# Patient Record
Sex: FEMALE | Race: BLACK OR AFRICAN AMERICAN | NOT HISPANIC OR LATINO | Employment: PART TIME | URBAN - METROPOLITAN AREA
[De-identification: names, ages, dates, MRNs, and addresses within clinical notes are randomized per-mention and may not be internally consistent; named-entity substitution may affect disease eponyms.]

---

## 2017-03-12 DIAGNOSIS — Z12.39 ENCOUNTER FOR OTHER SCREENING FOR MALIGNANT NEOPLASM OF BREAST: ICD-10-CM

## 2017-07-17 ENCOUNTER — GENERIC CONVERSION - ENCOUNTER (OUTPATIENT)
Dept: OTHER | Facility: OTHER | Age: 62
End: 2017-07-17

## 2017-07-26 ENCOUNTER — APPOINTMENT (OUTPATIENT)
Dept: RADIOLOGY | Facility: MEDICAL CENTER | Age: 62
End: 2017-07-26
Attending: COLON & RECTAL SURGERY
Payer: COMMERCIAL

## 2017-09-07 ENCOUNTER — HOSPITAL ENCOUNTER (OUTPATIENT)
Dept: RADIOLOGY | Facility: MEDICAL CENTER | Age: 62
End: 2017-09-07
Attending: COLON & RECTAL SURGERY
Payer: COMMERCIAL

## 2017-09-07 ENCOUNTER — HOSPITAL ENCOUNTER (OUTPATIENT)
Dept: LAB | Facility: MEDICAL CENTER | Age: 62
End: 2017-09-07
Attending: NURSE PRACTITIONER
Payer: COMMERCIAL

## 2017-09-07 DIAGNOSIS — E66.01 MORBID OBESITY, UNSPECIFIED OBESITY TYPE (HCC): ICD-10-CM

## 2017-09-07 DIAGNOSIS — Z01.818 PREOP EXAMINATION: ICD-10-CM

## 2017-09-07 LAB — GFR SERPL CREATININE-BSD FRML MDRD: >60 ML/MIN/1.73 M 2

## 2017-09-07 PROCEDURE — 36415 COLL VENOUS BLD VENIPUNCTURE: CPT

## 2017-09-07 PROCEDURE — 85007 BL SMEAR W/DIFF WBC COUNT: CPT

## 2017-09-07 PROCEDURE — 83540 ASSAY OF IRON: CPT

## 2017-09-07 PROCEDURE — 83550 IRON BINDING TEST: CPT

## 2017-09-07 PROCEDURE — 82306 VITAMIN D 25 HYDROXY: CPT

## 2017-09-07 PROCEDURE — 74241 DX-UPPER GI-SERIES WITH KUB: CPT

## 2017-09-07 PROCEDURE — 82607 VITAMIN B-12: CPT

## 2017-09-07 PROCEDURE — 82728 ASSAY OF FERRITIN: CPT

## 2017-09-07 PROCEDURE — 82746 ASSAY OF FOLIC ACID SERUM: CPT

## 2017-09-07 PROCEDURE — 85027 COMPLETE CBC AUTOMATED: CPT

## 2017-09-07 PROCEDURE — 80053 COMPREHEN METABOLIC PANEL: CPT

## 2017-09-07 PROCEDURE — 84443 ASSAY THYROID STIM HORMONE: CPT

## 2017-09-08 LAB
25(OH)D3 SERPL-MCNC: 36 NG/ML (ref 30–100)
ALBUMIN SERPL BCP-MCNC: 3.8 G/DL (ref 3.2–4.9)
ALBUMIN/GLOB SERPL: 1.1 G/DL
ALP SERPL-CCNC: 96 U/L (ref 30–99)
ALT SERPL-CCNC: 16 U/L (ref 2–50)
ANION GAP SERPL CALC-SCNC: 10 MMOL/L (ref 0–11.9)
AST SERPL-CCNC: 22 U/L (ref 12–45)
BASOPHILS # BLD AUTO: 0 % (ref 0–1.8)
BASOPHILS # BLD: 0 K/UL (ref 0–0.12)
BILIRUB SERPL-MCNC: 0.4 MG/DL (ref 0.1–1.5)
BUN SERPL-MCNC: 11 MG/DL (ref 8–22)
CALCIUM SERPL-MCNC: 9.7 MG/DL (ref 8.5–10.5)
CHLORIDE SERPL-SCNC: 106 MMOL/L (ref 96–112)
CO2 SERPL-SCNC: 25 MMOL/L (ref 20–33)
CREAT SERPL-MCNC: 0.9 MG/DL (ref 0.5–1.4)
EOSINOPHIL # BLD AUTO: 0.21 K/UL (ref 0–0.51)
EOSINOPHIL NFR BLD: 2.6 % (ref 0–6.9)
ERYTHROCYTE [DISTWIDTH] IN BLOOD BY AUTOMATED COUNT: 49.7 FL (ref 35.9–50)
FERRITIN SERPL-MCNC: 9.3 NG/ML (ref 10–291)
FOLATE SERPL-MCNC: >23.7 NG/ML
GLOBULIN SER CALC-MCNC: 3.5 G/DL (ref 1.9–3.5)
GLUCOSE SERPL-MCNC: 90 MG/DL (ref 65–99)
HCT VFR BLD AUTO: 37.2 % (ref 37–47)
HGB BLD-MCNC: 10.8 G/DL (ref 12–16)
HYPOCHROMIA BLD QL SMEAR: ABNORMAL
IRON SATN MFR SERPL: 10 % (ref 15–55)
IRON SERPL-MCNC: 42 UG/DL (ref 40–170)
LG PLATELETS BLD QL SMEAR: NORMAL
LYMPHOCYTES # BLD AUTO: 1.85 K/UL (ref 1–4.8)
LYMPHOCYTES NFR BLD: 22.8 % (ref 22–41)
MANUAL DIFF BLD: NORMAL
MCH RBC QN AUTO: 22.4 PG (ref 27–33)
MCHC RBC AUTO-ENTMCNC: 29 G/DL (ref 33.6–35)
MCV RBC AUTO: 77 FL (ref 81.4–97.8)
MONOCYTES # BLD AUTO: 0.28 K/UL (ref 0–0.85)
MONOCYTES NFR BLD AUTO: 3.5 % (ref 0–13.4)
MORPHOLOGY BLD-IMP: NORMAL
NEUTROPHILS # BLD AUTO: 5.76 K/UL (ref 2–7.15)
NEUTROPHILS NFR BLD: 71.1 % (ref 44–72)
NRBC # BLD AUTO: 0 K/UL
NRBC BLD AUTO-RTO: 0 /100 WBC
OVALOCYTES BLD QL SMEAR: NORMAL
PLATELET # BLD AUTO: 313 K/UL (ref 164–446)
PLATELET BLD QL SMEAR: NORMAL
PMV BLD AUTO: 13.1 FL (ref 9–12.9)
POIKILOCYTOSIS BLD QL SMEAR: NORMAL
POTASSIUM SERPL-SCNC: 4.2 MMOL/L (ref 3.6–5.5)
PROT SERPL-MCNC: 7.3 G/DL (ref 6–8.2)
RBC # BLD AUTO: 4.83 M/UL (ref 4.2–5.4)
RBC BLD AUTO: PRESENT
SODIUM SERPL-SCNC: 141 MMOL/L (ref 135–145)
TIBC SERPL-MCNC: 417 UG/DL (ref 250–450)
TSH SERPL DL<=0.005 MIU/L-ACNC: 1.48 UIU/ML (ref 0.3–3.7)
VIT B12 SERPL-MCNC: 304 PG/ML (ref 211–911)
WBC # BLD AUTO: 8.1 K/UL (ref 4.8–10.8)

## 2017-09-13 ENCOUNTER — ALLSCRIPTS OFFICE VISIT (OUTPATIENT)
Dept: OTHER | Facility: OTHER | Age: 62
End: 2017-09-13

## 2017-12-22 ENCOUNTER — HOSPITAL ENCOUNTER (OUTPATIENT)
Dept: RADIOLOGY | Facility: MEDICAL CENTER | Age: 62
End: 2017-12-22
Attending: OTOLARYNGOLOGY
Payer: COMMERCIAL

## 2017-12-22 DIAGNOSIS — C44.319 BASAL CELL CARCINOMA OF FOREHEAD: ICD-10-CM

## 2017-12-22 PROCEDURE — 70481 CT ORBIT/EAR/FOSSA W/DYE: CPT

## 2017-12-22 PROCEDURE — 700117 HCHG RX CONTRAST REV CODE 255

## 2017-12-22 RX ADMIN — IOHEXOL 80 ML: 350 INJECTION, SOLUTION INTRAVENOUS at 11:39

## 2018-01-09 NOTE — MISCELLANEOUS
Provider Comments  Provider Comments:   called patient about no show appointment   LMOM to call office back and reschedule      Signatures   Electronically signed by : Jose Gutierrez DO; Sep 13 2017 11:37PM EST                       (Author)

## 2018-01-10 NOTE — MISCELLANEOUS
Message   Recorded as Task   Date: 07/13/2017 01:21 AM, Created By: System   Task Name: Rx Renew Request   Assigned To: Zechariah High   Regarding Patient: Jesusita Melvin, Status: Active   Comment:    System - 13 Jul 2017 1:21 AM     PHARMACY: [id number]  PATIENT: Jesusita Melvin  MEDICATION: METFORMIN HCL 1,000 MG TABLET   Janet Zee - 13 Jul 2017 3:29 PM     TASK REASSIGNED: Previously Assigned To Zechariah High  Pt needs an appt  her last OV was 10/2016     Chris Escobar - 13 Jul 2017 4:21 PM     TASK EDITED  Gabino Cantor - 13 Jul 2017 4:21 PM     TASK IN PROGRESS   Vicky Knowles - 14 Jul 2017 11:07 AM     [id number] has placed an additional request for this medication: METFORMIN HCL 1,000 MG TABLET [received on Jul 14 2017 11:07:42:113AM]   Zechariah High - 14 Jul 2017 4:59 PM     TASK EDITED  sent but ovedue for appt for DM   Vicky Knowles - 17 Jul 2017 9:00 AM     TASK EDITED  Patient aware appointment made   Chris Escobar - 17 Jul 2017 9:00 AM     TASK EDITED  Please close        Signatures   Electronically signed by : Sukumar Narayanan DO; Jul 17 2017 11:45AM EST                       (Author)

## 2018-01-10 NOTE — RESULT NOTES
Verified Results  ([facility]) Hemoglobin A1c 01Apr2016 10:13AM Alessandra Lee     Test Name Result Flag Reference   Hemoglobin A1c 7 3 %Hb H    Reference Range:  American Diabetes Association (ADA) Guidelines:  <5 7: Decreased risk for diabetes  5 7 - 6 4: Increased risk for diabetes  >6 4: Ongoing Hyperglycemia of any cause  <7 0: Glycemic control for adults with diabetes   Estimated Average Glucose 163 mg/dL         Discussion/Summary   Please schedule an office visit for your Diabetes very soon    Dr Ortiz Cons

## 2018-01-11 NOTE — RESULT NOTES
Verified Results  [facility]) CMP14+eGFR 01Apr2016 10:13AM Elvira Phipps     Test Name Result Flag Reference   Glucose, Serum 97 mg/dL  65-99   BUN 14 mg/dL  8-27   Creatinine, Serum 0 84 mg/dL  0 57-1 00   eGFR If NonAfricn Am 75 mL/min/1 73  >59   eGFR If Africn Am 86 mL/min/1 73  >59   BUN/Creatinine Ratio 17  11-26   Sodium, Serum 140 mmol/L  134-144   Potassium, Serum 4 7 mmol/L  3 5-5 2   Chloride, Serum 103 mmol/L     Carbon Dioxide, Total 23 mmol/L  18-29   Calcium, Serum 11 5 mg/dL H 8 7-10 3   **Verified by repeat analysis**   Protein, Total, Serum 6 8 g/dL  6 0-8 5   Albumin, Serum 3 9 g/dL  3 6-4 8   Globulin, Total 2 9 g/dL  1 5-4 5   A/G Ratio 1 3  1 1-2 5   Bilirubin, Total 0 4 mg/dL  0 0-1 2   Alkaline Phosphatase, S 71 IU/L     AST (SGOT) 10 IU/L  0-40   ALT (SGPT) 13 IU/L  0-32       Discussion/Summary   Sugar, kidneys, minerals and liver are good    Dr Brittanie Duarte

## 2018-01-13 NOTE — RESULT NOTES
Verified Results  MAMMO SCREENING RIGHT W CAD [id number] 10:11AM Ceola Roberta     Test Name Result Flag Reference   MAMMO SCREENING RIGHT W CAD (Report)     Patient History:   Patient has history of cancer in the left breast at age 34, has    history of breast cancer at age 26, and had previous    chemotherapy  Family history of breast cancer in mother at age 80  Reduction of the right breast, 1994  Malignant mastectomy of the   left breast, 1994  Patient's BMI is 24 8  Reason for exam: history of breast cancer, mastectomy  Mammo Screening Right W CAD: February 23, 2016 - Check In #:    [de-identified]   CC, MLO, and XCCL view(s) were taken of the right breast      Technologist: EVELYN Ortega   Prior study comparison: December 26, 2014, right breast    diagnostic mammogram performed at [address]  October 10, 2014, right breast screening mammogram    performed at [address]  There are scattered fibroglandular densities  No new dominant    soft tissue mass, architectural distortion or suspicious    calcifications are noted  The skin and nipple structures are    within normal limits  Benign appearing calcifications are noted  No mammographic evidence of malignancy  No    significant changes when compared with prior studies  ASSESSMENT: BiRad:2 - Benign     Recommendation:   Routine screening mammogram of the right breast in 1 year  Analyzed by CAD     8-10% of cancers will be missed on mammography  Management of a    palpable abnormality must be based on clinical grounds  Patients   will be notified of their results via letter from our facility  Accredited by [employer] of [employer]iology and FDA  Transcription Location: [address] [place] [address] [place]: [id number]     Risk Value(s):   Myriad Table: 26 3%, MRS : Based on personal and/or    family history, consideration of hereditary risk assessment may    be warranted  Signed by:   Aracelis Brown MD   2/23/16       Plan  Encounter for screening mammogram for malignant neoplasm of breast    · Digital Unilateral Diagnostic Mammogram With CAD ; every 1 year; Last [id number]; Next 72KEC9485; Status:Active    Discussion/Summary   Mammogram is normal   Repeat in one year    Dr Awa Beavers

## 2018-01-15 NOTE — RESULT NOTES
Verified Results  (1) COMPREHENSIVE METABOLIC PANEL 26HGU1563 64:80OM Patty Redman     Test Name Result Flag Reference   Glucose, Serum 105 mg/dL H 65-99   BUN 15 mg/dL  8-27   Creatinine, Serum 0 82 mg/dL  0 57-1 00   eGFR If NonAfricn Am 76 mL/min/1 73  >59   eGFR If Africn Am 88 mL/min/1 73  >59   BUN/Creatinine Ratio 18  11-26   Sodium, Serum 138 mmol/L  134-144   **Effective October 17, 2016 the reference interval**                   for Sodium, Serum will be changing to:                                                             136 - 144   Potassium, Serum 4 4 mmol/L  3 5-5 2   **Effective October 17, 2016 the reference interval**                   for Potassium, Serum will be changing to:                                          0 -  7 days        3 7 - 5 2                                          8 - 30 days        3 7 - 6 4                                          1 -  6 months      3 8 - 6 0                                   7 months -  1 year        3 8 - 5 3                                              >1 year        3 5 - 5 2   Chloride, Serum 102 mmol/L     **Effective October 17, 2016 the reference interval**                   for Chloride, Serum will be changing to:                                                              97 - 106   Carbon Dioxide, Total 23 mmol/L  18-29   Calcium, Serum 12 2 mg/dL H 8 7-10 3   **Verified by repeat analysis**   Protein, Total, Serum 6 9 g/dL  6 0-8 5   Albumin, Serum 4 0 g/dL  3 6-4 8   Globulin, Total 2 9 g/dL  1 5-4 5   A/G Ratio 1 4  1 1-2 5   Bilirubin, Total 0 3 mg/dL  0 0-1 2   Alkaline Phosphatase, S 84 IU/L     AST (SGOT) 16 IU/L  0-40   ALT (SGPT) 16 IU/L  0-32     (1) LIPID PANEL FASTING W DIRECT LDL REFLEX [id number] 08:41AM Patty Redman     Test Name Result Flag Reference   Cholesterol, Total 190 mg/dL  100-199   Triglycerides 100 mg/dL  0-149   HDL Cholesterol 62 mg/dL  >39   According to ATP-III Guidelines, HDL-C >59 mg/dL is considered a  negative risk factor for CHD  LDL Cholesterol Calc 108 mg/dL H 0-99     (1) MICROALBUMIN CREATININE RATIO, RANDOM URINE 07Oct2016 08:41AM Татьяна Pelt     Test Name Result Flag Reference   Creatinine, Urine 74 6 mg/dL  Not Estab  Microalbumin, Urine 12 0 ug/mL  Not Estab  Microalb/Creat Ratio 16 1 mg/g creat  0 0-30 0     (1) HEMOGLOBIN A1C 07Oct2016 08:41AM Татьяна Pelt     Test Name Result Flag Reference   Hemoglobin A1c 6 5 % H 4 8-5 6   Pre-diabetes: 5 7 - 6 4           Diabetes: >6 4           Glycemic control for adults with diabetes: <7 0       Discussion/Summary   Sugar, kidneys, minerals and liver are normal/good  Cholesterol profile is normal/good  LDL goal <100  The yearly urine test for diabetic kidney issues/harm is normal       A1c level indicates good diabetes control for past 3 months  Follow up in 3 months   Dr Mili Ayoub

## 2018-01-15 NOTE — PROGRESS NOTES
History of Present Illness  Care Coordination Encounter Information:   Type of Encounter: Telephonic   Last Office Visit: 4/18/16   Spoke to Patient  Care Coordination SL Nurse Katina Dates:   The reason for call is to discuss outreach for follow up/needed services and coordination of meeting care plan treatment goals  I called Amarilis Kang as per CPCI list  She has uncontrolled diabetes and has not been seen since 4/18/16  She refuses to schedule an appointment at this time as she "lost my insurance when I retired " She states she is working on getting some type of coverage  I have offered care coordination services that are provided free of charge and provided my contact information to her  Active Problems    1  Adenocarcinoma of breast, unspecified laterality (174 9) (C50 919)   2  Arthropathy (716 90) (M12 9)   3  Benign essential hypertension (401 1) (I10)   4  Body mass index (BMI) 24 0-24 9, adult (V85 1) (Z68 24)   5  Breast cancer screening, high risk patient (V76 11) (Z12 39)   6  Chronic kidney disease, stage I (585 1) (N18 1)   7  Chronic reflux esophagitis (530 11) (K21 0)   8  Colon cancer screening (V76 51) (Z12 11)   9  Colon, diverticulosis (562 10) (K57 30)   10  Hyperlipidemia LDL goal <100 (272 4) (E78 5)   11  Immunization due (V05 9) (Z23)   12  Nicotine dependence (305 1) (F17 200)   13  Obstructive sleep apnea (327 23) (G47 33)   14  Type 2 diabetes, uncontrolled, with renal manifestation (250 42) (E11 29,E11 65)   15  Well adult on routine health check (V70 0) (Z00 00)    Past Medical History    1  History of Acute bronchitis, unspecified organism (466 0) (J20 9)   2  History of Acute maxillary sinusitis, recurrence not specified (461 0) (J01 00)   3  Acute upper respiratory infection (465 9) (J06 9)   4  Acute upper respiratory infection (465 9) (J06 9)   5  Cough (786 2) (R05)   6  History of acute bronchitis (V12 69) (Z87 09)   7  History of acute bronchitis (V12 69) (Z87 09)   8  History of acute bronchitis (V12 69) (Z87 09)   9  History of sebaceous cyst (V13 3) (Z87 2)   10  History of tinea corporis (V12 09) (Z86 19)   11  History of Late Effects Of Sprain Or Strain (905 7)   12  History of Noninfectious gastroenteritis (558 9) (K52 9)   13  History of Noninfectious gastroenteritis (558 9) (K52 9)   14  History of Paronychia of toe, unspecified laterality (681 11) (L03 039)   15  History of Shoulder joint pain, unspecified laterality   16  History of Shoulder Sprain (840 9)   17  History of Type 2 diabetes mellitus (250 00) (E11 9)    Surgical History    1  History of Breast Surgery Mastectomy    Family History  Mother    1  Denied: Family history of Colon cancer   2  Denied: Family history of Crohn disease   3  Family history of hypothyroidism (V18 19) (Z83 49)   4  Denied: Family history of Liver disease  Father    5  Denied: Family history of Colon cancer   6  Denied: Family history of Crohn disease   7  Family history of diabetes mellitus (V18 0) (Z83 3)   8  Denied: Family history of Liver disease    Social History    · Being A Social Drinker   · Current every day smoker (305 1) (F17 200)   · Nicotine dependence (305 1) (F17 200)    Current Meds    1  Irbesartan 150 MG Oral Tablet; 1 every day; Therapy: 37YNA2415 to (Last Rx:48Zud8438)  Requested for: 68IGM7945 Ordered    2  Esomeprazole Magnesium 40 MG Oral Capsule Delayed Release (NexIUM); TAKE ONE   CAPSULE BY MOUTH EVERY DAY; Therapy: 61ERP3379 to (Evaluate:18Jan2017)  Requested for: 44Hgv8386; Last   Rx:87Okg6820 Ordered    3  Accu-Chek Bessie Plus In Vitro Strip; test bid as directed, <250 00>; Therapy: 32DMU5802 to (Last Rx:04Mar2014) Ordered   4  MetFORMIN HCl - 1000 MG Oral Tablet; take 1 tablet twice a day; Therapy: 22YEW3260 to (Evaluate:85Drb5245)  Requested for: 66WJG6544; Last   Rx:22Mar2016 Ordered    Allergies    1  No Known Drug Allergies    Health Management   MAMMO SCREENING RIGHT W CAD; every 1 year;  Last 38MYM6384; Next Due: 80EEK1632; Active   COLONOSCOPY; every 10 years; Last 70GWG0802; Next Due: 78TEG3888; Active   MAMMO SCREENING RIGHT W CAD; every 1 year; Last 13Buk7467; Next Due: 13Hcp0870; Active   MAMMO SCREENING RIGHT W CAD; every 1 year; Last 23Feb2016; Next Due: 45Zll3503; Active    End of Encounter Meds    1  Irbesartan 150 MG Oral Tablet; 1 every day; Therapy: 59OIQ2442 to (Last Rx:15Jul2016)  Requested for: 86DNM8922 Ordered    2  Esomeprazole Magnesium 40 MG Oral Capsule Delayed Release (NexIUM); TAKE ONE   CAPSULE BY MOUTH EVERY DAY; Therapy: 11QIC1309 to (Evaluate:18Jan2017)  Requested for: 22Jul2016; Last   Rx:22Jul2016 Ordered    3  Accu-Chek Bessie Plus In Vitro Strip; test bid as directed, <250 00>; Therapy: 29QWI4767 to (Last Rx:04Mar2014) Ordered   4  MetFORMIN HCl - 1000 MG Oral Tablet; take 1 tablet twice a day; Therapy: 27MCP6500 to (Evaluate:18Sep2016)  Requested for: 22Mar2016; Last   Rx:22Mar2016 Ordered    Patient Care Team    Care Team Member Role Specialty Office Number   Sheldon CLEMENTE  Gastroenterology Adult [phone]     Signatures   Electronically signed by :  Darrick Song RN; Aug 22 2016 11:37AM EST                       (Author)

## 2018-02-21 ENCOUNTER — OFFICE VISIT (OUTPATIENT)
Dept: FAMILY MEDICINE CLINIC | Facility: CLINIC | Age: 63
End: 2018-02-21

## 2018-02-21 VITALS
SYSTOLIC BLOOD PRESSURE: 130 MMHG | BODY MASS INDEX: 26.05 KG/M2 | HEIGHT: 63 IN | TEMPERATURE: 98.6 F | WEIGHT: 147 LBS | HEART RATE: 64 BPM | RESPIRATION RATE: 16 BRPM | DIASTOLIC BLOOD PRESSURE: 70 MMHG

## 2018-02-21 DIAGNOSIS — I10 BENIGN ESSENTIAL HYPERTENSION: Primary | ICD-10-CM

## 2018-02-21 DIAGNOSIS — J20.9 ACUTE BRONCHITIS, UNSPECIFIED ORGANISM: ICD-10-CM

## 2018-02-21 DIAGNOSIS — IMO0002 UNCONTROLLED TYPE 2 DIABETES MELLITUS WITH OTHER DIABETIC KIDNEY COMPLICATION, WITHOUT LONG-TERM CURRENT USE OF INSULIN: ICD-10-CM

## 2018-02-21 DIAGNOSIS — K21.00 CHRONIC REFLUX ESOPHAGITIS: ICD-10-CM

## 2018-02-21 PROCEDURE — 99213 OFFICE O/P EST LOW 20 MIN: CPT | Performed by: FAMILY MEDICINE

## 2018-02-21 RX ORDER — PANTOPRAZOLE SODIUM 40 MG/1
1 TABLET, DELAYED RELEASE ORAL DAILY
COMMUNITY
Start: 2016-10-04 | End: 2018-10-24

## 2018-02-21 RX ORDER — VALSARTAN 160 MG/1
1 TABLET ORAL DAILY
COMMUNITY
Start: 2012-01-06 | End: 2018-08-24

## 2018-02-21 RX ORDER — IRBESARTAN 150 MG/1
TABLET ORAL DAILY
Refills: 0 | Status: CANCELLED | OUTPATIENT
Start: 2018-02-21

## 2018-02-21 RX ORDER — IRBESARTAN 150 MG/1
TABLET ORAL DAILY
COMMUNITY
Start: 2016-07-15 | End: 2018-02-21

## 2018-02-21 RX ORDER — PANTOPRAZOLE SODIUM 40 MG/1
40 TABLET, DELAYED RELEASE ORAL DAILY
Refills: 0 | Status: CANCELLED | OUTPATIENT
Start: 2018-02-21

## 2018-02-21 RX ORDER — AMOXICILLIN 500 MG/1
500 TABLET, FILM COATED ORAL 3 TIMES DAILY
Qty: 30 TABLET | Refills: 0 | Status: SHIPPED | OUTPATIENT
Start: 2018-02-21 | End: 2018-03-03

## 2018-02-21 RX ORDER — IRBESARTAN 150 MG/1
150 TABLET ORAL DAILY
Qty: 30 TABLET | Refills: 3 | Status: SHIPPED | OUTPATIENT
Start: 2018-02-21 | End: 2018-08-24 | Stop reason: SDUPTHER

## 2018-02-21 NOTE — PROGRESS NOTES
Assessment/Plan:    No problem-specific Assessment & Plan notes found for this encounter  Needs dm f/u asap  Overdue  q3m advised  Getting insurance  Risks of meds aware alonzo unmonitored  Bronchitis possible pneumonia     Diagnoses and all orders for this visit:    Benign essential hypertension  -     irbesartan (AVAPRO) 150 mg tablet; Take 1 tablet (150 mg total) by mouth daily    Uncontrolled type 2 diabetes mellitus with other diabetic kidney complication, without long-term current use of insulin (HCC)  -     metFORMIN (GLUCOPHAGE) 1000 MG tablet; Take 1 tablet (1,000 mg total) by mouth 2 (two) times a day with meals    Chronic reflux esophagitis    Acute bronchitis, unspecified organism  -     amoxicillin (AMOXIL) 500 MG tablet; Take 1 tablet (500 mg total) by mouth 3 (three) times a day for 10 days    Other orders  -     valsartan (DIOVAN) 160 mg tablet; Take 1 tablet by mouth daily  -     pantoprazole (PROTONIX) 40 mg tablet; Take 1 tablet by mouth daily  -     Discontinue: irbesartan (AVAPRO) 150 mg tablet; Take by mouth daily  -     glucose blood (ACCU-CHEK TAVON PLUS) test strip; by In Vitro route  -     Discontinue: metFORMIN (GLUCOPHAGE) 1000 MG tablet; TAKE 1 TABLET BY MOUTH TWICE A DAY *PT NEEDS TO MAKE APPT*  -     Cancel: irbesartan (AVAPRO) 150 mg tablet; Take by mouth daily  -     Cancel: metFORMIN (GLUCOPHAGE) 1000 MG tablet;   -     Cancel: pantoprazole (PROTONIX) 40 mg tablet; Take 1 tablet (40 mg total) by mouth daily              Return if symptoms worsen or fail to improve  Subjective:      Patient ID: Maya Knutson is a 57 y o  female      Chief Complaint   Patient presents with    Generalized Body Aches       2d, cough, severe body aches, mucus is yellow and thick, still smoking, no fever  Not seen in 16m, aware of DM risk  No ha or dizziness  Sneezing  Stomach sore from coughing  No cp or sob      Generalized Body Aches         The following portions of the patient's history were reviewed and updated as appropriate: allergies, current medications, past family history, past medical history, past social history, past surgical history and problem list     Review of Systems   Neurological: Negative for dizziness and syncope  Current Outpatient Prescriptions   Medication Sig Dispense Refill    glucose blood (ACCU-CHEK TAVON PLUS) test strip by In Vitro route      irbesartan (AVAPRO) 150 mg tablet Take 1 tablet (150 mg total) by mouth daily 30 tablet 3    metFORMIN (GLUCOPHAGE) 1000 MG tablet Take 1 tablet (1,000 mg total) by mouth 2 (two) times a day with meals 60 tablet 3    pantoprazole (PROTONIX) 40 mg tablet Take 1 tablet by mouth daily      valsartan (DIOVAN) 160 mg tablet Take 1 tablet by mouth daily      amoxicillin (AMOXIL) 500 MG tablet Take 1 tablet (500 mg total) by mouth 3 (three) times a day for 10 days 30 tablet 0     No current facility-administered medications for this visit  Objective:    /70   Pulse 64   Temp 98 6 °F (37 °C)   Resp 16   Ht 5' 3" (1 6 m)   Wt 66 7 kg (147 lb)   BMI 26 04 kg/m²        Physical Exam   Constitutional: She appears well-developed  HENT:   Head: Normocephalic  Eyes: Conjunctivae are normal    Neck: Neck supple  Cardiovascular: Normal rate and intact distal pulses  Pulmonary/Chest: Effort normal  No respiratory distress  Coarse rhonchi right mid lung   Abdominal: Soft  Musculoskeletal: She exhibits no edema or deformity  Neurological: She is alert  Skin: Skin is warm and dry  Psychiatric: Her behavior is normal  Thought content normal    Nursing note and vitals reviewed               Saud Hennessy DO

## 2018-02-25 DIAGNOSIS — IMO0002 UNCONTROLLED TYPE 2 DIABETES MELLITUS WITH OTHER DIABETIC KIDNEY COMPLICATION, WITHOUT LONG-TERM CURRENT USE OF INSULIN: ICD-10-CM

## 2018-08-24 DIAGNOSIS — I10 BENIGN ESSENTIAL HYPERTENSION: ICD-10-CM

## 2018-08-24 RX ORDER — IRBESARTAN 150 MG/1
TABLET ORAL
Qty: 30 TABLET | Refills: 0 | Status: SHIPPED | OUTPATIENT
Start: 2018-08-24 | End: 2018-10-01 | Stop reason: SDUPTHER

## 2018-10-01 DIAGNOSIS — I10 BENIGN ESSENTIAL HYPERTENSION: ICD-10-CM

## 2018-10-01 RX ORDER — IRBESARTAN 150 MG/1
TABLET ORAL
Qty: 30 TABLET | Refills: 0 | Status: SHIPPED | OUTPATIENT
Start: 2018-10-01 | End: 2018-10-24

## 2018-10-24 ENCOUNTER — OFFICE VISIT (OUTPATIENT)
Dept: FAMILY MEDICINE CLINIC | Facility: CLINIC | Age: 63
End: 2018-10-24
Payer: MEDICARE

## 2018-10-24 VITALS
SYSTOLIC BLOOD PRESSURE: 138 MMHG | HEART RATE: 76 BPM | DIASTOLIC BLOOD PRESSURE: 84 MMHG | RESPIRATION RATE: 16 BRPM | BODY MASS INDEX: 24.59 KG/M2 | TEMPERATURE: 97.6 F | WEIGHT: 138.8 LBS | HEIGHT: 63 IN

## 2018-10-24 DIAGNOSIS — E11.29 TYPE 2 DIABETES MELLITUS WITH OTHER DIABETIC KIDNEY COMPLICATION, WITHOUT LONG-TERM CURRENT USE OF INSULIN (HCC): Primary | ICD-10-CM

## 2018-10-24 DIAGNOSIS — N18.1 CKD STAGE 1 DUE TO TYPE 2 DIABETES MELLITUS (HCC): ICD-10-CM

## 2018-10-24 DIAGNOSIS — Z78.0 MENOPAUSE: ICD-10-CM

## 2018-10-24 DIAGNOSIS — E78.5 HYPERLIPIDEMIA LDL GOAL <100: ICD-10-CM

## 2018-10-24 DIAGNOSIS — Z13.83 SCREENING FOR CARDIOVASCULAR, RESPIRATORY, AND GENITOURINARY DISEASES: ICD-10-CM

## 2018-10-24 DIAGNOSIS — Z12.39 BREAST CANCER SCREENING: ICD-10-CM

## 2018-10-24 DIAGNOSIS — I10 BENIGN ESSENTIAL HYPERTENSION: ICD-10-CM

## 2018-10-24 DIAGNOSIS — Z23 IMMUNIZATION DUE: ICD-10-CM

## 2018-10-24 DIAGNOSIS — Z13.89 SCREENING FOR CARDIOVASCULAR, RESPIRATORY, AND GENITOURINARY DISEASES: ICD-10-CM

## 2018-10-24 DIAGNOSIS — F17.208 NICOTINE DEPENDENCE WITH OTHER NICOTINE-INDUCED DISORDER, UNSPECIFIED NICOTINE PRODUCT TYPE: ICD-10-CM

## 2018-10-24 DIAGNOSIS — Z12.11 COLON CANCER SCREENING: ICD-10-CM

## 2018-10-24 DIAGNOSIS — E11.22 CKD STAGE 1 DUE TO TYPE 2 DIABETES MELLITUS (HCC): ICD-10-CM

## 2018-10-24 DIAGNOSIS — Z13.6 SCREENING FOR CARDIOVASCULAR, RESPIRATORY, AND GENITOURINARY DISEASES: ICD-10-CM

## 2018-10-24 PROCEDURE — G0009 ADMIN PNEUMOCOCCAL VACCINE: HCPCS

## 2018-10-24 PROCEDURE — 90670 PCV13 VACCINE IM: CPT

## 2018-10-24 PROCEDURE — 90662 IIV NO PRSV INCREASED AG IM: CPT

## 2018-10-24 PROCEDURE — 99214 OFFICE O/P EST MOD 30 MIN: CPT | Performed by: FAMILY MEDICINE

## 2018-10-24 PROCEDURE — G0008 ADMIN INFLUENZA VIRUS VAC: HCPCS

## 2018-10-24 RX ORDER — AMLODIPINE BESYLATE 10 MG/1
10 TABLET ORAL DAILY
Qty: 90 TABLET | Refills: 0 | Status: SHIPPED | OUTPATIENT
Start: 2018-10-24 | End: 2019-03-04 | Stop reason: SDUPTHER

## 2018-10-24 NOTE — PROGRESS NOTES
Assessment/Plan:    No problem-specific Assessment & Plan notes found for this encounter  Diagnoses and all orders for this visit:    Type 2 diabetes mellitus with other diabetic kidney complication, without long-term current use of insulin ([facility] 75 )  -     Comprehensive metabolic panel; Future  -     Hemoglobin A1C; Future  -     Microalbumin / creatinine urine ratio; Future  -     metFORMIN (GLUCOPHAGE) 1000 MG tablet; Take 1 tablet (1,000 mg total) by mouth 2 (two) times a day with meals    Screening for cardiovascular, respiratory, and genitourinary diseases  -     Lipid Panel with Direct LDL reflex; Future    Immunization due  -     PNEUMOCOCCAL CONJUGATE VACCINE 13-VALENT GREATER THAN 6 MONTHS  -     influenza vaccine, 5480-2586, high-dose, PF 0 5 mL, for patients 65 yr+ (FLUZONE HIGH-DOSE)    CKD stage 1 due to type 2 diabetes mellitus ([facility] 75 )  -     Comprehensive metabolic panel; Future    Nicotine dependence with other nicotine-induced disorder, unspecified nicotine product type  -     CT lung screening program; Future    Hyperlipidemia LDL goal <100    Benign essential hypertension  -     amLODIPine (NORVASC) 10 mg tablet; Take 1 tablet (10 mg total) by mouth daily    Breast cancer screening  -     Mammo screening bilateral w cad; Future    Colon cancer screening  -     Ambulatory referral to Gastroenterology; Future    Menopause  -     DXA bone density spine hip and pelvis; Future              Return in about 1 month (around 11/24/2018) for Recheck  Subjective:      Patient ID: Nina Rojas is a 70 y o  female      Chief Complaint   Patient presents with    Hypertension    Diabetes    Hyperlipidemia     [facility]    Cough     beleives it is from blood pressure medication       HPI  Taking meds  Cough   Smokes still  Willing to get lung CT scan  Coughed in past due ACEi  DM risks aware, needs q3m monitoring  Ongoing shoulder issue, WC injury  htn stable, no cp    The following portions of the patient's history were reviewed and updated as appropriate: allergies, current medications, past family history, past medical history, past social history, past surgical history and problem list     Review of Systems   Neurological: Negative for dizziness and syncope  Current Outpatient Prescriptions   Medication Sig Dispense Refill    glucose blood (ACCU-CHEK TAVON PLUS) test strip by In Vitro route      metFORMIN (GLUCOPHAGE) 1000 MG tablet Take 1 tablet (1,000 mg total) by mouth 2 (two) times a day with meals 180 tablet 0    amLODIPine (NORVASC) 10 mg tablet Take 1 tablet (10 mg total) by mouth daily 90 tablet 0     No current facility-administered medications for this visit  Objective:    /84   Pulse 76   Temp 97 6 °F (36 4 °C)   Resp 16   Ht 5' 3" (1 6 m)   Wt 63 kg (138 lb 12 8 oz)   BMI 24 59 kg/m²        Physical Exam   Constitutional: She appears well-developed  HENT:   Head: Normocephalic  Eyes: Conjunctivae are normal    Neck: Neck supple  Cardiovascular: Normal rate and intact distal pulses  Pulses are no weak pulses  Pulses:       Dorsalis pedis pulses are 2+ on the right side, and 2+ on the left side  Pulmonary/Chest: Effort normal  No respiratory distress  Abdominal: Soft  Musculoskeletal: She exhibits no edema or deformity  Feet:   Right Foot:   Skin Integrity: Negative for ulcer, skin breakdown, erythema, warmth, callus or dry skin  Left Foot:   Skin Integrity: Negative for ulcer, skin breakdown, erythema, warmth, callus or dry skin  Neurological: She is alert  Skin: Skin is warm and dry  Psychiatric: Her behavior is normal  Thought content normal    Nursing note and vitals reviewed  Patient's shoes and socks removed  Right Foot/Ankle   Right Foot Inspection  Skin Exam: skin normal and skin intact no dry skin, no warmth, no callus, no erythema, no maceration, no abnormal color, no pre-ulcer, no ulcer and no callus Sensory       Monofilament testing: intact  Vascular    The right DP pulse is 2+  Left Foot/Ankle  Left Foot Inspection  Skin Exam: skin normal and skin intactno dry skin, no warmth, no erythema, no maceration, normal color, no pre-ulcer, no ulcer and no callus                                         Sensory       Monofilament: intact  Vascular    The left DP pulse is 2+  Assign Risk Category:  No deformity present; No loss of protective sensation;  No weak pulses       Risk: 0           Norm Sharron, DO

## 2018-10-24 NOTE — PATIENT INSTRUCTIONS
TEST STRIPS for Diabetes monitoring can be prescribed to your pharmacy  Since the test strips are unique to the [facility] of the glucose meter, it is in your best economic interest to find out the SPECIFIC BRAND and TYPE of test strip covered by your insurance company  You can find this out from your insurance company or participating pharmacy  Once you obtain this information, we can prescribe the specific branded test strip of your choosing  As a Type 2 Diabetic, it would be informative to check and record your blood sugars at home to help manage sugar control and identify any fluctuations that may be due to diet/exercise  You can check your sugar ONCE A DAY at [facility] DIFFERENT times in an alternating fashion: (1) before BREAKFAST, then the next day (2) before LUNCH, the following day (3) before DINNER, and the following day (4) before BEDTIME (then start over again)  Identifying trends may help you determine if changes are needed to what you eat, when you eat it, your activity level, and your medications  You should bring this records with you to your Diabetes follow-up appointments every 3 months  Stay on your metformin for diabetes  Change blood pressure pill to amlodipine to see if cough occurs

## 2018-10-25 ENCOUNTER — APPOINTMENT (OUTPATIENT)
Dept: LAB | Facility: HOSPITAL | Age: 63
End: 2018-10-25
Attending: FAMILY MEDICINE
Payer: MEDICARE

## 2018-10-25 ENCOUNTER — TELEPHONE (OUTPATIENT)
Dept: GASTROENTEROLOGY | Facility: AMBULARY SURGERY CENTER | Age: 63
End: 2018-10-25

## 2018-10-25 ENCOUNTER — TELEPHONE (OUTPATIENT)
Dept: FAMILY MEDICINE CLINIC | Facility: CLINIC | Age: 63
End: 2018-10-25

## 2018-10-25 ENCOUNTER — TRANSCRIBE ORDERS (OUTPATIENT)
Dept: ADMINISTRATIVE | Facility: HOSPITAL | Age: 63
End: 2018-10-25

## 2018-10-25 DIAGNOSIS — Z13.6 SCREENING FOR CARDIOVASCULAR, RESPIRATORY, AND GENITOURINARY DISEASES: ICD-10-CM

## 2018-10-25 DIAGNOSIS — Z13.83 SCREENING FOR CARDIOVASCULAR, RESPIRATORY, AND GENITOURINARY DISEASES: ICD-10-CM

## 2018-10-25 DIAGNOSIS — E11.29 TYPE 2 DIABETES MELLITUS WITH OTHER DIABETIC KIDNEY COMPLICATION, WITHOUT LONG-TERM CURRENT USE OF INSULIN (HCC): ICD-10-CM

## 2018-10-25 DIAGNOSIS — N18.1 CKD STAGE 1 DUE TO TYPE 2 DIABETES MELLITUS (HCC): ICD-10-CM

## 2018-10-25 DIAGNOSIS — E11.22 CKD STAGE 1 DUE TO TYPE 2 DIABETES MELLITUS (HCC): ICD-10-CM

## 2018-10-25 DIAGNOSIS — Z13.89 SCREENING FOR CARDIOVASCULAR, RESPIRATORY, AND GENITOURINARY DISEASES: ICD-10-CM

## 2018-10-25 DIAGNOSIS — Z12.39 BREAST CANCER SCREENING: Primary | ICD-10-CM

## 2018-10-25 LAB
ALBUMIN SERPL BCP-MCNC: 3.6 G/DL (ref 3.5–5)
ALP SERPL-CCNC: 83 U/L (ref 46–116)
ALT SERPL W P-5'-P-CCNC: 30 U/L (ref 12–78)
ANION GAP SERPL CALCULATED.3IONS-SCNC: 8 MMOL/L (ref 4–13)
AST SERPL W P-5'-P-CCNC: 15 U/L (ref 5–45)
BILIRUB SERPL-MCNC: 0.6 MG/DL (ref 0.2–1)
BUN SERPL-MCNC: 19 MG/DL (ref 5–25)
CALCIUM ALBUM COR SERPL-MCNC: 11.5 MG/DL (ref 8.3–10.1)
CALCIUM SERPL-MCNC: 11.2 MG/DL (ref 8.3–10.1)
CHLORIDE SERPL-SCNC: 105 MMOL/L (ref 100–108)
CHOLEST SERPL-MCNC: 151 MG/DL (ref 50–200)
CO2 SERPL-SCNC: 30 MMOL/L (ref 21–32)
CREAT SERPL-MCNC: 1.11 MG/DL (ref 0.6–1.3)
CREAT UR-MCNC: 174 MG/DL
EST. AVERAGE GLUCOSE BLD GHB EST-MCNC: 186 MG/DL
GFR SERPL CREATININE-BSD FRML MDRD: 60 ML/MIN/1.73SQ M
GLUCOSE P FAST SERPL-MCNC: 79 MG/DL (ref 65–99)
HBA1C MFR BLD: 8.1 % (ref 4.2–6.3)
HDLC SERPL-MCNC: 63 MG/DL (ref 40–60)
LDLC SERPL CALC-MCNC: 73 MG/DL (ref 0–100)
MICROALBUMIN UR-MCNC: 37.9 MG/L (ref 0–20)
MICROALBUMIN/CREAT 24H UR: 22 MG/G CREATININE (ref 0–30)
POTASSIUM SERPL-SCNC: 3.6 MMOL/L (ref 3.5–5.3)
PROT SERPL-MCNC: 7.5 G/DL (ref 6.4–8.2)
SODIUM SERPL-SCNC: 143 MMOL/L (ref 136–145)
TRIGL SERPL-MCNC: 76 MG/DL

## 2018-10-25 PROCEDURE — 36415 COLL VENOUS BLD VENIPUNCTURE: CPT

## 2018-10-25 PROCEDURE — 80061 LIPID PANEL: CPT

## 2018-10-25 PROCEDURE — 82043 UR ALBUMIN QUANTITATIVE: CPT

## 2018-10-25 PROCEDURE — 82570 ASSAY OF URINE CREATININE: CPT

## 2018-10-25 PROCEDURE — 80053 COMPREHEN METABOLIC PANEL: CPT

## 2018-10-25 PROCEDURE — 83036 HEMOGLOBIN GLYCOSYLATED A1C: CPT

## 2018-10-25 NOTE — TELEPHONE ENCOUNTER
Abdi Melo  1955  [address]  [phone]  Cell Phone     Screened by: Mary Telles  ]    Referring Dr :     Pre- Screening:   Has patient been referred for a routine screening Colonoscopy? yes  Is the patient over 48years of age? yes    If the answer is YES to both questions, proceed to the medical questions  Do you have any of the following symptoms? Have you had a coronary or vascular stent within the last year? no    Have you had a heart attack or stroke in the last 6 months? no    Have you had intestinal surgery in the last 3 months? no    Do you have problems with:    Do you use:  Oxygen no  CPAP/BiPAP no    Have you been hospitalized in the last Month? no    Have you been diagnosed with a bleeding disorder or anemia? no    Have you had chest pain (angina) or breathing problems  (COPD) in the last 3 months? no     Do you have any difficulty walking up a flight of stairs? no    Have you had Kidney failure or insufficiency? no    Have you had heart valve surgery? no    Are you confined to a wheelchair? no    Do you take     Do you take insulin for Diabetes YES  Name of medication:     : If patient answers NO to medical questions, then schedule procedure  If patient answers YES to medical questions, then schedule office appointment  Previous Colonoscopy yes   (if yes) Date and Facility of last colonoscopy? Patient scheduled for procedure:   Scheduled by:     Time:   Provider:   Location:     Insurance:   Referral Required? Were instructions Mailed? [facility]   Was the prep sent to Pharmacy?    Comments: Venkatesh Pathak

## 2018-10-25 NOTE — TELEPHONE ENCOUNTER
Mammo needs to be changed to right breast   Dr Michael George wrote it for bilateral      Please put new order in epic    Thank you

## 2018-10-25 NOTE — TELEPHONE ENCOUNTER
lmom letting patient know that order is in Epic for her but if she would like it printed we would be happy to print a copy for her      No further action needed

## 2018-11-06 ENCOUNTER — HOSPITAL ENCOUNTER (OUTPATIENT)
Dept: RADIOLOGY | Facility: HOSPITAL | Age: 63
Discharge: HOME | End: 2018-11-06
Attending: FAMILY MEDICINE
Payer: MEDICARE

## 2018-11-06 DIAGNOSIS — Z12.39 BREAST CANCER SCREENING: ICD-10-CM

## 2018-11-06 DIAGNOSIS — Z78.0 MENOPAUSE: ICD-10-CM

## 2018-11-06 PROCEDURE — 77067 SCR MAMMO BI INCL CAD: CPT

## 2018-11-06 PROCEDURE — 77080 DXA BONE DENSITY AXIAL: CPT

## 2018-11-14 ENCOUNTER — OFFICE VISIT (OUTPATIENT)
Dept: GASTROENTEROLOGY | Facility: CLINIC | Age: 63
End: 2018-11-14
Payer: MEDICARE

## 2018-11-14 VITALS
TEMPERATURE: 96.9 F | RESPIRATION RATE: 18 BRPM | WEIGHT: 140.8 LBS | DIASTOLIC BLOOD PRESSURE: 84 MMHG | HEART RATE: 92 BPM | HEIGHT: 63 IN | SYSTOLIC BLOOD PRESSURE: 138 MMHG | BODY MASS INDEX: 24.95 KG/M2

## 2018-11-14 DIAGNOSIS — Z12.11 COLON CANCER SCREENING: ICD-10-CM

## 2018-11-14 DIAGNOSIS — K21.9 GASTROESOPHAGEAL REFLUX DISEASE, ESOPHAGITIS PRESENCE NOT SPECIFIED: Primary | ICD-10-CM

## 2018-11-14 PROCEDURE — 99204 OFFICE O/P NEW MOD 45 MIN: CPT | Performed by: INTERNAL MEDICINE

## 2018-11-14 NOTE — LETTER
November 14, 2018     Rodolfo Bashir, [address]  [facility] [address]    Patient: Anna Hawk   YOB: 1955   Date of Visit: 11/14/2018       Dear Dr Digna Patel: Thank you for referring Anna Hawk to me for evaluation  Below are my notes for this consultation  If you have questions, please do not hesitate to call me  I look forward to following your patient along with you  Sincerely,        Lashell Terry MD        CC: No Recipients  Lashell Terry MD  11/14/2018 11:57 AM  Sign at close encounter  Consultation - [facility]) [facility]  Anna Hawk 70 y o  female MRN: [id number]  Unit/Bed#:  Encounter: [id number]        Consults    ASSESSMENT/PLAN:   1  Colon cancer screening-up to date, underwent colonoscopy in January 2015 by Dr Mesha Kenny, was noted to have diverticulosis and internal hemorrhoids only  She is recommended repeat colonoscopy at 10 year interval however patient endorses that she has family history of colon cancer therefore would recommend 5 year interval instead  Nonetheless, patient is not due for colonoscopy at this time, will recall in 2 years  2   Longstanding symptoms of GERD-has taken over-the-counter Prilosec in the past, takes baking soda at this time, concern for Kapoor's esophagus given chronicity of symptoms, will schedule EGD for evaluation of Kapoor's  Meanwhile patient is instructed to start on low-dose Prilosec, 30 min before breakfast   - Patient was explained about the lifestyle and dietary modifications  Advised to avoid fatty foods, chocolates, caffeine, alcohol and any other triggering foods    Avoid eating for at least 3 hours before going to bed   -labs were reviewed, LFTs are normal  No other labs to CBC or TSH, states that will get this done with her PCP           ______________________________________________________________________    Reason for Consult / Principal Problem: [unfilled]    HPI: Yanely Mike is a 70y o  year old female presents for colon cancer screening evaluation  Patient states that she underwent colonoscopy 3 years ago  I reviewed the report, it appears that she only had mild diverticulosis and internal hemorrhoids, she was recommended a repeat colonoscopy at 10 year interval   Patient states that she does have family history of colon cancer in father in 62s  She denies any change in bowel habits, hematochezia, abdominal pain, unintentional weight loss  She does endorse a longstanding symptoms of acid reflux  She states that she has taken over-the-counter medications which is Prilosec and Nexium in the past but most recently has been taking baking soda for symptomatic relief  She denies dysphagia, odynophagia, nausea, vomiting, hematemesis or melena  No NSAID use  Review of Systems: The remainder of the review of systems was negative except for the pertinent positives noted in HPI  Historical Information   Past Medical History:   Diagnosis Date    [facility]2006    last assessed 7/10/13     Past Surgical History:   Procedure Laterality Date    MASTECTOMY      last assessed 12/16/14     Social History   History   Alcohol Use    Yes     Comment: Social     History   Drug Use No     History   Smoking Status    Current Every Day Smoker   Smokeless Tobacco    Never Used     Comment: Nicotine dependence     Family History   Problem Relation Age of Onset    Hypothyroidism Mother     Diabetes Father        Meds/Allergies       (Not in a hospital admission)  No current facility-administered medications for this visit  No Known Allergies    Objective     Blood pressure 138/84, pulse 92, temperature (!) 96 9 °F (36 1 °C), temperature source Tympanic, resp  rate 18, height 5' 3" (1 6 m), weight 63 9 kg (140 lb 12 8 oz)      [unfilled]    PHYSICAL EXAM     GEN: well nourished, well developed, no acute distress  HEENT: anicteric, MMM, no cervical or supraclavicular lymphadenopathy  CV: RRR, no m/r/g  CHEST: CTA b/l, no WRR  ABD: +BS, soft, NT/ND, no hepatosplenomegaly  EXT: no c/c/e  SKIN: no rashes,  NEURO: aaox3    Lab Results:   No visits with results within 1 Day(s) from this visit  Latest known visit with results is:   Appointment on 10/25/2018   Component Date Value    Sodium 10/25/2018 143     Potassium 10/25/2018 3 6     Chloride 10/25/2018 105     CO2 10/25/2018 30     ANION GAP 10/25/2018 8     BUN 10/25/2018 19     Creatinine 10/25/2018 1 11     Glucose, Fasting 10/25/2018 79     Calcium 10/25/2018 11 2*    Corrected Calcium 10/25/2018 11 5*    AST 10/25/2018 15     ALT 10/25/2018 30     Alkaline Phosphatase 10/25/2018 83     Total Protein 10/25/2018 7 5     Albumin 10/25/2018 3 6     Total Bilirubin 10/25/2018 0 60     eGFR 10/25/2018 60     Cholesterol 10/25/2018 151     Triglycerides 10/25/2018 76     HDL, Direct 10/25/2018 63*    LDL Calculated 10/25/2018 73     Hemoglobin A1C 10/25/2018 8 1*    EAG 10/25/2018 186     Creatinine, Ur 10/25/2018 174 0     Microalbum  ,U,Random 10/25/2018 37 9*    Microalb Creat Ratio 10/25/2018 22      Imaging Studies: I have personally reviewed pertinent films in PACS

## 2018-11-14 NOTE — PROGRESS NOTES
Consultation - [facility]  Grace Posada 70 y o  female MRN: [id number]  Unit/Bed#:  Encounter: [id number]        Consults    ASSESSMENT/PLAN:   1  Colon cancer screening-up to date, underwent colonoscopy in January 2015 by Dr Crystal Levi, was noted to have diverticulosis and internal hemorrhoids only  She is recommended repeat colonoscopy at 10 year interval however patient endorses that she has family history of colon cancer therefore would recommend 5 year interval instead  Nonetheless, patient is not due for colonoscopy at this time, will recall in 2 years  2   Longstanding symptoms of GERD-has taken over-the-counter Prilosec in the past, takes baking soda at this time, concern for Kapoor's esophagus given chronicity of symptoms, will schedule EGD for evaluation of Kapoor's  Meanwhile patient is instructed to start on low-dose Prilosec, 30 min before breakfast   - Patient was explained about the lifestyle and dietary modifications  Advised to avoid fatty foods, chocolates, caffeine, alcohol and any other triggering foods  Avoid eating for at least 3 hours before going to bed   -labs were reviewed, LFTs are normal  No other labs to CBC or TSH, states that will get this done with her PCP           ______________________________________________________________________    Reason for Consult / Principal Problem: [unfilled]    HPI: Grace Posada is a 70y o  year old female presents for colon cancer screening evaluation  Patient states that she underwent colonoscopy 3 years ago  I reviewed the report, it appears that she only had mild diverticulosis and internal hemorrhoids, she was recommended a repeat colonoscopy at 10 year interval   Patient states that she does have family history of colon cancer in father in 62s  She denies any change in bowel habits, hematochezia, abdominal pain, unintentional weight loss  She does endorse a longstanding symptoms of acid reflux    She states that she has taken over-the-counter medications which is Prilosec and Nexium in the past but most recently has been taking baking soda for symptomatic relief  She denies dysphagia, odynophagia, nausea, vomiting, hematemesis or melena  No NSAID use  Review of Systems: The remainder of the review of systems was negative except for the pertinent positives noted in HPI  Historical Information   Past Medical History:   Diagnosis Date    Diabetes mellitus, type 2 ([facility] ) 03/15/2006    last assessed 7/10/13     Past Surgical History:   Procedure Laterality Date    MASTECTOMY      last assessed 12/16/14     Social History   History   Alcohol Use    Yes     Comment: Social     History   Drug Use No     History   Smoking Status    Current Every Day Smoker   Smokeless Tobacco    Never Used     Comment: Nicotine dependence     Family History   Problem Relation Age of Onset    Hypothyroidism Mother     Diabetes Father        Meds/Allergies       (Not in a hospital admission)  No current facility-administered medications for this visit  No Known Allergies    Objective     Blood pressure 138/84, pulse 92, temperature (!) 96 9 °F (36 1 °C), temperature source Tympanic, resp  rate 18, height 5' 3" (1 6 m), weight 63 9 kg (140 lb 12 8 oz)  [unfilled]    PHYSICAL EXAM     GEN: well nourished, well developed, no acute distress  HEENT: anicteric, MMM, no cervical or supraclavicular lymphadenopathy  CV: RRR, no m/r/g  CHEST: CTA b/l, no WRR  ABD: +BS, soft, NT/ND, no hepatosplenomegaly  EXT: no c/c/e  SKIN: no rashes,  NEURO: aaox3    Lab Results:   No visits with results within 1 Day(s) from this visit     Latest known visit with results is:   Appointment on 10/25/2018   Component Date Value    Sodium 10/25/2018 143     Potassium 10/25/2018 3 6     Chloride 10/25/2018 105     CO2 10/25/2018 30     ANION GAP 10/25/2018 8     BUN 10/25/2018 19     Creatinine 10/25/2018 1 11     Glucose, Fasting 10/25/2018 79     Calcium 10/25/2018 11 2*    Corrected Calcium 10/25/2018 11 5*    AST 10/25/2018 15     ALT 10/25/2018 30     Alkaline Phosphatase 10/25/2018 83     Total Protein 10/25/2018 7 5     Albumin 10/25/2018 3 6     Total Bilirubin 10/25/2018 0 60     eGFR 10/25/2018 60     Cholesterol 10/25/2018 151     Triglycerides 10/25/2018 76     HDL, Direct 10/25/2018 63*    LDL Calculated 10/25/2018 73     Hemoglobin A1C 10/25/2018 8 1*    EAG 10/25/2018 186     Creatinine, Ur 10/25/2018 174 0     Microalbum  ,U,Random 10/25/2018 37 9*    Microalb Creat Ratio 10/25/2018 22      Imaging Studies: I have personally reviewed pertinent films in PACS

## 2018-12-04 ENCOUNTER — TELEPHONE (OUTPATIENT)
Dept: FAMILY MEDICINE CLINIC | Facility: CLINIC | Age: 63
End: 2018-12-04

## 2018-12-20 ENCOUNTER — ANESTHESIA EVENT (OUTPATIENT)
Dept: GASTROENTEROLOGY | Facility: AMBULARY SURGERY CENTER | Age: 63
End: 2018-12-20

## 2018-12-20 NOTE — ANESTHESIA PREPROCEDURE EVALUATION
Review of Systems/Medical History  Patient summary reviewed  Chart reviewed  No history of anesthetic complications     Cardiovascular  Hyperlipidemia, Hypertension ,    Pulmonary  Smoker cigarette smoker  , Tobacco cessation counseling given ,        GI/Hepatic    GERD ,        Kidney disease CKD, Chronic kidney disease stage 1,        Endo/Other  Diabetes ,      GYN       Hematology   Musculoskeletal       Neurology   Psychology           Physical Exam    Airway    Mallampati score: II  TM Distance: >3 FB  Neck ROM: full     Dental       Cardiovascular  Cardiovascular exam normal    Pulmonary  Pulmonary exam normal     Other Findings        Anesthesia Plan  ASA Score- 2     Anesthesia Type- IV sedation with anesthesia with ASA Monitors  Additional Monitors:   Airway Plan:         Plan Factors-    Induction-     Postoperative Plan-     Informed Consent- Anesthetic plan and risks discussed with patient

## 2018-12-21 ENCOUNTER — ANESTHESIA (OUTPATIENT)
Dept: GASTROENTEROLOGY | Facility: AMBULARY SURGERY CENTER | Age: 63
End: 2018-12-21

## 2019-03-04 DIAGNOSIS — I10 BENIGN ESSENTIAL HYPERTENSION: ICD-10-CM

## 2019-03-04 RX ORDER — AMLODIPINE BESYLATE 10 MG/1
TABLET ORAL
Qty: 90 TABLET | Refills: 0 | Status: SHIPPED | OUTPATIENT
Start: 2019-03-04 | End: 2020-04-16 | Stop reason: SDUPTHER

## 2019-03-10 ENCOUNTER — HOSPITAL ENCOUNTER (INPATIENT)
Facility: MEDICAL CENTER | Age: 64
LOS: 3 days | DRG: 607 | End: 2019-03-15
Attending: EMERGENCY MEDICINE | Admitting: INTERNAL MEDICINE
Payer: MEDICARE

## 2019-03-10 DIAGNOSIS — D64.9 ANEMIA, UNSPECIFIED TYPE: ICD-10-CM

## 2019-03-10 DIAGNOSIS — C44.90 SKIN CANCER: ICD-10-CM

## 2019-03-10 LAB
BASOPHILS # BLD AUTO: 0.2 % (ref 0–1.8)
BASOPHILS # BLD: 0.03 K/UL (ref 0–0.12)
EOSINOPHIL # BLD AUTO: 0 K/UL (ref 0–0.51)
EOSINOPHIL NFR BLD: 0 % (ref 0–6.9)
ERYTHROCYTE [DISTWIDTH] IN BLOOD BY AUTOMATED COUNT: 47.8 FL (ref 35.9–50)
HCT VFR BLD AUTO: 23.5 % (ref 37–47)
HGB BLD-MCNC: 7.3 G/DL (ref 12–16)
IMM GRANULOCYTES # BLD AUTO: 0.18 K/UL (ref 0–0.11)
IMM GRANULOCYTES NFR BLD AUTO: 0.9 % (ref 0–0.9)
LYMPHOCYTES # BLD AUTO: 1.8 K/UL (ref 1–4.8)
LYMPHOCYTES NFR BLD: 9.3 % (ref 22–41)
MCH RBC QN AUTO: 28.2 PG (ref 27–33)
MCHC RBC AUTO-ENTMCNC: 31.1 G/DL (ref 33.6–35)
MCV RBC AUTO: 90.7 FL (ref 81.4–97.8)
MONOCYTES # BLD AUTO: 0.79 K/UL (ref 0–0.85)
MONOCYTES NFR BLD AUTO: 4.1 % (ref 0–13.4)
NEUTROPHILS # BLD AUTO: 16.61 K/UL (ref 2–7.15)
NEUTROPHILS NFR BLD: 85.5 % (ref 44–72)
NRBC # BLD AUTO: 0 K/UL
NRBC BLD-RTO: 0 /100 WBC
PLATELET # BLD AUTO: 283 K/UL (ref 164–446)
PMV BLD AUTO: 11.5 FL (ref 9–12.9)
RBC # BLD AUTO: 2.59 M/UL (ref 4.2–5.4)
WBC # BLD AUTO: 19.4 K/UL (ref 4.8–10.8)

## 2019-03-10 PROCEDURE — 96375 TX/PRO/DX INJ NEW DRUG ADDON: CPT

## 2019-03-10 PROCEDURE — 85610 PROTHROMBIN TIME: CPT

## 2019-03-10 PROCEDURE — 82746 ASSAY OF FOLIC ACID SERUM: CPT

## 2019-03-10 PROCEDURE — 85025 COMPLETE CBC W/AUTO DIFF WBC: CPT

## 2019-03-10 PROCEDURE — 83550 IRON BINDING TEST: CPT

## 2019-03-10 PROCEDURE — 82607 VITAMIN B-12: CPT

## 2019-03-10 PROCEDURE — 700111 HCHG RX REV CODE 636 W/ 250 OVERRIDE (IP): Performed by: EMERGENCY MEDICINE

## 2019-03-10 PROCEDURE — 99285 EMERGENCY DEPT VISIT HI MDM: CPT

## 2019-03-10 PROCEDURE — 85730 THROMBOPLASTIN TIME PARTIAL: CPT

## 2019-03-10 PROCEDURE — 83540 ASSAY OF IRON: CPT

## 2019-03-10 PROCEDURE — 96374 THER/PROPH/DIAG INJ IV PUSH: CPT

## 2019-03-10 PROCEDURE — G0378 HOSPITAL OBSERVATION PER HR: HCPCS

## 2019-03-10 PROCEDURE — 99497 ADVNCD CARE PLAN 30 MIN: CPT | Performed by: INTERNAL MEDICINE

## 2019-03-10 PROCEDURE — 80048 BASIC METABOLIC PNL TOTAL CA: CPT

## 2019-03-10 PROCEDURE — 99220 PR INITIAL OBSERVATION CARE,LEVL III: CPT | Mod: 25 | Performed by: INTERNAL MEDICINE

## 2019-03-10 RX ORDER — ONDANSETRON 4 MG/1
4 TABLET, ORALLY DISINTEGRATING ORAL EVERY 4 HOURS PRN
Status: DISCONTINUED | OUTPATIENT
Start: 2019-03-10 | End: 2019-03-13

## 2019-03-10 RX ORDER — TEMAZEPAM 15 MG/1
45 CAPSULE ORAL
Status: DISCONTINUED | OUTPATIENT
Start: 2019-03-10 | End: 2019-03-13

## 2019-03-10 RX ORDER — ONDANSETRON 2 MG/ML
4 INJECTION INTRAMUSCULAR; INTRAVENOUS EVERY 4 HOURS PRN
Status: DISCONTINUED | OUTPATIENT
Start: 2019-03-10 | End: 2019-03-13

## 2019-03-10 RX ORDER — SODIUM CHLORIDE, SODIUM LACTATE, POTASSIUM CHLORIDE, CALCIUM CHLORIDE 600; 310; 30; 20 MG/100ML; MG/100ML; MG/100ML; MG/100ML
INJECTION, SOLUTION INTRAVENOUS CONTINUOUS
Status: DISCONTINUED | OUTPATIENT
Start: 2019-03-10 | End: 2019-03-13

## 2019-03-10 RX ORDER — MORPHINE SULFATE 4 MG/ML
4 INJECTION, SOLUTION INTRAMUSCULAR; INTRAVENOUS ONCE
Status: COMPLETED | OUTPATIENT
Start: 2019-03-10 | End: 2019-03-10

## 2019-03-10 RX ORDER — ACETAMINOPHEN 325 MG/1
650 TABLET ORAL EVERY 6 HOURS PRN
Status: DISCONTINUED | OUTPATIENT
Start: 2019-03-10 | End: 2019-03-13

## 2019-03-10 RX ORDER — OMEPRAZOLE 20 MG/1
20 CAPSULE, DELAYED RELEASE ORAL 2 TIMES DAILY
Status: DISCONTINUED | OUTPATIENT
Start: 2019-03-10 | End: 2019-03-13

## 2019-03-10 RX ORDER — HYDROCODONE BITARTRATE AND ACETAMINOPHEN 5; 325 MG/1; MG/1
1-2 TABLET ORAL EVERY 4 HOURS PRN
Status: DISCONTINUED | OUTPATIENT
Start: 2019-03-10 | End: 2019-03-13

## 2019-03-10 RX ORDER — MORPHINE SULFATE 4 MG/ML
2-4 INJECTION, SOLUTION INTRAMUSCULAR; INTRAVENOUS
Status: DISCONTINUED | OUTPATIENT
Start: 2019-03-10 | End: 2019-03-13

## 2019-03-10 RX ORDER — ONDANSETRON 2 MG/ML
4 INJECTION INTRAMUSCULAR; INTRAVENOUS ONCE
Status: COMPLETED | OUTPATIENT
Start: 2019-03-10 | End: 2019-03-10

## 2019-03-10 RX ADMIN — MORPHINE SULFATE 4 MG: 4 INJECTION INTRAVENOUS at 20:35

## 2019-03-10 RX ADMIN — ONDANSETRON 4 MG: 2 INJECTION INTRAMUSCULAR; INTRAVENOUS at 20:27

## 2019-03-10 ASSESSMENT — ENCOUNTER SYMPTOMS
BLOOD IN STOOL: 1
WEAKNESS: 1
SHORTNESS OF BREATH: 0
HEADACHES: 1

## 2019-03-10 ASSESSMENT — LIFESTYLE VARIABLES: DO YOU DRINK ALCOHOL: NO

## 2019-03-11 PROBLEM — D64.9 ANEMIA: Status: ACTIVE | Noted: 2019-03-11

## 2019-03-11 PROBLEM — C44.90 SKIN CANCER: Status: ACTIVE | Noted: 2019-03-11

## 2019-03-11 PROBLEM — I95.9 HYPOTENSION: Status: ACTIVE | Noted: 2019-03-11

## 2019-03-11 LAB
ANION GAP SERPL CALC-SCNC: 10 MMOL/L (ref 0–11.9)
ANION GAP SERPL CALC-SCNC: 6 MMOL/L (ref 0–11.9)
APTT PPP: 26.1 SEC (ref 24.7–36)
BASOPHILS # BLD AUTO: 0.3 % (ref 0–1.8)
BASOPHILS # BLD: 0.04 K/UL (ref 0–0.12)
BUN SERPL-MCNC: 21 MG/DL (ref 8–22)
BUN SERPL-MCNC: 22 MG/DL (ref 8–22)
CALCIUM SERPL-MCNC: 7.3 MG/DL (ref 8.5–10.5)
CALCIUM SERPL-MCNC: 7.7 MG/DL (ref 8.5–10.5)
CHLORIDE SERPL-SCNC: 108 MMOL/L (ref 96–112)
CHLORIDE SERPL-SCNC: 111 MMOL/L (ref 96–112)
CO2 SERPL-SCNC: 19 MMOL/L (ref 20–33)
CO2 SERPL-SCNC: 19 MMOL/L (ref 20–33)
COMMENT 1642: NORMAL
CREAT SERPL-MCNC: 0.89 MG/DL (ref 0.5–1.4)
CREAT SERPL-MCNC: 1.11 MG/DL (ref 0.5–1.4)
EOSINOPHIL # BLD AUTO: 0.1 K/UL (ref 0–0.51)
EOSINOPHIL NFR BLD: 0.7 % (ref 0–6.9)
ERYTHROCYTE [DISTWIDTH] IN BLOOD BY AUTOMATED COUNT: 47.3 FL (ref 35.9–50)
FOLATE SERPL-MCNC: >23.6 NG/ML
GLUCOSE SERPL-MCNC: 156 MG/DL (ref 65–99)
GLUCOSE SERPL-MCNC: 191 MG/DL (ref 65–99)
HCT VFR BLD AUTO: 21.6 % (ref 37–47)
HGB BLD-MCNC: 6.8 G/DL (ref 12–16)
HYPOCHROMIA BLD QL SMEAR: ABNORMAL
IMM GRANULOCYTES # BLD AUTO: 0.08 K/UL (ref 0–0.11)
IMM GRANULOCYTES NFR BLD AUTO: 0.6 % (ref 0–0.9)
INR PPP: 1.1 (ref 0.87–1.13)
IRON SATN MFR SERPL: 23 % (ref 15–55)
IRON SATN MFR SERPL: 7 % (ref 15–55)
IRON SERPL-MCNC: 20 UG/DL (ref 40–170)
IRON SERPL-MCNC: 58 UG/DL (ref 40–170)
LG PLATELETS BLD QL SMEAR: NORMAL
LYMPHOCYTES # BLD AUTO: 1.66 K/UL (ref 1–4.8)
LYMPHOCYTES NFR BLD: 12.4 % (ref 22–41)
MCH RBC QN AUTO: 28.1 PG (ref 27–33)
MCHC RBC AUTO-ENTMCNC: 31.5 G/DL (ref 33.6–35)
MCV RBC AUTO: 89.3 FL (ref 81.4–97.8)
MONOCYTES # BLD AUTO: 0.41 K/UL (ref 0–0.85)
MONOCYTES NFR BLD AUTO: 3.1 % (ref 0–13.4)
MORPHOLOGY BLD-IMP: NORMAL
NEUTROPHILS # BLD AUTO: 11.05 K/UL (ref 2–7.15)
NEUTROPHILS NFR BLD: 82.9 % (ref 44–72)
NRBC # BLD AUTO: 0 K/UL
NRBC BLD-RTO: 0 /100 WBC
PLATELET # BLD AUTO: 241 K/UL (ref 164–446)
PLATELET BLD QL SMEAR: NORMAL
PMV BLD AUTO: 12.1 FL (ref 9–12.9)
POTASSIUM SERPL-SCNC: 3.7 MMOL/L (ref 3.6–5.5)
POTASSIUM SERPL-SCNC: 4.3 MMOL/L (ref 3.6–5.5)
PROTHROMBIN TIME: 14.4 SEC (ref 12–14.6)
RBC # BLD AUTO: 2.42 M/UL (ref 4.2–5.4)
RBC BLD AUTO: PRESENT
SODIUM SERPL-SCNC: 136 MMOL/L (ref 135–145)
SODIUM SERPL-SCNC: 137 MMOL/L (ref 135–145)
TIBC SERPL-MCNC: 256 UG/DL (ref 250–450)
TIBC SERPL-MCNC: 269 UG/DL (ref 250–450)
VIT B12 SERPL-MCNC: 266 PG/ML (ref 211–911)
WBC # BLD AUTO: 13.3 K/UL (ref 4.8–10.8)

## 2019-03-11 PROCEDURE — 700111 HCHG RX REV CODE 636 W/ 250 OVERRIDE (IP): Performed by: INTERNAL MEDICINE

## 2019-03-11 PROCEDURE — 700105 HCHG RX REV CODE 258: Performed by: INTERNAL MEDICINE

## 2019-03-11 PROCEDURE — A9270 NON-COVERED ITEM OR SERVICE: HCPCS | Performed by: INTERNAL MEDICINE

## 2019-03-11 PROCEDURE — 700102 HCHG RX REV CODE 250 W/ 637 OVERRIDE(OP): Performed by: INTERNAL MEDICINE

## 2019-03-11 PROCEDURE — 36415 COLL VENOUS BLD VENIPUNCTURE: CPT

## 2019-03-11 PROCEDURE — 80048 BASIC METABOLIC PNL TOTAL CA: CPT

## 2019-03-11 PROCEDURE — G0378 HOSPITAL OBSERVATION PER HR: HCPCS

## 2019-03-11 PROCEDURE — 83550 IRON BINDING TEST: CPT

## 2019-03-11 PROCEDURE — 99498 ADVNCD CARE PLAN ADDL 30 MIN: CPT | Performed by: NURSE PRACTITIONER

## 2019-03-11 PROCEDURE — 83540 ASSAY OF IRON: CPT

## 2019-03-11 PROCEDURE — 99497 ADVNCD CARE PLAN 30 MIN: CPT | Performed by: INTERNAL MEDICINE

## 2019-03-11 PROCEDURE — 96376 TX/PRO/DX INJ SAME DRUG ADON: CPT

## 2019-03-11 PROCEDURE — 85025 COMPLETE CBC W/AUTO DIFF WBC: CPT

## 2019-03-11 PROCEDURE — 99226 PR SUBSEQUENT OBSERVATION CARE,LEVEL III: CPT | Mod: 25 | Performed by: INTERNAL MEDICINE

## 2019-03-11 PROCEDURE — 99497 ADVNCD CARE PLAN 30 MIN: CPT | Performed by: NURSE PRACTITIONER

## 2019-03-11 RX ORDER — FERROUS SULFATE 325(65) MG
325 TABLET ORAL 2 TIMES DAILY WITH MEALS
Status: DISCONTINUED | OUTPATIENT
Start: 2019-03-11 | End: 2019-03-13

## 2019-03-11 RX ORDER — CHOLECALCIFEROL (VITAMIN D3) 125 MCG
1000 CAPSULE ORAL DAILY
Status: DISCONTINUED | OUTPATIENT
Start: 2019-03-11 | End: 2019-03-13

## 2019-03-11 RX ORDER — SODIUM CHLORIDE 9 MG/ML
250 INJECTION, SOLUTION INTRAVENOUS ONCE
Status: COMPLETED | OUTPATIENT
Start: 2019-03-11 | End: 2019-03-11

## 2019-03-11 RX ORDER — MECLIZINE HYDROCHLORIDE 25 MG/1
25 TABLET ORAL 3 TIMES DAILY PRN
Status: DISCONTINUED | OUTPATIENT
Start: 2019-03-11 | End: 2019-03-13

## 2019-03-11 RX ADMIN — HYDROCODONE BITARTRATE AND ACETAMINOPHEN 2 TABLET: 5; 325 TABLET ORAL at 12:36

## 2019-03-11 RX ADMIN — HYDROCODONE BITARTRATE AND ACETAMINOPHEN 1 TABLET: 5; 325 TABLET ORAL at 08:36

## 2019-03-11 RX ADMIN — HYDROCODONE BITARTRATE AND ACETAMINOPHEN 2 TABLET: 5; 325 TABLET ORAL at 17:28

## 2019-03-11 RX ADMIN — HYDROCODONE BITARTRATE AND ACETAMINOPHEN 2 TABLET: 5; 325 TABLET ORAL at 21:42

## 2019-03-11 RX ADMIN — HYDROCODONE BITARTRATE AND ACETAMINOPHEN 2 TABLET: 5; 325 TABLET ORAL at 01:30

## 2019-03-11 RX ADMIN — FERROUS SULFATE TAB 325 MG (65 MG ELEMENTAL FE) 325 MG: 325 (65 FE) TAB at 17:28

## 2019-03-11 RX ADMIN — TEMAZEPAM 45 MG: 15 CAPSULE ORAL at 21:10

## 2019-03-11 RX ADMIN — SODIUM CHLORIDE 250 ML: 9 INJECTION, SOLUTION INTRAVENOUS at 15:35

## 2019-03-11 RX ADMIN — SODIUM CHLORIDE, POTASSIUM CHLORIDE, SODIUM LACTATE AND CALCIUM CHLORIDE: 600; 310; 30; 20 INJECTION, SOLUTION INTRAVENOUS at 17:28

## 2019-03-11 RX ADMIN — FERROUS SULFATE TAB 325 MG (65 MG ELEMENTAL FE) 325 MG: 325 (65 FE) TAB at 08:32

## 2019-03-11 RX ADMIN — OMEPRAZOLE 20 MG: 20 CAPSULE, DELAYED RELEASE ORAL at 17:29

## 2019-03-11 RX ADMIN — CYANOCOBALAMIN TAB 500 MCG 1000 MCG: 500 TAB at 15:35

## 2019-03-11 RX ADMIN — MORPHINE SULFATE 4 MG: 4 INJECTION INTRAVENOUS at 20:02

## 2019-03-11 RX ADMIN — OMEPRAZOLE 20 MG: 20 CAPSULE, DELAYED RELEASE ORAL at 01:26

## 2019-03-11 RX ADMIN — SODIUM CHLORIDE, POTASSIUM CHLORIDE, SODIUM LACTATE AND CALCIUM CHLORIDE: 600; 310; 30; 20 INJECTION, SOLUTION INTRAVENOUS at 01:27

## 2019-03-11 RX ADMIN — TEMAZEPAM 45 MG: 15 CAPSULE ORAL at 01:26

## 2019-03-11 RX ADMIN — MORPHINE SULFATE 4 MG: 4 INJECTION INTRAVENOUS at 15:44

## 2019-03-11 RX ADMIN — SODIUM CHLORIDE, POTASSIUM CHLORIDE, SODIUM LACTATE AND CALCIUM CHLORIDE: 600; 310; 30; 20 INJECTION, SOLUTION INTRAVENOUS at 08:32

## 2019-03-11 RX ADMIN — MORPHINE SULFATE 3 MG: 4 INJECTION INTRAVENOUS at 10:44

## 2019-03-11 RX ADMIN — MORPHINE SULFATE 4 MG: 4 INJECTION INTRAVENOUS at 23:34

## 2019-03-11 ASSESSMENT — ENCOUNTER SYMPTOMS
SORE THROAT: 0
VOMITING: 0
ABDOMINAL PAIN: 0
BLOOD IN STOOL: 0
HALLUCINATIONS: 0
PALPITATIONS: 0
COUGH: 0
MEMORY LOSS: 0
WHEEZING: 0
FEVER: 0
DIAPHORESIS: 0
SHORTNESS OF BREATH: 0
CHILLS: 0
DIARRHEA: 0
MYALGIAS: 1
BLURRED VISION: 1
BACK PAIN: 0
SENSORY CHANGE: 0
DIZZINESS: 0
NECK PAIN: 0
BLURRED VISION: 0
PHOTOPHOBIA: 0
FLANK PAIN: 0
SPUTUM PRODUCTION: 0
FOCAL WEAKNESS: 0
MYALGIAS: 0
EYE REDNESS: 0
DEPRESSION: 0
HEADACHES: 0
SPEECH CHANGE: 0
DOUBLE VISION: 0
NERVOUS/ANXIOUS: 0
NAUSEA: 0
SEIZURES: 0
WEAKNESS: 1

## 2019-03-11 ASSESSMENT — PATIENT HEALTH QUESTIONNAIRE - PHQ9
1. LITTLE INTEREST OR PLEASURE IN DOING THINGS: NOT AT ALL
SUM OF ALL RESPONSES TO PHQ9 QUESTIONS 1 AND 2: 0
2. FEELING DOWN, DEPRESSED, IRRITABLE, OR HOPELESS: NOT AT ALL

## 2019-03-11 ASSESSMENT — COGNITIVE AND FUNCTIONAL STATUS - GENERAL
DRESSING REGULAR UPPER BODY CLOTHING: A LITTLE
SUGGESTED CMS G CODE MODIFIER DAILY ACTIVITY: CJ
MOBILITY SCORE: 19
CLIMB 3 TO 5 STEPS WITH RAILING: A LITTLE
MOVING TO AND FROM BED TO CHAIR: A LITTLE
DAILY ACTIVITIY SCORE: 20
HELP NEEDED FOR BATHING: A LITTLE
STANDING UP FROM CHAIR USING ARMS: A LITTLE
DRESSING REGULAR LOWER BODY CLOTHING: A LITTLE
PERSONAL GROOMING: A LITTLE
WALKING IN HOSPITAL ROOM: A LITTLE
MOVING FROM LYING ON BACK TO SITTING ON SIDE OF FLAT BED: A LITTLE
SUGGESTED CMS G CODE MODIFIER MOBILITY: CK

## 2019-03-11 NOTE — ED NOTES
Tele contacted for a second time that patient is ready for transport to the floor. MERVIN Weber unit charge states she will find the nurse to come retrieve the patient.

## 2019-03-11 NOTE — ASSESSMENT & PLAN NOTE
Metastatic skin cancer diagnosed at [facility] in 2017, with metastases to bony calvarium.  Complicated by ?arterial bleeding.  Refused treatment at that time and continues to not want aggressive therapies.  Palliative care discussion resulted in her CODE STATUS being changed to DNR/DNI.  -Palliative care following  -Social work following, no hospice agencies in the area; plan to go home with support of her PCP  -Declines blood transfusion secondary to being Zoroastrian  -Supportive care with iron and IV fluids

## 2019-03-11 NOTE — CARE PLAN
Problem: Communication  Goal: The ability to communicate needs accurately and effectively will improve  Outcome: PROGRESSING AS EXPECTED  Patient able to call as needed for assistance.      Problem: Pain Management  Goal: Pain level will decrease to patient's comfort goal  Outcome: PROGRESSING AS EXPECTED  Patient with open head lesions due to cancer diagnosis.  Medicated per MAR for pain.  Resting comfortably at this time.

## 2019-03-11 NOTE — PROGRESS NOTES
Pt ambulated to the bathroom SBA. HR jumped up 150s and pt was feeling lightheaded and dizzy. Pt laid back down in bed and HR returned to 101 and pt symptoms resolved.

## 2019-03-11 NOTE — H&P
Hospital Medicine History & Physical Note    Date of Service  3/10/2019    Primary Care Physician  Shanta Krause M.D.    Consultants  None    Code Status  Full code    Chief Complaint  Left ear bleeding    History of Presenting Illness  63 y.o. female with a past medical history of metastatic skin cancer who presented 3/10/2019 with bleeding from her left ear started earlier today.  Patient is transferred from [facility].  Patient states she was diagnosed with metastatic skin cancer of her left ear in [facility] in 2017, she was evaluated by multiple ENT physicians and was recommended extensive surgery of her left ear and face, chemotherapy and radiation therapy which she refused.  Since then she has been taking naturopathic medications to manage her cancer.  Over the last few weeks she noted some bright red blood after bowel movements which have since then resolved.  Today she developed profuse bleeding from her left ear skin cancer site and presented to [facility].  Her bleeding has now resolved.  Patient is a Rastafari and does not want any blood transfusion.  She reports pain of her left ear at the site of her cancer.  I had a discussion with the patient and family members at bedside regarding her metastatic cancer and her poor prognosis given her intention for limited treatment.  She would not like any surgeries, chemotherapy or radiation therapy.  I recommended palliative care, pain control and symptom management and enrollment into hospice.  The patient and family agreed.  I have changed her CODE STATUS to DNR/DNI    Review of Systems  Review of Systems   Constitutional: Negative for chills, diaphoresis and fever.   HENT: Negative for hearing loss and sore throat.    Eyes: Negative for blurred vision.   Respiratory: Negative for cough, sputum production, shortness of breath and wheezing.    Cardiovascular: Negative for chest pain, palpitations and leg swelling.    Gastrointestinal: Negative for abdominal pain, blood in stool, diarrhea, nausea and vomiting.   Genitourinary: Negative for dysuria, flank pain and urgency.   Musculoskeletal: Negative for back pain, joint pain, myalgias and neck pain.   Skin: Negative for rash.   Neurological: Negative for dizziness, focal weakness, seizures and headaches.   Endo/Heme/Allergies:        Bleeding from left ear skin cancer site   Psychiatric/Behavioral: Negative for suicidal ideas.   All other systems reviewed and are negative.      Past Medical History   has a past medical history of Back pain; Dental disorder; Heart murmur; Pneumonia; Psychiatric disorder; and Sleep apnea.    Surgical History   has a past surgical history that includes other abdominal surgery; pr gastric bypass,obese<150cm javier-en-y; hysterectomy, total abdominal; knee arthroscopy; colonoscopy; foot surgery; and wrist orif (4/3/2014).     Family History  family history includes Cancer (age of onset: 42) in her sister.     Social History   reports that she has never smoked. She does not have any smokeless tobacco history on file. She reports that she uses drugs, including Oral. She reports that she does not drink alcohol.    Allergies  Allergies   Allergen Reactions   • Benadryl [Altaryl] Palpitations   • Codeine Vomiting   • Elavil [Amitriptyline Hcl]    • Percocet [Oxycodone-Acetaminophen] Vomiting   • Trazodone        Medications  Prior to Admission Medications   Prescriptions Last Dose Informant Patient Reported? Taking?   temazepam (RESTORIL) 15 MG CAPS 3/9/2019 at 2200  Yes No   Sig: Take 45 mg by mouth every bedtime.      Facility-Administered Medications: None       Physical Exam  Temp:  [36.4 °C (97.5 °F)] 36.4 °C (97.5 °F)  Pulse:  [] 96  Resp:  [6-18] 9  BP: (90)/(67) 90/67  SpO2:  [93 %-97 %] 93 %    Physical Exam   Constitutional: She is oriented to person, place, and time. She appears well-developed and well-nourished. No distress.   HENT:    Head: Normocephalic and atraumatic.   Mouth/Throat: Oropharynx is clear and moist.   Left mastoid mass with clotted blood  Left preauricular ulcer   Eyes: Pupils are equal, round, and reactive to light. Conjunctivae are normal.   Neck: Neck supple. No thyromegaly present.   Cardiovascular: Normal rate, regular rhythm and normal heart sounds.    Pulmonary/Chest: Effort normal and breath sounds normal. No respiratory distress. She has no wheezes. She has no rales.   Abdominal: Soft. Bowel sounds are normal. She exhibits no distension. There is no tenderness. There is no rebound.   Musculoskeletal: Normal range of motion. She exhibits no edema or tenderness.   Neurological: She is alert and oriented to person, place, and time. No cranial nerve deficit. Coordination normal.   Skin: Skin is warm and dry. There is pallor.   Psychiatric: She has a normal mood and affect. Her behavior is normal.   Nursing note and vitals reviewed.      Laboratory:          No results for input(s): ALTSGPT, ASTSGOT, ALKPHOSPHAT, TBILIRUBIN, DBILIRUBIN, GAMMAGT, AMYLASE, LIPASE, ALB, PREALBUMIN, GLUCOSE in the last 72 hours.              No results for input(s): TROPONINI in the last 72 hours.    Urinalysis:    No results found     Imaging:  No orders to display         Assessment/Plan:  I anticipate this patient is appropriate for observation status at this time.    Skin cancer- (present on admission)   Assessment & Plan    Metastatic skin cancer diagnosed at [facility] in 2017  Patient refused all treatment at the time and does not want any treatment for cancer at this time  Bleeding from cancerous growth has now stopped  Patient does not want any blood transfusions  Pain control with oxycodone and IV morphine, monitor respiratory status  She is DNR/DNI  I have consulted palliative care for advanced care planning, patient would benefit from hospice placement     Anemia- (present on admission)   Assessment & Plan    Secondary to chronic  blood loss from cancerous growth  Patient is Islam and will not receive blood transfusions  Check iron studies, vitamin B12, folate and TSH  Iron studies consistent with deficiency, I will start the patient on ferrous sulfate twice daily         VTE prophylaxis: scd    I discussed advance care planning with the patient for at least 15 minutes with the patient and family, including diagnosis, prognosis, plan of care, risks and benefits of any therapies that could be offered, as well as alternatives including palliation and hospice, as appropriate. The patient has opted for palliative care. Time spent is exclusive of evaluation and management or other separately billable procedures. Start time 10 10 pm: End time: 10 35 pm

## 2019-03-11 NOTE — ED NOTES
Med rec updated and complete.  Allergies reviewed.  Pt denies oral antibiotic use in last 30 days at home.

## 2019-03-11 NOTE — CARE PLAN
Problem: Safety  Goal: Will remain free from falls  Outcome: PROGRESSING AS EXPECTED  Pt has been free of falls. Safety precautions in place. Will continue to monitor.     Problem: Knowledge Deficit  Goal: Knowledge of disease process/condition, treatment plan, diagnostic tests, and medications will improve  Outcome: PROGRESSING AS EXPECTED  Educated pt on POC which includes IV fluids, palliative care consult, pain management. Pt verbalized understanding

## 2019-03-11 NOTE — DISCHARGE PLANNING
Received Choice form at 2451  Agency/Facility Name: [facility]  Referral sent per Choice form @ 4048

## 2019-03-11 NOTE — ED NOTES
Patient updated on POC. Patient's bed lowered for comfort. Patient has no complaints at this time. NAD. VSS.

## 2019-03-11 NOTE — ED NOTES
ERP at bedside for patient evaluation. Patient A&O x4, calm and cooperative. NAD at this time. Will continue to monitor closely.    agree

## 2019-03-11 NOTE — ASSESSMENT & PLAN NOTE
Secondary to iron deficiency with acute/chronic blood loss from cancerous growth. Patient is Confucianist and declines blood transfusions. Hemoglobin down to 5.6, but improved to 6.8 today.  - vitamin B12 low normal, add supplements  - supportive care with ferrous sulfate

## 2019-03-11 NOTE — ED TRIAGE NOTES
".  Chief Complaint   Patient presents with   • Bloody Stools   • Weakness   Pt transfer - BIB EMS(fixed wing) from [facility].  Pt c/o bleeding \" from left inner ear this morning \" and weakness.  Left ear bleeding onset approximately 5 days ago.  Pt also reports having blood in her stools over \" the last few weeks, maybe longer. \"  Pt has history of brain cancer, diagnosed late 2017.  Pt declines radiation or chemotherapy treatment.  Pt pale, skin dry.  Pressure dressing left ear intact.  Bleeding controlled.    "

## 2019-03-11 NOTE — DISCHARGE PLANNING
Anticipated Discharge Disposition: Home with hospice.    Action: RN CM phoned [facility], [place], and spoke with Newton. Newton states they do serve the [place] area depending on the case and to please send referral when CHOICE is obtained. RN CM met with pt and her family at the bedside to discuss hospice. Pt gave CHOICE which was faxed to [facility] at ext. [phone]. Support given and questions answered.    Barriers to Discharge: Accepting hospice agency.    Plan: Notify pt and IDT when accepting hospice agency is obtained.

## 2019-03-11 NOTE — ED PROVIDER NOTES
ED Provider Note    Scribed for Syd Crespo M.D. by Keri Kern. 3/10/2019, 7:32 PM.    Primary care provider: Shanta Krause M.D.  Means of arrival: Med flight   History obtained from: Patient  History limited by: None    CHIEF COMPLAINT  Chief Complaint   Patient presents with   • Bloody Stools   • Weakness       HPI  Josephine Casas is a 63 y.o. female who presents to the Emergency Department as a transfer from [place]. She initially presented to the transferring facility with left ear bleeding and generalized weakness. Patient states she lost approximately 5 L of blood from the left ear after using black salve on the affected area. She reports noticing two lumps in the back of her head that is new. Patient states she has been experiencing constipation over the last few weeks and was noted to have bright red blood with BM. She denies prior history of bloody stools. Patient reports new onset of headache and vision changes exacerbated by light. She states this is new onset over the last few months. She was diagnosed with metastatic skin cancer in 2017 at [facility] however declined further treatment with extensive surgery, radiation or chemotherapy treatment. Denies chest pain, fever, or shortness of breath. She refuses blood transfusion as a Voodoo. She is DNR status.     Transfer data:    PT/INR normal     CMP  Glucose 240   BUN 21  Co2 19   Creatinine 1.28  AST 38   Anion gap abnormal 20  Otherwise normal CMP    CBC  WBC 14  HGB 8.8    ANC 8.8 otherwise normal differential     REVIEW OF SYSTEMS  Review of Systems   HENT:        Positive for controlled bleeding from left ear   Respiratory: Negative for shortness of breath.    Cardiovascular: Negative for chest pain.   Gastrointestinal: Positive for blood in stool.   Neurological: Positive for weakness and headaches.        Positive for vision changes   All other systems reviewed and are negative.      PAST MEDICAL HISTORY   has a past  "medical history of Back pain; Dental disorder; Heart murmur; Pneumonia; Psychiatric disorder; and Sleep apnea.    SURGICAL HISTORY   has a past surgical history that includes other abdominal surgery; gastric bypass,obese<150cm javier-en-y; hysterectomy, total abdominal; knee arthroscopy; colonoscopy; foot surgery; and wrist orif (4/3/2014).    SOCIAL HISTORY  Social History   Substance Use Topics   • Smoking status: Never Smoker   • Smokeless tobacco: Not on file   • Alcohol use No      History   Drug Use   • Types: Oral       FAMILY HISTORY  Family History   Problem Relation Age of Onset   • Cancer Sister 42        mets        CURRENT MEDICATIONS  No current facility-administered medications on file prior to encounter.      Current Outpatient Prescriptions on File Prior to Encounter   Medication Sig Dispense Refill   • temazepam (RESTORIL) 15 MG CAPS Take 45 mg by mouth every bedtime.       ALLERGIES  Allergies   Allergen Reactions   • Benadryl [Altaryl] Palpitations   • Codeine Vomiting   • Elavil [Amitriptyline Hcl]    • Percocet [Oxycodone-Acetaminophen] Vomiting   • Trazodone        PHYSICAL EXAM  VITAL SIGNS: BP (!) 90/67   Pulse 95   Temp 36.4 °C (97.5 °F) (Oral)   Resp 18   Ht 1.702 m (5' 7\")   Wt 99.8 kg (220 lb)   SpO2 95%   BMI 34.46 kg/m²     Constitutional:  Alert in mild distress  HENT: Moist mucous membranes, Clotted mass over left mastoid and excoriated tumor anterior to the left ear, both measuring approximately 5-7 cm.   Eyes: No conjunctivitis or icterus  Neck: trachea is midline, no palpable thyroid  Lymphatic: No cervical lymphadenopathy  Cardiovascular: Regular rate and rhythm, no murmurs  Thorax & Lungs: Normal breath sounds, no rhonchi  Abdomen: Soft, Non-tender  Rectal: Heme negative, No impaction   Skin:. no rash  Extremities:  no edema  Vascular: symmetric radial pulse  Neurologic: Normal gross motor    COURSE & MEDICAL DECISION MAKING  Pertinent Labs & Imaging studies reviewed. (See " chart for details)    7:32 PM Patient seen and examined at bedside. Patient will be treated with 4 mg Zofran and 4 mg/ml morphine. We discussed further plan of care including admission for pain control and palliative care. Patient and family members understand and are agreeable.     8:18 PM Consulted Dr. Lema, Hospitalist, who agreed to admit patient.         FINAL IMPRESSION  1. Anemia, unspecified type       I, Keri Kern (Scribe), am scribing for, and in the presence of, Syd Crespo M.D..  Electronically signed by: Keri Kern (Scribe), 3/10/2019  I, Syd Crespo M.D. personally performed the services described in this documentation, as scribed by Keri Kern in my presence, and it is both accurate and complete. C.     The note accurately reflects work and decisions made by me.  Syd Crespo  3/10/2019  10:58 PM

## 2019-03-11 NOTE — DISCHARGE PLANNING
Care Transition Team Assessment    Patient lives in [facility] with spouse, Gary ([phone]). She lives in a two story house but has everything she needs on the main level and does not go upstairs.  Patient informs me she is a Restoration and is therefore does not accept Blood Products.   Family member will take her home when she is ready for discharge.    Information Source  Information Given By: Patient  Who is responsible for making decisions for patient? : Patient    Readmission Evaluation  Is this a readmission?: No    Elopement Risk  Legal Hold: No    Interdisciplinary Discharge Planning  Does Admitting Nurse Feel This Could be a Complex Discharge?: No  Primary Care Physician: Jimi  Lives with - Patient's Self Care Capacity: Spouse  Patient or legal guardian wants to designate a caregiver (see row info): Yes  Caregiver name: Kimberly Mtz  Caregiver relationship to patient: dtr  Caregiver contact info: [phone]  Support Systems: [employer] / [employer], Family Member(s), Friends / Neighbors  Housing / Facility: 2 Story House  Do You Take your Prescribed Medications Regularly: Yes  Able to Return to Previous ADL's: Yes  Mobility Issues: No  Prior Services: None  Patient Expects to be Discharged to:: home  Assistance Needed: No  Durable Medical Equipment: Walker    Discharge Preparedness  What is your plan after discharge?: Other (comment) (home with spouse)  What are your discharge supports?: Spouse  Prior Functional Level: Ambulatory, Drives Self, Independent with Activities of Daily Living, Independent with Medication Management  Difficulity with ADLs: None    Functional Assesment  Prior Functional Level: Ambulatory, Drives Self, Independent with Activities of Daily Living, Independent with Medication Management    Finances  Financial Barriers to Discharge: No  Prescription Coverage: Yes    Vision / Hearing Impairment  Vision Impairment : Yes  Right Eye Vision: Wears Glasses, Other  (Comments) (readers)  Left Eye Vision: Wears Glasses, Other (Comments) (readers)  Hearing Impairment : No    Values / Beliefs / Concerns  Values / Beliefs Concerns : Yes  Spiritual Requests During Hospitalization:  (Pt is JW.  NO BLOOD PRODUCTS)    Advance Directive  Advance Directive?: DPOA for Health Care (scanned into chart. Advanced directive packet given)  Durable Power of  Name and Contact : Kimberly Smith [phone]    Domestic Abuse  Have you ever been the victim of abuse or violence?: No    Psychological Assessment  History of Substance Abuse: None  History of Psychiatric Problems: No    Discharge Risks or Barriers  Discharge risks or barriers?: No    Anticipated Discharge Information  Anticipated discharge disposition: Home  Discharge Address: [address] [place] [address]  Discharge Contact Phone Number: [phone]

## 2019-03-11 NOTE — PROGRESS NOTES
[facility] Medicine Daily Progress Note    Date of Service  3/11/2019    Chief Complaint  63 y.o. female admitted 3/10/2019 with prostatic skin cancer with acute bleed refusing blood transfusion.  Patient is a Jain.  Now with associated hypotension.    Hospital Course    63 y.o. female admitted 3/10/2019 with prostatic skin cancer with acute bleed refusing blood transfusion.  Patient is a Jain.  Now with associated hypotension.      Interval Problem Update  Reports intermittent blurry vision  Denies any headache  Dizziness with ambulation  Hypotension with systolic in the 80s  Denies any shortness of breath  Bleeding from left ear cancer site, now stopped  Denies chest pain or palpitations    Patient is accompanied by her fellow Worship members  Advanced directives discussed, patient would like discharge home on comfort measures    Consultants/Specialty  None    Code Status  Full    Disposition  tbd    Review of Systems  Review of Systems   Constitutional: Negative for chills, diaphoresis, fever and malaise/fatigue.   HENT: Negative for congestion and hearing loss.    Eyes: Positive for blurred vision. Negative for double vision, photophobia and redness.   Respiratory: Negative for cough and shortness of breath.    Cardiovascular: Negative for chest pain, palpitations and leg swelling.   Gastrointestinal: Negative for abdominal pain, nausea and vomiting.   Genitourinary: Negative for dysuria and flank pain.   Musculoskeletal: Positive for myalgias. Negative for back pain and joint pain.   Neurological: Positive for weakness. Negative for dizziness, sensory change, speech change, focal weakness and headaches.   Psychiatric/Behavioral: Negative for depression, hallucinations and memory loss. The patient is not nervous/anxious.         Physical Exam  Temp:  [36.4 °C (97.5 °F)-37.4 °C (99.4 °F)] 37.4 °C (99.4 °F)  Pulse:  [] 95  Resp:  [1-28] 16  BP: ()/(40-67) 105/54  SpO2:  [91  %-97 %] 96 %    Physical Exam   Constitutional: She is oriented to person, place, and time. She appears well-nourished. No distress.   HENT:   Head: Normocephalic and atraumatic.   Nose: Nose normal.   Mouth/Throat: No oropharyngeal exudate.   Eyes: Pupils are equal, round, and reactive to light. EOM are normal. Right eye exhibits no discharge. Left eye exhibits no discharge. No scleral icterus.   Neck: Neck supple. No JVD present. No thyromegaly present.   Cardiovascular: Normal rate and intact distal pulses.    No murmur heard.  Pulmonary/Chest: Effort normal and breath sounds normal. No respiratory distress. She has no wheezes.   Abdominal: Soft. Bowel sounds are normal. She exhibits no distension. There is no tenderness.   Musculoskeletal: She exhibits tenderness. She exhibits no edema.   Neurological: She is alert and oriented to person, place, and time. No cranial nerve deficit. She exhibits normal muscle tone. Coordination normal.   Skin: Skin is warm and dry. No rash noted. She is not diaphoretic. No erythema. There is pallor.   Psychiatric: She has a normal mood and affect. Her behavior is normal. Judgment and thought content normal.   Nursing note and vitals reviewed.      Fluids    Intake/Output Summary (Last 24 hours) at 03/11/19 1517  Last data filed at 03/11/19 1441   Gross per 24 hour   Intake              830 ml   Output                0 ml   Net              830 ml       Laboratory  Recent Labs      03/10/19   2322  03/11/19   1029   WBC  19.4*  13.3*   RBC  2.59*  2.42*   HEMOGLOBIN  7.3*  6.8*   HEMATOCRIT  23.5*  21.6*   MCV  90.7  89.3   MCH  28.2  28.1   MCHC  31.1*  31.5*   RDW  47.8  47.3   PLATELETCT  283  241   MPV  11.5  12.1     Recent Labs      03/10/19   2322  03/11/19   1029   SODIUM  136  137   POTASSIUM  4.3  3.7   CHLORIDE  111  108   CO2  19*  19*   GLUCOSE  156*  191*   BUN  22  21   CREATININE  1.11  0.89   CALCIUM  7.3*  7.7*     Recent Labs      03/10/19   2322   APTT  26.1    INR  1.10               Imaging  No orders to display        Assessment/Plan  Skin cancer- (present on admission)   Assessment & Plan    Metastatic skin cancer diagnosed at [facility] in 2017, with metastases to bony calvarium  Patient refused all treatment at the time and does not want any treatment for cancer at this time  Bleeding from cancerous growth has now stopped  Patient does not want any blood transfusions  Pain control with oxycodone and IV morphine, monitor respiratory status  She is DNR/DNI  - palliative care for advanced care planning, patient would benefit from hospice placement    3/11 patient expresses wishes to return home and to be comfortable  We will consult hospice,  to assist    I discussed advance care planning with the patient's family for at least 26 minutes, including diagnosis, prognosis, plan of care, risks and benefits of any therapies that could be offered, as well as alternatives including palliation and hospice, as appropriate. Patient has been made a DNR now. BILL CODE 21720.     Hypotension   Assessment & Plan    Secondary to anemia and acute blood loss  Continue fluid support  250 mL bolus     Anemia- (present on admission)   Assessment & Plan    Secondary to acute/chronic blood loss from cancerous growth  Patient is Pentecostal and will not receive blood transfusions  Check iron studies and TSH  -vitamin B12 low normal, add supplements  - folate within normal limits   and TSH  Iron studies consistent with deficiency,on ferrous sulfate twice daily    3/11 hemoglobin of 6.8 today  With borderline hypotension  Continue fluid support  Add vitamin B12  INR is 1.1  Refusing transfusion  DNR/DNI  Palliative care follow-up          VTE prophylaxis: SCD

## 2019-03-11 NOTE — CONSULTS
"Reason for Palliative Care Consult: Advance Care Planning    Consulted by: Dr. Augustsu Lema    HPI: Josephine Casas is a 62 y/o female with a past medical history of metastatic skin cancer of the left ear, which was diagnosed by  [facility] in 2017.  Patient was transferred from [facility] on 3/10/19 with uncontrolled bleeding from her left ear, which has since resolved.  At the time of patient's diagnosis in 2017, she declined the radical surgery of her left ear and face, along with chemotherapy and radiation that was recommended by [facility] physicians.    Past medical/surgical history: back pain, dental disorder, heart murmur, pneumonia, sleep apnea, psychiatric disorder, gastric bypass    Assessment:  Patient alert & oriented X4  Dyspnea: No-    Last BM: 03/10/19-    Pain: Yes-    Depression: Mood appropriate for situation-    Dementia: No;       Living situation & psychosocial: Josephine lives in [place], [place] with her .  She has two children and 9 grandchildren.  Her daughter, Kimberly, lives nearby and provides functional support.     Spiritual:  Is Faith or spirituality important for coping with this illness? Yes- Worship  Has a  or spiritual provider visit been requested? No; patient declined offer for non-[facility] chaplaincy    Palliative Performance Scale: 60%    Advance Directive: None-    DPOA: No-    POLST: No-      Code Status: DNR-      Outcome:  Met with Josephine at bedside. Introduced myself and explained the role of palliative care.  Daughter, Kimberly, and 2 grandchildren at bedside during encounter.  Josephine was very alert, open, and interactive throughout encounter.  From a functional standpoint, Josephine has been very functional since her diagnosis of skin cancer in 2017.  She reports she is able to perform her ADL's including bathing, cooking, and shopping.  She does report that in the past month or so that housecleaning and cooking has been \"getting a little bit harder because I have " "no energy.\"    Patient endorses strong osiris as a Zoroastrian and gains much strength and support from her osiris community.  From a spiritual standpoint, she is hopeful for a cure from her cancer.  She has been using essiac tea, black salve, and \"health water\" to treat her cancer.  She feels that these treatments have extended her life and slowed her disease progression.  Patient also endorses being open to reflexology and aroma therapy.    Josephine shared that she derives umesh from her family and her animals.  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  They also have horses and she loves to watch documentaries and YouTube videos about animals like meerkats, panda bears, and monkeys.  She loves shopping with her daughter Kimberly, and especially enjoys purchasing [employer] housewares and  [employer] home decor.  One of her favorite stores is the [employer] in [place].    When asked about her understanding of her disease, Josephine said, \"It's progressive.  It's getting really big.  I've had 3 worsening bleeding episodes.  She verbalized understanding that her disease is terminal.  She stated that at the time of diagnosis, she declined the radical head/neck surgery that would've removed part of her skull, her ear, and part of her cheek.  She also states that she was told by [facility] doctors at that time, that chemotherapy and radiation \"was not an option.\"  She reports she received a head CT scan at [facility] in December 2017 and also received radiation here.    When asked what is most important to her as her disease progresses she said, \"I want to be home with all my family and animals and my [employer] community.  I don't want to be in pain.\"  She continued with, \"I love the truth and I am not afraid to die.  I'm just worried about my family and my animals.  I know the minute I wake up, I'll wake up in [place].\"  Shared with patient that hospice care aligns well with her " goal of staying home to be with her friends and her animals.  Educated patient about hospice services.  She is open to having  research hospice options in [place], [place] and discussing options with her.    Discussed advance directive and explained benefit of having a healthcare agent designated to make decision on her behalf, if she were unable to voice her own wishes regarding her healthcare.  Patient shared that she would not want to list her  as her DPOA-HC, as he has PTSD and she feels it would be too much of a burden for him.  I educated Josephine that she should designate somebody that she trusts, and who understands what her wishes are.  She voiced the possibly of listing her daughter, Kimberly, but would like some time to think about it and discuss it with Kimberly before making a final decision.  Made a plan to [facility] back later in the week for AD completion.    Discussed code status.  Patient confirms her desire to be DNAR/DNI.  Completed POLST form with patient selecting DNR, comfort treatment, no artifical nutrition, and IV fluid trial of no more than one week.  POLST completed and scanned into system.  Copy ofPOLST given to patient.  Original POLST placed in hardcopy chart.  Patient educated that original POLST should be sent home with her upon discharge, and that she should place POLST on her refrigerator at home.       Provided therapeutic communication including open-ended questions, reflective listening and normalization of feelings throughout encounter. Provided business card with palliative care contact information and encouraged Josephine to call with any questions or needs.     Plan: POLST completed and scanned into system.  Goal to [facility] back this week for AD completion.    Recommendations: I recommend a hospice consult.    Updated: Floor care manager, Ananya, who will research options for hospice care and discuss choice with patient.    Thank you for allowing Palliative Care to participate in  this patient's care. Please call our team with questions and/or additional needs.    Total visit time was 60 minutes discussing advance care planning.     MAUREEN Griffith.  Palliative Care Nurse Practitioner  [phone]

## 2019-03-12 LAB
ANION GAP SERPL CALC-SCNC: 5 MMOL/L (ref 0–11.9)
BASOPHILS # BLD AUTO: 0 % (ref 0–1.8)
BASOPHILS # BLD: 0 K/UL (ref 0–0.12)
BUN SERPL-MCNC: 16 MG/DL (ref 8–22)
CALCIUM SERPL-MCNC: 7.5 MG/DL (ref 8.5–10.5)
CHLORIDE SERPL-SCNC: 108 MMOL/L (ref 96–112)
CO2 SERPL-SCNC: 23 MMOL/L (ref 20–33)
CREAT SERPL-MCNC: 0.74 MG/DL (ref 0.5–1.4)
EOSINOPHIL # BLD AUTO: 0.32 K/UL (ref 0–0.51)
EOSINOPHIL NFR BLD: 3.5 % (ref 0–6.9)
ERYTHROCYTE [DISTWIDTH] IN BLOOD BY AUTOMATED COUNT: 49.9 FL (ref 35.9–50)
GLUCOSE SERPL-MCNC: 110 MG/DL (ref 65–99)
HCT VFR BLD AUTO: 19.4 % (ref 37–47)
HGB BLD-MCNC: 5.8 G/DL (ref 12–16)
LYMPHOCYTES # BLD AUTO: 0.72 K/UL (ref 1–4.8)
LYMPHOCYTES NFR BLD: 7.9 % (ref 22–41)
MANUAL DIFF BLD: ABNORMAL
MCH RBC QN AUTO: 27.9 PG (ref 27–33)
MCHC RBC AUTO-ENTMCNC: 29.9 G/DL (ref 33.6–35)
MCV RBC AUTO: 93.3 FL (ref 81.4–97.8)
MONOCYTES # BLD AUTO: 0 K/UL (ref 0–0.85)
MONOCYTES NFR BLD AUTO: 0 % (ref 0–13.4)
MORPHOLOGY BLD-IMP: NORMAL
NEUTROPHILS # BLD AUTO: 8.06 K/UL (ref 2–7.15)
NEUTROPHILS NFR BLD: 66.7 % (ref 44–72)
NEUTS BAND NFR BLD MANUAL: 21.9 % (ref 0–10)
NRBC # BLD AUTO: 0 K/UL
NRBC BLD-RTO: 0 /100 WBC
PLATELET # BLD AUTO: 216 K/UL (ref 164–446)
PLATELET BLD QL SMEAR: NORMAL
PMV BLD AUTO: 11 FL (ref 9–12.9)
POTASSIUM SERPL-SCNC: 3.9 MMOL/L (ref 3.6–5.5)
RBC # BLD AUTO: 2.08 M/UL (ref 4.2–5.4)
RBC BLD AUTO: NORMAL
SODIUM SERPL-SCNC: 136 MMOL/L (ref 135–145)
WBC # BLD AUTO: 9.1 K/UL (ref 4.8–10.8)

## 2019-03-12 PROCEDURE — A9270 NON-COVERED ITEM OR SERVICE: HCPCS | Performed by: INTERNAL MEDICINE

## 2019-03-12 PROCEDURE — G0378 HOSPITAL OBSERVATION PER HR: HCPCS

## 2019-03-12 PROCEDURE — 96376 TX/PRO/DX INJ SAME DRUG ADON: CPT

## 2019-03-12 PROCEDURE — 99232 SBSQ HOSP IP/OBS MODERATE 35: CPT | Performed by: HOSPITALIST

## 2019-03-12 PROCEDURE — 80048 BASIC METABOLIC PNL TOTAL CA: CPT

## 2019-03-12 PROCEDURE — 700105 HCHG RX REV CODE 258: Performed by: INTERNAL MEDICINE

## 2019-03-12 PROCEDURE — 770020 HCHG ROOM/CARE - TELE (206)

## 2019-03-12 PROCEDURE — 36415 COLL VENOUS BLD VENIPUNCTURE: CPT

## 2019-03-12 PROCEDURE — 700102 HCHG RX REV CODE 250 W/ 637 OVERRIDE(OP): Performed by: INTERNAL MEDICINE

## 2019-03-12 PROCEDURE — 700111 HCHG RX REV CODE 636 W/ 250 OVERRIDE (IP): Performed by: INTERNAL MEDICINE

## 2019-03-12 PROCEDURE — 85027 COMPLETE CBC AUTOMATED: CPT

## 2019-03-12 PROCEDURE — 99497 ADVNCD CARE PLAN 30 MIN: CPT | Performed by: NURSE PRACTITIONER

## 2019-03-12 PROCEDURE — 94760 N-INVAS EAR/PLS OXIMETRY 1: CPT

## 2019-03-12 PROCEDURE — 85007 BL SMEAR W/DIFF WBC COUNT: CPT

## 2019-03-12 RX ORDER — AMOXICILLIN 250 MG
1 CAPSULE ORAL DAILY
Status: DISCONTINUED | OUTPATIENT
Start: 2019-03-13 | End: 2019-03-13

## 2019-03-12 RX ORDER — POLYETHYLENE GLYCOL 3350 17 G/17G
1 POWDER, FOR SOLUTION ORAL
Status: DISCONTINUED | OUTPATIENT
Start: 2019-03-12 | End: 2019-03-13

## 2019-03-12 RX ADMIN — HYDROCODONE BITARTRATE AND ACETAMINOPHEN 2 TABLET: 5; 325 TABLET ORAL at 15:24

## 2019-03-12 RX ADMIN — HYDROCODONE BITARTRATE AND ACETAMINOPHEN 2 TABLET: 5; 325 TABLET ORAL at 11:04

## 2019-03-12 RX ADMIN — SODIUM CHLORIDE, POTASSIUM CHLORIDE, SODIUM LACTATE AND CALCIUM CHLORIDE: 600; 310; 30; 20 INJECTION, SOLUTION INTRAVENOUS at 18:15

## 2019-03-12 RX ADMIN — FERROUS SULFATE TAB 325 MG (65 MG ELEMENTAL FE) 325 MG: 325 (65 FE) TAB at 09:20

## 2019-03-12 RX ADMIN — FERROUS SULFATE TAB 325 MG (65 MG ELEMENTAL FE) 325 MG: 325 (65 FE) TAB at 18:14

## 2019-03-12 RX ADMIN — HYDROCODONE BITARTRATE AND ACETAMINOPHEN 2 TABLET: 5; 325 TABLET ORAL at 21:04

## 2019-03-12 RX ADMIN — TEMAZEPAM 45 MG: 15 CAPSULE ORAL at 21:01

## 2019-03-12 RX ADMIN — HYDROCODONE BITARTRATE AND ACETAMINOPHEN 2 TABLET: 5; 325 TABLET ORAL at 05:55

## 2019-03-12 RX ADMIN — OMEPRAZOLE 20 MG: 20 CAPSULE, DELAYED RELEASE ORAL at 18:14

## 2019-03-12 RX ADMIN — MORPHINE SULFATE 4 MG: 4 INJECTION INTRAVENOUS at 09:26

## 2019-03-12 RX ADMIN — SODIUM CHLORIDE, POTASSIUM CHLORIDE, SODIUM LACTATE AND CALCIUM CHLORIDE: 600; 310; 30; 20 INJECTION, SOLUTION INTRAVENOUS at 01:38

## 2019-03-12 RX ADMIN — SODIUM CHLORIDE, POTASSIUM CHLORIDE, SODIUM LACTATE AND CALCIUM CHLORIDE: 600; 310; 30; 20 INJECTION, SOLUTION INTRAVENOUS at 09:20

## 2019-03-12 RX ADMIN — CYANOCOBALAMIN TAB 500 MCG 1000 MCG: 500 TAB at 05:55

## 2019-03-12 ASSESSMENT — ENCOUNTER SYMPTOMS
VOMITING: 0
PALPITATIONS: 0
FOCAL WEAKNESS: 0
PHOTOPHOBIA: 0
SPEECH CHANGE: 0
HALLUCINATIONS: 0
COUGH: 0
MEMORY LOSS: 0
HEADACHES: 1
BACK PAIN: 0
WEAKNESS: 1
DOUBLE VISION: 0
ABDOMINAL PAIN: 0
FLANK PAIN: 0
MYALGIAS: 1
EYE REDNESS: 0
DIAPHORESIS: 0
DIZZINESS: 1
NAUSEA: 0
LOSS OF CONSCIOUSNESS: 0
FEVER: 0
BLURRED VISION: 1
DEPRESSION: 0
NERVOUS/ANXIOUS: 0
SHORTNESS OF BREATH: 0
CHILLS: 0

## 2019-03-12 ASSESSMENT — COPD QUESTIONNAIRES
DO YOU EVER COUGH UP ANY MUCUS OR PHLEGM?: NO/ONLY WITH OCCASIONAL COLDS OR INFECTIONS
HAVE YOU SMOKED AT LEAST 100 CIGARETTES IN YOUR ENTIRE LIFE: NO/DON'T KNOW
COPD SCREENING SCORE: 2
DURING THE PAST 4 WEEKS HOW MUCH DID YOU FEEL SHORT OF BREATH: NONE/LITTLE OF THE TIME

## 2019-03-12 ASSESSMENT — LIFESTYLE VARIABLES: EVER_SMOKED: NEVER

## 2019-03-12 NOTE — DISCHARGE PLANNING
Anticipated Discharge Disposition: Home with family, no hospice services in [place], [place].    Action: RN CM met with pt to discuss dc plan. Pt now aware of no hospice services for [place]. Pt states her grandson is getting FMLA and coming from [place] to take care of her in [place]. Pt sees CRISTAL Palmer with [facility] group and gave RN CM permission to call Fani to discuss post-acute plan- to ask Fani if her office will be able to keep pt comfortable at home. RN CM gave call back number for staff at [facility]'s office.    Barriers to Discharge: Medical clearance.    Plan: Update IDT and pt when [facility] calls back for collaboration.

## 2019-03-12 NOTE — PROGRESS NOTES
Hospital Medicine Daily Progress Note    Date of Service  3/12/2019    Chief Complaint  63 y.o. female admitted 3/10/2019 with prostatic skin cancer with acute bleed refusing blood transfusion.  Patient is a [employer].  Now with associated hypotension.    Hospital Course    63 y.o. female admitted 3/10/2019 with prostatic skin cancer with acute bleed refusing blood transfusion.  Patient is a [employer].  Now with associated hypotension.      Interval Problem Update  No further bleeding from malignancy behind left ear.  Hemoglobin down to 5.8.  Social work following but no hospice agency available where she lives.  Reiterates that she does not want aggressive measures.    Consultants/Specialty  None    Code Status  Full    Disposition  tbd    Review of Systems  Review of Systems   Constitutional: Positive for malaise/fatigue. Negative for chills, diaphoresis and fever.   HENT: Negative for congestion and hearing loss.    Eyes: Positive for blurred vision (Intermittent). Negative for double vision, photophobia and redness.   Respiratory: Negative for cough and shortness of breath.    Cardiovascular: Negative for chest pain, palpitations and leg swelling.   Gastrointestinal: Negative for abdominal pain, nausea and vomiting.   Genitourinary: Negative for dysuria and flank pain.   Musculoskeletal: Positive for myalgias. Negative for back pain and joint pain.   Neurological: Positive for dizziness, weakness and headaches. Negative for speech change, focal weakness and loss of consciousness.   Psychiatric/Behavioral: Negative for depression, hallucinations and memory loss. The patient is not nervous/anxious.         Physical Exam  Temp:  [36.7 °C (98.1 °F)-37.5 °C (99.5 °F)] 37.5 °C (99.5 °F)  Pulse:  [] 75  Resp:  [16-18] 18  BP: ()/(48-58) 88/52  SpO2:  [94 %-98 %] 95 %    Physical Exam   Constitutional: She is oriented to person, place, and time. She appears well-developed and well-nourished.  No distress.   HENT:   Head: Normocephalic and atraumatic.   Mouth/Throat: Oropharynx is clear and moist.   Eyes: Pupils are equal, round, and reactive to light. EOM are normal. Right eye exhibits no discharge. Left eye exhibits no discharge. No scleral icterus.   Neck: Normal range of motion. No JVD present. No tracheal deviation present.   Cardiovascular: Normal rate and intact distal pulses.    Pulmonary/Chest: Effort normal and breath sounds normal. No respiratory distress. She has no wheezes. She has no rales.   Abdominal: Soft. She exhibits no distension. There is no tenderness.   Musculoskeletal: She exhibits tenderness. She exhibits no edema.   Neurological: She is alert and oriented to person, place, and time. No cranial nerve deficit.   Skin: Skin is warm. She is not diaphoretic. There is pallor.   Large black mass lesion behind the left ear, no bleeding   Psychiatric: She has a normal mood and affect. Her behavior is normal.   Vitals reviewed.      Fluids    Intake/Output Summary (Last 24 hours) at 03/12/19 0803  Last data filed at 03/11/19 1822   Gross per 24 hour   Intake             2580 ml   Output                0 ml   Net             2580 ml       Laboratory  Recent Labs      03/10/19   2322  03/11/19   1029  03/12/19   0223   WBC  19.4*  13.3*  9.1   RBC  2.59*  2.42*  2.08*   HEMOGLOBIN  7.3*  6.8*  5.8*   HEMATOCRIT  23.5*  21.6*  19.4*   MCV  90.7  89.3  93.3   MCH  28.2  28.1  27.9   MCHC  31.1*  31.5*  29.9*   RDW  47.8  47.3  49.9   PLATELETCT  283  241  216   MPV  11.5  12.1  11.0     Recent Labs      03/10/19   2322  03/11/19   1029  03/12/19   0223   SODIUM  136  137  136   POTASSIUM  4.3  3.7  3.9   CHLORIDE  111  108  108   CO2  19*  19*  23   GLUCOSE  156*  191*  110*   BUN  22  21  16   CREATININE  1.11  0.89  0.74   CALCIUM  7.3*  7.7*  7.5*     Recent Labs      03/10/19   2322   APTT  26.1   INR  1.10               Imaging  No orders to display        Assessment/Plan  Skin cancer-  (present on admission)   Assessment & Plan    Metastatic skin cancer diagnosed at [facility] in 2017, with metastases to bony calvarium.  Complicated by ?arterial bleeding.  Refused treatment at that time and continues to not want aggressive therapies.  Palliative care discussion resulted in her CODE STATUS being changed to DNR/DNI.  -Palliative care following  -Social work following, no hospice agencies in the area  -Declines blood transfusion secondary to being [employer]  -Supportive care with iron and IV fluids     Anemia- (present on admission)   Assessment & Plan    Secondary to iron deficiency with acute/chronic blood loss from cancerous growth. Patient is [employer] and will not receive blood transfusions  -vitamin B12 low normal, add supplements  - folate within normal limits  -  ferrous sulfate twice daily     Hypotension   Assessment & Plan    Borderline low BP.  Secondary to anemia and acute blood loss.  -Continue with IV fluids          VTE prophylaxis: SCD

## 2019-03-12 NOTE — CARE PLAN
Problem: Safety  Goal: Will remain free from falls  Outcome: PROGRESSING AS EXPECTED  Pt has been free of falls. Safety precautions in place. Will continue to monitor.     Problem: Knowledge Deficit  Goal: Knowledge of disease process/condition, treatment plan, diagnostic tests, and medications will improve  Outcome: PROGRESSING AS EXPECTED  Pt educated on POC which includes lab monitoring, tele monitoring, IV fluids. Pt verbalized understanding.

## 2019-03-12 NOTE — PROGRESS NOTES
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  Patient was transferred from [facility] on 3/10/19 with uncontrolled bleeding from her left ear, which has since resolved.  At the time of patient's diagnosis in 2017, she declined the radical surgery of her left ear and face, along with chemotherapy and radiation that was recommended by [facility] physicians.    Discussion:  Reviewed our goals of care discussion from yesterday.  Patient is still very firm in her desire to remain DNR/DNI.  She does not want to pursue aggressive and/or life-prolonging treatment at this time, and is opting for comfort-based care, preferably with hospice care in [place], [place].  It is very important for her to be at home with her family and animals.  Josephine volunteered that after pondering, she would like her grandson, Elijah Castorena, to be her DPOA-HC.  Reviewed and completed advance directive.  Under statement of desires section, patient selected #2, #3, and #5.  Listed Elijah Castorena (grandson; tel. [phone]) as DPOA-HC, with Kimberly Mtz (daughter; tel. [phone] and [phone]) as first alternate.    Provided therapeutic communication including open-ended questions, reflective listening, and therapeutic touch throughout encounter. Provided business card with palliative care contact information and encouraged Josephine and/or her family to call with any questions or needs.     Outcome:  DNR/DNI code status confirmed.  Completed AD.  Floor care management  Ana emailed notary request.  Completed AD left in notary box in care management office.  Once AD is notarized, will scan document in patient's EMR and return original to patient.    Plan:  Josephine advised that  Ananya had been in yesterday to speak with her about hospice care.  Per  patient, Ananya had indicated that there is a hospice company headquartered in [place] that has a satellite office in [place], which may be able to provide hospice care to patient.  Pending update from care coordinator.    Thank you for allowing Palliative Care to participate in this patient's care. Please call our team with questions and/or additional needs.    Total visit time was 30 minutes discussing advance care planning.    Nguyen Lutz A.P.R.N.  Palliative Care Nurse Practitioner  [phone]

## 2019-03-12 NOTE — PROGRESS NOTES
Foster from Lab called with critical result of Hgb at 5.8 at 0627. Critical lab result read back to Foster.   Dr. Rubi notified of critical lab result at 0636.  Critical lab result read back by Dr. Rubi.    Dr. Rubi aware that pt is bloodless

## 2019-03-12 NOTE — PROGRESS NOTES
Bedside report received from day shift RN, assumed pt care. Pt assessment complete. Pt alert and oriented, no signs of distress. Reviewed plan of care with pt. Tele box on and rhythm verified.  Chart and labs reviewed.  Bed in lowest position, call light within reach. Hourly rounding in place. Educated about calling for assistance.

## 2019-03-12 NOTE — CARE PLAN
Problem: Safety  Goal: Will remain free from injury  Outcome: PROGRESSING AS EXPECTED  Pt calls appropriately, call light within reach. Bed in lowest and locked position, with hourly rounding in place. Bed alarm on and treaded slipper socks on. Mobility assessed with appropriate signs in place. Hourly rounding in place.      Intervention: Provide assistance with mobility   03/11/19 2000   OTHER   Assistance / Tolerance Standby Assist     Intervention: Collaborate with Interdisciplinary Team for safe transfer and mobilization techniques   03/11/19 2000   OTHER   Assistive Devices None         Problem: Infection  Goal: Will remain free from infection  Outcome: PROGRESSING AS EXPECTED  Standard precautions in place. Hand hygiene completed before entering room and exiting room.

## 2019-03-12 NOTE — DISCHARGE PLANNING
Agency/Facility Name: [facility]  Spoke To: Bonnie  Outcome: Patient declined. The hospice nurse servicing this area is at capacity and cannot travel to [place] to provide care at this time.

## 2019-03-13 LAB
HCT VFR BLD AUTO: 17.7 % (ref 37–47)
HGB BLD-MCNC: 5.6 G/DL (ref 12–16)

## 2019-03-13 PROCEDURE — 700102 HCHG RX REV CODE 250 W/ 637 OVERRIDE(OP): Performed by: INTERNAL MEDICINE

## 2019-03-13 PROCEDURE — 36415 COLL VENOUS BLD VENIPUNCTURE: CPT

## 2019-03-13 PROCEDURE — 85018 HEMOGLOBIN: CPT

## 2019-03-13 PROCEDURE — 770001 HCHG ROOM/CARE - MED/SURG/GYN PRIV*

## 2019-03-13 PROCEDURE — 85014 HEMATOCRIT: CPT

## 2019-03-13 PROCEDURE — 700111 HCHG RX REV CODE 636 W/ 250 OVERRIDE (IP): Performed by: HOSPITALIST

## 2019-03-13 PROCEDURE — A9270 NON-COVERED ITEM OR SERVICE: HCPCS | Performed by: INTERNAL MEDICINE

## 2019-03-13 PROCEDURE — 700111 HCHG RX REV CODE 636 W/ 250 OVERRIDE (IP): Performed by: INTERNAL MEDICINE

## 2019-03-13 PROCEDURE — 700105 HCHG RX REV CODE 258: Performed by: HOSPITALIST

## 2019-03-13 PROCEDURE — 99231 SBSQ HOSP IP/OBS SF/LOW 25: CPT | Performed by: HOSPITALIST

## 2019-03-13 PROCEDURE — 700102 HCHG RX REV CODE 250 W/ 637 OVERRIDE(OP): Performed by: HOSPITALIST

## 2019-03-13 PROCEDURE — A9270 NON-COVERED ITEM OR SERVICE: HCPCS | Performed by: HOSPITALIST

## 2019-03-13 PROCEDURE — 700105 HCHG RX REV CODE 258: Performed by: INTERNAL MEDICINE

## 2019-03-13 RX ORDER — ACETAMINOPHEN 325 MG/1
650 TABLET ORAL EVERY 4 HOURS PRN
Status: DISCONTINUED | OUTPATIENT
Start: 2019-03-13 | End: 2019-03-15 | Stop reason: HOSPADM

## 2019-03-13 RX ORDER — MORPHINE SULFATE 10 MG/ML
5 INJECTION, SOLUTION INTRAMUSCULAR; INTRAVENOUS
Status: DISCONTINUED | OUTPATIENT
Start: 2019-03-13 | End: 2019-03-15 | Stop reason: HOSPADM

## 2019-03-13 RX ORDER — ONDANSETRON 4 MG/1
8 TABLET, ORALLY DISINTEGRATING ORAL EVERY 8 HOURS PRN
Status: DISCONTINUED | OUTPATIENT
Start: 2019-03-13 | End: 2019-03-15 | Stop reason: HOSPADM

## 2019-03-13 RX ORDER — DOCUSATE SODIUM 100 MG/1
100 CAPSULE, LIQUID FILLED ORAL EVERY 12 HOURS
Status: DISCONTINUED | OUTPATIENT
Start: 2019-03-13 | End: 2019-03-13

## 2019-03-13 RX ORDER — ACETAMINOPHEN 650 MG/1
650 SUPPOSITORY RECTAL EVERY 4 HOURS PRN
Status: DISCONTINUED | OUTPATIENT
Start: 2019-03-13 | End: 2019-03-15 | Stop reason: HOSPADM

## 2019-03-13 RX ORDER — ATROPINE SULFATE 10 MG/ML
2 SOLUTION/ DROPS OPHTHALMIC EVERY 4 HOURS PRN
Status: DISCONTINUED | OUTPATIENT
Start: 2019-03-13 | End: 2019-03-15 | Stop reason: HOSPADM

## 2019-03-13 RX ORDER — LORAZEPAM 2 MG/ML
1 CONCENTRATE ORAL
Status: DISCONTINUED | OUTPATIENT
Start: 2019-03-13 | End: 2019-03-15 | Stop reason: HOSPADM

## 2019-03-13 RX ORDER — ONDANSETRON 2 MG/ML
8 INJECTION INTRAMUSCULAR; INTRAVENOUS EVERY 8 HOURS PRN
Status: DISCONTINUED | OUTPATIENT
Start: 2019-03-13 | End: 2019-03-15 | Stop reason: HOSPADM

## 2019-03-13 RX ORDER — LORAZEPAM 2 MG/ML
1 INJECTION INTRAMUSCULAR
Status: DISCONTINUED | OUTPATIENT
Start: 2019-03-13 | End: 2019-03-15 | Stop reason: HOSPADM

## 2019-03-13 RX ADMIN — OMEPRAZOLE 20 MG: 20 CAPSULE, DELAYED RELEASE ORAL at 05:22

## 2019-03-13 RX ADMIN — HYDROCODONE BITARTRATE AND ACETAMINOPHEN 2 TABLET: 5; 325 TABLET ORAL at 02:35

## 2019-03-13 RX ADMIN — MORPHINE SULFATE 4 MG: 4 INJECTION INTRAVENOUS at 11:34

## 2019-03-13 RX ADMIN — LORAZEPAM 1 MG: 2 INJECTION INTRAMUSCULAR; INTRAVENOUS at 23:10

## 2019-03-13 RX ADMIN — SODIUM CHLORIDE, POTASSIUM CHLORIDE, SODIUM LACTATE AND CALCIUM CHLORIDE: 600; 310; 30; 20 INJECTION, SOLUTION INTRAVENOUS at 11:36

## 2019-03-13 RX ADMIN — MORPHINE SULFATE 5 MG: 10 INJECTION INTRAVENOUS at 19:07

## 2019-03-13 RX ADMIN — MORPHINE SULFATE 5 MG: 10 INJECTION INTRAVENOUS at 15:37

## 2019-03-13 RX ADMIN — SODIUM CHLORIDE, POTASSIUM CHLORIDE, SODIUM LACTATE AND CALCIUM CHLORIDE 1000 ML: 600; 310; 30; 20 INJECTION, SOLUTION INTRAVENOUS at 02:32

## 2019-03-13 RX ADMIN — SENNOSIDES AND DOCUSATE SODIUM 1 TABLET: 8.6; 5 TABLET ORAL at 05:22

## 2019-03-13 RX ADMIN — HYDROCODONE BITARTRATE AND ACETAMINOPHEN 2 TABLET: 5; 325 TABLET ORAL at 08:23

## 2019-03-13 RX ADMIN — MORPHINE SULFATE 5 MG: 10 INJECTION INTRAVENOUS at 21:35

## 2019-03-13 RX ADMIN — CYANOCOBALAMIN TAB 500 MCG 1000 MCG: 500 TAB at 05:22

## 2019-03-13 RX ADMIN — FERROUS SULFATE TAB 325 MG (65 MG ELEMENTAL FE) 325 MG: 325 (65 FE) TAB at 08:25

## 2019-03-13 ASSESSMENT — ENCOUNTER SYMPTOMS
FEVER: 0
COUGH: 0
NERVOUS/ANXIOUS: 0
DIZZINESS: 0
PHOTOPHOBIA: 0
BLOOD IN STOOL: 0
HALLUCINATIONS: 0
PALPITATIONS: 0
CHILLS: 0
SPEECH CHANGE: 0
BLURRED VISION: 1
MYALGIAS: 1
EYE PAIN: 0
ABDOMINAL PAIN: 0
NAUSEA: 0
DIAPHORESIS: 0
MEMORY LOSS: 0
LOSS OF CONSCIOUSNESS: 0
FOCAL WEAKNESS: 0
WEAKNESS: 1
SHORTNESS OF BREATH: 0
DEPRESSION: 0
HEADACHES: 1
DOUBLE VISION: 0
BACK PAIN: 0
VOMITING: 0
EYE REDNESS: 0
FLANK PAIN: 0

## 2019-03-13 NOTE — DISCHARGE PLANNING
CRISTAL Orourke for pt, returned call and discussed dc plan. Medical release permission signed by pt and clinicals faxed to Tala at [phone].

## 2019-03-13 NOTE — PROGRESS NOTES
[facility] Medicine Daily Progress Note    Date of Service  3/13/2019    Chief Complaint  63 y.o. female admitted 3/10/2019 with prostatic skin cancer with acute bleed refusing blood transfusion.  Patient is a [employer].  Now with associated hypotension.    Hospital Course    63 y.o. female admitted 3/10/2019 with prostatic skin cancer with acute bleed refusing blood transfusion.  Patient is a [employer].  Now with associated hypotension.      Interval Problem Update  No further bleeding from malignancy behind left ear. Hemoglobin down to 5.6 this AM. Social work following but no hospice agency available where she lives. SW working with PCP to get additional support at home.    Consultants/Specialty  None    Code Status  Full    Disposition  Comfort Care. Home with aid of PCP, as no hospice agencies available in area.     Review of Systems  Review of Systems   Constitutional: Positive for malaise/fatigue. Negative for chills, diaphoresis and fever.   HENT: Negative for congestion and hearing loss.    Eyes: Positive for blurred vision (Intermittent). Negative for double vision, photophobia, pain and redness.   Respiratory: Negative for cough and shortness of breath.    Cardiovascular: Negative for chest pain, palpitations and leg swelling.   Gastrointestinal: Negative for abdominal pain, blood in stool, nausea and vomiting.   Genitourinary: Negative for dysuria and flank pain.   Musculoskeletal: Positive for myalgias. Negative for back pain and joint pain.   Neurological: Positive for weakness and headaches. Negative for dizziness, speech change, focal weakness and loss of consciousness.   Psychiatric/Behavioral: Negative for depression, hallucinations and memory loss. The patient is not nervous/anxious.         Physical Exam  Temp:  [36.2 °C (97.1 °F)-36.7 °C (98 °F)] 36.3 °C (97.4 °F)  Pulse:  [89-99] 99  Resp:  [17-18] 18  BP: ()/(49-63) 111/54  SpO2:  [92 %-96 %] 96 %    Physical Exam    Constitutional: She is oriented to person, place, and time. She appears well-developed and well-nourished. No distress.   HENT:   Head: Normocephalic.   Mouth/Throat: Oropharynx is clear and moist.   Eyes: EOM are normal. Right eye exhibits no discharge. Left eye exhibits no discharge. No scleral icterus.   Neck: Normal range of motion. No JVD present. No tracheal deviation present.   Cardiovascular: Normal rate and intact distal pulses.    Pulmonary/Chest: Effort normal and breath sounds normal. No respiratory distress. She has no rales.   Abdominal: Soft. She exhibits no distension. There is no tenderness.   Musculoskeletal: She exhibits tenderness. She exhibits no edema.   Neurological: She is alert and oriented to person, place, and time. No cranial nerve deficit.   Skin: Skin is warm. She is not diaphoretic. There is pallor.   Large black mass lesion behind the left ear and anterior to ear, no bleeding   Psychiatric: She has a normal mood and affect. Her speech is normal and behavior is normal. Cognition and memory are normal.   Vitals reviewed.      Fluids    Intake/Output Summary (Last 24 hours) at 03/13/19 0839  Last data filed at 03/13/19 0600   Gross per 24 hour   Intake             2090 ml   Output                0 ml   Net             2090 ml       Laboratory  Recent Labs      03/10/19   2322  03/11/19   1029  03/12/19   0223  03/13/19   0218   WBC  19.4*  13.3*  9.1   --    RBC  2.59*  2.42*  2.08*   --    HEMOGLOBIN  7.3*  6.8*  5.8*  5.6*   HEMATOCRIT  23.5*  21.6*  19.4*  17.7*   MCV  90.7  89.3  93.3   --    MCH  28.2  28.1  27.9   --    MCHC  31.1*  31.5*  29.9*   --    RDW  47.8  47.3  49.9   --    PLATELETCT  283  241  216   --    MPV  11.5  12.1  11.0   --      Recent Labs      03/10/19   2322  03/11/19   1029  03/12/19   0223   SODIUM  136  137  136   POTASSIUM  4.3  3.7  3.9   CHLORIDE  111  108  108   CO2  19*  19*  23   GLUCOSE  156*  191*  110*   BUN  22  21  16   CREATININE  1.11  0.89   0.74   CALCIUM  7.3*  7.7*  7.5*     Recent Labs      03/10/19   2322   APTT  26.1   INR  1.10               Imaging  No orders to display        Assessment/Plan  Skin cancer- (present on admission)   Assessment & Plan    Metastatic skin cancer diagnosed at [facility] in 2017, with metastases to bony calvarium.  Complicated by ?arterial bleeding.  Refused treatment at that time and continues to not want aggressive therapies.  Palliative care discussion resulted in her CODE STATUS being changed to DNR/DNI.  -Palliative care following  -Social work following, no hospice agencies in the area  -Declines blood transfusion secondary to being [employer]  -Supportive care with iron and IV fluids  -wishes to be comfort care, no additional studies or blood draws     Anemia- (present on admission)   Assessment & Plan    Secondary to iron deficiency with acute/chronic blood loss from cancerous growth. Patient is [employer] and declines blood transfusions. Hemoglobin down to 5.6.  -vitamin B12 low normal, add supplements  - supportive care with ferrous sulfate     Hypotension- (present on admission)   Assessment & Plan    Borderline low BP.  Secondary to anemia and acute blood loss.  -Continue with IV fluids          VTE prophylaxis: SCD

## 2019-03-13 NOTE — CARE PLAN
Problem: Safety  Goal: Will remain free from falls  Outcome: PROGRESSING AS EXPECTED  Pt has been free of falls. Safety precautions in place. Will continue to monitor.     Problem: Knowledge Deficit  Goal: Knowledge of disease process/condition, treatment plan, diagnostic tests, and medications will improve  Outcome: PROGRESSING AS EXPECTED  Pt educated on POC which includes IV fluids, ambulating, labs.

## 2019-03-13 NOTE — PROGRESS NOTES
Reinaldo from Lab called with critical result of Hemoglobin of 5.6 and Hematocrit of 17.7 at 0328. Critical lab result read back to Reinaldo. Dr. Roberson notified of critical lab result at 0344.  Critical lab result read back by Dr. Roberson.    MD aware pt is bloodless. No new orders at this time.

## 2019-03-14 LAB
HCT VFR BLD AUTO: 21.6 % (ref 37–47)
HGB BLD-MCNC: 6.8 G/DL (ref 12–16)

## 2019-03-14 PROCEDURE — 85018 HEMOGLOBIN: CPT

## 2019-03-14 PROCEDURE — 99231 SBSQ HOSP IP/OBS SF/LOW 25: CPT | Performed by: HOSPITALIST

## 2019-03-14 PROCEDURE — A9270 NON-COVERED ITEM OR SERVICE: HCPCS | Performed by: HOSPITALIST

## 2019-03-14 PROCEDURE — 36415 COLL VENOUS BLD VENIPUNCTURE: CPT

## 2019-03-14 PROCEDURE — 85014 HEMATOCRIT: CPT

## 2019-03-14 PROCEDURE — 700102 HCHG RX REV CODE 250 W/ 637 OVERRIDE(OP): Performed by: INTERNAL MEDICINE

## 2019-03-14 PROCEDURE — 770001 HCHG ROOM/CARE - MED/SURG/GYN PRIV*

## 2019-03-14 PROCEDURE — A9270 NON-COVERED ITEM OR SERVICE: HCPCS | Performed by: INTERNAL MEDICINE

## 2019-03-14 PROCEDURE — 700102 HCHG RX REV CODE 250 W/ 637 OVERRIDE(OP): Performed by: HOSPITALIST

## 2019-03-14 RX ORDER — HYDROCODONE BITARTRATE AND ACETAMINOPHEN 5; 325 MG/1; MG/1
1-2 TABLET ORAL EVERY 6 HOURS PRN
Status: DISCONTINUED | OUTPATIENT
Start: 2019-03-14 | End: 2019-03-15 | Stop reason: HOSPADM

## 2019-03-14 RX ORDER — TEMAZEPAM 15 MG/1
45 CAPSULE ORAL
Status: DISCONTINUED | OUTPATIENT
Start: 2019-03-14 | End: 2019-03-15 | Stop reason: HOSPADM

## 2019-03-14 RX ORDER — FERROUS SULFATE 325(65) MG
325 TABLET ORAL 2 TIMES DAILY WITH MEALS
Status: DISCONTINUED | OUTPATIENT
Start: 2019-03-14 | End: 2019-03-15 | Stop reason: HOSPADM

## 2019-03-14 RX ORDER — LOPERAMIDE HYDROCHLORIDE 2 MG/1
2 CAPSULE ORAL 4 TIMES DAILY PRN
Status: DISCONTINUED | OUTPATIENT
Start: 2019-03-14 | End: 2019-03-15 | Stop reason: HOSPADM

## 2019-03-14 RX ADMIN — FERROUS SULFATE TAB 325 MG (65 MG ELEMENTAL FE) 325 MG: 325 (65 FE) TAB at 10:50

## 2019-03-14 RX ADMIN — TEMAZEPAM 45 MG: 15 CAPSULE ORAL at 21:10

## 2019-03-14 RX ADMIN — LOPERAMIDE HYDROCHLORIDE 2 MG: 2 CAPSULE ORAL at 17:16

## 2019-03-14 RX ADMIN — HYDROCODONE BITARTRATE AND ACETAMINOPHEN 2 TABLET: 5; 325 TABLET ORAL at 19:33

## 2019-03-14 RX ADMIN — FERROUS SULFATE TAB 325 MG (65 MG ELEMENTAL FE) 325 MG: 325 (65 FE) TAB at 17:16

## 2019-03-14 ASSESSMENT — ENCOUNTER SYMPTOMS
FLANK PAIN: 0
MYALGIAS: 1
HALLUCINATIONS: 0
EYE REDNESS: 0
PALPITATIONS: 0
DEPRESSION: 0
DIZZINESS: 0
DIAPHORESIS: 0
SHORTNESS OF BREATH: 0
FEVER: 0
LOSS OF CONSCIOUSNESS: 0
CHILLS: 0
HEADACHES: 1
SPEECH CHANGE: 0
BLOOD IN STOOL: 0
MEMORY LOSS: 0
NERVOUS/ANXIOUS: 0
DOUBLE VISION: 0
VOMITING: 0
COUGH: 0
BLURRED VISION: 1
NAUSEA: 0
ABDOMINAL PAIN: 0
BACK PAIN: 0
FOCAL WEAKNESS: 0

## 2019-03-14 NOTE — CARE PLAN
Problem: Communication  Goal: The ability to communicate needs accurately and effectively will improve  Outcome: PROGRESSING AS EXPECTED  Patient educated to utilize call light. Patient oriented to hospital room. Patient encouraged to ask questions about plan of care. Patient effectively uses call light and is involved in POC.    Problem: Pain Management  Goal: Pain level will decrease to patient's comfort goal  Outcome: PROGRESSING AS EXPECTED  Patient educated to pain scale system. Patient encouraged to verbalize discomfort. Patient taught about non-pharmacological pain management. Patient is comfortable at this time without statements of discomfort or pain.

## 2019-03-14 NOTE — PROGRESS NOTES
Bedside report completed. Pt lying in bed comfortably. Family at bedside. A/O x 4. Safety precautions in place. Call light and personal belongings within reach. Pt educated on POC and all questions answered at this time.  Educated patient on calling for assistance when needed. All pt needs are met at this time.  Will continue to monitor.

## 2019-03-14 NOTE — PROGRESS NOTES
Bedside report received from night nurse. Assumed care of pt. Pt awake, laying in bed. A/Ox4, VSS. No complaints at this time. POC reviewed and white board updated. Tele box on. Call light in reach. Bed locked in lowest position with 2 upper bed rails up. Bed alarm on, plugged in correctly, and placed under pt correctly.

## 2019-03-14 NOTE — PROGRESS NOTES
Hospital Medicine Daily Progress Note    Date of Service  3/14/2019    Chief Complaint  63 y.o. female admitted 3/10/2019 with prostatic skin cancer with acute bleed refusing blood transfusion.  Patient is a [employer].  Now with associated hypotension.    Hospital Course    63 y.o. female admitted 3/10/2019 with prostatic skin cancer with acute bleed refusing blood transfusion.  Patient is a [employer].  Now with associated hypotension.      Interval Problem Update  States that morphine makes her nauseated. Still with mild headache. No acute overnight events. Would like to know what her hemoglobin is today.    Consultants/Specialty  None    Code Status  Full    Disposition  Comfort Care. Home with aid of PCP, as no hospice agencies available in area.     Review of Systems  Review of Systems   Constitutional: Positive for malaise/fatigue. Negative for chills, diaphoresis and fever.   HENT: Negative for congestion and hearing loss.    Eyes: Positive for blurred vision (Intermittent). Negative for double vision and redness.   Respiratory: Negative for cough and shortness of breath.    Cardiovascular: Negative for chest pain, palpitations and leg swelling.   Gastrointestinal: Negative for abdominal pain, blood in stool, nausea and vomiting.   Genitourinary: Negative for dysuria and flank pain.   Musculoskeletal: Positive for myalgias. Negative for back pain and joint pain.   Neurological: Positive for headaches. Negative for dizziness, speech change, focal weakness and loss of consciousness.   Psychiatric/Behavioral: Negative for depression, hallucinations and memory loss. The patient is not nervous/anxious.         Physical Exam  Temp:  [36.4 °C (97.5 °F)-36.9 °C (98.5 °F)] 36.9 °C (98.5 °F)  Pulse:  [100-112] 112  Resp:  [18] 18  BP: ()/(51-61) 91/58  SpO2:  [97 %-98 %] 97 %    Physical Exam   Constitutional: She is oriented to person, place, and time. She appears well-developed. No distress.    HENT:   Head: Normocephalic.   Mouth/Throat: Oropharynx is clear and moist.   Eyes: EOM are normal. No scleral icterus.   Neck: Normal range of motion. No tracheal deviation present.   Cardiovascular: Normal rate and intact distal pulses.    Pulmonary/Chest: Effort normal and breath sounds normal. No respiratory distress. She has no rales.   Abdominal: Soft. She exhibits no distension. There is no tenderness.   Musculoskeletal: She exhibits tenderness. She exhibits no edema.   Neurological: She is alert and oriented to person, place, and time. No cranial nerve deficit.   Skin: Skin is warm. She is not diaphoretic. There is pallor.   Large black mass lesion behind the left ear and anterior to ear, no bleeding   Psychiatric: She has a normal mood and affect. Her speech is normal and behavior is normal. Cognition and memory are normal.   Vitals reviewed.      Fluids    Intake/Output Summary (Last 24 hours) at 03/14/19 0802  Last data filed at 03/13/19 1529   Gross per 24 hour   Intake          1436.67 ml   Output                0 ml   Net          1436.67 ml       Laboratory  Recent Labs      03/11/19   1029  03/12/19   0223  03/13/19   0218   WBC  13.3*  9.1   --    RBC  2.42*  2.08*   --    HEMOGLOBIN  6.8*  5.8*  5.6*   HEMATOCRIT  21.6*  19.4*  17.7*   MCV  89.3  93.3   --    MCH  28.1  27.9   --    MCHC  31.5*  29.9*   --    RDW  47.3  49.9   --    PLATELETCT  241  216   --    MPV  12.1  11.0   --      Recent Labs      03/11/19   1029  03/12/19   0223   SODIUM  137  136   POTASSIUM  3.7  3.9   CHLORIDE  108  108   CO2  19*  23   GLUCOSE  191*  110*   BUN  21  16   CREATININE  0.89  0.74   CALCIUM  7.7*  7.5*                   Imaging  No orders to display        Assessment/Plan  Skin cancer- (present on admission)   Assessment & Plan    Metastatic skin cancer diagnosed at [facility] in 2017, with metastases to bony calvarium.  Complicated by ?arterial bleeding.  Refused treatment at that time and continues to not  want aggressive therapies.  Palliative care discussion resulted in her CODE STATUS being changed to DNR/DNI.  -Palliative care following  -Social work following, no hospice agencies in the area; plan to go home with support of her PCP  -Declines blood transfusion secondary to being [employer]  -Supportive care with iron and IV fluids     Anemia- (present on admission)   Assessment & Plan    Secondary to iron deficiency with acute/chronic blood loss from cancerous growth. Patient is [employer] and declines blood transfusions. Hemoglobin down to 5.6, but improved to 6.8 today.  - vitamin B12 low normal, add supplements  - supportive care with ferrous sulfate     Hypotension- (present on admission)   Assessment & Plan    Borderline low BP.  Secondary to anemia and acute blood loss.  -Continue with IV fluids          VTE prophylaxis: SCD

## 2019-03-14 NOTE — DISCHARGE PLANNING
Anticipated Discharge Disposition: Home with family.    Action: RN CM discussed dc plan for tomorrow with pts dtr at bedside. Daughter is returning to [facility] to get a different vehicle and will be back in the morning for pts discharge to take her home. Questions answered and support given.    Barriers to Discharge: None.    Plan: Assist with any needs that may be identified.

## 2019-03-14 NOTE — CARE PLAN
Problem: Safety  Goal: Will remain free from injury  Outcome: PROGRESSING AS EXPECTED  Pt educated to use call light before getting out of bed. Call light in reach. Bed locked in lowest position with 2 upper bed rails up. Pt encouraged to sit at edge of bed before standing up. No c/o dizziness. Assistance of one given to help pt to the commode and back to bed. Room floor is free from clutter. Treaded socks on pt. Bed alarm on.     Problem: Knowledge Deficit  Goal: Knowledge of disease process/condition, treatment plan, diagnostic tests, and medications will improve    Intervention: Explain information regarding disease process/condition, treatment plan, diagnostic tests, and medications and document in education  Pt educated on plan of care, medications, pain management, and disease processes. Pt verbalized understanding and was encouraged to ask questions. All questions answered.

## 2019-03-15 ENCOUNTER — PATIENT OUTREACH (OUTPATIENT)
Dept: HEALTH INFORMATION MANAGEMENT | Facility: OTHER | Age: 64
End: 2019-03-15

## 2019-03-15 VITALS
TEMPERATURE: 97.9 F | RESPIRATION RATE: 18 BRPM | HEIGHT: 67 IN | DIASTOLIC BLOOD PRESSURE: 49 MMHG | SYSTOLIC BLOOD PRESSURE: 111 MMHG | HEART RATE: 98 BPM | OXYGEN SATURATION: 95 % | WEIGHT: 223.11 LBS | BODY MASS INDEX: 35.02 KG/M2

## 2019-03-15 LAB
HCT VFR BLD AUTO: 22.2 % (ref 37–47)
HGB BLD-MCNC: 6.9 G/DL (ref 12–16)

## 2019-03-15 PROCEDURE — 36415 COLL VENOUS BLD VENIPUNCTURE: CPT

## 2019-03-15 PROCEDURE — 700102 HCHG RX REV CODE 250 W/ 637 OVERRIDE(OP): Performed by: INTERNAL MEDICINE

## 2019-03-15 PROCEDURE — 85014 HEMATOCRIT: CPT

## 2019-03-15 PROCEDURE — A9270 NON-COVERED ITEM OR SERVICE: HCPCS | Performed by: INTERNAL MEDICINE

## 2019-03-15 PROCEDURE — 99239 HOSP IP/OBS DSCHRG MGMT >30: CPT | Performed by: HOSPITALIST

## 2019-03-15 PROCEDURE — 85018 HEMOGLOBIN: CPT

## 2019-03-15 PROCEDURE — A9270 NON-COVERED ITEM OR SERVICE: HCPCS | Performed by: HOSPITALIST

## 2019-03-15 PROCEDURE — 700102 HCHG RX REV CODE 250 W/ 637 OVERRIDE(OP): Performed by: HOSPITALIST

## 2019-03-15 RX ORDER — HYDROCODONE BITARTRATE AND ACETAMINOPHEN 5; 325 MG/1; MG/1
1-2 TABLET ORAL EVERY 6 HOURS PRN
Qty: 30 TAB | Refills: 0 | Status: SHIPPED | OUTPATIENT
Start: 2019-03-15 | End: 2019-03-22

## 2019-03-15 RX ORDER — ONDANSETRON 4 MG/1
4 TABLET, ORALLY DISINTEGRATING ORAL EVERY 8 HOURS PRN
Qty: 10 TAB | Refills: 0 | Status: SHIPPED | OUTPATIENT
Start: 2019-03-15

## 2019-03-15 RX ORDER — ONDANSETRON 4 MG/1
4 TABLET, ORALLY DISINTEGRATING ORAL EVERY 8 HOURS PRN
Qty: 10 TAB | Refills: 0 | Status: SHIPPED | OUTPATIENT
Start: 2019-03-15 | End: 2019-03-15

## 2019-03-15 RX ORDER — FERROUS SULFATE 325(65) MG
325 TABLET ORAL 2 TIMES DAILY WITH MEALS
Qty: 60 TAB | Refills: 0 | Status: SHIPPED | OUTPATIENT
Start: 2019-03-15

## 2019-03-15 RX ORDER — FERROUS SULFATE 325(65) MG
325 TABLET ORAL 2 TIMES DAILY WITH MEALS
Qty: 60 TAB | Refills: 0 | Status: SHIPPED | OUTPATIENT
Start: 2019-03-15 | End: 2019-03-15

## 2019-03-15 RX ADMIN — HYDROCODONE BITARTRATE AND ACETAMINOPHEN 2 TABLET: 5; 325 TABLET ORAL at 07:22

## 2019-03-15 RX ADMIN — HYDROCODONE BITARTRATE AND ACETAMINOPHEN 2 TABLET: 5; 325 TABLET ORAL at 01:24

## 2019-03-15 RX ADMIN — FERROUS SULFATE TAB 325 MG (65 MG ELEMENTAL FE) 325 MG: 325 (65 FE) TAB at 09:17

## 2019-03-15 RX ADMIN — LOPERAMIDE HYDROCHLORIDE 2 MG: 2 CAPSULE ORAL at 01:24

## 2019-03-15 RX ADMIN — LOPERAMIDE HYDROCHLORIDE 2 MG: 2 CAPSULE ORAL at 12:27

## 2019-03-15 NOTE — PROGRESS NOTES
Bedside report received. Pt care assumed. Pt resting comfortably. Pt not in distress. AOx 4. Pt complains of pain and wants a shower. Medicated per mar. Safety precautions in place. Educated to call for assistance.

## 2019-03-15 NOTE — DISCHARGE INSTRUCTIONS
Discharge Instructions    Discharged to home by car with relative. Discharged via wheelchair, hospital escort: Yes.  Special equipment needed: Not Applicable    Be sure to schedule a follow-up appointment with your primary care doctor or any specialists as instructed.     Discharge Plan:   Pneumococcal Vaccine Administered/Refused: Not given - Patient refused pneumococcal vaccine  Influenza Vaccine Indication: Patient Refuses    I understand that a diet low in cholesterol, fat, and sodium is recommended for good health. Unless I have been given specific instructions below for another diet, I accept this instruction as my diet prescription.   Other diet: Regular    Special Instructions: None    · Is patient discharged on Warfarin / Coumadin?   No     Depression / Suicide Risk    As you are discharged from this [facility] facility, it is important to learn how to keep safe from harming yourself.    Recognize the warning signs:  · Abrupt changes in personality, positive or negative- including increase in energy   · Giving away possessions  · Change in eating patterns- significant weight changes-  positive or negative  · Change in sleeping patterns- unable to sleep or sleeping all the time   · Unwillingness or inability to communicate  · Depression  · Unusual sadness, discouragement and loneliness  · Talk of wanting to die  · Neglect of personal appearance   · Rebelliousness- reckless behavior  · Withdrawal from people/activities they love  · Confusion- inability to concentrate     If you or a loved one observes any of these behaviors or has concerns about self-harm, here's what you can do:  · Talk about it- your feelings and reasons for harming yourself  · Remove any means that you might use to hurt yourself (examples: pills, rope, extension cords, firearm)  · Get professional help from the community (Mental Health, Substance Abuse, psychological counseling)  · Do not be alone:Call your Safe Contact- someone whom  you trust who will be there for you.  · Call your local CRISIS HOTLINE 717-9510 or 577-400-2296  · Call your local Children's Mobile Crisis Response Team [place] (301) 146-7271 or www.Consulted  · Call the toll free National Suicide Prevention Hotlines   · National Suicide Prevention Lifeline 443-968-QEKB (3995)  · [employer] Line Network 800-SUICIDE (501-8268)    Anemia, Nonspecific  Anemia is a condition in which the concentration of red blood cells or hemoglobin in the blood is below normal. Hemoglobin is a substance in red blood cells that carries oxygen to the tissues of the body. Anemia results in not enough oxygen reaching these tissues.  What are the causes?  Common causes of anemia include:  · Excessive bleeding. Bleeding may be internal or external. This includes excessive bleeding from periods (in women) or from the intestine.  · Poor nutrition.  · Chronic kidney, thyroid, and liver disease.  · Bone marrow disorders that decrease red blood cell production.  · Cancer and treatments for cancer.  · HIV, AIDS, and their treatments.  · Spleen problems that increase red blood cell destruction.  · Blood disorders.  · Excess destruction of red blood cells due to infection, medicines, and autoimmune disorders.  What are the signs or symptoms?  · Minor weakness.  · Dizziness.  · Headache.  · Palpitations.  · Shortness of breath, especially with exercise.  · Paleness.  · Cold sensitivity.  · Indigestion.  · Nausea.  · Difficulty sleeping.  · Difficulty concentrating.  Symptoms may occur suddenly or they may develop slowly.  How is this diagnosed?  Additional blood tests are often needed. These help your health care provider determine the best treatment. Your health care provider will check your stool for blood and look for other causes of blood loss.  How is this treated?  Treatment varies depending on the cause of the anemia. Treatment can include:  · Supplements of iron, vitamin B12, or folic  acid.  · Hormone medicines.  · A blood transfusion. This may be needed if blood loss is severe.  · Hospitalization. This may be needed if there is significant continual blood loss.  · Dietary changes.  · Spleen removal.  Follow these instructions at home:  Keep all follow-up appointments. It often takes many weeks to correct anemia, and having your health care provider check on your condition and your response to treatment is very important.  Get help right away if:  · You develop extreme weakness, shortness of breath, or chest pain.  · You become dizzy or have trouble concentrating.  · You develop heavy vaginal bleeding.  · You develop a rash.  · You have bloody or black, tarry stools.  · You faint.  · You vomit up blood.  · You vomit repeatedly.  · You have abdominal pain.  · You have a fever or persistent symptoms for more than 2-3 days.  · You have a fever and your symptoms suddenly get worse.  · You are dehydrated.  This information is not intended to replace advice given to you by your health care provider. Make sure you discuss any questions you have with your health care provider.  Document Released: 01/25/2006 Document Revised: 05/31/2017 Document Reviewed: 06/13/2014  [employer] Patient Education © 2017 [employer]    Skin Cancer  Most skin cancers occur on the face, neck, arms, or on sun-exposed areas. Damage from excessive sunlight exposure is the most common cause of skin cancer. People with light skin are more likely to develop skin cancer than people with more skin pigment, but skin cancers occur in people of all races. Squamous and basal cell cancers are the most common types. They are both slow-growing and easy to recognize. They often form scaly open sores that do not heal. Melanoma is a skin cancer that looks like a mole. Most moles are harmless, especially if they do not change over time.  Most skin cancers can be prevented by avoiding excessive sunlight exposure. Signs that a skin  sore or mole may be cancerous include:  · A sore that does not heal or one that bleeds or slowly gets bigger.   · Moles that are growing or are larger than a pencil eraser, or ones that have irregular borders or color variations.   · Moles that have a bump, a raised area, or cause itching or pain.   If you have a skin sore or mole that could be cancer, it is important that you call your doctor right away for a skin exam and possible skin biopsy to determine the best treatment.  Document Released: 01/25/2006 Document Revised: 03/11/2013 Document Reviewed: 05/07/2010  [employer]® Patient Information ©2013 [employer].

## 2019-03-15 NOTE — DISCHARGE SUMMARY
Discharge Summary    CHIEF COMPLAINT ON ADMISSION  Chief Complaint   Patient presents with   • Bloody Stools   • Weakness       Reason for Admission  ems     Admission Date  3/10/2019    CODE STATUS  Comfort Care/DNR    HPI & HOSPITAL COURSE  This is a 63 y.o. Female Mandaeism with metastatic skin cancer here with bleeding from behind the left ear. She was transferred from [place] for possible ENT or cautery. She was diagnosed with this skin cancer at [facility] in 2017, where she refused surgery or aggressive treatment. Since that time she has been using homeopathic remedies. On evaluation, she was hypotensive, tachycardic, and with a hemoglobin of 7.3 that trended down to 5.6 during her hospitalization. There was no further bleeding. She was given IVF and iron supplementation. She associated SOB, headaches, changes in vision, dizziness and malaise. She continued to decline blood transfusions or aggressive measures for her metastatic skin cancer. Palliative care was consulted and she opted to go home with hospice. Unfortunately, the hospice agency in her area was unable to accept her so her PCP was notified and willing to help support her post discharge.    Therefore, she is discharged in good and stable condition to home with close outpatient follow-up.    The patient met 2-midnight criteria for an inpatient stay at the time of discharge.    Discharge Date  3/15/2019    FOLLOW UP ITEMS POST DISCHARGE  Follow up with PCP.    DISCHARGE DIAGNOSES  Active Problems:    Skin cancer POA: Yes    Anemia POA: Yes    Hypotension POA: Yes  Resolved Problems:    * No resolved hospital problems. *      FOLLOW UP  Shanta Krause M.D.  [address] [facility] [address]  [phone]      The hospital  left a voicemail with the office to call you directly to schedule a follow up appointment. If you do not receive a call within 2 days please call to arrange a follow up.       MEDICATIONS ON DISCHARGE      Medication List      START taking these medications      Instructions   ferrous sulfate 325 (65 Fe) MG tablet   Take 1 Tab by mouth 2 times a day, with meals.  Dose:  325 mg     HYDROcodone-acetaminophen 5-325 MG Tabs per tablet  Commonly known as:  NORCO   Take 1-2 Tabs by mouth every 6 hours as needed for up to 7 days.  Dose:  1-2 Tab     ondansetron 4 MG Tbdp  Commonly known as:  ZOFRAN ODT   Take 1 Tab by mouth every 8 hours as needed for Nausea.  Dose:  4 mg        CONTINUE taking these medications      Instructions   temazepam 15 MG Caps  Commonly known as:  RESTORIL   Take 45 mg by mouth every bedtime.  Dose:  45 mg            Allergies  Allergies   Allergen Reactions   • Benadryl [Altaryl] Palpitations   • Bloodless      Patient does not want blood transfusions due to [employer] beliefs   • Codeine Vomiting   • Elavil [Amitriptyline Hcl]    • Percocet [Oxycodone-Acetaminophen] Vomiting   • Trazodone        DIET  Orders Placed This Encounter   Procedures   • Diet Order Regular     Standing Status:   Standing     Number of Occurrences:   1     Order Specific Question:   Diet:     Answer:   Regular [1]       ACTIVITY  As tolerated.  Weight bearing as tolerated    CONSULTATIONS  Palliative Care    PROCEDURES  No orders to display         LABORATORY  Lab Results   Component Value Date    SODIUM 136 03/12/2019    POTASSIUM 3.9 03/12/2019    CHLORIDE 108 03/12/2019    CO2 23 03/12/2019    GLUCOSE 110 (H) 03/12/2019    BUN 16 03/12/2019    CREATININE 0.74 03/12/2019    CREATININE 0.9 06/08/2006        Lab Results   Component Value Date    WBC 9.1 03/12/2019    HEMOGLOBIN 6.9 (L) 03/15/2019    HEMATOCRIT 22.2 (L) 03/15/2019    PLATELETCT 216 03/12/2019        Total time of the discharge process exceeds 34 minutes.

## 2019-03-15 NOTE — PROGRESS NOTES
Bedside report completed. Pt lying in bed comfortably. Family at bedside. A/O x4, bed alarm is on. Safety precautions in place. Call light and personal belongings within reach. Pt educated on POC and all questions answered at this time.  Educated patient on calling for assistance when needed. All pt needs are met at this time.  Will continue to monitor.

## 2019-03-15 NOTE — PROGRESS NOTES
Discharge instructions and prescriptions given. IV removed. No complaints at this time. Pt transported with this RN to car and family.

## 2019-03-15 NOTE — PROGRESS NOTES
Pt requesting norco for pain relief instead. Already addressed pt wanting norco two other times with Dr. Thomas earlier in the day. Still no orders placed. Dr. Thomas paged for orders. Awaiting call back.

## 2019-08-13 ENCOUNTER — HOSPITAL ENCOUNTER (OUTPATIENT)
Dept: LAB | Facility: MEDICAL CENTER | Age: 64
End: 2019-08-13
Attending: NURSE PRACTITIONER
Payer: MEDICARE

## 2019-08-13 LAB
ALBUMIN SERPL BCP-MCNC: 3.7 G/DL (ref 3.2–4.9)
ALBUMIN/GLOB SERPL: 1.2 G/DL
ALP SERPL-CCNC: 65 U/L (ref 30–99)
ALT SERPL-CCNC: 12 U/L (ref 2–50)
ANION GAP SERPL CALC-SCNC: 5 MMOL/L (ref 0–11.9)
ANISOCYTOSIS BLD QL SMEAR: ABNORMAL
AST SERPL-CCNC: 17 U/L (ref 12–45)
BASOPHILS # BLD AUTO: 1.2 % (ref 0–1.8)
BASOPHILS # BLD: 0.08 K/UL (ref 0–0.12)
BILIRUB SERPL-MCNC: 0.2 MG/DL (ref 0.1–1.5)
BUN SERPL-MCNC: 16 MG/DL (ref 8–22)
CALCIUM SERPL-MCNC: 8.9 MG/DL (ref 8.5–10.5)
CHLORIDE SERPL-SCNC: 109 MMOL/L (ref 96–112)
CO2 SERPL-SCNC: 28 MMOL/L (ref 20–33)
COMMENT 1642: NORMAL
CREAT SERPL-MCNC: 0.87 MG/DL (ref 0.5–1.4)
EOSINOPHIL # BLD AUTO: 0.38 K/UL (ref 0–0.51)
EOSINOPHIL NFR BLD: 5.6 % (ref 0–6.9)
ERYTHROCYTE [DISTWIDTH] IN BLOOD BY AUTOMATED COUNT: 71.9 FL (ref 35.9–50)
FERRITIN SERPL-MCNC: 24.1 NG/ML (ref 10–291)
GLOBULIN SER CALC-MCNC: 3.1 G/DL (ref 1.9–3.5)
GLUCOSE SERPL-MCNC: 84 MG/DL (ref 65–99)
HCT VFR BLD AUTO: 37.2 % (ref 37–47)
HGB BLD-MCNC: 11 G/DL (ref 12–16)
IMM GRANULOCYTES # BLD AUTO: 0.01 K/UL (ref 0–0.11)
IMM GRANULOCYTES NFR BLD AUTO: 0.1 % (ref 0–0.9)
IRON SATN MFR SERPL: 16 % (ref 15–55)
IRON SERPL-MCNC: 50 UG/DL (ref 40–170)
LYMPHOCYTES # BLD AUTO: 2.76 K/UL (ref 1–4.8)
LYMPHOCYTES NFR BLD: 40.5 % (ref 22–41)
MACROCYTES BLD QL SMEAR: ABNORMAL
MCH RBC QN AUTO: 26 PG (ref 27–33)
MCHC RBC AUTO-ENTMCNC: 29.8 G/DL (ref 33.6–35)
MCV RBC AUTO: 87.1 FL (ref 81.4–97.8)
MICROCYTES BLD QL SMEAR: ABNORMAL
MONOCYTES # BLD AUTO: 0.47 K/UL (ref 0–0.85)
MONOCYTES NFR BLD AUTO: 6.9 % (ref 0–13.4)
MORPHOLOGY BLD-IMP: NORMAL
NEUTROPHILS # BLD AUTO: 3.12 K/UL (ref 2–7.15)
NEUTROPHILS NFR BLD: 45.7 % (ref 44–72)
NRBC # BLD AUTO: 0 K/UL
NRBC BLD-RTO: 0 /100 WBC
PLATELET # BLD AUTO: 207 K/UL (ref 164–446)
PLATELET BLD QL SMEAR: NORMAL
PMV BLD AUTO: 12 FL (ref 9–12.9)
POTASSIUM SERPL-SCNC: 4.2 MMOL/L (ref 3.6–5.5)
PROT SERPL-MCNC: 6.8 G/DL (ref 6–8.2)
RBC # BLD AUTO: 4.2 M/UL (ref 4.2–5.4)
RBC BLD AUTO: PRESENT
SODIUM SERPL-SCNC: 142 MMOL/L (ref 135–145)
TIBC SERPL-MCNC: 315 UG/DL (ref 250–450)
WBC # BLD AUTO: 6.8 K/UL (ref 4.8–10.8)

## 2019-08-13 PROCEDURE — 80053 COMPREHEN METABOLIC PANEL: CPT

## 2019-08-13 PROCEDURE — 36415 COLL VENOUS BLD VENIPUNCTURE: CPT

## 2019-08-13 PROCEDURE — 83540 ASSAY OF IRON: CPT

## 2019-08-13 PROCEDURE — 85025 COMPLETE CBC W/AUTO DIFF WBC: CPT

## 2019-08-13 PROCEDURE — 82728 ASSAY OF FERRITIN: CPT

## 2019-08-13 PROCEDURE — 83550 IRON BINDING TEST: CPT

## 2020-04-12 PROBLEM — Z85.3 PERSONAL HISTORY OF MALIGNANT NEOPLASM OF BREAST: Status: ACTIVE | Noted: 2020-04-12

## 2020-04-12 RX ORDER — VALSARTAN 160 MG/1
TABLET ORAL DAILY
COMMUNITY
Start: 2011-03-08 | End: 2020-04-16

## 2020-04-16 ENCOUNTER — TELEMEDICINE (OUTPATIENT)
Dept: FAMILY MEDICINE CLINIC | Facility: CLINIC | Age: 65
End: 2020-04-16
Payer: MEDICARE

## 2020-04-16 DIAGNOSIS — E11.22 CKD STAGE 1 DUE TO TYPE 2 DIABETES MELLITUS (HCC): ICD-10-CM

## 2020-04-16 DIAGNOSIS — Z13.89 SCREENING FOR CARDIOVASCULAR, RESPIRATORY, AND GENITOURINARY DISEASES: ICD-10-CM

## 2020-04-16 DIAGNOSIS — E11.29 TYPE 2 DIABETES MELLITUS WITH OTHER DIABETIC KIDNEY COMPLICATION, WITHOUT LONG-TERM CURRENT USE OF INSULIN (HCC): ICD-10-CM

## 2020-04-16 DIAGNOSIS — I10 BENIGN ESSENTIAL HYPERTENSION: Primary | ICD-10-CM

## 2020-04-16 DIAGNOSIS — Z13.83 SCREENING FOR CARDIOVASCULAR, RESPIRATORY, AND GENITOURINARY DISEASES: ICD-10-CM

## 2020-04-16 DIAGNOSIS — N18.1 CKD STAGE 1 DUE TO TYPE 2 DIABETES MELLITUS (HCC): ICD-10-CM

## 2020-04-16 DIAGNOSIS — Z13.6 SCREENING FOR CARDIOVASCULAR, RESPIRATORY, AND GENITOURINARY DISEASES: ICD-10-CM

## 2020-04-16 PROCEDURE — 99443 PR PHYS/QHP TELEPHONE EVALUATION 21-30 MIN: CPT | Performed by: FAMILY MEDICINE

## 2020-04-16 RX ORDER — AMLODIPINE BESYLATE 10 MG/1
10 TABLET ORAL DAILY
Qty: 30 TABLET | Refills: 1 | Status: SHIPPED | OUTPATIENT
Start: 2020-04-16 | End: 2020-04-24 | Stop reason: SDUPTHER

## 2020-04-22 LAB
CREAT ?TM UR-SCNC: 137 UMOL/L
EXT MICROALBUMIN URINE RANDOM: 162.5
HBA1C MFR BLD HPLC: 14.4 %
MICROALBUMIN/CREAT UR: 118.6 MG/G{CREAT}

## 2020-04-24 ENCOUNTER — TELEMEDICINE (OUTPATIENT)
Dept: FAMILY MEDICINE CLINIC | Facility: CLINIC | Age: 65
End: 2020-04-24
Payer: MEDICARE

## 2020-04-24 DIAGNOSIS — R80.9 MICROALBUMINURIA: ICD-10-CM

## 2020-04-24 DIAGNOSIS — E78.5 HYPERLIPIDEMIA LDL GOAL <100: ICD-10-CM

## 2020-04-24 DIAGNOSIS — I10 BENIGN ESSENTIAL HYPERTENSION: ICD-10-CM

## 2020-04-24 DIAGNOSIS — Z00.00 MEDICARE ANNUAL WELLNESS VISIT, INITIAL: ICD-10-CM

## 2020-04-24 DIAGNOSIS — Z00.00 MEDICARE ANNUAL WELLNESS VISIT, SUBSEQUENT: Primary | ICD-10-CM

## 2020-04-24 DIAGNOSIS — E11.29 TYPE 2 DIABETES MELLITUS WITH OTHER DIABETIC KIDNEY COMPLICATION, WITHOUT LONG-TERM CURRENT USE OF INSULIN (HCC): ICD-10-CM

## 2020-04-24 DIAGNOSIS — Z11.59 NEED FOR HEPATITIS C SCREENING TEST: ICD-10-CM

## 2020-04-24 DIAGNOSIS — R05.9 COUGH: ICD-10-CM

## 2020-04-24 DIAGNOSIS — F17.208 NICOTINE DEPENDENCE WITH OTHER NICOTINE-INDUCED DISORDER, UNSPECIFIED NICOTINE PRODUCT TYPE: ICD-10-CM

## 2020-04-24 DIAGNOSIS — N18.1 CKD STAGE 1 DUE TO TYPE 2 DIABETES MELLITUS (HCC): ICD-10-CM

## 2020-04-24 DIAGNOSIS — E11.22 CKD STAGE 1 DUE TO TYPE 2 DIABETES MELLITUS (HCC): ICD-10-CM

## 2020-04-24 DIAGNOSIS — Z12.39 BREAST CANCER SCREENING: ICD-10-CM

## 2020-04-24 PROCEDURE — 1036F TOBACCO NON-USER: CPT | Performed by: FAMILY MEDICINE

## 2020-04-24 PROCEDURE — 3075F SYST BP GE 130 - 139MM HG: CPT | Performed by: FAMILY MEDICINE

## 2020-04-24 PROCEDURE — 4040F PNEUMOC VAC/ADMIN/RCVD: CPT | Performed by: FAMILY MEDICINE

## 2020-04-24 PROCEDURE — 3066F NEPHROPATHY DOC TX: CPT | Performed by: FAMILY MEDICINE

## 2020-04-24 PROCEDURE — 3046F HEMOGLOBIN A1C LEVEL >9.0%: CPT | Performed by: FAMILY MEDICINE

## 2020-04-24 PROCEDURE — 99214 OFFICE O/P EST MOD 30 MIN: CPT | Performed by: FAMILY MEDICINE

## 2020-04-24 PROCEDURE — 4010F ACE/ARB THERAPY RXD/TAKEN: CPT | Performed by: FAMILY MEDICINE

## 2020-04-24 PROCEDURE — G0438 PPPS, INITIAL VISIT: HCPCS | Performed by: FAMILY MEDICINE

## 2020-04-24 PROCEDURE — 3079F DIAST BP 80-89 MM HG: CPT | Performed by: FAMILY MEDICINE

## 2020-04-24 PROCEDURE — 1170F FXNL STATUS ASSESSED: CPT | Performed by: FAMILY MEDICINE

## 2020-04-24 PROCEDURE — 1125F AMNT PAIN NOTED PAIN PRSNT: CPT | Performed by: FAMILY MEDICINE

## 2020-04-24 RX ORDER — BUPRENORPHINE HYDROCHLORIDE, NALOXONE HYDROCHLORIDE 8; 2 MG/1; MG/1
FILM, SOLUBLE BUCCAL; SUBLINGUAL
COMMUNITY
Start: 2020-04-01 | End: 2020-05-27

## 2020-04-24 RX ORDER — AMLODIPINE BESYLATE 10 MG/1
10 TABLET ORAL DAILY
Qty: 90 TABLET | Refills: 1 | Status: SHIPPED | OUTPATIENT
Start: 2020-04-24 | End: 2020-05-11

## 2020-04-24 RX ORDER — BENZONATATE 200 MG/1
200 CAPSULE ORAL 3 TIMES DAILY PRN
Qty: 60 CAPSULE | Refills: 0 | Status: SHIPPED | OUTPATIENT
Start: 2020-04-24 | End: 2020-12-20

## 2020-04-24 RX ORDER — ATORVASTATIN CALCIUM 10 MG/1
10 TABLET, FILM COATED ORAL DAILY
Qty: 90 TABLET | Refills: 1 | Status: ON HOLD | OUTPATIENT
Start: 2020-04-24 | End: 2020-11-17 | Stop reason: SDUPTHER

## 2020-05-01 ENCOUNTER — TRANSCRIBE ORDERS (OUTPATIENT)
Dept: ADMINISTRATIVE | Facility: HOSPITAL | Age: 65
End: 2020-05-01

## 2020-05-01 DIAGNOSIS — C50.919 MALIGNANT NEOPLASM OF FEMALE BREAST, UNSPECIFIED ESTROGEN RECEPTOR STATUS, UNSPECIFIED LATERALITY, UNSPECIFIED SITE OF BREAST (HCC): Primary | ICD-10-CM

## 2020-05-10 DIAGNOSIS — I10 BENIGN ESSENTIAL HYPERTENSION: ICD-10-CM

## 2020-05-11 RX ORDER — AMLODIPINE BESYLATE 10 MG/1
TABLET ORAL
Qty: 30 TABLET | Refills: 1 | Status: SHIPPED | OUTPATIENT
Start: 2020-05-11 | End: 2020-05-27

## 2020-05-18 ENCOUNTER — APPOINTMENT (OUTPATIENT)
Dept: RADIOLOGY | Facility: HOSPITAL | Age: 65
End: 2020-05-18
Attending: FAMILY MEDICINE
Payer: MEDICARE

## 2020-05-18 ENCOUNTER — HOSPITAL ENCOUNTER (OUTPATIENT)
Dept: RADIOLOGY | Facility: HOSPITAL | Age: 65
Discharge: HOME | End: 2020-05-18
Attending: FAMILY MEDICINE
Payer: MEDICARE

## 2020-05-18 VITALS — BODY MASS INDEX: 24.8 KG/M2 | HEIGHT: 63 IN | WEIGHT: 140 LBS

## 2020-05-18 DIAGNOSIS — C50.919 MALIGNANT NEOPLASM OF FEMALE BREAST, UNSPECIFIED ESTROGEN RECEPTOR STATUS, UNSPECIFIED LATERALITY, UNSPECIFIED SITE OF BREAST (HCC): ICD-10-CM

## 2020-05-18 PROCEDURE — 77063 BREAST TOMOSYNTHESIS BI: CPT

## 2020-05-18 PROCEDURE — 77067 SCR MAMMO BI INCL CAD: CPT

## 2020-05-25 DIAGNOSIS — I10 BENIGN ESSENTIAL HYPERTENSION: ICD-10-CM

## 2020-05-26 PROBLEM — G47.33 OBSTRUCTIVE SLEEP APNEA SYNDROME: Status: ACTIVE | Noted: 2020-05-26

## 2020-05-26 PROBLEM — IMO0002 UNCONTROLLED TYPE 2 DIABETES MELLITUS: Status: ACTIVE | Noted: 2020-05-26

## 2020-05-26 RX ORDER — ESOMEPRAZOLE MAGNESIUM 40 MG/1
CAPSULE, DELAYED RELEASE ORAL
COMMUNITY
Start: 2014-10-09 | End: 2020-05-27

## 2020-05-26 RX ORDER — VALSARTAN 160 MG/1
TABLET ORAL
COMMUNITY
Start: 2012-01-06 | End: 2020-05-27

## 2020-05-27 ENCOUNTER — OFFICE VISIT (OUTPATIENT)
Dept: FAMILY MEDICINE CLINIC | Facility: CLINIC | Age: 65
End: 2020-05-27
Payer: MEDICARE

## 2020-05-27 ENCOUNTER — TELEPHONE (OUTPATIENT)
Dept: FAMILY MEDICINE CLINIC | Facility: CLINIC | Age: 65
End: 2020-05-27

## 2020-05-27 VITALS
SYSTOLIC BLOOD PRESSURE: 130 MMHG | BODY MASS INDEX: 24.27 KG/M2 | DIASTOLIC BLOOD PRESSURE: 70 MMHG | WEIGHT: 137 LBS | TEMPERATURE: 97 F | RESPIRATION RATE: 18 BRPM | HEART RATE: 93 BPM | HEIGHT: 63 IN | OXYGEN SATURATION: 97 %

## 2020-05-27 DIAGNOSIS — N18.1 CKD STAGE 1 DUE TO TYPE 2 DIABETES MELLITUS (HCC): ICD-10-CM

## 2020-05-27 DIAGNOSIS — E11.29 TYPE 2 DIABETES MELLITUS WITH OTHER DIABETIC KIDNEY COMPLICATION, WITHOUT LONG-TERM CURRENT USE OF INSULIN (HCC): Primary | ICD-10-CM

## 2020-05-27 DIAGNOSIS — F17.208 NICOTINE DEPENDENCE WITH OTHER NICOTINE-INDUCED DISORDER, UNSPECIFIED NICOTINE PRODUCT TYPE: ICD-10-CM

## 2020-05-27 DIAGNOSIS — E11.22 CKD STAGE 1 DUE TO TYPE 2 DIABETES MELLITUS (HCC): ICD-10-CM

## 2020-05-27 DIAGNOSIS — E78.5 HYPERLIPIDEMIA LDL GOAL <100: ICD-10-CM

## 2020-05-27 DIAGNOSIS — I10 BENIGN ESSENTIAL HYPERTENSION: ICD-10-CM

## 2020-05-27 PROCEDURE — 3008F BODY MASS INDEX DOCD: CPT | Performed by: FAMILY MEDICINE

## 2020-05-27 PROCEDURE — 1160F RVW MEDS BY RX/DR IN RCRD: CPT | Performed by: FAMILY MEDICINE

## 2020-05-27 PROCEDURE — 3046F HEMOGLOBIN A1C LEVEL >9.0%: CPT | Performed by: FAMILY MEDICINE

## 2020-05-27 PROCEDURE — 3078F DIAST BP <80 MM HG: CPT | Performed by: FAMILY MEDICINE

## 2020-05-27 PROCEDURE — 4040F PNEUMOC VAC/ADMIN/RCVD: CPT | Performed by: FAMILY MEDICINE

## 2020-05-27 PROCEDURE — 99214 OFFICE O/P EST MOD 30 MIN: CPT | Performed by: FAMILY MEDICINE

## 2020-05-27 PROCEDURE — 3066F NEPHROPATHY DOC TX: CPT | Performed by: FAMILY MEDICINE

## 2020-05-27 PROCEDURE — 3075F SYST BP GE 130 - 139MM HG: CPT | Performed by: FAMILY MEDICINE

## 2020-05-27 PROCEDURE — 4004F PT TOBACCO SCREEN RCVD TLK: CPT | Performed by: FAMILY MEDICINE

## 2020-05-27 RX ORDER — AMLODIPINE BESYLATE 10 MG/1
TABLET ORAL
Qty: 90 TABLET | Refills: 1 | Status: SHIPPED | OUTPATIENT
Start: 2020-05-27 | End: 2020-06-19

## 2020-05-29 ENCOUNTER — TELEPHONE (OUTPATIENT)
Dept: FAMILY MEDICINE CLINIC | Facility: CLINIC | Age: 65
End: 2020-05-29

## 2020-06-19 DIAGNOSIS — I10 BENIGN ESSENTIAL HYPERTENSION: ICD-10-CM

## 2020-06-19 RX ORDER — AMLODIPINE BESYLATE 10 MG/1
TABLET ORAL
Qty: 30 TABLET | Refills: 1 | Status: SHIPPED | OUTPATIENT
Start: 2020-06-19 | End: 2020-11-17 | Stop reason: HOSPADM

## 2020-06-23 ENCOUNTER — CLINICAL SUPPORT (OUTPATIENT)
Dept: FAMILY MEDICINE CLINIC | Facility: CLINIC | Age: 65
End: 2020-06-23
Payer: MEDICARE

## 2020-06-23 VITALS — TEMPERATURE: 98.3 F

## 2020-06-23 DIAGNOSIS — Z23 NEED FOR VACCINATION: Primary | ICD-10-CM

## 2020-06-23 PROCEDURE — 86580 TB INTRADERMAL TEST: CPT

## 2020-06-25 ENCOUNTER — CLINICAL SUPPORT (OUTPATIENT)
Dept: FAMILY MEDICINE CLINIC | Facility: CLINIC | Age: 65
End: 2020-06-25

## 2020-06-25 DIAGNOSIS — Z11.1 SCREENING FOR TUBERCULOSIS: Primary | ICD-10-CM

## 2020-06-25 LAB
INDURATION: NORMAL MM
TB SKIN TEST: NEGATIVE

## 2020-07-07 ENCOUNTER — CLINICAL SUPPORT (OUTPATIENT)
Dept: FAMILY MEDICINE CLINIC | Facility: CLINIC | Age: 65
End: 2020-07-07
Payer: MEDICARE

## 2020-07-07 DIAGNOSIS — Z11.1 SCREENING FOR TUBERCULOSIS: Primary | ICD-10-CM

## 2020-07-07 PROCEDURE — 86580 TB INTRADERMAL TEST: CPT

## 2020-07-09 ENCOUNTER — CLINICAL SUPPORT (OUTPATIENT)
Dept: FAMILY MEDICINE CLINIC | Facility: CLINIC | Age: 65
End: 2020-07-09

## 2020-07-09 VITALS — TEMPERATURE: 96.1 F

## 2020-07-09 DIAGNOSIS — Z11.1 ENCOUNTER FOR PPD SKIN TEST READING: Primary | ICD-10-CM

## 2020-07-09 LAB
INDURATION: 0 MM
TB SKIN TEST: NEGATIVE

## 2020-11-09 LAB — EXT SARS-COV-2: NOT DETECTED

## 2020-11-15 ENCOUNTER — APPOINTMENT (EMERGENCY)
Dept: RADIOLOGY | Facility: HOSPITAL | Age: 65
DRG: 286 | End: 2020-11-15
Payer: MEDICARE

## 2020-11-15 ENCOUNTER — NURSE TRIAGE (OUTPATIENT)
Dept: OTHER | Facility: OTHER | Age: 65
End: 2020-11-15

## 2020-11-15 ENCOUNTER — HOSPITAL ENCOUNTER (INPATIENT)
Facility: HOSPITAL | Age: 65
LOS: 2 days | Discharge: HOME | DRG: 286 | End: 2020-11-17
Attending: EMERGENCY MEDICINE | Admitting: INTERNAL MEDICINE
Payer: MEDICARE

## 2020-11-15 ENCOUNTER — APPOINTMENT (EMERGENCY)
Dept: CT IMAGING | Facility: HOSPITAL | Age: 65
DRG: 286 | End: 2020-11-15
Payer: MEDICARE

## 2020-11-15 ENCOUNTER — APPOINTMENT (INPATIENT)
Dept: NON INVASIVE DIAGNOSTICS | Facility: HOSPITAL | Age: 65
DRG: 286 | End: 2020-11-15
Payer: MEDICARE

## 2020-11-15 DIAGNOSIS — I50.41 ACUTE COMBINED SYSTOLIC AND DIASTOLIC CONGESTIVE HEART FAILURE (HCC): ICD-10-CM

## 2020-11-15 DIAGNOSIS — E83.42 HYPOMAGNESEMIA: ICD-10-CM

## 2020-11-15 DIAGNOSIS — E11.29 TYPE 2 DIABETES MELLITUS WITH OTHER DIABETIC KIDNEY COMPLICATION, WITHOUT LONG-TERM CURRENT USE OF INSULIN (HCC): ICD-10-CM

## 2020-11-15 DIAGNOSIS — E78.5 HYPERLIPIDEMIA LDL GOAL <100: ICD-10-CM

## 2020-11-15 DIAGNOSIS — I10 HYPERTENSION: ICD-10-CM

## 2020-11-15 DIAGNOSIS — E83.52 HYPERCALCEMIA: ICD-10-CM

## 2020-11-15 DIAGNOSIS — R06.00 DYSPNEA: ICD-10-CM

## 2020-11-15 DIAGNOSIS — F17.208 NICOTINE DEPENDENCE WITH OTHER NICOTINE-INDUCED DISORDER, UNSPECIFIED NICOTINE PRODUCT TYPE: ICD-10-CM

## 2020-11-15 DIAGNOSIS — I50.9 CHF (CONGESTIVE HEART FAILURE) (HCC): Primary | ICD-10-CM

## 2020-11-15 LAB
ALBUMIN SERPL BCP-MCNC: 3.2 G/DL (ref 3.5–5)
ALP SERPL-CCNC: 126 U/L (ref 46–116)
ALT SERPL W P-5'-P-CCNC: 67 U/L (ref 12–78)
ANION GAP SERPL CALCULATED.3IONS-SCNC: 10 MMOL/L (ref 4–13)
APTT PPP: 23 SECONDS (ref 23–37)
AST SERPL W P-5'-P-CCNC: 20 U/L (ref 5–45)
ATRIAL RATE: 106 BPM
BASOPHILS # BLD AUTO: 0.02 THOUSANDS/ΜL (ref 0–0.1)
BASOPHILS NFR BLD AUTO: 0 % (ref 0–1)
BILIRUB SERPL-MCNC: 0.66 MG/DL (ref 0.2–1)
BILIRUB UR QL STRIP: NEGATIVE
BUN SERPL-MCNC: 10 MG/DL (ref 5–25)
CALCIUM ALBUM COR SERPL-MCNC: 11.4 MG/DL (ref 8.3–10.1)
CALCIUM SERPL-MCNC: 10.8 MG/DL (ref 8.3–10.1)
CHLORIDE SERPL-SCNC: 103 MMOL/L (ref 100–108)
CLARITY UR: CLEAR
CO2 SERPL-SCNC: 23 MMOL/L (ref 21–32)
COLOR UR: YELLOW
CREAT SERPL-MCNC: 1.04 MG/DL (ref 0.6–1.3)
CRP SERPL HS-MCNC: 22.52 MG/L
D DIMER PPP FEU-MCNC: 0.68 UG/ML FEU
EOSINOPHIL # BLD AUTO: 0.11 THOUSAND/ΜL (ref 0–0.61)
EOSINOPHIL NFR BLD AUTO: 1 % (ref 0–6)
ERYTHROCYTE [DISTWIDTH] IN BLOOD BY AUTOMATED COUNT: 13 % (ref 11.6–15.1)
FLUAV RNA RESP QL NAA+PROBE: NEGATIVE
FLUBV RNA RESP QL NAA+PROBE: NEGATIVE
GFR SERPL CREATININE-BSD FRML MDRD: 64 ML/MIN/1.73SQ M
GLUCOSE SERPL-MCNC: 138 MG/DL (ref 65–140)
GLUCOSE SERPL-MCNC: 182 MG/DL (ref 65–140)
GLUCOSE SERPL-MCNC: 257 MG/DL (ref 65–140)
GLUCOSE SERPL-MCNC: 307 MG/DL (ref 65–140)
GLUCOSE UR STRIP-MCNC: ABNORMAL MG/DL
HCT VFR BLD AUTO: 44.7 % (ref 34.8–46.1)
HGB BLD-MCNC: 14.7 G/DL (ref 11.5–15.4)
HGB UR QL STRIP.AUTO: NEGATIVE
IMM GRANULOCYTES # BLD AUTO: 0.02 THOUSAND/UL (ref 0–0.2)
IMM GRANULOCYTES NFR BLD AUTO: 0 % (ref 0–2)
INR PPP: 0.99 (ref 0.84–1.19)
KETONES UR STRIP-MCNC: NEGATIVE MG/DL
LACTATE SERPL-SCNC: 1.3 MMOL/L (ref 0.5–2)
LEUKOCYTE ESTERASE UR QL STRIP: NEGATIVE
LYMPHOCYTES # BLD AUTO: 2.99 THOUSANDS/ΜL (ref 0.6–4.47)
LYMPHOCYTES NFR BLD AUTO: 33 % (ref 14–44)
MCH RBC QN AUTO: 28.2 PG (ref 26.8–34.3)
MCHC RBC AUTO-ENTMCNC: 32.9 G/DL (ref 31.4–37.4)
MCV RBC AUTO: 86 FL (ref 82–98)
MONOCYTES # BLD AUTO: 0.7 THOUSAND/ΜL (ref 0.17–1.22)
MONOCYTES NFR BLD AUTO: 8 % (ref 4–12)
NEUTROPHILS # BLD AUTO: 5.28 THOUSANDS/ΜL (ref 1.85–7.62)
NEUTS SEG NFR BLD AUTO: 58 % (ref 43–75)
NITRITE UR QL STRIP: NEGATIVE
NRBC BLD AUTO-RTO: 0 /100 WBCS
NT-PROBNP SERPL-MCNC: 3246 PG/ML
P AXIS: 251 DEGREES
PH UR STRIP.AUTO: 6.5 [PH]
PLATELET # BLD AUTO: 249 THOUSANDS/UL (ref 149–390)
PLATELET # BLD AUTO: 271 THOUSANDS/UL (ref 149–390)
PMV BLD AUTO: 11.8 FL (ref 8.9–12.7)
PMV BLD AUTO: 11.9 FL (ref 8.9–12.7)
POTASSIUM SERPL-SCNC: 3.8 MMOL/L (ref 3.5–5.3)
PR INTERVAL: 136 MS
PROT SERPL-MCNC: 7.6 G/DL (ref 6.4–8.2)
PROT UR STRIP-MCNC: NEGATIVE MG/DL
PROTHROMBIN TIME: 13.2 SECONDS (ref 11.6–14.5)
QRS AXIS: 87 DEGREES
QRSD INTERVAL: 114 MS
QT INTERVAL: 332 MS
QTC INTERVAL: 441 MS
RBC # BLD AUTO: 5.21 MILLION/UL (ref 3.81–5.12)
RSV RNA RESP QL NAA+PROBE: NEGATIVE
SARS-COV-2 RNA RESP QL NAA+PROBE: NEGATIVE
SODIUM SERPL-SCNC: 136 MMOL/L (ref 136–145)
SP GR UR STRIP.AUTO: 1.01 (ref 1–1.03)
T WAVE AXIS: -54 DEGREES
TROPONIN I SERPL-MCNC: 0.02 NG/ML
TROPONIN I SERPL-MCNC: 0.02 NG/ML
TROPONIN I SERPL-MCNC: 0.03 NG/ML
TROPONIN I SERPL-MCNC: 0.03 NG/ML
TSH SERPL DL<=0.05 MIU/L-ACNC: 0.85 UIU/ML (ref 0.36–3.74)
UROBILINOGEN UR QL STRIP.AUTO: 0.2 E.U./DL
VENTRICULAR RATE: 106 BPM
WBC # BLD AUTO: 9.12 THOUSAND/UL (ref 4.31–10.16)

## 2020-11-15 PROCEDURE — 99223 1ST HOSP IP/OBS HIGH 75: CPT | Performed by: INTERNAL MEDICINE

## 2020-11-15 PROCEDURE — 83970 ASSAY OF PARATHORMONE: CPT | Performed by: INTERNAL MEDICINE

## 2020-11-15 PROCEDURE — 71275 CT ANGIOGRAPHY CHEST: CPT

## 2020-11-15 PROCEDURE — 99285 EMERGENCY DEPT VISIT HI MDM: CPT | Performed by: EMERGENCY MEDICINE

## 2020-11-15 PROCEDURE — 74177 CT ABD & PELVIS W/CONTRAST: CPT

## 2020-11-15 PROCEDURE — 83605 ASSAY OF LACTIC ACID: CPT | Performed by: EMERGENCY MEDICINE

## 2020-11-15 PROCEDURE — 84443 ASSAY THYROID STIM HORMONE: CPT | Performed by: INTERNAL MEDICINE

## 2020-11-15 PROCEDURE — G1004 CDSM NDSC: HCPCS

## 2020-11-15 PROCEDURE — 93005 ELECTROCARDIOGRAM TRACING: CPT

## 2020-11-15 PROCEDURE — 85025 COMPLETE CBC W/AUTO DIFF WBC: CPT | Performed by: EMERGENCY MEDICINE

## 2020-11-15 PROCEDURE — 85730 THROMBOPLASTIN TIME PARTIAL: CPT | Performed by: EMERGENCY MEDICINE

## 2020-11-15 PROCEDURE — 71045 X-RAY EXAM CHEST 1 VIEW: CPT

## 2020-11-15 PROCEDURE — 86141 C-REACTIVE PROTEIN HS: CPT | Performed by: EMERGENCY MEDICINE

## 2020-11-15 PROCEDURE — 93010 ELECTROCARDIOGRAM REPORT: CPT | Performed by: INTERNAL MEDICINE

## 2020-11-15 PROCEDURE — 93306 TTE W/DOPPLER COMPLETE: CPT

## 2020-11-15 PROCEDURE — 84484 ASSAY OF TROPONIN QUANT: CPT | Performed by: INTERNAL MEDICINE

## 2020-11-15 PROCEDURE — 93306 TTE W/DOPPLER COMPLETE: CPT | Performed by: INTERNAL MEDICINE

## 2020-11-15 PROCEDURE — 1124F ACP DISCUSS-NO DSCNMKR DOCD: CPT | Performed by: EMERGENCY MEDICINE

## 2020-11-15 PROCEDURE — 87040 BLOOD CULTURE FOR BACTERIA: CPT | Performed by: EMERGENCY MEDICINE

## 2020-11-15 PROCEDURE — 85049 AUTOMATED PLATELET COUNT: CPT | Performed by: INTERNAL MEDICINE

## 2020-11-15 PROCEDURE — 85379 FIBRIN DEGRADATION QUANT: CPT | Performed by: EMERGENCY MEDICINE

## 2020-11-15 PROCEDURE — 36415 COLL VENOUS BLD VENIPUNCTURE: CPT | Performed by: EMERGENCY MEDICINE

## 2020-11-15 PROCEDURE — 83036 HEMOGLOBIN GLYCOSYLATED A1C: CPT | Performed by: INTERNAL MEDICINE

## 2020-11-15 PROCEDURE — 82948 REAGENT STRIP/BLOOD GLUCOSE: CPT

## 2020-11-15 PROCEDURE — 83880 ASSAY OF NATRIURETIC PEPTIDE: CPT | Performed by: EMERGENCY MEDICINE

## 2020-11-15 PROCEDURE — 81003 URINALYSIS AUTO W/O SCOPE: CPT | Performed by: EMERGENCY MEDICINE

## 2020-11-15 PROCEDURE — 99285 EMERGENCY DEPT VISIT HI MDM: CPT

## 2020-11-15 PROCEDURE — 84484 ASSAY OF TROPONIN QUANT: CPT | Performed by: EMERGENCY MEDICINE

## 2020-11-15 PROCEDURE — 85610 PROTHROMBIN TIME: CPT | Performed by: EMERGENCY MEDICINE

## 2020-11-15 PROCEDURE — 80053 COMPREHEN METABOLIC PANEL: CPT | Performed by: EMERGENCY MEDICINE

## 2020-11-15 PROCEDURE — 94664 DEMO&/EVAL PT USE INHALER: CPT

## 2020-11-15 PROCEDURE — 96374 THER/PROPH/DIAG INJ IV PUSH: CPT

## 2020-11-15 PROCEDURE — 0241U HB NFCT DS VIR RESP RNA 4 TRGT: CPT | Performed by: EMERGENCY MEDICINE

## 2020-11-15 RX ORDER — ACETAMINOPHEN 325 MG/1
650 TABLET ORAL EVERY 6 HOURS PRN
Status: DISCONTINUED | OUTPATIENT
Start: 2020-11-15 | End: 2020-11-17 | Stop reason: HOSPADM

## 2020-11-15 RX ORDER — GUAIFENESIN/DEXTROMETHORPHAN 100-10MG/5
10 SYRUP ORAL EVERY 4 HOURS PRN
Status: DISCONTINUED | OUTPATIENT
Start: 2020-11-15 | End: 2020-11-17 | Stop reason: HOSPADM

## 2020-11-15 RX ORDER — FUROSEMIDE 10 MG/ML
20 INJECTION INTRAMUSCULAR; INTRAVENOUS
Status: DISCONTINUED | OUTPATIENT
Start: 2020-11-15 | End: 2020-11-16

## 2020-11-15 RX ORDER — GUAIFENESIN 600 MG
600 TABLET, EXTENDED RELEASE 12 HR ORAL EVERY 12 HOURS SCHEDULED
Status: DISCONTINUED | OUTPATIENT
Start: 2020-11-15 | End: 2020-11-17 | Stop reason: HOSPADM

## 2020-11-15 RX ORDER — ALBUTEROL SULFATE 2.5 MG/3ML
2.5 SOLUTION RESPIRATORY (INHALATION) EVERY 6 HOURS PRN
Status: DISCONTINUED | OUTPATIENT
Start: 2020-11-15 | End: 2020-11-17 | Stop reason: HOSPADM

## 2020-11-15 RX ORDER — CALCIUM CARBONATE 200(500)MG
1000 TABLET,CHEWABLE ORAL DAILY PRN
Status: DISCONTINUED | OUTPATIENT
Start: 2020-11-15 | End: 2020-11-15

## 2020-11-15 RX ORDER — NICOTINE 21 MG/24HR
1 PATCH, TRANSDERMAL 24 HOURS TRANSDERMAL DAILY
Status: DISCONTINUED | OUTPATIENT
Start: 2020-11-15 | End: 2020-11-17 | Stop reason: HOSPADM

## 2020-11-15 RX ORDER — AMLODIPINE BESYLATE 10 MG/1
10 TABLET ORAL DAILY
Status: DISCONTINUED | OUTPATIENT
Start: 2020-11-15 | End: 2020-11-17

## 2020-11-15 RX ORDER — FUROSEMIDE 10 MG/ML
20 INJECTION INTRAMUSCULAR; INTRAVENOUS ONCE
Status: COMPLETED | OUTPATIENT
Start: 2020-11-15 | End: 2020-11-15

## 2020-11-15 RX ORDER — ALBUTEROL SULFATE 90 UG/1
2 AEROSOL, METERED RESPIRATORY (INHALATION) ONCE
Status: COMPLETED | OUTPATIENT
Start: 2020-11-15 | End: 2020-11-15

## 2020-11-15 RX ORDER — BENZONATATE 100 MG/1
100 CAPSULE ORAL 3 TIMES DAILY
Status: DISCONTINUED | OUTPATIENT
Start: 2020-11-15 | End: 2020-11-17 | Stop reason: HOSPADM

## 2020-11-15 RX ORDER — ONDANSETRON 2 MG/ML
4 INJECTION INTRAMUSCULAR; INTRAVENOUS EVERY 6 HOURS PRN
Status: DISCONTINUED | OUTPATIENT
Start: 2020-11-15 | End: 2020-11-17 | Stop reason: HOSPADM

## 2020-11-15 RX ORDER — DOCUSATE SODIUM 100 MG/1
100 CAPSULE, LIQUID FILLED ORAL 2 TIMES DAILY PRN
Status: DISCONTINUED | OUTPATIENT
Start: 2020-11-15 | End: 2020-11-17 | Stop reason: HOSPADM

## 2020-11-15 RX ADMIN — NITROGLYCERIN 0.5 INCH: 20 OINTMENT TOPICAL at 07:13

## 2020-11-15 RX ADMIN — ALBUTEROL SULFATE 2 PUFF: 90 AEROSOL, METERED RESPIRATORY (INHALATION) at 07:12

## 2020-11-15 RX ADMIN — IOHEXOL 100 ML: 350 INJECTION, SOLUTION INTRAVENOUS at 08:20

## 2020-11-15 RX ADMIN — ACETAMINOPHEN 650 MG: 325 TABLET, FILM COATED ORAL at 14:58

## 2020-11-15 RX ADMIN — Medication 1 PATCH: at 13:49

## 2020-11-15 RX ADMIN — GUAIFENESIN 600 MG: 600 TABLET, EXTENDED RELEASE ORAL at 14:59

## 2020-11-15 RX ADMIN — INSULIN LISPRO 4 UNITS: 100 INJECTION, SOLUTION INTRAVENOUS; SUBCUTANEOUS at 18:30

## 2020-11-15 RX ADMIN — AMLODIPINE BESYLATE 10 MG: 10 TABLET ORAL at 13:48

## 2020-11-15 RX ADMIN — GUAIFENESIN AND DEXTROMETHORPHAN 10 ML: 100; 10 SYRUP ORAL at 13:48

## 2020-11-15 RX ADMIN — FUROSEMIDE 20 MG: 10 INJECTION, SOLUTION INTRAMUSCULAR; INTRAVENOUS at 17:21

## 2020-11-15 RX ADMIN — FUROSEMIDE 20 MG: 10 INJECTION, SOLUTION INTRAMUSCULAR; INTRAVENOUS at 07:18

## 2020-11-15 RX ADMIN — BENZONATATE 100 MG: 100 CAPSULE ORAL at 21:25

## 2020-11-15 RX ADMIN — ENOXAPARIN SODIUM 40 MG: 40 INJECTION SUBCUTANEOUS at 13:49

## 2020-11-15 RX ADMIN — BENZONATATE 100 MG: 100 CAPSULE ORAL at 17:22

## 2020-11-15 RX ADMIN — GUAIFENESIN 600 MG: 600 TABLET, EXTENDED RELEASE ORAL at 21:25

## 2020-11-15 RX ADMIN — GUAIFENESIN AND DEXTROMETHORPHAN 10 ML: 100; 10 SYRUP ORAL at 21:25

## 2020-11-16 ENCOUNTER — APPOINTMENT (OUTPATIENT)
Dept: NON INVASIVE DIAGNOSTICS | Facility: HOSPITAL | Age: 65
DRG: 286 | End: 2020-11-16
Payer: MEDICARE

## 2020-11-16 PROBLEM — I50.41 ACUTE COMBINED SYSTOLIC AND DIASTOLIC CONGESTIVE HEART FAILURE (HCC): Status: ACTIVE | Noted: 2020-11-15

## 2020-11-16 PROBLEM — N18.2 CKD (CHRONIC KIDNEY DISEASE) STAGE 2, GFR 60-89 ML/MIN: Status: ACTIVE | Noted: 2020-11-16

## 2020-11-16 PROBLEM — E10.22 CKD STAGE 1 DUE TO TYPE 1 DIABETES MELLITUS (HCC): Status: ACTIVE | Noted: 2020-11-16

## 2020-11-16 PROBLEM — N18.1 CKD STAGE 1 DUE TO TYPE 1 DIABETES MELLITUS (HCC): Status: ACTIVE | Noted: 2020-11-16

## 2020-11-16 LAB
25(OH)D3 SERPL-MCNC: 7.6 NG/ML (ref 30–100)
AMPHETAMINES SERPL QL SCN: NEGATIVE
ANION GAP SERPL CALCULATED.3IONS-SCNC: 11 MMOL/L (ref 4–13)
BARBITURATES UR QL: NEGATIVE
BENZODIAZ UR QL: NEGATIVE
BUN SERPL-MCNC: 18 MG/DL (ref 5–25)
CALCIUM SERPL-MCNC: 10.7 MG/DL (ref 8.3–10.1)
CHLORIDE SERPL-SCNC: 102 MMOL/L (ref 100–108)
CHOLEST SERPL-MCNC: 168 MG/DL (ref 50–200)
CO2 SERPL-SCNC: 21 MMOL/L (ref 21–32)
COCAINE UR QL: NEGATIVE
CREAT SERPL-MCNC: 1.03 MG/DL (ref 0.6–1.3)
ERYTHROCYTE [DISTWIDTH] IN BLOOD BY AUTOMATED COUNT: 12.7 % (ref 11.6–15.1)
EST. AVERAGE GLUCOSE BLD GHB EST-MCNC: 240 MG/DL
GFR SERPL CREATININE-BSD FRML MDRD: 65 ML/MIN/1.73SQ M
GLUCOSE SERPL-MCNC: 158 MG/DL (ref 65–140)
GLUCOSE SERPL-MCNC: 188 MG/DL (ref 65–140)
GLUCOSE SERPL-MCNC: 194 MG/DL (ref 65–140)
GLUCOSE SERPL-MCNC: 298 MG/DL (ref 65–140)
GLUCOSE SERPL-MCNC: 445 MG/DL (ref 65–140)
HBA1C MFR BLD: 10 %
HCT VFR BLD AUTO: 42.4 % (ref 34.8–46.1)
HDLC SERPL-MCNC: 47 MG/DL
HGB BLD-MCNC: 13.7 G/DL (ref 11.5–15.4)
LDLC SERPL CALC-MCNC: 105 MG/DL (ref 0–100)
MAGNESIUM SERPL-MCNC: 1.6 MG/DL (ref 1.6–2.6)
MCH RBC QN AUTO: 27.7 PG (ref 26.8–34.3)
MCHC RBC AUTO-ENTMCNC: 32.3 G/DL (ref 31.4–37.4)
MCV RBC AUTO: 86 FL (ref 82–98)
METHADONE UR QL: NEGATIVE
NONHDLC SERPL-MCNC: 121 MG/DL
OPIATES UR QL SCN: NEGATIVE
OXYCODONE+OXYMORPHONE UR QL SCN: NEGATIVE
PCP UR QL: NEGATIVE
PLATELET # BLD AUTO: 255 THOUSANDS/UL (ref 149–390)
PMV BLD AUTO: 11.9 FL (ref 8.9–12.7)
POTASSIUM SERPL-SCNC: 3.5 MMOL/L (ref 3.5–5.3)
PTH-INTACT SERPL-MCNC: 359.4 PG/ML (ref 18.4–80.1)
RBC # BLD AUTO: 4.94 MILLION/UL (ref 3.81–5.12)
SODIUM SERPL-SCNC: 134 MMOL/L (ref 136–145)
THC UR QL: NEGATIVE
TRIGL SERPL-MCNC: 82 MG/DL
WBC # BLD AUTO: 7.44 THOUSAND/UL (ref 4.31–10.16)

## 2020-11-16 PROCEDURE — 83735 ASSAY OF MAGNESIUM: CPT | Performed by: INTERNAL MEDICINE

## 2020-11-16 PROCEDURE — C1769 GUIDE WIRE: HCPCS | Performed by: PHYSICIAN ASSISTANT

## 2020-11-16 PROCEDURE — 93571 IV DOP VEL&/PRESS C FLO 1ST: CPT | Performed by: PHYSICIAN ASSISTANT

## 2020-11-16 PROCEDURE — 99152 MOD SED SAME PHYS/QHP 5/>YRS: CPT | Performed by: INTERNAL MEDICINE

## 2020-11-16 PROCEDURE — 80048 BASIC METABOLIC PNL TOTAL CA: CPT | Performed by: INTERNAL MEDICINE

## 2020-11-16 PROCEDURE — B2111ZZ FLUOROSCOPY OF MULTIPLE CORONARY ARTERIES USING LOW OSMOLAR CONTRAST: ICD-10-PCS | Performed by: INTERNAL MEDICINE

## 2020-11-16 PROCEDURE — 93572 IV DOP VEL&/PRESS C FLO EA: CPT | Performed by: INTERNAL MEDICINE

## 2020-11-16 PROCEDURE — 4A023N7 MEASUREMENT OF CARDIAC SAMPLING AND PRESSURE, LEFT HEART, PERCUTANEOUS APPROACH: ICD-10-PCS | Performed by: INTERNAL MEDICINE

## 2020-11-16 PROCEDURE — 93458 L HRT ARTERY/VENTRICLE ANGIO: CPT | Performed by: INTERNAL MEDICINE

## 2020-11-16 PROCEDURE — 93458 L HRT ARTERY/VENTRICLE ANGIO: CPT | Performed by: PHYSICIAN ASSISTANT

## 2020-11-16 PROCEDURE — 82948 REAGENT STRIP/BLOOD GLUCOSE: CPT

## 2020-11-16 PROCEDURE — 82306 VITAMIN D 25 HYDROXY: CPT | Performed by: INTERNAL MEDICINE

## 2020-11-16 PROCEDURE — 93571 IV DOP VEL&/PRESS C FLO 1ST: CPT | Performed by: INTERNAL MEDICINE

## 2020-11-16 PROCEDURE — 99152 MOD SED SAME PHYS/QHP 5/>YRS: CPT | Performed by: PHYSICIAN ASSISTANT

## 2020-11-16 PROCEDURE — 85027 COMPLETE CBC AUTOMATED: CPT | Performed by: INTERNAL MEDICINE

## 2020-11-16 PROCEDURE — 80307 DRUG TEST PRSMV CHEM ANLYZR: CPT | Performed by: INTERNAL MEDICINE

## 2020-11-16 PROCEDURE — C1894 INTRO/SHEATH, NON-LASER: HCPCS | Performed by: PHYSICIAN ASSISTANT

## 2020-11-16 PROCEDURE — 99232 SBSQ HOSP IP/OBS MODERATE 35: CPT | Performed by: INTERNAL MEDICINE

## 2020-11-16 PROCEDURE — 99223 1ST HOSP IP/OBS HIGH 75: CPT | Performed by: INTERNAL MEDICINE

## 2020-11-16 PROCEDURE — 80061 LIPID PANEL: CPT | Performed by: INTERNAL MEDICINE

## 2020-11-16 PROCEDURE — 99153 MOD SED SAME PHYS/QHP EA: CPT | Performed by: PHYSICIAN ASSISTANT

## 2020-11-16 PROCEDURE — 93572 IV DOP VEL&/PRESS C FLO EA: CPT | Performed by: PHYSICIAN ASSISTANT

## 2020-11-16 PROCEDURE — C1887 CATHETER, GUIDING: HCPCS | Performed by: PHYSICIAN ASSISTANT

## 2020-11-16 RX ORDER — FUROSEMIDE 10 MG/ML
20 INJECTION INTRAMUSCULAR; INTRAVENOUS ONCE
Status: COMPLETED | OUTPATIENT
Start: 2020-11-16 | End: 2020-11-16

## 2020-11-16 RX ORDER — INSULIN GLARGINE 100 [IU]/ML
8 INJECTION, SOLUTION SUBCUTANEOUS
Status: DISCONTINUED | OUTPATIENT
Start: 2020-11-16 | End: 2020-11-17

## 2020-11-16 RX ORDER — NITROGLYCERIN 20 MG/100ML
INJECTION INTRAVENOUS CODE/TRAUMA/SEDATION MEDICATION
Status: COMPLETED | OUTPATIENT
Start: 2020-11-16 | End: 2020-11-16

## 2020-11-16 RX ORDER — ERGOCALCIFEROL 1.25 MG/1
50000 CAPSULE ORAL WEEKLY
Status: DISCONTINUED | OUTPATIENT
Start: 2020-11-17 | End: 2020-11-16

## 2020-11-16 RX ORDER — HEPARIN SODIUM 1000 [USP'U]/ML
INJECTION, SOLUTION INTRAVENOUS; SUBCUTANEOUS CODE/TRAUMA/SEDATION MEDICATION
Status: COMPLETED | OUTPATIENT
Start: 2020-11-16 | End: 2020-11-16

## 2020-11-16 RX ORDER — LIDOCAINE HYDROCHLORIDE 10 MG/ML
INJECTION, SOLUTION EPIDURAL; INFILTRATION; INTRACAUDAL; PERINEURAL CODE/TRAUMA/SEDATION MEDICATION
Status: COMPLETED | OUTPATIENT
Start: 2020-11-16 | End: 2020-11-16

## 2020-11-16 RX ORDER — ASPIRIN 81 MG/1
81 TABLET ORAL DAILY
Status: DISCONTINUED | OUTPATIENT
Start: 2020-11-16 | End: 2020-11-16

## 2020-11-16 RX ORDER — FUROSEMIDE 10 MG/ML
40 INJECTION INTRAMUSCULAR; INTRAVENOUS
Status: DISCONTINUED | OUTPATIENT
Start: 2020-11-16 | End: 2020-11-17

## 2020-11-16 RX ORDER — SODIUM CHLORIDE 9 MG/ML
INJECTION, SOLUTION INTRAVENOUS
Status: COMPLETED | OUTPATIENT
Start: 2020-11-16 | End: 2020-11-16

## 2020-11-16 RX ORDER — FENTANYL CITRATE 50 UG/ML
INJECTION, SOLUTION INTRAMUSCULAR; INTRAVENOUS CODE/TRAUMA/SEDATION MEDICATION
Status: COMPLETED | OUTPATIENT
Start: 2020-11-16 | End: 2020-11-16

## 2020-11-16 RX ORDER — VERAPAMIL HYDROCHLORIDE 2.5 MG/ML
INJECTION, SOLUTION INTRAVENOUS CODE/TRAUMA/SEDATION MEDICATION
Status: COMPLETED | OUTPATIENT
Start: 2020-11-16 | End: 2020-11-16

## 2020-11-16 RX ORDER — MIDAZOLAM HYDROCHLORIDE 2 MG/2ML
INJECTION, SOLUTION INTRAMUSCULAR; INTRAVENOUS CODE/TRAUMA/SEDATION MEDICATION
Status: COMPLETED | OUTPATIENT
Start: 2020-11-16 | End: 2020-11-16

## 2020-11-16 RX ORDER — METOPROLOL SUCCINATE 25 MG/1
25 TABLET, EXTENDED RELEASE ORAL DAILY
Status: DISCONTINUED | OUTPATIENT
Start: 2020-11-16 | End: 2020-11-17 | Stop reason: HOSPADM

## 2020-11-16 RX ORDER — ATORVASTATIN CALCIUM 40 MG/1
40 TABLET, FILM COATED ORAL
Status: DISCONTINUED | OUTPATIENT
Start: 2020-11-16 | End: 2020-11-17 | Stop reason: HOSPADM

## 2020-11-16 RX ORDER — SODIUM CHLORIDE 9 MG/ML
75 INJECTION, SOLUTION INTRAVENOUS CONTINUOUS
Status: DISPENSED | OUTPATIENT
Start: 2020-11-16 | End: 2020-11-16

## 2020-11-16 RX ORDER — ASPIRIN 81 MG/1
324 TABLET ORAL ONCE
Status: COMPLETED | OUTPATIENT
Start: 2020-11-16 | End: 2020-11-16

## 2020-11-16 RX ADMIN — BENZONATATE 100 MG: 100 CAPSULE ORAL at 21:45

## 2020-11-16 RX ADMIN — VERAPAMIL HYDROCHLORIDE 2.5 MG: 2.5 INJECTION, SOLUTION INTRAVENOUS at 15:57

## 2020-11-16 RX ADMIN — ACETAMINOPHEN 650 MG: 325 TABLET, FILM COATED ORAL at 09:08

## 2020-11-16 RX ADMIN — ENOXAPARIN SODIUM 40 MG: 40 INJECTION SUBCUTANEOUS at 09:08

## 2020-11-16 RX ADMIN — MAGNESIUM OXIDE 400 MG: 400 TABLET ORAL at 12:45

## 2020-11-16 RX ADMIN — TICAGRELOR 180 MG: 90 TABLET ORAL at 12:33

## 2020-11-16 RX ADMIN — METOPROLOL SUCCINATE 25 MG: 25 TABLET, EXTENDED RELEASE ORAL at 12:27

## 2020-11-16 RX ADMIN — INSULIN LISPRO 1 UNITS: 100 INJECTION, SOLUTION INTRAVENOUS; SUBCUTANEOUS at 09:10

## 2020-11-16 RX ADMIN — FENTANYL CITRATE 50 MCG: 50 INJECTION, SOLUTION INTRAMUSCULAR; INTRAVENOUS at 15:50

## 2020-11-16 RX ADMIN — NITROGLYCERIN 200 MCG: 20 INJECTION INTRAVENOUS at 15:57

## 2020-11-16 RX ADMIN — MAGNESIUM OXIDE 400 MG: 400 TABLET ORAL at 18:18

## 2020-11-16 RX ADMIN — Medication 1 PATCH: at 09:11

## 2020-11-16 RX ADMIN — SODIUM CHLORIDE 75 ML/HR: 0.9 INJECTION, SOLUTION INTRAVENOUS at 18:43

## 2020-11-16 RX ADMIN — FUROSEMIDE 20 MG: 10 INJECTION, SOLUTION INTRAMUSCULAR; INTRAVENOUS at 12:27

## 2020-11-16 RX ADMIN — INSULIN LISPRO 1 UNITS: 100 INJECTION, SOLUTION INTRAVENOUS; SUBCUTANEOUS at 18:21

## 2020-11-16 RX ADMIN — GUAIFENESIN AND DEXTROMETHORPHAN 10 ML: 100; 10 SYRUP ORAL at 18:18

## 2020-11-16 RX ADMIN — HEPARIN SODIUM 4000 UNITS: 1000 INJECTION INTRAVENOUS; SUBCUTANEOUS at 15:58

## 2020-11-16 RX ADMIN — FUROSEMIDE 20 MG: 10 INJECTION, SOLUTION INTRAMUSCULAR; INTRAVENOUS at 09:09

## 2020-11-16 RX ADMIN — IOHEXOL 80 ML: 350 INJECTION, SOLUTION INTRAVENOUS at 16:11

## 2020-11-16 RX ADMIN — SODIUM CHLORIDE 50 ML/HR: 0.9 INJECTION, SOLUTION INTRAVENOUS at 15:48

## 2020-11-16 RX ADMIN — BENZONATATE 100 MG: 100 CAPSULE ORAL at 09:09

## 2020-11-16 RX ADMIN — LIDOCAINE HYDROCHLORIDE 1 ML: 10 INJECTION, SOLUTION EPIDURAL; INFILTRATION; INTRACAUDAL; PERINEURAL at 15:56

## 2020-11-16 RX ADMIN — GUAIFENESIN AND DEXTROMETHORPHAN 10 ML: 100; 10 SYRUP ORAL at 09:09

## 2020-11-16 RX ADMIN — INSULIN GLARGINE 8 UNITS: 100 INJECTION, SOLUTION SUBCUTANEOUS at 21:43

## 2020-11-16 RX ADMIN — HEPARIN SODIUM 2000 UNITS: 1000 INJECTION INTRAVENOUS; SUBCUTANEOUS at 16:03

## 2020-11-16 RX ADMIN — BENZONATATE 100 MG: 100 CAPSULE ORAL at 18:18

## 2020-11-16 RX ADMIN — MIDAZOLAM HYDROCHLORIDE 2 MG: 1 INJECTION, SOLUTION INTRAMUSCULAR; INTRAVENOUS at 15:50

## 2020-11-16 RX ADMIN — AMLODIPINE BESYLATE 10 MG: 10 TABLET ORAL at 09:07

## 2020-11-16 RX ADMIN — ATORVASTATIN CALCIUM 40 MG: 40 TABLET, FILM COATED ORAL at 18:18

## 2020-11-16 RX ADMIN — ASPIRIN 324 MG: 81 TABLET, COATED ORAL at 12:25

## 2020-11-16 RX ADMIN — GUAIFENESIN 600 MG: 600 TABLET, EXTENDED RELEASE ORAL at 09:09

## 2020-11-16 RX ADMIN — INSULIN LISPRO 5 UNITS: 100 INJECTION, SOLUTION INTRAVENOUS; SUBCUTANEOUS at 21:44

## 2020-11-16 RX ADMIN — FUROSEMIDE 40 MG: 10 INJECTION, SOLUTION INTRAMUSCULAR; INTRAVENOUS at 18:18

## 2020-11-16 RX ADMIN — GUAIFENESIN 600 MG: 600 TABLET, EXTENDED RELEASE ORAL at 21:44

## 2020-11-17 VITALS
BODY MASS INDEX: 23.92 KG/M2 | WEIGHT: 135 LBS | DIASTOLIC BLOOD PRESSURE: 80 MMHG | HEART RATE: 81 BPM | TEMPERATURE: 97.9 F | SYSTOLIC BLOOD PRESSURE: 133 MMHG | RESPIRATION RATE: 16 BRPM | HEIGHT: 63 IN | OXYGEN SATURATION: 98 %

## 2020-11-17 PROBLEM — N17.9 AKI (ACUTE KIDNEY INJURY) (HCC): Status: ACTIVE | Noted: 2020-11-16

## 2020-11-17 LAB
ALBUMIN SERPL BCP-MCNC: 3.1 G/DL (ref 3.5–5)
ALP SERPL-CCNC: 126 U/L (ref 46–116)
ALT SERPL W P-5'-P-CCNC: 37 U/L (ref 12–78)
ANION GAP SERPL CALCULATED.3IONS-SCNC: 8 MMOL/L (ref 4–13)
AST SERPL W P-5'-P-CCNC: 10 U/L (ref 5–45)
BILIRUB SERPL-MCNC: 0.44 MG/DL (ref 0.2–1)
BUN SERPL-MCNC: 30 MG/DL (ref 5–25)
CA-I BLD-SCNC: 1.34 MMOL/L (ref 1.12–1.32)
CALCIUM ALBUM COR SERPL-MCNC: 11.8 MG/DL (ref 8.3–10.1)
CALCIUM SERPL-MCNC: 11.1 MG/DL (ref 8.3–10.1)
CHLORIDE SERPL-SCNC: 100 MMOL/L (ref 100–108)
CO2 SERPL-SCNC: 26 MMOL/L (ref 21–32)
CREAT SERPL-MCNC: 1.47 MG/DL (ref 0.6–1.3)
GFR SERPL CREATININE-BSD FRML MDRD: 42 ML/MIN/1.73SQ M
GLUCOSE SERPL-MCNC: 189 MG/DL (ref 65–140)
GLUCOSE SERPL-MCNC: 201 MG/DL (ref 65–140)
GLUCOSE SERPL-MCNC: 241 MG/DL (ref 65–140)
POTASSIUM SERPL-SCNC: 3.7 MMOL/L (ref 3.5–5.3)
PROT SERPL-MCNC: 7.6 G/DL (ref 6.4–8.2)
SODIUM SERPL-SCNC: 134 MMOL/L (ref 136–145)

## 2020-11-17 PROCEDURE — 80053 COMPREHEN METABOLIC PANEL: CPT | Performed by: INTERNAL MEDICINE

## 2020-11-17 PROCEDURE — 82330 ASSAY OF CALCIUM: CPT | Performed by: INTERNAL MEDICINE

## 2020-11-17 PROCEDURE — 99239 HOSP IP/OBS DSCHRG MGMT >30: CPT | Performed by: HOSPITALIST

## 2020-11-17 PROCEDURE — 82948 REAGENT STRIP/BLOOD GLUCOSE: CPT

## 2020-11-17 PROCEDURE — 99232 SBSQ HOSP IP/OBS MODERATE 35: CPT | Performed by: INTERNAL MEDICINE

## 2020-11-17 RX ORDER — METOPROLOL SUCCINATE 25 MG/1
25 TABLET, EXTENDED RELEASE ORAL DAILY
Qty: 30 TABLET | Refills: 0 | Status: SHIPPED | OUTPATIENT
Start: 2020-11-18 | End: 2020-11-24 | Stop reason: SDUPTHER

## 2020-11-17 RX ORDER — ATORVASTATIN CALCIUM 40 MG/1
40 TABLET, FILM COATED ORAL DAILY
Qty: 30 TABLET | Refills: 0 | Status: SHIPPED | OUTPATIENT
Start: 2020-11-17 | End: 2020-11-24 | Stop reason: SDUPTHER

## 2020-11-17 RX ORDER — LISINOPRIL 10 MG/1
10 TABLET ORAL DAILY
Qty: 30 TABLET | Refills: 0 | Status: SHIPPED | OUTPATIENT
Start: 2020-11-17 | End: 2020-11-17 | Stop reason: SDUPTHER

## 2020-11-17 RX ORDER — INSULIN GLARGINE 100 [IU]/ML
10 INJECTION, SOLUTION SUBCUTANEOUS
Status: DISCONTINUED | OUTPATIENT
Start: 2020-11-17 | End: 2020-11-17 | Stop reason: HOSPADM

## 2020-11-17 RX ORDER — LISINOPRIL 10 MG/1
10 TABLET ORAL DAILY
Status: DISCONTINUED | OUTPATIENT
Start: 2020-11-17 | End: 2020-11-17

## 2020-11-17 RX ORDER — PEN NEEDLE, DIABETIC 31 G X1/4"
NEEDLE, DISPOSABLE MISCELLANEOUS DAILY
Qty: 30 EACH | Refills: 0 | Status: SHIPPED | OUTPATIENT
Start: 2020-11-17 | End: 2020-11-17 | Stop reason: HOSPADM

## 2020-11-17 RX ORDER — ATORVASTATIN CALCIUM 10 MG/1
40 TABLET, FILM COATED ORAL DAILY
Qty: 90 TABLET | Refills: 0 | Status: SHIPPED | OUTPATIENT
Start: 2020-11-17 | End: 2020-11-17 | Stop reason: SDUPTHER

## 2020-11-17 RX ORDER — NICOTINE 21 MG/24HR
1 PATCH, TRANSDERMAL 24 HOURS TRANSDERMAL DAILY
Qty: 28 PATCH | Refills: 0 | Status: SHIPPED | OUTPATIENT
Start: 2020-11-18 | End: 2020-12-02 | Stop reason: SDUPTHER

## 2020-11-17 RX ORDER — LISINOPRIL 10 MG/1
10 TABLET ORAL DAILY
Qty: 30 TABLET | Refills: 0 | Status: SHIPPED | OUTPATIENT
Start: 2020-11-17 | End: 2020-11-24 | Stop reason: SDUPTHER

## 2020-11-17 RX ORDER — PEN NEEDLE, DIABETIC 31 G X1/4"
NEEDLE, DISPOSABLE MISCELLANEOUS DAILY
Qty: 30 EACH | Refills: 0 | Status: SHIPPED | OUTPATIENT
Start: 2020-11-17 | End: 2020-11-17 | Stop reason: SDUPTHER

## 2020-11-17 RX ORDER — INSULIN GLARGINE 100 [IU]/ML
10 INJECTION, SOLUTION SUBCUTANEOUS DAILY
Qty: 5 PEN | Refills: 0 | Status: SHIPPED | OUTPATIENT
Start: 2020-11-17 | End: 2020-11-17 | Stop reason: HOSPADM

## 2020-11-17 RX ADMIN — FUROSEMIDE 40 MG: 10 INJECTION, SOLUTION INTRAMUSCULAR; INTRAVENOUS at 08:04

## 2020-11-17 RX ADMIN — Medication 1 PATCH: at 08:09

## 2020-11-17 RX ADMIN — MAGNESIUM OXIDE 400 MG: 400 TABLET ORAL at 08:05

## 2020-11-17 RX ADMIN — AMLODIPINE BESYLATE 10 MG: 10 TABLET ORAL at 08:06

## 2020-11-17 RX ADMIN — INSULIN LISPRO 3 UNITS: 100 INJECTION, SOLUTION INTRAVENOUS; SUBCUTANEOUS at 11:49

## 2020-11-17 RX ADMIN — ACETAMINOPHEN 650 MG: 325 TABLET, FILM COATED ORAL at 05:33

## 2020-11-17 RX ADMIN — INSULIN LISPRO 6 UNITS: 100 INJECTION, SOLUTION INTRAVENOUS; SUBCUTANEOUS at 08:03

## 2020-11-17 RX ADMIN — ENOXAPARIN SODIUM 40 MG: 40 INJECTION SUBCUTANEOUS at 08:05

## 2020-11-17 RX ADMIN — GUAIFENESIN 600 MG: 600 TABLET, EXTENDED RELEASE ORAL at 08:06

## 2020-11-17 RX ADMIN — BENZONATATE 100 MG: 100 CAPSULE ORAL at 08:04

## 2020-11-17 RX ADMIN — METOPROLOL SUCCINATE 25 MG: 25 TABLET, EXTENDED RELEASE ORAL at 08:05

## 2020-11-18 ENCOUNTER — TRANSITIONAL CARE MANAGEMENT (OUTPATIENT)
Dept: FAMILY MEDICINE CLINIC | Facility: CLINIC | Age: 65
End: 2020-11-18

## 2020-11-20 LAB
BACTERIA BLD CULT: NORMAL
BACTERIA BLD CULT: NORMAL

## 2020-11-24 ENCOUNTER — OFFICE VISIT (OUTPATIENT)
Dept: FAMILY MEDICINE CLINIC | Facility: CLINIC | Age: 65
End: 2020-11-24
Payer: MEDICARE

## 2020-11-24 ENCOUNTER — LAB (OUTPATIENT)
Dept: LAB | Facility: HOSPITAL | Age: 65
End: 2020-11-24
Payer: MEDICARE

## 2020-11-24 VITALS
SYSTOLIC BLOOD PRESSURE: 136 MMHG | WEIGHT: 137 LBS | HEIGHT: 63 IN | BODY MASS INDEX: 24.27 KG/M2 | DIASTOLIC BLOOD PRESSURE: 88 MMHG | TEMPERATURE: 97 F | HEART RATE: 86 BPM | RESPIRATION RATE: 16 BRPM

## 2020-11-24 DIAGNOSIS — E21.3 HYPERPARATHYROIDISM (HCC): ICD-10-CM

## 2020-11-24 DIAGNOSIS — I10 BENIGN ESSENTIAL HYPERTENSION: Primary | ICD-10-CM

## 2020-11-24 DIAGNOSIS — E11.65 UNCONTROLLED TYPE 2 DIABETES MELLITUS WITH HYPERGLYCEMIA (HCC): ICD-10-CM

## 2020-11-24 DIAGNOSIS — E78.5 HYPERLIPIDEMIA LDL GOAL <100: ICD-10-CM

## 2020-11-24 DIAGNOSIS — I10 BENIGN ESSENTIAL HYPERTENSION: ICD-10-CM

## 2020-11-24 DIAGNOSIS — E11.29 TYPE 2 DIABETES MELLITUS WITH OTHER DIABETIC KIDNEY COMPLICATION, WITHOUT LONG-TERM CURRENT USE OF INSULIN (HCC): ICD-10-CM

## 2020-11-24 DIAGNOSIS — I50.41 ACUTE COMBINED SYSTOLIC AND DIASTOLIC CONGESTIVE HEART FAILURE (HCC): ICD-10-CM

## 2020-11-24 PROBLEM — I25.10 CORONARY ARTERY DISEASE INVOLVING NATIVE CORONARY ARTERY OF NATIVE HEART WITHOUT ANGINA PECTORIS: Status: ACTIVE | Noted: 2020-11-24

## 2020-11-24 PROBLEM — I50.42 CHRONIC COMBINED SYSTOLIC AND DIASTOLIC CONGESTIVE HEART FAILURE (HCC): Status: ACTIVE | Noted: 2020-11-24

## 2020-11-24 LAB
ANION GAP SERPL CALCULATED.3IONS-SCNC: 7 MMOL/L (ref 4–13)
BUN SERPL-MCNC: 15 MG/DL (ref 6–20)
CALCIUM SERPL-MCNC: 11.5 MG/DL (ref 8.4–10.2)
CHLORIDE SERPL-SCNC: 104 MMOL/L (ref 96–108)
CO2 SERPL-SCNC: 29 MMOL/L (ref 22–33)
CREAT SERPL-MCNC: 1.4 MG/DL (ref 0.4–1.1)
GFR SERPL CREATININE-BSD FRML MDRD: 45 ML/MIN/1.73SQ M
GLUCOSE P FAST SERPL-MCNC: 137 MG/DL (ref 70–105)
POTASSIUM SERPL-SCNC: 4.7 MMOL/L (ref 3.5–5)
SODIUM SERPL-SCNC: 140 MMOL/L (ref 133–145)

## 2020-11-24 PROCEDURE — 80048 BASIC METABOLIC PNL TOTAL CA: CPT

## 2020-11-24 PROCEDURE — 36415 COLL VENOUS BLD VENIPUNCTURE: CPT

## 2020-11-24 PROCEDURE — 99495 TRANSJ CARE MGMT MOD F2F 14D: CPT | Performed by: FAMILY MEDICINE

## 2020-11-24 RX ORDER — LISINOPRIL 10 MG/1
10 TABLET ORAL DAILY
Qty: 90 TABLET | Refills: 1 | Status: SHIPPED | OUTPATIENT
Start: 2020-11-24 | End: 2021-09-20

## 2020-11-24 RX ORDER — ATORVASTATIN CALCIUM 40 MG/1
40 TABLET, FILM COATED ORAL DAILY
Qty: 90 TABLET | Refills: 1 | Status: SHIPPED | OUTPATIENT
Start: 2020-11-24 | End: 2020-12-31 | Stop reason: SDUPTHER

## 2020-11-24 RX ORDER — MAGNESIUM OXIDE 400 MG/1
1 TABLET ORAL 2 TIMES DAILY
COMMUNITY
Start: 2020-11-17 | End: 2020-12-02 | Stop reason: SDUPTHER

## 2020-11-24 RX ORDER — ATORVASTATIN CALCIUM 10 MG/1
TABLET, FILM COATED ORAL
COMMUNITY
Start: 2020-11-17 | End: 2020-11-24

## 2020-11-24 RX ORDER — METOPROLOL SUCCINATE 25 MG/1
25 TABLET, EXTENDED RELEASE ORAL DAILY
Qty: 90 TABLET | Refills: 1 | Status: SHIPPED | OUTPATIENT
Start: 2020-11-24 | End: 2020-12-31 | Stop reason: SDUPTHER

## 2020-12-02 ENCOUNTER — OFFICE VISIT (OUTPATIENT)
Dept: CARDIOLOGY CLINIC | Facility: CLINIC | Age: 65
End: 2020-12-02
Payer: MEDICARE

## 2020-12-02 VITALS
OXYGEN SATURATION: 99 % | BODY MASS INDEX: 24.38 KG/M2 | WEIGHT: 137.6 LBS | DIASTOLIC BLOOD PRESSURE: 80 MMHG | SYSTOLIC BLOOD PRESSURE: 138 MMHG | HEART RATE: 77 BPM | HEIGHT: 63 IN

## 2020-12-02 DIAGNOSIS — I25.10 CORONARY ARTERY DISEASE INVOLVING NATIVE CORONARY ARTERY OF NATIVE HEART WITHOUT ANGINA PECTORIS: ICD-10-CM

## 2020-12-02 DIAGNOSIS — E78.2 MIXED HYPERLIPIDEMIA: ICD-10-CM

## 2020-12-02 DIAGNOSIS — I50.33 ACUTE ON CHRONIC DIASTOLIC HF (HEART FAILURE) (HCC): ICD-10-CM

## 2020-12-02 DIAGNOSIS — Z09 HOSPITAL DISCHARGE FOLLOW-UP: Primary | ICD-10-CM

## 2020-12-02 DIAGNOSIS — G47.33 OBSTRUCTIVE SLEEP APNEA SYNDROME: ICD-10-CM

## 2020-12-02 DIAGNOSIS — N18.31 DIABETES MELLITUS DUE TO UNDERLYING CONDITION WITH STAGE 3A CHRONIC KIDNEY DISEASE, UNSPECIFIED WHETHER LONG TERM INSULIN USE (HCC): ICD-10-CM

## 2020-12-02 DIAGNOSIS — F17.200 NICOTINE DEPENDENCE, UNCOMPLICATED, UNSPECIFIED NICOTINE PRODUCT TYPE: ICD-10-CM

## 2020-12-02 DIAGNOSIS — E08.22 DIABETES MELLITUS DUE TO UNDERLYING CONDITION WITH STAGE 3A CHRONIC KIDNEY DISEASE, UNSPECIFIED WHETHER LONG TERM INSULIN USE (HCC): ICD-10-CM

## 2020-12-02 DIAGNOSIS — I10 BENIGN ESSENTIAL HYPERTENSION: ICD-10-CM

## 2020-12-02 DIAGNOSIS — F17.208 NICOTINE DEPENDENCE WITH OTHER NICOTINE-INDUCED DISORDER, UNSPECIFIED NICOTINE PRODUCT TYPE: ICD-10-CM

## 2020-12-02 PROCEDURE — 99215 OFFICE O/P EST HI 40 MIN: CPT | Performed by: NURSE PRACTITIONER

## 2020-12-02 RX ORDER — FUROSEMIDE 20 MG/1
TABLET ORAL
Qty: 30 TABLET | Refills: 2
Start: 2020-12-02 | End: 2021-07-01 | Stop reason: SDUPTHER

## 2020-12-02 RX ORDER — NICOTINE 21 MG/24HR
1 PATCH, TRANSDERMAL 24 HOURS TRANSDERMAL DAILY
Qty: 28 PATCH | Refills: 1 | Status: SHIPPED | OUTPATIENT
Start: 2020-12-02 | End: 2021-10-14

## 2020-12-02 RX ORDER — ASPIRIN 81 MG/1
81 TABLET ORAL DAILY
Status: ON HOLD
Start: 2020-12-02 | End: 2021-04-21 | Stop reason: SDUPTHER

## 2020-12-03 ENCOUNTER — OFFICE VISIT (OUTPATIENT)
Dept: ENDOCRINOLOGY | Facility: CLINIC | Age: 65
End: 2020-12-03
Payer: MEDICARE

## 2020-12-03 VITALS
DIASTOLIC BLOOD PRESSURE: 68 MMHG | TEMPERATURE: 96 F | WEIGHT: 137 LBS | HEART RATE: 74 BPM | BODY MASS INDEX: 24.27 KG/M2 | HEIGHT: 63 IN | SYSTOLIC BLOOD PRESSURE: 130 MMHG

## 2020-12-03 DIAGNOSIS — E83.52 HYPERCALCEMIA: Primary | ICD-10-CM

## 2020-12-03 DIAGNOSIS — E78.00 PURE HYPERCHOLESTEROLEMIA: ICD-10-CM

## 2020-12-03 DIAGNOSIS — I10 HYPERTENSION GOAL BP (BLOOD PRESSURE) < 140/90: ICD-10-CM

## 2020-12-03 DIAGNOSIS — E21.3 HYPERPARATHYROIDISM (HCC): ICD-10-CM

## 2020-12-03 DIAGNOSIS — E11.65 TYPE 2 DIABETES MELLITUS WITH HYPERGLYCEMIA, WITHOUT LONG-TERM CURRENT USE OF INSULIN (HCC): ICD-10-CM

## 2020-12-03 DIAGNOSIS — M85.80 OSTEOPENIA, UNSPECIFIED LOCATION: ICD-10-CM

## 2020-12-03 DIAGNOSIS — E11.65 UNCONTROLLED TYPE 2 DIABETES MELLITUS WITH HYPERGLYCEMIA (HCC): ICD-10-CM

## 2020-12-03 PROCEDURE — 99204 OFFICE O/P NEW MOD 45 MIN: CPT | Performed by: INTERNAL MEDICINE

## 2020-12-09 ENCOUNTER — LAB (OUTPATIENT)
Dept: LAB | Facility: HOSPITAL | Age: 65
End: 2020-12-09
Payer: MEDICARE

## 2020-12-09 DIAGNOSIS — E11.65 UNCONTROLLED TYPE 2 DIABETES MELLITUS WITH HYPERGLYCEMIA (HCC): ICD-10-CM

## 2020-12-09 DIAGNOSIS — E21.3 HYPERPARATHYROIDISM (HCC): ICD-10-CM

## 2020-12-09 DIAGNOSIS — I50.33 ACUTE ON CHRONIC DIASTOLIC HF (HEART FAILURE) (HCC): ICD-10-CM

## 2020-12-09 DIAGNOSIS — E11.65 TYPE 2 DIABETES MELLITUS WITH HYPERGLYCEMIA, WITHOUT LONG-TERM CURRENT USE OF INSULIN (HCC): ICD-10-CM

## 2020-12-09 LAB
25(OH)D3 SERPL-MCNC: 9.3 NG/ML (ref 30–100)
ALBUMIN SERPL BCP-MCNC: 4 G/DL (ref 3.4–4.8)
ALP SERPL-CCNC: 128.4 U/L (ref 35–140)
ALT SERPL W P-5'-P-CCNC: 47 U/L (ref 5–54)
ANION GAP SERPL CALCULATED.3IONS-SCNC: 8 MMOL/L (ref 4–13)
AST SERPL W P-5'-P-CCNC: 17 U/L (ref 15–41)
BILIRUB SERPL-MCNC: 0.63 MG/DL (ref 0.3–1.2)
BUN SERPL-MCNC: 18 MG/DL (ref 6–20)
CALCIUM SERPL-MCNC: 11.3 MG/DL (ref 8.4–10.2)
CHLORIDE SERPL-SCNC: 105 MMOL/L (ref 96–108)
CO2 SERPL-SCNC: 24 MMOL/L (ref 22–33)
CREAT SERPL-MCNC: 1.02 MG/DL (ref 0.4–1.1)
GFR SERPL CREATININE-BSD FRML MDRD: 66 ML/MIN/1.73SQ M
GLUCOSE P FAST SERPL-MCNC: 211 MG/DL (ref 70–105)
MAGNESIUM SERPL-MCNC: 1.6 MG/DL (ref 1.6–2.6)
PHOSPHATE SERPL-MCNC: 2.3 MG/DL (ref 2.5–5)
POTASSIUM SERPL-SCNC: 4.2 MMOL/L (ref 3.5–5)
PROT SERPL-MCNC: 7.5 G/DL (ref 6.4–8.3)
PTH-INTACT SERPL-MCNC: 523.1 PG/ML (ref 18.4–80.1)
SODIUM SERPL-SCNC: 137 MMOL/L (ref 133–145)
T4 FREE SERPL-MCNC: 1.25 NG/DL (ref 0.76–1.46)
TSH SERPL DL<=0.05 MIU/L-ACNC: 1.33 UIU/ML (ref 0.34–5.6)

## 2020-12-09 PROCEDURE — 83735 ASSAY OF MAGNESIUM: CPT

## 2020-12-09 PROCEDURE — 80053 COMPREHEN METABOLIC PANEL: CPT

## 2020-12-09 PROCEDURE — 82306 VITAMIN D 25 HYDROXY: CPT

## 2020-12-09 PROCEDURE — 84439 ASSAY OF FREE THYROXINE: CPT

## 2020-12-09 PROCEDURE — 84443 ASSAY THYROID STIM HORMONE: CPT

## 2020-12-09 PROCEDURE — 36415 COLL VENOUS BLD VENIPUNCTURE: CPT

## 2020-12-09 PROCEDURE — 84100 ASSAY OF PHOSPHORUS: CPT

## 2020-12-09 PROCEDURE — 83970 ASSAY OF PARATHORMONE: CPT

## 2020-12-11 ENCOUNTER — TELEPHONE (OUTPATIENT)
Dept: ENDOCRINOLOGY | Facility: CLINIC | Age: 65
End: 2020-12-11

## 2020-12-11 ENCOUNTER — LAB (OUTPATIENT)
Dept: LAB | Facility: HOSPITAL | Age: 65
End: 2020-12-11
Attending: INTERNAL MEDICINE
Payer: MEDICARE

## 2020-12-11 DIAGNOSIS — E21.3 HYPERPARATHYROIDISM (HCC): Primary | ICD-10-CM

## 2020-12-11 DIAGNOSIS — E83.52 HYPERCALCEMIA: ICD-10-CM

## 2020-12-11 DIAGNOSIS — E11.65 TYPE 2 DIABETES MELLITUS WITH HYPERGLYCEMIA, WITHOUT LONG-TERM CURRENT USE OF INSULIN (HCC): ICD-10-CM

## 2020-12-11 DIAGNOSIS — E11.65 UNCONTROLLED TYPE 2 DIABETES MELLITUS WITH HYPERGLYCEMIA (HCC): ICD-10-CM

## 2020-12-11 DIAGNOSIS — E21.3 HYPERPARATHYROIDISM (HCC): ICD-10-CM

## 2020-12-11 LAB
CALCIUM 24H UR-MCNC: 177.6 MG/24 HRS (ref 42–353)
CREAT 24H UR-MRATE: 1.2 G/24HR (ref 0.6–1.8)
SPECIMEN VOL UR: 800 ML
SPECIMEN VOL UR: 800 ML

## 2020-12-11 PROCEDURE — 82340 ASSAY OF CALCIUM IN URINE: CPT

## 2020-12-11 PROCEDURE — 82570 ASSAY OF URINE CREATININE: CPT

## 2020-12-14 ENCOUNTER — TRANSCRIBE ORDERS (OUTPATIENT)
Dept: LAB | Facility: CLINIC | Age: 65
End: 2020-12-14

## 2020-12-15 ENCOUNTER — TELEPHONE (OUTPATIENT)
Dept: ENDOCRINOLOGY | Facility: CLINIC | Age: 65
End: 2020-12-15

## 2020-12-17 ENCOUNTER — TELEPHONE (OUTPATIENT)
Dept: ENDOCRINOLOGY | Facility: CLINIC | Age: 65
End: 2020-12-17

## 2020-12-20 PROBLEM — E11.22 CKD STAGE 2 DUE TO TYPE 2 DIABETES MELLITUS (HCC): Status: ACTIVE | Noted: 2020-11-16

## 2020-12-20 PROBLEM — IMO0002 UNCONTROLLED TYPE 2 DIABETES MELLITUS: Status: RESOLVED | Noted: 2020-05-26 | Resolved: 2020-12-20

## 2020-12-28 ENCOUNTER — TELEPHONE (OUTPATIENT)
Dept: FAMILY MEDICINE CLINIC | Facility: CLINIC | Age: 65
End: 2020-12-28

## 2020-12-31 ENCOUNTER — OFFICE VISIT (OUTPATIENT)
Dept: FAMILY MEDICINE CLINIC | Facility: CLINIC | Age: 65
End: 2020-12-31
Payer: MEDICARE

## 2020-12-31 VITALS
BODY MASS INDEX: 24.1 KG/M2 | DIASTOLIC BLOOD PRESSURE: 84 MMHG | SYSTOLIC BLOOD PRESSURE: 132 MMHG | TEMPERATURE: 97.6 F | HEART RATE: 72 BPM | RESPIRATION RATE: 16 BRPM | HEIGHT: 63 IN | WEIGHT: 136 LBS

## 2020-12-31 DIAGNOSIS — E78.5 HYPERLIPIDEMIA LDL GOAL <100: ICD-10-CM

## 2020-12-31 DIAGNOSIS — F17.200 NICOTINE DEPENDENCE, UNCOMPLICATED, UNSPECIFIED NICOTINE PRODUCT TYPE: ICD-10-CM

## 2020-12-31 DIAGNOSIS — Z79.4 TYPE 2 DIABETES MELLITUS WITH OTHER DIABETIC KIDNEY COMPLICATION, WITH LONG-TERM CURRENT USE OF INSULIN (HCC): ICD-10-CM

## 2020-12-31 DIAGNOSIS — I10 BENIGN ESSENTIAL HYPERTENSION: Primary | ICD-10-CM

## 2020-12-31 DIAGNOSIS — E11.29 TYPE 2 DIABETES MELLITUS WITH OTHER DIABETIC KIDNEY COMPLICATION, WITH LONG-TERM CURRENT USE OF INSULIN (HCC): ICD-10-CM

## 2020-12-31 DIAGNOSIS — I50.41 ACUTE COMBINED SYSTOLIC AND DIASTOLIC CONGESTIVE HEART FAILURE (HCC): ICD-10-CM

## 2020-12-31 DIAGNOSIS — I50.42 CHRONIC COMBINED SYSTOLIC AND DIASTOLIC CONGESTIVE HEART FAILURE (HCC): ICD-10-CM

## 2020-12-31 PROCEDURE — 99214 OFFICE O/P EST MOD 30 MIN: CPT | Performed by: FAMILY MEDICINE

## 2020-12-31 RX ORDER — METOPROLOL SUCCINATE 25 MG/1
25 TABLET, EXTENDED RELEASE ORAL DAILY
Qty: 90 TABLET | Refills: 1 | Status: SHIPPED | OUTPATIENT
Start: 2020-12-31 | End: 2021-10-14 | Stop reason: SDUPTHER

## 2020-12-31 RX ORDER — ATORVASTATIN CALCIUM 40 MG/1
40 TABLET, FILM COATED ORAL DAILY
Qty: 90 TABLET | Refills: 1 | Status: SHIPPED | OUTPATIENT
Start: 2020-12-31 | End: 2021-10-14 | Stop reason: SDUPTHER

## 2021-01-01 ENCOUNTER — APPOINTMENT (OUTPATIENT)
Dept: RADIOLOGY | Facility: MEDICAL CENTER | Age: 66
DRG: 853 | End: 2021-01-01
Attending: SURGERY
Payer: MEDICARE

## 2021-01-01 ENCOUNTER — APPOINTMENT (OUTPATIENT)
Dept: RADIOLOGY | Facility: MEDICAL CENTER | Age: 66
DRG: 853 | End: 2021-01-01
Attending: NURSE PRACTITIONER
Payer: MEDICARE

## 2021-01-01 ENCOUNTER — HOSPITAL ENCOUNTER (INPATIENT)
Facility: MEDICAL CENTER | Age: 66
LOS: 15 days | DRG: 853 | End: 2021-07-03
Attending: EMERGENCY MEDICINE | Admitting: INTERNAL MEDICINE
Payer: MEDICARE

## 2021-01-01 ENCOUNTER — ANESTHESIA EVENT (OUTPATIENT)
Dept: SURGERY | Facility: MEDICAL CENTER | Age: 66
DRG: 853 | End: 2021-01-01
Payer: MEDICARE

## 2021-01-01 ENCOUNTER — ANESTHESIA (OUTPATIENT)
Dept: SURGERY | Facility: MEDICAL CENTER | Age: 66
DRG: 853 | End: 2021-01-01
Payer: MEDICARE

## 2021-01-01 ENCOUNTER — HOSPITAL ENCOUNTER (OUTPATIENT)
Dept: RADIOLOGY | Facility: MEDICAL CENTER | Age: 66
End: 2021-06-18
Payer: MEDICARE

## 2021-01-01 ENCOUNTER — APPOINTMENT (OUTPATIENT)
Dept: RADIOLOGY | Facility: MEDICAL CENTER | Age: 66
DRG: 853 | End: 2021-01-01
Payer: MEDICARE

## 2021-01-01 ENCOUNTER — APPOINTMENT (OUTPATIENT)
Dept: RADIOLOGY | Facility: MEDICAL CENTER | Age: 66
DRG: 853 | End: 2021-01-01
Attending: EMERGENCY MEDICINE
Payer: MEDICARE

## 2021-01-01 VITALS
BODY MASS INDEX: 42.94 KG/M2 | HEIGHT: 66 IN | WEIGHT: 267.2 LBS | TEMPERATURE: 99.4 F | SYSTOLIC BLOOD PRESSURE: 141 MMHG | DIASTOLIC BLOOD PRESSURE: 65 MMHG

## 2021-01-01 DIAGNOSIS — L02.211 ABDOMINAL WALL ABSCESS: ICD-10-CM

## 2021-01-01 DIAGNOSIS — J95.821 RESPIRATORY FAILURE FOLLOWING TRAUMA AND SURGERY (HCC): ICD-10-CM

## 2021-01-01 DIAGNOSIS — K55.9 MESENTERIC ISCHEMIA (HCC): ICD-10-CM

## 2021-01-01 DIAGNOSIS — E43 SEVERE PROTEIN-CALORIE MALNUTRITION (HCC): ICD-10-CM

## 2021-01-01 DIAGNOSIS — A41.9 SEPSIS, DUE TO UNSPECIFIED ORGANISM, UNSPECIFIED WHETHER ACUTE ORGAN DYSFUNCTION PRESENT (HCC): ICD-10-CM

## 2021-01-01 DIAGNOSIS — E66.01 CLASS 2 SEVERE OBESITY DUE TO EXCESS CALORIES WITH SERIOUS COMORBIDITY IN ADULT, UNSPECIFIED BMI (HCC): ICD-10-CM

## 2021-01-01 DIAGNOSIS — K55.9 ISCHEMIC COLITIS (HCC): ICD-10-CM

## 2021-01-01 LAB
ALBUMIN SERPL BCP-MCNC: 1.3 G/DL (ref 3.2–4.9)
ALBUMIN SERPL BCP-MCNC: 1.4 G/DL (ref 3.2–4.9)
ALBUMIN SERPL BCP-MCNC: 1.5 G/DL (ref 3.2–4.9)
ALBUMIN SERPL BCP-MCNC: 1.6 G/DL (ref 3.2–4.9)
ALBUMIN SERPL BCP-MCNC: 1.6 G/DL (ref 3.2–4.9)
ALBUMIN SERPL BCP-MCNC: 1.7 G/DL (ref 3.2–4.9)
ALBUMIN SERPL BCP-MCNC: 1.9 G/DL (ref 3.2–4.9)
ALBUMIN SERPL BCP-MCNC: 2.2 G/DL (ref 3.2–4.9)
ALBUMIN/GLOB SERPL: 0.4 G/DL
ALBUMIN/GLOB SERPL: 0.5 G/DL
ALBUMIN/GLOB SERPL: 0.6 G/DL
ALBUMIN/GLOB SERPL: 0.6 G/DL
ALBUMIN/GLOB SERPL: 0.7 G/DL
ALBUMIN/GLOB SERPL: 0.9 G/DL
ALBUMIN/GLOB SERPL: 1 G/DL
ALBUMIN/GLOB SERPL: ABNORMAL G/DL
ALP SERPL-CCNC: 107 U/L (ref 30–99)
ALP SERPL-CCNC: 108 U/L (ref 30–99)
ALP SERPL-CCNC: 109 U/L (ref 30–99)
ALP SERPL-CCNC: 110 U/L (ref 30–99)
ALP SERPL-CCNC: 111 U/L (ref 30–99)
ALP SERPL-CCNC: 116 U/L (ref 30–99)
ALP SERPL-CCNC: 117 U/L (ref 30–99)
ALP SERPL-CCNC: 117 U/L (ref 30–99)
ALP SERPL-CCNC: 122 U/L (ref 30–99)
ALP SERPL-CCNC: 122 U/L (ref 30–99)
ALP SERPL-CCNC: 123 U/L (ref 30–99)
ALP SERPL-CCNC: 124 U/L (ref 30–99)
ALP SERPL-CCNC: 127 U/L (ref 30–99)
ALP SERPL-CCNC: 46 U/L (ref 30–99)
ALP SERPL-CCNC: 57 U/L (ref 30–99)
ALP SERPL-CCNC: 58 U/L (ref 30–99)
ALP SERPL-CCNC: 63 U/L (ref 30–99)
ALP SERPL-CCNC: 74 U/L (ref 30–99)
ALT SERPL-CCNC: 103 U/L (ref 2–50)
ALT SERPL-CCNC: 105 U/L (ref 2–50)
ALT SERPL-CCNC: 112 U/L (ref 2–50)
ALT SERPL-CCNC: 114 U/L (ref 2–50)
ALT SERPL-CCNC: 122 U/L (ref 2–50)
ALT SERPL-CCNC: 130 U/L (ref 2–50)
ALT SERPL-CCNC: 132 U/L (ref 2–50)
ALT SERPL-CCNC: 23 U/L (ref 2–50)
ALT SERPL-CCNC: 38 U/L (ref 2–50)
ALT SERPL-CCNC: 39 U/L (ref 2–50)
ALT SERPL-CCNC: 39 U/L (ref 2–50)
ALT SERPL-CCNC: 43 U/L (ref 2–50)
ALT SERPL-CCNC: 45 U/L (ref 2–50)
ALT SERPL-CCNC: 48 U/L (ref 2–50)
ALT SERPL-CCNC: 66 U/L (ref 2–50)
ALT SERPL-CCNC: 68 U/L (ref 2–50)
ALT SERPL-CCNC: 79 U/L (ref 2–50)
ALT SERPL-CCNC: 87 U/L (ref 2–50)
ANION GAP SERPL CALC-SCNC: 10 MMOL/L (ref 7–16)
ANION GAP SERPL CALC-SCNC: 20 MMOL/L (ref 7–16)
ANION GAP SERPL CALC-SCNC: 5 MMOL/L (ref 7–16)
ANION GAP SERPL CALC-SCNC: 5 MMOL/L (ref 7–16)
ANION GAP SERPL CALC-SCNC: 6 MMOL/L (ref 7–16)
ANION GAP SERPL CALC-SCNC: 7 MMOL/L (ref 7–16)
ANION GAP SERPL CALC-SCNC: 8 MMOL/L (ref 7–16)
ANION GAP SERPL CALC-SCNC: 8 MMOL/L (ref 7–16)
ANION GAP SERPL CALC-SCNC: 9 MMOL/L (ref 7–16)
ANISOCYTOSIS BLD QL SMEAR: ABNORMAL
APPEARANCE UR: CLEAR
APPEARANCE UR: CLEAR
APTT PPP: 25.6 SEC (ref 24.7–36)
AST SERPL-CCNC: 102 U/L (ref 12–45)
AST SERPL-CCNC: 105 U/L (ref 12–45)
AST SERPL-CCNC: 135 U/L (ref 12–45)
AST SERPL-CCNC: 142 U/L (ref 12–45)
AST SERPL-CCNC: 18 U/L (ref 12–45)
AST SERPL-CCNC: 195 U/L (ref 12–45)
AST SERPL-CCNC: 200 U/L (ref 12–45)
AST SERPL-CCNC: 21 U/L (ref 12–45)
AST SERPL-CCNC: 228 U/L (ref 12–45)
AST SERPL-CCNC: 229 U/L (ref 12–45)
AST SERPL-CCNC: 25 U/L (ref 12–45)
AST SERPL-CCNC: 26 U/L (ref 12–45)
AST SERPL-CCNC: 26 U/L (ref 12–45)
AST SERPL-CCNC: 35 U/L (ref 12–45)
AST SERPL-CCNC: 49 U/L (ref 12–45)
AST SERPL-CCNC: 49 U/L (ref 12–45)
AST SERPL-CCNC: 64 U/L (ref 12–45)
AST SERPL-CCNC: 74 U/L (ref 12–45)
BACTERIA #/AREA URNS HPF: NEGATIVE /HPF
BACTERIA #/AREA URNS HPF: NEGATIVE /HPF
BACTERIA BLD CULT: NORMAL
BACTERIA BLD CULT: NORMAL
BACTERIA BRONCH AEROBE CULT: ABNORMAL
BACTERIA BRONCH AEROBE CULT: ABNORMAL
BACTERIA UR CULT: NORMAL
BASE EXCESS BLDA CALC-SCNC: -13 MMOL/L (ref -4–3)
BASE EXCESS BLDA CALC-SCNC: -5 MMOL/L (ref -4–3)
BASE EXCESS BLDA CALC-SCNC: -9 MMOL/L (ref -4–3)
BASE EXCESS BLDA CALC-SCNC: 8 MMOL/L (ref -4–3)
BASE EXCESS BLDA CALC-SCNC: 9 MMOL/L (ref -4–3)
BASOPHILS # BLD AUTO: 0 % (ref 0–1.8)
BASOPHILS # BLD AUTO: 0.2 % (ref 0–1.8)
BASOPHILS # BLD AUTO: 0.3 % (ref 0–1.8)
BASOPHILS # BLD AUTO: 0.7 % (ref 0–1.8)
BASOPHILS # BLD: 0 K/UL (ref 0–0.12)
BASOPHILS # BLD: 0.03 K/UL (ref 0–0.12)
BASOPHILS # BLD: 0.03 K/UL (ref 0–0.12)
BASOPHILS # BLD: 0.07 K/UL (ref 0–0.12)
BASOPHILS # BLD: 0.1 K/UL (ref 0–0.12)
BASOPHILS # BLD: 0.18 K/UL (ref 0–0.12)
BILIRUB SERPL-MCNC: 0.2 MG/DL (ref 0.1–1.5)
BILIRUB SERPL-MCNC: 0.3 MG/DL (ref 0.1–1.5)
BILIRUB SERPL-MCNC: 0.9 MG/DL (ref 0.1–1.5)
BILIRUB SERPL-MCNC: 1.2 MG/DL (ref 0.1–1.5)
BILIRUB SERPL-MCNC: 1.3 MG/DL (ref 0.1–1.5)
BILIRUB SERPL-MCNC: 1.3 MG/DL (ref 0.1–1.5)
BILIRUB SERPL-MCNC: 1.4 MG/DL (ref 0.1–1.5)
BILIRUB SERPL-MCNC: 1.5 MG/DL (ref 0.1–1.5)
BILIRUB SERPL-MCNC: 1.5 MG/DL (ref 0.1–1.5)
BILIRUB SERPL-MCNC: 1.6 MG/DL (ref 0.1–1.5)
BILIRUB SERPL-MCNC: 1.6 MG/DL (ref 0.1–1.5)
BILIRUB SERPL-MCNC: 1.7 MG/DL (ref 0.1–1.5)
BILIRUB SERPL-MCNC: 1.9 MG/DL (ref 0.1–1.5)
BILIRUB SERPL-MCNC: 2 MG/DL (ref 0.1–1.5)
BILIRUB SERPL-MCNC: 2.1 MG/DL (ref 0.1–1.5)
BILIRUB SERPL-MCNC: <0.2 MG/DL (ref 0.1–1.5)
BILIRUB UR QL STRIP.AUTO: NEGATIVE
BILIRUB UR QL STRIP.AUTO: NEGATIVE
BODY TEMPERATURE: ABNORMAL DEGREES
BREATHS SETTING VENT: 20
BREATHS SETTING VENT: 20
BREATHS SETTING VENT: 22
BUN SERPL-MCNC: 21 MG/DL (ref 8–22)
BUN SERPL-MCNC: 24 MG/DL (ref 8–22)
BUN SERPL-MCNC: 25 MG/DL (ref 8–22)
BUN SERPL-MCNC: 25 MG/DL (ref 8–22)
BUN SERPL-MCNC: 26 MG/DL (ref 8–22)
BUN SERPL-MCNC: 28 MG/DL (ref 8–22)
BUN SERPL-MCNC: 29 MG/DL (ref 8–22)
BUN SERPL-MCNC: 30 MG/DL (ref 8–22)
BUN SERPL-MCNC: 30 MG/DL (ref 8–22)
BUN SERPL-MCNC: 31 MG/DL (ref 8–22)
BUN SERPL-MCNC: 32 MG/DL (ref 8–22)
BUN SERPL-MCNC: 32 MG/DL (ref 8–22)
BUN SERPL-MCNC: 33 MG/DL (ref 8–22)
BUN SERPL-MCNC: 33 MG/DL (ref 8–22)
BURR CELLS BLD QL SMEAR: NORMAL
BURR CELLS BLD QL SMEAR: NORMAL
C DIFF DNA SPEC QL NAA+PROBE: NEGATIVE
C DIFF TOX GENS STL QL NAA+PROBE: NEGATIVE
CA-I BLD ISE-SCNC: 1.24 MMOL/L (ref 1.1–1.3)
CA-I SERPL-SCNC: 1 MMOL/L (ref 1.1–1.3)
CALCIUM SERPL-MCNC: 6.7 MG/DL (ref 8.5–10.5)
CALCIUM SERPL-MCNC: 6.9 MG/DL (ref 8.5–10.5)
CALCIUM SERPL-MCNC: 7 MG/DL (ref 8.5–10.5)
CALCIUM SERPL-MCNC: 7.1 MG/DL (ref 8.5–10.5)
CALCIUM SERPL-MCNC: 7.2 MG/DL (ref 8.5–10.5)
CALCIUM SERPL-MCNC: 7.4 MG/DL (ref 8.5–10.5)
CALCIUM SERPL-MCNC: 7.5 MG/DL (ref 8.5–10.5)
CALCIUM SERPL-MCNC: 7.6 MG/DL (ref 8.5–10.5)
CALCIUM SERPL-MCNC: 7.6 MG/DL (ref 8.5–10.5)
CHLORIDE SERPL-SCNC: 106 MMOL/L (ref 96–112)
CHLORIDE SERPL-SCNC: 107 MMOL/L (ref 96–112)
CHLORIDE SERPL-SCNC: 108 MMOL/L (ref 96–112)
CHLORIDE SERPL-SCNC: 109 MMOL/L (ref 96–112)
CHLORIDE SERPL-SCNC: 110 MMOL/L (ref 96–112)
CHLORIDE SERPL-SCNC: 110 MMOL/L (ref 96–112)
CHLORIDE SERPL-SCNC: 111 MMOL/L (ref 96–112)
CHLORIDE SERPL-SCNC: 112 MMOL/L (ref 96–112)
CHLORIDE SERPL-SCNC: 112 MMOL/L (ref 96–112)
CHLORIDE SERPL-SCNC: 113 MMOL/L (ref 96–112)
CHLORIDE SERPL-SCNC: 114 MMOL/L (ref 96–112)
CHLORIDE SERPL-SCNC: 115 MMOL/L (ref 96–112)
CHLORIDE SERPL-SCNC: 116 MMOL/L (ref 96–112)
CO2 BLDA-SCNC: 15 MMOL/L (ref 20–33)
CO2 BLDA-SCNC: 15 MMOL/L (ref 20–33)
CO2 BLDA-SCNC: 20 MMOL/L (ref 20–33)
CO2 BLDA-SCNC: 32 MMOL/L (ref 20–33)
CO2 BLDA-SCNC: 34 MMOL/L (ref 20–33)
CO2 SERPL-SCNC: 14 MMOL/L (ref 20–33)
CO2 SERPL-SCNC: 17 MMOL/L (ref 20–33)
CO2 SERPL-SCNC: 20 MMOL/L (ref 20–33)
CO2 SERPL-SCNC: 22 MMOL/L (ref 20–33)
CO2 SERPL-SCNC: 24 MMOL/L (ref 20–33)
CO2 SERPL-SCNC: 26 MMOL/L (ref 20–33)
CO2 SERPL-SCNC: 27 MMOL/L (ref 20–33)
CO2 SERPL-SCNC: 27 MMOL/L (ref 20–33)
CO2 SERPL-SCNC: 28 MMOL/L (ref 20–33)
CO2 SERPL-SCNC: 29 MMOL/L (ref 20–33)
CO2 SERPL-SCNC: 29 MMOL/L (ref 20–33)
CO2 SERPL-SCNC: 30 MMOL/L (ref 20–33)
CO2 SERPL-SCNC: 9 MMOL/L (ref 20–33)
COLOR UR: YELLOW
COLOR UR: YELLOW
CORTIS SERPL-MCNC: 66.6 UG/DL (ref 0–23)
CREAT SERPL-MCNC: 0.53 MG/DL (ref 0.5–1.4)
CREAT SERPL-MCNC: 0.53 MG/DL (ref 0.5–1.4)
CREAT SERPL-MCNC: 0.54 MG/DL (ref 0.5–1.4)
CREAT SERPL-MCNC: 0.58 MG/DL (ref 0.5–1.4)
CREAT SERPL-MCNC: 0.59 MG/DL (ref 0.5–1.4)
CREAT SERPL-MCNC: 0.59 MG/DL (ref 0.5–1.4)
CREAT SERPL-MCNC: 0.61 MG/DL (ref 0.5–1.4)
CREAT SERPL-MCNC: 0.62 MG/DL (ref 0.5–1.4)
CREAT SERPL-MCNC: 0.62 MG/DL (ref 0.5–1.4)
CREAT SERPL-MCNC: 0.65 MG/DL (ref 0.5–1.4)
CREAT SERPL-MCNC: 0.66 MG/DL (ref 0.5–1.4)
CREAT SERPL-MCNC: 0.68 MG/DL (ref 0.5–1.4)
CREAT SERPL-MCNC: 0.79 MG/DL (ref 0.5–1.4)
CREAT SERPL-MCNC: 0.84 MG/DL (ref 0.5–1.4)
CREAT SERPL-MCNC: 0.85 MG/DL (ref 0.5–1.4)
CREAT SERPL-MCNC: 1.12 MG/DL (ref 0.5–1.4)
CREAT SERPL-MCNC: 1.16 MG/DL (ref 0.5–1.4)
CREAT SERPL-MCNC: 1.24 MG/DL (ref 0.5–1.4)
CREAT SERPL-MCNC: 1.28 MG/DL (ref 0.5–1.4)
CRP SERPL HS-MCNC: 16.39 MG/DL (ref 0–0.75)
CRP SERPL HS-MCNC: 24.91 MG/DL (ref 0–0.75)
CRP SERPL HS-MCNC: 33.63 MG/DL (ref 0–0.75)
DELSYS IDSYS: ABNORMAL
END TIDAL CARBON DIOXIDE IECO2: 26 MMHG
END TIDAL CARBON DIOXIDE IECO2: 28 MMHG
END TIDAL CARBON DIOXIDE IECO2: 31 MMHG
END TIDAL CARBON DIOXIDE IECO2: 32 MMHG
EOSINOPHIL # BLD AUTO: 0 K/UL (ref 0–0.51)
EOSINOPHIL # BLD AUTO: 0.01 K/UL (ref 0–0.51)
EOSINOPHIL # BLD AUTO: 0.03 K/UL (ref 0–0.51)
EOSINOPHIL # BLD AUTO: 0.05 K/UL (ref 0–0.51)
EOSINOPHIL # BLD AUTO: 0.05 K/UL (ref 0–0.51)
EOSINOPHIL # BLD AUTO: 0.12 K/UL (ref 0–0.51)
EOSINOPHIL # BLD AUTO: 0.18 K/UL (ref 0–0.51)
EOSINOPHIL NFR BLD: 0 % (ref 0–6.9)
EOSINOPHIL NFR BLD: 0.1 % (ref 0–6.9)
EOSINOPHIL NFR BLD: 0.2 % (ref 0–6.9)
EOSINOPHIL NFR BLD: 0.3 % (ref 0–6.9)
EOSINOPHIL NFR BLD: 0.7 % (ref 0–6.9)
EOSINOPHIL NFR BLD: 0.9 % (ref 0–6.9)
EPI CELLS #/AREA URNS HPF: ABNORMAL /HPF
EPI CELLS #/AREA URNS HPF: ABNORMAL /HPF
ERYTHROCYTE [DISTWIDTH] IN BLOOD BY AUTOMATED COUNT: 45.5 FL (ref 35.9–50)
ERYTHROCYTE [DISTWIDTH] IN BLOOD BY AUTOMATED COUNT: 46.9 FL (ref 35.9–50)
ERYTHROCYTE [DISTWIDTH] IN BLOOD BY AUTOMATED COUNT: 47.5 FL (ref 35.9–50)
ERYTHROCYTE [DISTWIDTH] IN BLOOD BY AUTOMATED COUNT: 47.9 FL (ref 35.9–50)
ERYTHROCYTE [DISTWIDTH] IN BLOOD BY AUTOMATED COUNT: 48.1 FL (ref 35.9–50)
ERYTHROCYTE [DISTWIDTH] IN BLOOD BY AUTOMATED COUNT: 48.3 FL (ref 35.9–50)
ERYTHROCYTE [DISTWIDTH] IN BLOOD BY AUTOMATED COUNT: 48.5 FL (ref 35.9–50)
ERYTHROCYTE [DISTWIDTH] IN BLOOD BY AUTOMATED COUNT: 48.6 FL (ref 35.9–50)
ERYTHROCYTE [DISTWIDTH] IN BLOOD BY AUTOMATED COUNT: 48.6 FL (ref 35.9–50)
ERYTHROCYTE [DISTWIDTH] IN BLOOD BY AUTOMATED COUNT: 48.7 FL (ref 35.9–50)
ERYTHROCYTE [DISTWIDTH] IN BLOOD BY AUTOMATED COUNT: 49.3 FL (ref 35.9–50)
ERYTHROCYTE [DISTWIDTH] IN BLOOD BY AUTOMATED COUNT: 49.9 FL (ref 35.9–50)
ERYTHROCYTE [DISTWIDTH] IN BLOOD BY AUTOMATED COUNT: 50.3 FL (ref 35.9–50)
ERYTHROCYTE [DISTWIDTH] IN BLOOD BY AUTOMATED COUNT: 50.9 FL (ref 35.9–50)
ERYTHROCYTE [DISTWIDTH] IN BLOOD BY AUTOMATED COUNT: 55 FL (ref 35.9–50)
ERYTHROCYTE [DISTWIDTH] IN BLOOD BY AUTOMATED COUNT: 57.4 FL (ref 35.9–50)
EST. AVERAGE GLUCOSE BLD GHB EST-MCNC: 108 MG/DL
FIBRINOGEN PPP-MCNC: 393 MG/DL (ref 215–460)
GIANT PLATELETS BLD QL SMEAR: NORMAL
GLOBULIN SER CALC-MCNC: 1.8 G/DL (ref 1.9–3.5)
GLOBULIN SER CALC-MCNC: 2.1 G/DL (ref 1.9–3.5)
GLOBULIN SER CALC-MCNC: 2.2 G/DL (ref 1.9–3.5)
GLOBULIN SER CALC-MCNC: 2.4 G/DL (ref 1.9–3.5)
GLOBULIN SER CALC-MCNC: 2.6 G/DL (ref 1.9–3.5)
GLOBULIN SER CALC-MCNC: 2.6 G/DL (ref 1.9–3.5)
GLOBULIN SER CALC-MCNC: 2.7 G/DL (ref 1.9–3.5)
GLOBULIN SER CALC-MCNC: 2.8 G/DL (ref 1.9–3.5)
GLOBULIN SER CALC-MCNC: 3.2 G/DL (ref 1.9–3.5)
GLOBULIN SER CALC-MCNC: 3.8 G/DL (ref 1.9–3.5)
GLOBULIN SER CALC-MCNC: ABNORMAL G/DL (ref 1.9–3.5)
GLUCOSE BLD-MCNC: 105 MG/DL (ref 65–99)
GLUCOSE BLD-MCNC: 106 MG/DL (ref 65–99)
GLUCOSE BLD-MCNC: 107 MG/DL (ref 65–99)
GLUCOSE BLD-MCNC: 107 MG/DL (ref 65–99)
GLUCOSE BLD-MCNC: 108 MG/DL (ref 65–99)
GLUCOSE BLD-MCNC: 108 MG/DL (ref 65–99)
GLUCOSE BLD-MCNC: 109 MG/DL (ref 65–99)
GLUCOSE BLD-MCNC: 110 MG/DL (ref 65–99)
GLUCOSE BLD-MCNC: 111 MG/DL (ref 65–99)
GLUCOSE BLD-MCNC: 111 MG/DL (ref 65–99)
GLUCOSE BLD-MCNC: 112 MG/DL (ref 65–99)
GLUCOSE BLD-MCNC: 113 MG/DL (ref 65–99)
GLUCOSE BLD-MCNC: 114 MG/DL (ref 65–99)
GLUCOSE BLD-MCNC: 114 MG/DL (ref 65–99)
GLUCOSE BLD-MCNC: 115 MG/DL (ref 65–99)
GLUCOSE BLD-MCNC: 117 MG/DL (ref 65–99)
GLUCOSE BLD-MCNC: 118 MG/DL (ref 65–99)
GLUCOSE BLD-MCNC: 119 MG/DL (ref 65–99)
GLUCOSE BLD-MCNC: 119 MG/DL (ref 65–99)
GLUCOSE BLD-MCNC: 121 MG/DL (ref 65–99)
GLUCOSE BLD-MCNC: 122 MG/DL (ref 65–99)
GLUCOSE BLD-MCNC: 125 MG/DL (ref 65–99)
GLUCOSE BLD-MCNC: 125 MG/DL (ref 65–99)
GLUCOSE BLD-MCNC: 126 MG/DL (ref 65–99)
GLUCOSE BLD-MCNC: 128 MG/DL (ref 65–99)
GLUCOSE BLD-MCNC: 129 MG/DL (ref 65–99)
GLUCOSE BLD-MCNC: 130 MG/DL (ref 65–99)
GLUCOSE BLD-MCNC: 130 MG/DL (ref 65–99)
GLUCOSE BLD-MCNC: 132 MG/DL (ref 65–99)
GLUCOSE BLD-MCNC: 132 MG/DL (ref 65–99)
GLUCOSE BLD-MCNC: 135 MG/DL (ref 65–99)
GLUCOSE BLD-MCNC: 137 MG/DL (ref 65–99)
GLUCOSE BLD-MCNC: 140 MG/DL (ref 65–99)
GLUCOSE BLD-MCNC: 141 MG/DL (ref 65–99)
GLUCOSE BLD-MCNC: 142 MG/DL (ref 65–99)
GLUCOSE BLD-MCNC: 143 MG/DL (ref 65–99)
GLUCOSE BLD-MCNC: 143 MG/DL (ref 65–99)
GLUCOSE BLD-MCNC: 181 MG/DL (ref 65–99)
GLUCOSE BLD-MCNC: 68 MG/DL (ref 65–99)
GLUCOSE BLD-MCNC: 75 MG/DL (ref 65–99)
GLUCOSE BLD-MCNC: 84 MG/DL (ref 65–99)
GLUCOSE BLD-MCNC: 95 MG/DL (ref 65–99)
GLUCOSE SERPL-MCNC: 106 MG/DL (ref 65–99)
GLUCOSE SERPL-MCNC: 117 MG/DL (ref 65–99)
GLUCOSE SERPL-MCNC: 117 MG/DL (ref 65–99)
GLUCOSE SERPL-MCNC: 122 MG/DL (ref 65–99)
GLUCOSE SERPL-MCNC: 126 MG/DL (ref 65–99)
GLUCOSE SERPL-MCNC: 126 MG/DL (ref 65–99)
GLUCOSE SERPL-MCNC: 128 MG/DL (ref 65–99)
GLUCOSE SERPL-MCNC: 130 MG/DL (ref 65–99)
GLUCOSE SERPL-MCNC: 133 MG/DL (ref 65–99)
GLUCOSE SERPL-MCNC: 134 MG/DL (ref 65–99)
GLUCOSE SERPL-MCNC: 135 MG/DL (ref 65–99)
GLUCOSE SERPL-MCNC: 139 MG/DL (ref 65–99)
GLUCOSE SERPL-MCNC: 139 MG/DL (ref 65–99)
GLUCOSE SERPL-MCNC: 144 MG/DL (ref 65–99)
GLUCOSE SERPL-MCNC: 153 MG/DL (ref 65–99)
GLUCOSE SERPL-MCNC: 158 MG/DL (ref 65–99)
GLUCOSE SERPL-MCNC: 168 MG/DL (ref 65–99)
GLUCOSE SERPL-MCNC: 230 MG/DL (ref 65–99)
GLUCOSE SERPL-MCNC: 300 MG/DL (ref 65–99)
GLUCOSE UR STRIP.AUTO-MCNC: 500 MG/DL
GLUCOSE UR STRIP.AUTO-MCNC: NEGATIVE MG/DL
GRAM STN SPEC: ABNORMAL
GRAM STN SPEC: NORMAL
GRAM STN SPEC: NORMAL
HBA1C MFR BLD: 5.4 % (ref 4–5.6)
HCO3 BLDA-SCNC: 13.8 MMOL/L (ref 17–25)
HCO3 BLDA-SCNC: 14.1 MMOL/L (ref 17–25)
HCO3 BLDA-SCNC: 18.8 MMOL/L (ref 17–25)
HCO3 BLDA-SCNC: 30.8 MMOL/L (ref 17–25)
HCO3 BLDA-SCNC: 32.4 MMOL/L (ref 17–25)
HCO3 BLDA-SCNC: 32.8 MMOL/L (ref 17–25)
HCO3 BLDA-SCNC: 32.9 MMOL/L (ref 17–25)
HCT VFR BLD AUTO: 19.4 % (ref 37–47)
HCT VFR BLD AUTO: 19.5 % (ref 37–47)
HCT VFR BLD AUTO: 20.3 % (ref 37–47)
HCT VFR BLD AUTO: 20.8 % (ref 37–47)
HCT VFR BLD AUTO: 20.9 % (ref 37–47)
HCT VFR BLD AUTO: 22 % (ref 37–47)
HCT VFR BLD AUTO: 22.2 % (ref 37–47)
HCT VFR BLD AUTO: 22.4 % (ref 37–47)
HCT VFR BLD AUTO: 22.7 % (ref 37–47)
HCT VFR BLD AUTO: 23.1 % (ref 37–47)
HCT VFR BLD AUTO: 23.6 % (ref 37–47)
HCT VFR BLD AUTO: 24 % (ref 37–47)
HCT VFR BLD AUTO: 24.5 % (ref 37–47)
HCT VFR BLD AUTO: 29.2 % (ref 37–47)
HCT VFR BLD AUTO: 32.4 % (ref 37–47)
HCT VFR BLD AUTO: 37.6 % (ref 37–47)
HCT VFR BLD CALC: 30 % (ref 37–47)
HGB BLD-MCNC: 10.2 G/DL (ref 12–16)
HGB BLD-MCNC: 10.5 G/DL (ref 12–16)
HGB BLD-MCNC: 11.4 G/DL (ref 12–16)
HGB BLD-MCNC: 6.3 G/DL (ref 12–16)
HGB BLD-MCNC: 6.3 G/DL (ref 12–16)
HGB BLD-MCNC: 6.4 G/DL (ref 12–16)
HGB BLD-MCNC: 6.5 G/DL (ref 12–16)
HGB BLD-MCNC: 6.7 G/DL (ref 12–16)
HGB BLD-MCNC: 6.9 G/DL (ref 12–16)
HGB BLD-MCNC: 6.9 G/DL (ref 12–16)
HGB BLD-MCNC: 7 G/DL (ref 12–16)
HGB BLD-MCNC: 7.1 G/DL (ref 12–16)
HGB BLD-MCNC: 7.4 G/DL (ref 12–16)
HGB BLD-MCNC: 7.4 G/DL (ref 12–16)
HGB BLD-MCNC: 7.7 G/DL (ref 12–16)
HGB BLD-MCNC: 7.9 G/DL (ref 12–16)
HGB BLD-MCNC: 9.7 G/DL (ref 12–16)
HGB RETIC QN AUTO: 18.1 PG/CELL (ref 29–35)
HGB RETIC QN AUTO: 25.7 PG/CELL (ref 29–35)
HGB RETIC QN AUTO: 27.3 PG/CELL (ref 29–35)
HOROWITZ INDEX BLDA+IHG-RTO: 125 MM[HG]
HOROWITZ INDEX BLDA+IHG-RTO: 170 MM[HG]
HOROWITZ INDEX BLDA+IHG-RTO: 198 MM[HG]
HOROWITZ INDEX BLDA+IHG-RTO: 297 MM[HG]
HOROWITZ INDEX BLDA+IHG-RTO: 377 MM[HG]
HOROWITZ INDEX BLDA+IHG-RTO: 65 MM[HG]
HOROWITZ INDEX BLDA+IHG-RTO: 89 MM[HG]
HYALINE CASTS #/AREA URNS LPF: ABNORMAL /LPF
HYALINE CASTS #/AREA URNS LPF: ABNORMAL /LPF
HYPOCHROMIA BLD QL SMEAR: ABNORMAL
IMM GRANULOCYTES # BLD AUTO: 0.51 K/UL (ref 0–0.11)
IMM GRANULOCYTES # BLD AUTO: 0.66 K/UL (ref 0–0.11)
IMM GRANULOCYTES # BLD AUTO: 0.66 K/UL (ref 0–0.11)
IMM GRANULOCYTES # BLD AUTO: 0.7 K/UL (ref 0–0.11)
IMM GRANULOCYTES # BLD AUTO: 0.72 K/UL (ref 0–0.11)
IMM GRANULOCYTES NFR BLD AUTO: 2 % (ref 0–0.9)
IMM GRANULOCYTES NFR BLD AUTO: 2.4 % (ref 0–0.9)
IMM GRANULOCYTES NFR BLD AUTO: 2.6 % (ref 0–0.9)
IMM GRANULOCYTES NFR BLD AUTO: 2.9 % (ref 0–0.9)
IMM GRANULOCYTES NFR BLD AUTO: 4 % (ref 0–0.9)
IMM RETICS NFR: 19.1 % (ref 9.3–17.4)
IMM RETICS NFR: 38.9 % (ref 9.3–17.4)
IMM RETICS NFR: 50.6 % (ref 9.3–17.4)
INR PPP: 1.13 (ref 0.87–1.13)
INR PPP: 1.47 (ref 0.87–1.13)
IRON SATN MFR SERPL: ABNORMAL % (ref 15–55)
IRON SATN MFR SERPL: ABNORMAL % (ref 15–55)
IRON SERPL-MCNC: 15 UG/DL (ref 40–170)
IRON SERPL-MCNC: 24 UG/DL (ref 40–170)
KETONES UR STRIP.AUTO-MCNC: 15 MG/DL
KETONES UR STRIP.AUTO-MCNC: NEGATIVE MG/DL
LACTATE BLD-SCNC: 1.1 MMOL/L (ref 0.5–2)
LACTATE BLD-SCNC: 1.4 MMOL/L (ref 0.5–2)
LACTATE BLD-SCNC: 1.5 MMOL/L (ref 0.5–2)
LACTATE BLD-SCNC: 1.9 MMOL/L (ref 0.5–2)
LACTATE BLD-SCNC: 10.4 MMOL/L (ref 0.5–2)
LACTATE BLD-SCNC: 2 MMOL/L (ref 0.5–2)
LACTATE BLD-SCNC: 2.1 MMOL/L (ref 0.5–2)
LACTATE BLD-SCNC: 2.8 MMOL/L (ref 0.5–2)
LACTATE BLD-SCNC: 3.2 MMOL/L (ref 0.5–2)
LACTATE BLD-SCNC: 3.7 MMOL/L (ref 0.5–2)
LACTATE BLD-SCNC: 3.7 MMOL/L (ref 0.5–2)
LACTATE BLD-SCNC: 5.9 MMOL/L (ref 0.5–2)
LEUKOCYTE ESTERASE UR QL STRIP.AUTO: NEGATIVE
LEUKOCYTE ESTERASE UR QL STRIP.AUTO: NEGATIVE
LG PLATELETS BLD QL SMEAR: NORMAL
LPM ILPM: 8 LPM
LPM ILPM: 8 LPM
LYMPHOCYTES # BLD AUTO: 0.24 K/UL (ref 1–4.8)
LYMPHOCYTES # BLD AUTO: 0.34 K/UL (ref 1–4.8)
LYMPHOCYTES # BLD AUTO: 0.61 K/UL (ref 1–4.8)
LYMPHOCYTES # BLD AUTO: 0.65 K/UL (ref 1–4.8)
LYMPHOCYTES # BLD AUTO: 0.67 K/UL (ref 1–4.8)
LYMPHOCYTES # BLD AUTO: 0.97 K/UL (ref 1–4.8)
LYMPHOCYTES # BLD AUTO: 0.99 K/UL (ref 1–4.8)
LYMPHOCYTES # BLD AUTO: 1.04 K/UL (ref 1–4.8)
LYMPHOCYTES # BLD AUTO: 1.05 K/UL (ref 1–4.8)
LYMPHOCYTES # BLD AUTO: 1.23 K/UL (ref 1–4.8)
LYMPHOCYTES # BLD AUTO: 1.25 K/UL (ref 1–4.8)
LYMPHOCYTES # BLD AUTO: 1.26 K/UL (ref 1–4.8)
LYMPHOCYTES # BLD AUTO: 1.27 K/UL (ref 1–4.8)
LYMPHOCYTES # BLD AUTO: 1.59 K/UL (ref 1–4.8)
LYMPHOCYTES # BLD AUTO: 1.6 K/UL (ref 1–4.8)
LYMPHOCYTES # BLD AUTO: 1.92 K/UL (ref 1–4.8)
LYMPHOCYTES NFR BLD: 0.9 % (ref 22–41)
LYMPHOCYTES NFR BLD: 1.7 % (ref 22–41)
LYMPHOCYTES NFR BLD: 2.6 % (ref 22–41)
LYMPHOCYTES NFR BLD: 3.5 % (ref 22–41)
LYMPHOCYTES NFR BLD: 3.9 % (ref 22–41)
LYMPHOCYTES NFR BLD: 4.4 % (ref 22–41)
LYMPHOCYTES NFR BLD: 5.2 % (ref 22–41)
LYMPHOCYTES NFR BLD: 5.3 % (ref 22–41)
LYMPHOCYTES NFR BLD: 6.1 % (ref 22–41)
LYMPHOCYTES NFR BLD: 6.1 % (ref 22–41)
LYMPHOCYTES NFR BLD: 6.9 % (ref 22–41)
LYMPHOCYTES NFR BLD: 7 % (ref 22–41)
LYMPHOCYTES NFR BLD: 7.5 % (ref 22–41)
LYMPHOCYTES NFR BLD: 9.7 % (ref 22–41)
MACROCYTES BLD QL SMEAR: ABNORMAL
MAGNESIUM SERPL-MCNC: 1.7 MG/DL (ref 1.5–2.5)
MAGNESIUM SERPL-MCNC: 1.7 MG/DL (ref 1.5–2.5)
MAGNESIUM SERPL-MCNC: 1.8 MG/DL (ref 1.5–2.5)
MAGNESIUM SERPL-MCNC: 1.9 MG/DL (ref 1.5–2.5)
MAGNESIUM SERPL-MCNC: 2 MG/DL (ref 1.5–2.5)
MAGNESIUM SERPL-MCNC: 2.1 MG/DL (ref 1.5–2.5)
MAGNESIUM SERPL-MCNC: 2.1 MG/DL (ref 1.5–2.5)
MAGNESIUM SERPL-MCNC: 2.2 MG/DL (ref 1.5–2.5)
MANUAL DIFF BLD: NORMAL
MCH RBC QN AUTO: 28.8 PG (ref 27–33)
MCH RBC QN AUTO: 28.9 PG (ref 27–33)
MCH RBC QN AUTO: 29 PG (ref 27–33)
MCH RBC QN AUTO: 29 PG (ref 27–33)
MCH RBC QN AUTO: 29.4 PG (ref 27–33)
MCH RBC QN AUTO: 29.6 PG (ref 27–33)
MCH RBC QN AUTO: 29.7 PG (ref 27–33)
MCH RBC QN AUTO: 29.7 PG (ref 27–33)
MCH RBC QN AUTO: 29.9 PG (ref 27–33)
MCH RBC QN AUTO: 29.9 PG (ref 27–33)
MCH RBC QN AUTO: 30.2 PG (ref 27–33)
MCH RBC QN AUTO: 30.3 PG (ref 27–33)
MCH RBC QN AUTO: 30.5 PG (ref 27–33)
MCH RBC QN AUTO: 30.6 PG (ref 27–33)
MCHC RBC AUTO-ENTMCNC: 30.3 G/DL (ref 33.6–35)
MCHC RBC AUTO-ENTMCNC: 30.7 G/DL (ref 33.6–35)
MCHC RBC AUTO-ENTMCNC: 30.8 G/DL (ref 33.6–35)
MCHC RBC AUTO-ENTMCNC: 30.8 G/DL (ref 33.6–35)
MCHC RBC AUTO-ENTMCNC: 31.1 G/DL (ref 33.6–35)
MCHC RBC AUTO-ENTMCNC: 31.4 G/DL (ref 33.6–35)
MCHC RBC AUTO-ENTMCNC: 31.4 G/DL (ref 33.6–35)
MCHC RBC AUTO-ENTMCNC: 31.5 G/DL (ref 33.6–35)
MCHC RBC AUTO-ENTMCNC: 32.1 G/DL (ref 33.6–35)
MCHC RBC AUTO-ENTMCNC: 32.2 G/DL (ref 33.6–35)
MCHC RBC AUTO-ENTMCNC: 32.2 G/DL (ref 33.6–35)
MCHC RBC AUTO-ENTMCNC: 32.3 G/DL (ref 33.6–35)
MCHC RBC AUTO-ENTMCNC: 32.4 G/DL (ref 33.6–35)
MCHC RBC AUTO-ENTMCNC: 32.5 G/DL (ref 33.6–35)
MCHC RBC AUTO-ENTMCNC: 33.2 G/DL (ref 33.6–35)
MCHC RBC AUTO-ENTMCNC: 33.3 G/DL (ref 33.6–35)
MCV RBC AUTO: 91 FL (ref 81.4–97.8)
MCV RBC AUTO: 91.5 FL (ref 81.4–97.8)
MCV RBC AUTO: 91.8 FL (ref 81.4–97.8)
MCV RBC AUTO: 92.1 FL (ref 81.4–97.8)
MCV RBC AUTO: 92.4 FL (ref 81.4–97.8)
MCV RBC AUTO: 92.4 FL (ref 81.4–97.8)
MCV RBC AUTO: 92.7 FL (ref 81.4–97.8)
MCV RBC AUTO: 93.3 FL (ref 81.4–97.8)
MCV RBC AUTO: 93.3 FL (ref 81.4–97.8)
MCV RBC AUTO: 93.7 FL (ref 81.4–97.8)
MCV RBC AUTO: 93.7 FL (ref 81.4–97.8)
MCV RBC AUTO: 93.8 FL (ref 81.4–97.8)
MCV RBC AUTO: 94 FL (ref 81.4–97.8)
MCV RBC AUTO: 94.2 FL (ref 81.4–97.8)
MCV RBC AUTO: 96.3 FL (ref 81.4–97.8)
MCV RBC AUTO: 98.7 FL (ref 81.4–97.8)
METAMYELOCYTES NFR BLD MANUAL: 1.7 %
METAMYELOCYTES NFR BLD MANUAL: 2.6 %
METAMYELOCYTES NFR BLD MANUAL: 4.4 %
MICRO URNS: ABNORMAL
MICRO URNS: ABNORMAL
MODE IMODE: ABNORMAL
MONOCYTES # BLD AUTO: 0.18 K/UL (ref 0–0.85)
MONOCYTES # BLD AUTO: 0.22 K/UL (ref 0–0.85)
MONOCYTES # BLD AUTO: 0.51 K/UL (ref 0–0.85)
MONOCYTES # BLD AUTO: 0.67 K/UL (ref 0–0.85)
MONOCYTES # BLD AUTO: 0.73 K/UL (ref 0–0.85)
MONOCYTES # BLD AUTO: 0.8 K/UL (ref 0–0.85)
MONOCYTES # BLD AUTO: 0.9 K/UL (ref 0–0.85)
MONOCYTES # BLD AUTO: 0.91 K/UL (ref 0–0.85)
MONOCYTES # BLD AUTO: 1.03 K/UL (ref 0–0.85)
MONOCYTES # BLD AUTO: 1.39 K/UL (ref 0–0.85)
MONOCYTES # BLD AUTO: 1.61 K/UL (ref 0–0.85)
MONOCYTES # BLD AUTO: 1.65 K/UL (ref 0–0.85)
MONOCYTES # BLD AUTO: 1.83 K/UL (ref 0–0.85)
MONOCYTES # BLD AUTO: 1.93 K/UL (ref 0–0.85)
MONOCYTES # BLD AUTO: 1.93 K/UL (ref 0–0.85)
MONOCYTES # BLD AUTO: 2.47 K/UL (ref 0–0.85)
MONOCYTES NFR BLD AUTO: 0.9 % (ref 0–13.4)
MONOCYTES NFR BLD AUTO: 1.8 % (ref 0–13.4)
MONOCYTES NFR BLD AUTO: 10.5 % (ref 0–13.4)
MONOCYTES NFR BLD AUTO: 11.6 % (ref 0–13.4)
MONOCYTES NFR BLD AUTO: 2.6 % (ref 0–13.4)
MONOCYTES NFR BLD AUTO: 3.4 % (ref 0–13.4)
MONOCYTES NFR BLD AUTO: 3.5 % (ref 0–13.4)
MONOCYTES NFR BLD AUTO: 4.3 % (ref 0–13.4)
MONOCYTES NFR BLD AUTO: 4.3 % (ref 0–13.4)
MONOCYTES NFR BLD AUTO: 4.4 % (ref 0–13.4)
MONOCYTES NFR BLD AUTO: 5.2 % (ref 0–13.4)
MONOCYTES NFR BLD AUTO: 5.3 % (ref 0–13.4)
MONOCYTES NFR BLD AUTO: 5.6 % (ref 0–13.4)
MONOCYTES NFR BLD AUTO: 7 % (ref 0–13.4)
MONOCYTES NFR BLD AUTO: 7.3 % (ref 0–13.4)
MONOCYTES NFR BLD AUTO: 9.3 % (ref 0–13.4)
MORPHOLOGY BLD-IMP: NORMAL
MRSA DNA SPEC QL NAA+PROBE: NORMAL
MYELOCYTES NFR BLD MANUAL: 0.9 %
MYELOCYTES NFR BLD MANUAL: 1.8 %
MYELOCYTES NFR BLD MANUAL: 2.6 %
NEUTROPHILS # BLD AUTO: 11.33 K/UL (ref 2–7.15)
NEUTROPHILS # BLD AUTO: 12.01 K/UL (ref 2–7.15)
NEUTROPHILS # BLD AUTO: 12.68 K/UL (ref 2–7.15)
NEUTROPHILS # BLD AUTO: 14.28 K/UL (ref 2–7.15)
NEUTROPHILS # BLD AUTO: 16.32 K/UL (ref 2–7.15)
NEUTROPHILS # BLD AUTO: 16.83 K/UL (ref 2–7.15)
NEUTROPHILS # BLD AUTO: 17.27 K/UL (ref 2–7.15)
NEUTROPHILS # BLD AUTO: 17.38 K/UL (ref 2–7.15)
NEUTROPHILS # BLD AUTO: 18.44 K/UL (ref 2–7.15)
NEUTROPHILS # BLD AUTO: 18.6 K/UL (ref 2–7.15)
NEUTROPHILS # BLD AUTO: 20 K/UL (ref 2–7.15)
NEUTROPHILS # BLD AUTO: 21.82 K/UL (ref 2–7.15)
NEUTROPHILS # BLD AUTO: 21.99 K/UL (ref 2–7.15)
NEUTROPHILS # BLD AUTO: 25.12 K/UL (ref 2–7.15)
NEUTROPHILS # BLD AUTO: 26.45 K/UL (ref 2–7.15)
NEUTROPHILS # BLD AUTO: 28.59 K/UL (ref 2–7.15)
NEUTROPHILS NFR BLD: 76 % (ref 44–72)
NEUTROPHILS NFR BLD: 76.3 % (ref 44–72)
NEUTROPHILS NFR BLD: 80.4 % (ref 44–72)
NEUTROPHILS NFR BLD: 83.2 % (ref 44–72)
NEUTROPHILS NFR BLD: 83.5 % (ref 44–72)
NEUTROPHILS NFR BLD: 85.1 % (ref 44–72)
NEUTROPHILS NFR BLD: 85.2 % (ref 44–72)
NEUTROPHILS NFR BLD: 85.2 % (ref 44–72)
NEUTROPHILS NFR BLD: 86.7 % (ref 44–72)
NEUTROPHILS NFR BLD: 87 % (ref 44–72)
NEUTROPHILS NFR BLD: 87.8 % (ref 44–72)
NEUTROPHILS NFR BLD: 88.2 % (ref 44–72)
NEUTROPHILS NFR BLD: 89.5 % (ref 44–72)
NEUTROPHILS NFR BLD: 91.3 % (ref 44–72)
NEUTROPHILS NFR BLD: 92.9 % (ref 44–72)
NEUTROPHILS NFR BLD: 94.8 % (ref 44–72)
NEUTS BAND NFR BLD MANUAL: 3.5 % (ref 0–10)
NEUTS BAND NFR BLD MANUAL: 6.1 % (ref 0–10)
NEUTS BAND NFR BLD MANUAL: 6.9 % (ref 0–10)
NEUTS BAND NFR BLD MANUAL: 7.8 % (ref 0–10)
NITRITE UR QL STRIP.AUTO: NEGATIVE
NITRITE UR QL STRIP.AUTO: NEGATIVE
NRBC # BLD AUTO: 0 K/UL
NRBC # BLD AUTO: 0 K/UL
NRBC # BLD AUTO: 0.03 K/UL
NRBC # BLD AUTO: 0.05 K/UL
NRBC # BLD AUTO: 0.06 K/UL
NRBC # BLD AUTO: 0.13 K/UL
NRBC # BLD AUTO: 0.16 K/UL
NRBC # BLD AUTO: 0.21 K/UL
NRBC # BLD AUTO: 0.22 K/UL
NRBC # BLD AUTO: 0.25 K/UL
NRBC # BLD AUTO: 0.26 K/UL
NRBC # BLD AUTO: 0.27 K/UL
NRBC # BLD AUTO: 0.31 K/UL
NRBC # BLD AUTO: 0.42 K/UL
NRBC BLD-RTO: 0 /100 WBC
NRBC BLD-RTO: 0 /100 WBC
NRBC BLD-RTO: 0.2 /100 WBC
NRBC BLD-RTO: 0.3 /100 WBC
NRBC BLD-RTO: 0.4 /100 WBC
NRBC BLD-RTO: 0.7 /100 WBC
NRBC BLD-RTO: 0.7 /100 WBC
NRBC BLD-RTO: 0.8 /100 WBC
NRBC BLD-RTO: 0.9 /100 WBC
NRBC BLD-RTO: 0.9 /100 WBC
NRBC BLD-RTO: 1 /100 WBC
NRBC BLD-RTO: 1.3 /100 WBC
NRBC BLD-RTO: 1.3 /100 WBC
NRBC BLD-RTO: 1.8 /100 WBC
O2/TOTAL GAS SETTING VFR VENT: 100 %
O2/TOTAL GAS SETTING VFR VENT: 100 %
O2/TOTAL GAS SETTING VFR VENT: 30 %
O2/TOTAL GAS SETTING VFR VENT: 50 %
O2/TOTAL GAS SETTING VFR VENT: 52 %
O2/TOTAL GAS SETTING VFR VENT: 60 %
O2/TOTAL GAS SETTING VFR VENT: 60 %
PATHOLOGY CONSULT NOTE: NORMAL
PCO2 BLDA: 22.6 MMHG (ref 26–37)
PCO2 BLDA: 31 MMHG (ref 26–37)
PCO2 BLDA: 33.4 MMHG (ref 26–37)
PCO2 BLDA: 35.2 MMHG (ref 26–37)
PCO2 BLDA: 39.1 MMHG (ref 26–37)
PCO2 BLDA: 39.7 MMHG (ref 26–37)
PCO2 BLDA: 41.8 MMHG (ref 26–37)
PCO2 TEMP ADJ BLDA: 23.4 MMHG (ref 26–37)
PCO2 TEMP ADJ BLDA: 30.6 MMHG (ref 26–37)
PCO2 TEMP ADJ BLDA: 33 MMHG (ref 26–37)
PCO2 TEMP ADJ BLDA: 36.4 MMHG (ref 26–37)
PCO2 TEMP ADJ BLDA: 40 MMHG (ref 26–37)
PCO2 TEMP ADJ BLDA: 41.1 MMHG (ref 26–37)
PCO2 TEMP ADJ BLDA: 42.9 MMHG (ref 26–37)
PEEP END EXPIRATORY PRESSURE IPEEP: 10 CMH20
PEEP END EXPIRATORY PRESSURE IPEEP: 8 CMH20
PH BLDA: 7.23 [PH] (ref 7.4–7.5)
PH BLDA: 7.39 [PH] (ref 7.4–7.5)
PH BLDA: 7.4 [PH] (ref 7.4–7.5)
PH BLDA: 7.5 [PH] (ref 7.4–7.5)
PH BLDA: 7.53 [PH] (ref 7.4–7.5)
PH BLDA: 7.53 [PH] (ref 7.4–7.5)
PH BLDA: 7.55 [PH] (ref 7.4–7.5)
PH TEMP ADJ BLDA: 7.23 [PH] (ref 7.4–7.5)
PH TEMP ADJ BLDA: 7.39 [PH] (ref 7.4–7.5)
PH TEMP ADJ BLDA: 7.39 [PH] (ref 7.4–7.5)
PH TEMP ADJ BLDA: 7.49 [PH] (ref 7.4–7.5)
PH TEMP ADJ BLDA: 7.51 [PH] (ref 7.4–7.5)
PH TEMP ADJ BLDA: 7.52 [PH] (ref 7.4–7.5)
PH TEMP ADJ BLDA: 7.54 [PH] (ref 7.4–7.5)
PH UR STRIP.AUTO: 5.5 [PH] (ref 5–8)
PH UR STRIP.AUTO: 5.5 [PH] (ref 5–8)
PHOSPHATE SERPL-MCNC: 1.6 MG/DL (ref 2.5–4.5)
PHOSPHATE SERPL-MCNC: 1.9 MG/DL (ref 2.5–4.5)
PHOSPHATE SERPL-MCNC: 2.1 MG/DL (ref 2.5–4.5)
PHOSPHATE SERPL-MCNC: 2.2 MG/DL (ref 2.5–4.5)
PHOSPHATE SERPL-MCNC: 2.4 MG/DL (ref 2.5–4.5)
PHOSPHATE SERPL-MCNC: 2.5 MG/DL (ref 2.5–4.5)
PHOSPHATE SERPL-MCNC: 2.5 MG/DL (ref 2.5–4.5)
PHOSPHATE SERPL-MCNC: 2.6 MG/DL (ref 2.5–4.5)
PHOSPHATE SERPL-MCNC: 2.9 MG/DL (ref 2.5–4.5)
PHOSPHATE SERPL-MCNC: 2.9 MG/DL (ref 2.5–4.5)
PHOSPHATE SERPL-MCNC: 3 MG/DL (ref 2.5–4.5)
PHOSPHATE SERPL-MCNC: 3.2 MG/DL (ref 2.5–4.5)
PHOSPHATE SERPL-MCNC: 3.8 MG/DL (ref 2.5–4.5)
PLATELET # BLD AUTO: 142 K/UL (ref 164–446)
PLATELET # BLD AUTO: 161 K/UL (ref 164–446)
PLATELET # BLD AUTO: 167 K/UL (ref 164–446)
PLATELET # BLD AUTO: 190 K/UL (ref 164–446)
PLATELET # BLD AUTO: 192 K/UL (ref 164–446)
PLATELET # BLD AUTO: 203 K/UL (ref 164–446)
PLATELET # BLD AUTO: 204 K/UL (ref 164–446)
PLATELET # BLD AUTO: 241 K/UL (ref 164–446)
PLATELET # BLD AUTO: 249 K/UL (ref 164–446)
PLATELET # BLD AUTO: 262 K/UL (ref 164–446)
PLATELET # BLD AUTO: 321 K/UL (ref 164–446)
PLATELET # BLD AUTO: 331 K/UL (ref 164–446)
PLATELET # BLD AUTO: 357 K/UL (ref 164–446)
PLATELET # BLD AUTO: 367 K/UL (ref 164–446)
PLATELET # BLD AUTO: 368 K/UL (ref 164–446)
PLATELET # BLD AUTO: 414 K/UL (ref 164–446)
PLATELET BLD QL SMEAR: NORMAL
PMV BLD AUTO: 10.8 FL (ref 9–12.9)
PMV BLD AUTO: 10.9 FL (ref 9–12.9)
PMV BLD AUTO: 11.3 FL (ref 9–12.9)
PMV BLD AUTO: 11.3 FL (ref 9–12.9)
PMV BLD AUTO: 11.6 FL (ref 9–12.9)
PMV BLD AUTO: 11.7 FL (ref 9–12.9)
PMV BLD AUTO: 11.7 FL (ref 9–12.9)
PMV BLD AUTO: 11.8 FL (ref 9–12.9)
PMV BLD AUTO: 11.9 FL (ref 9–12.9)
PMV BLD AUTO: 12 FL (ref 9–12.9)
PMV BLD AUTO: 12.1 FL (ref 9–12.9)
PMV BLD AUTO: 12.4 FL (ref 9–12.9)
PO2 BLDA: 119 MMHG (ref 64–87)
PO2 BLDA: 377 MMHG (ref 64–87)
PO2 BLDA: 39 MMHG (ref 64–87)
PO2 BLDA: 65 MMHG (ref 64–87)
PO2 BLDA: 85 MMHG (ref 64–87)
PO2 BLDA: 89 MMHG (ref 64–87)
PO2 BLDA: 89 MMHG (ref 64–87)
PO2 TEMP ADJ BLDA: 117 MMHG (ref 64–87)
PO2 TEMP ADJ BLDA: 376 MMHG (ref 64–87)
PO2 TEMP ADJ BLDA: 40 MMHG (ref 64–87)
PO2 TEMP ADJ BLDA: 67 MMHG (ref 64–87)
PO2 TEMP ADJ BLDA: 89 MMHG (ref 64–87)
PO2 TEMP ADJ BLDA: 93 MMHG (ref 64–87)
PO2 TEMP ADJ BLDA: 93 MMHG (ref 64–87)
POIKILOCYTOSIS BLD QL SMEAR: NORMAL
POLYCHROMASIA BLD QL SMEAR: NORMAL
POTASSIUM BLD-SCNC: 3.7 MMOL/L (ref 3.6–5.5)
POTASSIUM SERPL-SCNC: 2.6 MMOL/L (ref 3.6–5.5)
POTASSIUM SERPL-SCNC: 3 MMOL/L (ref 3.6–5.5)
POTASSIUM SERPL-SCNC: 3 MMOL/L (ref 3.6–5.5)
POTASSIUM SERPL-SCNC: 3.1 MMOL/L (ref 3.6–5.5)
POTASSIUM SERPL-SCNC: 3.2 MMOL/L (ref 3.6–5.5)
POTASSIUM SERPL-SCNC: 3.3 MMOL/L (ref 3.6–5.5)
POTASSIUM SERPL-SCNC: 3.4 MMOL/L (ref 3.6–5.5)
POTASSIUM SERPL-SCNC: 3.4 MMOL/L (ref 3.6–5.5)
POTASSIUM SERPL-SCNC: 3.5 MMOL/L (ref 3.6–5.5)
POTASSIUM SERPL-SCNC: 3.6 MMOL/L (ref 3.6–5.5)
POTASSIUM SERPL-SCNC: 3.7 MMOL/L (ref 3.6–5.5)
POTASSIUM SERPL-SCNC: 3.8 MMOL/L (ref 3.6–5.5)
POTASSIUM SERPL-SCNC: 3.9 MMOL/L (ref 3.6–5.5)
POTASSIUM SERPL-SCNC: 4 MMOL/L (ref 3.6–5.5)
POTASSIUM SERPL-SCNC: 4.1 MMOL/L (ref 3.6–5.5)
POTASSIUM SERPL-SCNC: 4.1 MMOL/L (ref 3.6–5.5)
POTASSIUM SERPL-SCNC: 4.3 MMOL/L (ref 3.6–5.5)
PREALB SERPL-MCNC: 4.5 MG/DL (ref 18–38)
PREALB SERPL-MCNC: 8.3 MG/DL (ref 18–38)
PROCALCITONIN SERPL-MCNC: 0.78 NG/ML
PROMYELOCYTES NFR BLD MANUAL: 0.9 %
PROT SERPL-MCNC: 3.5 G/DL (ref 6–8.2)
PROT SERPL-MCNC: 3.7 G/DL (ref 6–8.2)
PROT SERPL-MCNC: 3.7 G/DL (ref 6–8.2)
PROT SERPL-MCNC: 3.8 G/DL (ref 6–8.2)
PROT SERPL-MCNC: 3.9 G/DL (ref 6–8.2)
PROT SERPL-MCNC: 3.9 G/DL (ref 6–8.2)
PROT SERPL-MCNC: 4 G/DL (ref 6–8.2)
PROT SERPL-MCNC: 4.3 G/DL (ref 6–8.2)
PROT SERPL-MCNC: 4.5 G/DL (ref 6–8.2)
PROT SERPL-MCNC: 4.6 G/DL (ref 6–8.2)
PROT SERPL-MCNC: 4.7 G/DL (ref 6–8.2)
PROT SERPL-MCNC: 4.8 G/DL (ref 6–8.2)
PROT SERPL-MCNC: 5.1 G/DL (ref 6–8.2)
PROT SERPL-MCNC: 5.3 G/DL (ref 6–8.2)
PROT UR QL STRIP: 100 MG/DL
PROT UR QL STRIP: 30 MG/DL
PROTHROMBIN TIME: 14.2 SEC (ref 12–14.6)
PROTHROMBIN TIME: 17.4 SEC (ref 12–14.6)
RBC # BLD AUTO: 2.11 M/UL (ref 4.2–5.4)
RBC # BLD AUTO: 2.12 M/UL (ref 4.2–5.4)
RBC # BLD AUTO: 2.16 M/UL (ref 4.2–5.4)
RBC # BLD AUTO: 2.22 M/UL (ref 4.2–5.4)
RBC # BLD AUTO: 2.24 M/UL (ref 4.2–5.4)
RBC # BLD AUTO: 2.38 M/UL (ref 4.2–5.4)
RBC # BLD AUTO: 2.4 M/UL (ref 4.2–5.4)
RBC # BLD AUTO: 2.4 M/UL (ref 4.2–5.4)
RBC # BLD AUTO: 2.42 M/UL (ref 4.2–5.4)
RBC # BLD AUTO: 2.44 M/UL (ref 4.2–5.4)
RBC # BLD AUTO: 2.52 M/UL (ref 4.2–5.4)
RBC # BLD AUTO: 2.59 M/UL (ref 4.2–5.4)
RBC # BLD AUTO: 2.67 M/UL (ref 4.2–5.4)
RBC # BLD AUTO: 3.17 M/UL (ref 4.2–5.4)
RBC # BLD AUTO: 3.44 M/UL (ref 4.2–5.4)
RBC # BLD AUTO: 3.81 M/UL (ref 4.2–5.4)
RBC # URNS HPF: ABNORMAL /HPF
RBC # URNS HPF: ABNORMAL /HPF
RBC BLD AUTO: PRESENT
RBC UR QL AUTO: ABNORMAL
RBC UR QL AUTO: ABNORMAL
RETICS # AUTO: 0.04 M/UL (ref 0.04–0.06)
RETICS # AUTO: 0.07 M/UL (ref 0.04–0.06)
RETICS # AUTO: 0.14 M/UL (ref 0.04–0.06)
RETICS/RBC NFR: 1.8 % (ref 0.8–2.1)
RETICS/RBC NFR: 2.7 % (ref 0.8–2.1)
RETICS/RBC NFR: 6 % (ref 0.8–2.1)
SAO2 % BLDA: 100 % (ref 93–99)
SAO2 % BLDA: 78 % (ref 93–99)
SAO2 % BLDA: 95 % (ref 93–99)
SAO2 % BLDA: 97 % (ref 93–99)
SAO2 % BLDA: 98 % (ref 93–99)
SAO2 % BLDA: 98 % (ref 93–99)
SAO2 % BLDA: 99 % (ref 93–99)
SARS-COV+SARS-COV-2 AG RESP QL IA.RAPID: NOTDETECTED
SARS-COV-2 RNA RESP QL NAA+PROBE: NOTDETECTED
SIGNIFICANT IND 70042: ABNORMAL
SIGNIFICANT IND 70042: NORMAL
SITE SITE: ABNORMAL
SITE SITE: NORMAL
SODIUM BLD-SCNC: 139 MMOL/L (ref 135–145)
SODIUM SERPL-SCNC: 133 MMOL/L (ref 135–145)
SODIUM SERPL-SCNC: 135 MMOL/L (ref 135–145)
SODIUM SERPL-SCNC: 137 MMOL/L (ref 135–145)
SODIUM SERPL-SCNC: 138 MMOL/L (ref 135–145)
SODIUM SERPL-SCNC: 139 MMOL/L (ref 135–145)
SODIUM SERPL-SCNC: 142 MMOL/L (ref 135–145)
SODIUM SERPL-SCNC: 143 MMOL/L (ref 135–145)
SODIUM SERPL-SCNC: 143 MMOL/L (ref 135–145)
SODIUM SERPL-SCNC: 146 MMOL/L (ref 135–145)
SODIUM SERPL-SCNC: 147 MMOL/L (ref 135–145)
SODIUM SERPL-SCNC: 148 MMOL/L (ref 135–145)
SODIUM SERPL-SCNC: 149 MMOL/L (ref 135–145)
SODIUM SERPL-SCNC: 150 MMOL/L (ref 135–145)
SODIUM SERPL-SCNC: 151 MMOL/L (ref 135–145)
SOURCE SOURCE: ABNORMAL
SOURCE SOURCE: NORMAL
SP GR UR STRIP.AUTO: 1.02
SP GR UR STRIP.AUTO: 1.04
SPECIMEN DRAWN FROM PATIENT: ABNORMAL
SPECIMEN SOURCE: NORMAL
SPECIMEN SOURCE: NORMAL
TIBC SERPL-MCNC: 84 UG/DL (ref 250–450)
TIBC SERPL-MCNC: 84 UG/DL (ref 250–450)
TIDAL VOLUME IVT: 320 ML
TIDAL VOLUME IVT: 340 ML
TIDAL VOLUME IVT: 340 ML
TOXIC GRANULES BLD QL SMEAR: NORMAL
TOXIC GRANULES BLD QL SMEAR: SLIGHT
TRIGL SERPL-MCNC: 122 MG/DL (ref 0–149)
TRIGL SERPL-MCNC: 127 MG/DL (ref 0–149)
TRIGL SERPL-MCNC: 186 MG/DL (ref 0–149)
TRIGL SERPL-MCNC: 382 MG/DL (ref 0–149)
TRIGL SERPL-MCNC: 98 MG/DL (ref 0–149)
TROPONIN T SERPL-MCNC: 27 NG/L (ref 6–19)
UIBC SERPL-MCNC: 60 UG/DL (ref 110–370)
UIBC SERPL-MCNC: 69 UG/DL (ref 110–370)
UROBILINOGEN UR STRIP.AUTO-MCNC: 1 MG/DL
UROBILINOGEN UR STRIP.AUTO-MCNC: 1 MG/DL
WBC # BLD AUTO: 12.2 K/UL (ref 4.8–10.8)
WBC # BLD AUTO: 14.1 K/UL (ref 4.8–10.8)
WBC # BLD AUTO: 16.7 K/UL (ref 4.8–10.8)
WBC # BLD AUTO: 17.8 K/UL (ref 4.8–10.8)
WBC # BLD AUTO: 18.6 K/UL (ref 4.8–10.8)
WBC # BLD AUTO: 19.1 K/UL (ref 4.8–10.8)
WBC # BLD AUTO: 19.8 K/UL (ref 4.8–10.8)
WBC # BLD AUTO: 19.8 K/UL (ref 4.8–10.8)
WBC # BLD AUTO: 20 K/UL (ref 4.8–10.8)
WBC # BLD AUTO: 20.6 K/UL (ref 4.8–10.8)
WBC # BLD AUTO: 23.5 K/UL (ref 4.8–10.8)
WBC # BLD AUTO: 23.9 K/UL (ref 4.8–10.8)
WBC # BLD AUTO: 26.4 K/UL (ref 4.8–10.8)
WBC # BLD AUTO: 26.5 K/UL (ref 4.8–10.8)
WBC # BLD AUTO: 30.5 K/UL (ref 4.8–10.8)
WBC # BLD AUTO: 32.5 K/UL (ref 4.8–10.8)
WBC #/AREA URNS HPF: ABNORMAL /HPF
WBC #/AREA URNS HPF: ABNORMAL /HPF

## 2021-01-01 PROCEDURE — 700111 HCHG RX REV CODE 636 W/ 250 OVERRIDE (IP): Performed by: ANESTHESIOLOGY

## 2021-01-01 PROCEDURE — 97530 THERAPEUTIC ACTIVITIES: CPT

## 2021-01-01 PROCEDURE — 700105 HCHG RX REV CODE 258: Performed by: SURGERY

## 2021-01-01 PROCEDURE — 700111 HCHG RX REV CODE 636 W/ 250 OVERRIDE (IP): Performed by: SURGERY

## 2021-01-01 PROCEDURE — 770022 HCHG ROOM/CARE - ICU (200)

## 2021-01-01 PROCEDURE — 160031 HCHG SURGERY MINUTES - 1ST 30 MINS LEVEL 5: Performed by: SURGERY

## 2021-01-01 PROCEDURE — 86140 C-REACTIVE PROTEIN: CPT

## 2021-01-01 PROCEDURE — 94799 UNLISTED PULMONARY SVC/PX: CPT

## 2021-01-01 PROCEDURE — 94003 VENT MGMT INPAT SUBQ DAY: CPT

## 2021-01-01 PROCEDURE — 84100 ASSAY OF PHOSPHORUS: CPT

## 2021-01-01 PROCEDURE — 96376 TX/PRO/DX INJ SAME DRUG ADON: CPT

## 2021-01-01 PROCEDURE — 99291 CRITICAL CARE FIRST HOUR: CPT | Performed by: SURGERY

## 2021-01-01 PROCEDURE — 43830 GSTRST OPEN WO CONSTJ TUBE: CPT | Mod: 59 | Performed by: SURGERY

## 2021-01-01 PROCEDURE — 99292 CRITICAL CARE ADDL 30 MIN: CPT | Mod: 24 | Performed by: SURGERY

## 2021-01-01 PROCEDURE — 82962 GLUCOSE BLOOD TEST: CPT | Mod: 91

## 2021-01-01 PROCEDURE — 84478 ASSAY OF TRIGLYCERIDES: CPT

## 2021-01-01 PROCEDURE — 0DH63UZ INSERTION OF FEEDING DEVICE INTO STOMACH, PERCUTANEOUS APPROACH: ICD-10-PCS | Performed by: SURGERY

## 2021-01-01 PROCEDURE — 501435 HCHG STAPLER, LINEAR 60: Performed by: SURGERY

## 2021-01-01 PROCEDURE — 85025 COMPLETE CBC W/AUTO DIFF WBC: CPT

## 2021-01-01 PROCEDURE — 80053 COMPREHEN METABOLIC PANEL: CPT

## 2021-01-01 PROCEDURE — 160042 HCHG SURGERY MINUTES - EA ADDL 1 MIN LEVEL 5: Performed by: SURGERY

## 2021-01-01 PROCEDURE — 74177 CT ABD & PELVIS W/CONTRAST: CPT | Mod: ME

## 2021-01-01 PROCEDURE — 83605 ASSAY OF LACTIC ACID: CPT | Mod: 91

## 2021-01-01 PROCEDURE — 85046 RETICYTE/HGB CONCENTRATE: CPT

## 2021-01-01 PROCEDURE — 99291 CRITICAL CARE FIRST HOUR: CPT | Mod: 24 | Performed by: SURGERY

## 2021-01-01 PROCEDURE — 700111 HCHG RX REV CODE 636 W/ 250 OVERRIDE (IP)

## 2021-01-01 PROCEDURE — 700105 HCHG RX REV CODE 258

## 2021-01-01 PROCEDURE — 37799 UNLISTED PX VASCULAR SURGERY: CPT

## 2021-01-01 PROCEDURE — 700111 HCHG RX REV CODE 636 W/ 250 OVERRIDE (IP): Performed by: STUDENT IN AN ORGANIZED HEALTH CARE EDUCATION/TRAINING PROGRAM

## 2021-01-01 PROCEDURE — 700117 HCHG RX CONTRAST REV CODE 255: Performed by: SURGERY

## 2021-01-01 PROCEDURE — 99291 CRITICAL CARE FIRST HOUR: CPT | Mod: 25 | Performed by: SURGERY

## 2021-01-01 PROCEDURE — 501623 HCHG TUBE, GASTROSTOMY: Performed by: SURGERY

## 2021-01-01 PROCEDURE — 94150 VITAL CAPACITY TEST: CPT

## 2021-01-01 PROCEDURE — 82330 ASSAY OF CALCIUM: CPT

## 2021-01-01 PROCEDURE — 02H633Z INSERTION OF INFUSION DEVICE INTO RIGHT ATRIUM, PERCUTANEOUS APPROACH: ICD-10-PCS | Performed by: EMERGENCY MEDICINE

## 2021-01-01 PROCEDURE — 88307 TISSUE EXAM BY PATHOLOGIST: CPT

## 2021-01-01 PROCEDURE — 94640 AIRWAY INHALATION TREATMENT: CPT

## 2021-01-01 PROCEDURE — 71045 X-RAY EXAM CHEST 1 VIEW: CPT

## 2021-01-01 PROCEDURE — 700101 HCHG RX REV CODE 250: Performed by: SURGERY

## 2021-01-01 PROCEDURE — 82533 TOTAL CORTISOL: CPT

## 2021-01-01 PROCEDURE — 501838 HCHG SUTURE GENERAL: Performed by: SURGERY

## 2021-01-01 PROCEDURE — U0005 INFEC AGEN DETEC AMPLI PROBE: HCPCS

## 2021-01-01 PROCEDURE — 94669 MECHANICAL CHEST WALL OSCILL: CPT

## 2021-01-01 PROCEDURE — 85027 COMPLETE CBC AUTOMATED: CPT

## 2021-01-01 PROCEDURE — 82962 GLUCOSE BLOOD TEST: CPT

## 2021-01-01 PROCEDURE — 36600 WITHDRAWAL OF ARTERIAL BLOOD: CPT

## 2021-01-01 PROCEDURE — 96375 TX/PRO/DX INJ NEW DRUG ADDON: CPT

## 2021-01-01 PROCEDURE — 0DB80ZZ EXCISION OF SMALL INTESTINE, OPEN APPROACH: ICD-10-PCS | Performed by: SURGERY

## 2021-01-01 PROCEDURE — 83735 ASSAY OF MAGNESIUM: CPT

## 2021-01-01 PROCEDURE — 85610 PROTHROMBIN TIME: CPT

## 2021-01-01 PROCEDURE — 92950 HEART/LUNG RESUSCITATION CPR: CPT

## 2021-01-01 PROCEDURE — 81001 URINALYSIS AUTO W/SCOPE: CPT

## 2021-01-01 PROCEDURE — 82803 BLOOD GASES ANY COMBINATION: CPT | Mod: 91

## 2021-01-01 PROCEDURE — 0W9G30Z DRAINAGE OF PERITONEAL CAVITY WITH DRAINAGE DEVICE, PERCUTANEOUS APPROACH: ICD-10-PCS | Performed by: RADIOLOGY

## 2021-01-01 PROCEDURE — 85007 BL SMEAR W/DIFF WBC COUNT: CPT

## 2021-01-01 PROCEDURE — 83540 ASSAY OF IRON: CPT

## 2021-01-01 PROCEDURE — 87040 BLOOD CULTURE FOR BACTERIA: CPT | Mod: 91

## 2021-01-01 PROCEDURE — 97535 SELF CARE MNGMENT TRAINING: CPT

## 2021-01-01 PROCEDURE — 83605 ASSAY OF LACTIC ACID: CPT

## 2021-01-01 PROCEDURE — 87106 FUNGI IDENTIFICATION YEAST: CPT

## 2021-01-01 PROCEDURE — 99292 CRITICAL CARE ADDL 30 MIN: CPT | Mod: 24,25 | Performed by: SURGERY

## 2021-01-01 PROCEDURE — 99291 CRITICAL CARE FIRST HOUR: CPT | Mod: 24,25 | Performed by: SURGERY

## 2021-01-01 PROCEDURE — 03HY32Z INSERTION OF MONITORING DEVICE INTO UPPER ARTERY, PERCUTANEOUS APPROACH: ICD-10-PCS | Performed by: ANESTHESIOLOGY

## 2021-01-01 PROCEDURE — C1751 CATH, INF, PER/CENT/MIDLINE: HCPCS

## 2021-01-01 PROCEDURE — 500042 HCHG BAG, URINARY DRAINAGE (CLOSED): Performed by: SURGERY

## 2021-01-01 PROCEDURE — A4338 INDWELLING CATHETER LATEX: HCPCS | Performed by: SURGERY

## 2021-01-01 PROCEDURE — 87426 SARSCOV CORONAVIRUS AG IA: CPT

## 2021-01-01 PROCEDURE — 700102 HCHG RX REV CODE 250 W/ 637 OVERRIDE(OP): Performed by: SURGERY

## 2021-01-01 PROCEDURE — 700111 HCHG RX REV CODE 636 W/ 250 OVERRIDE (IP): Performed by: RADIOLOGY

## 2021-01-01 PROCEDURE — 160009 HCHG ANES TIME/MIN: Performed by: SURGERY

## 2021-01-01 PROCEDURE — 502240 HCHG MISC OR SUPPLY RC 0272: Performed by: SURGERY

## 2021-01-01 PROCEDURE — 80048 BASIC METABOLIC PNL TOTAL CA: CPT

## 2021-01-01 PROCEDURE — 87186 SC STD MICRODIL/AGAR DIL: CPT | Mod: 91

## 2021-01-01 PROCEDURE — 700105 HCHG RX REV CODE 258: Performed by: EMERGENCY MEDICINE

## 2021-01-01 PROCEDURE — 501452 HCHG STAPLES, GIA MULTIFIRE 60/80: Performed by: SURGERY

## 2021-01-01 PROCEDURE — 700101 HCHG RX REV CODE 250: Performed by: PSYCHIATRY & NEUROLOGY

## 2021-01-01 PROCEDURE — 501583 HCHG TROCAR, THRD CAN&SEAL 5X100: Performed by: SURGERY

## 2021-01-01 PROCEDURE — 84145 PROCALCITONIN (PCT): CPT

## 2021-01-01 PROCEDURE — 501572 HCHG TROCAR, SHIELD OBTU 5X100: Performed by: SURGERY

## 2021-01-01 PROCEDURE — 84484 ASSAY OF TROPONIN QUANT: CPT

## 2021-01-01 PROCEDURE — 97166 OT EVAL MOD COMPLEX 45 MIN: CPT

## 2021-01-01 PROCEDURE — 44120 REMOVAL OF SMALL INTESTINE: CPT | Performed by: SURGERY

## 2021-01-01 PROCEDURE — 700101 HCHG RX REV CODE 250: Performed by: ANESTHESIOLOGY

## 2021-01-01 PROCEDURE — 5A1945Z RESPIRATORY VENTILATION, 24-96 CONSECUTIVE HOURS: ICD-10-PCS | Performed by: SURGERY

## 2021-01-01 PROCEDURE — 31500 INSERT EMERGENCY AIRWAY: CPT | Performed by: SURGERY

## 2021-01-01 PROCEDURE — 85384 FIBRINOGEN ACTIVITY: CPT

## 2021-01-01 PROCEDURE — 3E0436Z INTRODUCTION OF NUTRITIONAL SUBSTANCE INTO CENTRAL VEIN, PERCUTANEOUS APPROACH: ICD-10-PCS | Performed by: SURGERY

## 2021-01-01 PROCEDURE — 502571 HCHG PACK, LAP CHOLE: Performed by: SURGERY

## 2021-01-01 PROCEDURE — 99233 SBSQ HOSP IP/OBS HIGH 50: CPT | Mod: 24 | Performed by: SURGERY

## 2021-01-01 PROCEDURE — 84134 ASSAY OF PREALBUMIN: CPT

## 2021-01-01 PROCEDURE — 87077 CULTURE AEROBIC IDENTIFY: CPT | Mod: 91

## 2021-01-01 PROCEDURE — B4087 GASTRO/JEJUNO TUBE, STD: HCPCS | Performed by: SURGERY

## 2021-01-01 PROCEDURE — 87641 MR-STAPH DNA AMP PROBE: CPT

## 2021-01-01 PROCEDURE — 99291 CRITICAL CARE FIRST HOUR: CPT | Mod: AI | Performed by: STUDENT IN AN ORGANIZED HEALTH CARE EDUCATION/TRAINING PROGRAM

## 2021-01-01 PROCEDURE — 160048 HCHG OR STATISTICAL LEVEL 1-5: Performed by: SURGERY

## 2021-01-01 PROCEDURE — 87086 URINE CULTURE/COLONY COUNT: CPT

## 2021-01-01 PROCEDURE — 87070 CULTURE OTHR SPECIMN AEROBIC: CPT

## 2021-01-01 PROCEDURE — 4410114 CT-DRAIN-PERITONEAL

## 2021-01-01 PROCEDURE — 36556 INSERT NON-TUNNEL CV CATH: CPT

## 2021-01-01 PROCEDURE — 85014 HEMATOCRIT: CPT

## 2021-01-01 PROCEDURE — 74177 CT ABD & PELVIS W/CONTRAST: CPT | Mod: MG

## 2021-01-01 PROCEDURE — 700117 HCHG RX CONTRAST REV CODE 255

## 2021-01-01 PROCEDURE — 700111 HCHG RX REV CODE 636 W/ 250 OVERRIDE (IP): Performed by: INTERNAL MEDICINE

## 2021-01-01 PROCEDURE — 501433 HCHG STAPLER, GIA MULTIFIRE 60/80: Performed by: SURGERY

## 2021-01-01 PROCEDURE — 31500 INSERT EMERGENCY AIRWAY: CPT

## 2021-01-01 PROCEDURE — 97162 PT EVAL MOD COMPLEX 30 MIN: CPT

## 2021-01-01 PROCEDURE — 500868 HCHG NEEDLE, SURGI(VARES): Performed by: SURGERY

## 2021-01-01 PROCEDURE — 87205 SMEAR GRAM STAIN: CPT

## 2021-01-01 PROCEDURE — 700105 HCHG RX REV CODE 258: Performed by: STUDENT IN AN ORGANIZED HEALTH CARE EDUCATION/TRAINING PROGRAM

## 2021-01-01 PROCEDURE — 94002 VENT MGMT INPAT INIT DAY: CPT

## 2021-01-01 PROCEDURE — 0DH60UZ INSERTION OF FEEDING DEVICE INTO STOMACH, OPEN APPROACH: ICD-10-PCS | Performed by: SURGERY

## 2021-01-01 PROCEDURE — 501443 HCHG STAPLER, ROTIC. FLEX: Performed by: SURGERY

## 2021-01-01 PROCEDURE — A6402 STERILE GAUZE <= 16 SQ IN: HCPCS | Performed by: SURGERY

## 2021-01-01 PROCEDURE — 49002 REOPENING OF ABDOMEN: CPT | Mod: 59 | Performed by: SURGERY

## 2021-01-01 PROCEDURE — 700111 HCHG RX REV CODE 636 W/ 250 OVERRIDE (IP): Mod: JG | Performed by: SURGERY

## 2021-01-01 PROCEDURE — 302978 HCHG BRONCHOSCOPY-DIAGNOSTIC

## 2021-01-01 PROCEDURE — 82803 BLOOD GASES ANY COMBINATION: CPT

## 2021-01-01 PROCEDURE — 99291 CRITICAL CARE FIRST HOUR: CPT

## 2021-01-01 PROCEDURE — 99152 MOD SED SAME PHYS/QHP 5/>YRS: CPT

## 2021-01-01 PROCEDURE — 87076 CULTURE ANAEROBE IDENT EACH: CPT

## 2021-01-01 PROCEDURE — 83550 IRON BINDING TEST: CPT

## 2021-01-01 PROCEDURE — 84132 ASSAY OF SERUM POTASSIUM: CPT

## 2021-01-01 PROCEDURE — 0DBA0ZZ EXCISION OF JEJUNUM, OPEN APPROACH: ICD-10-PCS | Performed by: SURGERY

## 2021-01-01 PROCEDURE — 84132 ASSAY OF SERUM POTASSIUM: CPT | Mod: 91

## 2021-01-01 PROCEDURE — 502652 HCHG STAPLES, ETHICON ECR60: Performed by: SURGERY

## 2021-01-01 PROCEDURE — 0D160ZA BYPASS STOMACH TO JEJUNUM, OPEN APPROACH: ICD-10-PCS | Performed by: SURGERY

## 2021-01-01 PROCEDURE — 99291 CRITICAL CARE FIRST HOUR: CPT | Performed by: PSYCHIATRY & NEUROLOGY

## 2021-01-01 PROCEDURE — 83036 HEMOGLOBIN GLYCOSYLATED A1C: CPT

## 2021-01-01 PROCEDURE — 501460 HCHG STAPLER, TA55 35LD: Performed by: SURGERY

## 2021-01-01 PROCEDURE — 99233 SBSQ HOSP IP/OBS HIGH 50: CPT | Performed by: SURGERY

## 2021-01-01 PROCEDURE — 44121 REMOVAL OF SMALL INTESTINE: CPT | Performed by: SURGERY

## 2021-01-01 PROCEDURE — 0DBU0ZZ EXCISION OF OMENTUM, OPEN APPROACH: ICD-10-PCS | Performed by: SURGERY

## 2021-01-01 PROCEDURE — A9270 NON-COVERED ITEM OR SERVICE: HCPCS | Performed by: SURGERY

## 2021-01-01 PROCEDURE — 0BH18EZ INSERTION OF ENDOTRACHEAL AIRWAY INTO TRACHEA, VIA NATURAL OR ARTIFICIAL OPENING ENDOSCOPIC: ICD-10-PCS | Performed by: SURGERY

## 2021-01-01 PROCEDURE — 94760 N-INVAS EAR/PLS OXIMETRY 1: CPT

## 2021-01-01 PROCEDURE — 700105 HCHG RX REV CODE 258: Performed by: ANESTHESIOLOGY

## 2021-01-01 PROCEDURE — 502704 HCHG DEVICE, LIGASURE IMPACT: Performed by: SURGERY

## 2021-01-01 PROCEDURE — 501570 HCHG TROCAR, SEPARATOR: Performed by: SURGERY

## 2021-01-01 PROCEDURE — 302136 NUTRITION PUMP: Performed by: SURGERY

## 2021-01-01 PROCEDURE — 93971 EXTREMITY STUDY: CPT | Mod: LT

## 2021-01-01 PROCEDURE — 88305 TISSUE EXAM BY PATHOLOGIST: CPT

## 2021-01-01 PROCEDURE — 87493 C DIFF AMPLIFIED PROBE: CPT

## 2021-01-01 PROCEDURE — 0B9F8ZX DRAINAGE OF RIGHT LOWER LUNG LOBE, VIA NATURAL OR ARTIFICIAL OPENING ENDOSCOPIC, DIAGNOSTIC: ICD-10-PCS | Performed by: SURGERY

## 2021-01-01 PROCEDURE — U0003 INFECTIOUS AGENT DETECTION BY NUCLEIC ACID (DNA OR RNA); SEVERE ACUTE RESPIRATORY SYNDROME CORONAVIRUS 2 (SARS-COV-2) (CORONAVIRUS DISEASE [COVID-19]), AMPLIFIED PROBE TECHNIQUE, MAKING USE OF HIGH THROUGHPUT TECHNOLOGIES AS DESCRIBED BY CMS-2020-01-R: HCPCS

## 2021-01-01 PROCEDURE — 85730 THROMBOPLASTIN TIME PARTIAL: CPT

## 2021-01-01 PROCEDURE — 84295 ASSAY OF SERUM SODIUM: CPT

## 2021-01-01 PROCEDURE — 96374 THER/PROPH/DIAG INJ IV PUSH: CPT

## 2021-01-01 RX ORDER — HYDROMORPHONE HYDROCHLORIDE 1 MG/ML
1 INJECTION, SOLUTION INTRAMUSCULAR; INTRAVENOUS; SUBCUTANEOUS
Status: DISCONTINUED | OUTPATIENT
Start: 2021-01-01 | End: 2021-01-01

## 2021-01-01 RX ORDER — ENALAPRILAT 1.25 MG/ML
1.25 INJECTION INTRAVENOUS EVERY 6 HOURS PRN
Status: DISCONTINUED | OUTPATIENT
Start: 2021-01-01 | End: 2021-01-01

## 2021-01-01 RX ORDER — MIDAZOLAM HYDROCHLORIDE 1 MG/ML
INJECTION INTRAMUSCULAR; INTRAVENOUS
Status: COMPLETED
Start: 2021-01-01 | End: 2021-01-01

## 2021-01-01 RX ORDER — SODIUM CHLORIDE, SODIUM LACTATE, POTASSIUM CHLORIDE, AND CALCIUM CHLORIDE .6; .31; .03; .02 G/100ML; G/100ML; G/100ML; G/100ML
500 INJECTION, SOLUTION INTRAVENOUS ONCE
Status: COMPLETED | OUTPATIENT
Start: 2021-01-01 | End: 2021-01-01

## 2021-01-01 RX ORDER — SODIUM CHLORIDE, SODIUM LACTATE, POTASSIUM CHLORIDE, AND CALCIUM CHLORIDE .6; .31; .03; .02 G/100ML; G/100ML; G/100ML; G/100ML
1000 INJECTION, SOLUTION INTRAVENOUS ONCE
Status: COMPLETED | OUTPATIENT
Start: 2021-01-01 | End: 2021-01-01

## 2021-01-01 RX ORDER — SODIUM CHLORIDE, SODIUM LACTATE, POTASSIUM CHLORIDE, CALCIUM CHLORIDE 600; 310; 30; 20 MG/100ML; MG/100ML; MG/100ML; MG/100ML
INJECTION, SOLUTION INTRAVENOUS CONTINUOUS
Status: DISCONTINUED | OUTPATIENT
Start: 2021-01-01 | End: 2021-01-01

## 2021-01-01 RX ORDER — PROPOFOL 10 MG/ML
0-200 INJECTION, EMULSION INTRAVENOUS ONCE
Status: COMPLETED | OUTPATIENT
Start: 2021-01-01 | End: 2021-01-01

## 2021-01-01 RX ORDER — ETOMIDATE 2 MG/ML
INJECTION INTRAVENOUS PRN
Status: DISCONTINUED | OUTPATIENT
Start: 2021-01-01 | End: 2021-01-01 | Stop reason: SURG

## 2021-01-01 RX ORDER — ONDANSETRON 2 MG/ML
4 INJECTION INTRAMUSCULAR; INTRAVENOUS EVERY 4 HOURS PRN
Status: DISCONTINUED | OUTPATIENT
Start: 2021-01-01 | End: 2021-01-01

## 2021-01-01 RX ORDER — ONDANSETRON 4 MG/1
4 TABLET, ORALLY DISINTEGRATING ORAL EVERY 4 HOURS PRN
Status: DISCONTINUED | OUTPATIENT
Start: 2021-01-01 | End: 2021-01-01

## 2021-01-01 RX ORDER — HYDROMORPHONE HYDROCHLORIDE 2 MG/1
4 TABLET ORAL
Status: DISCONTINUED | OUTPATIENT
Start: 2021-01-01 | End: 2021-01-01

## 2021-01-01 RX ORDER — CALCIUM CHLORIDE 100 MG/ML
1 INJECTION INTRAVENOUS; INTRAVENTRICULAR ONCE
Status: DISCONTINUED | OUTPATIENT
Start: 2021-01-01 | End: 2021-01-01

## 2021-01-01 RX ORDER — SUCCINYLCHOLINE CHLORIDE 20 MG/ML
200 INJECTION INTRAMUSCULAR; INTRAVENOUS ONCE
Status: COMPLETED | OUTPATIENT
Start: 2021-01-01 | End: 2021-01-01

## 2021-01-01 RX ORDER — POTASSIUM CHLORIDE 14.9 MG/ML
20 INJECTION INTRAVENOUS ONCE
Status: COMPLETED | OUTPATIENT
Start: 2021-01-01 | End: 2021-01-01

## 2021-01-01 RX ORDER — MAGNESIUM SULFATE HEPTAHYDRATE 40 MG/ML
2 INJECTION, SOLUTION INTRAVENOUS ONCE
Status: DISCONTINUED | OUTPATIENT
Start: 2021-01-01 | End: 2021-01-01

## 2021-01-01 RX ORDER — LABETALOL HYDROCHLORIDE 5 MG/ML
10 INJECTION, SOLUTION INTRAVENOUS EVERY 4 HOURS PRN
Status: DISCONTINUED | OUTPATIENT
Start: 2021-01-01 | End: 2021-01-01

## 2021-01-01 RX ORDER — MAGNESIUM SULFATE HEPTAHYDRATE 40 MG/ML
2 INJECTION, SOLUTION INTRAVENOUS ONCE
Status: COMPLETED | OUTPATIENT
Start: 2021-01-01 | End: 2021-01-01

## 2021-01-01 RX ORDER — METOCLOPRAMIDE HYDROCHLORIDE 5 MG/ML
INJECTION INTRAMUSCULAR; INTRAVENOUS PRN
Status: DISCONTINUED | OUTPATIENT
Start: 2021-01-01 | End: 2021-01-01 | Stop reason: SURG

## 2021-01-01 RX ORDER — DEXTROSE MONOHYDRATE 25 G/50ML
50 INJECTION, SOLUTION INTRAVENOUS
Status: DISCONTINUED | OUTPATIENT
Start: 2021-01-01 | End: 2021-01-01

## 2021-01-01 RX ORDER — SODIUM CHLORIDE, SODIUM LACTATE, POTASSIUM CHLORIDE, CALCIUM CHLORIDE 600; 310; 30; 20 MG/100ML; MG/100ML; MG/100ML; MG/100ML
1000 INJECTION, SOLUTION INTRAVENOUS ONCE
Status: COMPLETED | OUTPATIENT
Start: 2021-01-01 | End: 2021-01-01

## 2021-01-01 RX ORDER — FERROUS SULFATE 325(65) MG
325 TABLET ORAL 2 TIMES DAILY WITH MEALS
Status: DISCONTINUED | OUTPATIENT
Start: 2021-01-01 | End: 2021-01-01

## 2021-01-01 RX ORDER — FAMOTIDINE 20 MG/1
20 TABLET, FILM COATED ORAL 2 TIMES DAILY
Status: DISCONTINUED | OUTPATIENT
Start: 2021-01-01 | End: 2021-01-01

## 2021-01-01 RX ORDER — ACETAMINOPHEN 325 MG/1
650 TABLET ORAL EVERY 6 HOURS PRN
Status: DISCONTINUED | OUTPATIENT
Start: 2021-01-01 | End: 2021-01-01

## 2021-01-01 RX ORDER — ONDANSETRON 2 MG/ML
4 INJECTION INTRAMUSCULAR; INTRAVENOUS PRN
Status: ACTIVE | OUTPATIENT
Start: 2021-01-01 | End: 2021-01-01

## 2021-01-01 RX ORDER — POTASSIUM CHLORIDE 29.8 MG/ML
40 INJECTION INTRAVENOUS ONCE
Status: DISCONTINUED | OUTPATIENT
Start: 2021-01-01 | End: 2021-01-01

## 2021-01-01 RX ORDER — HYDROMORPHONE HYDROCHLORIDE 2 MG/ML
4 INJECTION, SOLUTION INTRAMUSCULAR; INTRAVENOUS; SUBCUTANEOUS
Status: DISCONTINUED | OUTPATIENT
Start: 2021-01-01 | End: 2021-01-01 | Stop reason: HOSPADM

## 2021-01-01 RX ORDER — FAMOTIDINE 20 MG/1
20 TABLET, FILM COATED ORAL DAILY
Status: DISCONTINUED | OUTPATIENT
Start: 2021-01-01 | End: 2021-01-01

## 2021-01-01 RX ORDER — POTASSIUM CHLORIDE 29.8 MG/ML
40 INJECTION INTRAVENOUS ONCE
Status: COMPLETED | OUTPATIENT
Start: 2021-01-01 | End: 2021-01-01

## 2021-01-01 RX ORDER — ROCURONIUM BROMIDE 10 MG/ML
50 INJECTION, SOLUTION INTRAVENOUS ONCE
Status: COMPLETED | OUTPATIENT
Start: 2021-01-01 | End: 2021-01-01

## 2021-01-01 RX ORDER — POTASSIUM CHLORIDE 7.45 MG/ML
10 INJECTION INTRAVENOUS ONCE
Status: COMPLETED | OUTPATIENT
Start: 2021-01-01 | End: 2021-01-01

## 2021-01-01 RX ORDER — FUROSEMIDE 10 MG/ML
20 INJECTION INTRAMUSCULAR; INTRAVENOUS ONCE
Status: COMPLETED | OUTPATIENT
Start: 2021-01-01 | End: 2021-01-01

## 2021-01-01 RX ORDER — HALOPERIDOL 5 MG/ML
1 INJECTION INTRAMUSCULAR
Status: DISCONTINUED | OUTPATIENT
Start: 2021-01-01 | End: 2021-01-01

## 2021-01-01 RX ORDER — TEMAZEPAM 15 MG/1
15 CAPSULE ORAL
Status: DISCONTINUED | OUTPATIENT
Start: 2021-01-01 | End: 2021-01-01

## 2021-01-01 RX ORDER — POLYETHYLENE GLYCOL 3350 17 G/17G
1 POWDER, FOR SOLUTION ORAL
Status: DISCONTINUED | OUTPATIENT
Start: 2021-01-01 | End: 2021-01-01

## 2021-01-01 RX ORDER — CEFOTETAN DISODIUM 2 G/20ML
INJECTION, POWDER, FOR SOLUTION INTRAMUSCULAR; INTRAVENOUS PRN
Status: DISCONTINUED | OUTPATIENT
Start: 2021-01-01 | End: 2021-01-01 | Stop reason: SURG

## 2021-01-01 RX ORDER — HYDROMORPHONE HYDROCHLORIDE 1 MG/ML
1 INJECTION, SOLUTION INTRAMUSCULAR; INTRAVENOUS; SUBCUTANEOUS ONCE
Status: COMPLETED | OUTPATIENT
Start: 2021-01-01 | End: 2021-01-01

## 2021-01-01 RX ORDER — FUROSEMIDE 10 MG/ML
20 INJECTION INTRAMUSCULAR; INTRAVENOUS
Status: DISCONTINUED | OUTPATIENT
Start: 2021-01-01 | End: 2021-01-01

## 2021-01-01 RX ORDER — SODIUM CHLORIDE, SODIUM LACTATE, POTASSIUM CHLORIDE, CALCIUM CHLORIDE 600; 310; 30; 20 MG/100ML; MG/100ML; MG/100ML; MG/100ML
INJECTION, SOLUTION INTRAVENOUS
Status: DISCONTINUED | OUTPATIENT
Start: 2021-01-01 | End: 2021-01-01 | Stop reason: SURG

## 2021-01-01 RX ORDER — ACETAMINOPHEN 325 MG/1
650 TABLET ORAL EVERY 4 HOURS PRN
Status: DISCONTINUED | OUTPATIENT
Start: 2021-01-01 | End: 2021-01-01 | Stop reason: HOSPADM

## 2021-01-01 RX ORDER — HYDROMORPHONE HYDROCHLORIDE 2 MG/ML
INJECTION, SOLUTION INTRAMUSCULAR; INTRAVENOUS; SUBCUTANEOUS PRN
Status: DISCONTINUED | OUTPATIENT
Start: 2021-01-01 | End: 2021-01-01 | Stop reason: SURG

## 2021-01-01 RX ORDER — NOREPINEPHRINE BITARTRATE 0.03 MG/ML
0-30 INJECTION, SOLUTION INTRAVENOUS CONTINUOUS
Status: DISCONTINUED | OUTPATIENT
Start: 2021-01-01 | End: 2021-01-01

## 2021-01-01 RX ORDER — HYDROMORPHONE HYDROCHLORIDE 1 MG/ML
0.5 INJECTION, SOLUTION INTRAMUSCULAR; INTRAVENOUS; SUBCUTANEOUS ONCE
Status: COMPLETED | OUTPATIENT
Start: 2021-01-01 | End: 2021-01-01

## 2021-01-01 RX ORDER — TEMAZEPAM 15 MG/1
45 CAPSULE ORAL
Status: DISCONTINUED | OUTPATIENT
Start: 2021-01-01 | End: 2021-01-01

## 2021-01-01 RX ORDER — LINEZOLID 2 MG/ML
600 INJECTION, SOLUTION INTRAVENOUS EVERY 12 HOURS
Status: DISCONTINUED | OUTPATIENT
Start: 2021-01-01 | End: 2021-01-01

## 2021-01-01 RX ORDER — HYDROMORPHONE HYDROCHLORIDE 1 MG/ML
.5-1 INJECTION, SOLUTION INTRAMUSCULAR; INTRAVENOUS; SUBCUTANEOUS
Status: DISCONTINUED | OUTPATIENT
Start: 2021-01-01 | End: 2021-01-01

## 2021-01-01 RX ORDER — ACETAMINOPHEN 650 MG/1
650 SUPPOSITORY RECTAL EVERY 4 HOURS PRN
Status: DISCONTINUED | OUTPATIENT
Start: 2021-01-01 | End: 2021-01-01 | Stop reason: HOSPADM

## 2021-01-01 RX ORDER — LORAZEPAM 2 MG/ML
1 CONCENTRATE ORAL
Status: DISCONTINUED | OUTPATIENT
Start: 2021-01-01 | End: 2021-01-01 | Stop reason: HOSPADM

## 2021-01-01 RX ORDER — TEMAZEPAM 15 MG/1
30 CAPSULE ORAL
Status: DISCONTINUED | OUTPATIENT
Start: 2021-01-01 | End: 2021-01-01 | Stop reason: HOSPADM

## 2021-01-01 RX ORDER — MIDAZOLAM HYDROCHLORIDE 1 MG/ML
.5-2 INJECTION INTRAMUSCULAR; INTRAVENOUS PRN
Status: ACTIVE | OUTPATIENT
Start: 2021-01-01 | End: 2021-01-01

## 2021-01-01 RX ORDER — PHENYLEPHRINE HCL IN 0.9% NACL 0.5 MG/5ML
SYRINGE (ML) INTRAVENOUS PRN
Status: DISCONTINUED | OUTPATIENT
Start: 2021-01-01 | End: 2021-01-01 | Stop reason: SURG

## 2021-01-01 RX ORDER — HYDROMORPHONE HYDROCHLORIDE 2 MG/ML
2 INJECTION, SOLUTION INTRAMUSCULAR; INTRAVENOUS; SUBCUTANEOUS
Status: DISCONTINUED | OUTPATIENT
Start: 2021-01-01 | End: 2021-01-01 | Stop reason: HOSPADM

## 2021-01-01 RX ORDER — FLUMAZENIL 0.1 MG/ML
INJECTION INTRAVENOUS
Status: COMPLETED
Start: 2021-01-01 | End: 2021-01-01

## 2021-01-01 RX ORDER — AMOXICILLIN 250 MG
2 CAPSULE ORAL 2 TIMES DAILY
Status: DISCONTINUED | OUTPATIENT
Start: 2021-01-01 | End: 2021-01-01

## 2021-01-01 RX ORDER — BUPIVACAINE HYDROCHLORIDE AND EPINEPHRINE 5; 5 MG/ML; UG/ML
INJECTION, SOLUTION EPIDURAL; INTRACAUDAL; PERINEURAL
Status: DISCONTINUED | OUTPATIENT
Start: 2021-01-01 | End: 2021-01-01 | Stop reason: HOSPADM

## 2021-01-01 RX ORDER — NALOXONE HYDROCHLORIDE 0.4 MG/ML
INJECTION, SOLUTION INTRAMUSCULAR; INTRAVENOUS; SUBCUTANEOUS
Status: COMPLETED
Start: 2021-01-01 | End: 2021-01-01

## 2021-01-01 RX ORDER — ONDANSETRON 2 MG/ML
INJECTION INTRAMUSCULAR; INTRAVENOUS PRN
Status: DISCONTINUED | OUTPATIENT
Start: 2021-01-01 | End: 2021-01-01 | Stop reason: SURG

## 2021-01-01 RX ORDER — SODIUM CHLORIDE 9 MG/ML
500 INJECTION, SOLUTION INTRAVENOUS
Status: ACTIVE | OUTPATIENT
Start: 2021-01-01 | End: 2021-01-01

## 2021-01-01 RX ORDER — POTASSIUM CHLORIDE 14.9 MG/ML
20 INJECTION INTRAVENOUS ONCE
Status: DISCONTINUED | OUTPATIENT
Start: 2021-01-01 | End: 2021-01-01

## 2021-01-01 RX ORDER — ATROPINE SULFATE 10 MG/ML
2 SOLUTION/ DROPS OPHTHALMIC EVERY 4 HOURS PRN
Status: DISCONTINUED | OUTPATIENT
Start: 2021-01-01 | End: 2021-01-01 | Stop reason: HOSPADM

## 2021-01-01 RX ORDER — SODIUM CHLORIDE, SODIUM LACTATE, POTASSIUM CHLORIDE, AND CALCIUM CHLORIDE .6; .31; .03; .02 G/100ML; G/100ML; G/100ML; G/100ML
500 INJECTION, SOLUTION INTRAVENOUS
Status: COMPLETED | OUTPATIENT
Start: 2021-01-01 | End: 2021-01-01

## 2021-01-01 RX ORDER — SODIUM CHLORIDE, SODIUM LACTATE, POTASSIUM CHLORIDE, CALCIUM CHLORIDE 600; 310; 30; 20 MG/100ML; MG/100ML; MG/100ML; MG/100ML
INJECTION, SOLUTION INTRAVENOUS CONTINUOUS
Status: ACTIVE | OUTPATIENT
Start: 2021-01-01 | End: 2021-01-01

## 2021-01-01 RX ORDER — PIPERACILLIN SODIUM, TAZOBACTAM SODIUM 3; .375 G/15ML; G/15ML
INJECTION, POWDER, LYOPHILIZED, FOR SOLUTION INTRAVENOUS PRN
Status: DISCONTINUED | OUTPATIENT
Start: 2021-01-01 | End: 2021-01-01 | Stop reason: SURG

## 2021-01-01 RX ORDER — LORAZEPAM 2 MG/ML
1 INJECTION INTRAMUSCULAR
Status: DISCONTINUED | OUTPATIENT
Start: 2021-01-01 | End: 2021-01-01 | Stop reason: HOSPADM

## 2021-01-01 RX ORDER — ROCURONIUM BROMIDE 10 MG/ML
INJECTION, SOLUTION INTRAVENOUS PRN
Status: DISCONTINUED | OUTPATIENT
Start: 2021-01-01 | End: 2021-01-01 | Stop reason: SURG

## 2021-01-01 RX ORDER — ONDANSETRON 2 MG/ML
4 INJECTION INTRAMUSCULAR; INTRAVENOUS ONCE
Status: COMPLETED | OUTPATIENT
Start: 2021-01-01 | End: 2021-01-01

## 2021-01-01 RX ORDER — BISACODYL 10 MG
10 SUPPOSITORY, RECTAL RECTAL
Status: DISCONTINUED | OUTPATIENT
Start: 2021-01-01 | End: 2021-01-01

## 2021-01-01 RX ORDER — HEPARIN SODIUM 5000 [USP'U]/ML
5000 INJECTION, SOLUTION INTRAVENOUS; SUBCUTANEOUS EVERY 8 HOURS
Status: DISCONTINUED | OUTPATIENT
Start: 2021-01-01 | End: 2021-01-01

## 2021-01-01 RX ORDER — PROPOFOL 10 MG/ML
100 INJECTION, EMULSION INTRAVENOUS ONCE
Status: COMPLETED | OUTPATIENT
Start: 2021-01-01 | End: 2021-01-01

## 2021-01-01 RX ORDER — HYDROMORPHONE HYDROCHLORIDE 2 MG/ML
2 INJECTION, SOLUTION INTRAMUSCULAR; INTRAVENOUS; SUBCUTANEOUS ONCE
Status: COMPLETED | OUTPATIENT
Start: 2021-01-01 | End: 2021-01-01

## 2021-01-01 RX ORDER — SODIUM CHLORIDE, SODIUM LACTATE, POTASSIUM CHLORIDE, CALCIUM CHLORIDE 600; 310; 30; 20 MG/100ML; MG/100ML; MG/100ML; MG/100ML
INJECTION, SOLUTION INTRAVENOUS
Status: DISCONTINUED | OUTPATIENT
Start: 2021-01-01 | End: 2021-01-01 | Stop reason: HOSPADM

## 2021-01-01 RX ORDER — POTASSIUM CHLORIDE 20 MEQ/1
20 TABLET, EXTENDED RELEASE ORAL DAILY
Status: DISCONTINUED | OUTPATIENT
Start: 2021-01-01 | End: 2021-01-01

## 2021-01-01 RX ORDER — DIPHENHYDRAMINE HYDROCHLORIDE 50 MG/ML
12.5 INJECTION INTRAMUSCULAR; INTRAVENOUS
Status: DISCONTINUED | OUTPATIENT
Start: 2021-01-01 | End: 2021-01-01

## 2021-01-01 RX ORDER — BUPRENORPHINE AND NALOXONE 8; 2 MG/1; MG/1
1 FILM, SOLUBLE BUCCAL; SUBLINGUAL 2 TIMES DAILY
COMMUNITY
Start: 2021-01-01

## 2021-01-01 RX ORDER — GUAIFENESIN/DEXTROMETHORPHAN 100-10MG/5
10 SYRUP ORAL EVERY 6 HOURS PRN
Status: DISCONTINUED | OUTPATIENT
Start: 2021-01-01 | End: 2021-01-01

## 2021-01-01 RX ORDER — HYDROMORPHONE HYDROCHLORIDE 2 MG/1
2 TABLET ORAL
Status: DISCONTINUED | OUTPATIENT
Start: 2021-01-01 | End: 2021-01-01

## 2021-01-01 RX ORDER — BUPRENORPHINE AND NALOXONE 4; 1 MG/1; MG/1
2 FILM, SOLUBLE BUCCAL; SUBLINGUAL 2 TIMES DAILY
Status: DISCONTINUED | OUTPATIENT
Start: 2021-01-01 | End: 2021-01-01

## 2021-01-01 RX ORDER — SODIUM CHLORIDE, SODIUM LACTATE, POTASSIUM CHLORIDE, CALCIUM CHLORIDE 600; 310; 30; 20 MG/100ML; MG/100ML; MG/100ML; MG/100ML
4000 INJECTION, SOLUTION INTRAVENOUS CONTINUOUS
Status: DISCONTINUED | OUTPATIENT
Start: 2021-01-01 | End: 2021-01-01

## 2021-01-01 RX ORDER — DEXAMETHASONE SODIUM PHOSPHATE 4 MG/ML
INJECTION, SOLUTION INTRA-ARTICULAR; INTRALESIONAL; INTRAMUSCULAR; INTRAVENOUS; SOFT TISSUE PRN
Status: DISCONTINUED | OUTPATIENT
Start: 2021-01-01 | End: 2021-01-01 | Stop reason: SURG

## 2021-01-01 RX ORDER — ONDANSETRON 2 MG/ML
4 INJECTION INTRAMUSCULAR; INTRAVENOUS
Status: DISCONTINUED | OUTPATIENT
Start: 2021-01-01 | End: 2021-01-01

## 2021-01-01 RX ADMIN — SODIUM CHLORIDE, POTASSIUM CHLORIDE, SODIUM LACTATE AND CALCIUM CHLORIDE 500 ML: 600; 310; 30; 20 INJECTION, SOLUTION INTRAVENOUS at 01:45

## 2021-01-01 RX ADMIN — SODIUM CHLORIDE 125 MG: 9 INJECTION, SOLUTION INTRAVENOUS at 17:29

## 2021-01-01 RX ADMIN — MAGNESIUM SULFATE HEPTAHYDRATE: 500 INJECTION, SOLUTION INTRAMUSCULAR; INTRAVENOUS at 19:27

## 2021-01-01 RX ADMIN — PIPERACILLIN AND TAZOBACTAM 3.38 G: 3; .375 INJECTION, POWDER, LYOPHILIZED, FOR SOLUTION INTRAVENOUS; PARENTERAL at 12:45

## 2021-01-01 RX ADMIN — PROPOFOL 10 MCG/KG/MIN: 10 INJECTION, EMULSION INTRAVENOUS at 09:57

## 2021-01-01 RX ADMIN — FENTANYL CITRATE 100 MCG: 50 INJECTION INTRAMUSCULAR; INTRAVENOUS at 23:34

## 2021-01-01 RX ADMIN — HYDROMORPHONE HYDROCHLORIDE 1 MG: 2 INJECTION, SOLUTION INTRAMUSCULAR; INTRAVENOUS; SUBCUTANEOUS at 13:50

## 2021-01-01 RX ADMIN — DEXTROSE MONOHYDRATE 50 ML: 25 INJECTION, SOLUTION INTRAVENOUS at 17:08

## 2021-01-01 RX ADMIN — PIPERACILLIN AND TAZOBACTAM 3.38 G: 3; .375 INJECTION, POWDER, LYOPHILIZED, FOR SOLUTION INTRAVENOUS; PARENTERAL at 06:07

## 2021-01-01 RX ADMIN — PIPERACILLIN AND TAZOBACTAM 4.5 G: 4; .5 INJECTION, POWDER, LYOPHILIZED, FOR SOLUTION INTRAVENOUS; PARENTERAL at 20:15

## 2021-01-01 RX ADMIN — MIDAZOLAM HYDROCHLORIDE 0.5 MG: 1 INJECTION, SOLUTION INTRAMUSCULAR; INTRAVENOUS at 16:54

## 2021-01-01 RX ADMIN — LINEZOLID 600 MG: 600 INJECTION, SOLUTION INTRAVENOUS at 12:55

## 2021-01-01 RX ADMIN — Medication 200 MCG: at 23:15

## 2021-01-01 RX ADMIN — FENTANYL CITRATE 50 MCG: 50 INJECTION INTRAMUSCULAR; INTRAVENOUS at 19:43

## 2021-01-01 RX ADMIN — PROPOFOL 10 MCG/KG/MIN: 10 INJECTION, EMULSION INTRAVENOUS at 01:37

## 2021-01-01 RX ADMIN — ONDANSETRON 4 MG: 2 INJECTION INTRAMUSCULAR; INTRAVENOUS at 12:56

## 2021-01-01 RX ADMIN — MAGNESIUM SULFATE 2 G: 2 INJECTION INTRAVENOUS at 06:03

## 2021-01-01 RX ADMIN — SODIUM CHLORIDE: 234 INJECTION INTRAMUSCULAR; INTRAVENOUS; SUBCUTANEOUS at 19:29

## 2021-01-01 RX ADMIN — PIPERACILLIN AND TAZOBACTAM 3.38 G: 3; .375 INJECTION, POWDER, LYOPHILIZED, FOR SOLUTION INTRAVENOUS; PARENTERAL at 21:11

## 2021-01-01 RX ADMIN — ONDANSETRON 4 MG: 2 INJECTION INTRAMUSCULAR; INTRAVENOUS at 21:06

## 2021-01-01 RX ADMIN — HYDROMORPHONE HYDROCHLORIDE 1 MG: 1 INJECTION, SOLUTION INTRAMUSCULAR; INTRAVENOUS; SUBCUTANEOUS at 10:35

## 2021-01-01 RX ADMIN — SODIUM CHLORIDE, POTASSIUM CHLORIDE, SODIUM LACTATE AND CALCIUM CHLORIDE: 600; 310; 30; 20 INJECTION, SOLUTION INTRAVENOUS at 00:21

## 2021-01-01 RX ADMIN — PIPERACILLIN AND TAZOBACTAM 4.5 G: 4; .5 INJECTION, POWDER, LYOPHILIZED, FOR SOLUTION INTRAVENOUS; PARENTERAL at 21:38

## 2021-01-01 RX ADMIN — ENOXAPARIN SODIUM 40 MG: 40 INJECTION SUBCUTANEOUS at 05:16

## 2021-01-01 RX ADMIN — PROPOFOL 50 MG: 10 INJECTION, EMULSION INTRAVENOUS at 12:34

## 2021-01-01 RX ADMIN — FUROSEMIDE 20 MG: 10 INJECTION, SOLUTION INTRAMUSCULAR; INTRAVENOUS at 05:28

## 2021-01-01 RX ADMIN — FENTANYL CITRATE 25 MCG: 50 INJECTION, SOLUTION INTRAMUSCULAR; INTRAVENOUS at 17:03

## 2021-01-01 RX ADMIN — FENTANYL CITRATE 50 MCG: 50 INJECTION INTRAMUSCULAR; INTRAVENOUS at 17:00

## 2021-01-01 RX ADMIN — FUROSEMIDE 20 MG: 10 INJECTION, SOLUTION INTRAMUSCULAR; INTRAVENOUS at 05:32

## 2021-01-01 RX ADMIN — PROPOFOL 10 MCG/KG/MIN: 10 INJECTION, EMULSION INTRAVENOUS at 05:51

## 2021-01-01 RX ADMIN — SODIUM CHLORIDE: 234 INJECTION INTRAMUSCULAR; INTRAVENOUS; SUBCUTANEOUS at 20:05

## 2021-01-01 RX ADMIN — FENTANYL CITRATE 50 MCG: 50 INJECTION INTRAMUSCULAR; INTRAVENOUS at 20:40

## 2021-01-01 RX ADMIN — ROCURONIUM BROMIDE 100 MG: 10 INJECTION, SOLUTION INTRAVENOUS at 12:50

## 2021-01-01 RX ADMIN — FENTANYL CITRATE 50 MCG: 50 INJECTION INTRAMUSCULAR; INTRAVENOUS at 17:04

## 2021-01-01 RX ADMIN — PIPERACILLIN AND TAZOBACTAM 4.5 G: 4; .5 INJECTION, POWDER, LYOPHILIZED, FOR SOLUTION INTRAVENOUS; PARENTERAL at 04:54

## 2021-01-01 RX ADMIN — PROPOFOL 50 MG: 10 INJECTION, EMULSION INTRAVENOUS at 06:09

## 2021-01-01 RX ADMIN — FENTANYL CITRATE 100 MCG: 50 INJECTION INTRAMUSCULAR; INTRAVENOUS at 14:54

## 2021-01-01 RX ADMIN — CALCIUM GLUCONATE 1 G: 98 INJECTION, SOLUTION INTRAVENOUS at 08:53

## 2021-01-01 RX ADMIN — SODIUM CHLORIDE, POTASSIUM CHLORIDE, SODIUM LACTATE AND CALCIUM CHLORIDE: 600; 310; 30; 20 INJECTION, SOLUTION INTRAVENOUS at 12:48

## 2021-01-01 RX ADMIN — NOREPINEPHRINE BITARTRATE 6 MCG/MIN: 1 INJECTION, SOLUTION, CONCENTRATE INTRAVENOUS at 10:33

## 2021-01-01 RX ADMIN — PROPOFOL 20 MCG/KG/MIN: 10 INJECTION, EMULSION INTRAVENOUS at 18:55

## 2021-01-01 RX ADMIN — PROPOFOL 40 MCG/KG/MIN: 10 INJECTION, EMULSION INTRAVENOUS at 05:20

## 2021-01-01 RX ADMIN — FENTANYL CITRATE 50 MCG: 50 INJECTION INTRAMUSCULAR; INTRAVENOUS at 19:45

## 2021-01-01 RX ADMIN — SODIUM CHLORIDE, POTASSIUM CHLORIDE, SODIUM LACTATE AND CALCIUM CHLORIDE: 600; 310; 30; 20 INJECTION, SOLUTION INTRAVENOUS at 23:07

## 2021-01-01 RX ADMIN — METOCLOPRAMIDE 10 MG: 5 INJECTION, SOLUTION INTRAMUSCULAR; INTRAVENOUS at 13:50

## 2021-01-01 RX ADMIN — SODIUM CHLORIDE, POTASSIUM CHLORIDE, SODIUM LACTATE AND CALCIUM CHLORIDE 1000 ML: 600; 310; 30; 20 INJECTION, SOLUTION INTRAVENOUS at 09:52

## 2021-01-01 RX ADMIN — PROPOFOL 20 MCG/KG/MIN: 10 INJECTION, EMULSION INTRAVENOUS at 01:16

## 2021-01-01 RX ADMIN — PIPERACILLIN AND TAZOBACTAM 4.5 G: 4; .5 INJECTION, POWDER, LYOPHILIZED, FOR SOLUTION INTRAVENOUS; PARENTERAL at 05:29

## 2021-01-01 RX ADMIN — LORAZEPAM 1 MG: 2 INJECTION INTRAMUSCULAR; INTRAVENOUS at 15:13

## 2021-01-01 RX ADMIN — FUROSEMIDE 20 MG: 10 INJECTION, SOLUTION INTRAMUSCULAR; INTRAVENOUS at 06:07

## 2021-01-01 RX ADMIN — SODIUM CHLORIDE, POTASSIUM CHLORIDE, SODIUM LACTATE AND CALCIUM CHLORIDE: 600; 310; 30; 20 INJECTION, SOLUTION INTRAVENOUS at 02:01

## 2021-01-01 RX ADMIN — LINEZOLID 600 MG: 600 INJECTION, SOLUTION INTRAVENOUS at 00:21

## 2021-01-01 RX ADMIN — HYDROMORPHONE HYDROCHLORIDE 4 MG: 2 INJECTION, SOLUTION INTRAMUSCULAR; INTRAVENOUS; SUBCUTANEOUS at 15:13

## 2021-01-01 RX ADMIN — FENTANYL CITRATE 100 MCG: 50 INJECTION INTRAMUSCULAR; INTRAVENOUS at 23:08

## 2021-01-01 RX ADMIN — SODIUM CHLORIDE 250 MG: 9 INJECTION, SOLUTION INTRAVENOUS at 17:37

## 2021-01-01 RX ADMIN — FENTANYL CITRATE 100 MCG: 50 INJECTION INTRAMUSCULAR; INTRAVENOUS at 02:29

## 2021-01-01 RX ADMIN — FENTANYL CITRATE 25 MCG: 50 INJECTION, SOLUTION INTRAMUSCULAR; INTRAVENOUS at 16:54

## 2021-01-01 RX ADMIN — FENTANYL CITRATE 100 MCG: 50 INJECTION INTRAMUSCULAR; INTRAVENOUS at 07:27

## 2021-01-01 RX ADMIN — SODIUM CHLORIDE, POTASSIUM CHLORIDE, SODIUM LACTATE AND CALCIUM CHLORIDE 500 ML: 600; 310; 30; 20 INJECTION, SOLUTION INTRAVENOUS at 10:30

## 2021-01-01 RX ADMIN — PIPERACILLIN AND TAZOBACTAM 3.38 G: 3; .375 INJECTION, POWDER, LYOPHILIZED, FOR SOLUTION INTRAVENOUS; PARENTERAL at 10:26

## 2021-01-01 RX ADMIN — POTASSIUM CHLORIDE 20 MEQ: 14.9 INJECTION, SOLUTION INTRAVENOUS at 08:18

## 2021-01-01 RX ADMIN — FENTANYL CITRATE 50 MCG: 50 INJECTION INTRAMUSCULAR; INTRAVENOUS at 10:26

## 2021-01-01 RX ADMIN — ENOXAPARIN SODIUM 40 MG: 40 INJECTION SUBCUTANEOUS at 05:19

## 2021-01-01 RX ADMIN — SODIUM CHLORIDE 250 MG: 9 INJECTION, SOLUTION INTRAVENOUS at 17:52

## 2021-01-01 RX ADMIN — SODIUM CHLORIDE: 234 INJECTION INTRAMUSCULAR; INTRAVENOUS; SUBCUTANEOUS at 20:01

## 2021-01-01 RX ADMIN — HYDROMORPHONE HYDROCHLORIDE 1 MG: 1 INJECTION, SOLUTION INTRAMUSCULAR; INTRAVENOUS; SUBCUTANEOUS at 21:08

## 2021-01-01 RX ADMIN — LINEZOLID 600 MG: 600 INJECTION, SOLUTION INTRAVENOUS at 12:10

## 2021-01-01 RX ADMIN — HYDROMORPHONE HYDROCHLORIDE 0.5 MG: 1 INJECTION, SOLUTION INTRAMUSCULAR; INTRAVENOUS; SUBCUTANEOUS at 21:06

## 2021-01-01 RX ADMIN — PIPERACILLIN AND TAZOBACTAM 3.38 G: 3; .375 INJECTION, POWDER, LYOPHILIZED, FOR SOLUTION INTRAVENOUS; PARENTERAL at 20:50

## 2021-01-01 RX ADMIN — ONDANSETRON 4 MG: 2 INJECTION INTRAMUSCULAR; INTRAVENOUS at 10:39

## 2021-01-01 RX ADMIN — FENTANYL CITRATE 50 MCG: 50 INJECTION INTRAMUSCULAR; INTRAVENOUS at 07:12

## 2021-01-01 RX ADMIN — ENOXAPARIN SODIUM 40 MG: 40 INJECTION SUBCUTANEOUS at 05:32

## 2021-01-01 RX ADMIN — FUROSEMIDE 20 MG: 10 INJECTION, SOLUTION INTRAMUSCULAR; INTRAVENOUS at 05:34

## 2021-01-01 RX ADMIN — FENTANYL CITRATE 25 MCG: 50 INJECTION, SOLUTION INTRAMUSCULAR; INTRAVENOUS at 16:58

## 2021-01-01 RX ADMIN — CEFOTETAN DISODIUM 2 G: 2 INJECTION, POWDER, FOR SOLUTION INTRAMUSCULAR; INTRAVENOUS at 23:15

## 2021-01-01 RX ADMIN — FENTANYL CITRATE 100 MCG: 50 INJECTION INTRAMUSCULAR; INTRAVENOUS at 01:48

## 2021-01-01 RX ADMIN — ONDANSETRON 4 MG: 2 INJECTION INTRAMUSCULAR; INTRAVENOUS at 18:43

## 2021-01-01 RX ADMIN — SODIUM CHLORIDE, POTASSIUM CHLORIDE, SODIUM LACTATE AND CALCIUM CHLORIDE 1000 ML: 600; 310; 30; 20 INJECTION, SOLUTION INTRAVENOUS at 12:21

## 2021-01-01 RX ADMIN — MAGNESIUM SULFATE 2 G: 2 INJECTION INTRAVENOUS at 09:36

## 2021-01-01 RX ADMIN — POTASSIUM CHLORIDE 20 MEQ: 14.9 INJECTION, SOLUTION INTRAVENOUS at 13:43

## 2021-01-01 RX ADMIN — SODIUM CHLORIDE, POTASSIUM CHLORIDE, SODIUM LACTATE AND CALCIUM CHLORIDE: 600; 310; 30; 20 INJECTION, SOLUTION INTRAVENOUS at 03:16

## 2021-01-01 RX ADMIN — PIPERACILLIN SODIUM AND TAZOBACTAM SODIUM 4.5 G: 3; .375 INJECTION, POWDER, FOR SOLUTION INTRAVENOUS at 13:09

## 2021-01-01 RX ADMIN — FENTANYL CITRATE 100 MCG: 50 INJECTION INTRAMUSCULAR; INTRAVENOUS at 19:22

## 2021-01-01 RX ADMIN — SODIUM CHLORIDE 125 MG: 9 INJECTION, SOLUTION INTRAVENOUS at 23:01

## 2021-01-01 RX ADMIN — PROPOFOL 10 MCG/KG/MIN: 10 INJECTION, EMULSION INTRAVENOUS at 14:57

## 2021-01-01 RX ADMIN — POTASSIUM CHLORIDE 20 MEQ: 14.9 INJECTION, SOLUTION INTRAVENOUS at 01:34

## 2021-01-01 RX ADMIN — PIPERACILLIN AND TAZOBACTAM 3.38 G: 3; .375 INJECTION, POWDER, LYOPHILIZED, FOR SOLUTION INTRAVENOUS; PARENTERAL at 12:59

## 2021-01-01 RX ADMIN — POTASSIUM CHLORIDE 40 MEQ: 29.8 INJECTION, SOLUTION INTRAVENOUS at 06:03

## 2021-01-01 RX ADMIN — POTASSIUM PHOSPHATE, MONOBASIC AND POTASSIUM PHOSPHATE, DIBASIC 30 MMOL: 224; 236 INJECTION, SOLUTION, CONCENTRATE INTRAVENOUS at 09:46

## 2021-01-01 RX ADMIN — PIPERACILLIN AND TAZOBACTAM 4.5 G: 4; .5 INJECTION, POWDER, LYOPHILIZED, FOR SOLUTION INTRAVENOUS; PARENTERAL at 06:00

## 2021-01-01 RX ADMIN — FAMOTIDINE 20 MG: 10 INJECTION INTRAVENOUS at 05:51

## 2021-01-01 RX ADMIN — SODIUM CHLORIDE, POTASSIUM CHLORIDE, SODIUM LACTATE AND CALCIUM CHLORIDE 1000 ML: 600; 310; 30; 20 INJECTION, SOLUTION INTRAVENOUS at 10:45

## 2021-01-01 RX ADMIN — POTASSIUM CHLORIDE 10 MEQ: 7.46 INJECTION, SOLUTION INTRAVENOUS at 03:48

## 2021-01-01 RX ADMIN — SODIUM PHOSPHATE, MONOBASIC, MONOHYDRATE AND SODIUM PHOSPHATE, DIBASIC, ANHYDROUS 30 MMOL: 276; 142 INJECTION, SOLUTION INTRAVENOUS at 09:49

## 2021-01-01 RX ADMIN — FENTANYL CITRATE 75 MCG: 50 INJECTION INTRAMUSCULAR; INTRAVENOUS at 23:17

## 2021-01-01 RX ADMIN — PIPERACILLIN AND TAZOBACTAM 4.5 G: 4; .5 INJECTION, POWDER, LYOPHILIZED, FOR SOLUTION INTRAVENOUS; PARENTERAL at 20:31

## 2021-01-01 RX ADMIN — POTASSIUM CHLORIDE 20 MEQ: 14.9 INJECTION, SOLUTION INTRAVENOUS at 06:50

## 2021-01-01 RX ADMIN — FENTANYL CITRATE 50 MCG: 50 INJECTION INTRAMUSCULAR; INTRAVENOUS at 21:57

## 2021-01-01 RX ADMIN — PIPERACILLIN AND TAZOBACTAM 4.5 G: 4; .5 INJECTION, POWDER, LYOPHILIZED, FOR SOLUTION INTRAVENOUS; PARENTERAL at 13:06

## 2021-01-01 RX ADMIN — FENTANYL CITRATE 50 MCG: 50 INJECTION INTRAMUSCULAR; INTRAVENOUS at 10:27

## 2021-01-01 RX ADMIN — FUROSEMIDE 20 MG: 10 INJECTION, SOLUTION INTRAMUSCULAR; INTRAVENOUS at 13:10

## 2021-01-01 RX ADMIN — FENTANYL CITRATE 50 MCG: 50 INJECTION INTRAMUSCULAR; INTRAVENOUS at 18:06

## 2021-01-01 RX ADMIN — VASOPRESSIN 0.03 UNITS/MIN: 20 INJECTION INTRAVENOUS at 14:04

## 2021-01-01 RX ADMIN — PIPERACILLIN AND TAZOBACTAM 3.38 G: 3; .375 INJECTION, POWDER, LYOPHILIZED, FOR SOLUTION INTRAVENOUS; PARENTERAL at 13:10

## 2021-01-01 RX ADMIN — PIPERACILLIN AND TAZOBACTAM 3.38 G: 3; .375 INJECTION, POWDER, LYOPHILIZED, FOR SOLUTION INTRAVENOUS; PARENTERAL at 14:30

## 2021-01-01 RX ADMIN — FUROSEMIDE 20 MG: 10 INJECTION, SOLUTION INTRAMUSCULAR; INTRAVENOUS at 12:42

## 2021-01-01 RX ADMIN — POTASSIUM CHLORIDE 20 MEQ: 14.9 INJECTION, SOLUTION INTRAVENOUS at 00:49

## 2021-01-01 RX ADMIN — FENTANYL CITRATE 50 MCG: 50 INJECTION INTRAMUSCULAR; INTRAVENOUS at 21:54

## 2021-01-01 RX ADMIN — SODIUM CHLORIDE, POTASSIUM CHLORIDE, SODIUM LACTATE AND CALCIUM CHLORIDE: 600; 310; 30; 20 INJECTION, SOLUTION INTRAVENOUS at 05:28

## 2021-01-01 RX ADMIN — SODIUM CHLORIDE, POTASSIUM CHLORIDE, SODIUM LACTATE AND CALCIUM CHLORIDE 1000 ML: 600; 310; 30; 20 INJECTION, SOLUTION INTRAVENOUS at 06:30

## 2021-01-01 RX ADMIN — FENTANYL CITRATE 50 MCG: 50 INJECTION INTRAMUSCULAR; INTRAVENOUS at 17:11

## 2021-01-01 RX ADMIN — FENTANYL CITRATE 100 MCG: 50 INJECTION INTRAMUSCULAR; INTRAVENOUS at 02:49

## 2021-01-01 RX ADMIN — CALCIUM CHLORIDE 1000 MG: 100 INJECTION, SOLUTION INTRAVENOUS at 09:49

## 2021-01-01 RX ADMIN — FUROSEMIDE 20 MG: 10 INJECTION, SOLUTION INTRAMUSCULAR; INTRAVENOUS at 09:46

## 2021-01-01 RX ADMIN — SODIUM CHLORIDE: 234 INJECTION INTRAMUSCULAR; INTRAVENOUS; SUBCUTANEOUS at 20:14

## 2021-01-01 RX ADMIN — FENTANYL CITRATE 50 MCG: 50 INJECTION, SOLUTION INTRAMUSCULAR; INTRAVENOUS at 23:35

## 2021-01-01 RX ADMIN — SODIUM CHLORIDE, POTASSIUM CHLORIDE, SODIUM LACTATE AND CALCIUM CHLORIDE: 600; 310; 30; 20 INJECTION, SOLUTION INTRAVENOUS at 08:20

## 2021-01-01 RX ADMIN — CALCIUM GLUCONATE 1 G: 98 INJECTION, SOLUTION INTRAVENOUS at 22:25

## 2021-01-01 RX ADMIN — ONDANSETRON 4 MG: 2 INJECTION INTRAMUSCULAR; INTRAVENOUS at 08:41

## 2021-01-01 RX ADMIN — VASOPRESSIN 0.03 UNITS/MIN: 20 INJECTION INTRAVENOUS at 16:43

## 2021-01-01 RX ADMIN — PIPERACILLIN AND TAZOBACTAM 4.5 G: 4; .5 INJECTION, POWDER, LYOPHILIZED, FOR SOLUTION INTRAVENOUS; PARENTERAL at 13:14

## 2021-01-01 RX ADMIN — PIPERACILLIN AND TAZOBACTAM 3.38 G: 3; .375 INJECTION, POWDER, LYOPHILIZED, FOR SOLUTION INTRAVENOUS; PARENTERAL at 21:08

## 2021-01-01 RX ADMIN — PROPOFOL 20 MCG/KG/MIN: 10 INJECTION, EMULSION INTRAVENOUS at 05:31

## 2021-01-01 RX ADMIN — SODIUM CHLORIDE, POTASSIUM CHLORIDE, SODIUM LACTATE AND CALCIUM CHLORIDE: 600; 310; 30; 20 INJECTION, SOLUTION INTRAVENOUS at 07:47

## 2021-01-01 RX ADMIN — HYDROMORPHONE HYDROCHLORIDE 4 MG: 2 INJECTION, SOLUTION INTRAMUSCULAR; INTRAVENOUS; SUBCUTANEOUS at 14:42

## 2021-01-01 RX ADMIN — FENTANYL CITRATE 100 MCG: 50 INJECTION INTRAMUSCULAR; INTRAVENOUS at 21:38

## 2021-01-01 RX ADMIN — ROCURONIUM BROMIDE 50 MG: 10 INJECTION, SOLUTION INTRAVENOUS at 12:20

## 2021-01-01 RX ADMIN — ENOXAPARIN SODIUM 40 MG: 40 INJECTION SUBCUTANEOUS at 18:16

## 2021-01-01 RX ADMIN — ALBUTEROL SULFATE 2.5 MG: 2.5 SOLUTION RESPIRATORY (INHALATION) at 22:11

## 2021-01-01 RX ADMIN — PIPERACILLIN AND TAZOBACTAM 3.38 G: 3; .375 INJECTION, POWDER, LYOPHILIZED, FOR SOLUTION INTRAVENOUS; PARENTERAL at 14:27

## 2021-01-01 RX ADMIN — SODIUM CHLORIDE, POTASSIUM CHLORIDE, SODIUM LACTATE AND CALCIUM CHLORIDE 1000 ML: 600; 310; 30; 20 INJECTION, SOLUTION INTRAVENOUS at 23:00

## 2021-01-01 RX ADMIN — SODIUM CHLORIDE, POTASSIUM CHLORIDE, SODIUM LACTATE AND CALCIUM CHLORIDE 1000 ML: 600; 310; 30; 20 INJECTION, SOLUTION INTRAVENOUS at 02:02

## 2021-01-01 RX ADMIN — FENTANYL CITRATE 100 MCG: 50 INJECTION INTRAMUSCULAR; INTRAVENOUS at 11:00

## 2021-01-01 RX ADMIN — SODIUM CHLORIDE, POTASSIUM CHLORIDE, SODIUM LACTATE AND CALCIUM CHLORIDE: 600; 310; 30; 20 INJECTION, SOLUTION INTRAVENOUS at 13:07

## 2021-01-01 RX ADMIN — ENOXAPARIN SODIUM 40 MG: 40 INJECTION SUBCUTANEOUS at 18:06

## 2021-01-01 RX ADMIN — FENTANYL CITRATE 100 MCG: 50 INJECTION INTRAMUSCULAR; INTRAVENOUS at 22:08

## 2021-01-01 RX ADMIN — FAMOTIDINE 20 MG: 10 INJECTION INTRAVENOUS at 05:29

## 2021-01-01 RX ADMIN — PIPERACILLIN AND TAZOBACTAM 4.5 G: 4; .5 INJECTION, POWDER, LYOPHILIZED, FOR SOLUTION INTRAVENOUS; PARENTERAL at 04:22

## 2021-01-01 RX ADMIN — ENOXAPARIN SODIUM 40 MG: 40 INJECTION SUBCUTANEOUS at 06:07

## 2021-01-01 RX ADMIN — SODIUM CHLORIDE: 234 INJECTION INTRAMUSCULAR; INTRAVENOUS; SUBCUTANEOUS at 20:08

## 2021-01-01 RX ADMIN — PIPERACILLIN AND TAZOBACTAM 4.5 G: 4; .5 INJECTION, POWDER, LYOPHILIZED, FOR SOLUTION INTRAVENOUS; PARENTERAL at 12:43

## 2021-01-01 RX ADMIN — FENTANYL CITRATE 50 MCG: 50 INJECTION INTRAMUSCULAR; INTRAVENOUS at 22:55

## 2021-01-01 RX ADMIN — FENTANYL CITRATE 50 MCG: 50 INJECTION INTRAMUSCULAR; INTRAVENOUS at 18:27

## 2021-01-01 RX ADMIN — PROPOFOL 20 MCG/KG/MIN: 10 INJECTION, EMULSION INTRAVENOUS at 15:15

## 2021-01-01 RX ADMIN — PIPERACILLIN AND TAZOBACTAM 3.38 G: 3; .375 INJECTION, POWDER, LYOPHILIZED, FOR SOLUTION INTRAVENOUS; PARENTERAL at 05:19

## 2021-01-01 RX ADMIN — FENTANYL CITRATE 50 MCG: 50 INJECTION, SOLUTION INTRAMUSCULAR; INTRAVENOUS at 23:39

## 2021-01-01 RX ADMIN — FENTANYL CITRATE 25 MCG: 50 INJECTION INTRAMUSCULAR; INTRAVENOUS at 16:07

## 2021-01-01 RX ADMIN — ETOMIDATE 120 MG: 2 INJECTION INTRAVENOUS at 23:15

## 2021-01-01 RX ADMIN — FUROSEMIDE 20 MG: 10 INJECTION, SOLUTION INTRAMUSCULAR; INTRAVENOUS at 05:51

## 2021-01-01 RX ADMIN — SODIUM CHLORIDE, POTASSIUM CHLORIDE, SODIUM LACTATE AND CALCIUM CHLORIDE 1000 ML: 600; 310; 30; 20 INJECTION, SOLUTION INTRAVENOUS at 16:00

## 2021-01-01 RX ADMIN — IOHEXOL 100 ML: 350 INJECTION, SOLUTION INTRAVENOUS at 13:28

## 2021-01-01 RX ADMIN — ONDANSETRON 4 MG: 2 INJECTION INTRAMUSCULAR; INTRAVENOUS at 14:25

## 2021-01-01 RX ADMIN — PIPERACILLIN AND TAZOBACTAM 4.5 G: 4; .5 INJECTION, POWDER, LYOPHILIZED, FOR SOLUTION INTRAVENOUS; PARENTERAL at 12:10

## 2021-01-01 RX ADMIN — SODIUM CHLORIDE, POTASSIUM CHLORIDE, SODIUM LACTATE AND CALCIUM CHLORIDE: 600; 310; 30; 20 INJECTION, SOLUTION INTRAVENOUS at 15:24

## 2021-01-01 RX ADMIN — MIDAZOLAM HYDROCHLORIDE 1 MG: 1 INJECTION, SOLUTION INTRAMUSCULAR; INTRAVENOUS at 17:01

## 2021-01-01 RX ADMIN — ENOXAPARIN SODIUM 40 MG: 40 INJECTION SUBCUTANEOUS at 18:03

## 2021-01-01 RX ADMIN — FENTANYL CITRATE 50 MCG: 50 INJECTION INTRAMUSCULAR; INTRAVENOUS at 12:09

## 2021-01-01 RX ADMIN — PIPERACILLIN AND TAZOBACTAM 4.5 G: 4; .5 INJECTION, POWDER, LYOPHILIZED, FOR SOLUTION INTRAVENOUS; PARENTERAL at 05:24

## 2021-01-01 RX ADMIN — FENTANYL CITRATE 50 MCG: 50 INJECTION INTRAMUSCULAR; INTRAVENOUS at 11:44

## 2021-01-01 RX ADMIN — FENTANYL CITRATE 50 MCG: 50 INJECTION INTRAMUSCULAR; INTRAVENOUS at 12:56

## 2021-01-01 RX ADMIN — SODIUM CHLORIDE 355 MG: 9 INJECTION, SOLUTION INTRAVENOUS at 14:42

## 2021-01-01 RX ADMIN — ONDANSETRON 4 MG: 2 INJECTION INTRAMUSCULAR; INTRAVENOUS at 13:50

## 2021-01-01 RX ADMIN — FENTANYL CITRATE 50 MCG: 50 INJECTION INTRAMUSCULAR; INTRAVENOUS at 11:06

## 2021-01-01 RX ADMIN — HYDROMORPHONE HYDROCHLORIDE 2 MG: 2 INJECTION, SOLUTION INTRAMUSCULAR; INTRAVENOUS; SUBCUTANEOUS at 13:13

## 2021-01-01 RX ADMIN — POTASSIUM CHLORIDE 20 MEQ: 14.9 INJECTION, SOLUTION INTRAVENOUS at 09:36

## 2021-01-01 RX ADMIN — FENTANYL CITRATE 50 MCG: 50 INJECTION INTRAMUSCULAR; INTRAVENOUS at 08:38

## 2021-01-01 RX ADMIN — DEXTROSE MONOHYDRATE 50 ML: 25 INJECTION, SOLUTION INTRAVENOUS at 11:36

## 2021-01-01 RX ADMIN — POTASSIUM PHOSPHATE, MONOBASIC AND POTASSIUM PHOSPHATE, DIBASIC 15 MMOL: 224; 236 INJECTION, SOLUTION, CONCENTRATE INTRAVENOUS at 10:00

## 2021-01-01 RX ADMIN — HYDROMORPHONE HYDROCHLORIDE 1 MG: 1 INJECTION, SOLUTION INTRAMUSCULAR; INTRAVENOUS; SUBCUTANEOUS at 00:38

## 2021-01-01 RX ADMIN — ONDANSETRON 4 MG: 2 INJECTION INTRAMUSCULAR; INTRAVENOUS at 20:24

## 2021-01-01 RX ADMIN — FUROSEMIDE 20 MG: 10 INJECTION, SOLUTION INTRAMUSCULAR; INTRAVENOUS at 05:19

## 2021-01-01 RX ADMIN — PROPOFOL 20 MCG/KG/MIN: 10 INJECTION, EMULSION INTRAVENOUS at 22:27

## 2021-01-01 RX ADMIN — PROPOFOL 10 MCG/KG/MIN: 10 INJECTION, EMULSION INTRAVENOUS at 05:32

## 2021-01-01 RX ADMIN — ENOXAPARIN SODIUM 40 MG: 40 INJECTION SUBCUTANEOUS at 05:33

## 2021-01-01 RX ADMIN — PROPOFOL 30 MCG/KG/MIN: 10 INJECTION, EMULSION INTRAVENOUS at 14:02

## 2021-01-01 RX ADMIN — PROPOFOL 25 MCG/KG/MIN: 10 INJECTION, EMULSION INTRAVENOUS at 19:23

## 2021-01-01 RX ADMIN — PROPOFOL 20 MCG/KG/MIN: 10 INJECTION, EMULSION INTRAVENOUS at 03:29

## 2021-01-01 RX ADMIN — HYDROMORPHONE HYDROCHLORIDE 0.5 MG: 2 INJECTION, SOLUTION INTRAMUSCULAR; INTRAVENOUS; SUBCUTANEOUS at 13:08

## 2021-01-01 RX ADMIN — PIPERACILLIN AND TAZOBACTAM 4.5 G: 4; .5 INJECTION, POWDER, LYOPHILIZED, FOR SOLUTION INTRAVENOUS; PARENTERAL at 12:49

## 2021-01-01 RX ADMIN — POTASSIUM CHLORIDE 40 MEQ: 29.8 INJECTION, SOLUTION INTRAVENOUS at 14:27

## 2021-01-01 RX ADMIN — POTASSIUM CHLORIDE 20 MEQ: 14.9 INJECTION, SOLUTION INTRAVENOUS at 19:31

## 2021-01-01 RX ADMIN — POTASSIUM CHLORIDE 10 MEQ: 7.46 INJECTION, SOLUTION INTRAVENOUS at 21:09

## 2021-01-01 RX ADMIN — PIPERACILLIN AND TAZOBACTAM 4.5 G: 4; .5 INJECTION, POWDER, LYOPHILIZED, FOR SOLUTION INTRAVENOUS; PARENTERAL at 20:08

## 2021-01-01 RX ADMIN — FENTANYL CITRATE 100 MCG: 50 INJECTION INTRAMUSCULAR; INTRAVENOUS at 03:14

## 2021-01-01 RX ADMIN — PROPOFOL 20 MCG/KG/MIN: 10 INJECTION, EMULSION INTRAVENOUS at 14:57

## 2021-01-01 RX ADMIN — DEXAMETHASONE SODIUM PHOSPHATE 4 MG: 4 INJECTION, SOLUTION INTRA-ARTICULAR; INTRALESIONAL; INTRAMUSCULAR; INTRAVENOUS; SOFT TISSUE at 20:24

## 2021-01-01 RX ADMIN — PIPERACILLIN AND TAZOBACTAM 3.38 G: 3; .375 INJECTION, POWDER, LYOPHILIZED, FOR SOLUTION INTRAVENOUS; PARENTERAL at 20:11

## 2021-01-01 RX ADMIN — SODIUM CHLORIDE, POTASSIUM CHLORIDE, SODIUM LACTATE AND CALCIUM CHLORIDE: 600; 310; 30; 20 INJECTION, SOLUTION INTRAVENOUS at 12:55

## 2021-01-01 RX ADMIN — PROPOFOL 5 MCG/KG/MIN: 10 INJECTION, EMULSION INTRAVENOUS at 06:45

## 2021-01-01 RX ADMIN — MAGNESIUM SULFATE 2 G: 2 INJECTION INTRAVENOUS at 14:12

## 2021-01-01 RX ADMIN — POTASSIUM CHLORIDE 10 MEQ: 7.46 INJECTION, SOLUTION INTRAVENOUS at 21:47

## 2021-01-01 RX ADMIN — FENTANYL CITRATE 50 MCG: 50 INJECTION INTRAMUSCULAR; INTRAVENOUS at 07:47

## 2021-01-01 RX ADMIN — PIPERACILLIN AND TAZOBACTAM 4.5 G: 4; .5 INJECTION, POWDER, LYOPHILIZED, FOR SOLUTION INTRAVENOUS; PARENTERAL at 21:48

## 2021-01-01 RX ADMIN — FENTANYL CITRATE 100 MCG: 50 INJECTION INTRAMUSCULAR; INTRAVENOUS at 19:53

## 2021-01-01 RX ADMIN — FENTANYL CITRATE 50 MCG: 50 INJECTION INTRAMUSCULAR; INTRAVENOUS at 17:26

## 2021-01-01 RX ADMIN — ENOXAPARIN SODIUM 40 MG: 40 INJECTION SUBCUTANEOUS at 17:25

## 2021-01-01 RX ADMIN — ENOXAPARIN SODIUM 40 MG: 40 INJECTION SUBCUTANEOUS at 05:54

## 2021-01-01 RX ADMIN — FUROSEMIDE 20 MG: 10 INJECTION, SOLUTION INTRAMUSCULAR; INTRAVENOUS at 05:55

## 2021-01-01 RX ADMIN — FENTANYL CITRATE 50 MCG: 50 INJECTION INTRAMUSCULAR; INTRAVENOUS at 18:39

## 2021-01-01 RX ADMIN — NOREPINEPHRINE BITARTRATE 5 MCG/MIN: 1 INJECTION, SOLUTION, CONCENTRATE INTRAVENOUS at 10:09

## 2021-01-01 RX ADMIN — FENTANYL CITRATE 100 MCG: 50 INJECTION INTRAMUSCULAR; INTRAVENOUS at 04:22

## 2021-01-01 RX ADMIN — MAGNESIUM SULFATE HEPTAHYDRATE: 500 INJECTION, SOLUTION INTRAMUSCULAR; INTRAVENOUS at 21:08

## 2021-01-01 RX ADMIN — ONDANSETRON 4 MG: 2 INJECTION INTRAMUSCULAR; INTRAVENOUS at 10:56

## 2021-01-01 RX ADMIN — TEMAZEPAM 15 MG: 15 CAPSULE ORAL at 22:22

## 2021-01-01 RX ADMIN — MAGNESIUM SULFATE 2 G: 2 INJECTION INTRAVENOUS at 17:25

## 2021-01-01 RX ADMIN — FENTANYL CITRATE 50 MCG: 50 INJECTION INTRAMUSCULAR; INTRAVENOUS at 08:47

## 2021-01-01 RX ADMIN — NOREPINEPHRINE BITARTRATE 5 MCG/MIN: 1 INJECTION, SOLUTION, CONCENTRATE INTRAVENOUS at 23:18

## 2021-01-01 RX ADMIN — ONDANSETRON 4 MG: 2 INJECTION INTRAMUSCULAR; INTRAVENOUS at 08:15

## 2021-01-01 RX ADMIN — LINEZOLID 600 MG: 600 INJECTION, SOLUTION INTRAVENOUS at 00:05

## 2021-01-01 RX ADMIN — IOHEXOL 25 ML: 240 INJECTION, SOLUTION INTRATHECAL; INTRAVASCULAR; INTRAVENOUS; ORAL at 11:53

## 2021-01-01 RX ADMIN — PIPERACILLIN AND TAZOBACTAM 4.5 G: 4; .5 INJECTION, POWDER, LYOPHILIZED, FOR SOLUTION INTRAVENOUS; PARENTERAL at 05:55

## 2021-01-01 RX ADMIN — FENTANYL CITRATE 100 MCG: 50 INJECTION INTRAMUSCULAR; INTRAVENOUS at 12:21

## 2021-01-01 RX ADMIN — FENTANYL CITRATE 75 MCG: 50 INJECTION INTRAMUSCULAR; INTRAVENOUS at 01:55

## 2021-01-01 RX ADMIN — PIPERACILLIN AND TAZOBACTAM 3.38 G: 3; .375 INJECTION, POWDER, LYOPHILIZED, FOR SOLUTION INTRAVENOUS; PARENTERAL at 14:57

## 2021-01-01 RX ADMIN — LORAZEPAM 1 MG: 2 INJECTION INTRAMUSCULAR; INTRAVENOUS at 15:45

## 2021-01-01 RX ADMIN — PIPERACILLIN AND TAZOBACTAM 3.38 G: 3; .375 INJECTION, POWDER, LYOPHILIZED, FOR SOLUTION INTRAVENOUS; PARENTERAL at 04:43

## 2021-01-01 RX ADMIN — FUROSEMIDE 20 MG: 10 INJECTION, SOLUTION INTRAVENOUS at 18:13

## 2021-01-01 RX ADMIN — FENTANYL CITRATE 100 MCG: 50 INJECTION INTRAMUSCULAR; INTRAVENOUS at 00:28

## 2021-01-01 RX ADMIN — VASOPRESSIN 0.03 UNITS/MIN: 20 INJECTION INTRAVENOUS at 23:30

## 2021-01-01 RX ADMIN — SODIUM CHLORIDE, POTASSIUM CHLORIDE, SODIUM LACTATE AND CALCIUM CHLORIDE: 600; 310; 30; 20 INJECTION, SOLUTION INTRAVENOUS at 20:18

## 2021-01-01 RX ADMIN — PIPERACILLIN AND TAZOBACTAM 3.38 G: 3; .375 INJECTION, POWDER, LYOPHILIZED, FOR SOLUTION INTRAVENOUS; PARENTERAL at 05:54

## 2021-01-01 RX ADMIN — SODIUM CHLORIDE: 234 INJECTION INTRAMUSCULAR; INTRAVENOUS; SUBCUTANEOUS at 20:09

## 2021-01-01 RX ADMIN — FENTANYL CITRATE 50 MCG: 50 INJECTION INTRAMUSCULAR; INTRAVENOUS at 14:48

## 2021-01-01 RX ADMIN — PIPERACILLIN AND TAZOBACTAM 3.38 G: 3; .375 INJECTION, POWDER, LYOPHILIZED, FOR SOLUTION INTRAVENOUS; PARENTERAL at 05:17

## 2021-01-01 RX ADMIN — ONDANSETRON 4 MG: 2 INJECTION INTRAMUSCULAR; INTRAVENOUS at 04:30

## 2021-01-01 RX ADMIN — HYDROMORPHONE HYDROCHLORIDE 0.5 MG: 2 INJECTION, SOLUTION INTRAMUSCULAR; INTRAVENOUS; SUBCUTANEOUS at 13:34

## 2021-01-01 RX ADMIN — POTASSIUM PHOSPHATE, MONOBASIC AND POTASSIUM PHOSPHATE, DIBASIC 30 MMOL: 224; 236 INJECTION, SOLUTION, CONCENTRATE INTRAVENOUS at 08:53

## 2021-01-01 RX ADMIN — FUROSEMIDE 20 MG: 10 INJECTION, SOLUTION INTRAMUSCULAR; INTRAVENOUS at 05:54

## 2021-01-01 RX ADMIN — PIPERACILLIN AND TAZOBACTAM 3.38 G: 3; .375 INJECTION, POWDER, LYOPHILIZED, FOR SOLUTION INTRAVENOUS; PARENTERAL at 21:10

## 2021-01-01 RX ADMIN — SODIUM CHLORIDE, POTASSIUM CHLORIDE, SODIUM LACTATE AND CALCIUM CHLORIDE 1000 ML: 600; 310; 30; 20 INJECTION, SOLUTION INTRAVENOUS at 03:30

## 2021-01-01 RX ADMIN — FENTANYL CITRATE 50 MCG: 50 INJECTION INTRAMUSCULAR; INTRAVENOUS at 11:49

## 2021-01-01 RX ADMIN — HYDROMORPHONE HYDROCHLORIDE 4 MG: 2 INJECTION, SOLUTION INTRAMUSCULAR; INTRAVENOUS; SUBCUTANEOUS at 15:45

## 2021-01-01 RX ADMIN — PIPERACILLIN AND TAZOBACTAM 4.5 G: 4; .5 INJECTION, POWDER, LYOPHILIZED, FOR SOLUTION INTRAVENOUS; PARENTERAL at 20:21

## 2021-01-01 RX ADMIN — FENTANYL CITRATE 50 MCG: 50 INJECTION INTRAMUSCULAR; INTRAVENOUS at 08:30

## 2021-01-01 RX ADMIN — FENTANYL CITRATE 50 MCG: 50 INJECTION INTRAMUSCULAR; INTRAVENOUS at 05:34

## 2021-01-01 RX ADMIN — LORAZEPAM 1 MG: 2 INJECTION INTRAMUSCULAR; INTRAVENOUS at 13:57

## 2021-01-01 RX ADMIN — PIPERACILLIN AND TAZOBACTAM 4.5 G: 4; .5 INJECTION, POWDER, LYOPHILIZED, FOR SOLUTION INTRAVENOUS; PARENTERAL at 05:39

## 2021-01-01 RX ADMIN — FENTANYL CITRATE 50 MCG: 50 INJECTION INTRAMUSCULAR; INTRAVENOUS at 05:36

## 2021-01-01 RX ADMIN — VASOPRESSIN 0.03 UNITS/MIN: 20 INJECTION INTRAVENOUS at 04:30

## 2021-01-01 RX ADMIN — FENTANYL CITRATE 100 MCG: 50 INJECTION INTRAMUSCULAR; INTRAVENOUS at 03:48

## 2021-01-01 RX ADMIN — SUCCINYLCHOLINE CHLORIDE 200 MG: 20 INJECTION, SOLUTION INTRAMUSCULAR; INTRAVENOUS; PARENTERAL at 06:00

## 2021-01-01 RX ADMIN — POTASSIUM CHLORIDE 20 MEQ: 14.9 INJECTION, SOLUTION INTRAVENOUS at 20:53

## 2021-01-01 RX ADMIN — ALTEPLASE 2 MG: 2.2 INJECTION, POWDER, LYOPHILIZED, FOR SOLUTION INTRAVENOUS at 12:40

## 2021-01-01 RX ADMIN — POTASSIUM CHLORIDE 40 MEQ: 29.8 INJECTION, SOLUTION INTRAVENOUS at 08:48

## 2021-01-01 RX ADMIN — ROCURONIUM BROMIDE 50 MG: 10 INJECTION, SOLUTION INTRAVENOUS at 20:53

## 2021-01-01 RX ADMIN — POTASSIUM PHOSPHATE, MONOBASIC AND POTASSIUM PHOSPHATE, DIBASIC 30 MMOL: 224; 236 INJECTION, SOLUTION, CONCENTRATE INTRAVENOUS at 10:01

## 2021-01-01 RX ADMIN — SODIUM CHLORIDE 355 MG: 9 INJECTION, SOLUTION INTRAVENOUS at 01:52

## 2021-01-01 RX ADMIN — POTASSIUM CHLORIDE 20 MEQ: 14.9 INJECTION, SOLUTION INTRAVENOUS at 16:46

## 2021-01-01 RX ADMIN — FENTANYL CITRATE 100 MCG: 50 INJECTION INTRAMUSCULAR; INTRAVENOUS at 12:29

## 2021-01-01 RX ADMIN — FENTANYL CITRATE 100 MCG: 50 INJECTION INTRAMUSCULAR; INTRAVENOUS at 03:42

## 2021-01-01 RX ADMIN — FENTANYL CITRATE 100 MCG: 50 INJECTION INTRAMUSCULAR; INTRAVENOUS at 00:31

## 2021-01-01 RX ADMIN — POTASSIUM PHOSPHATE, MONOBASIC AND POTASSIUM PHOSPHATE, DIBASIC 30 MMOL: 224; 236 INJECTION, SOLUTION, CONCENTRATE INTRAVENOUS at 09:55

## 2021-01-01 RX ADMIN — HYDROMORPHONE HYDROCHLORIDE 4 MG: 2 INJECTION, SOLUTION INTRAMUSCULAR; INTRAVENOUS; SUBCUTANEOUS at 13:57

## 2021-01-01 RX ADMIN — PROPOFOL 30 MCG/KG/MIN: 10 INJECTION, EMULSION INTRAVENOUS at 00:23

## 2021-01-01 RX ADMIN — SODIUM CHLORIDE: 234 INJECTION INTRAMUSCULAR; INTRAVENOUS; SUBCUTANEOUS at 20:21

## 2021-01-01 RX ADMIN — ONDANSETRON 4 MG: 2 INJECTION INTRAMUSCULAR; INTRAVENOUS at 23:00

## 2021-01-01 RX ADMIN — SODIUM CHLORIDE, POTASSIUM CHLORIDE, SODIUM LACTATE AND CALCIUM CHLORIDE 1000 ML: 600; 310; 30; 20 INJECTION, SOLUTION INTRAVENOUS at 20:25

## 2021-01-01 RX ADMIN — LORAZEPAM 1 MG: 2 INJECTION INTRAMUSCULAR; INTRAVENOUS at 14:41

## 2021-01-01 RX ADMIN — SODIUM CHLORIDE, POTASSIUM CHLORIDE, SODIUM LACTATE AND CALCIUM CHLORIDE 4000 ML: 600; 310; 30; 20 INJECTION, SOLUTION INTRAVENOUS at 00:11

## 2021-01-01 RX ADMIN — SODIUM CHLORIDE: 234 INJECTION INTRAMUSCULAR; INTRAVENOUS; SUBCUTANEOUS at 19:57

## 2021-01-01 RX ADMIN — PIPERACILLIN AND TAZOBACTAM 3.38 G: 3; .375 INJECTION, POWDER, LYOPHILIZED, FOR SOLUTION INTRAVENOUS; PARENTERAL at 12:42

## 2021-01-01 RX ADMIN — POTASSIUM PHOSPHATE, MONOBASIC AND POTASSIUM PHOSPHATE, DIBASIC 30 MMOL: 224; 236 INJECTION, SOLUTION, CONCENTRATE INTRAVENOUS at 08:20

## 2021-01-01 RX ADMIN — FUROSEMIDE 20 MG: 10 INJECTION, SOLUTION INTRAMUSCULAR; INTRAVENOUS at 14:12

## 2021-01-01 RX ADMIN — PROPOFOL 20 MCG/KG/MIN: 10 INJECTION, EMULSION INTRAVENOUS at 14:28

## 2021-01-01 RX ADMIN — FENTANYL CITRATE 25 MCG: 50 INJECTION, SOLUTION INTRAMUSCULAR; INTRAVENOUS at 17:01

## 2021-01-01 RX ADMIN — IOHEXOL 100 ML: 350 INJECTION, SOLUTION INTRAVENOUS at 11:52

## 2021-01-01 RX ADMIN — MIDAZOLAM HYDROCHLORIDE 0.5 MG: 1 INJECTION, SOLUTION INTRAMUSCULAR; INTRAVENOUS at 16:58

## 2021-01-01 RX ADMIN — PROPOFOL 30 MCG/KG/MIN: 10 INJECTION, EMULSION INTRAVENOUS at 23:49

## 2021-01-01 RX ADMIN — HYDROMORPHONE HYDROCHLORIDE 1 MG: 1 INJECTION, SOLUTION INTRAMUSCULAR; INTRAVENOUS; SUBCUTANEOUS at 00:57

## 2021-01-01 RX ADMIN — ROCURONIUM BROMIDE 50 MG: 10 INJECTION, SOLUTION INTRAVENOUS at 23:15

## 2021-01-01 RX ADMIN — SODIUM CHLORIDE, POTASSIUM CHLORIDE, SODIUM LACTATE AND CALCIUM CHLORIDE: 600; 310; 30; 20 INJECTION, SOLUTION INTRAVENOUS at 14:11

## 2021-01-01 RX ADMIN — EPOETIN ALFA-EPBX 6000 UNITS: 3000 INJECTION, SOLUTION INTRAVENOUS; SUBCUTANEOUS at 00:00

## 2021-01-01 RX ADMIN — SODIUM CHLORIDE, POTASSIUM CHLORIDE, SODIUM LACTATE AND CALCIUM CHLORIDE: 600; 310; 30; 20 INJECTION, SOLUTION INTRAVENOUS at 10:33

## 2021-01-01 RX ADMIN — PROPOFOL 10 MCG/KG/MIN: 10 INJECTION, EMULSION INTRAVENOUS at 17:10

## 2021-01-01 RX ADMIN — CALCIUM CHLORIDE 1000 MG: 100 INJECTION, SOLUTION INTRAVENOUS at 09:56

## 2021-01-01 RX ADMIN — SODIUM CHLORIDE, POTASSIUM CHLORIDE, SODIUM LACTATE AND CALCIUM CHLORIDE 1000 ML: 600; 310; 30; 20 INJECTION, SOLUTION INTRAVENOUS at 13:40

## 2021-01-01 RX ADMIN — PIPERACILLIN AND TAZOBACTAM 3.38 G: 3; .375 INJECTION, POWDER, LYOPHILIZED, FOR SOLUTION INTRAVENOUS; PARENTERAL at 05:32

## 2021-01-01 RX ADMIN — PROPOFOL 20 MCG/KG/MIN: 10 INJECTION, EMULSION INTRAVENOUS at 20:29

## 2021-01-01 RX ADMIN — FENTANYL CITRATE 100 MCG: 50 INJECTION INTRAMUSCULAR; INTRAVENOUS at 09:58

## 2021-01-01 RX ADMIN — IOHEXOL 25 ML: 240 INJECTION, SOLUTION INTRATHECAL; INTRAVASCULAR; INTRAVENOUS; ORAL at 13:30

## 2021-01-01 RX ADMIN — ENOXAPARIN SODIUM 40 MG: 40 INJECTION SUBCUTANEOUS at 17:52

## 2021-01-01 RX ADMIN — SODIUM CHLORIDE, POTASSIUM CHLORIDE, SODIUM LACTATE AND CALCIUM CHLORIDE 1000 ML: 600; 310; 30; 20 INJECTION, SOLUTION INTRAVENOUS at 20:20

## 2021-01-01 RX ADMIN — PIPERACILLIN AND TAZOBACTAM 4.5 G: 4; .5 INJECTION, POWDER, LYOPHILIZED, FOR SOLUTION INTRAVENOUS; PARENTERAL at 00:35

## 2021-01-01 RX ADMIN — SODIUM CHLORIDE 250 MG: 9 INJECTION, SOLUTION INTRAVENOUS at 18:19

## 2021-01-01 RX ADMIN — FOLIC ACID: 5 INJECTION, SOLUTION INTRAMUSCULAR; INTRAVENOUS; SUBCUTANEOUS at 19:24

## 2021-01-01 RX ADMIN — HYDROMORPHONE HYDROCHLORIDE 1 MG: 1 INJECTION, SOLUTION INTRAMUSCULAR; INTRAVENOUS; SUBCUTANEOUS at 21:58

## 2021-01-01 RX ADMIN — HYDROMORPHONE HYDROCHLORIDE 1 MG: 1 INJECTION, SOLUTION INTRAMUSCULAR; INTRAVENOUS; SUBCUTANEOUS at 22:32

## 2021-01-01 RX ADMIN — HYDROMORPHONE HYDROCHLORIDE 1 MG: 1 INJECTION, SOLUTION INTRAMUSCULAR; INTRAVENOUS; SUBCUTANEOUS at 03:52

## 2021-01-01 RX ADMIN — PROPOFOL 20 MCG/KG/MIN: 10 INJECTION, EMULSION INTRAVENOUS at 12:54

## 2021-01-01 ASSESSMENT — ENCOUNTER SYMPTOMS
ABDOMINAL PAIN: 1
COUGH: 0
DIARRHEA: 0
COUGH: 1
SHORTNESS OF BREATH: 0
WEAKNESS: 0
NEUROLOGICAL NEGATIVE: 1
BRUISES/BLEEDS EASILY: 0
MYALGIAS: 0
CHILLS: 0
SHORTNESS OF BREATH: 0
SHORTNESS OF BREATH: 1
NERVOUS/ANXIOUS: 1
MUSCULOSKELETAL NEGATIVE: 1
EYES NEGATIVE: 1
FALLS: 0
FEVER: 0
FATIGUE: 1
CHILLS: 0
NERVOUS/ANXIOUS: 1
NEUROLOGICAL NEGATIVE: 1
VOMITING: 1
CHEST TIGHTNESS: 0
ABDOMINAL PAIN: 1
CHEST TIGHTNESS: 0
WHEEZING: 0
CHEST TIGHTNESS: 0
HEADACHES: 0
FEVER: 0
NAUSEA: 1
FOCAL WEAKNESS: 0
FLANK PAIN: 0
WHEEZING: 0
DOUBLE VISION: 0
ABDOMINAL PAIN: 1
SLEEP DISTURBANCE: 1
CONSTIPATION: 0
ABDOMINAL PAIN: 1
BLURRED VISION: 0
NEUROLOGICAL NEGATIVE: 1
PND: 0
CONSTIPATION: 0
BLOOD IN STOOL: 0
SHORTNESS OF BREATH: 1
CONSTIPATION: 0
SLEEP DISTURBANCE: 1
FATIGUE: 1
NEUROLOGICAL NEGATIVE: 1
FATIGUE: 1
CHILLS: 0
DIAPHORESIS: 0
WHEEZING: 0
DIAPHORESIS: 0
DIZZINESS: 0
HEARTBURN: 1
CHILLS: 0
SLEEP DISTURBANCE: 1
NERVOUS/ANXIOUS: 1
COUGH: 1
DEPRESSION: 0
DIAPHORESIS: 0
COUGH: 0
WHEEZING: 0

## 2021-01-01 ASSESSMENT — COGNITIVE AND FUNCTIONAL STATUS - GENERAL
HELP NEEDED FOR BATHING: TOTAL
MOVING TO AND FROM BED TO CHAIR: UNABLE
DAILY ACTIVITIY SCORE: 6
MOBILITY SCORE: 6
MOVING FROM LYING ON BACK TO SITTING ON SIDE OF FLAT BED: UNABLE
TURNING FROM BACK TO SIDE WHILE IN FLAT BAD: UNABLE
TURNING FROM BACK TO SIDE WHILE IN FLAT BAD: UNABLE
CLIMB 3 TO 5 STEPS WITH RAILING: TOTAL
TURNING FROM BACK TO SIDE WHILE IN FLAT BAD: UNABLE
DRESSING REGULAR LOWER BODY CLOTHING: TOTAL
MOVING FROM LYING ON BACK TO SITTING ON SIDE OF FLAT BED: UNABLE
MOBILITY SCORE: 6
DRESSING REGULAR UPPER BODY CLOTHING: TOTAL
MOVING FROM LYING ON BACK TO SITTING ON SIDE OF FLAT BED: UNABLE
STANDING UP FROM CHAIR USING ARMS: TOTAL
CLIMB 3 TO 5 STEPS WITH RAILING: TOTAL
CLIMB 3 TO 5 STEPS WITH RAILING: TOTAL
SUGGESTED CMS G CODE MODIFIER MOBILITY: CN
SUGGESTED CMS G CODE MODIFIER MOBILITY: CN
STANDING UP FROM CHAIR USING ARMS: TOTAL
TOILETING: TOTAL
MOVING TO AND FROM BED TO CHAIR: UNABLE
SUGGESTED CMS G CODE MODIFIER MOBILITY: CN
PERSONAL GROOMING: TOTAL
WALKING IN HOSPITAL ROOM: TOTAL
STANDING UP FROM CHAIR USING ARMS: TOTAL
WALKING IN HOSPITAL ROOM: TOTAL
MOBILITY SCORE: 6
WALKING IN HOSPITAL ROOM: TOTAL
MOVING TO AND FROM BED TO CHAIR: UNABLE
EATING MEALS: TOTAL
SUGGESTED CMS G CODE MODIFIER DAILY ACTIVITY: CN

## 2021-01-01 ASSESSMENT — PATIENT HEALTH QUESTIONNAIRE - PHQ9
SUM OF ALL RESPONSES TO PHQ9 QUESTIONS 1 AND 2: 0
2. FEELING DOWN, DEPRESSED, IRRITABLE, OR HOPELESS: NOT AT ALL
SUM OF ALL RESPONSES TO PHQ9 QUESTIONS 1 AND 2: 0
1. LITTLE INTEREST OR PLEASURE IN DOING THINGS: NOT AT ALL
2. FEELING DOWN, DEPRESSED, IRRITABLE, OR HOPELESS: NOT AT ALL
1. LITTLE INTEREST OR PLEASURE IN DOING THINGS: NOT AT ALL

## 2021-01-01 ASSESSMENT — PULMONARY FUNCTION TESTS
FVC: 1.1
FVC: .7
FVC: 1.5
FVC: .55
FVC: .5
FVC: .8
FVC: .6

## 2021-01-01 ASSESSMENT — FIBROSIS 4 INDEX
FIB4 SCORE: 4.89
FIB4 SCORE: 7.08
FIB4 SCORE: 1.59
FIB4 SCORE: 0.66
FIB4 SCORE: 7.15
FIB4 SCORE: 0.65
FIB4 SCORE: 1.28
FIB4 SCORE: 7.08
FIB4 SCORE: 6.07
FIB4 SCORE: 2.13
FIB4 SCORE: 0.8
FIB4 SCORE: 6.23
FIB4 SCORE: 7.15
FIB4 SCORE: 4.84
FIB4 SCORE: 1.1

## 2021-01-01 ASSESSMENT — PAIN SCALES - GENERAL
PAIN_LEVEL: 0
PAIN_LEVEL: 0

## 2021-01-01 ASSESSMENT — COPD QUESTIONNAIRES
COPD SCREENING SCORE: 4
DO YOU EVER COUGH UP ANY MUCUS OR PHLEGM?: NO/ONLY WITH OCCASIONAL COLDS OR INFECTIONS
COPD SCREENING SCORE: 4
HAVE YOU SMOKED AT LEAST 100 CIGARETTES IN YOUR ENTIRE LIFE: NO/DON'T KNOW
DO YOU EVER COUGH UP ANY MUCUS OR PHLEGM?: NO/ONLY WITH OCCASIONAL COLDS OR INFECTIONS
DURING THE PAST 4 WEEKS HOW MUCH DID YOU FEEL SHORT OF BREATH: SOME OF THE TIME
HAVE YOU SMOKED AT LEAST 100 CIGARETTES IN YOUR ENTIRE LIFE: NO/DON'T KNOW
DURING THE PAST 4 WEEKS HOW MUCH DID YOU FEEL SHORT OF BREATH: SOME OF THE TIME

## 2021-01-01 ASSESSMENT — GAIT ASSESSMENTS
GAIT LEVEL OF ASSIST: UNABLE TO PARTICIPATE

## 2021-01-01 ASSESSMENT — ACTIVITIES OF DAILY LIVING (ADL): TOILETING: INDEPENDENT

## 2021-01-01 ASSESSMENT — LIFESTYLE VARIABLES: SUBSTANCE_ABUSE: 0

## 2021-01-23 PROBLEM — Z85.3 PERSONAL HISTORY OF MALIGNANT NEOPLASM OF BREAST: Status: RESOLVED | Noted: 2020-04-12 | Resolved: 2021-01-23

## 2021-02-17 ENCOUNTER — TELEPHONE (OUTPATIENT)
Dept: ENDOCRINOLOGY | Facility: CLINIC | Age: 66
End: 2021-02-17

## 2021-02-18 NOTE — TELEPHONE ENCOUNTER
Please order CMP, Phosphorus, Magnesium, PTH, vitamin D level, A1C, lipids, urine microalbumin along with the labs ordered by her family doctor Dr Jt Barrera in December 2020 c-peptide and BRIDGET Antibody     She also didn't do the US thyroid, DEXA scan, or the nuclear medicine parathyroid scan

## 2021-02-19 DIAGNOSIS — I10 HYPERTENSION GOAL BP (BLOOD PRESSURE) < 140/90: ICD-10-CM

## 2021-02-19 DIAGNOSIS — E11.65 UNCONTROLLED TYPE 2 DIABETES MELLITUS WITH HYPERGLYCEMIA (HCC): ICD-10-CM

## 2021-02-19 DIAGNOSIS — M85.80 OSTEOPENIA, UNSPECIFIED LOCATION: ICD-10-CM

## 2021-02-19 DIAGNOSIS — E21.3 HYPERPARATHYROIDISM (HCC): Primary | ICD-10-CM

## 2021-02-19 DIAGNOSIS — E83.52 HYPERCALCEMIA: ICD-10-CM

## 2021-02-19 DIAGNOSIS — E55.9 VITAMIN D DEFICIENCY: ICD-10-CM

## 2021-02-19 DIAGNOSIS — E78.00 PURE HYPERCHOLESTEROLEMIA: ICD-10-CM

## 2021-02-19 NOTE — TELEPHONE ENCOUNTER
Labs ordered  Called and spoke to patient, advised of all labs and scans to be completed  Placed in mail  She understood

## 2021-03-09 ENCOUNTER — TRANSCRIBE ORDERS (OUTPATIENT)
Dept: LAB | Facility: CLINIC | Age: 66
End: 2021-03-09

## 2021-03-09 ENCOUNTER — LAB (OUTPATIENT)
Dept: LAB | Facility: CLINIC | Age: 66
End: 2021-03-09
Payer: COMMERCIAL

## 2021-03-09 ENCOUNTER — TELEPHONE (OUTPATIENT)
Dept: OTHER | Facility: OTHER | Age: 66
End: 2021-03-09

## 2021-03-09 ENCOUNTER — TELEPHONE (OUTPATIENT)
Dept: OTHER | Facility: HOSPITAL | Age: 66
End: 2021-03-09

## 2021-03-09 DIAGNOSIS — E78.00 PURE HYPERCHOLESTEROLEMIA: ICD-10-CM

## 2021-03-09 DIAGNOSIS — E11.65 UNCONTROLLED TYPE 2 DIABETES MELLITUS WITH HYPERGLYCEMIA (HCC): ICD-10-CM

## 2021-03-09 DIAGNOSIS — I10 HYPERTENSION GOAL BP (BLOOD PRESSURE) < 140/90: ICD-10-CM

## 2021-03-09 DIAGNOSIS — Z79.4 TYPE 2 DIABETES MELLITUS WITH OTHER DIABETIC KIDNEY COMPLICATION, WITH LONG-TERM CURRENT USE OF INSULIN (HCC): Primary | ICD-10-CM

## 2021-03-09 DIAGNOSIS — I50.41 ACUTE COMBINED SYSTOLIC AND DIASTOLIC CONGESTIVE HEART FAILURE (HCC): ICD-10-CM

## 2021-03-09 DIAGNOSIS — E21.3 HYPERPARATHYROIDISM (HCC): ICD-10-CM

## 2021-03-09 DIAGNOSIS — E83.52 HYPERCALCEMIA: ICD-10-CM

## 2021-03-09 DIAGNOSIS — Z79.4 TYPE 2 DIABETES MELLITUS WITH OTHER DIABETIC KIDNEY COMPLICATION, WITH LONG-TERM CURRENT USE OF INSULIN (HCC): ICD-10-CM

## 2021-03-09 DIAGNOSIS — E55.9 VITAMIN D DEFICIENCY: ICD-10-CM

## 2021-03-09 DIAGNOSIS — M85.80 OSTEOPENIA, UNSPECIFIED LOCATION: ICD-10-CM

## 2021-03-09 DIAGNOSIS — E11.29 TYPE 2 DIABETES MELLITUS WITH OTHER DIABETIC KIDNEY COMPLICATION, WITH LONG-TERM CURRENT USE OF INSULIN (HCC): Primary | ICD-10-CM

## 2021-03-09 DIAGNOSIS — E11.29 TYPE 2 DIABETES MELLITUS WITH OTHER DIABETIC KIDNEY COMPLICATION, WITH LONG-TERM CURRENT USE OF INSULIN (HCC): ICD-10-CM

## 2021-03-09 LAB
25(OH)D3 SERPL-MCNC: 7.7 NG/ML (ref 30–100)
ALBUMIN SERPL BCP-MCNC: 3.6 G/DL (ref 3.5–5)
ALP SERPL-CCNC: 118 U/L (ref 46–116)
ALT SERPL W P-5'-P-CCNC: 22 U/L (ref 12–78)
ANION GAP SERPL CALCULATED.3IONS-SCNC: 3 MMOL/L (ref 4–13)
AST SERPL W P-5'-P-CCNC: 15 U/L (ref 5–45)
BILIRUB SERPL-MCNC: 0.54 MG/DL (ref 0.2–1)
BUN SERPL-MCNC: 15 MG/DL (ref 5–25)
CALCIUM SERPL-MCNC: 12.3 MG/DL (ref 8.3–10.1)
CHLORIDE SERPL-SCNC: 112 MMOL/L (ref 100–108)
CHOLEST SERPL-MCNC: 144 MG/DL (ref 50–200)
CO2 SERPL-SCNC: 27 MMOL/L (ref 21–32)
CREAT SERPL-MCNC: 1.12 MG/DL (ref 0.6–1.3)
CREAT UR-MCNC: 298 MG/DL
EST. AVERAGE GLUCOSE BLD GHB EST-MCNC: 171 MG/DL
GFR SERPL CREATININE-BSD FRML MDRD: 59 ML/MIN/1.73SQ M
GLUCOSE P FAST SERPL-MCNC: 136 MG/DL (ref 65–99)
HBA1C MFR BLD: 7.6 %
HDLC SERPL-MCNC: 64 MG/DL
LDLC SERPL CALC-MCNC: 65 MG/DL (ref 0–100)
MAGNESIUM SERPL-MCNC: 1.6 MG/DL (ref 1.6–2.6)
MICROALBUMIN UR-MCNC: 182 MG/L (ref 0–20)
MICROALBUMIN/CREAT 24H UR: 61 MG/G CREATININE (ref 0–30)
NONHDLC SERPL-MCNC: 80 MG/DL
PHOSPHATE SERPL-MCNC: 2.4 MG/DL (ref 2.3–4.1)
POTASSIUM SERPL-SCNC: 4.7 MMOL/L (ref 3.5–5.3)
PROT SERPL-MCNC: 7.7 G/DL (ref 6.4–8.2)
PTH-INTACT SERPL-MCNC: 391.6 PG/ML (ref 18.4–80.1)
SODIUM SERPL-SCNC: 142 MMOL/L (ref 136–145)
TRIGL SERPL-MCNC: 76 MG/DL

## 2021-03-09 PROCEDURE — 80053 COMPREHEN METABOLIC PANEL: CPT

## 2021-03-09 PROCEDURE — 83970 ASSAY OF PARATHORMONE: CPT

## 2021-03-09 PROCEDURE — 3051F HG A1C>EQUAL 7.0%<8.0%: CPT | Performed by: SPECIALIST

## 2021-03-09 PROCEDURE — 3060F POS MICROALBUMINURIA REV: CPT | Performed by: SPECIALIST

## 2021-03-09 PROCEDURE — 82570 ASSAY OF URINE CREATININE: CPT

## 2021-03-09 PROCEDURE — 82306 VITAMIN D 25 HYDROXY: CPT

## 2021-03-09 PROCEDURE — 82043 UR ALBUMIN QUANTITATIVE: CPT

## 2021-03-09 PROCEDURE — 83036 HEMOGLOBIN GLYCOSYLATED A1C: CPT

## 2021-03-09 PROCEDURE — 86341 ISLET CELL ANTIBODY: CPT

## 2021-03-09 PROCEDURE — 83735 ASSAY OF MAGNESIUM: CPT

## 2021-03-09 PROCEDURE — 80061 LIPID PANEL: CPT

## 2021-03-09 PROCEDURE — 84100 ASSAY OF PHOSPHORUS: CPT

## 2021-03-09 PROCEDURE — 3066F NEPHROPATHY DOC TX: CPT | Performed by: SPECIALIST

## 2021-03-09 PROCEDURE — 84681 ASSAY OF C-PEPTIDE: CPT

## 2021-03-09 PROCEDURE — 36415 COLL VENOUS BLD VENIPUNCTURE: CPT

## 2021-03-09 PROCEDURE — 86337 INSULIN ANTIBODIES: CPT

## 2021-03-10 DIAGNOSIS — E21.3 HYPERPARATHYROIDISM (HCC): Primary | ICD-10-CM

## 2021-03-10 DIAGNOSIS — E83.52 HYPERCALCEMIA: ICD-10-CM

## 2021-03-10 LAB — C PEPTIDE SERPL-MCNC: 3.8 NG/ML (ref 1.1–4.4)

## 2021-03-10 RX ORDER — CINACALCET 30 MG/1
30 TABLET, FILM COATED ORAL 2 TIMES DAILY
Qty: 180 TABLET | Refills: 0 | Status: SHIPPED | OUTPATIENT
Start: 2021-03-10 | End: 2021-04-19

## 2021-03-10 NOTE — TELEPHONE ENCOUNTER
TigerText:    [phone]/ Tara/ [facility]/ Pt Deshawn Zamora  [phone]/ Critical calcium result

## 2021-03-10 NOTE — TELEPHONE ENCOUNTER
I tried calling patient twice on mobile # and twice on home # no     -please let pt know calcium level increased to 12 3 which is now quite high  -please start cinacalcet 30mg BID (ordered to her pharmacy) which is a pill to help reduce parathyroid hormone levels and reduce calcium levels    Please see ENT ASAP to discuss parathyroid surgery along with having the parathyroid nuclear scan done along with the US thyroid that was previously ordered     Stay well hydrated, repeat CMP in 1 week to re-evaluate calcium level (ordered)

## 2021-03-10 NOTE — TELEPHONE ENCOUNTER
I was called for critical lab result of Calcium: 12 3 at 9:30 pm  Last calcium were 11 3 and 11 5  Will rout to office to address tomorrow morning

## 2021-03-11 ENCOUNTER — TELEPHONE (OUTPATIENT)
Dept: ENDOCRINOLOGY | Facility: CLINIC | Age: 66
End: 2021-03-11

## 2021-03-11 DIAGNOSIS — E83.52 HYPERCALCEMIA: ICD-10-CM

## 2021-03-11 DIAGNOSIS — E21.3 HYPERPARATHYROIDISM (HCC): Primary | ICD-10-CM

## 2021-03-11 NOTE — TELEPHONE ENCOUNTER
Called patient left message to call office to discuss alternative for Sensipar  Called pharmacy and was advised $100 is monthly

## 2021-03-11 NOTE — TELEPHONE ENCOUNTER
I tried calling patient on home and mobile number no      Liv Fitzpatrick- please try calling her again and if sensipar is too expensive will try to give IV bisphosphonate to help lower calcium level until she can see Dr Arloa Dancer     Also if you can let me know the best phone # to reach her thanks

## 2021-03-11 NOTE — TELEPHONE ENCOUNTER
Unfortunately not- we can do a 1 month supply if that is more affordable    It is the only medication to help with the calcium to hold her over until she sees ENT

## 2021-03-11 NOTE — TELEPHONE ENCOUNTER
patient called ans stated that Sensipar will cost $100 a month  She can not afford that, is there something else she can take?

## 2021-03-11 NOTE — TELEPHONE ENCOUNTER
We can consider an IV bisphosphonate at the infusion center if she cannot afford the cinacalcet     Let me know

## 2021-03-11 NOTE — TELEPHONE ENCOUNTER
Patient returned called, went over labs results in detail  Advised  to call Dr Waqas moody to schedule appt and also schedule appt for NM parathyroid and US thyroid  She advised she was going to call today

## 2021-03-12 NOTE — TELEPHONE ENCOUNTER
Tried to leave a message with appointment time and date for infusion, but mailbox full    The appointment for the pamidronate IV 60mg infusion is March 18th at 10:00 am

## 2021-03-12 NOTE — TELEPHONE ENCOUNTER
I spoke with patient, she cannot afford the sensipar right now, possibly would be able to afford it if receives her stimulus check soon  Will order IV pamidronate 60mg once to be given at [facility] infusion center next week  Counseled on adverse side effects of pamidronate including osteonecrosis of the jaw and flu like symptoms for a few days after infusion  She understood and wished to proceed       Encouraged continued hydration    Repeat CMP calcium level 1 week after infusion    Message sent to Dr Varghese Genao to see if ENT appt can be moved up sooner      **please assist setting up infusion at [facility] 3/18/21

## 2021-03-17 ENCOUNTER — HOSPITAL ENCOUNTER (OUTPATIENT)
Dept: RADIOLOGY | Facility: HOSPITAL | Age: 66
Discharge: HOME | End: 2021-03-17
Attending: INTERNAL MEDICINE
Payer: COMMERCIAL

## 2021-03-17 ENCOUNTER — TELEPHONE (OUTPATIENT)
Dept: OTOLARYNGOLOGY | Facility: CLINIC | Age: 66
End: 2021-03-17

## 2021-03-17 DIAGNOSIS — E11.65 TYPE 2 DIABETES MELLITUS WITH HYPERGLYCEMIA, WITHOUT LONG-TERM CURRENT USE OF INSULIN (HCC): ICD-10-CM

## 2021-03-17 DIAGNOSIS — E78.00 PURE HYPERCHOLESTEROLEMIA: ICD-10-CM

## 2021-03-17 DIAGNOSIS — E21.3 HYPERPARATHYROIDISM (HCC): ICD-10-CM

## 2021-03-17 DIAGNOSIS — E11.65 UNCONTROLLED TYPE 2 DIABETES MELLITUS WITH HYPERGLYCEMIA (HCC): ICD-10-CM

## 2021-03-17 DIAGNOSIS — E83.52 HYPERCALCEMIA: ICD-10-CM

## 2021-03-17 DIAGNOSIS — I10 HYPERTENSION GOAL BP (BLOOD PRESSURE) < 140/90: ICD-10-CM

## 2021-03-17 PROCEDURE — 76536 US EXAM OF HEAD AND NECK: CPT

## 2021-03-17 PROCEDURE — 77080 DXA BONE DENSITY AXIAL: CPT

## 2021-03-17 NOTE — TELEPHONE ENCOUNTER
I left a message for Sofiya to call back and move her appt up to March 29th    If she calls back please schedule her on March 29th between 2pm and 3pm   Also just as an FYI if you need to leave her an additional message you can only do so on the cell phone

## 2021-03-18 ENCOUNTER — HOSPITAL ENCOUNTER (OUTPATIENT)
Dept: INFUSION CENTER | Facility: HOSPITAL | Age: 66
Discharge: HOME | End: 2021-03-18
Attending: INTERNAL MEDICINE
Payer: COMMERCIAL

## 2021-03-18 ENCOUNTER — TELEPHONE (OUTPATIENT)
Dept: ENDOCRINOLOGY | Facility: CLINIC | Age: 66
End: 2021-03-18

## 2021-03-18 VITALS
HEART RATE: 80 BPM | TEMPERATURE: 97.7 F | SYSTOLIC BLOOD PRESSURE: 144 MMHG | WEIGHT: 129.19 LBS | BODY MASS INDEX: 22.06 KG/M2 | DIASTOLIC BLOOD PRESSURE: 65 MMHG | RESPIRATION RATE: 16 BRPM | HEIGHT: 64 IN | OXYGEN SATURATION: 97 %

## 2021-03-18 DIAGNOSIS — E21.3 HYPERPARATHYROIDISM (HCC): ICD-10-CM

## 2021-03-18 DIAGNOSIS — E83.52 HYPERCALCEMIA: Primary | ICD-10-CM

## 2021-03-18 LAB
ALBUMIN SERPL BCP-MCNC: 3.4 G/DL (ref 3.5–5)
ALP SERPL-CCNC: 130 U/L (ref 46–116)
ALT SERPL W P-5'-P-CCNC: 28 U/L (ref 12–78)
ANION GAP SERPL CALCULATED.3IONS-SCNC: 9 MMOL/L (ref 4–13)
AST SERPL W P-5'-P-CCNC: 19 U/L (ref 5–45)
BILIRUB SERPL-MCNC: 0.3 MG/DL (ref 0.2–1)
BUN SERPL-MCNC: 12 MG/DL (ref 5–25)
CALCIUM ALBUM COR SERPL-MCNC: 12.2 MG/DL (ref 8.3–10.1)
CALCIUM SERPL-MCNC: 11.7 MG/DL (ref 8.3–10.1)
CHLORIDE SERPL-SCNC: 102 MMOL/L (ref 100–108)
CO2 SERPL-SCNC: 28 MMOL/L (ref 21–32)
CREAT SERPL-MCNC: 1.22 MG/DL (ref 0.6–1.3)
GFR SERPL CREATININE-BSD FRML MDRD: 53 ML/MIN/1.73SQ M
GLUCOSE SERPL-MCNC: 154 MG/DL (ref 65–140)
POTASSIUM SERPL-SCNC: 3.5 MMOL/L (ref 3.5–5.3)
PROT SERPL-MCNC: 7.8 G/DL (ref 6.4–8.2)
SODIUM SERPL-SCNC: 139 MMOL/L (ref 136–145)

## 2021-03-18 PROCEDURE — 80053 COMPREHEN METABOLIC PANEL: CPT | Performed by: INTERNAL MEDICINE

## 2021-03-18 PROCEDURE — 96366 THER/PROPH/DIAG IV INF ADDON: CPT

## 2021-03-18 PROCEDURE — 96365 THER/PROPH/DIAG IV INF INIT: CPT

## 2021-03-18 RX ADMIN — PAMIDRONATE DISODIUM 60 MG: 3 INJECTION INTRAVENOUS at 10:49

## 2021-03-19 ENCOUNTER — TELEPHONE (OUTPATIENT)
Dept: ENDOCRINOLOGY | Facility: CLINIC | Age: 66
End: 2021-03-19

## 2021-03-19 ENCOUNTER — TELEPHONE (OUTPATIENT)
Dept: INFUSION CENTER | Facility: HOSPITAL | Age: 66
End: 2021-03-19

## 2021-03-19 DIAGNOSIS — E11.65 UNCONTROLLED TYPE 2 DIABETES MELLITUS WITH HYPERGLYCEMIA (HCC): ICD-10-CM

## 2021-03-19 DIAGNOSIS — M85.80 OSTEOPENIA, UNSPECIFIED LOCATION: ICD-10-CM

## 2021-03-19 DIAGNOSIS — I10 HYPERTENSION GOAL BP (BLOOD PRESSURE) < 140/90: ICD-10-CM

## 2021-03-19 DIAGNOSIS — E83.52 HYPERCALCEMIA: ICD-10-CM

## 2021-03-19 DIAGNOSIS — E78.00 PURE HYPERCHOLESTEROLEMIA: ICD-10-CM

## 2021-03-19 DIAGNOSIS — E21.3 HYPERPARATHYROIDISM (HCC): Primary | ICD-10-CM

## 2021-03-19 NOTE — TELEPHONE ENCOUNTER
Adalgisa Jacobs from Infusion ([phone]) called and   She has two orders, one states one time treatment and the other states every 28 days until she sess Dr Darrick Bullock  Pt has appt on 4/26/21 with Dr Darrick Bullock   asked if patient should have Infusion that is scheduled for 4/15/21

## 2021-03-19 NOTE — TELEPHONE ENCOUNTER
Please let pt know DEXA scan showed osteoporosis or weak bones    This should improve to some degree after parathyroid surgery    Will repeat DEXA including forearm in 1 year

## 2021-03-19 NOTE — TELEPHONE ENCOUNTER
Called Rick Wesley back and advised to cancel the re-occurrent appts  Rick Wesley advised for physician to discontinue the orders

## 2021-03-19 NOTE — PROGRESS NOTES
Spoke to Simran Montemayor - will contact Dr Timmy Farah and clarify if pt is to have another [facility] infusion -

## 2021-03-24 ENCOUNTER — TELEPHONE (OUTPATIENT)
Dept: ENDOCRINOLOGY | Facility: CLINIC | Age: 66
End: 2021-03-24

## 2021-03-24 DIAGNOSIS — M81.0 OSTEOPOROSIS, UNSPECIFIED OSTEOPOROSIS TYPE, UNSPECIFIED PATHOLOGICAL FRACTURE PRESENCE: Primary | ICD-10-CM

## 2021-03-26 ENCOUNTER — TRANSCRIBE ORDERS (OUTPATIENT)
Dept: ADMINISTRATIVE | Facility: HOSPITAL | Age: 66
End: 2021-03-26

## 2021-03-26 ENCOUNTER — LAB (OUTPATIENT)
Dept: LAB | Facility: HOSPITAL | Age: 66
End: 2021-03-26
Attending: INTERNAL MEDICINE
Payer: COMMERCIAL

## 2021-03-26 DIAGNOSIS — E83.52 HYPERCALCEMIA: ICD-10-CM

## 2021-03-26 DIAGNOSIS — I10 HYPERTENSION GOAL BP (BLOOD PRESSURE) < 140/90: ICD-10-CM

## 2021-03-26 DIAGNOSIS — E78.00 PURE HYPERCHOLESTEROLEMIA: ICD-10-CM

## 2021-03-26 DIAGNOSIS — E11.65 UNCONTROLLED TYPE 2 DIABETES MELLITUS WITH HYPERGLYCEMIA (HCC): ICD-10-CM

## 2021-03-26 DIAGNOSIS — M85.80 OSTEOPENIA, UNSPECIFIED LOCATION: ICD-10-CM

## 2021-03-26 DIAGNOSIS — E21.3 HYPERPARATHYROIDISM (HCC): ICD-10-CM

## 2021-03-26 LAB
ALBUMIN SERPL BCP-MCNC: 3.1 G/DL (ref 3.5–5)
ALP SERPL-CCNC: 105 U/L (ref 46–116)
ALT SERPL W P-5'-P-CCNC: 24 U/L (ref 12–78)
ANION GAP SERPL CALCULATED.3IONS-SCNC: 8 MMOL/L (ref 4–13)
AST SERPL W P-5'-P-CCNC: 21 U/L (ref 5–45)
BILIRUB SERPL-MCNC: 0.3 MG/DL (ref 0.2–1)
BUN SERPL-MCNC: 21 MG/DL (ref 5–25)
CALCIUM ALBUM COR SERPL-MCNC: 10.9 MG/DL (ref 8.3–10.1)
CALCIUM SERPL-MCNC: 10.2 MG/DL (ref 8.3–10.1)
CHLORIDE SERPL-SCNC: 108 MMOL/L (ref 100–108)
CO2 SERPL-SCNC: 27 MMOL/L (ref 21–32)
CREAT SERPL-MCNC: 0.98 MG/DL (ref 0.6–1.3)
GFR SERPL CREATININE-BSD FRML MDRD: 69 ML/MIN/1.73SQ M
GLUCOSE P FAST SERPL-MCNC: 97 MG/DL (ref 65–99)
POTASSIUM SERPL-SCNC: 4 MMOL/L (ref 3.5–5.3)
PROT SERPL-MCNC: 7.4 G/DL (ref 6.4–8.2)
SODIUM SERPL-SCNC: 143 MMOL/L (ref 136–145)

## 2021-03-26 PROCEDURE — 86341 ISLET CELL ANTIBODY: CPT

## 2021-03-26 PROCEDURE — 36415 COLL VENOUS BLD VENIPUNCTURE: CPT

## 2021-03-26 PROCEDURE — 83519 RIA NONANTIBODY: CPT

## 2021-03-26 PROCEDURE — 86337 INSULIN ANTIBODIES: CPT

## 2021-03-26 PROCEDURE — 80053 COMPREHEN METABOLIC PANEL: CPT

## 2021-03-29 ENCOUNTER — OFFICE VISIT (OUTPATIENT)
Dept: OTOLARYNGOLOGY | Facility: CLINIC | Age: 66
End: 2021-03-29
Payer: COMMERCIAL

## 2021-03-29 VITALS — HEIGHT: 64 IN | TEMPERATURE: 97.6 F | BODY MASS INDEX: 23.56 KG/M2 | WEIGHT: 138 LBS

## 2021-03-29 DIAGNOSIS — E55.9 VITAMIN D DEFICIENCY: ICD-10-CM

## 2021-03-29 DIAGNOSIS — E83.52 HYPERCALCEMIA: ICD-10-CM

## 2021-03-29 DIAGNOSIS — E21.3 HYPERPARATHYROIDISM (HCC): Primary | ICD-10-CM

## 2021-03-29 LAB — GAD65 AB SER-ACNC: <5 U/ML (ref 0–5)

## 2021-03-29 PROCEDURE — 3008F BODY MASS INDEX DOCD: CPT | Performed by: SPECIALIST

## 2021-03-29 PROCEDURE — 99204 OFFICE O/P NEW MOD 45 MIN: CPT | Performed by: SPECIALIST

## 2021-03-29 PROCEDURE — 1160F RVW MEDS BY RX/DR IN RCRD: CPT | Performed by: SPECIALIST

## 2021-03-29 NOTE — H&P (VIEW-ONLY)
Assessment/Plan:    Hyperparathyroidism ([facility] [address] )  Reviewed hyperparathyroidism, hypercalcemia, and vitamin D deficiency  Thyroid us indicating:  Right lobe:  3 7 x 1 5 x 1 7 cm  Left lobe:  4 1 x 1 5 x 1 7 cm  Isthmus:  0 4 cm  LEFT midgland nodule measuring 0 6 x 0 3 x 0 5 cm  No priors for comparison  TR 4 (4-6 points)  There are additional nodules of lesser size and/or TI-RADS score  These do not necessitate additional evaluation based on ACR criteria  5 mm macrocalcification in the inferior left lobe    1 6 x 1 x 0 9 cm hypoechoic nodule posterior and inferior to the lower pole of the right lobe may represent parathyroid nodule  Most recent labs: PTH level 391, Calcium 11 7, Vitamin D 7 7     Discussed that most likely the thyroid nodule is parathyroid gland that is enlarged  Reviewed treatment options including acceptance, nuclear scan (Spect), Ct scan of neck, or surgical options  Surgical option includes minimally invasive parathyroidectomy with 4 gland exploration vs right gland alone if  Nuclear study localizes  Discussed actual surgical procedure as well as risks of infection, anesthesia, bleeding, and treatment failure  Discussed parathyroid specific risks including altered swallow, injury to vocal cords, and hoarseness  Reviewed post operative expectations  Pt agreed to proceed with nuclear scan  Follow up after testing to review results and again discuss surgery if needed  Also reviewed plan with Dr Danuta Alfaro              Diagnoses and all orders for this visit:    Hyperparathyroidism [facility])  -     Ambulatory Referral to Otolaryngology  -     NM parathyroid scan w spect; Future    Hypercalcemia  -     Ambulatory Referral to Otolaryngology  -     NM parathyroid scan w spect; Future    Vitamin D deficiency  -     NM parathyroid scan w spect; Future          Subjective:      Patient ID: Hiral Anderson is a 77 y o  female      Presents today as a new patient consultation due to parathyroid concerns  Following endocrinology for medical management of parathyroid  Pt notes frequent muscle aches  Denies kidney stones  Recent thyroid us and dexa scan        The following portions of the patient's history were reviewed and updated as appropriate: allergies, current medications, past family history, past medical history, past social history, past surgical history and problem list     Review of Systems   Constitutional: Negative  HENT: Negative for congestion, ear discharge, ear pain, hearing loss, nosebleeds, postnasal drip, rhinorrhea, sinus pressure, sinus pain, sore throat, tinnitus and voice change  Eyes: Negative  Respiratory: Negative for chest tightness and shortness of breath  Cardiovascular: Negative  Gastrointestinal: Negative  Endocrine: Negative  Musculoskeletal: Positive for myalgias  Skin: Negative for color change  Neurological: Positive for weakness  Negative for dizziness, numbness and headaches  Psychiatric/Behavioral: Negative  Objective:      Temp 97 6 °F (36 4 °C) (Temporal)   Ht 5' 3 75" (1 619 m)   Wt 62 6 kg (138 lb)   LMP  (LMP Unknown)   BMI 23 87 kg/m²          Physical Exam  Constitutional:       Appearance: She is well-developed  HENT:      Head: Normocephalic  Right Ear: Hearing, tympanic membrane, ear canal and external ear normal  No decreased hearing noted  No drainage or tenderness  Tympanic membrane is not perforated or erythematous  Left Ear: Hearing, tympanic membrane, ear canal and external ear normal  No decreased hearing noted  No drainage or tenderness  Tympanic membrane is not perforated or erythematous  Nose: Nose normal  No nasal deformity or septal deviation  Mouth/Throat:      Mouth: Mucous membranes are not pale and not dry  No oral lesions  Dentition: Normal dentition  Pharynx: Uvula midline  No oropharyngeal exudate     Neck:      Musculoskeletal: Full passive range of motion without pain, normal range of motion and neck supple  Trachea: No tracheal deviation  Cardiovascular:      Rate and Rhythm: Normal rate  Pulmonary:      Effort: Pulmonary effort is normal  No accessory muscle usage or respiratory distress  Musculoskeletal:      Right shoulder: She exhibits normal range of motion  Lymphadenopathy:      Cervical: No cervical adenopathy  Skin:     General: Skin is warm and dry  Neurological:      Mental Status: She is alert and oriented to person, place, and time  Cranial Nerves: No cranial nerve deficit  Sensory: No sensory deficit  Psychiatric:         Behavior: Behavior is cooperative           Scribe Attestation    I,:  MAGGIE Moody am acting as a scribe while in the presence of the attending physician :       I,:  Deon Roth MD personally performed the services described in this documentation    as scribed in my presence :

## 2021-03-29 NOTE — ASSESSMENT & PLAN NOTE
Reviewed hyperparathyroidism, hypercalcemia, and vitamin D deficiency  Thyroid us indicating:  Right lobe:  3 7 x 1 5 x 1 7 cm  Left lobe:  4 1 x 1 5 x 1 7 cm  Isthmus:  0 4 cm  LEFT midgland nodule measuring 0 6 x 0 3 x 0 5 cm  No priors for comparison  TR 4 (4-6 points)  There are additional nodules of lesser size and/or TI-RADS score  These do not necessitate additional evaluation based on ACR criteria  5 mm macrocalcification in the inferior left lobe    1 6 x 1 x 0 9 cm hypoechoic nodule posterior and inferior to the lower pole of the right lobe may represent parathyroid nodule  Most recent labs: PTH level 391, Calcium 11 7, Vitamin D 7 7     Discussed that most likely the thyroid nodule is parathyroid gland that is enlarged  Reviewed treatment options including acceptance, nuclear scan (Spect), Ct scan of neck, or surgical options  Surgical option includes minimally invasive parathyroidectomy with 4 gland exploration vs right gland alone if  Nuclear study localizes  Discussed actual surgical procedure as well as risks of infection, anesthesia, bleeding, and treatment failure  Discussed parathyroid specific risks including altered swallow, injury to vocal cords, and hoarseness  Reviewed post operative expectations     Pt agreed to proceed with nuclear scan  Follow up after testing to review results and again discuss surgery if needed  Also reviewed plan with Dr Zully Bosch

## 2021-03-29 NOTE — PROGRESS NOTES
Assessment/Plan:    Hyperparathyroidism ([facility] [address] )  Reviewed hyperparathyroidism, hypercalcemia, and vitamin D deficiency  Thyroid us indicating:  Right lobe:  3 7 x 1 5 x 1 7 cm  Left lobe:  4 1 x 1 5 x 1 7 cm  Isthmus:  0 4 cm  LEFT midgland nodule measuring 0 6 x 0 3 x 0 5 cm  No priors for comparison  TR 4 (4-6 points)  There are additional nodules of lesser size and/or TI-RADS score  These do not necessitate additional evaluation based on ACR criteria  5 mm macrocalcification in the inferior left lobe    1 6 x 1 x 0 9 cm hypoechoic nodule posterior and inferior to the lower pole of the right lobe may represent parathyroid nodule  Most recent labs: PTH level 391, Calcium 11 7, Vitamin D 7 7     Discussed that most likely the thyroid nodule is parathyroid gland that is enlarged  Reviewed treatment options including acceptance, nuclear scan (Spect), Ct scan of neck, or surgical options  Surgical option includes minimally invasive parathyroidectomy with 4 gland exploration vs right gland alone if  Nuclear study localizes  Discussed actual surgical procedure as well as risks of infection, anesthesia, bleeding, and treatment failure  Discussed parathyroid specific risks including altered swallow, injury to vocal cords, and hoarseness  Reviewed post operative expectations  Pt agreed to proceed with nuclear scan  Follow up after testing to review results and again discuss surgery if needed  Also reviewed plan with Dr Katy Garza              Diagnoses and all orders for this visit:    Hyperparathyroidism [facility])  -     Ambulatory Referral to Otolaryngology  -     NM parathyroid scan w spect; Future    Hypercalcemia  -     Ambulatory Referral to Otolaryngology  -     NM parathyroid scan w spect; Future    Vitamin D deficiency  -     NM parathyroid scan w spect; Future          Subjective:      Patient ID: Rigoberto Batres is a 77 y o  female      Presents today as a new patient consultation due to parathyroid concerns  Following endocrinology for medical management of parathyroid  Pt notes frequent muscle aches  Denies kidney stones  Recent thyroid us and dexa scan        The following portions of the patient's history were reviewed and updated as appropriate: allergies, current medications, past family history, past medical history, past social history, past surgical history and problem list     Review of Systems   Constitutional: Negative  HENT: Negative for congestion, ear discharge, ear pain, hearing loss, nosebleeds, postnasal drip, rhinorrhea, sinus pressure, sinus pain, sore throat, tinnitus and voice change  Eyes: Negative  Respiratory: Negative for chest tightness and shortness of breath  Cardiovascular: Negative  Gastrointestinal: Negative  Endocrine: Negative  Musculoskeletal: Positive for myalgias  Skin: Negative for color change  Neurological: Positive for weakness  Negative for dizziness, numbness and headaches  Psychiatric/Behavioral: Negative  Objective:      Temp 97 6 °F (36 4 °C) (Temporal)   Ht 5' 3 75" (1 619 m)   Wt 62 6 kg (138 lb)   LMP  (LMP Unknown)   BMI 23 87 kg/m²          Physical Exam  Constitutional:       Appearance: She is well-developed  HENT:      Head: Normocephalic  Right Ear: Hearing, tympanic membrane, ear canal and external ear normal  No decreased hearing noted  No drainage or tenderness  Tympanic membrane is not perforated or erythematous  Left Ear: Hearing, tympanic membrane, ear canal and external ear normal  No decreased hearing noted  No drainage or tenderness  Tympanic membrane is not perforated or erythematous  Nose: Nose normal  No nasal deformity or septal deviation  Mouth/Throat:      Mouth: Mucous membranes are not pale and not dry  No oral lesions  Dentition: Normal dentition  Pharynx: Uvula midline  No oropharyngeal exudate     Neck:      Musculoskeletal: Full passive range of motion without pain, normal range of motion and neck supple  Trachea: No tracheal deviation  Cardiovascular:      Rate and Rhythm: Normal rate  Pulmonary:      Effort: Pulmonary effort is normal  No accessory muscle usage or respiratory distress  Musculoskeletal:      Right shoulder: She exhibits normal range of motion  Lymphadenopathy:      Cervical: No cervical adenopathy  Skin:     General: Skin is warm and dry  Neurological:      Mental Status: She is alert and oriented to person, place, and time  Cranial Nerves: No cranial nerve deficit  Sensory: No sensory deficit  Psychiatric:         Behavior: Behavior is cooperative           Scribe Attestation    I,:  MAGGIE Youssef am acting as a scribe while in the presence of the attending physician :       I,:  Brooks Lehman MD personally performed the services described in this documentation    as scribed in my presence :

## 2021-03-30 ENCOUNTER — TELEPHONE (OUTPATIENT)
Dept: ENDOCRINOLOGY | Facility: CLINIC | Age: 66
End: 2021-03-30

## 2021-03-30 NOTE — TELEPHONE ENCOUNTER
Spoke with patient and reviewed information regarding ultrasound thyroid  She understood and is going April 5th for parathyroid scan

## 2021-03-30 NOTE — TELEPHONE ENCOUNTER
Please let pt know I spoke with Dr Baylee Avila, ultrasound thyroid suspicious for right lower parathyroid being overactive    Proceed to have the parathyroid scan done--please schedule ASAP    Calcium level improved to 10 9 corrected after IV pamidronate but the effect will only last for a few weeks so it is important to have the parathyroid scan done now

## 2021-04-03 LAB — ISLET CELL512 AB SER-ACNC: <7.5 U/ML

## 2021-04-05 ENCOUNTER — TELEPHONE (OUTPATIENT)
Dept: OTOLARYNGOLOGY | Facility: CLINIC | Age: 66
End: 2021-04-05

## 2021-04-05 ENCOUNTER — HOSPITAL ENCOUNTER (OUTPATIENT)
Dept: RADIOLOGY | Facility: HOSPITAL | Age: 66
Discharge: HOME | End: 2021-04-05
Payer: COMMERCIAL

## 2021-04-05 ENCOUNTER — TRANSCRIBE ORDERS (OUTPATIENT)
Dept: ADMINISTRATIVE | Facility: HOSPITAL | Age: 66
End: 2021-04-05

## 2021-04-05 DIAGNOSIS — E21.3 HYPERPARATHYROIDISM (HCC): ICD-10-CM

## 2021-04-05 DIAGNOSIS — E55.9 VITAMIN D DEFICIENCY: ICD-10-CM

## 2021-04-05 DIAGNOSIS — E83.52 HYPERCALCEMIA: ICD-10-CM

## 2021-04-05 PROCEDURE — G1004 CDSM NDSC: HCPCS

## 2021-04-05 PROCEDURE — A9500 TC99M SESTAMIBI: HCPCS

## 2021-04-05 PROCEDURE — 78071 PARATHYRD PLANAR W/WO SUBTRJ: CPT

## 2021-04-05 NOTE — TELEPHONE ENCOUNTER
Attempted to contact pt regarding proceeding with surgery based on test results    Unable to leave voicemail, mailbox full

## 2021-04-06 ENCOUNTER — TELEPHONE (OUTPATIENT)
Dept: OTOLARYNGOLOGY | Facility: CLINIC | Age: 66
End: 2021-04-06

## 2021-04-06 LAB — INSULIN AB SER-ACNC: <5 UU/ML

## 2021-04-06 NOTE — TELEPHONE ENCOUNTER
----- Message from Jimmy Erazo sent at 4/6/2021  1:21 PM EDT -----  Regarding: RE: surgery  Spoke to patient  Scheduled her for 4/21/21  She will be contacting the [facility] office to schedule post-op appointment  Kayli Hatfield said you can call her today to go over lab results  ----- Message -----  From: Sanket Patiño MA  Sent: 4/6/2021  12:19 PM EDT  To: MAGGIE German, Jimmy Erazo  Subject: RE: surgery                                      This is Parathyroid surgery which cannot be done at [facility]  ~Megan L   ----- Message -----  From: Jimmy Erazo  Sent: 4/6/2021  12:14 PM EDT  To: MAGGIE German, Sanket Patiño MA  Subject: RE: surgery                                      Diana Miranda,    Will you be doing the scheduling for [facility]? Or should I contact her to schedule at [facility]?  ----- Message -----  From: MAGGIE German  Sent: 4/6/2021  11:44 AM EDT  To: Sanket Patiño MA, Jimmy Erazo  Subject: surgery                                          Surgery discussed in detail with pt during last visit  Nuclear med testing indicating she did localize  Parathyroidectomy  I attempted to contact pt last evening but did not have success  Dr Rabia Larkin agrees to proceed with scheduling surgery  Please contact pt to schedule  If she wishes to review results please let me know when she is available    Yoan Melendez  ----- Message -----  From: Interface, Radiology Results In  Sent: 4/5/2021   4:29 PM EDT  To: Flory Langford

## 2021-04-06 NOTE — TELEPHONE ENCOUNTER
Reviewed Nuclear scan indicating suspicious parathyroid adenoma  Pt agrees to proceed with surgery  Clarified any questions she had related to surgery    Pt to contact office if needed

## 2021-04-07 ENCOUNTER — TELEPHONE (OUTPATIENT)
Dept: FAMILY MEDICINE CLINIC | Facility: CLINIC | Age: 66
End: 2021-04-07

## 2021-04-07 NOTE — TELEPHONE ENCOUNTER
Request for preop clearance received from ENT Dr Peg Yañez is Parathyroidectomy    Date of surgery is 4/21/21    Please schedule her for a preop clearance ASAP

## 2021-04-08 ENCOUNTER — RA CDI HCC (OUTPATIENT)
Dept: OTHER | Facility: HOSPITAL | Age: 66
End: 2021-04-08

## 2021-04-08 NOTE — PROGRESS NOTES
[facility]  coding opportunities             Chart reviewed, (number of) suggestions sent to provider: 6         DX: E11 22, N18 31 Type 2 diabetes mellitus with diabetic chronic kidney disease, with Chronic kidney disease, stage 3a  DX: I50 42 Chronic combined systolic (congestive) and diastolic (congestive) heart failure  DX: I13 0 Hypertensive heart and chronic kidney disease with heart failure and stage 1 through stage 4 chronic kidney disease, or unspecified chronic kidney disease  DX: E11 65, Z79 4 Type 2 diabetes mellitus with hyperglycemia, with Long term (current) use of insulin

## 2021-04-12 PROBLEM — Z79.4 TYPE 2 DIABETES MELLITUS WITH HYPERGLYCEMIA, WITH LONG-TERM CURRENT USE OF INSULIN (HCC): Status: ACTIVE | Noted: 2021-04-12

## 2021-04-12 PROBLEM — E11.65 TYPE 2 DIABETES MELLITUS WITH HYPERGLYCEMIA, WITH LONG-TERM CURRENT USE OF INSULIN (HCC): Status: ACTIVE | Noted: 2021-04-12

## 2021-04-13 ENCOUNTER — APPOINTMENT (OUTPATIENT)
Dept: LAB | Facility: HOSPITAL | Age: 66
End: 2021-04-13
Attending: SPECIALIST
Payer: COMMERCIAL

## 2021-04-13 DIAGNOSIS — E21.3 HYPERPARATHYROIDISM (HCC): ICD-10-CM

## 2021-04-13 LAB
ANION GAP SERPL CALCULATED.3IONS-SCNC: 7 MMOL/L (ref 4–13)
BASOPHILS # BLD AUTO: 0.01 THOUSANDS/ΜL (ref 0–0.1)
BASOPHILS NFR BLD AUTO: 0 % (ref 0–1)
BUN SERPL-MCNC: 15 MG/DL (ref 5–25)
CALCIUM SERPL-MCNC: 10.8 MG/DL (ref 8.3–10.1)
CHLORIDE SERPL-SCNC: 107 MMOL/L (ref 100–108)
CO2 SERPL-SCNC: 27 MMOL/L (ref 21–32)
CREAT SERPL-MCNC: 1 MG/DL (ref 0.6–1.3)
EOSINOPHIL # BLD AUTO: 0.12 THOUSAND/ΜL (ref 0–0.61)
EOSINOPHIL NFR BLD AUTO: 2 % (ref 0–6)
ERYTHROCYTE [DISTWIDTH] IN BLOOD BY AUTOMATED COUNT: 14.1 % (ref 11.6–15.1)
GFR SERPL CREATININE-BSD FRML MDRD: 67 ML/MIN/1.73SQ M
GLUCOSE P FAST SERPL-MCNC: 123 MG/DL (ref 65–99)
HCT VFR BLD AUTO: 44.4 % (ref 34.8–46.1)
HGB BLD-MCNC: 13.8 G/DL (ref 11.5–15.4)
IMM GRANULOCYTES # BLD AUTO: 0.03 THOUSAND/UL (ref 0–0.2)
IMM GRANULOCYTES NFR BLD AUTO: 0 % (ref 0–2)
LYMPHOCYTES # BLD AUTO: 3.21 THOUSANDS/ΜL (ref 0.6–4.47)
LYMPHOCYTES NFR BLD AUTO: 45 % (ref 14–44)
MCH RBC QN AUTO: 27.1 PG (ref 26.8–34.3)
MCHC RBC AUTO-ENTMCNC: 31.1 G/DL (ref 31.4–37.4)
MCV RBC AUTO: 87 FL (ref 82–98)
MONOCYTES # BLD AUTO: 0.58 THOUSAND/ΜL (ref 0.17–1.22)
MONOCYTES NFR BLD AUTO: 8 % (ref 4–12)
NEUTROPHILS # BLD AUTO: 3.19 THOUSANDS/ΜL (ref 1.85–7.62)
NEUTS SEG NFR BLD AUTO: 45 % (ref 43–75)
NRBC BLD AUTO-RTO: 0 /100 WBCS
PLATELET # BLD AUTO: 231 THOUSANDS/UL (ref 149–390)
PMV BLD AUTO: 12.1 FL (ref 8.9–12.7)
POTASSIUM SERPL-SCNC: 4.6 MMOL/L (ref 3.5–5.3)
RBC # BLD AUTO: 5.09 MILLION/UL (ref 3.81–5.12)
SODIUM SERPL-SCNC: 141 MMOL/L (ref 136–145)
WBC # BLD AUTO: 7.14 THOUSAND/UL (ref 4.31–10.16)

## 2021-04-13 PROCEDURE — 36415 COLL VENOUS BLD VENIPUNCTURE: CPT

## 2021-04-13 PROCEDURE — 80048 BASIC METABOLIC PNL TOTAL CA: CPT

## 2021-04-13 PROCEDURE — 85025 COMPLETE CBC W/AUTO DIFF WBC: CPT

## 2021-04-14 ENCOUNTER — OFFICE VISIT (OUTPATIENT)
Dept: FAMILY MEDICINE CLINIC | Facility: CLINIC | Age: 66
End: 2021-04-14
Payer: COMMERCIAL

## 2021-04-14 VITALS
SYSTOLIC BLOOD PRESSURE: 136 MMHG | HEIGHT: 64 IN | OXYGEN SATURATION: 97 % | DIASTOLIC BLOOD PRESSURE: 70 MMHG | RESPIRATION RATE: 16 BRPM | HEART RATE: 78 BPM | WEIGHT: 128.2 LBS | TEMPERATURE: 98.4 F | BODY MASS INDEX: 21.89 KG/M2

## 2021-04-14 DIAGNOSIS — Z12.31 ENCOUNTER FOR SCREENING MAMMOGRAM FOR MALIGNANT NEOPLASM OF BREAST: ICD-10-CM

## 2021-04-14 DIAGNOSIS — Z85.3 HISTORY OF LEFT BREAST CANCER: ICD-10-CM

## 2021-04-14 DIAGNOSIS — Z79.4 TYPE 2 DIABETES MELLITUS WITH OTHER DIABETIC KIDNEY COMPLICATION, WITH LONG-TERM CURRENT USE OF INSULIN (HCC): ICD-10-CM

## 2021-04-14 DIAGNOSIS — I50.42 CHRONIC COMBINED SYSTOLIC AND DIASTOLIC CONGESTIVE HEART FAILURE (HCC): ICD-10-CM

## 2021-04-14 DIAGNOSIS — N18.2 CKD STAGE 2 DUE TO TYPE 2 DIABETES MELLITUS (HCC): ICD-10-CM

## 2021-04-14 DIAGNOSIS — Z90.12 S/P LEFT MASTECTOMY: ICD-10-CM

## 2021-04-14 DIAGNOSIS — E11.22 CKD STAGE 2 DUE TO TYPE 2 DIABETES MELLITUS (HCC): ICD-10-CM

## 2021-04-14 DIAGNOSIS — E11.29 TYPE 2 DIABETES MELLITUS WITH OTHER DIABETIC KIDNEY COMPLICATION, WITH LONG-TERM CURRENT USE OF INSULIN (HCC): ICD-10-CM

## 2021-04-14 DIAGNOSIS — Z00.00 MEDICARE ANNUAL WELLNESS VISIT, SUBSEQUENT: Primary | ICD-10-CM

## 2021-04-14 PROBLEM — E11.65 TYPE 2 DIABETES MELLITUS WITH HYPERGLYCEMIA, WITH LONG-TERM CURRENT USE OF INSULIN (HCC): Status: RESOLVED | Noted: 2021-04-12 | Resolved: 2021-04-14

## 2021-04-14 PROCEDURE — G0439 PPPS, SUBSEQ VISIT: HCPCS | Performed by: FAMILY MEDICINE

## 2021-04-14 PROCEDURE — 3066F NEPHROPATHY DOC TX: CPT | Performed by: INTERNAL MEDICINE

## 2021-04-14 PROCEDURE — 99214 OFFICE O/P EST MOD 30 MIN: CPT | Performed by: FAMILY MEDICINE

## 2021-04-14 PROCEDURE — 3078F DIAST BP <80 MM HG: CPT | Performed by: FAMILY MEDICINE

## 2021-04-14 PROCEDURE — 1125F AMNT PAIN NOTED PAIN PRSNT: CPT | Performed by: FAMILY MEDICINE

## 2021-04-14 PROCEDURE — 3725F SCREEN DEPRESSION PERFORMED: CPT | Performed by: FAMILY MEDICINE

## 2021-04-14 PROCEDURE — 1170F FXNL STATUS ASSESSED: CPT | Performed by: FAMILY MEDICINE

## 2021-04-14 PROCEDURE — 1101F PT FALLS ASSESS-DOCD LE1/YR: CPT | Performed by: FAMILY MEDICINE

## 2021-04-14 PROCEDURE — 3288F FALL RISK ASSESSMENT DOCD: CPT | Performed by: FAMILY MEDICINE

## 2021-04-14 PROCEDURE — 3075F SYST BP GE 130 - 139MM HG: CPT | Performed by: FAMILY MEDICINE

## 2021-04-14 NOTE — PROGRESS NOTES
Assessment/Plan:    No problem-specific Assessment & Plan notes found for this encounter  Medically cleared for surgery  Letter sent to surgeon    Plan cardiology f/u after surgery for her CHF  Asked her to stop smoking asap for better surgical recovery    Plan osteoporosis medications after parathyroid situation stabilized, especially calcium    DM2 fair control    Htn/chf stable but needs cardiology followup       Diagnoses and all orders for this visit:    Encounter for screening mammogram for malignant neoplasm of breast  -     Mammo screening right w 3d & cad; Future    S/P left mastectomy  -     Mammo screening right w 3d & cad; Future    Chronic combined systolic and diastolic congestive heart failure ([facility] [address] )  -     Ambulatory referral to Cardiology; Future    History of left breast cancer    Type 2 diabetes mellitus with other diabetic kidney complication, with long-term current use of insulin ([facility] [address] )    CKD stage 2 due to type 2 diabetes mellitus [facility] )              No follow-ups on file  Subjective:      Patient ID: Mitzi Chandler is a 77 y o  female      Chief Complaint   Patient presents with    Pre-op Exam       HPI    Planned procedure:  parathyroidectomy  Surgeon:   Dr Ya Rangel  Date:  4/21/21  Anesthesia type:  unstated    Prior major surgeries:  Left mastectomy, right rotator cuff repair  Problems with anesthesia in past:  none    Can walk 4 blocks or 2 flights of stairs:  y  History of excessive bleeding:  n  History of excessive clotting:  n  Personal history of DVT or Pe: n     Taking NSAIDS:  Asa on hold, avoiding nsaids  Takings vitamins D or E or A or fish oil supplements:  none    Usual am medications:  Metformin 1000mg, lipitor 40mg, lisinopril 10mg    Sees Dr Tawanna Jones, pt not sure she if is taking sensipar    Bmp ok but Ca 10 8  fbs 123  Cbc wnl    Last a1c 7 6 on 3/9/21    Lost wt  Hx of chf  Needs cardiology f/u at [facility] per last note  No leg swelling    The following portions of the patient's history were reviewed and updated as appropriate: allergies, current medications, past family history, past medical history, past social history, past surgical history and problem list     Review of Systems   Constitutional: Negative for fever  Respiratory: Negative for shortness of breath  Current Outpatient Medications   Medication Sig Dispense Refill    furosemide (LASIX) 20 mg tablet Take one tablet daily x 3 days, then every Mon,Wed,Fri 30 tablet 2    metFORMIN (GLUCOPHAGE) 1000 MG tablet Take 1 tablet (1,000 mg total) by mouth 2 (two) times a day with meals 180 tablet 1    NEEDLE, DISP, 30 G 30G X 1/2" MISC Use daily 100 each 0    aspirin (ECOTRIN LOW STRENGTH) 81 mg EC tablet Take 1 tablet (81 mg total) by mouth daily (Patient not taking: Reported on 4/14/2021)      atorvastatin (LIPITOR) 40 mg tablet Take 1 tablet (40 mg total) by mouth daily 90 tablet 1    cinacalcet (SENSIPAR) 30 mg tablet Take 1 tablet (30 mg total) by mouth 2 (two) times a day (Patient not taking: Reported on 3/18/2021) 180 tablet 0    lisinopril (ZESTRIL) 10 mg tablet Take 1 tablet (10 mg total) by mouth daily 90 tablet 1    metoprolol succinate (TOPROL-XL) 25 mg 24 hr tablet Take 1 tablet (25 mg total) by mouth daily 90 tablet 1    nicotine (NICODERM CQ) 14 mg/24hr TD 24 hr patch Place 1 patch on the skin daily (Patient not taking: Reported on 3/18/2021) 28 patch 1     No current facility-administered medications for this visit  Objective:    /70   Pulse 78   Temp 98 4 °F (36 9 °C) (Temporal)   Resp 16   Ht 5' 3 75" (1 619 m)   Wt 58 2 kg (128 lb 3 2 oz)   LMP  (LMP Unknown)   SpO2 97%   BMI 22 18 kg/m²        Physical Exam  Vitals signs and nursing note reviewed  Constitutional:       General: She is not in acute distress  Appearance: She is well-developed  She is not ill-appearing  HENT:      Head: Normocephalic  Eyes:      General: No scleral icterus  Conjunctiva/sclera: Conjunctivae normal    Neck:      Musculoskeletal: Neck supple  Cardiovascular:      Rate and Rhythm: Normal rate and regular rhythm  Pulmonary:      Effort: Pulmonary effort is normal  No respiratory distress  Breath sounds: No wheezing  Abdominal:      General: There is no distension  Palpations: Abdomen is soft  Tenderness: There is no abdominal tenderness  Musculoskeletal:         General: No deformity  Skin:     General: Skin is warm and dry  Coloration: Skin is not pale  Neurological:      Mental Status: She is alert  Gait: Gait normal    Psychiatric:         Behavior: Behavior normal          Thought Content:  Thought content normal                 Nick Fisher, DO

## 2021-04-15 NOTE — PROGRESS NOTES
Assessment and Plan:     Problem List Items Addressed This Visit        Active Problems    Breast cancer screening    Relevant Orders    Mammo screening right w 3d & cad    Medicare annual wellness visit, subsequent - Primary    Diabetes mellitus with renal manifestation ([facility] )    Chronic combined systolic and diastolic congestive heart failure ([facility] )    Relevant Orders    Ambulatory referral to Cardiology    CKD stage 2 due to type 2 diabetes mellitus ([facility] )    History of left breast cancer    S/P left mastectomy    Relevant Orders    Mammo screening right w 3d & cad           Preventive health issues were discussed with patient, and age appropriate screening tests were ordered as noted in patient's After Visit Summary  Personalized health advice and appropriate referrals for health education or preventive services given if needed, as noted in patient's After Visit Summary       History of Present Illness:     Patient presents for Medicare Annual Wellness visit    Patient Care Team:  Jose Gutierrez DO as PCP - Nissa Carey MD (Gastroenterology)  Swathi Flores MD (Endocrinology)     Problem List:     Patient Active Problem List   Diagnosis    History of left breast cancer    Arthropathy    Hypertensive heart and kidney disease with heart failure and with chronic kidney disease stage III ([facility] )    Colon, diverticulosis    Hyperlipidemia LDL goal <100    [facility]    Screening for cardiovascular, respiratory, and genitourinary diseases    Immunization due    Breast cancer screening    Colon cancer screening    Menopause    Gastroesophageal reflux disease    Microalbuminuria    Medicare annual wellness visit, subsequent    Obstructive sleep apnea syndrome    Hypercalcemia    CKD stage 2 due to type 2 diabetes mellitus ([facility] )    Hyperparathyroidism ([facility] )    Coronary artery disease involving native coronary artery of native heart without angina pectoris    Chronic combined systolic and diastolic congestive heart failure (HCC)    Vitamin D deficiency    S/P left mastectomy      Past Medical and Surgical History:     Past Medical History:   Diagnosis Date    Breast cancer ([facility] [address] )     left - 1994      Diabetes mellitus, type 2 ([facility] ) 03/15/2006    last assessed 7/10/13    History of chemotherapy     Hypertension      Past Surgical History:   Procedure Laterality Date    MASTECTOMY Left     last assessed 12/16/14    MASTECTOMY, RADICAL Left     1994    REDUCTION MAMMAPLASTY Right       Family History:     Family History   Problem Relation Age of Onset    Hypothyroidism Mother     Leukemia Mother     Diabetes Father     Diabetes type II Father     Stroke Sister     Diabetes Paternal Grandmother     Cancer Sister     Diabetes Brother       Social History:     E-Cigarette/Vaping    E-Cigarette Use Never User      E-Cigarette/Vaping Substances    Nicotine No     THC No     CBD No     Flavoring No     Other No     Unknown No      Social History     Socioeconomic History    Marital status: Single     Spouse name: None    Number of children: None    Years of education: None    Highest education level: None   Occupational History    None   Social Needs    Financial resource strain: None    Food insecurity     Worry: None     Inability: None    Transportation needs     Medical: None     Non-medical: None   Tobacco Use    Smoking status: Current Every Day Smoker     Packs/day: 0 25    Smokeless tobacco: Never Used    Tobacco comment: Nicotine dependence   Substance and Sexual Activity    Alcohol use: Not Currently     Comment: Social    Drug use: No    Sexual activity: None   Lifestyle    Physical activity     Days per week: None     Minutes per session: None    Stress: None   Relationships    Social connections     Talks on phone: None     Gets together: None     Attends Restorationism service: None     Active member of club or organization: None     Attends meetings of clubs or organizations: None     Relationship status: None    Intimate partner violence     Fear of current or ex partner: None     Emotionally abused: None     Physically abused: None     Forced sexual activity: None   Other Topics Concern    None   Social History Narrative    None      Medications and Allergies:     Current Outpatient Medications   Medication Sig Dispense Refill    furosemide (LASIX) 20 mg tablet Take one tablet daily x 3 days, then every Mon,Wed,Fri 30 tablet 2    metFORMIN (GLUCOPHAGE) 1000 MG tablet Take 1 tablet (1,000 mg total) by mouth 2 (two) times a day with meals 180 tablet 1    NEEDLE, DISP, 30 G 30G X 1/2" MISC Use daily 100 each 0    aspirin (ECOTRIN LOW STRENGTH) 81 mg EC tablet Take 1 tablet (81 mg total) by mouth daily (Patient not taking: Reported on 4/14/2021)      atorvastatin (LIPITOR) 40 mg tablet Take 1 tablet (40 mg total) by mouth daily 90 tablet 1    cinacalcet (SENSIPAR) 30 mg tablet Take 1 tablet (30 mg total) by mouth 2 (two) times a day (Patient not taking: Reported on 3/18/2021) 180 tablet 0    lisinopril (ZESTRIL) 10 mg tablet Take 1 tablet (10 mg total) by mouth daily 90 tablet 1    metoprolol succinate (TOPROL-XL) 25 mg 24 hr tablet Take 1 tablet (25 mg total) by mouth daily 90 tablet 1    nicotine (NICODERM CQ) 14 mg/24hr TD 24 hr patch Place 1 patch on the skin daily (Patient not taking: Reported on 3/18/2021) 28 patch 1     No current facility-administered medications for this visit        No Known Allergies   Immunizations:     Immunization History   Administered Date(s) Administered    DTaP / HiB 03/15/2006    H1N1, All Formulations 11/05/2009    Influenza Quadrivalent Preservative Free 3 years and older IM 09/29/2014    Influenza Quadrivalent, 6-35 Months IM 09/10/2015, 10/04/2016    Influenza, high dose seasonal 0 7 mL 10/24/2018    Influenza, injectable, quadrivalent, preservative free 0 5 mL 09/18/2020    Influenza, seasonal, injectable 10/25/2006, 10/17/2007, 01/06/2012, 09/28/2012, 10/22/2013    Pneumococcal Conjugate 13-Valent 10/24/2018    Pneumococcal Polysaccharide PPV23 09/18/2020    SARS-CoV-2 / COVID-19 mRNA IM (Moderna) 01/11/2021, 02/08/2021    Tuberculin Skin Test-PPD Intradermal 06/23/2020, 07/07/2020      Health Maintenance:         Topic Date Due    Hepatitis C Screening  Never done    MAMMOGRAM  05/18/2021    Colorectal Cancer Screening  01/07/2025         Topic Date Due    COVID-19 Vaccine (2 - Moderna 2-dose series) 02/08/2021      Medicare Health Risk Assessment:     /70   Pulse 78   Temp 98 4 °F (36 9 °C) (Temporal)   Resp 16   Ht 5' 3 75" (1 619 m)   Wt 58 2 kg (128 lb 3 2 oz)   LMP  (LMP Unknown)   SpO2 97%   BMI 22 18 kg/m²      Christophe Romero is here for her Subsequent Wellness visit  Last Medicare Wellness visit information reviewed, patient interviewed and updates made to the record today  Health Risk Assessment:   Patient rates overall health as good  Patient feels that their physical health rating is much better  Patient is satisfied with their life  Eyesight was rated as same  Hearing was rated as same  Patient feels that their emotional and mental health rating is much better  Patients states they are sometimes angry  Patient states they are always unusually tired/fatigued  Pain experienced in the last 7 days has been a lot  Patient's pain rating has been 7/10  Patient states that she has experienced no weight loss or gain in last 6 months  Depression Screening:   PHQ-2 Score: 0      Fall Risk Screening: In the past year, patient has experienced: no history of falling in past year      Urinary Incontinence Screening:   Patient has not leaked urine accidently in the last six months  Home Safety:  Patient does not have trouble with stairs inside or outside of their home   Patient has working smoke alarms and has no working carbon monoxide detector  Home safety hazards include: none  Nutrition:   Current diet is Low Saturated Fat, Low Carb, No Added Salt and Regular  Medications:   Patient is not currently taking any over-the-counter supplements  Patient is able to manage medications  Activities of Daily Living (ADLs)/Instrumental Activities of Daily Living (IADLs):   Walk and transfer into and out of bed and chair?: Yes  Dress and groom yourself?: Yes    Bathe or shower yourself?: Yes    Feed yourself?  Yes  Do your laundry/housekeeping?: Yes  Manage your money, pay your bills and track your expenses?: Yes  Make your own meals?: Yes    Do your own shopping?: Yes    Previous Hospitalizations:   Any hospitalizations or ED visits within the last 12 months?: Yes    How many hospitalizations have you had in the last year?: 1-2    Advance Care Planning:   Living will: No    Durable POA for healthcare: No    End of Life Decisions reviewed with patient: No      Cognitive Screening:   Provider or family/friend/caregiver concerned regarding cognition?: No    PREVENTIVE SCREENINGS      Cardiovascular Screening:    General: Screening Not Indicated and History Lipid Disorder      Diabetes Screening:     General: Screening Not Indicated and History Diabetes      Colorectal Cancer Screening:     General: Screening Current      Breast Cancer Screening:     General: History Breast Cancer      Cervical Cancer Screening:    General: Screening Not Indicated      Osteoporosis Screening:    General: Screening Not Indicated and History Osteoporosis      Abdominal Aortic Aneurysm (AAA) Screening:        General: Screening Not Indicated      Lung Cancer Screening:     General: Screening Not Indicated      Hepatitis C Screening:    General: Risks and Benefits Discussed    Hep C Screening Accepted: No     Screening, Brief Intervention, and Referral to Treatment (SBIRT)    Screening  Typical number of drinks in a day: 0  Typical number of drinks in a week: 0  Interpretation: Low risk drinking behavior  Single Item Drug Screening:  How often have you used an illegal drug (including marijuana) or a prescription medication for non-medical reasons in the past year? never    Single Item Drug Screen Score: 0  Interpretation: Negative screen for possible drug use disorder    Other Counseling Topics:   Car/seat belt/driving safety and regular weightbearing exercise         Johnna Gamble, DO

## 2021-04-15 NOTE — PATIENT INSTRUCTIONS
Medicare Preventive Visit Patient Instructions  Thank you for completing your Welcome to Medicare Visit or Medicare Annual Wellness Visit today  Your next wellness visit will be due in one year (4/15/2022)  The screening/preventive services that you may require over the next 5-10 years are detailed below  Some tests may not apply to you based off risk factors and/or age  Screening tests ordered at today's visit but not completed yet may show as past due  Also, please note that scanned in results may not display below  Preventive Screenings:  Service Recommendations Previous Testing/Comments   Colorectal Cancer Screening  * Colonoscopy    * Fecal Occult Blood Test (FOBT)/Fecal Immunochemical Test (FIT)  * Fecal DNA/Cologuard Test  * Flexible Sigmoidoscopy Age: 54-65 years old   Colonoscopy: every 10 years (may be performed more frequently if at higher risk)  OR  FOBT/FIT: every 1 year  OR  Cologuard: every 3 years  OR  Sigmoidoscopy: every 5 years  Screening may be recommended earlier than age 48 if at higher risk for colorectal cancer  Also, an individualized decision between you and your healthcare provider will decide whether screening between the ages of 74-80 would be appropriate  Colonoscopy: 01/07/2015  FOBT/FIT: Not on file  Cologuard: Not on file  Sigmoidoscopy: Not on file    Screening Current     Breast Cancer Screening Age: 36 years old  Frequency: every 1-2 years  Not required if history of left and right mastectomy Mammogram: 05/18/2020    History Breast Cancer   Cervical Cancer Screening Between the ages of 21-29, pap smear recommended once every 3 years  Between the ages of 33-67, can perform pap smear with HPV co-testing every 5 years     Recommendations may differ for women with a history of total hysterectomy, cervical cancer, or abnormal pap smears in past  Pap Smear: Not on file    Screening Not Indicated   Hepatitis C Screening Once for adults born between 1945 and 1965  More frequently in patients at high risk for Hepatitis C Hep C Antibody: Not on file    Risks and Benefits Discussed   Diabetes Screening 1-2 times per year if you're at risk for diabetes or have pre-diabetes Fasting glucose: 123 mg/dL   A1C: 7 6 %    Screening Not Indicated  History Diabetes   Cholesterol Screening Once every 5 years if you don't have a lipid disorder  May order more often based on risk factors  Lipid panel: 03/09/2021    Screening Not Indicated  History Lipid Disorder     Other Preventive Screenings Covered by Medicare:  1  Abdominal Aortic Aneurysm (AAA) Screening: covered once if your at risk  You're considered to be at risk if you have a family history of AAA  2  Lung Cancer Screening: covers low dose CT scan once per year if you meet all of the following conditions: (1) Age 50-69; (2) No signs or symptoms of lung cancer; (3) Current smoker or have quit smoking within the last 15 years; (4) You have a tobacco smoking history of at least 30 pack years (packs per day multiplied by number of years you smoked); (5) You get a written order from a healthcare provider  3  Glaucoma Screening: covered annually if you're considered high risk: (1) You have diabetes OR (2) Family history of glaucoma OR (3)  aged 48 and older OR (3)  American aged 72 and older  3  Osteoporosis Screening: covered every 2 years if you meet one of the following conditions: (1) You're estrogen deficient and at risk for osteoporosis based off medical history and other findings; (2) Have a vertebral abnormality; (3) On glucocorticoid therapy for more than 3 months; (4) Have primary hyperparathyroidism; (5) On osteoporosis medications and need to assess response to drug therapy  · Last bone density test (DXA Scan): 03/17/2021   5  HIV Screening: covered annually if you're between the age of 15-65  Also covered annually if you are younger than 13 and older than 72 with risk factors for HIV infection   For pregnant patients, it is covered up to 3 times per pregnancy  Immunizations:  Immunization Recommendations   Influenza Vaccine Annual influenza vaccination during flu season is recommended for all persons aged >= 6 months who do not have contraindications   Pneumococcal Vaccine (Prevnar and Pneumovax)  * Prevnar = PCV13  * Pneumovax = PPSV23   Adults 25-60 years old: 1-3 doses may be recommended based on certain risk factors  Adults 72 years old: Prevnar (PCV13) vaccine recommended followed by Pneumovax (PPSV23) vaccine  If already received PPSV23 since turning 65, then PCV13 recommended at least one year after PPSV23 dose  Hepatitis B Vaccine 3 dose series if at intermediate or high risk (ex: diabetes, end stage renal disease, liver disease)   Tetanus (Td) Vaccine - COST NOT COVERED BY MEDICARE PART B Following completion of primary series, a booster dose should be given every 10 years to maintain immunity against tetanus  Td may also be given as tetanus wound prophylaxis  Tdap Vaccine - COST NOT COVERED BY MEDICARE PART B Recommended at least once for all adults  For pregnant patients, recommended with each pregnancy  Shingles Vaccine (Shingrix) - COST NOT COVERED BY MEDICARE PART B  2 shot series recommended in those aged 48 and above     Health Maintenance Due:      Topic Date Due    Hepatitis C Screening  Never done    MAMMOGRAM  05/18/2021    Colorectal Cancer Screening  01/07/2025     Immunizations Due:      Topic Date Due    COVID-19 Vaccine (2 - Moderna 2-dose series) 02/08/2021     Advance Directives   What are advance directives? Advance directives are legal documents that state your wishes and plans for medical care  These plans are made ahead of time in case you lose your ability to make decisions for yourself  Advance directives can apply to any medical decision, such as the treatments you want, and if you want to donate organs  What are the types of advance directives?   There are many types of advance directives, and each state has rules about how to use them  You may choose a combination of any of the following:  · Living will: This is a written record of the treatment you want  You can also choose which treatments you do not want, which to limit, and which to stop at a certain time  This includes surgery, medicine, IV fluid, and tube feedings  · Durable power of  for healthcare [facility]): This is a written record that states who you want to make healthcare choices for you when you are unable to make them for yourself  This person, called a proxy, is usually a family member or a friend  You may choose more than 1 proxy  · Do not resuscitate (DNR) order:  A DNR order is used in case your heart stops beating or you stop breathing  It is a request not to have certain forms of treatment, such as CPR  A DNR order may be included in other types of advance directives  · Medical directive: This covers the care that you want if you are in a coma, near death, or unable to make decisions for yourself  You can list the treatments you want for each condition  Treatment may include pain medicine, surgery, blood transfusions, dialysis, IV or tube feedings, and a ventilator (breathing machine)  · Values history: This document has questions about your views, beliefs, and how you feel and think about life  This information can help others choose the care that you would choose  Why are advance directives important? An advance directive helps you control your care  Although spoken wishes may be used, it is better to have your wishes written down  Spoken wishes can be misunderstood, or not followed  Treatments may be given even if you do not want them  An advance directive may make it easier for your family to make difficult choices about your care  Cigarette Smoking and Your Health   Risks to your health if you smoke:  Nicotine and other chemicals found in tobacco damage every cell in your body   Even if you are a light smoker, you have an increased risk for cancer, heart disease, and lung disease  If you are pregnant or have diabetes, smoking increases your risk for complications  Benefits to your health if you stop smoking:   · You decrease respiratory symptoms such as coughing, wheezing, and shortness of breath  · You reduce your risk for cancers of the lung, mouth, throat, kidney, bladder, pancreas, stomach, and cervix  If you already have cancer, you increase the benefits of chemotherapy  You also reduce your risk for cancer returning or a second cancer from developing  · You reduce your risk for heart disease, blood clots, heart attack, and stroke  · You reduce your risk for lung infections, and diseases such as pneumonia, asthma, chronic bronchitis, and emphysema  · Your circulation improves  More oxygen can be delivered to your body  If you have diabetes, you lower your risk for complications, such as kidney, artery, and eye diseases  You also lower your risk for nerve damage  Nerve damage can lead to amputations, poor vision, and blindness  · You improve your body's ability to heal and to fight infections  For more information and support to stop smoking:   · [place]  gov  Phone: 4- 476 - 573-5801  Web Address: www iFormulary  [address] [place] [address] Information is for End User's use only and may not be sold, redistributed or otherwise used for commercial purposes   All illustrations and images included in CareNotes® are the copyrighted property of A D A M , Inc  or [address] [place] [address]

## 2021-04-19 ENCOUNTER — ANESTHESIA EVENT (OUTPATIENT)
Dept: PERIOP | Facility: HOSPITAL | Age: 66
End: 2021-04-19
Payer: COMMERCIAL

## 2021-04-19 ENCOUNTER — OFFICE VISIT (OUTPATIENT)
Dept: ENDOCRINOLOGY | Facility: CLINIC | Age: 66
End: 2021-04-19
Payer: COMMERCIAL

## 2021-04-19 VITALS
HEIGHT: 64 IN | HEART RATE: 70 BPM | SYSTOLIC BLOOD PRESSURE: 150 MMHG | WEIGHT: 130 LBS | TEMPERATURE: 97 F | BODY MASS INDEX: 22.2 KG/M2 | DIASTOLIC BLOOD PRESSURE: 80 MMHG

## 2021-04-19 DIAGNOSIS — Z79.4 TYPE 2 DIABETES MELLITUS WITH HYPERGLYCEMIA, WITH LONG-TERM CURRENT USE OF INSULIN (HCC): ICD-10-CM

## 2021-04-19 DIAGNOSIS — E21.3 HYPERPARATHYROIDISM (HCC): Primary | ICD-10-CM

## 2021-04-19 DIAGNOSIS — E78.00 PURE HYPERCHOLESTEROLEMIA: ICD-10-CM

## 2021-04-19 DIAGNOSIS — I10 HYPERTENSION GOAL BP (BLOOD PRESSURE) < 140/90: ICD-10-CM

## 2021-04-19 DIAGNOSIS — M81.0 OSTEOPOROSIS, UNSPECIFIED OSTEOPOROSIS TYPE, UNSPECIFIED PATHOLOGICAL FRACTURE PRESENCE: ICD-10-CM

## 2021-04-19 DIAGNOSIS — E83.52 HYPERCALCEMIA: ICD-10-CM

## 2021-04-19 DIAGNOSIS — E11.65 TYPE 2 DIABETES MELLITUS WITH HYPERGLYCEMIA, WITH LONG-TERM CURRENT USE OF INSULIN (HCC): Primary | ICD-10-CM

## 2021-04-19 DIAGNOSIS — E11.65 TYPE 2 DIABETES MELLITUS WITH HYPERGLYCEMIA, WITH LONG-TERM CURRENT USE OF INSULIN (HCC): ICD-10-CM

## 2021-04-19 DIAGNOSIS — Z79.4 TYPE 2 DIABETES MELLITUS WITH HYPERGLYCEMIA, WITH LONG-TERM CURRENT USE OF INSULIN (HCC): Primary | ICD-10-CM

## 2021-04-19 DIAGNOSIS — E55.9 VITAMIN D DEFICIENCY: ICD-10-CM

## 2021-04-19 PROCEDURE — 1160F RVW MEDS BY RX/DR IN RCRD: CPT | Performed by: INTERNAL MEDICINE

## 2021-04-19 PROCEDURE — 4004F PT TOBACCO SCREEN RCVD TLK: CPT | Performed by: INTERNAL MEDICINE

## 2021-04-19 PROCEDURE — 99214 OFFICE O/P EST MOD 30 MIN: CPT | Performed by: INTERNAL MEDICINE

## 2021-04-19 RX ORDER — BLOOD SUGAR DIAGNOSTIC
STRIP MISCELLANEOUS
Qty: 200 EACH | Refills: 3 | Status: SHIPPED | OUTPATIENT
Start: 2021-04-19 | End: 2021-12-13

## 2021-04-19 RX ORDER — BLOOD-GLUCOSE METER
EACH MISCELLANEOUS
Qty: 1 KIT | Refills: 0 | Status: SHIPPED | OUTPATIENT
Start: 2021-04-19 | End: 2021-10-14

## 2021-04-19 RX ORDER — LANCETS
EACH MISCELLANEOUS
Qty: 102 EACH | Refills: 3 | Status: SHIPPED | OUTPATIENT
Start: 2021-04-19 | End: 2021-12-13

## 2021-04-19 NOTE — PRE-PROCEDURE INSTRUCTIONS
Pre-Surgery Instructions:   Medication Instructions    aspirin (ECOTRIN LOW STRENGTH) 81 mg EC tablet Patient was instructed by Physician and understands   atorvastatin (LIPITOR) 40 mg tablet Instructed to take per normal schedule including DOS with sips water    cinacalcet (SENSIPAR) 30 mg tablet Instructed to take per normal schedule including DOS with sips water    furosemide (LASIX) 20 mg tablet Instructed to take per normal schedule except DOS    lisinopril (ZESTRIL) 10 mg tablet Instructed to take per normal schedule except DOS    metFORMIN (GLUCOPHAGE) 1000 MG tablet Instructed to take per normal schedule except DOS    metoprolol succinate (TOPROL-XL) 25 mg 24 hr tablet Instructed to take per normal schedule including DOS with sips water    Pre-op medication, and showering instructions with antibacteral soap reviewed  Instructed to avoid all NSAIDs and OTC Vit/Supp from now until after surgery  Tylenol ok prn  Pt  Given instructions by MD to stop ASA 1week prior to surgery  Pt  Verbalized an understanding of all instructions reviewed and offers no concerns at this time

## 2021-04-19 NOTE — PATIENT INSTRUCTIONS
Start vitamin D3 2000 international units daily     Have labs done in 4 weeks     Follow up in 5 weeks

## 2021-04-19 NOTE — PROGRESS NOTES
ENDOCRINOLOGY  FOLLOW UP VISIT      Reason for Endocrine Consult/Chief Complaint: DM follow up    ? Medical Decision Making:     Impression  1  Type 2 Diabetes  2  Hypercalcemia  3  Elevated PTH  4  Vitamin D def  5  Osteoporosis   6  HTN  7  HLD  8  HFrEF  9  Hx of breast CA  10  Thyroid nodule        Recommendations:  ? She stopped her insulin, now only on metformin 1000mg BID AC continue, A1C improved to 7 6% March 2021  She has no glucometer to check BGs, new meter prescribed  Hypercalcemia, elevated PTH- likely primary hyperparathyroidism, s/p  IV pamidronate 60mg 3/18/21, calcium level slowly increasing 10 8 April 2021, going for parathyroid resection later this week, US thyroid and parathyroid scan localizing to right lower gland, repeat calcium labs 4 weeks post-operatively     Osteoporosis- on DEXA scan March 2021 in forearm density, repeat DEXA including forearm March 2022 for re-evaluation after parathyroid surgery     Vitamin D def- low at 7 7 March 2021, never started D3 replacement, instructed to start 2000 IU daily after surgery    Thyroid nodule- left subcentimeter, repeat US thyroid 3/2022     RTC 5 weeks  Daniel CLEMENTE  Endocrinology           History of Present Illness:   Mrs Lalita Song is a 77year old female who presents for hypercalcemia and diabetes evaluation- follow up      She has a history of hypercalcemia for several years     >11 1- yes   Age <50- no  GFR <60- yes 45 Nov 2020  Kidney stones-none known   Osteoporosis-DEXA scan 2018 osteopenia   24hr urine calcium- has not had checked yet      ?  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 Review of Systems:     Review of Systems   Constitutional: Negative for appetite change, chills, diaphoresis, fatigue, fever and unexpected weight change  HENT: Negative for congestion, ear pain, hearing loss, rhinorrhea, sinus pressure, sinus pain, sore throat, trouble swallowing and voice change  Eyes: Negative for photophobia, redness and visual disturbance  Respiratory: Negative for apnea, cough, chest tightness, shortness of breath, wheezing and stridor  Cardiovascular: Negative for chest pain, palpitations and leg swelling  Gastrointestinal: Negative for abdominal distention, abdominal pain, constipation, diarrhea, nausea and vomiting  Endocrine: Negative for cold intolerance, heat intolerance, polydipsia, polyphagia and polyuria  Genitourinary: Negative for difficulty urinating, dysuria, flank pain, frequency, hematuria and urgency  Musculoskeletal: Negative for arthralgias, back pain, gait problem, joint swelling and myalgias  Skin: Negative for color change, pallor, rash and wound  Allergic/Immunologic: Negative for immunocompromised state  Neurological: Negative for dizziness, tremors, syncope, weakness, light-headedness and headaches  Hematological: Negative for adenopathy  Does not bruise/bleed easily  Psychiatric/Behavioral: Negative for confusion and sleep disturbance  The patient is not nervous/anxious  ? Patient History:     Past Medical History:   Diagnosis Date    Breast cancer ([facility] )     left - 1994      Diabetes mellitus ([facility] )     Diabetes mellitus, type 2 ([facility] ) 03/15/2006    last assessed 7/10/13    GERD (gastroesophageal reflux disease)     History of chemotherapy     Hypertension      Past Surgical History:   Procedure Laterality Date    MASTECTOMY Left     last assessed 12/16/14    MASTECTOMY, RADICAL Left     1994    REDUCTION MAMMAPLASTY Right     UVULECTOMY       Social History     Socioeconomic History    Marital status: Single     Spouse name: Not on file    Number of children: Not on file    Years of education: Not on file    Highest education level: Not on file   Occupational History    Not on file   Social Needs    Financial resource strain: Not on file    Food insecurity     Worry: Not on file     Inability: Not on file    Transportation needs     Medical: Not on file     Non-medical: Not on file   Tobacco Use    Smoking status: Current Every Day Smoker     Packs/day: 0 25    Smokeless tobacco: Never Used    Tobacco comment: Nicotine dependence   Substance and Sexual Activity    Alcohol use: Not Currently     Comment: Social    Drug use: No    Sexual activity: Not on file   Lifestyle    Physical activity     Days per week: Not on file     Minutes per session: Not on file    Stress: Not on file   Relationships    Social connections     Talks on phone: Not on file     Gets together: Not on file     Attends Alevism service: Not on file     Active member of club or organization: Not on file     Attends meetings of clubs or organizations: Not on file     Relationship status: Not on file    Intimate partner violence     Fear of current or ex partner: Not on file     Emotionally abused: Not on file     Physically abused: Not on file     Forced sexual activity: Not on file   Other Topics Concern    Not on file   Social History Narrative    Not on file     Family History   Problem Relation Age of Onset    Hypothyroidism Mother     Leukemia Mother     Diabetes Father     Diabetes type II Father     Stroke Sister     Diabetes Paternal Grandmother     Cancer Sister     Diabetes Brother        Current Medications: At the time this note was written these were the medications the patient was on    Current Outpatient Medications   Medication Sig Dispense Refill    aspirin (ECOTRIN LOW STRENGTH) 81 mg EC tablet Take 1 tablet (81 mg total) by mouth daily      atorvastatin (LIPITOR) 40 mg tablet Take 1 tablet (40 mg total) by mouth daily 90 tablet 1    furosemide (LASIX) 20 mg tablet Take one tablet daily x 3 days, then every Mon,Wed,Fri 30 tablet 2    lisinopril (ZESTRIL) 10 mg tablet Take 1 tablet (10 mg total) by mouth daily 90 tablet 1    metFORMIN (GLUCOPHAGE) 1000 MG tablet Take 1 tablet (1,000 mg total) by mouth 2 (two) times a day with meals 180 tablet 1    metoprolol succinate (TOPROL-XL) 25 mg 24 hr tablet Take 1 tablet (25 mg total) by mouth daily 90 tablet 1    NEEDLE, DISP, 30 G 30G X 1/2" MISC Use daily 100 each 0    cinacalcet (SENSIPAR) 30 mg tablet Take 1 tablet (30 mg total) by mouth 2 (two) times a day (Patient not taking: Reported on 4/19/2021) 180 tablet 0    nicotine (NICODERM CQ) 14 mg/24hr TD 24 hr patch Place 1 patch on the skin daily (Patient not taking: Reported on 3/18/2021) 28 patch 1     No current facility-administered medications for this visit  Allergies: Patient has no known allergies  Physical Exam:   Vital Signs:   /80   Pulse 70   Temp (!) 97 °F (36 1 °C)   Ht 5' 3 75" (1 619 m)   Wt 59 kg (130 lb)   LMP  (LMP Unknown)   BMI 22 49 kg/m²     Physical Exam  Vitals signs reviewed  Constitutional:       General: She is not in acute distress  Appearance: Normal appearance  She is not ill-appearing, toxic-appearing or diaphoretic  HENT:      Head: Normocephalic and atraumatic  Right Ear: External ear normal       Left Ear: External ear normal       Nose: Nose normal    Eyes:      General: No scleral icterus  Extraocular Movements: Extraocular movements intact  Conjunctiva/sclera: Conjunctivae normal    Neck:      Musculoskeletal: Normal range of motion and neck supple  No muscular tenderness  Comments: No thyromegaly or nodules  Cardiovascular:      Rate and Rhythm: Normal rate and regular rhythm  Heart sounds: Normal heart sounds  No murmur  No friction rub  No gallop  Pulmonary:      Effort: Pulmonary effort is normal  No respiratory distress  Breath sounds: Normal breath sounds  No stridor  No wheezing, rhonchi or rales  Abdominal:      General: Bowel sounds are normal  There is no distension  Palpations: Abdomen is soft  There is no mass  Tenderness: There is no abdominal tenderness  There is no guarding or rebound  Hernia: No hernia is present  Musculoskeletal: Normal range of motion  General: No swelling  Lymphadenopathy:      Cervical: No cervical adenopathy  Skin:     General: Skin is warm and dry  Coloration: Skin is not pale  Findings: No erythema or rash  Neurological:      General: No focal deficit present  Mental Status: She is alert and oriented to person, place, and time  Psychiatric:         Mood and Affect: Mood normal          Behavior: Behavior normal          Thought Content:  Thought content normal          Judgment: Judgment normal             Labs and Imaging:      Component      Latest Ref Rng & Units 4/13/2021   Sodium      136 - 145 mmol/L 141   Potassium      3 5 - 5 3 mmol/L 4 6   Chloride      100 - 108 mmol/L 107   CO2      21 - 32 mmol/L 27   Anion Gap      4 - 13 mmol/L 7   BUN      5 - 25 mg/dL 15   Creatinine      0 60 - 1 30 mg/dL 1 00   GLUCOSE FASTING      65 - 99 mg/dL 123 (H)   Calcium      8 3 - 10 1 mg/dL 10 8 (H)   eGFR      ml/min/1 73sq m 67       PARATHYROID SCAN     INDICATION:  E21 3: Hyperparathyroidism, unspecified  E83 52: Hypercalcemia  E55 9: Vitamin D deficiency, unspecified     COMPARISON:  Thyroid ultrasound 3/17/2021     TECHNIQUE:   Following the intravenous administration of 25 mCi Tc-99m Cardiolite, anterior and bilateral anterior oblique projection images of the neck and mediastinum were obtained at approximately 10 minutes post injection followed at 2 hours post   injection by static anterior and bilateral oblique projections as well as SPECT images in coronal, sagittal and axial projections       FINDINGS:     Early images demonstrate symmetric radiotracer uptake in the thyroid gland      Delayed images demonstrate washout of thyroid gland activity  Focal radiotracer uptake suggested in the right lower pole region      SPECT images are somewhat suboptimal due to motion but do demonstrate focal radiotracer uptake in the right lower pole region extending posteriorly from the thyroid gland level  This area would correspond to the 1 6 x 1 0 x 0 9 cm hypoechoic nodule seen   on ultrasound exam   Findings suspicious for parathyroid adenoma      IMPRESSION:     1  Focal radiotracer uptake in the right lower pole region    This area would correspond to the 1 6 x 1 0 x 0 9 cm hypoechoic nodule seen on ultrasound exam   Findings suspicious for parathyroid adenoma         Workstation performed: [id number]

## 2021-04-20 PROBLEM — IMO0001 SMOKING: Status: ACTIVE | Noted: 2021-04-20

## 2021-04-20 PROBLEM — C50.919 BREAST CANCER (HCC): Status: ACTIVE | Noted: 2021-04-20

## 2021-04-20 PROBLEM — F17.200 SMOKING: Status: ACTIVE | Noted: 2021-04-20

## 2021-04-20 PROBLEM — I10 HTN (HYPERTENSION): Status: ACTIVE | Noted: 2021-04-20

## 2021-04-21 ENCOUNTER — HOSPITAL ENCOUNTER (OUTPATIENT)
Facility: HOSPITAL | Age: 66
Setting detail: OUTPATIENT SURGERY
Discharge: HOME | End: 2021-04-21
Attending: SPECIALIST | Admitting: SPECIALIST
Payer: COMMERCIAL

## 2021-04-21 ENCOUNTER — ANESTHESIA (OUTPATIENT)
Dept: PERIOP | Facility: HOSPITAL | Age: 66
End: 2021-04-21
Payer: COMMERCIAL

## 2021-04-21 VITALS
SYSTOLIC BLOOD PRESSURE: 178 MMHG | HEART RATE: 84 BPM | BODY MASS INDEX: 21.85 KG/M2 | HEIGHT: 64 IN | WEIGHT: 128 LBS | OXYGEN SATURATION: 97 % | DIASTOLIC BLOOD PRESSURE: 89 MMHG | TEMPERATURE: 97.1 F | RESPIRATION RATE: 20 BRPM

## 2021-04-21 DIAGNOSIS — E21.3 HYPERPARATHYROIDISM (HCC): Primary | ICD-10-CM

## 2021-04-21 DIAGNOSIS — I25.10 CORONARY ARTERY DISEASE INVOLVING NATIVE CORONARY ARTERY OF NATIVE HEART WITHOUT ANGINA PECTORIS: ICD-10-CM

## 2021-04-21 PROBLEM — M54.9 BACK PAIN: Status: ACTIVE | Noted: 2021-04-21

## 2021-04-21 LAB
GLUCOSE SERPL-MCNC: 119 MG/DL (ref 65–140)
GLUCOSE SERPL-MCNC: 136 MG/DL (ref 65–140)
PTH-INTACT SERPL-MCNC: 395.5 PG/ML (ref 8.2–83.5)
PTH-INTACT SERPL-MCNC: 99.5 PG/ML (ref 8.2–83.5)

## 2021-04-21 PROCEDURE — 88305 TISSUE EXAM BY PATHOLOGIST: CPT | Performed by: PATHOLOGY

## 2021-04-21 PROCEDURE — 82948 REAGENT STRIP/BLOOD GLUCOSE: CPT

## 2021-04-21 PROCEDURE — 83970 ASSAY OF PARATHORMONE: CPT | Performed by: SPECIALIST

## 2021-04-21 PROCEDURE — 60500 EXPLORE PARATHYROID GLANDS: CPT | Performed by: SPECIALIST

## 2021-04-21 PROCEDURE — 88331 PATH CONSLTJ SURG 1 BLK 1SPC: CPT | Performed by: PATHOLOGY

## 2021-04-21 RX ORDER — NEOSTIGMINE METHYLSULFATE 1 MG/ML
INJECTION INTRAVENOUS AS NEEDED
Status: DISCONTINUED | OUTPATIENT
Start: 2021-04-21 | End: 2021-04-21

## 2021-04-21 RX ORDER — PROPOFOL 10 MG/ML
INJECTION, EMULSION INTRAVENOUS AS NEEDED
Status: DISCONTINUED | OUTPATIENT
Start: 2021-04-21 | End: 2021-04-21

## 2021-04-21 RX ORDER — SODIUM CHLORIDE, SODIUM LACTATE, POTASSIUM CHLORIDE, CALCIUM CHLORIDE 600; 310; 30; 20 MG/100ML; MG/100ML; MG/100ML; MG/100ML
75 INJECTION, SOLUTION INTRAVENOUS CONTINUOUS
Status: DISCONTINUED | OUTPATIENT
Start: 2021-04-21 | End: 2021-04-21 | Stop reason: HOSPADM

## 2021-04-21 RX ORDER — GLYCOPYRROLATE 0.2 MG/ML
INJECTION INTRAMUSCULAR; INTRAVENOUS AS NEEDED
Status: DISCONTINUED | OUTPATIENT
Start: 2021-04-21 | End: 2021-04-21

## 2021-04-21 RX ORDER — ONDANSETRON 2 MG/ML
INJECTION INTRAMUSCULAR; INTRAVENOUS
Status: COMPLETED
Start: 2021-04-21 | End: 2021-04-21

## 2021-04-21 RX ORDER — ONDANSETRON 2 MG/ML
INJECTION INTRAMUSCULAR; INTRAVENOUS AS NEEDED
Status: DISCONTINUED | OUTPATIENT
Start: 2021-04-21 | End: 2021-04-21

## 2021-04-21 RX ORDER — ASPIRIN 81 MG/1
81 TABLET ORAL DAILY
Refills: 0
Start: 2021-04-24 | End: 2022-01-27 | Stop reason: SDUPTHER

## 2021-04-21 RX ORDER — OXYCODONE HYDROCHLORIDE 5 MG/1
5 TABLET ORAL EVERY 4 HOURS PRN
Status: DISCONTINUED | OUTPATIENT
Start: 2021-04-21 | End: 2021-04-21 | Stop reason: HOSPADM

## 2021-04-21 RX ORDER — MIDAZOLAM HYDROCHLORIDE 2 MG/2ML
INJECTION, SOLUTION INTRAMUSCULAR; INTRAVENOUS AS NEEDED
Status: DISCONTINUED | OUTPATIENT
Start: 2021-04-21 | End: 2021-04-21

## 2021-04-21 RX ORDER — ACETAMINOPHEN 325 MG/1
650 TABLET ORAL EVERY 6 HOURS PRN
Status: DISCONTINUED | OUTPATIENT
Start: 2021-04-21 | End: 2021-04-21 | Stop reason: HOSPADM

## 2021-04-21 RX ORDER — OXYCODONE HYDROCHLORIDE 5 MG/1
5 TABLET ORAL EVERY 6 HOURS PRN
Qty: 6 TABLET | Refills: 0 | Status: SHIPPED | OUTPATIENT
Start: 2021-04-21 | End: 2021-05-01

## 2021-04-21 RX ORDER — DEXAMETHASONE SODIUM PHOSPHATE 10 MG/ML
INJECTION, SOLUTION INTRAMUSCULAR; INTRAVENOUS AS NEEDED
Status: DISCONTINUED | OUTPATIENT
Start: 2021-04-21 | End: 2021-04-21

## 2021-04-21 RX ORDER — LIDOCAINE HYDROCHLORIDE 10 MG/ML
INJECTION, SOLUTION EPIDURAL; INFILTRATION; INTRACAUDAL; PERINEURAL AS NEEDED
Status: DISCONTINUED | OUTPATIENT
Start: 2021-04-21 | End: 2021-04-21

## 2021-04-21 RX ORDER — LIDOCAINE HYDROCHLORIDE AND EPINEPHRINE 10; 10 MG/ML; UG/ML
INJECTION, SOLUTION INFILTRATION; PERINEURAL AS NEEDED
Status: DISCONTINUED | OUTPATIENT
Start: 2021-04-21 | End: 2021-04-21 | Stop reason: HOSPADM

## 2021-04-21 RX ORDER — MAGNESIUM HYDROXIDE 1200 MG/15ML
LIQUID ORAL AS NEEDED
Status: DISCONTINUED | OUTPATIENT
Start: 2021-04-21 | End: 2021-04-21 | Stop reason: HOSPADM

## 2021-04-21 RX ORDER — ONDANSETRON 2 MG/ML
4 INJECTION INTRAMUSCULAR; INTRAVENOUS ONCE AS NEEDED
Status: DISCONTINUED | OUTPATIENT
Start: 2021-04-21 | End: 2021-04-21 | Stop reason: HOSPADM

## 2021-04-21 RX ORDER — B-COMPLEX WITH VITAMIN C
2 TABLET ORAL 3 TIMES DAILY
Qty: 180 TABLET | Refills: 0 | Status: SHIPPED | OUTPATIENT
Start: 2021-04-21 | End: 2021-12-13

## 2021-04-21 RX ORDER — LIDOCAINE HYDROCHLORIDE 10 MG/ML
0.5 INJECTION, SOLUTION EPIDURAL; INFILTRATION; INTRACAUDAL; PERINEURAL ONCE AS NEEDED
Status: DISCONTINUED | OUTPATIENT
Start: 2021-04-21 | End: 2021-04-21 | Stop reason: HOSPADM

## 2021-04-21 RX ORDER — IBUPROFEN 200 MG
400 TABLET ORAL EVERY 6 HOURS PRN
Qty: 120 TABLET | Refills: 0
Start: 2021-04-21 | End: 2021-07-25

## 2021-04-21 RX ORDER — IBUPROFEN 400 MG/1
400 TABLET ORAL EVERY 6 HOURS PRN
Status: DISCONTINUED | OUTPATIENT
Start: 2021-04-21 | End: 2021-04-21

## 2021-04-21 RX ORDER — FENTANYL CITRATE/PF 50 MCG/ML
25 SYRINGE (ML) INJECTION
Status: DISCONTINUED | OUTPATIENT
Start: 2021-04-21 | End: 2021-04-21 | Stop reason: HOSPADM

## 2021-04-21 RX ORDER — ACETAMINOPHEN 325 MG/1
TABLET ORAL
Qty: 30 TABLET | Refills: 0
Start: 2021-04-21 | End: 2021-07-01

## 2021-04-21 RX ORDER — ROCURONIUM BROMIDE 10 MG/ML
INJECTION, SOLUTION INTRAVENOUS AS NEEDED
Status: DISCONTINUED | OUTPATIENT
Start: 2021-04-21 | End: 2021-04-21

## 2021-04-21 RX ORDER — PROMETHAZINE HYDROCHLORIDE 25 MG/ML
12.5 INJECTION, SOLUTION INTRAMUSCULAR; INTRAVENOUS EVERY 6 HOURS PRN
Status: DISCONTINUED | OUTPATIENT
Start: 2021-04-21 | End: 2021-04-21

## 2021-04-21 RX ORDER — B-COMPLEX WITH VITAMIN C
2 TABLET ORAL ONCE
Status: COMPLETED | OUTPATIENT
Start: 2021-04-21 | End: 2021-04-21

## 2021-04-21 RX ORDER — FENTANYL CITRATE 50 UG/ML
INJECTION, SOLUTION INTRAMUSCULAR; INTRAVENOUS AS NEEDED
Status: DISCONTINUED | OUTPATIENT
Start: 2021-04-21 | End: 2021-04-21

## 2021-04-21 RX ORDER — PROMETHAZINE HYDROCHLORIDE 25 MG/ML
12.5 INJECTION, SOLUTION INTRAMUSCULAR; INTRAVENOUS EVERY 6 HOURS PRN
Status: DISCONTINUED | OUTPATIENT
Start: 2021-04-21 | End: 2021-04-21 | Stop reason: HOSPADM

## 2021-04-21 RX ORDER — IBUPROFEN 400 MG/1
400 TABLET ORAL EVERY 6 HOURS PRN
Status: DISCONTINUED | OUTPATIENT
Start: 2021-04-21 | End: 2021-04-21 | Stop reason: HOSPADM

## 2021-04-21 RX ADMIN — ACETAMINOPHEN 650 MG: 325 TABLET, FILM COATED ORAL at 13:07

## 2021-04-21 RX ADMIN — GLYCOPYRROLATE 0.4 MG: 0.2 INJECTION, SOLUTION INTRAMUSCULAR; INTRAVENOUS at 11:54

## 2021-04-21 RX ADMIN — ROCURONIUM BROMIDE 40 MG: 10 SOLUTION INTRAVENOUS at 10:49

## 2021-04-21 RX ADMIN — DEXAMETHASONE SODIUM PHOSPHATE 4 MG: 10 INJECTION, SOLUTION INTRAMUSCULAR; INTRAVENOUS at 10:49

## 2021-04-21 RX ADMIN — ONDANSETRON 4 MG: 2 INJECTION INTRAMUSCULAR; INTRAVENOUS at 10:49

## 2021-04-21 RX ADMIN — ONDANSETRON 4 MG: 2 INJECTION INTRAMUSCULAR; INTRAVENOUS at 13:32

## 2021-04-21 RX ADMIN — NEOSTIGMINE METHYLSULFATE 3 MG: 1 INJECTION INTRAVENOUS at 11:54

## 2021-04-21 RX ADMIN — FENTANYL CITRATE 50 MCG: 50 INJECTION, SOLUTION INTRAMUSCULAR; INTRAVENOUS at 11:18

## 2021-04-21 RX ADMIN — FENTANYL CITRATE 50 MCG: 50 INJECTION, SOLUTION INTRAMUSCULAR; INTRAVENOUS at 10:49

## 2021-04-21 RX ADMIN — SODIUM CHLORIDE, SODIUM LACTATE, POTASSIUM CHLORIDE, AND CALCIUM CHLORIDE: .6; .31; .03; .02 INJECTION, SOLUTION INTRAVENOUS at 10:44

## 2021-04-21 RX ADMIN — LIDOCAINE HYDROCHLORIDE 50 MG: 10 INJECTION, SOLUTION EPIDURAL; INFILTRATION; INTRACAUDAL at 10:49

## 2021-04-21 RX ADMIN — IBUPROFEN 400 MG: 400 TABLET ORAL at 14:47

## 2021-04-21 RX ADMIN — Medication 2 TABLET: at 14:46

## 2021-04-21 RX ADMIN — PROPOFOL 120 MG: 10 INJECTION, EMULSION INTRAVENOUS at 10:49

## 2021-04-21 RX ADMIN — MIDAZOLAM HYDROCHLORIDE 2 MG: 1 INJECTION, SOLUTION INTRAMUSCULAR; INTRAVENOUS at 10:44

## 2021-04-21 NOTE — INTERVAL H&P NOTE
H&P reviewed  After examining the patient I find no changes in the patients condition since the H&P had been written  Her nuclear medicine scan localized corresponding to the nodule seen posterior to thyroid on the right  As such we will proceed with right parathyroidectomy, possible four gland exploration      Vitals:    04/21/21 0913   BP: (!) 187/80   Pulse: 66   Resp: 18   Temp: (!) 97 2 °F (36 2 °C)   SpO2: 100%

## 2021-04-21 NOTE — ANESTHESIA POSTPROCEDURE EVALUATION
Post-Op Assessment Note    CV Status:  Stable  Pain Score: 0    Pain management: adequate     Mental Status:  Awake   Hydration Status:  Stable and euvolemic   PONV Controlled:  None   Airway Patency:  Patent      Post Op Vitals Reviewed: Yes      Staff: CRNA         No complications documented      BP  171/89   Temp 96 9   Pulse 92   Resp 16   SpO2 100

## 2021-04-21 NOTE — OP NOTE
OPERATIVE REPORT  PATIENT NAME: Michael Ca    :  1955  MRN: [id number]  Pt Location: AN OR ROOM 03    SURGERY DATE: 2021    Surgeon(s) and Role:     * Maria Hager MD - Primary     * Kusum Montano MD - Assisting    Preop Diagnosis:  Hyperparathyroidism ([address] ) [E21 3]    Post-Op Diagnosis Codes:     * Hyperparathyroidism ([address] ) [E21 3]     * Parathyroid related hypercalcemia ([address] ) [E83 52, E21 5]    Procedure(s):  PARATHYROIDECTOMY, RIGHT INFERIOR    Specimen(s):  ID Type Source Tests Collected by Time Destination   1 : RIGHT INFERIOR PARATHYROID Tissue Parathyroid TISSUE Sena Gomez MD 2021 1124        Estimated Blood Loss:   Minimal    Drains:  None    Anesthesia Type:   General    Operative Indications:  Hyperparathyroidism ([address] ) [E21 3] and hypercalcemia, as well as osteoporosis, workup consistent with primary hyperparathyroidism due to likely right inferior parathyroid adenoma  As such, parathyroidectomy was indicated  Operative Findings:  Large abnormal right inferior parathyroid gland weighing over 1g, confirmed as hypercellular parathyroid tissue consistent with proliferative disease on frozen section evaluation  Complications:   None    Procedure and Technique:  Michael Ca was positively identified and transferred onto the operating table in the supine position  Appropriate monitoring devices were put in place, anesthesia was induced and the patient was intubated without difficulty  A shoulder roll was placed, and the patients neck was examined  An appropriate site for skin incision was demarcated 1cm below the cricoid cartilage  Before proceeding further, the timeout process was completed  Local anesthesia in the form of 1% lidocaine with 1/100,000 epinephrine was then injected to the marked site  The patients neck was then prepped and draped in the usual sterile fashion  Skin incision was then made at the marked site in a transverse fashion using a 15 blade  Dissection continued through subcutaneous tissues and platysma using the 15 blade and Bovie cautery  Dissection then continued vertically in midline in between strap musculature using DeBakey forceps and Bovie cautery  Strap muscles were then elevated off the underlying thyroid gland on the right side using Bovie cautery and blunt dissection  The strap muscles were then held in a retracted position using an CoreOS Orlovista retractor  Dissection then continued around the thyroid gland, immediately adjacent to the capsule of the gland using bipolar cautery  The gland was retracted medially, and dissection continued using right angle forceps and bipolar cautery  As dissection continued an enlarged parathyroid gland consistent with parathyroid adenoma was identified in the location suggested by the ultrasound and nuclear medicine study  The gland was carefully dissected free using bipolar cautery, and sent to pathology for frozen and permanent section evaluation  The surgical site was irrigated with saline which was suctioned free, and hemostasis was ensured using bipolar cautery  The surgical site was then closed in multiple layers  Strap muscles were re-approximated across midline using 3-0 Vicryl stitches in a running fashion, and the same stitch was used in an interrupted fashion to reapproximate the plastysma and tissue in midline at the same plane  Skin was closed using a 4-0 Monocryl stitch in a running sub-cuticular fashion, followed by a Steristrip  Anesthesia was then reversed, and the patient was awakened, extubated and taken to the recovery room in stable condition  All counts were correct at the end of the case, and no complications were encountered       I was present for the entire procedure    Patient Disposition:  PACU  and extubated and stable    SIGNATURE: Bhanu Cash MD  DATE: April 21, 2021  TIME: 3:40 PM

## 2021-04-21 NOTE — DISCHARGE INSTRUCTIONS
Parathyroidectomy   WHAT YOU NEED TO KNOW:   A parathyroidectomy is surgery to remove part or all of your parathyroid glands  The parathyroid is made of 4 small glands that usually sit near the thyroid gland  The parathyroid glands make a hormone that controls the amount of calcium in your blood  DISCHARGE INSTRUCTIONS:   Medicines: You may need any of the following:  · NSAIDs  may decrease swelling and pain  This medicine can be bought with or without a doctor's order  This medicine can cause stomach bleeding or kidney problems in certain people  If you take blood thinner medicine, always ask your healthcare provider if NSAIDs are safe for you  Always read the medicine label and follow the directions on it before using this medicine  · Acetaminophen  decreases pain  It is available without a doctor's order  Ask how much to take and how often to take it  Follow directions  Acetaminophen can cause liver damage if not taken correctly  · Take your medicine as directed  Contact your healthcare provider if you think your medicine is not helping or if you have side effects  Tell him or her if you are allergic to any medicine  Keep a list of the medicines, vitamins, and herbs you take  Include the amounts, and when and why you take them  Bring the list or the pill bottles to follow-up visits  Carry your medicine list with you in case of an emergency  Follow up with your healthcare provider or surgeon as directed: You will need to return to have tests, your incision checked, and your drain or stitches removed  You may be referred to an endocrinologist  Write down your questions so you remember to ask them during your visits  Wound care:  Care for your wound as directed  You may need to carefully wash the wound with soap and water  Dry the area and put on new, clean bandages as directed  Change your bandages when they get wet or dirty  Check your drain when you change the bandages   Do not pull the drain out  Ask for more information about how to care for your drain  Rest as needed:  Slowly start to do more each day  Return to your daily activities as directed  Take supplements as directed: You may need to take calcium medicine to keep your blood calcium level normal  It may also help prevent and treat bone loss  Your healthcare provider may also tell you to take vitamin D to help your body absorb the calcium  Contact your healthcare provider or surgeon if:   · You have a fever  · Your wound is red, swollen, or draining pus  · You have pain in your neck area that does not go away, or gets worse even after you take your pain medicine  · You have chills, a cough, or feel weak and achy  · You have nausea or are vomiting  · You have questions or concerns about your condition or care  Seek care immediately or call 911 if:   · You cough up blood  · You feel lightheaded, short of breath, and have chest pain  · Your arm or leg feels warm, tender, and painful  It may look swollen and red  · You have trouble swallowing or talking, or you lose your voice  · You feel anxious, frightened, and uneasy  · You have the following symptoms of low blood calcium:     ¨ Confusion    ¨ Fatigue    ¨ Muscle spasms or muscle tightening    ¨ Numbness or tingling around your face, hands, or feet    ¨ A seizure  © 2017 [facility] Information is for End User's use only and may not be sold, redistributed or otherwise used for commercial purposes  All illustrations and images included in CareNotes® are the copyrighted property of A D A M , Inc  or Elvin Landry  The above information is an  only  It is not intended as medical advice for individual conditions or treatments  Talk to your doctor, nurse or pharmacist before following any medical regimen to see if it is safe and effective for you      Call Dr Waqas Soto with any questions or concerns: office ; mobile  (urgent issues)

## 2021-04-21 NOTE — ANESTHESIA PREPROCEDURE EVALUATION
Procedure:  PARATHYROIDECTOMY POSSIBLE 4 GLAND EXPLORATION (N/A Neck)    Relevant Problems   CARDIO   (+) Chronic combined systolic and diastolic congestive heart failure (HCC)   (+) Coronary artery disease involving native coronary artery of native heart without angina pectoris   (+) HTN (hypertension)   (+) Hyperlipidemia LDL goal <100   (+) Hypertensive heart and kidney disease with heart failure and with chronic kidney disease stage III (HCC)      ENDO   (+) Diabetes mellitus, type 2 (HCC)   (+) Hyperparathyroidism (HCC)      GI/HEPATIC   (+) Gastroesophageal reflux disease      /RENAL   (+) CKD stage 2 due to type 2 diabetes mellitus (HCC)   (+) Hypertensive heart and kidney disease with heart failure and with chronic kidney disease stage III (HCC)      GYN   (+) Breast cancer (HCC) - left 1994      MUSCULOSKELETAL   (+) Back pain      NEURO/PSYCH   (+) History of left breast cancer      PULMONARY   (+) Obstructive sleep apnea syndrome   (+) Smoking        Physical Exam    Airway    Mallampati score: II  TM Distance: >3 FB  Neck ROM: full     Dental       Cardiovascular  Rhythm: regular, Rate: normal,     Pulmonary  Breath sounds clear to auscultation,     Other Findings        Anesthesia Plan  ASA Score- 3     Anesthesia Type- general with ASA Monitors  Additional Monitors:   Airway Plan: ETT  Plan Factors-    Chart reviewed  Patient is a current smoker  Induction- intravenous  Postoperative Plan- Plan for postoperative opioid use  Informed Consent- Anesthetic plan and risks discussed with patient  I personally reviewed this patient with the CRNA  Discussed and agreed on the Anesthesia Plan with the CRNA  Ira Ramsay

## 2021-04-28 ENCOUNTER — OFFICE VISIT (OUTPATIENT)
Dept: OTOLARYNGOLOGY | Facility: CLINIC | Age: 66
End: 2021-04-28

## 2021-04-28 VITALS — TEMPERATURE: 97.9 F | BODY MASS INDEX: 22.2 KG/M2 | WEIGHT: 130 LBS | HEIGHT: 64 IN

## 2021-04-28 DIAGNOSIS — E21.3 HYPERPARATHYROIDISM (HCC): Primary | ICD-10-CM

## 2021-04-28 DIAGNOSIS — E83.52 HYPERCALCEMIA: ICD-10-CM

## 2021-04-28 DIAGNOSIS — E55.9 VITAMIN D DEFICIENCY: ICD-10-CM

## 2021-04-28 PROCEDURE — 3008F BODY MASS INDEX DOCD: CPT | Performed by: INTERNAL MEDICINE

## 2021-04-28 PROCEDURE — 99024 POSTOP FOLLOW-UP VISIT: CPT | Performed by: NURSE PRACTITIONER

## 2021-04-28 NOTE — PROGRESS NOTES
Assessment/Plan:    Hyperparathyroidism ([facility] )  Status post parathyroidectomy 04/21/2021  PTH levels decreased during surgery from 395 to 99 5  Discussed nature of hyperparathyroidism  Denies numbness or tingling of face, hands or feet  Reviewed post operative expectations including pain, incision care, and hypocalcemia  Incision well healing, surgical tape dislodged prior to today's visit  Antibiotic ointment to incision twice daily for four weeks then may use vaseline to incision once daily  Reviewed ongoing wound care of the incision as well as avoiding deep dark tan and risk of keloids  Discussed the importance of calcium and vitamin D supplementation  Pt voiced concern with swallowing large pills  May take Calcium (tums 2 tabs two times per day)  Vitamin D3 4000 units daily (may take gummies) until follow up with Dr Marty Palacios   Encouraged to follow up with endocrinology as scheduled for medical management of PTH and calcium levels, labs per Dr Marty Palacios 4 weeks after surgery (approx 05/20)  Follow up 3 months post operatively  Diagnoses and all orders for this visit:    Hyper[facility]athyroidism ([facility] )    Hypercalcemia    Vitamin D deficiency          Subjective:      Patient ID: Mary Eduardo is a 77 y o  female  Presents today for POV due to parathyroidectomy 04/21/2021  Back pain resolved since surgery  Swallowing without difficulty  Slight tenderness at incision site  No numbness or tingling in face, hands or feet  Not currently taking calcium supplement  The following portions of the patient's history were reviewed and updated as appropriate: allergies, current medications, past family history, past medical history, past social history, past surgical history and problem list     Review of Systems   Constitutional: Negative      HENT: Negative for congestion, ear discharge, ear pain, hearing loss, nosebleeds, postnasal drip, rhinorrhea, sinus pressure, sinus pain, sore throat, tinnitus and voice change  Eyes: Negative  Respiratory: Negative for chest tightness and shortness of breath  Cardiovascular: Negative  Gastrointestinal: Negative  Endocrine: Negative  Musculoskeletal: Positive for arthralgias and myalgias  Skin: Negative for color change  Neurological: Negative for dizziness, numbness and headaches  Psychiatric/Behavioral: Negative  Objective:      Temp 97 9 °F (36 6 °C) (Temporal)   Ht 5' 3 75" (1 619 m)   Wt 59 kg (130 lb)   LMP  (LMP Unknown)   BMI 22 49 kg/m²          Physical Exam  Constitutional:       Appearance: She is well-developed  HENT:      Head: Normocephalic  Right Ear: Hearing, tympanic membrane, ear canal and external ear normal  No decreased hearing noted  No drainage or tenderness  Tympanic membrane is not perforated or erythematous  Left Ear: Hearing, tympanic membrane, ear canal and external ear normal  No decreased hearing noted  No drainage or tenderness  Tympanic membrane is not perforated or erythematous  Nose: Nose normal  No nasal deformity or septal deviation  Mouth/Throat:      Mouth: Mucous membranes are not pale and not dry  No oral lesions  Dentition: Normal dentition  Pharynx: Uvula midline  No oropharyngeal exudate  Neck:      Musculoskeletal: Full passive range of motion without pain, normal range of motion and neck supple  Trachea: No tracheal deviation  Comments: Midline neck incision well healing    Cardiovascular:      Rate and Rhythm: Normal rate  Pulmonary:      Effort: Pulmonary effort is normal  No accessory muscle usage or respiratory distress  Musculoskeletal:      Right shoulder: She exhibits normal range of motion  Lymphadenopathy:      Cervical: No cervical adenopathy  Skin:     General: Skin is warm and dry  Neurological:      Mental Status: She is alert and oriented to person, place, and time  Cranial Nerves: No cranial nerve deficit  Sensory: No sensory deficit  Psychiatric:         Behavior: Behavior is cooperative

## 2021-04-28 NOTE — PATIENT INSTRUCTIONS
Blood work per Dr Maurice Slater 4 weeks after surgery (approx 05/20)  Antibiotic ointment to incision twice daily for four weeks then may use vaseline to incision once daily  Calcium (tums 2 tabs two times per day)  Vitamin D3 4000 units daily (may take gummies) until follow up with Dr Maurice Slater

## 2021-04-28 NOTE — ASSESSMENT & PLAN NOTE
Status post parathyroidectomy 04/21/2021  PTH levels decreased during surgery from 395 to 99 5  Discussed nature of hyperparathyroidism  Denies numbness or tingling of face, hands or feet  Reviewed post operative expectations including pain, incision care, and hypocalcemia  Incision well healing, surgical tape dislodged prior to today's visit  Antibiotic ointment to incision twice daily for four weeks then may use vaseline to incision once daily  Reviewed ongoing wound care of the incision as well as avoiding deep dark tan and risk of keloids  Discussed the importance of calcium and vitamin D supplementation  Pt voiced concern with swallowing large pills  May take Calcium (tums 2 tabs two times per day)  Vitamin D3 4000 units daily (may take gummies) until follow up with Dr Annabella Somers   Encouraged to follow up with endocrinology as scheduled for medical management of PTH and calcium levels, labs per Dr Annabella Somers 4 weeks after surgery (approx 05/20)  Follow up 3 months post operatively

## 2021-06-18 PROBLEM — R65.21 SEPTIC SHOCK (HCC): Status: ACTIVE | Noted: 2021-01-01

## 2021-06-18 PROBLEM — E86.0 DEHYDRATION: Status: ACTIVE | Noted: 2021-01-01

## 2021-06-18 PROBLEM — E87.20 LACTIC ACIDOSIS: Status: ACTIVE | Noted: 2021-01-01

## 2021-06-18 PROBLEM — C79.2: Status: ACTIVE | Noted: 2021-01-01

## 2021-06-18 PROBLEM — E66.9 CLASS 2 OBESITY IN ADULT: Status: ACTIVE | Noted: 2021-01-01

## 2021-06-18 PROBLEM — A41.9 SEPSIS (HCC): Status: ACTIVE | Noted: 2021-01-01

## 2021-06-18 PROBLEM — E83.51 HYPOCALCEMIA: Status: ACTIVE | Noted: 2021-01-01

## 2021-06-18 PROBLEM — G89.4 CHRONIC PAIN SYNDROME: Status: ACTIVE | Noted: 2021-01-01

## 2021-06-18 PROBLEM — R33.9 URINARY RETENTION: Status: ACTIVE | Noted: 2021-01-01

## 2021-06-18 PROBLEM — D72.829 LEUKOCYTOSIS: Status: ACTIVE | Noted: 2021-01-01

## 2021-06-18 PROBLEM — E88.09 HYPOALBUMINEMIA: Status: ACTIVE | Noted: 2021-01-01

## 2021-06-18 PROBLEM — E44.0 MODERATE PROTEIN-CALORIE MALNUTRITION (HCC): Status: ACTIVE | Noted: 2021-01-01

## 2021-06-18 PROBLEM — Z71.89 ACP (ADVANCE CARE PLANNING): Status: ACTIVE | Noted: 2021-01-01

## 2021-06-18 PROBLEM — K55.9 ISCHEMIC COLITIS (HCC): Status: ACTIVE | Noted: 2021-01-01

## 2021-06-18 PROBLEM — C80.1: Status: ACTIVE | Noted: 2021-01-01

## 2021-06-18 PROBLEM — E11.9 DM (DIABETES MELLITUS) (HCC): Status: ACTIVE | Noted: 2021-01-01

## 2021-06-18 PROBLEM — J96.01 ACUTE RESPIRATORY FAILURE WITH HYPOXIA (HCC): Status: ACTIVE | Noted: 2021-01-01

## 2021-06-19 PROBLEM — D62 ANEMIA ASSOCIATED WITH ACUTE BLOOD LOSS: Status: ACTIVE | Noted: 2019-03-11

## 2021-06-19 PROBLEM — J95.821 RESPIRATORY FAILURE FOLLOWING TRAUMA AND SURGERY (HCC): Status: ACTIVE | Noted: 2021-01-01

## 2021-06-19 NOTE — PROGRESS NOTES
Patient arrived to s122 with PACU team. Propofol infusing at 30mcg/kg/min. OGT to low continuous suction. Dr. Juan at bedside.      ST  /79  O2 99% on 100% FiO2  RR 22  100.5Kg   96.8F    2 RN skin assessment completed with Cirilo JEFFRIES    Generalized skin pale and mottled throughout.   Bilateral heels are nate and boggy slow to patricia/dry and calloused  Midline abthera wound vac in place  Buttocks is intact and blanching   Religous garment kept in place over chest     Patient belongings:  Upper dentures placed in a specimen cup

## 2021-06-19 NOTE — CONSULTS
Critical Care Consultation    Date of consult: 6/18/2021    Referring Physician  Adelita Wiggins M.D.    Reason for Consultation  Ischemic Bowel    History of Presenting Illness  65 y.o. female who presented 6/18/2021 as a transfer from an OSH for concern for ischemic bowel. Per report normal health 2 days ago with some pain yesterday and then sudden sharp non relenting abdominal pain this am. She went to the ED and a CT of the abdomen was suggestive of ischemic colitis. WBC was 27K at the OSH. On arrival to [facility] for further work up her lactate is 10 and she complains of extreme abdominal pain. At present she is mentating well and her GCS is 15. SBP is in the 170s. General surgery has seen her in the ED and plans for exploratory laparotomy. She is mottled on exam.    Code Status  DNAR/DNI    Review of Systems  Review of Systems   Constitutional: Positive for malaise/fatigue. Negative for chills and fever.   HENT: Negative.    Eyes: Negative.    Respiratory: Negative for shortness of breath.    Cardiovascular: Negative for chest pain.   Gastrointestinal: Positive for abdominal pain. Negative for blood in stool.   Genitourinary: Negative.    Musculoskeletal: Negative.    Skin: Negative.    Neurological: Negative.    Endo/Heme/Allergies: Negative.        Past Medical History   has a past medical history of Back pain, Dental disorder, Heart murmur, Pneumonia, Psychiatric disorder, and Sleep apnea.    Surgical History   has a past surgical history that includes other abdominal surgery; pr gastric bypass,obese<150cm javier-en-y; hysterectomy, total abdominal; knee arthroscopy; colonoscopy; foot surgery; and wrist orif (4/3/2014).    Family History  family history includes Cancer (age of onset: 42) in her sister.    Social History   reports that she has never smoked. She does not have any smokeless tobacco history on file. She reports current drug use. Drug: Oral. She reports that she does not drink  alcohol.    Medications  Home Medications     Reviewed by Yanick DIEGO Chief Goes Out, PhT (Pharmacy Tech) on 06/18/21 at 2055  Med List Status: Complete   Medication Last Dose Status   Buprenorphine HCl-Naloxone HCl 8-2 MG FILM 4-5 days ago Active   ferrous sulfate 325 (65 Fe) MG tablet Not Taking Active   ondansetron (ZOFRAN ODT) 4 MG TABLET DISPERSIBLE Not Taking Active   temazepam (RESTORIL) 15 MG CAPS 6/17/2021 Active              Current Facility-Administered Medications   Medication Dose Route Frequency Provider Last Rate Last Admin   • lactated ringers infusion   Intravenous Continuous Alf Tobar M.D.         Current Outpatient Medications   Medication Sig Dispense Refill   • Buprenorphine HCl-Naloxone HCl 8-2 MG FILM Take 1 Film by mouth 2 times a day.     • ondansetron (ZOFRAN ODT) 4 MG TABLET DISPERSIBLE Take 1 Tab by mouth every 8 hours as needed for Nausea. (Patient not taking: Reported on 6/18/2021) 10 Tab 0   • ferrous sulfate 325 (65 Fe) MG tablet Take 1 Tab by mouth 2 times a day, with meals. (Patient not taking: Reported on 6/18/2021) 60 Tab 0   • temazepam (RESTORIL) 15 MG CAPS Take 45 mg by mouth at bedtime. 3 caps = 45 mg         Allergies  Allergies   Allergen Reactions   • Benadryl [Altaryl] Palpitations   • Bloodless      Patient does not want blood transfusions due to Christian beliefs   • Codeine Vomiting   • Elavil [Amitriptyline Hcl]    • Percocet [Oxycodone-Acetaminophen] Vomiting   • Trazodone        Vital Signs last 24 hours  Temp:  [36.9 °C (98.5 °F)] 36.9 °C (98.5 °F)  Pulse:  [123-138] 123  Resp:  [27-55] 27  BP: (143-156)/(59-89) 156/89  SpO2:  [99 %-100 %] 100 %    Physical Exam  Physical Exam  Constitutional:       General: She is in acute distress.      Appearance: She is obese. She is ill-appearing and toxic-appearing.   HENT:      Head: Normocephalic and atraumatic.      Mouth/Throat:      Mouth: Mucous membranes are dry.      Pharynx: Oropharynx is clear.   Eyes:       Extraocular Movements: Extraocular movements intact.      Pupils: Pupils are equal, round, and reactive to light.   Cardiovascular:      Rate and Rhythm: Regular rhythm. Tachycardia present.      Pulses: Normal pulses.   Pulmonary:      Effort: No respiratory distress.   Abdominal:      Tenderness: There is abdominal tenderness. There is guarding.   Musculoskeletal:         General: No swelling.   Skin:     Capillary Refill: Capillary refill takes more than 3 seconds.      Comments: Mottled throughout   Neurological:      Mental Status: She is alert and oriented to person, place, and time.         Fluids  No intake or output data in the 24 hours ending 06/18/21 2107    Laboratory  Recent Results (from the past 48 hour(s))   Lactic acid (lactate)    Collection Time: 06/18/21  8:39 PM   Result Value Ref Range    Lactic Acid 10.4 (HH) 0.5 - 2.0 mmol/L   URINALYSIS    Collection Time: 06/18/21  8:45 PM    Specimen: Urine   Result Value Ref Range    Micro Urine Req Microscopic        Imaging  DX-CHEST-PORTABLE (1 VIEW)   Final Result         1.  Hazy left lung base opacities suggests infiltrates.   2.  Right internal jugular central line terminates in the right atrium, could be withdrawn 4 cm.      OUTSIDE IMAGES-CT ABDOMEN /PELVIS   Final Result      OUTSIDE IMAGES-CT ABDOMEN /PELVIS   Final Result      OUTSIDE IMAGES-CT CHEST   Final Result          Assessment/Plan  Ischemic colitis (HCC)  Assessment & Plan  Ischemic bowel on CT  Lactate>10  General surgery planning for ex lap  Empiric abx started  Pt is Jehovah witness and will not accept blood products - she had a prior DNR but  Per her and her family she will accept short term intubation and cpr if needed at present        Sepsis (HCC)  Assessment & Plan  This is Sepsis Present on admission  SIRS criteria identified on my evaluation include: Tachycardia, with heart rate greater than 90 BPM, Tachypnea, with respirations greater than 20 per minute and Leukocytosis,  with WBC greater than 12,000  Source is abdominal  Sepsis protocol initiated  Fluid resuscitation ordered per protocol  IV antibiotics as appropriate for source of sepsis  While organ dysfunction may be noted elsewhere in this problem list or in the chart, degree of organ dysfunction does not meet CMS criteria for severe sepsis          DM (diabetes mellitus) (HCC)  Assessment & Plan  Per report new diagnosis  SSI      Discussed patient condition and risk of morbidity and/or mortality with RN, Pharmacy, Code status disscussed, Charge nurse / hot rounds, Patient and general surgery.    The patient remains critically ill.  Critical care time = 40 minutes in directly providing and coordinating critical care and extensive data review.  No time overlap and excludes procedures.

## 2021-06-19 NOTE — ANESTHESIA PROCEDURE NOTES
Airway    Date/Time: 6/18/2021 11:15 PM  Performed by: Garrick Wagner M.D.  Authorized by: Garrick Wagner M.D.     Location:  OR  Urgency:  Elective  Indications for Airway Management:  Anesthesia      Spontaneous Ventilation: absent    Sedation Level:  Deep  Preoxygenated: Yes    Patient Position:  Sniffing  Final Airway Type:  Endotracheal airway  Final Endotracheal Airway:  ETT  Cuffed: Yes    Technique Used for Successful ETT Placement:  Direct laryngoscopy    Insertion Site:  Oral  Blade Type:  Glide  Laryngoscope Blade/Videolaryngoscope Blade Size:  3  ETT Size (mm):  7.0  Measured from:  Teeth  ETT to Teeth (cm):  22  Placement Verified by: auscultation and capnometry    Cormack-Lehane Classification:  Grade I - full view of glottis  Number of Attempts at Approach:  1

## 2021-06-19 NOTE — OP REPORT
Operative Report    Date: 6/18/2021    PreOp Diagnosis:   1.  Acute abdomen  2.  Mesenteric ischemia  3.  Septic shock    PostOp Diagnosis: Same    Procedure(s):  1.  LAPAROTOMY, EXPLORATORY.  Small bowel resection, temporary abdominal closure (ABThera ~100cm2)    Surgeon(s):  Thomas Mercado M.D.    Assistant:  Bala Juan MD    Anesthesiologist/Type of Anesthesia:  Anesthesiologist: Garrick Wagner M.D./General    Surgical Staff:  Circulator: Nena Harrison R.N.  Monitoring Nurse: Carito Victor R.N.  Scrub Person: Leopold von C Garcia    Specimens removed if any:  * No specimens in log *    Estimated Blood Loss: 25mL    Findings: Long segment (approximately 230 cm) of mid small bowel ischemia which was nonviable.  Unclear etiology.  No current evidence of internal hernia seen intraoperatively, but there was proximal dilation of the small bowel and 2 areas of focal narrowing suggesting history of focal mechanical obstruction.  Mild ischemia of the appendiceal mesentery.  Approximately 130cm of viable small bowel remained, including the jejunojejunal anastomosis and [place] limb.  Colon appeared viable throughout.      Complications: None noted    Outcome: Transferred to the surgical ICU in critical but stable condition    Indications:  65-year-old female who presented with abdominal pain out of proportion to exam and CT findings of diffuse small bowel ischemia.  The procedure above was discussed with her in detail include the risk, benefits, alternatives, she wished to proceed.    Procedure in detail: The patient was identified in the pre-operative holding area and brought to the operating room. Correct side and site were identified. Pre-operative antibiotics were administered prior to the procedure. GETA was smoothly induced.  Appropriate monitoring lines were placed by the anesthesia team.  The patient was prepped and draped in the usual sterile fashion.     A midline laparotomy was performed in the usual fashion  from the epigastric region to approximately 10 cm below the umbilicus.  Once the fascia was entered, there was a large amount of dark ascites which was suctioned away.  These viscera was then examined.  There was a large segment of mid small bowel ischemia with patchy areas of necrosis but without noticeable perforation.  The patient has a history of France-en-Y gastric bypass.  The biliopancreatic limb, the jejunojejunal anastomosis and [place] limb were all intact and viable.  The colon also appeared viable throughout except for a small area of mild ischemic change in the appendiceal mesentery.  Approximately 230 cm of nonviable small bowel was resected using a 75 mm LESLIE stapler, and the mesentery divided using a LigaSure device.  Hemostasis was observed.  An orogastric tube was placed and palpated within the proximal gastric pouch.  The decision was made to place temporary abdominal closure using an ABThera system, with plan for second look in approximately 48 hrs or as clinically indicated.  This was applied and functioned appropriately.  The patient was then transported intubated to the surgical intensive care unit.      Sponge and needle counts were correct at the end of the case.       Thomas Mercado M.D.  [facility]  [phone]      6/18/2021 11:47 PM Thomas Mercado M.D.

## 2021-06-19 NOTE — ASSESSMENT & PLAN NOTE
Progressive acute blood loss anemia associated with multiple operations.  Patient is a Yazidism who refuses blood products.  6/23 Patient accepts Epogen, given.  6/24 Iron replacement initiated. B12 low normal.

## 2021-06-19 NOTE — PROGRESS NOTES
Called Dr Juan in regards to patients tachycardia 130's and hypotension, 80's/50's. Told to give a liter bolus of LR and if hypotension continues to start levo.

## 2021-06-19 NOTE — ASSESSMENT & PLAN NOTE
Acute septic shock secondary to mesenteric ischemia.  Ongoing resuscitation with crystalloids, vasopressor support, broad-spectrum empiric antibiotic therapy, and trending of endpoints of resuscitation.  6/25 Piperacillin/tazobactam day 7/7.  6/26 Leukocytosis of 26.5, resume Zosyn - low threshold to scan.  6/29  Hypotension, lactic acidosis, respiratory failure.  Volume therapy, intubation.  C diff negative.  Zyvox and Zosyn # 1.  BAL pending.    6/30 Zyvox discontinued  7/2 antibiotics resumed for abdominal abscess with wound drainage   stated

## 2021-06-19 NOTE — DISCHARGE PLANNING
Medical Social Work     SW met with the pt family and advised them they are able to stay in the OR waiting area until the pt gets out of surgery and get an update. SW provided them with hotel information, the family has been calling around for a hotel but hotels seem to be booked up. The family is going to continue trying to call hotels in [place] while they wait for the pt to get out of surgery. SW will continue to check on the pts family. OBI provided the family with the ER SW contact information in case any needs come up.     Ortega Mtz (son in law and daughter) [phone].    Plan: SW will remain available for pt and family support.

## 2021-06-19 NOTE — H&P
Hospital Medicine History & Physical Note    Date of Service  6/18/2021    Primary Care Physician  Shanta Krause M.D.    Consultants  Surgery  ICU  Palliative care    Code Status  Prior    Chief Complaint  Chief Complaint   Patient presents with   • Abdominal Pain       History of Presenting Illness  65 y.o. Adventist female with history of metastatic skin cancer who presented 6/18/2021 at [facility] with acute onset abdominal pain, found to have lactic acid 2.59, WBC 27.18k, CTAP with contrast noted ischemic colitis of small and large bowel with no free air and subsequently transferred to [facility] ER for higher level care.  Patient was seen by me at [facility] ER arrival.  History obtained from patient and patient's family members who were at bedside in ER.  Per family, patient was doing painting earlier, and had a sudden onset of severe abdominal pain and requested family members to call for EMS.  She endorsed a few episodes of nausea bilious vomiting.  Patient was previously DNR/DNI with comfort care status March 2019.  However, I confirmed with patient and patient's family member that they want patient to be fully resuscitated --including intubation, CPR, shocking her prior to her life if necessary.  In [facility] ER, she appears to be in acute distress with acute abdomen.  Lactic acid is now 10.4.  General surgery and ICU have been consulted.  Planned for diagnostic laparoscopy with possible open laparatomy / bowel resection by surgery.  Patient will likely be admitted to ICU postoperatively.  Admit to ICU for further evaluation and treatment.    ED course prior to [facility] transfer: Zosyn 3.375 g  ED course at [facility]: Dilaudid 0.5 IV, LR 2 L boluses, Zofran 4 mg IV    Review of Systems  Review of Systems   Constitutional: Positive for malaise/fatigue. Negative for chills and fever.   HENT: Negative for hearing loss and tinnitus.    Eyes: Negative for blurred vision and double  vision.   Respiratory: Negative for cough and wheezing.    Cardiovascular: Negative for chest pain, leg swelling and PND.   Gastrointestinal: Positive for abdominal pain, heartburn, nausea and vomiting. Negative for blood in stool, constipation and diarrhea.   Genitourinary: Negative for dysuria and flank pain.   Musculoskeletal: Negative for falls and myalgias.   Skin: Negative for itching and rash.   Neurological: Negative for dizziness, focal weakness, weakness and headaches.   Endo/Heme/Allergies: Negative for environmental allergies. Does not bruise/bleed easily.   Psychiatric/Behavioral: Negative for depression, substance abuse and suicidal ideas.   All other systems reviewed and are negative.      Past Medical History   has a past medical history of Back pain, Dental disorder, Heart murmur, Pneumonia, Psychiatric disorder, Respiratory failure following trauma and surgery (HCC) (6/19/2021), and Sleep apnea.    Surgical History   has a past surgical history that includes other abdominal surgery; pr gastric bypass,obese<150cm javier-en-y; hysterectomy, total abdominal; knee arthroscopy; colonoscopy; foot surgery; wrist orif (4/3/2014); pr lap,diagnostic abdomen (N/A, 6/18/2021); pr exploratory of abdomen (N/A, 6/18/2021); pr exploratory of abdomen (6/19/2021); irrigation & debridement general (6/19/2021); bowel resection (6/19/2021); gastrostomy feeding (6/19/2021); pr exploratory of abdomen (N/A, 6/21/2021); and gastrostomy feeding (N/A, 6/21/2021).     Family History  family history includes Cancer (age of onset: 42) in her sister.     Social History   reports that she has never smoked. She does not have any smokeless tobacco history on file. She reports current drug use. Drug: Oral. She reports that she does not drink alcohol.    Allergies  Allergies   Allergen Reactions   • Benadryl [Altaryl] Palpitations   • Bloodless      Patient does not want blood transfusions due to Anabaptism beliefs   • Codeine Vomiting    • Elavil [Amitriptyline Hcl]    • Percocet [Oxycodone-Acetaminophen] Vomiting   • Trazodone        Medications  Prior to Admission Medications   Prescriptions Last Dose Informant Patient Reported? Taking?   Buprenorphine HCl-Naloxone HCl 8-2 MG FILM 4-5 days ago at [facility] Patient Yes No   Sig: Take 1 Film by mouth 2 times a day.   ferrous sulfate 325 (65 Fe) MG tablet Not Taking at Not Taking Patient No No   Sig: Take 1 Tab by mouth 2 times a day, with meals.   Patient not taking: Reported on 6/18/2021   ondansetron (ZOFRAN ODT) 4 MG TABLET DISPERSIBLE Not Taking at Not Taking Patient No No   Sig: Take 1 Tab by mouth every 8 hours as needed for Nausea.   Patient not taking: Reported on 6/18/2021   temazepam (RESTORIL) 15 MG CAPS 6/17/2021 at PM Patient Yes No   Sig: Take 45 mg by mouth at bedtime. 3 caps = 45 mg      Facility-Administered Medications: None       Physical Exam       Physical Exam  Vitals and nursing note reviewed.   Constitutional:       General: She is in acute distress.      Appearance: She is obese. She is ill-appearing.   HENT:      Head: Normocephalic and atraumatic.      Nose: Nose normal.      Mouth/Throat:      Mouth: Mucous membranes are dry.      Pharynx: Oropharynx is clear.   Eyes:      General: No scleral icterus.     Extraocular Movements: Extraocular movements intact.   Cardiovascular:      Rate and Rhythm: Regular rhythm. Tachycardia present.      Pulses: Normal pulses.      Heart sounds: No friction rub.   Pulmonary:      Effort: Respiratory distress present.      Breath sounds: Rhonchi and rales present. No wheezing.      Comments: 15L  Abdominal:      General: There is distension.      Tenderness: There is abdominal tenderness. There is guarding and rebound.   Genitourinary:     Comments: Harrington present at admission  Musculoskeletal:         General: Normal range of motion.      Cervical back: Neck supple. No tenderness.      Right lower leg: Edema present.      Left lower leg: Edema  present.   Skin:     General: Skin is warm and dry.      Capillary Refill: Capillary refill takes 2 to 3 seconds.   Neurological:      General: No focal deficit present.      Mental Status: She is alert and oriented to person, place, and time.   Psychiatric:      Comments: Appears axious         Laboratory:          No results for input(s): ALTSGPT, ASTSGOT, ALKPHOSPHAT, TBILIRUBIN, DBILIRUBIN, GAMMAGT, AMYLASE, LIPASE, ALB, PREALBUMIN, GLUCOSE in the last 72 hours.      No results for input(s): NTPROBNP in the last 72 hours.      No results for input(s): TROPONINT in the last 72 hours.    Imaging:  DX-CHEST-PORTABLE (1 VIEW)   Final Result         Feeding tube is not seen. Correlate clinically.               CT-DRAIN-PERITONEAL   Final Result      Successful peritoneal drainage tube placement. Suspect leak from bowel.      Plan: Thrice daily flushes with 10 mL of sterile saline. Monitor outputs. Please contact interventional radiology if there is any concern for tube dysfunction. Suggest routine tube maintenance at 10-14 day interval if the tube remains in place.      Suspicion of bowel perforation was discussed with Dr. Crisostomo prior to the procedure. Findings were communicated with ROBIN CRISOSTOMO via [employer] at the conclusion of the procedure.      CT-ABDOMEN-PELVIS WITH   Final Result   Addendum 1 of 1   Additional review of images shows complex collection of gas and fluid in    the mesenteric root measuring 10 x 4 x 9.5 cm dense material suggesting    oral contrast.  This may communicate with anastomotic suture line at the    inferior aspect and is concerning    for anastomotic leak.      Final      1.  Pneumoperitoneum, likely related to recent surgery.  Bowel perforation is difficult to exclude.   2.  Diffuse bowel wall thickening suggests enteritis.   3.  Interloop collections of fluid and gas in the RIGHT lower quadrant concerning for abscesses.   4.  Small amount of free fluid present.   5.  Postoperative  change of anterior abdominal wall.   6.  Supportive tubing as described above.   7.  Small bilateral pleural effusions with associated atelectasis.   8.  Probable pneumonia involving RIGHT upper and middle lobes.      DX-CHEST-PORTABLE (1 VIEW)   Final Result      Interval extubation now with significant worsening bibasilar atelectasis and probable consolidation      DX-ABDOMEN FOR TUBE PLACEMENT   Final Result      1.  Feeding tube tip overlies the gastric fundus without significant interval change.      DX-CHEST-PORTABLE (1 VIEW)   Final Result         1. Stable lines and tubes.   2. Hypoinflation. Unchanged left basilar atelectasis versus consolidation. Suspected small left effusion.         DX-CHEST-PORTABLE (1 VIEW)   Final Result      1.  Improving bilateral basilar atelectasis and/or consolidation.   2.  Previously demonstrated subdiaphragmatic free intraperitoneal air is no longer visualized.      US-EXTREMITY VENOUS UPPER UNILAT LEFT   Final Result      DX-CHEST-PORTABLE (1 VIEW)   Final Result      Endotracheal tube is satisfactory in position. No other significant change.         DX-CHEST-PORTABLE (1 VIEW)   Final Result      Endotracheal tube terminates just above the maría. No other significant change.      These findings were discussed with patient's nurse Cirilo on 6/29/2021 7:24 AM.         DX-CHEST-PORTABLE (1 VIEW)   Final Result      1.  New airspace opacity in the right upper lobe may represent atelectasis or developing pneumonia.   2.  Free intraperitoneal air likely related to recent surgery.      CT-ABDOMEN-PELVIS WITH   Final Result      1.  New gastrostomy tube and new postoperative changes involving the small bowel in the upper abdomen.      2.  Small amount of free air in the abdomen consistent with recent surgery.      3.  New small bilateral pleural effusions and bibasilar areas of atelectasis or pneumonia.      4.  No evidence of bowel or renal obstruction.      5.  Cholecystectomy.       DX-CHEST-PORTABLE (1 VIEW)   Final Result         No significant interval change.            DX-CHEST-PORTABLE (1 VIEW)   Final Result         No significant interval change.         DX-CHEST-PORTABLE (1 VIEW)   Final Result         1. Interval extubation. No other significant interval change.      DX-CHEST-PORTABLE (1 VIEW)   Final Result         No new abnormalities have developed on portable chest radiograph since the prior examination.  No new infiltrates or consolidations are identified.      DX-ABDOMEN FOR TUBE PLACEMENT   Final Result      Feeding tube tip projects over the proximal stomach.      DX-CHEST-PORTABLE (1 VIEW)   Final Result         1. No significant interval change.      DX-CHEST-PORTABLE (1 VIEW)   Final Result      1.  Persistent atelectasis or pneumonia in the left lower lobe and minor interstitial edema or pneumonia in the right lower lobe with small bilateral pleural effusions.      2.  Interval removal of NG tube.      DX-CHEST-PORTABLE (1 VIEW)   Final Result         1.  There are persistent opacifications in the left lower lung medially and medial right lower lung.      2.  No new infiltrates or consolidations are identified.      DX-CHEST-PORTABLE (1 VIEW)   Final Result         1.  Hazy left lung base infiltrate.   2.  Nasogastric tube terminates near the gastroesophageal junction, recommend advancement      DX-CHEST-PORTABLE (1 VIEW)   Final Result         1.  Hazy left lung base opacities suggests infiltrates.   2.  Right internal jugular central line terminates in the right atrium, could be withdrawn 4 cm.      OUTSIDE IMAGES-CT ABDOMEN /PELVIS   Final Result      OUTSIDE IMAGES-CT ABDOMEN /PELVIS   Final Result      OUTSIDE IMAGES-CT CHEST   Final Result            Assessment/Plan:  I anticipate this patient will require at least two midnights for appropriate medical management, necessitating inpatient admission.    * Mesenteric ischemia (HCC)- (present on admission)  Assessment  & Plan  LA 2.59 >> 10.4  WBC 27.18k >> 20k    CT AP w/contrast at [facility] ER:  ischemic colitis of small and large bowel    IVF, trend lactate  Pain control  Abx: Zosyn    Surgery f/u appreciated - OR now for diagnostic laparoscopy with possible open laparatomy / bowel resection  ICU f/u appreciated - admit to ICU after OR      Acute respiratory failure with hypoxia (HCC)  Assessment & Plan  On 15L -- wean off as tolerated  Going for emergent OR for ischemic colitis    Likely to be intubated post-op  Defer sedative, pressors support to ICU\  ICU f/u appreciated    Severe sepsis (HCC)  Assessment & Plan  This is severe Sepsis Present on admission  SIRS criteria identified on my evaluation include: Tachycardia, with heart rate greater than 90 BPM, Tachypnea, with respirations greater than 20 per minute, Leukocytosis, with WBC greater than 12,000 and Bandemia, greater than 10% bands  Source is GI/colitis  Sepsis protocol initiated  Fluid resuscitation ordered per protocol  IV antibiotics as appropriate for source of sepsis    WBC 27k, LA 10.4      Lactic acidosis  Assessment & Plan  Secondary to ischemic colitis -- planned for OR  IVF, trend lactate    Dehydration  Assessment & Plan  LA 10.4, urine specific gravity 1.044  IVF    Urinary retention  Assessment & Plan  Harrington present at admission  Harrington care    Leukocytosis  Assessment & Plan  Secondary to septic shock / ischemic colitis  On abx    Moderate protein-calorie malnutrition (HCC)  Assessment & Plan  Dietary intake as tolerated after OR    Hypoalbuminemia  Assessment & Plan  Monitor, supportive care    Hypocalcemia  Assessment & Plan  WNL with corrected albumin    ACP (advance care planning)  Assessment & Plan  I discussed plan of care and rationale for hospitalization with patient and patient's family members who were at bedside in emergency room  Although she was previously DNR/DNI with comfort care status, patient and family elected for full CODE STATUS  at this time  Full CODE STATUS confirmed  ACP: 20 minutes    DM (diabetes mellitus) (HCC)  Assessment & Plan  Glucose 300  ISS  May need insulin gtt for close glucose goal 140-180 as she is going to be in ICU, defer to ICU    Anemia associated with acute blood loss- (present on admission)  Assessment & Plan  hgb 11.4  No blood product transfusion as patient is [employer]    VTE PPx: Heparin  Diet: NPO  Code: Full  Dispo: Admit to ICU, very guarded prognosis    Critical care time: 40 minutes spent in chart review, medical management, coordinating care with patient/patient's family members/RN/pharmacist/consultant providers.

## 2021-06-19 NOTE — ANESTHESIA PROCEDURE NOTES
Arterial Line  Performed by: Garrick Wagner M.D.  Authorized by: Garrick Wagner M.D.     Start Time:  6/18/2021 11:15 PM  End Time:  6/18/2021 11:17 PM  Localization: surface landmarks    Patient Location:  OR  Indication: continuous blood pressure monitoring        Catheter Size:  20 G  Seldinger Technique?: Yes    Laterality:  Left  Site:  Radial artery  Line Secured:  Antimicrobial disc, tape and transparent dressing  Events: patient tolerated procedure well with no complications

## 2021-06-19 NOTE — ASSESSMENT & PLAN NOTE
Acute mid small bowel mesenteric ischemia secondary to unknown etiology.  6/18 Emergent laparotomy with small bowel resection and damage control closure.  6/19 Expedited second look laparotomy for persistent lactic acidosis. Biliary limb of France-en-Y gastric bypass with significant distention and ischemia. Gastrostomy, additional proximal and distal small bowel resections, and damage control closure.  6/21 Revision of G-tube and RYGB, fascial closure.  High risk for post-op intra-abdominal abscess and/or anastomotic failure.  6/26 Clears initiated.  6/28:  CT : no leak.  No obvious abscess.    6/30 negative stomach g tube to gravity: likely stasis  7/1 start trickle feeding  7/2 Midline with enteric appearing material in wound, some staples d/gamaliel   kerlex packed into wound: Change as needed  CT scan with no definitive leak but suspect fluid collection in the midline is fistula  IR for CT-guided drain placement: Culture sent  [facility].

## 2021-06-19 NOTE — ANESTHESIA TIME REPORT
Anesthesia Start and Stop Event Times     Date Time Event    6/18/2021 2251 Ready for Procedure     2307 Anesthesia Start    6/19/2021 0002 Anesthesia Stop        Responsible Staff  06/18/21 to 06/19/21    Name Role Begin End    Garrick Wagner M.D. Anesth 2307 0002        Preop Diagnosis (Free Text):  Pre-op Diagnosis             Preop Diagnosis (Codes):    Post op Diagnosis  Ischemic necrosis of small bowel (HCC)      Premium Reason  A. 3PM - 7AM    Comments:

## 2021-06-19 NOTE — ASSESSMENT & PLAN NOTE
On 15L -- wean off as tolerated  Going for emergent OR for ischemic colitis    Likely to be intubated post-op  Defer sedative, pressors support to ICU\  ICU f/u appreciated

## 2021-06-19 NOTE — RESPIRATORY CARE
Patient arrived from OR, tape removed and crystal applied, ETT 7.0 @22, Placed on initial vent settings.

## 2021-06-19 NOTE — ANESTHESIA POSTPROCEDURE EVALUATION
Patient: Josephine Casas    Procedure Summary     Date: 06/18/21 Room / Location: [place] [address] / SURGERY [facility]    Anesthesia Start: 2307 Anesthesia Stop: 06/19/21 0002    Procedures:       LAPAROSCOPY (N/A Abdomen)      LAPAROTOMY, EXPLORATORY. POSSIBLE BOWEL RESESCTION (N/A Abdomen) Diagnosis:     Surgeons: Thomas Mercado M.D. Responsible Provider: Grarick Wagner M.D.    Anesthesia Type: general ASA Status: 5 - Emergent          Final Anesthesia Type: general  Last vitals  BP   Blood Pressure : (!) 181/90    Temp   36.9 °C (98.5 °F)    Pulse   (!) 134   Resp   (!) 36    SpO2   98 %      Anesthesia Post Evaluation    Patient location during evaluation: ICU  Patient participation: complete - patient cannot participate  Level of consciousness: lethargic  Pain score: 0    Airway patency: patent  Anesthetic complications: no  Cardiovascular status: hemodynamically stable  Respiratory status: acceptable and ETT  Hydration status: euvolemic    PONV: none          No complications documented.     Nurse Pain Score: 10 (NPRS)

## 2021-06-19 NOTE — ED NOTES
Med Rec completed: per patient at bedside  Preferred Pharmacy: [facility] in [place]  Allergies:  Allergies   Allergen Reactions   • Benadryl [Altaryl] Palpitations   • Bloodless      Patient does not want blood transfusions due to Denominational beliefs   • Codeine Vomiting   • Elavil [Amitriptyline Hcl]    • Percocet [Oxycodone-Acetaminophen] Vomiting   • Trazodone        No ORAL antibiotics in last 14 days    Home Medications:  Medication Sig Comments   • Buprenorphine HCl-Naloxone HCl 8-2 MG FILM Take 1 Film by mouth 2 times a day. Verified  #60 for 30 days.- Patient reports that she only takes this occassionally    • temazepam (RESTORIL) 15 MG CAPS Take 45 mg by mouth at bedtime.   3 caps = 45 mg Verified  #90 for 30 days.     **Denies any other prescription or OTC medications

## 2021-06-19 NOTE — PROGRESS NOTES
Trauma / Surgical Daily Progress Note    Date of Service  6/19/2021    Chief Complaint  65 y.o. female admitted 6/18/2021 with Ischemic Bowel    Interval Events  Sepsis resuscitation.  The patient is critically ill with acute respiratory failure, septic shock and multisystem organ failure.  The patient was seen and examined on rounds and discussed with the multidisciplinary critical care team and consulting physicians. Critically evaluated laboratory tests, culture data, medications, imaging, and other diagnostic tests.    The patient has impairment of one or more vital organ systems and a high probability of imminent or life-threatening deterioration in condition. Provided high complexity decision making to assess, manipulate, and support vital system functions to treat vital organ system failure and/or to prevent further life-threatening deterioration of the patient's condition. Requires continued ICU and hospital admission.    Critical care interventions include: integration of multiple data points and associated complex medical decision making, ventilator management, titration of vasopressors, evaluation and direction of antimicrobial therapy for life threatening infection and sepsis resuscitation.    Review of Systems  Review of Systems   Unable to perform ROS: Intubated        Vital Signs for last 24 hours  Temp:  [36.9 °C (98.5 °F)] 36.9 °C (98.5 °F)  Pulse:  [103-138] 127  Resp:  [21-55] 26  BP: ()/(57-93) 96/64  SpO2:  [95 %-100 %] 98 %    Hemodynamic parameters for last 24 hours  CVP:  [-319.4 MM HG-327 MM HG] 0 MM HG    Respiratory Data     Respiration: (!) 26, Pulse Oximetry: 98 %     Work Of Breathing / Effort: Vented  RUL Breath Sounds: Clear, RML Breath Sounds: Clear, RLL Breath Sounds: Diminished, NICO Breath Sounds: Clear, LLL Breath Sounds: Diminished    Physical Exam  Physical Exam  Vitals and nursing note reviewed.   Constitutional:       Appearance: She is obese. She is ill-appearing and  toxic-appearing.      Interventions: She is intubated.   HENT:      Head: Normocephalic.      Comments: ET tube in place     Nose: Nose normal.      Mouth/Throat:      Mouth: Mucous membranes are moist.   Eyes:      General: No scleral icterus.     Conjunctiva/sclera: Conjunctivae normal.      Pupils: Pupils are equal, round, and reactive to light.   Cardiovascular:      Rate and Rhythm: Regular rhythm. Tachycardia present.      Pulses: Normal pulses.   Pulmonary:      Effort: She is intubated.      Breath sounds: Examination of the right-lower field reveals decreased breath sounds. Examination of the left-lower field reveals decreased breath sounds. Decreased breath sounds present.   Abdominal:      Palpations: Abdomen is soft.      Comments: abthera in place   Genitourinary:     Comments: Harrington in place  Musculoskeletal:         General: Normal range of motion.      Cervical back: Normal range of motion and neck supple.   Skin:     General: Skin is warm and dry.      Capillary Refill: Capillary refill takes 2 to 3 seconds.   Neurological:      General: No focal deficit present.      Comments: sedated   Psychiatric:      Comments: Unable to assess         Laboratory  Recent Results (from the past 24 hour(s))   BLOOD CULTURE    Collection Time: 06/18/21  8:20 PM    Specimen: Peripheral; Blood   Result Value Ref Range    Significant Indicator NEG     Source BLD     Site PERIPHERAL     Culture Result       No Growth  Note: Blood cultures are incubated for 5 days and  are monitored continuously.Positive blood cultures  are called to the RN and reported as soon as  they are identified.     Lactic acid (lactate)    Collection Time: 06/18/21  8:39 PM   Result Value Ref Range    Lactic Acid 10.4 (HH) 0.5 - 2.0 mmol/L   CBC WITH DIFFERENTIAL    Collection Time: 06/18/21  8:39 PM   Result Value Ref Range    WBC 20.0 (H) 4.8 - 10.8 K/uL    RBC 3.81 (L) 4.20 - 5.40 M/uL    Hemoglobin 11.4 (L) 12.0 - 16.0 g/dL    Hematocrit  37.6 37.0 - 47.0 %    MCV 98.7 (H) 81.4 - 97.8 fL    MCH 29.9 27.0 - 33.0 pg    MCHC 30.3 (L) 33.6 - 35.0 g/dL    RDW 49.3 35.9 - 50.0 fL    Platelet Count 204 164 - 446 K/uL    MPV 12.1 9.0 - 12.9 fL    Neutrophils-Polys 85.20 (H) 44.00 - 72.00 %    Lymphocytes 5.20 (L) 22.00 - 41.00 %    Monocytes 0.90 0.00 - 13.40 %    Eosinophils 0.00 0.00 - 6.90 %    Basophils 0.00 0.00 - 1.80 %    Nucleated RBC 0.00 /100 WBC    Neutrophils (Absolute) 18.60 (H) 2.00 - 7.15 K/uL    Lymphs (Absolute) 1.04 1.00 - 4.80 K/uL    Monos (Absolute) 0.18 0.00 - 0.85 K/uL    Eos (Absolute) 0.00 0.00 - 0.51 K/uL    Baso (Absolute) 0.00 0.00 - 0.12 K/uL    NRBC (Absolute) 0.00 K/uL    Anisocytosis 1+     Macrocytosis 1+    COMP METABOLIC PANEL    Collection Time: 06/18/21  8:39 PM   Result Value Ref Range    Sodium 138 135 - 145 mmol/L    Potassium 4.1 3.6 - 5.5 mmol/L    Chloride 109 96 - 112 mmol/L    Co2 9 (LL) 20 - 33 mmol/L    Anion Gap 20.0 (H) 7.0 - 16.0    Glucose 300 (H) 65 - 99 mg/dL    Bun 24 (H) 8 - 22 mg/dL    Creatinine 1.16 0.50 - 1.40 mg/dL    Calcium 7.5 (L) 8.5 - 10.5 mg/dL    AST(SGOT) 35 12 - 45 U/L    ALT(SGPT) 23 2 - 50 U/L    Alkaline Phosphatase 58 30 - 99 U/L    Total Bilirubin 0.3 0.1 - 1.5 mg/dL    Albumin 2.2 (L) 3.2 - 4.9 g/dL    Total Protein 4.3 (L) 6.0 - 8.2 g/dL    Globulin 2.1 1.9 - 3.5 g/dL    A-G Ratio 1.0 g/dL   BLOOD CULTURE    Collection Time: 06/18/21  8:39 PM    Specimen: Peripheral; Blood   Result Value Ref Range    Significant Indicator NEG     Source BLD     Site PERIPHERAL     Culture Result       No Growth  Note: Blood cultures are incubated for 5 days and  are monitored continuously.Positive blood cultures  are called to the RN and reported as soon as  they are identified.     ESTIMATED GFR    Collection Time: 06/18/21  8:39 PM   Result Value Ref Range    GFR If  56 (A) >60 mL/min/1.73 m 2    GFR If Non  46 (A) >60 mL/min/1.73 m 2   Prothrombin time (INR)     Collection Time: 06/18/21  8:39 PM   Result Value Ref Range    PT 17.4 (H) 12.0 - 14.6 sec    INR 1.47 (H) 0.87 - 1.13   Procalcitonin    Collection Time: 06/18/21  8:39 PM   Result Value Ref Range    Procalcitonin 0.78 (H) <0.25 ng/mL   Cortisol    Collection Time: 06/18/21  8:39 PM   Result Value Ref Range    Cortisol 66.6 (H) 0.0 - 23.0 ug/dL   DIFFERENTIAL MANUAL    Collection Time: 06/18/21  8:39 PM   Result Value Ref Range    Bands-Stabs 7.80 0.00 - 10.00 %    Myelocytes 0.90 %    Manual Diff Status PERFORMED    PERIPHERAL SMEAR REVIEW    Collection Time: 06/18/21  8:39 PM   Result Value Ref Range    Peripheral Smear Review see below    PLATELET ESTIMATE    Collection Time: 06/18/21  8:39 PM   Result Value Ref Range    Plt Estimation Normal    MORPHOLOGY    Collection Time: 06/18/21  8:39 PM   Result Value Ref Range    RBC Morphology Present     Large Platelets 1+     Giant Platelets 1+    FIBRINOGEN    Collection Time: 06/18/21  8:39 PM   Result Value Ref Range    Fibrinogen 393 215 - 460 mg/dL   TROPONIN    Collection Time: 06/18/21  8:39 PM   Result Value Ref Range    Troponin T 27 (H) 6 - 19 ng/L   URINALYSIS    Collection Time: 06/18/21  8:45 PM    Specimen: Urine   Result Value Ref Range    Color Yellow     Character Clear     Specific Gravity 1.044 <1.035    Ph 5.5 5.0 - 8.0    Glucose 500 (A) Negative mg/dL    Ketones 15 (A) Negative mg/dL    Protein 100 (A) Negative mg/dL    Bilirubin Negative Negative    Urobilinogen, Urine 1.0 Negative    Nitrite Negative Negative    Leukocyte Esterase Negative Negative    Occult Blood Moderate (A) Negative    Micro Urine Req Microscopic    URINE MICROSCOPIC (W/UA)    Collection Time: 06/18/21  8:45 PM   Result Value Ref Range    WBC 0-2 /hpf    RBC 2-5 (A) /hpf    Bacteria Negative None /hpf    Epithelial Cells Few /hpf    Hyaline Cast 0-2 /lpf   Lactic Acid Every four hours after STAT order    Collection Time: 06/19/21 12:40 AM   Result Value Ref Range    Lactic  Acid 5.9 (HH) 0.5 - 2.0 mmol/L   HEMOGLOBIN A1C    Collection Time: 06/19/21 12:40 AM   Result Value Ref Range    Glycohemoglobin 5.4 4.0 - 5.6 %    Est Avg Glucose 108 mg/dL   PREALBUMIN    Collection Time: 06/19/21 12:40 AM   Result Value Ref Range    Pre-Albumin 4.5 (L) 18.0 - 38.0 mg/dL   Triglyceride    Collection Time: 06/19/21 12:40 AM   Result Value Ref Range    Triglycerides 98 0 - 149 mg/dL   POCT arterial blood gas device results    Collection Time: 06/19/21 12:51 AM   Result Value Ref Range    Ph 7.226 (LL) 7.400 - 7.500    Pco2 33.4 26.0 - 37.0 mmHg    Po2 377 (H) 64 - 87 mmHg    Tco2 15 (L) 20 - 33 mmol/L    S02 100 (H) 93 - 99 %    Hco3 13.8 (L) 17.0 - 25.0 mmol/L    BE -13 (L) -4 - 3 mmol/L    Body Temp 98.1 F degrees    O2 Therapy 100 %    iPF Ratio 377     Ph Temp Nanci 7.230 (LL) 7.400 - 7.500    Pco2 Temp Co 33.0 26.0 - 37.0 mmHg    Po2 Temp Cor 376 (H) 64 - 87 mmHg    Specimen Arterial     DelSys Vent     End Tidal Carbon Dioxide 31 mmhg    Tidal Volume 320 mL    Peep End Expiratory Pressure 8 cmh20    Set Rate 22     Mode APV-CMV    POCT sodium device results    Collection Time: 06/19/21 12:51 AM   Result Value Ref Range    Istat Sodium 139 135 - 145 mmol/L   POCT potassium device results    Collection Time: 06/19/21 12:51 AM   Result Value Ref Range    Istat Potassium 3.7 3.6 - 5.5 mmol/L   POCT ionized CA device results    Collection Time: 06/19/21 12:51 AM   Result Value Ref Range    Istat Ionized Calcium 1.24 1.10 - 1.30 mmol/L   POCT hematocrit and hemoglobin device results    Collection Time: 06/19/21 12:51 AM   Result Value Ref Range    Istat Hematocrit 30 (L) 37 - 47 %    Istat Hemoglobin 10.2 (L) 12.0 - 16.0 g/dL   LACTIC ACID    Collection Time: 06/19/21  4:00 AM   Result Value Ref Range    Lactic Acid 3.2 (H) 0.5 - 2.0 mmol/L   CBC with Differential: Tomorrow AM    Collection Time: 06/19/21  4:00 AM   Result Value Ref Range    WBC 12.2 (H) 4.8 - 10.8 K/uL    RBC 3.44 (L) 4.20 - 5.40  M/uL    Hemoglobin 10.5 (L) 12.0 - 16.0 g/dL    Hematocrit 32.4 (L) 37.0 - 47.0 %    MCV 94.2 81.4 - 97.8 fL    MCH 30.5 27.0 - 33.0 pg    MCHC 32.4 (L) 33.6 - 35.0 g/dL    RDW 46.9 35.9 - 50.0 fL    Platelet Count 142 (L) 164 - 446 K/uL    MPV 11.7 9.0 - 12.9 fL    Neutrophils-Polys 92.90 (H) 44.00 - 72.00 %    Lymphocytes 5.30 (L) 22.00 - 41.00 %    Monocytes 1.80 0.00 - 13.40 %    Eosinophils 0.00 0.00 - 6.90 %    Basophils 0.00 0.00 - 1.80 %    Nucleated RBC 0.00 /100 WBC    Neutrophils (Absolute) 11.33 (H) 2.00 - 7.15 K/uL    Lymphs (Absolute) 0.65 (L) 1.00 - 4.80 K/uL    Monos (Absolute) 0.22 0.00 - 0.85 K/uL    Eos (Absolute) 0.00 0.00 - 0.51 K/uL    Baso (Absolute) 0.00 0.00 - 0.12 K/uL    NRBC (Absolute) 0.00 K/uL   Comp Metabolic Panel (CMP): Tomorrow AM    Collection Time: 06/19/21  4:00 AM   Result Value Ref Range    Sodium 137 135 - 145 mmol/L    Potassium 3.9 3.6 - 5.5 mmol/L    Chloride 113 (H) 96 - 112 mmol/L    Co2 14 (L) 20 - 33 mmol/L    Anion Gap 10.0 7.0 - 16.0    Glucose 134 (H) 65 - 99 mg/dL    Bun 21 8 - 22 mg/dL    Creatinine 0.79 0.50 - 1.40 mg/dL    Calcium 7.0 (L) 8.5 - 10.5 mg/dL    AST(SGOT) 105 (H) 12 - 45 U/L    ALT(SGPT) 48 2 - 50 U/L    Alkaline Phosphatase 46 30 - 99 U/L    Total Bilirubin 0.2 0.1 - 1.5 mg/dL    Albumin 1.7 (L) 3.2 - 4.9 g/dL    Total Protein 3.5 (L) 6.0 - 8.2 g/dL    Globulin 1.8 (L) 1.9 - 3.5 g/dL    A-G Ratio 0.9 g/dL   ESTIMATED GFR    Collection Time: 06/19/21  4:00 AM   Result Value Ref Range    GFR If African American >60 >60 mL/min/1.73 m 2    GFR If Non African American >60 >60 mL/min/1.73 m 2   DIFFERENTIAL MANUAL    Collection Time: 06/19/21  4:00 AM   Result Value Ref Range    Manual Diff Status PERFORMED    PERIPHERAL SMEAR REVIEW    Collection Time: 06/19/21  4:00 AM   Result Value Ref Range    Peripheral Smear Review see below    PLATELET ESTIMATE    Collection Time: 06/19/21  4:00 AM   Result Value Ref Range    Plt Estimation Decreased    MORPHOLOGY     Collection Time: 06/19/21  4:00 AM   Result Value Ref Range    RBC Morphology Present     Poikilocytosis 1+     Echinocytes 1+    Lactic Acid Every four hours after STAT order    Collection Time: 06/19/21  8:20 AM   Result Value Ref Range    Lactic Acid 3.2 (H) 0.5 - 2.0 mmol/L   POCT glucose device results    Collection Time: 06/19/21 11:24 AM   Result Value Ref Range    Glucose - Accu-Ck 95 65 - 99 mg/dL       Fluids    Intake/Output Summary (Last 24 hours) at 6/19/2021 1253  Last data filed at 6/19/2021 1200  Gross per 24 hour   Intake 9896.76 ml   Output 1105 ml   Net 8791.76 ml       Core Measures & Quality Metrics  Labs reviewed, Radiology images reviewed and Medications reviewed  Harrington catheter: Critically Ill - Requiring Accurate Measurement of Urinary Output  Central line in place: Sepsis, Need for access and Vasopressors    DVT Prophylaxis: Contraindicated - High bleeding risk  DVT prophylaxis - mechanical: SCDs  Ulcer prophylaxis: Yes  Antibiotics: Treating active infection/contamination beyond 24 hours perioperative coverage      DEBBIE Score  ETOH Screening    Assessment/Plan  * Mesenteric ischemia ([facility])  Assessment & Plan  Acute mid small bowel mesenteric ischemia secondary to unknown etiology.  6/18 underwent emergent bowel resection.  Plan for second look laparotomy-ABThera in place  Dr. Mercado    Respiratory failure following trauma and surgery ([facility])  Assessment & Plan  Mechanical ventilation following emergent abdominal surgery.  Continue full mechanical ventilatory support. Ventilator bundle and Trauma weaning protocol.    Septic shock ([facility])  Assessment & Plan  Acute septic shock secondary to mesenteric ischemia.  Ongoing resuscitation with crystalloids, vasopressor support, broad-spectrum empiric antibiotic therapy, and trending of endpoints of resuscitation.    6/19 Piperacillin/tazobactam day 1.      Anemia associated with acute blood loss- (present on admission)  Assessment & Plan  Mild acute  blood loss anemia.  Patient is a Christianity who refuses blood products.      Discussed patient condition with RN, RT and Pharmacy.  CRITICAL CARE TIME EXCLUDING PROCEDURES: 42 minutes

## 2021-06-19 NOTE — ED PROVIDER NOTES
"ED Provider Note    CHIEF COMPLAINT  Chief Complaint   Patient presents with   • Abdominal Pain       HPI  Josephine Casas is a 65 y.o. female who presents as a transfer from [place] with possible ischemic colitis.  The patient went to the ER stating that she had abdominal pain for the last 2 days.  It has become more severe.  She is otherwise healthy.  She denies any recent vomiting or diarrhea.  CT with contrast was performed at the other hospital when her white blood cell count came back elevated at 27,000.  The CT showed small and large bowel thickening concerning for enterocolitis.  She was transferred here for higher level of care.  Upon arrival she had pulled both of her IVs out.  She is complaining of severe 11 out of 10 abdominal pain.  The patient is a Druze and is bloodless.    REVIEW OF SYSTEMS  HEENT:  No ear pain, congestion or sore throat   EYES: no discharge redness or vision changes  CARDIAC: no chest pain, palpitations    PULMONARY: no dyspnea, cough or congestion   GI: no vomiting diarrhea positive abdominal pain   : no dysuria, back pain or hematuria   Neuro: no weakness, numbness aphasia or headache  Musculoskeletal: no swelling deformity or pain no joint swelling  Endocrine: no fevers, sweating, weight loss   SKIN: no rash, erythema or contusions     See history of present illness all other systems are negative    PAST MEDICAL HISTORY  Past Medical History:   Diagnosis Date   • Back pain     pt has pain management with Dr Rice   • Dental disorder     full upper denture / partial lower   • Heart murmur    • Pneumonia      Fall/2013   • Psychiatric disorder     addicted to presc drugs   • Sleep apnea     not using C Pap  \"can't stand it\"       FAMILY HISTORY  Family History   Problem Relation Age of Onset   • Cancer Sister 42        mets        SOCIAL HISTORY  Social History     Socioeconomic History   • Marital status:      Spouse name: Not on file   • Number of children: Not on " file   • Years of education: Not on file   • Highest education level: Not on file   Occupational History   • Not on file   Tobacco Use   • Smoking status: Never Smoker   Substance and Sexual Activity   • Alcohol use: No   • Drug use: Yes     Types: Oral   • Sexual activity: Not on file   Other Topics Concern   • Not on file   Social History Narrative   • Not on file     Social Determinants of Health     Financial Resource Strain:    • Difficulty of Paying Living Expenses:    Food Insecurity:    • Worried About Running Out of Food in the Last Year:    • Ran Out of Food in the Last Year:    Transportation Needs:    • Lack of Transportation (Medical):    • Lack of Transportation (Non-Medical):    Physical Activity:    • Days of Exercise per Week:    • Minutes of Exercise per Session:    Stress:    • Feeling of Stress :    Social Connections:    • Frequency of Communication with Friends and Family:    • Frequency of Social Gatherings with Friends and Family:    • Attends Mormonism Services:    • Active Member of Clubs or Organizations:    • Attends Club or Organization Meetings:    • Marital Status:    Intimate Partner Violence:    • Fear of Current or Ex-Partner:    • Emotionally Abused:    • Physically Abused:    • Sexually Abused:        SURGICAL HISTORY  Past Surgical History:   Procedure Laterality Date   • WRIST ORIF  4/3/2014    Performed by Thomas Marcum D.O. at [facility]   • COLONOSCOPY     • FOOT SURGERY      right foot hammertoe and bunionectomy   • HYSTERECTOMY, TOTAL ABDOMINAL     • KNEE ARTHROSCOPY      right and left knee   • OTHER ABDOMINAL SURGERY      gall bladder   • PB GASTRIC BYPASS,OBESE<150CM REMINGTON-EN-Y         CURRENT MEDICATIONS  Home Medications     Reviewed by Yanick Torres, PhT (Pharmacy Tech) on 06/18/21 at 2055  Med List Status: Complete   Medication Last Dose Status   Buprenorphine HCl-Naloxone HCl 8-2 MG FILM 4-5 days ago Active   ferrous sulfate 325 (46  "Fe) MG tablet Not Taking Active   ondansetron (ZOFRAN ODT) 4 MG TABLET DISPERSIBLE Not Taking Active   temazepam (RESTORIL) 15 MG CAPS 6/17/2021 Active                ALLERGIES  Allergies   Allergen Reactions   • Benadryl [Altaryl] Palpitations   • Bloodless      Patient does not want blood transfusions due to [employer] beliefs   • Codeine Vomiting   • Elavil [Amitriptyline Hcl]    • Percocet [Oxycodone-Acetaminophen] Vomiting   • Trazodone        PHYSICAL EXAM  VITAL SIGNS: BP (!) 181/90   Pulse (!) 129   Temp 36.9 °C (98.5 °F)   Resp (!) 25   Ht 1.6 m (5' 3\")   Wt 102 kg (225 lb)   SpO2 99%   BMI 39.86 kg/m²   Constitutional: Pale mottled and ill-appearing with moderate discomfort from her abdominal pain.  HEENT: Normocephalic, Atraumatic,  external ears normal, pharynx pink,  Mucous  Membranes dry,   Eyes: PERRL, EOMI, Conjunctiva normal, No discharge.   Neck: Normal range of motion, No tenderness, Supple, No stridor.   Lymphatic: No lymphadenopathy    Cardiovascular: Tachycardic regular rhythm, No murmurs,  rubs, or gallops.   Thorax & Lungs: Lungs clear to auscultation bilaterally, No respiratory distress, No wheezes, rhales or rhonchi, No chest wall tenderness.   Abdomen: Bowel sounds normal, Soft, positive diffuse tenderness to palpation.   Skin: Warm, Dry, No erythema,  positive mottling of her lower extremities  Back:  No CVA tenderness,  No spinal tenderness, bony crepitance step offs or instability.   Extremities: Equal, intact distal pulses, No cyanosis, clubbing or edema,  No tenderness.   Musculoskeletal: Good range of motion in all major joints. No tenderness to palpation or major deformities noted.   Neurologic: Alert & oriented x 3,  No focal deficits noted.   Psychiatric: Affect normal, Judgment normal, Mood normal.      RADIOLOGY/PROCEDURES  I reviewed the radiology results from [facility] that showed the colon thickening and small bowel thickening.    COURSE & MEDICAL DECISION " MAKING  Pertinent Labs & Imaging studies reviewed. (See chart for details)  Differential diagnosis: Ischemic colitis, sepsis, infectious colitis    Results for orders placed or performed during the hospital encounter of 06/18/21   Lactic acid (lactate)   Result Value Ref Range    Lactic Acid 10.4 (HH) 0.5 - 2.0 mmol/L   CBC WITH DIFFERENTIAL   Result Value Ref Range    WBC 20.0 (H) 4.8 - 10.8 K/uL    RBC 3.81 (L) 4.20 - 5.40 M/uL    Hemoglobin 11.4 (L) 12.0 - 16.0 g/dL    Hematocrit 37.6 37.0 - 47.0 %    MCV 98.7 (H) 81.4 - 97.8 fL    MCH 29.9 27.0 - 33.0 pg    MCHC 30.3 (L) 33.6 - 35.0 g/dL    RDW 49.3 35.9 - 50.0 fL    Platelet Count 204 164 - 446 K/uL    MPV 12.1 9.0 - 12.9 fL    Neutrophils-Polys 85.20 (H) 44.00 - 72.00 %    Lymphocytes 5.20 (L) 22.00 - 41.00 %    Monocytes 0.90 0.00 - 13.40 %    Eosinophils 0.00 0.00 - 6.90 %    Basophils 0.00 0.00 - 1.80 %    Nucleated RBC 0.00 /100 WBC    Neutrophils (Absolute) 18.60 (H) 2.00 - 7.15 K/uL    Lymphs (Absolute) 1.04 1.00 - 4.80 K/uL    Monos (Absolute) 0.18 0.00 - 0.85 K/uL    Eos (Absolute) 0.00 0.00 - 0.51 K/uL    Baso (Absolute) 0.00 0.00 - 0.12 K/uL    NRBC (Absolute) 0.00 K/uL    Anisocytosis 1+     Macrocytosis 1+    COMP METABOLIC PANEL   Result Value Ref Range    Sodium 138 135 - 145 mmol/L    Potassium 4.1 3.6 - 5.5 mmol/L    Chloride 109 96 - 112 mmol/L    Glucose 300 (H) 65 - 99 mg/dL    Bun 24 (H) 8 - 22 mg/dL    Creatinine 1.16 0.50 - 1.40 mg/dL    Calcium 7.5 (L) 8.5 - 10.5 mg/dL    AST(SGOT) 35 12 - 45 U/L    ALT(SGPT) 23 2 - 50 U/L    Alkaline Phosphatase 58 30 - 99 U/L    Total Bilirubin 0.3 0.1 - 1.5 mg/dL    Albumin 2.2 (L) 3.2 - 4.9 g/dL    Total Protein 4.3 (L) 6.0 - 8.2 g/dL    Globulin 2.1 1.9 - 3.5 g/dL    A-G Ratio 1.0 g/dL   URINALYSIS    Specimen: Urine   Result Value Ref Range    Color Yellow     Character Clear     Specific Gravity 1.044 <1.035    Ph 5.5 5.0 - 8.0    Glucose 500 (A) Negative mg/dL    Ketones 15 (A) Negative mg/dL     Protein 100 (A) Negative mg/dL    Bilirubin Negative Negative    Urobilinogen, Urine 1.0 Negative    Nitrite Negative Negative    Leukocyte Esterase Negative Negative    Occult Blood Moderate (A) Negative    Micro Urine Req Microscopic    URINE MICROSCOPIC (W/UA)   Result Value Ref Range    WBC 0-2 /hpf    RBC 2-5 (A) /hpf    Bacteria Negative None /hpf    Epithelial Cells Few /hpf    Hyaline Cast 0-2 /lpf   ESTIMATED GFR   Result Value Ref Range    GFR If  56 (A) >60 mL/min/1.73 m 2    GFR If Non  46 (A) >60 mL/min/1.73 m 2   DIFFERENTIAL MANUAL   Result Value Ref Range    Bands-Stabs 7.80 0.00 - 10.00 %    Myelocytes 0.90 %    Manual Diff Status PERFORMED    PERIPHERAL SMEAR REVIEW   Result Value Ref Range    Peripheral Smear Review see below    PLATELET ESTIMATE   Result Value Ref Range    Plt Estimation Normal    MORPHOLOGY   Result Value Ref Range    RBC Morphology Present     Large Platelets 1+     Giant Platelets 1+         The patient came with no IVs.  A peripheral IV was placed by ultrasound and then I performed a central line please see note below.    Central Line Placement Procedure Note  Indication: vascular access    Consent: The patient was counseled regarding the procedure, its indications, risks, potential complications and alternatives, and any questions were answered. Consent was obtained to proceed.    Procedure: The patient was positioned appropriately and the skin over the right internal jugular vein was prepped with betadine and draped in a sterile fashion. Local anesthesia was obtained by infiltration using 1% Lidocaine without epinephrine.  A large bore needle was used to identify the vein.  A guide wire was then inserted into the vein through the needle. A triple lumen catheter was then inserted into the vessel over the guide wire using the Seldinger technique.  All ports showed good, free flowing blood return and were flushed with saline solution.  The  catheter was then securely fastened to the skin with sutures and covered with a sterile dressing.  A post procedure X-ray was ordered and showed good line position.    The patient tolerated the procedure well.    Complications: None        HYDRATION: Based on the patient's presentation of Sepsis the patient was given IV fluids. IV Hydration was used because oral hydration was not adequate alone. Upon recheck following hydration, the patient was still very ill.           I spoke with the hospitalist  the intensivist [facility] has accepted the patient for admission.      9:41 PM I spoke with Dr. Rogelio desai who will take the patient to the operating room to see if she has viable intestine.    I did inform the family how critical the patient is.  She has already been given Zosyn at the other hospital.  Initially she was a DNR however they have rescinded this and she is now a full code.  Critical Care  Due to the real possibility of a deterioration of this patient's condition required the highest level of my preparedness for sudden emergent intervention. I provided critical care services which included medication orders, frequent reevaluations of the patient's condition and response to treatment, ordering and reviewing test results and discussing the case with various consultants. The critical care time associated with the care of the patient was 45 minutes. Review chart for interventions. This time is exclusive of any other billable procedures.     FINAL IMPRESSION  1. Ischemic colitis (HCC)    2. Sepsis, due to unspecified organism, unspecified whether acute organ dysfunction present (HCC)           PLAN/DISPOSITION  Admitted in critical condition          Electronically signed by: Carmenza Ji M.D., 6/18/2021 8:36 PM

## 2021-06-19 NOTE — ASSESSMENT & PLAN NOTE
This is Sepsis Present on admission  SIRS criteria identified on my evaluation include: Tachycardia, with heart rate greater than 90 BPM, Tachypnea, with respirations greater than 20 per minute and Leukocytosis, with WBC greater than 12,000  Source is abdominal  Sepsis protocol initiated  Fluid resuscitation ordered per protocol  IV antibiotics as appropriate for source of sepsis  While organ dysfunction may be noted elsewhere in this problem list or in the chart, degree of organ dysfunction does not meet CMS criteria for severe sepsis

## 2021-06-19 NOTE — ED NOTES
After speaking with family, pt verbalized she wishes to change her dnr status to full code.  Would like CPR and intubation if needed, still does not want any blood products.  Dr Ji notified of change in code status as well as lactic 10.4

## 2021-06-19 NOTE — CONSULTS
"    Surgical Consultation    Date: 6/18/2021    Requesting Physician: Dr. Ji  PCP: Shanta Krause M.D.  Attending Physician: Thomas Mercado M.D.    CC: Abdominal pain    HPI: This is a 65 y.o. female who is presenting in transfer from an outside facility with approximately 1 day of worsening abdominal pain that became severe.  CT from the outside facility shows diffuse enterocolitis concerning for ischemia, although a CT angiogram was not performed.  There is no CT evidence of perforation such as pneumoperitoneum or significant free fluid.  She has a history of gastric bypass for weight loss surgery which was performed approximately 15 years ago by Dr. Paniagua.  She denies any other significant medical problems.  Her lactic acid level was elevated at the outside facility but is significantly worse at this facility at 10.  She is currently tachycardic, tachypneic requiring nonrebreather oxygen, and extremities are quite cool but she is fully awake and alert.  She and her family want every possible measure performed including surgery if needed.  Currently no nausea or vomiting, chest pain, hematemesis, hemoptysis, melena, hematochezia, neurologic changes.    Past Medical History:   Diagnosis Date   • Back pain     pt has pain management with Dr Rice   • Dental disorder     full upper denture / partial lower   • Heart murmur    • Pneumonia      Fall/2013   • Psychiatric disorder     addicted to presc drugs   • Sleep apnea     not using C Pap  \"can't stand it\"       Past Surgical History:   Procedure Laterality Date   • WRIST ORIF  4/3/2014    Performed by Thomas [facility]um D.O[facility] at [facility]   • COLONOSCOPY     • FOOT SURGERY      right foot hammertoe and bunionectomy   • HYSTERECTOMY, TOTAL ABDOMINAL     • KNEE ARTHROSCOPY      right and left knee   • OTHER ABDOMINAL SURGERY      gall bladder   • PB GASTRIC BYPASS,OBESE<150CM REMINGTON-EN-Y         Current Facility-Administered Medications   "   Medication Dose Route Frequency Provider Last Rate Last Admin   • lactated ringers infusion   Intravenous Continuous Alf Tobar M.D.       • lactated ringers infusion (BOLUS)  500 mL Intravenous Once PRN Scot Coelho M.D.       • piperacillin-tazobactam (ZOSYN) 4.5 g in  mL IVPB  4.5 g Intravenous Once Scto Coelho M.D.        And   • [START ON 6/19/2021] piperacillin-tazobactam (ZOSYN) 4.5 g in  mL IVPB  4.5 g Intravenous Q8HRS Scot Coelho M.D.       • senna-docusate (PERICOLACE or SENOKOT S) 8.6-50 MG per tablet 2 tablet  2 tablet Oral BID Scot Coelho M.D.        And   • polyethylene glycol/lytes (MIRALAX) PACKET 1 Packet  1 Packet Oral QDAY PRN Scot Coelho M.D.        And   • magnesium hydroxide (MILK OF MAGNESIA) suspension 30 mL  30 mL Oral QDAY PRN Scot Coelho M.D.        And   • bisacodyl (DULCOLAX) suppository 10 mg  10 mg Rectal QDAY PRN Scot Coelho M.D.       • Respiratory Therapy Consult   Nebulization Continuous RT Scot Coelho M.D.       • lactated ringers infusion  4,000 mL Intravenous Continuous Scot Coelho M.D.       • heparin injection 5,000 Units  5,000 Units Subcutaneous Q8HRS Scot Coelho M.D.       • acetaminophen (Tylenol) tablet 650 mg  650 mg Oral Q6HRS PRN Scot Coelho M.D.       • enalaprilat (VASOTEC) injection 1.25 mg  1.25 mg Intravenous Q6HRS PRN Scot Coelho M.D.       • labetalol (NORMODYNE/TRANDATE) injection 10 mg  10 mg Intravenous Q4HRS PRN Scot Coelho M.D.       • ondansetron (ZOFRAN) syringe/vial injection 4 mg  4 mg Intravenous Q4HRS PRN Scot Coelho M.D.       • ondansetron (ZOFRAN ODT) dispertab 4 mg  4 mg Oral Q4HRS PRN Scot Chaung, M.D.       • guaiFENesin dextromethorphan (ROBITUSSIN DM) 100-10 MG/5ML syrup 10 mL  10 mL Oral Q6HRS PRN Scot Coelho M.D.       • [START ON 6/19/2021] insulin regular (HumuLIN R,NovoLIN R) injection  2-9 Units Subcutaneous Q6HRS Scot Coelho M.D.        And   • glucose 4 g chewable tablet 16 g  16 g  Oral Q15 MIN PRN Scot Coelho M.D.        And   • dextrose 50% (D50W) injection 50 mL  50 mL Intravenous Q15 MIN PRN Scot Coelho M.D.       • Pharmacy Consult Request ...Pain Management Review 1 Each  1 Each Other PHARMACY TO DOSE Scot Coelho M.D.       • HYDROmorphone (DILAUDID) tablet 2 mg  2 mg Oral Q3HRS PRN Scot Coelho M.D.        Or   • HYDROmorphone (DILAUDID) tablet 4 mg  4 mg Oral Q3HRS PRN Scot Coelho M.D.        Or   • HYDROmorphone (Dilaudid) injection 1 mg  1 mg Intravenous Q3HRS PRN Scot Coelho M.D.       • temazepam (Restoril) capsule 45 mg  45 mg Oral QHS Scot Coelho M.D.       • Buprenorphine HCl-Naloxone HCl 8-2 MG FILM 1 Film  1 Film Oral BID Scot Coelho M.D.       • [START ON 6/19/2021] ferrous sulfate tablet 325 mg  325 mg Oral BID WITH MEALS Scot Coelho M.D.         Current Outpatient Medications   Medication Sig Dispense Refill   • Buprenorphine HCl-Naloxone HCl 8-2 MG FILM Take 1 Film by mouth 2 times a day.     • ondansetron (ZOFRAN ODT) 4 MG TABLET DISPERSIBLE Take 1 Tab by mouth every 8 hours as needed for Nausea. (Patient not taking: Reported on 6/18/2021) 10 Tab 0   • ferrous sulfate 325 (65 Fe) MG tablet Take 1 Tab by mouth 2 times a day, with meals. (Patient not taking: Reported on 6/18/2021) 60 Tab 0   • temazepam (RESTORIL) 15 MG CAPS Take 45 mg by mouth at bedtime. 3 caps = 45 mg         Social History     Socioeconomic History   • Marital status:      Spouse name: Not on file   • Number of children: Not on file   • Years of education: Not on file   • Highest education level: Not on file   Occupational History   • Not on file   Tobacco Use   • Smoking status: Never Smoker   Substance and Sexual Activity   • Alcohol use: No   • Drug use: Yes     Types: Oral   • Sexual activity: Not on file   Other Topics Concern   • Not on file   Social History Narrative   • Not on file     Social Determinants of Health     Financial Resource Strain:    • Difficulty of Paying  "Living Expenses:    Food Insecurity:    • Worried About Running Out of Food in the Last Year:    • Ran Out of Food in the Last Year:    Transportation Needs:    • Lack of Transportation (Medical):    • Lack of Transportation (Non-Medical):    Physical Activity:    • Days of Exercise per Week:    • Minutes of Exercise per Session:    Stress:    • Feeling of Stress :    Social Connections:    • Frequency of Communication with Friends and Family:    • Frequency of Social Gatherings with Friends and Family:    • Attends Voodoo Services:    • Active Member of Clubs or Organizations:    • Attends Club or Organization Meetings:    • Marital Status:    Intimate Partner Violence:    • Fear of Current or Ex-Partner:    • Emotionally Abused:    • Physically Abused:    • Sexually Abused:        Family History   Problem Relation Age of Onset   • Cancer Sister 42        mets        Allergies:  Benadryl [altaryl], Bloodless, Codeine, Elavil [amitriptyline hcl], Percocet [oxycodone-acetaminophen], and Trazodone    Review of Systems:  Negative except as noted above in the HPI on 10 point review    Physical Exam:  BP (!) 181/90   Pulse (!) 129   Temp 36.9 °C (98.5 °F)   Resp (!) 25   Ht 1.6 m (5' 3\")   Wt 102 kg (225 lb)   SpO2 99%     Constitutional: she is oriented to person, place, and time.  she appears well-developed and well-nourished. No acute distress.   Head: Normocephalic and atraumatic.   Neck: Normal range of motion. Neck supple. No JVD present. No tracheal deviation present.   Cardiovascular: Tachycardia  Pulmonary/Chest: Breathing is mildly labored on nonrebreather oxygen, but there is no stridor or overt respiratory distress  Abdominal: Soft, obese, tender to palpation throughout with only mild tenderness.  Pain out of proportion to exam  Musculoskeletal: Normal range of motion. she exhibits no edema and no tenderness.   Neurological: she is alert and oriented to person, place, and time.  No gross deficits " noted  Skin: Skin is cool and pale.  There is no rash.  Mildly diaphoretic  Psychiatric: she has a normal mood and affect.  Behavior is reasonable under the circumstances.       Labs:  Recent Labs     06/18/21 2039   WBC 20.0*   RBC 3.81*   HEMOGLOBIN 11.4*   HEMATOCRIT 37.6   MCV 98.7*   MCH 29.9   MCHC 30.3*   RDW 49.3   PLATELETCT 204   MPV 12.1     Recent Labs     06/18/21 2039   SODIUM 138   POTASSIUM 4.1   CHLORIDE 109   GLUCOSE 300*   BUN 24*   CREATININE 1.16   CALCIUM 7.5*         Recent Labs     06/18/21 2039   ASTSGOT 35   ALTSGPT 23   TBILIRUBIN 0.3   ALKPHOSPHAT 58   GLOBULIN 2.1       Radiology:  DX-CHEST-PORTABLE (1 VIEW)   Final Result         1.  Hazy left lung base opacities suggests infiltrates.   2.  Right internal jugular central line terminates in the right atrium, could be withdrawn 4 cm.      OUTSIDE IMAGES-CT ABDOMEN /PELVIS   Final Result      OUTSIDE IMAGES-CT ABDOMEN /PELVIS   Final Result      OUTSIDE IMAGES-CT CHEST   Final Result          Assessment: This is a 65 y.o. female transferred from an outside facility with a history of gastric bypass and imaging and clinical concerns for mesenteric ischemia with a lactate of 10 and pain out of proportion to exam.  Other surgical diagnoses are certainly on the differential, but CT findings nonspecific.  She is currently hemodynamically stable but tachycardic.      Recommendations:   -Patient is very ill, however she wants to be aggressive and I think it is necessary to rule out significant bowel ischemia surgically.  Recommend proceeding for diagnostic laparoscopy with possible conversion to laparotomy, possible bowel resection, possible temporary abdominal closure pending findings.  -Aggressive resuscitation by ER and ICU team while OR is being prepared  -All of the plans pending intraoperative findings  -Guarded prognosis         ____________________________________     Thomas Mercado M.D.    DD: 6/18/2021  9:41 PM

## 2021-06-19 NOTE — ASSESSMENT & PLAN NOTE
Ischemic bowel on CT  Lactate>10  General surgery planning for ex lap  Empiric abx started  Pt is Jehovah witness and will not accept blood products - she had a prior DNR but  Per her and her family she will accept short term intubation and cpr if needed at present

## 2021-06-19 NOTE — PROGRESS NOTES
1510: Dr Jackson updated of patient's urine output of approx 28 mls/hour for this shift, orders received to start vasopressin.

## 2021-06-19 NOTE — ASSESSMENT & PLAN NOTE
Mechanical ventilation following emergent abdominal surgery.  6/25 Extubated.  6/29 Re intubated for progressive respiratory failure.   6/30 transition to ASV   Ventilator bundle  SICU ventilator weaning protocol.  7/1 extubated

## 2021-06-19 NOTE — THERAPY
Missed Therapy     Patient Name: Josephine Casas  Age:  65 y.o., Sex:  female  Medical Record #: [id number]  Today's Date: 6/19/2021 06/19/21 0922   Treatment Variance   Reason For Missed Therapy Medical - Patient Is Not Medically Stable   Interdisciplinary Plan of Care Collaboration   IDT Collaboration with  Nursing   Collaboration Comments Orders received for a clinical swallow evaluation. Per RN, patient is intubated. Will HOLD evaluation at this time.

## 2021-06-19 NOTE — CARE PLAN
Problem: Ventilation  Goal: Ability to achieve and maintain unassisted ventilation or tolerate decreased levels of ventilator support  Description: Document on Vent flowsheet    1.  Support and monitor invasive and noninvasive mechanical ventilation  2.  Monitor ventilator weaning response  3.  Perform ventilator associated pneumonia prevention interventions  4.  Manage ventilation therapy by monitoring diagnostic test results  Outcome: Not Met  Note:    Ventilator Daily Summary    Vent Day # 1    Ventilator settings changed this shift: none    Weaning trials: none    Respiratory Procedures: none    Plan: Continue current ventilator settings and wean mechanical ventilation as tolerated per physician orders.

## 2021-06-19 NOTE — ASSESSMENT & PLAN NOTE
Glucose 300  ISS  May need insulin gtt for close glucose goal 140-180 as she is going to be in ICU, defer to ICU

## 2021-06-19 NOTE — ASSESSMENT & PLAN NOTE
LA 2.59 >> 10.4  WBC 27.18k >> 20k    CT AP w/contrast at [facility] ER:  ischemic colitis of small and large bowel    IVF, trend lactate  Pain control  Abx: Zosyn    Surgery f/u appreciated - OR now for diagnostic laparoscopy with possible open laparatomy / bowel resection  ICU f/u appreciated - admit to ICU after OR

## 2021-06-19 NOTE — CARE PLAN
The patient is Unstable - High likelihood or risk of patient condition declining or worsening    Shift Goals  Clinical Goals: maintain blood pressure, control pain    Progress made toward(s) clinical / shift goals:    Patient medicated per MAR, appropriate PRN's available. Vasopressors and fluid boluses available upon MAR.    Problem: Pain - Standard  Goal: Alleviation of pain or a reduction in pain to the patient’s comfort goal  Outcome: Progressing     Problem: Hemodynamics  Goal: Patient's hemodynamics, fluid balance and neurologic status will be stable or improve  Outcome: Progressing     Problem: Fluid Volume  Goal: Fluid volume balance will be maintained  Outcome: Progressing       Patient is not progressing towards the following goals:    N/a

## 2021-06-19 NOTE — CARE PLAN
Problem: Pain - Standard  Goal: Alleviation of pain or a reduction in pain to the patient’s comfort goal  Outcome: Progressing     Problem: Hemodynamics  Goal: Patient's hemodynamics, fluid balance and neurologic status will be stable or improve  Outcome: Progressing     Problem: Fluid Volume  Goal: Fluid volume balance will be maintained  Outcome: Progressing     Problem: Urinary - Renal Perfusion  Goal: Ability to achieve and maintain adequate renal perfusion and functioning will improve  Outcome: Progressing     Problem: Respiratory  Goal: Patient will achieve/maintain optimum respiratory ventilation and gas exchange  Outcome: Progressing   The patient is Unstable - High likelihood or risk of patient condition declining or worsening    Shift Goals  Clinical Goals: pt will remian hemodynamically stable during shift

## 2021-06-19 NOTE — ANESTHESIA PREPROCEDURE EVALUATION
Relevant Problems   ANESTHESIA (within normal limits)       (within normal limits)      Other   (positive) Acute respiratory failure with hypoxia (HCC)   (positive) Anemia   (positive) Class 2 obesity in adult   (positive) DM (diabetes mellitus) (HCC)   (positive) Ischemic colitis (HCC)   (positive) Septic shock (HCC)       Physical Exam    Airway   Mallampati: II  TM distance: >3 FB  Neck ROM: full       Cardiovascular - normal exam  Rhythm: regular  Rate: normal  (-) murmur     Dental - normal exam           Pulmonary - normal exam  Breath sounds clear to auscultation     Abdominal    Neurological - normal exam                 Anesthesia Plan    ASA 5- EMERGENT   ASA physical status 5 criteria: ischemic bowel in the face of significant cardiac pathology or multiple organ/system dysfunctionASA physical status emergent criteria: sepsis, acute peritonitis, acute deteriorating condition due to infection and acute ischemia (limb, body part, tissue)    Plan - general       Airway plan will be ETT          Induction: intravenous    Postoperative Plan: Postoperative administration of opioids is intended.    Pertinent diagnostic labs and testing reviewed    Informed Consent:    Anesthetic plan and risks discussed with patient.    Use of blood products discussed with: patient whom consented to blood products.

## 2021-06-19 NOTE — ASSESSMENT & PLAN NOTE
This is severe Sepsis Present on admission  SIRS criteria identified on my evaluation include: Tachycardia, with heart rate greater than 90 BPM, Tachypnea, with respirations greater than 20 per minute, Leukocytosis, with WBC greater than 12,000 and Bandemia, greater than 10% bands  Source is GI/colitis  Sepsis protocol initiated  Fluid resuscitation ordered per protocol  IV antibiotics as appropriate for source of sepsis    WBC 27k, LA 10.4

## 2021-06-19 NOTE — PROGRESS NOTES
Trauma / Surgical Daily Progress Note    Date of Service  6/18/2021    Chief Complaint  65 y.o. female admitted 6/18/2021 with Ischemic Bowel.  Underwent emergent laparotomy and bowel resection    Interval Events  Hospital day #1  Critically ill  Requires continued ICU and hospital admission  Injuries and physiologic derangements pose a significant risk of mortality and long term morbidity  Critical care interventions include:  integration of multiple data points and associated complex medical decisions   Management of ventilator-full support at this time will being resuscitated  Treatment of septic shock-ongoing resuscitation with crystalloids, pressor support as needed, and antibiotic infusion  Pain control    Review of Systems  Review of Systems   Unable to perform ROS: Intubated        Vital Signs for last 24 hours  Temp:  [36.9 °C (98.5 °F)] 36.9 °C (98.5 °F)  Pulse:  [123-138] 134  Resp:  [25-55] 36  BP: (143-181)/(59-93) 181/90  SpO2:  [98 %-100 %] 98 %    Hemodynamic parameters for last 24 hours       Respiratory Data     Respiration: (!) 36, Pulse Oximetry: 98 %             Physical Exam  Physical Exam  Constitutional:       Appearance: She is obese. She is ill-appearing and toxic-appearing.   HENT:      Head: Normocephalic.      Comments: ET tube in place     Nose: Nose normal.      Mouth/Throat:      Mouth: Mucous membranes are moist.   Eyes:      General: No scleral icterus.     Pupils: Pupils are equal, round, and reactive to light.   Cardiovascular:      Rate and Rhythm: Regular rhythm. Tachycardia present.      Pulses: Normal pulses.   Pulmonary:      Comments: On vent  Abdominal:      Comments: abthera in place   Genitourinary:     Comments: Harrington in place  Musculoskeletal:         General: Normal range of motion.      Cervical back: Normal range of motion and neck supple.   Skin:     General: Skin is warm and dry.      Capillary Refill: Capillary refill takes less than 2 seconds.   Neurological:       General: No focal deficit present.      Comments: sedated   Psychiatric:      Comments: Unable to assess         Laboratory  Recent Results (from the past 24 hour(s))   Lactic acid (lactate)    Collection Time: 06/18/21  8:39 PM   Result Value Ref Range    Lactic Acid 10.4 (HH) 0.5 - 2.0 mmol/L   CBC WITH DIFFERENTIAL    Collection Time: 06/18/21  8:39 PM   Result Value Ref Range    WBC 20.0 (H) 4.8 - 10.8 K/uL    RBC 3.81 (L) 4.20 - 5.40 M/uL    Hemoglobin 11.4 (L) 12.0 - 16.0 g/dL    Hematocrit 37.6 37.0 - 47.0 %    MCV 98.7 (H) 81.4 - 97.8 fL    MCH 29.9 27.0 - 33.0 pg    MCHC 30.3 (L) 33.6 - 35.0 g/dL    RDW 49.3 35.9 - 50.0 fL    Platelet Count 204 164 - 446 K/uL    MPV 12.1 9.0 - 12.9 fL    Neutrophils-Polys 85.20 (H) 44.00 - 72.00 %    Lymphocytes 5.20 (L) 22.00 - 41.00 %    Monocytes 0.90 0.00 - 13.40 %    Eosinophils 0.00 0.00 - 6.90 %    Basophils 0.00 0.00 - 1.80 %    Nucleated RBC 0.00 /100 WBC    Neutrophils (Absolute) 18.60 (H) 2.00 - 7.15 K/uL    Lymphs (Absolute) 1.04 1.00 - 4.80 K/uL    Monos (Absolute) 0.18 0.00 - 0.85 K/uL    Eos (Absolute) 0.00 0.00 - 0.51 K/uL    Baso (Absolute) 0.00 0.00 - 0.12 K/uL    NRBC (Absolute) 0.00 K/uL    Anisocytosis 1+     Macrocytosis 1+    COMP METABOLIC PANEL    Collection Time: 06/18/21  8:39 PM   Result Value Ref Range    Sodium 138 135 - 145 mmol/L    Potassium 4.1 3.6 - 5.5 mmol/L    Chloride 109 96 - 112 mmol/L    Co2 9 (LL) 20 - 33 mmol/L    Anion Gap 20.0 (H) 7.0 - 16.0    Glucose 300 (H) 65 - 99 mg/dL    Bun 24 (H) 8 - 22 mg/dL    Creatinine 1.16 0.50 - 1.40 mg/dL    Calcium 7.5 (L) 8.5 - 10.5 mg/dL    AST(SGOT) 35 12 - 45 U/L    ALT(SGPT) 23 2 - 50 U/L    Alkaline Phosphatase 58 30 - 99 U/L    Total Bilirubin 0.3 0.1 - 1.5 mg/dL    Albumin 2.2 (L) 3.2 - 4.9 g/dL    Total Protein 4.3 (L) 6.0 - 8.2 g/dL    Globulin 2.1 1.9 - 3.5 g/dL    A-G Ratio 1.0 g/dL   ESTIMATED GFR    Collection Time: 06/18/21  8:39 PM   Result Value Ref Range    GFR If   American 56 (A) >60 mL/min/1.73 m 2    GFR If Non  46 (A) >60 mL/min/1.73 m 2   DIFFERENTIAL MANUAL    Collection Time: 06/18/21  8:39 PM   Result Value Ref Range    Bands-Stabs 7.80 0.00 - 10.00 %    Myelocytes 0.90 %    Manual Diff Status PERFORMED    PERIPHERAL SMEAR REVIEW    Collection Time: 06/18/21  8:39 PM   Result Value Ref Range    Peripheral Smear Review see below    PLATELET ESTIMATE    Collection Time: 06/18/21  8:39 PM   Result Value Ref Range    Plt Estimation Normal    MORPHOLOGY    Collection Time: 06/18/21  8:39 PM   Result Value Ref Range    RBC Morphology Present     Large Platelets 1+     Giant Platelets 1+    URINALYSIS    Collection Time: 06/18/21  8:45 PM    Specimen: Urine   Result Value Ref Range    Color Yellow     Character Clear     Specific Gravity 1.044 <1.035    Ph 5.5 5.0 - 8.0    Glucose 500 (A) Negative mg/dL    Ketones 15 (A) Negative mg/dL    Protein 100 (A) Negative mg/dL    Bilirubin Negative Negative    Urobilinogen, Urine 1.0 Negative    Nitrite Negative Negative    Leukocyte Esterase Negative Negative    Occult Blood Moderate (A) Negative    Micro Urine Req Microscopic    URINE MICROSCOPIC (W/UA)    Collection Time: 06/18/21  8:45 PM   Result Value Ref Range    WBC 0-2 /hpf    RBC 2-5 (A) /hpf    Bacteria Negative None /hpf    Epithelial Cells Few /hpf    Hyaline Cast 0-2 /lpf       Fluids  No intake or output data in the 24 hours ending 06/18/21 2356    Core Measures & Quality Metrics  Labs reviewed, Radiology images reviewed and Medications reviewed  Harrington catheter: Critically Ill - Requiring Accurate Measurement of Urinary Output  Central line in place: Sepsis    DVT Prophylaxis: Contraindicated - High bleeding risk  DVT prophylaxis - mechanical: SCDs  Ulcer prophylaxis: Yes  Antibiotics: Treating active infection/contamination beyond 24 hours perioperative coverage      DEBBIE Score  ETOH Screening    Assessment/Plan  65-year-old woman who presented with  acute mesenteric ischemia.  Underwent emergent  bowel resection.  Left in discontinuity with plan for a second look laparotomy in the next 24 to 48 hours.  Will need:  1.  Respiratory support-will maintain full support with the ventilator  2.  Treatment of septic shock-continue resuscitation with crystalloids and blood products as needed.  Pressor support as needed.  Ongoing infusion of antibiotics  Patient is currently critically ill and will need ICU admission.    Discussed patient condition with RN, RT and general surgery.  CRITICAL CARE TIME EXCLUDING PROCEDURES: 32    minutes

## 2021-06-19 NOTE — ASSESSMENT & PLAN NOTE
I discussed plan of care and rationale for hospitalization with patient and patient's family members who were at bedside in emergency room  Although she was previously DNR/DNI with comfort care status, patient and family elected for full CODE STATUS at this time  Full CODE STATUS confirmed  ACP: 20 minutes

## 2021-06-20 NOTE — PROGRESS NOTES
from Lab called with critical result of Lactic 1.9 at 1400. Critical lab result read back to .   This critical lab result is within parameters established by  (improved from last lactic) for this patient

## 2021-06-20 NOTE — CARE PLAN
The patient is Unstable - High likelihood or risk of patient condition declining or worsening    Shift Goals  Clinical Goals: maintain blood pressure, control pain    Progress made toward(s) clinical / shift goals:    Patient medicated per MAR, appropriate PRN's available. Titrating vasopressors and fluid boluses to maintain perfusion parameters.    Problem: Pain - Standard  Goal: Alleviation of pain or a reduction in pain to the patient’s comfort goal  Outcome: Progressing     Problem: Hemodynamics  Goal: Patient's hemodynamics, fluid balance and neurologic status will be stable or improve  Outcome: Progressing       Patient is not progressing towards the following goals: n/a

## 2021-06-20 NOTE — CARE PLAN
Shift Goals  Clinical Goals: maintain blood pressure, control pain    Progress made toward(s) clinical / shift goals:  none  Problem: Skin Integrity  Goal: Skin integrity is maintained or improved  Note: Pt turned q2 hours     Problem: Safety - Medical Restraint  Goal: Remains free of injury from restraints (Restraint for Interference with Medical Device)  6/19/2021 2037 by Nga Hoskins R.N.  Flowsheets (Taken 6/19/2021 2037)  Addressed this shift: Remains free of injury from restraints (restraint for interference with medical device):   Determine that other, less restrictive measures have been tried or would not be effective before applying the restraint   Evaluate the patient's condition at the time of restraint application   Inform patient/family regarding the reason for restraint   Every 2 hours: Monitor safety, psychosocial status, comfort, nutrition and hydration  6/19/2021 2036 by Nga Hoskins R.N.  Flowsheets (Taken 6/19/2021 2036)  Addressed this shift: Remains free of injury from restraints (restraint for interference with medical device):   Determine that other, less restrictive measures have been tried or would not be effective before applying the restraint   Evaluate the patient's condition at the time of restraint application   Inform patient/family regarding the reason for restraint   Every 2 hours: Monitor safety, psychosocial status, comfort, nutrition and hydration       Patient is not progressing towards the following goals:

## 2021-06-20 NOTE — CARE PLAN
Problem: Ventilation  Goal: Ability to achieve and maintain unassisted ventilation or tolerate decreased levels of ventilator support  Description: Document on Vent flowsheet    1.  Support and monitor invasive and noninvasive mechanical ventilation  2.  Monitor ventilator weaning response  3.  Perform ventilator associated pneumonia prevention interventions  4.  Manage ventilation therapy by monitoring diagnostic test results  Outcome: Not Met          Ventilator Daily Summary    Vent Day # 2    Ventilator settings changed this shift: Increased Fio2 to 60% per Dr Jackson    Weaning trials: None    Respiratory Procedures: None    Plan: Continue current ventilator settings and wean mechanical ventilation as tolerated per physician orders.

## 2021-06-20 NOTE — ANESTHESIA POSTPROCEDURE EVALUATION
Patient: Josephine Casas    Procedure Summary     Date: 06/19/21 Room / Location: [facility] 09 / SURGERY [facility]    Anesthesia Start: 2024 Anesthesia Stop: 2200    Procedures:       IRRIGATION AND DEBRIDEMENT, ABDOMEN (Abdomen)      LAPAROTOMY, EXPLORATORY (Abdomen)      SMALL BOWEL RESECTION (Abdomen)      INSERTION, GASTROSTOMY TUBE (Abdomen) Diagnosis: (MESENTERIC ISCHEMIA )    Surgeons: Samuel Jackson M.D. Responsible Provider: Deshawn Williamson M.D.    Anesthesia Type: general ASA Status: 5 - Emergent          Final Anesthesia Type: general  Last vitals  BP   Blood Pressure : 102/59, Arterial BP: 101/75    Temp   36.9 °C (98.5 °F)    Pulse   (!) 129   Resp   (!) 28    SpO2   100 %      Anesthesia Post Evaluation    Patient location during evaluation: PACU  Patient participation: complete - patient participated  Level of consciousness: awake and alert  Pain score: 0    Airway patency: patent  Anesthetic complications: no  Cardiovascular status: hemodynamically stable  Respiratory status: acceptable  Hydration status: euvolemic    PONV: none          No complications documented.     Nurse Pain Score: 4 (NPRS)

## 2021-06-20 NOTE — ANESTHESIA PREPROCEDURE EVALUATION
Relevant Problems   CARDIAC   (positive) Mesenteric ischemia (HCC)       Physical Exam    Airway   Mallampati: II  TM distance: >3 FB  Neck ROM: full       Cardiovascular - normal exam  Rhythm: regular  Rate: normal  (-) murmur     Dental - normal exam      Very poor dentition   Pulmonary - normal exam  Breath sounds clear to auscultation     Abdominal    Neurological - normal exam                 Anesthesia Plan    ASA 5- EMERGENT   ASA physical status 5 criteria: ischemic bowel in the face of significant cardiac pathology or multiple organ/system dysfunctionASA physical status emergent criteria: sepsis    Plan - general               Induction: intravenous    Postoperative Plan: Postoperative administration of opioids is intended.    Pertinent diagnostic labs and testing reviewed    Informed Consent:    Anesthetic plan and risks discussed with patient.    Use of blood products discussed with: patient whom consented to blood products.

## 2021-06-20 NOTE — PROGRESS NOTES
Trauma/ Surgical Critical Care    PREOPERATIVE NOTE: I have seen and examined the patient today.  Critical physical examination findings, laboratory indices, and radiographic studies reviewed. Stalled sepsis resuscitation and failure to clear lactate concerning for additional visceral ischemia.  I recommend second look laparotomy tonight to assess viability of abdominal contents.    The operative strategy was explained and reviewed. Alternatives discussed. Potential complications including, but not limited to, infection, bleeding, damage to adjacent structures, and anesthetic complications were discussed. The patient will not accept blood products. Questions were elicited and answered to the patient's decision maker's satisfaction.  Operative consent signed.        ____________________________________     Samuel Jackson M.D.    DD: 6/19/2021  6:59 PM

## 2021-06-20 NOTE — OP REPORT
DATE OF OPERATION: 6/19/2021    PREOPERATIVE DIAGNOSIS:  Ischemic small bowel, small bowel resection, and damage control laparotomy closure with persistent sepsis.     POSTOPERATIVE DIAGNOSIS: Persistent small bowel ischemia involving the mid and distal small bowel.  Massively dilated stomach and biliary limb ischemia.    PROCEDURE PERFORMED: Second look laparotomy, abdominal washout, gastrostomy tube placement, proximal and distal small bowel resection, damage control negative pressure dressing placement.    SURGEON: Samuel Jackson M.D.    ASSISTANT: Freedom Whitlock M.D.     ANESTHESIOLOGIST: Deshawn Williamson M.D.    ANESTHESIA: General endotracheal anesthesia.    ASA CLASSIFICATION: V. Emergent.    INDICATIONS:  The patient is a critically ill 65 year-old woman with acute small bowel ischemia of unclear etiology.  She was taken to surgery emergently last evening for small bowel resection and damage control closure.  Over the course of the day she has had significant volume resuscitation but has failed to clear her lactate.  She is taken back to surgery for planned second look laparotomy to assess the viability of her abdominal contents.     The complexity of the surgical procedure necessitated additional skilled operative assistance from another surgeon. The assistant was present for a substantial portion of the operation and provided assistance to the operating physician throughout the critical aspects of the procedure. The surgical assistant performed the following: provided assistance with optimal surgical exposure of the operative field, provided high complexity, subspecialty decision making input, assisted with the surgical resection and damage control closure.    FINDINGS: There is diffuse shocky ischemia of the entire abdominal cavity including the abdominal sidewalls.  The stomach and biliary limb were distended to the point of ischemia.  This improved following decompression.  The region of the  France-en-Y anastomosis was frankly ischemic.  The distal small bowel and proximal terminal ileum was ischemic.     WOUND CLASSIFICATION: Class IV, Dirty or Infected. Infection present at the time of surgery (PATOS).    SPECIMEN:  Small bowel segments.    ESTIMATED BLOOD LOSS:  30 mL.    PROCEDURE: Following informed consent consent, the patient was properly identified, taken to the operating room and placed in supine position where a general endotracheal anesthesia was administered. Intravenous antibiotics were administered in the preoperative setting within the correct time interval. The patient arrived with a previously placed Harrington catheter. Sequential compression devices were employed. A safety retension strap was secured. Active patient warming devices were utilized. The operative site was widely prepped and draped into a sterile field.  A timeout was conducted with the full attention and participation of all operating room personnel.      The abdominal contents were explored with findings detailed above.  A 16 [place] gastrostomy tube was placed into the anterior surface of the greater curve of the stomach and secured with 3-0 silk pursestring suture.  The stomach and biliary limb were decompressed.  The proximal small bowel was divided just distal to the ligament of Treitz had an area of impending perforation with a single firing of an Endo LESLIE stapler with a blue load.  The distal end of the resection was at the France-en-Y anastomosis.  The intervening mesentery was taken down with multiple firings of the stapler with vascular loads.    The distal small bowel was inspected.  The most proximal portion of this was remarkable for areas of patchy full-thickness necrosis and impending perforation.  Additional 10 to 12 cm segment of distal small bowel was resected in a similar fashion.    The alimentary limb was inspected.  The orogastric tube was advanced as far distal to the gastrojejunostomy as possible.  The  distal end of the alimentary limb leading into the France-en-Y anastomosis was ischemic and this was resected.  The distended contents were evacuated.    The abdominal contents were returned to the normal anatomic position. A damage control closure was employed. An ABThera™ Open Abdomen Negative Pressure dressing was passed on to the operative field. The visceral protective layer was trimmed to the appropriate dimensions and placed within the abdominal cavity. The edges were extended into the paracolic gutters and dependent pelvis ensuring full coverage of all viscera. The medial tension perforated foam units were trimmed to the appropriate dimensions and placed within the wound. The upper foam was secured to the skin in a radial fashion with interrupted staples. The self-adhesive drape was applied and connected to closed suction drainage.     The patient tolerated the procedure well, and there were no apparent complications. All sponge, needle, and instrument counts were correct on 2 separate occasions. The patient was was mechanically ventilated and transferred to to the surgical intensive care unit (SICU) in critical condition.       ____________________________________     Samuel Jackson M.D.    DD: 6/19/2021  8:19 PM

## 2021-06-20 NOTE — CARE PLAN
Problem: Ventilation  Goal: Ability to achieve and maintain unassisted ventilation or tolerate decreased levels of ventilator support  Description: Document on Vent flowsheet    1.  Support and monitor invasive and noninvasive mechanical ventilation  2.  Monitor ventilator weaning response  3.  Perform ventilator associated pneumonia prevention interventions  4.  Manage ventilation therapy by monitoring diagnostic test results  Outcome: Not Met        Ventilator Daily Summary    Vent Day #2    Ventilator settings changed this shift:RR to 18, FIO2 to 40%, per trauma provide vent support for now    Weaning trials:none    Respiratory Procedures:none at this time    Plan: Continue current ventilator settings and wean mechanical ventilation as tolerated per physician orders.

## 2021-06-20 NOTE — PROGRESS NOTES
Trauma / Surgical Daily Progress Note    Date of Service  6/20/2021    Chief Complaint  65 y.o. female admitted 6/18/2021 with Ischemic Bowel    Interval Events  Second look laparotomy with gastrostomy, additional bowel resection, and damage control closure.  Continuing resuscitation.  The patient remains critically ill with acute respiratory failure, septic shock and multisystem organ failure.  The patient was seen and examined on rounds and discussed with the multidisciplinary critical care team and consulting physicians. Critically evaluated laboratory tests, culture data, medications, imaging, and other diagnostic tests.    The patient has impairment of one or more vital organ systems and a high probability of imminent or life-threatening deterioration in condition. Provided high complexity decision making to assess, manipulate, and support vital system functions to treat vital organ system failure and/or to prevent further life-threatening deterioration of the patient's condition. Requires continued ICU and hospital admission.    Critical care interventions include: integration of multiple data points and associated complex medical decision making, ventilator management, titration of vasopressors, evaluation and direction of antimicrobial therapy for life threatening infection, sepsis resuscitation and management of critical electrolyte abnormalities.    Review of Systems  Review of Systems   Unable to perform ROS: Intubated        Vital Signs for last 24 hours  Pulse:  [] 114  Resp:  [0-38] 18  BP: ()/() 148/64  SpO2:  [52 %-100 %] 98 %    Hemodynamic parameters for last 24 hours  CVP:  [-319.4 MM HG-317 MM HG] 9 MM HG    Respiratory Data     Respiration: 18, Pulse Oximetry: 98 %     Work Of Breathing / Effort: Vented  RUL Breath Sounds: Clear, RML Breath Sounds: Clear, RLL Breath Sounds: Diminished, NICO Breath Sounds: Clear, LLL Breath Sounds: Diminished    Physical Exam  Physical  Exam  Vitals and nursing note reviewed.   Constitutional:       Appearance: She is obese. She is ill-appearing and toxic-appearing.      Interventions: She is intubated.   HENT:      Head: Normocephalic.      Comments: ET tube in place     Nose: Nose normal.      Mouth/Throat:      Mouth: Mucous membranes are moist.   Eyes:      General: No scleral icterus.     Conjunctiva/sclera: Conjunctivae normal.      Pupils: Pupils are equal, round, and reactive to light.   Cardiovascular:      Rate and Rhythm: Regular rhythm. Tachycardia present.      Pulses: Normal pulses.   Pulmonary:      Effort: She is intubated.      Breath sounds: Examination of the right-lower field reveals decreased breath sounds. Examination of the left-lower field reveals decreased breath sounds. Decreased breath sounds present.   Abdominal:      Palpations: Abdomen is soft.      Comments: abthera in place   Genitourinary:     Comments: Harrington in place  Musculoskeletal:         General: Normal range of motion.      Cervical back: Normal range of motion and neck supple.   Skin:     General: Skin is dry.      Capillary Refill: Capillary refill takes 2 to 3 seconds.   Neurological:      General: No focal deficit present.      Mental Status: She is easily aroused.      Comments: sedated   Psychiatric:         Mood and Affect: Mood normal.         Behavior: Behavior normal. Behavior is cooperative.         Laboratory  Recent Results (from the past 24 hour(s))   POCT glucose device results    Collection Time: 06/19/21 11:24 AM   Result Value Ref Range    Glucose - Accu-Ck 95 65 - 99 mg/dL   POCT glucose device results    Collection Time: 06/19/21  5:04 PM   Result Value Ref Range    Glucose - Accu-Ck 68 65 - 99 mg/dL   LACTIC ACID    Collection Time: 06/19/21  5:30 PM   Result Value Ref Range    Lactic Acid 3.7 (H) 0.5 - 2.0 mmol/L   POCT glucose device results    Collection Time: 06/19/21  5:35 PM   Result Value Ref Range    Glucose - Accu-Ck 181 (H) 65 -  99 mg/dL   POCT arterial blood gas device results    Collection Time: 06/19/21  6:48 PM   Result Value Ref Range    Ph 7.402 7.400 - 7.500    Pco2 22.6 (L) 26.0 - 37.0 mmHg    Po2 89 (H) 64 - 87 mmHg    Tco2 15 (L) 20 - 33 mmol/L    S02 97 93 - 99 %    Hco3 14.1 (L) 17.0 - 25.0 mmol/L    BE -9 (L) -4 - 3 mmol/L    Body Temp 99.9 F degrees    O2 Therapy 30 %    iPF Ratio 297     Ph Temp Nanci 7.392 (L) 7.400 - 7.500    Pco2 Temp Co 23.4 (L) 26.0 - 37.0 mmHg    Po2 Temp Cor 93 (H) 64 - 87 mmHg    Specimen Arterial     DelSys Vent     End Tidal Carbon Dioxide 26 mmhg    Tidal Volume 320 mL    Peep End Expiratory Pressure 8 cmh20    Set Rate 22     Mode APV-CMV    LACTIC ACID    Collection Time: 06/20/21 12:05 AM   Result Value Ref Range    Lactic Acid 3.7 (H) 0.5 - 2.0 mmol/L   POCT glucose device results    Collection Time: 06/20/21 12:08 AM   Result Value Ref Range    Glucose - Accu-Ck 141 (H) 65 - 99 mg/dL   POCT arterial blood gas device results    Collection Time: 06/20/21  5:16 AM   Result Value Ref Range    Ph 7.390 (L) 7.400 - 7.500    Pco2 31.0 26.0 - 37.0 mmHg    Po2 119 (H) 64 - 87 mmHg    Tco2 20 20 - 33 mmol/L    S02 99 93 - 99 %    Hco3 18.8 17.0 - 25.0 mmol/L    BE -5 (L) -4 - 3 mmol/L    Body Temp 98.1 F degrees    O2 Therapy 60 %    iPF Ratio 198     Ph Temp Nanci 7.395 (L) 7.400 - 7.500    Pco2 Temp Co 30.6 26.0 - 37.0 mmHg    Po2 Temp Cor 117 (H) 64 - 87 mmHg    Specimen Arterial     DelSys Vent     End Tidal Carbon Dioxide 28 mmhg    Tidal Volume 320 mL    Peep End Expiratory Pressure 8 cmh20    Set Rate 22     Mode APV-CMV    CBC WITH DIFFERENTIAL    Collection Time: 06/20/21  5:20 AM   Result Value Ref Range    WBC 19.1 (H) 4.8 - 10.8 K/uL    RBC 3.17 (L) 4.20 - 5.40 M/uL    Hemoglobin 9.7 (L) 12.0 - 16.0 g/dL    Hematocrit 29.2 (L) 37.0 - 47.0 %    MCV 92.1 81.4 - 97.8 fL    MCH 30.6 27.0 - 33.0 pg    MCHC 33.2 (L) 33.6 - 35.0 g/dL    RDW 47.5 35.9 - 50.0 fL    Platelet Count 203 164 - 446 K/uL     MPV 12.4 9.0 - 12.9 fL    Neutrophils-Polys 83.50 (H) 44.00 - 72.00 %    Lymphocytes 3.50 (L) 22.00 - 41.00 %    Monocytes 3.50 0.00 - 13.40 %    Eosinophils 0.00 0.00 - 6.90 %    Basophils 0.00 0.00 - 1.80 %    Nucleated RBC 0.30 /100 WBC    Neutrophils (Absolute) 17.27 (H) 2.00 - 7.15 K/uL    Lymphs (Absolute) 0.67 (L) 1.00 - 4.80 K/uL    Monos (Absolute) 0.67 0.00 - 0.85 K/uL    Eos (Absolute) 0.00 0.00 - 0.51 K/uL    Baso (Absolute) 0.00 0.00 - 0.12 K/uL    NRBC (Absolute) 0.05 K/uL   Comp Metabolic Panel    Collection Time: 06/20/21  5:20 AM   Result Value Ref Range    Sodium 133 (L) 135 - 145 mmol/L    Potassium 4.1 3.6 - 5.5 mmol/L    Chloride 106 96 - 112 mmol/L    Co2 17 (L) 20 - 33 mmol/L    Anion Gap 10.0 7.0 - 16.0    Glucose 144 (H) 65 - 99 mg/dL    Bun 29 (H) 8 - 22 mg/dL    Creatinine 1.24 0.50 - 1.40 mg/dL    Calcium 7.5 (L) 8.5 - 10.5 mg/dL    AST(SGOT) 229 (H) 12 - 45 U/L    ALT(SGPT) 114 (H) 2 - 50 U/L    Alkaline Phosphatase 57 30 - 99 U/L    Total Bilirubin <0.2 0.1 - 1.5 mg/dL    Albumin 1.5 (L) 3.2 - 4.9 g/dL    Total Protein 3.9 (L) 6.0 - 8.2 g/dL    Globulin 2.4 1.9 - 3.5 g/dL    A-G Ratio 0.6 g/dL   MAGNESIUM    Collection Time: 06/20/21  5:20 AM   Result Value Ref Range    Magnesium 1.8 1.5 - 2.5 mg/dL   PHOSPHORUS    Collection Time: 06/20/21  5:20 AM   Result Value Ref Range    Phosphorus 3.2 2.5 - 4.5 mg/dL   IONIZED CALCIUM    Collection Time: 06/20/21  5:20 AM   Result Value Ref Range    Ionized Calcium 1.0 (L) 1.1 - 1.3 mmol/L   LACTIC ACID    Collection Time: 06/20/21  5:20 AM   Result Value Ref Range    Lactic Acid 2.8 (H) 0.5 - 2.0 mmol/L   Triglyceride    Collection Time: 06/20/21  5:20 AM   Result Value Ref Range    Triglycerides 382 (H) 0 - 149 mg/dL   MORPHOLOGY    Collection Time: 06/20/21  5:20 AM   Result Value Ref Range    RBC Morphology Present     Polychromia 1+     Poikilocytosis 2+     Echinocytes 2+    PERIPHERAL SMEAR REVIEW    Collection Time: 06/20/21  5:20 AM    Result Value Ref Range    Peripheral Smear Review see below    DIFFERENTIAL MANUAL    Collection Time: 06/20/21  5:20 AM   Result Value Ref Range    Bands-Stabs 6.90 0.00 - 10.00 %    Metamyelocytes 2.60 %    Manual Diff Status PERFORMED    PLATELET ESTIMATE    Collection Time: 06/20/21  5:20 AM   Result Value Ref Range    Plt Estimation Normal    ESTIMATED GFR    Collection Time: 06/20/21  5:20 AM   Result Value Ref Range    GFR If  52 (A) >60 mL/min/1.73 m 2    GFR If Non  43 (A) >60 mL/min/1.73 m 2   POCT glucose device results    Collection Time: 06/20/21  5:29 AM   Result Value Ref Range    Glucose - Accu-Ck 132 (H) 65 - 99 mg/dL       Fluids    Intake/Output Summary (Last 24 hours) at 6/20/2021 0844  Last data filed at 6/20/2021 0800  Gross per 24 hour   Intake 83565.05 ml   Output 2255 ml   Net 8047.05 ml       Core Measures & Quality Metrics  Labs reviewed, Radiology images reviewed and Medications reviewed  Harrington catheter: Critically Ill - Requiring Accurate Measurement of Urinary Output  Central line in place: Sepsis, Need for access and Vasopressors    DVT Prophylaxis: Contraindicated - High bleeding risk  DVT prophylaxis - mechanical: SCDs  Ulcer prophylaxis: Yes  Antibiotics: Treating active infection/contamination beyond 24 hours perioperative coverage      DEBBIE Score  ETOH Screening    Assessment/Plan  * Mesenteric ischemia (HCC)  Assessment & Plan  Acute mid small bowel mesenteric ischemia secondary to unknown etiology.  6/18 Emergent laparotomy with small bowel resection and damage control closure.  6/19 Expedited second look laparotomy for persistent lactic acidosis.  Biliary limb of [place]-en-Y gastric bypass with significant distention and ischemia.  Gastrostomy, additional proximal and distal small bowel resections, and damage control closure.  [facility].    Respiratory failure following trauma and surgery (HCC)  Assessment & Plan  Mechanical  ventilation following emergent abdominal surgery.  Continue full mechanical ventilatory support. Ventilator bundle and Trauma weaning protocol.    Septic shock (HCC)  Assessment & Plan  Acute septic shock secondary to mesenteric ischemia.  Ongoing resuscitation with crystalloids, vasopressor support, broad-spectrum empiric antibiotic therapy, and trending of endpoints of resuscitation.    6/20 Piperacillin/tazobactam day 2.    Anemia associated with acute blood loss- (present on admission)  Assessment & Plan  Mild acute blood loss anemia.  Patient is a Spiritism who refuses blood products.      Discussed patient condition with Family, RN, RT, Pharmacy and general surgery.  CRITICAL CARE TIME EXCLUDING PROCEDURES: 40 minutes

## 2021-06-21 PROBLEM — C80.1: Status: RESOLVED | Noted: 2021-01-01 | Resolved: 2021-01-01

## 2021-06-21 PROBLEM — E66.9 CLASS 2 OBESITY IN ADULT: Status: RESOLVED | Noted: 2021-01-01 | Resolved: 2021-01-01

## 2021-06-21 PROBLEM — E46 PROTEIN CALORIE MALNUTRITION (HCC): Status: ACTIVE | Noted: 2021-01-01

## 2021-06-21 PROBLEM — G89.4 CHRONIC PAIN SYNDROME: Status: RESOLVED | Noted: 2021-01-01 | Resolved: 2021-01-01

## 2021-06-21 PROBLEM — C79.2: Status: RESOLVED | Noted: 2021-01-01 | Resolved: 2021-01-01

## 2021-06-21 NOTE — DIETARY
NUTRITION SERVICES:  Pt with BMI 45.35. Class 3 obesity. Weight loss counseling not appropriate in acute care setting.     RECOMMENDATION: Referral to outpatient nutrition services for weight management after D/C as appropriate.

## 2021-06-21 NOTE — CARE PLAN
Problem: Safety - Medical Restraint  Goal: Remains free of injury from restraints (Restraint for Interference with Medical Device)  Flowsheets (Taken 6/19/2021 2037)  Addressed this shift: Remains free of injury from restraints (restraint for interference with medical device):   Determine that other, less restrictive measures have been tried or would not be effective before applying the restraint   Evaluate the patient's condition at the time of restraint application   Inform patient/family regarding the reason for restraint   Every 2 hours: Monitor safety, psychosocial status, comfort, nutrition and hydration  Goal: Free from restraint(s) (Restraint for Interference with Medical Device)  Flowsheets (Taken 6/20/2021 2232)  Addressed this shift: Free from restraint(s) (restraint for interference with medical device):   ONCE/SHIFT or MINIMUM Every 12 hours: Assess and document the continuing need for restraints   Every 24 hours: Continued use of restraint requires Licensed Independent Practitioner to perform face to face examination and written order   Identify and implement measures to help patient regain control    Shift Goals  Clinical Goals: maintain blood pressure, control pain      Patient is not progressing towards the following goals:

## 2021-06-21 NOTE — PROGRESS NOTES
"    DATE: 6/21/2021    POD 3, 2 Ex lap, bowel resection for intestinal ischemia    Interval Events:  Improving hemodynamics and physiology  Discussed with the patient's  on the phone    Given the above presentation, the patient will be taken to the operating room for laparotomy, possible bowel resection, possible bowel anastomosis, gastrostomy tube creation. The surgical plan rationale for surgery was discussed in detail and in layman's terms with available family. Potential complications were discussed in detail and include but are not limited to infection, bleeding, damage to adjacent tissues and organs, pneumonia, anesthetic complications and death on very rare occasions. Questions and concerns were addressed to the available family's apparent satisfaction.  It was agreed to proceed.  Operative consent was obtained.      PHYSICAL EXAMINATION:  Vital Signs: /53   Pulse (!) 108   Temp 36.9 °C (98.5 °F)   Resp 17   Ht 1.6 m (5' 2.99\")   Wt 116 kg (255 lb 15.3 oz)   SpO2 98%     Awakes to voice on the vent  AbThera in place and on suction    ASSESSMENT AND PLAN:     -Plan as above    Appreciate ICU and consulting physician care.       ____________________________________     Clarence Collins M.D.    "

## 2021-06-21 NOTE — PROGRESS NOTES
Pt escorted to preop, attached to SICU monitor. Report given to preop RN and Anesthesiologist Dr. Thrasher.

## 2021-06-21 NOTE — PROGRESS NOTES
Contacted Dr. Thrasher about FSBG of 75. Per Dr. Thrasher, give one amp D50 prior to surgery via voalte.

## 2021-06-21 NOTE — CARE PLAN
The patient is Watcher - Medium risk of patient condition declining or worsening    Shift Goals  Clinical Goals: maintain blood pressure, control pain    Progress made toward(s) clinical / shift goals:  Pt off pressors, surgery again today 6/22, q2hour turns, U/O adequate       Problem: Pain - Standard  Goal: Alleviation of pain or a reduction in pain to the patient’s comfort goal  Outcome: Progressing     Problem: Hemodynamics  Goal: Patient's hemodynamics, fluid balance and neurologic status will be stable or improve  Outcome: Progressing     Problem: Fluid Volume  Goal: Fluid volume balance will be maintained  Outcome: Progressing

## 2021-06-21 NOTE — THERAPY
Missed Therapy     Patient Name: Josephine Casas  Age:  65 y.o., Sex:  female  Medical Record #: [id number]  Today's Date: 6/21/2021    PT consult received. Per RN, pt scheduled for more surgery today, requests PT hold until post-op as appropriate.

## 2021-06-21 NOTE — THERAPY
Missed Therapy     Patient Name: Josephine Casas  Age:  65 y.o., Sex:  female  Medical Record #: [id number]  Today's Date: 6/21/2021 06/21/21 1005   Initial Contact Note    Initial Contact Note Order Received and Verified, Occupational Therapy Evaluation in Progress with Full Report to Follow.   Interdisciplinary Plan of Care Collaboration   Collaboration Comments OT robin held pt going for sx today    Session Information   Date / Session Number  6/21 HOLD

## 2021-06-21 NOTE — ANESTHESIA TIME REPORT
Anesthesia Start and Stop Event Times     Date Time Event    6/21/2021 1153 Ready for Procedure     1248 Anesthesia Start     1431 Anesthesia Stop        Responsible Staff  06/21/21    Name Role Begin End    Axel Thrasher M.D. Anesth 1248 1431        Preop Diagnosis (Free Text):  Pre-op Diagnosis      intestinal ischemia        Preop Diagnosis (Codes):    Post op Diagnosis  Intestinal ischemia (HCC)      Premium Reason  Non-Premium    Comments:

## 2021-06-21 NOTE — OP REPORT
Operative Report    Date: 6/21/2021    Surgeon: Altaf Crisostomo M.D.     Co-Surgeon: Dr. Collins     Pre-operative Diagnosis: Ischemic small bowel, small bowel resection, open abdomen    Post-operative Diagnosis: Same    Procedure: Exploratory laparotomy, small bowel resection, restoration of GI continuity in setting of [place]-en-Y gastric bypass    ASA Classification: IV.    Indications: This is a 65 y.o. female who is being cared for by the acute care surgery team this is her third trip to the operating room with findings of small bowel ischemia in the history of [place]-en-Y gastric bypass    The indications for a surgical assistant in this surgery were indicated due to complexity of the procedure. Their role included aiding in incision, retraction, holding devices including camera for laparoscopic procedure, and closure of the wound.      Findings: Persistent areas of ischemia in the [place] limb, new, channel length 80 cm plus the IC valve, ruling 55 cm, BP limb 8 cm    Wound Classification: Class IV, IV, Dirty or Infected. Infection present at the time of surgery (PATOS)..    Procedure in detail: The patient was seen and examined in the intensive care unit.  Discussion by Dr. Collins had with patient's family regarding return to the operating room and her overall critical illness they understand and wish to proceed and all questions were answered to their satisfaction..  The patient was transferred to the operating room placed in supine position and all pressure points were properly padded.  General endotracheal anesthesia was induced and preoperative antibiotics were given per SCIP protocol.  Patient's abdomen was prepped with Betadine paint and scrub and draped in the normal sterile fashion.  A timeout was performed confirming correct patient, correct procedure, and that all necessary equipment was in the room.      Began the procedure by removing the previous placed upper abdominal closure device.  We then ran the  small bowel from the GJ anastomosis through to the stapled end the staple line to the terminal ileum and the entire length of the BP limb.  The BP limb was 8 cm long, the common channel was 60 cm long, and the [place] limb was 72 cm long.  There was an area of omentum which was ischemic and resected with the LigaSure electrosurgical device.  This was passed off specimen #1.  We carefully inspected the [place] limb and there were several areas of ischemia and impending perforation which we elected to resect.  We resected the bowel with single fire of the blue load 75 mm LESLIE stapler and secured the mesentery with the LigaSure device.  This passed off the specimen #2.  Given her previous gastric bypass ever attempt was made to lengthen her common channel for the most absorption possible.  We approximated the [place] limb to the BP limb leaving minimal laxity above and performed a side-to-side functional end-to-end isoperistaltic anastomosis with the 60 mm blue load LESLIE stapler.  We performed enterotomy on the [place] limb and remove the corner of the BP limb staple line.  We closed the common channel with a running 3-0 Vicryl suture and imbricated with multiple interrupted 3 oh Lembert sutures.  We then performed an end-to-end anastomosis between the [place] limb and the common channel in a similar fashion with the blue load 60 mm blue load LESLIE stapler.  We closed the common enterotomy with a single fire of the blue load TA stapler.  Then careful inspected all staple lines found these to be hemostatic inspected the mesentery found to be hemostatic as well.  I then passed the case off to Dr Collins please see his dictation for further details regarding the remainder of the procedure.    The reanastomosing of the [place]-en-Y gastric bypass resulted in a BP limb length of 8 cm a common channel length of 80 cm and a [place] limb length of 55 cm.    The patient was left in the care of Dr Collins    Sponge and needle counts were correct at the  end of the case.     Specimen: #1 omentum, #2 jejunum    EBL: 75mL    Dispo: Intubated in the operating room and care of Dr. Karina Crisostomo M.D.  [facility]  [phone]

## 2021-06-21 NOTE — PROGRESS NOTES
Trauma / Surgical Daily Progress Note    Date of Service  6/21/2021    Chief Complaint  65 y.o. female admitted 6/18/2021 with Ischemic Bowel    Interval Events  Interval improvement in hemodynamics and septic state after takeback to OR 36 hours ago. Weaning vasopressors.  Full vent support in setting of critical illness.  Seen by consulting services, recommendations noted and appreciated.    Review of Systems  Review of Systems     Vital Signs for last 24 hours  Pulse:  [] 96  Resp:  [10-50] 16  BP: ()/(53-77) 108/53  SpO2:  [95 %-100 %] 100 %    Hemodynamic parameters for last 24 hours  CVP:  [6 MM HG-193 MM HG] 9 MM HG    Respiratory Data     Respiration: 16, Pulse Oximetry: 100 %     Work Of Breathing / Effort: Vented  RUL Breath Sounds: Clear, RML Breath Sounds: Diminished, RLL Breath Sounds: Diminished, NICO Breath Sounds: Clear, LLL Breath Sounds: Diminished    Physical Exam  Physical Exam  Vitals and nursing note reviewed.   Constitutional:       Comments: Intubated, sedated   HENT:      Head: Normocephalic and atraumatic.      Right Ear: External ear normal.      Left Ear: External ear normal.      Nose: Nose normal.      Mouth/Throat:      Mouth: Mucous membranes are moist.      Pharynx: Oropharynx is clear.   Eyes:      Conjunctiva/sclera: Conjunctivae normal.      Pupils: Pupils are equal, round, and reactive to light.   Cardiovascular:      Rate and Rhythm: Normal rate and regular rhythm.      Pulses: Normal pulses.      Heart sounds: Normal heart sounds.   Pulmonary:      Comments: Coarse upper airway sounds  Abdominal:      Comments: Abthera in place, G tube in center with bilious drainage.    Genitourinary:     Comments: Harrington in place  Musculoskeletal:      Cervical back: Normal range of motion and neck supple.      Right lower leg: Edema present.      Left lower leg: Edema present.   Skin:     General: Skin is warm and dry.      Capillary Refill: Capillary refill takes less than 2  seconds.      Coloration: Skin is not jaundiced.   Neurological:      Comments: BARRAZA   Psychiatric:      Comments: Unable to assess           Laboratory  Recent Results (from the past 24 hour(s))   LACTIC ACID    Collection Time: 06/20/21  1:10 PM   Result Value Ref Range    Lactic Acid 1.9 0.5 - 2.0 mmol/L   POCT glucose device results    Collection Time: 06/20/21  1:10 PM   Result Value Ref Range    Glucose - Accu-Ck 128 (H) 65 - 99 mg/dL   LACTIC ACID    Collection Time: 06/20/21  5:35 PM   Result Value Ref Range    Lactic Acid 1.4 0.5 - 2.0 mmol/L   LACTIC ACID    Collection Time: 06/21/21 12:04 AM   Result Value Ref Range    Lactic Acid 1.5 0.5 - 2.0 mmol/L   POCT glucose device results    Collection Time: 06/21/21 12:04 AM   Result Value Ref Range    Glucose - Accu-Ck 108 (H) 65 - 99 mg/dL   CBC WITH DIFFERENTIAL    Collection Time: 06/21/21  5:17 AM   Result Value Ref Range    WBC 18.6 (H) 4.8 - 10.8 K/uL    RBC 2.44 (L) 4.20 - 5.40 M/uL    Hemoglobin 7.4 (L) 12.0 - 16.0 g/dL    Hematocrit 22.2 (L) 37.0 - 47.0 %    MCV 91.0 81.4 - 97.8 fL    MCH 30.3 27.0 - 33.0 pg    MCHC 33.3 (L) 33.6 - 35.0 g/dL    RDW 45.5 35.9 - 50.0 fL    Platelet Count 190 164 - 446 K/uL    MPV 12.0 9.0 - 12.9 fL    Neutrophils-Polys 87.00 (H) 44.00 - 72.00 %    Lymphocytes 5.20 (L) 22.00 - 41.00 %    Monocytes 4.30 0.00 - 13.40 %    Eosinophils 0.00 0.00 - 6.90 %    Basophils 0.00 0.00 - 1.80 %    Nucleated RBC 0.30 /100 WBC    Neutrophils (Absolute) 16.83 (H) 2.00 - 7.15 K/uL    Lymphs (Absolute) 0.97 (L) 1.00 - 4.80 K/uL    Monos (Absolute) 0.80 0.00 - 0.85 K/uL    Eos (Absolute) 0.00 0.00 - 0.51 K/uL    Baso (Absolute) 0.00 0.00 - 0.12 K/uL    NRBC (Absolute) 0.05 K/uL    Hypochromia 1+    Comp Metabolic Panel    Collection Time: 06/21/21  5:17 AM   Result Value Ref Range    Sodium 135 135 - 145 mmol/L    Potassium 4.0 3.6 - 5.5 mmol/L    Chloride 107 96 - 112 mmol/L    Co2 20 20 - 33 mmol/L    Anion Gap 8.0 7.0 - 16.0    Glucose  106 (H) 65 - 99 mg/dL    Bun 32 (H) 8 - 22 mg/dL    Creatinine 1.28 0.50 - 1.40 mg/dL    Calcium 6.9 (LL) 8.5 - 10.5 mg/dL    AST(SGOT) 195 (H) 12 - 45 U/L    ALT(SGPT) 122 (H) 2 - 50 U/L    Alkaline Phosphatase 63 30 - 99 U/L    Total Bilirubin 0.2 0.1 - 1.5 mg/dL    Albumin 1.5 (L) 3.2 - 4.9 g/dL    Total Protein 3.7 (L) 6.0 - 8.2 g/dL    Globulin 2.2 1.9 - 3.5 g/dL    A-G Ratio 0.7 g/dL   MAGNESIUM    Collection Time: 06/21/21  5:17 AM   Result Value Ref Range    Magnesium 1.7 1.5 - 2.5 mg/dL   PHOSPHORUS    Collection Time: 06/21/21  5:17 AM   Result Value Ref Range    Phosphorus 3.8 2.5 - 4.5 mg/dL   LACTIC ACID    Collection Time: 06/21/21  5:17 AM   Result Value Ref Range    Lactic Acid 1.1 0.5 - 2.0 mmol/L   ESTIMATED GFR    Collection Time: 06/21/21  5:17 AM   Result Value Ref Range    GFR If African American 50 (A) >60 mL/min/1.73 m 2    GFR If Non  41 (A) >60 mL/min/1.73 m 2   DIFFERENTIAL MANUAL    Collection Time: 06/21/21  5:17 AM   Result Value Ref Range    Bands-Stabs 3.50 0.00 - 10.00 %    Manual Diff Status PERFORMED    PERIPHERAL SMEAR REVIEW    Collection Time: 06/21/21  5:17 AM   Result Value Ref Range    Peripheral Smear Review see below    PLATELET ESTIMATE    Collection Time: 06/21/21  5:17 AM   Result Value Ref Range    Plt Estimation Normal    MORPHOLOGY    Collection Time: 06/21/21  5:17 AM   Result Value Ref Range    RBC Morphology Present     Large Platelets 2+     Toxic Gran Moderate    POCT glucose device results    Collection Time: 06/21/21  5:24 AM   Result Value Ref Range    Glucose - Accu-Ck 109 (H) 65 - 99 mg/dL   Histology Request    Collection Time: 06/21/21  8:12 AM   Result Value Ref Range    Pathology Request Sent to Histo    Histology Request    Collection Time: 06/21/21  8:21 AM   Result Value Ref Range    Pathology Request Sent to Histo        Fluids    Intake/Output Summary (Last 24 hours) at 6/21/2021 1245  Last data filed at 6/21/2021 0600  Gross per 24  hour   Intake 3623.93 ml   Output 1700 ml   Net 1923.93 ml       Core Measures & Quality Metrics  Labs reviewed, Medications reviewed and Radiology images reviewed  Harrington catheter: Critically Ill - Requiring Accurate Measurement of Urinary Output      DVT Prophylaxis: Contraindicated - High bleeding risk  DVT prophylaxis - mechanical: SCDs  Ulcer prophylaxis: Yes  Antibiotics: Treating active infection/contamination beyond 24 hours perioperative coverage      DEBBIE Score  ETOH Screening    Assessment/Plan  * Mesenteric ischemia ([facility] [place])  Assessment & Plan  Acute mid small bowel mesenteric ischemia secondary to unknown etiology.  6/18 Emergent laparotomy with small bowel resection and damage control closure.  6/19 Expedited second look laparotomy for persistent lactic acidosis.  Biliary limb of [place]-en-Y gastric bypass with significant distention and ischemia.  Gastrostomy, additional proximal and distal small bowel resections, and damage control closure.  6/21 Plan return to OR for washout, possible anastomoses  [facility].    Respiratory failure following trauma and surgery ([facility] [place])  Assessment & Plan  Mechanical ventilation following emergent abdominal surgery.  Continue full mechanical ventilatory support. Ventilator bundle and Trauma weaning protocol.    Septic shock ([facility])  Assessment & Plan  Acute septic shock secondary to mesenteric ischemia.  Ongoing resuscitation with crystalloids, vasopressor support, broad-spectrum empiric antibiotic therapy, and trending of endpoints of resuscitation.    6/21 Piperacillin/tazobactam day 3.    Anemia associated with acute blood loss- (present on admission)  Assessment & Plan  Mild acute blood loss anemia.  Patient is a [employer] who refuses blood products.      Overall Plan:  Return to OR today for washout. Multiple areas of patchy necrosis resected a last operation. Complex GI tract reconstruction given history of RYGB.   Wean ventilator - decrease PEEP  and FiO2 and increase spontaneous breathing percentages. Start aggressive vent wean post op.   Wean vasopressors, trend endpoints of resuscitation  Start TPN today.     Discussed patient condition with RN, RT, Pharmacy and Dietary.  The patient is/remains critically ill with perforated bowel, open abdomen, septic shock, respiratory failure.    I provided the following critical care services: management of above, high risk medication management, ventilator management, resuscitation.    Critical care time spent exclusive of procedures: 81 minutes.    Freedom Whitlock MD  [phone]

## 2021-06-21 NOTE — PROGRESS NOTES
Pharmacy TPN Day # 1       6/21/2021    Dosing Weight   72 kg  (Adjusted weight from first scale weight of 100.5kg)    TPN currently providing 0% of goal  TPN goal: 7198-6916 kcal/day including 1.5 gm/kg/day Protein  TPN indication: Bowel resection and long term lack of enteral access           Pertinent PMH: History of gastric bypass now with ischemic bowel requiring resection and decompression as well as ongoing resuscitation.    No data recorded.  .  Recent Labs     06/18/21 2039 06/19/21  0040 06/19/21  0400 06/20/21  0520 06/21/21  0517   SODIUM   < >  --  137 133* 135   POTASSIUM   < >  --  3.9 4.1 4.0   CHLORIDE   < >  --  113* 106 107   CO2   < >  --  14* 17* 20   BUN   < >  --  21 29* 32*   CREATININE   < >  --  0.79 1.24 1.28   GLUCOSE   < >  --  134* 144* 106*   CALCIUM   < >  --  7.0* 7.5* 6.9*   ASTSGOT   < >  --  105* 229* 195*   ALTSGPT   < >  --  48 114* 122*   ALBUMIN   < >  --  1.7* 1.5* 1.5*   TBILIRUBIN   < >  --  0.2 <0.2 0.2   PHOSPHORUS  --   --   --  3.2 3.8   MAGNESIUM  --   --   --  1.8 1.7   PREALBUMIN  --  4.5*  --   --   --     < > = values in this interval not displayed.     Accu-Checks  Recent Labs     06/20/21  1310 06/21/21  0004 06/21/21  0524   POCGLUCOSE 128* 108* 109*       Vitals:    06/21/21 0600 06/21/21 0615 06/21/21 0800 06/21/21 0900   BP: 118/56 112/57 115/55 108/53   Weight:       Height:           Intake/Output Summary (Last 24 hours) at 6/21/2021 1304  Last data filed at 6/21/2021 0600  Gross per 24 hour   Intake 3623.93 ml   Output 1700 ml   Net 1923.93 ml       Orders Placed This Encounter   Procedures   • Diet NPO     Standing Status:   Standing     Number of Occurrences:   1     Order Specific Question:   Restrict to:     Answer:   Strict [1]         TPN for past 72 hours (Show up to 3 orders; newest on the left.)     Start date and time   06/21/2021 2000      TPN Central Line Formulation [[id number]]    Order Status  Active       Base    Clinisol 15%  54 g     dextrose 70%  152 g       Additives    potassium phosphate  15 mmol    potassium chloride  40 mEq    sodium acetate  150 mEq    sodium chloride  150 mEq    magnesium sulfate  16 mEq    M.T.E.-4 Adult  1 mL    M.V.I. Adult  10 mL    famotidine  40 mg       QS Base    sterile water  1,258.46 mL       Energy Contribution    Proteins  --    Dextrose  --    Lipids  --    Total  --       Electrolyte Ion Calculated Amount    Sodium  300 mEq    Potassium  62 mEq    Calcium  --    Magnesium  16 mEq    Aluminum  --    Phosphate  15 mmol    Chloride  190 mEq    Acetate  195.72 mEq       Other    Total Protein  54 g    Total Protein/kg  0.47 g/kg    Total Amino Acid  --    Total Amino Acid/kg  --    Glucose Infusion Rate  0.91 mg/kg/min    Osmolarity (Estimated)  --    Volume  1,992 mL    Rate  83 mL/hr    Dosing Weight  116 kg    Infusion Site  [facility]            This formula provides:  % kcal as lipids = ~ 27% (provided entirely by propofol)  Grams protein/kg = 0.75  Non-protein calories = 784  Kcals/kg = ~ 14  Total daily calories = 1001     Assessment/plan:    1.  Patient to return to OR today (6/21/21) to reestablish enteral continuity.  Unlikely to tolerate enteral therapy for an extended time.  Patient no longer requiring hemodynamic support from vasopressors.  All cultures are currently negative.  Due to prolonged duration of poor nutrition patient is at risk for refeeding syndrome.    2.  Macronutrients:  Will begin therapy at 50% of goal at this time.  Propofol continues to provide 268 kCal/day, no lipids provided in TPN. Will increase caloric provision based on tolerance.  3.  Micronutrients/electrolytes:  Concern for refeeding syndrome, will adjust as necessary based on response.  Ionized calcium ordered for 6/22/21.   4.  Famotidine provided in TPN.    5.  Fluid status:  Currently receiving LR at 150 mL/hr, will decrease once TPN initiated to a total rate of 150 mL/hr.  UOP adequate at this time.      Bucky Lorenzana  PharmD, BCCCP, BCPS

## 2021-06-21 NOTE — ANESTHESIA PREPROCEDURE EVALUATION
Morbid Obesity    Relevant Problems   CARDIAC   (positive) Mesenteric ischemia (HCC)      Other   (positive) Anemia associated with acute blood loss   (positive) DM (diabetes mellitus) (HCC)   (positive) Respiratory failure following trauma and surgery (HCC)   (positive) Septic shock (HCC)       Physical Exam    Airway - unable to assess  Patient is intubated/trached     Cardiovascular - normal exam  Rhythm: regular  Rate: abnormal  (-) murmur     Dental     Unable to assess dental       Pulmonary   (+) decreased breath sounds     Abdominal    Neurological - normal exam                 Anesthesia Plan    ASA 4   ASA physical status 4 criteria: respiratory failure    Plan - general       Airway plan will be ETT          Induction: inhalational      Pertinent diagnostic labs and testing reviewed    Informed Consent:    Anesthetic plan and risks discussed with spouse and patient.    Use of blood products discussed with: whom did not consent to blood products.   Special considerations: Anglican.

## 2021-06-21 NOTE — ASSESSMENT & PLAN NOTE
Gut in discontinuity, will eventually have multiple high risk anastomoses. Unable to tolerate any enteral nutrition.  6/21TPN started.  6/24 Plan to have Cortrak placed and start nasoenteric feeding and stop feeding remnant stomach. Remnant should be for decompression.  6/26 Impact tube feeds and TPN, will also start clears. Likely to have poor absorption.  6/29: TPN support continues.

## 2021-06-21 NOTE — CARE PLAN
Problem: Ventilation  Goal: Ability to achieve and maintain unassisted ventilation or tolerate decreased levels of ventilator support  Description: Document on Vent flowsheet    1.  Support and monitor invasive and noninvasive mechanical ventilation  2.  Monitor ventilator weaning response  3.  Perform ventilator associated pneumonia prevention interventions  4.  Manage ventilation therapy by monitoring diagnostic test results  Outcome: Not Met        Ventilator Daily Summary    Vent Day #3    Ventilator settings changed this shift:none    Weaning trials:none    Respiratory Procedures:pt went to OR today    Plan: Continue current ventilator settings and wean mechanical ventilation as tolerated per physician orders.

## 2021-06-22 NOTE — CARE PLAN
The patient is Watcher - Medium risk of patient condition declining or worsening    Shift Goals  Clinical Goals: maintain blood pressure, control pain    Progress made toward(s) clinical / shift goals:  Hemodynamically stable, pain control per CPOT/ MAR, q2 hour turns, oral care [id number], U/O [id number]      Problem: Pain - Standard  Goal: Alleviation of pain or a reduction in pain to the patient’s comfort goal  Outcome: Progressing     Problem: Hemodynamics  Goal: Patient's hemodynamics, fluid balance and neurologic status will be stable or improve  Outcome: Progressing     Problem: Fluid Volume  Goal: Fluid volume balance will be maintained  Outcome: Progressing     Problem: Urinary - Renal Perfusion  Goal: Ability to achieve and maintain adequate renal perfusion and functioning will improve  Outcome: Progressing

## 2021-06-22 NOTE — CARE PLAN
Problem: Ventilation  Goal: Ability to achieve and maintain unassisted ventilation or tolerate decreased levels of ventilator support  Description: Document on Vent flowsheet    1.  Support and monitor invasive and noninvasive mechanical ventilation  2.  Monitor ventilator weaning response  3.  Perform ventilator associated pneumonia prevention interventions  4.  Manage ventilation therapy by monitoring diagnostic test results  Outcome: Progressing     Ventilator Daily Summary     Vent Day #4     Ventilator settings changed this shift:none     Weaning trials:none     Respiratory Procedures:      Plan: Continue current ventilator settings and wean mechanical ventilation as tolerated per physician orders.

## 2021-06-22 NOTE — CARE PLAN
Problem: Skin Integrity  Goal: Skin integrity is maintained or improved  Outcome: Progressing  Note: Q2 turns, sacral mepilex, pillows for support and positioning, and commercial securement ETT device all being utilized to promote skin integrity     Problem: Safety - Medical Restraint  Goal: Remains free of injury from restraints (Restraint for Interference with Medical Device)  Outcome: Progressing  Flowsheets (Taken 6/19/2021 2037 by Nga Hoskins, R.N.)  Addressed this shift: Remains free of injury from restraints (restraint for interference with medical device):   Determine that other, less restrictive measures have been tried or would not be effective before applying the restraint   Evaluate the patient's condition at the time of restraint application   Inform patient/family regarding the reason for restraint   Every 2 hours: Monitor safety, psychosocial status, comfort, nutrition and hydration   The patient is Watcher - Medium risk of patient condition declining or worsening    Shift Goals  Clinical Goals: maintain blood pressure, control pain    Progress made toward(s) clinical / shift goals:  progressing    Patient is not progressing towards the following goals: n/a

## 2021-06-22 NOTE — PROGRESS NOTES
Pharmacy TPN Day # 2       2021    Dosing Weight   Scale weight 100.5 kg, ideal body weight 59 kg     TPN currently providing 50% of goal  TPN goal: ~ 6036-5395 kcal/day based on 11-14 kcal/kg actual weight including 2 gm/kg/day (using ideal body weight) of Protein  TPN indication: Bowel resection and long term lack of enteral access                                                                   Pertinent PMH: History of gastric bypass now with ischemic bowel requiring resection and decompression as well as ongoing resuscitation.     Temp (24hrs), Av.5 °C (97.7 °F), Min:35.7 °C (96.3 °F), Max:37.4 °C (99.4 °F)  .  Recent Labs     21  0520 21  0517 21  0534   SODIUM 133* 135 139   POTASSIUM 4.1 4.0 3.5*   CHLORIDE 106 107 110   CO2 17* 20 22   BUN 29* 32* 28*   CREATININE 1.24 1.28 1.12   GLUCOSE 144* 106* 117*   CALCIUM 7.5* 6.9* 6.7*   ASTSGOT 229* 195* 142*   ALTSGPT 114* 122* 105*   ALBUMIN 1.5* 1.5* 1.4*   TBILIRUBIN <0.2 0.2 0.2   PHOSPHORUS 3.2 3.8 2.6   MAGNESIUM 1.8 1.7 2.1     Accu-Checks  Recent Labs     21  0032 21  0538 21  1139   POCGLUCOSE 110* 115* 106*       Vitals:    21 0600 21 0700 21 1100 21 1200   BP: (!) 94/55 102/51 (!) 89/53 128/66   Weight:       Height:           Intake/Output Summary (Last 24 hours) at 2021 1325  Last data filed at 2021 1200  Gross per 24 hour   Intake 3788.95 ml   Output 2365 ml   Net 1423.95 ml       Orders Placed This Encounter   Procedures   • Diet NPO     Standing Status:   Standing     Number of Occurrences:   1     Order Specific Question:   Restrict to:     Answer:   Strict [1]         TPN for past 72 hours (Show up to 3 orders; newest on the left. Changes between the two most recent orders are indicated.)     Start date and time   2021      TPN Central Line Formulation [[id number]] TPN Central Line Formulation [[id number]]    Order Status  Active Last Dose in  Progress    Last Admin   New Bag at 06/21/2021 2001 by John Vaz R.N.       Base    Clinisol 15%  118 g 54 g    dextrose 70%  212 g 152 g    fat emulsions 20%  5 g --       Additives    potassium phosphate  15 mmol 15 mmol    potassium chloride  40 mEq 40 mEq    sodium acetate  150 mEq 150 mEq    sodium chloride  150 mEq 150 mEq    magnesium sulfate  16 mEq 16 mEq    M.T.E.-4 Adult  1 mL 1 mL    M.V.I. Adult  10 mL 10 mL    famotidine  40 mg 40 mg       QS Base    sterile water  720.99 mL 1,258.46 mL       Energy Contribution    Proteins  -- --    Dextrose  -- --    Lipids  -- --    Total  -- --       Electrolyte Ion Calculated Amount    Sodium  300 mEq 300 mEq    Potassium  62 mEq 62 mEq    Calcium  -- --    Magnesium  16 mEq 16 mEq    Aluminum  -- --    Phosphate  15 mmol 15 mmol    Chloride  190 mEq 190 mEq    Acetate  249.91 mEq 195.72 mEq       Other    Total Protein  118 g 54 g    Total Protein/kg  1.01 g/kg 0.47 g/kg    Total Amino Acid  -- --    Total Amino Acid/kg  -- --    Glucose Infusion Rate  1.26 mg/kg/min 0.9 mg/kg/min    Osmolarity (Estimated)  -- --    Volume  1,992 mL 1,992 mL    Rate  83 mL/hr 83 mL/hr    Dosing Weight  117 kg 116 kg    Infusion Site  [facility]            This formula provides:  % kcal as lipids = ~ 15% (provided by TPN and propofol)  Grams protein/kg = 2  Non-protein calories = 931  Kcals/kg = ~ 14 (actual body weight)  Total daily calories = 1403        Assessment/plan:     1.  Patient tolerated return to OR well.  Remains free from vasopressor support.  All cultures are currently negative.  No current signs or symptoms of refeeding syndrome.    2.  Macronutrients:  Will increase therapy to 100% of goal at this time.  Propofol continues to provide 161 kCal/day, lipids added to TPN.  Due to obesity patient meets criteria for high protein hypocaloric feeding in critically ill patients.  Macronutrients altered to provide appropriate therapy.   3.   Micronutrients/electrolytes:  Refeeding syndrome not a concern based on current response.  40 mEq potassium and 1 gram calcium chloride supplemented outside TPN, will adjust electrolytes in TPN if relative hypokalemia and hypocalcemia persist.  Continue micronutrients/electrolytes with no changes to TPN formulation.  Ionized calcium ordered for 6/23/21.   4.  Famotidine provided in TPN.    5.  Fluid status:  Currently receiving LR at 67 mL/hr.  UOP remains adequate at this time.      Bucky Lorenzana PharmD, BCCCP, BCPS

## 2021-06-22 NOTE — DIETARY
"Nutrition Support Assessment     Nutrition services:   Day 4 of admit.  66 yo female with admitting diagnosis: ischemic bowel    Current problem list:  1. Mesenteric ischemia  2. Respiratory failure - on vent  3. Septic shock  4. Anemia  5. History of gastric bypass    Assessment:  Estimated Nutritional Needs: based on: height 5'6\", weight 100.5 kg, IBW 58.969 kg, BMI 35.76 - class 2 obesity    Calculation/Equation:  Obesity guidelines for critical illness - based on admit scale weight 100.5 kg  Calories/day: 1105 - 1407 kcals (11 - 14 kcals/kg admit scale weight)  Protein/day: 89 - 118 g (1.5 - 2.0 g/kg IBW)    Evaluation:   1. History of gastric bypass admit with ischemic bowel having bowel resection x 2  2. Surgeries:   · 6/18 emergent bowel resection  · 6/19 w/o, g-tube, additional bowel resection, negative pressure dressing  · 6/20 removal g-tube, creation gastrostomy in gastric remnant, abdominal closure  · 6/21 bowel anastomosis  3. TPN started last night   4. Propofol @ 6.1 ml/hr (10 mcg/kg/min) - providing 161 kcals/day   5. Short bowel syndrome post bowel resections     Malnutrition Risk: na    Recommendations/Plan:  1. TPN per RPH to meet estimated nutritional needs  2. Enteral feedings when appropriate. Will need to determine extent of short bowel and pt ability to receive adequate nutrition via GI tract.     "

## 2021-06-22 NOTE — RESPIRATORY CARE
Ventilator Weaning Update    Patient is on vent day 4.  SBT was tolerated for a minimum of 1 hour hours on settings of 5/8.    Wean parameters for this SBT were:  $ FVC / Vital Capacity (liters) : 0.7 (06/22/21 0812)  NIF (cm H2O) : -16 (06/22/21 0812)  Rapid Shallow Breathing Index (RR/VT): 37 (06/22/21 0812)  RR (bpm): 14 (06/22/21 0812)  Spontaneous VE: 5.4 (06/22/21 0812)  Spontaneous VT: 374 (06/22/21 0812)      Spontaneous breathing trial (SBT) outcome: Pt weaned for 1 hour and returned to rest settings per protocol

## 2021-06-22 NOTE — CONSULTS
Reason for PC Consult: Advance Care Planning    Consulted by: Scot Coelho MD    Assessment:  General: 64yo lady transferred from [place], admitted through ED 6/18 with septic shock and went directly to surgery for mesenteric ischemia - small bowel resection with temporary ABD closure. Back to OR 6/19 for washout, resection, g-tube placement. Back to OR 6/21 for washout, resection of additional ischemic jejunum, small bowel anastomoses, G tube revision, and fascial closure. Off pressors, on propofol and TPN. H&H today 7.4/22.2. Pt is bloodless.    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    Dyspnea: Yes- Mechanically ventilated  Last BM:  (prior to arrival)-    Pain: Yes- Pt indicates ABD pain. ICU RN aware. Fentanyl being used PRN.  Depression: Unable to determine-      Spiritual:  Is Cheondoism or spirituality important for coping with this illness? Yes- Pt's Protestant osiris is a great source of strength for her. Spiritual care order placed for arranging for visit with leaders from pt's osiris.  Has a  or spiritual provider visit been requested? Yes    Palliative Performance Scale: 40    Advanced Directive: Advance Directive, DPOA- Completed 3/12/19 with Palliative Care with pt specifically choosing her grandcortez Nava as DPOA as she felt he would follow her wishes regarding bloodless where her dtr Kimberly might not (Kimberly shared remembering this discussion well). Under Statements of Desires, patient selected #2, #3, and #5, meaning that she would not want life-prolonging treatments to be provided, if there was no reasonable hope of recovery, long-term survival, or if the patient was in an irreversible coma. She would want her DPOA-HC to consider quality of life, relief of suffering, and preservation of function.   DPOA:  - pt's grandcortez Benderannmarie  "[phone]  POLST:Yes- Dated 3/11/19 - pt wishes for DNR, comfort, no artificial nutrition, IVF x1 week. Explained to pt's family that pt can always make other choices when they have capacity and still have POLST wishes be followed if pt unable to make her wishes known. Goal will be to address further with pt once able.    Code Status: Full- Pt had stated full code at admission. Family was present and supported this decision. Pt had completed a POLST for DNR/DNI after code discussion with Palliative Care in March 2019 at the time pt was diagnosed with metastatic skin cancer.     Social:   Pt lives with her  Efren in [place], [place]. Her dtr Kimberly and family live nearby. Son Alexy and family live in [place]. Pt's brother Elijah and cheryl Jeffery live in [place]. Pt enjoys shopping, her animals and her flowers - she was painting flower pots the day prior to her admission.    Outcome:  Consult received and EMR reviewed; case discussed with ICU RN Nena. Pt is known to Palliative Care service from previous admission.    Met with pt at bedside who was recently placed more upright by IDT -  introduced self and role of Palliative Care.  Provided brief update and queried pt's thoughts around a possible tracheostomy if unable to wean from ventilator. Pt initially closed her eyes and shook her head 'no' but after further explanation of the continued goal for weaning and reversal, she did nod 'yes' to having tracheostomy. Pt gave permission to speak with her family.    Met with pt's family in SICU lobby -  Efren, dtr Kimberly, granddtr Jen, brother Elijah, cheryl Jeffery and nephew. Pt's son Alexy had just arrived from [place] and went in to visit pt.  Assessed family's understanding of pt's current medical status, overall health picture, and options for future care. 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    Provided general ideas of possible pathways for pt's expected lengthy rehab including LTACH and SNF capabilities. Discussed pt's previous approaches to her healthcare including homeopathic remedies and not wanting artificial nutrition. Cautioned family that pt's own motivation given her discomfort with medical folks and facilities could make her rehab more arduous and that their support will be paramount. Multiple appropriate questions, indicated family's understanding, asked and answered.  There is a fear that pt may never be able to eat solids again.    Active listening, reflection, reminiscing, validation & normalization, and empathic support utilized throughout these encounters.  All questions answered.  PC contact information given.     Discussed with/Updated: ICU RN Nena,  Lucian    Plan:  Tracheostomy if needed/can't meet weaning parameters. Palliative care to continue to follow, provide support, and help facilitate decision-making as clinical picture evolves.    Consideration for hospice:  Pt currently full code and agreeing to tracheostomy if needed.  *Hospice consideration does not provide clinical appropriateness for hospice nor does it provide that the patient would or would not qualify for hospice services.*    Thank you for allowing Palliative Care to participate in this patient's care. Please feel free to call [phone] with any questions or concerns.

## 2021-06-22 NOTE — OP REPORT
DATE OF SERVICE:  06/21/2021     PREOPERATIVE DIAGNOSIS:  Ischemic small bowel, small bowel resection, open   abdomen.     POSTOPERATIVE DIAGNOSIS:  Ischemic small bowel, small bowel resection, open   abdomen.     PROCEDURES PERFORMED:  Exploratory laparotomy, removal of previously placed   gastrostomy tube, gastrostomy creation, abdominal wall closure.     PRIMARY SURGEON:  Clarence Collins MD     CO-SURGEON:  Altaf Crisostomo MD     ANESTHESIOLOGIST:  Axel Thrasher MD     ANESTHESIA:  General endotracheal.     ESTIMATED BLOOD LOSS:  25 mL     COMPLICATIONS:  None immediately apparent.     SPECIMENS:  None.     OPERATIVE FINDINGS:  Gastrostomy tube was then placed in the patient's gastric   remnant.  Otherwise, normal appearing gastric anatomy.     DESCRIPTION OF PROCEDURE:  Prior to creation of this gastrostomy tube, the   patient underwent a small bowel resection and restoration of GI continuity in   the setting of a France-en-Y gastric bypass by Dr. Altaf Crisostomo. Please see his   dictation for further details.     The patient's previously placed gastrostomy tube was removed by releasing the   pursestring suture around it.  This was then discarded.  The edges of the   gastrostomy site appeared viable.  A 20-Grenadian gastrostomy tube was chosen and   introduced percutaneously along the left upper quadrant, then guided into the   gastrostomy tube into the gastric lumen.  This was secured with a 3-0 Vicryl   pursestring suture. A 5 mL of saline was injected into the balloon.  The   gastrostomy tube was then secured in a Viktoria fashion using circumferential   interrupted 3-0 Vicryl sutures between the anterior gastric wall up to the   peritoneum. Tube was flushed and then hooked up to down drainage.     The patient's midline fascia was then closed with 2 #1 non-looped PDS.  Once   tied down, the fascia was noted to be tight and well approximated.  The   patient's subcutaneous tissues were irrigated with 1 liter of warm saline.     The wound was then further approximated with multiple interrupted 3-0 Vicryl   deep dermal sutures.  The skin was then closed with staples.     Overall, the patient tolerated the procedure well.  She remained intubated and   sedated and was taken to the Intensive Care Unit postoperatively for ongoing   critical care management.  All sharps and sponge counts were reported correct   by end of the case on 2 occasions.     ASA SCORE:  4.     WOUND CLASSIFICATION:  Class IV, dirty or infected, present at the time of   surgery.        ______________________________  MD ANTOLIN Iraheta/LAWSON    DD:  06/21/2021 17:54  DT:  06/21/2021 19:01    Job#:  [id number]

## 2021-06-22 NOTE — PROGRESS NOTES
"    DATE: 6/22/2021    POD 4, 3, 1 Ex lap, bowel resection for intestinal ischemia    Interval Events:  Family updated extensively post-op yesterday  Remains weak/frail, will likely need trach  G-tube with 500mL since OR, would like to see this decrease a bit before commencing feeds  Continue TPN  Clamp NGT, if not emesis, OK to remove in 6 hours  No activity restrictions    PHYSICAL EXAMINATION:  Vital Signs: /51   Pulse (!) 101   Temp 37.4 °C (99.4 °F) (Temporal)   Resp (!) 11   Ht 1.6 m (5' 2.99\")   Wt 117 kg (257 lb 11.5 oz)   SpO2 99%     Awakes to voice on the vent  OR dressing in place  G-tube with green turbid drainage    ASSESSMENT AND PLAN:     -Plan as above    Appreciate ICU and consulting physician care.       ____________________________________     Clarence Collins M.D.    "

## 2021-06-22 NOTE — RESPIRATORY CARE
Ventilator Weaning Update    Patient is on vent day 4.  SBT was tolerated for a minimum of 1 hour hours on settings of 5/8.    Wean parameters for this SBT were:  $ FVC / Vital Capacity (liters) : 0.5 (06/22/21 1515)  NIF (cm H2O) : -16 (06/22/21 1515)  Rapid Shallow Breathing Index (RR/VT): 40 (06/22/21 1515)  RR (bpm): 15 (06/22/21 1515)  Spontaneous VE: 5.9 (06/22/21 1515)  Spontaneous VT: 378 (06/22/21 1515)    Spontaneous breathing trial (SBT) outcome: Pt weaned for 1 hour and returned to rest settings per protocol

## 2021-06-22 NOTE — PROGRESS NOTES
Trauma / Surgical Daily Progress Note    Date of Service  6/22/2021    Chief Complaint  65 y.o. female admitted 6/18/2021 with Ischemic Bowel    Interval Events  Return to OR for washout, resection of additional ischemic jejunum, small bowel anastomoses, G tube revision, and fascial closure.   Vasopressors weaned to off.  Awake but poor SBT parameters this morning.   Seen by consulting services, recommendations noted and appreciated.    Review of Systems  Review of Systems       Vital Signs for last 24 hours  Temp:  [35.7 °C (96.3 °F)-37.4 °C (99.4 °F)] 37.4 °C (99.4 °F)  Pulse:  [] 64  Resp:  [0-25] 18  BP: ()/(49-66) 128/66  SpO2:  [96 %-100 %] 99 %    Hemodynamic parameters for last 24 hours  CVP:  [7 MM HG-15 MM HG] 8 MM HG    Respiratory Data     Respiration: 18, Pulse Oximetry: 99 %     Work Of Breathing / Effort: Vented  RUL Breath Sounds: Diminished, RML Breath Sounds: Diminished, RLL Breath Sounds: Diminished, NICO Breath Sounds: Diminished, LLL Breath Sounds: Diminished    Physical Exam  Physical Exam  Vitals and nursing note reviewed.   Constitutional:       Comments: Intubated, sedated   HENT:      Head: Normocephalic and atraumatic.      Right Ear: External ear normal.      Left Ear: External ear normal.      Nose: Nose normal.      Mouth/Throat:      Mouth: Mucous membranes are moist.      Pharynx: Oropharynx is clear.   Eyes:      Conjunctiva/sclera: Conjunctivae normal.      Pupils: Pupils are equal, round, and reactive to light.   Cardiovascular:      Rate and Rhythm: Normal rate and regular rhythm.      Pulses: Normal pulses.      Heart sounds: Normal heart sounds.   Pulmonary:      Comments: Coarse upper airway sounds  Abdominal:      Comments: Dressing in place, G tube with brownish drainage.    Genitourinary:     Comments: Harrington in place  Musculoskeletal:      Cervical back: Normal range of motion and neck supple.      Right lower leg: Edema present.      Left lower leg: Edema  present.   Skin:     General: Skin is warm and dry.      Capillary Refill: Capillary refill takes less than 2 seconds.      Coloration: Skin is not jaundiced.   Neurological:      Comments: MADI   Psychiatric:      Comments: Unable to assess           Laboratory  Recent Results (from the past 24 hour(s))   Histology Request    Collection Time: 06/21/21  4:22 PM   Result Value Ref Range    Pathology Request Sent to Histo    POCT glucose device results    Collection Time: 06/21/21  5:23 PM   Result Value Ref Range    Glucose - Accu-Ck 84 65 - 99 mg/dL   POCT glucose device results    Collection Time: 06/22/21 12:32 AM   Result Value Ref Range    Glucose - Accu-Ck 110 (H) 65 - 99 mg/dL   Comp Metabolic Panel    Collection Time: 06/22/21  5:34 AM   Result Value Ref Range    Sodium 139 135 - 145 mmol/L    Potassium 3.5 (L) 3.6 - 5.5 mmol/L    Chloride 110 96 - 112 mmol/L    Co2 22 20 - 33 mmol/L    Anion Gap 7.0 7.0 - 16.0    Glucose 117 (H) 65 - 99 mg/dL    Bun 28 (H) 8 - 22 mg/dL    Creatinine 1.12 0.50 - 1.40 mg/dL    Calcium 6.7 (LL) 8.5 - 10.5 mg/dL    AST(SGOT) 142 (H) 12 - 45 U/L    ALT(SGPT) 105 (H) 2 - 50 U/L    Alkaline Phosphatase 74 30 - 99 U/L    Total Bilirubin 0.2 0.1 - 1.5 mg/dL    Albumin 1.4 (L) 3.2 - 4.9 g/dL    Total Protein 3.7 (L) 6.0 - 8.2 g/dL    Globulin see below 1.9 - 3.5 g/dL    A-G Ratio see below g/dL   MAGNESIUM    Collection Time: 06/22/21  5:34 AM   Result Value Ref Range    Magnesium 2.1 1.5 - 2.5 mg/dL   PHOSPHORUS    Collection Time: 06/22/21  5:34 AM   Result Value Ref Range    Phosphorus 2.6 2.5 - 4.5 mg/dL   IONIZED CALCIUM    Collection Time: 06/22/21  5:34 AM   Result Value Ref Range    Ionized Calcium 1.0 (L) 1.1 - 1.3 mmol/L   ESTIMATED GFR    Collection Time: 06/22/21  5:34 AM   Result Value Ref Range    GFR If  59 (A) >60 mL/min/1.73 m 2    GFR If Non  48 (A) >60 mL/min/1.73 m 2   POCT glucose device results    Collection Time: 06/22/21  5:38 AM    Result Value Ref Range    Glucose - Accu-Ck 115 (H) 65 - 99 mg/dL   POCT glucose device results    Collection Time: 06/22/21 11:39 AM   Result Value Ref Range    Glucose - Accu-Ck 106 (H) 65 - 99 mg/dL       Fluids    Intake/Output Summary (Last 24 hours) at 6/22/2021 1354  Last data filed at 6/22/2021 1200  Gross per 24 hour   Intake 3954.95 ml   Output 2215 ml   Net 1739.95 ml       Core Measures & Quality Metrics  Labs reviewed, Medications reviewed and Radiology images reviewed  Harrington catheter: Critically Ill - Requiring Accurate Measurement of Urinary Output      DVT Prophylaxis: Contraindicated - High bleeding risk  DVT prophylaxis - mechanical: SCDs  Ulcer prophylaxis: Yes  Antibiotics: Treating active infection/contamination beyond 24 hours perioperative coverage      DEBBIE Score    ETOH Screening      Assessment/Plan  * Mesenteric ischemia (HCC)  Assessment & Plan  Acute mid small bowel mesenteric ischemia secondary to unknown etiology.  6/18 Emergent laparotomy with small bowel resection and damage control closure.  6/19 Expedited second look laparotomy for persistent lactic acidosis.  Biliary limb of France-en-Y gastric bypass with significant distention and ischemia.  Gastrostomy, additional proximal and distal small bowel resections, and damage control closure.  6/21 Revision of G-tube and RYGB, fascial closure  High risk for post-op intra-abdominal abscess and/or anastomotic failure  [facility].    Respiratory failure following trauma and surgery (HCC)  Assessment & Plan  Mechanical ventilation following emergent abdominal surgery.  Continue full mechanical ventilatory support. Ventilator bundle and Trauma weaning protocol.    Septic shock (HCC)  Assessment & Plan  Acute septic shock secondary to mesenteric ischemia.  Ongoing resuscitation with crystalloids, vasopressor support, broad-spectrum empiric antibiotic therapy, and trending of endpoints of resuscitation.    6/22  Piperacillin/tazobactam day 4.    Anemia associated with acute blood loss- (present on admission)  Assessment & Plan  Mild acute blood loss anemia.  Patient is a Quaker who refuses blood products.    Protein calorie malnutrition (HCC)  Assessment & Plan  Gut in discontinuity, will eventually have multiple high risk anastomoses. Unable to tolerate any enteral nutrition.   6/21TPN started    Overall Plan:  TPN in place, working with acute care service about appropriate time interval for restarting tube feeds.   D/c naso-enteric tube; G tube to drainage. Both limbs should be decompressed and anastomoses are open.   H/h is critically low and likely will get lower as she navigates this period of critical illness without good nutritional support and anticipated bone marrow dysfunction.  No unnecessary labs.   Wean ventilator - decrease PEEP and FiO2 and increase spontaneous breathing percentages. Start aggressive vent wean post op.   Blood pressure and intravascular volume status too low to tolerate lasix diuresis today.       Discussed patient condition with RN, RT, Pharmacy and Dietary.  The patient is/remains critically ill with perforated bowel, septic shock, respiratory failure, critical anemia.    I provided the following critical care services: management of above, high risk medication management, ventilator management, resuscitation.    Critical care time spent exclusive of procedures: 81 minutes.    Freedom Whitlock MD  [phone]

## 2021-06-22 NOTE — CARE PLAN
Problem: Ventilation  Goal: Ability to achieve and maintain unassisted ventilation or tolerate decreased levels of ventilator support  Description: Document on Vent flowsheet    1.  Support and monitor invasive and noninvasive mechanical ventilation  2.  Monitor ventilator weaning response  3.  Perform ventilator associated pneumonia prevention interventions  4.  Manage ventilation therapy by monitoring diagnostic test results  Outcome: Not Met        Ventilator Daily Summary    Vent Day #4    Ventilator settings changed this shift:none    Weaning trials:am and pm    Respiratory Procedures: none    Plan: Continue current ventilator settings and wean mechanical ventilation as tolerated per physician orders.

## 2021-06-23 PROBLEM — E87.70 VOLUME OVERLOAD: Status: ACTIVE | Noted: 2021-01-01

## 2021-06-23 NOTE — THERAPY
Occupational Therapy   Initial Evaluation     Patient Name: Josephine Casas  Age:  65 y.o., Sex:  female  Medical Record #: [id number]  Today's Date: 6/23/2021     Precautions: Fall Risk  Comments: orally intubated significant edema in LUE     Assessment  Patient is 65 y.o. female admitted for worsening abdominal pain. PMHx: metastic skin cancer, back pain, dental d/o, heart murmur, pneumonia, psychartic d/o and sleep apnea. This admission pt is dx w/mesenteric ischemia s/p ex-lap x2 w/G-tube placement and revision., respiratory failure current orally intubated on vent, septic shock, anemia, and protein calorie malnutrition. At this time pt requried total assist for all activities and was limited by pain, fatigue/weakness and edema in BUE. OT will follow in this setting.     Plan  Recommend Occupational Therapy 2 times per week until therapy goals are met for the following treatments:  Adaptive Equipment, Self Care/Activities of Daily Living, Therapeutic Activities and Therapeutic Exercises.    DC Equipment Recommendations: Unable to determine at this time  Discharge Recommendations: Recommend post-acute placement for additional occupational therapy services prior to discharge home     Subjective  Nodding yes/no      Objective     06/23/21 1452   Prior Living Situation   Prior Services None   Housing / Facility [facility]   Lives with - Patient's Self Care Capacity Spouse   Comments per SW note pt unable to provide hx   Prior Level of ADL Function   Self Feeding Independent   Grooming / Hygiene Independent   Bathing Independent   Dressing Independent   Toileting Independent   Comments per chart    Prior Level of IADL Function   Medication Management Independent   Laundry Independent   Kitchen Mobility Independent   Finances Independent   Home Management Independent   Shopping Independent   Prior Level Of Mobility Unable to Determine At This Time   Comments per SW note    Precautions   Precautions Fall Risk;Other (See  Comments)   Comments orally intubated significant edema in LUE    Cognition    Cognition / Consciousness X   Speech/ Communication Intubated / Trached;Nods Appropriately;Sign Language / Gestures   Level of Consciousness Alert   Reason Unresponsive Other (Comment)   Ability To Follow Commands 1 Step   Initiation Impaired   Comments pt nodded yes/no and gave thumbs up    Passive ROM Upper Body   Passive ROM Upper Body X   Comments impaired bilaterally by edema    Active ROM Upper Body   Active ROM Upper Body  X   Comments impaired bilaterally d/t weakness and edema    Strength Upper Body   Upper Body Strength  X   Gross Strength Generalized Weakness, Equal Bilaterally.    Sensation Upper Body   Upper Extremity Sensation  Not Tested   Upper Body Muscle Tone   Upper Body Muscle Tone  WDL   Neurological Concerns   Neurological Concerns No   Coordination Upper Body   Coordination X   Comments impaired fine/gross d/t edema and weakness    Balance Assessment   Sitting Balance (Static) Trace   Sitting Balance (Dynamic) Dependent   Weight Shift Sitting Absent   Comments attempted to use UE for support    Bed Mobility    Supine to Sit Total Assist   Sit to Supine Total Assist   Scooting Total Assist   Rolling Total Assist to Rt.;Total Assist to Lt.   ADL Assessment   Grooming Total Assist   Upper Body Dressing Total Assist   Lower Body Dressing Total Assist   Toileting Total Assist   How much help from another person does the patient currently need...   6 Clicks Daily Activity Score 6   Functional Mobility   Sit to Stand Unable to Participate   Bed, Chair, Wheelchair Transfer Unable to Participate   Mobility EOB    ICU Target Mobility Level   ICU Mobility - Targeted Level Level 2   Edema / Skin Assessment   Edema / Skin  X   Right Upper Extremity Edema 3+ Pitting   Left Upper Extremity Edema 3+ Pitting   Comments noted blisters on LUE    Activity Tolerance   Comments intermittent desaturation of o2 and appeared to fatigue  quickly    Patient / Family Goals   Patient / Family Goal #1 unable to state per pt    Short Term Goals   Short Term Goal # 1 pt will sit EOB w/mod A in prep for ADL's    Short Term Goal # 2 pt will participate in oral care w/mod A    Short Term Goal # 3 pt will complete UB dressing w/mod A    Education Group   Role of Occupational Therapist Patient Response Patient;Explanation   Problem List   Problem List Decreased Active Daily Living Skills;Decreased Upper Extremity Strength Right;Decreased Upper Extremity Strength Left;Decreased Upper Extremity AROM Right;Decreased Upper Extremity AROM Left;Decreased Upper Extremity PROM Right;Decreased Upper Extremity PROM Left;Decreased Functional Mobility;Decreased Activity Tolerance;Safety Awareness Deficits / Cognition;Impaired Coordination Right Upper Extremity;Impaired Coordination Left Upper Extremity;Impaired Postural Control / Balance;Limited Knowledge of Post Op Precautions   Anticipated Discharge Equipment and Recommendations   DC Equipment Recommendations Unable to determine at this time   Discharge Recommendations Recommend post-acute placement for additional occupational therapy services prior to discharge home   Interdisciplinary Plan of Care Collaboration   IDT Collaboration with  Nursing   Patient Position at End of Therapy In Bed;Call Light within Reach;Tray Table within Reach;Phone within Reach;Wrist Restraints Applied   Collaboration Comments RN aware of OT eval and pts efforts    Session Information   Date / Session Number  6/23 #1 (1/2, 6/29)   Priority 1

## 2021-06-23 NOTE — CARE PLAN
Problem: Ventilation  Goal: Ability to achieve and maintain unassisted ventilation or tolerate decreased levels of ventilator support  Description: Document on Vent flowsheet    1.  Support and monitor invasive and noninvasive mechanical ventilation  2.  Monitor ventilator weaning response  3.  Perform ventilator associated pneumonia prevention interventions  4.  Manage ventilation therapy by monitoring diagnostic test results  6/23/2021 0527 by Claus Gray, RRT  Outcome: Progressing    Vent Day 5     7.0 22      +8 40%

## 2021-06-23 NOTE — ASSESSMENT & PLAN NOTE
6/24, 6/25 Diuresis with lasix.  6/26 Ongoing diuresis with lasix (patient is 20kg up from admission weight), judicious electrolyte replacement (K, phos, Calcium).

## 2021-06-23 NOTE — THERAPY
Physical Therapy   Initial Evaluation     Patient Name: Josephine Casas  Age:  65 y.o., Sex:  female  Medical Record #: [id number]  Today's Date: 6/23/2021     Precautions: Fall Risk    Assessment  Patient is a 65 y.o. female presenting POD 5, 4, 2 ex lap and bowel resection for intestinal ischemia. Pt seen for PT evaluation at this time. Pt intubated,  consistently follows 1 step commands and nod appropriately during session. Pt requires total A for mobility but demos BLE AROM sitting EOB, grossly weak but equal R/L. Pt requires continuous posterior support sitting EOB with absent righting reactions. Pt with noticeable fatigue and SpO2 decr to mid-high 80s at EOB. Pt will benefit from sitting EOB daily with nursing to promote incr activity tolerance, continued PT to progress mobility, and currently recommend postacute placement. Will follow.     Plan  Recommend Physical Therapy 3 times per week until therapy goals are met for the following treatments:  Bed Mobility, Gait Training, Neuro Re-Education / Balance, Self Care/Home Evaluation, Stair Training, Therapeutic Activities and Therapeutic Exercises  DC Equipment Recommendations: Unable to determine at this time  Discharge Recommendations: Recommend post-acute placement for additional physical therapy services prior to discharge home        06/23/21 1453   Prior Living Situation   Prior Services None   Housing / Facility [facility]   Lives with -  Spouse   Comments per SW note; pt was independent and ambulatory PTA. lives with spouse, has multiple family members who are supportive and able to assist   Prior Level of Functional Mobility   Bed Mobility Independent   Transfer Status Independent   Ambulation Independent   Distance Ambulation (Feet) community distances   Assistive Devices Used None   Stairs Independent   Cognition    Speech/ Communication Intubated / Trached;Nods Appropriately   Level of Consciousness Alert   Ability To Follow Commands 1 Step   Initiation  "Impaired   Comments pt consistently follows one step commands, slight incr time to process. pt nods appropriattely throughout.    Balance Assessment   Sitting Balance (Static) Trace   Sitting Balance (Dynamic) Dependent   Weight Shift Sitting Absent   Comments attempts to support with BUEs but otherwise no righting reactions and requires continuous posterior support   Gait Analysis   Gait Level Of Assist Unable to Participate   Weight Bearing Status no restrictions   Bed Mobility    Supine to Sit Total Assist   Sit to Supine Total Assist   Scooting Total Assist   Rolling Total Assist to Lt.;Total Assist to Rt.   Comments decr initiation   Functional Mobility   Sit to Stand Unable to Participate   Bed, Chair, WC Transfer Unable to Participate   Short Term Goals    Short Term Goal # 1 pt will perform supine <> sit with mod A in 6 visits for improved independence   Short Term Goal # 2 pt will sit EOB 5 min with \"fair\" balance in 6 visits for improved participation in functional tasks   Short Term Goal # 3 pt will perform STS with mod A in 6 visits to initiate transfers and gait       "

## 2021-06-23 NOTE — PROGRESS NOTES
Pharmacy TPN Day # 3       6/23/2021    Dosing Weight   Scale weight 100.5 kg, ideal body weight 59 kg     TPN currently providing ~100% of goal  TPN goal: ~ 5993-9192 kcal/day based on 11-14 kcal/kg actual weight including 2 gm/kg/day (using ideal body weight) of Protein  TPN indication: Bowel resection and long term lack of enteral access                                                                   Pertinent PMH: History of gastric bypass now with ischemic bowel requiring resection and decompression as well as ongoing resuscitation.    No data recorded.  .  Recent Labs     06/21/21  0517 06/22/21  0534 06/23/21  0502   SODIUM 135 139 142   POTASSIUM 4.0 3.5* 3.7   CHLORIDE 107 110 111   CO2 20 22 26   BUN 32* 28* 25*   CREATININE 1.28 1.12 0.85   GLUCOSE 106* 117* 117*   CALCIUM 6.9* 6.7* 7.2*   ASTSGOT 195* 142* 228*   ALTSGPT 122* 105* 130*   ALBUMIN 1.5* 1.4* 1.3*   TBILIRUBIN 0.2 0.2 0.9   PHOSPHORUS 3.8 2.6 1.6*   MAGNESIUM 1.7 2.1 2.2     Accu-Checks  Recent Labs     06/23/21  0013 06/23/21  0459 06/23/21  1249   POCGLUCOSE 112* 108* 111*       Vitals:    06/23/21 1100 06/23/21 1200 06/23/21 1256 06/23/21 1300   BP: 103/69 110/65 110/65 116/74   Weight:       Height:           Intake/Output Summary (Last 24 hours) at 6/23/2021 1410  Last data filed at 6/23/2021 1400  Gross per 24 hour   Intake 2614.22 ml   Output 3015 ml   Net -400.78 ml       Orders Placed This Encounter   Procedures   • Diet NPO     Standing Status:   Standing     Number of Occurrences:   1     Order Specific Question:   Restrict to:     Answer:   Strict [1]         TPN for past 72 hours (Show up to 3 orders; newest on the left. Changes between the two most recent orders are indicated.)     Start date and time   06/23/2021 2000 06/22/2021 2000 06/21/2021 2000      TPN Central Line Formulation [[id number]] TPN Central Line Formulation [[id number]] TPN Central Line Formulation [[id number]]    Order Status  Active Last Dose in Progress  Completed    Last Admin   New Bag at 06/22/2021 2008 by John Vaz R.N. New Bag at 06/21/2021 2001 by John Vaz R.N.       Base    Clinisol 15%  118 g 118 g 54 g    dextrose 70%  212 g 212 g 152 g    fat emulsions 20%  5 g 5 g --       Additives    potassium phosphate  21 mmol 15 mmol 15 mmol    potassium chloride  40 mEq 40 mEq 40 mEq    sodium acetate  150 mEq 150 mEq 150 mEq    sodium chloride  150 mEq 150 mEq 150 mEq    magnesium sulfate  16 mEq 16 mEq 16 mEq    M.T.E.-4 Adult  1 mL 1 mL 1 mL    M.V.I. Adult  10 mL 10 mL 10 mL    famotidine  40 mg 40 mg 40 mg       QS Base    sterile water  718.99 mL 720.99 mL 1,258.46 mL       Energy Contribution    Proteins  -- -- --    Dextrose  -- -- --    Lipids  -- -- --    Total  -- -- --       Electrolyte Ion Calculated Amount    Sodium  300 mEq 300 mEq 300 mEq    Potassium  70.8 mEq 62 mEq 62 mEq    Calcium  -- -- --    Magnesium  16 mEq 16 mEq 16 mEq    Aluminum  -- -- --    Phosphate  21 mmol 15 mmol 15 mmol    Chloride  190 mEq 190 mEq 190 mEq    Acetate  249.91 mEq 249.91 mEq 195.72 mEq       Other    Total Protein  118 g 118 g 54 g    Total Protein/kg  1 g/kg 1.01 g/kg 0.47 g/kg    Total Amino Acid  -- -- --    Total Amino Acid/kg  -- -- --    Glucose Infusion Rate  1.25 mg/kg/min 1.26 mg/kg/min 0.9 mg/kg/min    Osmolarity (Estimated)  -- -- --    Volume  1,992 mL 1,992 mL 1,992 mL    Rate  83 mL/hr 83 mL/hr 83 mL/hr    Dosing Weight  118 kg 117 kg 116 kg    Infusion Site  Central Central Central            This formula provides:  % kcal as lipids = ~15% (with both propofol and TPN)  Grams protein/kg = 2 (IBW)  Non-protein calories = 931  Kcals/kg = ~14 (with actual body weight)  Total daily calories = 1403    Comments:  1) Per Surgery note, OK to clamp G-tube and if no nausea or increased pain then OK to start TF at 10 ml/hr  2) Phos level low at 1.6. KPhos 30 mmol outside of TPN given. Will increase Phos to 21 mmol in TPN with tonight's bag.   3)  Propofol still 10 mcg/kg/min which is providing ~161 kcals/24 hrs. Lipids between TPN and propofol providing ~15% of kcals. Due to obesity patient meets criteria for high protein hypocaloric feeding in critically ill patients.  Macronutrients altered to provide appropriate therapy.   4) Pepcid provided in TPN  5) Pt also receiving LR at 67 ml/hr. TPN + LR = 150 ml/hr total IVF.  6) Will continue current TPN formulation except as noted above.      Leandro Corado, PharmD

## 2021-06-23 NOTE — PROGRESS NOTES
"    DATE: 6/23/2021    POD 5, 4, 2 Ex lap, bowel resection for intestinal ischemia    Interval Events:  Hemoglobin noted, patient is a Witness  Remains weak/frail, will likely need trach  Continue TPN  Clamp G-tube, if no nausea or increased pain - OK to start 10cc/hr tube feeds  No activity restrictions    PHYSICAL EXAMINATION:  Vital Signs: /53   Pulse (!) 101   Temp 37.4 °C (99.4 °F) (Temporal)   Resp 14   Ht 1.676 m (5' 6\")   Wt 118 kg (260 lb 2.3 oz)   SpO2 98%     Awakes to voice on the vent  OR dressing in place  G-tube with green turbid drainage    ASSESSMENT AND PLAN:     -Plan as above    Appreciate ICU and consulting physician care.       ____________________________________     Clarence Collins M.D.    "

## 2021-06-23 NOTE — CARE PLAN
The patient is Watcher - Medium risk of patient condition declining or worsening    Shift Goals  Clinical Goals: maintain blood pressure, control pain    Progress made toward(s) clinical / shift goals:  Mobilized EOB, oral care q2, TF initiated through G-tube at 10mL's/hr, q2 hour turns, float boots in place.       Problem: Pain - Standard  Goal: Alleviation of pain or a reduction in pain to the patient’s comfort goal  Outcome: Progressing     Problem: Hemodynamics  Goal: Patient's hemodynamics, fluid balance and neurologic status will be stable or improve  Outcome: Progressing     Problem: Fluid Volume  Goal: Fluid volume balance will be maintained  Outcome: Progressing     Problem: Urinary - Renal Perfusion  Goal: Ability to achieve and maintain adequate renal perfusion and functioning will improve  Outcome: Progressing

## 2021-06-23 NOTE — DISCHARGE PLANNING
Care Transition Team Assessment    In the case of an emergency, pt's DPOA is Elijah sears [phone]      LSW obtained the following information used in this assessment from chart review. Patient is  and has multiple family members involved with her care.  No concerns as pt has a good support system. Patient has ACP documents which are properly uploaded into chart. Patient has an AD which lists her grandson to be her DPOA. Patient also has a POLST on file which has her DNR and wanting comfort focused care. After reviewing chart it appears that patient stated she wanted to be a full code. Family aware of POLST and pt's statement per PC.     Pt lives with spouse in [facility]. Post acute services are limited in that area. Prior to current hospitalization, pt was completely independent in ADLS/IADLS. No SA or MH hx. PCP is Shanta Krause.     Nanci consulted by PC RN    Barriers to dc: Medically complex; requires ICU level of care. No post acute referrals     Plan: Continue to assist with social/dc needs     Care Transition Team Assessment    Information Source  Orientation Level: Unable to assess  Informant's Name: Chart Review  Who is responsible for making decisions for patient? : POA  Name(s) of Primary Decision Maker: Kristin    Readmission Evaluation  Is this a readmission?: No    Elopement Risk  Legal Hold: No  Ambulatory or Self Mobile in Wheelchair: No-Not an Elopement Risk  Elopement Risk: Not at Risk for Elopement    Interdisciplinary Discharge Planning  Primary Care Physician: Shanta Krause  Lives with - Patient's Self Care Capacity: Spouse  Support Systems: Family Member(s)  Housing / Facility: [address] [facility]  Prior Services: None    Discharge Preparedness  What is your plan after discharge?: Uncertain - pending medical team collaboration  What are your discharge supports?: Child, Spouse  Prior Functional Level: Ambulatory, Independent with Activities of Daily Living,  Independent with Medication Management    Functional Assesment  Prior Functional Level: Ambulatory, Independent with Activities of Daily Living, Independent with Medication Management    Finances  Financial Barriers to Discharge: No  Prescription Coverage: Yes    Values / Beliefs / Concerns  Values / Beliefs Concerns : Yes (Nanci consulted by PC RN)    Advance Directive  Advance Directive?: DPOA for Health Care, POLST  Durable Power of  Name and Contact : Elijah Castorena (Grandson)    Domestic Abuse  Have you ever been the victim of abuse or violence?: No    Psychological Assessment  History of Substance Abuse: None  History of Psychiatric Problems: No  Non-compliant with Treatment: No  Newly Diagnosed Illness: Yes    Discharge Risks or Barriers  Discharge risks or barriers?: Post-acute placement / services  Patient risk factors: Complex medical needs, Lack of outside supports, Vulnerable adult    Anticipated Discharge Information  Discharge Disposition: Still a Patient (30)

## 2021-06-23 NOTE — PROGRESS NOTES
2 RN's verified with patient POA (Elijah Castorena) that it is okay to give Epogen and Iron to patient in lieu of bloodless status.     Second RN that verified: Lisa WALLS

## 2021-06-23 NOTE — PALLIATIVE CARE
Spiritual Care Note        Patient's Name: Josephine Casas   MRN: [id number]    YOB: 1955   Age and Gender: 65 y.o. 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     Yes  Would you like to receive a visit from our Spiritual Care team or your own Denominational or spiritual leader?     Yes    Palliative Care Spiritual Screen Comment:       Patient's Zoroastrianism osiris is a great source of strength for her.   Spiritual care order placed for arranging for visit with leaders from patient's osiris.        Nature of the Visit:     Initial, On shift    General Visit:     Yes    Referral from/Origin of Request:     [facility] nursing (palliative care)    Referral to:     Community clergy (Jehovah's Witnesses leader)    Interventions:      Spoke with Forrest Bernard who will visit today.      Spiritual Care Provider   Chaplain JOHNATHON Lawson  [phone]   [email or web address]

## 2021-06-23 NOTE — CARE PLAN
Problem: Ventilation  Goal: Ability to achieve and maintain unassisted ventilation or tolerate decreased levels of ventilator support  Description: Document on Vent flowsheet    1.  Support and monitor invasive and noninvasive mechanical ventilation  2.  Monitor ventilator weaning response  3.  Perform ventilator associated pneumonia prevention interventions  4.  Manage ventilation therapy by monitoring diagnostic test results  Outcome: Progressing      Ventilator Daily Summary    Vent Day # 5  7 @ 22    ASV: 100%/+8/30%    Weaning trials: SBT x 2    Plan: Continue current ventilator settings and wean mechanical ventilation as tolerated per physician orders.

## 2021-06-23 NOTE — PROGRESS NOTES
2 RN Skin check with Cirilo RN     Z flow for pillow, no redness noted to back of head.  Q2 hour turns to ETT tube, oral care q2 hours   R central line CDI  Midline incision to abdomen, CDI, island dressing  [place] CDI, pillow case placed between legs to help with pressure/moisture build up  Bilateral heels pink and blanching, float boots in place, SCD's repositioned  Mepilex to sacrum   Moisture fissure to lower sacrum, barrier paste applied.  Paint marks to extremities  No other skinissues noted    Q2 hour turns, all above measures in place, checked under all devices qshift, peripheral IV's not in use discontinued.

## 2021-06-23 NOTE — CARE PLAN
Problem: Pain - Standard  Goal: Alleviation of pain or a reduction in pain to the patient’s comfort goal  Outcome: Progressing     Problem: Hemodynamics  Goal: Patient's hemodynamics, fluid balance and neurologic status will be stable or improve  Outcome: Progressing     Problem: Urinary - Renal Perfusion  Goal: Ability to achieve and maintain adequate renal perfusion and functioning will improve  Outcome: Progressing   The patient is Watcher - Medium risk of patient condition declining or worsening    Shift Goals  Clinical Goals: maintain blood pressure, control pain    Progress made toward(s) clinical / shift goals:  see above     Patient is not progressing towards the following goals:

## 2021-06-23 NOTE — PROGRESS NOTES
Dr. Whitlock called and notified of a Hgb recheck of 6.3 after an initial draw of 5.7.  No orders received as patient is Sikh and is refusing any kind of blood product.

## 2021-06-23 NOTE — DIETARY
Nutrition services: Day 5 of admit.  Full nutrition assessment completed yesterday for TPN. Per surgeon NGT to be clamped and if tolerated start Tube feedings @ 10 ml/hr.    Assessment:  Calculation/Equation:  Obesity guidelines for critical illness - based on admit scale weight 100.5 kg  Calories/day: 1105 - 1407 kcals (11 - 14 kcals/kg admit scale weight)  Protein/day: 89 - 118 g (1.5 - 2.0 g/kg IBW)    Recommendations/Plan:  1. If pt tolerates NGT clamping start Peptamen Intense @ 10 ml/hr and advance only with MD orders  2. Full goal for Peptamen  Intense will be: 60 ml/hr to provide 1440 kcals, 134 g protein, 1152 ml H20/day.   3. as tube feeding advances will monitor closely for pt ability to absorb and utilize enterally provided nutrition following bowel resections x 2 and questionable amount of bowel remaining. Pt has a history of gastric bypass as well.

## 2021-06-24 NOTE — PROGRESS NOTES
Trauma / Surgical Daily Progress Note    Date of Service  6/24/2021    Chief Complaint  65 y.o. female admitted 6/18/2021 with Ischemic Bowel POD 6, 5, 3 Ex lap, bowel resection for intestinal ischemia    Interval Events  Hemoglobin stable and low, giving Fe  Remains off pressors.   Cont TPN, G tube should be for decompression.    Very weak with poor ability on SBT.  Work on mobilizing to a chair today.    Diuresis with low dose lasix.     Review of Systems  Review of Systems     Vital Signs for last 24 hours  Pulse:  [] 95  Resp:  [14-32] 19  BP: ()/(51-90) 109/66  SpO2:  [93 %-100 %] 97 %    Hemodynamic parameters for last 24 hours  CVP:  [7 MM HG-159 MM HG] 7 MM HG    Respiratory Data     Respiration: 19, Pulse Oximetry: 97 %     Work Of Breathing / Effort: Vented  RUL Breath Sounds: Clear, RML Breath Sounds: Diminished, RLL Breath Sounds: Diminished, NICO Breath Sounds: Clear, LLL Breath Sounds: Diminished    Physical Exam  Physical Exam  Constitutional:       General: She is not in acute distress.     Appearance: She is obese. She is ill-appearing. She is not toxic-appearing.   HENT:      Head: Normocephalic and atraumatic.      Right Ear: Tympanic membrane normal.      Left Ear: Tympanic membrane normal.      Nose: Nose normal.      Mouth/Throat:      Mouth: Mucous membranes are dry.   Eyes:      Extraocular Movements: Extraocular movements intact.      Pupils: Pupils are equal, round, and reactive to light.   Cardiovascular:      Rate and Rhythm: Regular rhythm. Tachycardia present.      Pulses: Normal pulses.   Pulmonary:      Effort: No respiratory distress.      Breath sounds: Normal breath sounds.      Comments: Shallow breaths  Abdominal:      General: There is no distension.      Palpations: Abdomen is soft.      Tenderness: There is no abdominal tenderness.   Musculoskeletal:         General: Swelling present.      Cervical back: Normal range of motion.   Skin:     General: Skin is warm and  dry.   Neurological:      General: No focal deficit present.      Mental Status: She is alert.   Psychiatric:         Mood and Affect: Mood normal.         Laboratory  Recent Results (from the past 24 hour(s))   POCT glucose device results    Collection Time: 06/23/21  5:07 PM   Result Value Ref Range    Glucose - Accu-Ck 130 (H) 65 - 99 mg/dL   POCT glucose device results    Collection Time: 06/24/21 12:54 AM   Result Value Ref Range    Glucose - Accu-Ck 126 (H) 65 - 99 mg/dL   Magnesium: Every Monday and Thursday AM    Collection Time: 06/24/21  3:50 AM   Result Value Ref Range    Magnesium 2.0 1.5 - 2.5 mg/dL   Phosphorus: Every Monday and Thursday AM    Collection Time: 06/24/21  3:50 AM   Result Value Ref Range    Phosphorus 1.9 (L) 2.5 - 4.5 mg/dL   Comp Metabolic Panel    Collection Time: 06/24/21  3:50 AM   Result Value Ref Range    Sodium 143 135 - 145 mmol/L    Potassium 3.3 (L) 3.6 - 5.5 mmol/L    Chloride 108 96 - 112 mmol/L    Co2 28 20 - 33 mmol/L    Anion Gap 7.0 7.0 - 16.0    Glucose 122 (H) 65 - 99 mg/dL    Bun 25 (H) 8 - 22 mg/dL    Creatinine 0.84 0.50 - 1.40 mg/dL    Calcium 7.1 (L) 8.5 - 10.5 mg/dL    AST(SGOT) 200 (H) 12 - 45 U/L    ALT(SGPT) 132 (H) 2 - 50 U/L    Alkaline Phosphatase 117 (H) 30 - 99 U/L    Total Bilirubin 1.4 0.1 - 1.5 mg/dL    Albumin 1.4 (L) 3.2 - 4.9 g/dL    Total Protein 3.9 (L) 6.0 - 8.2 g/dL    Globulin see below 1.9 - 3.5 g/dL    A-G Ratio see below g/dL   CRP Quantitive (Non-Cardiac)    Collection Time: 06/24/21  3:50 AM   Result Value Ref Range    Stat C-Reactive Protein 16.39 (H) 0.00 - 0.75 mg/dL   CBC WITH DIFFERENTIAL    Collection Time: 06/24/21  3:50 AM   Result Value Ref Range    WBC 16.7 (H) 4.8 - 10.8 K/uL    RBC 2.12 (L) 4.20 - 5.40 M/uL    Hemoglobin 6.3 (L) 12.0 - 16.0 g/dL    Hematocrit 19.4 (L) 37.0 - 47.0 %    MCV 91.5 81.4 - 97.8 fL    MCH 29.7 27.0 - 33.0 pg    MCHC 32.5 (L) 33.6 - 35.0 g/dL    RDW 48.6 35.9 - 50.0 fL    Platelet Count 192 164 - 446  K/uL    MPV 10.9 9.0 - 12.9 fL    Neutrophils-Polys 76.00 (H) 44.00 - 72.00 %    Lymphocytes 7.50 (L) 22.00 - 41.00 %    Monocytes 11.60 0.00 - 13.40 %    Eosinophils 0.70 0.00 - 6.90 %    Basophils 0.20 0.00 - 1.80 %    Immature Granulocytes 4.00 (H) 0.00 - 0.90 %    Nucleated RBC 0.40 /100 WBC    Neutrophils (Absolute) 12.68 (H) 2.00 - 7.15 K/uL    Lymphs (Absolute) 1.25 1.00 - 4.80 K/uL    Monos (Absolute) 1.93 (H) 0.00 - 0.85 K/uL    Eos (Absolute) 0.12 0.00 - 0.51 K/uL    Baso (Absolute) 0.03 0.00 - 0.12 K/uL    Immature Granulocytes (abs) 0.66 (H) 0.00 - 0.11 K/uL    NRBC (Absolute) 0.06 K/uL   ESTIMATED GFR    Collection Time: 06/24/21  3:50 AM   Result Value Ref Range    GFR If African American >60 >60 mL/min/1.73 m 2    GFR If Non African American >60 >60 mL/min/1.73 m 2   RETICULOCYTES COUNT    Collection Time: 06/24/21  3:50 AM   Result Value Ref Range    Reticulocyte Count 1.8 0.8 - 2.1 %    Retic, Absolute 0.04 0.04 - 0.06 M/uL    Imm. Reticulocyte Fraction 19.1 (H) 9.3 - 17.4 %    Retic Hgb Equivalent 18.1 (L) 29.0 - 35.0 pg/cell   POCT glucose device results    Collection Time: 06/24/21 11:15 AM   Result Value Ref Range    Glucose - Accu-Ck 119 (H) 65 - 99 mg/dL       Fluids    Intake/Output Summary (Last 24 hours) at 6/24/2021 1308  Last data filed at 6/24/2021 1200  Gross per 24 hour   Intake 4369 ml   Output 3550 ml   Net 819 ml       Core Measures & Quality Metrics  Labs reviewed, Medications reviewed, Radiology images reviewed and EKG reviewed  Harrington catheter: Critically Ill - Requiring Accurate Measurement of Urinary Output      DVT Prophylaxis: Contraindicated - High bleeding risk  DVT prophylaxis - mechanical: SCDs  Ulcer prophylaxis: Yes  Antibiotics: Treating active infection/contamination beyond 24 hours perioperative coverage      DEBBIE Score  ETOH Screening    Assessment/Plan  * Mesenteric ischemia (HCC)  Assessment & Plan  Acute mid small bowel mesenteric ischemia secondary to unknown  etiology.  6/18 Emergent laparotomy with small bowel resection and damage control closure.  6/19 Expedited second look laparotomy for persistent lactic acidosis.  Biliary limb of [place]-en-Y gastric bypass with significant distention and ischemia.  Gastrostomy, additional proximal and distal small bowel resections, and damage control closure.  6/21 Revision of G-tube and RYGB, fascial closure  High risk for post-op intra-abdominal abscess and/or anastomotic failure  [facility].    Respiratory failure following trauma and surgery ([facility])  Assessment & Plan  Mechanical ventilation following emergent abdominal surgery.  Continue full mechanical ventilatory support. Ventilator bundle and Trauma weaning protocol.    Septic shock ([facility])  Assessment & Plan  Acute septic shock secondary to mesenteric ischemia.  Ongoing resuscitation with crystalloids, vasopressor support, broad-spectrum empiric antibiotic therapy, and trending of endpoints of resuscitation.    6/24 Piperacillin/tazobactam day 6/7.    Volume overload  Assessment & Plan  6/23/2021 started gentle lasix diuresis  6/24 lasix    Anemia associated with acute blood loss- (present on admission)  Assessment & Plan  Progressive acute blood loss anemia associated with multiple operation.  Patient is a Rastafari who refuses blood products.  6/23/2021 patient accepts epogen. Iron studies and epogen if appropriate per pharmacy.   6/24- got epo (low dose) and starting iron; checking B12    Protein calorie malnutrition (HCC)  Assessment & Plan  Gut in discontinuity, will eventually have multiple high risk anastomoses. Unable to tolerate any enteral nutrition.   6/21TPN started  6/24- plan to have coretrak placed and start nasoenteric feeding and stop feeding remnant stomach.  Remnant should be for decompression.     The patient remains critically ill with acute respiratory failure and acute blood loss anemia.  The patient was seen and examined on rounds  and discussed with the multidisciplinary critical care team and consulting physicians. Critically evaluated laboratory tests, culture data, medications, imaging, and other diagnostic tests.    The patient has impairment of one or more vital organ systems and a high probability of imminent or life-threatening deterioration in condition. Provided high complexity decision making to assess, manipulate, and support vital system functions to treat vital organ system failure and/or to prevent further life-threatening deterioration of the patient's condition. Requires continued ICU and hospital admission.    Critical care interventions include: integration of multiple data points and associated complex medical decision making, ventilator management, evaluation and direction of antimicrobial therapy for life threatening infection and management of critical electrolyte abnormalities.        Discussed patient condition with Family, RN, RT, Therapies, Pharmacy, Dietary and .  CRITICAL CARE TIME EXCLUDING PROCEDURES: 40    minutes

## 2021-06-24 NOTE — CARE PLAN
Problem: Ventilation  Goal: Ability to achieve and maintain unassisted ventilation or tolerate decreased levels of ventilator support  Description: Document on Vent flowsheet    1.  Support and monitor invasive and noninvasive mechanical ventilation  2.  Monitor ventilator weaning response  3.  Perform ventilator associated pneumonia prevention interventions  4.  Manage ventilation therapy by monitoring diagnostic test results  Outcome: Progressing      Ventilator Daily Summary    Vent Day #6    Ventilator settings changed this shift: asv 120 from 100    Weaning trials: sbt, weak parameters    Respiratory Procedures: none    Plan: Continue current ventilator settings and wean mechanical ventilation as tolerated per physician orders.

## 2021-06-24 NOTE — CARE PLAN
Problem: Respiratory  Goal: Patient will achieve/maintain optimum respiratory ventilation and gas exchange  Outcome: Progressing     Problem: Fluid Volume  Goal: Fluid volume balance will be maintained  Outcome: Progressing     Problem: Hemodynamics  Goal: Patient's hemodynamics, fluid balance and neurologic status will be stable or improve  Outcome: Progressing     Problem: Mechanical Ventilation  Goal: Safe management of artificial airway and ventilation  Outcome: Progressing     Problem: Pain - Standard  Goal: Alleviation of pain or a reduction in pain to the patient’s comfort goal  Outcome: Progressing   The patient is Watcher - Medium risk of patient condition declining or worsening    Shift Goals  Clinical Goals: pain control  Patient Goals: NA  Family Goals: wean ventilator    Progress made toward(s) clinical / shift goals:  YES    Patient is not progressing towards the following goals:

## 2021-06-24 NOTE — CARE PLAN
The patient is Watcher - Medium risk of patient condition declining or worsening    Shift Goals  Clinical Goals: control pain, hemodynamic stability    Progress made toward(s) clinical / shift goals:    Patient medicated per MAR, appropriate PRN's available. Blood pressure monitored as appropriate, appropriate vasopressors on MAR, I's and O's documented.     Problem: Pain - Standard  Goal: Alleviation of pain or a reduction in pain to the patient’s comfort goal  Outcome: Progressing     Problem: Hemodynamics  Goal: Patient's hemodynamics, fluid balance and neurologic status will be stable or improve  Outcome: Progressing    Patient is not progressing towards the following goals: n/a

## 2021-06-24 NOTE — PROGRESS NOTES
Pharmacy TPN Day # 4       6/24/2021    Dosing Weight   Scale weight 100.5 kg, ideal body weight 59 kg     TPN currently providing ~100% of goal  TPN goal: ~ 9370-6616 kcal/day based on 11-14 kcal/kg actual weight including 2 gm/kg/day (using ideal body weight) of Protein  TPN indication: Bowel resection and long term lack of enteral access                                                                   Pertinent PMH: History of gastric bypass now with ischemic bowel requiring resection and decompression as well as ongoing resuscitation.    No data recorded.  .  Recent Labs     06/22/21  0534 06/23/21  0502 06/24/21  0350   SODIUM 139 142 143   POTASSIUM 3.5* 3.7 3.3*   CHLORIDE 110 111 108   CO2 22 26 28   BUN 28* 25* 25*   CREATININE 1.12 0.85 0.84   GLUCOSE 117* 117* 122*   CALCIUM 6.7* 7.2* 7.1*   ASTSGOT 142* 228* 200*   ALTSGPT 105* 130* 132*   ALBUMIN 1.4* 1.3* 1.4*   TBILIRUBIN 0.2 0.9 1.4   PHOSPHORUS 2.6 1.6* 1.9*   MAGNESIUM 2.1 2.2 2.0     Accu-Checks  Recent Labs     06/23/21  1707 06/24/21  0054 06/24/21  1115   POCGLUCOSE 130* 126* 119*       Vitals:    06/24/21 0900 06/24/21 1000 06/24/21 1100 06/24/21 1200   BP: 114/57 129/56 120/57 109/66   Weight:       Height:           Intake/Output Summary (Last 24 hours) at 6/24/2021 1337  Last data filed at 6/24/2021 1200  Gross per 24 hour   Intake 4369 ml   Output 3550 ml   Net 819 ml       Orders Placed This Encounter   Procedures   • Diet NPO     Standing Status:   Standing     Number of Occurrences:   1     Order Specific Question:   Restrict to:     Answer:   Strict [1]         TPN for past 72 hours (Show up to 3 orders; newest on the left. Changes between the two most recent orders are indicated.)     Start date and time   06/24/2021 2000 06/23/2021 2000 06/22/2021 2000      TPN Central Line Formulation [[id number]] TPN Central Line Formulation [[id number]] TPN Central Line Formulation [[id number]]    Order Status  Active Discontinued Completed    Last  Admin   Rate Verify at 06/24/2021 0700 by Jaron Ayala R.N. New Bag at 06/22/2021 2008 by John Vaz R.N.       Base    Clinisol 15%  118 g 118 g 118 g    dextrose 70%  212 g 212 g 212 g    fat emulsions 20%  -- 5 g 5 g       Additives    potassium phosphate  30 mmol 21 mmol 15 mmol    potassium chloride  40 mEq 40 mEq 40 mEq    sodium acetate  150 mEq 150 mEq 150 mEq    sodium chloride  150 mEq 150 mEq 150 mEq    magnesium sulfate  16 mEq 16 mEq 16 mEq    M.T.E.-4 Adult  1 mL 1 mL 1 mL    M.V.I. Adult  10 mL 10 mL 10 mL    famotidine  40 mg 40 mg 40 mg    thiamine  100 mg -- --    folic acid  1 mg -- --       QS Base    sterile water  739.79 mL 718.99 mL 720.99 mL       Energy Contribution    Proteins  -- -- --    Dextrose  -- -- --    Lipids  -- -- --    Total  -- -- --       Electrolyte Ion Calculated Amount    Sodium  300 mEq 300 mEq 300 mEq    Potassium  84 mEq 70.8 mEq 62 mEq    Calcium  -- -- --    Magnesium  16 mEq 16 mEq 16 mEq    Aluminum  -- -- --    Phosphate  30 mmol 21 mmol 15 mmol    Chloride  190 mEq 190 mEq 190 mEq    Acetate  249.91 mEq 249.91 mEq 249.91 mEq       Other    Total Protein  118 g 118 g 118 g    Total Protein/kg  0.99 g/kg 1 g/kg 1.01 g/kg    Total Amino Acid  -- -- --    Total Amino Acid/kg  -- -- --    Glucose Infusion Rate  1.24 mg/kg/min 1.25 mg/kg/min 1.26 mg/kg/min    Osmolarity (Estimated)  -- -- --    Volume  1,992 mL 1,992 mL 1,992 mL    Rate  83 mL/hr 83 mL/hr 83 mL/hr    Dosing Weight  119 kg 118 kg 117 kg    Infusion Site  [facility]            This formula provides:  % kcal as lipids = 0 from TPN (~21% from Propofol)  Grams protein/kg = 2 (IBW)  Non-protein calories = 721 from TPN (1043 kcals including propofol)  Kcals/kg = 11.9 from TPN (~15 including propofol kcals)  Total daily calories = 1193 from TPN (~1515 including propofol)    Comments:  1) Per Surgery note, G-tube to continue down drain and OK for NGT for TF, will CTM  2) Pt's propofol need  increased from 10 mcg/kg/min to 20 mcg/kg/min. Increase in propofol rate is providing the pt with ~322 kcals/24 hrs. Lipids will be removed from the TPN since the lipids from propofol are providing ~21% of kcals. See above.  3) Phos level low at 1.9, K+ 3.3. KPhos 30 mmol ivpb given outside of TPN today. Will increase KPhos in TPN from 21 mmol to 30 mmol. K+ increased from 70 meq (KPhos + KCL) to 84 meq.   4) Pt may have some refeeding going on as the low phos and K+ levels suggest. Will add thiamine and folic acid to the TPN formulation with plan for 5 days duration.  5) Pepcid provided in TPN  6) Pt also receiving LR at 67 ml/hr. TPN + LR = 150 ml/hr total IVF.      Leandro Corado, PharmD    ADDENDUM:  D/W Bucky Lorenzana, Pharmacy Critical Care Clinical Specialist, TPN to be concentrated in order to diurese the pt. TPN rate decreased to 60 ml/hr and volume over 24 hrs decreased to 1440 ml.

## 2021-06-24 NOTE — PROGRESS NOTES
"    DATE: 6/24/2021    POD 6, 5, 3 Ex lap, bowel resection for intestinal ischemia    Interval Events:  Hemoglobin stable  Remains weak/frail, will likely need trach  Continue TPN  Continue G-tube to down drain  OK for nasoenteric tube feeds as tolerated  No activity restrictions    PHYSICAL EXAMINATION:  Vital Signs: /90   Pulse (!) 104   Temp 37.4 °C (99.4 °F) (Temporal)   Resp 16   Ht 1.676 m (5' 6\")   Wt 119 kg (261 lb 7.5 oz)   SpO2 93%     Awakes to voice on the vent  OR dressing in place  G-tube with green turbid drainage    ASSESSMENT AND PLAN:     -Plan as above    Appreciate ICU and consulting physician care.       ____________________________________     Clarence Collins M.D.    "

## 2021-06-24 NOTE — PALLIATIVE CARE
PALLIATIVE CARE FOLLOW-UP:    Pt distressed asking family about her hospital course. She does not recall coming to the hospital, being in pain and making decision to have surgery. Explained that hers was a medical emergency and her family supported her decision for surgery and full code staus as pt would likely have . Reviewed pt's hospital course and pathways going forward. Encouraged pt to take one day at a time. Pt a bit teary and closed her eyes. She did seem pleased that [employer] leader to visit today.    Discussed with/Updated:    ICU RN Nena      Plan:  Palliative Care to continue to follow, provide support, and help facilitate decision-making as clinical picture evolves.    Thank you for allowing Palliative Care to support this pt and her family.  Contact [phone] for additional assistance, patient status change, questions or concerns.

## 2021-06-25 NOTE — PROGRESS NOTES
Pharmacy TPN Day # 5     6/25/2021    Dosing Weight   100 kg (Admit Weight) for kcal goals, 59kg (IBW) for protein goals  TPN currently providing 100% of goal  TPN goal: 8786-2638 kcal/day including 2 gm/kg/day Protein (hypocaloric and high protein given morbid obesity)    TPN indication: ischemic bowel s/p resection, suspected long-term lack of enteral access    Pertinent PMH: Presented to OSH with severe abdominal pain, transferred to [facility] for concern of ischemic bowel. Of note, patient has a history of gastric bypass (~2005). Patient underwent ex lap w/ small bowel resection and damage control closure on 6/18, additional proximal and distal small bowel resections with damage control closure on 6/19, and revision of G-tube and RYGB with fascial closure on 6/21. TPN was initiated on 6/21 given likely prolonged NPO status.   .  Recent Labs     06/23/21  0502 06/24/21  0350 06/25/21  0520   SODIUM 142 143 143   POTASSIUM 3.7 3.3* 3.3*   CHLORIDE 111 108 110   CO2 26 28 26   BUN 25* 25* 30*   CREATININE 0.85 0.84 0.66   GLUCOSE 117* 122* 126*   CALCIUM 7.2* 7.1* 6.9*   ASTSGOT 228* 200* 135*   ALTSGPT 130* 132* 112*   ALBUMIN 1.3* 1.4* 1.4*   TBILIRUBIN 0.9 1.4 1.2   PHOSPHORUS 1.6* 1.9* 2.4*   MAGNESIUM 2.2 2.0 1.9     Accu-Checks  Recent Labs     06/24/21  1727 06/24/21  2339 06/25/21  1151   POCGLUCOSE 119* 105* 111*       Vitals:    06/25/21 0700 06/25/21 0800 06/25/21 1000 06/25/21 1200   BP: (!) 95/50 102/56 101/65 113/56   Weight:       Height:           Intake/Output Summary (Last 24 hours) at 6/25/2021 1314  Last data filed at 6/25/2021 1200  Gross per 24 hour   Intake 3987.98 ml   Output 2025 ml   Net 1962.98 ml       Orders Placed This Encounter   Procedures   • Diet NPO     Standing Status:   Standing     Number of Occurrences:   1     Order Specific Question:   Restrict to:     Answer:   Strict [1]         TPN for past 72 hours (Show up to 3 orders; newest on the left. Changes between the two most  recent orders are indicated.)     Start date and time   06/24/2021 2000 06/23/2021 2000 06/22/2021 2000      TPN Central Line Formulation [[id number]] TPN Central Line Formulation [[id number]] TPN Central Line Formulation [[id number]]    Order Status  Active Discontinued Completed    Last Admin  Rate Verify at 06/25/2021 0700 by Jaron Ayala R.N. Rate Verify at 06/24/2021 0700 by Jaron Ayala R.N. New Bag at 06/22/2021 2008 by John Vaz R.N.       Base    Clinisol 15%  118 g 118 g 118 g    dextrose 70%  212 g 212 g 212 g    fat emulsions 20%  -- 5 g 5 g       Additives    potassium phosphate  30 mmol 21 mmol 15 mmol    potassium chloride  40 mEq 40 mEq 40 mEq    sodium acetate  100 mEq 150 mEq 150 mEq    sodium chloride  100 mEq 150 mEq 150 mEq    magnesium sulfate  16 mEq 16 mEq 16 mEq    M.T.E.-4 Adult  1 mL 1 mL 1 mL    M.V.I. Adult  10 mL 10 mL 10 mL    famotidine  40 mg 40 mg 40 mg    thiamine  100 mg -- --    folic acid  1 mg -- --       QS Base    sterile water  225.29 mL 718.99 mL 720.99 mL       Energy Contribution    Proteins  -- -- --    Dextrose  -- -- --    Lipids  -- -- --    Total  -- -- --       Electrolyte Ion Calculated Amount    Sodium  200 mEq 300 mEq 300 mEq    Potassium  84 mEq 70.8 mEq 62 mEq    Calcium  -- -- --    Magnesium  16 mEq 16 mEq 16 mEq    Aluminum  -- -- --    Phosphate  30 mmol 21 mmol 15 mmol    Chloride  140 mEq 190 mEq 190 mEq    Acetate  199.91 mEq 249.91 mEq 249.91 mEq       Other    Total Protein  118 g 118 g 118 g    Total Protein/kg  0.99 g/kg 1 g/kg 1.01 g/kg    Total Amino Acid  -- -- --    Total Amino Acid/kg  -- -- --    Glucose Infusion Rate  1.24 mg/kg/min 1.25 mg/kg/min 1.26 mg/kg/min    Osmolarity (Estimated)  -- -- --    Volume  1,440 mL 1,992 mL 1,992 mL    Rate  60 mL/hr 83 mL/hr 83 mL/hr    Dosing Weight  119 kg 118 kg 117 kg    Infusion Site  [facility]        Additional Nutritional Sources:  · Tube feeds @ 30mL/hr (provides 720  kcal/day, 67g of protein/day)  · Propofol @ 0 mcg/kg/min (previously 10-20 kcg/kg/min, providing ~160-320 kcal/day of lipids)    This formula provides:  % kcal as lipids = 0   Grams protein/kg = 2  Non-protein calories = 721  Kcals/kg = 11.9  Total daily calories = 1193    Comments:  1. Possible extubation today, propofol paused this morning. Tube feeds advanced to 30mL/hr from 10mL/hr (goal is 60mL/her, per dietary recs). Empiric antibiotics continue.  2. Macronutrients: current TPN formulation providing 100% of caloric needs (note hypocaloric goals, given BMI). Lipids removed from TPN formulation yesterday given increased propofol, which has since been paused for possible extubation - will hold off on adding lipids back to TPN at this time. Tube feeds advanced to 50% of goal - will follow tolerability for possible weaning of TPN over the next few days. No changes today.  3. Potassium, phosphorous, and magnesium below goal. Potassium phosphorous increased in TPN yesterday (patient received about 1/3 of TPN at time of AM labs). Potassium phosphorous replaced outside of TPN this morning, will also replace magnesium and continue to trend. Current TPN formulated at NS equivalence. Will check ionized calcium tomorrow. No changed to TPN. Folic acid and thiamine added to TPN yesterday for possible re-feeding syndrome; day 2 of 5. Famotidine also remains within TPN.   4. Glycemic Control: blood glucose remains within goal (BG < 180 mg/dL) with no hypoglycemic events (BG < 70 mg/dL) and no insulin required (ISS ordered). No changes to TPN, will CTM with increase in tube feeds.  5. Fluid status: Patient net positive 23L since admission and 2L positive in the past 24 hours (not accounting for insensible losses with open abdomen). TPN at 60 mL/hr, LR currently running at 67 mL/hr. Scr improving. No diuretics currently ordered, will discuss with MD fluid status and possible discontinuation of maintenance fluid.       Tasia  Abdoul, PharmD

## 2021-06-25 NOTE — CARE PLAN
Problem: Pain - Standard  Goal: Alleviation of pain or a reduction in pain to the patient’s comfort goal  Outcome: Not Met     Problem: Hemodynamics  Goal: Patient's hemodynamics, fluid balance and neurologic status will be stable or improve  Outcome: Not Met     Problem: Fluid Volume  Goal: Fluid volume balance will be maintained  Outcome: Not Met     Problem: Urinary - Renal Perfusion  Goal: Ability to achieve and maintain adequate renal perfusion and functioning will improve  Outcome: Not Met     Problem: Respiratory  Goal: Patient will achieve/maintain optimum respiratory ventilation and gas exchange  Outcome: Not Met     Problem: Mechanical Ventilation  Goal: Safe management of artificial airway and ventilation  Outcome: Not Met  Goal: Successful weaning off mechanical ventilator, spontaneously maintains adequate gas exchange  Outcome: Not Met  Goal: Patient will be able to express needs and understand communication  Outcome: Not Met     Problem: Physical Regulation  Goal: Diagnostic test results will improve  Outcome: Not Met  Goal: Signs and symptoms of infection will decrease  Outcome: Not Met     Problem: Knowledge Deficit - Standard  Goal: Patient and family/care givers will demonstrate understanding of plan of care, disease process/condition, diagnostic tests and medications  Outcome: Not Met     Problem: Skin Integrity  Goal: Skin integrity is maintained or improved  Outcome: Not Met     Problem: Fall Risk  Goal: Patient will remain free from falls  Outcome: Not Met     Problem: Safety - Medical Restraint  Goal: Remains free of injury from restraints (Restraint for Interference with Medical Device)  Outcome: Not Met  Goal: Free from restraint(s) (Restraint for Interference with Medical Device)  Outcome: Not Met   The patient is Watcher - Medium risk of patient condition declining or worsening    Shift Goals  Clinical Goals: pain control   Patient Goals: NA  Family Goals: wean ventilator    Progress  made toward(s) clinical / shift goals:  yes      Patient is not progressing towards the following goals:      Problem: Pain - Standard  Goal: Alleviation of pain or a reduction in pain to the patient’s comfort goal  Outcome: Not Met     Problem: Hemodynamics  Goal: Patient's hemodynamics, fluid balance and neurologic status will be stable or improve  Outcome: Not Met     Problem: Fluid Volume  Goal: Fluid volume balance will be maintained  Outcome: Not Met     Problem: Urinary - Renal Perfusion  Goal: Ability to achieve and maintain adequate renal perfusion and functioning will improve  Outcome: Not Met     Problem: Respiratory  Goal: Patient will achieve/maintain optimum respiratory ventilation and gas exchange  Outcome: Not Met     Problem: Mechanical Ventilation  Goal: Safe management of artificial airway and ventilation  Outcome: Not Met  Goal: Successful weaning off mechanical ventilator, spontaneously maintains adequate gas exchange  Outcome: Not Met  Goal: Patient will be able to express needs and understand communication  Outcome: Not Met     Problem: Physical Regulation  Goal: Diagnostic test results will improve  Outcome: Not Met  Goal: Signs and symptoms of infection will decrease  Outcome: Not Met     Problem: Knowledge Deficit - Standard  Goal: Patient and family/care givers will demonstrate understanding of plan of care, disease process/condition, diagnostic tests and medications  Outcome: Not Met     Problem: Skin Integrity  Goal: Skin integrity is maintained or improved  Outcome: Not Met     Problem: Fall Risk  Goal: Patient will remain free from falls  Outcome: Not Met     Problem: Safety - Medical Restraint  Goal: Remains free of injury from restraints (Restraint for Interference with Medical Device)  Outcome: Not Met  Goal: Free from restraint(s) (Restraint for Interference with Medical Device)  Outcome: Not Met

## 2021-06-25 NOTE — PROGRESS NOTES
"    DATE: 6/25/2021    Post Operative Day 4 Enteric reconstruction and delayed primary fascial closure    Interval Events:  Iron supplementation and erythropoietin administration.    PHYSICAL EXAMINATION:  Vital Signs: BP (!) 93/50   Pulse (!) 101   Temp 37.4 °C (99.4 °F) (Temporal)   Resp 19   Ht 1.676 m (5' 6\")   Wt 122 kg (269 lb 13.5 oz)   SpO2 96%     The abdomen is soft minimally tender.  Midline incision is clean and dry.  G tube to gravity.    ASSESSMENT AND PLAN:   Incremental progress.  Recommend continue efforts at low rate enteral feeding via alimentary limb. TPN.  Concur with iron supplementation and erythropoietin.  Consider tracheostomy.    Appreciate ICU and consulting physician care.       ____________________________________     Samuel Jackson M.D.    DD: 6/25/2021  7:13 AM      "

## 2021-06-25 NOTE — CARE PLAN
The patient is Watcher - Medium risk of patient condition declining or worsening    Shift Goals  Clinical Goals: pain control, wean ventilator  Patient Goals: NA  Family Goals: wean ventilator    Progress made toward(s) clinical / shift goals:    Patient medicated per MAR, appropriate PRN's available, able to communicate pain. Patient extubated per MD orders tolerating well at this time.  Problem: Pain - Standard  Goal: Alleviation of pain or a reduction in pain to the patient’s comfort goal  Outcome: Progressing     Problem: Respiratory  Goal: Patient will achieve/maintain optimum respiratory ventilation and gas exchange  Outcome: Progressing       Patient is not progressing towards the following goals: n/a

## 2021-06-25 NOTE — WOUND TEAM
Wound RN in to see patient regarding wound consult for sacrum. Patient about to get extubated at this time, inappropriate time to turn patient for assessment. Updated bedside RN on what dressing most appropriate, to apply barrier paste. Wound RN to round back to assess sacrum at later time or date.

## 2021-06-25 NOTE — PROGRESS NOTES
Trauma / Surgical Daily Progress Note    Date of Service  6/25/2021    Chief Complaint  65 y.o. female admitted 6/18/2021 with Ischemic Bowel POD 7, 6, 4 Ex lap, bowel resection for intestinal ischemia    Interval Events  Epo and Fe for anemia (no blood transfusion)  Attempt to extubate today, patient says she does not want a trach, will discuss more once extubated. Needs to mobilize  Diuresis again today.     Review of Systems  Review of Systems     Vital Signs for last 24 hours  Pulse:  [] 105  Resp:  [13-32] 32  BP: ()/(45-69) 113/56  SpO2:  [94 %-99 %] 98 %    Hemodynamic parameters for last 24 hours  CVP:  [5 MM HG-151 MM HG] 142 MM HG    Respiratory Data     Respiration: (!) 32, Pulse Oximetry: 98 %     Work Of Breathing / Effort: Within Normal Limits;Mild  RUL Breath Sounds: Diminished, RML Breath Sounds: Diminished, RLL Breath Sounds: Diminished, NICO Breath Sounds: Diminished, LLL Breath Sounds: Diminished    Physical Exam  Physical Exam  Constitutional:       Appearance: She is obese.   HENT:      Head: Normocephalic and atraumatic.      Nose:      Comments: Nasoenteric tube present     Mouth/Throat:      Mouth: Mucous membranes are moist.   Eyes:      Extraocular Movements: Extraocular movements intact.      Pupils: Pupils are equal, round, and reactive to light.   Cardiovascular:      Rate and Rhythm: Regular rhythm. Tachycardia present.      Pulses: Normal pulses.   Pulmonary:      Effort: Pulmonary effort is normal.      Comments: Endotracheal tube in the morning, removed later in the day  Abdominal:      Palpations: Abdomen is soft.      Comments: Appropriately tender, gastrostomy tube present   Musculoskeletal:         General: Swelling present. Normal range of motion.      Cervical back: Normal range of motion and neck supple.   Skin:     General: Skin is warm.      Coloration: Skin is pale.   Neurological:      General: No focal deficit present.      Mental Status: She is alert and  oriented to person, place, and time.   Psychiatric:         Mood and Affect: Mood normal.         Behavior: Behavior normal.         Laboratory  Recent Results (from the past 24 hour(s))   POCT glucose device results    Collection Time: 06/24/21  5:27 PM   Result Value Ref Range    Glucose - Accu-Ck 119 (H) 65 - 99 mg/dL   POCT glucose device results    Collection Time: 06/24/21 11:39 PM   Result Value Ref Range    Glucose - Accu-Ck 105 (H) 65 - 99 mg/dL   CBC WITH DIFFERENTIAL    Collection Time: 06/25/21  5:20 AM   Result Value Ref Range    WBC 19.8 (H) 4.8 - 10.8 K/uL    RBC 2.24 (L) 4.20 - 5.40 M/uL    Hemoglobin 6.5 (L) 12.0 - 16.0 g/dL    Hematocrit 20.9 (L) 37.0 - 47.0 %    MCV 93.3 81.4 - 97.8 fL    MCH 29.0 27.0 - 33.0 pg    MCHC 31.1 (L) 33.6 - 35.0 g/dL    RDW 49.9 35.9 - 50.0 fL    Platelet Count 241 164 - 446 K/uL    MPV 10.9 9.0 - 12.9 fL    Neutrophils-Polys 87.80 (H) 44.00 - 72.00 %    Lymphocytes 1.70 (L) 22.00 - 41.00 %    Monocytes 7.00 0.00 - 13.40 %    Eosinophils 0.90 0.00 - 6.90 %    Basophils 0.00 0.00 - 1.80 %    Nucleated RBC 0.70 /100 WBC    Neutrophils (Absolute) 17.38 (H) 2.00 - 7.15 K/uL    Lymphs (Absolute) 0.34 (L) 1.00 - 4.80 K/uL    Monos (Absolute) 1.39 (H) 0.00 - 0.85 K/uL    Eos (Absolute) 0.18 0.00 - 0.51 K/uL    Baso (Absolute) 0.00 0.00 - 0.12 K/uL    NRBC (Absolute) 0.13 K/uL    Anisocytosis 1+     Macrocytosis 1+    Comp Metabolic Panel    Collection Time: 06/25/21  5:20 AM   Result Value Ref Range    Sodium 143 135 - 145 mmol/L    Potassium 3.3 (L) 3.6 - 5.5 mmol/L    Chloride 110 96 - 112 mmol/L    Co2 26 20 - 33 mmol/L    Anion Gap 7.0 7.0 - 16.0    Glucose 126 (H) 65 - 99 mg/dL    Bun 30 (H) 8 - 22 mg/dL    Creatinine 0.66 0.50 - 1.40 mg/dL    Calcium 6.9 (LL) 8.5 - 10.5 mg/dL    AST(SGOT) 135 (H) 12 - 45 U/L    ALT(SGPT) 112 (H) 2 - 50 U/L    Alkaline Phosphatase 110 (H) 30 - 99 U/L    Total Bilirubin 1.2 0.1 - 1.5 mg/dL    Albumin 1.4 (L) 3.2 - 4.9 g/dL    Total  Protein 4.0 (L) 6.0 - 8.2 g/dL    Globulin see below 1.9 - 3.5 g/dL    A-G Ratio see below g/dL   MAGNESIUM    Collection Time: 06/25/21  5:20 AM   Result Value Ref Range    Magnesium 1.9 1.5 - 2.5 mg/dL   PHOSPHORUS    Collection Time: 06/25/21  5:20 AM   Result Value Ref Range    Phosphorus 2.4 (L) 2.5 - 4.5 mg/dL   DIFFERENTIAL MANUAL    Collection Time: 06/25/21  5:20 AM   Result Value Ref Range    Metamyelocytes 1.70 %    Myelocytes 0.90 %    Manual Diff Status PERFORMED    PERIPHERAL SMEAR REVIEW    Collection Time: 06/25/21  5:20 AM   Result Value Ref Range    Peripheral Smear Review see below    PLATELET ESTIMATE    Collection Time: 06/25/21  5:20 AM   Result Value Ref Range    Plt Estimation Normal    MORPHOLOGY    Collection Time: 06/25/21  5:20 AM   Result Value Ref Range    RBC Morphology Present     Polychromia 1+    ESTIMATED GFR    Collection Time: 06/25/21  5:20 AM   Result Value Ref Range    GFR If African American >60 >60 mL/min/1.73 m 2    GFR If Non African American >60 >60 mL/min/1.73 m 2   POCT glucose device results    Collection Time: 06/25/21 11:51 AM   Result Value Ref Range    Glucose - Accu-Ck 111 (H) 65 - 99 mg/dL       Fluids    Intake/Output Summary (Last 24 hours) at 6/25/2021 1359  Last data filed at 6/25/2021 1200  Gross per 24 hour   Intake 3987.98 ml   Output 2025 ml   Net 1962.98 ml       Core Measures & Quality Metrics  EKG reviewed, Radiology images reviewed, Labs reviewed and Medications reviewed  Harrington catheter: Critically Ill - Requiring Accurate Measurement of Urinary Output  Central line in place: Need for access    DVT Prophylaxis: Contraindicated - High bleeding risk  DVT prophylaxis - mechanical: SCDs  Ulcer prophylaxis: Yes  Antibiotics: Treating active infection/contamination beyond 24 hours perioperative coverage      DEBBIE Score  ETOH Screening    Assessment/Plan  * Mesenteric ischemia (HCC)  Assessment & Plan  Acute mid small bowel mesenteric ischemia secondary to  unknown etiology.  6/18 Emergent laparotomy with small bowel resection and damage control closure.  6/19 Expedited second look laparotomy for persistent lactic acidosis.  Biliary limb of [place]-en-Y gastric bypass with significant distention and ischemia.  Gastrostomy, additional proximal and distal small bowel resections, and damage control closure.  6/21 Revision of G-tube and RYGB, fascial closure  High risk for post-op intra-abdominal abscess and/or anastomotic failure  [facility].    Respiratory failure following trauma and surgery (HCC)  Assessment & Plan  Mechanical ventilation following emergent abdominal surgery.  Continue full mechanical ventilatory support. Ventilator bundle and Trauma weaning protocol.    6/25- extubate    Septic shock ([facility])  Assessment & Plan  Acute septic shock secondary to mesenteric ischemia.  Ongoing resuscitation with crystalloids, vasopressor support, broad-spectrum empiric antibiotic therapy, and trending of endpoints of resuscitation.    6/25 Piperacillin/tazobactam day 7/7.    Volume overload  Assessment & Plan  Diuresis with lasix 6/24 and 6/25    Anemia associated with acute blood loss- (present on admission)  Assessment & Plan  Progressive acute blood loss anemia associated with multiple operation.  Patient is a Sh[employer] who refuses blood products.  6/23/2021 patient accepts epogen. Iron studies and epogen if appropriate per pharmacy.   6/24- got epo (low dose) and starting iron; checking B12    Protein calorie malnutrition (HCC)  Assessment & Plan  Gut in discontinuity, will eventually have multiple high risk anastomoses. Unable to tolerate any enteral nutrition.   6/21TPN started  6/24- plan to have coretrak placed and start nasoenteric feeding and stop feeding remnant stomach.  Remnant should be for decompression.       Discussed patient condition with Family, RN, RT, Therapies, Pharmacy, Dietary,  and Patient.  CRITICAL CARE TIME  EXCLUDING PROCEDURES: 45    minutes

## 2021-06-26 PROBLEM — J95.821 RESPIRATORY FAILURE FOLLOWING TRAUMA AND SURGERY (HCC): Status: RESOLVED | Noted: 2021-01-01 | Resolved: 2021-01-01

## 2021-06-26 NOTE — CARE PLAN
The patient is Watcher - Medium risk of patient condition declining or worsening    Shift Goals  Clinical Goals: control pain, mobilize    Progress made toward(s) clinical / shift goals:    Patient medicated per MAR, appropriate PRN's available. Patient able to communicate pain needs. Working with PT/OT to mobilize patient safely.  Problem: Pain - Standard  Goal: Alleviation of pain or a reduction in pain to the patient’s comfort goal  Outcome: Progressing     Problem: Fall Risk  Goal: Patient will remain free from falls  Outcome: Progressing       Patient is not progressing towards the following goals: n/a

## 2021-06-26 NOTE — PROGRESS NOTES
Pharmacy TPN Day # 6     6/26/2021    Dosing Weight: 100 kg (admit weight) for kcal goals, 59kg (IBW) for protein goals  TPN currently providing 100% of goal  TPN goal: 3472-3903 kcal/day including 2 gm/kg/day Protein      TPN indication: ischemic bowel s/p resection, suspected long-term lack of enteral access     Pertinent PMH: Presented to OSH with severe abdominal pain, transferred to [facility] for concern of ischemic bowel. Of note, patient has a history of gastric bypass (~2005). Patient underwent ex lap w/ small bowel resection and damage control closure on 6/18, additional proximal and distal small bowel resections with damage control closure on 6/19, and revision of G-tube and RYGB with fascial closure on 6/21. TPN was initiated on 6/21 given likely prolonged NPO status.  .  Recent Labs     06/24/21  0350 06/25/21  0520 06/26/21  0410   SODIUM 143 143 147*   POTASSIUM 3.3* 3.3* 3.0*   CHLORIDE 108 110 111   CO2 28 26 30   BUN 25* 30* 28*   CREATININE 0.84 0.66 0.61   GLUCOSE 122* 126* 133*   CALCIUM 7.1* 6.9* 7.1*   ASTSGOT 200* 135* 102*   ALTSGPT 132* 112* 103*   ALBUMIN 1.4* 1.4* 1.9*   TBILIRUBIN 1.4 1.2 1.6*   PHOSPHORUS 1.9* 2.4* 2.4*   MAGNESIUM 2.0 1.9 2.1     Accu-Checks  Recent Labs     06/25/21  1728 06/26/21  0039 06/26/21  0530   POCGLUCOSE 107* 121* 118*       Vitals:    06/26/21 0400 06/26/21 0500 06/26/21 0600 06/26/21 0700   BP: 146/66 135/60 126/58 148/65   Weight:       Height:           Intake/Output Summary (Last 24 hours) at 6/26/2021 1043  Last data filed at 6/26/2021 0800  Gross per 24 hour   Intake 4043.8 ml   Output 3370 ml   Net 673.8 ml       Orders Placed This Encounter   Procedures   • Diet NPO     Standing Status:   Standing     Number of Occurrences:   1     Order Specific Question:   Restrict to:     Answer:   Strict [1]         TPN for past 72 hours (Show up to 3 orders; newest on the left. Changes between the two most recent orders are indicated.)     Start date and time    06/26/2021 2000 06/24/2021 2000 06/23/2021 2000      TPN Central Line Formulation [[id number]] TPN Central Line Formulation [[id number]] TPN Central Line Formulation [[id number]]    Order Status  Active Last Dose in Progress Discontinued    Last Admin   New Bag at 06/25/2021 2014 by Wilfredo Guerin R.N. Rate Verify at 06/24/2021 0700 by Jaron Ayala R.N.       Base    Clinisol 15%  118 g 118 g 118 g    dextrose 70%  212 g 212 g 212 g    fat emulsions 20%  -- -- 5 g       Additives    potassium phosphate  30 mmol 30 mmol 21 mmol    potassium chloride  60 mEq 40 mEq 40 mEq    sodium acetate  100 mEq 100 mEq 150 mEq    sodium chloride  100 mEq 100 mEq 150 mEq    magnesium sulfate  16 mEq 16 mEq 16 mEq    M.T.E.-4 Adult  1 mL 1 mL 1 mL    M.V.I. Adult  10 mL 10 mL 10 mL    famotidine  40 mg 40 mg 40 mg    thiamine  100 mg 100 mg --    folic acid  1 mg 1 mg --       QS Base    sterile water  215.29 mL 225.29 mL 718.99 mL       Energy Contribution    Proteins  -- -- --    Dextrose  -- -- --    Lipids  -- -- --    Total  -- -- --       Electrolyte Ion Calculated Amount    Sodium  200 mEq 200 mEq 300 mEq    Potassium  104 mEq 84 mEq 70.8 mEq    Calcium  -- -- --    Magnesium  16 mEq 16 mEq 16 mEq    Aluminum  -- -- --    Phosphate  30 mmol 30 mmol 21 mmol    Chloride  160 mEq 140 mEq 190 mEq    Acetate  199.91 mEq 199.91 mEq 249.91 mEq       Other    Total Protein  118 g 118 g 118 g    Total Protein/kg  0.97 g/kg 0.99 g/kg 1 g/kg    Total Amino Acid  -- -- --    Total Amino Acid/kg  -- -- --    Glucose Infusion Rate  1.21 mg/kg/min 1.21 mg/kg/min 1.25 mg/kg/min    Osmolarity (Estimated)  -- -- --    Volume  1,440 mL 1,440 mL 1,992 mL    Rate  60 mL/hr 60 mL/hr 83 mL/hr    Dosing Weight  122 kg 119 kg 118 kg    Infusion [facility]  [facility]          Additional Nutritional Sources:  · Tube feeds @ 30mL/hr (provides 720 kcal/day, 67g of protein/day)     This formula provides:  % kcal as lipids = 0   Grams protein/kg  = 2  Non-protein calories = 721  Kcals/kg = 11.9  Total daily calories = 1193     Comments:  1. Patient successfully extubated yesterday and diuresis initiated. Antibiotics re-started empirically for leukocytosis; plan to CT abdomen to further assess any intra-abdominal activity. TF advancement pending imaging.  2. Macronutrients: current TPN formulation providing 100% of caloric needs (note hypocaloric and high protein goals, given BMI). No lipids currently within TPN, given prior propofol use while intubated. Tube feeds currently at 30 mL/hr (50% of goal), possible increase in rate pending imaging. No changes to TPN today, will consider re-adding lipids to TPN tomorrow pending overall nutrition plan. Note - possible concern for short gut syndrome, unclear how much tube feeds are actually being absorbed.   3. Potassium, phosphorous, and ionized calcium remain below goal; all replaced outside of TPN. Will also increase potasium within TPN and trend phosphorous for another day or two. Maintenance fluid discontinued with the addition of diuretics. Current TPN formulation at 3/4 NS, will trend for another day prior to adjusting. Folic acid and thiamine remain in TPN; day 3 of 5. Famotidine also remains within TPN.   4. Glycemic Control: blood glucose remains within goal (BG < 180 mg/dL) with no hypoglycemic events (BG < 70 mg/dL) and no insulin required. No changes to TPN, will CTM.  5. Fluid status: Patient net positive 24L since admission and 500mL positive in the past 24 hours (not accounting for insensible losses with open abdomen). TPN at 60 mL/hr, maintenance fluid discontinued and diuresis initiated. Scr improving. About 200mL of sterile water remain in TPN, will consider concentrating in the next few days pending nutrition plans.         Tasia Schreiber, PharmD

## 2021-06-26 NOTE — PROGRESS NOTES
"    DATE: 6/26/2021    Post Operative Day 5 Enteric reconstruction and delayed fascial closure    Interval Events:    Extubated.  Clear liquid diet started.    PHYSICAL EXAMINATION:  Vital Signs: /56   Pulse (!) 102   Temp 37.4 °C (99.4 °F) (Temporal)   Resp (!) 24   Ht 1.676 m (5' 6\")   Wt 122 kg (269 lb 13.5 oz)   SpO2 94%     Abdomen is soft.  Midline incision is clean and dry.  Gastrostomy tube to gravity drainage.    ASSESSMENT AND PLAN:   Continued progress.  Concur with diuresis.    Appreciate ICU and consulting physician care.       ____________________________________     Samuel Jackson M.D.    DD: 6/26/2021  2:37 AM      "

## 2021-06-26 NOTE — PROGRESS NOTES
Trauma / Surgical Daily Progress Note    Date of Service  6/26/2021    Chief Complaint  65 y.o. female admitted 6/18/2021 with Ischemic Bowel POD 8, 7, 5 Ex lap, bowel resection for intestinal ischemia    Interval Events  Remains extubated.  Very adamant that she would like to eat.    Ongoing diuretics.    Worsening leukocytosis but no fever, cont zosyn for now, low threshold to scan for residual abscess vs anastomotic leak    Review of Systems  Review of Systems     Vital Signs for last 24 hours  Pulse:  [] 104  Resp:  [19-25] 23  BP: (106-162)/(55-91) 120/56  SpO2:  [94 %-100 %] 97 %    Hemodynamic parameters for last 24 hours  CVP:  [2 MM HG-146 MM HG] 142 MM HG    Respiratory Data     Respiration: (!) 23, Pulse Oximetry: 97 %     Work Of Breathing / Effort: Within Normal Limits;Mild  RUL Breath Sounds: Diminished, RML Breath Sounds: Diminished, RLL Breath Sounds: Diminished, NICO Breath Sounds: Diminished, LLL Breath Sounds: Diminished    Physical Exam  Physical Exam   Constitutional:       Appearance: She is obese.   HENT:      Head: Normocephalic and atraumatic.      Nose:      Comments: Nasoenteric tube present     Mouth/Throat:      Mouth: Mucous membranes are moist.   Eyes:      Extraocular Movements: Extraocular movements intact.      Pupils: Pupils are equal, round, and reactive to light.   Cardiovascular:      Rate and Rhythm: Regular rhythm. Tachycardia present.      Pulses: Normal pulses.   Pulmonary:      Effort: Pulmonary effort is normal.      Comments: Extubated, lungs dim at bases  Abdominal:      Palpations: Abdomen is soft.      Comments: Appropriately tender, gastrostomy tube present   Musculoskeletal:         General: Swelling present. Normal range of motion.      Cervical back: Normal range of motion and neck supple.   Skin:     General: Skin is warm.      Coloration: Skin is pale.   Neurological:      General: No focal deficit present.      Mental Status: She is alert and oriented to  person, place, and time.   Psychiatric:         Mood and Affect: Mood normal.         Behavior: Behavior normal.     Laboratory  Recent Results (from the past 24 hour(s))   POCT glucose device results    Collection Time: 06/25/21  5:28 PM   Result Value Ref Range    Glucose - Accu-Ck 107 (H) 65 - 99 mg/dL   POCT glucose device results    Collection Time: 06/26/21 12:39 AM   Result Value Ref Range    Glucose - Accu-Ck 121 (H) 65 - 99 mg/dL   CBC WITH DIFFERENTIAL    Collection Time: 06/26/21  4:10 AM   Result Value Ref Range    WBC 26.5 (H) 4.8 - 10.8 K/uL    RBC 2.40 (L) 4.20 - 5.40 M/uL    Hemoglobin 6.9 (L) 12.0 - 16.0 g/dL    Hematocrit 22.4 (L) 37.0 - 47.0 %    MCV 93.3 81.4 - 97.8 fL    MCH 28.8 27.0 - 33.0 pg    MCHC 30.8 (L) 33.6 - 35.0 g/dL    RDW 50.3 (H) 35.9 - 50.0 fL    Platelet Count 262 164 - 446 K/uL    MPV 10.9 9.0 - 12.9 fL    Neutrophils-Polys 94.80 (H) 44.00 - 72.00 %    Lymphocytes 0.90 (L) 22.00 - 41.00 %    Monocytes 3.40 0.00 - 13.40 %    Eosinophils 0.00 0.00 - 6.90 %    Basophils 0.00 0.00 - 1.80 %    Nucleated RBC 0.80 /100 WBC    Neutrophils (Absolute) 25.12 (H) 2.00 - 7.15 K/uL    Lymphs (Absolute) 0.24 (L) 1.00 - 4.80 K/uL    Monos (Absolute) 0.90 (H) 0.00 - 0.85 K/uL    Eos (Absolute) 0.00 0.00 - 0.51 K/uL    Baso (Absolute) 0.00 0.00 - 0.12 K/uL    NRBC (Absolute) 0.21 K/uL    Anisocytosis 1+     Macrocytosis 1+    Comp Metabolic Panel    Collection Time: 06/26/21  4:10 AM   Result Value Ref Range    Sodium 147 (H) 135 - 145 mmol/L    Potassium 3.0 (L) 3.6 - 5.5 mmol/L    Chloride 111 96 - 112 mmol/L    Co2 30 20 - 33 mmol/L    Anion Gap 6.0 (L) 7.0 - 16.0    Glucose 133 (H) 65 - 99 mg/dL    Bun 28 (H) 8 - 22 mg/dL    Creatinine 0.61 0.50 - 1.40 mg/dL    Calcium 7.1 (L) 8.5 - 10.5 mg/dL    AST(SGOT) 102 (H) 12 - 45 U/L    ALT(SGPT) 103 (H) 2 - 50 U/L    Alkaline Phosphatase 123 (H) 30 - 99 U/L    Total Bilirubin 1.6 (H) 0.1 - 1.5 mg/dL    Albumin 1.9 (L) 3.2 - 4.9 g/dL    Total Protein  4.5 (L) 6.0 - 8.2 g/dL    Globulin 2.6 1.9 - 3.5 g/dL    A-G Ratio 0.7 g/dL   MAGNESIUM    Collection Time: 06/26/21  4:10 AM   Result Value Ref Range    Magnesium 2.1 1.5 - 2.5 mg/dL   PHOSPHORUS    Collection Time: 06/26/21  4:10 AM   Result Value Ref Range    Phosphorus 2.4 (L) 2.5 - 4.5 mg/dL   RETICULOCYTES COUNT    Collection Time: 06/26/21  4:10 AM   Result Value Ref Range    Reticulocyte Count 2.7 (H) 0.8 - 2.1 %    Retic, Absolute 0.07 (H) 0.04 - 0.06 M/uL    Imm. Reticulocyte Fraction 50.6 (H) 9.3 - 17.4 %    Retic Hgb Equivalent 25.7 (L) 29.0 - 35.0 pg/cell   IONIZED CALCIUM    Collection Time: 06/26/21  4:10 AM   Result Value Ref Range    Ionized Calcium 1.0 (L) 1.1 - 1.3 mmol/L   ESTIMATED GFR    Collection Time: 06/26/21  4:10 AM   Result Value Ref Range    GFR If African American >60 >60 mL/min/1.73 m 2    GFR If Non African American >60 >60 mL/min/1.73 m 2   DIFFERENTIAL MANUAL    Collection Time: 06/26/21  4:10 AM   Result Value Ref Range    Myelocytes 0.90 %    Manual Diff Status PERFORMED    PERIPHERAL SMEAR REVIEW    Collection Time: 06/26/21  4:10 AM   Result Value Ref Range    Peripheral Smear Review see below    PLATELET ESTIMATE    Collection Time: 06/26/21  4:10 AM   Result Value Ref Range    Plt Estimation Normal    MORPHOLOGY    Collection Time: 06/26/21  4:10 AM   Result Value Ref Range    RBC Morphology Present     Polychromia 1+    POCT glucose device results    Collection Time: 06/26/21  5:30 AM   Result Value Ref Range    Glucose - Accu-Ck 118 (H) 65 - 99 mg/dL   POCT glucose device results    Collection Time: 06/26/21 12:48 PM   Result Value Ref Range    Glucose - Accu-Ck 129 (H) 65 - 99 mg/dL       Fluids    Intake/Output Summary (Last 24 hours) at 6/26/2021 1404  Last data filed at 6/26/2021 1253  Gross per 24 hour   Intake 4036.6 ml   Output 4095 ml   Net -58.4 ml       Core Measures & Quality Metrics  Radiology images reviewed, Labs reviewed, EKG reviewed and Medications  reviewed  Harrington catheter: Critically Ill - Requiring Accurate Measurement of Urinary Output      DVT Prophylaxis: Contraindicated - High bleeding risk  DVT prophylaxis - mechanical: SCDs  Ulcer prophylaxis: Yes  Antibiotics: Treating active infection/contamination beyond 24 hours perioperative coverage      DEBBIE Score  ETOH Screening    Assessment/Plan  * Mesenteric ischemia (HCC)  Assessment & Plan  Acute mid small bowel mesenteric ischemia secondary to unknown etiology.  6/18 Emergent laparotomy with small bowel resection and damage control closure.  6/19 Expedited second look laparotomy for persistent lactic acidosis.  Biliary limb of [place]-en-Y gastric bypass with significant distention and ischemia.  Gastrostomy, additional proximal and distal small bowel resections, and damage control closure.  6/21 Revision of G-tube and RYGB, fascial closure  High risk for post-op intra-abdominal abscess and/or anastomotic failure  6/26 start [facility].    Septic shock (HCC)  Assessment & Plan  Acute septic shock secondary to mesenteric ischemia.  Ongoing resuscitation with crystalloids, vasopressor support, broad-spectrum empiric antibiotic therapy, and trending of endpoints of resuscitation.    6/25 Piperacillin/tazobactam day 7/7.  6/26 Leukocytosis of 26.5, resume zosyn- low threshold to scan     Volume overload  Assessment & Plan  Diuresis with lasix 6/24 and 6/25 6/26- ongoing diuresis with lasix (patient is 20kg up from admission weight), judicious electrolyte replacement (K, phos, Calcium)      Anemia associated with acute blood loss- (present on admission)  Assessment & Plan  Progressive acute blood loss anemia associated with multiple operation.  Patient is a [employer]reg[employer] who refuses blood products.  6/23/2021 patient accepts epogen. Iron studies and epogen if appropriate per pharmacy.   6/24- got epo (low dose) and starting iron; checking B12    Protein calorie malnutrition  (HCC)  Assessment & Plan  Gut in discontinuity, will eventually have multiple high risk anastomoses. Unable to tolerate any enteral nutrition.   6/21TPN started  6/24- plan to have coretrak placed and start nasoenteric feeding and stop feeding remnant stomach.  Remnant should be for decompression.   6/26- impact tube feeds and TPN, will also start clears.  Likely to have poor absorption.       Discussed patient condition with Family, RN, RT, Pharmacy and Patient.  CRITICAL CARE TIME EXCLUDING PROCEDURES: 40    Minutes

## 2021-06-26 NOTE — CARE PLAN
Problem: Pain - Standard  Goal: Alleviation of pain or a reduction in pain to the patient’s comfort goal  Outcome: Progressing     Problem: Hemodynamics  Goal: Patient's hemodynamics, fluid balance and neurologic status will be stable or improve  Outcome: Progressing     Problem: Urinary - Renal Perfusion  Goal: Ability to achieve and maintain adequate renal perfusion and functioning will improve  Outcome: Progressing     Problem: Skin Integrity  Goal: Skin integrity is maintained or improved  Outcome: Progressing         The patient is Stable    Shift Goals  Clinical Goals: pain control   Patient Goals: NA  Family Goals: wean ventilator    Progress made toward(s) clinical / shift goals: Pain level has been maintained, patient rest, vital signs have remained stable patient is on 4 L oxygen via nasal cannula     Patient is not progressing towards the following goals:

## 2021-06-27 NOTE — WOUND TEAM
[facility]  Inpatient Services  Initial Wound and Skin Care Evaluation    Admission Date: 6/18/2021     Last order of IP CONSULT TO WOUND CARE was found on 6/24/2021 from Hospital Encounter on 6/18/2021     HPI, PMH, SH: Reviewed    Past Surgical History:   Procedure Laterality Date   • PB EXPLORATORY OF ABDOMEN N/A 6/21/2021    Procedure: LAPAROTOMY, EXPLORATORY WITH BOWEL ANASTOMOSIS;  Surgeon: Clarence Collins M.D.;  Location: SURGERY [facility];  Service: General   • GASTROSTOMY FEEDING N/A 6/21/2021    Procedure: INSERTION, GASTROSTOMY TUBE, FOR FEEDING;  Surgeon: Clarence Collins M.D.;  Location: SURGERY [facility];  Service: General   • PB EXPLORATORY OF ABDOMEN  6/19/2021    Procedure: LAPAROTOMY, EXPLORATORY;  Surgeon: Samuel Jackson M.D.;  Location: SURGERY [facility];  Service: General   • IRRIGATION & DEBRIDEMENT GENERAL  6/19/2021    Procedure: IRRIGATION AND DEBRIDEMENT, ABDOMEN;  Surgeon: Samuel Jackson M.D.;  Location: SURGERY [facility];  Service: General   • BOWEL RESECTION  6/19/2021    Procedure: SMALL BOWEL RESECTION;  Surgeon: Samuel Jackson M.D.;  Location: SURGERY [facility];  Service: General   • GASTROSTOMY FEEDING  6/19/2021    Procedure: INSERTION, GASTROSTOMY TUBE;  Surgeon: Samuel Jackson M.D.;  Location: SURGERY [facility];  Service: General   • PB LAP,DIAGNOSTIC ABDOMEN N/A 6/18/2021    Procedure: LAPAROSCOPY;  Surgeon: Thomas Mercado M.D.;  Location: [facility];  Service: General   • PB EXPLORATORY OF ABDOMEN N/A 6/18/2021    Procedure: LAPAROTOMY, EXPLORATORY. POSSIBLE BOWEL RESESCTION;  Surgeon: Thomas Mercado M.D.;  Location: [facility];  Service: General   • WRIST ORIF  4/3/2014    Performed by Thomas Marcum D.O. at [facility] ORS   • COLONOSCOPY     • FOOT SURGERY      right foot hammertoe and bunionectomy   • HYSTERECTOMY, TOTAL ABDOMINAL     • KNEE ARTHROSCOPY      right and left knee   • OTHER ABDOMINAL SURGERY       "gall bladder   • PB GASTRIC BYPASS,OBESE<150CM REMINGTON-EN-Y       Social History     Tobacco Use   • Smoking status: Never Smoker   Substance Use Topics   • Alcohol use: No     Chief Complaint   Patient presents with   • Abdominal Pain     Diagnosis: Ischemic bowel disease (HCC) [K55.9]  S/P exploratory laparotomy [Z98.890]  Status post exploratory laparotomy [Z98.890]    Unit where seen by Wound Team: S122/00     WOUND CONSULT/FOLLOW UP RELATED TO: sacrococcygeal area    WOUND HISTORY:  \"65 y.o. [employer]ism female with history of metastatic skin cancer who presented 6/18/2021 at [facility] with acute onset abdominal pain, found to have lactic acid 2.59, WBC 27.18k, CTAP with contrast noted ischemic colitis of small and large bowel with no free air and subsequently transferred to [facility] ER for higher level care.  Patient was seen by me at [facility] ER arrival.  History obtained from patient and patient's family members who were at bedside in ER.  Per family, patient was doing painting earlier, and had a sudden onset of severe abdominal pain and requested family members to call for EMS.  She endorsed a few episodes of nausea bilious vomiting.  Patient was previously DNR/DNI with comfort care status March 2019.  However, I confirmed with patient and patient's family member that they want patient to be fully resuscitated --including intubation, CPR, shocking her prior to her life if necessary.  In [facility] ER, she appears to be in acute distress with acute abdomen.  Lactic acid is now 10.4.  General surgery and ICU have been consulted.  Planned for diagnostic laparoscopy with possible open laparatomy / bowel resection by surgery.  Patient will likely be admitted to ICU postoperatively.  Admit to ICU for further evaluation and treatment\"    WOUND ASSESSMENT/LDA  Wound 06/24/21 Other (comment) Sacrum;Coccyx moisture associated skin damage (Active)      06/24/21 1200   Site Assessment Pink    Periwound " Assessment Intact    Margins Defined edges    Closure Open to air    Drainage Amount None    Treatments Cleansed    Wound Cleansing Foam Cleanser/Washcloth    Periwound Protectant Barrier Paste    Dressing Cleansing/Solutions Not Applicable    Dressing Options Mepilex    Dressing Changed Observed    Dressing Status Intact    Dressing Change/Treatment Frequency Every Shift, and As Needed    NEXT Dressing Change/Treatment Date 06/27/21    NEXT Weekly Photo (Inpatient Only) 07/01/21    Non-staged Wound Description Partial thickness 06/27/21 1200   Wound Length (cm) 15 cm 06/27/21 1200   Wound Width (cm) 0.7 cm 06/27/21 1200   Wound Depth (cm) 0.2 cm 06/27/21 1200   Wound Surface Area (cm^2) 10.5 cm^2 06/27/21 1200   Wound Volume (cm^3) 2.1 cm^3 06/27/21 1200   Shape linear    Wound Odor None    Exposed Structures None    Number of days: 3        Vascular:    OLIVA:   No results found.    Lab Values:    Lab Results   Component Value Date/Time    WBC 23.5 (H) 06/27/2021 04:30 AM    RBC 2.67 (L) 06/27/2021 04:30 AM    HEMOGLOBIN 7.9 (L) 06/27/2021 04:30 AM    HEMATOCRIT 24.5 (L) 06/27/2021 04:30 AM    CREACTPROT 16.39 (H) 06/24/2021 03:50 AM    HBA1C 5.4 06/19/2021 12:40 AM        Culture Results show:  No results found for this or any previous visit (from the past 720 hour(s)).    Pain Level/Medicated:  Pain with turning      INTERVENTIONS BY WOUND TEAM:  Chart and images reviewed. Discussed with bedside RN. This RN in to assess patient. Performed standard wound care which includes appropriate positioning, dressing removal and non-selective debridement.   Preparation for Dressing removal: peeled back mepilex from proximal sacrum, skin intact.  Cleansed with:  foaming soap and warm moist washcloths  Sharp debridement: NA  Ilana wound: Cleansed with foaming soap and warm moist washcloths, Prepped with barrier paste  Primary Dressing: Mepilex  Secondary (Outer) Dressing: NA    Interdisciplinary consultation: Patient, Bedside  RN    EVALUATION / RATIONALE FOR TREATMENT:  Most Recent Date:  6/27: Pt has moisture fissure to sacrococcygeal and proximal gluteal cleft from moisture r/t incontinence and body habitus.      Goals: Slow steady decrease in wound area and depth weekly.    WOUND TEAM PLAN OF CARE ([X] for frequency of wound follow up,):   Nursing to follow orders written for wound care. Contact wound team if area fails to progress, deteriorates or with any questions/concerns  Dressing changes by wound team:                   Follow up 3 times weekly:                NPWT change 3 times weekly:     Follow up 1-2 times weekly:      Follow up Bi-Monthly:                   Follow up as needed:   x  Other (explain):     NURSING PLAN OF CARE ORDERS (X):  Dressing changes: See Dressing Care orders:   Skin care: See Skin Care orders:   RN Prevention Protocol: x  Rectal tube care: See Rectal Tube Care orders:   Other orders:    RSKIN:   CURRENTLY IN PLACE (X), APPLIED THIS VISIT (A), ORDERED (O):   Q shift Farooq:  X  Q shift pressure point assessments:  X    Surface/Positioning   Pressure redistribution mattress            Low Airloss    x      Bariatric foam      Bariatric ARIADNE     Waffle cushion        Waffle Overlay          Reposition q 2 hours    x  TAPs Turning system   x  Z George Pillow x    Offloading/Redistribution  Sacral Mepilex (Silicone dressing)   x  Heel Mepilex (Silicone dressing)         Heel float boots (Prevalon boot)             Float Heels off Bed with Pillows    x       Respiratory  Silicone O2 tubing x        Gray Foam Ear protectors     Cannula fixation Device (Tender )          High flow offloading Clip    Elastic head band offloading device      Anchorfast                                                         Trach with Optifoam split foam             Containment/Moisture Prevention     Rectal tube or BMS  x  Purwick/Condom Cath        Harrington Catheter  x  Barrier wipes           Barrier paste       Antifungal tx       Interdry        Mobilization not assessed      Up to chair        Ambulate      PT/OT      Nutrition       Dietician        Diabetes Education      PO     TF   x  TPN     NPO   # days     Other        Anticipated discharge plans: TBD  LTACH:        SNF/Rehab:                  Home Health Care:           Outpatient Wound Center:            Self/Family Care:        Other:

## 2021-06-27 NOTE — PROGRESS NOTES
Trauma / Surgical Daily Progress Note    Date of Service  06/23/2021    Chief Complaint  65 y.o. female admitted 6/18/2021 with Ischemic Bowel    Interval Events  Return to OR for washout, resection of additional ischemic jejunum, small bowel anastomoses, G tube revision, and fascial closure.   Blood pressure too soft for diuresis yesterday. UOP improved overnight - self diuresis beginning.   Failed SBT parameters again this morning.   Seen by consulting services, recommendations noted and appreciated.    Review of Systems  Review of Systems       Vital Signs for last 24 hours  Pulse:  [] 103  Resp:  [19-25] 22  BP: (106-162)/(55-91) 108/73  SpO2:  [94 %-100 %] 98 %    Hemodynamic parameters for last 24 hours  CVP:  [-3 MM HG-146 MM HG] 6 MM HG    Respiratory Data     Respiration: (!) 22, Pulse Oximetry: 98 %     Work Of Breathing / Effort: Within Normal Limits;Mild  RUL Breath Sounds: Diminished, RML Breath Sounds: Diminished, RLL Breath Sounds: Diminished, NICO Breath Sounds: Diminished, LLL Breath Sounds: Diminished    Physical Exam  Physical Exam  Vitals and nursing note reviewed.   Constitutional:       Comments: Intubated, sedated   HENT:      Head: Normocephalic and atraumatic.      Right Ear: External ear normal.      Left Ear: External ear normal.      Nose: Nose normal.      Mouth/Throat:      Mouth: Mucous membranes are moist.      Pharynx: Oropharynx is clear.   Eyes:      Conjunctiva/sclera: Conjunctivae normal.      Pupils: Pupils are equal, round, and reactive to light.   Cardiovascular:      Rate and Rhythm: Normal rate and regular rhythm.      Pulses: Normal pulses.      Heart sounds: Normal heart sounds.   Pulmonary:      Comments: Coarse upper airway sounds  Abdominal:      Comments: Dressing in place, G tube with brownish drainage.    Genitourinary:     Comments: Harrington in place  Musculoskeletal:      Cervical back: Normal range of motion and neck supple.      Right lower leg: Edema present.       Left lower leg: Edema present.   Skin:     General: Skin is warm and dry.      Capillary Refill: Capillary refill takes less than 2 seconds.      Coloration: Skin is not jaundiced.   Neurological:      Comments: MADI   Psychiatric:      Comments: Unable to assess           Laboratory  Recent Results (from the past 24 hour(s))   POCT glucose device results    Collection Time: 06/26/21 12:39 AM   Result Value Ref Range    Glucose - Accu-Ck 121 (H) 65 - 99 mg/dL   CBC WITH DIFFERENTIAL    Collection Time: 06/26/21  4:10 AM   Result Value Ref Range    WBC 26.5 (H) 4.8 - 10.8 K/uL    RBC 2.40 (L) 4.20 - 5.40 M/uL    Hemoglobin 6.9 (L) 12.0 - 16.0 g/dL    Hematocrit 22.4 (L) 37.0 - 47.0 %    MCV 93.3 81.4 - 97.8 fL    MCH 28.8 27.0 - 33.0 pg    MCHC 30.8 (L) 33.6 - 35.0 g/dL    RDW 50.3 (H) 35.9 - 50.0 fL    Platelet Count 262 164 - 446 K/uL    MPV 10.9 9.0 - 12.9 fL    Neutrophils-Polys 94.80 (H) 44.00 - 72.00 %    Lymphocytes 0.90 (L) 22.00 - 41.00 %    Monocytes 3.40 0.00 - 13.40 %    Eosinophils 0.00 0.00 - 6.90 %    Basophils 0.00 0.00 - 1.80 %    Nucleated RBC 0.80 /100 WBC    Neutrophils (Absolute) 25.12 (H) 2.00 - 7.15 K/uL    Lymphs (Absolute) 0.24 (L) 1.00 - 4.80 K/uL    Monos (Absolute) 0.90 (H) 0.00 - 0.85 K/uL    Eos (Absolute) 0.00 0.00 - 0.51 K/uL    Baso (Absolute) 0.00 0.00 - 0.12 K/uL    NRBC (Absolute) 0.21 K/uL    Anisocytosis 1+     Macrocytosis 1+    Comp Metabolic Panel    Collection Time: 06/26/21  4:10 AM   Result Value Ref Range    Sodium 147 (H) 135 - 145 mmol/L    Potassium 3.0 (L) 3.6 - 5.5 mmol/L    Chloride 111 96 - 112 mmol/L    Co2 30 20 - 33 mmol/L    Anion Gap 6.0 (L) 7.0 - 16.0    Glucose 133 (H) 65 - 99 mg/dL    Bun 28 (H) 8 - 22 mg/dL    Creatinine 0.61 0.50 - 1.40 mg/dL    Calcium 7.1 (L) 8.5 - 10.5 mg/dL    AST(SGOT) 102 (H) 12 - 45 U/L    ALT(SGPT) 103 (H) 2 - 50 U/L    Alkaline Phosphatase 123 (H) 30 - 99 U/L    Total Bilirubin 1.6 (H) 0.1 - 1.5 mg/dL    Albumin 1.9 (L) 3.2 -  4.9 g/dL    Total Protein 4.5 (L) 6.0 - 8.2 g/dL    Globulin 2.6 1.9 - 3.5 g/dL    A-G Ratio 0.7 g/dL   MAGNESIUM    Collection Time: 06/26/21  4:10 AM   Result Value Ref Range    Magnesium 2.1 1.5 - 2.5 mg/dL   PHOSPHORUS    Collection Time: 06/26/21  4:10 AM   Result Value Ref Range    Phosphorus 2.4 (L) 2.5 - 4.5 mg/dL   RETICULOCYTES COUNT    Collection Time: 06/26/21  4:10 AM   Result Value Ref Range    Reticulocyte Count 2.7 (H) 0.8 - 2.1 %    Retic, Absolute 0.07 (H) 0.04 - 0.06 M/uL    Imm. Reticulocyte Fraction 50.6 (H) 9.3 - 17.4 %    Retic Hgb Equivalent 25.7 (L) 29.0 - 35.0 pg/cell   IONIZED CALCIUM    Collection Time: 06/26/21  4:10 AM   Result Value Ref Range    Ionized Calcium 1.0 (L) 1.1 - 1.3 mmol/L   ESTIMATED GFR    Collection Time: 06/26/21  4:10 AM   Result Value Ref Range    GFR If African American >60 >60 mL/min/1.73 m 2    GFR If Non African American >60 >60 mL/min/1.73 m 2   DIFFERENTIAL MANUAL    Collection Time: 06/26/21  4:10 AM   Result Value Ref Range    Myelocytes 0.90 %    Manual Diff Status PERFORMED    PERIPHERAL SMEAR REVIEW    Collection Time: 06/26/21  4:10 AM   Result Value Ref Range    Peripheral Smear Review see below    PLATELET ESTIMATE    Collection Time: 06/26/21  4:10 AM   Result Value Ref Range    Plt Estimation Normal    MORPHOLOGY    Collection Time: 06/26/21  4:10 AM   Result Value Ref Range    RBC Morphology Present     Polychromia 1+    POCT glucose device results    Collection Time: 06/26/21  5:30 AM   Result Value Ref Range    Glucose - Accu-Ck 118 (H) 65 - 99 mg/dL   POCT glucose device results    Collection Time: 06/26/21 12:48 PM   Result Value Ref Range    Glucose - Accu-Ck 129 (H) 65 - 99 mg/dL   POCT glucose device results    Collection Time: 06/26/21  5:35 PM   Result Value Ref Range    Glucose - Accu-Ck 135 (H) 65 - 99 mg/dL       Fluids    Intake/Output Summary (Last 24 hours) at 6/26/2021 1758  Last data filed at 6/26/2021 1600  Gross per 24 hour    Intake 3701.83 ml   Output 3945 ml   Net -243.17 ml       Core Measures & Quality Metrics  Labs reviewed, Medications reviewed and Radiology images reviewed  Harrington catheter: Critically Ill - Requiring Accurate Measurement of Urinary Output      DVT Prophylaxis: Contraindicated - High bleeding risk  DVT prophylaxis - mechanical: SCDs  Ulcer prophylaxis: Yes  Antibiotics: Treating active infection/contamination beyond 24 hours perioperative coverage      DEBBIE Score    ETOH Screening      Assessment/Plan  * Mesenteric ischemia (HCC)  Assessment & Plan  Acute mid small bowel mesenteric ischemia secondary to unknown etiology.  6/18 Emergent laparotomy with small bowel resection and damage control closure.  6/19 Expedited second look laparotomy for persistent lactic acidosis.  Biliary limb of [place]-en-Y gastric bypass with significant distention and ischemia.  Gastrostomy, additional proximal and distal small bowel resections, and damage control closure.  6/21 Revision of G-tube and RYGB, fascial closure  High risk for post-op intra-abdominal abscess and/or anastomotic failure  6/26 start clears  [facility].    Septic shock (HCC)  Assessment & Plan  Acute septic shock secondary to mesenteric ischemia.  Ongoing resuscitation with crystalloids, vasopressor support, broad-spectrum empiric antibiotic therapy, and trending of endpoints of resuscitation.    6/25 Piperacillin/tazobactam day 7/7.  6/26 Leukocytosis of 26.5, resume zosyn- low threshold to scan     Volume overload  Assessment & Plan  Diuresis with lasix 6/24 and 6/25 6/26- ongoing diuresis with lasix (patient is 20kg up from admission weight), judicious electrolyte replacement (K, phos, Calcium)      Anemia associated with acute blood loss- (present on admission)  Assessment & Plan  Progressive acute blood loss anemia associated with multiple operation.  Patient is a Mandaen who refuses blood products.  6/23/2021 patient accepts epogen.  Iron studies and epogen if appropriate per pharmacy.   6/24- got epo (low dose) and starting iron; checking B12    Protein calorie malnutrition (HCC)  Assessment & Plan  Gut in discontinuity, will eventually have multiple high risk anastomoses. Unable to tolerate any enteral nutrition.   6/21TPN started  6/24- plan to have coretrak placed and start nasoenteric feeding and stop feeding remnant stomach.  Remnant should be for decompression.   6/26- impact tube feeds and TPN, will also start clears.  Likely to have poor absorption.     Overall Plan:  TPN in place; begin trophic rate tube feeding per discussion with Dr. Collins/Renetta.   H/h is critically low - refuses blood products but will accept Epo and iron. Iron studies today and epo if appropriatel.   Wean ventilator - decrease PEEP and FiO2 and increase spontaneous breathing percentages. Start aggressive vent wean post op.   Begin gentle lasix diuresis today.       Discussed patient condition with RN, RT, Pharmacy and Dietary.  The patient is/remains critically ill with perforated bowel, septic shock, respiratory failure, critical anemia.    I provided the following critical care services: management of above, high risk medication management, ventilator management, resuscitation.    Critical care time spent exclusive of procedures: 41 minutes.    Freedom Whitlock MD  [phone]

## 2021-06-27 NOTE — CARE PLAN
Problem: Hemodynamics  Goal: Patient's hemodynamics, fluid balance and neurologic status will be stable or improve  Outcome: Progressing     Problem: Fluid Volume  Goal: Fluid volume balance will be maintained  Outcome: Progressing     Problem: Urinary - Renal Perfusion  Goal: Ability to achieve and maintain adequate renal perfusion and functioning will improve  Outcome: Progressing     Problem: Respiratory  Goal: Patient will achieve/maintain optimum respiratory ventilation and gas exchange  Outcome: Progressing     Problem: Physical Regulation  Goal: Diagnostic test results will improve  Outcome: Progressing  Goal: Signs and symptoms of infection will decrease  Outcome: Progressing   The patient is Watcher - Medium risk of patient condition declining or worsening    Shift Goals  Clinical Goals: pt will stand for 5 min at bedside  Patient Goals: sleep   Family Goals: n/a

## 2021-06-27 NOTE — PROGRESS NOTES
Pharmacy TPN Day # 7     6/27/2021    Dosing Weight: 100 kg (admit weight) for kcal goals, 59kg (IBW) for protein goals  TPN currently providing 100% of goal  TPN goal: 5758-6301 kcal/day including 2 gm/kg/day Protein      TPN indication: ischemic bowel s/p resection, suspected long-term lack of enteral access     Pertinent PMH: Presented to OSH with severe abdominal pain, transferred to [facility] for concern of ischemic bowel. Of note, patient has a history of gastric bypass (~2005). Patient underwent ex lap w/ small bowel resection and damage control closure on 6/18, additional proximal and distal small bowel resections with damage control closure on 6/19, and revision of G-tube and RYGB with fascial closure on 6/21. TPN was initiated on 6/21 given likely prolonged NPO status.    .  Recent Labs     06/25/21  0520 06/26/21  0410 06/27/21  0430   SODIUM 143 147* 149*   POTASSIUM 3.3* 3.0* 2.6*   CHLORIDE 110 111 112   CO2 26 30 30   BUN 30* 28* 33*   CREATININE 0.66 0.61 0.53   GLUCOSE 126* 133* 135*   CALCIUM 6.9* 7.1* 7.0*   ASTSGOT 135* 102* 74*   ALTSGPT 112* 103* 87*   ALBUMIN 1.4* 1.9* 1.6*   TBILIRUBIN 1.2 1.6* 1.5   PHOSPHORUS 2.4* 2.4* 2.1*   MAGNESIUM 1.9 2.1 1.7     Accu-Checks  Recent Labs     06/26/21  1735 06/27/21  0009 06/27/21  0556   POCGLUCOSE 135* 121* 129*       Vitals:    06/27/21 0000 06/27/21 0400 06/27/21 0800 06/27/21 1000   BP: 104/56  133/62 144/66   Weight:  124 kg (272 lb 7.8 oz)     Height:           Intake/Output Summary (Last 24 hours) at 6/27/2021 1055  Last data filed at 6/27/2021 0600  Gross per 24 hour   Intake 1320.42 ml   Output 4250 ml   Net -2929.58 ml       Orders Placed This Encounter   Procedures   • Diet Order Diet: Clear Liquid; Miscellaneous modifications: (optional): Bariatric     Standing Status:   Standing     Number of Occurrences:   1     Order Specific Question:   Diet:     Answer:   Clear Liquid [10]     Order Specific Question:   Miscellaneous modifications:  (optional)     Answer:   Bariatric [3]         TPN for past 72 hours (Show up to 3 orders; newest on the left. Changes between the two most recent orders are indicated.)     Start date and time   06/27/2021 2000 06/26/2021 2000      TPN Central Line Formulation [[id number]] TPN Central Line Formulation [[id number]]    Order Status  Active Discontinued    Last Admin   New Bag at 06/26/2021 1957 by Gallito Bunch R.N.       Base    Clinisol 15%  118 g 118 g    dextrose 70%  212 g 212 g       Additives    potassium phosphate  45 mmol 30 mmol    potassium chloride  60 mEq 60 mEq    sodium acetate  75 mEq 100 mEq    sodium chloride  75 mEq 100 mEq    magnesium sulfate  20 mEq 16 mEq    M.T.E.-4 Adult  1 mL 1 mL    M.V.I. Adult  10 mL 10 mL    famotidine  40 mg 40 mg    thiamine  100 mg 100 mg    folic acid  1 mg 1 mg       QS Base    sterile water  228.05 mL 215.29 mL       Energy Contribution    Proteins  -- --    Dextrose  -- --    Lipids  -- --    Total  -- --       Electrolyte Ion Calculated Amount    Sodium  150 mEq 200 mEq    Potassium  126 mEq 104 mEq    Calcium  -- --    Magnesium  20.02 mEq 16 mEq    Aluminum  -- --    Phosphate  45 mmol 30 mmol    Chloride  135 mEq 160 mEq    Acetate  174.91 mEq 199.91 mEq       Other    Total Protein  118 g 118 g    Total Protein/kg  0.95 g/kg 0.97 g/kg    Total Amino Acid  -- --    Total Amino Acid/kg  -- --    Glucose Infusion Rate  1.19 mg/kg/min 1.21 mg/kg/min    Osmolarity (Estimated)  -- --    Volume  1,440 mL 1,440 mL    Rate  60 mL/hr 60 mL/hr    Dosing Weight  124 kg 122 kg    [facility]            Additional Nutritional Sources:  · Tube feeds @ 30mL/hr (provides 720 kcal/day, 67g of protein/day)  · Clear liquid diet (started 6/26 PM, minimal intake charted thus far)     This formula provides:  % kcal as lipids = 0   Grams protein/kg = 2  Non-protein calories = 721  Kcals/kg = 11.9  Total daily calories = 1193     Comments:  1. Clear liquid diet  started yesterday evening, minimal intake per chart review.  Leukocytosis resolving after antibiotics restarted yesterday.   2. Macronutrients: current TPN formulation providing 100% of caloric needs (note hypocaloric and high protein goals, given BMI). Tube feeds currently at 30 mL/hr (50% of goal), and full liquid diet ordered (minimal intake per chart review). Some concern for absorption of tube feeds/liquid diet, per MD continue TPN at 100% for now. Note, no lipids currently within TPN given previous propofol use - will hold off adding lipids back into TPN until more clear nutrition plan in place.   3. Micronutrients: Potassium, phosphorous, and magnesium low with AM labs; all replaced outside and within TPN. Sodium continues to trend up. Maintenance fluid stopped yesterday and active diuresis continues. Current TPN formulated at 3/4 NS, will reduce to 1/2 NS and consider removing sodium and/or increasing free water should sodium not correct. Folic acid and thiamine remain in TPN; day 4 of 5. Famotidine also remains within TPN.   4. Glycemic Control: blood glucose remains within goal (BG < 180 mg/dL) with no hypoglycemic events (BG < 70 mg/dL) and no insulin required. No changes to TPN, will CTM.  5. Fluid status: Patient net positive 21L since admission and 3L negative in the past 24 hours (not accounting for insensible losses with open abdomen). TPN at 60 mL/hr, no current maintenance fluid and diuresis continues. Scr appears to be at baseline.         Tasia Schreiber, PharmD

## 2021-06-27 NOTE — CARE PLAN
Problem: Hyperinflation  Goal: Prevent or improve atelectasis  Description: 1. Instruct incentive spirometry usage  2.  Perform hyperinflation therapy as indicated  Outcome: Not Progressing       Respiratory Update    Treatment modality: pep  Frequency: qid    Pt tolerating current treatments well with no adverse reactions.

## 2021-06-27 NOTE — CARE PLAN
"The patient is Stable - Low risk of patient condition declining or worsening    Shift Goals  Clinical Goals: mobility  Patient Goals: sleep   Family Goals: n/a    Progress made toward(s) clinical / shift goals:  pain control, sleep     Patient is not progressing towards the following goals: mobility, patient refused stating \"they made me stand up earlier and I just cant now\"      Problem: Pain - Standard  Goal: Alleviation of pain or a reduction in pain to the patient’s comfort goal  Outcome: Met     Problem: Mechanical Ventilation  Goal: Safe management of artificial airway and ventilation  Outcome: Met  Goal: Successful weaning off mechanical ventilator, spontaneously maintains adequate gas exchange  Outcome: Met  Goal: Patient will be able to express needs and understand communication  Outcome: Met     Problem: Knowledge Deficit - Standard  Goal: Patient and family/care givers will demonstrate understanding of plan of care, disease process/condition, diagnostic tests and medications  Outcome: Progressing     Problem: Safety - Medical Restraint  Goal: Remains free of injury from restraints (Restraint for Interference with Medical Device)  Outcome: Met  Goal: Free from restraint(s) (Restraint for Interference with Medical Device)  Outcome: Met         "

## 2021-06-27 NOTE — PROGRESS NOTES
Trauma / Surgical Daily Progress Note    Date of Service  6/27/2021    Chief Complaint  65 y.o. female admitted 6/18/2021 with Ischemic Bowel    Interval Events  No pressors  TF at 30.  Advance soon.  Abdomen closed  TPN  Add lovenox/begin prophylactic anticoagulation      Review of Systems  Review of Systems     Vital Signs for last 24 hours  Pulse:  [] 105  Resp:  [16-27] 27  BP: ()/(50-73) 141/72  SpO2:  [94 %-99 %] 96 %    Hemodynamic parameters for last 24 hours  CVP:  [-3 MM HG-142 MM HG] 141 MM HG    Respiratory Data     Respiration: (!) 27, Pulse Oximetry: 96 %     Work Of Breathing / Effort: Within Normal Limits  RUL Breath Sounds: Coarse Crackles, RML Breath Sounds: Coarse Crackles, RLL Breath Sounds: Diminished, NICO Breath Sounds: Coarse Crackles, LLL Breath Sounds: Diminished    Physical Exam  Physical Exam  Eyes:      General: No scleral icterus.     Pupils: Pupils are equal, round, and reactive to light.   Cardiovascular:      Rate and Rhythm: Regular rhythm.   Pulmonary:      Effort: No respiratory distress.      Breath sounds: No wheezing.   Abdominal:      Palpations: Abdomen is soft.      Tenderness: There is no abdominal tenderness.   Musculoskeletal:         General: No swelling or tenderness.   Skin:     General: Skin is warm and dry.   Neurological:      Mental Status: She is alert and oriented to person, place, and time.         Laboratory  Recent Results (from the past 24 hour(s))   POCT glucose device results    Collection Time: 06/26/21 12:48 PM   Result Value Ref Range    Glucose - Accu-Ck 129 (H) 65 - 99 mg/dL   POCT glucose device results    Collection Time: 06/26/21  5:35 PM   Result Value Ref Range    Glucose - Accu-Ck 135 (H) 65 - 99 mg/dL   POCT glucose device results    Collection Time: 06/27/21 12:09 AM   Result Value Ref Range    Glucose - Accu-Ck 121 (H) 65 - 99 mg/dL   CBC WITH DIFFERENTIAL    Collection Time: 06/27/21  4:30 AM   Result Value Ref Range    WBC 23.5  (H) 4.8 - 10.8 K/uL    RBC 2.67 (L) 4.20 - 5.40 M/uL    Hemoglobin 7.9 (L) 12.0 - 16.0 g/dL    Hematocrit 24.5 (L) 37.0 - 47.0 %    MCV 91.8 81.4 - 97.8 fL    MCH 29.6 27.0 - 33.0 pg    MCHC 32.2 (L) 33.6 - 35.0 g/dL    RDW 48.1 35.9 - 50.0 fL    Platelet Count 249 164 - 446 K/uL    MPV 11.3 9.0 - 12.9 fL    Neutrophils-Polys 85.10 (H) 44.00 - 72.00 %    Lymphocytes 2.60 (L) 22.00 - 41.00 %    Monocytes 10.50 0.00 - 13.40 %    Eosinophils 0.00 0.00 - 6.90 %    Basophils 0.00 0.00 - 1.80 %    Nucleated RBC 0.90 /100 WBC    Neutrophils (Absolute) 20.00 (H) 2.00 - 7.15 K/uL    Lymphs (Absolute) 0.61 (L) 1.00 - 4.80 K/uL    Monos (Absolute) 2.47 (H) 0.00 - 0.85 K/uL    Eos (Absolute) 0.00 0.00 - 0.51 K/uL    Baso (Absolute) 0.00 0.00 - 0.12 K/uL    NRBC (Absolute) 0.22 K/uL    Hypochromia 1+     Anisocytosis 1+     Macrocytosis 1+    Comp Metabolic Panel    Collection Time: 06/27/21  4:30 AM   Result Value Ref Range    Sodium 149 (H) 135 - 145 mmol/L    Potassium 2.6 (LL) 3.6 - 5.5 mmol/L    Chloride 112 96 - 112 mmol/L    Co2 30 20 - 33 mmol/L    Anion Gap 7.0 7.0 - 16.0    Glucose 135 (H) 65 - 99 mg/dL    Bun 33 (H) 8 - 22 mg/dL    Creatinine 0.53 0.50 - 1.40 mg/dL    Calcium 7.0 (L) 8.5 - 10.5 mg/dL    AST(SGOT) 74 (H) 12 - 45 U/L    ALT(SGPT) 87 (H) 2 - 50 U/L    Alkaline Phosphatase 108 (H) 30 - 99 U/L    Total Bilirubin 1.5 0.1 - 1.5 mg/dL    Albumin 1.6 (L) 3.2 - 4.9 g/dL    Total Protein 4.3 (L) 6.0 - 8.2 g/dL    Globulin 2.7 1.9 - 3.5 g/dL    A-G Ratio 0.6 g/dL   MAGNESIUM    Collection Time: 06/27/21  4:30 AM   Result Value Ref Range    Magnesium 1.7 1.5 - 2.5 mg/dL   PHOSPHORUS    Collection Time: 06/27/21  4:30 AM   Result Value Ref Range    Phosphorus 2.1 (L) 2.5 - 4.5 mg/dL   ESTIMATED GFR    Collection Time: 06/27/21  4:30 AM   Result Value Ref Range    GFR If African American >60 >60 mL/min/1.73 m 2    GFR If Non African American >60 >60 mL/min/1.73 m 2   DIFFERENTIAL MANUAL    Collection Time: 06/27/21   4:30 AM   Result Value Ref Range    Myelocytes 1.80 %    Manual Diff Status PERFORMED    PERIPHERAL SMEAR REVIEW    Collection Time: 06/27/21  4:30 AM   Result Value Ref Range    Peripheral Smear Review see below    PLATELET ESTIMATE    Collection Time: 06/27/21  4:30 AM   Result Value Ref Range    Plt Estimation Normal    MORPHOLOGY    Collection Time: 06/27/21  4:30 AM   Result Value Ref Range    RBC Morphology Present     Polychromia 1+     Toxic Gran Slight    POCT glucose device results    Collection Time: 06/27/21  5:56 AM   Result Value Ref Range    Glucose - Accu-Ck 129 (H) 65 - 99 mg/dL   POCT glucose device results    Collection Time: 06/27/21 12:28 PM   Result Value Ref Range    Glucose - Accu-Ck 140 (H) 65 - 99 mg/dL       Fluids    Intake/Output Summary (Last 24 hours) at 6/27/2021 1246  Last data filed at 6/27/2021 1200  Gross per 24 hour   Intake 1528.75 ml   Output 3950 ml   Net -2421.25 ml       Core Measures & Quality Metrics  Labs reviewed, Medications reviewed and Radiology images reviewed  Harrington catheter: Critically Ill - Requiring Accurate Measurement of Urinary Output      DVT Prophylaxis: Enoxaparin (Lovenox)  DVT prophylaxis - mechanical: SCDs          DEBBIE Score  ETOH Screening    Assessment/Plan  * Mesenteric ischemia (HCC)  Assessment & Plan  Acute mid small bowel mesenteric ischemia secondary to unknown etiology.  6/18 Emergent laparotomy with small bowel resection and damage control closure.  6/19 Expedited second look laparotomy for persistent lactic acidosis.  Biliary limb of [place]-en-Y gastric bypass with significant distention and ischemia.  Gastrostomy, additional proximal and distal small bowel resections, and damage control closure.  6/21 Revision of G-tube and RYGB, fascial closure  High risk for post-op intra-abdominal abscess and/or anastomotic failure  6/26 start [facility].    Septic shock (HCC)  Assessment & Plan  Acute septic shock secondary to  mesenteric ischemia.  Ongoing resuscitation with crystalloids, vasopressor support, broad-spectrum empiric antibiotic therapy, and trending of endpoints of resuscitation.    6/25 Piperacillin/tazobactam day 7/7.  6/26 Leukocytosis of 26.5, resume zosyn- low threshold to scan     Volume overload  Assessment & Plan  Diuresis with lasix 6/24 and 6/25 6/26- ongoing diuresis with lasix (patient is 20kg up from admission weight), judicious electrolyte replacement (K, phos, Calcium)      Anemia associated with acute blood loss- (present on admission)  Assessment & Plan  Progressive acute blood loss anemia associated with multiple operation.  Patient is a Sikh who refuses blood products.  6/23/2021 patient accepts epogen. Iron studies and epogen if appropriate per pharmacy.   6/24- got epo (low dose) and starting iron; checking B12    Protein calorie malnutrition (HCC)  Assessment & Plan  Gut in discontinuity, will eventually have multiple high risk anastomoses. Unable to tolerate any enteral nutrition.   6/21TPN started  6/24- plan to have coretrak placed and start nasoenteric feeding and stop feeding remnant stomach.  Remnant should be for decompression.   6/26- impact tube feeds and TPN, will also start clears.  Likely to have poor absorption.       Discussed patient condition with RN, RT and Pharmacy.  CRITICAL CARE TIME EXCLUDING PROCEDURES: 35  Minutes.  Managing continuous tube feeds/malnutrition, intravenous antibiotics, total parenteral nutrition, diuretic therapy for fluid overload, resolving ileus from abdominal catastrophe.

## 2021-06-27 NOTE — PROGRESS NOTES
"    DATE: 6/27/2021    Post Operative Day 6 Enteric reconstruction and delayed fascial closure    Interval Events:    Lovenox started.    PHYSICAL EXAMINATION:  Vital Signs: /72   Pulse (!) 105   Temp 37.4 °C (99.4 °F) (Temporal)   Resp (!) 27   Ht 1.676 m (5' 6\")   Wt 124 kg (272 lb 7.8 oz)   SpO2 96%     Wounds clean.    ASSESSMENT AND PLAN:   Steady progress.  Recommend wean TPN.  Approaching transfer to [facility].    Appreciate ICU and consulting physician care.       ____________________________________     Samuel Jackson M.D.    DD: 6/27/2021  2:12 PM      "

## 2021-06-28 NOTE — PROGRESS NOTES
Patient complaining of 10/10 lower abdominal pain shortly after consuming bariatric clear liquid diet tray. Tube feeds paused temporarily, patient made NPO for time being. Will continue to monitor.

## 2021-06-28 NOTE — CARE PLAN
Problem: Hemodynamics  Goal: Patient's hemodynamics, fluid balance and neurologic status will be stable or improve  Outcome: Progressing     Problem: Fluid Volume  Goal: Fluid volume balance will be maintained  Outcome: Progressing     Problem: Urinary - Renal Perfusion  Goal: Ability to achieve and maintain adequate renal perfusion and functioning will improve  Outcome: Progressing     Problem: Respiratory  Goal: Patient will achieve/maintain optimum respiratory ventilation and gas exchange  Outcome: Progressing   The patient is Watcher - Medium risk of patient condition declining or worsening    Shift Goals  Clinical Goals: pt will tolerate po food, and mobility  Patient Goals: pain control, sleep   Family Goals: n/a

## 2021-06-28 NOTE — THERAPY
Physical Therapy   Daily Treatment     Patient Name: Josephine Casas  Age:  66 y.o., Sex:  female  Medical Record #: [id number]  Today's Date: 6/28/2021     Precautions: Fall Risk    Assessment    Pt seen for follow up PT tx. Spouse at bedside during treatment. Pt required total assist for bed mobility. Once EOB, pt sat with min/mod assist and verbal cues for anterior weight shifting. Pt instructed in LE ROM exercises sitting EOB, pt followed commands well. Pt requested to return to supine due to abdominal pain, RN aware. PT will cont while in acute care setting    Plan    Continue current treatment plan.    DC Equipment Recommendations: Unable to determine at this time  Discharge Recommendations: Recommend post-acute placement for additional physical therapy services prior to discharge home         06/28/21 1417   Vitals   O2 (LPM) 6   O2 Delivery Device Oxymask   Cognition    Level of Consciousness Alert   Ability To Follow Commands 1 Step   Initiation Impaired   Comments cooperative but appeared confused   Sitting Lower Body Exercises   Sitting Lower Body Exercises Yes   Ankle Pumps 1 set of 10;Bilateral   Long Arc Quad 1 set of 10;Bilateral   Neuro-Muscular Treatments   Neuro-Muscular Treatments Weight Shift Left;Weight Shift Right;Anterior weight shift   Comments EOB   Other Treatments   Other Treatments Provided spouse at bedside present during treatment   Balance   Sitting Balance (Static) Poor -   Sitting Balance (Dynamic) Trace +   Weight Shift Sitting Poor   Skilled Intervention Verbal Cuing;Tactile Cuing;Compensatory Strategies   Comments EOB only   Gait Analysis   Gait Level Of Assist Unable to Participate   Bed Mobility    Supine to Sit Total Assist   Sit to Supine Total Assist   Scooting Total Assist   Rolling Total Assist to Rt.;Total Assist to Lt.   Skilled Intervention Verbal Cuing;Tactile Cuing;Sequencing   Comments limited initiation   Functional Mobility   Sit to Stand Unable to Participate   Bed,  "Chair, Wheelchair Transfer Unable to Participate   Mobility EOB only   Short Term Goals    Short Term Goal # 1 pt will perform supine <> sit with mod A in 6 visits for improved independence   Goal Outcome # 1 goal not met   Short Term Goal # 2 pt will sit EOB 5 min with \"fair\" balance in 6 visits for improved participation in functional tasks   Goal Outcome # 2 Goal not met   Short Term Goal # 3 pt will perform STS with mod A in 6 visits to initiate transfers and gait   Goal Outcome # 3 Goal not met   Anticipated Discharge Equipment and Recommendations   DC Equipment Recommendations Unable to determine at this time   Discharge Recommendations Recommend post-acute placement for additional physical therapy services prior to discharge home     "

## 2021-06-28 NOTE — CARE PLAN
The patient is Watcher - Medium risk of patient condition declining or worsening    Shift Goals  Clinical Goals: mobility, pain control   Patient Goals: pain control, sleep   Family Goals: n/a    Progress made toward(s) clinical / shift goals:  sleep     Patient is not progressing towards the following goals: mobility       Problem: Physical Regulation  Goal: Diagnostic test results will improve  Outcome: Not Progressing  Goal: Signs and symptoms of infection will decrease  Outcome: Not Progressing      Problem: Fluid Volume  Goal: Fluid volume balance will be maintained  Outcome: Progressing     Problem: Urinary - Renal Perfusion  Goal: Ability to achieve and maintain adequate renal perfusion and functioning will improve  Outcome: Progressing

## 2021-06-28 NOTE — PROGRESS NOTES
Pharmacy TPN Day # 8       6/28/2021    Dosing Weight: 100 kg (admit weight) for kcal goals, 59kg (IBW) for protein goals  TPN currently providing 100% of goal  TPN goal: 6405-7000 kcal/day including 2 gm/kg/day Protein      TPN indication: ischemic bowel s/p resection, suspected long-term lack of enteral access     Pertinent PMH: Presented to OSH with severe abdominal pain, transferred to [facility] for concern of ischemic bowel. Of note, patient has a history of gastric bypass (~2005). Patient underwent ex lap w/ small bowel resection and damage control closure on 6/18, additional proximal and distal small bowel resections with damage control closure on 6/19, and revision of G-tube and RYGB with fascial closure on 6/21. TPN was initiated on 6/21 given likely prolonged NPO status.     No data recorded.  .  Recent Labs     06/26/21  0410 06/27/21  0430 06/28/21  0250   SODIUM 147* 149* 150*   POTASSIUM 3.0* 2.6* 3.1*   CHLORIDE 111 112 111   CO2 30 30 30   BUN 28* 33* 29*   CREATININE 0.61 0.53 0.54   GLUCOSE 133* 135* 139*   CALCIUM 7.1* 7.0* 7.0*   ASTSGOT 102* 74* 64*   ALTSGPT 103* 87* 79*   ALBUMIN 1.9* 1.6* 1.9*   TBILIRUBIN 1.6* 1.5 1.7*   PHOSPHORUS 2.4* 2.1* 2.2*   MAGNESIUM 2.1 1.7 1.9     Accu-Checks  Recent Labs     06/28/21  0028 06/28/21  0552 06/28/21  1126   POCGLUCOSE 114* 114* 121*       Vitals:    06/28/21 0900 06/28/21 1000 06/28/21 1100 06/28/21 1200   BP: 139/63 131/69 153/65 155/67   Weight:       Height:           Intake/Output Summary (Last 24 hours) at 6/28/2021 1318  Last data filed at 6/28/2021 1200  Gross per 24 hour   Intake 2168.27 ml   Output 3350 ml   Net -1181.73 ml       Orders Placed This Encounter   Procedures   • Diet Order Diet: Clear Liquid; Miscellaneous modifications: (optional): Bariatric     Standing Status:   Standing     Number of Occurrences:   1     Order Specific Question:   Diet:     Answer:   Clear Liquid [10]     Order Specific Question:   Miscellaneous modifications:  (optional)     Answer:   Bariatric [3]         TPN for past 72 hours (Show up to 3 orders; newest on the left. Changes between the two most recent orders are indicated.)     Start date and time   2021      TPN Central Line Formulation [[id number]] TPN Central Line Formulation [[id number]] TPN Central Line Formulation [[id number]]    Order Status  Active  Last Dose in Progress    Last Admin    New Bag at 2021 1929 by Gallito Bunch R.N.       Base    Clinisol 15%  118 g 118 g 118 g    dextrose 70%  212 g 212 g 212 g       Additives    potassium phosphate  45 mmol 30 mmol 45 mmol    potassium chloride  60 mEq 60 mEq 60 mEq    sodium acetate  50 mEq 100 mEq 75 mEq    sodium chloride  50 mEq 100 mEq 75 mEq    magnesium sulfate  20 mEq 16 mEq 20 mEq    M.T.E.-4 Adult  1 mL 1 mL 1 mL    M.V.I. Adult  10 mL 10 mL 10 mL    famotidine  40 mg 40 mg 40 mg    thiamine  100 mg 100 mg 100 mg    folic acid  1 mg 1 mg 1 mg       QS Base    sterile water  246.8 mL 215.29 mL 228.05 mL       Energy Contribution    Proteins  -- -- --    Dextrose  -- -- --    Lipids  -- -- --    Total  -- -- --       Electrolyte Ion Calculated Amount    Sodium  100 mEq 200 mEq 150 mEq    Potassium  126 mEq 104 mEq 126 mEq    Calcium  -- -- --    Magnesium  20.02 mEq 16 mEq 20.02 mEq    Aluminum  -- -- --    Phosphate  45 mmol 30 mmol 45 mmol    Chloride  110 mEq 160 mEq 135 mEq    Acetate  149.91 mEq 199.91 mEq 174.91 mEq       Other    Total Protein  118 g 118 g 118 g    Total Protein/kg  0.95 g/kg 0.95 g/kg 0.95 g/kg    Total Amino Acid  -- -- --    Total Amino Acid/kg  -- -- --    Glucose Infusion Rate  1.19 mg/kg/min 1.19 mg/kg/min 1.19 mg/kg/min    Osmolarity (Estimated)  -- -- --    Volume  1,440 mL 1,440 mL 1,440 mL    Rate  60 mL/hr 60 mL/hr 60 mL/hr    Dosing Weight  124 kg 124 kg 124 kg    Infusion [facility]  [facility]            This formula provides:  % kcal as lipids  = 0   Grams protein/kg = 2  Non-protein calories = 721  Kcals/kg = 11.9  Total daily calories = 1193     Comments:  1. Clear liquid diet held, Leif dislodged, patient to CT to assess for leak.  2. Macronutrients: current TPN formulation providing 100% of caloric needs (note hypocaloric and high protein goals, given BMI). Tube feeds and clears held.  Some concern for absorption of tube feeds/liquid diet, per MD continue TPN at 100% for now. Note, no lipids currently within TPN given previous propofol use - will hold off adding lipids back into TPN.  3. Micronutrients/electrolytes: Potassium, phosphorous, and magnesium all replaced outside TPN. Sodium continues to trend up. Active diuresis continues. Current TPN formulated at 3/4 NS, decreased to ~ 1/2 NS equivalent today, consider decreasing/halting diuresis if sodium continues to rise.  Folic acid and thiamine remain in TPN; day 5 of 5. Famotidine also remains in TPN.   4. Glycemic Control: No insulin required, no change to TPN.  5. Fluid status: TPN at 60 mL/hr, no current maintenance fluid and diuresis continues. SCr appears to be at baseline and sodium continues to increase.       Bucky Lorenzana PharmD, BCCCP, BCPS

## 2021-06-28 NOTE — THERAPY
Missed Therapy     Patient Name: Josephine Casas  Age:  66 y.o., Sex:  female  Medical Record #: [id number]  Today's Date: 6/28/2021    Discussed missed therapy with RN        06/28/21 0815   Treatment Variance   Reason For Missed Therapy Non-Medical - Other (Please Comment)   Interdisciplinary Plan of Care Collaboration   Collaboration Comments Patient now extubated and on a clear liquid diet.  RN to clarify orders during rounds.

## 2021-06-28 NOTE — PALLIATIVE CARE
"PALLIATIVE CARE FOLLOW-UP:    Pt awake and recalls being confused, but also a bit repetitive in this conversation. Reviewed POC and her \"job\" in her healing, including mobilization.  Pt stated, \"I'm ready to go right now, when are they coming? I'll do it.\" Discussed ultimate goal of getting back to her family, animals and flowers with the hopes of bowel recovery and PO nutritian/absorption.    Plan:  Palliative Care to continue to follow, provide support, and help facilitate decision-making as clinical picture evolves.    Thank you for allowing Palliative Care to support this pt and her family.  Contact [phone] for additional assistance, patient status change, questions or concerns.  "

## 2021-06-28 NOTE — ANESTHESIA POSTPROCEDURE EVALUATION
Patient: Josephine Casas    Procedure Summary     Date: 06/21/21 Room / Location: [place] [address] / SURGERY [facility]    Anesthesia Start: 1248 Anesthesia Stop: 1431    Procedures:       LAPAROTOMY, EXPLORATORY WITH BOWEL ANASTOMOSIS (N/A Abdomen)      INSERTION, GASTROSTOMY TUBE, FOR FEEDING (N/A Abdomen) Diagnosis: ( intestinal ischemia)    Surgeons: Clarence Collins M.D. Responsible Provider: Axel Thrasher M.D.    Anesthesia Type: general ASA Status: 4          Final Anesthesia Type: general  Last vitals  BP   Blood Pressure : 159/83    Temp        Pulse   (!) 109   Resp   (!) 30    SpO2   92 %      Anesthesia Post Evaluation    Patient location during evaluation: ICU  Patient participation: complete - patient cannot participate  Level of consciousness: obtunded/minimal responses    Airway patency: patent  Anesthetic complications: no  Cardiovascular status: adequate  Respiratory status: ETT  Hydration status: acceptable              No complications documented.     Nurse Pain Score: 7 (NPRS)

## 2021-06-28 NOTE — DISCHARGE PLANNING
Anticipated Discharge Disposition: TBD    Action: Patient discussed in IDT rounds with Dr. Schmitz. No case management/ or dc needs at this time. No post acute referrals placed. On going ICU level of care.     Barriers to Discharge: Medically complex - requires ICU level of care    Plan: Continue to assist with social/dc needs

## 2021-06-28 NOTE — PROGRESS NOTES
This RN entered patients room to find patient had removed her cortrak, and was spraying deodorant spray in her mouth. Patient then asked for her purse and car keys because she was going home. Reoriented patient to time, place, situation.

## 2021-06-28 NOTE — PROGRESS NOTES
Trauma / Surgical Daily Progress Note    Date of Service  6/28/2021    Chief Complaint  66 y.o. female admitted 6/18/2021 with Ischemic Bowel    Interval Events  RUQ pain.    New confusion  BP ok  Resp:  To 8 liters.     AB Zosyn  Lovenox yes.    CT scan today to evaluate anastomosis and possible intra-abdominal source of infection    Review of Systems  Review of Systems     Vital Signs for last 24 hours  Pulse:  [] 106  Resp:  [18-39] 26  BP: ()/(55-90) 118/66  SpO2:  [89 %-100 %] 91 %    Hemodynamic parameters for last 24 hours  CVP:  [123 MM HG-141 MM HG] 126 MM HG    Respiratory Data     Respiration: (!) 26, Pulse Oximetry: 91 %     Work Of Breathing / Effort: Mild  RUL Breath Sounds: Clear, RML Breath Sounds: Clear;Diminished, RLL Breath Sounds: Clear;Diminished, NICO Breath Sounds: Clear, LLL Breath Sounds: Clear;Diminished    Physical Exam  Physical Exam  Eyes:      General: No scleral icterus.     Pupils: Pupils are equal, round, and reactive to light.   Cardiovascular:      Rate and Rhythm: Regular rhythm.   Pulmonary:      Effort: No respiratory distress.      Breath sounds: No wheezing.   Abdominal:      General: There is distension.      Palpations: Abdomen is soft.      Comments: Incisional tenderness   Musculoskeletal:         General: No swelling or tenderness.   Skin:     General: Skin is warm and dry.   Neurological:      Mental Status: She is alert and oriented to person, place, and time.         Laboratory  Recent Results (from the past 24 hour(s))   POCT glucose device results    Collection Time: 06/27/21 12:28 PM   Result Value Ref Range    Glucose - Accu-Ck 140 (H) 65 - 99 mg/dL   POCT glucose device results    Collection Time: 06/27/21  5:56 PM   Result Value Ref Range    Glucose - Accu-Ck 129 (H) 65 - 99 mg/dL   POCT glucose device results    Collection Time: 06/28/21 12:28 AM   Result Value Ref Range    Glucose - Accu-Ck 114 (H) 65 - 99 mg/dL   CBC WITH DIFFERENTIAL     Collection Time: 06/28/21  2:50 AM   Result Value Ref Range    WBC 26.4 (H) 4.8 - 10.8 K/uL    RBC 2.59 (L) 4.20 - 5.40 M/uL    Hemoglobin 7.7 (L) 12.0 - 16.0 g/dL    Hematocrit 24.0 (L) 37.0 - 47.0 %    MCV 92.7 81.4 - 97.8 fL    MCH 29.7 27.0 - 33.0 pg    MCHC 32.1 (L) 33.6 - 35.0 g/dL    RDW 48.6 35.9 - 50.0 fL    Platelet Count 321 164 - 446 K/uL    MPV 11.3 9.0 - 12.9 fL    Neutrophils-Polys 83.20 (H) 44.00 - 72.00 %    Lymphocytes 6.10 (L) 22.00 - 41.00 %    Monocytes 7.30 0.00 - 13.40 %    Eosinophils 0.10 0.00 - 6.90 %    Basophils 0.70 0.00 - 1.80 %    Immature Granulocytes 2.60 (H) 0.00 - 0.90 %    Nucleated RBC 0.90 /100 WBC    Neutrophils (Absolute) 21.99 (H) 2.00 - 7.15 K/uL    Lymphs (Absolute) 1.60 1.00 - 4.80 K/uL    Monos (Absolute) 1.93 (H) 0.00 - 0.85 K/uL    Eos (Absolute) 0.03 0.00 - 0.51 K/uL    Baso (Absolute) 0.18 (H) 0.00 - 0.12 K/uL    Immature Granulocytes (abs) 0.70 (H) 0.00 - 0.11 K/uL    NRBC (Absolute) 0.25 K/uL   Comp Metabolic Panel    Collection Time: 06/28/21  2:50 AM   Result Value Ref Range    Sodium 150 (H) 135 - 145 mmol/L    Potassium 3.1 (L) 3.6 - 5.5 mmol/L    Chloride 111 96 - 112 mmol/L    Co2 30 20 - 33 mmol/L    Anion Gap 9.0 7.0 - 16.0    Glucose 139 (H) 65 - 99 mg/dL    Bun 29 (H) 8 - 22 mg/dL    Creatinine 0.54 0.50 - 1.40 mg/dL    Calcium 7.0 (L) 8.5 - 10.5 mg/dL    AST(SGOT) 64 (H) 12 - 45 U/L    ALT(SGPT) 79 (H) 2 - 50 U/L    Alkaline Phosphatase 111 (H) 30 - 99 U/L    Total Bilirubin 1.7 (H) 0.1 - 1.5 mg/dL    Albumin 1.9 (L) 3.2 - 4.9 g/dL    Total Protein 4.5 (L) 6.0 - 8.2 g/dL    Globulin 2.6 1.9 - 3.5 g/dL    A-G Ratio 0.7 g/dL   MAGNESIUM    Collection Time: 06/28/21  2:50 AM   Result Value Ref Range    Magnesium 1.9 1.5 - 2.5 mg/dL   PHOSPHORUS    Collection Time: 06/28/21  2:50 AM   Result Value Ref Range    Phosphorus 2.2 (L) 2.5 - 4.5 mg/dL   ESTIMATED GFR    Collection Time: 06/28/21  2:50 AM   Result Value Ref Range    GFR If  >60 >60  mL/min/1.73 m 2    GFR If Non African American >60 >60 mL/min/1.73 m 2   POCT glucose device results    Collection Time: 06/28/21  5:52 AM   Result Value Ref Range    Glucose - Accu-Ck 114 (H) 65 - 99 mg/dL       Fluids    Intake/Output Summary (Last 24 hours) at 6/28/2021 0935  Last data filed at 6/28/2021 0800  Gross per 24 hour   Intake 2545.41 ml   Output 3320 ml   Net -774.59 ml       Core Measures & Quality Metrics  Labs reviewed, Medications reviewed and Radiology images reviewed  Harrington catheter: Critically Ill - Requiring Accurate Measurement of Urinary Output      DVT Prophylaxis: Enoxaparin (Lovenox)  DVT prophylaxis - mechanical: SCDs          DEBBIE Score  ETOH Screening    Assessment/Plan  * Mesenteric ischemia (HCC)- (present on admission)  Assessment & Plan  Acute mid small bowel mesenteric ischemia secondary to unknown etiology.  6/18 Emergent laparotomy with small bowel resection and damage control closure.  6/19 Expedited second look laparotomy for persistent lactic acidosis. Biliary limb of [place]-en-Y gastric bypass with significant distention and ischemia. Gastrostomy, additional proximal and distal small bowel resections, and damage control closure.  6/21 Revision of G-tube and RYGB, fascial closure.  High risk for post-op intra-abdominal abscess and/or anastomotic failure.  6/26 Clears initiated.  [facility].    Septic shock (HCC)- (present on admission)  Assessment & Plan  Acute septic shock secondary to mesenteric ischemia.  Ongoing resuscitation with crystalloids, vasopressor support, broad-spectrum empiric antibiotic therapy, and trending of endpoints of resuscitation.  6/25 Piperacillin/tazobactam day 7/7.  6/26 Leukocytosis of 26.5, resume Zosyn - low threshold to scan.    Volume overload- (present on admission)  Assessment & Plan  6/24, 6/25 Diuresis with lasix.  6/26 Ongoing diuresis with lasix (patient is 20kg up from admission weight), judicious electrolyte replacement  (K, phos, Calcium).    Protein calorie malnutrition (HCC)- (present on admission)  Assessment & Plan  Gut in discontinuity, will eventually have multiple high risk anastomoses. Unable to tolerate any enteral nutrition.  6/21TPN started.  6/24 Plan to have Cortrak placed and start nasoenteric feeding and stop feeding remnant stomach. Remnant should be for decompression.  6/26 Impact tube feeds and TPN, will also start clears. Likely to have poor absorption.    Anemia associated with acute blood loss- (present on admission)  Assessment & Plan  Progressive acute blood loss anemia associated with multiple operations.  Patient is a Zoroastrian who refuses blood products.  6/23 Patient accepts Epogen, given.  6/24 Iron replacement initiated. B12 low normal.        Discussed patient condition with RN, RT, Pharmacy and Dietary.  CRITICAL CARE TIME EXCLUDING PROCEDURES: 40    Minutes.Decision making of high complexity.  I reviewed clinical labs, trends and orders for follow up.  Review of imaging,reports, consultant documentation .  Utilization of the information in todays decision making.   I evaluated the patient condition at bedside and discussed the daily plan(s) with available nursing staff,  pharmacists on rounds.  Managing abdominal pain, leukocytosis and fever, total parenteral nutrition, intra-abdominal infection, IV antibiotics, bedside and review of imaging/consultation with other physicians

## 2021-06-29 NOTE — PROGRESS NOTES
Dr Schmitz, and RT at bedside to perform bronchoscopy, medications are ready, consents have been signed and timeout has been completed.

## 2021-06-29 NOTE — THERAPY
Occupational Therapy Contact Note    Attempted OT treatment, pt just reintubated yesterday evening RN requesting to let her rest for today. Will attempt tomorrow as able.    Lashae Joe, OTR/L

## 2021-06-29 NOTE — PROGRESS NOTES
Spoke to Dr Schmitz in concern to patients CT abdomen results and whether or not pt can have diet or if he wished to reinsert Cortrak and resume TF. Currently holding off at this time and communication orders are in. Also addressed patients increasing respiratory need. An extra order of lasix was ordered as patient is still incredibly fluid positive. Will continue pulmonary toileting with patient.

## 2021-06-29 NOTE — PROGRESS NOTES
Trauma / Surgical Daily Progress Note    Date of Service  6/29/2021    Chief Complaint  66 y.o. female admitted 6/18/2021 with Ischemic Bowel    Interval Events  Intubated last andres.    BP no pressors  WBC 32.  Bronchoscopy and BAL pending  TPN:  At 60  Consider tube feeds.    Lactate.    Urine output brisk but with recent lasix.   Check for C diff.      Review of Systems  Review of Systems     Vital Signs for last 24 hours  Pulse:  [] 106  Resp:  [13-40] 31  BP: ()/(48-83) 98/62  SpO2:  [91 %-100 %] 95 %    Hemodynamic parameters for last 24 hours       Respiratory Data     Respiration: (!) 31, Pulse Oximetry: 95 %     Work Of Breathing / Effort: Within Normal Limits  RUL Breath Sounds: Clear, RML Breath Sounds: Diminished, RLL Breath Sounds: Diminished, NICO Breath Sounds: Clear, LLL Breath Sounds: Diminished    Physical Exam  Physical Exam    Laboratory  Recent Results (from the past 24 hour(s))   Prealbumin    Collection Time: 06/28/21  8:38 PM   Result Value Ref Range    Pre-Albumin 8.3 (L) 18.0 - 38.0 mg/dL   IONIZED CALCIUM    Collection Time: 06/28/21  8:38 PM   Result Value Ref Range    Ionized Calcium 1.0 (L) 1.1 - 1.3 mmol/L   Magnesium: Every Monday and Thursday AM    Collection Time: 06/28/21 11:30 PM   Result Value Ref Range    Magnesium 2.0 1.5 - 2.5 mg/dL   Phosphorus: Every Monday and Thursday AM    Collection Time: 06/28/21 11:30 PM   Result Value Ref Range    Phosphorus 2.4 (L) 2.5 - 4.5 mg/dL   Comp Metabolic Panel    Collection Time: 06/28/21 11:30 PM   Result Value Ref Range    Sodium 149 (H) 135 - 145 mmol/L    Potassium 3.0 (L) 3.6 - 5.5 mmol/L    Chloride 112 96 - 112 mmol/L    Co2 30 20 - 33 mmol/L    Anion Gap 7.0 7.0 - 16.0    Glucose 158 (H) 65 - 99 mg/dL    Bun 26 (H) 8 - 22 mg/dL    Creatinine 0.59 0.50 - 1.40 mg/dL    Calcium 7.6 (L) 8.5 - 10.5 mg/dL    AST(SGOT) 49 (H) 12 - 45 U/L    ALT(SGPT) 66 (H) 2 - 50 U/L    Alkaline Phosphatase 117 (H) 30 - 99 U/L    Total Bilirubin  1.9 (H) 0.1 - 1.5 mg/dL    Albumin 1.9 (L) 3.2 - 4.9 g/dL    Total Protein 4.7 (L) 6.0 - 8.2 g/dL    Globulin 2.8 1.9 - 3.5 g/dL    A-G Ratio 0.7 g/dL   ESTIMATED GFR    Collection Time: 06/28/21 11:30 PM   Result Value Ref Range    GFR If African American >60 >60 mL/min/1.73 m 2    GFR If Non African American >60 >60 mL/min/1.73 m 2   POCT glucose device results    Collection Time: 06/28/21 11:30 PM   Result Value Ref Range    Glucose - Accu-Ck 137 (H) 65 - 99 mg/dL   POCT arterial blood gas device results    Collection Time: 06/29/21  1:19 AM   Result Value Ref Range    Ph 7.503 (H) 7.400 - 7.500    Pco2 41.8 (H) 26.0 - 37.0 mmHg    Po2 39 (LL) 64 - 87 mmHg    Tco2 34 (H) 20 - 33 mmol/L    S02 78 (L) 93 - 99 %    Hco3 32.8 (H) 17.0 - 25.0 mmol/L    BE 9 (H) -4 - 3 mmol/L    Body Temp 99.7 F degrees    O2 Therapy 60 %    iPF Ratio 65     Ph Temp Nanci 7.493 7.400 - 7.500    Pco2 Temp Co 42.9 (H) 26.0 - 37.0 mmHg    Po2 Temp Cor 40 (LL) 64 - 87 mmHg    Specimen Arterial     DelSys Other     LPM 8 lpm   POCT arterial blood gas device results    Collection Time: 06/29/21  1:32 AM   Result Value Ref Range    Ph 7.527 (H) 7.400 - 7.500    Pco2 39.1 (H) 26.0 - 37.0 mmHg    Po2 65 64 - 87 mmHg    Tco2 34 (H) 20 - 33 mmol/L    S02 95 93 - 99 %    Hco3 32.4 (H) 17.0 - 25.0 mmol/L    BE 9 (H) -4 - 3 mmol/L    Body Temp 99.5 F degrees    O2 Therapy 52 %    iPF Ratio 125     Ph Temp Nanci 7.519 (H) 7.400 - 7.500    Pco2 Temp Co 40.0 (H) 26.0 - 37.0 mmHg    Po2 Temp Cor 67 64 - 87 mmHg    Specimen Arterial     DelSys Other     LPM 8 lpm   CBC WITH DIFFERENTIAL    Collection Time: 06/29/21  6:00 AM   Result Value Ref Range    WBC 32.5 (HH) 4.8 - 10.8 K/uL    RBC 2.52 (L) 4.20 - 5.40 M/uL    Hemoglobin 7.4 (L) 12.0 - 16.0 g/dL    Hematocrit 23.6 (L) 37.0 - 47.0 %    MCV 93.7 81.4 - 97.8 fL    MCH 29.4 27.0 - 33.0 pg    MCHC 31.4 (L) 33.6 - 35.0 g/dL    RDW 48.5 35.9 - 50.0 fL    Platelet Count 368 164 - 446 K/uL    MPV 11.7 9.0 -  12.9 fL    Neutrophils-Polys 88.20 (H) 44.00 - 72.00 %    Lymphocytes 3.90 (L) 22.00 - 41.00 %    Monocytes 5.60 0.00 - 13.40 %    Eosinophils 0.00 0.00 - 6.90 %    Basophils 0.30 0.00 - 1.80 %    Immature Granulocytes 2.00 (H) 0.00 - 0.90 %    Nucleated RBC 1.30 /100 WBC    Neutrophils (Absolute) 28.59 (H) 2.00 - 7.15 K/uL    Lymphs (Absolute) 1.27 1.00 - 4.80 K/uL    Monos (Absolute) 1.83 (H) 0.00 - 0.85 K/uL    Eos (Absolute) 0.01 0.00 - 0.51 K/uL    Baso (Absolute) 0.10 0.00 - 0.12 K/uL    Immature Granulocytes (abs) 0.66 (H) 0.00 - 0.11 K/uL    NRBC (Absolute) 0.42 K/uL   Comp Metabolic Panel    Collection Time: 06/29/21  6:00 AM   Result Value Ref Range    Sodium 149 (H) 135 - 145 mmol/L    Potassium 3.4 (L) 3.6 - 5.5 mmol/L    Chloride 113 (H) 96 - 112 mmol/L    Co2 30 20 - 33 mmol/L    Anion Gap 6.0 (L) 7.0 - 16.0    Glucose 168 (H) 65 - 99 mg/dL    Bun 28 (H) 8 - 22 mg/dL    Creatinine 0.58 0.50 - 1.40 mg/dL    Calcium 7.5 (L) 8.5 - 10.5 mg/dL    AST(SGOT) 49 (H) 12 - 45 U/L    ALT(SGPT) 68 (H) 2 - 50 U/L    Alkaline Phosphatase 122 (H) 30 - 99 U/L    Total Bilirubin 1.6 (H) 0.1 - 1.5 mg/dL    Albumin 1.6 (L) 3.2 - 4.9 g/dL    Total Protein 4.8 (L) 6.0 - 8.2 g/dL    Globulin 3.2 1.9 - 3.5 g/dL    A-G Ratio 0.5 g/dL   MAGNESIUM    Collection Time: 06/29/21  6:00 AM   Result Value Ref Range    Magnesium 2.0 1.5 - 2.5 mg/dL   PHOSPHORUS    Collection Time: 06/29/21  6:00 AM   Result Value Ref Range    Phosphorus 2.5 2.5 - 4.5 mg/dL   ESTIMATED GFR    Collection Time: 06/29/21  6:00 AM   Result Value Ref Range    GFR If African American >60 >60 mL/min/1.73 m 2    GFR If Non African American >60 >60 mL/min/1.73 m 2   POCT glucose device results    Collection Time: 06/29/21  6:07 AM   Result Value Ref Range    Glucose - Accu-Ck 142 (H) 65 - 99 mg/dL   POCT arterial blood gas device results    Collection Time: 06/29/21  7:14 AM   Result Value Ref Range    Ph 7.526 (H) 7.400 - 7.500    Pco2 39.7 (H) 26.0 - 37.0  mmHg    Po2 89 (H) 64 - 87 mmHg    Tco2 34 (H) 20 - 33 mmol/L    S02 98 93 - 99 %    Hco3 32.9 (H) 17.0 - 25.0 mmol/L    BE 9 (H) -4 - 3 mmol/L    Body Temp 100.0 F degrees    O2 Therapy 100 %    iPF Ratio 89     Ph Temp Nanci 7.513 (H) 7.400 - 7.500    Pco2 Temp Co 41.1 (H) 26.0 - 37.0 mmHg    Po2 Temp Cor 93 (H) 64 - 87 mmHg    Specimen Arterial     DelSys Vent     End Tidal Carbon Dioxide 32 mmhg    Tidal Volume 340 mL    Peep End Expiratory Pressure 8 cmh20    Set Rate 20     Mode APV-CMV    LACTIC ACID    Collection Time: 06/29/21 10:15 AM   Result Value Ref Range    Lactic Acid 3.2 (H) 0.5 - 2.0 mmol/L   POTASSIUM SERUM (K)    Collection Time: 06/29/21 10:35 AM   Result Value Ref Range    Potassium 3.3 (L) 3.6 - 5.5 mmol/L   Triglyceride    Collection Time: 06/29/21 10:35 AM   Result Value Ref Range    Triglycerides 127 0 - 149 mg/dL   C Diff by PCR rflx Toxin    Collection Time: 06/29/21 10:45 AM    Specimen: Stool   Result Value Ref Range    C Diff by PCR Negative Negative    [id number] Presumptive Negative Negative   MRSA By PCR (Amp)    Collection Time: 06/29/21 11:10 AM    Specimen: Nares; Respirate   Result Value Ref Range    Significant Indicator NEG     Source RESP     Site NARES     MRSA PCR Negative for MRSA by PCR.    POCT glucose device results    Collection Time: 06/29/21 11:54 AM   Result Value Ref Range    Glucose - Accu-Ck 143 (H) 65 - 99 mg/dL   QUANT BRONCHIAL WASHING    Collection Time: 06/29/21 12:24 PM    Specimen: Respirate   Result Value Ref Range    Significant Indicator NEG     Source RESP     Site Quantitative BAL     Culture Result -     Gram Stain Result -    LACTIC ACID    Collection Time: 06/29/21  3:09 PM   Result Value Ref Range    Lactic Acid 2.1 (H) 0.5 - 2.0 mmol/L   POCT glucose device results    Collection Time: 06/29/21  6:04 PM   Result Value Ref Range    Glucose - Accu-Ck 143 (H) 65 - 99 mg/dL       Fluids    Intake/Output Summary (Last 24 hours) at 6/29/2021 9077  Last  data filed at 6/29/2021 1800  Gross per 24 hour   Intake 4166.76 ml   Output 3305 ml   Net 861.76 ml       Core Measures & Quality Metrics  Core Measures & Quality Metrics  DEBBIE Score  ETOH Screening    Assessment/Plan  * Mesenteric ischemia (HCC)- (present on admission)  Assessment & Plan  Acute mid small bowel mesenteric ischemia secondary to unknown etiology.  6/18 Emergent laparotomy with small bowel resection and damage control closure.  6/19 Expedited second look laparotomy for persistent lactic acidosis. Biliary limb of [place]-en-Y gastric bypass with significant distention and ischemia. Gastrostomy, additional proximal and distal small bowel resections, and damage control closure.  6/21 Revision of G-tube and RYGB, fascial closure.  High risk for post-op intra-abdominal abscess and/or anastomotic failure.  6/26 Clears initiated.  6/28:  CT : no leak.  No obvious abscess.    [facility].    Respiratory failure following trauma and surgery (HCC)- (present on admission)  Assessment & Plan  Mechanical ventilation following emergent abdominal surgery.  6/25 Extubated.  6/29 Re intubated for progressive respiratory failure. Continue full mechanical ventilatory support. Ventilator bundle and Trauma weaning protocol.    Septic shock (HCC)- (present on admission)  Assessment & Plan  Acute septic shock secondary to mesenteric ischemia.  Ongoing resuscitation with crystalloids, vasopressor support, broad-spectrum empiric antibiotic therapy, and trending of endpoints of resuscitation.  6/25 Piperacillin/tazobactam day 7/7.  6/26 Leukocytosis of 26.5, resume Zosyn - low threshold to scan.  6/29  Hypotension, lactic acidosis, respiratory failure.  Volume therapy, intubation.  C diff negative.  Zyvox and Zosyn # 1.  BAL pending.      Volume overload- (present on admission)  Assessment & Plan  6/24, 6/25 Diuresis with lasix.  6/26 Ongoing diuresis with lasix (patient is 20kg up from admission weight), judicious  electrolyte replacement (K, phos, Calcium).    Protein calorie malnutrition (HCC)- (present on admission)  Assessment & Plan  Gut in discontinuity, will eventually have multiple high risk anastomoses. Unable to tolerate any enteral nutrition.  6/21TPN started.  6/24 Plan to have Cortrak placed and start nasoenteric feeding and stop feeding remnant stomach. Remnant should be for decompression.  6/26 Impact tube feeds and TPN, will also start clears. Likely to have poor absorption.  6/29: TPN support continues.  No bowel obstruction.  Consider tube feeds soon    Anemia associated with acute blood loss- (present on admission)  Assessment & Plan  Progressive acute blood loss anemia associated with multiple operations.  Patient is a Taoism who refuses blood products.  6/23 Patient accepts Epogen, given.  6/24 Iron replacement initiated. B12 low normal.      Discussed patient condition with RN, RT, Pharmacy and Dietary.  CRITICAL CARE TIME EXCLUDING PROCEDURES: 90   Minutes.Decision making of high complexity.  I reviewed clinical labs, trends and orders for follow up.  Review of imaging,reports, consultant documentation .  Utilization of the information in todays decision making.   I evaluated the patient condition at bedside and discussed the daily plan(s) with available nursing staff,  pharmacists on rounds.  Managing mechanical ventilation, adjust, multiple fluid bolus, serial labs and interpretation, family counseled, sepsis work-up and intravenous antibiotics

## 2021-06-29 NOTE — OP REPORT
DATE OF OPERATION: 6/29/2021     PREOPERATIVE DIAGNOSIS: Acute respiratory failure    POSTOPERATIVE DIAGNOSIS: Same.     PROCEDURE PERFORMED: Rapid sequence endotracheal intubation.    SURGEON: Samuel Jackson M.D.     INDICATIONS: The patient is a 66 year-old woman with acute respiratory failure.  Rapid sequence endotracheal intubation is carried out at the bedside to secure a definitve airway.     PROCEDURE: Following informed consent consent, the patient was properly identified and optimally positioned in bed. She was preoxygenated with 100% oxygen. A rapid sequence induction consisting of propofol and succinyl choline was administered intravenously.    The vocal cords were visualized transorally with a Mack Scope. A cuffed 7.5 endotracheal tube was passed and the cuff inflated. End tidal CO2 monitoring confirmed return of carbon dioxide. The tube was secured.     The patient tolerated the procedure well. There were no apparent complications.         ____________________________________     Samuel Jackson M.D.    DD: 6/29/2021  6:07 AM

## 2021-06-29 NOTE — RESPIRATORY CARE
Intubation Yes  Positive Color Change on EZCap? Yes  Difficult Airway No  Number of attempts 1  Evidence of aspiration No      Events/Summary/Plan: Intubated (06/29/21 0600)

## 2021-06-29 NOTE — CARE PLAN
Problem: Hemodynamics  Goal: Patient's hemodynamics, fluid balance and neurologic status will be stable or improve  Outcome: Progressing     Problem: Fluid Volume  Goal: Fluid volume balance will be maintained  Outcome: Progressing     Problem: Urinary - Renal Perfusion  Goal: Ability to achieve and maintain adequate renal perfusion and functioning will improve  Outcome: Progressing     Problem: Respiratory  Goal: Patient will achieve/maintain optimum respiratory ventilation and gas exchange  Outcome: Progressing   The patient is Unstable - High likelihood or risk of patient condition declining or worsening    Shift Goals  Clinical Goals: patient will remain hemodynamically stable duirng shift  Patient Goals: pain control, sleep   Family Goals: n/a

## 2021-06-29 NOTE — PROGRESS NOTES
Called MD Schmitz regarding patient's increased oxygen demand, tachypnea, accessory muscle use, and ALOC. MD Jackson to come bedside for emergent intubation. Consent received from patient's spouse Gary via phone with 2 RN witness.

## 2021-06-29 NOTE — CARE PLAN
Problem: Ventilation Defect:  Goal: Ability to achieve and maintain unassisted ventilation or tolerate decreased levels of ventilator support  Outcome: Progressing      Ventilator Daily Summary    Vent Day # 1    Ventilator settings changed this shift:  Weaning FIO2 as tolerated  Patient ABG 7.51, 41, 93, 9, 32.9, 98%.  Patient over breathing a RR set of 20 at 28 to 32 breaths per minute.    * Patient required increase of PEEP as high as 14 and 70% FIO2 due to de-sat.  After suctioning, was able to reduce PEEP to 12 and FIO2 to 60%.  Currently plan for bronch due to increased secretions.     Weaning trials: none     Respiratory Procedures Intubated and taped at 24cm at the lip.  Per radiology pull back 2 to 3 cm.  Tube pulled back to 22cm at the lip.      Plan: Continue current ventilator settings and wean mechanical ventilation as tolerated per physician orders.

## 2021-06-29 NOTE — CARE PLAN
Problem: Hyperinflation  Goal: Prevent or improve atelectasis  Description: 1. Instruct incentive spirometry usage  2.  Perform hyperinflation therapy as indicated  Outcome: Not Met. Difficulties today with compliance due to pain. Pt able to achieve 500mL on the IS with coaching and poor coordination. Pt able to preform PEP with better effort.

## 2021-06-29 NOTE — PROGRESS NOTES
0554: Dr. Jackson at bedside to assess patient   0558: Per Dr. Jackson, prepare for intubation.   0600: Medications given, see MAR  0603: Intubation performed, positive color change noted, bilateral breath sounds present.

## 2021-06-29 NOTE — PROGRESS NOTES
1930 Received call from  Penelope regarding patient's critical WBC count of 32.5.  5418 Informed MD Schmitz of critical lab.

## 2021-06-30 NOTE — DIETARY
Nutrition support weekly update:  Day 12 of admit.  65 yo female admitted with ischemic bowel.  TPN started 6/22 and currently providing 1193 kcals, 118 g protein/day. Receiving lasix for fluid overload.     Assessment:  Weight today 123 kcals, is increased 23 kg from admit scale weight of 100 kg. Pt is currently 22 liters fluid positive.     Evaluation:   1. Ischemic bowel with bowel resection x 2 since admit. History of gastric bypass. 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   2. Pt was extubated 6/25 and re-intubated yesterday.   3. Tube  Feedings started 6/23 and were advancing slowly with MD orders. Pt pulled cortrak and TF was not resumed and was discontinued 6/28.   4. c-diff negative  5. TPN currently meeting 100% of nutritional needs.     Malnutrition risk: na    Recommendations/Plan:  1. TPN per  [facility]  2. Trial of trickle TF when appropriate.

## 2021-06-30 NOTE — PROGRESS NOTES
Late entry for 6/29/21  Pharmacy TPN Day # 9       6/30/2021    Dosing Weight: 100 kg (admit weight) for kcal goals, 59kg (IBW) for protein goals  TPN currently providing 100% of goal  TPN goal: 6546-3768 kcal/day including 2 gm/kg/day Protein      TPN indication: ischemic bowel s/p resection, suspected long-term lack of enteral access     Pertinent PMH: Presented to OSH with severe abdominal pain, transferred to [facility] for concern of ischemic bowel. Of note, patient has a history of gastric bypass (~2005). Patient underwent ex lap w/ small bowel resection and damage control closure on 6/18, additional proximal and distal small bowel resections with damage control closure on 6/19, and revision of G-tube and RYGB with fascial closure on 6/21. TPN was initiated on 6/21 given likely prolonged NPO status.  No data recorded.  .  Recent Labs     06/28/21  0250 06/28/21  2038 06/28/21  2330 06/28/21  2330 06/29/21  0600 06/29/21  1035 06/30/21  0002   SODIUM   < >  --  149*   < > 149*  --  151*   POTASSIUM   < >  --  3.0*   < > 3.4*   < > 3.4*   CHLORIDE   < >  --  112   < > 113*  --  114*   CO2   < >  --  30   < > 30  --  29   BUN   < >  --  26*   < > 28*  --  29*   CREATININE   < >  --  0.59   < > 0.58  --  0.62   GLUCOSE   < >  --  158*   < > 168*  --  139*   CALCIUM   < >  --  7.6*   < > 7.5*  --  6.9*   ASTSGOT   < >  --  49*  --  49*  --   --    ALTSGPT   < >  --  66*  --  68*  --   --    ALBUMIN   < >  --  1.9*  --  1.6*  --   --    TBILIRUBIN   < >  --  1.9*  --  1.6*  --   --    PHOSPHORUS   < >  --  2.4*  --  2.5  --   --    MAGNESIUM   < >  --  2.0  --  2.0  --   --    PREALBUMIN  --  8.3*  --   --   --   --   --     < > = values in this interval not displayed.     Accu-Checks  Recent Labs     06/29/21  2357 06/30/21  0509 06/30/21  1135   POCGLUCOSE 132* 130* 122*       Vitals:    06/30/21 1000 06/30/21 1100 06/30/21 1200 06/30/21 1300   BP: 107/56 137/57 100/48 113/61   Weight:       Height:            Intake/Output Summary (Last 24 hours) at 6/30/2021 1334  Last data filed at 6/30/2021 1310  Gross per 24 hour   Intake 4243.91 ml   Output 2230 ml   Net 2013.91 ml       No orders of the defined types were placed in this encounter.        TPN for past 72 hours (Show up to 3 orders; newest on the left. Changes between the two most recent orders are indicated.)     Start date and time   06/30/2021 2000 06/29/2021 2000 06/28/2021 2000      TPN Central Line Formulation [[id number]] TPN Central Line Formulation [[id number]] TPN Central Line Formulation [[id number]]    Order Status  Active Last Dose in Progress Completed    Last Admin   New Bag at 06/29/2021 2005 by Christy Espinal R.N. New Bag at 06/28/2021 2009 by Milady Reich R.N.       Base    Clinisol 15%  118 g 118 g 118 g    dextrose 70%  212 g 212 g 212 g       Additives    potassium phosphate  45 mmol 45 mmol 45 mmol    potassium chloride  80 mEq 60 mEq 60 mEq    sodium acetate  -- 50 mEq 50 mEq    sodium chloride  -- 50 mEq 50 mEq    magnesium sulfate  20 mEq 20 mEq 20 mEq    M.T.E.-4 Adult  1 mL 1 mL 1 mL    M.V.I. Adult  10 mL 10 mL 10 mL    famotidine  40 mg 40 mg 40 mg    thiamine  100 mg 100 mg 100 mg    folic acid  1 mg 1 mg 1 mg       QS Base    sterile water  274.3 mL 246.8 mL 246.8 mL       Energy Contribution    Proteins  -- -- --    Dextrose  -- -- --    Lipids  -- -- --    Total  -- -- --       Electrolyte Ion Calculated Amount    Sodium  -- 100 mEq 100 mEq    Potassium  146 mEq 126 mEq 126 mEq    Calcium  -- -- --    Magnesium  20.02 mEq 20.02 mEq 20.02 mEq    Aluminum  -- -- --    Phosphate  45 mmol 45 mmol 45 mmol    Chloride  80 mEq 110 mEq 110 mEq    Acetate  99.91 mEq 149.91 mEq 149.91 mEq       Other    Total Protein  118 g 118 g 118 g    Total Protein/kg  0.96 g/kg 1.01 g/kg 0.95 g/kg    Total Amino Acid  -- -- --    Total Amino Acid/kg  -- -- --    Glucose Infusion Rate  1.2 mg/kg/min 1.2 mg/kg/min 1.19 mg/kg/min    Osmolarity  (Estimated)  -- -- --    Volume  1,440 mL 1,440 mL 1,440 mL    Rate  60 mL/hr 60 mL/hr 60 mL/hr    Dosing Weight  123 kg 117 kg 124 kg    Infusion Site  [facility]            This formula provides:  % kcal as lipids = 0   Grams protein/kg = 2  Non-protein calories = 721  Kcals/kg = 11.9  Total daily calories = 1193     Comments:  1. Patient intubated overnight, WBC continues to increase, no obvious leak on CT 6/28/21.  2. Macronutrients: current TPN formulation providing 100% of caloric needs (note hypocaloric and high protein goals, given BMI). Continue TPN at 100% for now. Note, no lipids currently within TPN given previous propofol use - will hold off adding lipids back into TPN.  3. Micronutrients/electrolytes: Potassium, phosphorous, and magnesium all replaced outside TPN. Sodium continues to trend up. Active diuresis continues. Current TPN formulated at ~ 1/2 NS equivalent today, sodium decreasing no change today.  Folic acid and thiamine remain in TPN will provide one additional day.  Famotidine also remains in TPN.   4. Glycemic Control: No insulin required, no change to TPN.  5. Fluid status: TPN at 60 mL/hr, lactated ringers infusion at 150 mL/hr and diuresis continues. SCr appears to be at baseline.          Bucky DiazD, BCCCP, BCPS

## 2021-06-30 NOTE — CARE PLAN
Problem: Nutritional:  Goal: Nutrition support tolerated and meeting greater than 85% of estimated needs  Outcome: Met    TPN providing 100% of nutrition needs

## 2021-06-30 NOTE — THERAPY
Missed Therapy     Patient Name: Josephine Casas  Age:  66 y.o., Sex:  female  Medical Record #: [id number]  Today's Date: 6/30/2021 06/30/21 1048   Treatment Variance   Reason For Missed Therapy Medical - Patient not Able To Participate   Interdisciplinary Plan of Care Collaboration   Collaboration Comments Patient re-intubated.  SLP will hold and monitor for new orders.

## 2021-06-30 NOTE — PROGRESS NOTES
"    DATE: 6/29/2021    Post Operative Day  8 enteric reconstruction following resection of ischemic gut. .    Interval Events:  Respiratory failure with reintubation.    Sepsis of unknown origin.  CT yesterday with no post operative clear leak or complicaiton    PHYSICAL EXAMINATION:  Vital Signs: /51   Pulse (!) 103   Temp 37.4 °C (99.4 °F) (Temporal)   Resp (!) 30   Ht 1.676 m (5' 6\")   Wt 117 kg (257 lb 11.5 oz)   SpO2 97%     G tube present, wounds clean.    Laboratory Values:   Recent Labs     06/27/21  0430 06/28/21  0250 06/29/21  0600   WBC 23.5* 26.4* 32.5*   RBC 2.67* 2.59* 2.52*   HEMOGLOBIN 7.9* 7.7* 7.4*   HEMATOCRIT 24.5* 24.0* 23.6*   MCV 91.8 92.7 93.7   MCH 29.6 29.7 29.4   MCHC 32.2* 32.1* 31.4*   RDW 48.1 48.6 48.5   PLATELETCT 249 321 368   MPV 11.3 11.3 11.7     Recent Labs     06/28/21  0250 06/28/21  1730 06/28/21  2330 06/29/21  0600 06/29/21  1035   SODIUM 150*  --  149* 149*  --    POTASSIUM 3.1*   < > 3.0* 3.4* 3.3*   CHLORIDE 111  --  112 113*  --    CO2 30  --  30 30  --    GLUCOSE 139*  --  158* 168*  --    BUN 29*  --  26* 28*  --    CREATININE 0.54  --  0.59 0.58  --    CALCIUM 7.0*  --  7.6* 7.5*  --     < > = values in this interval not displayed.     Recent Labs     06/28/21  0250 06/28/21  2330 06/29/21  0600   ASTSGOT 64* 49* 49*   ALTSGPT 79* 66* 68*   TBILIRUBIN 1.7* 1.9* 1.6*   ALKPHOSPHAT 111* 117* 122*   GLOBULIN 2.6 2.8 3.2            Imaging:   US-EXTREMITY VENOUS UPPER UNILAT LEFT   Final Result      DX-CHEST-PORTABLE (1 VIEW)   Final Result      Endotracheal tube is satisfactory in position. No other significant change.         DX-CHEST-PORTABLE (1 VIEW)   Final Result      Endotracheal tube terminates just above the maría. No other significant change.      These findings were discussed with patient's nurse Cirilo on 6/29/2021 7:24 AM.         DX-CHEST-PORTABLE (1 VIEW)   Final Result      1.  New airspace opacity in the right upper lobe may represent " atelectasis or developing pneumonia.   2.  Free intraperitoneal air likely related to recent surgery.      CT-ABDOMEN-PELVIS WITH   Final Result      1.  New gastrostomy tube and new postoperative changes involving the small bowel in the upper abdomen.      2.  Small amount of free air in the abdomen consistent with recent surgery.      3.  New small bilateral pleural effusions and bibasilar areas of atelectasis or pneumonia.      4.  No evidence of bowel or renal obstruction.      5.  Cholecystectomy.      DX-CHEST-PORTABLE (1 VIEW)   Final Result         No significant interval change.            DX-CHEST-PORTABLE (1 VIEW)   Final Result         No significant interval change.         DX-CHEST-PORTABLE (1 VIEW)   Final Result         1. Interval extubation. No other significant interval change.      DX-CHEST-PORTABLE (1 VIEW)   Final Result         No new abnormalities have developed on portable chest radiograph since the prior examination.  No new infiltrates or consolidations are identified.      DX-ABDOMEN FOR TUBE PLACEMENT   Final Result      Feeding tube tip projects over the proximal stomach.      DX-CHEST-PORTABLE (1 VIEW)   Final Result         1. No significant interval change.      DX-CHEST-PORTABLE (1 VIEW)   Final Result      1.  Persistent atelectasis or pneumonia in the left lower lobe and minor interstitial edema or pneumonia in the right lower lobe with small bilateral pleural effusions.      2.  Interval removal of NG tube.      DX-CHEST-PORTABLE (1 VIEW)   Final Result         1.  There are persistent opacifications in the left lower lung medially and medial right lower lung.      2.  No new infiltrates or consolidations are identified.      DX-CHEST-PORTABLE (1 VIEW)   Final Result         1.  Hazy left lung base infiltrate.   2.  Nasogastric tube terminates near the gastroesophageal junction, recommend advancement      DX-CHEST-PORTABLE (1 VIEW)   Final Result         1.  Hazy left lung base  opacities suggests infiltrates.   2.  Right internal jugular central line terminates in the right atrium, could be withdrawn 4 cm.      OUTSIDE IMAGES-CT ABDOMEN /PELVIS   Final Result      OUTSIDE IMAGES-CT ABDOMEN /PELVIS   Final Result      OUTSIDE IMAGES-CT CHEST   Final Result          ASSESSMENT AND PLAN:     Sepsis with reintubation  Workup for sepsis underway, will monitor closely, CT unremarkable for clear abdominal pathology at this point     ____________________________________     Ximena Cortez M.D.    DD: 6/29/2021  5:25 PM

## 2021-06-30 NOTE — DISCHARGE PLANNING
Anticipated Discharge Disposition: TBD     Action: Patient discussed in IDT rounds with Dr. Kelly. No case management/ or dc needs at this time. No post acute referrals placed. On going ICU level of care.     Therapy evals reviewed; recommend post acute placement.      Barriers to Discharge: Medically complex - requires ICU level of care     Plan: Continue to assist with social/dc needs

## 2021-06-30 NOTE — PROGRESS NOTES
Pharmacy TPN Day # 10       6/30/2021    Dosing Weight: 100 kg (admit weight) for kcal goals, 59kg (IBW) for protein goals  TPN currently providing 100% of goal  TPN goal: 9474-8754 kcal/day including 2 gm/kg/day Protein      TPN indication: ischemic bowel s/p resection, suspected long-term lack of enteral access     Pertinent PMH: Presented to OSH with severe abdominal pain, transferred to [facility] for concern of ischemic bowel. Of note, patient has a history of gastric bypass (~2005). Patient underwent ex lap w/ small bowel resection and damage control closure on 6/18, additional proximal and distal small bowel resections with damage control closure on 6/19, and revision of G-tube and RYGB with fascial closure on 6/21. TPN was initiated on 6/21 given likely prolonged NPO status  No data recorded.  .  Recent Labs     06/28/21  0250 06/28/21  2038 06/28/21  2330 06/28/21  2330 06/29/21  0600 06/29/21  1035 06/29/21  1805 06/30/21  0002 06/30/21  0735   SODIUM   < >  --  149*   < > 149*  --   --  151* 146*   POTASSIUM   < >  --  3.0*   < > 3.4*   < > 3.7 3.4* 3.3*   CHLORIDE   < >  --  112   < > 113*  --   --  114* 111   CO2   < >  --  30   < > 30  --   --  29 28   BUN   < >  --  26*   < > 28*  --   --  29* 31*   CREATININE   < >  --  0.59   < > 0.58  --   --  0.62 0.68   GLUCOSE   < >  --  158*   < > 168*  --   --  139* 128*   CALCIUM   < >  --  7.6*   < > 7.5*  --   --  6.9* 7.2*   ASTSGOT   < >  --  49*  --  49*  --   --   --  26   ALTSGPT   < >  --  66*  --  68*  --   --   --  45   ALBUMIN   < >  --  1.9*  --  1.6*  --   --   --  1.3*   TBILIRUBIN   < >  --  1.9*  --  1.6*  --   --   --  1.3   PHOSPHORUS   < >  --  2.4*  --  2.5  --   --   --  2.5   MAGNESIUM   < >  --  2.0  --  2.0  --   --   --  1.9   PREALBUMIN  --  8.3*  --   --   --   --   --   --   --     < > = values in this interval not displayed.     Accu-Checks  Recent Labs     06/29/21  1647 06/30/21  0509 06/30/21  1135   POCGLUCOSE 132* 130* 122*        Vitals:    06/30/21 1000 06/30/21 1100 06/30/21 1200 06/30/21 1300   BP: 107/56 137/57 100/48 113/61   Weight:       Height:           Intake/Output Summary (Last 24 hours) at 6/30/2021 1344  Last data filed at 6/30/2021 1310  Gross per 24 hour   Intake 4243.91 ml   Output 2230 ml   Net 2013.91 ml       No orders of the defined types were placed in this encounter.        TPN for past 72 hours (Show up to 3 orders; newest on the left. Changes between the two most recent orders are indicated.)     Start date and time   06/30/2021 2000 06/29/2021 2000 06/28/2021 2000      TPN Central Line Formulation [[id number]] TPN Central Line Formulation [[id number]] TPN Central Line Formulation [[id number]]    Order Status  Active Last Dose in Progress Completed    Last Admin   New Bag at 06/29/2021 2005 by Christy Espinal RKATHI New Bag at 06/28/2021 2009 by Milady Reich RKATHI       Base    Clinisol 15%  118 g 118 g 118 g    dextrose 70%  212 g 212 g 212 g       Additives    potassium phosphate  45 mmol 45 mmol 45 mmol    potassium chloride  80 mEq 60 mEq 60 mEq    sodium acetate  -- 50 mEq 50 mEq    sodium chloride  -- 50 mEq 50 mEq    magnesium sulfate  20 mEq 20 mEq 20 mEq    M.T.E.-4 Adult  1 mL 1 mL 1 mL    M.V.I. Adult  10 mL 10 mL 10 mL    famotidine  40 mg 40 mg 40 mg    thiamine  100 mg 100 mg 100 mg    folic acid  1 mg 1 mg 1 mg       QS Base    sterile water  274.3 mL 246.8 mL 246.8 mL       Energy Contribution    Proteins  -- -- --    Dextrose  -- -- --    Lipids  -- -- --    Total  -- -- --       Electrolyte Ion Calculated Amount    Sodium  -- 100 mEq 100 mEq    Potassium  146 mEq 126 mEq 126 mEq    Calcium  -- -- --    Magnesium  20.02 mEq 20.02 mEq 20.02 mEq    Aluminum  -- -- --    Phosphate  45 mmol 45 mmol 45 mmol    Chloride  80 mEq 110 mEq 110 mEq    Acetate  99.91 mEq 149.91 mEq 149.91 mEq       Other    Total Protein  118 g 118 g 118 g    Total Protein/kg  0.96 g/kg 1.01 g/kg 0.95 g/kg    Total Amino  Acid  -- -- --    Total Amino Acid/kg  -- -- --    Glucose Infusion Rate  1.2 mg/kg/min 1.2 mg/kg/min 1.19 mg/kg/min    Osmolarity (Estimated)  -- -- --    Volume  1,440 mL 1,440 mL 1,440 mL    Rate  60 mL/hr 60 mL/hr 60 mL/hr    Dosing Weight  123 kg 117 kg 124 kg    Infusion Site  [facility]            This formula provides:  % kcal as lipids = 0   Grams protein/kg = 2  Non-protein calories = 721  Kcals/kg = 11.9  Total daily calories = 1193     Comments:  1. Patient remains intubated, WBC slightly decreased, C.diff negative, all cultures currently negative, no obvious leak on CT 6/28/21.  2. Macronutrients: current TPN formulation providing 100% of caloric needs (note hypocaloric and high protein goals, given BMI). Continue TPN at 100% for now. No lipids currently within TPN given propofol use [facility] is providing ~491 kcal/day at the current rate.  If unable to decrease propofol rate will decrease carbohydrate content in TPN 7/1/21.  3. Micronutrients/electrolytes: Patient remains on Kscale requiring ~ 80 mEq in the past 24 hrs.  Will increase in TPN this evening. Remains hypernatremic sodium removed from TPN per phyisician request.  Active diuresis continues. Folic acid and thiamine remain in TPN will remove 7/1/21.  Famotidine also remains in TPN.   4. Glycemic Control: No insulin required, no change to TPN.  5. Fluid status: TPN at 60 mL/hr, no current maintenance fluid and diuresis continues. SCr appears to be at baseline.          Bucky Lorenzana PharmD, BCCCP, BCPS

## 2021-06-30 NOTE — CARE PLAN
Ventilator Daily Summary    Vent Day # 2    Ventilator settings changed this shift: None    Weaning trials:n/a        Plan: Continue current ventilator settings and wean mechanical ventilation as tolerated per physician orders.

## 2021-06-30 NOTE — THERAPY
Occupational Therapy  Daily Treatment     Patient Name: Josephine Casas  Age:  66 y.o., Sex:  female  Medical Record #: [id number]  Today's Date: 6/30/2021     Precautions  Precautions: Fall Risk, Other (See Comments)  Comments: orally intubated    Assessment    Pt now reintubated, alert off sedation and wanting to participate. However, upon sitting EOB pt was able to tolerate <5 min and started gagging on the ET tube, feeling nauseous. She was promptly returned to supine. Unable to participate meaningfully and progress her during session. At this time, will decrease frequency of OT sessions to 1x/week to monitor for appropriateness of ability to participate.    Plan    Treatment plan modified to 1 time per week until therapy goals are met for the following treatments:  Adaptive Equipment, Cognitive Skill Development, Neuro Re-Education / Balance, Self Care/Activities of Daily Living, Therapeutic Activities and Therapeutic Exercises.    DC Equipment Recommendations: Unable to determine at this time  Discharge Recommendations: Recommend post-acute placement for additional occupational therapy services prior to discharge home    Subjective    When asked if she wanted to watch HGTV she nodded very enthusiastically      Objective       06/30/21 0931   Cognition    Cognition / Consciousness X   Speech/ Communication Intubated / Trached;Nods Appropriately   Level of Consciousness Responds to voice   Ability To Follow Commands 1 Step   Safety Awareness Impaired   Comments attempts to pull at ET tube, wants to be extubated   Other Treatments   Other Treatments Provided unable to have treatment session continued due to pt feeling nauseous and gagging on the ET tube. pt was returned to supine   Balance   Sitting Balance (Static) Trace   Sitting Balance (Dynamic) Dependent   Weight Shift Sitting Absent   Skilled Intervention Verbal Cuing;Tactile Cuing;Sequencing   Comments EOB only   Bed Mobility    Supine to Sit Total Assist   Sit  to Supine Total Assist   Scooting Total Assist   Rolling Total Assist to Rt.;Total Assist to Lt.   Skilled Intervention Verbal Cuing;Tactile Cuing;Sequencing   Activities of Daily Living   Lower Body Dressing Total Assist   Functional Mobility   Sit to Stand Unable to Participate   Bed, Chair, Wheelchair Transfer Unable to Participate   Mobility EOB only for less than 5 min   ICU Target Mobility Level   ICU Mobility - Targeted Level Level 2   Patient / Family Goals   Patient / Family Goal #1 unable to state per pt    Short Term Goals   Short Term Goal # 1 pt will sit EOB w/mod A in prep for ADL's    Goal Outcome # 1 Goal not met   Short Term Goal # 2 pt will participate in oral care w/mod A    Goal Outcome # 2 Goal not met   Short Term Goal # 3 pt will complete UB dressing w/mod A    Goal Outcome # 3 Goal not met

## 2021-06-30 NOTE — THERAPY
Physical Therapy   Daily Treatment     Patient Name: Josephine Casas  Age:  66 y.o., Sex:  female  Medical Record #: [id number]  Today's Date: 6/30/2021     Precautions: Fall Risk, Other (See Comments)  Comments: orally intubated    Assessment    Pt seen for follow up PT tx. Pt now intubated but alert and perseverating on being extubated. Pt required total assist to sit EOB with no postural control. Once EOB, pt began gagging, RN present and requested to return to supine. Pt tolerated ~3 min EOB. Frequency will be dropped to 1x/wk and will increase as pt becomes more appropriate for skilled services.     Plan    Treatment plan modified to 1 time per week until therapy goals are met for the following treatments:  Bed Mobility, Neuro Re-Education / Balance, Self Care/Home Evaluation, Therapeutic Activities and Therapeutic Exercises.    DC Equipment Recommendations: Unable to determine at this time  Discharge Recommendations: Recommend post-acute placement for additional physical therapy services prior to discharge home         06/30/21 0929   Cognition    Cognition / Consciousness X   Speech/ Communication Intubated / Trached;Nods Appropriately   Level of Consciousness Responds to voice   Ability To Follow Commands 1 Step   Safety Awareness Impaired   Comments kept attempting to pull lines   Other Treatments   Other Treatments Provided Seen with OT as pt is high medically complexity with signiificant lines. 2 skilled therapist needed for safety. Once pt sat on EOB, she began gagging, RN requested pt lay abck down   Balance   Sitting Balance (Static) Trace   Sitting Balance (Dynamic) Dependent   Weight Shift Sitting Absent   Skilled Intervention Verbal Cuing   Comments EOB only   Gait Analysis   Gait Level Of Assist Unable to Participate   Bed Mobility    Supine to Sit Total Assist   Sit to Supine Total Assist   Scooting Total Assist   Skilled Intervention Verbal Cuing   Functional Mobility   Sit to Stand Unable to  "Participate   Bed, Chair, Wheelchair Transfer Unable to Participate   Mobility EOB only   Short Term Goals    Short Term Goal # 1 pt will perform supine <> sit with mod A in 6 visits for improved independence   Goal Outcome # 1 goal not met   Short Term Goal # 2 pt will sit EOB 5 min with \"fair\" balance in 6 visits for improved participation in functional tasks   Goal Outcome # 2 Goal not met   Short Term Goal # 3 pt will perform STS with mod A in 6 visits to initiate transfers and gait   Goal Outcome # 3 Goal not met   Anticipated Discharge Equipment and Recommendations   DC Equipment Recommendations Unable to determine at this time   Discharge Recommendations Recommend post-acute placement for additional physical therapy services prior to discharge home     "

## 2021-06-30 NOTE — CARE PLAN
The patient is Watcher - Medium risk of patient condition declining or worsening    Shift Goals  Clinical Goals: patient will remain hemodynamically stable duirng shift  Patient Goals: pain control, sleep   Family Goals: n/a    Progress made toward(s) clinical / shift goals:    Problem: Urinary - Renal Perfusion  Goal: Ability to achieve and maintain adequate renal perfusion and functioning will improve  Outcome: Progressing     Problem: Respiratory  Goal: Patient will achieve/maintain optimum respiratory ventilation and gas exchange  Outcome: Progressing     Problem: Skin Integrity  Goal: Skin integrity is maintained or improved  Outcome: Progressing     Problem: Fall Risk  Goal: Patient will remain free from falls  Outcome: Progressing       Patient is not progressing towards the following goals:      Problem: Fluid Volume  Goal: Fluid volume balance will be maintained  Outcome: Not Progressing     Problem: Knowledge Deficit - Standard  Goal: Patient and family/care givers will demonstrate understanding of plan of care, disease process/condition, diagnostic tests and medications  Outcome: Not Progressing

## 2021-07-01 ENCOUNTER — OFFICE VISIT (OUTPATIENT)
Dept: FAMILY MEDICINE CLINIC | Facility: CLINIC | Age: 66
End: 2021-07-01
Payer: COMMERCIAL

## 2021-07-01 VITALS
HEIGHT: 64 IN | HEART RATE: 68 BPM | DIASTOLIC BLOOD PRESSURE: 78 MMHG | SYSTOLIC BLOOD PRESSURE: 142 MMHG | BODY MASS INDEX: 23.29 KG/M2 | RESPIRATION RATE: 18 BRPM | WEIGHT: 136.4 LBS | TEMPERATURE: 97.1 F

## 2021-07-01 DIAGNOSIS — R60.0 BILATERAL LEG EDEMA: Primary | ICD-10-CM

## 2021-07-01 DIAGNOSIS — N18.30 HYPERTENSIVE HEART AND KIDNEY DISEASE WITH HEART FAILURE AND WITH CHRONIC KIDNEY DISEASE STAGE III (HCC): ICD-10-CM

## 2021-07-01 DIAGNOSIS — I25.10 CORONARY ARTERY DISEASE INVOLVING NATIVE CORONARY ARTERY OF NATIVE HEART WITHOUT ANGINA PECTORIS: ICD-10-CM

## 2021-07-01 DIAGNOSIS — N18.2 CKD STAGE 2 DUE TO TYPE 2 DIABETES MELLITUS (HCC): ICD-10-CM

## 2021-07-01 DIAGNOSIS — I50.42 CHRONIC COMBINED SYSTOLIC AND DIASTOLIC CONGESTIVE HEART FAILURE (HCC): ICD-10-CM

## 2021-07-01 DIAGNOSIS — I10 ESSENTIAL HYPERTENSION: ICD-10-CM

## 2021-07-01 DIAGNOSIS — E11.22 CKD STAGE 2 DUE TO TYPE 2 DIABETES MELLITUS (HCC): ICD-10-CM

## 2021-07-01 DIAGNOSIS — I50.32 CHRONIC DIASTOLIC CONGESTIVE HEART FAILURE (HCC): ICD-10-CM

## 2021-07-01 DIAGNOSIS — R05.9 COUGH: ICD-10-CM

## 2021-07-01 DIAGNOSIS — I13.0 HYPERTENSIVE HEART AND KIDNEY DISEASE WITH HEART FAILURE AND WITH CHRONIC KIDNEY DISEASE STAGE III (HCC): ICD-10-CM

## 2021-07-01 PROBLEM — G47.00 INSOMNIA: Status: ACTIVE | Noted: 2021-01-01

## 2021-07-01 PROCEDURE — 4004F PT TOBACCO SCREEN RCVD TLK: CPT | Performed by: FAMILY MEDICINE

## 2021-07-01 PROCEDURE — 3008F BODY MASS INDEX DOCD: CPT | Performed by: FAMILY MEDICINE

## 2021-07-01 PROCEDURE — 99214 OFFICE O/P EST MOD 30 MIN: CPT | Performed by: FAMILY MEDICINE

## 2021-07-01 PROCEDURE — 3066F NEPHROPATHY DOC TX: CPT | Performed by: FAMILY MEDICINE

## 2021-07-01 PROCEDURE — 3078F DIAST BP <80 MM HG: CPT | Performed by: FAMILY MEDICINE

## 2021-07-01 PROCEDURE — 3077F SYST BP >= 140 MM HG: CPT | Performed by: FAMILY MEDICINE

## 2021-07-01 PROCEDURE — 1160F RVW MEDS BY RX/DR IN RCRD: CPT | Performed by: FAMILY MEDICINE

## 2021-07-01 RX ORDER — BENZONATATE 200 MG/1
200 CAPSULE ORAL 3 TIMES DAILY PRN
Qty: 60 CAPSULE | Refills: 1 | Status: SHIPPED | OUTPATIENT
Start: 2021-07-01 | End: 2021-10-14 | Stop reason: SDUPTHER

## 2021-07-01 RX ORDER — FUROSEMIDE 20 MG/1
TABLET ORAL
Qty: 30 TABLET | Refills: 0 | Status: SHIPPED | OUTPATIENT
Start: 2021-07-01 | End: 2021-08-10

## 2021-07-01 NOTE — CARE PLAN
Ventilator Daily Summary    Vent Day # 3    Ventilator settings changed this shift: %,        Respiratory Procedures: Pt. Refused her morning ABG draw.  Nurse notified.    Plan: Continue current ventilator settings and wean mechanical ventilation as tolerated per physician orders.      Problem: Ventilation Defect:  Goal: Ability to achieve and maintain unassisted ventilation or tolerate decreased levels of ventilator support  Outcome: Progressing

## 2021-07-01 NOTE — PROGRESS NOTES
"    DATE: 7/1/2021    Post Operative Day   bowel resection and visceral reconstruction.    Interval Events:  Extubated this morning.  No complaints of abdominal pain.  G tube to gravity(in remnant stomach).    PHYSICAL EXAMINATION:  Vital Signs: BP (!) 88/54   Pulse 99   Temp 37.4 °C (99.4 °F) (Temporal)   Resp 20   Ht 1.676 m (5' 6\")   Wt 122 kg (269 lb 13.5 oz)   SpO2 95%     Abdomen is obese and nontender, incision is c/d/i, g tube to gravity drainage.    Laboratory Values:   Recent Labs     06/29/21  0600 06/30/21  1000 07/01/21  0607   WBC 32.5* 30.5* 23.9*   RBC 2.52* 2.38* 2.22*   HEMOGLOBIN 7.4* 6.9* 6.7*   HEMATOCRIT 23.6* 22.0* 20.8*   MCV 93.7 92.4 93.7   MCH 29.4 29.0 30.2   MCHC 31.4* 31.4* 32.2*   RDW 48.5 47.9 50.9*   PLATELETCT 368 331 367   MPV 11.7 12.1 11.8     Recent Labs     06/30/21  1936 07/01/21  0134 07/01/21  0607   SODIUM 149* 147* 148*   POTASSIUM 3.9 4.3 3.6   CHLORIDE 114* 113* 115*   CO2 29 27 28   GLUCOSE 134* 230* 126*   BUN 31* 32* 31*   CREATININE 0.62 0.65 0.66   CALCIUM 7.1* 7.2* 7.4*     Recent Labs     06/30/21  0735 07/01/21  0134 07/01/21  0607   ASTSGOT 26 18 21   ALTSGPT 45 39 38   TBILIRUBIN 1.3 1.3 1.5   ALKPHOSPHAT 122* 107* 109*   GLOBULIN see below see below see below            Imaging:   DX-CHEST-PORTABLE (1 VIEW)   Final Result         1. Stable lines and tubes.   2. Hypoinflation. Unchanged left basilar atelectasis versus consolidation. Suspected small left effusion.         DX-CHEST-PORTABLE (1 VIEW)   Final Result      1.  Improving bilateral basilar atelectasis and/or consolidation.   2.  Previously demonstrated subdiaphragmatic free intraperitoneal air is no longer visualized.      US-EXTREMITY VENOUS UPPER UNILAT LEFT   Final Result      DX-CHEST-PORTABLE (1 VIEW)   Final Result      Endotracheal tube is satisfactory in position. No other significant change.         DX-CHEST-PORTABLE (1 VIEW)   Final Result      Endotracheal tube terminates just above " the maría. No other significant change.      These findings were discussed with patient's nurse Cirilo on 6/29/2021 7:24 AM.         DX-CHEST-PORTABLE (1 VIEW)   Final Result      1.  New airspace opacity in the right upper lobe may represent atelectasis or developing pneumonia.   2.  Free intraperitoneal air likely related to recent surgery.      CT-ABDOMEN-PELVIS WITH   Final Result      1.  New gastrostomy tube and new postoperative changes involving the small bowel in the upper abdomen.      2.  Small amount of free air in the abdomen consistent with recent surgery.      3.  New small bilateral pleural effusions and bibasilar areas of atelectasis or pneumonia.      4.  No evidence of bowel or renal obstruction.      5.  Cholecystectomy.      DX-CHEST-PORTABLE (1 VIEW)   Final Result         No significant interval change.            DX-CHEST-PORTABLE (1 VIEW)   Final Result         No significant interval change.         DX-CHEST-PORTABLE (1 VIEW)   Final Result         1. Interval extubation. No other significant interval change.      DX-CHEST-PORTABLE (1 VIEW)   Final Result         No new abnormalities have developed on portable chest radiograph since the prior examination.  No new infiltrates or consolidations are identified.      DX-ABDOMEN FOR TUBE PLACEMENT   Final Result      Feeding tube tip projects over the proximal stomach.      DX-CHEST-PORTABLE (1 VIEW)   Final Result         1. No significant interval change.      DX-CHEST-PORTABLE (1 VIEW)   Final Result      1.  Persistent atelectasis or pneumonia in the left lower lobe and minor interstitial edema or pneumonia in the right lower lobe with small bilateral pleural effusions.      2.  Interval removal of NG tube.      DX-CHEST-PORTABLE (1 VIEW)   Final Result         1.  There are persistent opacifications in the left lower lung medially and medial right lower lung.      2.  No new infiltrates or consolidations are identified.         DX-CHEST-PORTABLE (1 VIEW)   Final Result         1.  Hazy left lung base infiltrate.   2.  Nasogastric tube terminates near the gastroesophageal junction, recommend advancement      DX-CHEST-PORTABLE (1 VIEW)   Final Result         1.  Hazy left lung base opacities suggests infiltrates.   2.  Right internal jugular central line terminates in the right atrium, could be withdrawn 4 cm.      OUTSIDE IMAGES-CT ABDOMEN /PELVIS   Final Result      OUTSIDE IMAGES-CT ABDOMEN /PELVIS   Final Result      OUTSIDE IMAGES-CT CHEST   Final Result          ASSESSMENT AND PLAN:     * Mesenteric ischemia (HCC)- (present on admission)  Assessment & Plan  Acute mid small bowel mesenteric ischemia secondary to unknown etiology.  6/18 Emergent laparotomy with small bowel resection and damage control closure.  6/19 Expedited second look laparotomy for persistent lactic acidosis. Biliary limb of [place]-en-Y gastric bypass with significant distention and ischemia. Gastrostomy, additional proximal and distal small bowel resections, and damage control closure.  6/21 Revision of G-tube and RYGB, fascial closure.  High risk for post-op intra-abdominal abscess and/or anastomotic failure.  6/26 Clears initiated.  6/28:  CT : no leak.  No obvious abscess.    6/30 g tube to gravity  [facility].    Respiratory failure following trauma and surgery (HCC)- (present on admission)  Assessment & Plan  Mechanical ventilation following emergent abdominal surgery.  6/25 Extubated.  6/29 Re intubated for progressive respiratory failure.   6/30 transition to ASV   Ventilator bundle  SICU ventilator weaning protocol.  7/1 extubated    Septic shock (HCC)- (present on admission)  Assessment & Plan  Acute septic shock secondary to mesenteric ischemia.  Ongoing resuscitation with crystalloids, vasopressor support, broad-spectrum empiric antibiotic therapy, and trending of endpoints of resuscitation.  6/25 Piperacillin/tazobactam day 7/7.  6/26  Leukocytosis of 26.5, resume Zosyn - low threshold to scan.  6/29  Hypotension, lactic acidosis, respiratory failure.  Volume therapy, intubation.  C diff negative.  Zyvox and Zosyn # 1.  BAL pending.    6/30 Zyvox discontinued    Volume overload- (present on admission)  Assessment & Plan  6/24, 6/25 Diuresis with lasix.  6/26 Ongoing diuresis with lasix (patient is 20kg up from admission weight), judicious electrolyte replacement (K, phos, Calcium).    Protein calorie malnutrition (HCC)- (present on admission)  Assessment & Plan  Gut in discontinuity, will eventually have multiple high risk anastomoses. Unable to tolerate any enteral nutrition.  6/21TPN started.  6/24 Plan to have Cortrak placed and start nasoenteric feeding and stop feeding remnant stomach. Remnant should be for decompression.  6/26 Impact tube feeds and TPN, will also start clears. Likely to have poor absorption.  6/29: TPN support continues.     Anemia associated with acute blood loss- (present on admission)  Assessment & Plan  Progressive acute blood loss anemia associated with multiple operations.  Patient is a [employer] who refuses blood products.  6/23 Patient accepts Epogen, given.  6/24 Iron replacement initiated. B12 low normal.         ____________________________________     Ximena Cortez M.D.    DD: 7/1/2021  11:12 AM

## 2021-07-01 NOTE — PROGRESS NOTES
"Trauma / Surgical Daily Progress Note    Date of Service  6/30/2021    Chief Complaint  66 y.o. female admitted 6/18/2021 with Ischemic Bowel    Interval Events  CRITICAL CARE DECISION MAKING:    Patient examined and discussed with team at bedside.    Abdominal sepsis: Multifactorial.  Patient with persistent leukocytosis despite escalation of antibiotics.  Continuing to trend.  No GPC so Zyvox stopped.  Trending white count.  Low-grade temps.  No obvious source on extended work-up over the last few days.    In a later dependent respiratory failure: Addressed pulmonary hygiene concerns as well as oxygenation/ventilation.  Patient is still on full vent support.  Going to try and transition ASV this evening.  Ventilator settings addressed with respiratory therapy at the bedside    Labs reviewed, electrolytes addressed, renal function assessed  Reviewed nutrition strategies, recent indices: Remains on TPN with G-tube to gravity.  We will try and start trickle feeding in the next day or so, blood patient has had significant abdominal discomfort with feeds been started.    Addressed GI prophylaxis and bowel frequency: Pepcid  Assessed/discussed/titrated analgesics and need for sedatives:  Addressed DVT prophylaxis: lovenox  Addressed line days, carmen catheter days, access needs  Addressed family and discharge concerns      Review of Systems  Review of Systems   Unable to perform ROS: Intubated        Vital Signs for last 24 hours  Pulse:  [] 95  Resp:  [18-37] 21  BP: ()/(44-61) 105/56  SpO2:  [94 %-100 %] 99 %    Hemodynamic parameters for last 24 hours    /56   Pulse 95   Temp 37.4 °C (99.4 °F) (Temporal)   Resp (!) 21   Ht 1.676 m (5' 6\") Comment: drivers license  Wt 123 kg (271 lb 6.2 oz)   SpO2 99%   BMI 43.80 kg/m²       Respiratory Data     Respiration: (!) 21, Pulse Oximetry: 99 %     Work Of Breathing / Effort: Vented  RUL Breath Sounds: Clear, RML Breath Sounds: Diminished, RLL Breath " Sounds: Diminished, NICO Breath Sounds: Clear, LLL Breath Sounds: Diminished    Physical Exam  Physical Exam  Vitals and nursing note reviewed.   HENT:      Head: Normocephalic and atraumatic.      Mouth/Throat:      Mouth: Mucous membranes are moist.      Pharynx: Oropharynx is clear.   Eyes:      Extraocular Movements: Extraocular movements intact.      Pupils: Pupils are equal, round, and reactive to light.   Cardiovascular:      Rate and Rhythm: Normal rate and regular rhythm.      Pulses: Normal pulses.   Pulmonary:      Breath sounds: No stridor. Examination of the right-lower field reveals decreased breath sounds. Examination of the left-lower field reveals decreased breath sounds. Decreased breath sounds present. No wheezing.   Abdominal:      General: There is no distension.      Palpations: Abdomen is soft.      Tenderness: There is abdominal tenderness.      Comments: g tube site clean  Wounds clean   Genitourinary:     Comments: Len  Musculoskeletal:      Cervical back: Neck supple.   Neurological:      General: No focal deficit present.      Mental Status: She is alert.      GCS: GCS eye subscore is 4. GCS verbal subscore is 5. GCS motor subscore is 6.         Laboratory  Recent Results (from the past 24 hour(s))   POCT glucose device results    Collection Time: 06/29/21 11:57 PM   Result Value Ref Range    Glucose - Accu-Ck 132 (H) 65 - 99 mg/dL   Basic Metabolic Panel    Collection Time: 06/30/21 12:02 AM   Result Value Ref Range    Sodium 151 (H) 135 - 145 mmol/L    Potassium 3.4 (L) 3.6 - 5.5 mmol/L    Chloride 114 (H) 96 - 112 mmol/L    Co2 29 20 - 33 mmol/L    Glucose 139 (H) 65 - 99 mg/dL    Bun 29 (H) 8 - 22 mg/dL    Creatinine 0.62 0.50 - 1.40 mg/dL    Calcium 6.9 (LL) 8.5 - 10.5 mg/dL    Anion Gap 8.0 7.0 - 16.0   ESTIMATED GFR    Collection Time: 06/30/21 12:02 AM   Result Value Ref Range    GFR If African American >60 >60 mL/min/1.73 m 2    GFR If Non African American >60 >60 mL/min/1.73 m  2   POCT arterial blood gas device results    Collection Time: 06/30/21  5:03 AM   Result Value Ref Range    Ph 7.550 (H) 7.400 - 7.500    Pco2 35.2 26.0 - 37.0 mmHg    Po2 85 64 - 87 mmHg    Tco2 32 20 - 33 mmol/L    S02 98 93 - 99 %    Hco3 30.8 (H) 17.0 - 25.0 mmol/L    BE 8 (H) -4 - 3 mmol/L    Body Temp 100.0 F degrees    O2 Therapy 50 %    iPF Ratio 170     Ph Temp Nanci 7.538 (H) 7.400 - 7.500    Pco2 Temp Co 36.4 26.0 - 37.0 mmHg    Po2 Temp Cor 89 (H) 64 - 87 mmHg    Specimen Arterial     DelSys Vent     Tidal Volume 340 mL    Peep End Expiratory Pressure 10 cmh20    Set Rate 20     Mode APV-CMV    POCT glucose device results    Collection Time: 06/30/21  5:09 AM   Result Value Ref Range    Glucose - Accu-Ck 130 (H) 65 - 99 mg/dL   Comp Metabolic Panel    Collection Time: 06/30/21  7:35 AM   Result Value Ref Range    Sodium 146 (H) 135 - 145 mmol/L    Potassium 3.3 (L) 3.6 - 5.5 mmol/L    Chloride 111 96 - 112 mmol/L    Co2 28 20 - 33 mmol/L    Anion Gap 7.0 7.0 - 16.0    Glucose 128 (H) 65 - 99 mg/dL    Bun 31 (H) 8 - 22 mg/dL    Creatinine 0.68 0.50 - 1.40 mg/dL    Calcium 7.2 (L) 8.5 - 10.5 mg/dL    AST(SGOT) 26 12 - 45 U/L    ALT(SGPT) 45 2 - 50 U/L    Alkaline Phosphatase 122 (H) 30 - 99 U/L    Total Bilirubin 1.3 0.1 - 1.5 mg/dL    Albumin 1.3 (L) 3.2 - 4.9 g/dL    Total Protein 4.6 (L) 6.0 - 8.2 g/dL    Globulin see below 1.9 - 3.5 g/dL    A-G Ratio see below g/dL   MAGNESIUM    Collection Time: 06/30/21  7:35 AM   Result Value Ref Range    Magnesium 1.9 1.5 - 2.5 mg/dL   PHOSPHORUS    Collection Time: 06/30/21  7:35 AM   Result Value Ref Range    Phosphorus 2.5 2.5 - 4.5 mg/dL   ESTIMATED GFR    Collection Time: 06/30/21  7:35 AM   Result Value Ref Range    GFR If African American >60 >60 mL/min/1.73 m 2    GFR If Non African American >60 >60 mL/min/1.73 m 2   CBC WITH DIFFERENTIAL    Collection Time: 06/30/21 10:00 AM   Result Value Ref Range    WBC 30.5 (HH) 4.8 - 10.8 K/uL    RBC 2.38 (L) 4.20 -  5.40 M/uL    Hemoglobin 6.9 (L) 12.0 - 16.0 g/dL    Hematocrit 22.0 (L) 37.0 - 47.0 %    MCV 92.4 81.4 - 97.8 fL    MCH 29.0 27.0 - 33.0 pg    MCHC 31.4 (L) 33.6 - 35.0 g/dL    RDW 47.9 35.9 - 50.0 fL    Platelet Count 331 164 - 446 K/uL    MPV 12.1 9.0 - 12.9 fL    Neutrophils-Polys 86.70 (H) 44.00 - 72.00 %    Lymphocytes 5.20 (L) 22.00 - 41.00 %    Monocytes 5.30 0.00 - 13.40 %    Eosinophils 0.20 0.00 - 6.90 %    Basophils 0.20 0.00 - 1.80 %    Immature Granulocytes 2.40 (H) 0.00 - 0.90 %    Nucleated RBC 1.00 /100 WBC    Neutrophils (Absolute) 26.45 (H) 2.00 - 7.15 K/uL    Lymphs (Absolute) 1.59 1.00 - 4.80 K/uL    Monos (Absolute) 1.61 (H) 0.00 - 0.85 K/uL    Eos (Absolute) 0.05 0.00 - 0.51 K/uL    Baso (Absolute) 0.07 0.00 - 0.12 K/uL    Immature Granulocytes (abs) 0.72 (H) 0.00 - 0.11 K/uL    NRBC (Absolute) 0.31 K/uL   POCT glucose device results    Collection Time: 06/30/21 11:35 AM   Result Value Ref Range    Glucose - Accu-Ck 122 (H) 65 - 99 mg/dL   Basic Metabolic Panel    Collection Time: 06/30/21  1:28 PM   Result Value Ref Range    Sodium 150 (H) 135 - 145 mmol/L    Potassium 3.7 3.6 - 5.5 mmol/L    Chloride 113 (H) 96 - 112 mmol/L    Co2 27 20 - 33 mmol/L    Glucose 153 (H) 65 - 99 mg/dL    Bun 33 (H) 8 - 22 mg/dL    Creatinine 0.66 0.50 - 1.40 mg/dL    Calcium 7.1 (L) 8.5 - 10.5 mg/dL    Anion Gap 10.0 7.0 - 16.0   ESTIMATED GFR    Collection Time: 06/30/21  1:28 PM   Result Value Ref Range    GFR If African American >60 >60 mL/min/1.73 m 2    GFR If Non African American >60 >60 mL/min/1.73 m 2   POCT glucose device results    Collection Time: 06/30/21  6:11 PM   Result Value Ref Range    Glucose - Accu-Ck 117 (H) 65 - 99 mg/dL       Fluids    Intake/Output Summary (Last 24 hours) at 6/30/2021 2004  Last data filed at 6/30/2021 1846  Gross per 24 hour   Intake 2791.14 ml   Output 2550 ml   Net 241.14 ml       Core Measures & Quality Metrics    Harrington catheter: Critically Ill - Requiring Accurate  Measurement of Urinary Output  Central line in place: TPN, Concentrated IV drugs and Need for access    DVT Prophylaxis: Enoxaparin (Lovenox)  DVT prophylaxis - mechanical: SCDs  Ulcer prophylaxis: Yes  Antibiotics: Treating active infection/contamination beyond 24 hours perioperative coverage  Assessed for rehab: Patient unable to tolerate rehabilitation therapeutic regimen    DEBBIE Score  ETOH Screening    Assessment/Plan  * Mesenteric ischemia (HCC)- (present on admission)  Assessment & Plan  Acute mid small bowel mesenteric ischemia secondary to unknown etiology.  6/18 Emergent laparotomy with small bowel resection and damage control closure.  6/19 Expedited second look laparotomy for persistent lactic acidosis. Biliary limb of [place]-en-Y gastric bypass with significant distention and ischemia. Gastrostomy, additional proximal and distal small bowel resections, and damage control closure.  6/21 Revision of G-tube and RYGB, fascial closure.  High risk for post-op intra-abdominal abscess and/or anastomotic failure.  6/26 Clears initiated.  6/28:  CT : no leak.  No obvious abscess.    6/30 g tube to gravity  [facility].    Respiratory failure following trauma and surgery (HCC)- (present on admission)  Assessment & Plan  Mechanical ventilation following emergent abdominal surgery.  6/25 Extubated.  6/29 Re intubated for progressive respiratory failure.   6/30 transition to ASV   Ventilator bundle  SICU ventilator weaning protocol.    Septic shock (HCC)- (present on admission)  Assessment & Plan  Acute septic shock secondary to mesenteric ischemia.  Ongoing resuscitation with crystalloids, vasopressor support, broad-spectrum empiric antibiotic therapy, and trending of endpoints of resuscitation.  6/25 Piperacillin/tazobactam day 7/7.  6/26 Leukocytosis of 26.5, resume Zosyn - low threshold to scan.  6/29  Hypotension, lactic acidosis, respiratory failure.  Volume therapy, intubation.  C diff negative.   Zyvox and Zosyn # 1.  BAL pending.    6/30 Zyvox discontinued    Volume overload- (present on admission)  Assessment & Plan  6/24, 6/25 Diuresis with lasix.  6/26 Ongoing diuresis with lasix (patient is 20kg up from admission weight), judicious electrolyte replacement (K, phos, Calcium).    Protein calorie malnutrition (HCC)- (present on admission)  Assessment & Plan  Gut in discontinuity, will eventually have multiple high risk anastomoses. Unable to tolerate any enteral nutrition.  6/21TPN started.  6/24 Plan to have Cortrak placed and start nasoenteric feeding and stop feeding remnant stomach. Remnant should be for decompression.  6/26 Impact tube feeds and TPN, will also start clears. Likely to have poor absorption.  6/29: TPN support continues.     Anemia associated with acute blood loss- (present on admission)  Assessment & Plan  Progressive acute blood loss anemia associated with multiple operations.  Patient is a J[employer] who refuses blood products.  6/23 Patient accepts Epogen, given.  6/24 Iron replacement initiated. B12 low normal.        Discussed patient condition with RN, RT, Pharmacy, Dietary,  and Patient.  CRITICAL CARE TIME EXCLUDING PROCEDURES:  37  minutes

## 2021-07-01 NOTE — PROGRESS NOTES
Pharmacy TPN Day # 11         7/1/2021  Dosing Weight: 100 kg (admit weight) for kcal goals, 59kg (IBW) for protein goals    TPN currently providing 100% of goal  TPN goal: 1941-3673 kcal/day including 2 gm/kg/day Protein      TPN indication: ischemic bowel s/p resection, suspected long-term lack of enteral access     Pertinent PMH: Presented to OSH with severe abdominal pain, transferred to [facility] for concern of ischemic bowel. Of note, patient has a history of gastric bypass (~2005). Patient underwent ex lap w/ small bowel resection and damage control closure on 6/18, additional proximal and distal small bowel resections with damage control closure on 6/19, and revision of G-tube and RYGB with fascial closure on 6/21. TPN was initiated on 6/21 given likely prolonged NPO status    No data recorded.  .  Recent Labs     06/28/21  2038 06/28/21  2330 06/29/21  0600 06/29/21  1035 06/30/21  0735 06/30/21  1328 06/30/21  1936 07/01/21  0134 07/01/21  0607   SODIUM  --    < > 149*   < > 146*   < > 149* 147* 148*   POTASSIUM  --    < > 3.4*   < > 3.3*   < > 3.9 4.3 3.6   CHLORIDE  --    < > 113*   < > 111   < > 114* 113* 115*   CO2  --    < > 30   < > 28   < > 29 27 28   BUN  --    < > 28*   < > 31*   < > 31* 32* 31*   CREATININE  --    < > 0.58   < > 0.68   < > 0.62 0.65 0.66   GLUCOSE  --    < > 168*   < > 128*   < > 134* 230* 126*   CALCIUM  --    < > 7.5*   < > 7.2*   < > 7.1* 7.2* 7.4*   ASTSGOT  --    < > 49*   < > 26  --   --  18 21   ALTSGPT  --    < > 68*   < > 45  --   --  39 38   ALBUMIN  --    < > 1.6*   < > 1.3*  --   --  1.4* 1.3*   TBILIRUBIN  --    < > 1.6*   < > 1.3  --   --  1.3 1.5   PHOSPHORUS  --    < > 2.5  --  2.5  --   --   --  2.9   MAGNESIUM  --    < > 2.0  --  1.9  --   --   --  2.0   PREALBUMIN 8.3*  --   --   --   --   --   --   --   --     < > = values in this interval not displayed.     Accu-Checks  Recent Labs     06/30/21  1811 07/01/21  0023 07/01/21  1226   POCGLUCOSE 117* 107* 125*        Vitals:    07/01/21 0600 07/01/21 0700 07/01/21 0800 07/01/21 0900   BP: (!) 90/49 (!) 98/51 (!) 97/50 (!) 88/54   Weight:       Height:           Intake/Output Summary (Last 24 hours) at 7/1/2021 1343  Last data filed at 7/1/2021 0600  Gross per 24 hour   Intake 782.52 ml   Output 1950 ml   Net -1167.48 ml       Orders Placed This Encounter   Procedures   • Diet NPO     Standing Status:   Standing     Number of Occurrences:   1     Order Specific Question:   Restrict to:     Answer:   Strict [1]         TPN for past 72 hours (Show up to 3 orders; newest on the left. Changes between the two most recent orders are indicated.)     Start date and time   07/01/2021 2000 06/30/2021 2000 06/29/2021 2000      TPN Central Line Formulation [[id number]] TPN Central Line Formulation [[id number]] TPN Central Line Formulation [[id number]]    Order Status  Active Last Dose in Progress Completed    Last Admin   New Bag at 06/30/2021 1924 by Jen Greco R.N. New Bag at 06/29/2021 2005 by Christy Espinal R.N.       Base    Clinisol 15%  118 g 118 g 118 g    dextrose 70%  212 g 212 g 212 g       Additives    potassium phosphate  45 mmol 45 mmol 45 mmol    potassium chloride  50 mEq 80 mEq 60 mEq    potassium acetate  50 mEq -- --    sodium acetate  -- -- 50 mEq    sodium chloride  -- -- 50 mEq    magnesium sulfate  20 mEq 20 mEq 20 mEq    M.T.E.-4 Adult  1 mL 1 mL 1 mL    M.V.I. Adult  10 mL 10 mL 10 mL    famotidine  40 mg 40 mg 40 mg    thiamine  100 mg 100 mg 100 mg    folic acid  1 mg 1 mg 1 mg       QS Base    sterile water  264.3 mL 274.3 mL 246.8 mL       Energy Contribution    Proteins  -- -- --    Dextrose  -- -- --    Lipids  -- -- --    Total  -- -- --       Electrolyte Ion Calculated Amount    Sodium  -- -- 100 mEq    Potassium  166 mEq 146 mEq 126 mEq    Calcium  -- -- --    Magnesium  20.02 mEq 20.02 mEq 20.02 mEq    Aluminum  -- -- --    Phosphate  45 mmol 45 mmol 45 mmol    Chloride  50 mEq 80 mEq 110 mEq     Acetate  149.91 mEq 99.91 mEq 149.91 mEq       Other    Total Protein  118 g 118 g 118 g    Total Protein/kg  0.97 g/kg 0.96 g/kg 1.01 g/kg    Total Amino Acid  -- -- --    Total Amino Acid/kg  -- -- --    Glucose Infusion Rate  1.21 mg/kg/min 1.2 mg/kg/min 1.2 mg/kg/min    Osmolarity (Estimated)  -- -- --    Volume  1,440 mL 1,440 mL 1,440 mL    Rate  60 mL/hr 60 mL/hr 60 mL/hr    Dosing Weight  122 kg 123 kg 117 kg    Infusion Site  [facility]            This formula provides:  % kcal as lipids = 0   Grams protein/kg = 2  Non-protein calories = 721  Kcals/kg = 11.9  Total daily calories = 1193     Comments:  1. Patient remains intubated, WBC decreased, C.diff negative, all cultures currently negative, no obvious leak on CT 6/28/21.  Plan to place Cortrak today and hopefully begin feeding enterally.  2. Macronutrients: current TPN formulation providing 100% of caloric needs (note hypocaloric and high protein goals, given BMI). Continue TPN at 100% for now. No lipids in TPN, patient extubated and propofol discontinued, will leave lipids out of TPN tonight.  3. Micronutrients/electrolytes:  Kscale discontinued and 20 additional mEq potassium added to this evening's TPN formulation.  Potassium salts split to include acetate and decrease chloride as patient is hyperchloremic.  Remains hypernatremic sodium removed from TPN per phyisician request.  Active diuresis continues. Folic acid and thiamine removed from TPN.  Famotidine also remains in TPN.   4. Glycemic Control: No insulin required, no change to TPN.  5. Fluid status: TPN at 60 mL/hr, no current maintenance fluid and diuresis continues. SCr appears to be at baseline.       Bucky Lorenzana PharmD, BCCCP, BCPS

## 2021-07-01 NOTE — PROGRESS NOTES
Cortrak Placement    Tube Team verified patient name and medical record number prior to tube placement.  Cortrak tube (43 inches, 10 [place]) placed at 45 cm in right nare.  Per Cortrak picture, tube appears to be in the stomach.  Nursing Instructions: Awaiting KUB to confirm placement before use for medications or feeding. Once placement confirmed, flush tube with 30 ml of water, and then remove and save stylet, in patient medication drawer.

## 2021-07-01 NOTE — PROGRESS NOTES
Assessment/Plan:    No problem-specific Assessment & Plan notes found for this encounter  Cough, intermittent, still smokes, risks aware  No hemoptysis or f/c  Tessalon prn but advised she stop smoking more importantly    Edema, suspect due to lasix noncompliance, she takes for chf, she needs to make a f/u appt with cardiology Dr Vlad Owen per last cardio notes, pt aware    No bloody mucus  No f/c     Diagnoses and all orders for this visit:    Bilateral leg edema    Hypertensive heart and kidney disease with heart failure and with chronic kidney disease stage III [facility])  -     Ambulatory referral to Cardiology; Future    Coronary artery disease involving native coronary artery of native heart without angina pectoris  -     Ambulatory referral to Cardiology; Future    Chronic combined systolic and diastolic congestive heart failure ([facility] )  -     Ambulatory referral to Cardiology; Future    Cough  -     benzonatate (TESSALON) 200 MG capsule; Take 1 capsule (200 mg total) by mouth 3 (three) times a day as needed for cough    Essential hypertension    Chronic diastolic congestive heart failure (HCC)  -     furosemide (LASIX) 20 mg tablet; Take one tablet daily every Mon,Wed,Fri    CKD stage 2 due to type 2 diabetes mellitus ([facility] )              Return if symptoms worsen or fail to improve  Subjective:      Patient ID: Nakia Newsome is a 74 y o  female  Chief Complaint   Patient presents with    Joint Swelling     both ankles staqrted about 2 days ago  patient also has wrist pain   nm/lpn       HPI  2d ago  Swelling b/l, R>L  May have had salty food  Does not always avoid salt    No new meds  No new herbals  Ran out of diuretic 1m ago    Lasix 20mg on MWF  Has CHF  Also CAD  Cardio is Dr Vlad Owen    htn borderline  Cont meds  Low salt diet  F/u regularly    DM2  Keep f/u at endocrinology  She states she has no test strips or machine but I reviewed for her that Dr Keri Snow did indeed send a device and strips to her pharmacy in April 2021      The following portions of the patient's history were reviewed and updated as appropriate: allergies, current medications, past family history, past medical history, past social history, past surgical history and problem list     Review of Systems   Constitutional: Negative for fever  Respiratory: Negative for shortness of breath            Current Outpatient Medications   Medication Sig Dispense Refill    aspirin (ECOTRIN LOW STRENGTH) 81 mg EC tablet Take 1 tablet (81 mg total) by mouth daily  0    atorvastatin (LIPITOR) 40 mg tablet Take 1 tablet (40 mg total) by mouth daily 90 tablet 1    calcium carbonate-vitamin D (OSCAL-D) 500 mg-200 units per tablet Take 2 tablets by mouth 3 (three) times a day 180 tablet 0    furosemide (LASIX) 20 mg tablet Take one tablet daily every Mon,Wed,Fri 30 tablet 0    lisinopril (ZESTRIL) 10 mg tablet Take 1 tablet (10 mg total) by mouth daily 90 tablet 1    metFORMIN (GLUCOPHAGE) 1000 MG tablet Take 1 tablet (1,000 mg total) by mouth 2 (two) times a day with meals 180 tablet 1    metoprolol succinate (TOPROL-XL) 25 mg 24 hr tablet Take 1 tablet (25 mg total) by mouth daily 90 tablet 1    nicotine (NICODERM CQ) 14 mg/24hr TD 24 hr patch Place 1 patch on the skin daily 28 patch 1    Accu-Chek FastClix Lancets MISC Use to check blood sugar 1 time a day (Patient not taking: Reported on 7/1/2021) 102 each 3    benzonatate (TESSALON) 200 MG capsule Take 1 capsule (200 mg total) by mouth 3 (three) times a day as needed for cough 60 capsule 1    Blood Glucose Monitoring Suppl (Accu-Chek Guide Me) w/Device KIT Use to test blood sugar 1 time a day (Patient not taking: Reported on 4/28/2021) 1 kit 0    glucose blood (Accu-Chek Guide) test strip Use to test blood sugar 1 time a day (Patient not taking: Reported on 4/28/2021) 200 each 3    ibuprofen (MOTRIN) 200 mg tablet Take 2 tablets (400 mg total) by mouth every 6 (six) hours as needed for moderate pain (Patient not taking: Reported on 4/28/2021) 120 tablet 0    NEEDLE, DISP, 30 G 30G X 1/2" MISC Use daily (Patient not taking: Reported on 7/1/2021) 100 each 0     No current facility-administered medications for this visit  Objective:    /78   Pulse 68   Temp (!) 97 1 °F (36 2 °C)   Resp 18   Ht 5' 3 75" (1 619 m)   Wt 61 9 kg (136 lb 6 4 oz)   LMP  (LMP Unknown)   BMI 23 60 kg/m²        Physical Exam  Vitals and nursing note reviewed  Constitutional:       Appearance: She is well-developed  She is not ill-appearing  HENT:      Head: Normocephalic  Eyes:      Conjunctiva/sclera: Conjunctivae normal    Cardiovascular:      Rate and Rhythm: Normal rate and regular rhythm  Pulses: no weak pulses          Dorsalis pedis pulses are 2+ on the right side and 2+ on the left side  Pulmonary:      Effort: Pulmonary effort is normal  No respiratory distress  Breath sounds: No rales  Abdominal:      Palpations: Abdomen is soft  Musculoskeletal:         General: No deformity  Cervical back: Neck supple  Right lower leg: Edema present  Left lower leg: Edema present  Skin:     General: Skin is warm and dry  Coloration: Skin is not pale  Neurological:      Mental Status: She is alert  Psychiatric:         Behavior: Behavior normal          Thought Content: Thought content normal          Diabetic Foot Exam    Patient's shoes and socks removed  Right Foot/Ankle   Right Foot Inspection  Skin Exam: skin not intact and no abnormal color                            Sensory       Monofilament testing: intact  Vascular    The right DP pulse is 2+  Left Foot/Ankle  Left Foot Inspection  Skin Exam: skin not intact and normal color                                         Sensory       Monofilament: intact  Vascular    The left DP pulse is 2+  Assign Risk Category:  No deformity present; No loss of protective sensation;  No weak pulses       Risk: 0         Ace Medici Lennox, DO

## 2021-07-01 NOTE — NON-PROVIDER
Patient Summary:  Josephine Casas is a 66yoF w:    * Mesenteric ischemia     Respiratory failure following trauma and surgery     Septic shock     Volume overload     Protein calorie malnutrition      Anemia associated with acute blood loss    24 Hour Events:    G tube drained 600 overnight dark bilious  No propofol since 9pm 6/29  Adequate spontaneous breathing    SUBJECTIVE/OBJECTIVE:  NEURO:  GCS  24hr:11T   Current:   Exam PERRLA, motor and sensation intact in all extremities   Meds/Pain Fentanyl 100mcg   Labs none   Imaging none     RESP:  RR, SpO2 96%on FiO2 50%   Vent cmv peep 8, 8.9 minute vent   Exam Diminished breath sounds in lower lung fields   Meds    Labs 6/30  PH: 7.55  PCO2: 35.2  PO2: 85  HCO3: 30.8   Imaging 7/1 CXR  1. Stable lines and tubes.  2. Hypoinflation. Unchanged left basilar atelectasis versus consolidation. Suspected small left effusion.     CV:  HR/BP/MAP/  CVP HR 100s  BP 90s-50s   Exam Normal RRR   Meds Furosemide 20mg   Labs none   Imaging none     GI:  Diet/Bowel Function TPN, BM yesterday   Exam No abdominal tenderness; so drainage or dehiscence at wound site   Labs none   Imaging none     F/E/N-:  I/O  24hr totals:    Intake:  2.2L   Output:   1.7 L urine                   600 G-tube bilious drainage                      Fluid Balance:-100mL                          24hr:                          Admission:   Exam Harrington draining normal appearing urine  G-tube draining bilious drainage from bypassed stomach   Meds none   Labs none   Nutrition TPN     HEME:  Labs Hgb: 6.6 dec   Hct: 20.8 dec   Transfusions none   Imaging none     ID:  Temp  24hr:   Tmax:99   Labs WBC 23.9 dec from yesterday (30.5)   Micro No growth   ABX Zosyn 3.375mg q8     ENDOCRINE:  Blood Sugar 107   Meds none     MUSCULOSKELETAL:  Imaging none   WB Status none     SKIN:  Exam none   Interventions none       PROPHYLAXIS:  DVT Lovenox 40mg   GI none   Restraints D/c nonviolent restraints       * Mesenteric ischemia  ([facility])- (present on admission)  Assessment & Plan  Acute mid small bowel mesenteric ischemia secondary to unknown etiology.  6/18 Emergent laparotomy with small bowel resection and damage control closure.  6/19 Expedited second look laparotomy for persistent lactic acidosis. Biliary limb of [place]-en-Y gastric bypass with significant distention and ischemia. Gastrostomy, additional proximal and distal small bowel resections, and damage control closure.  6/21 Revision of G-tube and RYGB, fascial closure.  High risk for post-op intra-abdominal abscess and/or anastomotic failure.  6/26 Clears initiated.  6/28:  CT : no leak.  No obvious abscess.    6/30 g tube to gravity  -G-tube likely draining source of infection. Retain and do not feed via G-tube     Respiratory failure following trauma and surgery ([facility])- (present on admission)  Assessment & Plan  Mechanical ventilation following emergent abdominal surgery.  6/25 Extubated.  6/29 Re intubated for progressive respiratory failure.   6/30 transition to ASV   Ventilator bundle  -Extubate, monitor for signs of respiratory insufficiency for possible re-intubation     Septic shock ([facility])- (present on admission)  Assessment & Plan  Acute septic shock secondary to mesenteric ischemia.  Ongoing resuscitation with crystalloids, vasopressor support, broad-spectrum empiric antibiotic therapy, and trending of endpoints of resuscitation.  6/25 Piperacillin/tazobactam day 7/7.  6/26 Leukocytosis of 26.5, resume Zosyn - low threshold to scan.  6/29  Hypotension, lactic acidosis, respiratory failure.  Volume therapy, intubation.  C diff negative.  Zyvox and Zosyn # 1.  BAL pending.    No intracellular growth from BAL  -6/30 Zyvox discontinued, Zosyn continued     Volume overload- (present on admission)  Assessment & Plan  6/24, 6/25 Diuresis with lasix.  6/26 Ongoing diuresis with lasix (patient is 20kg up from admission weight), judicious electrolyte replacement (K, phos,  Calcium).  7/1: Na corrected in TPN feeds; Furosemide 20mg     Protein calorie malnutrition (HCC)- (present on admission)  Assessment & Plan  Gut in discontinuity, will eventually have multiple high risk anastomoses. Unable to tolerate any enteral nutrition.  6/21TPN started.  6/24 Plan to have Cortrak placed and start nasoenteric feeding and stop feeding remnant stomach. Remnant should be for decompression.  6/26 Impact tube feeds and TPN, will also start clears. Likely to have poor absorption.  -TPN support continues.        LINES/TUBES/CATHETERS:    Harrington catheter  G-tube draining bypass limb

## 2021-07-01 NOTE — DIETARY
Nutrition services: Day 13 of admit. Pt discussed in critical care rounds this morning. Will place Cortrak and start trickle tube feedings with Peptamen Intense @ 10 ml/hr advancing only with MD orders. Full goal for Peptamen Intense will be 60 ml/hr which will provide 1440 kcals, 134 g protein, 1152 ml H20/day meeting 100% of nutritional needs. Pt is currently receiving TPN providing 100% of needs.

## 2021-07-01 NOTE — WOUND TEAM
In to see pt for area of concern to buttocks near anus. Pt was turned so that area could be assessed. Wiped off cream with washcloth, and assessed area. Pink areas that had picture yesterday have resolved. Pt still with fissure to sacrococcygeal area, no wound inside gluteal fold.

## 2021-07-01 NOTE — RESPIRATORY CARE
Respiratory Therapy Update    pt extubated per MD; cuff leak present no stridor noted. Placed on 3LNC no distress noted

## 2021-07-01 NOTE — CARE PLAN
Problem: Ventilation  Goal: Ability to achieve and maintain unassisted ventilation or tolerate decreased levels of ventilator support  Description: Document on Vent flowsheet    1.  Support and monitor invasive and noninvasive mechanical ventilation  2.  Monitor ventilator weaning response  3.  Perform ventilator associated pneumonia prevention interventions  4.  Manage ventilation therapy by monitoring diagnostic test results  Outcome: Progressing   Ventilator Daily Summary    Vent Day # 2 ETT 7.5 @ 21    Ventilator settings changed this shift: No APVCMV 16/340/10/50%    Weaning trials: Yes     Respiratory Procedures: None     Plan: Continue current ventilator settings and wean mechanical ventilation as tolerated per physician orders.

## 2021-07-01 NOTE — PROGRESS NOTES
2 RN skin check completed     Areas of concern/Skin observations:  · Small skin tear to left forearm-mepitel applied  · Two small circular scabs to right AC-biatin applied  · Midline abd incision, leaking yellow fluid from bottom-island dressing applied  · gtube reddened underneath securement appliance-optifoam applied  · Moisture fissure to sacrum, area reddened and areas are open-barrier cream applied, mepilex in place    Devices in use, assessed under and interventions (as appropriate) for skin protection:  · SCDs, BP cuff, SaO2 monitor, Gtube,     Interventions in place such as:   · q2 hour turns  · Keeping skin clean and dry  · Use of products such as barrier wipes/cream  · Waffle cushion while OOB: N/A  · Bed Type: low air loss mattress  · Mobility: EOB  · Collaboration with RT to rotate ETT Q2H

## 2021-07-02 NOTE — PROGRESS NOTES
"    DATE: 7/2/2021    Post Operative Day  11 bowel resection and visceral reconstruction.    Interval Events:  Staples removed, concern for leak.     PHYSICAL EXAMINATION:  Vital Signs: /61   Pulse (!) 108   Temp 37.4 °C (99.4 °F) (Temporal)   Resp (!) 31   Ht 1.676 m (5' 6\")   Wt 123 kg (270 lb 11.6 oz)   SpO2 94%     Abdomen is obese and nontender, staples removed purulent and enteric appearing material removed from wound, Kerlix packed in the wound g tube to gravity drainage.    Laboratory Values:   Recent Labs     06/30/21  1000 07/01/21  0607 07/02/21  0526   WBC 30.5* 23.9* 20.6*   RBC 2.38* 2.22* 2.42*   HEMOGLOBIN 6.9* 6.7* 7.0*   HEMATOCRIT 22.0* 20.8* 22.7*   MCV 92.4 93.7 93.8   MCH 29.0 30.2 28.9   MCHC 31.4* 32.2* 30.8*   RDW 47.9 50.9* 55.0*   PLATELETCT 331 367 414   MPV 12.1 11.8 12.1     Recent Labs     07/01/21  0134 07/01/21  0607 07/02/21  0526   SODIUM 147* 148* 150*   POTASSIUM 4.3 3.6 3.9   CHLORIDE 113* 115* 116*   CO2 27 28 24   GLUCOSE 230* 126* 130*   BUN 32* 31* 30*   CREATININE 0.65 0.66 0.59   CALCIUM 7.2* 7.4* 7.6*     Recent Labs     07/01/21  0134 07/01/21  0607 07/02/21  0526   ASTSGOT 18 21 25   ALTSGPT 39 38 39   TBILIRUBIN 1.3 1.5 2.0*   ALKPHOSPHAT 107* 109* 124*   GLOBULIN see below see below see below            Imaging:   DX-CHEST-PORTABLE (1 VIEW)   Final Result      Interval extubation now with significant worsening bibasilar atelectasis and probable consolidation      DX-ABDOMEN FOR TUBE PLACEMENT   Final Result      1.  Feeding tube tip overlies the gastric fundus without significant interval change.      DX-CHEST-PORTABLE (1 VIEW)   Final Result         1. Stable lines and tubes.   2. Hypoinflation. Unchanged left basilar atelectasis versus consolidation. Suspected small left effusion.         DX-CHEST-PORTABLE (1 VIEW)   Final Result      1.  Improving bilateral basilar atelectasis and/or consolidation.   2.  Previously demonstrated subdiaphragmatic free " intraperitoneal air is no longer visualized.      US-EXTREMITY VENOUS UPPER UNILAT LEFT   Final Result      DX-CHEST-PORTABLE (1 VIEW)   Final Result      Endotracheal tube is satisfactory in position. No other significant change.         DX-CHEST-PORTABLE (1 VIEW)   Final Result      Endotracheal tube terminates just above the maría. No other significant change.      These findings were discussed with patient's nurse Cirilo on 6/29/2021 7:24 AM.         DX-CHEST-PORTABLE (1 VIEW)   Final Result      1.  New airspace opacity in the right upper lobe may represent atelectasis or developing pneumonia.   2.  Free intraperitoneal air likely related to recent surgery.      CT-ABDOMEN-PELVIS WITH   Final Result      1.  New gastrostomy tube and new postoperative changes involving the small bowel in the upper abdomen.      2.  Small amount of free air in the abdomen consistent with recent surgery.      3.  New small bilateral pleural effusions and bibasilar areas of atelectasis or pneumonia.      4.  No evidence of bowel or renal obstruction.      5.  Cholecystectomy.      DX-CHEST-PORTABLE (1 VIEW)   Final Result         No significant interval change.            DX-CHEST-PORTABLE (1 VIEW)   Final Result         No significant interval change.         DX-CHEST-PORTABLE (1 VIEW)   Final Result         1. Interval extubation. No other significant interval change.      DX-CHEST-PORTABLE (1 VIEW)   Final Result         No new abnormalities have developed on portable chest radiograph since the prior examination.  No new infiltrates or consolidations are identified.      DX-ABDOMEN FOR TUBE PLACEMENT   Final Result      Feeding tube tip projects over the proximal stomach.      DX-CHEST-PORTABLE (1 VIEW)   Final Result         1. No significant interval change.      DX-CHEST-PORTABLE (1 VIEW)   Final Result      1.  Persistent atelectasis or pneumonia in the left lower lobe and minor interstitial edema or pneumonia in the right  lower lobe with small bilateral pleural effusions.      2.  Interval removal of NG tube.      DX-CHEST-PORTABLE (1 VIEW)   Final Result         1.  There are persistent opacifications in the left lower lung medially and medial right lower lung.      2.  No new infiltrates or consolidations are identified.      DX-CHEST-PORTABLE (1 VIEW)   Final Result         1.  Hazy left lung base infiltrate.   2.  Nasogastric tube terminates near the gastroesophageal junction, recommend advancement      DX-CHEST-PORTABLE (1 VIEW)   Final Result         1.  Hazy left lung base opacities suggests infiltrates.   2.  Right internal jugular central line terminates in the right atrium, could be withdrawn 4 cm.      OUTSIDE IMAGES-CT ABDOMEN /PELVIS   Final Result      OUTSIDE IMAGES-CT ABDOMEN /PELVIS   Final Result      OUTSIDE IMAGES-CT CHEST   Final Result      CT-ABDOMEN-PELVIS WITH    (Results Pending)       ASSESSMENT AND PLAN:     * Mesenteric ischemia (HCC)- (present on admission)  Assessment & Plan  Acute mid small bowel mesenteric ischemia secondary to unknown etiology.  6/18 Emergent laparotomy with small bowel resection and damage control closure.  6/19 Expedited second look laparotomy for persistent lactic acidosis. Biliary limb of [place]-en-Y gastric bypass with significant distention and ischemia. Gastrostomy, additional proximal and distal small bowel resections, and damage control closure.  6/21 Revision of G-tube and RYGB, fascial closure.  High risk for post-op intra-abdominal abscess and/or anastomotic failure.  6/26 Clears initiated.  6/28:  CT : no leak.  No obvious abscess.    6/30 negative stomach g tube to gravity: likely stasis  7/1 start trickle feeding  7/2 Midline with enteric appearing material in wound, some staples d/gamaliel kerlex packed into wound  [facility].    Patient is Cheondoism  Assessment & Plan  Critical anemia with persistently low hemoglobin  Patient is a Cheondoism  and will not accept transfusions  EPO not an option  Iron studies ordered  Try to offload some excess volume to improve hematocrit    Respiratory failure following trauma and surgery (HCC)- (present on admission)  Assessment & Plan  Mechanical ventilation following emergent abdominal surgery.  6/25 Extubated.  6/29 Re intubated for progressive respiratory failure.   6/30 transition to ASV   Ventilator bundle  SICU ventilator weaning protocol.  7/1 extubated    Septic shock (HCC)- (present on admission)  Assessment & Plan  Acute septic shock secondary to mesenteric ischemia.  Ongoing resuscitation with crystalloids, vasopressor support, broad-spectrum empiric antibiotic therapy, and trending of endpoints of resuscitation.  6/25 Piperacillin/tazobactam day 7/7.  6/26 Leukocytosis of 26.5, resume Zosyn - low threshold to scan.  6/29  Hypotension, lactic acidosis, respiratory failure.  Volume therapy, intubation.  C diff negative.  Zyvox and Zosyn # 1.  BAL pending.    6/30 Zyvox discontinued  7/1 white count improving: Trend    Insomnia  Assessment & Plan  Premorbid on temazepam  Restart prn when taking p.o.    Volume overload- (present on admission)  Assessment & Plan  6/24, 6/25 Diuresis with lasix.  6/26 Ongoing diuresis with lasix (patient is 20kg up from admission weight), judicious electrolyte replacement (K, phos, Calcium).    Protein calorie malnutrition (HCC)- (present on admission)  Assessment & Plan  Gut in discontinuity, will eventually have multiple high risk anastomoses. Unable to tolerate any enteral nutrition.  6/21TPN started.  6/24 Plan to have Cortrak placed and start nasoenteric feeding and stop feeding remnant stomach. Remnant should be for decompression.  6/26 Impact tube feeds and TPN, will also start clears. Likely to have poor absorption.  6/29: TPN support continues.     Anemia associated with acute blood loss- (present on admission)  Assessment & Plan  Progressive acute blood loss anemia  associated with multiple operations.  Patient is a Hinduism who refuses blood products.  6/23 Patient accepts Epogen, given.  6/24 Iron replacement initiated. B12 low normal.    High concern for leak from one of her anastomotic connections.  Extensive conversation had with patient regarding the possible treatments of this leak.  Would attempt conservative treatment at this time given her high risk of future surgery and any further bowel resections as likely result in severe malnutrition and short gut syndrome and likely be fatal.  Patient understands this and does not wish to proceed with any further surgical interventions even if that decision leads to her death.  Plan for CT scan with oral contrast to evaluate for possible leak and drainage by interventional radiology if possible.     ____________________________________     Altaf Crisostomo M.D.    DD: 7/1/2021  11:12 AM

## 2021-07-02 NOTE — NON-PROVIDER
Patient Summary:  Josephine Casas is a 66yoF w:    * Mesenteric ischemia     Respiratory failure following trauma and surgery     Septic shock     Volume overload     Protein calorie malnutrition      Anemia associated with acute blood loss    24 Hour Events:    Multiple episodes of respiratory trouble  Abdominal wound draining greenish yellow discharge,  Per Dr. Crisostomo consult, will advise against surgery and medically manage. Order CT abdomen  Consult palliative prior to reintubation  SUBJECTIVE/OBJECTIVE:  NEURO:  GCS  24hr:15   Current:   Exam PERRLA, motor and sensation intact in all extremities   Meds/Pain Dilaudid 1mg, Temazapam 15mg   Labs none   Imaging none     RESP:  RR, SpO2 96% on 6L NC, RR 32   Vent none   Exam Extensive expiratory wheezing throughout bilateral lung fields   Meds    Labs 6/30  PH: 7.55  PCO2: 35.2  PO2: 85  HCO3: 30.8   Imaging 7/2 CXR  Interval extubation now with significant worsening bibasilar atelectasis and probable consolidation     CV:  HR/BP/MAP/  CVP HR 100s  BP 90s-120s   Exam Normal RRR   Meds Furosemide 20mg   Labs none   Imaging none     GI:  Diet/Bowel Function TPN with Peptamin supplement via NG tube   Exam No abdominal tenderness; green-yellow discharge oozing from wound site   Labs none   Imaging none     F/E/N-:  I/O  24hr totals:    Intake:  250mL   Output:  1805 mL urine                   875 G-tube bilious drainage                      Fluid Balance:-100mL                          24hr:                          Admission:  Labs 7/2  Na: 150  K: 3.9  Cl: 116  CO2: 24  BUN 30  Cr: .6           Exam Harrington draining normal appearing urine  G-tube draining bilious drainage from bypassed stomach   Meds none   Labs none   Nutrition TPN     HEME:  Labs Hgb: 7 uptrending   Hct: 22.7 dec   Transfusions none   Imaging none     ID:  Temp  24hr:   Tmax:100.4   Labs WBC 20.6 dec from yesterday   Micro BAL shows MOULD growth   ABX Zosyn 3.375mg q8     ENDOCRINE:  Blood Sugar 114    Meds none     MUSCULOSKELETAL:  Imaging none   WB Status none     SKIN:  Exam none   Interventions none       PROPHYLAXIS:  DVT Lovenox 40mg   GI none   Restraints D/c nonviolent restraints       * Mesenteric ischemia (HCC)- (present on admission)  Assessment & Plan  Acute mid small bowel mesenteric ischemia secondary to unknown etiology.  6/18 Emergent laparotomy with small bowel resection and damage control closure.  6/19 Expedited second look laparotomy for persistent lactic acidosis. Biliary limb of [place]-en-Y gastric bypass with significant distention and ischemia. Gastrostomy, additional proximal and distal small bowel resections, and damage control closure.  6/21 Revision of G-tube and RYGB, fascial closure.  High risk for post-op intra-abdominal abscess and/or anastomotic failure.  6/26 Clears initiated.  6/28:  CT : no leak.  No obvious abscess.    6/30 g tube to gravity  -G-tube likely draining source of infection. Retain and do not feed via G-tube     Respiratory failure following trauma and surgery (HCC)- (present on admission)  Assessment & Plan  Mechanical ventilation following emergent abdominal surgery.  6/25 Extubated.  6/29 Re intubated for progressive respiratory failure.   6/30 transition to ASV   Ventilator bundle  Extubate 7/1  -Maintain PEP, IPV, reintubate if necessary     Septic shock (HCC)- (present on admission)  Assessment & Plan  Acute septic shock secondary to mesenteric ischemia.  Ongoing resuscitation with crystalloids, vasopressor support, broad-spectrum empiric antibiotic therapy, and trending of endpoints of resuscitation.  6/25 Piperacillin/tazobactam day 7/7.  6/26 Leukocytosis of 26.5, resume Zosyn - low threshold to scan.  6/29  Hypotension, lactic acidosis, respiratory failure.  Volume therapy, intubation.  C diff negative.  Zyvox and Zosyn # 1.  BAL pending.   6/30 Zyvox discontinued, Zosyn continued  7/2 Start Voriconazole for mould growth     Volume overload- (present on  admission)  Assessment & Plan  6/24, 6/25 Diuresis with lasix.  6/26 Ongoing diuresis with lasix (patient is 20kg up from admission weight), judicious electrolyte replacement (K, phos, Calcium).  7/1: Na corrected in TPN feeds; Furosemide 20mg  7/2: na 150, infuse D5W     Protein calorie malnutrition (HCC)- (present on admission)  Assessment & Plan  Gut in discontinuity, will eventually have multiple high risk anastomoses. Unable to tolerate any enteral nutrition.  6/21TPN started.  6/24 Plan to have Cortrak placed and start nasoenteric feeding and stop feeding remnant stomach. Remnant should be for decompression.  6/26 Impact tube feeds and TPN, will also start clears. Likely to have poor absorption.  -TPN support continues.      LINES/TUBES/CATHETERS:    Harrington catheter  G-tube draining bypass limb

## 2021-07-02 NOTE — PROGRESS NOTES
2 RN skin check completed      Areas of concern/Skin observations:  · Small skin tear to left forearm  · Two small circular scabs to right AC-biatin applied  · Midline abd incision, leaking yellow/brown fluid from bottom-abd pad and hypafix applied   · gtube reddened underneath securement appliance-optifoam in place  · Moisture fissure to sacrum, area reddened and areas are open-barrier cream applied, mepilex in place  · No redness to right nare from coretrack in place     Devices in use, assessed under and interventions (as appropriate) for skin protection:  · SCDs, BP cuff, SaO2 monitor, Gtube, right nare coretrack     Interventions in place such as:   · q2 hour turns  · Keeping skin clean and dry  · Use of products such as barrier wipes/cream  · Waffle cushion while OOB: N/A  · Bed Type: low air loss mattress  · Mobility: EOB

## 2021-07-02 NOTE — DISCHARGE PLANNING
Patient discussed in IDT rounds with Dr. Kelly. No case management or dc needs at this time. On going ICU management.

## 2021-07-02 NOTE — PROGRESS NOTES
Dr Crisostomo notified of pts foul discharge from midline surgical incision. MD at bedside for assessment. Trickle feeds stopped; strict bowel rest, orders for abd CT with oral contrast received. Site packed by MD with complete dressing change. Critical Care MD updated.

## 2021-07-02 NOTE — CARE PLAN
Problem: Fluid Volume  Goal: Fluid volume balance will be maintained  Outcome: Progressing     Problem: Respiratory  Goal: Patient will achieve/maintain optimum respiratory ventilation and gas exchange  Outcome: Progressing     Problem: Physical Regulation  Goal: Diagnostic test results will improve  Outcome: Progressing  Goal: Signs and symptoms of infection will decrease  Outcome: Progressing   The patient is Watcher - Medium risk of patient condition declining or worsening    Shift Goals  Clinical Goals: CT abd with oral contrast, Palliative care consult, reorient  Patient Goals: Express needs, mobilize  Family Goals: Visist today    Progress made toward(s) clinical / shift goals:  Ct completed, consents for IR drain placement completed, awaiting IR drain placement

## 2021-07-02 NOTE — PROGRESS NOTES
Trauma / Surgical Daily Progress Note    Date of Service  7/1/2021    Chief Complaint  66 y.o. female admitted 6/18/2021 with Ischemic Bowel    Interval Events  CRITICAL CARE DECISION MAKING:    Patient examined and discussed with team at bedside.    Intestinal ischemia: Recent bout of sepsis is resolving with Zosyn.  Native stomach G-tube placed to gravity yesterday with fair amount of output:?  Foregut stasis versus efferent limb problem.  Continuing G-tube to gravity.  Core track trickle feeding started.  No increase in complaints of nausea.  Small bowel movement yesterday.    Ventilator-Dependent respiratory failure: Good parameters this AM, pt off sedation extubated during rounds.  Unfortunately, poor inspiratory capacity later in the day and this is likely what contributed to patient's slow deterioration after her last extubation.  PEP/IPB ordered but patient essentially nonparticipatory.  Discussed with patient at bedside.  She needs to significantly improve or resolve will be reintubation and likely need for tracheostomy.    Labs reviewed, electrolytes addressed, renal function assessed  Reviewed nutrition strategies, recent indices: Remains on TPN with slow advancement of tube feeds  Addressed GI prophylaxis and bowel frequency  Assessed/discussed/titrated analgesics and need for sedatives: Home dose of Restoril quite high so low-dose Restoril started for sleep.  Addressed DVT prophylaxis  Addressed line days, carmen catheter days, access needs  Addressed family and discharge concerns    Review of Systems  Review of Systems   Constitutional: Positive for fatigue. Negative for chills, diaphoresis and fever.   Respiratory: Positive for shortness of breath. Negative for cough, chest tightness and wheezing.    Gastrointestinal: Positive for abdominal pain. Negative for constipation.   Neurological: Negative.    Psychiatric/Behavioral: Positive for sleep disturbance. The patient is nervous/anxious.       Vital  "Signs for last 24 hours  Pulse:  [] 114  Resp:  [18-31] 31  BP: ()/(42-75) 121/58  SpO2:  [91 %-100 %] 96 %    Hemodynamic parameters for last 24 hours    /58   Pulse (!) 114   Temp 37.4 °C (99.4 °F) (Temporal)   Resp (!) 31   Ht 1.676 m (5' 6\") Comment: drivers license  Wt 122 kg (269 lb 13.5 oz)   SpO2 96%   BMI 43.55 kg/m²     Respiratory Data     Respiration: (!) 31, Pulse Oximetry: 96 %     Work Of Breathing / Effort: Vented  RUL Breath Sounds: Expiratory Wheezes, RML Breath Sounds: Diminished, RLL Breath Sounds: Expiratory Wheezes, NICO Breath Sounds: Diminished, LLL Breath Sounds: Diminished    Physical Exam  Physical Exam  Vitals and nursing note reviewed.   Constitutional:       General: She is awake.      Appearance: She is obese.      Interventions: Nasal cannula in place.   HENT:      Head: Normocephalic and atraumatic.      Nose:      Comments: Core track     Mouth/Throat:      Mouth: Mucous membranes are dry.      Pharynx: Oropharynx is clear.   Eyes:      Extraocular Movements: Extraocular movements intact.      Conjunctiva/sclera: Conjunctivae normal.   Cardiovascular:      Rate and Rhythm: Normal rate and regular rhythm.      Pulses: Normal pulses.   Pulmonary:      Effort: No respiratory distress.      Breath sounds: Examination of the right-lower field reveals decreased breath sounds. Examination of the left-lower field reveals decreased breath sounds. Decreased breath sounds present. No wheezing.   Abdominal:      General: There is no distension.      Palpations: Abdomen is soft.      Tenderness: There is no abdominal tenderness.      Comments: G-tube to gravity with bilious drainage  Incision CDI   Genitourinary:     Comments: Len  Musculoskeletal:         General: Normal range of motion.      Cervical back: Neck supple.      Right lower leg: Edema present.      Left lower leg: Edema present.   Skin:     General: Skin is warm and dry.      Coloration: Skin is pale. "   Neurological:      General: No focal deficit present.      Mental Status: She is oriented to person, place, and time.   Psychiatric:         Behavior: Behavior is cooperative.         Laboratory  Recent Results (from the past 24 hour(s))   POCT glucose device results    Collection Time: 07/01/21 12:23 AM   Result Value Ref Range    Glucose - Accu-Ck 107 (H) 65 - 99 mg/dL   Comp Metabolic Panel    Collection Time: 07/01/21  1:34 AM   Result Value Ref Range    Sodium 147 (H) 135 - 145 mmol/L    Potassium 4.3 3.6 - 5.5 mmol/L    Chloride 113 (H) 96 - 112 mmol/L    Co2 27 20 - 33 mmol/L    Anion Gap 7.0 7.0 - 16.0    Glucose 230 (H) 65 - 99 mg/dL    Bun 32 (H) 8 - 22 mg/dL    Creatinine 0.65 0.50 - 1.40 mg/dL    Calcium 7.2 (L) 8.5 - 10.5 mg/dL    AST(SGOT) 18 12 - 45 U/L    ALT(SGPT) 39 2 - 50 U/L    Alkaline Phosphatase 107 (H) 30 - 99 U/L    Total Bilirubin 1.3 0.1 - 1.5 mg/dL    Albumin 1.4 (L) 3.2 - 4.9 g/dL    Total Protein 4.3 (L) 6.0 - 8.2 g/dL    Globulin see below 1.9 - 3.5 g/dL    A-G Ratio see below g/dL   Triglycerides Starting now and then Every 3 Days    Collection Time: 07/01/21  1:34 AM   Result Value Ref Range    Triglycerides 186 (H) 0 - 149 mg/dL   ESTIMATED GFR    Collection Time: 07/01/21  1:34 AM   Result Value Ref Range    GFR If African American >60 >60 mL/min/1.73 m 2    GFR If Non African American >60 >60 mL/min/1.73 m 2   CBC WITH DIFFERENTIAL    Collection Time: 07/01/21  6:07 AM   Result Value Ref Range    WBC 23.9 (H) 4.8 - 10.8 K/uL    RBC 2.22 (L) 4.20 - 5.40 M/uL    Hemoglobin 6.7 (L) 12.0 - 16.0 g/dL    Hematocrit 20.8 (L) 37.0 - 47.0 %    MCV 93.7 81.4 - 97.8 fL    MCH 30.2 27.0 - 33.0 pg    MCHC 32.2 (L) 33.6 - 35.0 g/dL    RDW 50.9 (H) 35.9 - 50.0 fL    Platelet Count 367 164 - 446 K/uL    MPV 11.8 9.0 - 12.9 fL    Neutrophils-Polys 91.30 (H) 44.00 - 72.00 %    Lymphocytes 4.40 (L) 22.00 - 41.00 %    Monocytes 4.30 0.00 - 13.40 %    Eosinophils 0.00 0.00 - 6.90 %    Basophils 0.00  0.00 - 1.80 %    Nucleated RBC 0.70 /100 WBC    Neutrophils (Absolute) 21.82 (H) 2.00 - 7.15 K/uL    Lymphs (Absolute) 1.05 1.00 - 4.80 K/uL    Monos (Absolute) 1.03 (H) 0.00 - 0.85 K/uL    Eos (Absolute) 0.00 0.00 - 0.51 K/uL    Baso (Absolute) 0.00 0.00 - 0.12 K/uL    NRBC (Absolute) 0.16 K/uL    Hypochromia 1+     Anisocytosis 1+     Macrocytosis 1+    Comp Metabolic Panel    Collection Time: 07/01/21  6:07 AM   Result Value Ref Range    Sodium 148 (H) 135 - 145 mmol/L    Potassium 3.6 3.6 - 5.5 mmol/L    Chloride 115 (H) 96 - 112 mmol/L    Co2 28 20 - 33 mmol/L    Anion Gap 5.0 (L) 7.0 - 16.0    Glucose 126 (H) 65 - 99 mg/dL    Bun 31 (H) 8 - 22 mg/dL    Creatinine 0.66 0.50 - 1.40 mg/dL    Calcium 7.4 (L) 8.5 - 10.5 mg/dL    AST(SGOT) 21 12 - 45 U/L    ALT(SGPT) 38 2 - 50 U/L    Alkaline Phosphatase 109 (H) 30 - 99 U/L    Total Bilirubin 1.5 0.1 - 1.5 mg/dL    Albumin 1.3 (L) 3.2 - 4.9 g/dL    Total Protein 4.5 (L) 6.0 - 8.2 g/dL    Globulin see below 1.9 - 3.5 g/dL    A-G Ratio see below g/dL   MAGNESIUM    Collection Time: 07/01/21  6:07 AM   Result Value Ref Range    Magnesium 2.0 1.5 - 2.5 mg/dL   PHOSPHORUS    Collection Time: 07/01/21  6:07 AM   Result Value Ref Range    Phosphorus 2.9 2.5 - 4.5 mg/dL   ESTIMATED GFR    Collection Time: 07/01/21  6:07 AM   Result Value Ref Range    GFR If African American >60 >60 mL/min/1.73 m 2    GFR If Non African American >60 >60 mL/min/1.73 m 2   DIFFERENTIAL MANUAL    Collection Time: 07/01/21  6:07 AM   Result Value Ref Range    Manual Diff Status PERFORMED    PERIPHERAL SMEAR REVIEW    Collection Time: 07/01/21  6:07 AM   Result Value Ref Range    Peripheral Smear Review see below    PLATELET ESTIMATE    Collection Time: 07/01/21  6:07 AM   Result Value Ref Range    Plt Estimation Normal    MORPHOLOGY    Collection Time: 07/01/21  6:07 AM   Result Value Ref Range    RBC Morphology Present     Large Platelets 1+     Polychromia 1+    POCT glucose device results     Collection Time: 07/01/21 12:26 PM   Result Value Ref Range    Glucose - Accu-Ck 125 (H) 65 - 99 mg/dL       Fluids    Intake/Output Summary (Last 24 hours) at 7/1/2021 2114  Last data filed at 7/1/2021 1800  Gross per 24 hour   Intake 348.11 ml   Output 2800 ml   Net -2451.89 ml       Core Measures & Quality Metrics    Harrington catheter: Critically Ill - Requiring Accurate Measurement of Urinary Output  Central line in place: TPN and Need for access    DVT Prophylaxis: Enoxaparin (Lovenox)  DVT prophylaxis - mechanical: SCDs  Ulcer prophylaxis: Yes  Antibiotics: Treating active infection/contamination beyond 24 hours perioperative coverage  Assessed for rehab: Patient unable to tolerate rehabilitation therapeutic regimen    DEBBIE Score  ETOH Screening    Assessment/Plan  * Mesenteric ischemia (HCC)- (present on admission)  Assessment & Plan  Acute mid small bowel mesenteric ischemia secondary to unknown etiology.  6/18 Emergent laparotomy with small bowel resection and damage control closure.  6/19 Expedited second look laparotomy for persistent lactic acidosis. Biliary limb of [place]-en-Y gastric bypass with significant distention and ischemia. Gastrostomy, additional proximal and distal small bowel resections, and damage control closure.  6/21 Revision of G-tube and RYGB, fascial closure.  High risk for post-op intra-abdominal abscess and/or anastomotic failure.  6/26 Clears initiated.  6/28:  CT : no leak.  No obvious abscess.    6/30 negative stomach g tube to gravity: likely stasis  7/1 start trickle feeding  [facility].    Respiratory failure following trauma and surgery (HCC)- (present on admission)  Assessment & Plan  Mechanical ventilation following emergent abdominal surgery.  6/25 Extubated.  6/29 Re intubated for progressive respiratory failure.   6/30 transition to ASV   Ventilator bundle  SICU ventilator weaning protocol.  7/1 extubated    Septic shock (HCC)- (present on  admission)  Assessment & Plan  Acute septic shock secondary to mesenteric ischemia.  Ongoing resuscitation with crystalloids, vasopressor support, broad-spectrum empiric antibiotic therapy, and trending of endpoints of resuscitation.  6/25 Piperacillin/tazobactam day 7/7.  6/26 Leukocytosis of 26.5, resume Zosyn - low threshold to scan.  6/29  Hypotension, lactic acidosis, respiratory failure.  Volume therapy, intubation.  C diff negative.  Zyvox and Zosyn # 1.  BAL pending.    6/30 Zyvox discontinued  7/1 white count improving: Trend    Volume overload- (present on admission)  Assessment & Plan  6/24, 6/25 Diuresis with lasix.  6/26 Ongoing diuresis with lasix (patient is 20kg up from admission weight), judicious electrolyte replacement (K, phos, Calcium).    Protein calorie malnutrition (HCC)- (present on admission)  Assessment & Plan  Gut in discontinuity, will eventually have multiple high risk anastomoses. Unable to tolerate any enteral nutrition.  6/21TPN started.  6/24 Plan to have Cortrak placed and start nasoenteric feeding and stop feeding remnant stomach. Remnant should be for decompression.  6/26 Impact tube feeds and TPN, will also start clears. Likely to have poor absorption.  6/29: TPN support continues.     Anemia associated with acute blood loss- (present on admission)  Assessment & Plan  Progressive acute blood loss anemia associated with multiple operations.  Patient is a [employer] who refuses blood products.  6/23 Patient accepts Epogen, given.  6/24 Iron replacement initiated. B12 low normal.        Discussed patient condition with RN, RT, Pharmacy, Dietary,  and Patient.  CRITICAL CARE TIME EXCLUDING PROCEDURES:39   minutes

## 2021-07-02 NOTE — CARE PLAN
The patient is Watcher    Clinical Goals: sleep comfortably, pulmonary hygiene, mobilize  Patient Goals: sleep comfortably  Family Goals: none present    Progress made toward(s) clinical / shift goals:  yes    Problem: Respiratory  Goal: Patient will achieve/maintain optimum respiratory ventilation and gas exchange  Outcome: Progressing     Problem: Knowledge Deficit - Standard  Goal: Patient and family/care givers will demonstrate understanding of plan of care, disease process/condition, diagnostic tests and medications  Outcome: Progressing

## 2021-07-02 NOTE — PROGRESS NOTES
1545: Pt transported to IR with ACLS RN and transport with sufficient oxygen on ICU monitor. Report given to IR MERVIN Strauss.

## 2021-07-02 NOTE — CARE PLAN
Problem: Respiratory  Goal: Patient will achieve/maintain optimum respiratory ventilation and gas exchange  Description: Document on Assessment flowsheet     1.  Assess and monitor rate, rhythm, depth and effort of respiration   2.  Breath sounds assessed qshift and/or as needed   3.  Assess O2 saturation, administer/titrate oxygen as ordered   4.  Position patient for maximum ventilatory efficiency   5.  Turn, cough, and deep breath with splinting to improve effectiveness   6.  Collaborate with RT to administer medication/treatments per order   7.  Encourage use of incentive spirometer and encourage patient to cough after use and utilize splinting techniques if applicable   8.  Airway suctioning   9.  Monitor sputum production for changes in color, consistency and frequency  10. Perform frequent oral hygiene  11. Alternate physical activity with rest periods  Outcome: Progressing       Respiratory Update    Treatment modality: PEP and SVN   Frequency: QID     Pt tolerating current treatments well with no adverse reactions.

## 2021-07-02 NOTE — ASSESSMENT & PLAN NOTE
Critical anemia with persistently low hemoglobin  Patient is a [employer] and will not accept transfusions  EPO not an option  Iron studies ordered  Try to offload some excess volume to improve hematocrit

## 2021-07-02 NOTE — PROGRESS NOTES
Pharmacy TPN Day # 12       7/2/2021    Dosing Weight: 100 kg (admit weight) for kcal goals, 59kg (IBW) for protein goals     TPN currently providing 100% of goal  TPN goal: 1083-5996 kcal/day including 2 gm/kg/day Protein      TPN indication: ischemic bowel s/p resection, suspected long-term lack of enteral access     Pertinent PMH: Presented to OSH with severe abdominal pain, transferred to [facility] for concern of ischemic bowel. Of note, patient has a history of gastric bypass (~2005). Patient underwent ex lap w/ small bowel resection and damage control closure on 6/18, additional proximal and distal small bowel resections with damage control closure on 6/19, and revision of G-tube and RYGB with fascial closure on 6/21. TPN was initiated on 6/21 given likely prolonged NPO status.    No data recorded.  .  Recent Labs     06/30/21  0735 06/30/21  1328 07/01/21  0134 07/01/21  0607 07/02/21  0526   SODIUM 146*   < > 147* 148* 150*   POTASSIUM 3.3*   < > 4.3 3.6 3.9   CHLORIDE 111   < > 113* 115* 116*   CO2 28   < > 27 28 24   BUN 31*   < > 32* 31* 30*   CREATININE 0.68   < > 0.65 0.66 0.59   GLUCOSE 128*   < > 230* 126* 130*   CALCIUM 7.2*   < > 7.2* 7.4* 7.6*   ASTSGOT 26   < > 18 21 25   ALTSGPT 45   < > 39 38 39   ALBUMIN 1.3*   < > 1.4* 1.3* 1.3*   TBILIRUBIN 1.3   < > 1.3 1.5 2.0*   PHOSPHORUS 2.5  --   --  2.9 2.9   MAGNESIUM 1.9  --   --  2.0 2.0    < > = values in this interval not displayed.     Accu-Checks  Recent Labs     06/30/21  1811 07/01/21  0023 07/01/21  1226   POCGLUCOSE 117* 107* 125*       Vitals:    07/02/21 0800 07/02/21 0900 07/02/21 1000 07/02/21 1100   BP: 116/55 119/61 121/76 110/56   Weight:       Height:           Intake/Output Summary (Last 24 hours) at 7/2/2021 1317  Last data filed at 7/2/2021 0800  Gross per 24 hour   Intake 150 ml   Output 2755 ml   Net -2605 ml       Orders Placed This Encounter   Procedures   • Diet NPO     Standing Status:   Standing     Number of Occurrences:   1      Order Specific Question:   Restrict to:     Answer:   Strict [1]         TPN for past 72 hours (Show up to 3 orders; newest on the left. Changes between the two most recent orders are indicated.)     Start date and time   07/01/2021 2000 06/30/2021 2000 06/29/2021 2000      TPN Central Line Formulation [[id number]] TPN Central Line Formulation [[id number]] TPN Central Line Formulation [[id number]]    Order Status  Active Completed Completed    Last Admin  New Bag at 07/01/2021 1927 by Jen Greco R.N. New Bag at 06/30/2021 1924 by Jen Greco R.N. New Bag at 06/29/2021 2005 by Christy Espinal R.N.       Base    Clinisol 15%  118 g 118 g 118 g    dextrose 70%  212 g 212 g 212 g       Additives    potassium phosphate  45 mmol 45 mmol 45 mmol    potassium chloride  50 mEq 80 mEq 60 mEq    potassium acetate  50 mEq -- --    sodium acetate  -- -- 50 mEq    sodium chloride  -- -- 50 mEq    magnesium sulfate  20 mEq 20 mEq 20 mEq    M.T.E.-4 Adult  1 mL 1 mL 1 mL    M.V.I. Adult  10 mL 10 mL 10 mL    famotidine  40 mg 40 mg 40 mg    thiamine  -- 100 mg 100 mg    folic acid  -- 1 mg 1 mg       QS Base    sterile water  265.5 mL 274.3 mL 246.8 mL       Energy Contribution    Proteins  -- -- --    Dextrose  -- -- --    Lipids  -- -- --    Total  -- -- --       Electrolyte Ion Calculated Amount    Sodium  -- -- 100 mEq    Potassium  166 mEq 146 mEq 126 mEq    Calcium  -- -- --    Magnesium  20.02 mEq 20.02 mEq 20.02 mEq    Aluminum  -- -- --    Phosphate  45 mmol 45 mmol 45 mmol    Chloride  50 mEq 80 mEq 110 mEq    Acetate  149.91 mEq 99.91 mEq 149.91 mEq       Other    Total Protein  118 g 118 g 118 g    Total Protein/kg  0.97 g/kg 0.96 g/kg 1.01 g/kg    Total Amino Acid  -- -- --    Total Amino Acid/kg  -- -- --    Glucose Infusion Rate  1.21 mg/kg/min 1.2 mg/kg/min 1.2 mg/kg/min    Osmolarity (Estimated)  -- -- --    Volume  1,440 mL 1,440 mL 1,440 mL    Rate  60 mL/hr 60 mL/hr 60 mL/hr    Dosing Weight  122 kg  123 kg 117 kg    Infusion Site  [facility]            This formula provides:  % kcal as lipids = 0   Grams protein/kg = 2  Non-protein calories = 721  Kcals/kg = 11.9  Total daily calories = 1193     Comments:  1. Patient remains intubated, WBC decreased, C.diff negative, all cultures currently negative, new output from wound, CT ordered.  Cortrak placed but not receiving enteral feeding at this time.  2. Macronutrients: current TPN formulation providing 100% of caloric needs (note hypocaloric and high protein goals, given BMI). Continue TPN at 100% for now. No lipids in TPN, patient extubated and propofol discontinued, will continue to withhold lipids tonight as patient may have a new source of infection.  3. Micronutrients/electrolytes:  Potassium appears to have stabilized, no change today. Potassium salts split to include acetate and decrease chloride as patient is hyperchloremic.  Remains hypernatremic,  sodium removed from TPN per phyisician request, increasing free water component in TPN.  Active diuresis continues. Famotidine also remains in TPN.   4. Glycemic Control: No insulin required, no change to TPN.  5. Fluid status: TPN at 60 mL/hr, no current maintenance fluid and diuresis continues. Will increase to 83 mL/hr this evening to provide more fluid and hopefully decrease hypernateremia.  SCr appears to be at baseline.      Bucky DiazD, BCCCP, BCPS

## 2021-07-02 NOTE — CARE PLAN
The patient is Watcher - Medium risk of patient condition declining or worsening    Shift Goals  Clinical Goals: pain control, wean off propofol, rest comforably  Patient Goals: pain control, sleep  Family Goals: none present    Progress made toward(s) clinical / shift goals:    Problem: Urinary - Renal Perfusion  Goal: Ability to achieve and maintain adequate renal perfusion and functioning will improve  Outcome: Progressing     Problem: Respiratory  Goal: Patient will achieve/maintain optimum respiratory ventilation and gas exchange  7/1/2021 1834 by Melody Hi R.N.  Outcome: Progressing  7/1/2021 1833 by Melody Hi R.N.  Outcome: Progressing     Problem: Knowledge Deficit - Standard  Goal: Patient and family/care givers will demonstrate understanding of plan of care, disease process/condition, diagnostic tests and medications  7/1/2021 1834 by Melody Hi R.N.  Outcome: Progressing  7/1/2021 1833 by CATERINA ThomasN.  Outcome: Progressing     Problem: Skin Integrity  Goal: Skin integrity is maintained or improved  7/1/2021 1834 by CATERINA ThomasN.  Outcome: Progressing  7/1/2021 1833 by Melody Hi R.N.  Outcome: Progressing     Problem: Fall Risk  Goal: Patient will remain free from falls  Outcome: Progressing       Patient is not progressing towards the following goals:      Problem: Fluid Volume  Goal: Fluid volume balance will be maintained  Outcome: Not Progressing

## 2021-07-03 PROBLEM — R65.20 SEVERE SEPSIS (HCC): Status: ACTIVE | Noted: 2021-01-01

## 2021-07-03 PROBLEM — Z51.5 HOSPICE CARE: Status: ACTIVE | Noted: 2021-01-01

## 2021-07-03 NOTE — OR SURGEON
Immediate Post- Operative Note        PostOp Diagnosis: mesenteric root abscess, suspected colonic perforation      Procedure(s): CT drain of same  150 mL feculent material to lab      Estimated Blood Loss: Less than 5 ml        Complications: None            7/2/2021     5:11 PM     Jaun Garcia M.D.

## 2021-07-03 NOTE — CARE PLAN
Problem: Respiratory  Goal: Patient will achieve/maintain optimum respiratory ventilation and gas exchange  Outcome: Progressing     Problem: Knowledge Deficit - Standard  Goal: Patient and family/care givers will demonstrate understanding of plan of care, disease process/condition, diagnostic tests and medications  Outcome: Progressing     Problem: Skin Integrity  Goal: Skin integrity is maintained or improved  Outcome: Progressing   The patient is Watcher - Medium risk of patient condition declining or worsening    Shift Goals  Clinical Goals: Imporved oxygenation, stable vital signs, wound consult, lights on during day  Patient Goals: Comfort (water), see family  Family Goals:  to visit today    Progress made toward(s) clinical / shift goals:  wound consulted, aggressive respiratory hygiene provided by RT and RN staffing Palliative care consulted to address pts needs

## 2021-07-03 NOTE — PROGRESS NOTES
"    DATE: 7/3/2021    Post Operative Day  12 bowel resection and visceral reconstruction.    Interval Events:  Drain placed in mesenteric fluid collection, draining succus appearing material.     PHYSICAL EXAMINATION:  Vital Signs: /57   Pulse 94   Temp 37.4 °C (99.4 °F) (Temporal)   Resp (!) 35   Ht 1.676 m (5' 6\")   Wt 121 kg (267 lb 3.2 oz)   SpO2 95%     Abdomen is obese and nontender, staples removed purulent and enteric appearing material removed from wound, Kerlix packed in the wound g tube to gravity drainage, drain with enteric appearing content    Laboratory Values:   Recent Labs     07/01/21  0607 07/02/21  0526 07/03/21  0420   WBC 23.9* 20.6* 14.1*   RBC 2.22* 2.42* 2.40*   HEMOGLOBIN 6.7* 7.0* 7.1*   HEMATOCRIT 20.8* 22.7* 23.1*   MCV 93.7 93.8 96.3   MCH 30.2 28.9 29.6   MCHC 32.2* 30.8* 30.7*   RDW 50.9* 55.0* 57.4*   PLATELETCT 367 414 357   MPV 11.8 12.1 11.6     Recent Labs     07/01/21  0607 07/02/21  0526 07/03/21  0420   SODIUM 148* 150* 147*   POTASSIUM 3.6 3.9 3.8   CHLORIDE 115* 116* 114*   CO2 28 24 26   GLUCOSE 126* 130* 134*   BUN 31* 30* 29*   CREATININE 0.66 0.59 0.53   CALCIUM 7.4* 7.6* 7.5*     Recent Labs     07/01/21  0607 07/02/21  0526 07/03/21  0420   ASTSGOT 21 25 26   ALTSGPT 38 39 43   TBILIRUBIN 1.5 2.0* 2.1*   ALKPHOSPHAT 109* 124* 127*   GLOBULIN see below see below 3.8*            Imaging:   DX-CHEST-PORTABLE (1 VIEW)   Final Result         Feeding tube is not seen. Correlate clinically.               CT-DRAIN-PERITONEAL   Final Result      Successful peritoneal drainage tube placement. Suspect leak from bowel.      Plan: Thrice daily flushes with 10 mL of sterile saline. Monitor outputs. Please contact interventional radiology if there is any concern for tube dysfunction. Suggest routine tube maintenance at 10-14 day interval if the tube remains in place.      Suspicion of bowel perforation was discussed with Dr. Crisostomo prior to the procedure. Findings were " communicated with ROBIN MARQUEZ via [employer] at the conclusion of the procedure.      CT-ABDOMEN-PELVIS WITH   Final Result   Addendum 1 of 1   Additional review of images shows complex collection of gas and fluid in    the mesenteric root measuring 10 x 4 x 9.5 cm dense material suggesting    oral contrast.  This may communicate with anastomotic suture line at the    inferior aspect and is concerning    for anastomotic leak.      Final      1.  Pneumoperitoneum, likely related to recent surgery.  Bowel perforation is difficult to exclude.   2.  Diffuse bowel wall thickening suggests enteritis.   3.  Interloop collections of fluid and gas in the RIGHT lower quadrant concerning for abscesses.   4.  Small amount of free fluid present.   5.  Postoperative change of anterior abdominal wall.   6.  Supportive tubing as described above.   7.  Small bilateral pleural effusions with associated atelectasis.   8.  Probable pneumonia involving RIGHT upper and middle lobes.      DX-CHEST-PORTABLE (1 VIEW)   Final Result      Interval extubation now with significant worsening bibasilar atelectasis and probable consolidation      DX-ABDOMEN FOR TUBE PLACEMENT   Final Result      1.  Feeding tube tip overlies the gastric fundus without significant interval change.      DX-CHEST-PORTABLE (1 VIEW)   Final Result         1. Stable lines and tubes.   2. Hypoinflation. Unchanged left basilar atelectasis versus consolidation. Suspected small left effusion.         DX-CHEST-PORTABLE (1 VIEW)   Final Result      1.  Improving bilateral basilar atelectasis and/or consolidation.   2.  Previously demonstrated subdiaphragmatic free intraperitoneal air is no longer visualized.      US-EXTREMITY VENOUS UPPER UNILAT LEFT   Final Result      DX-CHEST-PORTABLE (1 VIEW)   Final Result      Endotracheal tube is satisfactory in position. No other significant change.         DX-CHEST-PORTABLE (1 VIEW)   Final Result      Endotracheal tube terminates  just above the maría. No other significant change.      These findings were discussed with patient's nurse Cirilo on 6/29/2021 7:24 AM.         DX-CHEST-PORTABLE (1 VIEW)   Final Result      1.  New airspace opacity in the right upper lobe may represent atelectasis or developing pneumonia.   2.  Free intraperitoneal air likely related to recent surgery.      CT-ABDOMEN-PELVIS WITH   Final Result      1.  New gastrostomy tube and new postoperative changes involving the small bowel in the upper abdomen.      2.  Small amount of free air in the abdomen consistent with recent surgery.      3.  New small bilateral pleural effusions and bibasilar areas of atelectasis or pneumonia.      4.  No evidence of bowel or renal obstruction.      5.  Cholecystectomy.      DX-CHEST-PORTABLE (1 VIEW)   Final Result         No significant interval change.            DX-CHEST-PORTABLE (1 VIEW)   Final Result         No significant interval change.         DX-CHEST-PORTABLE (1 VIEW)   Final Result         1. Interval extubation. No other significant interval change.      DX-CHEST-PORTABLE (1 VIEW)   Final Result         No new abnormalities have developed on portable chest radiograph since the prior examination.  No new infiltrates or consolidations are identified.      DX-ABDOMEN FOR TUBE PLACEMENT   Final Result      Feeding tube tip projects over the proximal stomach.      DX-CHEST-PORTABLE (1 VIEW)   Final Result         1. No significant interval change.      DX-CHEST-PORTABLE (1 VIEW)   Final Result      1.  Persistent atelectasis or pneumonia in the left lower lobe and minor interstitial edema or pneumonia in the right lower lobe with small bilateral pleural effusions.      2.  Interval removal of NG tube.      DX-CHEST-PORTABLE (1 VIEW)   Final Result         1.  There are persistent opacifications in the left lower lung medially and medial right lower lung.      2.  No new infiltrates or consolidations are identified.         DX-CHEST-PORTABLE (1 VIEW)   Final Result         1.  Hazy left lung base infiltrate.   2.  Nasogastric tube terminates near the gastroesophageal junction, recommend advancement      DX-CHEST-PORTABLE (1 VIEW)   Final Result         1.  Hazy left lung base opacities suggests infiltrates.   2.  Right internal jugular central line terminates in the right atrium, could be withdrawn 4 cm.      OUTSIDE IMAGES-CT ABDOMEN /PELVIS   Final Result      OUTSIDE IMAGES-CT ABDOMEN /PELVIS   Final Result      OUTSIDE IMAGES-CT CHEST   Final Result          ASSESSMENT AND PLAN:     * Mesenteric ischemia (HCC)- (present on admission)  Assessment & Plan  Acute mid small bowel mesenteric ischemia secondary to unknown etiology.  6/18 Emergent laparotomy with small bowel resection and damage control closure.  6/19 Expedited second look laparotomy for persistent lactic acidosis. Biliary limb of [place]-en-Y gastric bypass with significant distention and ischemia. Gastrostomy, additional proximal and distal small bowel resections, and damage control closure.  6/21 Revision of G-tube and RYGB, fascial closure.  High risk for post-op intra-abdominal abscess and/or anastomotic failure.  6/26 Clears initiated.  6/28:  CT : no leak.  No obvious abscess.    6/30 negative stomach g tube to gravity: likely stasis  7/1 start trickle feeding  7/2 Midline with enteric appearing material in wound, some staples d/gamaliel   kerlex packed into wound: Change as needed  CT scan with no definitive leak but suspect fluid collection in the midline is fistula  IR for CT-guided drain placement: Culture sent  [facility].    Patient is Buddhist  Assessment & Plan  Critical anemia with persistently low hemoglobin  Patient is a Buddhist and will not accept transfusions  EPO not an option  Iron studies ordered  Try to offload some excess volume to improve hematocrit    Respiratory failure following trauma and surgery (HCC)- (present on  admission)  Assessment & Plan  Mechanical ventilation following emergent abdominal surgery.  6/25 Extubated.  6/29 Re intubated for progressive respiratory failure.   6/30 transition to ASV   Ventilator bundle  SICU ventilator weaning protocol.  7/1 extubated    Severe sepsis (HCC)  Assessment & Plan  Acute septic shock secondary to mesenteric ischemia.  Ongoing resuscitation with crystalloids, vasopressor support, broad-spectrum empiric antibiotic therapy, and trending of endpoints of resuscitation.  6/25 Piperacillin/tazobactam day 7/7.  6/26 Leukocytosis of 26.5, resume Zosyn - low threshold to scan.  6/29  Hypotension, lactic acidosis, respiratory failure.  Volume therapy, intubation.  C diff negative.  Zyvox and Zosyn # 1.  BAL pending.    6/30 Zyvox discontinued  7/2 antibiotics resumed for abdominal abscess with wound drainage    Insomnia  Assessment & Plan  Premorbid on temazepam  Restart prn when taking p.o.    Volume overload- (present on admission)  Assessment & Plan  6/24, 6/25 Diuresis with lasix.  6/26 Ongoing diuresis with lasix (patient is 20kg up from admission weight), judicious electrolyte replacement (K, phos, Calcium).    Protein calorie malnutrition (HCC)- (present on admission)  Assessment & Plan  Gut in discontinuity, will eventually have multiple high risk anastomoses. Unable to tolerate any enteral nutrition.  6/21TPN started.  6/24 Plan to have Cortrak placed and start nasoenteric feeding and stop feeding remnant stomach. Remnant should be for decompression.  6/26 Impact tube feeds and TPN, will also start clears. Likely to have poor absorption.  6/29: TPN support continues.     Anemia associated with acute blood loss- (present on admission)  Assessment & Plan  Progressive acute blood loss anemia associated with multiple operations.  Patient is a [employer]ah[employer] who refuses blood products.  6/23 Patient accepts Epogen, given.  6/24 Iron replacement initiated. B12 low normal.    Leak,  likely at one of her small bowel anastomosis .  Extensive conversation had with patient regarding the possible treatments of this leak.  Would attempt conservative treatment at this time given her high risk of future surgery and any further bowel resections as likely result in severe malnutrition and short gut syndrome and likely be fatal.  Patient understands this and does not wish to proceed with any further surgical interventions even if that decision leads to her death.  Drain in place will continue to monitor plan would be to have controlled fistula if possible - continue NPO and TPN     ____________________________________     Altaf Crisostomo M.D.    DD: 7/1/2021  11:12 AM

## 2021-07-03 NOTE — DISCHARGE PLANNING
Anticipated Discharge Disposition: Home with Hospice    Action: This RNCM was contacted by Palliative RN Shilpa regarding hospice choice for this patient. Per Shilpa, this patient was made comfort care today and is requesting to go home with hospice. Patient lives in [place], [place] and will have very limited hospice options. Per Shilpa, the patient may require reintubation tonight and is very imminent.     This RNCM researched hospice options and called [facility] who services [place] and left message with answering service for call back. Choice form Faxed to [facility]. Left VM with  to update him on hospice option.      Barriers to Discharge: Hospice selection.    Plan: [facility] will continue to follow to assist with dc needs.    [id number] Addendum:  This RNCM spoke with Truman at [facility] and he confirmed patient will be accepted onto service and he is the only RN that travels to [place]. There is a possibility that the patient's brother who lives in [place] would accept patient at his home for hospice services as well. Per Truman, he will call the  in the morning for patient update, or if she has passed. If she has survived the night, [place] will come talk to the family and get her home.

## 2021-07-03 NOTE — WOUND TEAM
[facility]  Inpatient Services  Initial Wound and Skin Care Evaluation    Admission Date: 6/18/2021     Last order of IP CONSULT TO WOUND CARE was found on 7/3/2021 from Hospital Encounter on 6/18/2021     HPI, PMH, SH: Reviewed    Past Surgical History:   Procedure Laterality Date   • PB EXPLORATORY OF ABDOMEN N/A 6/21/2021    Procedure: LAPAROTOMY, EXPLORATORY WITH BOWEL ANASTOMOSIS;  Surgeon: Clarence Collins M.D.;  Location: SURGERY [facility];  Service: General   • GASTROSTOMY FEEDING N/A 6/21/2021    Procedure: INSERTION, GASTROSTOMY TUBE, FOR FEEDING;  Surgeon: Clarence Collins M.D.;  Location: SURGERY [facility];  Service: General   • PB EXPLORATORY OF ABDOMEN  6/19/2021    Procedure: LAPAROTOMY, EXPLORATORY;  Surgeon: Samuel Jackson M.D.;  Location: SURGERY [facility];  Service: General   • IRRIGATION & DEBRIDEMENT GENERAL  6/19/2021    Procedure: IRRIGATION AND DEBRIDEMENT, ABDOMEN;  Surgeon: Samuel Jackson M.D.;  Location: SURGERY [facility];  Service: General   • BOWEL RESECTION  6/19/2021    Procedure: SMALL BOWEL RESECTION;  Surgeon: Samuel Jackson M.D.;  Location: SURGERY [facility];  Service: General   • GASTROSTOMY FEEDING  6/19/2021    Procedure: INSERTION, GASTROSTOMY TUBE;  Surgeon: Samuel Jackson M.D.;  Location: SURGERY [facility];  Service: General   • PB LAP,DIAGNOSTIC ABDOMEN N/A 6/18/2021    Procedure: LAPAROSCOPY;  Surgeon: Thomas Mercado M.D.;  Location: SURGERY [facility];  Service: General   • PB EXPLORATORY OF ABDOMEN N/A 6/18/2021    Procedure: LAPAROTOMY, EXPLORATORY. POSSIBLE BOWEL RESESCTION;  Surgeon: Thomas Mercado M.D.;  Location: [facility];  Service: General   • WRIST ORIF  4/3/2014    Performed by Thomas Marcum D.O. at [facility] ORS   • COLONOSCOPY     • FOOT SURGERY      right foot hammertoe and bunionectomy   • HYSTERECTOMY, TOTAL ABDOMINAL     • KNEE ARTHROSCOPY      right and left knee   • OTHER ABDOMINAL SURGERY       gall bladder   • PB GASTRIC BYPASS,OBESE<150CM REMINGTON-EN-Y       Social History     Tobacco Use   • Smoking status: Never Smoker   Substance Use Topics   • Alcohol use: No     Chief Complaint   Patient presents with   • Abdominal Pain     Diagnosis: Ischemic bowel disease (HCC) [K55.9]  S/P exploratory laparotomy [Z98.890]  Status post exploratory laparotomy [Z98.890]    Unit where seen by Wound Team: S122/00     WOUND CONSULT/FOLLOW UP RELATED TO:  abdomen     WOUND HISTORY:  66 y.o. female admitted 6/18/2021 with Ischemic Bowel. Transitioned to comfort care today. Will go home on hospice.    WOUND ASSESSMENT/LDA  Wound 07/03/21 Full Thickness Wound Abdomen NPWT (Active)   Wound Image   07/03/21 1400   Site Assessment Yellow;Pink    Periwound Assessment Dry    Margins Unattached edges    Closure Staples    Drainage Amount Large    Drainage Description Yellow;Tan    Treatments Site care    Wound Cleansing Approved Wound Cleanser    Periwound Protectant Not Applicable    Dressing Cleansing/Solutions Not Applicable    Dressing Options Wound Vac    Dressing Changed Changed    Dressing Status Intact    Dressing Change/Treatment Frequency By Wound Team Only    NEXT Dressing Change/Treatment Date 07/07/21    NEXT Weekly Photo (Inpatient Only) 07/10/21    Non-staged Wound Description Full thickness    Wound Length (cm) 20 cm    Wound Width (cm) 1.3 cm    Wound Depth (cm) 4.5 cm    Wound Surface Area (cm^2) 26 cm^2    Wound Volume (cm^3) 117 cm^3    Shape irregular    Wound Odor Foul    Pulses N/A    Exposed Structures Other (Comments)    Number of days: 0        Vascular:    OLIVA:   No results found.    Lab Values:    Lab Results   Component Value Date/Time    WBC 14.1 (H) 07/03/2021 04:20 AM    RBC 2.40 (L) 07/03/2021 04:20 AM    HEMOGLOBIN 7.1 (L) 07/03/2021 04:20 AM    HEMATOCRIT 23.1 (L) 07/03/2021 04:20 AM    CREACTPROT 33.63 (H) 07/02/2021 05:26 AM    HBA1C 5.4 06/19/2021 12:40 AM        Culture Results show:  No  results found for this or any previous visit (from the past 720 hour(s)).    Pain Level/Medicated:  generalized discomfort.       INTERVENTIONS BY WOUND TEAM:  Chart and images reviewed. Discussed with bedside RN. All areas of concern (based on picture review, LDA review and discussion with bedside RN) have been thoroughly assessed. Documentation of areas based on significant findings. This RN in to assess patient. Performed standard wound care which includes appropriate positioning, dressing removal and non-selective debridement. Pictures and measurements obtained weekly if/when required.  Preparation for Dressing removal: NA  Cleansed with:  cleanser and gauze.  Sharp debridement: NA  Ilana wound: Cleansed with cleanser and gauze, Prepped with no sting barrier, dry gauze over intact staples  Primary Dressing: half thick foam to inferior and superior opening. 1 strip to surface to connect both.  Secondary (Outer) Dressing: button, drape and tracpad to foam.    Interdisciplinary consultation: Patient, Bedside RN, Josh JEFFRIES, Dr. Crisostomo    EVALUATION / RATIONALE FOR TREATMENT:  Most Recent Date:  7/3: dehisced incision with yellow, tan drainage. Vac applied to manage drainage and provide pt comfort as she transitions. WT will follow while she remain in-house.     Goals: Steady decrease in wound area and depth weekly.    WOUND TEAM PLAN OF CARE ([X] for frequency of wound follow up,):   Nursing to follow orders written for wound care. Contact wound team if area fails to progress, deteriorates or with any questions/concerns  Dressing changes by wound team:                   Follow up 3 times weekly:                NPWT change 3 times weekly:     Follow up 1-2 times weekly:      Follow up Bi-Monthly:                   Follow up as needed:     Other (explain): X Wound vac changes per pt comfort needs    NURSING PLAN OF CARE ORDERS (X):  Dressing changes: See Dressing Care orders: x  Skin care: See Skin Care orders:   RN  Prevention Protocol:   Rectal tube care: See Rectal Tube Care orders:   Other orders:    RSKIN:   CURRENTLY IN PLACE (X), APPLIED THIS VISIT (A), ORDERED (O):   Q shift Farooq:  X  Q shift pressure point assessments:  X    Surface/Positioning   Pressure redistribution mattress            Low Airloss      x    Bariatric foam      Bariatric ARIADNE     Waffle cushion        Waffle Overlay          Reposition q 2 hours      TAPs Turning system     Z George Pillow     Offloading/Redistribution NA  Sacral Mepilex (Silicone dressing)     Heel Mepilex (Silicone dressing)         Heel float boots (Prevalon boot)             Float Heels off Bed with Pillows           Respiratory O2 mask  Silicone O2 tubing         Gray Foam Ear protectors     Cannula fixation Device (Tender )          High flow offloading Clip    Elastic head band offloading device      Anchorfast                                                         Trach with Optifoam split foam             Containment/Moisture Prevention     Rectal tube or BMS    Purwick/Condom Cath        Harrington Catheter  x  Barrier wipes           Barrier paste       Antifungal tx      Interdry        Mobilization NA      Up to chair        Ambulate      PT/OT      Nutrition       Dietician        Diabetes Education      PO x    TF     TPN     NPO   # days     Other        Anticipated discharge plans:   LTACH:        SNF/Rehab:                  Home Health Care:           Outpatient Wound Center:            Self/Family Care:        Other:       hospice

## 2021-07-03 NOTE — DISCHARGE PLANNING
Received Choice form at 1407  Agency/Facility Name: [facility]  Referral sent per Choice form @ [id number]

## 2021-07-03 NOTE — PROGRESS NOTES
Trauma / Surgical Daily Progress Note    Date of Service  7/3/2021    Chief Complaint  66 y.o. female admitted 6/18/2021 with Ischemic Bowel    Interval Events  7/3.  Patient had significant concerns regarding her general comfort and prognosis after extubation.  Palliative care consultation requested and they spoke to patient and family today.  She does not want to be reintubated.  She does not want any further procedures done.  She wants to be made comfortable and she wants to go home.  Long discussion with patient and family regarding this process including the need for narcotics, access, transportation, wound care, home O2 and the high likelihood that she would not survive trip to her home which is at least 3-1/2 hours away.  After significant discussion.  Decision was made to transition to comfort care today.  Family is at bedside.  Additional family members on route.    Patient would like water.  Wound care to place VAC on draining abdominal wound.  We will see if there is a bed on the oncology zamora for transition of care.    Review of Systems  Review of Systems   Constitutional: Positive for fatigue. Negative for chills, diaphoresis and fever.   Respiratory: Positive for cough and shortness of breath. Negative for chest tightness and wheezing.    Gastrointestinal: Positive for abdominal pain.   Neurological: Negative.    Psychiatric/Behavioral: Positive for sleep disturbance. The patient is nervous/anxious.         Vital Signs for last 24 hours  Pulse:  [] 95  Resp:  [21-44] 28  BP: ()/(51-72) 141/65  SpO2:  [90 %-99 %] 95 %    Hemodynamic parameters for last 24 hours       Respiratory Data     Respiration: (!) 28, Pulse Oximetry: 95 %     Work Of Breathing / Effort: Moderate;Shallow;Tachypnea  RUL Breath Sounds: Coarse Crackles, RML Breath Sounds: Coarse Crackles, RLL Breath Sounds: Diminished, NICO Breath Sounds: Coarse Crackles, LLL Breath Sounds: Diminished    Physical Exam  Physical  Exam  Vitals and nursing note reviewed.   Constitutional:       Appearance: She is obese.      Interventions: Face mask in place.   HENT:      Head: Normocephalic and atraumatic.      Mouth/Throat:      Mouth: Mucous membranes are dry.      Pharynx: Oropharynx is clear.   Eyes:      Extraocular Movements: Extraocular movements intact.      Conjunctiva/sclera: Conjunctivae normal.      Pupils: Pupils are equal, round, and reactive to light.   Cardiovascular:      Rate and Rhythm: Normal rate and regular rhythm.      Pulses: Normal pulses.   Pulmonary:      Effort: No respiratory distress.      Breath sounds: Wheezing present.   Abdominal:      Tenderness: There is abdominal tenderness. There is no guarding.      Comments: Open draining wound that is malodorous  G-tube to gravity   Musculoskeletal:         General: Normal range of motion.      Cervical back: Normal range of motion.      Right lower leg: Edema present.      Left lower leg: Edema present.   Skin:     General: Skin is warm and dry.      Coloration: Skin is pale.   Neurological:      General: No focal deficit present.      Mental Status: She is alert and oriented to person, place, and time.   Psychiatric:         Behavior: Behavior is cooperative.         Laboratory  Recent Results (from the past 24 hour(s))   FLUID CULTURE W/GRAM STAIN    Collection Time: 07/02/21  5:05 PM    Specimen: Body Fluid   Result Value Ref Range    Significant Indicator NEG     Source BF     Site Peritoneal Fluid     Culture Result -     Gram Stain Result       Many Gram negative rods.  Few Gram positive rods.  Moderate Yeast.     GRAM STAIN    Collection Time: 07/02/21  5:05 PM    Specimen: Body Fluid   Result Value Ref Range    Significant Indicator .     Source BF     Site Peritoneal Fluid     Gram Stain Result       Many Gram negative rods.  Few Gram positive rods.  Moderate Yeast.     CBC WITH DIFFERENTIAL    Collection Time: 07/03/21  4:20 AM   Result Value Ref Range     WBC 14.1 (H) 4.8 - 10.8 K/uL    RBC 2.40 (L) 4.20 - 5.40 M/uL    Hemoglobin 7.1 (L) 12.0 - 16.0 g/dL    Hematocrit 23.1 (L) 37.0 - 47.0 %    MCV 96.3 81.4 - 97.8 fL    MCH 29.6 27.0 - 33.0 pg    MCHC 30.7 (L) 33.6 - 35.0 g/dL    RDW 57.4 (H) 35.9 - 50.0 fL    Platelet Count 357 164 - 446 K/uL    MPV 11.6 9.0 - 12.9 fL    Neutrophils-Polys 85.20 (H) 44.00 - 72.00 %    Lymphocytes 7.00 (L) 22.00 - 41.00 %    Monocytes 5.20 0.00 - 13.40 %    Eosinophils 0.00 0.00 - 6.90 %    Basophils 0.00 0.00 - 1.80 %    Nucleated RBC 1.80 /100 WBC    Neutrophils (Absolute) 12.01 (H) 2.00 - 7.15 K/uL    Lymphs (Absolute) 0.99 (L) 1.00 - 4.80 K/uL    Monos (Absolute) 0.73 0.00 - 0.85 K/uL    Eos (Absolute) 0.00 0.00 - 0.51 K/uL    Baso (Absolute) 0.00 0.00 - 0.12 K/uL    NRBC (Absolute) 0.26 K/uL    Hypochromia 1+     Anisocytosis 1+     Macrocytosis 1+    Comp Metabolic Panel    Collection Time: 07/03/21  4:20 AM   Result Value Ref Range    Sodium 147 (H) 135 - 145 mmol/L    Potassium 3.8 3.6 - 5.5 mmol/L    Chloride 114 (H) 96 - 112 mmol/L    Co2 26 20 - 33 mmol/L    Anion Gap 7.0 7.0 - 16.0    Glucose 134 (H) 65 - 99 mg/dL    Bun 29 (H) 8 - 22 mg/dL    Creatinine 0.53 0.50 - 1.40 mg/dL    Calcium 7.5 (L) 8.5 - 10.5 mg/dL    AST(SGOT) 26 12 - 45 U/L    ALT(SGPT) 43 2 - 50 U/L    Alkaline Phosphatase 127 (H) 30 - 99 U/L    Total Bilirubin 2.1 (H) 0.1 - 1.5 mg/dL    Albumin 1.5 (L) 3.2 - 4.9 g/dL    Total Protein 5.3 (L) 6.0 - 8.2 g/dL    Globulin 3.8 (H) 1.9 - 3.5 g/dL    A-G Ratio 0.4 g/dL   MAGNESIUM    Collection Time: 07/03/21  4:20 AM   Result Value Ref Range    Magnesium 1.9 1.5 - 2.5 mg/dL   PHOSPHORUS    Collection Time: 07/03/21  4:20 AM   Result Value Ref Range    Phosphorus 3.0 2.5 - 4.5 mg/dL   ESTIMATED GFR    Collection Time: 07/03/21  4:20 AM   Result Value Ref Range    GFR If African American >60 >60 mL/min/1.73 m 2    GFR If Non African American >60 >60 mL/min/1.73 m 2   DIFFERENTIAL MANUAL    Collection Time:  07/03/21  4:20 AM   Result Value Ref Range    Myelocytes 2.60 %    Manual Diff Status PERFORMED    PERIPHERAL SMEAR REVIEW    Collection Time: 07/03/21  4:20 AM   Result Value Ref Range    Peripheral Smear Review see below    PLATELET ESTIMATE    Collection Time: 07/03/21  4:20 AM   Result Value Ref Range    Plt Estimation Normal    MORPHOLOGY    Collection Time: 07/03/21  4:20 AM   Result Value Ref Range    RBC Morphology Present     Polychromia 1+     Poikilocytosis 1+    RETICULOCYTES COUNT    Collection Time: 07/03/21  4:20 AM   Result Value Ref Range    Reticulocyte Count 6.0 (H) 0.8 - 2.1 %    Retic, Absolute 0.14 (H) 0.04 - 0.06 M/uL    Imm. Reticulocyte Fraction 38.9 (H) 9.3 - 17.4 %    Retic Hgb Equivalent 27.3 (L) 29.0 - 35.0 pg/cell       Fluids    Intake/Output Summary (Last 24 hours) at 7/3/2021 1217  Last data filed at 7/3/2021 1200  Gross per 24 hour   Intake 2464 ml   Output 3155 ml   Net -691 ml       Core Measures & Quality Metrics  Labs reviewed, Medications reviewed and Radiology images reviewed  Harrington catheter: Critically Ill - Requiring Accurate Measurement of Urinary Output  Central line in place: TPN and Need for access      DVT prophylaxis - mechanical: SCDs      Assessed for rehab: Patient/family refusal    DEBBIE Score  ETOH Screening    Assessment/Plan  * Mesenteric ischemia (HCC)- (present on admission)  Assessment & Plan  Acute mid small bowel mesenteric ischemia secondary to unknown etiology.  6/18 Emergent laparotomy with small bowel resection and damage control closure.  6/19 Expedited second look laparotomy for persistent lactic acidosis. Biliary limb of [place]-en-Y gastric bypass with significant distention and ischemia. Gastrostomy, additional proximal and distal small bowel resections, and damage control closure.  6/21 Revision of G-tube and RYGB, fascial closure.  High risk for post-op intra-abdominal abscess and/or anastomotic failure.  6/26 Clears initiated.  6/28:  CT : no leak.   No obvious abscess.    6/30 negative stomach g tube to gravity: likely stasis  7/1 start trickle feeding  7/2 Midline with enteric appearing material in wound, some staples d/gamaliel   kerlex packed into wound: Change as needed  CT scan with no definitive leak but suspect fluid collection in the midline is fistula  IR for CT-guided drain placement: Culture sent  [facility].    Hospice care  Assessment & Plan  7/22 at patient's request, transition to comfort care today    Patient is Yazidi  Assessment & Plan  Critical anemia with persistently low hemoglobin  Patient is a Yazidi and will not accept transfusions  EPO not an option  Iron studies ordered  Try to offload some excess volume to improve hematocrit    Respiratory failure following trauma and surgery (HCC)- (present on admission)  Assessment & Plan  Mechanical ventilation following emergent abdominal surgery.  6/25 Extubated.  6/29 Re intubated for progressive respiratory failure.   6/30 transition to ASV   Ventilator bundle  SICU ventilator weaning protocol.  7/1 extubated    Insomnia  Assessment & Plan  Premorbid on temazepam  Restart prn when taking p.o.    Volume overload- (present on admission)  Assessment & Plan  6/24, 6/25 Diuresis with lasix.  6/26 Ongoing diuresis with lasix (patient is 20kg up from admission weight), judicious electrolyte replacement (K, phos, Calcium).    Protein calorie malnutrition (HCC)- (present on admission)  Assessment & Plan  Gut in discontinuity, will eventually have multiple high risk anastomoses. Unable to tolerate any enteral nutrition.  6/21TPN started.  6/24 Plan to have Cortrak placed and start nasoenteric feeding and stop feeding remnant stomach. Remnant should be for decompression.  6/26 Impact tube feeds and TPN, will also start clears. Likely to have poor absorption.  6/29: TPN support continues.     Severe sepsis (HCC)  Assessment & Plan  Acute septic shock secondary to mesenteric  ischemia.  Ongoing resuscitation with crystalloids, vasopressor support, broad-spectrum empiric antibiotic therapy, and trending of endpoints of resuscitation.  6/25 Piperacillin/tazobactam day 7/7.  6/26 Leukocytosis of 26.5, resume Zosyn - low threshold to scan.  6/29  Hypotension, lactic acidosis, respiratory failure.  Volume therapy, intubation.  C diff negative.  Zyvox and Zosyn # 1.  BAL pending.    6/30 Zyvox discontinued  7/2 antibiotics resumed for abdominal abscess with wound drainage    Anemia associated with acute blood loss- (present on admission)  Assessment & Plan  Progressive acute blood loss anemia associated with multiple operations.  Patient is a Orthodox who refuses blood products.  6/23 Patient accepts Epogen, given.  6/24 Iron replacement initiated. B12 low normal.        Discussed patient condition with Family, RN, RT, Pharmacy, Dietary, , Patient, general surgery and Palliative care.

## 2021-07-03 NOTE — PROGRESS NOTES
Pt returned from IR. New drain dressing clean dry intact with SHANNA drain to bulb suction. Output is brown and thin. Per IR RN, pt has had over 120 cc output from drain.

## 2021-07-03 NOTE — PROGRESS NOTES
Pharmacy TPN Day # 13       7/3/2021    Dosing Weight   100 kg (Admit Weight) for kcal goals, 59kg (IBW) for protein goals  TPN currently providing 100% of goal  TPN goal: 7206-5929 kcal/day including 2 gm/kg/day Protein (hypocaloric and high protein given morbid obesity)     TPN indication: ischemic bowel s/p resection, suspected long-term lack of enteral access     Pertinent PMH: Presented to OSH with severe abdominal pain, transferred to [facility] for concern of ischemic bowel. Of note, patient has a history of gastric bypass (~2005). Patient underwent ex lap w/ small bowel resection and damage control closure on 6/18, additional proximal and distal small bowel resections with damage control closure on 6/19, and revision of G-tube and RYGB with fascial closure on 6/21. TPN was initiated on 6/21 given likely prolonged NPO status.   .  Recent Labs     07/01/21  0607 07/02/21  0526 07/03/21  0420   SODIUM 148* 150* 147*   POTASSIUM 3.6 3.9 3.8   CHLORIDE 115* 116* 114*   CO2 28 24 26   BUN 31* 30* 29*   CREATININE 0.66 0.59 0.53   GLUCOSE 126* 130* 134*   CALCIUM 7.4* 7.6* 7.5*   ASTSGOT 21 25 26   ALTSGPT 38 39 43   ALBUMIN 1.3* 1.3* 1.5*   TBILIRUBIN 1.5 2.0* 2.1*   PHOSPHORUS 2.9 2.9 3.0   MAGNESIUM 2.0 2.0 1.9     Accu-Checks  Recent Labs     06/30/21  1811 07/01/21  0023 07/01/21  1226   POCGLUCOSE 117* 107* 125*       Vitals:    07/03/21 0800 07/03/21 0900 07/03/21 1000 07/03/21 1100   BP: 111/57 103/70 117/55 141/65   Weight:       Height:           Intake/Output Summary (Last 24 hours) at 7/3/2021 1116  Last data filed at 7/3/2021 1000  Gross per 24 hour   Intake 2418 ml   Output 3130 ml   Net -712 ml       Orders Placed This Encounter   Procedures   • Diet NPO     Standing Status:   Standing     Number of Occurrences:   1     Order Specific Question:   Restrict to:     Answer:   Strict [1]         TPN for past 72 hours (Show up to 3 orders; newest on the left. Changes between the two most recent orders are  indicated.)     Start date and time   07/02/2021 2000 07/01/2021 2000 06/30/2021 2000      TPN Central Line Formulation [[id number]] TPN Central Line Formulation [[id number]] TPN Central Line Formulation [[id number]]    Order Status  Active Completed Completed    Last Admin  New Bag at 07/02/2021 2108 by Lisa Bowden R.N. New Bag at 07/01/2021 1927 by Jen Greco R.N. New Bag at 06/30/2021 1924 by Jen Greco R.N.       Base    Clinisol 15%  118 g 118 g 118 g    dextrose 70%  212 g 212 g 212 g       Additives    potassium phosphate  45 mmol 45 mmol 45 mmol    potassium chloride  50 mEq 50 mEq 80 mEq    potassium acetate  50 mEq 50 mEq --    magnesium sulfate  20 mEq 20 mEq 20 mEq    M.T.E.-4 Adult  1 mL 1 mL 1 mL    M.V.I. Adult  10 mL 10 mL 10 mL    famotidine  40 mg 40 mg 40 mg    thiamine  -- -- 100 mg    folic acid  -- -- 1 mg       QS Base    sterile water  817.5 mL 265.5 mL 274.3 mL       Energy Contribution    Proteins  -- -- --    Dextrose  -- -- --    Lipids  -- -- --    Total  -- -- --       Electrolyte Ion Calculated Amount    Sodium  -- -- --    Potassium  166 mEq 166 mEq 146 mEq    Calcium  -- -- --    Magnesium  20.02 mEq 20.02 mEq 20.02 mEq    Aluminum  -- -- --    Phosphate  45 mmol 45 mmol 45 mmol    Chloride  50 mEq 50 mEq 80 mEq    Acetate  149.91 mEq 149.91 mEq 99.91 mEq       Other    Total Protein  118 g 118 g 118 g    Total Protein/kg  0.96 g/kg 0.97 g/kg 0.96 g/kg    Total Amino Acid  -- -- --    Total Amino Acid/kg  -- -- --    Glucose Infusion Rate  1.2 mg/kg/min 1.2 mg/kg/min 1.2 mg/kg/min    Osmolarity (Estimated)  -- -- --    Volume  1,992 mL 1,440 mL 1,440 mL    Rate  83 mL/hr 60 mL/hr 60 mL/hr    Dosing Weight  123 kg 122 kg 123 kg    Infusion Site  Central Central Central            This formula provides:  % kcal as lipids = 0  Grams protein/kg = 2  Non-protein calories = 721  Kcals/kg = 12  Total daily calories = 1193    Comments:  1. Patient remains NPO. S/p drainage of  mesenteric root abscess with IR yesterday. Voriconazole started yesterday for mould on BAL; Zosyn continues. Per surgery note today, after discussion with patient no further plans for surgical intervention. Palliative to consult.  2. Macronutrients: current TPN formulation providing 100% of caloric needs (note hypocaloric and high protein goal, given BMI). Note, lipids removed from TPN on 6/24 (was on propofol at the time) - will continue to hold given possible new infection and on-going GOC discussions.  3. Micronutrients: Hypernatremia improving, potassium and magnesium slightly below goal today. Sodium removed from TPN on 6/30 and free water increased on 7/2 - will continue to monitor closely. Will replace potassium and magnesium outside of TPN. Famotidine remains in TPN. No changes to TPN today.   4. Glycemic Control: Blood glucose remains within goal (BG < 180 mg/dL), with no hypoglycemic events (BG< 70 mg/dL) or insulin required. No changes.  5. Fluid status: TPN currently infusing at 83 mL/hr providing 1992mL/day. No maintenance fluid currently ordered, but patient is receiving Lasix 20mg iv daily. Note, free water increased in TPN yesterday to help reduce hypernatremia. Patient net positive 19 L since admission, but net even over the past 24 hours. Renal function and UOP adequate. No changes.      Tasia Schreiber, PharmD

## 2021-07-03 NOTE — CARE PLAN
Problem: Hemodynamics  Goal: Patient's hemodynamics, fluid balance and neurologic status will be stable or improve  Outcome: Progressing     Problem: Respiratory  Goal: Patient will achieve/maintain optimum respiratory ventilation and gas exchange  Outcome: Not Met  Pt on 6L NC, poor IS effort and coarse crackles for lung sounds.      Problem: Knowledge Deficit - Standard  Goal: Patient and family/care givers will demonstrate understanding of plan of care, disease process/condition, diagnostic tests and medications  Outcome: Progressing  Pt and family updated on plan of care     Problem: Skin Integrity  Goal: Skin integrity is maintained or improved  Outcome: Not Met  Pt turned q2hrs with pillows for offloading, heel float boots in place. Pts skin is fragile, edematous and poor turgor.     Shift Goals  Clinical Goals: stable vitals, improved orientation  Patient Goals: comfort, water  Family Goals: pt comfort    Progress made toward(s) clinical / shift goals: updated on plan of care, pt provided with oral care to help mouth dryness    Patient is not progressing towards the following goals: Pt remains with very poor IS effort

## 2021-07-03 NOTE — PALLIATIVE CARE
"Palliative Care follow-up  Received update from pts Bedside RN Cathie, that pt was stating that she did not wish to be re-intubated if needed, and was declining care.   Arrived at the room, pts daughter, Melanie Alternate HC-DPOA, and pts , Efren were also present at the bedside. Pt verbalized her wish to not be placed back on vent support if needed and said that she wanted to \"go home\". She expressed that she wished to \"No longer be here\".   I asked her to clarify if she meant she was tired of being in the hospital or was wanting to die. Josephine said the she very much wished to be out of the hospital, but also that she wished to be at home to die.   Spoke about the course of her admission, continued TPN, and strict NPO. Josephine spoke about her distress and said that none of us could understand her position and being unable to eat or drink, validated her thoughts and feelings.   She again expressed that she did not wish to have any further surgeries, did not wish to be re-intubated, and only wanted to go home to die. She further expressed that this was her \"right\",   Pts daughter Melanie expressed her tearful remorse of her mothers suffering, and expressed wanting to support her wishes, Efren is also in full agreement.   Spoke about the difficulty of [place] physical [place]   Asked if they have any family in [facility] where it may be more likely to have hospice support. Efren stated that perhaps in [place].   Josephine was again adamant that she wished to \"go home and die in peace\". Assured Josephine I would speak with the MD, speak with the Weekend , and update her.     Returned with Dr Kelly. Had brittney discussion with Josephine and her family clarifying her wishes and that they support her decision and are in agreement.   Spoke further with her , Efren outside of the room in order to not cause Josephine distress, and let him know that it may be very " difficult to get Melissa to their home in [place], due to the likely lack of Hospice supportive services in that area.   Jonnathan brother Elijah arrived at the bedside with his wife.   We discussed the possibility of CC here in the hospital setting for support while researching what hospice resources may be available in their area. Efren felt that his wife would wish for this.   Returned to the bedside and explained CC to Melissa, and that we will continue to work towards a DC to home. Melissa stated that she wants to be CC today.   We discussed that one of her Granddaughters and a Son-In-Law are currently driving up and will not be able to be at the bedside for approximately 3-4 hours. Explained her possible decline and resulting confusion if she was to transition to CC prior to their arrival, Melissa felt that she still wished to be CC now. Supported her wishes, daughter Melanie and  Efren also wish to abide by Jonnathan wishes.     Let Melissa and family know that I will call Jonnathan Nava Grandson who is her first DPOA in order to update him on her wishes. Melanie asked it would also call her brother, Alexy who had questions that she felt unable to answer, let her know I will call them both.    Verified Elijah Castorena at [phone]-called, no answer, left message stating that he Grandmother had changed her own POC this morning and she wished for him to be updated, requested call back and provided Palliative Care number.     Called Alexy [phone], his wife Harpal answered and stated that he was to upset to come to the phone. Updated Harpal that [facility] had requested I call them to answer any questions. Harpal stated that her  was right there, she asked the differences of CC, Hospice, and palliative care.   Clarified CC while in the hospital awaiting hospice plan, hospice care, and palliative care.  Provided overview of mornings conversation with Melissa and that Melanie and Efren have been at the bedside and supporting Melissa through out  this decision making process.   Harpal was thankful for the information, provided with Palliative Care contact number for any further questions or needs.     Updated Melanie that I was able to speak with her brother but not with Elijah at this time. Let her know to call the office if he called and and that I did leave him a message with the Palliative Team contact number also.     Updated: Dr Robin kinney, bedside RN Cathie kinney, Weekend CM RN     Plan: Transition to CC while Case Management determines if there adequate resources/hospice support for the pt to return to home with hospice     Recommendations: I recommend a hospice consult.    Thank you for allowing Palliative Care to participate in this patient's care. Please feel free to call [phone] with any questions or concerns.

## 2021-07-03 NOTE — PROGRESS NOTES
CT NOTE: 14FR FLEXIMA BS LOCKING LOOP DRAIN PLACED BY DR. ALFARO.  120ML BROWN FLUID DRAINED. VSS AND RESP STATUS MORE RELAXED BUT TALKING

## 2021-07-03 NOTE — NON-PROVIDER
Patient Summary:  Josephine Casas is a 66yoF w:    * Mesenteric ischemia     Respiratory failure following trauma and surgery     Septic shock     Volume overload     Protein calorie malnutrition      Anemia associated with acute blood loss    24 Hour Events:    Pt opened abdominal wound dressing  Respiratory troubles post extubation    Palliative consulted, after discussion with family decided to switch to comfort care.   Family at bedside.    Pt received Jello PO.    SUBJECTIVE/OBJECTIVE:  NEURO:  GCS  24hr:15   Current:   Exam PERRLA, motor and sensation intact in all extremities   Meds/Pain Dilaudid 1mg,    Labs none   Imaging none     RESP:  RR, SpO2 96% on 8L NC, RR 30   Vent none   Exam Extensive expiratory wheezing throughout bilateral lung fields   Meds    Labs 6/30  PH: 7.55  PCO2: 35.2  PO2: 85  HCO3: 30.8   Imaging 7/3 CXR  Worsening atelectasis     CV:  HR/BP/MAP/  CVP HR 100s  BP 90s-120s   Exam Normal RRR   Meds Furosemide 20mg   Labs none   Imaging none     GI:  Diet/Bowel Function TPN with Peptamin supplement via NG tube   Exam No abdominal tenderness; Green-yellow leakage from abdominal wound site   Labs none   Imaging none     F/E/N-:  I/O  24hr totals:    Intake:  3.3L   Output:  3L urine                                     Fluid Balance:+.3 L                          24hr:                          Admission:  Labs 7/3  Na: 147  K: 3.8  Cl: 114  CO2: 26  BUN 29  Cr: .53           Exam Harrington draining normal appearing urine  G-tube draining bilious drainage from bypassed stomach   Meds none   Labs none   Nutrition TPN     HEME:  Labs Hgb: 7.1 uptrending   Hct: 23.1 dec   Transfusions none   Imaging none     ID:  Temp  24hr:   Tmax:100.4   Labs WBC 14.1 dec from yesterday   Micro BAL shows MOULD growth   ABX Zosyn 3.375mg q8, Voriconazole 35g     ENDOCRINE:  Blood Sugar 134   Meds none     MUSCULOSKELETAL:  Imaging none   WB Status none     SKIN:  Exam none   Interventions none       PROPHYLAXIS:  DVT  Lovenox 40mg   GI none   Restraints D/c nonviolent restraints       * Mesenteric ischemia (HCC)- (present on admission)  Assessment & Plan  Acute mid small bowel mesenteric ischemia secondary to unknown etiology.  6/18 Emergent laparotomy with small bowel resection and damage control closure.  6/19 Expedited second look laparotomy for persistent lactic acidosis. Biliary limb of [place]-en-Y gastric bypass with significant distention and ischemia. Gastrostomy, additional proximal and distal small bowel resections, and damage control closure.  6/21 Revision of G-tube and RYGB, fascial closure.  High risk for post-op intra-abdominal abscess and/or anastomotic failure.  6/26 Clears initiated.  6/28:  CT : no leak.  No obvious abscess.    6/30 g tube to gravity  -G-tube likely draining source of infection. Retain and do not feed via G-tube     Respiratory failure following trauma and surgery (HCC)- (present on admission)  Assessment & Plan  Mechanical ventilation following emergent abdominal surgery.  6/25 Extubated.  6/29 Re intubated for progressive respiratory failure.   6/30 transition to ASV   Ventilator bundle  Extubate 7/1  -Maintain PEP, IPV, reintubate if necessary     Septic shock (HCC)- (present on admission)  Assessment & Plan  Acute septic shock secondary to mesenteric ischemia.  Ongoing resuscitation with crystalloids, vasopressor support, broad-spectrum empiric antibiotic therapy, and trending of endpoints of resuscitation.  6/25 Piperacillin/tazobactam day 7/7.  6/26 Leukocytosis of 26.5, resume Zosyn - low threshold to scan.  6/29  Hypotension, lactic acidosis, respiratory failure.  Volume therapy, intubation.  C diff negative.  Zyvox and Zosyn # 1.  BAL pending.   6/30 Zyvox discontinued, Zosyn continued  7/2 Start Voriconazole for mould growth     Volume overload- (present on admission)  Assessment & Plan  6/24, 6/25 Diuresis with lasix.  6/26 Ongoing diuresis with lasix (patient is 20kg up from admission  weight), judicious electrolyte replacement (K, phos, Calcium).  7/1: Na corrected in TPN feeds; Furosemide 20mg  7/2: na 150, infuse D5W     Protein calorie malnutrition (HCC)- (present on admission)  Assessment & Plan  Gut in discontinuity, will eventually have multiple high risk anastomoses. Unable to tolerate any enteral nutrition.  6/21TPN started.  6/24 Plan to have Cortrak placed and start nasoenteric feeding and stop feeding remnant stomach. Remnant should be for decompression.  6/26 Impact tube feeds and TPN, will also start clears. Likely to have poor absorption.  -TPN support continues.      LINES/TUBES/CATHETERS:    Harrington catheter  G-tube draining bypass limb

## 2021-07-03 NOTE — PROGRESS NOTES
Trauma / Surgical Daily Progress Note    Date of Service  7/2/2021    Chief Complaint  66 y.o. female admitted 6/18/2021 with Ischemic Bowel    Interval Events  CRITICAL CARE DECISION MAKING:    Patient examined and discussed with team at bedside.    Patient now draining feculent/succus appearing fluid from midline wound.  Opened by surgeon at the bedside.  Wound has been packed several times throughout the day.  CT scan shows a fluid collection without obvious oral contrast.  Suspect possible remnant leak.  CT drain placed by interventional radiology with a fair amount of fluid removed.  New culture sent.  Antibiotics restarted.  Trickle feeding stopped.  Strict n.p.o.: Remains on TPN.    Addressed pulmonary hygiene concerns as well as oxygenation/ ventilation.  Patient working with respiratory therapy with some productive cough but inspiratory capacity remains quite poor.  Patient remains very high risk for requiring reintubation and reinitiation of mechanical elation.    Patient states that she is getting near the end of her rope and is 1 and 1 any further surgical procedures or may not want to be reintubated.  Palliative care consult requested.  Hopefully they can see patient tomorrow.    Labs reviewed, electrolytes addressed, renal function assessed  Reviewed nutrition strategies, recent indices  Addressed GI prophylaxis and bowel frequency  Assessed/discussed/titrated analgesics and need for sedatives  Addressed DVT prophylaxis  Addressed line days, carmen catheter days, access needs  Addressed family and discharge concerns    Review of Systems  Review of Systems   Constitutional: Positive for fatigue. Negative for chills, diaphoresis and fever.   Respiratory: Positive for cough. Negative for chest tightness, shortness of breath and wheezing.    Gastrointestinal: Positive for abdominal pain. Negative for constipation.   Neurological: Negative.    Psychiatric/Behavioral: Positive for sleep disturbance. The  "patient is nervous/anxious.       Vital Signs for last 24 hours  Pulse:  [102-116] 108  Resp:  [24-44] 26  BP: ()/(52-80) 114/58  SpO2:  [91 %-98 %] 95 %    Hemodynamic parameters for last 24 hours    /58   Pulse (!) 108   Temp 37.4 °C (99.4 °F) (Temporal)   Resp (!) 26   Ht 1.676 m (5' 6\") Comment: drivers license  Wt 123 kg (270 lb 11.6 oz)   SpO2 95%   BMI 43.70 kg/m²       Respiratory Data     Respiration: (!) 26, Pulse Oximetry: 95 %     Given By:: Mouthpiece, Work Of Breathing / Effort: Within Normal Limits  RUL Breath Sounds: Rhonchi, RML Breath Sounds: Rhonchi, RLL Breath Sounds: Diminished, NICO Breath Sounds: Rhonchi, LLL Breath Sounds: Diminished    Physical Exam  Physical Exam  Vitals and nursing note reviewed.   Constitutional:       Appearance: Normal appearance.   HENT:      Head: Normocephalic and atraumatic.      Mouth/Throat:      Mouth: Mucous membranes are moist.      Pharynx: Oropharynx is clear.   Eyes:      Extraocular Movements: Extraocular movements intact.      Conjunctiva/sclera: Conjunctivae normal.   Cardiovascular:      Rate and Rhythm: Normal rate and regular rhythm.      Pulses: Normal pulses.   Abdominal:      General: There is no distension.      Palpations: Abdomen is soft.      Tenderness: There is no abdominal tenderness.   Genitourinary:     Comments: Harrington catheter  Musculoskeletal:      Cervical back: Normal range of motion and neck supple.      Right lower leg: Edema present.      Left lower leg: Edema present.   Skin:     General: Skin is warm and dry.      Coloration: Skin is pale.   Neurological:      General: No focal deficit present.      Mental Status: She is alert and oriented to person, place, and time.         Laboratory  Recent Results (from the past 24 hour(s))   CBC WITH DIFFERENTIAL    Collection Time: 07/02/21  5:26 AM   Result Value Ref Range    WBC 20.6 (H) 4.8 - 10.8 K/uL    RBC 2.42 (L) 4.20 - 5.40 M/uL    Hemoglobin 7.0 (L) 12.0 - 16.0 " g/dL    Hematocrit 22.7 (L) 37.0 - 47.0 %    MCV 93.8 81.4 - 97.8 fL    MCH 28.9 27.0 - 33.0 pg    MCHC 30.8 (L) 33.6 - 35.0 g/dL    RDW 55.0 (H) 35.9 - 50.0 fL    Platelet Count 414 164 - 446 K/uL    MPV 12.1 9.0 - 12.9 fL    Neutrophils-Polys 89.50 (H) 44.00 - 72.00 %    Lymphocytes 6.10 (L) 22.00 - 41.00 %    Monocytes 4.40 0.00 - 13.40 %    Eosinophils 0.00 0.00 - 6.90 %    Basophils 0.00 0.00 - 1.80 %    Nucleated RBC 1.30 /100 WBC    Neutrophils (Absolute) 18.44 (H) 2.00 - 7.15 K/uL    Lymphs (Absolute) 1.26 1.00 - 4.80 K/uL    Monos (Absolute) 0.91 (H) 0.00 - 0.85 K/uL    Eos (Absolute) 0.00 0.00 - 0.51 K/uL    Baso (Absolute) 0.00 0.00 - 0.12 K/uL    NRBC (Absolute) 0.27 K/uL    Anisocytosis 2+ (A)     Macrocytosis 2+ (A)    Comp Metabolic Panel    Collection Time: 07/02/21  5:26 AM   Result Value Ref Range    Sodium 150 (H) 135 - 145 mmol/L    Potassium 3.9 3.6 - 5.5 mmol/L    Chloride 116 (H) 96 - 112 mmol/L    Co2 24 20 - 33 mmol/L    Anion Gap 10.0 7.0 - 16.0    Glucose 130 (H) 65 - 99 mg/dL    Bun 30 (H) 8 - 22 mg/dL    Creatinine 0.59 0.50 - 1.40 mg/dL    Calcium 7.6 (L) 8.5 - 10.5 mg/dL    AST(SGOT) 25 12 - 45 U/L    ALT(SGPT) 39 2 - 50 U/L    Alkaline Phosphatase 124 (H) 30 - 99 U/L    Total Bilirubin 2.0 (H) 0.1 - 1.5 mg/dL    Albumin 1.3 (L) 3.2 - 4.9 g/dL    Total Protein 5.1 (L) 6.0 - 8.2 g/dL    Globulin see below 1.9 - 3.5 g/dL    A-G Ratio see below g/dL   MAGNESIUM    Collection Time: 07/02/21  5:26 AM   Result Value Ref Range    Magnesium 2.0 1.5 - 2.5 mg/dL   PHOSPHORUS    Collection Time: 07/02/21  5:26 AM   Result Value Ref Range    Phosphorus 2.9 2.5 - 4.5 mg/dL   CRP QUANTITIVE (NON-CARDIAC)    Collection Time: 07/02/21  5:26 AM   Result Value Ref Range    Stat C-Reactive Protein 33.63 (H) 0.00 - 0.75 mg/dL   IRON/TOTAL IRON BIND    Collection Time: 07/02/21  5:26 AM   Result Value Ref Range    Iron 24 (L) 40 - 170 ug/dL    Total Iron Binding 84 (L) 250 - 450 ug/dL    Unsat Iron Binding  60 (L) 110 - 370 ug/dL    % Saturation see below 15 - 55 %   ESTIMATED GFR    Collection Time: 07/02/21  5:26 AM   Result Value Ref Range    GFR If African American >60 >60 mL/min/1.73 m 2    GFR If Non African American >60 >60 mL/min/1.73 m 2   DIFFERENTIAL MANUAL    Collection Time: 07/02/21  5:26 AM   Result Value Ref Range    Manual Diff Status PERFORMED    PERIPHERAL SMEAR REVIEW    Collection Time: 07/02/21  5:26 AM   Result Value Ref Range    Peripheral Smear Review see below    PLATELET ESTIMATE    Collection Time: 07/02/21  5:26 AM   Result Value Ref Range    Plt Estimation Increased    MORPHOLOGY    Collection Time: 07/02/21  5:26 AM   Result Value Ref Range    RBC Morphology Present        Fluids    Intake/Output Summary (Last 24 hours) at 7/2/2021 1840  Last data filed at 7/2/2021 1600  Gross per 24 hour   Intake 2080 ml   Output 2830 ml   Net -750 ml       Core Measures & Quality Metrics  Labs reviewed, Medications reviewed and Radiology images reviewed  Harrington catheter: Critically Ill - Requiring Accurate Measurement of Urinary Output  Central line in place: TPN and Need for access    DVT Prophylaxis: Enoxaparin (Lovenox)  DVT prophylaxis - mechanical: SCDs  Ulcer prophylaxis: Yes  Antibiotics: Treating active infection/contamination beyond 24 hours perioperative coverage  Assessed for rehab: Patient unable to tolerate rehabilitation therapeutic regimen    DEBBIE Score  ETOH Screening    Assessment/Plan  * Mesenteric ischemia (HCC)- (present on admission)  Assessment & Plan  Acute mid small bowel mesenteric ischemia secondary to unknown etiology.  6/18 Emergent laparotomy with small bowel resection and damage control closure.  6/19 Expedited second look laparotomy for persistent lactic acidosis. Biliary limb of [place]-en-Y gastric bypass with significant distention and ischemia. Gastrostomy, additional proximal and distal small bowel resections, and damage control closure.  6/21 Revision of G-tube and RYGB,  fascial closure.  High risk for post-op intra-abdominal abscess and/or anastomotic failure.  6/26 Clears initiated.  6/28:  CT : no leak.  No obvious abscess.    6/30 negative stomach g tube to gravity: likely stasis  7/1 start trickle feeding  7/2 Midline with enteric appearing material in wound, some staples d/gamaliel   kerlex packed into wound: Change as needed  CT scan with no definitive leak but suspect fluid collection in the midline is fistula  IR for CT-guided drain placement: Culture sent  [facility].    Patient is Congregational  Assessment & Plan  Critical anemia with persistently low hemoglobin  Patient is a Congregational and will not accept transfusions  EPO not an option  Iron studies ordered  Try to offload some excess volume to improve hematocrit    Respiratory failure following trauma and surgery (HCC)- (present on admission)  Assessment & Plan  Mechanical ventilation following emergent abdominal surgery.  6/25 Extubated.  6/29 Re intubated for progressive respiratory failure.   6/30 transition to ASV   Ventilator bundle  SICU ventilator weaning protocol.  7/1 extubated    Septic shock (HCC)- (present on admission)  Assessment & Plan  Acute septic shock secondary to mesenteric ischemia.  Ongoing resuscitation with crystalloids, vasopressor support, broad-spectrum empiric antibiotic therapy, and trending of endpoints of resuscitation.  6/25 Piperacillin/tazobactam day 7/7.  6/26 Leukocytosis of 26.5, resume Zosyn - low threshold to scan.  6/29  Hypotension, lactic acidosis, respiratory failure.  Volume therapy, intubation.  C diff negative.  Zyvox and Zosyn # 1.  BAL pending.    6/30 Zyvox discontinued  7/2 antibiotics resumed for abdominal abscess with wound drainage    Insomnia  Assessment & Plan  Premorbid on temazepam  Restart prn when taking p.o.    Volume overload- (present on admission)  Assessment & Plan  6/24, 6/25 Diuresis with lasix.  6/26 Ongoing diuresis with lasix  (patient is 20kg up from admission weight), judicious electrolyte replacement (K, phos, Calcium).    Protein calorie malnutrition (HCC)- (present on admission)  Assessment & Plan  Gut in discontinuity, will eventually have multiple high risk anastomoses. Unable to tolerate any enteral nutrition.  6/21TPN started.  6/24 Plan to have Cortrak placed and start nasoenteric feeding and stop feeding remnant stomach. Remnant should be for decompression.  6/26 Impact tube feeds and TPN, will also start clears. Likely to have poor absorption.  6/29: TPN support continues.     Anemia associated with acute blood loss- (present on admission)  Assessment & Plan  Progressive acute blood loss anemia associated with multiple operations.  Patient is a Buddhist who refuses blood products.  6/23 Patient accepts Epogen, given.  6/24 Iron replacement initiated. B12 low normal.        Discussed patient condition with Family, RN, RT, Pharmacy, Dietary and .  CRITICAL CARE TIME EXCLUDING PROCEDURES: 35  minutes

## 2021-07-03 NOTE — CARE PLAN
Problem: Hyperinflation  Goal: Prevent or improve atelectasis  Description: 1. Instruct incentive spirometry usage  2.  Perform hyperinflation therapy as indicated  Outcome: Progressing  Flowsheets  Taken 7/3/2021 0247 by Heather Wong, DARIN  Hyperinflation Protocol Indications: Abdominal/Thoracic Surgeries (Open Heart)  Taken 7/1/2021 1719 by Odalys Cade RRT  Hyperinflation Protocol Goals/Outcome: Stable Vital Capacity x24 hrs and Patient Understands / uses I.S.       Respiratory Update    Treatment modality: IPV Q4 WA      Pt tolerating current treatments well with no adverse reactions.

## 2021-07-04 LAB
BACTERIA BLD CULT: NORMAL
BACTERIA BLD CULT: NORMAL
SIGNIFICANT IND 70042: NORMAL
SIGNIFICANT IND 70042: NORMAL
SITE SITE: NORMAL
SITE SITE: NORMAL
SOURCE SOURCE: NORMAL
SOURCE SOURCE: NORMAL

## 2021-07-04 NOTE — DISCHARGE SUMMARY
Discharge Summary    CHIEF COMPLAINT ON ADMISSION  Chief Complaint   Patient presents with   • Abdominal Pain       Reason for Admission  EMS     Admission Date  2021    CODE STATUS  Comfort Care/DNR    HPI & HOSPITAL COURSE  This is a 66 y.o. female here with intestinal ischemia status post multiple laparotomies with subtotal colectomy as well as [place] limb revision and G-tube placement.  Patient has had ongoing failure to thrive during this hospitalization with inability to tolerate tube feeding or p.o. intake.  She has been on TPN.  She had recurrent sepsis likely pulmonary source after extubation and required replacement of endotracheal tube as well as panculture and treatment with antibiotics.  Most recently, patient had opening of midline wound with drainage of what appeared to be enteric contents.  CT scan showed fluid collection in the mid abdomen which was drained by interventional radiology.  After that procedure, patient stated she did not want any further procedures done and palliative care consult was requested.  Today she is having increased work of breathing but declines intubation and after discussion with palliative care nurse, decision was made to transition to comfort measures only.  Patient's only request was discharged home but she [place] the middle [place] [facility], approximately a 4-hour drive from here and we would need to arrange for services as well as oxygen for the travel which would take at least a day.  Patient request to transition to comfort care this afternoon and orders were placed.  Family was at the bedside.  She  at 4 PM.  Tissue bank and 's office were notified.      The patient met 2-midnight criteria for an inpatient stay at the time of discharge.    Discharge Date  7/3/2021    FOLLOW UP ITEMS POST DISCHARGE  None    DISCHARGE DIAGNOSES  Principal Problem:    Mesenteric ischemia (HCC) POA: Yes  Active Problems:    Respiratory failure following trauma and surgery  (HCC) POA: Yes    Patient is Spiritism POA: Unknown      Overview: Critical anemia with persistently low hemoglobin      Patient is a Spiritism and will not accept transfusions      EPO not an option      Iron studies ordered      Try to offload some excess volume to improve hematocrit    Hospice care POA: Unknown      Overview: 7/3.  Patient had significant concerns regarding her general comfort and       prognosis after extubation.  Palliative care consultation requested and       they spoke to patient and family today.  She does not want to be       reintubated.  She does not want any further procedures done.  She wants to       be made comfortable and she wants to go home.  Long discussion with       patient and family regarding this process including the need for       narcotics, access, transportation, wound care, home O2 and the high       likelihood that she would not survive trip to her home which is at least       3-1/2 hours away.  After significant discussion.  Decision was made to       transition to comfort care today.  Family is at bedside.  Additional       family members on route.          Anemia associated with acute blood loss POA: Yes    Severe sepsis (HCC) POA: Unknown    Protein calorie malnutrition (HCC) POA: Yes    Volume overload POA: Yes    Insomnia POA: Unknown      Overview: Premorbid on temazepam      Restart prn when taking p.o.  Resolved Problems:    Metastatic carcinoma involving skin with unknown primary site (HCC) POA: Unknown    Class 2 obesity in adult POA: Unknown    Chronic pain syndrome POA: Unknown      FOLLOW UP  No future appointments.  No follow-up provider specified.    MEDICATIONS ON DISCHARGE     Medication List      ASK your doctor about these medications      Instructions   Buprenorphine HCl-Naloxone HCl 8-2 MG Film   Take 1 Film by mouth 2 times a day.  Dose: 1 Film     ferrous sulfate 325 (65 Fe) MG tablet   Take 1 Tab by mouth 2 times a day, with  meals.  Dose: 325 mg     ondansetron 4 MG Tbdp  Commonly known as: ZOFRAN ODT   Take 1 Tab by mouth every 8 hours as needed for Nausea.  Dose: 4 mg     temazepam 15 MG Caps  Commonly known as: Restoril   Take 45 mg by mouth at bedtime. 3 caps = 45 mg  Dose: 45 mg            Allergies  Allergies   Allergen Reactions   • Benadryl [Altaryl] Palpitations   • Bloodless      Patient does not want blood transfusions due to Quaker beliefs   • Codeine Vomiting   • Elavil [Amitriptyline Hcl]    • Percocet [Oxycodone-Acetaminophen] Vomiting   • Trazodone        DIET  Orders Placed This Encounter   Procedures   • Diet Order Diet: Regular     Standing Status:   Standing     Number of Occurrences:   1     Order Specific Question:   Diet:     Answer:   Regular [1]       ACTIVITY  Not applicable    CONSULTATIONS  General surgery, palliative care    PROCEDURES  Exploratory laparotomy, small bowel resection  Arterial and central line placement  Exploratory laparotomy, gastrostomy, multiple small bowel resection  Exploratory laparotomy, revision gastrostomy  Exploratory laparotomy, small bowel resection  Rapid sequence intubation  CT-guided drain placement    LABORATORY  Lab Results   Component Value Date    SODIUM 147 (H) 07/03/2021    POTASSIUM 3.8 07/03/2021    CHLORIDE 114 (H) 07/03/2021    CO2 26 07/03/2021    GLUCOSE 134 (H) 07/03/2021    BUN 29 (H) 07/03/2021    CREATININE 0.53 07/03/2021    CREATININE 0.9 06/08/2006        Lab Results   Component Value Date    WBC 14.1 (H) 07/03/2021    HEMOGLOBIN 7.1 (L) 07/03/2021    HEMATOCRIT 23.1 (L) 07/03/2021    PLATELETCT 357 07/03/2021        Total time of the discharge process exceeds 49 minutes.

## 2021-07-04 NOTE — PROGRESS NOTES
All belongings sent home with  including cell phone.    Thank you for allowing the SICU team to take care of June, it has not only been this RNs pleasure but the unit as a whole.

## 2021-07-05 LAB
BACTERIA FLD AEROBE CULT: ABNORMAL
GRAM STN SPEC: ABNORMAL
SIGNIFICANT IND 70042: ABNORMAL
SITE SITE: ABNORMAL
SOURCE SOURCE: ABNORMAL

## 2021-07-06 NOTE — DOCUMENTATION QUERY
[facility]                                                                       Query Response Note      PATIENT:               LARRY GANT  ACCT #:                  [id number]  MRN:                     [id number]  :                      1955  ADMIT DATE:       2021 8:09 PM  DISCH DATE:        7/3/2021 4:00 PM  RESPONDING  PROVIDER #:        [id number]           QUERY TEXT:      Abdominal abscess has developed in the postoperative period.  Can the relationship be further specified.    NOTE:  If an appropriate response is not listed below, please respond with a new note.        The patient's Clinical Indicators include:  21 MD PN:  -Post-op day 11 bowel resection & visceral reconstruction  -staples removed purulent and enteric appearing material removed from wound  -antibiotics resumed for abdominal abscess with wound drainage   21 IR Post-op note: mesenteric root abscess, suspected colonic perforation     Treatment: CT abd, CT drain, Zosyn  Risk Factors: Sepsis, Mesenteric ischemia, S/P Ex-lap w/multiple sm bowel resection    Thank you,  Wolfgang Luna RN, BSN  Clinical   Connect via Exeros  .    Thank you,  Wolfgang Luna RN, BSN  Clinical   Connect via Exeros  .  Options provided:   -- Abdominal abscess is unexpected and a complication of the procedure performed   -- Abdominal abscess is not routinely expected, but integral/inherent to the procedure performed   -- Abdominal abscess is routinely expected and integral/inherent to the procedure performed   -- Abdominal abscess is related to another etiology, (please document under lying etiology)   -- Abdominal abscess is related to another etiology, (please document under lying etiology)   -- Abdominal abscess is incidental and without clinical significance   -- Unable to determine      Query created by:  Wolfgang Luna on 7/6/2021 8:43 AM    RESPONSE TEXT:    Abdominal abscess is not routinely expected, but integral/inherent to the procedure performed       QUERY TEXT:    A Potassium lab value of 2.6 is noted in the Medical Record.  Can a diagnosis be specified to support this finding?    NOTE:  If an appropriate response is not listed below, please respond with a new note.    The patient's Clinical Indicators include:  Lab Results: 6/27/21 Potassium 2.6    Treatment: Potassium chloride IVPB, Monitor CMP/BMP labs, Potassium phosphate IVPB, Kdur, K+ scale goal 4.5  Risk Factors: Sepsis, Mesenteric ischemia, Acute blood los anemia, Protein calorie malnutrition, metastatic carcinoma    Thank you,  Wolfgang Luna RN, BSN  Clinical   Connect via Jascha  .  Options provided:   -- Hypokalemia   -- Findings are clinically insignificant   -- Unable to determine      Query created by: Wolfgang Luna on 7/6/2021 9:05 AM    RESPONSE TEXT:    Hypokalemia          Electronically signed by:  NICOLE OMER DO 7/6/2021 10:19 AM

## 2021-07-07 ENCOUNTER — RA CDI HCC (OUTPATIENT)
Dept: OTHER | Facility: HOSPITAL | Age: 66
End: 2021-07-07

## 2021-07-07 NOTE — PROGRESS NOTES
[facility]  coding opportunities          Chart reviewed, no opportunity found: CHART REVIEWED, NO OPPORTUNITY FOUND                     Patients insurance company: [employer] only)

## 2021-07-08 NOTE — DOCUMENTATION QUERY
[facility]                                                                       Query Response Note      PATIENT:               LARRY GANT  ACCT #:                  [id number]  MRN:                     [id number]  :                      1955  ADMIT DATE:       2021 8:09 PM  DISCH DATE:        7/3/2021 4:00 PM  RESPONDING  PROVIDER #:        [id number]           QUERY TEXT:    A Sodium lab value of 151 is noted in the Medical Record.  Can a diagnosis be specified to support this finding?    NOTE:  If an appropriate response is not listed below, please respond with a new note.    The patient's Clinical Indicators include:  21 Lab Results: Sodium 151    Treatment: Monitor sodium levels, Sodium phosphate in dextrose 5%, LR  Risk Factors: Sepsis, Mesenteric ischemia, Acute blood los anemia, Protein calorie malnutrition, metastatic carcinoma    Thank you,  Wolfgang Luna RN, BSN  Clinical   Connect via Cycle messenger  Options provided:   -- Hypernatremia   -- Findings are clinically insignificant   -- Unable to determine      Query created by: Wolfgang Luna on 2021 9:12 AM    RESPONSE TEXT:    Hypernatremia          Electronically signed by:  NICOLE OMER DO 2021 5:30 PM

## 2021-07-10 NOTE — DOCUMENTATION QUERY
[facility]                                                                       Query Response Note      PATIENT:               ALFREDA LARRY  ACCT #:                  [id number]  MRN:                     [id number]  :                      1955  ADMIT DATE:       2021 8:09 PM  DISCH DATE:        7/3/2021 4:00 PM  RESPONDING  PROVIDER #:        [id number]           QUERY TEXT:    21 CXR results show Persistent atelectasis or pneumonia in the left lower lobe and minor interstitial edema or pneumonia in the right lower lobe with small bilateral pleural effusions. Diagnostic studies Bronchoscopy w/BAL procedure report documents- Indication: Refractory atelectasis/Pneumonia on 21. Please clarify the clinical relevance for the clinical/diagnostic findings in the radiologist & bronchoscopy report.    NOTE:  If an appropriate response is not listed below, please respond with a new note.            The patient's clinical indicators include:  CXR results:   21: Persistent atelectasis or pneumonia in the left lower lobe and minor interstitial edema or pneumonia in the right lower lobe with small bilateral pleural effusions.  21: New airspace opacity in the right upper lobe may represent atelectasis or developing pneumonia.  21: Interval extubation now with significant worsening bibasilar atelectasis and probable consolidation  CT results:  21: New small bilateral pleural effusions and bibasilar areas of atelectasis or pneumonia  21: Small bilateral pleural effusions with associated atelectasis. Probable pneumonia involving RIGHT upper and middle lobes.  Lab results: 21 Procalcitonin 0.78  21 Quant bronchial washing results: +Mould  21 Diagnostic studies procedure report: Indication: Refractory atelectasis/Pneumonia    Treatment: Serial CXR, CT scan, Zosyn, Albuterol, Vancomycin, RT protocol,  Cefotan, Bronchoscopy w/BAL  Risk Factors: Sepsis w/septic shock, Acute respiratory failure, Mechanical ventilation    Thank you,  Wolfgang Luna RN, BSN  Clinical   Connect via [employer] Eat  .  Options provided:   -- Agree with radiology finding of atelectasis, pleural effusions & pneumonia   -- Agree with radiology finding of atelectasis   -- Agree with radiology finding of pleural effusions   -- Agree with radiology finding of pneumonia   -- Disagree with radiology finding of atelectasis, pleural effusions & pneumonia   -- Other explanation of clinical findings   -- Findings of no clinical significance   -- Other explanation of clinical findings   -- Unable to determine      Query created by: Wolfgang Luna on 7/6/2021 9:41 AM    RESPONSE TEXT:    Agree with radiology finding of atelectasis       QUERY TEXT:    BAL is documented in the Diagnostic studies report on 6/29/21 bronchoscopy procedure note.  With the current documentation, the location of the BAL is not clear. Based upon the clinical findings, risk factors, and treatment can this procedure be further specified?  BAL was performed on the following body parts:    NOTE:  If an appropriate response is not listed below, please respond with a new note.        The patient's Clinical Indicators include:  6/29/21 Diagnostic studies procedure report: Indication: Refractory atelectasis/Pneumonia; Specimens: BAL Quantitative respiratory culture intracellular pathogens    Treatment: Bronchoscopy w/BAL consent, BAL washing cultures   Risk Factors: Sepsis w/septic shock, Acute respiratory failure, Mechanical ventilation    Thank you,  Wolfgang Luna RN, BSN  Clinical   Connect via [employer] T[employer] Eat  .  Options provided:   -- Lower lung lobe, left   -- Lower lung lobe, right   -- Lung lingual   -- Middle lung lobe, right   -- Middle lung lobe, left   -- Upper lung lobe, right   -- Lung, left   -- Lung, right    -- Lungs, bilateral   -- Unable to determine      Query created by: Wolfgang Luna on 7/6/2021 9:47 AM    RESPONSE TEXT:    Lower lung lobe, right          Electronically signed by:  TIARA LYONS MD 7/10/2021 8:43 AM

## 2021-07-17 NOTE — OP REPORT
DATE OF OPERATION: 6/29/2021     PREOPERATIVE DIAGNOSIS: Respiratory failure, purulent secretions, fever, leukocytosis, aspiration, new infiltrate on chest radiograph, increasing oxygen requirement.     POSTOPERATIVE DIAGNOSIS: Same.     PROCEDURE PERFORMED: Fiberoptic bronchoscopy with bronchoalveolar lavage. Bilateral lower lobes  SURGEON: Carlos Schmitz MD, FACS     ANESTHESIA: Intravenous sedation, analgesia, and pharmacologic restraint.     INDICATIONS: The patient is a 66 y.o. female with acute respiratory failure.     FINDINGS:     SPECIMEN: Bronchoalveolar lavage for quantitative cultures.     PROCEDURE: Following informed consent, the patient was properly identified and optimally positioned in bed. She was preoxygenated with 100% oxygen and placed on a regular ventilatory rate. Intravenous sedation, analgesia, and pharmacologic restraint was administered.    The fiberoptic bronchoscope was advance through the indwelling endotracheal tube.  The upper airways were suctioned. The bilateral lower and middle and upper airways were systematically and sequentially inspected and lavaged. The pooled effluent was collected in a sterile trap and submitted for quantitative cultures.     The patient tolerated the procedure well. There were no apparent complications.    ____________________________________   Carlos Schmitz MD, FACS

## 2021-07-20 ENCOUNTER — TELEPHONE (OUTPATIENT)
Dept: FAMILY MEDICINE CLINIC | Facility: CLINIC | Age: 66
End: 2021-07-20

## 2021-07-28 ENCOUNTER — TELEPHONE (OUTPATIENT)
Dept: FAMILY MEDICINE CLINIC | Facility: CLINIC | Age: 66
End: 2021-07-28

## 2021-08-03 NOTE — DOCUMENTATION QUERY
[facility]                                                                       Query Response Note      PATIENT:               LARRY GANT  ACCT #:                  [id number]  MRN:                     [id number]  :                      1955  ADMIT DATE:       2021 8:09 PM  DISCH DATE:        7/3/2021 4:00 PM  RESPONDING  PROVIDER #:        [id number]           QUERY TEXT:    Please clarify the clinical relevance for the clinical/diagnostic findings of 21 Peritoneal Fluid culture results of Klebsiella pneumoniae, Escherichia coli, Enterococcus avium, Candida tropicalis, Candida glabrata, Bacteroides thetaiotaomicron, Beta Lactamase positive.       NOTE:  If an appropriate response is not listed below, please respond with a new note.          The patient's clinical indicators include:  21 Peritoneal Fluid culture results: Klebsiella pneumoniae, Escherichia coli, Enterococcus avium, Candida tropicalis, Candida glabrata, Bacteroides thetaiotaomicron, Beta Lactamase positive.    Treatment: Peritoneal Fluid culture, Zosyn, Vancomycin, Cefotan  Risk Factors: Sepsis, Mesenteric ischemia, Acute blood los anemia, Protein calorie malnutrition, metastatic carcinoma        Thank you,   Wolfgang Luna RN, BSN   Clinical    Connect via Promoco   .  Options provided:   -- Peritoneal Fluid culture results of Klebsiella pneumoniae, Escherichia coli, Enterococcus avium, Candida tropicalis, Candida glabrata, Bacteroides thetaiotaomicron, Beta Lactamase positive are clinically relevant   -- Findings of no clinical significance   -- Unable to determine      Query created by: Wolfgang Luna on 2021 1:27 PM    RESPONSE TEXT:    Unable to determine          Electronically signed by:  JOSIE TENORIO MD 8/3/2021 10:01 AM

## 2021-08-10 DIAGNOSIS — I50.32 CHRONIC DIASTOLIC CONGESTIVE HEART FAILURE (HCC): ICD-10-CM

## 2021-08-10 RX ORDER — FUROSEMIDE 20 MG/1
TABLET ORAL
Qty: 30 TABLET | Refills: 0 | Status: SHIPPED | OUTPATIENT
Start: 2021-08-10 | End: 2021-10-14 | Stop reason: SDUPTHER

## 2021-08-16 NOTE — DOCUMENTATION QUERY
[facility]                                                                       Query Response Note      PATIENT:               ALFREDA LARRY  ACCT #:                  [id number]  MRN:                     [id number]  :                      1955  ADMIT DATE:       2021 8:09 PM  DISCH DATE:        7/3/2021 4:00 PM  RESPONDING  PROVIDER #:        [id number]           QUERY TEXT:    Mould is identified in the BAL quant bronchial washing results on 21 and treated with Voriconazole. Can a diagnosis be provided to support this finding?      NOTE:  If an appropriate response is not listed below, please respond with a new note.          The patient's clinical indicators include:  21 Quantitative BAL results: Quant Bronchial Washing: Mould  21 OP Report: Pre & post-op diagnosis: Respiratory failure, purulent secretions, fever, leukocytosis, aspiration, new infiltrate on chest radiograph, increasing oxygen requirement. Procedure Performed: Fiberoptic bronchoscopy with bronchoalveolar lavage. Bilateral lower lobes   CXR Results:  21- Persistent atelectasis or pneumonia in the left lower lobe and minor interstitial edema or pneumonia in the right lower lobe with small bilateral pleural effusions.  21- New airspace opacity in the right upper lobe may represent atelectasis or developing pneumonia.    Treatment: Voriconazole, RT protocol, Bronchoscopy w/BAL  Risk Factors: Mould in BAL, Sepsis w/septic chock, Acute respiratory failure, Mechanical ventilation    Thank you,  Wolfgang Luna RN, BSN  Clinical   Connect via Surveypal  .  Options provided:   -- Mould in the Left lower lobe is clinically relevant, please specify any manifestation due to the mould in the bronchial washing ie: Pulmonary mycosis, candidal pneumonia, other- please specify   -- Findings of no clinical significance   --  Unable to determine      Query created by: Wolfgang Luna on 8/16/2021 8:34 AM    RESPONSE TEXT:    Mould in the Left lower lobe is clinically relevant- Pulmonary mycosis          Electronically signed by:  NICOLE OMER DO 8/16/2021 2:10 PM

## 2021-08-18 ENCOUNTER — VBI (OUTPATIENT)
Dept: ADMINISTRATIVE | Facility: OTHER | Age: 66
End: 2021-08-18

## 2021-09-11 DIAGNOSIS — E11.29 TYPE 2 DIABETES MELLITUS WITH OTHER DIABETIC KIDNEY COMPLICATION, WITHOUT LONG-TERM CURRENT USE OF INSULIN (HCC): ICD-10-CM

## 2021-09-19 DIAGNOSIS — E11.29 TYPE 2 DIABETES MELLITUS WITH OTHER DIABETIC KIDNEY COMPLICATION, WITHOUT LONG-TERM CURRENT USE OF INSULIN (HCC): ICD-10-CM

## 2021-09-19 DIAGNOSIS — I50.41 ACUTE COMBINED SYSTOLIC AND DIASTOLIC CONGESTIVE HEART FAILURE (HCC): ICD-10-CM

## 2021-09-20 RX ORDER — LISINOPRIL 10 MG/1
TABLET ORAL
Qty: 90 TABLET | Refills: 1 | Status: SHIPPED | OUTPATIENT
Start: 2021-09-20 | End: 2022-01-27 | Stop reason: ALTCHOICE

## 2021-10-08 ENCOUNTER — RA CDI HCC (OUTPATIENT)
Dept: OTHER | Facility: HOSPITAL | Age: 66
End: 2021-10-08

## 2021-10-14 ENCOUNTER — APPOINTMENT (OUTPATIENT)
Dept: LAB | Facility: HOSPITAL | Age: 66
End: 2021-10-14
Attending: FAMILY MEDICINE
Payer: COMMERCIAL

## 2021-10-14 ENCOUNTER — OFFICE VISIT (OUTPATIENT)
Dept: FAMILY MEDICINE CLINIC | Facility: CLINIC | Age: 66
End: 2021-10-14
Payer: COMMERCIAL

## 2021-10-14 VITALS
TEMPERATURE: 97.2 F | SYSTOLIC BLOOD PRESSURE: 130 MMHG | RESPIRATION RATE: 20 BRPM | DIASTOLIC BLOOD PRESSURE: 70 MMHG | WEIGHT: 129 LBS | HEART RATE: 72 BPM | OXYGEN SATURATION: 94 % | BODY MASS INDEX: 22.02 KG/M2 | HEIGHT: 64 IN

## 2021-10-14 DIAGNOSIS — E11.22 TYPE 2 DIABETES MELLITUS WITH STAGE 2 CHRONIC KIDNEY DISEASE, WITHOUT LONG-TERM CURRENT USE OF INSULIN (HCC): ICD-10-CM

## 2021-10-14 DIAGNOSIS — I50.41 ACUTE COMBINED SYSTOLIC AND DIASTOLIC CONGESTIVE HEART FAILURE (HCC): ICD-10-CM

## 2021-10-14 DIAGNOSIS — N18.2 CKD STAGE 2 DUE TO TYPE 2 DIABETES MELLITUS (HCC): ICD-10-CM

## 2021-10-14 DIAGNOSIS — I50.42 CHRONIC COMBINED SYSTOLIC AND DIASTOLIC CONGESTIVE HEART FAILURE (HCC): ICD-10-CM

## 2021-10-14 DIAGNOSIS — N18.2 TYPE 2 DIABETES MELLITUS WITH STAGE 2 CHRONIC KIDNEY DISEASE, WITHOUT LONG-TERM CURRENT USE OF INSULIN (HCC): ICD-10-CM

## 2021-10-14 DIAGNOSIS — I13.0 HYPERTENSIVE HEART AND KIDNEY DISEASE WITH HEART FAILURE AND WITH CHRONIC KIDNEY DISEASE STAGE III (HCC): ICD-10-CM

## 2021-10-14 DIAGNOSIS — I50.32 CHRONIC DIASTOLIC CONGESTIVE HEART FAILURE (HCC): ICD-10-CM

## 2021-10-14 DIAGNOSIS — Z12.31 BREAST CANCER SCREENING BY MAMMOGRAM: ICD-10-CM

## 2021-10-14 DIAGNOSIS — Z23 IMMUNIZATION DUE: ICD-10-CM

## 2021-10-14 DIAGNOSIS — J20.9 ACUTE BRONCHITIS, UNSPECIFIED ORGANISM: Primary | ICD-10-CM

## 2021-10-14 DIAGNOSIS — E78.5 HYPERLIPIDEMIA LDL GOAL <100: ICD-10-CM

## 2021-10-14 DIAGNOSIS — I25.10 CORONARY ARTERY DISEASE INVOLVING NATIVE CORONARY ARTERY OF NATIVE HEART WITHOUT ANGINA PECTORIS: ICD-10-CM

## 2021-10-14 DIAGNOSIS — R05.9 COUGH: ICD-10-CM

## 2021-10-14 DIAGNOSIS — E11.22 CKD STAGE 2 DUE TO TYPE 2 DIABETES MELLITUS (HCC): ICD-10-CM

## 2021-10-14 DIAGNOSIS — N18.30 HYPERTENSIVE HEART AND KIDNEY DISEASE WITH HEART FAILURE AND WITH CHRONIC KIDNEY DISEASE STAGE III (HCC): ICD-10-CM

## 2021-10-14 LAB
ALBUMIN SERPL BCP-MCNC: 3.1 G/DL (ref 3.5–5)
ALP SERPL-CCNC: 100 U/L (ref 46–116)
ALT SERPL W P-5'-P-CCNC: 36 U/L (ref 12–78)
ANION GAP SERPL CALCULATED.3IONS-SCNC: 9 MMOL/L (ref 4–13)
AST SERPL W P-5'-P-CCNC: 23 U/L (ref 5–45)
BILIRUB SERPL-MCNC: 0.62 MG/DL (ref 0.2–1)
BUN SERPL-MCNC: 14 MG/DL (ref 5–25)
CALCIUM ALBUM COR SERPL-MCNC: 9.4 MG/DL (ref 8.3–10.1)
CALCIUM SERPL-MCNC: 8.7 MG/DL (ref 8.3–10.1)
CHLORIDE SERPL-SCNC: 103 MMOL/L (ref 100–108)
CO2 SERPL-SCNC: 26 MMOL/L (ref 21–32)
CREAT SERPL-MCNC: 1.17 MG/DL (ref 0.6–1.3)
EST. AVERAGE GLUCOSE BLD GHB EST-MCNC: 229 MG/DL
GFR SERPL CREATININE-BSD FRML MDRD: 55 ML/MIN/1.73SQ M
GLUCOSE P FAST SERPL-MCNC: 148 MG/DL (ref 65–99)
HBA1C MFR BLD: 9.6 %
POTASSIUM SERPL-SCNC: 4 MMOL/L (ref 3.5–5.3)
PROT SERPL-MCNC: 7.4 G/DL (ref 6.4–8.2)
SODIUM SERPL-SCNC: 138 MMOL/L (ref 136–145)

## 2021-10-14 PROCEDURE — 3046F HEMOGLOBIN A1C LEVEL >9.0%: CPT | Performed by: FAMILY MEDICINE

## 2021-10-14 PROCEDURE — 3008F BODY MASS INDEX DOCD: CPT | Performed by: FAMILY MEDICINE

## 2021-10-14 PROCEDURE — 3066F NEPHROPATHY DOC TX: CPT | Performed by: FAMILY MEDICINE

## 2021-10-14 PROCEDURE — 36415 COLL VENOUS BLD VENIPUNCTURE: CPT

## 2021-10-14 PROCEDURE — 3078F DIAST BP <80 MM HG: CPT | Performed by: FAMILY MEDICINE

## 2021-10-14 PROCEDURE — 83036 HEMOGLOBIN GLYCOSYLATED A1C: CPT

## 2021-10-14 PROCEDURE — 4004F PT TOBACCO SCREEN RCVD TLK: CPT | Performed by: FAMILY MEDICINE

## 2021-10-14 PROCEDURE — G0008 ADMIN INFLUENZA VIRUS VAC: HCPCS

## 2021-10-14 PROCEDURE — 99214 OFFICE O/P EST MOD 30 MIN: CPT | Performed by: FAMILY MEDICINE

## 2021-10-14 PROCEDURE — 3075F SYST BP GE 130 - 139MM HG: CPT | Performed by: FAMILY MEDICINE

## 2021-10-14 PROCEDURE — 1160F RVW MEDS BY RX/DR IN RCRD: CPT | Performed by: FAMILY MEDICINE

## 2021-10-14 PROCEDURE — 80053 COMPREHEN METABOLIC PANEL: CPT

## 2021-10-14 PROCEDURE — 90662 IIV NO PRSV INCREASED AG IM: CPT

## 2021-10-14 RX ORDER — AZITHROMYCIN 250 MG/1
TABLET, FILM COATED ORAL
Qty: 6 TABLET | Refills: 0 | Status: SHIPPED | OUTPATIENT
Start: 2021-10-14 | End: 2021-10-19

## 2021-10-14 RX ORDER — ATORVASTATIN CALCIUM 40 MG/1
40 TABLET, FILM COATED ORAL DAILY
Qty: 90 TABLET | Refills: 1 | Status: SHIPPED | OUTPATIENT
Start: 2021-10-14 | End: 2022-08-10 | Stop reason: SDUPTHER

## 2021-10-14 RX ORDER — METOPROLOL SUCCINATE 25 MG/1
25 TABLET, EXTENDED RELEASE ORAL DAILY
Qty: 90 TABLET | Refills: 1 | Status: SHIPPED | OUTPATIENT
Start: 2021-10-14 | End: 2022-02-16

## 2021-10-14 RX ORDER — BENZONATATE 200 MG/1
200 CAPSULE ORAL 3 TIMES DAILY PRN
Qty: 60 CAPSULE | Refills: 1 | Status: SHIPPED | OUTPATIENT
Start: 2021-10-14 | End: 2021-12-13

## 2021-10-14 RX ORDER — FUROSEMIDE 20 MG/1
TABLET ORAL
Qty: 30 TABLET | Refills: 1 | Status: SHIPPED | OUTPATIENT
Start: 2021-10-14 | End: 2022-01-05

## 2021-11-01 ENCOUNTER — CLINICAL SUPPORT (OUTPATIENT)
Dept: FAMILY MEDICINE CLINIC | Facility: CLINIC | Age: 66
End: 2021-11-01
Payer: COMMERCIAL

## 2021-11-01 DIAGNOSIS — Z11.1 PPD SCREENING TEST: Primary | ICD-10-CM

## 2021-11-01 PROCEDURE — 86580 TB INTRADERMAL TEST: CPT

## 2021-11-03 ENCOUNTER — CLINICAL SUPPORT (OUTPATIENT)
Dept: FAMILY MEDICINE CLINIC | Facility: CLINIC | Age: 66
End: 2021-11-03

## 2021-11-03 ENCOUNTER — VBI (OUTPATIENT)
Dept: ADMINISTRATIVE | Facility: OTHER | Age: 66
End: 2021-11-03

## 2021-11-03 DIAGNOSIS — Z23 IMMUNIZATION DUE: Primary | ICD-10-CM

## 2021-11-03 LAB
INDURATION: 0 MM
TB SKIN TEST: NEGATIVE

## 2021-11-09 ENCOUNTER — IMMUNIZATIONS (OUTPATIENT)
Dept: FAMILY MEDICINE CLINIC | Facility: HOSPITAL | Age: 66
End: 2021-11-09

## 2021-11-09 DIAGNOSIS — Z23 ENCOUNTER FOR IMMUNIZATION: Primary | ICD-10-CM

## 2021-11-09 PROCEDURE — 91306 COVID-19 MODERNA VACC 0.25 ML BOOSTER: CPT

## 2021-11-09 PROCEDURE — 0013A COVID-19 MODERNA VACC 0.25 ML BOOSTER: CPT

## 2021-11-12 ENCOUNTER — HOSPITAL ENCOUNTER (OUTPATIENT)
Dept: RADIOLOGY | Facility: HOSPITAL | Age: 66
Discharge: HOME | End: 2021-11-12
Attending: FAMILY MEDICINE
Payer: COMMERCIAL

## 2021-11-12 VITALS — BODY MASS INDEX: 23.04 KG/M2 | WEIGHT: 130 LBS | HEIGHT: 63 IN

## 2021-11-12 DIAGNOSIS — Z12.31 BREAST CANCER SCREENING BY MAMMOGRAM: ICD-10-CM

## 2021-11-12 PROCEDURE — 77067 SCR MAMMO BI INCL CAD: CPT

## 2021-11-12 PROCEDURE — 77063 BREAST TOMOSYNTHESIS BI: CPT

## 2021-12-02 ENCOUNTER — OFFICE VISIT (OUTPATIENT)
Dept: CARDIOLOGY CLINIC | Facility: CLINIC | Age: 66
End: 2021-12-02
Payer: COMMERCIAL

## 2021-12-02 VITALS
DIASTOLIC BLOOD PRESSURE: 72 MMHG | OXYGEN SATURATION: 99 % | BODY MASS INDEX: 22.22 KG/M2 | HEART RATE: 89 BPM | WEIGHT: 125.4 LBS | SYSTOLIC BLOOD PRESSURE: 156 MMHG | HEIGHT: 63 IN

## 2021-12-02 DIAGNOSIS — I73.9 CLAUDICATION (HCC): ICD-10-CM

## 2021-12-02 DIAGNOSIS — E78.5 HYPERLIPIDEMIA LDL GOAL <100: ICD-10-CM

## 2021-12-02 DIAGNOSIS — I50.42 CHRONIC COMBINED SYSTOLIC AND DIASTOLIC CONGESTIVE HEART FAILURE (HCC): ICD-10-CM

## 2021-12-02 DIAGNOSIS — I10 PRIMARY HYPERTENSION: Primary | ICD-10-CM

## 2021-12-02 DIAGNOSIS — E11.29 TYPE 2 DIABETES MELLITUS WITH OTHER DIABETIC KIDNEY COMPLICATION, WITHOUT LONG-TERM CURRENT USE OF INSULIN (HCC): ICD-10-CM

## 2021-12-02 DIAGNOSIS — I42.8 NICM (NONISCHEMIC CARDIOMYOPATHY) (HCC): ICD-10-CM

## 2021-12-02 DIAGNOSIS — I50.41 ACUTE COMBINED SYSTOLIC AND DIASTOLIC CONGESTIVE HEART FAILURE (HCC): ICD-10-CM

## 2021-12-02 DIAGNOSIS — I25.10 CORONARY ARTERY DISEASE INVOLVING NATIVE CORONARY ARTERY OF NATIVE HEART WITHOUT ANGINA PECTORIS: ICD-10-CM

## 2021-12-02 PROCEDURE — 99214 OFFICE O/P EST MOD 30 MIN: CPT | Performed by: INTERNAL MEDICINE

## 2021-12-02 PROCEDURE — 93000 ELECTROCARDIOGRAM COMPLETE: CPT | Performed by: INTERNAL MEDICINE

## 2021-12-02 PROCEDURE — 3066F NEPHROPATHY DOC TX: CPT | Performed by: ORTHOPAEDIC SURGERY

## 2021-12-02 RX ORDER — LISINOPRIL 20 MG/1
20 TABLET ORAL DAILY
Qty: 30 TABLET | Refills: 3 | Status: CANCELLED | OUTPATIENT
Start: 2021-12-02

## 2021-12-13 ENCOUNTER — OFFICE VISIT (OUTPATIENT)
Dept: FAMILY MEDICINE CLINIC | Facility: CLINIC | Age: 66
End: 2021-12-13
Payer: COMMERCIAL

## 2021-12-13 VITALS
TEMPERATURE: 98.6 F | RESPIRATION RATE: 18 BRPM | DIASTOLIC BLOOD PRESSURE: 80 MMHG | SYSTOLIC BLOOD PRESSURE: 122 MMHG | BODY MASS INDEX: 22.82 KG/M2 | HEIGHT: 63 IN | HEART RATE: 97 BPM | WEIGHT: 128.8 LBS

## 2021-12-13 DIAGNOSIS — M65.311 TRIGGER FINGER OF RIGHT THUMB: ICD-10-CM

## 2021-12-13 DIAGNOSIS — B34.9 VIRAL INFECTION, UNSPECIFIED: ICD-10-CM

## 2021-12-13 DIAGNOSIS — I50.32 CHRONIC DIASTOLIC CONGESTIVE HEART FAILURE (HCC): ICD-10-CM

## 2021-12-13 DIAGNOSIS — I10 PRIMARY HYPERTENSION: ICD-10-CM

## 2021-12-13 DIAGNOSIS — J20.9 ACUTE BRONCHITIS, UNSPECIFIED ORGANISM: Primary | ICD-10-CM

## 2021-12-13 DIAGNOSIS — F17.200 SMOKING: ICD-10-CM

## 2021-12-13 PROCEDURE — U0005 INFEC AGEN DETEC AMPLI PROBE: HCPCS | Performed by: FAMILY MEDICINE

## 2021-12-13 PROCEDURE — U0003 INFECTIOUS AGENT DETECTION BY NUCLEIC ACID (DNA OR RNA); SEVERE ACUTE RESPIRATORY SYNDROME CORONAVIRUS 2 (SARS-COV-2) (CORONAVIRUS DISEASE [COVID-19]), AMPLIFIED PROBE TECHNIQUE, MAKING USE OF HIGH THROUGHPUT TECHNOLOGIES AS DESCRIBED BY CMS-2020-01-R: HCPCS | Performed by: FAMILY MEDICINE

## 2021-12-13 PROCEDURE — 3008F BODY MASS INDEX DOCD: CPT | Performed by: ORTHOPAEDIC SURGERY

## 2021-12-13 PROCEDURE — 99214 OFFICE O/P EST MOD 30 MIN: CPT | Performed by: FAMILY MEDICINE

## 2021-12-13 PROCEDURE — 3074F SYST BP LT 130 MM HG: CPT | Performed by: FAMILY MEDICINE

## 2021-12-13 PROCEDURE — 3079F DIAST BP 80-89 MM HG: CPT | Performed by: FAMILY MEDICINE

## 2021-12-13 PROCEDURE — 3725F SCREEN DEPRESSION PERFORMED: CPT | Performed by: FAMILY MEDICINE

## 2021-12-13 RX ORDER — AZITHROMYCIN 250 MG/1
TABLET, FILM COATED ORAL
Qty: 6 TABLET | Refills: 0 | Status: SHIPPED | OUTPATIENT
Start: 2021-12-13 | End: 2021-12-18

## 2021-12-14 LAB — SARS-COV-2 RNA RESP QL NAA+PROBE: NEGATIVE

## 2021-12-28 ENCOUNTER — OFFICE VISIT (OUTPATIENT)
Dept: OBGYN CLINIC | Facility: CLINIC | Age: 66
End: 2021-12-28
Payer: COMMERCIAL

## 2021-12-28 ENCOUNTER — APPOINTMENT (OUTPATIENT)
Dept: RADIOLOGY | Facility: CLINIC | Age: 66
End: 2021-12-28
Payer: COMMERCIAL

## 2021-12-28 DIAGNOSIS — M25.531 PAIN IN RIGHT WRIST: ICD-10-CM

## 2021-12-28 DIAGNOSIS — R22.31 MASS OF WRIST, RIGHT: Primary | ICD-10-CM

## 2021-12-28 DIAGNOSIS — M65.311 TRIGGER FINGER OF RIGHT THUMB: ICD-10-CM

## 2021-12-28 PROCEDURE — 4004F PT TOBACCO SCREEN RCVD TLK: CPT | Performed by: ORTHOPAEDIC SURGERY

## 2021-12-28 PROCEDURE — 1160F RVW MEDS BY RX/DR IN RCRD: CPT | Performed by: ORTHOPAEDIC SURGERY

## 2021-12-28 PROCEDURE — 73110 X-RAY EXAM OF WRIST: CPT

## 2021-12-28 PROCEDURE — 99203 OFFICE O/P NEW LOW 30 MIN: CPT | Performed by: ORTHOPAEDIC SURGERY

## 2022-01-05 DIAGNOSIS — I50.32 CHRONIC DIASTOLIC CONGESTIVE HEART FAILURE (HCC): ICD-10-CM

## 2022-01-05 RX ORDER — FUROSEMIDE 20 MG/1
TABLET ORAL
Qty: 45 TABLET | Refills: 1 | Status: SHIPPED | OUTPATIENT
Start: 2022-01-05 | End: 2022-08-10 | Stop reason: SDUPTHER

## 2022-01-21 ENCOUNTER — HOSPITAL ENCOUNTER (OUTPATIENT)
Dept: NON INVASIVE DIAGNOSTICS | Facility: CLINIC | Age: 67
Discharge: HOME | End: 2022-01-21
Payer: COMMERCIAL

## 2022-01-21 VITALS
DIASTOLIC BLOOD PRESSURE: 80 MMHG | HEIGHT: 63 IN | BODY MASS INDEX: 22.68 KG/M2 | HEART RATE: 89 BPM | WEIGHT: 128 LBS | SYSTOLIC BLOOD PRESSURE: 122 MMHG

## 2022-01-21 DIAGNOSIS — I73.9 CLAUDICATION (HCC): ICD-10-CM

## 2022-01-21 DIAGNOSIS — I42.8 NICM (NONISCHEMIC CARDIOMYOPATHY) (HCC): ICD-10-CM

## 2022-01-21 DIAGNOSIS — I50.42 CHRONIC COMBINED SYSTOLIC AND DIASTOLIC CONGESTIVE HEART FAILURE (HCC): ICD-10-CM

## 2022-01-21 LAB
AORTIC ROOT: 3 CM
APICAL FOUR CHAMBER EJECTION FRACTION: 35 %
AV LVOT MEAN GRADIENT: 1 MMHG
AV LVOT PEAK GRADIENT: 1 MMHG
AV PEAK GRADIENT: 7 MMHG
AV REGURGITATION PRESSURE HALF TIME: 404 MS
DOP CALC LVOT PEAK VEL VTI: 10.2 CM
DOP CALC LVOT PEAK VEL: 0.55 M/S
FRACTIONAL SHORTENING: 15 % (ref 28–44)
INTERVENTRICULAR SEPTUM IN DIASTOLE (PARASTERNAL SHORT AXIS VIEW): 1 CM
LEFT ATRIUM AREA SYSTOLE SINGLE PLANE A4C: 13.1 CM2
LEFT ATRIUM SIZE: 3.9 CM
LEFT INTERNAL DIMENSION IN SYSTOLE: 3.5 CM (ref 2.1–4)
LEFT VENTRICULAR INTERNAL DIMENSION IN DIASTOLE: 4.1 CM (ref 3.74–5.57)
LEFT VENTRICULAR POSTERIOR WALL IN END DIASTOLE: 1.1 CM
LEFT VENTRICULAR STROKE VOLUME: 24 ML
PA SYSTOLIC PRESSURE: 44 MMHG
RA PRESSURE ESTIMATED: 8 MMHG
RIGHT ATRIUM AREA SYSTOLE A4C: 13.5 CM2
RIGHT VENTRICLE ID DIMENSION: 3.5 CM
SL CV AV DECELERATION TIME RETROGRADE: 1394 MS
SL CV AV PEAK GRADIENT RETROGRADE: 69 MMHG
SL CV LV EF: 30
SL CV PED ECHO LEFT VENTRICLE DIASTOLIC VOLUME (MOD BIPLANE) 2D: 73 ML
SL CV PED ECHO LEFT VENTRICLE SYSTOLIC VOLUME (MOD BIPLANE) 2D: 49 ML
TRICUSPID VALVE PEAK REGURGITATION VELOCITY: 3.47 M/S
Z-SCORE OF LEFT VENTRICULAR DIMENSION IN END SYSTOLE: -1.08

## 2022-01-21 PROCEDURE — 93306 TTE W/DOPPLER COMPLETE: CPT

## 2022-01-21 PROCEDURE — 93923 UPR/LXTR ART STDY 3+ LVLS: CPT

## 2022-01-21 PROCEDURE — 93306 TTE W/DOPPLER COMPLETE: CPT | Performed by: INTERNAL MEDICINE

## 2022-01-21 PROCEDURE — 93925 LOWER EXTREMITY STUDY: CPT

## 2022-01-21 PROCEDURE — 93922 UPR/L XTREMITY ART 2 LEVELS: CPT | Performed by: SURGERY

## 2022-01-21 PROCEDURE — 93925 LOWER EXTREMITY STUDY: CPT | Performed by: SURGERY

## 2022-01-21 NOTE — LETTER
[address] [place] [address]  Cardiology Department  [address] [facility] [address]    January 26, 2022    MRN: [id number]     Phone: [phone]     Dear Ms  Alton Hamlin recently had a(n) Echocardiogram performed on 1/21/2022 at  [address] [place] [address] that was requested by Loc Mcclure MD  The study was reviewed by a cardiologist, which is a physician who specializes in testing involving the heart  The cardiologist issued a report describing his or her findings  In that report there was a finding that the cardiologists felt warranted further discussion with your health care provider and that discussion would be beneficial to you  The results were sent to Loc Mcclure MD on 01/21/2022  4:43 PM  We recommend that you contact Loc Mccluer MD at [phone] or set up an appointment to discuss the results of your cardiac test  If you have already heard from Loc Mcclure MD regarding the results of your study, you can disregard this letter  This letter is not meant to alarm you, but intended to encourage you to follow-up on your test results with the provider that sent you  In addition, we have enclosed answers to frequently asked questions by other patients who have also received a letter to review results with their health care provider (see page two)  Thank you for choosing [address] [place] [address] for your cardiac testing needs  Sincerely,    [address] [place] [address]  Cardiology Department                                                                                                                                                                        FREQUENTLY ASKED QUESTIONS    1  Why am I receiving this letter? [facility] requires us to notify patients who have findings on imaging exams that may require more testing or follow-up with a health professional within the next 3 months          2  How serious is the finding on the imaging test?  This letter is sent to all patients who need follow-up or more testing within 3 months  Receiving this letter does not necessarily mean you have a life-threatening finding or disease  Recommendations in the cardiologist report are general in nature and it is up to your healthcare provider to say whether those recommendations make sense for your situation  You are strongly encouraged to talk to your healthcare provider about the results and ask whether additional steps need to be taken  3  Where can I get a copy of the final report for my recent cardiology exam?  To get a full copy of the report you can access your records online at [email or web address] or please contact [address] [place] [address] Department at [phone] Monday through Friday between 8 am and 6 pm          4  What do I need to do now? Please contact your health care provider who requested the test to discuss what further actions (if any) are needed  You may have already heard from your ordering physician in regards to this test in which case you can disregard this letter  NOTICE IN ACCORDANCE WITH THE [facility] PATIENT TEST RESULT INFORMATION ACT OF 2018    You are receiving this notice as a result of a determination by your diagnostic study that further discussions of your test results are warranted and would be beneficial to you  The complete results of your test or tests have been or will be sent to the health care practitioner that ordered the test or tests  It is recommended that you contact your health care practitioner to discuss your results as soon as possible

## 2022-01-26 ENCOUNTER — HOSPITAL ENCOUNTER (OUTPATIENT)
Dept: RADIOLOGY | Facility: HOSPITAL | Age: 67
Discharge: HOME | End: 2022-01-26
Attending: ORTHOPAEDIC SURGERY
Payer: COMMERCIAL

## 2022-01-26 DIAGNOSIS — R22.31 MASS OF WRIST, RIGHT: ICD-10-CM

## 2022-01-26 PROCEDURE — G1004 CDSM NDSC: HCPCS

## 2022-01-26 PROCEDURE — 73221 MRI JOINT UPR EXTREM W/O DYE: CPT

## 2022-01-27 ENCOUNTER — OFFICE VISIT (OUTPATIENT)
Dept: CARDIOLOGY CLINIC | Facility: CLINIC | Age: 67
End: 2022-01-27
Payer: COMMERCIAL

## 2022-01-27 VITALS
SYSTOLIC BLOOD PRESSURE: 130 MMHG | HEIGHT: 63 IN | WEIGHT: 130 LBS | HEART RATE: 89 BPM | BODY MASS INDEX: 23.04 KG/M2 | OXYGEN SATURATION: 100 % | DIASTOLIC BLOOD PRESSURE: 90 MMHG

## 2022-01-27 DIAGNOSIS — I25.10 CORONARY ARTERY DISEASE INVOLVING NATIVE CORONARY ARTERY OF NATIVE HEART WITHOUT ANGINA PECTORIS: ICD-10-CM

## 2022-01-27 DIAGNOSIS — I73.9 CLAUDICATION (HCC): ICD-10-CM

## 2022-01-27 DIAGNOSIS — I10 PRIMARY HYPERTENSION: ICD-10-CM

## 2022-01-27 DIAGNOSIS — E78.5 HYPERLIPIDEMIA LDL GOAL <100: ICD-10-CM

## 2022-01-27 DIAGNOSIS — I50.42 CHRONIC COMBINED SYSTOLIC AND DIASTOLIC CONGESTIVE HEART FAILURE (HCC): ICD-10-CM

## 2022-01-27 DIAGNOSIS — I42.8 NICM (NONISCHEMIC CARDIOMYOPATHY) (HCC): Primary | ICD-10-CM

## 2022-01-27 PROCEDURE — 3008F BODY MASS INDEX DOCD: CPT | Performed by: INTERNAL MEDICINE

## 2022-01-27 PROCEDURE — 99214 OFFICE O/P EST MOD 30 MIN: CPT | Performed by: INTERNAL MEDICINE

## 2022-01-27 PROCEDURE — 3080F DIAST BP >= 90 MM HG: CPT | Performed by: INTERNAL MEDICINE

## 2022-01-27 PROCEDURE — 1160F RVW MEDS BY RX/DR IN RCRD: CPT | Performed by: INTERNAL MEDICINE

## 2022-01-27 PROCEDURE — 3075F SYST BP GE 130 - 139MM HG: CPT | Performed by: INTERNAL MEDICINE

## 2022-01-27 PROCEDURE — 4004F PT TOBACCO SCREEN RCVD TLK: CPT | Performed by: INTERNAL MEDICINE

## 2022-01-27 RX ORDER — SACUBITRIL AND VALSARTAN 24; 26 MG/1; MG/1
1 TABLET, FILM COATED ORAL 2 TIMES DAILY
Qty: 30 TABLET | Refills: 3 | Status: CANCELLED | OUTPATIENT
Start: 2022-01-27

## 2022-01-27 RX ORDER — ASPIRIN 81 MG/1
81 TABLET ORAL DAILY
Qty: 90 TABLET | Refills: 3 | Status: SHIPPED | OUTPATIENT
Start: 2022-01-27

## 2022-01-27 RX ORDER — NICOTINE 21 MG/24HR
1 PATCH, TRANSDERMAL 24 HOURS TRANSDERMAL EVERY 24 HOURS
Qty: 28 PATCH | Refills: 3 | Status: SHIPPED | OUTPATIENT
Start: 2022-01-27

## 2022-01-27 RX ORDER — SACUBITRIL AND VALSARTAN 49; 51 MG/1; MG/1
1 TABLET, FILM COATED ORAL 2 TIMES DAILY
Qty: 30 TABLET | Refills: 3 | Status: SHIPPED | OUTPATIENT
Start: 2022-01-27

## 2022-01-27 NOTE — PROGRESS NOTES
Cardiology Follow Up    Alexandru Hay  1955  [id number]  [facility]  [address]    [place] [id number]  [phone]    1  NICM (nonischemic cardiomyopathy) ([facility] )     2  Primary hypertension     3  Coronary artery disease involving native coronary artery of native heart without angina pectoris     4  Chronic combined systolic and diastolic congestive heart failure ([facility] )     5  Hyperlipidemia LDL goal <100     6  Claudication [facility])       Chief Complaint   Patient presents with    Follow-up     post echo    Shortness of Breath     at night     Cough     "smokers cough"    Leg Swelling     every now and then if on feet too long     Panic Attack     feeling anxious when SOB        Interval History: Patient has some difficulty with her gait with fatigue in her legs with walking, improves with rest, no pain  She also has some shortness of breath at night, but not quite orthopnea  She has occasional LE edema that is relatively well controlled with current dose of diuretic  No reported chest pain, palpitations, lightheadedness, syncope, orthopnea, PND, or significant weight changes  Patient remains active without any increased fatigue out of the ordinary           Patient Active Problem List   Diagnosis    History of left breast cancer    Arthropathy    Hypertensive heart and kidney disease with heart failure and with chronic kidney disease stage III (HCC)    Colon, diverticulosis    Hyperlipidemia LDL goal <100    Type 2 diabetes mellitus with stage 2 chronic kidney disease, without long-term current use of insulin ([place] [address] )    Screening for cardiovascular, respiratory, and genitourinary diseases    Immunization due    Breast cancer screening    Colon cancer screening    Menopause    Gastroesophageal reflux disease    Microalbuminuria    Medicare annual wellness visit, subsequent    Obstructive sleep apnea syndrome    Hypercalcemia    CKD stage 2 due to type 2 diabetes mellitus ([place] [address] )    Hyperparathyroidism ([place] [address] )    Coronary artery disease involving native coronary artery of native heart without angina pectoris    Chronic combined systolic and diastolic congestive heart failure (HCC)    Vitamin D deficiency    S/P left mastectomy    Breast cancer ([place] [address] ) - left 1994    HTN (hypertension)    Smoking    Back pain    Cough    Chronic diastolic congestive heart failure (HCC)    Bilateral leg edema    Breast cancer screening by mammogram    Acute bronchitis    NICM (nonischemic cardiomyopathy) ([place] [address] )    Claudication (HCC)    Trigger finger of right thumb     Past Medical History:   Diagnosis Date    Breast cancer ([place] [address] )     left - 1994      Diabetes mellitus ([place] [address] )     Diabetes mellitus, type 2 ([place] [address] ) 03/15/2006    last assessed 7/10/13    GERD (gastroesophageal reflux disease)     History of chemotherapy     Hypertension      Social History     Socioeconomic History    Marital status: Single     Spouse name: Not on file    Number of children: Not on file    Years of education: Not on file    Highest education level: Not on file   Occupational History    Not on file   Tobacco Use    Smoking status: Current Every Day Smoker     Packs/day: 0 25    Smokeless tobacco: Never Used    Tobacco comment: 3 cigarettes daily    Vaping Use    Vaping Use: Never used   Substance and Sexual Activity    Alcohol use: Not Currently     Comment: Social    Drug use: No    Sexual activity: Not on file   Other Topics Concern    Not on file   Social History Narrative    Not on file     Social Determinants of Health     Financial Resource Strain: Not on file   Food Insecurity: Not on file   Transportation Needs: Not on file   Physical Activity: Not on file   Stress: Not on file   Social Connections: Not on file   Intimate Partner Violence: Not on file   Housing Stability: Not on file      Family History Problem Relation Age of Onset    Hypothyroidism Mother     Leukemia Mother     Diabetes Father     Diabetes type II Father     Stroke Sister     Diabetes Paternal Grandmother     Cancer Sister     Diabetes Brother      Past Surgical History:   Procedure Laterality Date    MASTECTOMY Left     last assessed 12/16/14    MASTECTOMY, RADICAL Left     1994    ME EXPLORE PARATHYROID GLANDS N/A 4/21/2021    Procedure: PARATHYROIDECTOMY POSSIBLE 4 GLAND EXPLORATION;  Surgeon: Deon Roth MD;  [facility];  Service: ENT    REDUCTION MAMMAPLASTY Right     UVULECTOMY         Current Outpatient Medications:     atorvastatin (LIPITOR) 40 mg tablet, Take 1 tablet (40 mg total) by mouth daily, Disp: 90 tablet, Rfl: 1    metFORMIN (GLUCOPHAGE) 1000 MG tablet, TAKE 1 TABLET BY MOUTH TWICE A DAY WITH MEALS, Disp: 180 tablet, Rfl: 1    metoprolol succinate (TOPROL-XL) 25 mg 24 hr tablet, Take 1 tablet (25 mg total) by mouth daily, Disp: 90 tablet, Rfl: 1    aspirin (ECOTRIN LOW STRENGTH) 81 mg EC tablet, Take 1 tablet (81 mg total) by mouth daily (Patient not taking: Reported on 1/27/2022 ), Disp:  , Rfl: 0    furosemide (LASIX) 20 mg tablet, TAKE 1 TABLET DAILY ON MONDAYS, WEDNESDAYS, AND FRIDAYS (Patient not taking: Reported on 1/27/2022 ), Disp: 45 tablet, Rfl: 1  No Known Allergies    Labs:  Hospital Outpatient Visit on 01/21/2022   Component Date Value    A4C EF 01/21/2022 35     LVIDd 01/21/2022 4 10     LVIDS 01/21/2022 3 50     IVSd 01/21/2022 1 00     LVPWd 01/21/2022 1 10     FS 01/21/2022 15     AV LVOT peak gradient 01/21/2022 1     LVOT peak VTI 01/21/2022 10 2     LVOT peak ebony 01/21/2022 0 55     RVID d 01/21/2022 3 5     LA size 01/21/2022 3 9     HEATHER A4C 01/21/2022 13 1     RAA A4C 01/21/2022 13 5     LVOT mn grad 01/21/2022 1 0     AV peak gradient 01/21/2022 7     AV peak gradient 01/21/2022 69     AV Deceleration Time 01/21/2022 1,394     AV regurgitation pressur* 01/21/2022 404     Ao root 01/21/2022 3 00     Tricuspid valve peak reg* 01/21/2022 3 47     Left ventricular stroke * 01/21/2022 24 00     LEFT VENTRICLE SYSTOLIC * 89/57/0253 49     LV DIASTOLIC VOLUME (MOD* 70/06/6367 73     ZLVIDS 01/21/2022 -1 08     LV EF 01/21/2022 30     Est  RA pres 01/21/2022 8 0     PASP 01/21/2022 44 0    Office Visit on 12/13/2021   Component Date Value    SARS-CoV-2 12/13/2021 Negative    Clinical Support on 11/01/2021   Component Date Value    TB Skin Test 11/03/2021 Negative     Induration 11/03/2021 0 0    Appointment on 10/14/2021   Component Date Value    Hemoglobin A1C 10/14/2021 9 6*    EAG 10/14/2021 229     Sodium 10/14/2021 138     Potassium 10/14/2021 4 0     Chloride 10/14/2021 103     CO2 10/14/2021 26     ANION GAP 10/14/2021 9     BUN 10/14/2021 14     Creatinine 10/14/2021 1 17     Glucose, Fasting 10/14/2021 148*    Calcium 10/14/2021 8 7     Corrected Calcium 10/14/2021 9 4     AST 10/14/2021 23     ALT 10/14/2021 36     Alkaline Phosphatase 10/14/2021 100     Total Protein 10/14/2021 7 4     Albumin 10/14/2021 3 1*    Total Bilirubin 10/14/2021 0 62     eGFR 10/14/2021 55      Lab Results   Component Value Date    CHOL 190 10/07/2016    TRIG 76 03/09/2021    TRIG 100 10/07/2016    HDL 64 03/09/2021    HDL 62 10/07/2016     Imaging: Mammo screening right w 3d & cad    Result Date: 11/16/2021  Narrative: DIAGNOSIS: Breast cancer screening by mammogram TECHNIQUE: Digital screening mammography was performed  Computer Aided Detection (CAD) analyzed all applicable images  COMPARISONS: Prior breast imaging dated: 05/18/2020, 11/06/2018, and 02/23/2016 RELEVANT HISTORY: Family Breast Cancer History: No known family history of breast cancer  Family Medical History: No known relevant family medical history  Personal History: No known relevant hormone history  Surgical history includes breast reduction and mastectomy   Medical history includes breast cancer and history of chemotherapy  The patient is scheduled in a reminder system for screening mammography  8-10% of cancers will be missed on mammography  Management of a palpable abnormality must be based on clinical grounds  Patients will be notified of their results via letter from our facility  Accredited by [employer] and FDA  RISK ASSESSMENT: Janene risk assessment reporting was suppressed due to the patient's history and/or demographic factors  TISSUE DENSITY: There are scattered areas of fibroglandular density  INDICATION: Alexandru Hay is a 74 y o  female presenting for screening mammography  FINDINGS: There are no suspicious masses, grouped microcalcifications or areas of architectural distortion  The skin and nipple areolar complex are unremarkable  Impression: No mammographic evidence of malignancy  ASSESSMENT/BI-RADS CATEGORY: Right: 1 - Negative Overall: 1 - Negative RECOMMENDATION:      - Routine screening mammogram in 1 year for the right breast  Workstation ID: [id number]       Review of Systems:  Review of Systems   Constitutional: Negative for activity change, appetite change, chills, diaphoresis, fatigue and unexpected weight change  HENT: Negative for hearing loss, nosebleeds and sore throat  Eyes: Negative for photophobia and visual disturbance  Respiratory: Positive for shortness of breath  Negative for cough, chest tightness and wheezing  Cardiovascular: Positive for leg swelling  Negative for chest pain and palpitations  Gastrointestinal: Negative for abdominal pain, diarrhea, nausea and vomiting  Endocrine: Negative for polyuria  Genitourinary: Negative for dysuria, frequency and hematuria  Musculoskeletal: Negative for arthralgias, back pain, gait problem and neck pain  Skin: Negative for pallor and rash  Neurological: Negative for dizziness, syncope and headaches  Hematological: Does not bruise/bleed easily  Psychiatric/Behavioral: Negative for behavioral problems and confusion  Physical Exam:  Physical Exam  Vitals reviewed  Constitutional:       Appearance: She is well-developed  She is not diaphoretic  HENT:      Head: Normocephalic and atraumatic  Nose: Nose normal    Eyes:      General: No scleral icterus  Pupils: Pupils are equal, round, and reactive to light  Neck:      Vascular: No JVD  Cardiovascular:      Rate and Rhythm: Normal rate and regular rhythm  Heart sounds: Normal heart sounds  No murmur heard  No friction rub  No gallop  Pulmonary:      Effort: Pulmonary effort is normal  No respiratory distress  Breath sounds: Normal breath sounds  No wheezing or rales  Abdominal:      General: Bowel sounds are normal  There is no distension  Palpations: Abdomen is soft  Tenderness: There is no abdominal tenderness  Musculoskeletal:         General: No deformity  Normal range of motion  Cervical back: Normal range of motion and neck supple  Skin:     General: Skin is warm and dry  Findings: No rash  Neurological:      Mental Status: She is alert and oriented to person, place, and time  Cranial Nerves: No cranial nerve deficit  Psychiatric:         Behavior: Behavior normal        Blood pressure 130/90, pulse 89, height 5' 3" (1 6 m), weight 59 kg (130 lb), SpO2 100 %, not currently breastfeeding  EKG:  Normal sinus rhythm  Left axis deviation  Incomplete right bundle branch block  Nonspecific ST and T wave abnormality  Abnormal ECG    Discussion/Summary:  NICM: mild reduction/low normal EF when hospitalized for CHF  Repeat echo now revealed significantly reduced EF of 25-30%  Continued on ACE-I and BB for GDMT  Low dose diuretic as maintenance continued at M-W-F schedule  Will refer to EPS for ICD placement  CAD: nonobstructive as per cath in Nov 2020  Continued on ASA, statin, and B-blocker      HTN: elevated today, will change lisinopril to Entresto  HLD: continued on statin  LDL 65 in March 2021  Claudication: fatigue in legs with exertion  We checked arterial duplex revealing significant LE arterial disease - will refer to vascular surgery for further management

## 2022-01-27 NOTE — PATIENT INSTRUCTIONS
Heart Failure   AMBULATORY CARE:   Heart failure  is a condition that does not allow your heart to fill or pump properly  Not enough oxygen in your blood gets to your organs and tissues  Fluid may not move through your body properly  Fluid builds up and causes swelling and trouble breathing  This is known as congestive heart failure  Heart failure may start in the left or right ventricle  Heart failure is often caused by damage or injury to your heart  The damage may be caused by other heart problems, diabetes, or high blood pressure  The damage may have also been caused by an infection  Heart failure is a long-term condition that tends to get worse over time  It is important to manage your health to improve your quality of life  Common signs and symptoms:   · Trouble breathing with activity that worsens to trouble breathing at rest    · Shortness of breath while lying flat    · Severe shortness of breath and coughing at night that usually wakes you    · Feeling lightheaded when you stand up    · Purple color around your mouth and nails    · Confusion or anxiety    · Chest pain at night    · Periods of no breathing, then breathing fast    · Lack of energy (often worsened by physical activity), or trouble sleeping    · Swelling in your ankles, legs, or abdomen    · Heartbeat that is fast or not regular    · Fingers and toes feel cool to the touch    Call your local emergency number (911 in the [address] [facility]) if:   · You have any of the following signs of a heart attack:      ? Squeezing, pressure, or pain in your chest    ? You may  also have any of the following:     § Discomfort or pain in your back, neck, jaw, stomach, or arm    § Shortness of breath    § Nausea or vomiting    § Lightheadedness or a sudden cold sweat      Call your doctor if:   · Your heartbeat is fast, slow, or uneven all the time  · You have symptoms of worsening heart failure:      ?  Shortness of breath at rest, at night, or that is getting worse in any way    ? Weight gain of 3 or more pounds (1 4 kg) in a day, or more than your healthcare provider says is okay    ? More swelling in your legs or ankles    ? Abdominal pain or swelling    ? More coughing    ? Loss of appetite    ? Feeling tired all the time    · You feel hopeless or depressed, or you have lost interest in things you used to enjoy  · You often feel worried or afraid  · You have questions or concerns about your condition or care  Treatment for heart failure  may include any of the following:  · Medicines  may be needed to help regulate your heart rhythm  You may also need medicines to lower your blood pressure, and to decrease extra fluids  · Oxygen  may help you breathe easier if your oxygen level is lower than normal  A CPAP machine may be used to keep your airway open while you sleep  · Cardiac rehab  is a program run by specialists who will help you safely strengthen your heart  In the program you will learn about exercise, relaxation, stress management, and heart-healthy nutrition  Cardiac rehab may be recommended if your heart failure is not severe  · Surgery  can be done to implant a pacemaker or another device in your chest to regulate your heart rhythm  Other types of surgery can open blocked heart vessels, replace a damaged heart valve, or remove scar tissue  Manage swelling from extra fluid:   · Elevate (raise) your legs above the level of your heart  This will help with fluid that builds up in your legs or ankles  Elevate your legs as often as possible during the day  Prop your legs on pillows or blankets to keep them elevated comfortably  Try not to stand for long periods of time during the day  Move around to keep your blood circulating  · Limit sodium (salt)  Ask how much sodium you can have each day  Your healthcare provider may give you a limit, such as 2,300 milligrams (mg) a day   Your provider or a dietitian can teach you how to read food labels for the number of mg in a food  He or she can also help you find ways to have less salt  For example, if you add salt to food as you cook, do not add more at the table  · Drink liquids as directed  You may need to limit the amount of liquid you drink within 24 hours  Your healthcare provider will tell you how much liquid to have and which liquids are best for you  He or she may tell you to limit liquid to 1 5 to 2 liters in a day  He or she will also tell you how often to drink liquid throughout the day  · Weigh yourself every morning  Use the same scale, in the same spot  Do this after you use the bathroom, but before you eat or drink  Wear the same type of clothing each time  Write down your weight and call your healthcare provider if you have a sudden weight gain  Swelling and weight gain are signs of fluid buildup  Manage heart failure: Your quality of life may improve with treatment and the following:  · Do not smoke  Nicotine and other chemicals in cigarettes and cigars can cause lung and heart damage  Ask your healthcare provider for information if you currently smoke and need help to quit  E-cigarettes or smokeless tobacco still contain nicotine  Talk to your healthcare provider before you use these products  · Do not drink alcohol or use illegal drugs  Alcohol and drugs can increase your risk for high blood pressure, diabetes, and coronary artery disease  · Eat heart-healthy foods  Heart-healthy foods include fruits, vegetables, lean meat (such as beef, chicken, or pork), and low-fat dairy products  Fatty fish such as salmon and tuna are also heart healthy  Other heart-healthy foods include walnuts, whole-grain breads, beans, and cooked beans  Replace butter and margarine with heart-healthy oils such as olive oil or canola oil  Your provider or a dietitian can help you create heart-healthy meal plans  · Manage any chronic health conditions you have    These include high blood pressure, diabetes, obesity, high cholesterol, metabolic syndrome, and COPD  You will have fewer symptoms if you manage these health conditions  Follow your healthcare provider's recommendations and follow up with him or her regularly  · Maintain a healthy weight  Being overweight can increase your risk for high blood pressure, diabetes, and coronary artery disease  These conditions can make your symptoms worse  Ask your healthcare provider how much you should weigh  Ask him or her to help you create a weight loss plan if you are overweight  · Stay active  Activity can help keep your symptoms from getting worse  Walking is a type of physical activity that helps maintain your strength and improve your mood  Physical activity also helps you manage your weight  Work with your healthcare provider to create an exercise plan that is right for you  · Get vaccines as directed  The flu and pneumonia can be severe for a person who has heart failure  Vaccines protect you from these infections  Get a flu shot every year as soon as it is recommended, usually in September or October  You may also need the pneumonia vaccine  Your healthcare provider can tell you if you need other vaccines, and when to get them  Follow up with your doctor or cardiologist within 7 days or as directed: You may need to return for other tests  You may need home health care  A healthcare provider will monitor your vital signs, weight, and make sure your medicines are working  Write down your questions so you remember to ask them during your visits  Join a support group:  Heart failure can be difficult to manage  It may be helpful to talk with others who have heart failure  You may learn how to better manage your condition or get emotional support  For more information:  · [address]  [place] , [place]   Phone: 1- 084 - [phone]  Web Address: https://[employer]/  org     © [facility] 2021 Information is for End User's use only and may not be sold, redistributed or otherwise used for commercial purposes  All illustrations and images included in CareNotes® are the copyrighted property of A D A M [employer] Inc  or Reynaldo Marroquin   The above information is an  only  It is not intended as medical advice for individual conditions or treatments  Talk to your doctor, nurse or pharmacist before following any medical regimen to see if it is safe and effective for you

## 2022-02-01 ENCOUNTER — OFFICE VISIT (OUTPATIENT)
Dept: OBGYN CLINIC | Facility: CLINIC | Age: 67
End: 2022-02-01
Payer: COMMERCIAL

## 2022-02-01 VITALS
SYSTOLIC BLOOD PRESSURE: 153 MMHG | HEART RATE: 94 BPM | DIASTOLIC BLOOD PRESSURE: 96 MMHG | BODY MASS INDEX: 23.04 KG/M2 | WEIGHT: 130 LBS | HEIGHT: 63 IN

## 2022-02-01 DIAGNOSIS — M25.531 PAIN IN RIGHT WRIST: Primary | ICD-10-CM

## 2022-02-01 DIAGNOSIS — M77.8 RIGHT WRIST TENDONITIS: ICD-10-CM

## 2022-02-01 PROCEDURE — 99213 OFFICE O/P EST LOW 20 MIN: CPT | Performed by: ORTHOPAEDIC SURGERY

## 2022-02-01 NOTE — PROGRESS NOTES
Assessment/Plan:  1  Pain in right wrist     2  Right wrist tendonitis  Durable Medical Equipment     74year old female with left 1st dorsal compartment tendon sheath thickening/tenosynovitis  I discussed patient's MRI results with her today  I explained that thankfully, there is no specific mass but rather inflammation of the tendons in that area  For this, I did offer steroid injection today  However, since patient is not that tender here, she politely declined this  Instead, we offered a thumb spica brace to use as needed to rest the tendons  Patient may follow up on an as needed basis  Call if any questions/concerns  Subjective:   Rodo Garay is a 74 y o  female who presents to the office today for follow up of left radial wrist mass and pain  Patient states she has not noticed any changes in the mass since her last visit  She describes some occasional tenderness here, but it does not bother her all the time  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 and prior to this developing performed repetitive tearing of bingo tickets  She is here today for MRI review  Review of Systems   Musculoskeletal:        As noted in HPI  All other systems reviewed and are negative  Past Medical History:   Diagnosis Date    Breast cancer ([facility] [address] )     left - 1994      Diabetes mellitus ([facility] [address] )     Diabetes mellitus, type 2 ([facility] [address] ) 03/15/2006    last assessed 7/10/13    GERD (gastroesophageal reflux disease)     History of chemotherapy     Hypertension        Past Surgical History:   Procedure Laterality Date    MASTECTOMY Left     last assessed 12/16/14    MASTECTOMY, RADICAL Left     1994    DC EXPLORE PARATHYROID GLANDS N/A 4/21/2021    Procedure: PARATHYROIDECTOMY POSSIBLE 4 GLAND EXPLORATION;  Surgeon: Tory Richardson MD;  Location: [facility];  Service: ENT    REDUCTION MAMMAPLASTY Right     UVULECTOMY         Family History   Problem Relation Age of Onset    Hypothyroidism Mother    Janina Riomar Leukemia Mother  Diabetes Father     Diabetes type II Father     Stroke Sister     Diabetes Paternal Grandmother    Bedelia Severin Sister     Diabetes Brother        Social History     Occupational History    Not on file   Tobacco Use    Smoking status: Current Every Day Smoker     Packs/day: 0 25    Smokeless tobacco: Never Used    Tobacco comment: 3 cigarettes daily    Vaping Use    Vaping Use: Never used   Substance and Sexual Activity    Alcohol use: Not Currently     Comment: Social    Drug use: No    Sexual activity: Not on file         Current Outpatient Medications:     aspirin (ECOTRIN LOW STRENGTH) 81 mg EC tablet, Take 1 tablet (81 mg total) by mouth daily, Disp: 90 tablet, Rfl: 3    atorvastatin (LIPITOR) 40 mg tablet, Take 1 tablet (40 mg total) by mouth daily, Disp: 90 tablet, Rfl: 1    furosemide (LASIX) 20 mg tablet, TAKE 1 TABLET DAILY ON MONDAYS, WEDNESDAYS, AND FRIDAYS (Patient not taking: Reported on 1/27/2022 ), Disp: 45 tablet, Rfl: 1    metFORMIN (GLUCOPHAGE) 1000 MG tablet, TAKE 1 TABLET BY MOUTH TWICE A DAY WITH MEALS, Disp: 180 tablet, Rfl: 1    metoprolol succinate (TOPROL-XL) 25 mg 24 hr tablet, Take 1 tablet (25 mg total) by mouth daily, Disp: 90 tablet, Rfl: 1    nicotine (NICODERM CQ) 14 mg/24hr TD 24 hr patch, Place 1 patch on the skin every 24 hours, Disp: 28 patch, Rfl: 3    sacubitril-valsartan (Entresto) 49-51 MG TABS, Take 1 tablet by mouth 2 (two) times a day, Disp: 30 tablet, Rfl: 3    No Known Allergies    Objective:  Vitals:    02/01/22 1105   BP: 153/96   Pulse: 94       Ortho Exam   Patient with swelling along the 1st dorsal compartment  APL/EPB intact  FPL/EPL intact  Relatively nontender along the 1st dorsal compartment today  Negative Tinel's along the mass  SILT radial/ulnar thumb  Brisk capillary refill  Physical Exam  Vitals reviewed  Constitutional:       Appearance: Normal appearance  She is well-developed     HENT:      Head: Normocephalic and atraumatic  Eyes:      Extraocular Movements: Extraocular movements intact  Conjunctiva/sclera: Conjunctivae normal    Neck:      Trachea: No tracheal deviation  Cardiovascular:      Pulses: Normal pulses  Pulmonary:      Effort: Pulmonary effort is normal    Abdominal:      Tenderness: There is no guarding  Skin:     General: Skin is warm and dry  Neurological:      Mental Status: She is alert and oriented to person, place, and time  Psychiatric:         Behavior: Behavior normal          Thought Content: Thought content normal          Judgment: Judgment normal          Studies Reviewed:  I have personally reviewed pertinent films in PACS and my interpretation is as follows:  MRI of the wrist from 01/26/22 demonstrates thickening of the 1st dorsal compartment tendon sheath c/w sprain or repetitive trauma  Mild tenosynovitis with mild APL tendinosis  Longitudinal signal within APL may represent normal striations or intrasubstance split tears  Other incidental findings consist of central TFCC perforation and 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

## 2022-02-16 ENCOUNTER — OFFICE VISIT (OUTPATIENT)
Dept: CARDIOLOGY CLINIC | Facility: CLINIC | Age: 67
End: 2022-02-16
Payer: COMMERCIAL

## 2022-02-16 ENCOUNTER — PREP FOR PROCEDURE (OUTPATIENT)
Dept: CARDIOLOGY CLINIC | Facility: CLINIC | Age: 67
End: 2022-02-16

## 2022-02-16 VITALS
HEIGHT: 63 IN | OXYGEN SATURATION: 99 % | BODY MASS INDEX: 21.51 KG/M2 | DIASTOLIC BLOOD PRESSURE: 84 MMHG | HEART RATE: 103 BPM | WEIGHT: 121.4 LBS | SYSTOLIC BLOOD PRESSURE: 138 MMHG

## 2022-02-16 DIAGNOSIS — I42.8 NICM (NONISCHEMIC CARDIOMYOPATHY) (HCC): ICD-10-CM

## 2022-02-16 DIAGNOSIS — I42.8 NICM (NONISCHEMIC CARDIOMYOPATHY) (HCC): Primary | ICD-10-CM

## 2022-02-16 DIAGNOSIS — I50.41 ACUTE COMBINED SYSTOLIC AND DIASTOLIC CONGESTIVE HEART FAILURE (HCC): ICD-10-CM

## 2022-02-16 DIAGNOSIS — E11.29 TYPE 2 DIABETES MELLITUS WITH OTHER DIABETIC KIDNEY COMPLICATION, WITHOUT LONG-TERM CURRENT USE OF INSULIN (HCC): ICD-10-CM

## 2022-02-16 PROCEDURE — 3008F BODY MASS INDEX DOCD: CPT | Performed by: INTERNAL MEDICINE

## 2022-02-16 PROCEDURE — 1160F RVW MEDS BY RX/DR IN RCRD: CPT | Performed by: INTERNAL MEDICINE

## 2022-02-16 PROCEDURE — 99215 OFFICE O/P EST HI 40 MIN: CPT | Performed by: INTERNAL MEDICINE

## 2022-02-16 PROCEDURE — 3079F DIAST BP 80-89 MM HG: CPT | Performed by: INTERNAL MEDICINE

## 2022-02-16 PROCEDURE — 3066F NEPHROPATHY DOC TX: CPT | Performed by: INTERNAL MEDICINE

## 2022-02-16 PROCEDURE — 3075F SYST BP GE 130 - 139MM HG: CPT | Performed by: INTERNAL MEDICINE

## 2022-02-16 PROCEDURE — 4004F PT TOBACCO SCREEN RCVD TLK: CPT | Performed by: INTERNAL MEDICINE

## 2022-02-16 PROCEDURE — 93000 ELECTROCARDIOGRAM COMPLETE: CPT | Performed by: INTERNAL MEDICINE

## 2022-02-16 RX ORDER — METOPROLOL SUCCINATE 50 MG/1
50 TABLET, EXTENDED RELEASE ORAL 2 TIMES DAILY
Qty: 180 TABLET | Refills: 3 | Status: SHIPPED | OUTPATIENT
Start: 2022-02-16

## 2022-02-16 NOTE — H&P (VIEW-ONLY)
HEART AND [address]    Outpatient New Consult   Today's Date: 02/16/22        Patient name: Ana Sun  YOB: 1955  Sex: female         Chief Complaint: Referral from Dr Jose Tierney for ICD      ASSESSMENT:  Problem List Items Addressed This Visit        Cardiovascular and Mediastinum    NICM (nonischemic cardiomyopathy) ([facility] )    Relevant Medications    metoprolol succinate (TOPROL-XL) 50 mg 24 hr tablet    Other Relevant Orders    POCT ECG      Other Visit Diagnoses     Acute combined systolic and diastolic congestive heart failure (HCC)        Relevant Medications    metoprolol succinate (TOPROL-XL) 50 mg 24 hr tablet    Type 2 diabetes mellitus with other diabetic kidney complication, without long-term current use of insulin ([facility] )              73 yo female  1) ISchemic/nonischemic cardiomyopathy EF 25% Hayes Rushing, she had been on ACE/Betablock and h/o diastolic chf  In 9856, and progressed to Hayes Rushing symptoms Jan 2022 leading to repeat echo showing EF decline to 25% Referred for ICD  Euvolemic on lasix today    2) Tachycardic 103bpm,  P wave is pos V1, maybe ectopic, different morphology than prior EKG (89bpm and neg V1)  3) RBBB  4) CAD: Cath 2020: The coronary circulation is right dominant  --  Left main: Angiography showed minor luminal irregularities  --  Mid LAD: There was a 50 % stenosis, iFR 0 97 not hemodynamically significant  --  Mid circumflex: There was a 50 % stenosis  iFR 0 96 not hemodynamically significant  --  1st obtuse marginal: There was a 60 % stenosis, iFR 0 98 not hemodynamically significant  --  RCA: Angiography showed minor luminal irregularities  On Asa/statin  Still smokes            PLAN:  1  Recommend dual chamber ICD  Informed Consent: Risks, benefits, and alternatives to Implantable cardiac device surgery was  discussed with patient and any family present   The patient and/or the patients designated decision maker understands risks, which include but are not limited to life threatening  bleeding, infection, air around lungs, blood around the heart, stroke, open heart surgery, death and reoperation dislodged or malfunctioning device  Reoperation due to device dislodgment is a fairly common complication where more serious complications are rare  2  Titrate up metoprolol to 50mg bid give possible ectopic atrial tachycardia today  She has room wBP, she will start 25mg bid and if tolerated go up to 50mg bid    3  Recommend Smoking cessation  4  Repeat Ischemic eval? will defer to Dr Gerardo Taylor  Orders Placed This Encounter   Procedures    POCT ECG     Medications Discontinued During This Encounter   Medication Reason    metoprolol succinate (TOPROL-XL) 25 mg 24 hr tablet              HPI/Subjective:     75 yo female with cardiomyopathy NYHAIII CHF EF 25% despite >3 month medical therapy, referred for ICD> Currently denies edema, she is tired and short of breath with moderate exertion,   No CP, no palpitations  She had cough w lisinpril but tolerating entresto ok  Please note HPI is listed by problem with with update following it, it is copied again in the assessment above and reflects medical decision making as well  Complete 12 point ROS reviewed and otherwise non pertinent or negative except as per HPI pertinent positives in Cardiovascular and Respiratory emphasized  Please see paper chart for outpatient clinic patients where the patient completed the 12 point ROS survey  Past Medical History:   Diagnosis Date    Breast cancer ([facility] )     left - 1994      Diabetes mellitus ([facility] )     Diabetes mellitus, type 2 ([facility] ) 03/15/2006    last assessed 7/10/13    GERD (gastroesophageal reflux disease)     History of chemotherapy     Hypertension        No Known Allergies  I reviewed the Home Medication list and Allergies in the chart  Scheduled Meds:  Current Outpatient Medications   Medication Sig Dispense Refill    aspirin (ECOTRIN LOW STRENGTH) 81 mg EC tablet Take 1 tablet (81 mg total) by mouth daily 90 tablet 3    atorvastatin (LIPITOR) 40 mg tablet Take 1 tablet (40 mg total) by mouth daily 90 tablet 1    furosemide (LASIX) 20 mg tablet TAKE 1 TABLET DAILY ON MONDAYS, WEDNESDAYS, AND FRIDAYS 45 tablet 1    metFORMIN (GLUCOPHAGE) 1000 MG tablet TAKE 1 TABLET BY MOUTH TWICE A DAY WITH MEALS 180 tablet 1    metoprolol succinate (TOPROL-XL) 50 mg 24 hr tablet Take 1 tablet (50 mg total) by mouth 2 (two) times a day 180 tablet 3    sacubitril-valsartan (Entresto) 49-51 MG TABS Take 1 tablet by mouth 2 (two) times a day 30 tablet 3    nicotine (NICODERM CQ) 14 mg/24hr TD 24 hr patch Place 1 patch on the skin every 24 hours (Patient not taking: Reported on 2/16/2022 ) 28 patch 3     No current facility-administered medications for this visit       PRN Meds:         Family History   Problem Relation Age of Onset    Hypothyroidism Mother    [facility] Leukemia Mother     Diabetes Father     Diabetes type II Father     Stroke Sister     Diabetes Paternal Grandmother     Cancer Sister     Diabetes Brother        Social History     Socioeconomic History    Marital status: Single     Spouse name: Not on file    Number of children: Not on file    Years of education: Not on file    Highest education level: Not on file   Occupational History    Not on file   Tobacco Use    Smoking status: Current Every Day Smoker     Packs/day: 0 25    Smokeless tobacco: Never Used    Tobacco comment: 3 cigarettes daily    Vaping Use    Vaping Use: Never used   Substance and Sexual Activity    Alcohol use: Not Currently     Comment: Social    Drug use: No    Sexual activity: Not on file   Other Topics Concern    Not on file   Social History Narrative    Not on file     Social Determinants of Health     Financial Resource Strain: Not on file   Food Insecurity: Not on file   Transportation Needs: Not on file   Physical Activity: Not on file   Stress: Not on file   Social Connections: Not on file   Intimate Partner Violence: Not on file   Housing Stability: Not on file         OBJECTIVE:    /84 (BP Location: Left arm, Patient Position: Sitting, Cuff Size: Standard)   Pulse 103   Ht 5' 3" (1 6 m)   Wt 55 1 kg (121 lb 6 4 oz)   LMP  (LMP Unknown)   SpO2 99%   BMI 21 51 kg/m²   Vitals:    02/16/22 0918   Weight: 55 1 kg (121 lb 6 4 oz)     GEN: No acute distress, Alert and oriented, well appearing  HEENT:External ears normal, wearing a mask  EYES: Pupils equal, sclera anicteric, midline, normal conjuctiva  NECK: No JVD, supple, no obvious masses or thryomegaly or goiter  CARDIOVASCULAR: tachycardic RRR, No murmur, rub, gallops S1,S2  LUNGS: Clear To auscultation bilaterally, normal effort, no rales, rhonchi, crackles   ABDOMEN:  nondistended,  without obvious organomegaly or ascites  EXTREMITIES/VASCULAR:  No edema  warm an well perfused  PSYCH: Normal Affect,  linear speech pattern without evidence of psychosis  NEURO: Grossly intact, moving all extremiteis equal, face symmetric, alert and responsive, no obvious focal defecits   GAIT:  Ambulates normally without difficulty  HEME: No bleeding, bruising, petechia, purpura   SKIN: No significant rashes on visibile skin, warm, no diaphoresis or pallor       Lab Results:       LABS:      Chemistry        Component Value Date/Time     10/07/2016 0841    K 4 0 10/14/2021 1232    K 4 4 10/07/2016 0841     10/14/2021 1232     10/07/2016 0841    CO2 26 10/14/2021 1232    CO2 23 10/07/2016 0841    BUN 14 10/14/2021 1232    BUN 15 10/07/2016 0841    CREATININE 1 17 10/14/2021 1232    CREATININE 0 82 10/07/2016 0841        Component Value Date/Time    CALCIUM 8 7 10/14/2021 1232    CALCIUM 12 2 (H) 10/07/2016 0841    ALKPHOS 100 10/14/2021 1232    ALKPHOS 84 10/07/2016 0841    AST 23 10/14/2021 1232    AST 16 10/07/2016 0841    ALT 36 10/14/2021 1232    ALT 16 10/07/2016 0841    BILITOT 0 3 10/07/2016 0841            Lab Results   Component Value Date    CHOL 190 10/07/2016    CHOL 137 12/16/2015    CHOL 168 09/11/2015     Lab Results   Component Value Date    HDL 64 03/09/2021    HDL 47 11/16/2020    HDL 63 (H) 10/25/2018     Lab Results   Component Value Date    LDLCALC 65 03/09/2021    LDLCALC 105 (H) 11/16/2020    LDLCALC 73 10/25/2018     Lab Results   Component Value Date    TRIG 76 03/09/2021    TRIG 82 11/16/2020    TRIG 76 10/25/2018     No results found for: CHOLHDL    IMAGING: MRI wrist right wo contrast    Result Date: 1/27/2022  Narrative: MRI RIGHT WRIST INDICATION:   R22 31: Localized swelling, mass and lump, right upper limb  Comparison:  X-ray 12/28/21 TECHNIQUE:   Multisequence multiplanar MR was obtained of the right wrist  Localizers, Sagittal STIR, axial T1, axial T2 fat sat, coronal T1, coronal T2 fat sat, coronal gradient 3D gradient echo  Gadolinium was not utilized  FINDINGS: SUBCUTANEOUS TISSUES: Normal JOINT FLUID: Trace fluid within the DRUJ  SCAPHOLUNATE LIGAMENT: Intact  LUNOTRIQUETRAL LIGAMENT: Intact  TRIANGULAR FIBROCARTILAGE: Small central perforation noted on series 9/24 FLEXOR/EXTENSOR TENDONS: The patient's palpable abnormality was marked with vitamin tablets, these correspond to the 1st extensor compartment where there is mild intermediate signal within the abductor pollicis longus  Thickening of the tendon sheath is  also appreciated as noted on series 4/13  There is a small amount of fluid within the tendon sheath  Linear T2 bright signal within the tendon is also appreciated as noted on series 4/18  CARPAL TUNNEL: Intact  MEDIAN NERVE: Intact  BONES: Normal  ARTICULAR SURFACES:  Degenerative changes within the 1st [facility] MUSCULATURE: Normal      Impression: 1    Thickening of the 1st extensor compartment tendon sheath consistent with sprain or sequela of repetitive trauma  There is also mild tenosynovitis with mild abductor pollicis longus tendinosis  Longitudinal internal T2 bright signal within the abductor pollicis longus may represent either normal tendon striations or intrasubstance split tears, consider confirmation with ultrasound  2   Central TFCC perforation 3   1st [facility] degenerative changes Workstation performed: [id number]     VAS lower limb arterial duplex, complete bilateral    Result Date: 1/21/2022  Narrative:  [facility] REPORT CLINICAL: Indications: Patient presents with pain, fatigue and heaviness after walking 1 block or less  Denies any open wounds or ulcers at this time  Operative History: 1994-01-01 Left Mastectomy Risk Factors The patient has history of HTN, Diabetes, CKD, CAD, Combined CHF, Non-ischemic cardiomyopathy, Breast cancer (prior) and smoking (current) 0 25 ppd  Clinical Right Pressure: 150/ mm Hg, Left Pressure: Mastectomy  FINDINGS:  Right                  Impression  Waveform  PSV (cm/s)  Post  Tibial                       Biphasic              Ant  Tibial                        Biphasic              Common Femoral Artery                                72  Prox Profunda                                        36  Prox SFA                                             67  Mid SFA                >75%                         211  Dist SFA                                             83  Proximal Pop                                         52  Distal Pop                                           31  Tibioperoneal                                        51  Dist Post Tibial                                     15  Prox  Ant  Tibial                                    26  Dist  Ant   Tibial                                    13  Dist Peroneal                                        21   Left                   Impression     Waveform    PSV (cm/s)  Post  Tibial Monophasic              Ant  Tibial                           Monophasic              Common Femoral Artery                                     80  Prox Profunda                                             55  Prox SFA                                                  67  Mid SFA                                                   46  Dist SFA               >75% & 50-75%                     129  Proximal Pop           >75%                              243  Distal Pop                                                14  Tibioperoneal                                             11  Dist Post Tibial       Occluded                            0  Prox  Ant  Tibial                                          7  Dist  Ant  Tibial                                         11  Dist Peroneal                                              8     CONCLUSION:  Impression:  RIGHT LOWER LIMB: Evidence of >75% stenosis noted in the mid superficial femoral artery with diffuse disease noted throughout the remaining femoral-popliteal and tibioperoneal arteries without significant focal stenosis  Ankle/Brachial index: 0 86 which is in the mild disease category  PVR/ PPG tracings are normal  Metatarsal pressure of 125 mm Hg  Great toe pressure of 103 mm Hg, within the healing range  LEFT LOWER LIMB: Evidence of >75% stenosis noted in the proximal popliteal artery  There is 50-75% stenosis in the distal superficial femoral artery followed by a >75% stenosis in the most distal portion  Evidence of tibioperoneal disease with high grade stenosis vs occlusion of the distal posterior tibial artery  Ankle/Brachial index: 0 69 which is in the moderate disease category  PVR/ PPG tracings are dampened  Metatarsal pressure of 83 mm Hg  Great toe pressure of 97 mm Hg, within the healing range    SIGNATURE: Electronically Signed by: Ronak Bright MD, RPVI on 2022-01-21 05:05:34 PM    Echo complete w/ contrast if indicated    Result Date: 2022  Narrative: Suzi Amaya  Left Ventricle: Left ventricular cavity size is normal  Wall thickness is normal  The left ventricular ejection fraction is 25-30%  Systolic function is severely reduced  There is global hypokinesis with regional variation  Diastolic function is abnormal    Right Ventricle: Right ventricular cavity size is normal  Systolic function is normal    Aortic Valve: There is mild regurgitation    Tricuspid Valve: There is moderate regurgitation    Pulmonary Artery: The estimated pulmonary artery systolic pressure is 33 7 mmHg    Prior TTE study available for comparison  Prior study date: 11/15/2020  Changes noted when compared to prior study  Changes include: LVEF has significantly decreased  Cardiac testing:   Results for orders placed during the hospital encounter of 11/15/20    Echo complete with contrast if indicated    Narrative  [place] [address], [address]  [phone]    Transthoracic Echocardiogram  2D, M-mode, Doppler, and Color Doppler    Study date:  15-Nov-2020    Patient: Denisha Hill  MR number: [id number]  Account number: [de-identified]  : 1955  Age: 77 years  Gender: Female  Status: Inpatient  Location: Bedside  Height: 63 in  Weight: 134 6 lb  BP: 161/ 82 mmHg    Indications: Cardiomyopathy, acute heart failure  Diagnoses: I42 9 - Cardiomyopathy, unspecified    Sonographer:  CARMELO Fallon  Primary Physician:  Keesha Harper DO  Referring Physician:  Sonido Wang MD  Group:  [facility]  Interpreting Physician:  Umair Childers MD    SUMMARY    LEFT VENTRICLE:  Size was normal   Systolic function was at the lower limits of normal   There was moderate hypokinesis of the basal to mid inferior and inferolateral walls  Wall thickness was normal   Doppler parameters were consistent with abnormal left ventricular relaxation (grade 1 diastolic dysfunction)      RIGHT VENTRICLE:  The size was normal   Systolic function was normal     MITRAL VALVE:  There was trace regurgitation  AORTIC VALVE:  There was trace regurgitation  HISTORY: PRIOR HISTORY: Cardiomyopathy, acute heart failure  Breast cancer, chemotherapy, L breast reconstruction with implant, HTN, CKD1, uncontrolled DM2, current smoker  PROCEDURE: The procedure was performed at the bedside  This was a routine study  The transthoracic approach was used  The study included complete 2D imaging, M-mode, complete spectral Doppler, and color Doppler  The heart rate was 108 bpm,  at the start of the study  Images were obtained from the parasternal, apical, subcostal, and suprasternal notch acoustic windows  Echocardiographic views were limited due to poor acoustic window availability  Image quality was good  LEFT VENTRICLE: Size was normal  Systolic function was at the lower limits of normal  There was moderate hypokinesis of the basal to mid inferior and inferolateral walls  Wall thickness was normal  DOPPLER: Doppler parameters were  consistent with abnormal left ventricular relaxation (grade 1 diastolic dysfunction)  RIGHT VENTRICLE: The size was normal  Systolic function was normal  Wall thickness was normal     LEFT ATRIUM: Size was normal     RIGHT ATRIUM: Size was normal     MITRAL VALVE: Valve structure was normal  There was normal leaflet separation  DOPPLER: The transmitral velocity was within the normal range  There was no evidence for stenosis  There was trace regurgitation  AORTIC VALVE: The valve was trileaflet  Leaflets exhibited normal thickness and normal cuspal separation  DOPPLER: Transaortic velocity was within the normal range  There was no evidence for stenosis  There was trace regurgitation  TRICUSPID VALVE: The valve structure was normal  There was normal leaflet separation  DOPPLER: The transtricuspid velocity was within the normal range  There was no evidence for stenosis   There was no regurgitation  PULMONIC VALVE: Leaflets exhibited normal thickness, no calcification, and normal cuspal separation  DOPPLER: The transpulmonic velocity was within the normal range  There was no regurgitation  PERICARDIUM: There was no pericardial effusion  The pericardium was normal in appearance  AORTA: The root exhibited normal size  SYSTEMIC VEINS: IVC: The inferior vena cava was normal in size and course  Respirophasic changes were normal     SYSTEM MEASUREMENT TABLES    2D  %FS: 27 06 %  Ao Diam: 3 24 cm  EDV(Teich): 117 95 ml  EF(Teich): 52 52 %  ESV(Teich): 56 ml  IVSd: 0 82 cm  LA Diam: 3 91 cm  LVEDV MOD A4C: 90 2 ml  LVEF MOD A4C: 40 17 %  LVESV MOD A4C: 53 96 ml  LVIDd: 4 99 cm  LVIDs: 3 64 cm  LVLd A4C: 7 83 cm  LVLs A4C: 6 73 cm  LVPWd: 0 83 cm  SV MOD A4C: 36 24 ml  SV(Teich): 61 95 ml    CW  AV Env  Ti: 209 32 ms  AV MaxP 51 mmHg  AV VTI: 21 7 cm  AV Vmax: 1 37 m/s  AV Vmean: 1 04 m/s  AV meanP 51 mmHg  TR MaxP 42 mmHg  TR Vmax: 2 26 m/s    PW  E' Sept: 0 06 m/s  E/E' Sept: 16 75  LVOT Env  Ti: 239 77 ms  LVOT VTI: 12 62 cm  LVOT Vmax: 0 8 m/s  LVOT Vmean: 0 53 m/s  LVOT maxP 57 mmHg  LVOT meanP 29 mmHg  MV A Conrad: 0 9 m/s  MV Dec Mower: 5 25 m/s2  MV DecT: 183 ms  MV E Conrad: 0 96 m/s  MV E/A Ratio: 1 07  MV PHT: 53 07 ms  MVA By PHT: 4 15 cm2    [employer] Accredited Echocardiography Laboratory    Prepared and electronically signed by    Marcella Ma MD  Signed [id number] 15:35:29    No results found for this or any previous visit  No results found for this or any previous visit  No results found for this or any previous visit  I reviewed and interpreted the following LABS/EKG/TELE/IMAGING and below is summary of my interpretation (if data available):    LABS: norml renal function, cbc in 2021    Current EKG and Rhythm Strip: Sinus vs Ectopic atrial tachycardia  (P wave pos V1, different morphology) RBBB 103bpm, LVH         EKG 21: NSR 89bpm, RBBB, LVH

## 2022-02-16 NOTE — PROGRESS NOTES
HEART AND [address]    Outpatient New Consult   Today's Date: 02/16/22        Patient name: Rodo Garay  YOB: 1955  Sex: female         Chief Complaint: Referral from Dr Crystal Villarreal for ICD      ASSESSMENT:  Problem List Items Addressed This Visit        Cardiovascular and Mediastinum    NICM (nonischemic cardiomyopathy) ([facility] )    Relevant Medications    metoprolol succinate (TOPROL-XL) 50 mg 24 hr tablet    Other Relevant Orders    POCT ECG      Other Visit Diagnoses     Acute combined systolic and diastolic congestive heart failure (HCC)        Relevant Medications    metoprolol succinate (TOPROL-XL) 50 mg 24 hr tablet    Type 2 diabetes mellitus with other diabetic kidney complication, without long-term current use of insulin ([facility] [address] )              73 yo female  1) ISchemic/nonischemic cardiomyopathy EF 25% Brigid Moore, she had been on ACE/Betablock and h/o diastolic chf  In 4914, and progressed to Brigid Moore symptoms Jan 2022 leading to repeat echo showing EF decline to 25% Referred for ICD  Euvolemic on lasix today    2) Tachycardic 103bpm,  P wave is pos V1, maybe ectopic, different morphology than prior EKG (89bpm and neg V1)  3) RBBB  4) CAD: Cath 2020: The coronary circulation is right dominant  --  Left main: Angiography showed minor luminal irregularities  --  Mid LAD: There was a 50 % stenosis, iFR 0 97 not hemodynamically significant  --  Mid circumflex: There was a 50 % stenosis  iFR 0 96 not hemodynamically significant  --  1st obtuse marginal: There was a 60 % stenosis, iFR 0 98 not hemodynamically significant  --  RCA: Angiography showed minor luminal irregularities  On Asa/statin  Still smokes            PLAN:  1  Recommend dual chamber ICD  Informed Consent: Risks, benefits, and alternatives to Implantable cardiac device surgery was  discussed with patient and any family present   The patient and/or the patients designated decision maker understands risks, which include but are not limited to life threatening  bleeding, infection, air around lungs, blood around the heart, stroke, open heart surgery, death and reoperation dislodged or malfunctioning device  Reoperation due to device dislodgment is a fairly common complication where more serious complications are rare  2  Titrate up metoprolol to 50mg bid give possible ectopic atrial tachycardia today  She has room wBP, she will start 25mg bid and if tolerated go up to 50mg bid    3  Recommend Smoking cessation  4  Repeat Ischemic eval? will defer to Dr Martin [facility]  Orders Placed This Encounter   Procedures    POCT ECG     Medications Discontinued During This Encounter   Medication Reason    metoprolol succinate (TOPROL-XL) 25 mg 24 hr tablet              HPI/Subjective:     75 yo female with cardiomyopathy NYHAIII CHF EF 25% despite >3 month medical therapy, referred for ICD> Currently denies edema, she is tired and short of breath with moderate exertion,   No CP, no palpitations  She had cough w lisinpril but tolerating entresto ok  Please note HPI is listed by problem with with update following it, it is copied again in the assessment above and reflects medical decision making as well  Complete 12 point ROS reviewed and otherwise non pertinent or negative except as per HPI pertinent positives in Cardiovascular and Respiratory emphasized  Please see paper chart for outpatient clinic patients where the patient completed the 12 point ROS survey  Past Medical History:   Diagnosis Date    Breast cancer ([facility] )     left - 1994      Diabetes mellitus ([facility] )     Diabetes mellitus, type 2 ([facility] ) 03/15/2006    last assessed 7/10/13    GERD (gastroesophageal reflux disease)     History of chemotherapy     Hypertension        No Known Allergies  I reviewed the Home Medication list and Allergies in the chart  Scheduled Meds:  Current Outpatient Medications   Medication Sig Dispense Refill    aspirin (ECOTRIN LOW STRENGTH) 81 mg EC tablet Take 1 tablet (81 mg total) by mouth daily 90 tablet 3    atorvastatin (LIPITOR) 40 mg tablet Take 1 tablet (40 mg total) by mouth daily 90 tablet 1    furosemide (LASIX) 20 mg tablet TAKE 1 TABLET DAILY ON MONDAYS, WEDNESDAYS, AND FRIDAYS 45 tablet 1    metFORMIN (GLUCOPHAGE) 1000 MG tablet TAKE 1 TABLET BY MOUTH TWICE A DAY WITH MEALS 180 tablet 1    metoprolol succinate (TOPROL-XL) 50 mg 24 hr tablet Take 1 tablet (50 mg total) by mouth 2 (two) times a day 180 tablet 3    sacubitril-valsartan (Entresto) 49-51 MG TABS Take 1 tablet by mouth 2 (two) times a day 30 tablet 3    nicotine (NICODERM CQ) 14 mg/24hr TD 24 hr patch Place 1 patch on the skin every 24 hours (Patient not taking: Reported on 2/16/2022 ) 28 patch 3     No current facility-administered medications for this visit       PRN Meds:         Family History   Problem Relation Age of Onset    Hypothyroidism Mother    Marcelina Stokes Leukemia Mother     Diabetes Father     Diabetes type II Father     Stroke Sister     Diabetes Paternal Grandmother     Cancer Sister     Diabetes Brother        Social History     Socioeconomic History    Marital status: Single     Spouse name: Not on file    Number of children: Not on file    Years of education: Not on file    Highest education level: Not on file   Occupational History    Not on file   Tobacco Use    Smoking status: Current Every Day Smoker     Packs/day: 0 25    Smokeless tobacco: Never Used    Tobacco comment: 3 cigarettes daily    Vaping Use    Vaping Use: Never used   Substance and Sexual Activity    Alcohol use: Not Currently     Comment: Social    Drug use: No    Sexual activity: Not on file   Other Topics Concern    Not on file   Social History Narrative    Not on file     Social Determinants of Health     Financial Resource Strain: Not on file   Food Insecurity: Not on file   Transportation Needs: Not on file   Physical Activity: Not on file   Stress: Not on file   Social Connections: Not on file   Intimate Partner Violence: Not on file   Housing Stability: Not on file         OBJECTIVE:    /84 (BP Location: Left arm, Patient Position: Sitting, Cuff Size: Standard)   Pulse 103   Ht 5' 3" (1 6 m)   Wt 55 1 kg (121 lb 6 4 oz)   LMP  (LMP Unknown)   SpO2 99%   BMI 21 51 kg/m²   Vitals:    02/16/22 0918   Weight: 55 1 kg (121 lb 6 4 oz)     GEN: No acute distress, Alert and oriented, well appearing  HEENT:External ears normal, wearing a mask  EYES: Pupils equal, sclera anicteric, midline, normal conjuctiva  NECK: No JVD, supple, no obvious masses or thryomegaly or goiter  CARDIOVASCULAR: tachycardic RRR, No murmur, rub, gallops S1,S2  LUNGS: Clear To auscultation bilaterally, normal effort, no rales, rhonchi, crackles   ABDOMEN:  nondistended,  without obvious organomegaly or ascites  EXTREMITIES/VASCULAR:  No edema  warm an well perfused  PSYCH: Normal Affect,  linear speech pattern without evidence of psychosis  NEURO: Grossly intact, moving all extremiteis equal, face symmetric, alert and responsive, no obvious focal defecits   GAIT:  Ambulates normally without difficulty  HEME: No bleeding, bruising, petechia, purpura   SKIN: No significant rashes on visibile skin, warm, no diaphoresis or pallor       Lab Results:       LABS:      Chemistry        Component Value Date/Time     10/07/2016 0841    K 4 0 10/14/2021 1232    K 4 4 10/07/2016 0841     10/14/2021 1232     10/07/2016 0841    CO2 26 10/14/2021 1232    CO2 23 10/07/2016 0841    BUN 14 10/14/2021 1232    BUN 15 10/07/2016 0841    CREATININE 1 17 10/14/2021 1232    CREATININE 0 82 10/07/2016 0841        Component Value Date/Time    CALCIUM 8 7 10/14/2021 1232    CALCIUM 12 2 (H) 10/07/2016 0841    ALKPHOS 100 10/14/2021 1232    ALKPHOS 84 10/07/2016 0841    AST 23 10/14/2021 1232    AST 16 10/07/2016 0841    ALT 36 10/14/2021 1232    ALT 16 10/07/2016 0841    BILITOT 0 3 10/07/2016 0841            Lab Results   Component Value Date    CHOL 190 10/07/2016    CHOL 137 12/16/2015    CHOL 168 09/11/2015     Lab Results   Component Value Date    HDL 64 03/09/2021    HDL 47 11/16/2020    HDL 63 (H) 10/25/2018     Lab Results   Component Value Date    LDLCALC 65 03/09/2021    LDLCALC 105 (H) 11/16/2020    LDLCALC 73 10/25/2018     Lab Results   Component Value Date    TRIG 76 03/09/2021    TRIG 82 11/16/2020    TRIG 76 10/25/2018     No results found for: CHOLHDL    IMAGING: MRI wrist right wo contrast    Result Date: 1/27/2022  Narrative: MRI RIGHT WRIST INDICATION:   R22 31: Localized swelling, mass and lump, right upper limb  Comparison:  X-ray 12/28/21 TECHNIQUE:   Multisequence multiplanar MR was obtained of the right wrist  Localizers, Sagittal STIR, axial T1, axial T2 fat sat, coronal T1, coronal T2 fat sat, coronal gradient 3D gradient echo  Gadolinium was not utilized  FINDINGS: SUBCUTANEOUS TISSUES: Normal JOINT FLUID: Trace fluid within the DRUJ  SCAPHOLUNATE LIGAMENT: Intact  LUNOTRIQUETRAL LIGAMENT: Intact  TRIANGULAR FIBROCARTILAGE: Small central perforation noted on series 9/24 FLEXOR/EXTENSOR TENDONS: The patient's palpable abnormality was marked with vitamin tablets, these correspond to the 1st extensor compartment where there is mild intermediate signal within the abductor pollicis longus  Thickening of the tendon sheath is  also appreciated as noted on series 4/13  There is a small amount of fluid within the tendon sheath  Linear T2 bright signal within the tendon is also appreciated as noted on series 4/18  CARPAL TUNNEL: Intact  MEDIAN NERVE: Intact  BONES: Normal  ARTICULAR SURFACES:  Degenerative changes within the 1st [facility] MUSCULATURE: Normal      Impression: 1    Thickening of the 1st extensor compartment tendon sheath consistent with sprain or sequela of repetitive trauma  There is also mild tenosynovitis with mild abductor pollicis longus tendinosis  Longitudinal internal T2 bright signal within the abductor pollicis longus may represent either normal tendon striations or intrasubstance split tears, consider confirmation with ultrasound  2   Central TFCC perforation 3   1st [facility] degenerative changes Workstation performed: [id number]     VAS lower limb arterial duplex, complete bilateral    Result Date: 1/21/2022  Narrative:  [facility] REPORT CLINICAL: Indications: Patient presents with pain, fatigue and heaviness after walking 1 block or less  Denies any open wounds or ulcers at this time  Operative History: 1994-01-01 Left Mastectomy Risk Factors The patient has history of HTN, Diabetes, CKD, CAD, Combined CHF, Non-ischemic cardiomyopathy, Breast cancer (prior) and smoking (current) 0 25 ppd  Clinical Right Pressure: 150/ mm Hg, Left Pressure: Mastectomy  FINDINGS:  Right                  Impression  Waveform  PSV (cm/s)  Post  Tibial                       Biphasic              Ant  Tibial                        Biphasic              Common Femoral Artery                                72  Prox Profunda                                        36  Prox SFA                                             67  Mid SFA                >75%                         211  Dist SFA                                             83  Proximal Pop                                         52  Distal Pop                                           31  Tibioperoneal                                        51  Dist Post Tibial                                     15  Prox  Ant  Tibial                                    26  Dist  Ant   Tibial                                    13  Dist Peroneal                                        21   Left                   Impression     Waveform    PSV (cm/s)  Post  Tibial Monophasic              Ant  Tibial                           Monophasic              Common Femoral Artery                                     80  Prox Profunda                                             55  Prox SFA                                                  67  Mid SFA                                                   46  Dist SFA               >75% & 50-75%                     129  Proximal Pop           >75%                              243  Distal Pop                                                14  Tibioperoneal                                             11  Dist Post Tibial       Occluded                            0  Prox  Ant  Tibial                                          7  Dist  Ant  Tibial                                         11  Dist Peroneal                                              8     CONCLUSION:  Impression:  RIGHT LOWER LIMB: Evidence of >75% stenosis noted in the mid superficial femoral artery with diffuse disease noted throughout the remaining femoral-popliteal and tibioperoneal arteries without significant focal stenosis  Ankle/Brachial index: 0 86 which is in the mild disease category  PVR/ PPG tracings are normal  Metatarsal pressure of 125 mm Hg  Great toe pressure of 103 mm Hg, within the healing range  LEFT LOWER LIMB: Evidence of >75% stenosis noted in the proximal popliteal artery  There is 50-75% stenosis in the distal superficial femoral artery followed by a >75% stenosis in the most distal portion  Evidence of tibioperoneal disease with high grade stenosis vs occlusion of the distal posterior tibial artery  Ankle/Brachial index: 0 69 which is in the moderate disease category  PVR/ PPG tracings are dampened  Metatarsal pressure of 83 mm Hg  Great toe pressure of 97 mm Hg, within the healing range    SIGNATURE: Electronically Signed by: Matias Hodgson MD, RPVI on 2022-01-21 05:05:34 PM    Echo complete w/ contrast if indicated    Result Date: 2022  Narrative: Tara Jefferson  Left Ventricle: Left ventricular cavity size is normal  Wall thickness is normal  The left ventricular ejection fraction is 25-30%  Systolic function is severely reduced  There is global hypokinesis with regional variation  Diastolic function is abnormal    Right Ventricle: Right ventricular cavity size is normal  Systolic function is normal    Aortic Valve: There is mild regurgitation    Tricuspid Valve: There is moderate regurgitation    Pulmonary Artery: The estimated pulmonary artery systolic pressure is 01 1 mmHg    Prior TTE study available for comparison  Prior study date: 11/15/2020  Changes noted when compared to prior study  Changes include: LVEF has significantly decreased  Cardiac testing:   Results for orders placed during the hospital encounter of 11/15/20    Echo complete with contrast if indicated    Narrative  [place] [address], [address] [facility]  [phone]    Transthoracic Echocardiogram  2D, M-mode, Doppler, and Color Doppler    Study date:  15-Nov-2020    Patient: Edel Lynch  MR number: [id number]  Account number: [de-identified]  : 1955  Age: 77 years  Gender: Female  Status: Inpatient  Location: Bedside  Height: 63 in  Weight: 134 6 lb  BP: 161/ 82 mmHg    Indications: Cardiomyopathy, acute heart failure  Diagnoses: I42 9 - Cardiomyopathy, unspecified    Sonographer:  CARMELO Belcher  Primary Physician:  Merced Milton DO  Referring Physician:  Olivia Davila MD  Group:  [facility]  Interpreting Physician:  Lizzy Crocker MD    SUMMARY    LEFT VENTRICLE:  Size was normal   Systolic function was at the lower limits of normal   There was moderate hypokinesis of the basal to mid inferior and inferolateral walls  Wall thickness was normal   Doppler parameters were consistent with abnormal left ventricular relaxation (grade 1 diastolic dysfunction)      RIGHT VENTRICLE:  The size was normal   Systolic function was normal     MITRAL VALVE:  There was trace regurgitation  AORTIC VALVE:  There was trace regurgitation  HISTORY: PRIOR HISTORY: Cardiomyopathy, acute heart failure  Breast cancer, chemotherapy, L breast reconstruction with implant, HTN, CKD1, uncontrolled DM2, current smoker  PROCEDURE: The procedure was performed at the bedside  This was a routine study  The transthoracic approach was used  The study included complete 2D imaging, M-mode, complete spectral Doppler, and color Doppler  The heart rate was 108 bpm,  at the start of the study  Images were obtained from the parasternal, apical, subcostal, and suprasternal notch acoustic windows  Echocardiographic views were limited due to poor acoustic window availability  Image quality was good  LEFT VENTRICLE: Size was normal  Systolic function was at the lower limits of normal  There was moderate hypokinesis of the basal to mid inferior and inferolateral walls  Wall thickness was normal  DOPPLER: Doppler parameters were  consistent with abnormal left ventricular relaxation (grade 1 diastolic dysfunction)  RIGHT VENTRICLE: The size was normal  Systolic function was normal  Wall thickness was normal     LEFT ATRIUM: Size was normal     RIGHT ATRIUM: Size was normal     MITRAL VALVE: Valve structure was normal  There was normal leaflet separation  DOPPLER: The transmitral velocity was within the normal range  There was no evidence for stenosis  There was trace regurgitation  AORTIC VALVE: The valve was trileaflet  Leaflets exhibited normal thickness and normal cuspal separation  DOPPLER: Transaortic velocity was within the normal range  There was no evidence for stenosis  There was trace regurgitation  TRICUSPID VALVE: The valve structure was normal  There was normal leaflet separation  DOPPLER: The transtricuspid velocity was within the normal range  There was no evidence for stenosis   There was no regurgitation  PULMONIC VALVE: Leaflets exhibited normal thickness, no calcification, and normal cuspal separation  DOPPLER: The transpulmonic velocity was within the normal range  There was no regurgitation  PERICARDIUM: There was no pericardial effusion  The pericardium was normal in appearance  AORTA: The root exhibited normal size  SYSTEMIC VEINS: IVC: The inferior vena cava was normal in size and course  Respirophasic changes were normal     SYSTEM MEASUREMENT TABLES    2D  %FS: 27 06 %  Ao Diam: 3 24 cm  EDV(Teich): 117 95 ml  EF(Teich): 52 52 %  ESV(Teich): 56 ml  IVSd: 0 82 cm  LA Diam: 3 91 cm  LVEDV MOD A4C: 90 2 ml  LVEF MOD A4C: 40 17 %  LVESV MOD A4C: 53 96 ml  LVIDd: 4 99 cm  LVIDs: 3 64 cm  LVLd A4C: 7 83 cm  LVLs A4C: 6 73 cm  LVPWd: 0 83 cm  SV MOD A4C: 36 24 ml  SV(Teich): 61 95 ml    CW  AV Env  Ti: 209 32 ms  AV MaxP 51 mmHg  AV VTI: 21 7 cm  AV Vmax: 1 37 m/s  AV Vmean: 1 04 m/s  AV meanP 51 mmHg  TR MaxP 42 mmHg  TR Vmax: 2 26 m/s    PW  E' Sept: 0 06 m/s  E/E' Sept: 16 75  LVOT Env  Ti: 239 77 ms  LVOT VTI: 12 62 cm  LVOT Vmax: 0 8 m/s  LVOT Vmean: 0 53 m/s  LVOT maxP 57 mmHg  LVOT meanP 29 mmHg  MV A Conrad: 0 9 m/s  MV Dec Grayson: 5 25 m/s2  MV DecT: 183 ms  MV E Conrad: 0 96 m/s  MV E/A Ratio: 1 07  MV PHT: 53 07 ms  MVA By PHT: 4 15 cm2    [employer] Accredited Echocardiography Laboratory    Prepared and electronically signed by    Jim Gastelum MD  Signed [id number] 15:35:29    No results found for this or any previous visit  No results found for this or any previous visit  No results found for this or any previous visit  I reviewed and interpreted the following LABS/EKG/TELE/IMAGING and below is summary of my interpretation (if data available):    LABS: norml renal function, cbc in     Current EKG and Rhythm Strip: Sinus vs Ectopic atrial tachycardia  (P wave pos V1, different morphology) RBBB 103bpm, LVH         EKG 21: NSR 89bpm, RBBB, LVH

## 2022-02-17 ENCOUNTER — TELEPHONE (OUTPATIENT)
Dept: CARDIOLOGY CLINIC | Facility: CLINIC | Age: 67
End: 2022-02-17

## 2022-02-17 NOTE — TELEPHONE ENCOUNTER
----- Message from Saul Ring sent at 2/16/2022 10:53 AM EST -----  Thanks   ----- Message -----  From: Mike Elizabeth MD  Sent: 2/16/2022  10:12 AM EST  To: Cardiology [facility]    Please schedule dual chamber icd, medtronic

## 2022-02-17 NOTE — TELEPHONE ENCOUNTER
Patient scheduled for dual chamber ICD at [facility] on 03/11/22 with Dr Mimi Nunez  Patient aware of general instructions, meds holds (lasix and metformin) and labs required  Can we please have insurance check

## 2022-03-04 ENCOUNTER — APPOINTMENT (OUTPATIENT)
Dept: LAB | Facility: CLINIC | Age: 67
End: 2022-03-04
Payer: COMMERCIAL

## 2022-03-04 DIAGNOSIS — I42.8 NICM (NONISCHEMIC CARDIOMYOPATHY) (HCC): ICD-10-CM

## 2022-03-04 LAB
ALBUMIN SERPL BCP-MCNC: 2.6 G/DL (ref 3.5–5)
ALP SERPL-CCNC: 115 U/L (ref 46–116)
ALT SERPL W P-5'-P-CCNC: 36 U/L (ref 12–78)
ANION GAP SERPL CALCULATED.3IONS-SCNC: 5 MMOL/L (ref 4–13)
AST SERPL W P-5'-P-CCNC: 27 U/L (ref 5–45)
BASOPHILS # BLD AUTO: 0.02 THOUSANDS/ΜL (ref 0–0.1)
BASOPHILS NFR BLD AUTO: 0 % (ref 0–1)
BILIRUB SERPL-MCNC: 0.45 MG/DL (ref 0.2–1)
BUN SERPL-MCNC: 14 MG/DL (ref 5–25)
CALCIUM ALBUM COR SERPL-MCNC: 9.9 MG/DL (ref 8.3–10.1)
CALCIUM SERPL-MCNC: 8.8 MG/DL (ref 8.3–10.1)
CHLORIDE SERPL-SCNC: 109 MMOL/L (ref 100–108)
CO2 SERPL-SCNC: 26 MMOL/L (ref 21–32)
CREAT SERPL-MCNC: 1.01 MG/DL (ref 0.6–1.3)
EOSINOPHIL # BLD AUTO: 0.15 THOUSAND/ΜL (ref 0–0.61)
EOSINOPHIL NFR BLD AUTO: 2 % (ref 0–6)
ERYTHROCYTE [DISTWIDTH] IN BLOOD BY AUTOMATED COUNT: 14.3 % (ref 11.6–15.1)
GFR SERPL CREATININE-BSD FRML MDRD: 57 ML/MIN/1.73SQ M
GLUCOSE P FAST SERPL-MCNC: 194 MG/DL (ref 65–99)
HCT VFR BLD AUTO: 43.7 % (ref 34.8–46.1)
HGB BLD-MCNC: 14.3 G/DL (ref 11.5–15.4)
IMM GRANULOCYTES # BLD AUTO: 0.01 THOUSAND/UL (ref 0–0.2)
IMM GRANULOCYTES NFR BLD AUTO: 0 % (ref 0–2)
LYMPHOCYTES # BLD AUTO: 2.62 THOUSANDS/ΜL (ref 0.6–4.47)
LYMPHOCYTES NFR BLD AUTO: 43 % (ref 14–44)
MCH RBC QN AUTO: 28.2 PG (ref 26.8–34.3)
MCHC RBC AUTO-ENTMCNC: 32.7 G/DL (ref 31.4–37.4)
MCV RBC AUTO: 86 FL (ref 82–98)
MONOCYTES # BLD AUTO: 0.45 THOUSAND/ΜL (ref 0.17–1.22)
MONOCYTES NFR BLD AUTO: 7 % (ref 4–12)
NEUTROPHILS # BLD AUTO: 2.88 THOUSANDS/ΜL (ref 1.85–7.62)
NEUTS SEG NFR BLD AUTO: 48 % (ref 43–75)
NRBC BLD AUTO-RTO: 0 /100 WBCS
PLATELET # BLD AUTO: 234 THOUSANDS/UL (ref 149–390)
PMV BLD AUTO: 12.3 FL (ref 8.9–12.7)
POTASSIUM SERPL-SCNC: 4 MMOL/L (ref 3.5–5.3)
PROT SERPL-MCNC: 6.7 G/DL (ref 6.4–8.2)
RBC # BLD AUTO: 5.07 MILLION/UL (ref 3.81–5.12)
SODIUM SERPL-SCNC: 140 MMOL/L (ref 136–145)
WBC # BLD AUTO: 6.13 THOUSAND/UL (ref 4.31–10.16)

## 2022-03-04 PROCEDURE — 85025 COMPLETE CBC W/AUTO DIFF WBC: CPT

## 2022-03-04 PROCEDURE — 80053 COMPREHEN METABOLIC PANEL: CPT

## 2022-03-04 PROCEDURE — 36415 COLL VENOUS BLD VENIPUNCTURE: CPT

## 2022-03-07 NOTE — TELEPHONE ENCOUNTER
called patient and LVm in regard case schedule for Friday, will need to be reschedule per Dr Marion Drake

## 2022-03-07 NOTE — TELEPHONE ENCOUNTER
[employer] approved auth # [id number] for DC [id number] @ [facility]   Valid dates:  3/9/22-4/8/22  Updated auth dates w/Nathaniel DIXON  @ [facility]

## 2022-03-09 ENCOUNTER — ANESTHESIA EVENT (OUTPATIENT)
Dept: NON INVASIVE DIAGNOSTICS | Facility: HOSPITAL | Age: 67
End: 2022-03-09
Payer: COMMERCIAL

## 2022-03-09 ENCOUNTER — HOSPITAL ENCOUNTER (OUTPATIENT)
Facility: HOSPITAL | Age: 67
Setting detail: OUTPATIENT SURGERY
Discharge: HOME | End: 2022-03-09
Attending: INTERNAL MEDICINE | Admitting: INTERNAL MEDICINE
Payer: COMMERCIAL

## 2022-03-09 ENCOUNTER — APPOINTMENT (OUTPATIENT)
Dept: RADIOLOGY | Facility: HOSPITAL | Age: 67
End: 2022-03-09
Payer: COMMERCIAL

## 2022-03-09 ENCOUNTER — ANESTHESIA (OUTPATIENT)
Dept: NON INVASIVE DIAGNOSTICS | Facility: HOSPITAL | Age: 67
End: 2022-03-09
Payer: COMMERCIAL

## 2022-03-09 VITALS
TEMPERATURE: 97.4 F | WEIGHT: 127 LBS | HEIGHT: 63 IN | HEART RATE: 70 BPM | DIASTOLIC BLOOD PRESSURE: 89 MMHG | OXYGEN SATURATION: 98 % | RESPIRATION RATE: 17 BRPM | BODY MASS INDEX: 22.5 KG/M2 | SYSTOLIC BLOOD PRESSURE: 166 MMHG

## 2022-03-09 DIAGNOSIS — I42.8 NICM (NONISCHEMIC CARDIOMYOPATHY) (HCC): ICD-10-CM

## 2022-03-09 LAB
ATRIAL RATE: 64 BPM
ATRIAL RATE: 65 BPM
ERYTHROCYTE [DISTWIDTH] IN BLOOD BY AUTOMATED COUNT: 14.6 % (ref 11.6–15.1)
HCT VFR BLD AUTO: 40.4 % (ref 34.8–46.1)
HGB BLD-MCNC: 13.5 G/DL (ref 11.5–15.4)
INR PPP: 1.01 (ref 0.84–1.19)
MCH RBC QN AUTO: 27.9 PG (ref 26.8–34.3)
MCHC RBC AUTO-ENTMCNC: 33.4 G/DL (ref 31.4–37.4)
MCV RBC AUTO: 84 FL (ref 82–98)
P AXIS: 53 DEGREES
P AXIS: 62 DEGREES
PLATELET # BLD AUTO: 201 THOUSANDS/UL (ref 149–390)
PMV BLD AUTO: 12.7 FL (ref 8.9–12.7)
PR INTERVAL: 158 MS
PR INTERVAL: 166 MS
PROTHROMBIN TIME: 12.9 SECONDS (ref 11.6–14.5)
QRS AXIS: -53 DEGREES
QRS AXIS: -59 DEGREES
QRSD INTERVAL: 126 MS
QRSD INTERVAL: 140 MS
QT INTERVAL: 440 MS
QT INTERVAL: 444 MS
QTC INTERVAL: 453 MS
QTC INTERVAL: 461 MS
RBC # BLD AUTO: 4.84 MILLION/UL (ref 3.81–5.12)
T WAVE AXIS: 236 DEGREES
T WAVE AXIS: 260 DEGREES
VENTRICULAR RATE: 64 BPM
VENTRICULAR RATE: 65 BPM
WBC # BLD AUTO: 5.62 THOUSAND/UL (ref 4.31–10.16)

## 2022-03-09 PROCEDURE — 33249 INSJ/RPLCMT DEFIB W/LEAD(S): CPT | Performed by: INTERNAL MEDICINE

## 2022-03-09 PROCEDURE — 93005 ELECTROCARDIOGRAM TRACING: CPT

## 2022-03-09 PROCEDURE — 85027 COMPLETE CBC AUTOMATED: CPT | Performed by: PHYSICIAN ASSISTANT

## 2022-03-09 PROCEDURE — C1721 AICD, DUAL CHAMBER: HCPCS | Performed by: INTERNAL MEDICINE

## 2022-03-09 PROCEDURE — C1892 INTRO/SHEATH,FIXED,PEEL-AWAY: HCPCS | Performed by: INTERNAL MEDICINE

## 2022-03-09 PROCEDURE — C1898 LEAD, PMKR, OTHER THAN TRANS: HCPCS | Performed by: INTERNAL MEDICINE

## 2022-03-09 PROCEDURE — 71045 X-RAY EXAM CHEST 1 VIEW: CPT

## 2022-03-09 PROCEDURE — 93010 ELECTROCARDIOGRAM REPORT: CPT | Performed by: INTERNAL MEDICINE

## 2022-03-09 PROCEDURE — C1777 LEAD, AICD, ENDO SINGLE COIL: HCPCS | Performed by: INTERNAL MEDICINE

## 2022-03-09 PROCEDURE — 85610 PROTHROMBIN TIME: CPT | Performed by: PHYSICIAN ASSISTANT

## 2022-03-09 DEVICE — ENVELOPE CMRM6133 ABSORB LRG MR
Type: IMPLANTABLE DEVICE | Site: CHEST  WALL | Status: FUNCTIONAL
Brand: TYRX™

## 2022-03-09 DEVICE — LEAD 457445 MRI US BI RCMCRD MVC
Type: IMPLANTABLE DEVICE | Site: HEART | Status: FUNCTIONAL
Brand: CAPSURE SENSE MRI™ SURESCAN™

## 2022-03-09 DEVICE — IMPLANTABLE DEVICE: Type: IMPLANTABLE DEVICE | Site: CHEST  WALL | Status: FUNCTIONAL

## 2022-03-09 DEVICE — LEAD 6935M55 QUATTRO SECURE S MRI US
Type: IMPLANTABLE DEVICE | Site: HEART | Status: FUNCTIONAL
Brand: SPRINT QUATTRO SECURE S MRI™ SURESCAN™

## 2022-03-09 RX ORDER — PROPOFOL 10 MG/ML
INJECTION, EMULSION INTRAVENOUS CONTINUOUS PRN
Status: DISCONTINUED | OUTPATIENT
Start: 2022-03-09 | End: 2022-03-09

## 2022-03-09 RX ORDER — SODIUM CHLORIDE 9 MG/ML
INJECTION, SOLUTION INTRAVENOUS CONTINUOUS PRN
Status: DISCONTINUED | OUTPATIENT
Start: 2022-03-09 | End: 2022-03-09

## 2022-03-09 RX ORDER — GENTAMICIN SULFATE 40 MG/ML
INJECTION, SOLUTION INTRAMUSCULAR; INTRAVENOUS AS NEEDED
Status: DISCONTINUED | OUTPATIENT
Start: 2022-03-09 | End: 2022-03-09 | Stop reason: HOSPADM

## 2022-03-09 RX ORDER — LIDOCAINE HYDROCHLORIDE 10 MG/ML
INJECTION, SOLUTION EPIDURAL; INFILTRATION; INTRACAUDAL; PERINEURAL AS NEEDED
Status: DISCONTINUED | OUTPATIENT
Start: 2022-03-09 | End: 2022-03-09

## 2022-03-09 RX ORDER — CEFAZOLIN SODIUM 1 G/50ML
1000 SOLUTION INTRAVENOUS ONCE
Status: DISCONTINUED | OUTPATIENT
Start: 2022-03-09 | End: 2022-03-09 | Stop reason: HOSPADM

## 2022-03-09 RX ORDER — ACETAMINOPHEN 325 MG/1
650 TABLET ORAL EVERY 4 HOURS PRN
Status: DISCONTINUED | OUTPATIENT
Start: 2022-03-09 | End: 2022-03-09 | Stop reason: HOSPADM

## 2022-03-09 RX ORDER — LIDOCAINE HYDROCHLORIDE 10 MG/ML
INJECTION, SOLUTION EPIDURAL; INFILTRATION; INTRACAUDAL; PERINEURAL AS NEEDED
Status: DISCONTINUED | OUTPATIENT
Start: 2022-03-09 | End: 2022-03-09 | Stop reason: HOSPADM

## 2022-03-09 RX ORDER — CEFAZOLIN SODIUM 1 G/3ML
INJECTION, POWDER, FOR SOLUTION INTRAMUSCULAR; INTRAVENOUS AS NEEDED
Status: DISCONTINUED | OUTPATIENT
Start: 2022-03-09 | End: 2022-03-09

## 2022-03-09 RX ADMIN — LIDOCAINE HYDROCHLORIDE 40 MG: 10 INJECTION, SOLUTION EPIDURAL; INFILTRATION; INTRACAUDAL; PERINEURAL at 13:25

## 2022-03-09 RX ADMIN — SODIUM CHLORIDE: 9 INJECTION, SOLUTION INTRAVENOUS at 13:22

## 2022-03-09 RX ADMIN — PROPOFOL 50 MCG/KG/MIN: 10 INJECTION, EMULSION INTRAVENOUS at 13:22

## 2022-03-09 RX ADMIN — CEFAZOLIN 1000 MG: 1 INJECTION, POWDER, FOR SOLUTION INTRAMUSCULAR; INTRAVENOUS at 13:22

## 2022-03-09 RX ADMIN — SODIUM CHLORIDE: 9 INJECTION, SOLUTION INTRAVENOUS at 13:40

## 2022-03-09 NOTE — INTERVAL H&P NOTE
Please refer to Dr Arpita Denney' full consultation for further detail  In short, Sanjuanita Chiang is a pleasant 74year old female with mixed cardiomyopathy, chronic systolic congestive heart failure, essential hypertension, hyperlipidemia, non-insulin dependent diabetes mellitus, and history of breast cancer status post left sided mastectomy with lymph node removal about 30 years ago  She has followed with Dr Kishor Levi of general cardiology since November of 2020 (hospitalization for new onset of heart failure) at which point she did undergo cardiac cath at showed "Right coronary artery is nonobstructive  Lad and circumflex have moderate 50-60% disease  Both studied with iFR wire be on lesion  Both LAD and circumflex are not hemodynamically significant with an IFR well over 0 9 " She was therefore started on guideline directed medical therapy and has been on Entresto and metoprolol  Unfortunately, repeat echo this year in January showed a lower EF of 25-30% and she was therefore referred to EP for ICD evaluation  She was seen by Dr Arpita Denney deemed a candidate for dual chamber ICD and presents today to undergo this procedure  She offers no cardiac complaints - just tired due to not sleeping last night  Given mastectomy and lymph node removal on left, did discuss the possibility of right sided device which she is okay with - will defer to attending for final decision      Vitals:    03/09/22 0926   BP: 157/85   Pulse: 63   Resp: 19   Temp: 98 3 °F (36 8 °C)   SpO2: 99%

## 2022-03-09 NOTE — PROGRESS NOTES
Patient S/P dual chamberICD with Dr Devan Dolan  Patient tolerated procedure well  Aquacel dressing applied to upper RIGHT chest  Report given to primary RNPAULETTE  Patient transported to [facility] with belongings  No bleeding or hematoma noted

## 2022-03-09 NOTE — ANESTHESIA POSTPROCEDURE EVALUATION
Post-Op Assessment Note    CV Status:  Stable  Pain Score: 0       Mental Status:  Awake   Hydration Status:  Stable   PONV Controlled:  None   Airway Patency:  Patent      Post Op Vitals Reviewed: Yes      Staff: CRNA         No complications documented      BP      Temp      Pulse     Resp      SpO2

## 2022-03-09 NOTE — DISCHARGE INSTRUCTIONS
Please refer to post ICD implantation discharge instructions and restrictions below and your ICD booklet/temporary card  Keep incision dry for one week  Do not use lotions/powders/creams on incision  Leave outer bandage in place for 1 week - it is water proof, and as long as it is fully adhered to your skin you may shower with it  If it appears as though the bandage is coming off and/or there is any communication to the area of device incision, please then keep the whole area dry for the remaining week  After 1 week, please remove by pulling all edges away from the center of the bandage  No overhead reaching/pushing/pulling/lifting greater than 5-10lbs with left arm for six weeks  Please call the office if you notice redness, swelling, bleeding, or drainage from incision or if you develop fevers    Implantable Cardioverter Defibrillator      If you have any questions, please call [phone] to speak with a nurse (8:30am-4pm, or [phone] after hours)  For appointments, please call [phone]  WHAT YOU SHOULD KNOW:    An implantable cardioverter defibrillator (ICD) is a small device that monitors your heart rate and rhythm  It is commonly placed inside your upper chest region  It may be used if you have a ventricular arrhythmia, which is an irregular, dangerous rhythm from the bottom chamber of your heart  Some arrhythmias may cause your heart to suddenly stop beating  An ICD can give a shock to your heart to make it start beating again, or it can give pacing therapy (also known as pain-free therapy) to return your heart to normal rhythm  It is also a pacemaker, so it will pace your heart if needed to prevent it from beating too slowly  AFTER YOU LEAVE:      Follow up with your primary healthcare provider or cardiologist as directed: You will need to follow-up to have your ICD checked and make sure you are not having problems   Write down your questions so you remember to ask them during your visits  Driving: you are ok to drive 48 hours after device is implanted     Self-care:   · Ask about activity: Ask if you need to avoid moving your shoulder or arm, and for how long  Ask if you should avoid lifting heavy objects  Do not play any contact sports, such as football or wrestling, until your primary healthcare provider [facility] or cardiologist tells you it is okay  You may only be able to drive for a certain amount of time per day, or during certain hours  Ask when you can return to work  · Care for your skin over the ICD: see instructions above     When you get a shock from your ICD: A shock may feel like someone has hit you, or you may feel a thump in the chest  If someone is touching you when you get a shock, they may feel a tingling feeling  The first time you feel a shock, it may scare you  Sit or lie down and stay calm  Ask someone to stay with you if possible  Please either call your cardiologist or report to an emergency room  Safety instructions when you have an ICD:   · Carry an ID card for the ICD: This card has important information about your ICD  · Stay away from magnets or machines with electric fields: This includes MRI machines  Avoid leaning into a car engine or doing welding  These things can interfere with how your ICD works  · Tell airport security you have an ICD: You may need to be searched by hand when you go through a security gate  The security gate or handheld wand could harm your ICD  · Keep an ICD diary: Record when you get a shock and what you were doing before you got the shock  Keep track of how you felt before and after the shock, as well as how many shocks you received  Write down the day and time of each shock  Bring the diary with you when you see your PHP or cardiologist    · Carry medical alert identification: Wear medical alert jewelry or carry a card that says you have an ICD   Ask your PHP or cardiologist where to get these items  Contact your primary healthcare provider or cardiologist if:   · You have a fever  · You feel 1 or more shocks from your ICD and feel fine afterwards  · Your feet or ankles swell  · The area around your ICD is painful or tender after surgery  · The skin around your stitches or staples is red, swollen, or draining pus or fluid  · You have chills, a cough, and feel weak or achy  · You are sad or anxious and find it hard to do your usual activities  · You have questions or concerns about your condition or care  Seek care immediately or call 911 if:   · Your stitches or staples come apart  · Blood soaks through your bandage  · You feel more than 3 shocks in a row from your ICD  · You become weak, dizzy, or faint  · You feel your heart skip beats or beat very fast or slow, but you do not feel a shock from your ICD  · You have chest pain that does not go away with rest or medicine  © 2014 [address] is for End User's use only and may not be sold, redistributed or otherwise used for commercial purposes  All illustrations and images included in CareNotes® are the copyrighted property of [employer] D [employer] M [employer]  or Elvin Landry  The above information is an  only  It is not intended as medical advice for individual conditions or treatments  Talk to your doctor, nurse or pharmacist before following any medical regimen to see if it is safe and effective for you

## 2022-03-09 NOTE — Clinical Note
Site (pad location): anterior thigh  Laterality: left  Grounding pad site assessment: skin integrity intact

## 2022-03-09 NOTE — Clinical Note
The [address] device was inserted  The leads were placed into the connector and visually verified to be in correct position  Injury current obtained

## 2022-03-09 NOTE — ANESTHESIA PREPROCEDURE EVALUATION
Procedure:  Cardiac icd implant/ DUAL CHAMBER ICD (N/A Chest)    Relevant Problems   ANESTHESIA (within normal limits)      CARDIO   (+) Chronic combined systolic and diastolic congestive heart failure (HCC)   (+) Coronary artery disease involving native coronary artery of native heart without angina pectoris   (+) HTN (hypertension)   (+) Hyperlipidemia LDL goal <100   (+) Hypertensive heart and kidney disease with heart failure and with chronic kidney disease stage III (HCC)      ENDO   (+) Type 2 diabetes mellitus with stage 2 chronic kidney disease, without long-term current use of insulin (HCC)      GI/HEPATIC   (+) Gastroesophageal reflux disease      /RENAL   (+) Hypertensive heart and kidney disease with heart failure and with chronic kidney disease stage III (HCC)      GYN   (+) Breast cancer (HCC) - left 1994      MUSCULOSKELETAL   (+) Back pain      NEURO/PSYCH   (+) Claudication (HCC)      PULMONARY   (+) Obstructive sleep apnea syndrome   (+) Smoking      Other   (+) NICM (nonischemic cardiomyopathy) ([address] )      Findings    Left Ventricle Left ventricular cavity size is normal  Wall thickness is normal  The left ventricular ejection fraction is 25-30%  Systolic function is severely reduced  There is global hypokinesis with regional variation  Diastolic function is abnormal    Right Ventricle Right ventricular cavity size is normal  Systolic function is normal    Left Atrium The atrium is normal in size  Right Atrium The atrium is normal in size  Aortic Valve The aortic valve is trileaflet  The leaflets are not thickened  The leaflets are not calcified  The leaflets exhibit normal mobility  There is mild regurgitation  There is no evidence of stenosis  Mitral Valve The mitral valve has normal structure and function  There is trace regurgitation  There is no evidence of stenosis  Tricuspid Valve Tricuspid valve structure is normal  There is moderate regurgitation   There is no evidence of stenosis  Pulmonic Valve Pulmonic valve structure is normal  There is trace regurgitation  There is no evidence of stenosis  Ascending Aorta The sinus of Valsalva is normal in size  IVC/SVC The right atrial pressure is estimated at 8 0 mmHg  The inferior vena cava is dilated  Respirophasic changes were normal    Pericardium There is no pericardial effusion  The pericardium is normal in appearance  Pulmonary Artery The estimated pulmonary artery systolic pressure is 77 0 mmHg  Physical Exam    Airway    Mallampati score: II  TM Distance: >3 FB  Neck ROM: full     Dental   No notable dental hx     Cardiovascular  Rhythm: regular, Rate: normal, Murmur,     Pulmonary  Pulmonary exam normal Breath sounds clear to auscultation,     Other Findings        Anesthesia Plan  ASA Score- 4     Anesthesia Type- IV sedation with anesthesia with ASA Monitors  Additional Monitors:   Airway Plan:           Plan Factors-Exercise tolerance (METS): <4 METS  Chart reviewed  EKG reviewed  Existing labs reviewed  Patient summary reviewed  Patient is a current smoker  Patient instructed to abstain from smoking on day of procedure  Patient smoked on day of surgery  Induction-     Postoperative Plan-     Informed Consent- Anesthetic plan and risks discussed with patient  I personally reviewed this patient with the CRNA  Discussed and agreed on the Anesthesia Plan with the CRNA  Elena Marrero

## 2022-03-17 ENCOUNTER — OFFICE VISIT (OUTPATIENT)
Dept: CARDIOLOGY CLINIC | Facility: CLINIC | Age: 67
End: 2022-03-17
Payer: COMMERCIAL

## 2022-03-17 VITALS
HEIGHT: 63 IN | OXYGEN SATURATION: 100 % | SYSTOLIC BLOOD PRESSURE: 148 MMHG | BODY MASS INDEX: 23.65 KG/M2 | WEIGHT: 133.5 LBS | DIASTOLIC BLOOD PRESSURE: 82 MMHG | HEART RATE: 70 BPM

## 2022-03-17 DIAGNOSIS — I42.8 NICM (NONISCHEMIC CARDIOMYOPATHY) (HCC): Primary | ICD-10-CM

## 2022-03-17 DIAGNOSIS — I50.42 CHRONIC COMBINED SYSTOLIC AND DIASTOLIC CONGESTIVE HEART FAILURE (HCC): ICD-10-CM

## 2022-03-17 DIAGNOSIS — I25.10 CORONARY ARTERY DISEASE INVOLVING NATIVE CORONARY ARTERY OF NATIVE HEART WITHOUT ANGINA PECTORIS: ICD-10-CM

## 2022-03-17 DIAGNOSIS — E78.5 HYPERLIPIDEMIA LDL GOAL <100: ICD-10-CM

## 2022-03-17 DIAGNOSIS — I13.0 HYPERTENSIVE HEART AND KIDNEY DISEASE WITH HEART FAILURE AND WITH CHRONIC KIDNEY DISEASE STAGE III (HCC): ICD-10-CM

## 2022-03-17 DIAGNOSIS — N18.30 HYPERTENSIVE HEART AND KIDNEY DISEASE WITH HEART FAILURE AND WITH CHRONIC KIDNEY DISEASE STAGE III (HCC): ICD-10-CM

## 2022-03-17 PROCEDURE — 99214 OFFICE O/P EST MOD 30 MIN: CPT | Performed by: INTERNAL MEDICINE

## 2022-03-17 PROCEDURE — 1160F RVW MEDS BY RX/DR IN RCRD: CPT | Performed by: INTERNAL MEDICINE

## 2022-03-17 PROCEDURE — 3066F NEPHROPATHY DOC TX: CPT | Performed by: INTERNAL MEDICINE

## 2022-03-17 PROCEDURE — 4004F PT TOBACCO SCREEN RCVD TLK: CPT | Performed by: INTERNAL MEDICINE

## 2022-03-17 NOTE — PATIENT INSTRUCTIONS
Heart Failure   AMBULATORY CARE:   Heart failure  is a condition that does not allow your heart to fill or pump properly  Not enough oxygen in your blood gets to your organs and tissues  Fluid may not move through your body properly  Fluid builds up and causes swelling and trouble breathing  This is known as congestive heart failure  Heart failure may start in the left or right ventricle  Heart failure is often caused by damage or injury to your heart  The damage may be caused by other heart problems, diabetes, or high blood pressure  The damage may have also been caused by an infection  Heart failure is a long-term condition that tends to get worse over time  It is important to manage your health to improve your quality of life  Common signs and symptoms:   · Trouble breathing with activity that worsens to trouble breathing at rest    · Shortness of breath while lying flat    · Severe shortness of breath and coughing at night that usually wakes you    · Feeling lightheaded when you stand up    · Purple color around your mouth and nails    · Confusion or anxiety    · Chest pain at night    · Periods of no breathing, then breathing fast    · Lack of energy (often worsened by physical activity), or trouble sleeping    · Swelling in your ankles, legs, or abdomen    · Heartbeat that is fast or not regular    · Fingers and toes feel cool to the touch    Call your local emergency number (911 in the [facility] [facility]) if:   · You have any of the following signs of a heart attack:      ? Squeezing, pressure, or pain in your chest    ? You may  also have any of the following:     § Discomfort or pain in your back, neck, jaw, stomach, or arm    § Shortness of breath    § Nausea or vomiting    § Lightheadedness or a sudden cold sweat      Call your doctor if:   · Your heartbeat is fast, slow, or uneven all the time  · You have symptoms of worsening heart failure:      ?  Shortness of breath at rest, at night, or that is getting worse in any way    ? Weight gain of 3 or more pounds (1 4 kg) in a day, or more than your healthcare provider says is okay    ? More swelling in your legs or ankles    ? Abdominal pain or swelling    ? More coughing    ? Loss of appetite    ? Feeling tired all the time    · You feel hopeless or depressed, or you have lost interest in things you used to enjoy  · You often feel worried or afraid  · You have questions or concerns about your condition or care  Treatment for heart failure  may include any of the following:  · Medicines  may be needed to help regulate your heart rhythm  You may also need medicines to lower your blood pressure, and to decrease extra fluids  · Oxygen  may help you breathe easier if your oxygen level is lower than normal  A CPAP machine may be used to keep your airway open while you sleep  · Cardiac rehab  is a program run by specialists who will help you safely strengthen your heart  In the program you will learn about exercise, relaxation, stress management, and heart-healthy nutrition  Cardiac rehab may be recommended if your heart failure is not severe  · Surgery  can be done to implant a pacemaker or another device in your chest to regulate your heart rhythm  Other types of surgery can open blocked heart vessels, replace a damaged heart valve, or remove scar tissue  Manage swelling from extra fluid:   · Elevate (raise) your legs above the level of your heart  This will help with fluid that builds up in your legs or ankles  Elevate your legs as often as possible during the day  Prop your legs on pillows or blankets to keep them elevated comfortably  Try not to stand for long periods of time during the day  Move around to keep your blood circulating  · Limit sodium (salt)  Ask how much sodium you can have each day  Your healthcare provider may give you a limit, such as 2,300 milligrams (mg) a day   Your provider or a dietitian can teach you how to read food labels for the number of mg in a food  He or she can also help you find ways to have less salt  For example, if you add salt to food as you cook, do not add more at the table  · Drink liquids as directed  You may need to limit the amount of liquid you drink within 24 hours  Your healthcare provider will tell you how much liquid to have and which liquids are best for you  He or she may tell you to limit liquid to 1 5 to 2 liters in a day  He or she will also tell you how often to drink liquid throughout the day  · Weigh yourself every morning  Use the same scale, in the same spot  Do this after you use the bathroom, but before you eat or drink  Wear the same type of clothing each time  Write down your weight and call your healthcare provider if you have a sudden weight gain  Swelling and weight gain are signs of fluid buildup  Manage heart failure: Your quality of life may improve with treatment and the following:  · Do not smoke  Nicotine and other chemicals in cigarettes and cigars can cause lung and heart damage  Ask your healthcare provider for information if you currently smoke and need help to quit  E-cigarettes or smokeless tobacco still contain nicotine  Talk to your healthcare provider before you use these products  · Do not drink alcohol or use illegal drugs  Alcohol and drugs can increase your risk for high blood pressure, diabetes, and coronary artery disease  · Eat heart-healthy foods  Heart-healthy foods include fruits, vegetables, lean meat (such as beef, chicken, or pork), and low-fat dairy products  Fatty fish such as salmon and tuna are also heart healthy  Other heart-healthy foods include walnuts, whole-grain breads, beans, and cooked beans  Replace butter and margarine with heart-healthy oils such as olive oil or canola oil  Your provider or a dietitian can help you create heart-healthy meal plans  · Manage any chronic health conditions you have    These include high blood pressure, diabetes, obesity, high cholesterol, metabolic syndrome, and COPD  You will have fewer symptoms if you manage these health conditions  Follow your healthcare provider's recommendations and follow up with him or her regularly  · Maintain a healthy weight  Being overweight can increase your risk for high blood pressure, diabetes, and coronary artery disease  These conditions can make your symptoms worse  Ask your healthcare provider how much you should weigh  Ask him or her to help you create a weight loss plan if you are overweight  · Stay active  Activity can help keep your symptoms from getting worse  Walking is a type of physical activity that helps maintain your strength and improve your mood  Physical activity also helps you manage your weight  Work with your healthcare provider to create an exercise plan that is right for you  · Get vaccines as directed  The flu and pneumonia can be severe for a person who has heart failure  Vaccines protect you from these infections  Get a flu shot every year as soon as it is recommended, usually in September or October  You may also need the pneumonia vaccine  Your healthcare provider can tell you if you need other vaccines, and when to get them  Follow up with your doctor or cardiologist within 7 days or as directed: You may need to return for other tests  You may need home health care  A healthcare provider will monitor your vital signs, weight, and make sure your medicines are working  Write down your questions so you remember to ask them during your visits  Join a support group:  Heart failure can be difficult to manage  It may be helpful to talk with others who have heart failure  You may learn how to better manage your condition or get emotional support  For more information:  · [address]  [place] , [place]   Phone: [phone] 074 - [phone]  Web Address: https://[employer]og[employer]ides/  org     © Copyright [employer] 2022 Information is for End User's use only and may not be sold, redistributed or otherwise used for commercial purposes  All illustrations and images included in CareNotes® are the copyrighted property of A D [employer] M , Inc  or Reynaldo Cloud  The above information is an  only  It is not intended as medical advice for individual conditions or treatments  Talk to your doctor, nurse or pharmacist before following any medical regimen to see if it is safe and effective for you

## 2022-03-17 NOTE — PROGRESS NOTES
Cardiology Follow Up     Co  1955  [phone]  [facility]  [address]    [address] [id number] [phone]    1  NICM (nonischemic cardiomyopathy) ([facility] )     2  Hypertensive heart and kidney disease with heart failure and with chronic kidney disease stage III ([facility] )     3  Coronary artery disease involving native coronary artery of native heart without angina pectoris     4  Chronic combined systolic and diastolic congestive heart failure ([facility] )     5  Hyperlipidemia LDL goal <100       Chief Complaint   Patient presents with    Follow-up     6 week post icd implant     Shortness of Breath     exertion        Interval History: Patient continues with mild SOB with exertion, not worsened than before  S/p ICD, tolerated procedure well  Patient feels well, without complaints  No reported chest pain, shortness of breath, palpitations, lightheadedness, syncope, LE edema, orthopnea, PND, or significant weight changes  Patient remains active without any increased fatigue out of the ordinary           Patient Active Problem List   Diagnosis    History of left breast cancer    Arthropathy    Hypertensive heart and kidney disease with heart failure and with chronic kidney disease stage III (HCC)    Colon, diverticulosis    Hyperlipidemia LDL goal <100    Type 2 diabetes mellitus with stage 2 chronic kidney disease, without long-term current use of insulin ([facility] )    Screening for cardiovascular, respiratory, and genitourinary diseases    Immunization due    Breast cancer screening    Colon cancer screening    Menopause    Gastroesophageal reflux disease    Microalbuminuria    Medicare annual wellness visit, subsequent    Obstructive sleep apnea syndrome    Hypercalcemia    CKD stage 2 due to type 2 diabetes mellitus ([facility] )    Hyperparathyroidism ([facility] )    Coronary artery disease involving native coronary artery of native heart without angina pectoris    Chronic combined systolic and diastolic congestive heart failure (HCC)    Vitamin D deficiency    S/P left mastectomy    Breast cancer ([place] [id number] ) - left 1994    HTN (hypertension)    Smoking    Back pain    Cough    Chronic diastolic congestive heart failure (HCC)    Bilateral leg edema    Breast cancer screening by mammogram    Acute bronchitis    NICM (nonischemic cardiomyopathy) (HCC)    Claudication (HCC)    Trigger finger of right thumb     Past Medical History:   Diagnosis Date    Breast cancer ([place] [address] )     left - 1994      Diabetes mellitus ([place] [address] )     Diabetes mellitus, type 2 ([place] [address] ) 03/15/2006    last assessed 7/10/13    GERD (gastroesophageal reflux disease)     History of chemotherapy     Hypertension      Social History     Socioeconomic History    Marital status: Single     Spouse name: Not on file    Number of children: Not on file    Years of education: Not on file    Highest education level: Not on file   Occupational History    Not on file   Tobacco Use    Smoking status: Current Every Day Smoker     Packs/day: 0 25    Smokeless tobacco: Never Used    Tobacco comment: 3 cigarettes daily    Vaping Use    Vaping Use: Never used   Substance and Sexual Activity    Alcohol use: Not Currently     Comment: Social    Drug use: No    Sexual activity: Not on file   Other Topics Concern    Not on file   Social History Narrative    Not on file     Social Determinants of Health     Financial Resource Strain: Not on file   Food Insecurity: Not on file   Transportation Needs: Not on file   Physical Activity: Not on file   Stress: Not on file   Social Connections: Not on file   Intimate Partner Violence: Not on file   Housing Stability: Not on file      Family History   Problem Relation Age of Onset    Hypothyroidism Mother     Leukemia Mother     Diabetes Father     Diabetes type II Father     Stroke Sister     Diabetes Paternal Grandmother     Cancer Sister     Diabetes Brother      Past Surgical History:   Procedure Laterality Date    CARDIAC ELECTROPHYSIOLOGY PROCEDURE N/A 3/9/2022    Procedure: Cardiac icd implant/ DUAL CHAMBER ICD; Surgeon: Shamika Ackerman MD;  [facility]: [facility];   Service: Cardiology    MASTECTOMY Left     last assessed 12/16/14    MASTECTOMY, RADICAL Left     1994    PA EXPLORE PARATHYROID GLANDS N/A 4/21/2021    Procedure: PARATHYROIDECTOMY POSSIBLE 4 GLAND EXPLORATION;  Surgeon: Bibi Jaime MD;  Location[facility];  Service: ENT    REDUCTION MAMMAPLASTY Right     UVULECTOMY         Current Outpatient Medications:     aspirin (ECOTRIN LOW STRENGTH) 81 mg EC tablet, Take 1 tablet (81 mg total) by mouth daily, Disp: 90 tablet, Rfl: 3    atorvastatin (LIPITOR) 40 mg tablet, Take 1 tablet (40 mg total) by mouth daily, Disp: 90 tablet, Rfl: 1    furosemide (LASIX) 20 mg tablet, TAKE 1 TABLET DAILY ON MONDAYS, WEDNESDAYS, AND FRIDAYS, Disp: 45 tablet, Rfl: 1    metFORMIN (GLUCOPHAGE) 1000 MG tablet, TAKE 1 TABLET BY MOUTH TWICE A DAY WITH MEALS, Disp: 180 tablet, Rfl: 1    metoprolol succinate (TOPROL-XL) 50 mg 24 hr tablet, Take 1 tablet (50 mg total) by mouth 2 (two) times a day, Disp: 180 tablet, Rfl: 3    sacubitril-valsartan (Entresto) 49-51 MG TABS, Take 1 tablet by mouth 2 (two) times a day, Disp: 30 tablet, Rfl: 3    nicotine (NICODERM CQ) 14 mg/24hr TD 24 hr patch, Place 1 patch on the skin every 24 hours (Patient not taking: Reported on 2/16/2022 ), Disp: 28 patch, Rfl: 3  No Known Allergies    Labs:  Admission on 03/09/2022, Discharged on 03/09/2022   Component Date Value    WBC 03/09/2022 5 62     RBC 03/09/2022 4 84     Hemoglobin 03/09/2022 13 5     Hematocrit 03/09/2022 40 4     MCV 03/09/2022 84     MCH 03/09/2022 27 9     MCHC 03/09/2022 33 4     RDW 03/09/2022 14 6     Platelets 09/05/2702 201     MPV 03/09/2022 12 7     Protime 03/09/2022 12 9     INR 03/09/2022 1 01     Ventricular Rate 03/09/2022 64     Atrial Rate 03/09/2022 64     MD Interval 03/09/2022 158     QRSD Interval 03/09/2022 126     QT Interval 03/09/2022 440     QTC Interval 03/09/2022 453     P Axis 03/09/2022 53     QRS Axis 03/09/2022 -48     T Wave Axis 03/09/2022 260     Ventricular Rate 03/09/2022 65     Atrial Rate 03/09/2022 65     MD Interval 03/09/2022 166     QRSD Interval 03/09/2022 140     QT Interval 03/09/2022 444     QTC Interval 03/09/2022 461     P Axis 03/09/2022 62     QRS Axis 03/09/2022 -61     T Wave Axis 03/09/2022 236    Appointment on 03/04/2022   Component Date Value    Sodium 03/04/2022 140     Potassium 03/04/2022 4 0     Chloride 03/04/2022 109*    CO2 03/04/2022 26     ANION GAP 03/04/2022 5     BUN 03/04/2022 14     Creatinine 03/04/2022 1 01     Glucose, Fasting 03/04/2022 194*    Calcium 03/04/2022 8 8     Corrected Calcium 03/04/2022 9 9     AST 03/04/2022 27     ALT 03/04/2022 36     Alkaline Phosphatase 03/04/2022 115     Total Protein 03/04/2022 6 7     Albumin 03/04/2022 2 6*    Total Bilirubin 03/04/2022 0 45     eGFR 03/04/2022 57     WBC 03/04/2022 6 13     RBC 03/04/2022 5 07     Hemoglobin 03/04/2022 14 3     Hematocrit 03/04/2022 43 7     MCV 03/04/2022 86     MCH 03/04/2022 28 2     MCHC 03/04/2022 32 7     RDW 03/04/2022 14 3     MPV 03/04/2022 12 3     Platelets 56/63/4334 234     nRBC 03/04/2022 0     Neutrophils Relative 03/04/2022 48     Immat GRANS % 03/04/2022 0     Lymphocytes Relative 03/04/2022 43     Monocytes Relative 03/04/2022 7     Eosinophils Relative 03/04/2022 2     Basophils Relative 03/04/2022 0     Neutrophils Absolute 03/04/2022 2 88     Immature Grans Absolute 03/04/2022 0 01     Lymphocytes Absolute 03/04/2022 2 62     Monocytes Absolute 03/04/2022 0 45     Eosinophils Absolute 03/04/2022 0 15     Basophils Absolute 03/04/2022 0 02    Hospital Outpatient Visit on 01/21/2022   Component Date Value    A4C EF 01/21/2022 35     LVIDd 01/21/2022 4 10     LVIDS 01/21/2022 3 50     IVSd 01/21/2022 1 00     LVPWd 01/21/2022 1 10     FS 01/21/2022 15     AV LVOT peak gradient 01/21/2022 1     LVOT peak VTI 01/21/2022 10 2     LVOT peak ebony 01/21/2022 0 55     RVID d 01/21/2022 3 5     LA size 01/21/2022 3 9     HEATHER A4C 01/21/2022 13 1     RAA A4C 01/21/2022 13 5     LVOT mn grad 01/21/2022 1 0     AV peak gradient 01/21/2022 7     AV peak gradient 01/21/2022 69     AV Deceleration Time 01/21/2022 1,394     AV regurgitation pressur* 01/21/2022 404     Ao root 01/21/2022 3 00     Tricuspid valve peak reg* 01/21/2022 3 47     Left ventricular stroke * 01/21/2022 24 00     LEFT VENTRICLE SYSTOLIC * 91/47/2394 49     LV DIASTOLIC VOLUME (MOD* 68/00/0143 73     ZLVIDS 01/21/2022 -1 08     LV EF 01/21/2022 30     Est  RA pres 01/21/2022 8 0     PASP 01/21/2022 44 0    Office Visit on 12/13/2021   Component Date Value    SARS-CoV-2 12/13/2021 Negative    Clinical Support on 11/01/2021   Component Date Value    TB Skin Test 11/03/2021 Negative     Induration 11/03/2021 0 0    Appointment on 10/14/2021   Component Date Value    Hemoglobin A1C 10/14/2021 9 6*    EAG 10/14/2021 229     Sodium 10/14/2021 138     Potassium 10/14/2021 4 0     Chloride 10/14/2021 103     CO2 10/14/2021 26     ANION GAP 10/14/2021 9     BUN 10/14/2021 14     Creatinine 10/14/2021 1 17     Glucose, Fasting 10/14/2021 148*    Calcium 10/14/2021 8 7     Corrected Calcium 10/14/2021 9 4     AST 10/14/2021 23     ALT 10/14/2021 36     Alkaline Phosphatase 10/14/2021 100     Total Protein 10/14/2021 7 4     Albumin 10/14/2021 3 1*    Total Bilirubin 10/14/2021 0 62     eGFR 10/14/2021 55      Lab Results   Component Value Date    CHOL 190 10/07/2016    TRIG 76 03/09/2021    TRIG 100 10/07/2016    HDL 64 03/09/2021    HDL 62 10/07/2016     Imaging: Mammo screening right w 3d & cad    Result Date: 11/16/2021  Narrative: DIAGNOSIS: Breast cancer screening by mammogram TECHNIQUE: Digital screening mammography was performed  Computer Aided Detection (CAD) analyzed all applicable images  COMPARISONS: Prior breast imaging dated: 05/18/2020, 11/06/2018, and 02/23/2016 RELEVANT HISTORY: Family Breast Cancer History: No known family history of breast cancer  Family Medical History: No known relevant family medical history  Personal History: No known relevant hormone history  Surgical history includes breast reduction and mastectomy  Medical history includes breast cancer and history of chemotherapy  The patient is scheduled in a reminder system for screening mammography  8-10% of cancers will be missed on mammography  Management of a palpable abnormality must be based on clinical grounds  Patients will be notified of their results via letter from our facility  Accredited by [employer] and FDA  RISK ASSESSMENT: Janene risk assessment reporting was suppressed due to the patient's history and/or demographic factors  TISSUE DENSITY: There are scattered areas of fibroglandular density  INDICATION: Jama Cooks is a 74 y o  female presenting for screening mammography  FINDINGS: There are no suspicious masses, grouped microcalcifications or areas of architectural distortion  The skin and nipple areolar complex are unremarkable  Impression: No mammographic evidence of malignancy  ASSESSMENT/BI-RADS CATEGORY: Right: 1 - Negative Overall: 1 - Negative RECOMMENDATION:      - Routine screening mammogram in 1 year for the right breast  Workstation ID: [id number]       Review of Systems:  Review of Systems   Constitutional: Negative for activity change, appetite change, chills, diaphoresis, fatigue and unexpected weight change  HENT: Negative for hearing loss, nosebleeds and sore throat  Eyes: Negative for photophobia and visual disturbance     Respiratory: Positive for shortness of breath  Negative for cough, chest tightness and wheezing  Cardiovascular: Negative for chest pain, palpitations and leg swelling  Gastrointestinal: Negative for abdominal pain, diarrhea, nausea and vomiting  Endocrine: Negative for polyuria  Genitourinary: Negative for dysuria, frequency and hematuria  Musculoskeletal: Negative for arthralgias, back pain, gait problem and neck pain  Skin: Negative for pallor and rash  Neurological: Negative for dizziness, syncope and headaches  Hematological: Does not bruise/bleed easily  Psychiatric/Behavioral: Negative for behavioral problems and confusion  Physical Exam:  Physical Exam  Vitals reviewed  Constitutional:       Appearance: She is well-developed  She is not diaphoretic  HENT:      Head: Normocephalic and atraumatic  Nose: Nose normal    Eyes:      General: No scleral icterus  Pupils: Pupils are equal, round, and reactive to light  Neck:      Vascular: No JVD  Cardiovascular:      Rate and Rhythm: Normal rate and regular rhythm  Heart sounds: Normal heart sounds  No murmur heard  No friction rub  No gallop  Pulmonary:      Effort: Pulmonary effort is normal  No respiratory distress  Breath sounds: Normal breath sounds  No wheezing or rales  Abdominal:      General: Bowel sounds are normal  There is no distension  Palpations: Abdomen is soft  Tenderness: There is no abdominal tenderness  Musculoskeletal:         General: No deformity  Normal range of motion  Cervical back: Normal range of motion and neck supple  Skin:     General: Skin is warm and dry  Findings: No rash  Neurological:      Mental Status: She is alert and oriented to person, place, and time  Cranial Nerves: No cranial nerve deficit     Psychiatric:         Behavior: Behavior normal        Blood pressure 148/82, pulse 70, height 5' 3" (1 6 m), weight 60 6 kg (133 lb 8 oz), SpO2 100 %, not currently breastfeeding  EKG:  Normal sinus rhythm  Left axis deviation  Incomplete right bundle branch block  Nonspecific ST and T wave abnormality  Abnormal ECG    Discussion/Summary:  NICM: mild reduction/low normal EF when hospitalized for CHF  Repeat echo now revealed significantly reduced EF of 25-30%  Continued on ACE-I and BB for GDMT  Low dose diuretic as maintenance continued at M-W-F schedule  s/p ICD placement and doing well  CAD: nonobstructive as per cath in Nov 2020  Continued on ASA, statin, and B-blocker  Eventual repeat ischemic eval     HTN: improving today, changed lisinopril to Entresto at last visit and tolerating well  HLD: continued on statin  LDL 65 in March 2021  Claudication: fatigue in legs with exertion  We checked arterial duplex revealing significant LE arterial disease - referred to vascular surgery for further management

## 2022-03-23 ENCOUNTER — IN-CLINIC DEVICE VISIT (OUTPATIENT)
Dept: CARDIOLOGY CLINIC | Facility: CLINIC | Age: 67
End: 2022-03-23

## 2022-03-23 DIAGNOSIS — Z95.810 AICD (AUTOMATIC CARDIOVERTER/DEFIBRILLATOR) PRESENT: Primary | ICD-10-CM

## 2022-03-23 PROCEDURE — 99024 POSTOP FOLLOW-UP VISIT: CPT | Performed by: INTERNAL MEDICINE

## 2022-03-23 NOTE — PROGRESS NOTES
Results for orders placed or performed in visit on 03/23/22   Cardiac EP device report    Narrative    MDT DUAL ICD/ [facility]  INTERROGATED IN THE [place] OFFICE  BATTERY VOLTAGE ADEQUATE (10 9 YRS)  AP-6%, <0 1%  ALL LEAD PARAMETERS WITHIN NORMAL LIMITS  NO SIGNIFICANT HIGH RATE EPISODES  OPTIVOL INITIALIZING  NORMAL DEVICE FUNCTION  WOUND CHECK: INCISION CLEAN AND DRY WITH EDGES APPROXIMATED; WOUND CARE AND RESTRICTIONS REVIEWED WITH PATIENT   GV

## 2022-03-31 ENCOUNTER — CONSULT (OUTPATIENT)
Dept: VASCULAR SURGERY | Facility: CLINIC | Age: 67
End: 2022-03-31
Payer: COMMERCIAL

## 2022-03-31 VITALS
BODY MASS INDEX: 23.57 KG/M2 | HEIGHT: 63 IN | HEART RATE: 67 BPM | SYSTOLIC BLOOD PRESSURE: 120 MMHG | DIASTOLIC BLOOD PRESSURE: 60 MMHG | WEIGHT: 133 LBS

## 2022-03-31 DIAGNOSIS — I73.9 CLAUDICATION (HCC): ICD-10-CM

## 2022-03-31 DIAGNOSIS — I87.2 VENOUS INSUFFICIENCY OF BOTH LOWER EXTREMITIES: Primary | ICD-10-CM

## 2022-03-31 PROCEDURE — 3008F BODY MASS INDEX DOCD: CPT | Performed by: INTERNAL MEDICINE

## 2022-03-31 PROCEDURE — 99203 OFFICE O/P NEW LOW 30 MIN: CPT | Performed by: NURSE PRACTITIONER

## 2022-03-31 NOTE — ASSESSMENT & PLAN NOTE
Patient is a 53-year-old female, smoker with HTN, HLD, CAD, CHF, and ICM, ICD, dm type 2,h/o breast CA s/p L mastectomy presents with complaints of tired legs with edema and to review lower extremity arterial duplex  - patient complains of tired, heavy legs after walking about 1 block  Patient reports both legs are affected  - BLE edema R>L    - She denies calf cramping, claudication, rest pain or tissue loss  - LEAD 1/21/22 demonstrate > 75% stenosis in R mid SFA with diffuse disease noted throughout the remaining femoral-popliteal and tibioperoneal arteries without significant focal stenosis  ROSA 0 86/125/103  And  >75% stenosis in the L proximal popliteal artery  50-75% stenosis in the distal SFA followed by a >75% stenosis in the most distal portion  Evidence of tibioperoneal disease with high grade stenosis vs occlusion of the distal posterior tibial artery  ROSA 0 69/83/97  -palpable DP pulses (R>L)    Plan:   - continue to monitor PAD as patient is asymptomatic denying claudication, rest pain or tissue loss    - continue ASA   - continue statin  - GENTLE light compression for symptom and edema control   - encourage frequent ambulation for both PAD and suspected venous insufficiency  - leg elevation at rest  - return to office in 3 months for follow-up on symptoms  - call or return to office sooner with new or worsening pain, claudication, rest pain or tissue loss

## 2022-03-31 NOTE — ASSESSMENT & PLAN NOTE
- patient smoking 4 cigarettes per day  -discussed the pathophysiology and relationship of smoking and vascular disease  -encourage smoking cessation  - patient has ordered Nicoderm patches to be delivered next week

## 2022-03-31 NOTE — PATIENT INSTRUCTIONS
Gentle, light compression daily on in a m , off in p m  Prescribed knee-high compression stocking 15-20 mmHg    Frequent ambulation   Leg elevation at rest   Return to office in 3 months for symptom evaluation  Call or return to office sooner with new or worsening pain, claudication, rest pain or wounds in feet that we talked about at visit today

## 2022-03-31 NOTE — PROGRESS NOTES
Assessment/Plan:    Venous insufficiency of both lower extremities  Patient is a 55-year-old female, smoker with HTN, HLD, CAD, CHF, and ICM, ICD, dm type 2,h/o breast CA s/p L mastectomy presents with complaints of tired legs with edema and to review lower extremity arterial duplex  - patient complains of tired, heavy legs after walking about 1 block  Patient reports both legs are affected  - BLE edema R>L    - She denies calf cramping, claudication, rest pain or tissue loss  - LEAD 1/21/22 demonstrate > 75% stenosis in R mid SFA with diffuse disease noted throughout the remaining femoral-popliteal and tibioperoneal arteries without significant focal stenosis  ROSA 0 86/125/103  And  >75% stenosis in the L proximal popliteal artery  50-75% stenosis in the distal SFA followed by a >75% stenosis in the most distal portion  Evidence of tibioperoneal disease with high grade stenosis vs occlusion of the distal posterior tibial artery  ROSA 0 69/83/97  -palpable DP pulses (R>L)    Plan:   - continue to monitor PAD as patient is asymptomatic denying claudication, rest pain or tissue loss    - continue ASA   - continue statin  - GENTLE light compression for symptom and edema control   - encourage frequent ambulation for both PAD and suspected venous insufficiency  - leg elevation at rest  - return to office in 3 months for follow-up on symptoms  - call or return to office sooner with new or worsening pain, claudication, rest pain or tissue loss      Smoking  - patient smoking 4 cigarettes per day  -discussed the pathophysiology and relationship of smoking and vascular disease  -encourage smoking cessation  - patient has ordered Nicoderm patches to be delivered next week       Diagnoses and all orders for this visit:    Venous insufficiency of both lower extremities  -     Compression Stocking    Claudication [facility])  -     Ambulatory referral to Vascular Surgery          Subjective:      Patient ID: Alexandru Hay is a 74 y o  female  Pt is new and was referred by Ravi Cardona MD  to rev ELVIS 1/21/22  Pt c/o aj tiredness and heaviness in legs  Pt says she is able to walk a block before she gets heaviness in legs  Pt is taking ASA and Atorvastatin  Pt is a smoker and smokes 4 cigarettes a day  Patient presents to review arterial duplex from 01/21/2022 with complaints of bilateral lower extremity tired, heavy legs" after walking and at end of day  Patient does not endorse one leg being worse than the other  Complains of edema R>L  Patient denies thigh or calf cramping, claudication, rest pain or tissue loss  We discussed pathophysiology of both peripheral arterial disease and venous insufficiency  Lower extremity arterial duplex reviewed demonstrates bilateral SFA stenosis without significant decrease in ABIs  Given that patient is not complaining of or describing arterial type symptoms, will continue to monitor and reassess in 3 months with encourage walking program   Since patient's symptoms are more in line with swelling, tired heavy legs, we will trial gentle, light compression, frequent ambulation leg elevation at rest   Patient is on optimal medical management with ASA and statin  Smoking cessation encouraged, patient has "ordered patches" to be delivered this week  Patient verbalized understanding without further questions will follow-up in office in 3 months  The following portions of the patient's history were reviewed and updated as appropriate: allergies, current medications, past family history, past medical history, past social history, past surgical history and problem list     Review of Systems   Constitutional: Negative  HENT: Negative  Eyes: Negative  Respiratory: Negative  Cardiovascular: Negative  Gastrointestinal: Negative  Endocrine: Negative  Genitourinary: Negative  Musculoskeletal: Negative  Heaviness in legs    Skin: Negative      Allergic/Immunologic: Negative  Neurological: Negative  Hematological: Negative  Psychiatric/Behavioral: Negative  I have reviewed and made appropriate changes to the review of systems input by the medical assistant  Objective:      /60 (BP Location: Right arm, Patient Position: Sitting, Cuff Size: Standard)   Pulse 67   Ht 5' 3" (1 6 m)   Wt 60 3 kg (133 lb)   LMP  (LMP Unknown)   BMI 23 56 kg/m²          Physical Exam  Vitals reviewed  Constitutional:       Appearance: Normal appearance  Cardiovascular:      Rate and Rhythm: Normal rate and regular rhythm  Pulses:           Radial pulses are 2+ on the right side and 2+ on the left side  Femoral pulses are 2+ on the right side and 2+ on the left side  Dorsalis pedis pulses are 2+ on the right side and 1+ on the left side  Posterior tibial pulses are detected w/ Doppler on the right side and detected w/ Doppler on the left side  Heart sounds: Normal heart sounds  Pulmonary:      Effort: Pulmonary effort is normal       Breath sounds: Normal breath sounds  Musculoskeletal:         General: Normal range of motion  Right lower leg: Edema present  Left lower leg: Edema present  Skin:     General: Skin is warm and dry  Capillary Refill: Capillary refill takes less than 2 seconds  Neurological:      Mental Status: She is alert and oriented to person, place, and time     Psychiatric:         Behavior: Behavior normal              Vitals:    03/31/22 1310   BP: 120/60   BP Location: Right arm   Patient Position: Sitting   Cuff Size: Standard   Pulse: 67   Weight: 60 3 kg (133 lb)   Height: 5' 3" (1 6 m)       Patient Active Problem List   Diagnosis    History of left breast cancer    Arthropathy    Hypertensive heart and kidney disease with heart failure and with chronic kidney disease stage III (HCC)    Colon, diverticulosis    Hyperlipidemia LDL goal <100    Type 2 diabetes mellitus with stage 2 chronic kidney disease, without long-term current use of insulin ([place] [address] )    Screening for cardiovascular, respiratory, and genitourinary diseases    Immunization due    Breast cancer screening    Colon cancer screening    Menopause    Gastroesophageal reflux disease    Microalbuminuria    Medicare annual wellness visit, subsequent    Obstructive sleep apnea syndrome    Hypercalcemia    CKD stage 2 due to type 2 diabetes mellitus ([place] [address] )    Hyperparathyroidism ([place] [address] )    Coronary artery disease involving native coronary artery of native heart without angina pectoris    Chronic combined systolic and diastolic congestive heart failure (HCC)    Vitamin D deficiency    S/P left mastectomy    Breast cancer ([place] [address] ) - left 1994    HTN (hypertension)    Smoking    Back pain    Cough    Chronic diastolic congestive heart failure (HCC)    Bilateral leg edema    Breast cancer screening by mammogram    Acute bronchitis    NICM (nonischemic cardiomyopathy) (HCC)    Claudication (HCC)    Trigger finger of right thumb    Venous insufficiency of both lower extremities       Past Surgical History:   Procedure Laterality Date    CARDIAC ELECTROPHYSIOLOGY PROCEDURE N/A 3/9/2022    Procedure: Cardiac icd implant/ DUAL CHAMBER ICD; Surgeon: Liyah Savage MD;  [facility];   Service: Cardiology    MASTECTOMY Left     last assessed 12/16/14    MASTECTOMY, RADICAL Left     1994    VT EXPLORE PARATHYROID GLANDS N/A 4/21/2021    Procedure: PARATHYROIDECTOMY POSSIBLE 4 GLAND EXPLORATION;  Surgeon: Ignacia Kohli MD;  [facility];  Service: ENT    REDUCTION MAMMAPLASTY Right     UVULECTOMY         Family History   Problem Relation Age of Onset    Hypothyroidism Mother     Leukemia Mother     Diabetes Father     Diabetes type II Father     Stroke Sister     Diabetes Paternal Grandmother     Cancer Sister     Diabetes Brother        Social History     Socioeconomic History    Marital status: Single     Spouse name: Not on file    Number of children: Not on file    Years of education: Not on file    Highest education level: Not on file   Occupational History    Not on file   Tobacco Use    Smoking status: Current Every Day Smoker     Packs/day: 0 25    Smokeless tobacco: Never Used    Tobacco comment: 3 cigarettes daily    Vaping Use    Vaping Use: Never used   Substance and Sexual Activity    Alcohol use: Not Currently     Comment: Social    Drug use: No    Sexual activity: Not on file   Other Topics Concern    Not on file   Social History Narrative    Not on file     Social Determinants of Health     Financial Resource Strain: Not on file   Food Insecurity: Not on file   Transportation Needs: Not on file   Physical Activity: Not on file   Stress: Not on file   Social Connections: Not on file   Intimate Partner Violence: Not on file   Housing Stability: Not on file       No Known Allergies      Current Outpatient Medications:     aspirin (ECOTRIN LOW STRENGTH) 81 mg EC tablet, Take 1 tablet (81 mg total) by mouth daily, Disp: 90 tablet, Rfl: 3    atorvastatin (LIPITOR) 40 mg tablet, Take 1 tablet (40 mg total) by mouth daily, Disp: 90 tablet, Rfl: 1    furosemide (LASIX) 20 mg tablet, TAKE 1 TABLET DAILY ON MONDAYS, WEDNESDAYS, AND FRIDAYS, Disp: 45 tablet, Rfl: 1    metFORMIN (GLUCOPHAGE) 1000 MG tablet, TAKE 1 TABLET BY MOUTH TWICE A DAY WITH MEALS, Disp: 180 tablet, Rfl: 1    metoprolol succinate (TOPROL-XL) 50 mg 24 hr tablet, Take 1 tablet (50 mg total) by mouth 2 (two) times a day, Disp: 180 tablet, Rfl: 3    sacubitril-valsartan (Entresto) 49-51 MG TABS, Take 1 tablet by mouth 2 (two) times a day, Disp: 30 tablet, Rfl: 3    nicotine (NICODERM CQ) 14 mg/24hr TD 24 hr patch, Place 1 patch on the skin every 24 hours (Patient not taking: Reported on 2/16/2022 ), Disp: 28 patch, Rfl: 3

## 2022-05-04 ENCOUNTER — TELEPHONE (OUTPATIENT)
Dept: FAMILY MEDICINE CLINIC | Facility: CLINIC | Age: 67
End: 2022-05-04

## 2022-05-10 ENCOUNTER — RA CDI HCC (OUTPATIENT)
Dept: OTHER | Facility: HOSPITAL | Age: 67
End: 2022-05-10

## 2022-05-14 ENCOUNTER — TELEPHONE (OUTPATIENT)
Dept: FAMILY MEDICINE CLINIC | Facility: CLINIC | Age: 67
End: 2022-05-14

## 2022-05-18 ENCOUNTER — VBI (OUTPATIENT)
Dept: ADMINISTRATIVE | Facility: OTHER | Age: 67
End: 2022-05-18

## 2022-05-19 ENCOUNTER — TELEPHONE (OUTPATIENT)
Dept: FAMILY MEDICINE CLINIC | Facility: CLINIC | Age: 67
End: 2022-05-19

## 2022-05-19 NOTE — TELEPHONE ENCOUNTER
LMOM for callback  We have received a fax from [address] [place] [address] requesting we fill out information regarding the patient so they can send diabetic supplies  We need to know if this is legitimate  Fax is in nurse pending 2    Axel Jones

## 2022-05-24 NOTE — TELEPHONE ENCOUNTER
Spoke to patient who states she did not make this request  She has no idea who this pharmacy was  Fax placed in shred box  NFA needed     La Ware MA

## 2022-05-27 ENCOUNTER — OFFICE VISIT (OUTPATIENT)
Dept: OBGYN CLINIC | Facility: CLINIC | Age: 67
End: 2022-05-27
Payer: COMMERCIAL

## 2022-05-27 VITALS
SYSTOLIC BLOOD PRESSURE: 158 MMHG | HEIGHT: 63 IN | WEIGHT: 128 LBS | BODY MASS INDEX: 22.68 KG/M2 | DIASTOLIC BLOOD PRESSURE: 98 MMHG

## 2022-05-27 DIAGNOSIS — L29.2 VULVAR ITCHING: Primary | ICD-10-CM

## 2022-05-27 PROCEDURE — 3080F DIAST BP >= 90 MM HG: CPT | Performed by: OBSTETRICS & GYNECOLOGY

## 2022-05-27 PROCEDURE — 1160F RVW MEDS BY RX/DR IN RCRD: CPT | Performed by: OBSTETRICS & GYNECOLOGY

## 2022-05-27 PROCEDURE — 3077F SYST BP >= 140 MM HG: CPT | Performed by: OBSTETRICS & GYNECOLOGY

## 2022-05-27 PROCEDURE — 99203 OFFICE O/P NEW LOW 30 MIN: CPT | Performed by: OBSTETRICS & GYNECOLOGY

## 2022-05-27 PROCEDURE — 3008F BODY MASS INDEX DOCD: CPT | Performed by: OBSTETRICS & GYNECOLOGY

## 2022-05-27 PROCEDURE — 4004F PT TOBACCO SCREEN RCVD TLK: CPT | Performed by: OBSTETRICS & GYNECOLOGY

## 2022-05-27 RX ORDER — NYSTATIN AND TRIAMCINOLONE ACETONIDE 100000; 1 [USP'U]/G; MG/G
OINTMENT TOPICAL 2 TIMES DAILY
Qty: 30 G | Refills: 1 | Status: SHIPPED | OUTPATIENT
Start: 2022-05-27

## 2022-05-28 NOTE — PROGRESS NOTES
Assessment/Plan:     Diagnoses and all orders for this visit:    Vulvar itching  -     nystatin-triamcinolone (MYCOLOG-II) ointment; Apply topically 2 (two) times a day        56-year-old female   History of breast cancer with left breast mastectomy and chemotherapy  Diabetes  Chronic hypertension   Vaginal dryness/dyspareunia   External vulvar itching  Plan  Nystatin triamcinolone   Oil-based moisturizer   Vaginal dilator  Return to office in 2 months follow-up and annual exam    Subjective:      Patient ID: Saleem Mccollum is a 74 y o  female  56-year-old female presents to the office today secondary to vaginal dryness and pain with sexual intercourse as well as external vulvar itching  Patient recently started her sexual activity very painful and dry as per patient  Vaginal Discharge  The patient's primary symptoms include genital itching and vaginal discharge  This is a new problem  The current episode started 1 to 4 weeks ago  The problem occurs intermittently  The problem has been waxing and waning  The patient is experiencing no pain  Associated symptoms include painful intercourse  Pertinent negatives include no constipation, diarrhea or urgency  The vaginal discharge was white  There has been no bleeding  The symptoms are aggravated by intercourse  She has tried nothing for the symptoms  She is sexually active  The following portions of the patient's history were reviewed and updated as appropriate: allergies, current medications, past family history, past medical history, past social history, past surgical history and problem list     Review of Systems   Gastrointestinal: Negative for constipation and diarrhea  Genitourinary: Positive for vaginal discharge  Negative for urgency           Objective:      /98 (BP Location: Left arm, Patient Position: Sitting, Cuff Size: Adult)   Ht 5' 3" (1 6 m)   Wt 58 1 kg (128 lb)   LMP  (LMP Unknown)   BMI 22 67 kg/m²          Physical Exam  Constitutional:       Appearance: She is well-developed  Abdominal:      General: There is no distension  Palpations: Abdomen is soft  Tenderness: There is no abdominal tenderness  Genitourinary:     Labia:         Right: No rash, tenderness or lesion  Left: No rash, tenderness or lesion  Vagina: No signs of injury  No vaginal discharge, erythema or tenderness  Cervix: No cervical motion tenderness, discharge or friability  Adnexa:         Right: No mass, tenderness or fullness  Left: No mass, tenderness or fullness  Comments: Atrophy vaginal and vulva  Neurological:      Mental Status: She is alert and oriented to person, place, and time     Psychiatric:         Behavior: Behavior normal

## 2022-06-26 ENCOUNTER — TELEPHONE (OUTPATIENT)
Dept: FAMILY MEDICINE CLINIC | Facility: CLINIC | Age: 67
End: 2022-06-26

## 2022-06-28 ENCOUNTER — OFFICE VISIT (OUTPATIENT)
Dept: OBGYN CLINIC | Facility: CLINIC | Age: 67
End: 2022-06-28
Payer: COMMERCIAL

## 2022-06-28 VITALS
SYSTOLIC BLOOD PRESSURE: 148 MMHG | BODY MASS INDEX: 22.15 KG/M2 | WEIGHT: 125 LBS | HEIGHT: 63 IN | DIASTOLIC BLOOD PRESSURE: 92 MMHG

## 2022-06-28 DIAGNOSIS — Z78.0 MENOPAUSE: ICD-10-CM

## 2022-06-28 DIAGNOSIS — Z12.31 SCREENING MAMMOGRAM, ENCOUNTER FOR: ICD-10-CM

## 2022-06-28 DIAGNOSIS — Z01.419 WOMEN'S ANNUAL ROUTINE GYNECOLOGICAL EXAMINATION: Primary | ICD-10-CM

## 2022-06-28 PROCEDURE — 3008F BODY MASS INDEX DOCD: CPT | Performed by: OBSTETRICS & GYNECOLOGY

## 2022-06-28 PROCEDURE — G0101 CA SCREEN;PELVIC/BREAST EXAM: HCPCS | Performed by: OBSTETRICS & GYNECOLOGY

## 2022-06-28 NOTE — PROGRESS NOTES
Amy Koo is a 69 y o  female who presents for annual well woman exam       Menstrual History:  OB History        0    Para   0    Term   0       0    AB   0    Living   0       SAB   0    IAB   0    Ectopic   0    Multiple   0    Live Births   0                  No LMP recorded (lmp unknown)  Patient is postmenopausal        The following portions of the patient's history were reviewed and updated as appropriate: allergies, current medications, past family history, past medical history, past social history, past surgical history and problem list     Review of Systems  Review of Systems   Constitutional: Negative for activity change, appetite change, chills, fatigue and fever  Respiratory: Negative for cough and shortness of breath  Cardiovascular: Negative for chest pain, palpitations and leg swelling  Gastrointestinal: Negative for abdominal pain, constipation, diarrhea, nausea and vomiting  Genitourinary: Positive for dyspareunia  Negative for difficulty urinating, dysuria, flank pain, frequency, hematuria, urgency and vaginal discharge  Vaginal dryness   Neurological: Negative for dizziness and headaches  Psychiatric/Behavioral: Negative for confusion            Objective      /92 (BP Location: Left arm, Patient Position: Sitting, Cuff Size: Adult)   Ht 5' 3" (1 6 m)   Wt 56 7 kg (125 lb)   LMP  (LMP Unknown)   BMI 22 14 kg/m²     Physical Exam  OBGyn Exam     General:   alert and oriented, in no acute distress, alert, appears stated age and cooperative   Heart: regular rate and rhythm, S1, S2 normal, no murmur, click, rub or gallop   Lungs: clear to auscultation bilaterally   Abdomen: soft, non-tender, without masses or organomegaly   Vulva: normal   Vagina: normal mucosa, normal discharge   Cervix: no cervical motion tenderness and no lesions   Uterus: normal size   Adnexa:  Breast Exam:  normal adnexa  Right breast appear normal, no suspicious masses, no skin or nipple changes or axillary nodes, augmentation on the left breast after mastectomy secondary to breast cancer  Right breast cardiac electrode device          zoster noted on the lower buttock no active lesion        Assessment      @well woman@   58-year-old female   History of breast cancer with left breast mastectomy and chemotherapy  Diabetes  Chronic hypertension   Vaginal dryness/dyspareunia   Plan  No Pap needed at the patient age  Diet/exercise  Calcium/vitamin-D   Vaginal dilator/oil-based moisturizer  Mammogram right breast  DEXA scan  Shingle vaccine recommended   All questions answered  There are no Patient Instructions on file for this visit

## 2022-08-03 ENCOUNTER — TELEPHONE (OUTPATIENT)
Dept: FAMILY MEDICINE CLINIC | Facility: CLINIC | Age: 67
End: 2022-08-03

## 2022-08-10 ENCOUNTER — OFFICE VISIT (OUTPATIENT)
Dept: FAMILY MEDICINE CLINIC | Facility: CLINIC | Age: 67
End: 2022-08-10
Payer: COMMERCIAL

## 2022-08-10 VITALS
OXYGEN SATURATION: 99 % | HEART RATE: 81 BPM | TEMPERATURE: 97.5 F | RESPIRATION RATE: 16 BRPM | HEIGHT: 63 IN | BODY MASS INDEX: 21.44 KG/M2 | WEIGHT: 121 LBS | DIASTOLIC BLOOD PRESSURE: 84 MMHG | SYSTOLIC BLOOD PRESSURE: 144 MMHG

## 2022-08-10 DIAGNOSIS — I50.42 CHRONIC COMBINED SYSTOLIC AND DIASTOLIC CONGESTIVE HEART FAILURE (HCC): ICD-10-CM

## 2022-08-10 DIAGNOSIS — I50.32 CHRONIC DIASTOLIC CONGESTIVE HEART FAILURE (HCC): ICD-10-CM

## 2022-08-10 DIAGNOSIS — N18.2 TYPE 2 DIABETES MELLITUS WITH STAGE 2 CHRONIC KIDNEY DISEASE, WITHOUT LONG-TERM CURRENT USE OF INSULIN (HCC): ICD-10-CM

## 2022-08-10 DIAGNOSIS — E11.29 TYPE 2 DIABETES MELLITUS WITH OTHER DIABETIC KIDNEY COMPLICATION, WITHOUT LONG-TERM CURRENT USE OF INSULIN (HCC): ICD-10-CM

## 2022-08-10 DIAGNOSIS — E78.5 HYPERLIPIDEMIA LDL GOAL <100: ICD-10-CM

## 2022-08-10 DIAGNOSIS — I13.0 HYPERTENSIVE HEART AND KIDNEY DISEASE WITH HEART FAILURE AND WITH CHRONIC KIDNEY DISEASE STAGE III (HCC): ICD-10-CM

## 2022-08-10 DIAGNOSIS — I50.1 HEART FAILURE, LEFT, WITH LVEF <=30% (HCC): ICD-10-CM

## 2022-08-10 DIAGNOSIS — I25.10 CORONARY ARTERY DISEASE INVOLVING NATIVE CORONARY ARTERY OF NATIVE HEART WITHOUT ANGINA PECTORIS: ICD-10-CM

## 2022-08-10 DIAGNOSIS — F17.200 SMOKING: ICD-10-CM

## 2022-08-10 DIAGNOSIS — E11.22 TYPE 2 DIABETES MELLITUS WITH STAGE 2 CHRONIC KIDNEY DISEASE, WITHOUT LONG-TERM CURRENT USE OF INSULIN (HCC): ICD-10-CM

## 2022-08-10 DIAGNOSIS — Z00.00 MEDICARE ANNUAL WELLNESS VISIT, SUBSEQUENT: Primary | ICD-10-CM

## 2022-08-10 DIAGNOSIS — N18.30 HYPERTENSIVE HEART AND KIDNEY DISEASE WITH HEART FAILURE AND WITH CHRONIC KIDNEY DISEASE STAGE III (HCC): ICD-10-CM

## 2022-08-10 DIAGNOSIS — I10 PRIMARY HYPERTENSION: ICD-10-CM

## 2022-08-10 PROBLEM — J20.9 ACUTE BRONCHITIS: Status: RESOLVED | Noted: 2021-10-14 | Resolved: 2022-08-10

## 2022-08-10 PROBLEM — E11.9 DIABETES MELLITUS (HCC): Status: ACTIVE | Noted: 2022-08-10

## 2022-08-10 LAB — SL AMB POCT HEMOGLOBIN AIC: 14.9 (ref ?–6.5)

## 2022-08-10 PROCEDURE — G0439 PPPS, SUBSEQ VISIT: HCPCS | Performed by: FAMILY MEDICINE

## 2022-08-10 PROCEDURE — 99214 OFFICE O/P EST MOD 30 MIN: CPT | Performed by: FAMILY MEDICINE

## 2022-08-10 PROCEDURE — 3077F SYST BP >= 140 MM HG: CPT | Performed by: FAMILY MEDICINE

## 2022-08-10 PROCEDURE — 3046F HEMOGLOBIN A1C LEVEL >9.0%: CPT | Performed by: FAMILY MEDICINE

## 2022-08-10 PROCEDURE — 1125F AMNT PAIN NOTED PAIN PRSNT: CPT | Performed by: FAMILY MEDICINE

## 2022-08-10 PROCEDURE — 83036 HEMOGLOBIN GLYCOSYLATED A1C: CPT | Performed by: FAMILY MEDICINE

## 2022-08-10 PROCEDURE — 3066F NEPHROPATHY DOC TX: CPT | Performed by: FAMILY MEDICINE

## 2022-08-10 PROCEDURE — 3725F SCREEN DEPRESSION PERFORMED: CPT | Performed by: FAMILY MEDICINE

## 2022-08-10 PROCEDURE — 3288F FALL RISK ASSESSMENT DOCD: CPT | Performed by: FAMILY MEDICINE

## 2022-08-10 PROCEDURE — 3079F DIAST BP 80-89 MM HG: CPT | Performed by: FAMILY MEDICINE

## 2022-08-10 PROCEDURE — 1170F FXNL STATUS ASSESSED: CPT | Performed by: FAMILY MEDICINE

## 2022-08-10 RX ORDER — ATORVASTATIN CALCIUM 40 MG/1
40 TABLET, FILM COATED ORAL DAILY
Qty: 90 TABLET | Refills: 1 | Status: SHIPPED | OUTPATIENT
Start: 2022-08-10 | End: 2022-10-20

## 2022-08-10 RX ORDER — FUROSEMIDE 20 MG/1
TABLET ORAL
Qty: 45 TABLET | Refills: 1 | Status: SHIPPED | OUTPATIENT
Start: 2022-08-10 | End: 2022-10-20 | Stop reason: SDUPTHER

## 2022-08-10 NOTE — PROGRESS NOTES
Assessment/Plan:    No problem-specific Assessment & Plan notes found for this encounter  Diagnoses and all orders for this visit:    Medicare annual wellness visit, subsequent    Type 2 diabetes mellitus with stage 2 chronic kidney disease, without long-term current use of insulin [facility]  -     POCT hemoglobin A1c    Chronic combined systolic and diastolic congestive heart failure (HCC)    Smoking    Hypertensive heart and kidney disease with heart failure and with chronic kidney disease stage III ([facility])    Primary hypertension    Chronic diastolic congestive heart failure (HCC)  -     furosemide (LASIX) 20 mg tablet; TAKE 1 TABLET DAILY ON MONDAYS, WEDNESDAYS, AND FRIDAYS    Hyperlipidemia LDL goal <100  -     Lipid Panel with Direct LDL reflex; Future  -     atorvastatin (LIPITOR) 40 mg tablet; Take 1 tablet (40 mg total) by mouth daily    Type 2 diabetes mellitus with other diabetic kidney complication, without long-term current use of insulin ([facility])  -     Comprehensive metabolic panel; Future  -     metFORMIN (GLUCOPHAGE) 1000 MG tablet; Take 1 tablet (1,000 mg total) by mouth 2 (two) times a day with meals  -     Empagliflozin (Jardiance) 10 MG TABS; Take 1 tablet (10 mg total) by mouth every morning  -     Ambulatory referral to Endocrinology; Future    Coronary artery disease involving native coronary artery of native heart without angina pectoris    Heart failure, left, with LVEF <=30% ([facility])        a1c 14 9 in office POC  Significance advised  Written and understood instructions given for need to see endocrinologist as advised before    Tobacco use risks aware    CHF with PPM  F/u cardiology  Should get lasix from cardiologist in future since due to CHF, pt aware, weigh regularly for signs of exacerbation      No follow-ups on file  Subjective:      Patient ID: Nata Gann is a 69 y o  female      Chief Complaint   Patient presents with    Follow-up     Diabetic [facility] Visit       HPI  Lost 7#  Smaller portions  Not on low fat/carb/salt  Has not kept appt with endo as advised    Still smoking  Risks aware    lvef 25-30%  Has ICD  Has seen her cardiologist    Has not seen her endocrinologist despite written advice    The following portions of the patient's history were reviewed and updated as appropriate: allergies, current medications, past family history, past medical history, past social history, past surgical history and problem list     Review of Systems   Constitutional: Negative for chills and fever  Current Outpatient Medications   Medication Sig Dispense Refill    aspirin (ECOTRIN LOW STRENGTH) 81 mg EC tablet Take 1 tablet (81 mg total) by mouth daily 90 tablet 3    atorvastatin (LIPITOR) 40 mg tablet Take 1 tablet (40 mg total) by mouth daily 90 tablet 1    Empagliflozin (Jardiance) 10 MG TABS Take 1 tablet (10 mg total) by mouth every morning 90 tablet 1    furosemide (LASIX) 20 mg tablet TAKE 1 TABLET DAILY ON MONDAYS, WEDNESDAYS, AND FRIDAYS 45 tablet 1    metFORMIN (GLUCOPHAGE) 1000 MG tablet Take 1 tablet (1,000 mg total) by mouth 2 (two) times a day with meals 180 tablet 1    metoprolol succinate (TOPROL-XL) 50 mg 24 hr tablet Take 1 tablet (50 mg total) by mouth 2 (two) times a day 180 tablet 3    nystatin-triamcinolone (MYCOLOG-II) ointment Apply topically 2 (two) times a day 30 g 1    sacubitril-valsartan (Entresto) 49-51 MG TABS Take 1 tablet by mouth 2 (two) times a day 30 tablet 3    nicotine (NICODERM CQ) 14 mg/24hr TD 24 hr patch Place 1 patch on the skin every 24 hours (Patient not taking: Reported on 8/10/2022) 28 patch 3     No current facility-administered medications for this visit  Objective:    /84   Pulse 81   Temp 97 5 °F (36 4 °C)   Resp 16   Ht 5' 2 5" (1 588 m)   Wt 54 9 kg (121 lb)   LMP  (LMP Unknown)   SpO2 99%   BMI 21 78 kg/m²        Physical Exam  Vitals and nursing note reviewed     Constitutional: General: She is not in acute distress  Appearance: She is well-developed  HENT:      Head: Normocephalic  Right Ear: Tympanic membrane normal       Left Ear: Tympanic membrane normal    Eyes:      General: No scleral icterus  Conjunctiva/sclera: Conjunctivae normal    Cardiovascular:      Rate and Rhythm: Normal rate and regular rhythm  Pulses: no weak pulses          Dorsalis pedis pulses are 2+ on the right side and 2+ on the left side  Pulmonary:      Effort: Pulmonary effort is normal  No respiratory distress  Breath sounds: No rales  Abdominal:      General: There is no distension  Palpations: Abdomen is soft  Musculoskeletal:         General: No deformity  Cervical back: Neck supple  Right lower leg: No edema  Left lower leg: No edema  Skin:     General: Skin is warm and dry  Coloration: Skin is not pale  Neurological:      Mental Status: She is alert  Motor: No weakness  Gait: Gait normal    Psychiatric:         Mood and Affect: Mood normal          Behavior: Behavior normal          Thought Content: Thought content normal        Diabetic Foot Exam    Patient's shoes and socks removed  Right Foot/Ankle   Right Foot Inspection  Skin Exam: skin intact  No abnormal color  Sensory   Monofilament testing: intact    Vascular  The right DP pulse is 2+  Left Foot/Ankle  Left Foot Inspection  Skin Exam: skin intact  Normal color  Sensory   Monofilament testing: intact    Vascular  The left DP pulse is 2+       Assign Risk Category  No deformity present  No loss of protective sensation  No weak pulses  Risk: 0           Luwana Ramp, DO

## 2022-08-10 NOTE — PATIENT INSTRUCTIONS
Your A1C level today is 14 9 and should be less than 7  Please make and keep appointments with your ENDOCRINOLOGIST for your UNCONTROLLED TYPE 2 DIABETES  SHINGRIX is the new, more effective vaccine for Shingles  It is more than 90% effective  You should check with your local pharmacy and insurance company for availability and coverage  It is a 2 shot series, with the second shot given between 2-6 months after the first, and is approved for ages 48 and up  Medicare Preventive Visit Patient Instructions  Thank you for completing your Welcome to Medicare Visit or Medicare Annual Wellness Visit today  Your next wellness visit will be due in one year (8/11/2023)  The screening/preventive services that you may require over the next 5-10 years are detailed below  Some tests may not apply to you based off risk factors and/or age  Screening tests ordered at today's visit but not completed yet may show as past due  Also, please note that scanned in results may not display below  Preventive Screenings:  Service Recommendations Previous Testing/Comments   Colorectal Cancer Screening  * Colonoscopy    * Fecal Occult Blood Test (FOBT)/Fecal Immunochemical Test (FIT)  * Fecal DNA/Cologuard Test  * Flexible Sigmoidoscopy Age: 39-70 years old   Colonoscopy: every 10 years (may be performed more frequently if at higher risk)  OR  FOBT/FIT: every 1 year  OR  Cologuard: every 3 years  OR  Sigmoidoscopy: every 5 years  Screening may be recommended earlier than age 39 if at higher risk for colorectal cancer  Also, an individualized decision between you and your healthcare provider will decide whether screening between the ages of 74-80 would be appropriate   Colonoscopy: 01/07/2015  FOBT/FIT: Not on file  Cologuard: Not on file  Sigmoidoscopy: Not on file    Screening Current     Breast Cancer Screening Age: 36 years old  Frequency: every 1-2 years  Not required if history of left and right mastectomy Mammogram: 11/12/2021    History Breast Cancer   Cervical Cancer Screening Between the ages of 18-28, pap smear recommended once every 3 years  Between the ages of 33-67, can perform pap smear with HPV co-testing every 5 years  Recommendations may differ for women with a history of total hysterectomy, cervical cancer, or abnormal pap smears in past  Pap Smear: 06/28/2022    Screening Not Indicated   Hepatitis C Screening Once for adults born between 1945 and 1965  More frequently in patients at high risk for Hepatitis C Hep C Antibody: Not on file    Risks and Benefits Discussed  Patient Declines   Diabetes Screening 1-2 times per year if you're at risk for diabetes or have pre-diabetes Fasting glucose: 194 mg/dL (3/4/2022)  A1C: 14 9 (8/10/2022)  Screening Not Indicated  History Diabetes   Cholesterol Screening Once every 5 years if you don't have a lipid disorder  May order more often based on risk factors  Lipid panel: 03/09/2021    Screening Not Indicated  History Lipid Disorder     Other Preventive Screenings Covered by Medicare:  1  Abdominal Aortic Aneurysm (AAA) Screening: covered once if your at risk  You're considered to be at risk if you have a family history of AAA  2  Lung Cancer Screening: covers low dose CT scan once per year if you meet all of the following conditions: (1) Age 50-69; (2) No signs or symptoms of lung cancer; (3) Current smoker or have quit smoking within the last 15 years; (4) You have a tobacco smoking history of at least 20 pack years (packs per day multiplied by number of years you smoked); (5) You get a written order from a healthcare provider  3  Glaucoma Screening: covered annually if you're considered high risk: (1) You have diabetes OR (2) Family history of glaucoma OR (3)  aged 48 and older OR (3)  American aged 72 and older  3   Osteoporosis Screening: covered every 2 years if you meet one of the following conditions: (1) You're estrogen deficient and at risk for osteoporosis based off medical history and other findings; (2) Have a vertebral abnormality; (3) On glucocorticoid therapy for more than 3 months; (4) Have primary hyperparathyroidism; (5) On osteoporosis medications and need to assess response to drug therapy  · Last bone density test (DXA Scan): 03/17/2021   5  HIV Screening: covered annually if you're between the age of 15-65  Also covered annually if you are younger than 13 and older than 72 with risk factors for HIV infection  For pregnant patients, it is covered up to 3 times per pregnancy  Immunizations:  Immunization Recommendations   Influenza Vaccine Annual influenza vaccination during flu season is recommended for all persons aged >= 6 months who do not have contraindications   Pneumococcal Vaccine   * Pneumococcal conjugate vaccine = PCV13 (Prevnar 13), PCV15 (Vaxneuvance), PCV20 (Prevnar 20)  * Pneumococcal polysaccharide vaccine = PPSV23 (Pneumovax) Adults 25-60 years old: 1-3 doses may be recommended based on certain risk factors  Adults 72 years old: 1-2 doses may be recommended based off what pneumonia vaccine you previously received   Hepatitis B Vaccine 3 dose series if at intermediate or high risk (ex: diabetes, end stage renal disease, liver disease)   Tetanus (Td) Vaccine - COST NOT COVERED BY MEDICARE PART B Following completion of primary series, a booster dose should be given every 10 years to maintain immunity against tetanus  Td may also be given as tetanus wound prophylaxis  Tdap Vaccine - COST NOT COVERED BY MEDICARE PART B Recommended at least once for all adults  For pregnant patients, recommended with each pregnancy     Shingles Vaccine (Shingrix) - COST NOT COVERED BY MEDICARE PART B  2 shot series recommended in those aged 48 and above     Health Maintenance Due:      Topic Date Due    Hepatitis C Screening  Never done    Breast Cancer Screening: Mammogram  11/12/2022    Colorectal Cancer Screening  01/07/2025 Immunizations Due:      Topic Date Due    COVID-19 Vaccine (4 - Booster for Moderna series) 03/09/2022    Influenza Vaccine (1) 09/01/2022     Advance Directives   What are advance directives? Advance directives are legal documents that state your wishes and plans for medical care  These plans are made ahead of time in case you lose your ability to make decisions for yourself  Advance directives can apply to any medical decision, such as the treatments you want, and if you want to donate organs  What are the types of advance directives? There are many types of advance directives, and each state has rules about how to use them  You may choose a combination of any of the following:  · Living will: This is a written record of the treatment you want  You can also choose which treatments you do not want, which to limit, and which to stop at a certain time  This includes surgery, medicine, IV fluid, and tube feedings  · Durable power of  for healthcare [facility]): This is a written record that states who you want to make healthcare choices for you when you are unable to make them for yourself  This person, called a proxy, is usually a family member or a friend  You may choose more than 1 proxy  · Do not resuscitate (DNR) order:  A DNR order is used in case your heart stops beating or you stop breathing  It is a request not to have certain forms of treatment, such as CPR  A DNR order may be included in other types of advance directives  · Medical directive: This covers the care that you want if you are in a coma, near death, or unable to make decisions for yourself  You can list the treatments you want for each condition  Treatment may include pain medicine, surgery, blood transfusions, dialysis, IV or tube feedings, and a ventilator (breathing machine)  · Values history: This document has questions about your views, beliefs, and how you feel and think about life   This information can help others choose the care that you would choose  Why are advance directives important? An advance directive helps you control your care  Although spoken wishes may be used, it is better to have your wishes written down  Spoken wishes can be misunderstood, or not followed  Treatments may be given even if you do not want them  An advance directive may make it easier for your family to make difficult choices about your care  Cigarette Smoking and Your Health   Risks to your health if you smoke:  Nicotine and other chemicals found in tobacco damage every cell in your body  Even if you are a light smoker, you have an increased risk for cancer, heart disease, and lung disease  If you are pregnant or have diabetes, smoking increases your risk for complications  Benefits to your health if you stop smoking:   · You decrease respiratory symptoms such as coughing, wheezing, and shortness of breath  · You reduce your risk for cancers of the lung, mouth, throat, kidney, bladder, pancreas, stomach, and cervix  If you already have cancer, you increase the benefits of chemotherapy  You also reduce your risk for cancer returning or a second cancer from developing  · You reduce your risk for heart disease, blood clots, heart attack, and stroke  · You reduce your risk for lung infections, and diseases such as pneumonia, asthma, chronic bronchitis, and emphysema  · Your circulation improves  More oxygen can be delivered to your body  If you have diabetes, you lower your risk for complications, such as kidney, artery, and eye diseases  You also lower your risk for nerve damage  Nerve damage can lead to amputations, poor vision, and blindness  · You improve your body's ability to heal and to fight infections  For more information and support to stop smoking:   · [employer]  Phone: 4- 361 [phone]  Web Address: [employer]  [address] [place] [address] 2018 Information is for End User's use only and may not be sold, redistributed or otherwise used for commercial purposes   All illustrations and images included in CareNotes® are the copyrighted property of A D A M , Inc  or [facility] Rigoberto Cloud

## 2022-08-11 NOTE — PROGRESS NOTES
Assessment and Plan:     Problem List Items Addressed This Visit        Active Problems    Hyperlipidemia LDL goal <100    Relevant Medications    atorvastatin (LIPITOR) 40 mg tablet    Other Relevant Orders    Lipid Panel with Direct LDL reflex    Medicare annual wellness visit, subsequent - Primary    Coronary artery disease involving native coronary artery of native heart without angina pectoris    Chronic combined systolic and diastolic congestive heart failure (HCC)    Hypertensive heart and kidney disease with heart failure and with chronic kidney disease stage III (HCC)    Relevant Medications    furosemide (LASIX) 20 mg tablet    HTN (hypertension)    Relevant Medications    furosemide (LASIX) 20 mg tablet    Type 2 diabetes mellitus with stage 2 chronic kidney disease, without long-term current use of insulin (HCC)    Relevant Medications    furosemide (LASIX) 20 mg tablet    metFORMIN (GLUCOPHAGE) 1000 MG tablet    Empagliflozin (Jardiance) 10 MG TABS    Other Relevant Orders    POCT hemoglobin A1c (Completed)    Chronic diastolic congestive heart failure (HCC)    Relevant Medications    furosemide (LASIX) 20 mg tablet    Smoking    Diabetes mellitus (HCC)    Relevant Medications    metFORMIN (GLUCOPHAGE) 1000 MG tablet    Empagliflozin (Jardiance) 10 MG TABS    Other Relevant Orders    POCT hemoglobin A1c (Completed)    Comprehensive metabolic panel    Ambulatory referral to Endocrinology    Heart failure, left, with LVEF <=30% ([facility] )           Preventive health issues were discussed with patient, and age appropriate screening tests were ordered as noted in patient's After Visit Summary  Personalized health advice and appropriate referrals for health education or preventive services given if needed, as noted in patient's After Visit Summary       History of Present Illness:     Patient presents for a Medicare Wellness Visit    HPI   Patient Care Team:  Jose Gutierrez DO as PCP - General  Michelle Love Antonia Finney MD (Gastroenterology)  Hanna Zamora MD (Endocrinology)     Review of Systems:     Review of Systems     Problem List:     Patient Active Problem List   Diagnosis    History of left breast cancer    Arthropathy    Hypertensive heart and kidney disease with heart failure and with chronic kidney disease stage III ([facility] )    Colon, diverticulosis    Hyperlipidemia LDL goal <100    Type 2 diabetes mellitus with stage 2 chronic kidney disease, without long-term current use of insulin ([facility] )    Screening for cardiovascular, respiratory, and genitourinary diseases    Immunization due    Breast cancer screening    Colon cancer screening    Menopause    Gastroesophageal reflux disease    Microalbuminuria    Medicare annual wellness visit, subsequent    Obstructive sleep apnea syndrome    Hypercalcemia    CKD stage 2 due to [facility] mellitus [facility]    Hyperparathyroidism ([facility] )    Coronary artery disease involving native coronary artery of native heart without angina pectoris    Chronic combined systolic and diastolic congestive heart failure (HCC)    Vitamin D deficiency    S/P left mastectomy    Breast cancer ([facility] ) - left 1994    HTN (hypertension)    Smoking    Back pain    Cough    Chronic diastolic congestive heart failure (HCC)    Bilateral leg edema    Breast cancer screening by mammogram    NICM (nonischemic cardiomyopathy) ([facility] )    Peripheral arterial disease ([facility] )    Trigger finger of right thumb    Venous insufficiency of both lower extremities    Diabetes mellitus ([facility] )    Heart failure, left, with LVEF <=30% ([facility] )      Past Medical and Surgical History:     Past Medical History:   Diagnosis Date    [facility]     left - 1994      Diabetes mellitus ([facility] [address] )     Diabetes mellitus, type 2 [facility] 03/15/2006    last assessed 7/10/13    GERD (gastroesophageal reflux disease)     History of chemotherapy     Hypertension      Past Surgical History:   Procedure Laterality Date    CARDIAC ELECTROPHYSIOLOGY PROCEDURE N/A 3/9/2022    Procedure: Cardiac icd implant/ DUAL CHAMBER ICD; Surgeon: Marek Ramirez MD;  [facility]; Service: Cardiology    MASTECTOMY Left     last assessed 12/16/14    MASTECTOMY, RADICAL Left     1994    MO EXPLORE PARATHYROID GLANDS N/A 4/21/2021    Procedure: PARATHYROIDECTOMY POSSIBLE 4 GLAND EXPLORATION;  Surgeon: Samir Wilkerson MD;  [facility];  Service: ENT    REDUCTION MAMMAPLASTY Right     UVULECTOMY        Family History:     Family History   Problem Relation Age of Onset    Hypothyroidism Mother     Leukemia Mother     Diabetes Father     Diabetes type II Father     Stroke Sister     Diabetes Paternal Grandmother     Cancer Sister     Diabetes Brother       Social History:     Social History     Socioeconomic History    Marital status: Single     Spouse name: None    Number of children: None    Years of education: None    Highest education level: None   Occupational History    None   Tobacco Use    Smoking status: Current Every Day Smoker     Packs/day: 0 25    Smokeless tobacco: Never Used    Tobacco comment: 3 cigarettes daily    Vaping Use    Vaping Use: Never used   Substance and Sexual Activity    Alcohol use: Not Currently     Comment: Social    Drug use: No    Sexual activity: Yes     Partners: Male   Other Topics Concern    None   Social History Narrative    None     Social Determinants of Health     Financial Resource Strain: Low Risk     Difficulty of Paying Living Expenses: Not very hard   Food Insecurity: Not on file   Transportation Needs: No Transportation Needs    Lack of Transportation (Medical): No    Lack of Transportation (Non-Medical):  No   Physical Activity: Not on file   Stress: Not on file   Social Connections: Not on file   Intimate Partner Violence: Not on file   Housing Stability: Not on file      Medications and Allergies:     Current Outpatient Medications Medication Sig Dispense Refill    aspirin (ECOTRIN LOW STRENGTH) 81 mg EC tablet Take 1 tablet (81 mg total) by mouth daily 90 tablet 3    atorvastatin (LIPITOR) 40 mg tablet Take 1 tablet (40 mg total) by mouth daily 90 tablet 1    Empagliflozin (Jardiance) 10 MG TABS Take 1 tablet (10 mg total) by mouth every morning 90 tablet 1    furosemide (LASIX) 20 mg tablet TAKE 1 TABLET DAILY ON MONDAYS, WEDNESDAYS, AND FRIDAYS 45 tablet 1    metFORMIN (GLUCOPHAGE) 1000 MG tablet Take 1 tablet (1,000 mg total) by mouth 2 (two) times a day with meals 180 tablet 1    metoprolol succinate (TOPROL-XL) 50 mg 24 hr tablet Take 1 tablet (50 mg total) by mouth 2 (two) times a day 180 tablet 3    nystatin-triamcinolone (MYCOLOG-II) ointment Apply topically 2 (two) times a day 30 g 1    sacubitril-valsartan (Entresto) 49-51 MG TABS Take 1 tablet by mouth 2 (two) times a day 30 tablet 3    nicotine (NICODERM CQ) 14 mg/24hr TD 24 hr patch Place 1 patch on the skin every 24 hours (Patient not taking: Reported on 8/10/2022) 28 patch 3     No current facility-administered medications for this visit       No Known Allergies   Immunizations:     Immunization History   Administered Date(s) Administered    COVID-19 MODERNA VACC 0 25 ML IM BOOSTER 11/09/2021    COVID-19 MODERNA VACC 0 5 ML IM 01/11/2021, 02/08/2021    DTaP / HiB 03/15/2006    H1N1, All Formulations 11/05/2009    Influenza Quadrivalent Preservative Free 3 years and older IM 09/29/2014    Influenza Quadrivalent, 6-35 Months IM 09/10/2015, 10/04/2016    Influenza, high dose seasonal 0 7 mL 10/24/2018, 10/14/2021    Influenza, injectable, quadrivalent, preservative free 0 5 mL 09/18/2020    Influenza, seasonal, injectable 10/25/2006, 10/17/2007, 01/06/2012, 09/28/2012, 10/22/2013    Pneumococcal Conjugate 13-Valent 10/24/2018    Pneumococcal Polysaccharide PPV23 09/18/2020    Tuberculin Skin Test-PPD Intradermal 06/23/2020, 07/07/2020, 11/01/2021      Health Maintenance:         Topic Date Due    Hepatitis C Screening  Never done    Breast Cancer Screening: Mammogram  11/12/2022    Colorectal Cancer Screening  01/07/2025         Topic Date Due    COVID-19 Vaccine (4 - Booster for Moderna series) 03/09/2022    Influenza Vaccine (1) 09/01/2022      Medicare Screening Tests and Risk Assessments:     Chan Mason is here for her Subsequent Wellness visit  Last Medicare Wellness visit information reviewed, patient interviewed and updates made to the record today  Health Risk Assessment:   Patient rates overall health as fair  Patient feels that their physical health rating is same  Patient is satisfied with their life  Eyesight was rated as same  Hearing was rated as same  Patient feels that their emotional and mental health rating is much better  Patients states they are sometimes angry  Patient states they are often unusually tired/fatigued  Pain experienced in the last 7 days has been none  Patient states that she has experienced no weight loss or gain in last 6 months  Fall Risk Screening: In the past year, patient has experienced: no history of falling in past year      Urinary Incontinence Screening:   Patient has not leaked urine accidently in the last six months  Home Safety:  Patient does not have trouble with stairs inside or outside of their home  Patient has no working smoke alarms and has no working carbon monoxide detector  Home safety hazards include: none  Nutrition:   Current diet is Unhealthy  Medications:   Patient is currently taking over-the-counter supplements  OTC medications include: see medication list  Patient is able to manage medications  Activities of Daily Living (ADLs)/Instrumental Activities of Daily Living (IADLs):   Walk and transfer into and out of bed and chair?: Yes  Dress and groom yourself?: Yes    Bathe or shower yourself?: Yes    Feed yourself?  Yes  Do your laundry/housekeeping?: Yes  Manage your money, pay your bills and track your expenses?: Yes  Make your own meals?: Yes    Do your own shopping?: Yes    Previous Hospitalizations:   Any hospitalizations or ED visits within the last 12 months?: No      Advance Care Planning:   Living will: No    Durable POA for healthcare: No    End of Life Decisions reviewed with patient: No      Cognitive Screening:   Provider or family/friend/caregiver concerned regarding cognition?: No    PREVENTIVE SCREENINGS      Cardiovascular Screening:    General: Screening Not Indicated and History Lipid Disorder      Diabetes Screening:     General: Screening Not Indicated and History Diabetes      Colorectal Cancer Screening:     General: Screening Current      Breast Cancer Screening:     General: History Breast Cancer      Cervical Cancer Screening:    General: Screening Not Indicated      Osteoporosis Screening:    General: Risks and Benefits Discussed      Abdominal Aortic Aneurysm (AAA) Screening:        General: Screening Not Indicated      Lung Cancer Screening:     General: Screening Not Indicated      Hepatitis C Screening:    General: Risks and Benefits Discussed and Patient Declines    Screening, Brief Intervention, and Referral to Treatment (SBIRT)    Screening  Typical number of drinks in a day: 0  Typical number of drinks in a week: 0  Interpretation: Low risk drinking behavior  Single Item Drug Screening:  How often have you used an illegal drug (including marijuana) or a prescription medication for non-medical reasons in the past year? never    Single Item Drug Screen Score: 0  Interpretation: Negative screen for possible drug use disorder    Other Counseling Topics:   Car/seat belt/driving safety and regular weightbearing exercise       No exam data present     Physical Exam:     /84   Pulse 81   Temp 97 5 °F (36 4 °C)   Resp 16   Ht 5' 2 5" (1 588 m)   Wt 54 9 kg (121 lb)   LMP  (LMP Unknown)   SpO2 99%   BMI 21 78 kg/m²     Physical Exam     Ugarte Katyh Lennox, DO

## 2022-08-12 DIAGNOSIS — I50.42 CHRONIC COMBINED SYSTOLIC AND DIASTOLIC CONGESTIVE HEART FAILURE (HCC): ICD-10-CM

## 2022-08-12 DIAGNOSIS — I42.8 NICM (NONISCHEMIC CARDIOMYOPATHY) (HCC): ICD-10-CM

## 2022-08-12 RX ORDER — SACUBITRIL AND VALSARTAN 49; 51 MG/1; MG/1
TABLET, FILM COATED ORAL
Qty: 30 TABLET | Refills: 3 | Status: SHIPPED | OUTPATIENT
Start: 2022-08-12

## 2022-08-23 ENCOUNTER — HOSPITAL ENCOUNTER (EMERGENCY)
Facility: HOSPITAL | Age: 67
Discharge: HOME | End: 2022-08-23
Attending: EMERGENCY MEDICINE
Payer: COMMERCIAL

## 2022-08-23 ENCOUNTER — APPOINTMENT (OUTPATIENT)
Dept: RADIOLOGY | Facility: HOSPITAL | Age: 67
End: 2022-08-23
Payer: COMMERCIAL

## 2022-08-23 VITALS
WEIGHT: 115.3 LBS | DIASTOLIC BLOOD PRESSURE: 97 MMHG | HEART RATE: 88 BPM | OXYGEN SATURATION: 97 % | RESPIRATION RATE: 17 BRPM | BODY MASS INDEX: 20.75 KG/M2 | TEMPERATURE: 98.4 F | SYSTOLIC BLOOD PRESSURE: 181 MMHG

## 2022-08-23 DIAGNOSIS — U07.1 COVID-19: Primary | ICD-10-CM

## 2022-08-23 DIAGNOSIS — E87.6 HYPOKALEMIA: ICD-10-CM

## 2022-08-23 DIAGNOSIS — R53.81 MALAISE AND FATIGUE: ICD-10-CM

## 2022-08-23 DIAGNOSIS — R53.83 MALAISE AND FATIGUE: ICD-10-CM

## 2022-08-23 PROCEDURE — 93005 ELECTROCARDIOGRAM TRACING: CPT

## 2022-08-23 PROCEDURE — 71045 X-RAY EXAM CHEST 1 VIEW: CPT

## 2022-08-23 PROCEDURE — 99285 EMERGENCY DEPT VISIT HI MDM: CPT | Performed by: EMERGENCY MEDICINE

## 2022-08-23 PROCEDURE — 99283 EMERGENCY DEPT VISIT LOW MDM: CPT

## 2022-08-23 RX ORDER — MELATONIN
2000 DAILY
Qty: 10 TABLET | Refills: 0 | Status: SHIPPED | OUTPATIENT
Start: 2022-08-23

## 2022-08-23 RX ORDER — POTASSIUM CHLORIDE 20 MEQ/1
40 TABLET, EXTENDED RELEASE ORAL ONCE
Status: COMPLETED | OUTPATIENT
Start: 2022-08-23 | End: 2022-08-23

## 2022-08-23 RX ORDER — BUDESONIDE 180 UG/1
2 AEROSOL, POWDER RESPIRATORY (INHALATION) 2 TIMES DAILY
Qty: 1 EACH | Refills: 0 | Status: SHIPPED | OUTPATIENT
Start: 2022-08-23 | End: 2022-09-06

## 2022-08-23 RX ADMIN — POTASSIUM CHLORIDE 40 MEQ: 1500 TABLET, EXTENDED RELEASE ORAL at 22:06

## 2022-08-24 ENCOUNTER — TELEPHONE (OUTPATIENT)
Dept: FAMILY MEDICINE CLINIC | Facility: CLINIC | Age: 67
End: 2022-08-24

## 2022-08-24 ENCOUNTER — TELEMEDICINE (OUTPATIENT)
Dept: FAMILY MEDICINE CLINIC | Facility: CLINIC | Age: 67
End: 2022-08-24
Payer: COMMERCIAL

## 2022-08-24 DIAGNOSIS — R05.9 COUGH: ICD-10-CM

## 2022-08-24 DIAGNOSIS — E78.5 HYPERLIPIDEMIA LDL GOAL <100: ICD-10-CM

## 2022-08-24 DIAGNOSIS — I50.42 CHRONIC COMBINED SYSTOLIC AND DIASTOLIC CONGESTIVE HEART FAILURE (HCC): ICD-10-CM

## 2022-08-24 DIAGNOSIS — U07.1 COVID-19: Primary | ICD-10-CM

## 2022-08-24 DIAGNOSIS — U07.1 COVID-19: ICD-10-CM

## 2022-08-24 DIAGNOSIS — E11.29 TYPE 2 DIABETES MELLITUS WITH OTHER DIABETIC KIDNEY COMPLICATION, WITHOUT LONG-TERM CURRENT USE OF INSULIN (HCC): ICD-10-CM

## 2022-08-24 LAB
ATRIAL RATE: 94 BPM
P AXIS: 63 DEGREES
PR INTERVAL: 148 MS
QRS AXIS: -64 DEGREES
QRSD INTERVAL: 136 MS
QT INTERVAL: 418 MS
QTC INTERVAL: 522 MS
T WAVE AXIS: 99 DEGREES
VENTRICULAR RATE: 94 BPM

## 2022-08-24 PROCEDURE — 93010 ELECTROCARDIOGRAM REPORT: CPT | Performed by: INTERNAL MEDICINE

## 2022-08-24 PROCEDURE — 99214 OFFICE O/P EST MOD 30 MIN: CPT | Performed by: FAMILY MEDICINE

## 2022-08-24 PROCEDURE — 1160F RVW MEDS BY RX/DR IN RCRD: CPT | Performed by: FAMILY MEDICINE

## 2022-08-24 RX ORDER — ALBUTEROL SULFATE 90 UG/1
2 AEROSOL, METERED RESPIRATORY (INHALATION) EVERY 6 HOURS PRN
Qty: 18 G | Refills: 1 | Status: SHIPPED | OUTPATIENT
Start: 2022-08-24 | End: 2022-08-24

## 2022-08-24 RX ORDER — ALBUTEROL SULFATE 90 UG/1
2 AEROSOL, METERED RESPIRATORY (INHALATION) EVERY 6 HOURS PRN
Qty: 18 G | Refills: 1 | Status: SHIPPED | OUTPATIENT
Start: 2022-08-24

## 2022-08-24 NOTE — ED NOTES
Pt's O2 dropped from 94% to 88% while ambulating  Reports she did not feel any SOB       Alvaro Pearce  08/23/22 [id number]

## 2022-08-24 NOTE — PROGRESS NOTES
Virtual Regular Visit    Verification of patient location:    Patient is located in the following state in which I hold an active license [place]      Assessment/Plan:    Problem List Items Addressed This Visit        Active Problems    Hyperlipidemia LDL goal <100    Chronic combined systolic and diastolic congestive heart failure (HCC)    Cough    Diabetes mellitus ([facility] )    COVID-19 - Primary        mucinex DM suggested  Cont vit D  Cont pulmicort as ordered  GUY prn sent  Due to 10th day of symptoms, not a candidate for paxlovid or mab  Monitor respiratory status  F/u if no better, ER if worse sob    withold oral prednisone due to uncontrolled DM2  Pt reminded to [facility] endocrinology appt    chf hx  cxr noted  Monitor weight    hld stable on statin    CDC quarantine guidelines were discussed/advised  Lab Results   Component Value Date    HGBA1C 14 9 (A) 08/10/2022    HGBA1C 9 6 (H) 10/14/2021    HGBA1C 7 6 (H) 03/09/2021     Lab Results   Component Value Date    GLUF 194 (H) 03/04/2022    LDLCALC 65 03/09/2021    CREATININE 1 01 03/04/2022         Reason for visit is covid  Chief Complaint   Patient presents with    Virtual Regular Visit        Encounter provider Suzanne Dillard DO    Provider located at [address]  [address] [place] [address]  [place] [address] [place] [id number]      Recent Visits  No visits were found meeting these conditions  Showing recent visits within past 7 days and meeting all other requirements  Today's Visits  Date Type Provider Dept   08/24/22 960 Twan [facility], [address] [place] [address] today's visits and meeting all other requirements  Future Appointments  No visits were found meeting these conditions  Showing future appointments within next 150 days and meeting all other requirements       The patient was identified by name and date of birth   Yanely Mike was informed that this is a telemedicine visit and that the visit is being conducted through [address] [employer] and patient was informed that this is a secure, HIPAA-compliant platform  She agrees to proceed     My office door was closed  No one else was in the room  She acknowledged consent and understanding of privacy and security of the video platform  The patient has agreed to participate and understands they can discontinue the visit at any time  Patient is aware this is a billable service  Amarjit Hernandez is a 69 y o  female with covid positive   HPI   [facility] attempted  ER last pm  [facility], [facility], rapid test covid positive  Symptoms over 1 week ago, about 10d ago, started with myalgias  cxr no pneumonia  No fever  Feeling better today  Has phlegm, thick white  No otc taken  Not smoking lately  Has pulmicort inhaler      Past Medical History:   Diagnosis Date    Breast cancer ([facility] [address] )     left - 1994   Diabetes mellitus ([facility] [address] )     Diabetes mellitus, type 2 ([facility] [address] ) 03/15/2006    last assessed 7/10/13    GERD (gastroesophageal reflux disease)     History of chemotherapy     Hypertension        Past Surgical History:   Procedure Laterality Date    CARDIAC ELECTROPHYSIOLOGY PROCEDURE N/A 3/9/2022    Procedure: Cardiac icd implant/ DUAL CHAMBER ICD; Surgeon: Easton Goldman MD;  [facility];   Service: Cardiology    MASTECTOMY Left     last assessed 12/16/14    MASTECTOMY, RADICAL Left     1994    [place] EXPLORE PARATHYROID GLANDS N/A 4/21/2021    Procedure: PARATHYROIDECTOMY POSSIBLE 4 GLAND EXPLORATION;  Surgeon: Gricelda Gallegos MD;  Location: [facility] Main [facility];  Service: ENT    REDUCTION MAMMAPLASTY Right     UVULECTOMY         Current Outpatient Medications   Medication Sig Dispense Refill    albuterol (Ventolin HFA) 90 mcg/act inhaler Inhale 2 puffs every 6 (six) hours as needed for wheezing (rinse mouth after use) 18 g 1    aspirin (ECOTRIN LOW STRENGTH) 81 mg EC tablet Take 1 tablet (81 mg total) by mouth daily 90 tablet 3    atorvastatin (LIPITOR) 40 mg tablet Take 1 tablet (40 mg total) by mouth daily 90 tablet 1    budesonide (Pulmicort Flexhaler) 180 MCG/ACT inhaler Inhale 2 puffs 2 (two) times a day for 14 days Rinse mouth after use  1 each 0    cholecalciferol (VITAMIN D3) 1,000 units tablet Take 2 tablets (2,000 Units total) by mouth daily 10 tablet 0    Empagliflozin (Jardiance) 10 MG TABS Take 1 tablet (10 mg total) by mouth every morning 90 tablet 1    Entresto 49-51 MG TABS TAKE 1 TABLET BY MOUTH TWICE A DAY 30 tablet 3    furosemide (LASIX) 20 mg tablet TAKE 1 TABLET DAILY ON MONDAYS, WEDNESDAYS, AND FRIDAYS 45 tablet 1    metFORMIN (GLUCOPHAGE) 1000 MG tablet Take 1 tablet (1,000 mg total) by mouth 2 (two) times a day with meals 180 tablet 1    metoprolol succinate (TOPROL-XL) 50 mg 24 hr tablet Take 1 tablet (50 mg total) by mouth 2 (two) times a day 180 tablet 3    nicotine (NICODERM CQ) 14 mg/24hr TD 24 hr patch Place 1 patch on the skin every 24 hours (Patient not taking: Reported on 8/10/2022) 28 patch 3    nystatin-triamcinolone (MYCOLOG-II) ointment Apply topically 2 (two) times a day 30 g 1     No current facility-administered medications for this visit  No Known Allergies    Review of Systems   Constitutional: Negative for chills and fever  Respiratory: Positive for shortness of breath  Video Exam    There were no vitals filed for this visit  Physical Exam   It was my intent to perform this visit via video technology but the patient was not able to do a video connection so the visit was completed via audio telephone only      I spent 18 minutes directly with the patient during this visit

## 2022-08-24 NOTE — ED PROVIDER NOTES
History  Chief Complaint   Patient presents with    Cough     Pt reports testing Positive for Covid today at [facility]   Pt reports cough with white thick mucous  Pt also reports fatigue  Pt reports eating and drinking adequately  HPI    Prior to Admission Medications   Prescriptions Last Dose Informant Patient Reported? Taking? Empagliflozin (Jardiance) 10 MG TABS   No No   Sig: Take 1 tablet (10 mg total) by mouth every morning   Entresto 49-51 MG TABS   No No   Sig: TAKE 1 TABLET BY MOUTH TWICE A DAY   aspirin (ECOTRIN LOW STRENGTH) 81 mg EC tablet  Self No No   Sig: Take 1 tablet (81 mg total) by mouth daily   atorvastatin (LIPITOR) 40 mg tablet   No No   Sig: Take 1 tablet (40 mg total) by mouth daily   furosemide (LASIX) 20 mg tablet   No No   Sig: TAKE 1 TABLET DAILY ON MONDAYS, WEDNESDAYS, AND FRIDAYS   metFORMIN (GLUCOPHAGE) 1000 MG tablet   No No   Sig: Take 1 tablet (1,000 mg total) by mouth 2 (two) times a day with meals   metoprolol succinate (TOPROL-XL) 50 mg 24 hr tablet  Self No No   Sig: Take 1 tablet (50 mg total) by mouth 2 (two) times a day   nicotine (NICODERM CQ) 14 mg/24hr TD 24 hr patch  Self No No   Sig: Place 1 patch on the skin every 24 hours   Patient not taking: Reported on 8/10/2022   nystatin-triamcinolone (MYCOLOG-II) ointment   No No   Sig: Apply topically 2 (two) times a day      Facility-Administered Medications: None       Past Medical History:   Diagnosis Date    Breast cancer ([facility] )     left - 1994   Diabetes mellitus ([facility] )     Diabetes mellitus, type 2 ([facility] ) 03/15/2006    last assessed 7/10/13    GERD (gastroesophageal reflux disease)     History of chemotherapy     Hypertension        Past Surgical History:   Procedure Laterality Date    CARDIAC ELECTROPHYSIOLOGY PROCEDURE N/A 3/9/2022    Procedure: Cardiac icd implant/ DUAL CHAMBER ICD; Surgeon: Elba Banks MD;  [facility];   Service: Cardiology    MASTECTOMY Left     last assessed 12/16/14    MASTECTOMY, RADICAL Left     1994    [place] EXPLORE PARATHYROID GLANDS N/A 4/21/2021    Procedure: PARATHYROIDECTOMY POSSIBLE 4 GLAND EXPLORATION;  Surgeon: Isai Hill MD;  [facility]: [facility];  Service: ENT    REDUCTION MAMMAPLASTY Right     UVULECTOMY         Family History   Problem Relation Age of Onset    Hypothyroidism Mother     Leukemia Mother     Diabetes Father     Diabetes type II Father     Stroke Sister     Diabetes Paternal Grandmother     Cancer Sister     Diabetes Brother      I have reviewed and agree with the history as documented      E-Cigarette/Vaping    E-Cigarette Use Never User      E-Cigarette/Vaping Substances    Nicotine No     THC No     CBD No     Flavoring No     Other No     Unknown No      Social History     Tobacco Use    Smoking status: Current Every Day Smoker     Packs/day: 0 25    Smokeless tobacco: Never Used    Tobacco comment: 3 cigarettes daily    Vaping Use    Vaping Use: Never used   Substance Use Topics    Alcohol use: Not Currently     Comment: Social    Drug use: No       Review of Systems    Physical Exam  Physical Exam    Vital Signs  ED Triage Vitals [08/23/22 2033]   Temperature Pulse Respirations Blood Pressure SpO2   98 4 °F (36 9 °C) 88 17 (!) 181/97 97 %      Temp Source Heart Rate Source Patient Position - Orthostatic VS BP Location FiO2 (%)   Oral -- -- -- --      Pain Score       No Pain           Vitals:    08/23/22 2033   BP: (!) 181/97   Pulse: 88         Visual Acuity      ED Medications  Medications   potassium chloride (K-DUR,KLOR-CON) CR tablet 40 mEq (40 mEq Oral Given 8/23/22 2206)       Diagnostic Studies  Results Reviewed     None                 XR chest 1 view portable    (Results Pending)              Procedures  ECG 12 Lead Documentation Only    Date/Time: 8/23/2022 10:40 PM  Performed by: Oseas Cortez DO  Authorized by: Oseas Cortez DO     Indications / Diagnosis:  COVID, hypokalemia  ECG reviewed by me, the ED Provider: yes    Patient location:  ED  Previous ECG:     Previous ECG:  Compared to current    Comparison ECG info:  3/22    Similarity:  Changes noted (Previous EKG demonstrates atrial paced rhythm)  Interpretation:     Interpretation: abnormal    Quality:     Tracing quality:  Limited by artifact  Rate:     ECG rate:  78    ECG rate assessment: normal    Rhythm:     Rhythm: sinus rhythm    Ectopy:     Ectopy: none    QRS:     QRS axis:  Left  Conduction:     Conduction: abnormal      Abnormal conduction: complete RBBB    Other findings:     Other findings: LVH with strain               ED Course  ED Course as of 08/23/22 2311   e Aug 23, 2022   2157 Patient's blood work reviewed from patient 1st   She has a white blood cell count of 5 4  Hemoglobin is 16 8  Potassium is 3 1 remainder of her blood work is unremarkable  She did test positive for COVID, negative for influenza a and B  glucose is 336  Patient is diabetic      2305 Patient re-evaluated  Patient was ambulatory and did not have any shortness of breath  She was 95% at rest and did briefly drop down to 88% wall walking at a fast pace but came right back up to 96% again at rest   She denied having any shortness of breath with ambulation  Patient does now states that she believes her symptom onset may have been 7 to 8 days ago which does not make her a candidate for Paxil twice daily  Patient's contact information was sent to the COVID follow-up team for possible monoclonal antibody  MDM    Disposition  Final diagnoses:   None     ED Disposition     None      Follow-up Information    None         Patient's Medications   Discharge Prescriptions    No medications on file       No discharge procedures on file      PDMP Review       Value Time User    PDMP Reviewed  Yes 4/21/2021  0:03 PM Luis Antonio Alberto MD          ED Provider  Electronically Signed by General: Bowel sounds are normal  There is no distension  Palpations: Abdomen is soft  Tenderness: There is no abdominal tenderness  There is no guarding or rebound  Musculoskeletal:         General: No tenderness or deformity  Normal range of motion  Cervical back: Normal range of motion and neck supple  Right lower leg: No edema  Left lower leg: No edema  Lymphadenopathy:      Cervical: No cervical adenopathy  Skin:     General: Skin is warm and dry  Capillary Refill: Capillary refill takes less than 2 seconds  Findings: No rash  Neurological:      General: No focal deficit present  Mental Status: She is alert and oriented to person, place, and time  Cranial Nerves: No cranial nerve deficit  Sensory: No sensory deficit  Motor: No abnormal muscle tone  Coordination: Coordination normal       Gait: Gait normal       Deep Tendon Reflexes: Reflexes are normal and symmetric  Psychiatric:         Behavior: Behavior normal          Thought Content:  Thought content normal          Judgment: Judgment normal          Vital Signs  ED Triage Vitals [08/23/22 2033]   Temperature Pulse Respirations Blood Pressure SpO2   98 4 °F (36 9 °C) 88 17 (!) 181/97 97 %      Temp Source Heart Rate Source Patient Position - Orthostatic VS BP Location FiO2 (%)   Oral -- -- -- --      Pain Score       No Pain           Vitals:    08/23/22 2033   BP: (!) 181/97   Pulse: 88         Visual Acuity      ED Medications  Medications   potassium chloride (K-DUR,KLOR-CON) CR tablet 40 mEq (40 mEq Oral Given 8/23/22 2206)       Diagnostic Studies  Results Reviewed     None                 XR chest 1 view portable   Final Result by Bryon Gann MD (08/24 [id number])      Development of suspected mild vascular congestion                  Workstation performed: [id number]                    Procedures  ECG 12 Lead Documentation Only    Date/Time: 8/23/2022 10:40 PM  Performed by: Gerson Joseph DO  Authorized by: Gerson Joseph DO     Indications / Diagnosis:  COVID, hypokalemia  ECG reviewed by me, the ED Provider: yes    Patient location:  ED  Previous ECG:     Previous ECG:  Compared to current    Comparison ECG info:  3/22    Similarity:  Changes noted (Previous EKG demonstrates atrial paced rhythm)  Interpretation:     Interpretation: abnormal    Quality:     Tracing quality:  Limited by artifact  Rate:     ECG rate:  78    ECG rate assessment: normal    Rhythm:     Rhythm: sinus rhythm    Ectopy:     Ectopy: none    QRS:     QRS axis:  Left  Conduction:     Conduction: abnormal      Abnormal conduction: complete RBBB    Other findings:     Other findings: LVH with strain               ED Course  ED Course as of 08/30/22 1743   Tue Aug 23, 2022   2157 Patient's blood work reviewed from patient 1st   She has a white blood cell count of 5 4  Hemoglobin is 16 8  Potassium is 3 1 remainder of her blood work is unremarkable  She did test positive for COVID, negative for influenza a and B  glucose is 336  Patient is diabetic      2305 Patient re-evaluated  Patient was ambulatory and did not have any shortness of breath  She was 95% at rest and did briefly drop down to 88% wall walking at a fast pace but came right back up to 96% again at rest   She denied having any shortness of breath with ambulation  Patient does now states that she believes her symptom onset may have been 7 to 8 days ago which does not make her a candidate for Paxil twice daily  Patient's contact information was sent to the COVID follow-up team for possible monoclonal antibody     correction to above should read paxlovid                                          MDM  Number of Diagnoses or Management Options  COVID-19: new and requires workup  Hypokalemia: new and requires workup  Malaise and fatigue: new and requires workup     Amount and/or Complexity of Data Reviewed  Clinical lab tests: reviewed  Tests in the radiology section of CPT®: ordered and reviewed  Tests in the medicine section of CPT®: ordered and reviewed  Decide to obtain previous medical records or to obtain history from someone other than the patient: yes  Independent visualization of images, tracings, or specimens: yes    Risk of Complications, Morbidity, and/or Mortality  General comments: 47-year-old female presents from urgent care after having a positive COVID test   Patient is nontoxic in appearance  Her vital signs are stable and she was able to ambulate without difficulty  Patient does not qualify for Paxlovid due to her symptoms starting more than 5 days ago  Patient's information was sent to COVID response team for follow-up  We discussed signs and symptoms return to the emergency department  Patient Progress  Patient progress: stable      Disposition  Final diagnoses:   COVID-19   Hypokalemia   Malaise and fatigue     Time reflects when diagnosis was documented in both MDM as applicable and the Disposition within this note     Time User Action Codes Description Comment    8/23/2022 10:53 PM Sophia Rose [U07 1] COVID-19     8/23/2022 10:53 PM Sophia Rose [E87 6] Hypokalemia     8/23/2022 10:56 PM Sophia Roes Add [R53 81,  R53 83] Malaise and fatigue       ED Disposition     ED Disposition   Discharge    Condition   Stable    Date/Time   Tue Aug 23, 2022 10:53 PM    Comment   Gabriel Schaffer discharge to home/self care                 Follow-up Information    None         Discharge Medication List as of 8/23/2022 11:08 PM      START taking these medications    Details   budesonide (Pulmicort Flexhaler) 180 MCG/ACT inhaler Inhale 2 puffs 2 (two) times a day for 14 days Rinse mouth after use , Starting Tue 8/23/2022, Until Tue 9/6/2022, Normal      cholecalciferol (VITAMIN D3) 1,000 units tablet Take 2 tablets (2,000 Units total) by mouth daily, Starting Tue 8/23/2022, Normal         CONTINUE these medications which have NOT CHANGED    Details aspirin (ECOTRIN LOW STRENGTH) 81 mg EC tablet Take 1 tablet (81 mg total) by mouth daily, Starting Thu 1/27/2022, Normal      atorvastatin (LIPITOR) 40 mg tablet Take 1 tablet (40 mg total) by mouth daily, Starting Wed 8/10/2022, Until Fri 9/9/2022, Normal      Empagliflozin (Jardiance) 10 MG TABS Take 1 tablet (10 mg total) by mouth every morning, Starting Wed 8/10/2022, Normal      Entresto 49-51 MG TABS TAKE 1 TABLET BY MOUTH TWICE A DAY, Normal      furosemide (LASIX) 20 mg tablet TAKE 1 TABLET DAILY ON MONDAYS, WEDNESDAYS, AND FRIDAYS, Normal      metFORMIN (GLUCOPHAGE) 1000 MG tablet Take 1 tablet (1,000 mg total) by mouth 2 (two) times a day with meals, Starting Wed 8/10/2022, Normal      metoprolol succinate (TOPROL-XL) 50 mg 24 hr tablet Take 1 tablet (50 mg total) by mouth 2 (two) times a day, Starting Wed 2/16/2022, Normal      nicotine (NICODERM CQ) 14 mg/24hr TD 24 hr patch Place 1 patch on the skin every 24 hours, Starting Thu 1/27/2022, Normal      nystatin-triamcinolone (MYCOLOG-II) ointment Apply topically 2 (two) times a day, Starting Fri 5/27/2022, Normal             No discharge procedures on file      PDMP Review       Value Time User    PDMP Reviewed  Yes 4/21/2021  0:10 PM Griselda Colunga MD          ED Provider  Electronically Signed by           Mari Sewell DO  08/30/22 3893

## 2022-08-24 NOTE — DISCHARGE INSTRUCTIONS
Monitor oxygen saturation at home  It is recommended that you return to the emergency department if your oxygen drops below 90%  Ambulation and mobilization is encouraged

## 2022-08-24 NOTE — TELEPHONE ENCOUNTER
Spoke with patient  I told her that she should schedule a follow up appointment with Dr Tasia Oviedo her PCP for further treatment options  Patient voiced understanding and was agreeable to contact him

## 2022-08-24 NOTE — TELEPHONE ENCOUNTER
----- Message from Oseas Cortez DO sent at 8/23/2022 10:58 PM EDT -----  Hello this vaccinated patient has multiple comorbidities  She tested positive for COVID today but has had symptoms for the past 4 days  She has significant drug to drug interactions limiting Paxlovid use  Please contact if she has a monoclonal antibody candidate

## 2022-10-12 PROBLEM — R05.9 COUGH: Status: RESOLVED | Noted: 2021-07-01 | Resolved: 2022-10-12

## 2022-10-13 ENCOUNTER — TELEPHONE (OUTPATIENT)
Dept: CARDIOLOGY CLINIC | Facility: CLINIC | Age: 67
End: 2022-10-13

## 2022-10-13 ENCOUNTER — REMOTE DEVICE CLINIC VISIT (OUTPATIENT)
Dept: CARDIOLOGY CLINIC | Facility: CLINIC | Age: 67
End: 2022-10-13
Payer: COMMERCIAL

## 2022-10-13 DIAGNOSIS — Z95.810 AICD (AUTOMATIC CARDIOVERTER/DEFIBRILLATOR) PRESENT: Primary | ICD-10-CM

## 2022-10-13 PROCEDURE — 93295 DEV INTERROG REMOTE 1/2/MLT: CPT | Performed by: INTERNAL MEDICINE

## 2022-10-13 PROCEDURE — 93296 REM INTERROG EVL PM/IDS: CPT | Performed by: INTERNAL MEDICINE

## 2022-10-13 NOTE — PROGRESS NOTES
Results for orders placed or performed in visit on 10/13/22   Cardiac EP device report    Narrative    MDT DUAL ICD/ ACTIVE SYSTEM IS MRI CONDITIONAL  CARELINK TRANSMISSION: BATTERY VOLTAGE ADEQUATE (10 6 YRS)  AP 3 7%  <0 1% (AAI-DDD 60)  SMALL P WAVE SENSING AMPLITUDE /STABLE (0 5 MV); ALL OTHER AVAILABLE LEAD PARAMETERS WITHIN NORMAL LIMITS  5 DEVICE CLASSIFIED VT-NS EPISODES WITH EGM'S SUGGESTIVE FOR PSVT, MAX DURATION 2 SECS @ AVG RATE 165 -200 BPM  EF 25-30% (1/2022 ECHO)  PATIENT ON ASA 81, METOPROLOL SUCC  OPTI-VOL FLUID THRESHOLD CROSSED SINCE 9/14/22 & ONGOING  PATIENT ALSO TAKES FUROSEMIDE  TASK TO CHF TEAM  RECHECK IN 31 DAYS  NORMAL DEVICE FUNCTION    ES

## 2022-10-13 NOTE — TELEPHONE ENCOUNTER
----- Message from Lorrie Salas sent at 10/13/2022 12:17 PM EDT -----  Regarding: crossed opti-vol  Samia -   Opti-vol fluid threshold crossed 9/14/22 & ongoing  + furosemide with recheck in 31 days (11/15/22)  Thanks, Susannah August TRANSMISSION: BATTERY VOLTAGE ADEQUATE (10 6 YRS)  AP 3 7%  <0 1% (AAI-DDD 60)  SMALL P WAVE SENSING AMPLITUDE /STABLE (0 5 MV); ALL OTHER AVAILABLE LEAD PARAMETERS WITHIN NORMAL LIMITS  5 DEVICE CLASSIFIED VT-NS EPISODES WITH EGM'S SUGGESTIVE FOR PSVT, MAX DURATION 2 SECS @ AVG RATE 165 -200 BPM  EF 25-30% (1/2022 ECHO)  PATIENT ON ASA 81, METOPROLOL SUCC  OPTI-VOL FLUID THRESHOLD CROSSED SINCE 9/14/22 & ONGOING  PATIENT ALSO TAKES FUROSEMIDE  TASK TO CHF TEAM  RECHECK IN 31 DAYS  NORMAL DEVICE FUNCTION    ES

## 2022-10-20 ENCOUNTER — OFFICE VISIT (OUTPATIENT)
Dept: CARDIOLOGY CLINIC | Facility: CLINIC | Age: 67
End: 2022-10-20
Payer: COMMERCIAL

## 2022-10-20 VITALS
SYSTOLIC BLOOD PRESSURE: 162 MMHG | OXYGEN SATURATION: 96 % | DIASTOLIC BLOOD PRESSURE: 92 MMHG | HEIGHT: 63 IN | HEART RATE: 81 BPM | BODY MASS INDEX: 21.4 KG/M2 | WEIGHT: 120.8 LBS

## 2022-10-20 DIAGNOSIS — I42.8 NICM (NONISCHEMIC CARDIOMYOPATHY) (HCC): Primary | ICD-10-CM

## 2022-10-20 DIAGNOSIS — I50.32 CHRONIC DIASTOLIC CONGESTIVE HEART FAILURE (HCC): ICD-10-CM

## 2022-10-20 DIAGNOSIS — N18.30 HYPERTENSIVE HEART AND KIDNEY DISEASE WITH HEART FAILURE AND WITH CHRONIC KIDNEY DISEASE STAGE III (HCC): ICD-10-CM

## 2022-10-20 DIAGNOSIS — I50.42 CHRONIC COMBINED SYSTOLIC AND DIASTOLIC CONGESTIVE HEART FAILURE (HCC): ICD-10-CM

## 2022-10-20 DIAGNOSIS — E78.5 HYPERLIPIDEMIA LDL GOAL <100: ICD-10-CM

## 2022-10-20 DIAGNOSIS — I25.10 CORONARY ARTERY DISEASE INVOLVING NATIVE CORONARY ARTERY OF NATIVE HEART WITHOUT ANGINA PECTORIS: ICD-10-CM

## 2022-10-20 DIAGNOSIS — I13.0 HYPERTENSIVE HEART AND KIDNEY DISEASE WITH HEART FAILURE AND WITH CHRONIC KIDNEY DISEASE STAGE III (HCC): ICD-10-CM

## 2022-10-20 PROCEDURE — 99214 OFFICE O/P EST MOD 30 MIN: CPT | Performed by: INTERNAL MEDICINE

## 2022-10-20 RX ORDER — FUROSEMIDE 40 MG/1
TABLET ORAL
Qty: 30 TABLET | Refills: 3 | Status: SHIPPED | OUTPATIENT
Start: 2022-10-20

## 2022-10-20 NOTE — PROGRESS NOTES
Cardiology Follow Up    Maya Knutson  1955  [id number]  [facility]  [address]    [place] [address] [phone]    1  NICM (nonischemic cardiomyopathy) ([facility] )     2  Hypertensive heart and kidney disease with heart failure and with chronic kidney disease stage III ([facility] )     3  Coronary artery disease involving native coronary artery of native heart without angina pectoris     4  Chronic combined systolic and diastolic congestive heart failure ([facility] )     5  Hyperlipidemia LDL goal <100     6  Chronic diastolic congestive heart failure [facility])       Chief Complaint   Patient presents with   • Follow-up   • Shortness of Breath     On exertion    • Dizziness     vertigo       Interval History: Patient has increased SOB with exertion with some weight gain  No reported chest pain, shortness of breath, palpitations, lightheadedness, syncope, LE edema, orthopnea, PND  Patient remains active without any increased fatigue out of the ordinary           Patient Active Problem List   Diagnosis   • History of left breast cancer   • Arthropathy   • Hypertensive heart and kidney disease with heart failure and with chronic kidney disease stage III ([facility] )   • Colon, diverticulosis   • Hyperlipidemia LDL goal <100   • Type 2 diabetes mellitus with stage 2 chronic kidney disease, without long-term current use of insulin ([facility] )   • Screening for cardiovascular, respiratory, and genitourinary diseases   • Immunization due   • Breast cancer screening   • Colon cancer screening   • Menopause   • Gastroesophageal reflux disease   • Microalbuminuria   • Medicare annual wellness visit, subsequent   • Obstructive sleep apnea syndrome   • Hypercalcemia   • CKD stage 2 due to type 2 diabetes mellitus ([facility] )   • Hyperparathyroidism ([facility] )   • Coronary artery disease involving native coronary artery of native heart without angina pectoris   • Chronic combined systolic and diastolic congestive heart failure (HCC)   • Vitamin D deficiency   • S/P left mastectomy   • Breast cancer ([place] [address] ) - left 1994   • HTN (hypertension)   • Smoking   • Back pain   • Chronic diastolic congestive heart failure (HCC)   • Bilateral leg edema   • Breast cancer screening by mammogram   • NICM (nonischemic cardiomyopathy) ([place] [address] )   • Peripheral arterial disease (HCC)   • Trigger finger of right thumb   • Venous insufficiency of both lower extremities   • Diabetes mellitus ([place] [address] )   • Heart failure, left, with LVEF <=30% ([place] [address] )   • COVID-19     Past Medical History:   Diagnosis Date   • Breast cancer ([place] [address] )     left - 1994  • Diabetes mellitus ([place] [address] )    • Diabetes mellitus, type 2 ([place] [address] ) 03/15/2006    last assessed 7/10/13   • GERD (gastroesophageal reflux disease)    • History of chemotherapy    • Hypertension      Social History     Socioeconomic History   • Marital status: Single     Spouse name: Not on file   • Number of children: Not on file   • Years of education: Not on file   • Highest education level: Not on file   Occupational History   • Not on file   Tobacco Use   • Smoking status: Current Every Day Smoker     Packs/day: 0 50     Types: Cigarettes   • Smokeless tobacco: Never Used   • Tobacco comment: 3 cigarettes daily    Vaping Use   • Vaping Use: Never used   Substance and Sexual Activity   • Alcohol use: Not Currently     Comment: Social   • Drug use: No   • Sexual activity: Yes     Partners: Male   Other Topics Concern   • Not on file   Social History Narrative   • Not on file     Social Determinants of Health     Financial Resource Strain: Low Risk    • Difficulty of Paying Living Expenses: Not very hard   Food Insecurity: Not on file   Transportation Needs: No Transportation Needs   • Lack of Transportation (Medical): No   • Lack of Transportation (Non-Medical):  No   Physical Activity: Not on file   Stress: Not on file   Social Connections: Not on file   Intimate Partner Violence: Not on file   Housing Stability: Not on file      Family History   Problem Relation Age of Onset   • Hypothyroidism Mother    • Leukemia Mother    • Diabetes Father    • Diabetes type II Father    • Stroke Sister    • Diabetes Paternal Grandmother    • Cancer Sister    • Diabetes Brother      Past Surgical History:   Procedure Laterality Date   • CARDIAC ELECTROPHYSIOLOGY PROCEDURE N/A 3/9/2022    Procedure: Cardiac icd implant/ DUAL CHAMBER ICD; Surgeon: Tay Doherty MD;  Location: [facility];   Service: Cardiology   • MASTECTOMY Left     last assessed 12/16/14   • MASTECTOMY, RADICAL Left     1994   • MN EXPLORE PARATHYROID GLANDS N/A 4/21/2021    Procedure: PARATHYROIDECTOMY POSSIBLE 4 GLAND EXPLORATION;  Surgeon: Sergey Jacinto MD;  Location: [facility];  Service: ENT   • REDUCTION MAMMAPLASTY Right    • UVULECTOMY         Current Outpatient Medications:   •  aspirin (ECOTRIN LOW STRENGTH) 81 mg EC tablet, Take 1 tablet (81 mg total) by mouth daily, Disp: 90 tablet, Rfl: 3  •  atorvastatin (LIPITOR) 40 mg tablet, Take 1 tablet (40 mg total) by mouth daily, Disp: 90 tablet, Rfl: 1  •  cholecalciferol (VITAMIN D3) 1,000 units tablet, Take 2 tablets (2,000 Units total) by mouth daily, Disp: 10 tablet, Rfl: 0  •  Entresto 49-51 MG TABS, TAKE 1 TABLET BY MOUTH TWICE A DAY, Disp: 30 tablet, Rfl: 3  •  furosemide (LASIX) 20 mg tablet, TAKE 1 TABLET DAILY ON MONDAYS, WEDNESDAYS, AND FRIDAYS, Disp: 45 tablet, Rfl: 1  •  metFORMIN (GLUCOPHAGE) 1000 MG tablet, Take 1 tablet (1,000 mg total) by mouth 2 (two) times a day with meals, Disp: 180 tablet, Rfl: 1  •  metoprolol succinate (TOPROL-XL) 50 mg 24 hr tablet, Take 1 tablet (50 mg total) by mouth 2 (two) times a day, Disp: 180 tablet, Rfl: 3  •  albuterol (Ventolin HFA) 90 mcg/act inhaler, Inhale 2 puffs every 6 (six) hours as needed for wheezing (rinse mouth after use) (Patient not taking: No sig reported), Disp: 18 g, Rfl: 1  •  budesonide (Pulmicort Flexhaler) 180 MCG/ACT inhaler, Inhale 2 puffs 2 (two) times a day for 14 days Rinse mouth after use  (Patient not taking: Reported on 10/20/2022), Disp: 1 each, Rfl: 0  •  Empagliflozin (Jardiance) 10 MG TABS, Take 1 tablet (10 mg total) by mouth every morning (Patient not taking: No sig reported), Disp: 90 tablet, Rfl: 1  •  Flovent  MCG/ACT inhaler, , Disp: , Rfl:   •  nicotine (NICODERM CQ) 14 mg/24hr TD 24 hr patch, Place 1 patch on the skin every 24 hours (Patient not taking: No sig reported), Disp: 28 patch, Rfl: 3  •  nystatin-triamcinolone (MYCOLOG-II) ointment, Apply topically 2 (two) times a day (Patient not taking: No sig reported), Disp: 30 g, Rfl: 1  No Known Allergies    Labs:  Admission on 08/23/2022, Discharged on 08/23/2022   Component Date Value   • Ventricular Rate 08/23/2022 94    • Atrial Rate 08/23/2022 94    • ID Interval 08/23/2022 148    • QRSD Interval 08/23/2022 136    • QT Interval 08/23/2022 418    • QTC Interval 08/23/2022 522    • P Axis 08/23/2022 63    • QRS Axis 08/23/2022 -59    • T Wave [place] 08/23/2022 99    Office Visit on 08/10/2022   Component Date Value   • Hemoglobin A1C 08/10/2022 14 9 (A)     Lab Results   Component Value Date    CHOL 190 10/07/2016    TRIG 76 03/09/2021    TRIG 100 10/07/2016    HDL 64 03/09/2021    HDL 62 10/07/2016     Imaging: Mammo screening right w 3d & cad    Result Date: 11/16/2021  Narrative: DIAGNOSIS: Breast cancer screening by mammogram TECHNIQUE: Digital screening mammography was performed  Computer Aided Detection (CAD) analyzed all applicable images  COMPARISONS: Prior breast imaging dated: 05/18/2020, 11/06/2018, and 02/23/2016 RELEVANT HISTORY: Family Breast Cancer History: No known family history of breast cancer  Family Medical History: No known relevant family medical history  Personal History: No known relevant hormone history  Surgical history includes breast reduction and mastectomy   Medical history includes breast cancer and history of chemotherapy  The patient is scheduled in a reminder system for screening mammography  8-10% of cancers will be missed on mammography  Management of a palpable abnormality must be based on clinical grounds  Patients will be notified of their results via letter from our facility  Accredited by [employer] of [employer] and FDA  RISK ASSESSMENT: Janene risk assessment reporting was suppressed due to the patient's history and/or demographic factors  TISSUE DENSITY: There are scattered areas of fibroglandular density  INDICATION: Maya Knutson is a 74 y o  female presenting for screening mammography  FINDINGS: There are no suspicious masses, grouped microcalcifications or areas of architectural distortion  The skin and nipple areolar complex are unremarkable  Impression: No mammographic evidence of malignancy  ASSESSMENT/BI-RADS CATEGORY: Right: 1 - Negative Overall: 1 - Negative RECOMMENDATION:      - Routine screening mammogram in 1 year for the right breast  Workstation ID: [id number]       Review of Systems:  Review of Systems   Constitutional: Negative for activity change, appetite change, chills, diaphoresis, fatigue and unexpected weight change  HENT: Negative for hearing loss, nosebleeds and sore throat  Eyes: Negative for photophobia and visual disturbance  Respiratory: Positive for shortness of breath  Negative for cough, chest tightness and wheezing  Cardiovascular: Negative for chest pain, palpitations and leg swelling  Gastrointestinal: Negative for abdominal pain, diarrhea, nausea and vomiting  Endocrine: Negative for polyuria  Genitourinary: Negative for dysuria, frequency and hematuria  Musculoskeletal: Negative for arthralgias, back pain, gait problem and neck pain  Skin: Negative for pallor and rash  Neurological: Negative for dizziness, syncope and headaches  Hematological: Does not bruise/bleed easily     Psychiatric/Behavioral: Negative for behavioral problems and confusion  Physical Exam:  Physical Exam  Vitals reviewed  Constitutional:       Appearance: She is well-developed  She is not diaphoretic  HENT:      Head: Normocephalic and atraumatic  Nose: Nose normal    Eyes:      General: No scleral icterus  Pupils: Pupils are equal, round, and reactive to light  Neck:      Vascular: No JVD  Cardiovascular:      Rate and Rhythm: Normal rate and regular rhythm  Heart sounds: Normal heart sounds  No murmur heard  No friction rub  No gallop  Pulmonary:      Effort: Pulmonary effort is normal  No respiratory distress  Breath sounds: Normal breath sounds  No wheezing or rales  Abdominal:      General: Bowel sounds are normal  There is no distension  Palpations: Abdomen is soft  Tenderness: There is no abdominal tenderness  Musculoskeletal:         General: No deformity  Normal range of motion  Cervical back: Normal range of motion and neck supple  Skin:     General: Skin is warm and dry  Findings: No rash  Neurological:      Mental Status: She is alert and oriented to person, place, and time  Cranial Nerves: No cranial nerve deficit  Psychiatric:         Behavior: Behavior normal        Blood pressure 162/92, pulse 81, height 5' 2 5" (1 588 m), weight 54 8 kg (120 lb 12 8 oz), SpO2 96 %, not currently breastfeeding  EKG:  Normal sinus rhythm  Left axis deviation  Incomplete right bundle branch block  Nonspecific ST and T wave abnormality  Abnormal ECG    Discussion/Summary:  NICM: mild reduction/low normal EF when hospitalized for CHF  Repeat echo revealed significantly reduced EF of 25-30%  Continued on ACE-I and BB for GDMT  Low dose diuretic as maintenance continued at M-W-F schedule  s/p ICD placement and doing well  Most recent device interrogation revealed brief nonsustained SVT episodes and crossing of OptiVol fluid threshold    Increase Lasix to 40 mg until next device check in November  CAD: nonobstructive as per cath in Nov 2020  Continued on ASA, statin, and B-blocker  Eventual repeat ischemic eval, no current symptoms to suggest active disease  Continue on aspirin, statin, beta-blocker  HTN:  Was improving at last visit, changed lisinopril to Entresto at prior visit and tolerating well  Likely with elevated blood pressure today with increased volume  Will increase Lasix to 40 mg daily to help with volume removal and hopefully blood pressure management  HLD: continued on statin  LDL 65 in March 2021  Repeat levels ordered her chart, encouraged to complete  Claudication: fatigue in legs with exertion  We checked arterial duplex revealing significant LE arterial disease - referred to vascular surgery for further management  They recommended continued monitoring and ambulation with continued aspirin and statin

## 2022-11-15 ENCOUNTER — REMOTE DEVICE CLINIC VISIT (OUTPATIENT)
Dept: CARDIOLOGY CLINIC | Facility: CLINIC | Age: 67
End: 2022-11-15

## 2022-11-15 DIAGNOSIS — Z95.810 AICD (AUTOMATIC CARDIOVERTER/DEFIBRILLATOR) PRESENT: Primary | ICD-10-CM

## 2022-11-15 NOTE — PROGRESS NOTES
Results for orders placed or performed in visit on 11/15/22   Cardiac EP device report    Narrative    MDT DUAL ICD/ [address] ONLY: OPTI-VOL FLUID THRESHOLD REMAINS CROSSSED & ONGOING SINCE 9/14/22  CKD III & PATIENT TAKES FUROSEMIDE  TASK TO CHF TEAM  RECHECK IN 31 DAYS  BATTERY VOLTAGE ADEQUATE (10 6 YRS)  AP 1 5%  <0 1% (AAI-DDD 60); ALL AVAILABLE LEAD PARAMETERS WITHIN NORMAL LIMITS  1 VT-NS EPISODE, 6 @  BPM  EF 25-30% (1/2022 ECHO)  PATIENT ALSO TAKES ASA 81, METOPROLOL SUCC  NORMAL DEVICE FUNCTION    ES

## 2022-11-17 NOTE — PROGRESS NOTES
Earnestine seen in clinic today for GC and MFM preconception consult due to recurrent miscarriages, autoimmune hepatitis, Sjogren's, SSa +, hx PPROM with PTD (see report/notes). VSS. Dr. Mcclain and Dr. Butler met with pt and discussed POC. Plan: karyotype today. Pt discharged stable and ambulatory.         Alissa Mendoza RN     Patient under the care or Trauma/SICU attending will sign off.     Sina Castano MD  Critical Care Medicine

## 2022-12-01 ENCOUNTER — TELEPHONE (OUTPATIENT)
Dept: FAMILY MEDICINE CLINIC | Facility: CLINIC | Age: 67
End: 2022-12-01

## 2022-12-01 NOTE — TELEPHONE ENCOUNTER
This is the second attempt this pharmacy has attempted to send pt diabetic supplies  Pt has refused in the past with this company  Message was left for the pt to call back to confirm she still does not wish to have anything sent to her  Pt's local pharmacy states she never has used a Blood sugar meter    [facility]

## 2022-12-07 ENCOUNTER — HOSPITAL ENCOUNTER (EMERGENCY)
Facility: HOSPITAL | Age: 67
Discharge: HOME | End: 2022-12-07
Attending: EMERGENCY MEDICINE

## 2022-12-07 ENCOUNTER — APPOINTMENT (EMERGENCY)
Dept: RADIOLOGY | Facility: HOSPITAL | Age: 67
End: 2022-12-07

## 2022-12-07 VITALS
HEART RATE: 80 BPM | TEMPERATURE: 98.7 F | SYSTOLIC BLOOD PRESSURE: 159 MMHG | RESPIRATION RATE: 18 BRPM | OXYGEN SATURATION: 95 % | DIASTOLIC BLOOD PRESSURE: 103 MMHG

## 2022-12-07 DIAGNOSIS — E83.41 HYPERMAGNESEMIA: ICD-10-CM

## 2022-12-07 DIAGNOSIS — R73.9 HYPERGLYCEMIA: Primary | ICD-10-CM

## 2022-12-07 LAB
ALBUMIN SERPL BCP-MCNC: 2.6 G/DL (ref 3.5–5)
ALP SERPL-CCNC: 196 U/L (ref 34–104)
ALT SERPL W P-5'-P-CCNC: 13 U/L (ref 7–52)
ANION GAP SERPL CALCULATED.3IONS-SCNC: 9 MMOL/L (ref 4–13)
AST SERPL W P-5'-P-CCNC: 19 U/L (ref 13–39)
BASE EX.OXY STD BLDV CALC-SCNC: 71.1 % (ref 60–80)
BASE EXCESS BLDV CALC-SCNC: -1.2 MMOL/L
BASOPHILS # BLD AUTO: 0.02 THOUSANDS/ÂΜL (ref 0–0.1)
BASOPHILS NFR BLD AUTO: 0 % (ref 0–1)
BETA-HYDROXYBUTYRATE: 0.4 MMOL/L
BILIRUB SERPL-MCNC: 1.05 MG/DL (ref 0.2–1)
BUN SERPL-MCNC: 13 MG/DL (ref 5–25)
CALCIUM ALBUM COR SERPL-MCNC: 9.6 MG/DL (ref 8.3–10.1)
CALCIUM SERPL-MCNC: 8.5 MG/DL (ref 8.4–10.2)
CARDIAC TROPONIN I PNL SERPL HS: 27 NG/L
CHLORIDE SERPL-SCNC: 95 MMOL/L (ref 96–108)
CO2 SERPL-SCNC: 26 MMOL/L (ref 21–32)
CREAT SERPL-MCNC: 1.19 MG/DL (ref 0.6–1.3)
EOSINOPHIL # BLD AUTO: 0 THOUSAND/ÂΜL (ref 0–0.61)
EOSINOPHIL NFR BLD AUTO: 0 % (ref 0–6)
ERYTHROCYTE [DISTWIDTH] IN BLOOD BY AUTOMATED COUNT: 13.7 % (ref 11.6–15.1)
FLUAV RNA RESP QL NAA+PROBE: NEGATIVE
FLUBV RNA RESP QL NAA+PROBE: NEGATIVE
GFR SERPL CREATININE-BSD FRML MDRD: 46 ML/MIN/1.73SQ M
GLUCOSE SERPL-MCNC: 397 MG/DL (ref 65–140)
GLUCOSE SERPL-MCNC: 482 MG/DL (ref 65–140)
GLUCOSE SERPL-MCNC: >500 MG/DL (ref 65–140)
HCO3 BLDV-SCNC: 23.4 MMOL/L (ref 24–30)
HCT VFR BLD AUTO: 46.8 % (ref 34.8–46.1)
HGB BLD-MCNC: 15.4 G/DL (ref 11.5–15.4)
IMM GRANULOCYTES # BLD AUTO: 0.02 THOUSAND/UL (ref 0–0.2)
IMM GRANULOCYTES NFR BLD AUTO: 0 % (ref 0–2)
LYMPHOCYTES # BLD AUTO: 1.05 THOUSANDS/ÂΜL (ref 0.6–4.47)
LYMPHOCYTES NFR BLD AUTO: 17 % (ref 14–44)
MAGNESIUM SERPL-MCNC: 1.5 MG/DL (ref 1.9–2.7)
MCH RBC QN AUTO: 29.1 PG (ref 26.8–34.3)
MCHC RBC AUTO-ENTMCNC: 32.9 G/DL (ref 31.4–37.4)
MCV RBC AUTO: 89 FL (ref 82–98)
MONOCYTES # BLD AUTO: 0.64 THOUSAND/ÂΜL (ref 0.17–1.22)
MONOCYTES NFR BLD AUTO: 11 % (ref 4–12)
NEUTROPHILS # BLD AUTO: 4.39 THOUSANDS/ÂΜL (ref 1.85–7.62)
NEUTS SEG NFR BLD AUTO: 72 % (ref 43–75)
NRBC BLD AUTO-RTO: 0 /100 WBCS
O2 CT BLDV-SCNC: 15.8 ML/DL
PCO2 BLDV: 39.2 MM HG (ref 42–50)
PH BLDV: 7.39 [PH] (ref 7.3–7.4)
PHOSPHATE SERPL-MCNC: 2.9 MG/DL (ref 2.3–4.1)
PLATELET # BLD AUTO: 168 THOUSANDS/UL (ref 149–390)
PMV BLD AUTO: 13.1 FL (ref 8.9–12.7)
PO2 BLDV: 39.6 MM HG (ref 35–45)
POTASSIUM SERPL-SCNC: 3.9 MMOL/L (ref 3.5–5.3)
PROT SERPL-MCNC: 6.6 G/DL (ref 6.4–8.4)
RBC # BLD AUTO: 5.29 MILLION/UL (ref 3.81–5.12)
RSV RNA RESP QL NAA+PROBE: NEGATIVE
SARS-COV-2 RNA RESP QL NAA+PROBE: NEGATIVE
SODIUM SERPL-SCNC: 130 MMOL/L (ref 135–147)
WBC # BLD AUTO: 6.12 THOUSAND/UL (ref 4.31–10.16)

## 2022-12-07 RX ORDER — MAGNESIUM SULFATE HEPTAHYDRATE 40 MG/ML
2 INJECTION, SOLUTION INTRAVENOUS ONCE
Status: COMPLETED | OUTPATIENT
Start: 2022-12-07 | End: 2022-12-07

## 2022-12-07 RX ADMIN — MAGNESIUM SULFATE HEPTAHYDRATE 2 G: 40 INJECTION, SOLUTION INTRAVENOUS at 17:50

## 2022-12-07 RX ADMIN — SODIUM CHLORIDE 1000 ML: 0.9 INJECTION, SOLUTION INTRAVENOUS at 15:31

## 2022-12-07 NOTE — ED PROVIDER NOTES
History  Chief Complaint   Patient presents with   • Weakness - Generalized     X2-3 days, reports associated SOB with activity and diarrhea, denies CP/N/V/D/fevers/headaches, no known sick contacts   • Hyperglycemia - no symptoms      for EMS, pt known type 2 DM, "I don't really check it "     69year old female presenting with weakness for the past week, as well as shortness of breath with exertion  Patient denies any symptoms of chest pain, nausea, vomiting, diarrhea, chills, fevers, headaches, cough, or any other symptoms at this time  Patient has a history of DMII and does take oral medications but does not check her blood sugar  Blood sugar on arrival was 500+  Prior to Admission Medications   Prescriptions Last Dose Informant Patient Reported? Taking? Empagliflozin (Jardiance) 10 MG TABS   No No   Sig: Take 1 tablet (10 mg total) by mouth every morning   Patient not taking: No sig reported   Entresto 49-51 MG TABS   No No   Sig: TAKE 1 TABLET BY MOUTH TWICE A DAY   Flovent  MCG/ACT inhaler   Yes No   Patient not taking: No sig reported   albuterol (Ventolin HFA) 90 mcg/act inhaler   No No   Sig: Inhale 2 puffs every 6 (six) hours as needed for wheezing (rinse mouth after use)   Patient not taking: No sig reported   aspirin (ECOTRIN LOW STRENGTH) 81 mg EC tablet   No No   Sig: Take 1 tablet (81 mg total) by mouth daily   atorvastatin (LIPITOR) 40 mg tablet   No No   Sig: Take 1 tablet (40 mg total) by mouth daily   budesonide (Pulmicort Flexhaler) 180 MCG/ACT inhaler   No No   Sig: Inhale 2 puffs 2 (two) times a day for 14 days Rinse mouth after use     Patient not taking: Reported on 10/20/2022   cholecalciferol (VITAMIN D3) 1,000 units tablet   No No   Sig: Take 2 tablets (2,000 Units total) by mouth daily   furosemide (LASIX) 40 mg tablet   No No   Sig: TAKE 1 TABLET DAILY ON MONDAYS, WEDNESDAYS, AND FRIDAYS   metFORMIN (GLUCOPHAGE) 1000 MG tablet   No No   Sig: Take 1 tablet (1,000 mg total) by mouth 2 (two) times a day with meals   metoprolol succinate (TOPROL-XL) 50 mg 24 hr tablet   No No   Sig: Take 1 tablet (50 mg total) by mouth 2 (two) times a day   nicotine (NICODERM CQ) 14 mg/24hr TD 24 hr patch   No No   Sig: Place 1 patch on the skin every 24 hours   Patient not taking: No sig reported   nystatin-triamcinolone (MYCOLOG-II) ointment   No No   Sig: Apply topically 2 (two) times a day   Patient not taking: No sig reported      Facility-Administered Medications: None       Past Medical History:   Diagnosis Date   • Breast cancer ([facility] [address] )     left - 1994  • Diabetes mellitus [facility] )    • Diabetes mellitus, type 2 ([facility] ) 03/15/2006    last assessed 7/10/13   • GERD (gastroesophageal reflux disease)    • History of chemotherapy    • Hypertension        Past Surgical History:   Procedure Laterality Date   • CARDIAC ELECTROPHYSIOLOGY PROCEDURE N/A 3/9/2022    Procedure: Cardiac icd implant/ DUAL CHAMBER ICD; Surgeon: Jenna Ferrera MD;  Location: [facility]; Service: Cardiology   • MASTECTOMY Left     last assessed 12/16/14   • MASTECTOMY, RADICAL Left     1994   • MN EXPLORE PARATHYROID GLANDS N/A 4/21/2021    Procedure: PARATHYROIDECTOMY POSSIBLE 4 GLAND EXPLORATION;  Surgeon: Oswald Soares MD;  Location: [facility];  Service: ENT   • REDUCTION MAMMAPLASTY Right    • UVULECTOMY         Family History   Problem Relation Age of Onset   • Hypothyroidism Mother    • Leukemia Mother    • Diabetes Father    • Diabetes type II Father    • Stroke Sister    • Diabetes Paternal Grandmother    • Cancer Sister    • Diabetes Brother      I have reviewed and agree with the history as documented      E-Cigarette/Vaping   • E-Cigarette Use Never User      E-Cigarette/Vaping Substances   • Nicotine No    • THC No    • CBD No    • Flavoring No    • Other No    • Unknown No      Social History     Tobacco Use   • Smoking status: Every Day     Packs/day: 0 50     Types: Cigarettes   • Smokeless tobacco: Never   • Tobacco comments:     3 cigarettes daily    Vaping Use   • Vaping Use: Never used   Substance Use Topics   • Alcohol use: Not Currently     Comment: Social   • Drug use: No       Review of Systems   Constitutional: Negative for chills and fever  HENT: Negative for ear pain and sore throat  Eyes: Negative for pain and visual disturbance  Respiratory: Positive for shortness of breath  Negative for apnea, cough, choking, chest tightness, wheezing and stridor  Cardiovascular: Negative for chest pain, palpitations and leg swelling  Gastrointestinal: Positive for diarrhea  Negative for abdominal pain, anal bleeding, blood in stool, constipation, nausea and vomiting  Endocrine: Positive for polydipsia and polyuria  Genitourinary: Positive for frequency  Negative for difficulty urinating, dyspareunia, dysuria, enuresis, flank pain, hematuria and urgency  Musculoskeletal: Negative for arthralgias and back pain  Skin: Negative for color change and rash  Neurological: Positive for dizziness and weakness  Negative for seizures, syncope, light-headedness, numbness and headaches  All other systems reviewed and are negative  Physical Exam  Physical Exam  Vitals and nursing note reviewed  Constitutional:       General: She is not in acute distress  Appearance: She is well-developed  She is ill-appearing  HENT:      Head: Normocephalic and atraumatic  Eyes:      Conjunctiva/sclera: Conjunctivae normal    Cardiovascular:      Rate and Rhythm: Normal rate and regular rhythm  Pulses: Normal pulses  Heart sounds: Normal heart sounds  No murmur heard  No friction rub  No gallop  Pulmonary:      Effort: Pulmonary effort is normal  No respiratory distress  Breath sounds: Normal breath sounds  No stridor  No wheezing, rhonchi or rales  Chest:      Chest wall: No tenderness  Abdominal:      General: There is no distension  Palpations: Abdomen is soft   There is no mass       Tenderness: There is no abdominal tenderness  There is no right CVA tenderness, left CVA tenderness, guarding or rebound  Hernia: No hernia is present  Musculoskeletal:         General: No swelling, tenderness or deformity  Cervical back: Neck supple  Right lower leg: No edema  Left lower leg: No edema  Skin:     General: Skin is warm and dry  Capillary Refill: Capillary refill takes less than 2 seconds  Coloration: Skin is not jaundiced or pale  Findings: No bruising, erythema, lesion or rash  Neurological:      Mental Status: She is alert     Psychiatric:         Mood and Affect: Mood normal          Vital Signs  ED Triage Vitals [12/07/22 1511]   Temperature Pulse Respirations Blood Pressure SpO2   98 7 °F (37 1 °C) 84 18 141/95 95 %      Temp Source Heart Rate Source Patient Position - Orthostatic VS BP Location FiO2 (%)   Oral Monitor Lying Left arm --      Pain Score       No Pain           Vitals:    12/07/22 1511   BP: 141/95   Pulse: 84   Patient Position - Orthostatic VS: Lying         Visual Acuity      ED Medications  Medications   magnesium sulfate 2 g/50 mL IVPB (premix) 2 g (has no administration in time range)   sodium chloride 0 9 % bolus 1,000 mL (1,000 mL Intravenous New Bag 12/7/22 1531)       Diagnostic Studies  Results Reviewed     Procedure Component Value Units Date/Time    Fingerstick Glucose (POCT) [[id number]]  (Abnormal) Collected: 12/07/22 1714    Lab Status: Final result Updated: 12/07/22 1716     POC Glucose 397 mg/dl     FLU/RSV/COVID - if FLU/RSV clinically relevant [[id number]]  (Normal) Collected: 12/07/22 1606    Lab Status: Final result Specimen: Nares from Nose Updated: 12/07/22 1656     SARS-CoV-2 Negative     INFLUENZA A PCR Negative     INFLUENZA B PCR Negative     RSV PCR Negative    Narrative:      FOR PEDIATRIC PATIENTS - copy/paste COVID Guidelines URL to browser: [email or web address]  [email or web address]    SARS-CoV-2 assay is a Nucleic Acid Amplification assay intended for the  qualitative detection of nucleic acid from SARS-CoV-2 in nasopharyngeal  swabs  Results are for the presumptive identification of SARS-CoV-2 RNA  Positive results are indicative of infection with SARS-CoV-2, the virus  causing COVID-19, but do not rule out bacterial infection or co-infection  with other viruses  Laboratories within the [place] and its  territories are required to report all positive results to the appropriate  public health authorities  Negative results do not preclude SARS-CoV-2  infection and should not be used as the sole basis for treatment or other  patient management decisions  Negative results must be combined with  clinical observations, patient history, and epidemiological information  This test has not been FDA cleared or approved  This test has been authorized by FDA under an Emergency Use Authorization  (EUA)  This test is only authorized for the duration of time the  declaration that circumstances exist justifying the authorization of the  emergency use of an in vitro diagnostic tests for detection of SARS-CoV-2  virus and/or diagnosis of COVID-19 infection under section 564(b)(1) of  the Act, 21 U  S C  117YBC-3(F)(3), unless the authorization is terminated  or revoked sooner  The test has been validated but independent review by FDA  and CLIA is pending  Test performed using [employer] Gene[employer]pert: This RT-PCR assay targets N2,  a region unique to SARS-CoV-2  A conserved region in the E-gene was chosen  for pan-Sarbecovirus detection which includes SARS-CoV-2  According to CMS-2020-01-R, this platform meets the definition of high-throughput technology      Phosphorus [[id number]]  (Normal) Collected: 12/07/22 1530    Lab Status: Final result Specimen: Blood from Arm, Right Updated: 12/07/22 1619     Phosphorus 2 9 mg/dL Magnesium [[id number]]  (Abnormal) Collected: 12/07/22 1530    Lab Status: Final result Specimen: Blood from Arm, Right Updated: 12/07/22 1619     Magnesium 1 5 mg/dL     HS Troponin 0hr (reflex protocol) [[id number]]  (Normal) Collected: 12/07/22 1530    Lab Status: Final result Specimen: Blood from Arm, Right Updated: 12/07/22 1609     hs TnI 0hr 27 ng/L     HS Troponin I 2hr [[id number]]     Lab Status: No result Specimen: Blood     HS Troponin I 4hr [[id number]]     Lab Status: No result Specimen: Blood     Comprehensive metabolic panel [[id number]]  (Abnormal) Collected: 12/07/22 1530    Lab Status: Final result Specimen: Blood from Arm, Right Updated: 12/07/22 1608     Sodium 130 mmol/L      Potassium 3 9 mmol/L      Chloride 95 mmol/L      CO2 26 mmol/L      ANION GAP 9 mmol/L      BUN 13 mg/dL      Creatinine 1 19 mg/dL      Glucose 482 mg/dL      Calcium 8 5 mg/dL      Corrected Calcium 9 6 mg/dL      AST 19 U/L      ALT 13 U/L      Alkaline Phosphatase 196 U/L      Total Protein 6 6 g/dL      Albumin 2 6 g/dL      Total Bilirubin 1 05 mg/dL      eGFR 46 ml/min/1 73sq m     Narrative:      [place] guidelines for Chronic Kidney Disease (CKD):   •  Stage 1 with normal or high GFR (GFR > 90 mL/min/1 73 square meters)  •  Stage 2 Mild CKD (GFR = 60-89 mL/min/1 73 square meters)  •  Stage 3A Moderate CKD (GFR = 45-59 mL/min/1 73 square meters)  •  Stage 3B Moderate CKD (GFR = 30-44 mL/min/1 73 square meters)  •  Stage 4 Severe CKD (GFR = 15-29 mL/min/1 73 square meters)  •  Stage 5 End Stage CKD (GFR <15 mL/min/1 73 square meters)  Note: GFR calculation is accurate only with a steady state creatinine    Beta Hydroxybutyrate [[id number]]  (Normal) Collected: 12/07/22 1530    Lab Status: Final result Specimen: Blood from Arm, Right Updated: 12/07/22 1552     BETA-HYDROXYBUTYRATE 0 4 mmol/L     Blood gas, venous [[id number]]  (Abnormal) Collected: 12/07/22 1530    Lab Status: Final result Specimen: Blood from Arm, Right Updated: 12/07/22 1545     pH, Seng 7 394     pCO2, Seng 39 2 mm Hg      pO2, Seng 39 6 mm Hg      HCO3, Seng 23 4 mmol/L      Base Excess, Seng -1 2 mmol/L      O2 Content, Seng 15 8 ml/dL      O2 HGB, VENOUS 71 1 %     CBC and differential [[id number]]  (Abnormal) Collected: 12/07/22 1530    Lab Status: Final result Specimen: Blood from Arm, Right Updated: 12/07/22 1543     WBC 6 12 Thousand/uL      RBC 5 29 Million/uL      Hemoglobin 15 4 g/dL      Hematocrit 46 8 %      MCV 89 fL      MCH 29 1 pg      MCHC 32 9 g/dL      RDW 13 7 %      MPV 13 1 fL      Platelets 250 Thousands/uL      nRBC 0 /100 WBCs      Neutrophils Relative 72 %      Immat GRANS % 0 %      Lymphocytes Relative 17 %      Monocytes Relative 11 %      Eosinophils Relative 0 %      Basophils Relative 0 %      Neutrophils Absolute 4 39 Thousands/µL      Immature Grans Absolute 0 02 Thousand/uL      Lymphocytes Absolute 1 05 Thousands/µL      Monocytes Absolute 0 64 Thousand/µL      Eosinophils Absolute 0 00 Thousand/µL      Basophils Absolute 0 02 Thousands/µL     UA w Reflex to Microscopic w Reflex to Culture [[id number]]     Lab Status: No result Specimen: Urine     Fingerstick Glucose (POCT) [[id number]]  (Abnormal) Collected: 12/07/22 1514    Lab Status: Final result Updated: 12/07/22 1515     POC Glucose >500 mg/dl                  XR chest 1 view portable   Final Result by Michael Crain MD (12/07 1623)      No acute cardiopulmonary disease                    Workstation performed: [id number]                    Procedures  ECG 12 Lead Documentation Only    Date/Time: 12/7/2022 4:25 PM  Performed by: Toby Shepherd PA-C  Authorized by: Toby Shepherd PA-C     Indications / Diagnosis:  Weakness  ECG reviewed by me, the ED Provider: yes    Patient location:  ED  Previous ECG:     Previous ECG:  Unavailable  Interpretation:     Interpretation: abnormal    Rate:     ECG rate:  84    ECG rate assessment: normal Rhythm:     Rhythm: sinus rhythm    Ectopy:     Ectopy: PVCs      PVCs:  Infrequent and unifocal  QRS:     QRS axis:  Normal    QRS intervals:  Normal  Conduction:     Conduction: normal    ST segments:     ST segments:  Normal  T waves:     T waves: flattening               ED Course                                             MDM  Number of Diagnoses or Management Options  Diagnosis management comments: 69year old female presenting with weakness worsening over the last week as well as shortness of breath  Hx of DMII and pacemaker for cardiomyopathy  No vomiting or abdominal pain but does admit to some diarrhea, nonbloody  Denies urinary symptoms  Admits to cough as well  BGL of 500+ on arrival   CBC - WNL  CMP - hyponatremia  Magnesium - mildly low - likely due to dehydration from diarrhea over the last week  Phosporus - wnl  Troponin - elevated without chest pain -- will trend  Urinalysis   Covid/Flu/RSV  VBG     CXR - no acute findings  Start normal saline bolus 1L for hyperglycemia  Patient does not take insulin, only PO meds  Disposition  Final diagnoses:   Hyperglycemia   Hyponatremia     Time reflects when diagnosis was documented in both MDM as applicable and the Disposition within this note     Time User Action Codes Description Comment    12/7/2022  5:33 PM Duran Gama Add [R73 9] Hyperglycemia     12/7/2022  5:34 PM Duran Gama Add [E87 1] Hyponatremia       ED Disposition     ED Disposition   Discharge    Condition   Stable    Date/Time   Wed Dec 7, 2022  5:33 PM    Comment   Natacha Moore discharge to home/self care                 Follow-up Information     Follow up With Specialties Details Why Contact Info Additional Information    [facility] Emergency Medicine Go to   [address] [place] [address] Emergency Department,  [address], [address], [address] Ayesha Babcock,  Family Medicine Schedule an appointment as soon as possible for a visit   [address]  [phone]             Patient's Medications   Discharge Prescriptions    No medications on file       No discharge procedures on file      PDMP Review       Value Time User    PDMP Reviewed  Yes 4/21/2021  7:80 PM Kapil Zaidi MD          ED Provider  Electronically Signed by           Gricelda Dia PA-C  12/07/22 2052

## 2022-12-08 LAB
ATRIAL RATE: 84 BPM
P AXIS: -44 DEGREES
PR INTERVAL: 150 MS
QRS AXIS: -65 DEGREES
QRSD INTERVAL: 104 MS
QT INTERVAL: 376 MS
QTC INTERVAL: 444 MS
T WAVE AXIS: 199 DEGREES
VENTRICULAR RATE: 84 BPM

## 2022-12-15 ENCOUNTER — RA CDI HCC (OUTPATIENT)
Dept: OTHER | Facility: HOSPITAL | Age: 67
End: 2022-12-15

## 2022-12-15 NOTE — PROGRESS NOTES
[facility] [address]  coding opportunities          Chart Reviewed number of suggestions sent to Provider: 3  E11 [id number]  E11 [id number]  I47 1    Patients Insurance     Medicare Insurance: [facility] [employer]

## 2022-12-17 PROBLEM — Z12.39 BREAST CANCER SCREENING: Status: RESOLVED | Noted: 2018-10-24 | Resolved: 2022-12-17

## 2022-12-17 PROBLEM — E11.9 DIABETES MELLITUS (HCC): Status: RESOLVED | Noted: 2022-08-10 | Resolved: 2022-12-17

## 2022-12-27 ENCOUNTER — OFFICE VISIT (OUTPATIENT)
Dept: FAMILY MEDICINE CLINIC | Facility: CLINIC | Age: 67
End: 2022-12-27

## 2022-12-27 VITALS
OXYGEN SATURATION: 99 % | TEMPERATURE: 98.2 F | BODY MASS INDEX: 22.75 KG/M2 | HEIGHT: 63 IN | WEIGHT: 128.4 LBS | HEART RATE: 78 BPM | RESPIRATION RATE: 15 BRPM | DIASTOLIC BLOOD PRESSURE: 88 MMHG | SYSTOLIC BLOOD PRESSURE: 134 MMHG

## 2022-12-27 DIAGNOSIS — N18.2 TYPE 2 DIABETES MELLITUS WITH STAGE 2 CHRONIC KIDNEY DISEASE, WITHOUT LONG-TERM CURRENT USE OF INSULIN (HCC): Primary | ICD-10-CM

## 2022-12-27 DIAGNOSIS — J06.9 ACUTE URI: ICD-10-CM

## 2022-12-27 DIAGNOSIS — E11.22 TYPE 2 DIABETES MELLITUS WITH STAGE 2 CHRONIC KIDNEY DISEASE, WITHOUT LONG-TERM CURRENT USE OF INSULIN (HCC): Primary | ICD-10-CM

## 2022-12-27 DIAGNOSIS — I50.42 CHRONIC COMBINED SYSTOLIC AND DIASTOLIC CONGESTIVE HEART FAILURE (HCC): ICD-10-CM

## 2022-12-27 DIAGNOSIS — E78.5 HYPERLIPIDEMIA LDL GOAL <100: ICD-10-CM

## 2022-12-27 DIAGNOSIS — I47.1 SVT (SUPRAVENTRICULAR TACHYCARDIA) (HCC): ICD-10-CM

## 2022-12-27 DIAGNOSIS — E83.42 HYPOMAGNESEMIA: ICD-10-CM

## 2022-12-27 PROBLEM — I47.10 SVT (SUPRAVENTRICULAR TACHYCARDIA): Status: ACTIVE | Noted: 2022-12-27

## 2022-12-27 RX ORDER — LANCETS 33 GAUGE
EACH MISCELLANEOUS
Qty: 300 EACH | Refills: 3 | Status: SHIPPED | OUTPATIENT
Start: 2022-12-27

## 2022-12-27 RX ORDER — INSULIN GLARGINE 100 [IU]/ML
10 INJECTION, SOLUTION SUBCUTANEOUS
Qty: 15 ML | Refills: 0 | Status: SHIPPED | OUTPATIENT
Start: 2022-12-27 | End: 2022-12-28

## 2022-12-27 RX ORDER — AMOXICILLIN 875 MG/1
875 TABLET, COATED ORAL 2 TIMES DAILY
Qty: 14 TABLET | Refills: 0 | Status: SHIPPED | OUTPATIENT
Start: 2022-12-27 | End: 2023-01-03

## 2022-12-27 RX ORDER — PEN NEEDLE, DIABETIC 32GX 5/32"
NEEDLE, DISPOSABLE MISCELLANEOUS
Qty: 100 EACH | Refills: 3 | Status: SHIPPED | OUTPATIENT
Start: 2022-12-27

## 2022-12-27 RX ORDER — BLOOD SUGAR DIAGNOSTIC
STRIP MISCELLANEOUS
Qty: 300 EACH | Refills: 3 | Status: SHIPPED | OUTPATIENT
Start: 2022-12-27

## 2022-12-27 RX ORDER — BLOOD-GLUCOSE METER
KIT MISCELLANEOUS
Qty: 1 KIT | Refills: 0 | Status: SHIPPED | OUTPATIENT
Start: 2022-12-27

## 2022-12-27 NOTE — PATIENT INSTRUCTIONS
Call Dr Keeley Sykes to make appt with cardiologist ASAP  Take furosemide on Monday, Wednesday, Friday and Saturday until seen by Dr Keeley Sykes  Start lantus 10 units daily at bed time and schedule follow up with endocrinologist ASAP  Take magnesium over the counter 200 mg daily and if having diarrhea then stop it  Supportive care discussed and advised  Advised to RTO for any worsening and no improvement  Follow up for no improvement and worsening of conditions  Patient advised and educated when to see immediate medical care

## 2022-12-27 NOTE — ASSESSMENT & PLAN NOTE
Will start on lantus 10 units daily at bed time and will check BS 3 times a day and will follow with ENDO ASAP  Lab Results   Component Value Date    HGBA1C 14 9 (A) 08/10/2022 lactate 5.6

## 2022-12-27 NOTE — PROGRESS NOTES
Assessment/Plan:    1  Type 2 diabetes mellitus with stage 2 chronic kidney disease, without long-term current use of insulin ([facility] [address] )  Assessment & Plan: Will start on lantus 10 units daily at bed time and will check BS 3 times a day and will follow with ENDO ASAP  Lab Results   Component Value Date    HGBA1C 14 9 (A) 08/10/2022       Orders:  -     Blood Glucose Monitoring Suppl (OneTouch Verio Reflect) w/Device KIT; Check blood sugars three times daily  Please substitute with appropriate alternative as covered by patient's insurance  Dx: E11 65  -     glucose blood (OneTouch Verio) test strip; Check blood sugars three times daily  Please substitute with appropriate alternative as covered by patient's insurance  Dx: E11 65  -     OneTouch Delica Lancets 80B MISC; Check blood sugars three times daily  Please substitute with appropriate alternative as covered by patient's insurance  Dx: E11 65  -     Insulin Glargine Solostar (Lantus SoloStar) 100 UNIT/ML SOPN; Inject 0 1 mL (10 Units total) under the skin daily at bedtime  -     Insulin Pen Needle (BD Pen Needle Kateryna U/F) 32G X 4 MM MISC; Use daily as directed with insulin pen    2  Acute URI  -     amoxicillin (AMOXIL) 875 mg tablet; Take 1 tablet (875 mg total) by mouth 2 (two) times a day for 7 days    3  SVT (supraventricular tachycardia) (HCC)  Comments:  denies any palpitations    4  Chronic combined systolic and diastolic congestive heart failure [facility])  Assessment & Plan: Will increase lasix to 4 times a day and will follow with cardiologist ASAP          5  Hyperlipidemia LDL goal <100  Assessment & Plan:  Complaint with statin and tolerating it well      6  Hypomagnesemia  Comments:  will start OTC magnessium 200 mg daily and will stop for any diarrhea            Patient Instructions:  Supportive care discussed and advised  Advised to RTO for any worsening and no improvement  Follow up for no improvement and worsening of conditions    Patient advised and educated when to see immediate medical care  Return in about 2 weeks (around 1/10/2023), or if symptoms worsen or fail to improve  Future Appointments   Date Time Provider Henna Reid   1/6/2023  9:15 AM DEVICE REMOTE [place] [facility]   1/10/2023  1:45 PM DO [facility]   4/7/2023  9:15 AM DEVICE [facility] [place] [facility]   7/7/2023  9:15 AM DEVICE REMOTE [place] [facility]           Subjective:      Patient ID: Xenia Meyer is a 69 y o  female  Chief Complaint   Patient presents with   • Follow-up     Bilateral leg swelling  The legs and feet are also itchy  klcma   • Cough     Was tested for covid and flu at the hospital which were negative  klcma   • Shortness of Breath     Needs refill for inhaler  klcma         Vitals:  /88   Pulse 78   Temp 98 2 °F (36 8 °C)   Resp 15   Ht 5' 2 5" (1 588 m)   Wt 58 2 kg (128 lb 6 4 oz)   LMP  (LMP Unknown)   SpO2 99%   BMI 23 11 kg/m²      HPI  Patient accompanied with niece  Patient was in ER on 12/7/2022 for hyperglycemia and hypomagnesia  Patient stated that not checking her BS at home and does not have equipment and no showed endo and cardiologist appts  Patient stated that her bilateral lower leg swelling has increased  Denies sob, chest pain  Stated that having cough and congestion from more than week and not improving  Complaint with medications and tolerating it well  Discussed in detail with patient and niece about being complaint with appointments and treatments to prevent complications and discussed all macro and micro vascular complications of uncontrolled diabetes   Patient and family verbalizes understanding        The following portions of the patient's history were reviewed and updated as appropriate: allergies, current medications, past family history, past medical history, past social history, past surgical history and problem list       Review of Systems Constitutional: Negative for chills, diaphoresis, fatigue, fever and unexpected weight change  HENT: Positive for congestion  Negative for dental problem, drooling, ear discharge, ear pain, facial swelling, hearing loss, mouth sores, nosebleeds, postnasal drip, rhinorrhea, sinus pressure, sinus pain, sneezing, sore throat, tinnitus, trouble swallowing and voice change  Respiratory: Positive for cough  Negative for chest tightness, shortness of breath and wheezing  Cardiovascular: Positive for leg swelling  Negative for chest pain and palpitations  Gastrointestinal: Negative  Negative for abdominal pain, constipation, diarrhea, nausea and vomiting  Genitourinary: Negative for difficulty urinating  Musculoskeletal: Negative  Skin: Negative  Negative for rash  Neurological: Negative for dizziness, light-headedness and headaches  Psychiatric/Behavioral: Negative  Objective:    Social History     Tobacco Use   Smoking Status Every Day   • Packs/day: 0 50   • Types: Cigarettes   Smokeless Tobacco Never   Tobacco Comments    3 cigarettes daily        Allergies: No Known Allergies      Current Outpatient Medications   Medication Sig Dispense Refill   • albuterol (Ventolin HFA) 90 mcg/act inhaler Inhale 2 puffs every 6 (six) hours as needed for wheezing (rinse mouth after use) 18 g 1   • amoxicillin (AMOXIL) 875 mg tablet Take 1 tablet (875 mg total) by mouth 2 (two) times a day for 7 days 14 tablet 0   • aspirin (ECOTRIN LOW STRENGTH) 81 mg EC tablet Take 1 tablet (81 mg total) by mouth daily 90 tablet 3   • atorvastatin (LIPITOR) 40 mg tablet Take 1 tablet (40 mg total) by mouth daily 90 tablet 1   • Blood Glucose Monitoring Suppl (OneTouch Verio Reflect) w/Device KIT Check blood sugars three times daily  Please substitute with appropriate alternative as covered by patient's insurance   Dx: E11 65 1 kit 0   • Empagliflozin (Jardiance) 10 MG TABS Take 1 tablet (10 mg total) by mouth every morning 90 tablet 1   • Entresto 49-51 MG TABS TAKE 1 TABLET BY MOUTH TWICE A DAY 30 tablet 3   • Flovent  MCG/ACT inhaler      • furosemide (LASIX) 40 mg tablet TAKE 1 TABLET DAILY ON MONDAYS, WEDNESDAYS, AND FRIDAYS 30 tablet 3   • glucose blood (OneTouch Verio) test strip Check blood sugars three times daily  Please substitute with appropriate alternative as covered by patient's insurance  Dx: E11 65 300 each 3   • Insulin Glargine Solostar (Lantus SoloStar) 100 UNIT/ML SOPN Inject 0 1 mL (10 Units total) under the skin daily at bedtime 15 mL 0   • Insulin Pen Needle (BD Pen Needle Kateryna U/F) 32G X 4 MM MISC Use daily as directed with insulin pen 100 each 3   • metFORMIN (GLUCOPHAGE) 1000 MG tablet Take 1 tablet (1,000 mg total) by mouth 2 (two) times a day with meals 180 tablet 1   • metoprolol succinate (TOPROL-XL) 50 mg 24 hr tablet Take 1 tablet (50 mg total) by mouth 2 (two) times a day 180 tablet 3   • nystatin-triamcinolone (MYCOLOG-II) ointment Apply topically 2 (two) times a day 30 g 1   • OneTouch Delica Lancets 58N MISC Check blood sugars three times daily  Please substitute with appropriate alternative as covered by patient's insurance  Dx: E11 65 300 each 3   • budesonide (Pulmicort Flexhaler) 180 MCG/ACT inhaler Inhale 2 puffs 2 (two) times a day for 14 days Rinse mouth after use  (Patient not taking: Reported on 10/20/2022) 1 each 0   • cholecalciferol (VITAMIN D3) 1,000 units tablet Take 2 tablets (2,000 Units total) by mouth daily (Patient not taking: Reported on 12/27/2022) 10 tablet 0   • nicotine (NICODERM CQ) 14 mg/24hr TD 24 hr patch Place 1 patch on the skin every 24 hours (Patient not taking: Reported on 12/27/2022) 28 patch 3     No current facility-administered medications for this visit  Physical Exam  Vitals reviewed  Constitutional:       Appearance: Normal appearance  She is well-developed  HENT:      Head: Normocephalic        Right Ear: Tympanic membrane, ear canal and external ear normal       Left Ear: Tympanic membrane, ear canal and external ear normal       Nose: Nose normal       Right Sinus: No maxillary sinus tenderness or frontal sinus tenderness  Left Sinus: No maxillary sinus tenderness or frontal sinus tenderness  Mouth/Throat:      Mouth: No oral lesions  Pharynx: No oropharyngeal exudate or posterior oropharyngeal erythema  Cardiovascular:      Rate and Rhythm: Normal rate and regular rhythm  Heart sounds: Normal heart sounds  Pulmonary:      Effort: Pulmonary effort is normal       Breath sounds: Normal breath sounds  Musculoskeletal:         General: Normal range of motion  Cervical back: Neck supple  Right lower leg: Edema present  Left lower leg: Edema present  Lymphadenopathy:      Cervical:      Right cervical: No superficial or posterior cervical adenopathy  Left cervical: No superficial or posterior cervical adenopathy  Skin:     General: Skin is warm and dry  Neurological:      Mental Status: She is alert and oriented to person, place, and time  Psychiatric:         Behavior: Behavior normal          Thought Content:  Thought content normal          Judgment: Judgment normal                      MAGGIE May

## 2022-12-28 DIAGNOSIS — E11.22 TYPE 2 DIABETES MELLITUS WITH STAGE 2 CHRONIC KIDNEY DISEASE, WITHOUT LONG-TERM CURRENT USE OF INSULIN (HCC): ICD-10-CM

## 2022-12-28 DIAGNOSIS — N18.2 TYPE 2 DIABETES MELLITUS WITH STAGE 2 CHRONIC KIDNEY DISEASE, WITHOUT LONG-TERM CURRENT USE OF INSULIN (HCC): ICD-10-CM

## 2022-12-28 RX ORDER — INSULIN GLARGINE 100 [IU]/ML
10 INJECTION, SOLUTION SUBCUTANEOUS
Qty: 15 ML | Refills: 0 | Status: SHIPPED | OUTPATIENT
Start: 2022-12-28

## 2023-01-06 ENCOUNTER — REMOTE DEVICE CLINIC VISIT (OUTPATIENT)
Dept: CARDIOLOGY CLINIC | Facility: CLINIC | Age: 68
End: 2023-01-06

## 2023-01-06 DIAGNOSIS — Z95.810 AICD (AUTOMATIC CARDIOVERTER/DEFIBRILLATOR) PRESENT: Primary | ICD-10-CM

## 2023-01-06 NOTE — PROGRESS NOTES
Results for orders placed or performed in visit on 01/06/23   Cardiac EP device report    Narrative    MDT DUAL ICD/ ACTIVE SYSTEM IS MRI CONDITIONAL  CARELINK TRANSMISSION: BATTERY VOLTAGE ADEQUATE (10 5 YRS)  AP 3%  <0 1% (AAI-DDD 60)  ALL  AVAILABLE LEAD PARAMETERS WITHIN NORMAL LIMITS  1 DEVICE CLASSIFIED VT-NS EPISODES WITH EGM'S SUGGESTIVE FOR NSVT, 11 @  BPM  EF 25-30% (1/2022 ECHO)  PATIENT ON [address]SA 81, METOPROLOL SUCC  OPTI-VOL FLUID THRESHOLD CROSSED SINCE 12/8/22 & ONGOING  CKD III, PATIENT ALSO TAKES FUROSEMIDE  TASK TO CHF TEAM  RECHECK IN 31 DAYS  NORMAL DEVICE FUNCTION    ES

## 2023-01-08 PROBLEM — N18.30 CKD STAGE 3 DUE TO TYPE 2 DIABETES MELLITUS (HCC): Status: ACTIVE | Noted: 2020-11-16

## 2023-01-08 PROBLEM — U07.1 COVID-19: Status: RESOLVED | Noted: 2022-08-24 | Resolved: 2023-01-08

## 2023-01-09 ENCOUNTER — TELEPHONE (OUTPATIENT)
Dept: CARDIOLOGY CLINIC | Facility: CLINIC | Age: 68
End: 2023-01-09

## 2023-01-09 NOTE — TELEPHONE ENCOUNTER
Called pt on follow up on   Opti-vol crossed since 12/8/22 & on-going  Patient with CKD III & takes furosemide  Scheduled for 1 mo recheck 2/7/23  Manuel - Rodri De Jesus TRANSMISSION: BATTERY VOLTAGE ADEQUATE (10 5 YRS)  AP 3%  <0 1% (AAI-DDD 60)  ALL  AVAILABLE LEAD PARAMETERS WITHIN NORMAL LIMITS  1 DEVICE CLASSIFIED VT-NS EPISODES WITH EGM'S SUGGESTIVE FOR NSVT, 11 @  BPM  EF 25-30% (1/2022 ECHO)  PATIENT ON ASA 81, METOPROLOL SUCC  OPTI-VOL FLUID THRESHOLD CROSSED SINCE 12/8/22 & ONGOING  CKD III, PATIENT ALSO TAKES FUROSEMIDE  TASK TO CHF TEAM  RECHECK IN 31 DAYS  NORMAL DEVICE FUNCTION   ES    Pt is scheduled on 3/1/23    Pt c/o of bilaterally edema in LL and feet    Currently on Lasix  40 mg m,w,f    Pt doesn't check bp at home    Please advise

## 2023-01-10 ENCOUNTER — OFFICE VISIT (OUTPATIENT)
Dept: FAMILY MEDICINE CLINIC | Facility: CLINIC | Age: 68
End: 2023-01-10

## 2023-01-10 VITALS
RESPIRATION RATE: 18 BRPM | BODY MASS INDEX: 22.96 KG/M2 | HEIGHT: 63 IN | WEIGHT: 129.6 LBS | HEART RATE: 66 BPM | DIASTOLIC BLOOD PRESSURE: 86 MMHG | SYSTOLIC BLOOD PRESSURE: 138 MMHG | TEMPERATURE: 96.9 F

## 2023-01-10 DIAGNOSIS — Z12.31 BREAST CANCER SCREENING BY MAMMOGRAM: ICD-10-CM

## 2023-01-10 DIAGNOSIS — N18.31 TYPE 2 DIABETES MELLITUS WITH STAGE 3A CHRONIC KIDNEY DISEASE, WITHOUT LONG-TERM CURRENT USE OF INSULIN (HCC): ICD-10-CM

## 2023-01-10 DIAGNOSIS — N18.30 CKD STAGE 3 DUE TO TYPE 2 DIABETES MELLITUS (HCC): ICD-10-CM

## 2023-01-10 DIAGNOSIS — I50.32 CHRONIC DIASTOLIC CONGESTIVE HEART FAILURE (HCC): Primary | ICD-10-CM

## 2023-01-10 DIAGNOSIS — I50.1 HEART FAILURE, LEFT, WITH LVEF <=30% (HCC): ICD-10-CM

## 2023-01-10 DIAGNOSIS — I87.2 VENOUS INSUFFICIENCY OF BOTH LOWER EXTREMITIES: Primary | ICD-10-CM

## 2023-01-10 DIAGNOSIS — Z72.0 TOBACCO USE: ICD-10-CM

## 2023-01-10 DIAGNOSIS — R60.0 BILATERAL LEG EDEMA: ICD-10-CM

## 2023-01-10 DIAGNOSIS — E11.22 TYPE 2 DIABETES MELLITUS WITH STAGE 3A CHRONIC KIDNEY DISEASE, WITHOUT LONG-TERM CURRENT USE OF INSULIN (HCC): ICD-10-CM

## 2023-01-10 DIAGNOSIS — L29.2 VULVAR ITCHING: ICD-10-CM

## 2023-01-10 DIAGNOSIS — E78.5 HYPERLIPIDEMIA LDL GOAL <100: ICD-10-CM

## 2023-01-10 DIAGNOSIS — E11.22 CKD STAGE 3 DUE TO TYPE 2 DIABETES MELLITUS (HCC): ICD-10-CM

## 2023-01-10 RX ORDER — NYSTATIN AND TRIAMCINOLONE ACETONIDE 100000; 1 [USP'U]/G; MG/G
OINTMENT TOPICAL 2 TIMES DAILY
Qty: 60 G | Refills: 1 | Status: SHIPPED | OUTPATIENT
Start: 2023-01-10

## 2023-01-10 RX ORDER — NICOTINE 21 MG/24HR
1 PATCH, TRANSDERMAL 24 HOURS TRANSDERMAL EVERY 24 HOURS
Qty: 42 PATCH | Refills: 0 | Status: SHIPPED | OUTPATIENT
Start: 2023-01-10

## 2023-01-10 NOTE — TELEPHONE ENCOUNTER
Spoke to pt and advised to increase lasix to daily, pt understood, and advised to have bmp next week, advised order will be in chart     Pt understood

## 2023-01-10 NOTE — PROGRESS NOTES
Assessment/Plan:    No problem-specific Assessment & Plan notes found for this encounter  Poorly controlled DM2, needs endo f/u aware asap  ckd3 stable, stay hydreated    Leg swelling  rx compression stockings    tob use risks aware  Patch rx given    Cardio f/u for chf     Cream for vaginitis refilled, use aware    Please make you contact your endocrinologist soon to make and appointment for your uncontrolled type 2 Diabetes which needs attention to avoid complications  Diagnoses and all orders for this visit:    Venous insufficiency of both lower extremities  -     Compression Stocking    Breast cancer screening by mammogram  -     Mammo screening bilateral w 3d & cad; Future    CKD stage 3 due to type 2 diabetes mellitus ([facility] [address] )    Hyperlipidemia LDL goal <100    Heart failure, left, with LVEF <=30% ([facility])    Type 2 diabetes mellitus with stage 3a chronic kidney disease, without long-term current use of insulin ([facility])    Tobacco use  -     nicotine (NICODERM CQ) 14 mg/24hr TD 24 hr patch; Place 1 patch on the skin every 24 hours  -     nicotine (NICODERM CQ) 7 mg/24hr TD 24 hr patch; Place 1 patch on the skin every 24 hours    Vulvar itching  -     nystatin-triamcinolone (MYCOLOG-II) ointment; Apply topically 2 (two) times a day To vaginal area    Bilateral leg edema          Return in about 6 months (around 7/10/2023) for Recheck  Subjective:      Patient ID: Annmarie Sam is a 69 y o  female      Chief Complaint   Patient presents with   • Follow-up     Hospital f/u nm lpn   • Foot Swelling       HPI    tob risks aware  Has not seen her endo yet as advised  ER eval noted  cxr neg  Labs ok but high glucose  Sob better  On diuretic 3x/w  Cardiologist recommended start daily    The following portions of the patient's history were reviewed and updated as appropriate: allergies, current medications, past family history, past medical history, past social history, past surgical history and problem list     Review of Systems   Constitutional: Negative for chills and fever  Cardiovascular: Positive for leg swelling  Current Outpatient Medications   Medication Sig Dispense Refill   • albuterol (Ventolin HFA) 90 mcg/act inhaler Inhale 2 puffs every 6 (six) hours as needed for wheezing (rinse mouth after use) 18 g 1   • aspirin (ECOTRIN LOW STRENGTH) 81 mg EC tablet Take 1 tablet (81 mg total) by mouth daily 90 tablet 3   • atorvastatin (LIPITOR) 40 mg tablet Take 1 tablet (40 mg total) by mouth daily 90 tablet 1   • Blood Glucose Monitoring Suppl (OneTouch Verio Reflect) w/Device KIT Check blood sugars three times daily  Please substitute with appropriate alternative as covered by patient's insurance  Dx: E11 65 1 kit 0   • cholecalciferol (VITAMIN D3) 1,000 units tablet Take 2 tablets (2,000 Units total) by mouth daily 10 tablet 0   • Empagliflozin (Jardiance) 10 MG TABS Take 1 tablet (10 mg total) by mouth every morning 90 tablet 1   • Entresto 49-51 MG TABS TAKE 1 TABLET BY MOUTH TWICE A DAY 30 tablet 3   • furosemide (LASIX) 40 mg tablet TAKE 1 TABLET DAILY ON MONDAYS, WEDNESDAYS, AND FRIDAYS (Patient taking differently: daily) 30 tablet 3   • glucose blood (OneTouch Verio) test strip Check blood sugars three times daily  Please substitute with appropriate alternative as covered by patient's insurance   Dx: E11 65 300 each 3   • insulin glargine (Lantus) 100 units/mL subcutaneous injection Inject 10 Units under the skin daily at bedtime 15 mL 0   • Insulin Pen Needle (BD Pen Needle Kateryna U/F) 32G X 4 MM MISC Use daily as directed with insulin pen 100 each 3   • metFORMIN (GLUCOPHAGE) 1000 MG tablet Take 1 tablet (1,000 mg total) by mouth 2 (two) times a day with meals 180 tablet 1   • metoprolol succinate (TOPROL-XL) 50 mg 24 hr tablet Take 1 tablet (50 mg total) by mouth 2 (two) times a day 180 tablet 3   • nicotine (NICODERM CQ) 14 mg/24hr TD 24 hr patch Place 1 patch on the skin every 24 hours 42 patch 0   • nicotine (NICODERM CQ) 7 mg/24hr TD 24 hr patch Place 1 patch on the skin every 24 hours 14 patch 0   • nystatin-triamcinolone (MYCOLOG-II) ointment Apply topically 2 (two) times a day To vaginal area 60 g 1   • OneTouch Delica Lancets 18O MISC Check blood sugars three times daily  Please substitute with appropriate alternative as covered by patient's insurance  Dx: E11 65 300 each 3   • Flovent  MCG/ACT inhaler  (Patient not taking: Reported on 1/10/2023)     • nicotine (NICODERM CQ) 14 mg/24hr TD 24 hr patch Place 1 patch on the skin every 24 hours (Patient not taking: Reported on 12/27/2022) 28 patch 3     No current facility-administered medications for this visit  Objective:    /86   Pulse 66   Temp (!) 96 9 °F (36 1 °C)   Resp 18   Ht 5' 2 5" (1 588 m)   Wt 58 8 kg (129 lb 9 6 oz)   LMP  (LMP Unknown)   BMI 23 33 kg/m²        Physical Exam  Vitals and nursing note reviewed  Constitutional:       General: She is not in acute distress  Appearance: She is well-developed  She is not ill-appearing  HENT:      Head: Normocephalic  Right Ear: Tympanic membrane normal       Left Ear: Tympanic membrane normal    Eyes:      Conjunctiva/sclera: Conjunctivae normal    Cardiovascular:      Rate and Rhythm: Normal rate and regular rhythm  Heart sounds: No murmur heard  No gallop  Pulmonary:      Effort: Pulmonary effort is normal  No respiratory distress  Breath sounds: No wheezing  Abdominal:      General: There is no distension  Palpations: Abdomen is soft  Tenderness: There is no abdominal tenderness  Musculoskeletal:         General: No deformity  Cervical back: Neck supple  Right lower leg: Edema present  Left lower leg: Edema present  Comments: No homans   Skin:     General: Skin is warm and dry  Coloration: Skin is not jaundiced or pale  Neurological:      Mental Status: She is alert  Motor: No weakness  Gait: Gait normal    Psychiatric:         Mood and Affect: Mood normal          Behavior: Behavior normal          Thought Content:  Thought content normal                 Adan Devine DO

## 2023-01-10 NOTE — PATIENT INSTRUCTIONS
Please make you contact your endocrinologist soon to make and appointment for your uncontrolled type 2 Diabetes which needs attention to avoid complications

## 2023-01-18 ENCOUNTER — APPOINTMENT (OUTPATIENT)
Dept: LAB | Facility: HOSPITAL | Age: 68
End: 2023-01-18

## 2023-01-18 DIAGNOSIS — E78.5 HYPERLIPIDEMIA LDL GOAL <100: ICD-10-CM

## 2023-01-18 DIAGNOSIS — E11.29 TYPE 2 DIABETES MELLITUS WITH OTHER DIABETIC KIDNEY COMPLICATION, WITHOUT LONG-TERM CURRENT USE OF INSULIN (HCC): ICD-10-CM

## 2023-01-18 LAB
ALBUMIN SERPL BCP-MCNC: 2.9 G/DL (ref 3.5–5)
ALP SERPL-CCNC: 351 U/L (ref 34–104)
ALT SERPL W P-5'-P-CCNC: 40 U/L (ref 7–52)
ANION GAP SERPL CALCULATED.3IONS-SCNC: 9 MMOL/L (ref 4–13)
AST SERPL W P-5'-P-CCNC: 29 U/L (ref 13–39)
BILIRUB SERPL-MCNC: 1.02 MG/DL (ref 0.2–1)
BUN SERPL-MCNC: 13 MG/DL (ref 5–25)
CALCIUM ALBUM COR SERPL-MCNC: 9.9 MG/DL (ref 8.3–10.1)
CALCIUM SERPL-MCNC: 9 MG/DL (ref 8.4–10.2)
CHLORIDE SERPL-SCNC: 100 MMOL/L (ref 96–108)
CHOLEST SERPL-MCNC: 163 MG/DL
CO2 SERPL-SCNC: 28 MMOL/L (ref 21–32)
CREAT SERPL-MCNC: 0.98 MG/DL (ref 0.6–1.3)
GFR SERPL CREATININE-BSD FRML MDRD: 59 ML/MIN/1.73SQ M
GLUCOSE P FAST SERPL-MCNC: 319 MG/DL (ref 65–99)
HDLC SERPL-MCNC: 73 MG/DL
LDLC SERPL CALC-MCNC: 76 MG/DL (ref 0–100)
POTASSIUM SERPL-SCNC: 3.5 MMOL/L (ref 3.5–5.3)
PROT SERPL-MCNC: 6.9 G/DL (ref 6.4–8.4)
SODIUM SERPL-SCNC: 137 MMOL/L (ref 135–147)
TRIGL SERPL-MCNC: 72 MG/DL

## 2023-02-07 ENCOUNTER — REMOTE DEVICE CLINIC VISIT (OUTPATIENT)
Dept: CARDIOLOGY CLINIC | Facility: CLINIC | Age: 68
End: 2023-02-07

## 2023-02-07 DIAGNOSIS — Z95.810 AICD (AUTOMATIC CARDIOVERTER/DEFIBRILLATOR) PRESENT: Primary | ICD-10-CM

## 2023-02-07 NOTE — PROGRESS NOTES
Results for orders placed or performed in visit on 02/07/23   Cardiac EP device report    Narrative    MDT DUAL ICD/ ACTIVE SYSTEM IS MRI CONDITIONAL  CARELINK TRANSMISSION - OPTI-VOL ONLY: OPTI-VOL FLUID THRESHOLD CROSSED ON 12/8/22 & ONGOING  PT ON FUROSEMIDE  WILL RECHECK IN 1 MONTH  TASKED HF-RN  BATTERY VOLTAGE ADEQUATE (10 4 YRS)  AP-4%, <0 1%  ALL AVAILABLE LEAD PARAMETERS WITHIN NORMAL LIMITS  NO SIGNIFICANT HIGH RATE EPISODES  NORMAL DEVICE FUNCTION   GV

## 2023-02-09 ENCOUNTER — TELEPHONE (OUTPATIENT)
Dept: CARDIOLOGY CLINIC | Facility: CLINIC | Age: 68
End: 2023-02-09

## 2023-02-09 NOTE — TELEPHONE ENCOUNTER
Abdirahman Arnett RN  Gianni Hernandez RN  FYI:     OPTI-VOL FLUID THRESHOLD CROSSED ON 12/8/22 & ONGOING  PT ON FUROSEMIDE  WILL RECHECK IN 1 MONTH

## 2023-02-10 NOTE — TELEPHONE ENCOUNTER
Spoke with pt, she does not weigh herself at all  Encouraged she weigh herself daily and let us know when wt is up 3 lbs in a day or 5 lbs in a week or <  She said the edema in the blle has not resolved at all, she does get sob with exertion and wakes her up at night at times  she feels her urination has decreased, but does have urgency at times  Denies CP/ Tightness, increase fatigue or cough  She follows low salt diet and fluid restriction  Taking: Lasix 20 mg qd  - she was supposed to be taking 40 mg qd, not sure what happened  1/18/23  Cr 0 98(1 19 on 12/7/22),  K- 3 5(3 9), bun 13(13) last HBG- 15 4 on 12/7/22    Will give appt to be seen      Please advise

## 2023-02-13 ENCOUNTER — TELEPHONE (OUTPATIENT)
Dept: CARDIOLOGY CLINIC | Facility: CLINIC | Age: 68
End: 2023-02-13

## 2023-02-13 DIAGNOSIS — I50.42 CHRONIC COMBINED SYSTOLIC AND DIASTOLIC CONGESTIVE HEART FAILURE (HCC): Primary | ICD-10-CM

## 2023-02-13 DIAGNOSIS — I48.91 ATRIAL FIBRILLATION, UNSPECIFIED TYPE (HCC): Primary | ICD-10-CM

## 2023-02-13 DIAGNOSIS — I50.32 CHRONIC DIASTOLIC CONGESTIVE HEART FAILURE (HCC): ICD-10-CM

## 2023-02-13 RX ORDER — FUROSEMIDE 40 MG/1
TABLET ORAL
Qty: 90 TABLET | Refills: 1 | Status: SHIPPED | OUTPATIENT
Start: 2023-02-13

## 2023-02-13 NOTE — TELEPHONE ENCOUNTER
MD Fei Ferreira, RN  Since you've been in contact with her about the Opti-Vol index crossing and increasing the lasix to 40mg, please also call her to inform her that I have put in Eliquis 5mg BID to her pharmacy because her device interrogation revealed new onset afib   If she needs an explanation regarding afib, please have her come in for an appointment to see me tomorrow at 2pm at [facility]

## 2023-02-13 NOTE — TELEPHONE ENCOUNTER
Have her increase back to 40mg daily until she's seen  Complete another BMP right before her appointment

## 2023-02-16 ENCOUNTER — TELEPHONE (OUTPATIENT)
Dept: CARDIOLOGY CLINIC | Facility: CLINIC | Age: 68
End: 2023-02-16

## 2023-02-16 NOTE — TELEPHONE ENCOUNTER
Spoke with pt and made her aware her appt is 3/1/23 at 11:00 with Dr Trista Bennett at [facility]  Verbally understood

## 2023-02-17 ENCOUNTER — TELEPHONE (OUTPATIENT)
Dept: OTHER | Facility: OTHER | Age: 68
End: 2023-02-17

## 2023-02-17 NOTE — TELEPHONE ENCOUNTER
[facility] called to follow up on refill request for diabetic supplies  Could call to request, or send script

## 2023-02-17 NOTE — TELEPHONE ENCOUNTER
S/w Shirley Meckel from the pharmacy stated is called about a follow up on a refill request  Please call back to discuss

## 2023-02-21 DIAGNOSIS — I50.41 ACUTE COMBINED SYSTOLIC AND DIASTOLIC CONGESTIVE HEART FAILURE (HCC): ICD-10-CM

## 2023-02-23 RX ORDER — METOPROLOL SUCCINATE 50 MG/1
50 TABLET, EXTENDED RELEASE ORAL 2 TIMES DAILY
Qty: 180 TABLET | Refills: 3 | Status: SHIPPED | OUTPATIENT
Start: 2023-02-23 | End: 2023-03-01 | Stop reason: SDUPTHER

## 2023-03-01 ENCOUNTER — OFFICE VISIT (OUTPATIENT)
Dept: CARDIOLOGY CLINIC | Facility: CLINIC | Age: 68
End: 2023-03-01

## 2023-03-01 ENCOUNTER — APPOINTMENT (OUTPATIENT)
Dept: LAB | Facility: CLINIC | Age: 68
End: 2023-03-01

## 2023-03-01 VITALS
WEIGHT: 131.6 LBS | OXYGEN SATURATION: 95 % | BODY MASS INDEX: 23.32 KG/M2 | HEART RATE: 78 BPM | HEIGHT: 63 IN | DIASTOLIC BLOOD PRESSURE: 82 MMHG | SYSTOLIC BLOOD PRESSURE: 158 MMHG

## 2023-03-01 DIAGNOSIS — I47.1 SVT (SUPRAVENTRICULAR TACHYCARDIA) (HCC): Primary | ICD-10-CM

## 2023-03-01 DIAGNOSIS — I25.10 CORONARY ARTERY DISEASE INVOLVING NATIVE CORONARY ARTERY OF NATIVE HEART WITHOUT ANGINA PECTORIS: ICD-10-CM

## 2023-03-01 DIAGNOSIS — I42.8 NICM (NONISCHEMIC CARDIOMYOPATHY) (HCC): ICD-10-CM

## 2023-03-01 DIAGNOSIS — I74.3 EMBOLISM AND THROMBOSIS OF ARTERIES OF THE LOWER EXTREMITIES (HCC): ICD-10-CM

## 2023-03-01 DIAGNOSIS — I48.0 PAF (PAROXYSMAL ATRIAL FIBRILLATION) (HCC): ICD-10-CM

## 2023-03-01 DIAGNOSIS — I50.1 HEART FAILURE, LEFT, WITH LVEF <=30% (HCC): ICD-10-CM

## 2023-03-01 DIAGNOSIS — I50.42 CHRONIC COMBINED SYSTOLIC AND DIASTOLIC CONGESTIVE HEART FAILURE (HCC): ICD-10-CM

## 2023-03-01 DIAGNOSIS — I50.41 ACUTE COMBINED SYSTOLIC AND DIASTOLIC CONGESTIVE HEART FAILURE (HCC): ICD-10-CM

## 2023-03-01 DIAGNOSIS — E78.5 HYPERLIPIDEMIA LDL GOAL <100: ICD-10-CM

## 2023-03-01 DIAGNOSIS — I10 PRIMARY HYPERTENSION: ICD-10-CM

## 2023-03-01 LAB
ANION GAP SERPL CALCULATED.3IONS-SCNC: 6 MMOL/L (ref 4–13)
BUN SERPL-MCNC: 13 MG/DL (ref 5–25)
CALCIUM SERPL-MCNC: 8.7 MG/DL (ref 8.4–10.2)
CHLORIDE SERPL-SCNC: 100 MMOL/L (ref 96–108)
CO2 SERPL-SCNC: 32 MMOL/L (ref 21–32)
CREAT SERPL-MCNC: 1.01 MG/DL (ref 0.6–1.3)
GFR SERPL CREATININE-BSD FRML MDRD: 56 ML/MIN/1.73SQ M
GLUCOSE SERPL-MCNC: 303 MG/DL (ref 65–140)
POTASSIUM SERPL-SCNC: 3.3 MMOL/L (ref 3.5–5.3)
SODIUM SERPL-SCNC: 138 MMOL/L (ref 135–147)

## 2023-03-01 RX ORDER — METOPROLOL SUCCINATE 50 MG/1
75 TABLET, EXTENDED RELEASE ORAL 2 TIMES DAILY
Qty: 180 TABLET | Refills: 3 | Status: SHIPPED | OUTPATIENT
Start: 2023-03-01

## 2023-03-01 NOTE — PROGRESS NOTES
Cardiology Follow Up    Isaías Waterman  1955  [id number]  [facility] [address]  [address] [address] [phone]    1  SVT (supraventricular tachycardia) ([facility] )        2  Primary hypertension        3  PAF (paroxysmal atrial fibrillation) ([facility] )        4  NICM (nonischemic cardiomyopathy) ([facility] )        5  Heart failure, left, with LVEF <=30% ([facility] )        6  Coronary artery disease involving native coronary artery of native heart without angina pectoris        7  Chronic combined systolic and diastolic congestive heart failure ([place] [address] )        8  Hyperlipidemia LDL goal <100        9  Acute combined systolic and diastolic congestive heart failure [facility])          Chief Complaint   Patient presents with   • Follow-up     Follow up    • Shortness of Breath     On exertion and at rest    • Ankle Swelling     R ankle    • Leg Swelling     R leg        Interval History: Patient has increased SOB with exertion with some weight gain  No reported chest pain, shortness of breath, palpitations, lightheadedness, syncope, LE edema, orthopnea, PND  Patient remains active without any increased fatigue out of the ordinary           Patient Active Problem List   Diagnosis   • History of left breast cancer   • Arthropathy   • Hypertensive heart and kidney disease with heart failure and with chronic kidney disease stage III ([facility] )   • Colon, diverticulosis   • Hyperlipidemia LDL goal <100   • Type 2 diabetes mellitus with stage 3a chronic kidney disease, without long-term current use of insulin ([facility] )   • Screening for cardiovascular, respiratory, and genitourinary diseases   • Immunization due   • Colon cancer screening   • Menopause   • Gastroesophageal reflux disease   • Microalbuminuria   • Medicare annual wellness visit, subsequent   • Obstructive sleep apnea syndrome   • Hypercalcemia   • CKD stage 3 due to type 2 diabetes mellitus ([place] [address] )   • Hyperparathyroidism ([place] [address] )   • Coronary artery disease involving native coronary artery of native heart without angina pectoris   • Chronic combined systolic and diastolic congestive heart failure (HCC)   • Vitamin D deficiency   • S/P left mastectomy   • Breast cancer ([place] [address] ) - left 1994   • Primary hypertension   • Smoking   • Back pain   • Chronic diastolic congestive heart failure (HCC)   • Bilateral leg edema   • Breast cancer screening by mammogram   • NICM (nonischemic cardiomyopathy) ([place] [address] )   • Peripheral arterial disease ([place] [address] )   • Trigger finger of right thumb   • Venous insufficiency of both lower extremities   • Heart failure, left, with LVEF <=30% ([facility])   • SVT (supraventricular tachycardia) ([facility])   • Tobacco use   • Vulvar itching   • PAF (paroxysmal atrial fibrillation) ([place] [address] )     Past Medical History:   Diagnosis Date   • Breast cancer ([place] [address] )     left - 1994     • Diabetes mellitus ([place] [address] )    • Diabetes mellitus, type 2 ([place] [address] ) 03/15/2006    last assessed 7/10/13   • GERD (gastroesophageal reflux disease)    • History of chemotherapy    • Hypertension      Social History     Socioeconomic History   • Marital status: Single     Spouse name: Not on file   • Number of children: Not on file   • Years of education: Not on file   • Highest education level: Not on file   Occupational History   • Not on file   Tobacco Use   • Smoking status: Every Day     Packs/day: 0 50     Types: Cigarettes   • Smokeless tobacco: Never   • Tobacco comments:     3 cigarettes daily    Vaping Use   • Vaping Use: Never used   Substance and Sexual Activity   • Alcohol use: Not Currently     Comment: Social   • Drug use: No   • Sexual activity: Yes     Partners: Male   Other Topics Concern   • Not on file   Social History Narrative   • Not on file     Social Determinants of Health     Financial Resource Strain: Low Risk    • Difficulty of Paying Living Expenses: Not very hard   Food Insecurity: Not on file Transportation Needs: No Transportation Needs   • Lack of Transportation (Medical): No   • Lack of Transportation (Non-Medical): No   Physical Activity: Not on file   Stress: Not on file   Social Connections: Not on file   Intimate Partner Violence: Not on file   Housing Stability: Not on file      Family History   Problem Relation Age of Onset   • Hypothyroidism Mother    • Leukemia Mother    • Diabetes Father    • Diabetes type II Father    • Stroke Sister    • Diabetes Paternal Grandmother    • Cancer Sister    • Diabetes Brother      Past Surgical History:   Procedure Laterality Date   • CARDIAC ELECTROPHYSIOLOGY PROCEDURE N/A 3/9/2022    Procedure: Cardiac icd implant/ DUAL CHAMBER ICD; Surgeon: Autumn Giles MD;  Location: [facility]; Service: Cardiology   • MASTECTOMY Left     last assessed 12/16/14   • MASTECTOMY, RADICAL Left     1994   • MN PARATHYROIDECTOMY/EXPLORATION PARATHYROIDS N/A 4/21/2021    Procedure: PARATHYROIDECTOMY POSSIBLE 4 GLAND EXPLORATION;  Surgeon: Bo Aguilera MD;  Location: [facility];  Service: ENT   • REDUCTION MAMMAPLASTY Right    • UVULECTOMY         Current Outpatient Medications:   •  apixaban (Eliquis) 5 mg, Take 1 tablet (5 mg total) by mouth 2 (two) times a day, Disp: 60 tablet, Rfl: 3  •  aspirin (ECOTRIN LOW STRENGTH) 81 mg EC tablet, Take 1 tablet (81 mg total) by mouth daily, Disp: 90 tablet, Rfl: 3  •  atorvastatin (LIPITOR) 40 mg tablet, Take 1 tablet (40 mg total) by mouth daily, Disp: 90 tablet, Rfl: 1  •  Blood Glucose Monitoring Suppl (OneTouch Verio Reflect) w/Device KIT, Check blood sugars three times daily  Please substitute with appropriate alternative as covered by patient's insurance   Dx: E11 65, Disp: 1 kit, Rfl: 0  •  cholecalciferol (VITAMIN D3) 1,000 units tablet, Take 2 tablets (2,000 Units total) by mouth daily, Disp: 10 tablet, Rfl: 0  •  Entresto 49-51 MG TABS, TAKE 1 TABLET BY MOUTH TWICE A DAY, Disp: 60 tablet, Rfl: 6  •  furosemide (LASIX) 40 mg tablet, TAKE 1 TABLET DAILY ON MONDAYS, WEDNESDAYS, AND FRIDAYS, Disp: 90 tablet, Rfl: 1  •  glucose blood (OneTouch Verio) test strip, Check blood sugars three times daily  Please substitute with appropriate alternative as covered by patient's insurance  Dx: E11 65, Disp: 300 each, Rfl: 3  •  insulin glargine (Lantus) 100 units/mL subcutaneous injection, Inject 10 Units under the skin daily at bedtime, Disp: 15 mL, Rfl: 0  •  Insulin Pen Needle (BD Pen Needle Kateryna U/F) 32G X 4 MM MISC, Use daily as directed with insulin pen, Disp: 100 each, Rfl: 3  •  metFORMIN (GLUCOPHAGE) 1000 MG tablet, Take 1 tablet (1,000 mg total) by mouth 2 (two) times a day with meals, Disp: 180 tablet, Rfl: 1  •  metoprolol succinate (TOPROL-XL) 50 mg 24 hr tablet, Take 1 tablet (50 mg total) by mouth 2 (two) times a day, Disp: 180 tablet, Rfl: 3  •  nicotine (NICODERM CQ) 14 mg/24hr TD 24 hr patch, Place 1 patch on the skin every 24 hours, Disp: 42 patch, Rfl: 0  •  nystatin-triamcinolone (MYCOLOG-II) ointment, Apply topically 2 (two) times a day To vaginal area, Disp: 60 g, Rfl: 1  •  OneTouch Delica Lancets 63K MISC, Check blood sugars three times daily  Please substitute with appropriate alternative as covered by patient's insurance   Dx: E11 65, Disp: 300 each, Rfl: 3  •  albuterol (Ventolin HFA) 90 mcg/act inhaler, Inhale 2 puffs every 6 (six) hours as needed for wheezing (rinse mouth after use) (Patient not taking: Reported on 3/1/2023), Disp: 18 g, Rfl: 1  •  Empagliflozin (Jardiance) 10 MG TABS, Take 1 tablet (10 mg total) by mouth every morning, Disp: 90 tablet, Rfl: 1  •  Flovent  MCG/ACT inhaler, , Disp: , Rfl:   •  nicotine (NICODERM CQ) 14 mg/24hr TD 24 hr patch, Place 1 patch on the skin every 24 hours (Patient not taking: Reported on 12/27/2022), Disp: 28 patch, Rfl: 3  •  nicotine (NICODERM CQ) 7 mg/24hr TD 24 hr patch, Place 1 patch on the skin every 24 hours (Patient not taking: Reported on 3/1/2023), Disp: 14 patch, Rfl: 0  No Known Allergies    Labs:  Appointment on 01/18/2023   Component Date Value   • Sodium 01/18/2023 137    • Potassium 01/18/2023 3 5    • Chloride 01/18/2023 100    • CO2 01/18/2023 28    • ANION GAP 01/18/2023 9    • BUN 01/18/2023 13    • Creatinine 01/18/2023 0 98    • Glucose, Fasting 01/18/2023 319 (H)    • Calcium 01/18/2023 9 0    • Corrected Calcium 01/18/2023 9 9    • AST 01/18/2023 29    • ALT 01/18/2023 40    • Alkaline Phosphatase 01/18/2023 351 (H)    • Total Protein 01/18/2023 6 9    • Albumin 01/18/2023 2 9 (L)    • Total Bilirubin 01/18/2023 1 02 (H)    • eGFR 01/18/2023 59    • Cholesterol 01/18/2023 163    • Triglycerides 01/18/2023 72    • HDL, Direct 01/18/2023 73    • LDL Calculated 01/18/2023 76    Admission on 12/07/2022, Discharged on 12/07/2022   Component Date Value   • POC Glucose 12/07/2022 >500 (HH)    • Ventricular Rate 12/07/2022 84    • Atrial Rate 12/07/2022 84    • OH Interval 12/07/2022 150    • QRSD Interval 12/07/2022 104    • QT Interval 12/07/2022 376    • QTC Interval 12/07/2022 444    • P Axis 12/07/2022 -44    • QRS Axis 12/07/2022 -65    • T Wave Axis 12/07/2022 199    • WBC 12/07/2022 6 12    • RBC 12/07/2022 5 29 (H)    • Hemoglobin 12/07/2022 15 4    • Hematocrit 12/07/2022 46 8 (H)    • MCV 12/07/2022 89    • MCH 12/07/2022 29 1    • MCHC 12/07/2022 32 9    • RDW 12/07/2022 13 7    • MPV 12/07/2022 13 1 (H)    • Platelets 87/42/1357 168    • nRBC 12/07/2022 0    • Neutrophils Relative 12/07/2022 72    • Immat GRANS % 12/07/2022 0    • Lymphocytes Relative 12/07/2022 17    • Monocytes Relative 12/07/2022 11    • Eosinophils Relative 12/07/2022 0    • Basophils Relative 12/07/2022 0    • Neutrophils Absolute 12/07/2022 4 39    • Immature Grans Absolute 12/07/2022 0 02    • Lymphocytes Absolute 12/07/2022 1 05    • Monocytes Absolute 12/07/2022 0 64    • Eosinophils Absolute 12/07/2022 0 00    • Basophils Absolute 12/07/2022 0 02    • Sodium 12/07/2022 130 (L)    • Potassium 12/07/2022 3 9    • Chloride 12/07/2022 95 (L)    • CO2 12/07/2022 26    • ANION GAP 12/07/2022 9    • BUN 12/07/2022 13    • Creatinine 12/07/2022 1 19    • Glucose 12/07/2022 482 (H)    • Calcium 12/07/2022 8 5    • Corrected Calcium 12/07/2022 9 6    • AST 12/07/2022 19    • ALT 12/07/2022 13    • Alkaline Phosphatase 12/07/2022 196 (H)    • Total Protein 12/07/2022 6 6    • Albumin 12/07/2022 2 6 (L)    • Total Bilirubin 12/07/2022 1 05 (H)    • eGFR 12/07/2022 46    • pH, Seng 12/07/2022 7  394    • pCO2, Seng 12/07/2022 39 2 (L)    • pO2, Seng 12/07/2022 39 6    • HCO3, Seng 12/07/2022 23 4 (L)    • Base Excess, Seng 12/07/2022 -1 2    • O2 Content, Seng 12/07/2022 15 8    • O2 HGB, VENOUS 12/07/2022 71 1    • BETA-HYDROXYBUTYRATE 12/07/2022 0 4    • hs TnI 0hr 12/07/2022 27    • Phosphorus 12/07/2022 2 9    • Magnesium 12/07/2022 1 5 (L)    • SARS-CoV-2 12/07/2022 Negative    • INFLUENZA A PCR 12/07/2022 Negative    • INFLUENZA B PCR 12/07/2022 Negative    • RSV PCR 12/07/2022 Negative    • POC Glucose 12/07/2022 397 (H)      Lab Results   Component Value Date    CHOL 190 10/07/2016    TRIG 72 01/18/2023    TRIG 100 10/07/2016    HDL 73 01/18/2023    HDL 62 10/07/2016     Imaging: Mammo screening right w 3d & cad    Result Date: 11/16/2021  Narrative: DIAGNOSIS: Breast cancer screening by mammogram TECHNIQUE: Digital screening mammography was performed  Computer Aided Detection (CAD) analyzed all applicable images  COMPARISONS: Prior breast imaging dated: 05/18/2020, 11/06/2018, and 02/23/2016 RELEVANT HISTORY: Family Breast Cancer History: No known family history of breast cancer  Family Medical History: No known relevant family medical history  Personal History: No known relevant hormone history  Surgical history includes breast reduction and mastectomy  Medical history includes breast cancer and history of chemotherapy   The patient is scheduled in a reminder system for screening mammography  8-10% of cancers will be missed on mammography  Management of a palpable abnormality must be based on clinical grounds  Patients will be notified of their results via letter from our facility  Accredited by [employer] of [employer] and FDA  RISK ASSESSMENT: Janene risk assessment reporting was suppressed due to the patient's history and/or demographic factors  TISSUE DENSITY: There are scattered areas of fibroglandular density  INDICATION: Abe Mayer is a 74 y o  female presenting for screening mammography  FINDINGS: There are no suspicious masses, grouped microcalcifications or areas of architectural distortion  The skin and nipple areolar complex are unremarkable  Impression: No mammographic evidence of malignancy  ASSESSMENT/BI-RADS CATEGORY: Right: 1 - Negative Overall: 1 - Negative RECOMMENDATION:      - Routine screening mammogram in 1 year for the right breast  Workstation ID: [id number]       Review of Systems:  Review of Systems   Constitutional: Negative for activity change, appetite change, chills, diaphoresis, fatigue and unexpected weight change  HENT: Negative for hearing loss, nosebleeds and sore throat  Eyes: Negative for photophobia and visual disturbance  Respiratory: Positive for shortness of breath  Negative for cough, chest tightness and wheezing  Cardiovascular: Positive for leg swelling  Negative for chest pain and palpitations  Gastrointestinal: Negative for abdominal pain, diarrhea, nausea and vomiting  Endocrine: Negative for polyuria  Genitourinary: Negative for dysuria, frequency and hematuria  Musculoskeletal: Negative for arthralgias, back pain, gait problem and neck pain  Skin: Negative for pallor and rash  Neurological: Negative for dizziness, syncope and headaches  Hematological: Does not bruise/bleed easily  Psychiatric/Behavioral: Negative for behavioral problems and confusion         Physical Exam:  Physical Exam  Vitals reviewed  Constitutional:       Appearance: She is well-developed  She is not diaphoretic  HENT:      Head: Normocephalic and atraumatic  Nose: Nose normal    Eyes:      General: No scleral icterus  Pupils: Pupils are equal, round, and reactive to light  Neck:      Vascular: No JVD  Cardiovascular:      Rate and Rhythm: Normal rate and regular rhythm  Heart sounds: Normal heart sounds  No murmur heard  No friction rub  No gallop  Pulmonary:      Effort: Pulmonary effort is normal  No respiratory distress  Breath sounds: Normal breath sounds  No wheezing or rales  Abdominal:      General: Bowel sounds are normal  There is no distension  Palpations: Abdomen is soft  Tenderness: There is no abdominal tenderness  Musculoskeletal:         General: No deformity  Normal range of motion  Cervical back: Normal range of motion and neck supple  Right lower leg: Edema present  Left lower leg: Edema present  Skin:     General: Skin is warm and dry  Findings: No rash  Neurological:      Mental Status: She is alert and oriented to person, place, and time  Cranial Nerves: No cranial nerve deficit  Psychiatric:         Behavior: Behavior normal        Blood pressure 158/82, pulse 78, height 5' 2 5" (1 588 m), weight 59 7 kg (131 lb 9 6 oz), SpO2 95 %, not currently breastfeeding  EKG:  Normal sinus rhythm  Left axis deviation  Incomplete right bundle branch block  Nonspecific ST and T wave abnormality  Abnormal ECG    Discussion/Summary:  NICM: mild reduction/low normal EF when hospitalized for CHF  Repeat echo revealed significantly reduced EF of 25-30%  Continued on ACE-I and BB for GDMT  Low dose diuretic as maintenance continued at M-W-F schedule  s/p ICD placement and doing well  Most recent device interrogation revealed crossing of OptiVol fluid threshold  Increase Lasix to 40 mg to help with volume removal going forward      CAD: nonobstructive as per cath in Nov 2020  Continued on ASA, statin, and B-blocker  Eventual repeat ischemic eval, no current symptoms to suggest active disease  Continue on aspirin, statin, beta-blocker  Currently asymptomatic from this standpoint  HTN:  Was improving at last visit, changed lisinopril to Entresto at prior visit and tolerating well  Likely with elevated blood pressure today with increased volume  Will continue Lasix at higher dose of 40 mg daily to help with volume removal and hopefully blood pressure management  Will also increase Toprol to 75mg BID to help with rate control of SVT/Afib episodes seen on device interrogation  HLD: continued on statin  LDL 65 in March 2021  Repeat levels well controlled with LDL of 76 in Jan 2023  Claudication: fatigue in legs with exertion  We checked arterial duplex revealing significant LE arterial disease - referred to vascular surgery for further management  They recommended continued monitoring and ambulation with continued aspirin and statin  Afib: paroxysmal and found on device interrogation  Will increase Toprol for improved heart rate control to 75mg BID and have started on Eliquis 5mg BID for AC

## 2023-03-10 ENCOUNTER — REMOTE DEVICE CLINIC VISIT (OUTPATIENT)
Dept: CARDIOLOGY CLINIC | Facility: CLINIC | Age: 68
End: 2023-03-10

## 2023-03-10 ENCOUNTER — TELEPHONE (OUTPATIENT)
Dept: CARDIOLOGY CLINIC | Facility: CLINIC | Age: 68
End: 2023-03-10

## 2023-03-10 DIAGNOSIS — Z95.810 AICD (AUTOMATIC CARDIOVERTER/DEFIBRILLATOR) PRESENT: Primary | ICD-10-CM

## 2023-03-10 NOTE — PROGRESS NOTES
Results for orders placed or performed in visit on 03/10/23   Cardiac EP device report    Narrative    MDT DUAL ICD/ ACTIVE SYSTEM IS MRI CONDITIONAL  CARELINK TRANSMISSION - OPTI-VOL ONLY: OPTI-VOL FLUID THRESHOLD REMAINS CROSSED SINCE 12/8/22 & ONGOING  PATIENT TAKES FUROSEMIDE; TASK TO CHF TEAM; RECHECK IN 1 MOS  BATTERY VOLTAGE ADEQUATE (10 3 YRS)  AP 7 6%  0 3% (AAI-DDD 60 PPM); ALL AVAILABLE LEAD PARAMETERS WITHIN NORMAL LIMITS  NO NEW SIGNIFICANT HIGH RATE OR AF EPISODES  EF 25-30% (1/2022 ECHO); PATIENT ALSO TAKES ELIQUIS, ASA 81, METOPROLOL SUCC  NORMAL DEVICE FUNCTION    ES

## 2023-03-10 NOTE — TELEPHONE ENCOUNTER
Optivol remains crossed & ongoing since 12/8/22 ; + furosemide; recheck 4/14/23  Thxs Faith Fiddler TRANSMISSION - OPTI-VOL ONLY: OPTI-VOL FLUID THRESHOLD REMAINS CROSSED SINCE 12/8/22 & ONGOING  PATIENT TAKES FUROSEMIDE; TASK TO CHF TEAM; RECHECK IN 1 MOS  BATTERY VOLTAGE ADEQUATE (10 3 YRS)  AP 7 6%  0 3% (AAI-DDD 60 PPM); ALL AVAILABLE LEAD PARAMETERS WITHIN NORMAL LIMITS  NO NEW SIGNIFICANT HIGH RATE OR AF EPISODES  EF 25-30% (1/2022 ECHO); PATIENT ALSO TAKES ELIQUIS, ASA 81, METOPROLOL SUCC   NORMAL DEVICE FUNCTION   ES

## 2023-03-11 DIAGNOSIS — I25.10 CORONARY ARTERY DISEASE INVOLVING NATIVE CORONARY ARTERY OF NATIVE HEART WITHOUT ANGINA PECTORIS: ICD-10-CM

## 2023-03-13 DIAGNOSIS — I50.42 CHRONIC COMBINED SYSTOLIC AND DIASTOLIC CONGESTIVE HEART FAILURE (HCC): ICD-10-CM

## 2023-03-13 DIAGNOSIS — I42.8 NICM (NONISCHEMIC CARDIOMYOPATHY) (HCC): ICD-10-CM

## 2023-03-13 RX ORDER — ASPIRIN 81 MG/1
TABLET, COATED ORAL
Qty: 90 TABLET | Refills: 3 | Status: SHIPPED | OUTPATIENT
Start: 2023-03-13

## 2023-03-15 ENCOUNTER — TELEPHONE (OUTPATIENT)
Dept: CARDIOLOGY CLINIC | Facility: CLINIC | Age: 68
End: 2023-03-15

## 2023-03-15 RX ORDER — SACUBITRIL AND VALSARTAN 49; 51 MG/1; MG/1
1 TABLET, FILM COATED ORAL 2 TIMES DAILY
Qty: 90 TABLET | Refills: 3 | Status: SHIPPED | OUTPATIENT
Start: 2023-03-15

## 2023-03-25 DIAGNOSIS — I50.41 ACUTE COMBINED SYSTOLIC AND DIASTOLIC CONGESTIVE HEART FAILURE (HCC): ICD-10-CM

## 2023-03-27 RX ORDER — METOPROLOL SUCCINATE 50 MG/1
TABLET, EXTENDED RELEASE ORAL
Qty: 270 TABLET | Refills: 3 | Status: SHIPPED | OUTPATIENT
Start: 2023-03-27

## 2023-04-07 ENCOUNTER — TELEPHONE (OUTPATIENT)
Dept: CARDIOLOGY CLINIC | Facility: CLINIC | Age: 68
End: 2023-04-07

## 2023-04-07 NOTE — TELEPHONE ENCOUNTER
Pts opti-vol crossed since 12/8 and is ongoing  Pt takes lasix, metoprolol succ, asa 81mg  Eliquis, entresto  EF: 25-30%  Thanks,   ~Radha   NON-BILLABLE CARELINK TRANSMISSION: BATTERY VOLTAGE ADEQUATE (10 2 YRS)  AP: 4 4%  : <0 1% (MVP-ON)  ALL AVAILABLE LEAD PARAMETERS WITHIN NORMAL LIMITS  NO SIGNIFICANT HIGH RATE EPISODES  OPTI-VOL FLUID THRESHOLD CROSSED SINCE 12/8 AND IS ONGOING  PT TAKES LASIX, METOPROLOL SUCC, ASA 81MG, ELIQUIS, ENTRESTO  EF: 25-30%   TASK TO HF TEAM  APPROPRIATELY FUNCTIONING ICD  [facility]

## 2023-04-26 ENCOUNTER — APPOINTMENT (INPATIENT)
Dept: NON INVASIVE DIAGNOSTICS | Facility: HOSPITAL | Age: 68
End: 2023-04-26

## 2023-04-26 ENCOUNTER — HOSPITAL ENCOUNTER (INPATIENT)
Facility: HOSPITAL | Age: 68
LOS: 4 days | Discharge: HOME HEALTH | End: 2023-04-30
Attending: EMERGENCY MEDICINE | Admitting: INTERNAL MEDICINE

## 2023-04-26 ENCOUNTER — APPOINTMENT (EMERGENCY)
Dept: RADIOLOGY | Facility: HOSPITAL | Age: 68
End: 2023-04-26

## 2023-04-26 DIAGNOSIS — I50.9 CHF EXACERBATION (HCC): Primary | ICD-10-CM

## 2023-04-26 DIAGNOSIS — N18.31 TYPE 2 DIABETES MELLITUS WITH STAGE 3A CHRONIC KIDNEY DISEASE, WITH LONG-TERM CURRENT USE OF INSULIN (HCC): ICD-10-CM

## 2023-04-26 DIAGNOSIS — E11.22 TYPE 2 DIABETES MELLITUS WITH STAGE 3A CHRONIC KIDNEY DISEASE, WITH LONG-TERM CURRENT USE OF INSULIN (HCC): ICD-10-CM

## 2023-04-26 DIAGNOSIS — Z79.4 TYPE 2 DIABETES MELLITUS WITH STAGE 3A CHRONIC KIDNEY DISEASE, WITH LONG-TERM CURRENT USE OF INSULIN (HCC): ICD-10-CM

## 2023-04-26 PROBLEM — J90 PLEURAL EFFUSION, BILATERAL: Status: ACTIVE | Noted: 2023-04-26

## 2023-04-26 LAB
2HR DELTA HS TROPONIN: -7 NG/L
4HR DELTA HS TROPONIN: 4 NG/L
ALBUMIN SERPL BCP-MCNC: 2.6 G/DL (ref 3.5–5)
ALP SERPL-CCNC: 210 U/L (ref 34–104)
ALT SERPL W P-5'-P-CCNC: 11 U/L (ref 7–52)
ANION GAP SERPL CALCULATED.3IONS-SCNC: 8 MMOL/L (ref 4–13)
AORTIC ROOT: 3 CM
APICAL FOUR CHAMBER EJECTION FRACTION: 43 %
ASCENDING AORTA: 2.8 CM
AST SERPL W P-5'-P-CCNC: 15 U/L (ref 13–39)
AV REGURGITATION PRESSURE HALF TIME: 370 MS
BASOPHILS # BLD AUTO: 0.01 THOUSANDS/ÂΜL (ref 0–0.1)
BASOPHILS # BLD AUTO: 0.01 THOUSANDS/ÂΜL (ref 0–0.1)
BASOPHILS NFR BLD AUTO: 0 % (ref 0–1)
BASOPHILS NFR BLD AUTO: 0 % (ref 0–1)
BILIRUB SERPL-MCNC: 0.91 MG/DL (ref 0.2–1)
BNP SERPL-MCNC: >4700 PG/ML (ref 0–100)
BUN SERPL-MCNC: 14 MG/DL (ref 5–25)
CALCIUM ALBUM COR SERPL-MCNC: 9.6 MG/DL (ref 8.3–10.1)
CALCIUM SERPL-MCNC: 8.5 MG/DL (ref 8.4–10.2)
CARDIAC TROPONIN I PNL SERPL HS: 24 NG/L
CARDIAC TROPONIN I PNL SERPL HS: 31 NG/L
CARDIAC TROPONIN I PNL SERPL HS: 35 NG/L
CHLORIDE SERPL-SCNC: 102 MMOL/L (ref 96–108)
CO2 SERPL-SCNC: 27 MMOL/L (ref 21–32)
CREAT SERPL-MCNC: 0.98 MG/DL (ref 0.6–1.3)
EOSINOPHIL # BLD AUTO: 0 THOUSAND/ÂΜL (ref 0–0.61)
EOSINOPHIL # BLD AUTO: 0.03 THOUSAND/ÂΜL (ref 0–0.61)
EOSINOPHIL NFR BLD AUTO: 0 % (ref 0–6)
EOSINOPHIL NFR BLD AUTO: 1 % (ref 0–6)
ERYTHROCYTE [DISTWIDTH] IN BLOOD BY AUTOMATED COUNT: 14 % (ref 11.6–15.1)
ERYTHROCYTE [DISTWIDTH] IN BLOOD BY AUTOMATED COUNT: 14.1 % (ref 11.6–15.1)
FRACTIONAL SHORTENING: 14 % (ref 28–44)
GFR SERPL CREATININE-BSD FRML MDRD: 59 ML/MIN/1.73SQ M
GLUCOSE SERPL-MCNC: 223 MG/DL (ref 65–140)
GLUCOSE SERPL-MCNC: 280 MG/DL (ref 65–140)
GLUCOSE SERPL-MCNC: 284 MG/DL (ref 65–140)
GLUCOSE SERPL-MCNC: 330 MG/DL (ref 65–140)
GLUCOSE SERPL-MCNC: 349 MG/DL (ref 65–140)
GLUCOSE SERPL-MCNC: 410 MG/DL (ref 65–140)
HCT VFR BLD AUTO: 48.3 % (ref 34.8–46.1)
HCT VFR BLD AUTO: 49.3 % (ref 34.8–46.1)
HGB BLD-MCNC: 16 G/DL (ref 11.5–15.4)
HGB BLD-MCNC: 16.2 G/DL (ref 11.5–15.4)
IMM GRANULOCYTES # BLD AUTO: 0.02 THOUSAND/UL (ref 0–0.2)
IMM GRANULOCYTES # BLD AUTO: 0.02 THOUSAND/UL (ref 0–0.2)
IMM GRANULOCYTES NFR BLD AUTO: 0 % (ref 0–2)
IMM GRANULOCYTES NFR BLD AUTO: 0 % (ref 0–2)
INTERVENTRICULAR SEPTUM IN DIASTOLE (PARASTERNAL SHORT AXIS VIEW): 0.9 CM
INTERVENTRICULAR SEPTUM: 0.9 CM (ref 0.6–1.1)
LAAS-AP2: 15.2 CM2
LAAS-AP4: 15.7 CM2
LEFT ATRIUM SIZE: 3.6 CM
LEFT INTERNAL DIMENSION IN SYSTOLE: 3.8 CM (ref 2.1–4)
LEFT VENTRICLE DIASTOLIC VOLUME (MOD BIPLANE): 114 ML
LEFT VENTRICLE SYSTOLIC VOLUME (MOD BIPLANE): 71 ML
LEFT VENTRICULAR INTERNAL DIMENSION IN DIASTOLE: 4.4 CM (ref 3.5–6)
LEFT VENTRICULAR POSTERIOR WALL IN END DIASTOLE: 0.9 CM
LEFT VENTRICULAR STROKE VOLUME: 25 ML
LV EF: 38 %
LVSV (TEICH): 25 ML
LYMPHOCYTES # BLD AUTO: 0.46 THOUSANDS/ÂΜL (ref 0.6–4.47)
LYMPHOCYTES # BLD AUTO: 1.99 THOUSANDS/ÂΜL (ref 0.6–4.47)
LYMPHOCYTES NFR BLD AUTO: 35 % (ref 14–44)
LYMPHOCYTES NFR BLD AUTO: 8 % (ref 14–44)
MAGNESIUM SERPL-MCNC: 1.4 MG/DL (ref 1.9–2.7)
MCH RBC QN AUTO: 29.4 PG (ref 26.8–34.3)
MCH RBC QN AUTO: 29.8 PG (ref 26.8–34.3)
MCHC RBC AUTO-ENTMCNC: 32.9 G/DL (ref 31.4–37.4)
MCHC RBC AUTO-ENTMCNC: 33.1 G/DL (ref 31.4–37.4)
MCV RBC AUTO: 90 FL (ref 82–98)
MCV RBC AUTO: 90 FL (ref 82–98)
MONOCYTES # BLD AUTO: 0.1 THOUSAND/ÂΜL (ref 0.17–1.22)
MONOCYTES # BLD AUTO: 0.46 THOUSAND/ÂΜL (ref 0.17–1.22)
MONOCYTES NFR BLD AUTO: 2 % (ref 4–12)
MONOCYTES NFR BLD AUTO: 8 % (ref 4–12)
NEUTROPHILS # BLD AUTO: 3.22 THOUSANDS/ÂΜL (ref 1.85–7.62)
NEUTROPHILS # BLD AUTO: 5.22 THOUSANDS/ÂΜL (ref 1.85–7.62)
NEUTS SEG NFR BLD AUTO: 56 % (ref 43–75)
NEUTS SEG NFR BLD AUTO: 90 % (ref 43–75)
NRBC BLD AUTO-RTO: 0 /100 WBCS
NRBC BLD AUTO-RTO: 0 /100 WBCS
PLATELET # BLD AUTO: 197 THOUSANDS/UL (ref 149–390)
PLATELET # BLD AUTO: 223 THOUSANDS/UL (ref 149–390)
PMV BLD AUTO: 12 FL (ref 8.9–12.7)
PMV BLD AUTO: 12.5 FL (ref 8.9–12.7)
POTASSIUM SERPL-SCNC: 3.4 MMOL/L (ref 3.5–5.3)
PROT SERPL-MCNC: 6.6 G/DL (ref 6.4–8.4)
RA PRESSURE ESTIMATED: 15 MMHG
RBC # BLD AUTO: 5.37 MILLION/UL (ref 3.81–5.12)
RBC # BLD AUTO: 5.51 MILLION/UL (ref 3.81–5.12)
RIGHT ATRIUM AREA SYSTOLE A4C: 11.4 CM2
RIGHT VENTRICLE ID DIMENSION: 3.2 CM
RV PSP: 59 MMHG
SL CV AV DECELERATION TIME RETROGRADE: 1275 MS
SL CV AV PEAK GRADIENT RETROGRADE: 46 MMHG
SL CV LEFT ATRIUM LENGTH A2C: 4.9 CM
SL CV LV EF: 35
SL CV PED ECHO LEFT VENTRICLE DIASTOLIC VOLUME (MOD BIPLANE) 2D: 86 ML
SL CV PED ECHO LEFT VENTRICLE SYSTOLIC VOLUME (MOD BIPLANE) 2D: 61 ML
SODIUM SERPL-SCNC: 137 MMOL/L (ref 135–147)
TR MAX PG: 44 MMHG
TR PEAK VELOCITY: 3.3 M/S
TRICUSPID ANNULAR PLANE SYSTOLIC EXCURSION: 1.7 CM
TRICUSPID VALVE PEAK REGURGITATION VELOCITY: 3.3 M/S
WBC # BLD AUTO: 5.73 THOUSAND/UL (ref 4.31–10.16)
WBC # BLD AUTO: 5.81 THOUSAND/UL (ref 4.31–10.16)

## 2023-04-26 RX ORDER — ATORVASTATIN CALCIUM 40 MG/1
40 TABLET, FILM COATED ORAL DAILY
Status: DISCONTINUED | OUTPATIENT
Start: 2023-04-26 | End: 2023-04-30 | Stop reason: HOSPADM

## 2023-04-26 RX ORDER — IPRATROPIUM BROMIDE AND ALBUTEROL SULFATE .5; 3 MG/3ML; MG/3ML
1 SOLUTION RESPIRATORY (INHALATION) ONCE
Status: CANCELLED | OUTPATIENT
Start: 2023-04-26 | End: 2023-04-26

## 2023-04-26 RX ORDER — MAGNESIUM SULFATE HEPTAHYDRATE 40 MG/ML
4 INJECTION, SOLUTION INTRAVENOUS ONCE
Status: COMPLETED | OUTPATIENT
Start: 2023-04-26 | End: 2023-04-26

## 2023-04-26 RX ORDER — METOPROLOL SUCCINATE 50 MG/1
50 TABLET, EXTENDED RELEASE ORAL 2 TIMES DAILY
Status: DISCONTINUED | OUTPATIENT
Start: 2023-04-26 | End: 2023-04-30 | Stop reason: HOSPADM

## 2023-04-26 RX ORDER — DOCUSATE SODIUM 100 MG/1
100 CAPSULE, LIQUID FILLED ORAL 2 TIMES DAILY
Status: DISCONTINUED | OUTPATIENT
Start: 2023-04-26 | End: 2023-04-30 | Stop reason: HOSPADM

## 2023-04-26 RX ORDER — ALBUTEROL SULFATE 2.5 MG/3ML
1 SOLUTION RESPIRATORY (INHALATION) ONCE
Status: CANCELLED | OUTPATIENT
Start: 2023-04-26 | End: 2023-04-26

## 2023-04-26 RX ORDER — SODIUM CHLORIDE FOR INHALATION 0.9 %
3 VIAL, NEBULIZER (ML) INHALATION ONCE
Status: COMPLETED | OUTPATIENT
Start: 2023-04-26 | End: 2023-04-26

## 2023-04-26 RX ORDER — ALBUTEROL SULFATE 90 UG/1
2 AEROSOL, METERED RESPIRATORY (INHALATION) EVERY 6 HOURS PRN
Status: DISCONTINUED | OUTPATIENT
Start: 2023-04-26 | End: 2023-04-30 | Stop reason: HOSPADM

## 2023-04-26 RX ORDER — ASPIRIN 81 MG/1
81 TABLET ORAL DAILY
Status: DISCONTINUED | OUTPATIENT
Start: 2023-04-26 | End: 2023-04-30 | Stop reason: HOSPADM

## 2023-04-26 RX ORDER — FLUTICASONE PROPIONATE 110 UG/1
1 AEROSOL, METERED RESPIRATORY (INHALATION) EVERY 12 HOURS SCHEDULED
Status: DISCONTINUED | OUTPATIENT
Start: 2023-04-26 | End: 2023-04-30 | Stop reason: HOSPADM

## 2023-04-26 RX ORDER — INSULIN LISPRO 100 [IU]/ML
1-5 INJECTION, SOLUTION INTRAVENOUS; SUBCUTANEOUS
Status: DISCONTINUED | OUTPATIENT
Start: 2023-04-26 | End: 2023-04-26

## 2023-04-26 RX ORDER — BENZONATATE 100 MG/1
100 CAPSULE ORAL 3 TIMES DAILY PRN
Status: DISCONTINUED | OUTPATIENT
Start: 2023-04-26 | End: 2023-04-30 | Stop reason: HOSPADM

## 2023-04-26 RX ORDER — NICOTINE 21 MG/24HR
1 PATCH, TRANSDERMAL 24 HOURS TRANSDERMAL DAILY
Status: DISCONTINUED | OUTPATIENT
Start: 2023-04-26 | End: 2023-04-30 | Stop reason: HOSPADM

## 2023-04-26 RX ORDER — FUROSEMIDE 10 MG/ML
60 INJECTION INTRAMUSCULAR; INTRAVENOUS
Status: DISCONTINUED | OUTPATIENT
Start: 2023-04-26 | End: 2023-04-29

## 2023-04-26 RX ORDER — FUROSEMIDE 10 MG/ML
80 INJECTION INTRAMUSCULAR; INTRAVENOUS ONCE
Status: COMPLETED | OUTPATIENT
Start: 2023-04-26 | End: 2023-04-26

## 2023-04-26 RX ORDER — METHYLPREDNISOLONE SODIUM SUCCINATE 40 MG/ML
125 INJECTION, POWDER, LYOPHILIZED, FOR SOLUTION INTRAMUSCULAR; INTRAVENOUS ONCE
Status: COMPLETED | OUTPATIENT
Start: 2023-04-26 | End: 2023-04-26

## 2023-04-26 RX ORDER — INSULIN GLARGINE 100 [IU]/ML
12 INJECTION, SOLUTION SUBCUTANEOUS
Status: DISCONTINUED | OUTPATIENT
Start: 2023-04-26 | End: 2023-04-27

## 2023-04-26 RX ORDER — BENZONATATE 100 MG/1
200 CAPSULE ORAL ONCE
Status: CANCELLED | OUTPATIENT
Start: 2023-04-26 | End: 2023-04-26

## 2023-04-26 RX ORDER — IPRATROPIUM BROMIDE AND ALBUTEROL SULFATE .5; 3 MG/3ML; MG/3ML
1 SOLUTION RESPIRATORY (INHALATION) ONCE
Status: COMPLETED | OUTPATIENT
Start: 2023-04-26 | End: 2023-04-26

## 2023-04-26 RX ORDER — ALBUTEROL SULFATE 2.5 MG/3ML
1 SOLUTION RESPIRATORY (INHALATION) ONCE
Status: COMPLETED | OUTPATIENT
Start: 2023-04-26 | End: 2023-04-26

## 2023-04-26 RX ORDER — MAGNESIUM HYDROXIDE/ALUMINUM HYDROXICE/SIMETHICONE 120; 1200; 1200 MG/30ML; MG/30ML; MG/30ML
30 SUSPENSION ORAL EVERY 6 HOURS PRN
Status: DISCONTINUED | OUTPATIENT
Start: 2023-04-26 | End: 2023-04-30 | Stop reason: HOSPADM

## 2023-04-26 RX ORDER — INSULIN LISPRO 100 [IU]/ML
1-5 INJECTION, SOLUTION INTRAVENOUS; SUBCUTANEOUS
Status: DISCONTINUED | OUTPATIENT
Start: 2023-04-26 | End: 2023-04-30 | Stop reason: HOSPADM

## 2023-04-26 RX ORDER — METHYLPREDNISOLONE SODIUM SUCCINATE 125 MG/2ML
INJECTION, POWDER, LYOPHILIZED, FOR SOLUTION INTRAMUSCULAR; INTRAVENOUS
Status: DISPENSED
Start: 2023-04-26 | End: 2023-04-26

## 2023-04-26 RX ORDER — HYDRALAZINE HYDROCHLORIDE 20 MG/ML
5 INJECTION INTRAMUSCULAR; INTRAVENOUS EVERY 6 HOURS PRN
Status: DISCONTINUED | OUTPATIENT
Start: 2023-04-26 | End: 2023-04-30 | Stop reason: HOSPADM

## 2023-04-26 RX ORDER — INSULIN LISPRO 100 [IU]/ML
4 INJECTION, SOLUTION INTRAVENOUS; SUBCUTANEOUS
Status: DISCONTINUED | OUTPATIENT
Start: 2023-04-26 | End: 2023-04-28

## 2023-04-26 RX ORDER — POTASSIUM CHLORIDE 20 MEQ/1
40 TABLET, EXTENDED RELEASE ORAL DAILY
Status: DISCONTINUED | OUTPATIENT
Start: 2023-04-26 | End: 2023-04-27

## 2023-04-26 RX ORDER — INSULIN GLARGINE 100 [IU]/ML
10 INJECTION, SOLUTION SUBCUTANEOUS
Status: DISCONTINUED | OUTPATIENT
Start: 2023-04-26 | End: 2023-04-26

## 2023-04-26 RX ADMIN — APIXABAN 5 MG: 5 TABLET, FILM COATED ORAL at 17:46

## 2023-04-26 RX ADMIN — ATORVASTATIN CALCIUM 40 MG: 40 TABLET, FILM COATED ORAL at 09:32

## 2023-04-26 RX ADMIN — FUROSEMIDE 60 MG: 10 INJECTION, SOLUTION INTRAMUSCULAR; INTRAVENOUS at 17:46

## 2023-04-26 RX ADMIN — BENZONATATE 100 MG: 100 CAPSULE ORAL at 21:39

## 2023-04-26 RX ADMIN — DOCUSATE SODIUM 100 MG: 100 CAPSULE, LIQUID FILLED ORAL at 17:45

## 2023-04-26 RX ADMIN — ASPIRIN 81 MG: 81 TABLET, COATED ORAL at 09:30

## 2023-04-26 RX ADMIN — FUROSEMIDE 80 MG: 10 INJECTION, SOLUTION INTRAMUSCULAR; INTRAVENOUS at 02:47

## 2023-04-26 RX ADMIN — SACUBITRIL AND VALSARTAN 1 TABLET: 49; 51 TABLET, FILM COATED ORAL at 09:30

## 2023-04-26 RX ADMIN — NICOTINE 1 PATCH: 14 PATCH, EXTENDED RELEASE TRANSDERMAL at 09:29

## 2023-04-26 RX ADMIN — INSULIN LISPRO 4 UNITS: 100 INJECTION, SOLUTION INTRAVENOUS; SUBCUTANEOUS at 17:49

## 2023-04-26 RX ADMIN — METHYLPREDNISOLONE SODIUM SUCCINATE 125 MG: 40 INJECTION, POWDER, LYOPHILIZED, FOR SOLUTION INTRAMUSCULAR; INTRAVENOUS at 01:48

## 2023-04-26 RX ADMIN — SACUBITRIL AND VALSARTAN 1 TABLET: 49; 51 TABLET, FILM COATED ORAL at 17:46

## 2023-04-26 RX ADMIN — POTASSIUM CHLORIDE 40 MEQ: 1500 TABLET, EXTENDED RELEASE ORAL at 09:32

## 2023-04-26 RX ADMIN — FLUTICASONE PROPIONATE 1 PUFF: 110 AEROSOL, METERED RESPIRATORY (INHALATION) at 09:34

## 2023-04-26 RX ADMIN — METOPROLOL SUCCINATE 50 MG: 50 TABLET, EXTENDED RELEASE ORAL at 09:32

## 2023-04-26 RX ADMIN — INSULIN GLARGINE 12 UNITS: 100 INJECTION, SOLUTION SUBCUTANEOUS at 21:35

## 2023-04-26 RX ADMIN — APIXABAN 5 MG: 5 TABLET, FILM COATED ORAL at 09:30

## 2023-04-26 RX ADMIN — IPRATROPIUM BROMIDE 1 MG: 0.5 SOLUTION RESPIRATORY (INHALATION) at 01:04

## 2023-04-26 RX ADMIN — ALBUTEROL SULFATE 10 MG: 2.5 SOLUTION RESPIRATORY (INHALATION) at 01:04

## 2023-04-26 RX ADMIN — ISODIUM CHLORIDE 3 ML: 0.03 SOLUTION RESPIRATORY (INHALATION) at 01:04

## 2023-04-26 RX ADMIN — DOCUSATE SODIUM 100 MG: 100 CAPSULE, LIQUID FILLED ORAL at 09:30

## 2023-04-26 RX ADMIN — FLUTICASONE PROPIONATE 1 PUFF: 110 AEROSOL, METERED RESPIRATORY (INHALATION) at 21:30

## 2023-04-26 RX ADMIN — MAGNESIUM SULFATE HEPTAHYDRATE 4 G: 40 INJECTION, SOLUTION INTRAVENOUS at 13:04

## 2023-04-26 RX ADMIN — FUROSEMIDE 60 MG: 10 INJECTION, SOLUTION INTRAMUSCULAR; INTRAVENOUS at 09:27

## 2023-04-26 RX ADMIN — INSULIN LISPRO 4 UNITS: 100 INJECTION, SOLUTION INTRAVENOUS; SUBCUTANEOUS at 13:03

## 2023-04-26 RX ADMIN — INSULIN LISPRO 5 UNITS: 100 INJECTION, SOLUTION INTRAVENOUS; SUBCUTANEOUS at 11:45

## 2023-04-26 RX ADMIN — INSULIN LISPRO 4 UNITS: 100 INJECTION, SOLUTION INTRAVENOUS; SUBCUTANEOUS at 09:33

## 2023-04-26 RX ADMIN — BENZONATATE 100 MG: 100 CAPSULE ORAL at 13:05

## 2023-04-26 RX ADMIN — METOPROLOL SUCCINATE 50 MG: 50 TABLET, EXTENDED RELEASE ORAL at 21:31

## 2023-04-26 NOTE — RESPIRATORY THERAPY NOTE
04/26/23 1005   Respiratory Protocol   Protocol Initiated? Yes   Protocol Selection Respiratory   Language Barrier? No   Medical & Social History Reviewed? Yes   Diagnostic Studies Reviewed? Yes   Physical Assessment Performed? Yes   Home Devices/Therapy BiPAP/CPAP;Other (Comment)  (Patient has a hx of using cpap)   Respiratory Plan No distress/Pulmonary history   Respiratory Assessment   Assessment Type Assess only   General Appearance Awake; Alert   Respiratory Pattern Normal   Chest Assessment Chest expansion symmetrical   Bilateral Breath Sounds Clear;Diminished   Resp Comments Assessed patient per protocol + order recived for CPAP HS  Patient has a hx of using cpap prior to admission but says she does not use it as much anymore  She also states she wakes up a lot at night and sometimes wakes up in the morning with SOB  Willing to try hospital cpap this evening to see if it improves SOB and help her sleep  Patient denies use of nebs/inhalers  Is on RA, in no respiratory distress  Denies SOB/WOB currently   BBS clear/dimished,   O2 Device RA

## 2023-04-26 NOTE — ASSESSMENT & PLAN NOTE
· CXR now with moderate right pleural effusion and small left pleural effusion with vascular congestion when compared to most recent 12/2022, official read pending   · Patient currently hemodynamically stable and maintained on RA  · Continue to monitor for improvement with lasix infusions   · Would consider possible thoracentesis if patient does not improve

## 2023-04-26 NOTE — QUICK NOTE
Patient was seen and examined at the bedside, resting comfortably in no acute distress  Patient was complaining of chest discomfort, dry cough, and SOB  Pt denies fevers, palpitations, ICD firing, abdominal bloating, urinary sx, leg swelling, HA, numbness, weakness, or any other sx at this time       Vitals- 95% on RA, 140/72, RR 18, HR 77  On exam- Lung sounds decreased bilaterally  No JVD, abdominal distension, or lower extremity edema    Plan-   CHF Exacerbation    ECHO- Pending    Cardiology recommendations- Furosemide 60mg IV BID, continue GDMT, resume  Jardiance   Daily weights, strict I/Os, fluid restriction     DM Type 2   Lantus 12U HS    Lispro 3U TID w/meals

## 2023-04-26 NOTE — PLAN OF CARE
Problem: PHYSICAL THERAPY ADULT  Goal: Performs mobility at highest level of function for planned discharge setting  See evaluation for individualized goals  Description: Treatment/Interventions: LE strengthening/ROM, Functional transfer training, Therapeutic exercise, Cognitive reorientation, Patient/family training, Equipment eval/education, Gait training, Bed mobility, Spoke to nursing          See flowsheet documentation for full assessment, interventions and recommendations  Outcome: Progressing  Note: Prognosis: Fair  Problem List: Decreased strength, Decreased endurance, Impaired balance, Decreased mobility, Decreased safety awareness, Decreased coordination, Decreased cognition, Impaired judgement  Assessment: Pt is a 69 y o  female seen for PT evaluation s/p admit to [facility] on 4/26/2023  Pt was admitted with a primary dx of: respiratory distress, CHF exacerbation  PT now consulted for assessment of mobility and d/c needs  Pt with Up as tolerated orders  Pts current comorbidities and personal factors effecting treatment include: HLD, DM II, CKD, CHF, GERD, hx of breast cancer  Pts current clinical presentation is Unstable/Unpredictable (high complexity) due to Ongoing medical management for primary dx, Decreased activity tolerance compared to baseline, Fall risk, Increased assistance needed from caregiver at current time, Cog status  Prior to admission, pt was independent without AD  Upon evaluation, pt currently is requiring Supervision for bed mobility; Harika for transfers and Harika for ambulation 15 ft w/ HHA  Pt presents at PT eval functioning below baseline and currently w/ overall mobility deficits 2* to: BLE weakness, impaired balance, decreased endurance, gait deviations, decreased activity tolerance compared to baseline, decreased functional mobility tolerance compared to baseline, decreased safety awareness, impaired judgement, fall risk, decreased cognition   Pt currently at a fall risk 2* to impairments listed above  Pt will continue to benefit from skilled acute PT interventions to address stated impairments; to maximize functional mobility; for ongoing pt/ family training; and DME needs  At conclusion of PT session chair alarm engaged, all needs in reach, RN notified of session findings/recommendations and pt returned back in recliner chair with phone and call bell within reach  Pt denies any further questions at this time  Recommend IP rehab upon hospital D/C  Barriers to Discharge: Inaccessible home environment (significant CEE)     PT Discharge Recommendation: Post acute rehabilitation services    See flowsheet documentation for full assessment

## 2023-04-26 NOTE — ASSESSMENT & PLAN NOTE
· Patient blood pressure initially elevated to 210/135, now decreased with SBP in the 170's  · Hydralazine available prn  · Continue IV lasix and metoprolol  · Continue to monitor blood pressures

## 2023-04-26 NOTE — H&P
"[facility]  H&P  Name: Wyatt Woodall 69 y o  female I MRN: [id number]  Unit/Bed#: S -01 I Date of Admission: 4/26/2023   Date of Service: 4/26/2023 I Hospital Day: 0      Assessment/Plan   * Acute combined systolic and diastolic congestive heart failure (HCC)  Assessment & Plan  Wt Readings from Last 3 Encounters:   04/26/23 60 4 kg (133 lb 2 5 oz)   03/01/23 59 7 kg (131 lb 9 6 oz)   01/10/23 58 8 kg (129 lb 9 6 oz)     · Patient with PMHx of combined CHF with severely reduced EF of 25-30% and NICM s/p ICD placement presented to the ED after not taking her lasix for 1-1 5 weeks with increased SOB, orthopnea, dry cough, and pedal edema  · Currently on maintained on RA although still mildy tachypneic  · Last seen by cardiologist 3/1/2023 who increased her lasix to 40 mg daily  · CXR now with moderate right pleural effusion and small left pleural effusion with vascular congestion when compared to most recent 12/2022, official read pending  · BNP >4700  · Last ECHO 1/2022 demonstrated EF 25-30% with severely decreased systolic dysfunction and \"there is global hypokinesis with regional variation   Diastolic function is abnormal \"  · Will repeat ECHO  · Initial troponin 31, continue to trend  · Monitor on telemetry  · Daily weights, I/O, fluid restriction  · Will initiate 60 mg IV lasix b i d   · Cardiology consult pending         Pleural effusion, bilateral  Assessment & Plan  · CXR now with moderate right pleural effusion and small left pleural effusion with vascular congestion when compared to most recent 12/2022, official read pending   · Patient currently hemodynamically stable and maintained on RA  · Continue to monitor for improvement with lasix infusions   · Would consider possible thoracentesis if patient does not improve    Primary hypertension  Assessment & Plan  · Patient blood pressure initially elevated to 210/135, now decreased with SBP in the 170's  · Hydralazine available " prn  · Continue IV lasix and metoprolol  · Continue to monitor blood pressures    PAF (paroxysmal atrial fibrillation) ([facility])  Assessment & Plan  · Anticoagulated on Eliquis  · Continue metoprolol    Coronary artery disease involving native coronary artery of native heart without angina pectoris  Assessment & Plan  · Nonobstructive as per cath 11/2020  · Continue aspirin, statin, BB    CKD stage 3 due to type 2 diabetes mellitus ([facility])  Assessment & Plan  · Creatinine approx baseline   · Continue to monitor BMP in the setting of diuretics    Hyperlipidemia LDL goal <100  Assessment & Plan  · Continue statin    Type 2 diabetes mellitus with stage 3a chronic kidney disease, with long-term current use of insulin ([facility])  Assessment & Plan  Lab Results   Component Value Date    HGBA1C 14 9 (A) 08/10/2022       Recent Labs     04/26/23  0045   POCGLU 280*       Blood Sugar Average: Last 72 hrs:  (P) 280   · Continue lantus 10 units h s , patient reports noncompliance at home  · Hold metformin and jardiance  · SSI w/ accucheks    Tobacco use  Assessment & Plan  · Patient currently reports smoking 0 5 packs a day  · Continue to encourage cessation  · Nicotine patch supplementation    Obstructive sleep apnea syndrome  Assessment & Plan  · Continue cpap h s          VTE Pharmacologic Prophylaxis: VTE Score: 3 Moderate Risk (Score 3-4) - Pharmacological DVT Prophylaxis Ordered: apixaban (Eliquis)  Code Status: Full code  Discussion with family: Patient declined call to   Anticipated Length of Stay: Patient will be admitted on an inpatient basis with an anticipated length of stay of greater than 2 midnights secondary to CHF exacerbation      Total Time Spent on Date of Encounter in care of patient: 75 minutes This time was spent on one or more of the following: performing physical exam; counseling and coordination of care; obtaining or reviewing history; documenting in the medical record; reviewing/ordering "tests, medications or procedures; communicating with other healthcare professionals and discussing with patient's family/caregivers  Chief Complaint: \"I felt very SOB tonight\"    History of Present Illness:  Binu Dunaway is a 69 y o  female who presents with CHF exacerbation  Patient reports that she has been feeling increasingly short of breath x1 week  She reports that she has not been taking her Lasix for about a week to week and a half  However she became increasingly short of breath tonight leading her to call EMS services  Patient also endorses orthopnea, pedal edema, and dry cough  She denies any other symptoms such as chest pain  She reports that her chest is not painful or tender, but feels \"strange  \"  She denies palpitations, abdominal pain, nausea vomiting, fever, chills, headache, or neurodeficits  Patient reports that she has diabetes and takes her oral medications but is not compliant with her Lantus  She reports that she still smokes half pack a day  Review of Systems:  Review of Systems   Constitutional: Positive for fatigue  Negative for appetite change, chills, diaphoresis and fever  HENT: Negative for congestion, rhinorrhea and sore throat  Eyes: Negative for photophobia and visual disturbance  Respiratory: Positive for cough (dry), chest tightness and shortness of breath  Negative for wheezing  Cardiovascular: Positive for leg swelling  Negative for chest pain and palpitations  Gastrointestinal: Negative for abdominal distention, abdominal pain, blood in stool, constipation, diarrhea, nausea and vomiting  Genitourinary: Negative for dysuria and hematuria  Musculoskeletal: Negative for arthralgias, back pain, myalgias and neck stiffness  Skin: Negative for color change and rash  Neurological: Negative for dizziness, seizures, syncope, weakness, light-headedness and headaches  Psychiatric/Behavioral: Negative for agitation, behavioral problems and confusion   " The patient is not nervous/anxious  All other systems reviewed and are negative  Past Medical and Surgical History:   Past Medical History:   Diagnosis Date   • Breast cancer ([facility] )     left - 1994  • Diabetes mellitus ([facility] )    • [facility] mellitus, [facility] 2 [facility] ) 03/15/2006    last assessed 7/10/13   • GERD (gastroesophageal reflux disease)    • History of chemotherapy    • Hypertension        Past Surgical History:   Procedure Laterality Date   • CARDIAC ELECTROPHYSIOLOGY PROCEDURE N/A 3/9/2022    Procedure: Cardiac icd implant/ DUAL CHAMBER ICD; Surgeon: Viola Whelan MD;  Location: [facility]; Service: Cardiology   • MASTECTOMY Left     last assessed 12/16/14   • MASTECTOMY, RADICAL Left     1994   • MN PARATHYROIDECTOMY/EXPLORATION PARATHYROIDS N/A 4/21/2021    Procedure: PARATHYROIDECTOMY POSSIBLE 4 GLAND EXPLORATION;  Surgeon: Nathalia Mendez MD;  Location: [facility];  Service: ENT   • REDUCTION MAMMAPLASTY Right    • UVULECTOMY         Meds/Allergies:  Prior to Admission medications    Medication Sig Start Date End Date Taking? Authorizing Provider   albuterol (Ventolin HFA) 90 mcg/act inhaler Inhale 2 puffs every 6 (six) hours as needed for wheezing (rinse mouth after use)  Patient not taking: Reported on 3/1/2023 8/24/22   Aaron Deluca DO   apixaban (Eliquis) 5 mg Take 1 tablet (5 mg total) by mouth 2 (two) times a day 2/13/23   Elyssa Cavazos MD   Aspirin Low Dose 81 MG EC tablet TAKE 1 TABLET BY MOUTH EVERY DAY 3/13/23   Elyssa Cavazos MD   atorvastatin (LIPITOR) 40 mg tablet Take 1 tablet (40 mg total) by mouth daily 8/10/22 3/1/23  Aaron Deluca DO   Blood Glucose Monitoring Suppl (OneTouch Verio Reflect) w/Device KIT Check blood sugars three times daily  Please substitute with appropriate alternative as covered by patient's insurance   Dx: E11 65 12/27/22   MAGGIE Guillen   cholecalciferol (VITAMIN D3) 1,000 units tablet Take 2 tablets (2,000 Units total) by mouth daily 8/23/22   Sophia Rose DO   Empagliflozin (Jardiance) 10 MG TABS Take 1 tablet (10 mg total) by mouth every morning 8/10/22   Anastasia Chung DO   [facility] 110 MCG/ACT inhaler  8/24/22   Enzo Shafer MD   furosemide (LASIX) 40 mg tablet TAKE 1 TABLET DAILY ON MONDAYS, WEDNESDAYS, AND FRIDAYS 2/13/23   Christine Contreras MD   glucose blood (OneTouch Verio) test strip Check blood sugars three times daily  Please substitute with appropriate alternative as covered by patient's insurance  Dx: E11 65 12/27/22   MAGGIE Conley   insulin glargine (Lantus) 100 units/mL subcutaneous injection Inject 10 Units under the skin daily at bedtime 12/28/22   Anastasia Chung DO   Insulin Pen Needle (BD Pen Needle Kateryna U/F) 32G X 4 MM MISC Use daily as directed with insulin pen 12/27/22   MAGGIE Conley   metFORMIN (GLUCOPHAGE) 1000 MG tablet Take 1 tablet (1,000 mg total) by mouth 2 (two) times a day with meals 8/10/22   Anastasia Chung DO   metoprolol succinate (TOPROL-XL) 50 mg 24 hr tablet TAKE 1 AND 1/2 TABLETS BY MOUTH 2 TIMES A DAY  3/27/23   Christine Contreras MD   nicotine (NICODERM CQ) 14 mg/24hr TD 24 hr patch Place 1 patch on the skin every 24 hours  Patient not taking: Reported on 12/27/2022 1/27/22   Christine Contreras MD   nicotine (NICODERM CQ) 14 mg/24hr TD 24 hr patch Place 1 patch on the skin every 24 hours 1/10/23   Anastasia Chung DO   nicotine (NICODERM CQ) 7 mg/24hr TD 24 hr patch Place 1 patch on the skin every 24 hours  Patient not taking: Reported on 3/1/2023 1/10/23   Anastasia Chung DO   nystatin-triamcinolone (MYCOLOG-II) ointment Apply topically 2 (two) times a day To vaginal area 1/10/23   Anastasia Chung DO   OneTouch Delica Lancets 21L MISC Check blood sugars three times daily  Please substitute with appropriate alternative as covered by patient's insurance   Dx: E11 65 12/27/22   MAGGIE Conley   sacubitril-valsartan (Entresto) 49-51 MG TABS Take 1 tablet by mouth 2 (two) times a "day 3/15/23   Lee Dawson MD     I have reviewed home medications using recent Epic encounter  Allergies: No Known Allergies    Social History:  Marital Status: Single   Patient Pre-hospital Living Situation: Home  Patient Pre-hospital Level of Mobility: walks  Patient Pre-hospital Diet Restrictions: Diabetic  Substance Use History:   Social History     Substance and Sexual Activity   Alcohol Use Not Currently    Comment: Social     Social History     Tobacco Use   Smoking Status Every Day   • Packs/day: 0 50   • Types: Cigarettes   Smokeless Tobacco Never   Tobacco Comments    3 cigarettes daily      Social History     Substance and Sexual Activity   Drug Use No       Family History:  Family History   Problem Relation Age of Onset   • Hypothyroidism Mother    • Leukemia Mother    • Diabetes Father    • Diabetes type II Father    • Stroke Sister    • Diabetes Paternal Grandmother    • Cancer Sister    • Diabetes Brother        Physical Exam:     Vitals:   Blood Pressure: (!) 182/96 (04/26/23 0400)  Pulse: 80 (04/26/23 0400)  Temperature: 97 9 °F (36 6 °C) (04/26/23 0032)  Temp Source: Oral (04/26/23 0032)  Respirations: (!) 28 (04/26/23 0400)  Height: 5' 2 5\" (158 8 cm) (04/26/23 0032)  Weight - Scale: 60 4 kg (133 lb 2 5 oz) (04/26/23 0032)  SpO2: 95 % (04/26/23 0400)    Physical Exam  Vitals and nursing note reviewed  Constitutional:       General: She is not in acute distress  Appearance: She is well-developed  She is not ill-appearing  HENT:      Head: Normocephalic and atraumatic  Nose: Nose normal  No congestion  Mouth/Throat:      Mouth: Mucous membranes are dry  Pharynx: Oropharynx is clear  Eyes:      Conjunctiva/sclera: Conjunctivae normal    Cardiovascular:      Rate and Rhythm: Regular rhythm  Tachycardia present  Heart sounds: Normal heart sounds  No murmur heard  No friction rub  No gallop     Pulmonary:      Effort: Pulmonary effort is normal  No respiratory " distress  Breath sounds: No wheezing, rhonchi or rales  Comments: Decreased breath sounds on R  Abdominal:      General: Bowel sounds are normal  There is no distension  Palpations: Abdomen is soft  Tenderness: There is no abdominal tenderness  Musculoskeletal:      Cervical back: Neck supple  Right lower leg: Edema (2+ pitting edema) present  Left lower leg: Edema (2+ pitting edema) present  Skin:     General: Skin is warm and dry  Capillary Refill: Capillary refill takes less than 2 seconds  Neurological:      General: No focal deficit present  Mental Status: She is alert and oriented to person, place, and time  Mental status is at baseline  Psychiatric:         Mood and Affect: Mood normal          Behavior: Behavior normal           Additional Data:     Lab Results:  Results from last 7 days   Lab Units 04/26/23  0135   WBC Thousand/uL 5 73   HEMOGLOBIN g/dL 16 2*   HEMATOCRIT % 49 3*   PLATELETS Thousands/uL 223   NEUTROS PCT % 56   LYMPHS PCT % 35   MONOS PCT % 8   EOS PCT % 1     Results from last 7 days   Lab Units 04/26/23  0052   SODIUM mmol/L 137   POTASSIUM mmol/L 3 4*   CHLORIDE mmol/L 102   CO2 mmol/L 27   BUN mg/dL 14   CREATININE mg/dL 0 98   ANION GAP mmol/L 8   CALCIUM mg/dL 8 5   ALBUMIN g/dL 2 6*   TOTAL BILIRUBIN mg/dL 0 91   ALK PHOS U/L 210*   ALT U/L 11   AST U/L 15   GLUCOSE RANDOM mg/dL 284*         Results from last 7 days   Lab Units 04/26/23  0045   POC GLUCOSE mg/dl 280*               Lines/Drains:  Invasive Devices     Peripheral Intravenous Line  Duration           Peripheral IV 04/26/23 Left Antecubital <1 day    Peripheral IV 04/26/23 Right Antecubital <1 day                    Imaging: Personally reviewed the following imaging: chest xray  XR chest 1 view portable    (Results Pending)       EKG and Other Studies Reviewed on Admission:   · EKG: No EKG obtained      ** Please Note: This note has been constructed using a voice recognition system   **

## 2023-04-26 NOTE — ASSESSMENT & PLAN NOTE
"Wt Readings from Last 3 Encounters:   04/26/23 60 4 kg (133 lb 2 5 oz)   03/01/23 59 7 kg (131 lb 9 6 oz)   01/10/23 58 8 kg (129 lb 9 6 oz)     · Patient with PMHx of combined CHF with severely reduced EF of 25-30% and NICM s/p ICD placement presented to the ED after not taking her lasix for 1-1 5 weeks with increased SOB, orthopnea, dry cough, and pedal edema  · Currently on maintained on RA although still mildy tachypneic  · Last seen by cardiologist 3/1/2023 who increased her lasix to 40 mg daily  · CXR now with moderate right pleural effusion and small left pleural effusion with vascular congestion when compared to most recent 12/2022, official read pending  · BNP >4700  · Last ECHO 1/2022 demonstrated EF 25-30% with severely decreased systolic dysfunction and \"there is global hypokinesis with regional variation   Diastolic function is abnormal \"  · Will repeat ECHO  · Initial troponin 31, continue to trend  · Monitor on telemetry  · Daily weights, I/O, fluid restriction  · Will initiate 60 mg IV lasix b i d   · Cardiology consult pending       "

## 2023-04-26 NOTE — CONSULTS
Heart Failure Consult Note - Mina Reynolds 69 y o  female MRN: [id number]    @ Encounter: [id number]      Assessment/Plan:    Patient Active Problem List    Diagnosis Date Noted   • Pleural effusion, bilateral 04/26/2023   • PAF (paroxysmal atrial fibrillation) ([place] [address] ) 03/01/2023   • Embolism and thrombosis of arteries of the lower extremities ([place] [address] ) 03/01/2023   • Tobacco use 01/10/2023   • Vulvar itching 01/10/2023   • SVT (supraventricular tachycardia) ([place] [address] ) 12/27/2022   • Heart failure, left, with LVEF <=30% ([place] [address] ) 08/10/2022   • Venous insufficiency of both lower extremities 03/31/2022   • Trigger finger of right thumb 12/13/2021   • NICM (nonischemic cardiomyopathy) ([place] [address] ) 12/02/2021   • Peripheral arterial disease ([place] [address] ) 12/02/2021   • Breast cancer screening by mammogram 10/14/2021   • Chronic diastolic congestive heart failure ([place] [address] ) 07/01/2021   • Bilateral leg edema 07/01/2021   • Back pain 04/21/2021   • Breast cancer ([place] [address] ) - left 1994 04/20/2021   • Primary hypertension 04/20/2021   • Smoking 04/20/2021   • S/P left mastectomy 04/14/2021   • Vitamin D deficiency 03/29/2021   • Hyperparathyroidism ([place] [address] ) 11/24/2020   • Coronary artery disease involving native coronary artery of native heart without angina pectoris 11/24/2020   • Chronic combined systolic and diastolic congestive heart failure ([place] [address] ) 11/24/2020   • CKD stage 3 due to type 2 diabetes mellitus ([place] [address] ) 11/16/2020   • Acute combined systolic and diastolic congestive heart failure ([place] [address] ) 11/15/2020   • Hypercalcemia 11/15/2020   • Obstructive sleep apnea syndrome 05/26/2020   • Microalbuminuria 04/24/2020   • Medicare annual wellness visit, subsequent 04/24/2020   • Gastroesophageal reflux disease 11/14/2018   • Screening for cardiovascular, respiratory, and genitourinary diseases 10/24/2018   • Immunization due 10/24/2018   • Colon cancer screening 10/24/2018   • Menopause 10/24/2018   • Hyperlipidemia LDL goal <100 12/07/2015   • Type 2 diabetes mellitus with stage 3a chronic kidney disease, with long-term current use of insulin ([facility] ) 01/28/2015   • History of left breast cancer 09/04/2012   • Arthropathy 09/04/2012   • Hypertensive heart and kidney disease with heart failure and with chronic kidney disease stage III ([facility] ) 09/04/2012   • Colon, diverticulosis 09/04/2012       # Acute on chronic heart failure with reduced ejection fraction  Etiology: unclear etiology, nonischemic  Possible due to hypertensive heart disease  Has afib with no documented RVR  Cause of decompensation likely due to nonadherence to diuretic  Reports taking other cardiac medications    Weight: 133 lbs 4/26/23 131 3/1/23  BNP:  > 4700 4/26/23     Studies- personally reviewed by me    Echocardiogram 1/21/22  LVEF:  25-30%  LVIDd: 4 1 cm, normal wall thickness  RV: normal size and systolic function  MR: trace  PASP: 44mmHg, moderate TR  RVOT: no notching  Other: mild AI  Abnormal diastology    [facility] 11/16/20:   Mid LAD: There was a 50 % stenosis, iFR 0 97 not hemodynamically significant  Mid circumflex: There was a 50 % stenosis  iFR 0 96 not hemodynamically significant  1st obtuse marginal: There was a 60 % stenosis, iFR 0 98 not hemodynamically significant; LM and RCA minimal luminal irregularities       Echo 11/15/20: LVEF lower limits of normal  Moderate hypokinesis of basal to mid inferior and inferolateral walls  Grade 1 diastology  Normal RV size and systolic function  Trace MR/AI    Neurohormonal Blockade:  --Beta-Blocker: metoprolol succinate 75mg BID  --ACEi, ARB or ARNi: entresto 49-51mg BID  --Aldosterone Receptor Blocker:  none  --SGLT2 Inhibitor: Jardiance 10mg daily  --Diuretic: furosemide 40mg on MWF    Sudden Cardiac Death Risk Reduction:  --ICD:  S/p ICD implant 3/9/22  Interrogation 4/14/23: AP 1 3%  <0 1%   OptiVol crossed since 12/8 and ongoing    Electrical Resynchronization:  --Candidacy for BiV device: narrow QRS    Advanced Therapies (if appropriate): --Inotrope:  --LVAD/Transplant Candidacy:    # Paroxysmal atrial fibrillation  Rate: metoprolol as above  Rhythm: none  AC: apixaban  # Hypertension, uncontrolled  # Nonobstructive CAD  Rx: aspirin, statin  # Hyperlipidemia  1/18/23: TG 72 HDL 73 LDL 76  # CKD 3a, creatinine 1 today  # DM type 2    Today's Plan:  Continue diuresis with lasix 60mg IV BID  Continue current GDMT with plan for optimization of SVR reduction  Resume Jardiance  Strict I/O and daily weights  Keep K>4 and Mg>2      HPI:   69year old female with PMH of chronic heart failure with reduced ejection fraction due to nonischemic cardiomyopathy, nonobstructive CAD, paroxysmal atrial fibrillation, uncontrolled hypertension, DM and hyperlipidemia who presents with worsening shortness of breath and fatigue over the past 2 weeks  Also reported orthopnea, leg swelling and dry cough  She has stopped taking lasix around the same time as it makes her urinate a lot  No increase in dietary salt or fluid intake  Takes cardiac medications regularly  Given lasix 80mg IV overnight and started on lasix 60mg IV BID  BP on presentation 210/135, 160/92 this morning  Past Medical History:   Diagnosis Date   • Breast cancer ([facility] )     left - 1994  • Diabetes mellitus ([facility] )    • Diabetes mellitus, type 2 ([facility] ) 03/15/2006    last assessed 7/10/13   • GERD (gastroesophageal reflux disease)    • History of chemotherapy    • Hypertension        Review of Systems   Constitutional: Positive for fatigue  Negative for chills and fever  HENT: Negative for ear pain and sore throat  Eyes: Negative for pain and visual disturbance  Respiratory: Positive for cough and shortness of breath  Cardiovascular: Positive for leg swelling  Negative for chest pain and palpitations  Gastrointestinal: Negative for abdominal distention, abdominal pain and vomiting  Genitourinary: Negative for dysuria and hematuria     Musculoskeletal: Negative for arthralgias and back pain  Skin: Negative for color change and rash  Neurological: Negative for dizziness, seizures, syncope and light-headedness  All other systems reviewed and are negative  No Known Allergies        Current Facility-Administered Medications:   •  albuterol (PROVENTIL HFA,VENTOLIN HFA) inhaler 2 puff, 2 puff, Inhalation, Q6H PRN, Juan Manuel James PA-C  •  aluminum-magnesium hydroxide-simethicone (MYLANTA) oral suspension 30 mL, 30 mL, Oral, Q6H PRN, Juan Manuel James PA-C  •  apixaban Retia Gamma) tablet 5 mg, 5 mg, Oral, BID, Juan Manuel James PA-C, 5 mg at 04/26/23 0930  •  aspirin (ECOTRIN LOW STRENGTH) EC tablet 81 mg, 81 mg, Oral, Daily, Juan Manuel James PA-C, 81 mg at 04/26/23 0930  •  atorvastatin (LIPITOR) tablet 40 mg, 40 mg, Oral, Daily, Juan Manuel James PA-C, 40 mg at 04/26/23 0932  •  benzonatate (TESSALON PERLES) capsule 100 mg, 100 mg, Oral, TID PRN, Juan Manuel James PA-C  •  docusate sodium (COLACE) capsule 100 mg, 100 mg, Oral, BID, Juan Manuel James PA-C, 100 mg at 04/26/23 0930  •  fluticasone (FLOVENT HFA) 110 MCG/ACT inhaler 1 puff, 1 puff, Inhalation, Q12H [facility], Juan Manuel James PA-C, 1 puff at 04/26/23 8517  •  furosemide (LASIX) injection 60 mg, 60 mg, Intravenous, BID (diuretic), ENOC PalacioC, 60 mg at 04/26/23 1109  •  hydrALAZINE (APRESOLINE) injection 5 mg, 5 mg, Intravenous, Q6H PRN, Juan Manuel James PA-C  •  insulin glargine (LANTUS) subcutaneous injection 12 Units 0 12 mL, 12 Units, Subcutaneous, HS, Юлия Hunter MD  •  insulin lispro (HumaLOG) 100 units/mL subcutaneous injection 1-5 Units, 1-5 Units, Subcutaneous, TID AC, 4 Units at 04/26/23 0933 **AND** Fingerstick Glucose (POCT), , , TID AC, Juan Manuel James PA-C  •  insulin lispro (HumaLOG) 100 units/mL subcutaneous injection 4 Units, 4 Units, Subcutaneous, TID With Meals, Юлия Hunter MD  •  methylPREDNISolone sodium succinate (Solu-MEDROL) 125 MG injection **ADS Override Pull**, , , ,   • metoprolol succinate (TOPROL-XL) 24 hr tablet 50 mg, 50 mg, Oral, BID, Alvarenga Sane, PA-C, 50 mg at 04/26/23 0932  •  nicotine (NICODERM CQ) 14 mg/24hr TD 24 hr patch 1 patch, 1 patch, Transdermal, Daily, Alvarenga Sane, PA-C, 1 patch at 04/26/23 3311  •  potassium chloride (K-DUR,KLOR-CON) CR tablet 40 mEq, 40 mEq, Oral, Daily, Alvarenga Sane, PA-C, 40 mEq at 04/26/23 0932  •  sacubitril-valsartan (ENTRESTO) 49-51 MG per tablet 1 tablet, 1 tablet, Oral, BID, Alvarenga Sane, PA-C, 1 tablet at 04/26/23 0930    Social History     Socioeconomic History   • Marital status: Single     Spouse name: Not on file   • Number of children: Not on file   • Years of education: Not on file   • Highest education level: Not on file   Occupational History   • Not on file   Tobacco Use   • Smoking status: Every Day     Packs/day: 0 50     Types: Cigarettes   • Smokeless tobacco: Never   • Tobacco comments:     3 cigarettes daily    Vaping Use   • Vaping Use: Never used   Substance and Sexual Activity   • Alcohol use: Not Currently     Comment: Social   • Drug use: No   • Sexual activity: Yes     Partners: Male   Other Topics Concern   • Not on file   Social History Narrative   • Not on file     Social Determinants of Health     Financial Resource Strain: Low Risk    • Difficulty of Paying Living Expenses: Not very hard   Food Insecurity: Not on file   Transportation Needs: No Transportation Needs   • Lack of Transportation (Medical): No   • Lack of Transportation (Non-Medical):  No   Physical Activity: Not on file   Stress: Not on file   Social Connections: Not on file   Intimate Partner Violence: Not on file   Housing Stability: Not on file       Family History   Problem Relation Age of Onset   • Hypothyroidism Mother    • Leukemia Mother    • Diabetes Father    • Diabetes type II Father    • Stroke Sister    • Diabetes Paternal Grandmother    • Cancer Sister    • Diabetes Brother        Physical Exam:    Vitals: Blood "pressure 160/92, pulse 85, temperature 97 6 °F (36 4 °C), temperature source Oral, resp  rate 20, height 5' 2 5\" (1 588 m), weight 60 4 kg (133 lb 2 5 oz), SpO2 95 %, not currently breastfeeding , Body mass index is 23 97 kg/m² ,   Wt Readings from Last 3 Encounters:   23 60 4 kg (133 lb 2 5 oz)   23 59 7 kg (131 lb 9 6 oz)   01/10/23 58 8 kg (129 lb 9 6 oz)       Physical Exam  Constitutional:       General: She is not in acute distress  Appearance: Normal appearance  HENT:      Head: Normocephalic and atraumatic  Mouth/Throat:      Mouth: Mucous membranes are moist    Eyes:      Extraocular Movements: Extraocular movements intact  Conjunctiva/sclera: Conjunctivae normal    Neck:      Vascular: JVD present  Comments: Distended EJ  Cardiovascular:      Rate and Rhythm: Normal rate and regular rhythm  Pulses: Normal pulses  Heart sounds: No murmur heard  No friction rub  No gallop  Pulmonary:      Effort: Pulmonary effort is normal  No respiratory distress  Breath sounds: No wheezing, rhonchi or rales  Abdominal:      General: There is no distension  Palpations: Abdomen is soft  Tenderness: There is no abdominal tenderness  There is no guarding  Musculoskeletal:         General: No deformity or signs of injury  Cervical back: Neck supple  Right lower le+ Pitting Edema present  Left lower le+ Pitting Edema present  Skin:     General: Skin is warm and dry  Capillary Refill: Capillary refill takes less than 2 seconds  Neurological:      General: No focal deficit present  Mental Status: She is alert and oriented to person, place, and time     Psychiatric:         Mood and Affect: Mood normal          Labs & Results:    Lab Results   Component Value Date    WBC 5 81 2023    HGB 16 0 (H) 2023    HCT 48 3 (H) 2023    MCV 90 2023     2023     Lab Results   Component Value Date    SODIUM " 137 04/26/2023    K 3 4 (L) 04/26/2023     04/26/2023    CO2 27 04/26/2023    BUN 14 04/26/2023    CREATININE 0 98 04/26/2023    GLUC 284 (H) 04/26/2023    CALCIUM 8 5 04/26/2023     Lab Results   Component Value Date    NTBNP 3,246 (H) 11/15/2020      Lab Results   Component Value Date    CHOLESTEROL 163 01/18/2023    CHOLESTEROL 144 03/09/2021    CHOLESTEROL 168 11/16/2020     Lab Results   Component Value Date    HDL 73 01/18/2023    HDL 64 03/09/2021    HDL 47 11/16/2020     Lab Results   Component Value Date    TRIG 72 01/18/2023    TRIG 76 03/09/2021    TRIG 82 11/16/2020     Lab Results   Component Value Date    [place] 80 03/09/2021    [place] 121 11/16/2020       EKG personally reviewed by Phil Mandel MD      Counseling / Coordination of Care  Time spent today 40 minutes  Greater than 50% of total time was spent with the patient and / or family counseling and / or coordination of care  We discussed diagnoses, most recent studies and any changes in treatment    Thank you for the opportunity to participate in the care of this patient      MD Radha Espinoza

## 2023-04-26 NOTE — PHYSICAL THERAPY NOTE
Physical Therapy Evaluation    Patient's Name: Krystle Khan    Admitting Diagnosis  Respiratory distress [R06 03]  CHF exacerbation ([place] [address] ) [I50 9]    Problem List  Patient Active Problem List   Diagnosis    History of left breast cancer    Arthropathy    Hypertensive heart and kidney disease with heart failure and with chronic kidney disease stage III ([place] [address] )    Colon, diverticulosis    Hyperlipidemia LDL goal <100    Type 2 diabetes mellitus with stage 3a chronic kidney disease, with long-term current use of insulin ([place] [address] )    Screening for cardiovascular, respiratory, and genitourinary diseases    Immunization due    Colon cancer screening    Menopause    Gastroesophageal reflux disease    Microalbuminuria    Medicare annual wellness visit, subsequent    Obstructive sleep apnea syndrome    Acute combined systolic and diastolic congestive heart failure (HCC)    Hypercalcemia    CKD stage 3 due to type 2 diabetes mellitus ([place] [address] )    Hyperparathyroidism ([place] [address] )    Coronary artery disease involving native coronary artery of native heart without angina pectoris    Chronic combined systolic and diastolic congestive heart failure (HCC)    Vitamin D deficiency    S/P left mastectomy    Breast cancer ([place] [address] ) - left 1994    Primary hypertension    Smoking    Back pain    Chronic diastolic congestive heart failure (HCC)    Bilateral leg edema    Breast cancer screening by mammogram    NICM (nonischemic cardiomyopathy) ([place] [address] )    Peripheral arterial disease ([place] [address] )    Trigger finger of right thumb    Venous insufficiency of both lower extremities    Heart failure, left, with LVEF <=30% (HCC)    SVT (supraventricular tachycardia) (HCC)    Tobacco use    Vulvar itching    PAF (paroxysmal atrial fibrillation) (HCC)    Embolism and thrombosis of arteries of the lower extremities (HCC)    Pleural effusion, bilateral       Past Medical History  Past Medical History:   Diagnosis Date    Breast cancer ([place] [address] )     left - 1994      Diabetes mellitus ([facility] )     Diabetes mellitus, type 2 ([facility] ) 03/15/2006    last assessed 7/10/13    GERD (gastroesophageal reflux disease)     History of chemotherapy     Hypertension        Past Surgical History  Past Surgical History:   Procedure Laterality Date    CARDIAC ELECTROPHYSIOLOGY PROCEDURE N/A 3/9/2022    Procedure: Cardiac icd implant/ DUAL CHAMBER ICD; Surgeon: Sumit Pandya MD;  Location: [facility]; Service: Cardiology    MASTECTOMY Left     last assessed 14    MASTECTOMY, RADICAL Left     1994    MN PARATHYROIDECTOMY/EXPLORATION PARATHYROIDS N/A 2021    Procedure: PARATHYROIDECTOMY POSSIBLE 4 GLAND EXPLORATION;  Surgeon: Paramjit Wiseman MD;  Location: [facility];  Service: ENT    REDUCTION MAMMAPLASTY Right     UVULECTOMY        23 0919   PT Last Visit   PT Visit Date 23   Note Type   Note type Evaluation   Pain Assessment   Pain Assessment Tool 0-10   Pain Score No Pain   Restrictions/Precautions   Weight Bearing Precautions Per Order No   Other Precautions Cognitive; Chair Alarm; Bed Alarm;Multiple lines;Telemetry; Fall Risk   Home Living   Type of [address] Layout Multi-level  (6 CEE(no rails), full flight within)   Bathroom Shower/Tub Tub/shower unit   [address]   (none)   Prior Function   Lives With Family  (nephew and niece)   Receives Help From [facility] in the last 6 months 1 to 4  (1)   Comments pt does not drive   General   Family/Caregiver Present No   Cognition   Overall Cognitive Status Impaired   Following Commands Follows one step commands with increased time or repetition   Comments pt ID by wristband, name and    Subjective   Subjective pt supine in bed, agreeable to PT eval   RLE Assessment   RLE Assessment WFL   Strength RLE   RLE Overall Strength 3+/5   LLE Assessment   LLE Assessment WFL   Strength LLE   LLE Overall Strength 3+/5   Bed Mobility   Supine to Sit 5  Supervision   Additional items HOB elevated; Increased time required; Bedrails   Sit to Supine Unable to assess   Additional Comments pt up in recliner at end of PT eval   Transfers   Sit to Stand 4  Minimal assistance   Additional items Assist x 1; Increased time required; Bedrails;Verbal cues  (vc for hand placement)   Stand to Sit 4  Minimal assistance   Additional items Assist x 1; Increased time required;Verbal cues;Armrests  (vc required for improved controlled lowering to target surface)   Ambulation/Elevation   Gait pattern Improper Weight shift;Narrow ADINA; Short stride   Gait Assistance 4  Minimal assist   Additional items Assist x 1;Verbal cues; Tactile cues   Assistive Device   (HHA)   Distance 15' x 2   Stair Management Assistance Not tested  (pt declines 2/2 fatigue)   Ambulation/Elevation Additional Comments pt would benefit from trial of RW or SPC to improve gait stability   Balance   Static Sitting Fair +   Dynamic Sitting Fair   Static Standing Fair -   Dynamic Standing Fair -   Ambulatory Poor +   Activity Tolerance   Activity Tolerance Patient limited by fatigue   Nurse Made Aware RN ayleen pre/post and PCA post   Assessment   Prognosis Fair   Problem List Decreased strength;Decreased endurance; Impaired balance;Decreased mobility; Decreased safety awareness;Decreased coordination;Decreased cognition; Impaired judgement   Assessment Pt is a 69 y o  female seen for PT evaluation s/p admit to [facility] on 4/26/[facility]23  Pt was admitted with a primary dx of: respiratory distress, CHF exacerbation  PT now consulted for assessment of mobility and d/c needs  Pt with Up as tolerated orders  Pts current comorbidities and personal factors effecting treatment include: HLD, DM II, CKD, CHF, GERD, hx of breast cancer   Pts current clinical presentation is Unstable/Unpredictable (high complexity) due to Ongoing medical management for primary dx, Decreased activity tolerance compared to baseline, Fall risk, Increased assistance needed from caregiver at current time, Cog status  Prior to admission, pt was independent without AD  Upon evaluation, pt currently is requiring Supervision for bed mobility; Harika for transfers and Harika for ambulation 15 ft w/ HHA  Pt presents at PT eval functioning below baseline and currently w/ overall mobility deficits 2* to: BLE weakness, impaired balance, decreased endurance, gait deviations, decreased activity tolerance compared to baseline, decreased functional mobility tolerance compared to baseline, decreased safety awareness, impaired judgement, fall risk, decreased cognition  Pt currently at a fall risk 2* to impairments listed above  Pt will continue to benefit from skilled acute PT interventions to address stated impairments; to maximize functional mobility; for ongoing pt/ family training; and DME needs  At conclusion of PT session chair alarm engaged, all needs in reach, RN notified of session findings/recommendations and pt returned back in recliner chair with phone and call bell within reach  Pt denies any further questions at this time  Recommend IP rehab upon hospital D/C  Barriers to Discharge Inaccessible home environment  (significant CEE)   Goals   Patient Goals to get out of bed   STG Expiration Date 05/06/23   Short Term Goal #1 In 10 days pt will be able to: 1  Demonstrate ability to perform all aspects of bed mobility independently to improve functional safety  2  Perform functional transfers independently to facilitate safe return to previous living environment  3   Ambulate 150 ft with LRAD and supervision with stable vitals to improve safety with household distances and reduce fall risk  4  Improve LE strength grades by 1 to increase ease of functional mobility with transfers and gait  5  Pt will demonstrate improved balance by one grade in order to decrease risk of falls  6  Climb 10 steps with 1 HR with LRAD and supervision to simulate entrance to home     PT Treatment Day 0   Plan Treatment/Interventions LE strengthening/ROM; Functional transfer training; Therapeutic exercise;Cognitive reorientation;Patient/family training;Equipment eval/education;Gait training;Bed mobility;Spoke to nursing   PT Frequency 3-5x/wk   Recommendation   PT Discharge Recommendation Post acute rehabilitation services   Additional Comments vs home with home health and performance of stairs at least min assist   AM-PAC Basic Mobility Inpatient   Turning in Flat Bed Without Bedrails 3   Lying on Back to Sitting on Edge of Flat Bed Without Bedrails 3   Moving Bed to Chair 3   Standing Up From Chair Using Arms 3   Walk in Room 3   Climb 3-5 Stairs With Railing 2   Basic Mobility Inpatient Raw Score 17   Basic Mobility Standardized Score 39 67   Highest Level Of Mobility   -HLM Goal 5: Stand one or more mins   -HLM Achieved 6: Walk 10 steps or more   End of Consult   Patient Position at End of Consult Bedside chair;Bed/Chair alarm activated; All needs within reach   The patient's AM-PAC Basic Mobility Inpatient Short Form Raw Score is 17  A Raw score of greater than 16 suggests the patient may benefit from discharge to home  HOWEVER, Please also refer to the recommendation of the Physical Therapist for safe discharge planning        Komal Rivera, PT

## 2023-04-26 NOTE — ASSESSMENT & PLAN NOTE
· Patient currently reports smoking 0 5 packs a day  · Continue to encourage cessation  · Nicotine patch supplementation

## 2023-04-26 NOTE — ASSESSMENT & PLAN NOTE
Lab Results   Component Value Date    HGBA1C 14 9 (A) 08/10/2022       Recent Labs     04/26/23  0045   POCGLU 280*       Blood Sugar Average: Last 72 hrs:  (P) 280   · Continue lantus 10 units h s , patient reports noncompliance at home  · Hold metformin and jardiance  · SSI w/ accucheks

## 2023-04-26 NOTE — PLAN OF CARE
Problem: OCCUPATIONAL THERAPY ADULT  Goal: Performs self-care activities at highest level of function for planned discharge setting  See evaluation for individualized goals  Description: Treatment Interventions: ADL retraining, Endurance training, Functional transfer training, Cognitive reorientation, Patient/family training, Equipment evaluation/education, Compensatory technique education, Continued evaluation, Energy conservation, Activityengagement  Equipment Recommended: Bedside commode, Shower transfer bench ($$)       See flowsheet documentation for full assessment, interventions and recommendations  Note: Limitation: Decreased ADL status, Decreased Safe judgement during ADL, Decreased cognition, Decreased endurance, Decreased self-care trans, Decreased high-level ADLs  Prognosis: Good  Assessment: Pt is a 69 y o  female seen for OT evaluation s/p [facility] to [facility] on [facility]/2023 w/Acute combined systolic and diastolic congestive heart failure ([facility] [place] [address] )  See medical hx above for extensive list of comorbidities and significant PMHx affecting pt's functional performance at time of eval  Personal and environmental factors affecting patient at time of evaluation include inaccessible home environment, inaccessible bathroom environment, difficulty completing ADLs and difficulty completing IADLs  Personal factors supporting patient at time of evaluation include age, social support and attitude towards recovery  Performance deficits impacting patients overall occupational performance at this time are balance, functional mobility, endurance, activity tolerance, forward functional reach, functional standing tolerance and overall strength, safety awareness, insight into deficits, pacing of tasks, problem solving and learning/remembering new tasks, that result in activity limitations (as is outlined above in flowsheet)   Pt will benefit from continued IPOT treatment to address above deficits and maximize level of independence with ADLs and functional mobility in the areas of: grooming , bathing/ shower, dressing, toilet hygiene and transfer to all surfaces  Please see formal OT recommendations below       OT Discharge Recommendation: Post acute rehabilitation services     Ric Espinoza [place], OTR/L  [place] License [id number]  [address] [facility] [id number]

## 2023-04-26 NOTE — ED PROVIDER NOTES
History  Chief Complaint   Patient presents with   • Respiratory Distress     Arrived via EMS from home  Patient states that she has not taken her medication (lasix) for awhile and has had an increase of shortness of breath  EMS gave duo-neb on the way here and patient reported breathing a little better  Patient is a 42-year-old female with history of CHF with last echo demonstrating LVEF 25 to 30%, pulmonary hypertension coming in for evaluation of shortness of breath  Patient states that over the past week she has had progressive shortness of breath at rest with progressively worsening nonproductive cough  Earlier today patient felt her work of breathing had increased enough to cause concern so she called EMS  En route patient did receive a DuoNeb which she reports offered minimal relief of her symptoms  Patient is being followed by cardiology for cardiac history including cardiomyopathy, heart failure, pacemaker placement  She states she is currently following the appropriate cardiac diet  Prior to Admission Medications   Prescriptions Last Dose Informant Patient Reported? Taking? Aspirin Low Dose 81 MG EC tablet   No No   Sig: TAKE 1 TABLET BY MOUTH EVERY DAY   Blood Glucose Monitoring Suppl (OneTouch Verio Reflect) w/Device KIT   No No   Sig: Check blood sugars three times daily  Please substitute with appropriate alternative as covered by patient's insurance  Dx: E11 65   Empagliflozin (Jardiance) 10 MG TABS   No No   Sig: Take 1 tablet (10 mg total) by mouth every morning   Flovent  MCG/ACT inhaler   Yes No   Patient not taking: Reported on 1/10/2023   Insulin Pen Needle (BD Pen Needle Kateryna U/F) 32G X 4 MM MISC   No No   Sig: Use daily as directed with insulin pen   OneTouch Delica Lancets 69T MISC   No No   Sig: Check blood sugars three times daily  Please substitute with appropriate alternative as covered by patient's insurance   Dx: E11 65   albuterol (Ventolin HFA) 90 mcg/act inhaler   No No   Sig: Inhale 2 puffs every 6 (six) hours as needed for wheezing (rinse mouth after use)   Patient not taking: Reported on 3/1/2023   apixaban (Eliquis) 5 mg   No No   Sig: Take 1 tablet (5 mg total) by mouth 2 (two) times a day   atorvastatin (LIPITOR) 40 mg tablet   No No   Sig: Take 1 tablet (40 mg total) by mouth daily   cholecalciferol (VITAMIN D3) 1,000 units tablet   No No   Sig: Take 2 tablets (2,000 Units total) by mouth daily   furosemide (LASIX) 40 mg tablet   No No   Sig: TAKE 1 TABLET DAILY ON MONDAYS, WEDNESDAYS, AND FRIDAYS   glucose blood (OneTouch Verio) test strip   No No   Sig: Check blood sugars three times daily  Please substitute with appropriate alternative as covered by patient's insurance  Dx: E11 65   insulin glargine (Lantus) 100 units/mL subcutaneous injection   No No   Sig: Inject 10 Units under the skin daily at bedtime   metFORMIN (GLUCOPHAGE) 1000 MG tablet   No No   Sig: Take 1 tablet (1,000 mg total) by mouth 2 (two) times a day with meals   metoprolol succinate (TOPROL-XL) 50 mg 24 hr tablet   No No   Sig: TAKE 1 AND 1/2 TABLETS BY MOUTH 2 TIMES A DAY  nicotine (NICODERM CQ) 14 mg/24hr TD 24 hr patch   No No   Sig: Place 1 patch on the skin every 24 hours   Patient not taking: Reported on 12/27/2022   nicotine (NICODERM CQ) 14 mg/24hr TD 24 hr patch   No No   Sig: Place 1 patch on the skin every 24 hours   nicotine (NICODERM CQ) 7 mg/24hr TD 24 hr patch   No No   Sig: Place 1 patch on the skin every 24 hours   Patient not taking: Reported on 3/1/2023   nystatin-triamcinolone (MYCOLOG-II) ointment   No No   Sig: Apply topically 2 (two) times a day To vaginal area   sacubitril-valsartan (Entresto) 49-51 MG TABS   No No   Sig: Take 1 tablet by mouth 2 (two) times a day      Facility-Administered Medications: None       Past Medical History:   Diagnosis Date   • Breast cancer ([facility] )     left - 1994     • [facility] mellitus [facility] )    • Diabetes mellitus, type 2 ([facility] ) 03/15/2006    last assessed 7/10/13   • GERD (gastroesophageal reflux disease)    • History of chemotherapy    • Hypertension        Past Surgical History:   Procedure Laterality Date   • CARDIAC ELECTROPHYSIOLOGY PROCEDURE N/A 3/9/2022    Procedure: Cardiac icd implant/ DUAL CHAMBER ICD; Surgeon: Ronaldo Golden MD;  Location: [facility]; Service: Cardiology   • MASTECTOMY Left     last assessed 12/16/14   • MASTECTOMY, RADICAL Left     1994   • [place] PARATHYROIDECTOMY/EXPLORATION PARATHYROIDS N/A 4/21/2021    Procedure: PARATHYROIDECTOMY POSSIBLE 4 GLAND EXPLORATION;  Surgeon: Miriam Espinosa MD;  Location: [facility];  Service: ENT   • REDUCTION MAMMAPLASTY Right    • UVULECTOMY         Family History   Problem Relation Age of Onset   • Hypothyroidism Mother    • Leukemia Mother    • Diabetes Father    • Diabetes type II Father    • Stroke Sister    • Diabetes Paternal Grandmother    • Cancer Sister    • Diabetes Brother      I have reviewed and agree with the history as documented  E-Cigarette/Vaping   • E-Cigarette Use Never User      E-Cigarette/Vaping Substances   • Nicotine No    • THC No    • CBD No    • Flavoring No    • Other No    • Unknown No      Social History     Tobacco Use   • Smoking status: Every Day     Packs/day: 0 50     Types: Cigarettes   • Smokeless tobacco: Never   • Tobacco comments:     3 cigarettes daily    Vaping Use   • Vaping Use: Never used   Substance Use Topics   • Alcohol use: Not Currently     Comment: Social   • Drug use: No        Review of Systems   Constitutional: Negative  HENT: Negative  Eyes: Negative  Respiratory: Positive for cough and shortness of breath  Cardiovascular: Negative  Gastrointestinal: Negative  Endocrine: Negative  Genitourinary: Negative  Musculoskeletal: Negative  Skin: Negative  Allergic/Immunologic: Negative  Neurological: Negative  Hematological: Negative  Psychiatric/Behavioral: Negative      All other systems reviewed and are negative  Physical Exam  ED Triage Vitals   Temperature Pulse Respirations Blood Pressure SpO2   04/26/23 0032 04/26/23 0032 04/26/23 0032 04/26/23 0032 04/26/23 0032   97 9 °F (36 6 °C) 81 (!) 40 (!) 210/135 96 %      Temp Source Heart Rate Source Patient Position - Orthostatic VS BP Location FiO2 (%)   04/26/23 0032 04/26/23 0032 04/26/23 0032 04/26/23 0032 --   Oral Monitor Lying Right arm       Pain Score       04/26/23 0500       No Pain             Orthostatic Vital Signs  Vitals:    04/26/23 0400 04/26/23 0451 04/26/23 0500 04/26/23 0523   BP: (!) 182/96 (!) 173/128  (!) 162/103   Pulse: 80  92    Patient Position - Orthostatic VS:           Physical Exam  Vitals and nursing note reviewed  Constitutional:       General: She is not in acute distress  Appearance: Normal appearance  She is not ill-appearing, toxic-appearing or diaphoretic  HENT:      Head: Normocephalic and atraumatic  Eyes:      General: No scleral icterus  Right eye: No discharge  Left eye: No discharge  Extraocular Movements: Extraocular movements intact  Conjunctiva/sclera: Conjunctivae normal       Pupils: Pupils are equal, round, and reactive to light  Cardiovascular:      Rate and Rhythm: Normal rate  Pulses: Normal pulses  Heart sounds: Normal heart sounds  No murmur heard  No friction rub  No gallop  Pulmonary:      Effort: Respiratory distress present  Breath sounds: Rales present  Abdominal:      General: Abdomen is flat  Bowel sounds are normal  There is no distension  Palpations: Abdomen is soft  Tenderness: There is no abdominal tenderness  There is no guarding or rebound  Musculoskeletal:         General: Normal range of motion  Cervical back: Normal range of motion  No rigidity  Skin:     General: Skin is warm and dry  Capillary Refill: Capillary refill takes less than 2 seconds  Coloration: Skin is not jaundiced  Findings: No bruising or lesion  Neurological:      General: No focal deficit present  Mental Status: She is alert and oriented to person, place, and time  Mental status is at baseline  Psychiatric:         Mood and Affect: Mood normal          Behavior: Behavior normal          Thought Content:  Thought content normal          Judgment: Judgment normal          ED Medications  Medications   albuterol (PROVENTIL HFA,VENTOLIN HFA) inhaler 2 puff (has no administration in time range)   apixaban (ELIQUIS) tablet 5 mg (has no administration in time range)   aspirin (ECOTRIN LOW STRENGTH) EC tablet 81 mg (has no administration in time range)   atorvastatin (LIPITOR) tablet 40 mg (has no administration in time range)   fluticasone (FLOVENT HFA) 110 MCG/ACT inhaler 1 puff (has no administration in time range)   insulin glargine (LANTUS) subcutaneous injection 10 Units 0 1 mL (has no administration in time range)   metoprolol succinate (TOPROL-XL) 24 hr tablet 50 mg (has no administration in time range)   sacubitril-valsartan (ENTRESTO) 49-51 MG per tablet 1 tablet (has no administration in time range)   potassium chloride (K-DUR,KLOR-CON) CR tablet 40 mEq (has no administration in time range)   docusate sodium (COLACE) capsule 100 mg (has no administration in time range)   aluminum-magnesium hydroxide-simethicone (MYLANTA) oral suspension 30 mL (has no administration in time range)   nicotine (NICODERM CQ) 14 mg/24hr TD 24 hr patch 1 patch (has no administration in time range)   furosemide (LASIX) injection 60 mg (has no administration in time range)   benzonatate (TESSALON PERLES) capsule 100 mg (has no administration in time range)   hydrALAZINE (APRESOLINE) injection 5 mg (has no administration in time range)   insulin lispro (HumaLOG) 100 units/mL subcutaneous injection 1-5 Units (has no administration in time range)   insulin lispro (HumaLOG) 100 units/mL subcutaneous injection 1-5 Units (has no administration in time range)   albuterol (FOR EMS ONLY) (2 5 mg/3 mL) 0 083 % inhalation solution 2 5 mg (0 mg Does not apply Given to EMS 4/26/23 0040)   ipratropium-albuterol (FOR EMS ONLY) (DUO-NEB) 0 5-2 5 mg/3 mL inhalation solution 3 mL (0 mL Does not apply Given to EMS 4/26/23 0041)   albuterol inhalation solution 10 mg (10 mg Nebulization Given 4/26/23 0104)     And   ipratropium (ATROVENT) 0 02 % inhalation solution 1 mg (1 mg Nebulization Given 4/26/23 0104)     And   sodium chloride 0 9 % inhalation solution 3 mL (3 mL Nebulization Given 4/26/23 0104)   methylPREDNISolone sodium succinate (Solu-MEDROL) injection 125 mg (125 mg Intravenous Given 4/26/23 0148)   furosemide (LASIX) injection 80 mg (80 mg Intravenous Given 4/26/23 0247)       Diagnostic Studies  Results Reviewed     Procedure Component Value Units Date/Time    HS Troponin I 2hr [[id number]]  (Normal) Collected: 04/26/23 0311    Lab Status: Final result Specimen: Blood from Arm, Right Updated: 04/26/23 0345     hs TnI 2hr 24 ng/L      Delta 2hr hsTnI -7 ng/L     HS Troponin I 4hr [[id number]]     Lab Status: No result Specimen: Blood     B-Type Natriuretic Peptide(BNP) [[id number]]  (Abnormal) Collected: 04/26/23 0135    Lab Status: Final result Specimen: Blood from Arm, Right Updated: 04/26/23 0226     BNP >4,700 pg/mL     CBC and differential [[id number]]  (Abnormal) Collected: 04/26/23 0135    Lab Status: Final result Specimen: Blood from Arm, Right Updated: 04/26/23 0143     WBC 5 73 Thousand/uL      RBC 5 51 Million/uL      Hemoglobin 16 2 g/dL      Hematocrit 49 3 %      MCV 90 fL      MCH 29 4 pg      MCHC 32 9 g/dL      RDW 14 1 %      MPV 12 0 fL      Platelets 889 Thousands/uL      nRBC 0 /100 WBCs      Neutrophils Relative 56 %      Immat GRANS % 0 %      Lymphocytes Relative 35 %      Monocytes Relative 8 %      Eosinophils Relative 1 %      Basophils Relative 0 %      Neutrophils Absolute 3 22 Thousands/µL      Immature Grans Absolute 0 02 Thousand/uL      Lymphocytes Absolute 1 99 Thousands/µL      Monocytes Absolute 0 46 Thousand/µL      Eosinophils Absolute 0 03 Thousand/µL      Basophils Absolute 0 01 Thousands/µL     HS Troponin 0hr (reflex protocol) [[id number]]  (Normal) Collected: 04/26/23 0052    Lab Status: Final result Specimen: Blood from Arm, Left Updated: 04/26/23 0136     hs TnI 0hr 31 ng/L     Comprehensive metabolic panel [[id number]]  (Abnormal) Collected: 04/26/23 0052    Lab Status: Final result Specimen: Blood from Arm, Left Updated: 04/26/23 0128     Sodium 137 mmol/L      Potassium 3 4 mmol/L      Chloride 102 mmol/L      CO2 27 mmol/L      ANION GAP 8 mmol/L      BUN 14 mg/dL      Creatinine 0 98 mg/dL      Glucose 284 mg/dL      Calcium 8 5 mg/dL      Corrected Calcium 9 6 mg/dL      AST 15 U/L      ALT 11 U/L      Alkaline Phosphatase 210 U/L      Total Protein 6 6 g/dL      Albumin 2 6 g/dL      Total Bilirubin 0 91 mg/dL      eGFR 59 ml/min/1 73sq m     Narrative:      [place] guidelines for Chronic Kidney Disease (CKD):   •  Stage 1 with normal or high GFR (GFR > 90 mL/min/1 73 square meters)  •  Stage 2 Mild CKD (GFR = 60-89 mL/min/1 73 square meters)  •  Stage 3A Moderate CKD (GFR = 45-59 mL/min/1 73 square meters)  •  Stage 3B Moderate CKD (GFR = 30-44 mL/min/1 73 square meters)  •  Stage 4 Severe CKD (GFR = 15-29 mL/min/1 73 square meters)  •  Stage 5 End Stage CKD (GFR <15 mL/min/1 73 square meters)  Note: GFR calculation is accurate only with a steady state creatinine    Fingerstick Glucose (POCT) [[id number]]  (Abnormal) Collected: 04/26/23 0045    Lab Status: Final result Updated: 04/26/23 0046     POC Glucose 280 mg/dl                  XR chest 1 view portable    (Results Pending)         Procedures  ECG 12 Lead Documentation Only    Date/Time: 4/26/2023 3:30 AM  Performed by: Ileana Kuhn DO  Authorized by: Ileana Kuhn DO     Indications / Diagnosis:  Sob  Patient location:  ED  Interpretation:     Interpretation: normal    Rate:     ECG rate:  81    ECG rate assessment: normal    Rhythm:     Rhythm: sinus rhythm    QRS:     QRS axis:  Left    QRS intervals:  Normal  Conduction:     Conduction: abnormal      Abnormal conduction: incomplete RBBB    ST segments:     ST segments:  Normal  T waves:     T waves: normal            ED Course                             SBIRT 22yo+    Flowsheet Row Most Recent Value   Initial Alcohol Screen: US AUDIT-C     1  How often do you have a drink containing alcohol? 0 Filed at: 04/26/2023 0033   2  How many drinks containing alcohol do you have on a typical day you are drinking? 0 Filed at: 04/26/2023 0033   3b  FEMALE Any Age, or MALE 65+: How often do you have 4 or more drinks on one occassion? 0 Filed at: 04/26/2023 0033   Audit-C Score 0 Filed at: 04/26/2023 4511   ALEX: How many times in the past year have you    Used an illegal drug or used a prescription medication for non-medical reasons? Never Filed at: 04/26/2023 6270                Medical Decision Making  Patient presents emergency room for evaluation of shortness of breath  Initial physical exam remarkable for above findings  Imaging suggestive of effusion, cephalization which was evident on prior film  BNP markedly elevated, no prior for comparison  Given shortness of breath suspect CHF exacerbation, will admit for further evaluation  Amount and/or Complexity of Data Reviewed  Labs: ordered  Risk  Prescription drug management  Decision regarding hospitalization              Disposition  Final diagnoses:   CHF exacerbation ([facility] [address] )     Time reflects when diagnosis was documented in both MDM as applicable and the Disposition within this note     Time User Action Codes Description Comment    4/26/2023  3:38 AM Mando Winters Add [I50 9] CHF exacerbation [facility])       ED Disposition     ED Disposition   Admit    Condition   Stable Date/Time   Wed Apr 26, 2023  3:38 AM    Comment   Case was discussed with laila and the patient's admission status was agreed to be Admission Status: inpatient status to the service of Dr Cristian Marshall   Follow-up Information    None         Current Discharge Medication List      CONTINUE these medications which have NOT CHANGED    Details   albuterol (Ventolin HFA) 90 mcg/act inhaler Inhale 2 puffs every 6 (six) hours as needed for wheezing (rinse mouth after use)  Qty: 18 g, Refills: 1    Comments: Dispense generic albuterol  Associated Diagnoses: Cough; COVID-19      apixaban (Eliquis) 5 mg Take 1 tablet (5 mg total) by mouth 2 (two) times a day  Qty: 60 tablet, Refills: 3    Associated Diagnoses: Atrial fibrillation, unspecified type ([facility])      Aspirin Low Dose 81 MG EC tablet TAKE 1 TABLET BY MOUTH EVERY DAY  Qty: 90 tablet, Refills: 3    Associated Diagnoses: Coronary artery disease involving native coronary artery of native heart without angina pectoris      atorvastatin (LIPITOR) 40 mg tablet Take 1 tablet (40 mg total) by mouth daily  Qty: 90 tablet, Refills: 1    Associated Diagnoses: Hyperlipidemia LDL goal <100      Blood Glucose Monitoring Suppl (OneTouch Verio Reflect) w/Device KIT Check blood sugars three times daily  Please substitute with appropriate alternative as covered by patient's insurance   Dx: E11 65  Qty: 1 kit, Refills: 0    Associated Diagnoses: Type 2 diabetes mellitus with stage 2 chronic kidney disease, without long-term current use of insulin ([facility])      cholecalciferol (VITAMIN D3) 1,000 units tablet Take 2 tablets (2,000 Units total) by mouth daily  Qty: 10 tablet, Refills: 0    Associated Diagnoses: COVID-19      Empagliflozin (Jardiance) 10 MG TABS Take 1 tablet (10 mg total) by mouth every morning  Qty: 90 tablet, Refills: 1    Associated Diagnoses: Type 2 diabetes mellitus with other diabetic kidney complication, without long-term current use of insulin ([facility])      Flovent HFA 110 MCG/ACT inhaler       furosemide (LASIX) 40 mg tablet TAKE 1 TABLET DAILY ON MONDAYS, WEDNESDAYS, AND FRIDAYS  Qty: 90 tablet, Refills: 1    Associated Diagnoses: Chronic diastolic congestive heart failure (HCC)      glucose blood (OneTouch Verio) test strip Check blood sugars three times daily  Please substitute with appropriate alternative as covered by patient's insurance  Dx: E11 65  Qty: 300 each, Refills: 3    Associated Diagnoses: Type 2 diabetes mellitus with stage 2 chronic kidney disease, without long-term current use of insulin ([facility])      insulin glargine (Lantus) 100 units/mL subcutaneous injection Inject 10 Units under the skin daily at bedtime  Qty: 15 mL, Refills: 0    Associated Diagnoses: Type 2 diabetes mellitus with stage 2 chronic kidney disease, without long-term current use of insulin ([facility])      Insulin Pen Needle (BD Pen Needle Kateryna U/F) 32G X 4 MM MISC Use daily as directed with insulin pen  Qty: 100 each, Refills: 3    Associated Diagnoses: Type 2 diabetes mellitus with stage 2 chronic kidney disease, without long-term current use of insulin ([facility])      metFORMIN (GLUCOPHAGE) 1000 MG tablet Take 1 tablet (1,000 mg total) by mouth 2 (two) times a day with meals  Qty: 180 tablet, Refills: 1    Associated Diagnoses: Type 2 diabetes mellitus with other diabetic kidney complication, without long-term current use of insulin ([facility])      metoprolol succinate (TOPROL-XL) 50 mg 24 hr tablet TAKE 1 AND 1/2 TABLETS BY MOUTH 2 TIMES A DAY  Qty: 270 tablet, Refills: 3    Associated Diagnoses: Acute combined systolic and diastolic congestive heart failure ([facility] )      ! ! nicotine (NICODERM CQ) 14 mg/24hr TD 24 hr patch Place 1 patch on the skin every 24 hours  Qty: 28 patch, Refills: 3    Associated Diagnoses: Coronary artery disease involving native coronary artery of native heart without angina pectoris; Chronic combined systolic and diastolic congestive heart failure ([address] )      ! ! nicotine (NICODERM CQ) 14 mg/24hr TD 24 hr patch Place 1 patch on the skin every 24 hours  Qty: 42 patch, Refills: 0    Associated Diagnoses: Tobacco use      nicotine (NICODERM CQ) 7 mg/24hr TD 24 hr patch Place 1 patch on the skin every 24 hours  Qty: 14 patch, Refills: 0    Associated Diagnoses: Tobacco use      nystatin-triamcinolone (MYCOLOG-II) ointment Apply topically 2 (two) times a day To vaginal area  Qty: 60 g, Refills: 1    Associated Diagnoses: Vulvar itching      OneTouch Delica Lancets 41X MISC Check blood sugars three times daily  Please substitute with appropriate alternative as covered by patient's insurance  Dx: E11 65  Qty: 300 each, Refills: 3    Associated Diagnoses: Type 2 diabetes mellitus with stage 2 chronic kidney disease, without long-term current use of insulin (HCC)      sacubitril-valsartan (Entresto) 49-51 MG TABS Take 1 tablet by mouth 2 (two) times a day  Qty: 90 tablet, Refills: 3    Comments: DX Code Needed    Associated Diagnoses: NICM (nonischemic cardiomyopathy) ([facility] [address] ); Chronic combined systolic and diastolic congestive heart failure ([facility] [address] )       ! ! - Potential duplicate medications found  Please discuss with provider  No discharge procedures on file  PDMP Review       Value Time User    PDMP Reviewed  Yes 4/21/2021  0:86 PM Emir Murray MD           ED Provider  Attending physically available and evaluated Edison Erica CRUZ managed the patient along with the ED Attending      Electronically Signed by         Melissa Lovett DO  04/26/23 8655

## 2023-04-27 PROBLEM — J96.00 ACUTE RESPIRATORY FAILURE (HCC): Status: ACTIVE | Noted: 2023-04-27

## 2023-04-27 LAB
ANION GAP SERPL CALCULATED.3IONS-SCNC: 8 MMOL/L (ref 4–13)
ATRIAL RATE: 81 BPM
BUN SERPL-MCNC: 19 MG/DL (ref 5–25)
CALCIUM SERPL-MCNC: 8.8 MG/DL (ref 8.4–10.2)
CHLORIDE SERPL-SCNC: 98 MMOL/L (ref 96–108)
CO2 SERPL-SCNC: 33 MMOL/L (ref 21–32)
CREAT SERPL-MCNC: 1.1 MG/DL (ref 0.6–1.3)
GFR SERPL CREATININE-BSD FRML MDRD: 51 ML/MIN/1.73SQ M
GLUCOSE SERPL-MCNC: 152 MG/DL (ref 65–140)
GLUCOSE SERPL-MCNC: 161 MG/DL (ref 65–140)
GLUCOSE SERPL-MCNC: 56 MG/DL (ref 65–140)
GLUCOSE SERPL-MCNC: 64 MG/DL (ref 65–140)
GLUCOSE SERPL-MCNC: 84 MG/DL (ref 65–140)
GLUCOSE SERPL-MCNC: 94 MG/DL (ref 65–140)
MAGNESIUM SERPL-MCNC: 2.1 MG/DL (ref 1.9–2.7)
P AXIS: 69 DEGREES
POTASSIUM SERPL-SCNC: 3.6 MMOL/L (ref 3.5–5.3)
PR INTERVAL: 178 MS
QRS AXIS: -51 DEGREES
QRSD INTERVAL: 112 MS
QT INTERVAL: 410 MS
QTC INTERVAL: 476 MS
SODIUM SERPL-SCNC: 139 MMOL/L (ref 135–147)
T WAVE AXIS: 228 DEGREES
VENTRICULAR RATE: 81 BPM

## 2023-04-27 RX ORDER — INSULIN GLARGINE 100 [IU]/ML
10 INJECTION, SOLUTION SUBCUTANEOUS
Status: DISCONTINUED | OUTPATIENT
Start: 2023-04-27 | End: 2023-04-30 | Stop reason: HOSPADM

## 2023-04-27 RX ORDER — POTASSIUM CHLORIDE 20 MEQ/1
40 TABLET, EXTENDED RELEASE ORAL 2 TIMES DAILY
Status: DISCONTINUED | OUTPATIENT
Start: 2023-04-27 | End: 2023-04-28

## 2023-04-27 RX ADMIN — SACUBITRIL AND VALSARTAN 1 TABLET: 49; 51 TABLET, FILM COATED ORAL at 10:17

## 2023-04-27 RX ADMIN — SACUBITRIL AND VALSARTAN 1 TABLET: 97; 103 TABLET, FILM COATED ORAL at 17:48

## 2023-04-27 RX ADMIN — INSULIN GLARGINE 10 UNITS: 100 INJECTION, SOLUTION SUBCUTANEOUS at 21:00

## 2023-04-27 RX ADMIN — METOPROLOL SUCCINATE 50 MG: 50 TABLET, EXTENDED RELEASE ORAL at 20:51

## 2023-04-27 RX ADMIN — BENZONATATE 100 MG: 100 CAPSULE ORAL at 08:22

## 2023-04-27 RX ADMIN — EMPAGLIFLOZIN 10 MG: 10 TABLET, FILM COATED ORAL at 08:13

## 2023-04-27 RX ADMIN — ALBUTEROL SULFATE 2 PUFF: 90 AEROSOL, METERED RESPIRATORY (INHALATION) at 08:14

## 2023-04-27 RX ADMIN — FLUTICASONE PROPIONATE 1 PUFF: 110 AEROSOL, METERED RESPIRATORY (INHALATION) at 08:14

## 2023-04-27 RX ADMIN — FLUTICASONE PROPIONATE 1 PUFF: 110 AEROSOL, METERED RESPIRATORY (INHALATION) at 20:52

## 2023-04-27 RX ADMIN — NICOTINE 1 PATCH: 14 PATCH, EXTENDED RELEASE TRANSDERMAL at 08:08

## 2023-04-27 RX ADMIN — ASPIRIN 81 MG: 81 TABLET, COATED ORAL at 08:10

## 2023-04-27 RX ADMIN — ATORVASTATIN CALCIUM 40 MG: 40 TABLET, FILM COATED ORAL at 08:08

## 2023-04-27 RX ADMIN — POTASSIUM CHLORIDE 40 MEQ: 1500 TABLET, EXTENDED RELEASE ORAL at 17:47

## 2023-04-27 RX ADMIN — INSULIN LISPRO 1 UNITS: 100 INJECTION, SOLUTION INTRAVENOUS; SUBCUTANEOUS at 13:12

## 2023-04-27 RX ADMIN — METOPROLOL SUCCINATE 50 MG: 50 TABLET, EXTENDED RELEASE ORAL at 08:09

## 2023-04-27 RX ADMIN — APIXABAN 5 MG: 5 TABLET, FILM COATED ORAL at 17:47

## 2023-04-27 RX ADMIN — SACUBITRIL AND VALSARTAN 1 TABLET: 49; 51 TABLET, FILM COATED ORAL at 08:09

## 2023-04-27 RX ADMIN — DOCUSATE SODIUM 100 MG: 100 CAPSULE, LIQUID FILLED ORAL at 08:10

## 2023-04-27 RX ADMIN — FUROSEMIDE 60 MG: 10 INJECTION, SOLUTION INTRAMUSCULAR; INTRAVENOUS at 08:10

## 2023-04-27 RX ADMIN — POTASSIUM CHLORIDE 40 MEQ: 1500 TABLET, EXTENDED RELEASE ORAL at 08:09

## 2023-04-27 RX ADMIN — INSULIN LISPRO 4 UNITS: 100 INJECTION, SOLUTION INTRAVENOUS; SUBCUTANEOUS at 13:13

## 2023-04-27 RX ADMIN — APIXABAN 5 MG: 5 TABLET, FILM COATED ORAL at 08:09

## 2023-04-27 RX ADMIN — DOCUSATE SODIUM 100 MG: 100 CAPSULE, LIQUID FILLED ORAL at 17:47

## 2023-04-27 RX ADMIN — FUROSEMIDE 60 MG: 10 INJECTION, SOLUTION INTRAMUSCULAR; INTRAVENOUS at 17:47

## 2023-04-27 NOTE — ASSESSMENT & PLAN NOTE
· Patient reports not using CPAP at home for many years  · Unclear reason why patient stopped  · Continue cpap h s

## 2023-04-27 NOTE — UTILIZATION REVIEW
Initial Clinical Review    Admission: Date/Time/Statement:   Admission Orders (From admission, onward)     Ordered        04/26/23 0339  INPATIENT ADMISSION  Once                      Orders Placed This Encounter   Procedures   • INPATIENT ADMISSION     Standing Status:   Standing     Number of Occurrences:   1     Order Specific Question:   Level of Care     Answer:   Med Surg [16]     Order Specific Question:   Estimated length of stay     Answer:   More than 2 Midnights     Order Specific Question:   Certification     Answer:   I certify that inpatient services are medically necessary for this patient for a duration of greater than two midnights  See H&P and MD Progress Notes for additional information about the patient's course of treatment  ED Arrival Information     Expected   -    Arrival   4/26/2023 00:29    Acuity   Emergent            Means of arrival   Ambulance    [facility]    Service   Hospitalist    Admission type   Emergency            Arrival complaint   resp distress           Chief Complaint   Patient presents with   • Respiratory Distress     Arrived via EMS from home  Patient states that she has not taken her medication (lasix) for awhile and has had an increase of shortness of breath  EMS gave duo-neb on the way here and patient reported breathing a little better  Initial Presentation: 69 y o  female from home to ED via ems admitted inpatient due to acute on combined systolic and diastolic CHF/Bilateral Pleural effusions/HPT  Presented due to shortness of breath starting week prior to arrival and work of breathing increased on day of arrival  Per ems enroute given Duoneb and albuterol neb  Non compliant with Lantus  On exam respiratory distress  Lungs rales  BNP >4700  H&H 16 2/49  3   K 3 4  Glucose 284  In the ED given Khadar Rodriguez neb, solu medrol, Lasix  Plan is continue telemetry, diuresis with IV Lasix  Cardiology consulted    Possible thoracentesis  Continue metoprolol  Continue home Lantus  Hold home metformin and Jardiance  Start SSI with accuchecks  4/26/23 per Cardiology - has acute on chronic heart failure with reduced EF and etiology is unclear, possible hypertensive disease and is decompensated to non compliance with diuretics  Compliant with other cardiac medications  Date: 4/27/23   Day 2:  Feels sleepy  Still some tiredness and shortness of breath but less than admission    -700  On exam: trace to 1+ pitting edema  Telemetry sinus  Slow NSVT  Plan is continued diuresis with IV Lasix  Increase Entresto   May need to add spirolactone tomorrow  Continue metoprolol     Lantus decreased  From 12 to 10 and continue SSI    ED Triage Vitals   Temperature Pulse Respirations Blood Pressure SpO2   04/26/23 0032 04/26/23 0032 04/26/23 0032 04/26/23 0032 04/26/23 0032   97 9 °F (36 6 °C) 81 (!) 40 (!) 210/135 96 %      Temp Source Heart Rate Source Patient Position - Orthostatic VS BP Location FiO2 (%)   04/26/23 0032 04/26/23 0032 04/26/23 0032 04/26/23 0032 --   Oral Monitor Lying Right arm       Pain Score       04/26/23 0500       No Pain          Wt Readings from Last 1 Encounters:   04/27/23 60 2 kg (132 lb 11 5 oz)     Additional Vital Signs:  Room air  04/27/23 07:12:57 98 °F (36 7 °C) 79 17 148/93 111 96 % -- --   04/26/23 22:38:06 98 3 °F (36 8 °C) 81 -- 153/81 105 95 % -- --   04/26/23 21:33:12 -- 94 -- 148/96 113 89 % Abnormal  -- --   04/26/23 2131 -- 94 -- 148/96 -- -- -- --   04/26/23 14:02:59 97 3 °F (36 3 °C) Abnormal  88 16 150/91 111 97 % -- --   04/26/23 1050 -- 84 -- 160/92 -- -- -- --   04/26/23 0740 97 6 °F (36 4 °C) 85 20 160/92 -- 95 % -- --   04/26/23 05:23:27 -- -- -- 162/103 Abnormal  123 -- -- --   04/26/23 0500 97 2 °F (36 2 °C) Abnormal  92 -- -- -- 95 % -- --   04/26/23 0400 -- 80 28 Abnormal  182/96 Abnormal  131 95 % -- --   04/26/23 0200 -- 87 30 Abnormal  188/120 Abnormal  147 96 % -- -- "  04/26/23 0100 -- 80 38 Abnormal  198/103 Abnormal  142 99 %       Pertinent Labs/Diagnostic Test Results:   XR chest 1 view portable   Final Result by Yonatan Umana MD (04/26 1550)      Moderate pulmonary venous congestion with small left effusion and mild left base atelectasis                 Workstation performed: [id number]           4/26/23 ecg - Normal sinus rhythm   Left axis deviation   Incomplete right bundle branch block   Inferior infarct , age undetermined   Abnormal ECG     Date/Time Weight Weight Method Height   04/27/23 0558 60 2 kg (132 lb 11 5 oz) Bed scale --   04/26/23 1050 60 4 kg (133 lb 2 5 oz) -- 5' 2 5\" (1 588 m)   04/26/23 0032 60 4 kg (133 lb 2 5 oz) Bed scale 5' 2 5\" (1 588      Results from last 7 days   Lab Units 04/26/23  0600 04/26/23  0135   WBC Thousand/uL 5 81 5 73   HEMOGLOBIN g/dL 16 0* 16 2*   HEMATOCRIT % 48 3* 49 3*   PLATELETS Thousands/uL 197 223   NEUTROS ABS Thousands/µL 5 22 3 22     Results from last 7 days   Lab Units 04/27/23  0506 04/26/23  0052   SODIUM mmol/L 139 137   POTASSIUM mmol/L 3 6 3 4*   CHLORIDE mmol/L 98 102   CO2 mmol/L 33* 27   ANION GAP mmol/L 8 8   BUN mg/dL 19 14   CREATININE mg/dL 1 10 0 98   EGFR ml/min/1 73sq m 51 59   CALCIUM mg/dL 8 8 8 5   MAGNESIUM mg/dL 2 1 1 4*     Results from last 7 days   Lab Units 04/26/23  0052   AST U/L 15   ALT U/L 11   ALK PHOS U/L 210*   TOTAL PROTEIN g/dL 6 6   ALBUMIN g/dL 2 6*   TOTAL BILIRUBIN mg/dL 0 91     Results from last 7 days   Lab Units 04/27/23  0712 04/26/23  2059 04/26/23  1623 04/26/23  1141 04/26/23  0744 04/26/23  0045   POC GLUCOSE mg/dl 84 223* 349* 410* 330* 280*     Results from last 7 days   Lab Units 04/27/23  0506 04/26/23  0052   GLUCOSE RANDOM mg/dL 64* 284*     BETA-HYDROXYBUTYRATE   Date Value Ref Range Status   12/07/2022 0 4 <0 6 mmol/L Final     Results from last 7 days   Lab Units 04/26/23  0600 04/26/23  0311 04/26/23  0052   HS TNI 0HR ng/L  --   --  31   HS TNI 2HR ng/L  --  " 24  --    HSTNI D2 ng/L  --  -7  --    HS TNI 4HR ng/L 35  --   --    HSTNI D4 ng/L 4  --   --      Results from last 7 days   Lab Units 04/26/23  0135   BNP pg/mL >4,700*       ED Treatment:   Medication Administration from 04/26/2023 0029 to 04/26/2023 0443       Date/Time Order Dose Route Action Comments     04/26/2023 0040 EDT albuterol (FOR EMS ONLY) (2 5 mg/3 mL) 0 083 % inhalation solution 2 5 mg 0 mg Does not apply Given to EMS --     04/26/2023 0041 EDT ipratropium-albuterol (FOR EMS ONLY) (DUO-NEB) 0 5-2 5 mg/3 mL inhalation solution 3 mL 0 mL Does not apply Given to EMS --     04/26/2023 0104 EDT albuterol inhalation solution 10 mg 10 mg Nebulization Given --     04/26/2023 0104 EDT ipratropium (ATROVENT) 0 02 % inhalation solution 1 mg 1 mg Nebulization Given --     04/26/2023 0104 EDT sodium chloride 0 9 % inhalation solution 3 mL 3 mL Nebulization Given --     04/26/2023 0148 EDT methylPREDNISolone sodium succinate (Solu-MEDROL) injection 125 mg 125 mg Intravenous Given --     04/26/2023 0247 EDT furosemide (LASIX) injection 80 mg 80 mg Intravenous Given /99 P 93        Past Medical History:   Diagnosis Date   • Breast cancer ([place] [address] )     left - 1994     • Diabetes mellitus ([place] [address] )    • Diabetes mellitus, type 2 ([place] [address] ) 03/15/2006    last assessed 7/10/13   • GERD (gastroesophageal reflux disease)    • History of chemotherapy    • Hypertension      Present on Admission:  • CKD stage 3 due to type 2 diabetes mellitus ([facility] )  • Acute combined systolic and diastolic congestive heart failure (HCC)  • Obstructive sleep apnea syndrome  • Coronary artery disease involving native coronary artery of native heart without angina pectoris  • PAF (paroxysmal atrial fibrillation) ([facility])  • Primary hypertension  • Hyperlipidemia LDL goal <100  • Tobacco use      Admitting Diagnosis: Respiratory distress [R06 03]  CHF exacerbation ([place] [address] ) [I50 9]  Age/Sex: 69 y o  female  Admission Orders:  Scheduled Medications:  apixaban, 5 mg, Oral, BID  aspirin, 81 mg, Oral, Daily  atorvastatin, 40 mg, Oral, Daily  docusate sodium, 100 mg, Oral, BID  Empagliflozin, 10 mg, Oral, QAM  fluticasone, 1 puff, Inhalation, Q12H EMILIA  furosemide, 60 mg, Intravenous, BID (diuretic)  insulin glargine, 12 Units, Subcutaneous, HS  insulin lispro, 1-5 Units, Subcutaneous, TID AC  insulin lispro, 4 Units, Subcutaneous, TID With Meals  metoprolol succinate, 50 mg, Oral, BID  nicotine, 1 patch, Transdermal, Daily  potassium chloride, 40 mEq, Oral, Daily  sacubitril-valsartan, 1 tablet, Oral, BID    magnesium sulfate 4 g/100 mL IVPB (premix) 4 g  Dose: 4 g  Freq: Once Route: IV  Last Dose: 4 g (04/26/23 1304)  Start: 04/26/23 1245 End: 04/26/23 1704    Continuous IV Infusions:     PRN Meds:  albuterol, 2 puff, Inhalation, Q6H PRN - used x 1 4/27  aluminum-magnesium hydroxide-simethicone, 30 mL, Oral, Q6H PRN  benzonatate, 100 mg, Oral, TID PRN x 2 4/26, x 1 4/27   hydrALAZINE, 5 mg, Intravenous, Q6H PRN    Telemetry   Fluid restriction   cpap at bedtime     IP CONSULT TO CARDIOLOGY    Network Utilization Review Department  ATTENTION: Please call with any questions or concerns to [phone] and carefully listen to the prompts so that you are directed to the right person  All voicemails are confidential   Susan Renu all requests for admission clinical reviews, approved or denied determinations and any other requests to dedicated fax number below belonging to the campus where the patient is receiving treatment   List of dedicated fax numbers for the Facilities:  [address] DENIALS (Administrative/Medical Necessity) [phone]   [address] (Maternity/NICU/Pediatrics) [phone]   [address] [facility] [address] [place] [address] Renetta Barclay [facility] [address] [facility] 134 815 [facility] [phone]

## 2023-04-27 NOTE — ASSESSMENT & PLAN NOTE
Wt Readings from Last 3 Encounters:   04/27/23 60 2 kg (132 lb 11 5 oz)   03/01/23 59 7 kg (131 lb 9 6 oz)   01/10/23 58 8 kg (129 lb 9 6 oz)   Last ECHO 1/2022 demonstrated EF 25-30% with severely decreased systolic dysfunction and there is global hypokinesis with regional variation   Diastolic function is abnormal  Echo- EF 35%, mild global hypokinesis, diastolic dysfunction, AR, TR, right ventricular pressure elevated, trivial pericardial effusion, left pleural effusion    Plan-  Daily weights  Monitor I/Os  Cardiac Diet with fluid restriction  Potassium supplementation changed to 40 BID   Appreciate Cardiology recommendations-continue Lasix 60 IV BID, consider starting spironolactone tomorrow, continue metoprolol/Jardiance, increase Entresto to  BID

## 2023-04-27 NOTE — ASSESSMENT & PLAN NOTE
Acute respiratory failure, POA, since resolved, due to acute CHF as evidenced by SOB, increased WOB, RR 28-40 and HTN urgency, treated with 1hr UDN, Lasix IV and telemetry  Initially saturating well on room air  History of COPD saturation goal >88%    Plan-  Continue to monitor O2 status

## 2023-04-27 NOTE — ASSESSMENT & PLAN NOTE
Lab Results   Component Value Date    HGBA1C 14 9 (A) 08/10/2022       Recent Labs     04/26/23  1623 04/26/23  2059 04/27/23  0712 04/27/23  1144   POCGLU 349* 223* 84 152*       Blood Sugar Average: Last 72 hrs:  (P) 261 [id number]   Home medications include Lantus 10HS    Plan-  Continue Lantus 10 HS   Lispro 4 TID w/ meals   Continue Jardiance for CHF

## 2023-04-27 NOTE — ASSESSMENT & PLAN NOTE
Chest x-ray on admission- Moderate pulmonary venous congestion with small left effusion and mild left base atelectasis     Plan-  See acute respiratory failure, CHF or plan

## 2023-04-27 NOTE — PHYSICAL THERAPY NOTE
PHYSICAL THERAPY NOTE          Patient Name: Yuly Otero  [id number] Date: 4/27/2023 04/27/23 1433   PT Last Visit   PT Visit Date 04/27/23   Note Type   Note Type Treatment   Pain Assessment   Pain Assessment Tool 0-10   Pain Score No Pain   Patient's Stated Pain Goal No pain   Hospital Pain Intervention(s) Repositioned; Ambulation/increased activity; Emotional support; Rest   Multiple Pain Sites No   Restrictions/Precautions   Weight Bearing Precautions Per Order No   Other Precautions Chair Alarm; Bed Alarm;Cognitive; Fall Risk;Pain   General   Chart Reviewed Yes   Response to Previous Treatment Patient with no complaints from previous session  Family/Caregiver Present No   Cognition   Overall Cognitive Status Impaired   Arousal/Participation Alert; Responsive; Cooperative   Attention Attends with cues to redirect   Orientation Level Oriented X4   Memory Decreased recall of recent events;Decreased recall of precautions   Following Commands Follows one step commands without difficulty   Comments pt was pleasant and cooperative throughout tx session   Subjective   Subjective pt was agreeable to participate in PT intervention   Bed Mobility   Supine to Sit 5  Supervision   Additional items Assist x 1;HOB elevated; Bedrails; Increased time required;Verbal cues   Sit to Supine 5  Supervision   Additional items Assist x 1;HOB elevated; Bedrails; Increased time required;Verbal cues   Additional Comments pt was able to sit EOB and participate in TE activities ion order to increase static/dynamcic sitting balance   Transfers   Sit to Stand 4  Minimal assistance   Additional items Assist x 1; Armrests; Increased time required;Verbal cues  (w/ RW)   Stand to Sit 4  Minimal assistance   Additional items Assist x 1; Armrests; Increased time required  (w/ RW)   Stand pivot 4  Minimal assistance   Additional items Assist x 1; Armrests; Increased time required;Verbal cues   Ambulation/Elevation   Gait pattern Decreased foot clearance; Improper Weight shift;Narrow ADINA; Short stride; Excessively slow  (LOB)   Gait Assistance 4  Minimal assist   Additional items Assist x 1;Verbal cues   Assistive Device Rolling walker   Distance 50'x2 RW   Stair Management Assistance 4  Minimal assist   Additional items Assist x 1;Verbal cues; Increased time required   Stair Management Technique Two rails; Step to pattern; Foreward;Backward   Number of Stairs 6   Balance   Static Sitting Fair +   Dynamic Sitting Fair   Static Standing Fair -   Dynamic Standing Fair -   Ambulatory Poor +  (w/ RW)   Endurance Deficit   Endurance Deficit Yes   Endurance Deficit Description limited activity tolerance and ambulation distance   Activity Tolerance   Activity Tolerance Patient limited by fatigue   Nurse Made Aware Spoke to RN   Exercises   Heelslides Supine;10 reps;AROM; Bilateral   Hip Abduction Sitting;AROM;15 reps;Bilateral   Hip Adduction Sitting;15 reps;AROM; Bilateral  (pillow squeezes)   Knee AROM Long Arc Quad Sitting;15 reps;AROM; Bilateral   Ankle Pumps Sitting;20 reps;AROM; Bilateral   Marching Sitting;10 reps;AROM; Bilateral   Assessment   Prognosis Fair   Problem List Decreased strength;Decreased endurance; Impaired balance;Decreased mobility; Decreased safety awareness;Decreased cognition;Decreased coordination; Impaired judgement   Assessment pt began tx session lying supine in the bed and was agreeable to participate in PT intervention  Upon arrival to pt room pt required cleaning and changing due to [address] not being placed correctly  pt continues to remain consistant with /s for all bed mobility and min Ax1 for all functional transfers to and from  with VC's for hand placement  pt utilized bed rail in order to assist with completing a supine<>sit EOB transfer  pt was able to sit EOB and perform TE activities 8 minutes w/o LOB in order to increase static/dynamic sitting EOB balance  pt was able to increase ambulation distance in todays tx session to 50'x2 rW with min Ax1  pt did have slight LoB x2 w/ ambulation and required a standing rest breaks with RW of 30 seconds before completing last ambulation of 50'x1 RW  pt educated on all safe stair trial strategies prior to initiating steps  pt was able to complete 6 steps with min Ax1 and bilateral hand rails  Additional not possible due to fatigue  Post tx pt in bed with call bell , all needs met and bed alarm activated  pt would benefit from cotninued skilled PT intervention in order to increase functional mobility, activity tolerance and ambulation distance  Goals   Patient Goals none stated this tx session   STG Expiration Date 05/06/23   PT Treatment Day 1   Plan   Treatment/Interventions Functional transfer training;LE strengthening/ROM; Therapeutic exercise;Elevations; Endurance training;Cognitive reorientation;Patient/family training;Equipment eval/education; Bed mobility;Gait training   Progress Slow progress, decreased activity tolerance   PT Frequency 3-5x/wk   Recommendation   PT Discharge Recommendation Post acute rehabilitation services   AM-PAC Basic Mobility Inpatient   Turning in Flat Bed Without Bedrails 3   Lying on Back to Sitting on Edge of Flat Bed Without Bedrails 3   Moving Bed to Chair 3   Standing Up From Chair Using Arms 3   Walk in Room 3   Climb 3-5 Stairs With Railing 3   Basic Mobility Inpatient Raw Score 18   Basic Mobility Standardized Score 41 05   Highest Level Of Mobility   JH-HLM Goal 6: Walk 10 steps or more   JH-HLM Achieved 7: Walk 25 feet or more   Education   Education Provided Mobility training;Assistive device; Other  (stair trials)   Patient Reinforcement needed   End of Consult   Patient Position at End of Consult Supine;Bed/Chair alarm activated; All needs within reach   The patient's AM-PAC Basic Mobility Inpatient Short Form Raw Score is 18   A Raw score of greater than 16 suggests the patient may benefit from discharge to home  Please also refer to the recommendation of the Physical Therapist for safe discharge planning       Pt would benefit from continued skilled PT intervention in order to increase functional mobility, activity tolerance, ambulation distance and steps completed as pt has 10 steps at home and pt is currently completing [place] [address]

## 2023-04-27 NOTE — PLAN OF CARE
Problem: PHYSICAL THERAPY ADULT  Goal: Performs mobility at highest level of function for planned discharge setting  See evaluation for individualized goals  Description: Treatment/Interventions: LE strengthening/ROM, Functional transfer training, Therapeutic exercise, Cognitive reorientation, Patient/family training, Equipment eval/education, Gait training, Bed mobility, Spoke to nursing          See flowsheet documentation for full assessment, interventions and recommendations  Outcome: Progressing  Note: Prognosis: Fair  Problem List: Decreased strength, Decreased endurance, Impaired balance, Decreased mobility, Decreased safety awareness, Decreased cognition, Decreased coordination, Impaired judgement  Assessment: pt began tx session lying supine in the bed and was agreeable to participate in PT intervention  Upon arrival to pt room pt required cleaning and changing due to [address] not being placed correctly  pt continues to remain consistant with /s for all bed mobility and min Ax1 for all functional transfers to and from rW with VC's for hand placement  pt utilized bed rail in order to assist with completing a supine<>sit EOB transfer  pt was able to sit EOB and perform TE activities 8 minutes w/o LOB in order to increase static/dynamic sitting EOB balance  pt was able to increase ambulation distance in todays tx session to 50'x2 rW with min Ax1  pt did have slight LoB x2 w/ ambulation and required a standing rest breaks with RW of 30 seconds before completing last ambulation of 50'x1 RW  pt educated on all safe stair trial strategies prior to initiating steps  pt was able to complete 6 steps with min Ax1 and bilateral hand rails  Additional not possible due to fatigue  Post tx pt in bed with call bell , all needs met and bed alarm activated  pt would benefit from cotninued skilled PT intervention in order to increase functional mobility, activity tolerance and ambulation distance    Barriers to Discharge: Inaccessible home environment (significant CEE)     PT Discharge Recommendation: Post acute rehabilitation services    See flowsheet documentation for full assessment

## 2023-04-27 NOTE — ASSESSMENT & PLAN NOTE
Lab Results   Component Value Date    CREATININE 1 10 04/27/2023     Does not appear to be in acute kidney injury   Very mild increase in creatinine 1 10    Plan-   Monitor BMP   Monitor I/Os  Avoid hypotension   Avoid nephrotoxins  Consider holding IV Lasix after doses today

## 2023-04-27 NOTE — CASE MANAGEMENT
Case Management Assessment & Discharge Planning Note    Patient name Kayleigh Graves  Location S /[address] 143-14 MRN [id number]  : 1955 Date 2023       Current Admission Date: 2023  Current Admission Diagnosis:Acute combined systolic and diastolic congestive heart failure [facility])   Patient Active Problem List    Diagnosis Date Noted   • Acute respiratory failure ([facility] ) 2023   • Pleural effusion, bilateral 2023   • PAF (paroxysmal atrial fibrillation) ([facility] ) 2023   • Embolism and thrombosis of arteries of the lower extremities ([place] [address] ) 2023   • Tobacco use 01/10/2023   • Vulvar itching 01/10/2023   • SVT (supraventricular tachycardia) ([facility] ) 2022   • Heart failure, left, with LVEF <=30% ([place] [address] ) 08/10/2022   • Venous insufficiency of both lower extremities 2022   • Trigger finger of right thumb 2021   • NICM (nonischemic cardiomyopathy) ([place] [address] ) 2021   • Peripheral arterial disease ([place] [address] ) 2021   • Breast cancer screening by mammogram 10/14/2021   • Chronic diastolic congestive heart failure ([facility] ) 2021   • Bilateral leg edema 2021   • Back pain 2021   • Breast cancer ([place] [address] ) - left 1994 2021   • Primary hypertension 2021   • Smoking 2021   • S/P left mastectomy 2021   • Vitamin D deficiency 2021   • Hyperparathyroidism ([facility] ) 2020   • Coronary artery disease involving native coronary artery of native heart without angina pectoris 2020   • Chronic combined systolic and diastolic congestive heart failure ([facility] ) 2020   • CKD stage 3 due to type 2 diabetes mellitus ([facility] ) 2020   • Acute combined systolic and diastolic congestive heart failure ([facility] ) 11/15/2020   • Hypercalcemia 11/15/2020   • Obstructive sleep apnea syndrome 2020   • Microalbuminuria 2020   • Medicare annual wellness visit, subsequent 2020   • Gastroesophageal reflux disease 2018   • Screening for cardiovascular, respiratory, and genitourinary diseases 10/24/2018   • Immunization due 10/24/2018   • Colon cancer screening 10/24/2018   • Menopause 10/24/2018   • Hyperlipidemia LDL goal <100 12/07/2015   • Type 2 diabetes mellitus with stage 3a chronic kidney disease, with long-term current use of insulin ([facility] ) 01/28/2015   • History of left breast cancer 09/04/2012   • Arthropathy 09/04/2012   • Hypertensive heart and kidney disease with heart failure and with chronic kidney disease stage III ([facility] ) 09/04/2012   • Colon, diverticulosis 09/04/2012      LOS (days): 1  Geometric Mean LOS (GMLOS) (days): 3 90  Days to GMLOS:2 4     OBJECTIVE:    Risk of Unplanned Readmission Score: 14 3         Current admission status: Inpatient       Preferred Pharmacy:   [facility]pharm[facility][id number][facility] [address] [facility] [place] [address]  Phone: [phone] Fax: [address] [place] [address]  [address] [place] [address]  Phone: [phone] Fax: [phone]    Primary Care Provider: Nadeen Dominique DO    Primary Insurance: [facility]  Secondary Insurance:     ASSESSMENT:  Hannah Tirado  63    Primary Phone: [phone] (Mobile)                         Readmission Root Cause  30 Day Readmission: No    Patient Information  Admitted from[de-identified] Home  Mental Status: Alert  During Assessment patient was accompanied by: Not accompanied during assessment  Assessment information provided by[de-identified] Patient  Primary Caregiver: Self  Support Systems: Family members  [place] of Residence: [address] [place] [address] do you live in?: [place] entry access options   Select all that apply : Stairs  Number of steps to enter home : 4  Do the steps have railings?: Yes  Type of Current Residence: [address] [place] [address] home  Upon entering residence, is there a bedroom on the main floor (no further steps)?: No  A bedroom is located on the following floor levels of residence (select all that apply):: 2nd Floor  Upon entering residence, is there a bathroom on the main floor (no further steps)?: No  Indicate which floors of current residence have a bathroom (select all the apply):: 2nd Floor  Number of steps to 2nd floor from main floor: One Flight  In the last 12 months, was there a time when you were not able to pay the mortgage or rent on time?: No  In the last 12 months, how many places have you lived?: 1  In the last 12 months, was there a time when you did not have a steady place to sleep or slept in a shelter (including now)?: No  Homeless/housing insecurity resource given?: N/A  Living Arrangements: Lives w/ Extended Family, Other (Comment) (2 nephews and a niece)  Is patient a ?: No    Activities of Daily Living Prior to Admission  Functional Status: Independent  Completes ADLs independently?: Yes  Ambulates independently?: Yes  Does patient use assisted devices?: No  Does patient currently own DME?: No  Does patient have a history of Outpatient Therapy (PT/OT)?: No  Does the patient have a history of Short-Term Rehab?: No  Does patient have a history of HHC?: No  Does patient currently have [facility]?: No         Patient Information Continued  Income Source: Pension/snf  Does patient have prescription coverage?: Yes  Within the past 12 months, you worried that your food would run out before you got the money to buy more : Never true  Within the past 12 months, the food you bought just didn't last and you didn't have money to get more : Never true  Food insecurity resource given?: N/A  Does patient receive dialysis treatments?: No  Does patient have a history of substance abuse?: No         Means of Transportation  Means of Transport to Appts[de-identified] Drives Self  In the past 12 months, has lack of transportation kept you from medical appointments or from getting medications?: No  In the past 12 months, has lack of transportation kept you from meetings, work, or from getting things needed for daily living?: No  Was application for public transport provided?: N/A        DISCHARGE DETAILS:    Discharge planning discussed with[de-identified] Patient and cousinRomero of Choice: Yes  Comments - Freedom of Choice: CM discussed discharge plans per care team recommendations  Patient declining rehab but requesting [facility] referrals - referrals made  CM will follow up with [facility] choice list  CM contacted family/caregiver?: Yes  Were Treatment Team discharge recommendations reviewed with patient/caregiver?: Yes  Did patient/caregiver verbalize understanding of patient care needs?: Yes  Were patient/caregiver advised of the risks associated with not following Treatment Team discharge recommendations?: Yes    Contacts  Patient Contacts: Tena Venegasjosseline - Cousin  Relationship to Patient[de-identified] Family  Contact Method: Phone  Phone Number: See face sheet  Reason/Outcome: Discharge Planning, Referral    Requested [address]         Is the patient interested in [facility] at discharge?: Yes  [address] requested[de-identified] Nursing, Physical Therapy, Occupational [address] [place], [place] [address]e Name[de-identified] Other ([facility] referrals made)  [facility] [address] Provider[de-identified] PCP  Home Health Services Needed[de-identified] Evaluate Functional Status and Safety, Gait/ADL Training, Strengthening/Theraputic Exercises to Improve Function, Heart Failure Management  Homebound Criteria Met[de-identified] Requires the Assistance of Another Person for Safe Ambulation or to Leave the Home, Uses an Assist Device (i e  cane, walker, etc)  Supporting Clincal Findings[de-identified] Limited Endurance, Fatigues Eas[place] in [employer]         Other Referral/Resources/Interventions Provided:  Interventions: [facility]  Referral Comments: Patient is declining rehab but prefers [facility] [address] referrals made           Treatment Team Recommendation: Home with [address]  Discharge Destination Plan[de-identified] Home with [address] [place] [address] [facility]

## 2023-04-27 NOTE — PROGRESS NOTES
[facility]  Progress Note  Name: Jose Constantino  MRN: [id number]  Unit/Bed#: S -01 I Date of Admission: 4/26/2023   Date of Service: 4/27/2023 I Hospital Day: 1    Assessment/Plan   * Acute combined systolic and diastolic congestive heart failure [facility])  Assessment & Plan  Wt Readings from Last 3 Encounters:   04/27/23 60 2 kg (132 lb 11 5 oz)   03/01/23 59 7 kg (131 lb 9 6 oz)   01/10/23 58 8 kg (129 lb 9 6 oz)   Last ECHO 1/2022 demonstrated EF 25-30% with severely decreased systolic dysfunction and there is global hypokinesis with regional variation   Diastolic function is abnormal  Echo- EF 35%, mild global hypokinesis, diastolic dysfunction, AR, TR, right ventricular pressure elevated, trivial pericardial effusion, left pleural effusion    Plan-  Daily weights  Monitor I/Os  Cardiac Diet with fluid restriction  Potassium supplementation changed to 40 BID   Appreciate Cardiology recommendations-continue Lasix 60 IV BID, consider starting spironolactone tomorrow, continue metoprolol/Jardiance, increase Entresto to  BID     Type 2 diabetes mellitus with stage 3a chronic kidney disease, with long-term current use of insulin [facility])  Assessment & Plan  Lab Results   Component Value Date    HGBA1C 14 9 (A) 08/10/2022       Recent Labs     04/26/23  1623 04/26/23  2059 04/27/23  0712 04/27/23  1144   POCGLU 349* 223* 84 152*       Blood Sugar Average: Last 72 hrs:  (P) 261 [id number]   Home medications include Lantus 10HS    Plan-  Continue Lantus 10 HS   Lispro 4 TID w/ meals   Continue Jardiance for CHF    Acute respiratory failure (HCC)  Assessment & Plan  Acute respiratory failure, POA, since resolved, due to acute CHF as evidenced by SOB, increased WOB, RR 28-40 and HTN urgency, treated with 1hr UDN, Lasix IV and telemetry  Initially saturating well on room air  History of COPD saturation goal >88%    Plan-  Continue to monitor O2 status    Pleural effusion, bilateral  Assessment & Plan  Chest x-ray on admission- Moderate pulmonary venous congestion with small left effusion and mild left base atelectasis     Plan-  See acute respiratory failure, CHF or plan      Primary hypertension  Assessment & Plan  BP on admission elevated to 210/135  Current /81    Plan-  See CHF plan for new therapy which would aid in hypertensive management    Coronary artery disease involving native coronary artery of native heart without angina pectoris  Assessment & Plan  · Nonobstructive as per cath 11/2020  · Continue aspirin, statin, BB    CKD stage 3 due to type 2 diabetes mellitus ([facility] )  Assessment & Plan  Lab Results   Component Value Date    CREATININE 1 10 04/27/2023     Does not appear to be in acute kidney injury   Very mild increase in creatinine 1 10    Plan-   Monitor BMP   Monitor I/Os  Avoid hypotension   Avoid nephrotoxins  Consider holding IV Lasix after doses today    Obstructive sleep apnea syndrome  Assessment & Plan  · Patient reports not using CPAP at home for many years  · Unclear reason why patient stopped  · Continue cpap h s  Hyperlipidemia LDL goal <100  Assessment & Plan  · Continue statin             VTE Pharmacologic Prophylaxis: VTE Score: 3 Moderate Risk (Score 3-4) - Pharmacological DVT Prophylaxis Ordered: apixaban (Eliquis)  Patient Centered Rounds: I performed bedside rounds with nursing staff today  Discussions with Specialists or Other Care Team Provider: Cardiology    Education and Discussions with Family / Patient: Updated  (Cousin) via phone  Current Length of Stay: 1 day(s)  Current Patient Status: Inpatient   Discharge Plan: Anticipate discharge in 24-48 hrs to home with home services  Code Status: Level 1 - Full Code    Subjective:   Patient was seen and examined at bedside  Patient was resting comfortably no acute distress  Patient reports she feels much better from when she did on admission    Patient denies chest pain, shortness of breath, palpitations, abdominal bloating, NVD, urinary symptoms, leg swelling, or any other symptoms at this time  Objective:     Vitals:   Temp (24hrs), Av 1 °F (36 7 °C), Min:98 °F (36 7 °C), Max:98 3 °F (36 8 °C)    Temp:  [98 °F (36 7 °C)-98 3 °F (36 8 °C)] 98 °F (36 7 °C)  HR:  [72-94] 72  Resp:  [16-17] 16  BP: (140-153)/(81-96) 140/86  SpO2:  [89 %-96 %] 90 %  Body mass index is 23 89 kg/m²  Input and Output Summary (last 24 hours): Intake/Output Summary (Last 24 hours) at 2023 1503  Last data filed at 2023 1301  Gross per 24 hour   Intake --   Output 2100 ml   Net -2100 ml       Physical Exam:   Physical Exam  Vitals and nursing note reviewed  Constitutional:       General: She is not in acute distress  Appearance: She is well-developed  She is not ill-appearing, toxic-appearing or diaphoretic  HENT:      Head: Normocephalic and atraumatic  Mouth/Throat:      Mouth: Mucous membranes are moist    Eyes:      Extraocular Movements: Extraocular movements intact  Conjunctiva/sclera: Conjunctivae normal       Pupils: Pupils are equal, round, and reactive to light  Cardiovascular:      Rate and Rhythm: Normal rate and regular rhythm  Pulses: Normal pulses  Heart sounds: Normal heart sounds  No friction rub  No gallop  Pulmonary:      Effort: Pulmonary effort is normal  No respiratory distress  Breath sounds: No wheezing or rhonchi  Comments: Decreased breath sounds throughout  Chest:      Chest wall: No tenderness  Abdominal:      General: Bowel sounds are normal  There is no distension  Palpations: Abdomen is soft  Tenderness: There is no abdominal tenderness  There is no right CVA tenderness or left CVA tenderness  Musculoskeletal:         General: No swelling or tenderness  Normal range of motion  Cervical back: Normal range of motion and neck supple  Right lower leg: No edema        Left lower leg: No edema  Skin:     General: Skin is warm and dry  Capillary Refill: Capillary refill takes less than 2 seconds  Neurological:      General: No focal deficit present  Mental Status: She is alert and oriented to person, place, and time  Cranial Nerves: No cranial nerve deficit  Sensory: No sensory deficit  Motor: No weakness  Coordination: Coordination normal       Gait: Gait normal       Deep Tendon Reflexes: Reflexes normal    Psychiatric:         Mood and Affect: Mood normal          Behavior: Behavior normal          Thought Content:  Thought content normal           Additional Data:     Labs:  Results from last 7 days   Lab Units 04/26/23  0600   WBC Thousand/uL 5 81   HEMOGLOBIN g/dL 16 0*   HEMATOCRIT % 48 3*   PLATELETS Thousands/uL 197   NEUTROS PCT % 90*   LYMPHS PCT % 8*   MONOS PCT % 2*   EOS PCT % 0     Results from last 7 days   Lab Units 04/27/23  0506 04/26/23  0052   SODIUM mmol/L 139 137   POTASSIUM mmol/L 3 6 3 4*   CHLORIDE mmol/L 98 102   CO2 mmol/L 33* 27   BUN mg/dL 19 14   CREATININE mg/dL 1 10 0 98   ANION GAP mmol/L 8 8   CALCIUM mg/dL 8 8 8 5   ALBUMIN g/dL  --  2 6*   TOTAL BILIRUBIN mg/dL  --  0 91   ALK PHOS U/L  --  210*   ALT U/L  --  11   AST U/L  --  15   GLUCOSE RANDOM mg/dL 64* 284*         Results from last 7 days   Lab Units 04/27/23  1144 04/27/23  0712 04/26/23  2059 04/26/23  1623 04/26/23  1141 04/26/23  0744 04/26/23  0045   POC GLUCOSE mg/dl 152* 84 223* 349* 410* 330* 280*               Lines/Drains:  Invasive Devices     Peripheral Intravenous Line  Duration           Peripheral IV 04/26/23 Left Antecubital 1 day    Peripheral IV 04/26/23 Right Antecubital 1 day                      Imaging: Reviewed radiology reports from this admission including: chest xray and ECHO    Recent Cultures (last 7 days):         Last 24 Hours Medication List:   Current Facility-Administered Medications   Medication Dose Route Frequency Provider Last Rate   • albuterol  2 puff Inhalation Q6H PRN George Oviedo PA-C     • aluminum-magnesium hydroxide-simethicone  30 mL Oral Q6H PRN George Oviedo PA-C     • apixaban  5 mg Oral BID George Oviedo PA-C     • aspirin  81 mg Oral Daily George Oviedo PA-C     • atorvastatin  40 mg Oral Daily George Oviedo PA-C     • benzonatate  100 mg Oral TID PRN George Oviedo PA-C     • docusate sodium  100 mg Oral BID George Oviedo PA-C     • Empagliflozin  10 mg Oral QAM Celina Lee MD     • fluticasone  1 puff Inhalation Q12H [address] George Oviedo PA-C     • furosemide  60 mg Intravenous BID (diuretic) George Oviedo PA-C     • hydrALAZINE  5 mg Intravenous Q6H PRN George Oviedo PA-C     • insulin glargine  10 Units Subcutaneous HS May Krys Lepe MD     • insulin lispro  1-5 Units Subcutaneous TID [facility] George Oviedo PA-C     • insulin lispro  4 Units Subcutaneous TID With Meals Celina Lee MD     • metoprolol succinate  50 mg Oral BID George Oviedo PA-C     • nicotine  1 patch Transdermal Daily George Oviedo PA-C     • potassium chloride  40 mEq Oral BID Aung Lozada MD     • sacubitril-valsartan  1 tablet Oral BID Aung Lozada MD          Today, Patient Was Seen By: Celina Lee MD    **Please Note: This note may have been constructed using a voice recognition system  **

## 2023-04-27 NOTE — PROGRESS NOTES
Heart Failure/ Pulmonary Hypertension Progress Note - Rolan Mcgill 69 y o  female MRN: [id number]    Unit/Bed#: S -01 Encounter: [id number]      Assessment:    Principal Problem:    Acute combined systolic and diastolic congestive heart failure (HCC)  Active Problems:    Hyperlipidemia LDL goal <100    Type 2 diabetes mellitus with stage 3a chronic kidney disease, with long-term current use of insulin (HCC)    Obstructive sleep apnea syndrome    CKD stage 3 due to type 2 diabetes mellitus ([address] )    Coronary artery disease involving native coronary artery of native heart without angina pectoris    Primary hypertension    Tobacco use    PAF (paroxysmal atrial fibrillation) (HCC)    Pleural effusion, bilateral    # Acute on chronic heart failure with reduced ejection fraction  Etiology: unclear etiology, nonischemic  Possible due to hypertensive heart disease  Has afib with no documented RVR  Cause of decompensation likely due to nonadherence to diuretic  Reports taking other cardiac medications     Weight: 133 lbs 4/26/23 131 3/1/23  BNP:  > 4700 4/26/23      Studies- personally reviewed by me     Echo 4/27/23:  LVEF 35%  RV normal size, mildly reduced systolic function  Estimated RVSP 59mmHg, estimated RAP 15mmHg  Mild MR  Mild AI  Trivial pericardial effusion    Echocardiogram 1/21/22  LVEF:  25-30%  LVIDd: 4 1 cm, normal wall thickness  RV: normal size and systolic function  MR: trace  PASP: 44mmHg, moderate TR, estimated RAP 8  RVOT: no notching  Other: mild AI  Abnormal diastology     [facility] 11/16/20:   Mid LAD: There was a 50 % stenosis, iFR 0 97 not hemodynamically significant  Mid circumflex: There was a 50 % stenosis  iFR 0 96 not hemodynamically significant    1st obtuse marginal: There was a 60 % stenosis, iFR 0 98 not hemodynamically significant; LM and RCA minimal luminal irregularities        Echo 11/15/20: LVEF lower limits of normal  Moderate hypokinesis of basal to mid inferior and inferolateral "walls  Grade 1 diastology  Normal RV size and systolic function  Trace MR/AI     Neurohormonal Blockade:  --Beta-Blocker: metoprolol succinate 75mg BID  --ACEi, ARB or ARNi: entresto 49-51mg BID  --Aldosterone Receptor Blocker:  none  --SGLT2 Inhibitor: Jardiance 10mg daily  --Diuretic: furosemide 40mg on MWF  --Inpatient diuretic: lasix 60mg IV BID     Sudden Cardiac Death Risk Reduction:  --ICD:  S/p ICD implant 3/9/22  Interrogation 4/14/23: AP 1 3%  <0 1%  OptiVol crossed since 12/8 and ongoing     Electrical Resynchronization:  --Candidacy for BiV device: narrow QRS     Advanced Therapies (if appropriate): --Inotrope:  --LVAD/Transplant Candidacy:     # Paroxysmal atrial fibrillation  Rate: metoprolol as above  Rhythm: none  AC: apixaban  # Hypertension, uncontrolled  # Nonobstructive CAD  Rx: aspirin, statin  # Hyperlipidemia  1/18/23: TG 72 HDL 73 LDL 76  # CKD 3a, creatinine 1 1 today  # DM type 2    Plan:  Continue diuresis with lasix 60mg IV BID  Inaccurate I/Os, bedscale weight, patient reports increased urination  Increase entresto to 97-103mg BID  To consider addition of spironolactone tomorrow  Continue metoprolol and jardiance  Strict I/O and daily weights  Keep K>4 and Mg>2    Subjective:   Patient seen and examined  No significant events overnight  Feeling better, less tired and short of breath  Just sleepy this morning  Review of Systems   Constitutional: Negative for chills and fever  Respiratory: Positive for shortness of breath  Negative for chest tightness  Cardiovascular: Positive for leg swelling  Negative for chest pain and palpitations  Gastrointestinal: Negative for abdominal distention, nausea and vomiting  Neurological: Negative for dizziness and light-headedness  Objective: Intake/ Output: ?/700? Weight: 132 lbs bed scale  Tele: sinus, slow NSVT      Vitals: Blood pressure 148/93, pulse 79, temperature 98 °F (36 7 °C), resp   rate 17, height 5' 2 5\" (1 588 " m), weight 60 2 kg (132 lb 11 5 oz), SpO2 96 %, not currently breastfeeding , Body mass index is 23 89 kg/m² , I/O last 3 completed shifts:  In: -   Out: 700 [Urine:700]  No intake/output data recorded    Wt Readings from Last 3 Encounters:   04/27/23 60 2 kg (132 lb 11 5 oz)   03/01/23 59 7 kg (131 lb 9 6 oz)   01/10/23 58 8 kg (129 lb 9 6 oz)       Intake/Output Summary (Last 24 hours) at 4/27/2023 9009  Last data filed at 4/26/2023 1129  Gross per 24 hour   Intake --   Output 700 ml   Net -700 ml     I/O last 3 completed shifts:  In: -   Out: 700 [Urine:700]      Physical Exam:  Vitals:    04/26/23 2133 04/26/23 2238 04/27/23 0558 04/27/23 0712   BP: 148/96 153/81  148/93   BP Location:       Pulse: 94 81  79   Resp:    17   Temp:  98 3 °F (36 8 °C)  98 °F (36 7 °C)   TempSrc:       SpO2: (!) 89% 95%  96%   Weight:   60 2 kg (132 lb 11 5 oz)    Height:           GEN: John Cuevas appears well, alert and oriented x 3, pleasant and cooperative   HEENT: NC/AT, moist mucosa, anicteric sclerae; extraocular muscles intact  NECK: supple, no carotid bruits   HEART: regular rhythm, normal S1 and S2, no murmurs, clicks, gallops or rubs, JVP is elevated, distended EJ  LUNGS: clear to auscultation bilaterally; no wheezes, rales, or rhonchi   ABDOMEN: normal bowel sounds, soft, no tenderness, no distention  EXTREMITIES: peripheral pulses normal; no clubbing, cyanosis, trace to 1+ pitting edema bilaterally  NEURO: no focal findings   SKIN: normal without suspicious lesions on exposed skin      Current Facility-Administered Medications:   •  albuterol (PROVENTIL HFA,VENTOLIN HFA) inhaler 2 puff, 2 puff, Inhalation, Q6H PRN, Mat Lat, PA-C, 2 puff at 04/27/23 0814  •  aluminum-magnesium hydroxide-simethicone (MYLANTA) oral suspension 30 mL, 30 mL, Oral, Q6H PRN, Loco Mcgee PA-C  •  apixaban [facility]) tablet 5 mg, 5 mg, Oral, BID, Loco Mcgee PA-C, 5 mg at 04/27/23 0809  •  aspirin (ECOTRIN LOW STRENGTH) EC tablet 81 mg, 81 mg, Oral, Daily, Bradley Borjas PA-C, 81 mg at 04/27/23 7952  •  atorvastatin (LIPITOR) tablet 40 mg, 40 mg, Oral, Daily, Bradley Borjas PA-C, 40 mg at 04/27/23 2316  •  benzonatate (TESSALON PERLES) capsule 100 mg, 100 mg, Oral, TID PRN, Bradley Borjas PA-C, 100 mg at 04/27/23 8247  •  docusate sodium (COLACE) capsule 100 mg, 100 mg, Oral, BID, Bradley Borjas PA-C, 100 mg at 04/27/23 7072  •  Empagliflozin (JARDIANCE) tablet 10 mg, 10 mg, Oral, QAM, Shaylee Ferrara MD, 10 mg at 04/27/23 0813  •  fluticasone (FLOVENT HFA) 110 MCG/ACT inhaler 1 puff, 1 puff, Inhalation, Q12H [address], Bradley Borjas PA-C, 1 puff at 04/27/23 8859  •  furosemide (LASIX) injection 60 mg, 60 mg, Intravenous, BID (diuretic), Bradley Borjas PA-C, 60 mg at 04/27/23 4229  •  hydrALAZINE (APRESOLINE) injection 5 mg, 5 mg, Intravenous, Q6H PRN, Bradley Borjas PA-C  •  insulin glargine (LANTUS) subcutaneous injection 12 Units 0 12 mL, 12 Units, Subcutaneous, HS, Shaylee Ferrara MD, 12 Units at 04/26/23 2135  •  insulin lispro (HumaLOG) 100 units/mL subcutaneous injection 1-5 Units, 1-5 Units, Subcutaneous, TID AC, 4 Units at 04/26/23 1749 **AND** Fingerstick Glucose (POCT), , , TID AC, Bradley Borjas PA-C  •  insulin lispro (HumaLOG) 100 units/mL subcutaneous injection 4 Units, 4 Units, Subcutaneous, TID With Meals, Shaylee Ferrara MD, 4 Units at 04/26/23 1749  •  metoprolol succinate (TOPROL-XL) 24 hr tablet 50 mg, 50 mg, Oral, BID, Bradley Borjas PA-C, 50 mg at 04/27/23 0809  •  nicotine (NICODERM CQ) 14 mg/24hr TD 24 hr patch 1 patch, 1 patch, Transdermal, Daily, Bradley Borjas PA-C, 1 patch at 04/27/23 3071  •  potassium chloride (K-DUR,KLOR-CON) CR tablet 40 mEq, 40 mEq, Oral, Daily, Bradley Borjas PA-C, 40 mEq at 04/27/23 0809  •  sacubitril-valsartan (ENTRESTO) 49-51 MG per tablet 1 tablet, 1 tablet, Oral, BID, Bradley Borjas PA-C, 1 tablet at 04/27/23 0809      Labs & Results:        Results from last 7 days   Lab Units 04/26/23  0600 04/26/23  0135   WBC Thousand/uL 5 81 5 73   HEMOGLOBIN g/dL 16 0* 16 2*   HEMATOCRIT % 48 3* 49 3*   PLATELETS Thousands/uL 197 223         Results from last 7 days   Lab Units 04/27/23  0506 04/26/23  0052   POTASSIUM mmol/L 3 6 3 4*   CHLORIDE mmol/L 98 102   CO2 mmol/L 33* 27   BUN mg/dL 19 14   CREATININE mg/dL 1 10 0 98   CALCIUM mg/dL 8 8 8 5   ALK PHOS U/L  --  210*   ALT U/L  --  11   AST U/L  --  Suzi Reynolds, MD  Advanced Heart Failure and Mechanical [facility]

## 2023-04-27 NOTE — ASSESSMENT & PLAN NOTE
BP on admission elevated to 210/135  Current /81    Plan-  See CHF plan for new therapy which would aid in hypertensive management

## 2023-04-28 LAB
ANION GAP SERPL CALCULATED.3IONS-SCNC: 4 MMOL/L (ref 4–13)
BNP SERPL-MCNC: 3988 PG/ML (ref 0–100)
BUN SERPL-MCNC: 17 MG/DL (ref 5–25)
CALCIUM SERPL-MCNC: 8.4 MG/DL (ref 8.4–10.2)
CHLORIDE SERPL-SCNC: 98 MMOL/L (ref 96–108)
CO2 SERPL-SCNC: 36 MMOL/L (ref 21–32)
CREAT SERPL-MCNC: 1.19 MG/DL (ref 0.6–1.3)
EST. AVERAGE GLUCOSE BLD GHB EST-MCNC: 303 MG/DL
GFR SERPL CREATININE-BSD FRML MDRD: 46 ML/MIN/1.73SQ M
GLUCOSE SERPL-MCNC: 126 MG/DL (ref 65–140)
GLUCOSE SERPL-MCNC: 151 MG/DL (ref 65–140)
GLUCOSE SERPL-MCNC: 197 MG/DL (ref 65–140)
GLUCOSE SERPL-MCNC: 271 MG/DL (ref 65–140)
GLUCOSE SERPL-MCNC: 90 MG/DL (ref 65–140)
GLUCOSE SERPL-MCNC: 93 MG/DL (ref 65–140)
HBA1C MFR BLD: 12.2 %
MAGNESIUM SERPL-MCNC: 1.9 MG/DL (ref 1.9–2.7)
POTASSIUM SERPL-SCNC: 4.5 MMOL/L (ref 3.5–5.3)
SODIUM SERPL-SCNC: 138 MMOL/L (ref 135–147)

## 2023-04-28 RX ORDER — POTASSIUM CHLORIDE 20 MEQ/1
40 TABLET, EXTENDED RELEASE ORAL DAILY
Status: DISCONTINUED | OUTPATIENT
Start: 2023-04-29 | End: 2023-04-30 | Stop reason: HOSPADM

## 2023-04-28 RX ORDER — SPIRONOLACTONE 25 MG/1
25 TABLET ORAL DAILY
Status: DISCONTINUED | OUTPATIENT
Start: 2023-04-28 | End: 2023-04-30 | Stop reason: HOSPADM

## 2023-04-28 RX ORDER — MAGNESIUM SULFATE HEPTAHYDRATE 40 MG/ML
2 INJECTION, SOLUTION INTRAVENOUS ONCE
Status: COMPLETED | OUTPATIENT
Start: 2023-04-28 | End: 2023-04-28

## 2023-04-28 RX ORDER — ACETAMINOPHEN 325 MG/1
650 TABLET ORAL EVERY 6 HOURS PRN
Status: DISCONTINUED | OUTPATIENT
Start: 2023-04-28 | End: 2023-04-30 | Stop reason: HOSPADM

## 2023-04-28 RX ADMIN — APIXABAN 5 MG: 5 TABLET, FILM COATED ORAL at 08:00

## 2023-04-28 RX ADMIN — DOCUSATE SODIUM 100 MG: 100 CAPSULE, LIQUID FILLED ORAL at 08:00

## 2023-04-28 RX ADMIN — FLUTICASONE PROPIONATE 1 PUFF: 110 AEROSOL, METERED RESPIRATORY (INHALATION) at 08:02

## 2023-04-28 RX ADMIN — APIXABAN 5 MG: 5 TABLET, FILM COATED ORAL at 17:06

## 2023-04-28 RX ADMIN — METOPROLOL SUCCINATE 50 MG: 50 TABLET, EXTENDED RELEASE ORAL at 21:08

## 2023-04-28 RX ADMIN — NICOTINE 1 PATCH: 14 PATCH, EXTENDED RELEASE TRANSDERMAL at 08:03

## 2023-04-28 RX ADMIN — ACETAMINOPHEN 650 MG: 325 TABLET ORAL at 08:00

## 2023-04-28 RX ADMIN — ASPIRIN 81 MG: 81 TABLET, COATED ORAL at 08:00

## 2023-04-28 RX ADMIN — METOPROLOL SUCCINATE 50 MG: 50 TABLET, EXTENDED RELEASE ORAL at 08:00

## 2023-04-28 RX ADMIN — EMPAGLIFLOZIN 10 MG: 10 TABLET, FILM COATED ORAL at 08:03

## 2023-04-28 RX ADMIN — INSULIN LISPRO 1 UNITS: 100 INJECTION, SOLUTION INTRAVENOUS; SUBCUTANEOUS at 12:03

## 2023-04-28 RX ADMIN — INSULIN LISPRO 3 UNITS: 100 INJECTION, SOLUTION INTRAVENOUS; SUBCUTANEOUS at 17:06

## 2023-04-28 RX ADMIN — MAGNESIUM SULFATE HEPTAHYDRATE 2 G: 40 INJECTION, SOLUTION INTRAVENOUS at 07:14

## 2023-04-28 RX ADMIN — ATORVASTATIN CALCIUM 40 MG: 40 TABLET, FILM COATED ORAL at 08:00

## 2023-04-28 RX ADMIN — INSULIN GLARGINE 10 UNITS: 100 INJECTION, SOLUTION SUBCUTANEOUS at 21:08

## 2023-04-28 RX ADMIN — SPIRONOLACTONE 25 MG: 25 TABLET ORAL at 09:40

## 2023-04-28 RX ADMIN — SACUBITRIL AND VALSARTAN 1 TABLET: 97; 103 TABLET, FILM COATED ORAL at 17:06

## 2023-04-28 RX ADMIN — SACUBITRIL AND VALSARTAN 1 TABLET: 97; 103 TABLET, FILM COATED ORAL at 08:02

## 2023-04-28 RX ADMIN — DOCUSATE SODIUM 100 MG: 100 CAPSULE, LIQUID FILLED ORAL at 17:06

## 2023-04-28 RX ADMIN — POTASSIUM CHLORIDE 40 MEQ: 1500 TABLET, EXTENDED RELEASE ORAL at 08:00

## 2023-04-28 RX ADMIN — FUROSEMIDE 60 MG: 10 INJECTION, SOLUTION INTRAMUSCULAR; INTRAVENOUS at 08:01

## 2023-04-28 RX ADMIN — INSULIN LISPRO 1 UNITS: 100 INJECTION, SOLUTION INTRAVENOUS; SUBCUTANEOUS at 08:03

## 2023-04-28 RX ADMIN — FUROSEMIDE 60 MG: 10 INJECTION, SOLUTION INTRAMUSCULAR; INTRAVENOUS at 17:06

## 2023-04-28 NOTE — ASSESSMENT & PLAN NOTE
----- Message from Jeannette Ibarra sent at 3/18/2019  3:27 PM CDT -----  Pt was in ER for Mesenteric Adenitis he was told to be seen in 3-4 days    Lab Results   Component Value Date    HGBA1C 12 2 (H) 04/26/2023       Recent Labs     04/27/23  1723 04/27/23  2047 04/28/23  0453 04/28/23  0731   POCGLU 94 161* 93 151*       Blood Sugar Average: Last 72 hrs:  (P) 133 [id number]   Home medications include Lantus 10HS    Plan-  Continue Lantus 10 HS   Continue Jardiance for CHF  ISS  Hypoglycemia protocol

## 2023-04-28 NOTE — ASSESSMENT & PLAN NOTE
Wt Readings from Last 3 Encounters:   04/28/23 45 9 kg (101 lb 4 8 oz)   03/01/23 59 7 kg (131 lb 9 6 oz)   01/10/23 58 8 kg (129 lb 9 6 oz)     Last ECHO 1/2022 demonstrated EF 25-30% with severely decreased systolic dysfunction and there is global hypokinesis with regional variation  Diastolic function is abnormal    Echo- EF 35%, mild global hypokinesis, diastolic dysfunction, AR, TR, right ventricular pressure elevated, trivial pericardial effusion, left pleural effusion    Plan-  Daily weights  Monitor I/Os  Cardiac Diet with fluid restriction and Na 2gm diet  Continue Potassium supplementation 40 BID   Appreciate Cardiology recommendations  Switch to oral Lasix 40 BID on 04/29  Continue metoprolol/Jardiance,   Continue Entresto to  BID   Continue Toprol-XL at 50 mg twice daily, spironolactone for goal-directed medical therapy  Plan discharge tomorrow  Continue aspirin and statin  Continue full anticoagulation with Eliquis

## 2023-04-28 NOTE — CASE MANAGEMENT
Case Management Progress Note    Patient name Jennifer Cool  Location S /S -01 MRN [id number]  : 1955 Date 2023       LOS (days): 2  Geometric Mean LOS (GMLOS) (days): 3 90  Days to GMLOS:1 4        OBJECTIVE:        Current admission status: Inpatient  Preferred Pharmacy:   [facility]/pharmacy #[id number]- [address] - [address]  Phone: [phone] Fax: [address]  [address] [place] [address]  Phone: [phone] Fax: [phone]    Primary Care Provider: Anastasia Chung DO    Primary Insurance: [facility] [employer]  Secondary Insurance:     PROGRESS NOTE:    RW delivered to pt bedside

## 2023-04-28 NOTE — ASSESSMENT & PLAN NOTE
Wt Readings from Last 3 Encounters:   04/28/23 60 2 kg (132 lb 11 5 oz)   03/01/23 59 7 kg (131 lb 9 6 oz)   01/10/23 58 8 kg (129 lb 9 6 oz)   Last ECHO 1/2022 demonstrated EF 25-30% with severely decreased systolic dysfunction and there is global hypokinesis with regional variation   Diastolic function is abnormal  Echo- EF 35%, mild global hypokinesis, diastolic dysfunction, AR, TR, right ventricular pressure elevated, trivial pericardial effusion, left pleural effusion    Plan-  Daily weights  Monitor I/Os  Cardiac Diet with fluid restriction  Continue Potassium supplementation changed to 40 BID   Appreciate Cardiology recommendations-continue Lasix 60 IV BID, consider starting spironolactone tomorrow, continue metoprolol/Jardiance, continue Entresto to  BID

## 2023-04-28 NOTE — ASSESSMENT & PLAN NOTE
Acute respiratory failure, POA, since resolved, due to acute CHF as evidenced by SOB, increased WOB, RR 28-40 and HTN urgency, treated with 1hr UDN, Lasix IV and telemetry  Initially saturating well on room air  History of COPD saturation goal >88%  Saturating 93% on RA     Plan-  Continue to monitor O2 status

## 2023-04-28 NOTE — ASSESSMENT & PLAN NOTE
BP on admission elevated to 210/135  Current /93    Plan-  See CHF plan for new therapy which would aid in hypertensive management

## 2023-04-28 NOTE — CASE MANAGEMENT
Case Management Discharge Planning Note    Patient name Kena Miranda S /S -00 MRN [id number]  : 1955 Date 2023       Current Admission Date: 2023  Current Admission Diagnosis:Acute combined systolic and diastolic congestive heart failure [facility])   Patient Active Problem List    Diagnosis Date Noted   • Acute respiratory failure ([facility] ) 2023   • Pleural effusion, bilateral 2023   • PAF (paroxysmal atrial fibrillation) ([facility] ) 2023   • Embolism and thrombosis of arteries of the lower extremities ([facility] ) 2023   • Tobacco use 01/10/2023   • Vulvar itching 01/10/2023   • SVT (supraventricular tachycardia) ([facility] ) 2022   • Heart failure, left, with LVEF <=30% ([facility] ) 08/10/2022   • Venous insufficiency of both lower extremities 2022   • Trigger finger of right thumb 2021   • NICM (nonischemic cardiomyopathy) ([facility] ) 2021   • Peripheral arterial disease ([facility] ) 2021   • Breast cancer screening by mammogram 10/14/2021   • Chronic diastolic congestive heart failure ([facility] ) 2021   • Bilateral leg edema 2021   • Back pain 2021   • Breast cancer ([place] [address] ) - left 1994 2021   • Primary hypertension 2021   • Smoking 2021   • S/P left mastectomy 2021   • Vitamin D deficiency 2021   • Hyperparathyroidism ([facility] ) 2020   • Coronary artery disease involving native coronary artery of native heart without angina pectoris 2020   • Chronic combined systolic and diastolic congestive heart failure ([facility] ) 2020   • CKD stage 3 due to type 2 diabetes mellitus ([facility] ) 2020   • Acute combined systolic and diastolic congestive heart failure ([facility] ) 11/15/2020   • Hypercalcemia 11/15/2020   • Obstructive sleep apnea syndrome 2020   • Microalbuminuria 2020   • Medicare annual wellness visit, subsequent 2020   • Gastroesophageal reflux disease 2018   • Screening for cardiovascular, respiratory, and genitourinary diseases 10/24/2018   • Immunization due 10/24/2018   • Colon cancer screening 10/24/2018   • Menopause 10/24/2018   • Hyperlipidemia LDL goal <100 12/07/2015   • Type 2 diabetes mellitus with stage 3a chronic kidney disease, with long-term current use of insulin ([facility] ) 01/28/2015   • History of left breast cancer 09/04/2012   • Arthropathy 09/04/2012   • Hypertensive heart and kidney disease with heart failure and with chronic kidney disease stage III ([facility] ) 09/04/2012   • Colon, diverticulosis 09/04/2012      LOS (days): 2  Geometric Mean LOS (GMLOS) (days): 3 90  Days to GMLOS:1 5     OBJECTIVE:  Risk of Unplanned Readmission Score: 14 63         Current admission status: Inpatient   Preferred Pharmacy:   [facility]/pharmacy #[id number]- [address]  [address] [facility] [address]  Phone: [phone] Fax: [address]  [address] [address]  Phone: [phone] Fax: [phone]    Primary Care Provider: Terese Lee DO    Primary Insurance: [facility] [employer] [employer]  Secondary Insurance:     DISCHARGE DETAILS:    Discharge planning discussed with[de-identified] Patient and cousinAdriana of Choice: Yes  Comments - Freedom of Choice: CM provided [address] choice list and accepted [facility]    CM contacted family/caregiver?: Yes        Were patient/caregiver advised of the risks associated with not following Treatment Team discharge recommendations?: Yes    Contacts  Patient Contacts: Kayli Martinez  Relationship to Patient[de-identified] Family  Contact Method: Phone  Phone Number: See face sheet  Reason/Outcome: Discharge [address]         Is the patient interested in [address] at discharge?: Yes  Via [facility] [place] requested[de-identified] Nursing, Occupational Therapy, Physical [address] Name[de-identified] [facility] Provider[de-identified] PCP  Home Health Services Needed[de-identified] Gait/ADL Training, Evaluate Functional Status and Safety, Heart Failure Management, Strengthening/Theraputic Exercises to Improve Function  Homebound Criteria Met[de-identified] Uses an Assist Device (i e  cane, walker, etc), Requires the Assistance of Another Person for Safe Ambulation or to Leave the Home  Supporting Clincal Findings[de-identified] Fatigues Easliy in [employer], Limited Endurance, Dyspnea with Exertion    DME Referral Provided  Referral made for DME?: Yes  DME referral completed for the following items[de-identified] Jose Jose Rafael  DME Supplier Name[de-identified] [email or web address]    Other Referral/Resources/Interventions Provided:  Interventions: HHC, DME  Referral Comments: Patient declining rehab but accepts [facility] ordered walker via [place] which will be delivered to patient's room  Would you like to participate in our [facility] [address] service program?  : No - Declined    Treatment Team Recommendation: Home with [address] [facility]  Discharge Destination Plan[de-identified] Home with Kalpesh at Discharge : Family         Accompanied by: Family member     IMM Given (Date):: 04/28/23  IMM Given to[de-identified] Patient        Snow Chan IMM reviewed with patient, patient agrees with discharge determination

## 2023-04-28 NOTE — ASSESSMENT & PLAN NOTE
Lab Results   Component Value Date    HGBA1C 12 2 (H) 04/26/2023       Recent Labs     04/28/23  2120 04/29/23  0710 04/29/23  1147 04/29/23  1618   POCGLU 126 82 176* 269*       Blood Sugar Average: Last 72 hrs:  (P) 586 [id number]   Home medications include Lantus 10HS    Plan-  Continue Lantus 10 HS   Continue Jardiance for CHF  ISS  Hypoglycemia protocol

## 2023-04-28 NOTE — PROGRESS NOTES
[facility]  Progress Note  Name: Rose Vega  MRN: [id number]  Unit/Bed#: S -01 I Date of Admission: 4/26/2023   Date of Service: 4/28/2023 I Hospital Day: 2    Assessment/Plan   * Acute combined systolic and diastolic congestive heart failure [facility])  Assessment & Plan  Wt Readings from Last 3 Encounters:   04/28/23 60 2 kg (132 lb 11 5 oz)   03/01/23 59 7 kg (131 lb 9 6 oz)   01/10/23 58 8 kg (129 lb 9 6 oz)   Last ECHO 1/2022 demonstrated EF 25-30% with severely decreased systolic dysfunction and there is global hypokinesis with regional variation   Diastolic function is abnormal  Echo- EF 35%, mild global hypokinesis, diastolic dysfunction, AR, TR, right ventricular pressure elevated, trivial pericardial effusion, left pleural effusion    Plan-  Daily weights  Monitor I/Os  Cardiac Diet with fluid restriction  Continue Potassium supplementation changed to 40 BID   Appreciate Cardiology recommendations-continue Lasix 60 IV BID, consider starting spironolactone tomorrow, continue metoprolol/Jardiance, continue Entresto to  BID     Type 2 diabetes mellitus with stage 3a chronic kidney disease, with long-term current use of insulin [facility])  Assessment & Plan  Lab Results   Component Value Date    HGBA1C 12 2 (H) 04/26/2023       Recent Labs     04/27/23  1723 04/27/23  2047 04/28/23  0453 04/28/23  0731   POCGLU 94 161* 93 151*       Blood Sugar Average: Last 72 hrs:  (P) 981 [id number]   Home medications include Lantus 10HS    Plan-  Continue Lantus 10 HS   Continue Jardiance for CHF  ISS  Hypoglycemia protocol     Acute respiratory failure (HCC)  Assessment & Plan  Acute respiratory failure, POA, since resolved, due to acute CHF as evidenced by SOB, increased WOB, RR 28-40 and HTN urgency, treated with 1hr UDN, Lasix IV and telemetry  Initially saturating well on room air  History of COPD saturation goal >88%  Saturating 93% on RA     Plan-  Continue to monitor O2 status    Pleural effusion, bilateral  Assessment & Plan  Chest x-ray on admission- Moderate pulmonary venous congestion with small left effusion and mild left base atelectasis     Plan-  See acute respiratory failure, CHF or plan      PAF (paroxysmal atrial fibrillation) ([facility])  Assessment & Plan  · Anticoagulated on Eliquis  · Continue metoprolol    Tobacco use  Assessment & Plan  · Patient currently reports smoking 0 5 packs a day  · Continue to encourage cessation  · Nicotine patch supplementation    Primary hypertension  Assessment & Plan  BP on admission elevated to 210/135  Current /93    Plan-  See CHF plan for new therapy which would aid in hypertensive management    Coronary artery disease involving native coronary artery of native heart without angina pectoris  Assessment & Plan  · Nonobstructive as per cath 11/2020  · Continue aspirin, statin, BB    CKD stage 3 due to type 2 diabetes mellitus ([facility] [address] )  Assessment & Plan  Lab Results   Component Value Date    CREATININE 1 19 04/28/2023     Does not appear to be in acute kidney injury   Very mild increase in creatinine 1 19    Plan-   Monitor BMP   Monitor I/Os  Avoid hypotension   Avoid nephrotoxins    Obstructive sleep apnea syndrome  Assessment & Plan  · Patient reports not using CPAP at home for many years  · Unclear reason why patient stopped  · Continue cpap h s  Will order Sleep Study as outpatient     Hyperlipidemia LDL goal <100  Assessment & Plan  · Continue statin               VTE Pharmacologic Prophylaxis: VTE Score: 3 Moderate Risk (Score 3-4) - Pharmacological DVT Prophylaxis Ordered: apixaban (Eliquis)  Patient Centered Rounds: I performed bedside rounds with nursing staff today  Discussions with Specialists or Other Care Team Provider: Cardiology    Education and Discussions with Family / Patient: Updated  (Cousin) via phone      Current Length of Stay: 2 day(s)  Current Patient Status: Inpatient   Discharge Plan: Anticipate discharge tomorrow to home with home services  Code Status: Level 1 - Full Code    Subjective:   Patient was seen and examined at bedside  Patient was resting comfortably in the chair in no acute distress  Patient reports she feels much better from when she did on admission  Patient denies chest pain, shortness of breath, palpitations, abdominal bloating, NVD, leg pain, leg swelling, or any other symptoms at this time  Objective:     Vitals:   Temp (24hrs), Av 8 °F (36 6 °C), Min:97 7 °F (36 5 °C), Max:98 °F (36 7 °C)    Temp:  [97 7 °F (36 5 °C)-98 °F (36 7 °C)] 97 7 °F (36 5 °C)  HR:  [70-81] 81  Resp:  [16-17] 17  BP: (140-156)/(86-93) 144/91  SpO2:  [90 %-95 %] 95 %  Body mass index is 23 89 kg/m²  Input and Output Summary (last 24 hours): Intake/Output Summary (Last 24 hours) at 2023 0826  Last data filed at 2023 1513  Gross per 24 hour   Intake 240 ml   Output 2100 ml   Net -1860 ml       Physical Exam:   Physical Exam  Vitals and nursing note reviewed  Constitutional:       General: She is not in acute distress  Appearance: She is well-developed  She is not ill-appearing, toxic-appearing or diaphoretic  HENT:      Head: Normocephalic and atraumatic  Mouth/Throat:      Mouth: Mucous membranes are moist    Eyes:      Extraocular Movements: Extraocular movements intact  Conjunctiva/sclera: Conjunctivae normal       Pupils: Pupils are equal, round, and reactive to light  Cardiovascular:      Rate and Rhythm: Normal rate and regular rhythm  Pulses: Normal pulses  Heart sounds: Normal heart sounds  No friction rub  No gallop  Pulmonary:      Effort: Pulmonary effort is normal  No respiratory distress  Breath sounds: No wheezing or rhonchi  Comments: Decreased breath sounds throughout  Chest:      Chest wall: No tenderness  Abdominal:      General: Bowel sounds are normal  There is no distension        Palpations: Abdomen is soft       Tenderness: There is no abdominal tenderness  There is no right CVA tenderness or left CVA tenderness  Musculoskeletal:         General: No swelling or tenderness  Normal range of motion  Cervical back: Normal range of motion and neck supple  Right lower leg: No edema  Left lower leg: No edema  Skin:     General: Skin is warm and dry  Capillary Refill: Capillary refill takes less than 2 seconds  Neurological:      General: No focal deficit present  Mental Status: She is alert and oriented to person, place, and time  Cranial Nerves: No cranial nerve deficit  Sensory: No sensory deficit  Motor: No weakness  Coordination: Coordination normal       Gait: Gait normal       Deep Tendon Reflexes: Reflexes normal    Psychiatric:         Mood and Affect: Mood normal          Behavior: Behavior normal          Thought Content: Thought content normal           Additional Data:     Labs:  Results from last 7 days   Lab Units 04/26/23  0600   WBC Thousand/uL 5 81   HEMOGLOBIN g/dL 16 0*   HEMATOCRIT % 48 3*   PLATELETS Thousands/uL 197   NEUTROS PCT % 90*   LYMPHS PCT % 8*   MONOS PCT % 2*   EOS PCT % 0     Results from last 7 days   Lab Units 04/28/23  0505 04/27/23  0506 04/26/23  0052   SODIUM mmol/L 138   < > 137   POTASSIUM mmol/L 4 5   < > 3 4*   CHLORIDE mmol/L 98   < > 102   CO2 mmol/L 36*   < > 27   BUN mg/dL 17   < > 14   CREATININE mg/dL 1 19   < > 0 98   ANION GAP mmol/L 4   < > 8   CALCIUM mg/dL 8 4   < > 8 5   ALBUMIN g/dL  --   --  2 6*   TOTAL BILIRUBIN mg/dL  --   --  0 91   ALK PHOS U/L  --   --  210*   ALT U/L  --   --  11   AST U/L  --   --  15   GLUCOSE RANDOM mg/dL 90   < > 284*    < > = values in this interval not displayed           Results from last 7 days   Lab Units 04/28/23  0731 04/28/23  0453 04/27/23  2047 04/27/23  1723 04/27/23  1617 04/27/23  1144 04/27/23  1404 04/26/23  2059 04/26/23  1623 04/26/23  1141 04/26/23  0744 04/26/23  0045 POC GLUCOSE mg/dl 151* 93 161* 94 56* 152* 84 223* 349* 410* 330* 280*     Results from last 7 days   Lab Units 04/26/23  0600   HEMOGLOBIN A1C % 12 2*           Lines/Drains:  Invasive Devices     Peripheral Intravenous Line  Duration           Peripheral IV 04/26/23 Left Antecubital 2 days    Peripheral IV 04/26/23 Right Antecubital 2 days                      Imaging: Reviewed radiology reports from this admission including: chest xray    Recent Cultures (last 7 days):         Last 24 Hours Medication List:   Current Facility-Administered Medications   Medication Dose Route Frequency Provider Last Rate   • acetaminophen  650 mg Oral Q6H PRN Eulalio Davison MD     • albuterol  2 puff Inhalation Q6H PRN Jillian Mendez PA-C     • aluminum-magnesium hydroxide-simethicone  30 mL Oral Q6H PRN Jillian Mendez PA-C     • apixaban  5 mg Oral BID Jillian Mendez PA-C     • aspirin  81 mg Oral Daily Jillian Mendez PA-C     • atorvastatin  40 mg Oral Daily Jillian Mendez PA-C     • benzonatate  100 mg Oral TID PRN Jillian Mendez PA-C     • docusate sodium  100 mg Oral BID Jillian Mendez PA-C     • Empagliflozin  10 mg Oral QAM Eulalio Davison MD     • fluticasone  1 puff Inhalation Q12H [address] Jillian Mendez PA-C     • furosemide  60 mg Intravenous BID (diuretic) Jillian Mendez PA-C     • hydrALAZINE  5 mg Intravenous Q6H PRN Jillian Mendez PA-C     • insulin glargine  10 Units Subcutaneous HS May u Bull Lindquist MD     • insulin lispro  1-5 Units Subcutaneous TID [facility] Jillian Mendez PA-C     • magnesium sulfate  2 g Intravenous Once Eulalio Davison MD 2 g (04/28/23 1506)   • metoprolol succinate  50 mg Oral BID Jillian Mendez PA-C     • nicotine  1 patch Transdermal Daily Jillian Mendez PA-C     • potassium chloride  40 mEq Oral BID Vinny Ayers MD     • sacubitril-valsartan  1 tablet Oral BID Vinny Ayers MD          Today, Patient Was Seen By: Eulalio Davison MD    **Please Note: This note may have been constructed using a voice recognition system  **

## 2023-04-28 NOTE — ASSESSMENT & PLAN NOTE
· Patient reports not using CPAP at home for many years  · Unclear reason why patient stopped  · Continue cpap h s   · Sleep Study as outpatient

## 2023-04-28 NOTE — DISCHARGE SUMMARY
[facility]  Discharge- Arya Ya 1955, 69 y o  female MRN: [id number]  Unit/Bed#: S -01 Encounter: [id number]  Primary Care Provider: Mary Beth Avery DO   Date and time admitted to hospital: 4/26/2023 12:29 AM    * Acute combined systolic and diastolic congestive heart failure [facility])  Assessment & Plan  Wt Readings from Last 3 Encounters:   04/28/23 45 9 kg (101 lb 4 8 oz)   03/01/23 59 7 kg (131 lb 9 6 oz)   01/10/23 58 8 kg (129 lb 9 6 oz)     Last ECHO 1/2022 demonstrated EF 25-30% with severely decreased systolic dysfunction and there is global hypokinesis with regional variation  Diastolic function is abnormal    Echo- EF 35%, mild global hypokinesis, diastolic dysfunction, AR, TR, right ventricular pressure elevated, trivial pericardial effusion, left pleural effusion    Plan-  Daily weights  Cardiac Diet with fluid restriction and Na 2gm diet  Continue Potassium supplementation 40 BID   Appreciate Cardiology recommendations- Lasix 40mg QD, Continue metoprolol/Jardiance, Continue Entresto to  BID, spironolactone for goal-directed medical therapy  Continue aspirin and statin      Continue full anticoagulation with Eliquis  Follow up with Cardiology/Heart Failure as outpatient     Type 2 diabetes mellitus with stage 3a chronic kidney disease, with long-term current use of insulin [facility])  Assessment & Plan  Lab Results   Component Value Date    HGBA1C 12 2 (H) 04/26/2023       Recent Labs     04/29/23  1147 04/29/23  1618 04/29/23  2125 04/30/23  0738   POCGLU 176* 269* 190* 83       Blood Sugar Average: Last 72 hrs:  (P) 073 [id number]   Home medications include Lantus 10HS and Metformin     Plan-  Continue Lantus 10 HS   Continue Jardiance for CHF  Ambulatory referral to Endocrinology for DM management         Pleural effusion, bilateral  Assessment & Plan  Chest x-ray on admission- Moderate pulmonary venous congestion with small left effusion and mild left base atelectasis     Plan-  See acute respiratory failure, CHF or plan      PAF (paroxysmal atrial fibrillation) (HCC)  Assessment & Plan  · Anticoagulated on Eliquis  · Continue metoprolol    Tobacco use  Assessment & Plan  · Patient currently reports smoking 0 5 packs a day  · Continue to encourage cessation  · Nicotine patch supplementation    Primary hypertension  Assessment & Plan  BP on admission elevated to 210/135  Current /68    Plan-  See CHF plan for new therapy which would aid in hypertensive management    Coronary artery disease involving native coronary artery of native heart without angina pectoris  Assessment & Plan  · Nonobstructive as per cath 11/2020  · Continue aspirin, statin, BB    CKD stage 3 due to type 2 diabetes mellitus [facility])  Assessment & Plan  Lab Results   Component Value Date    CREATININE 1 41 (H) 04/30/2023     Does not appear to be in acute kidney injury   Very mild increase in creatinine 1 41    Plan-   Follow up with PCP for BMP in 1 week     Obstructive sleep apnea syndrome  Assessment & Plan  · Patient reports not using CPAP at home for many years  · Unclear reason why patient stopped  · Ambulatory referral to Sleep Medicine for Sleep Study as outpatient     Hyperlipidemia LDL goal <100  Assessment & Plan  · Continue statin    Acute respiratory failure (HCC)-resolved as of 4/30/2023  Assessment & Plan  Acute respiratory failure, POA, since resolved, due to acute CHF as evidenced by SOB, increased WOB, RR 28-40 and HTN urgency, treated with 1hr UDN, Lasix IV and telemetry  Initially saturating well on room air  History of COPD saturation goal >88%  Saturating 98% on RA             Medical Problems     Resolved Problems  Date Reviewed: 4/29/2023          Resolved    Acute respiratory failure ([address] ) 4/30/2023     Resolved by  Jigna Jacobs MD        Discharging Resident: Jigna Jacobs MD  Discharging Attending: Rudolph Brennan MD  PCP: Salas Landers,   Admission Date:   Admission Orders (From admission, onward)     Ordered        04/26/23 0339  INPATIENT ADMISSION  Once                      Discharge Date: 04/30/23    Consultations During Hospital Stay:  · Cardiology    Procedures Performed:   · None    Significant Findings / Test Results:   XR chest 1 view portable   Final Result by Julian Clement MD (04/26 3620)      Moderate pulmonary venous congestion with small left effusion and mild left base atelectasis  Workstation performed: [id number]             Incidental Findings:   · None     Test Results Pending at Discharge (will require follow up): · None     Outpatient Tests Requested:  · Follow-up with PCP, Endocrinology, sleep medicine  · Will need BMP in 1 week  Complications: None    Reason for Admission: Shortness of breath    Hospital Course:   Zion Ring is a 69 y o  female patient with a past medical history of combined CHF with ICD placement, hypertension, PAF, CAD, CKD stage III, type 2 diabetes, hyperlipidemia, NASREEN? Who originally presented to the hospital on 4/26/2023 due to progressively worsening shortness of breath  In the ED patient's BP was found to be 210/135, tachypnea, saturating well 96% on room air  BNP >4700, chest x-ray demonstrating pleural effusions and vascular congestion  Patient was admitted for CHF exacerbation  Patient was managed with IV furosemide and consults cardiology throughout hospitalization  GDMT management was provided to patient adding Entresto to her home regimen  Patient reported noncompliance with home medications of Lasix and Lantus  Blood sugar on arrival was 284, we continued her home regimen of Lantus 10 HS and sliding scale throughout the day with meals  Patient's HgbA1c is 12  We educated the patient on compliance with all home medications    Per cardiology recommendations patient is to continue increased dose of Entresto, Toprol, Lasix 40 QD, spironolactone, potassium 40 bid, and "Danteance  Patient has been instructed to follow-up with cardiology/heart failure, endocrinology, PCP for BMP in 1 week, and sleep medicine  Please see above list of diagnoses and related plan for additional information  Condition at Discharge: good    Discharge Day Visit / Exam:   Subjective: Patient was seen and examined at bedside  Patient was resting comfortably no acute distress  Patient reports complete resolution of any chest discomfort, shortness of breath  Patient reports she is ready to go home  Patient denies chest pain, shortness of breath, palpitations, abdominal pain, abdominal distention, NVD, leg pain, or any other symptoms at this time  Vitals: Blood Pressure: 120/68 (04/30/23 0740)  Pulse: 73 (04/29/23 2127)  Temperature: 97 8 °F (36 6 °C) (04/30/23 0740)  Temp Source: Oral (04/26/23 0740)  Respirations: 18 (04/28/23 1506)  Height: 5' 2 5\" (158 8 cm) (04/26/23 1050)  Weight - Scale: 45 9 kg (101 lb 4 8 oz) (04/28/23 0900)  SpO2: 98 % (04/29/23 2127)  Exam:   Physical Exam  Vitals and nursing note reviewed  Constitutional:       General: She is not in acute distress  Appearance: She is well-developed  She is not ill-appearing, toxic-appearing or diaphoretic  HENT:      Head: Normocephalic and atraumatic  Mouth/Throat:      Mouth: Mucous membranes are moist    Eyes:      Extraocular Movements: Extraocular movements intact  Conjunctiva/sclera: Conjunctivae normal       Pupils: Pupils are equal, round, and reactive to light  Cardiovascular:      Rate and Rhythm: Normal rate and regular rhythm  Pulses: Normal pulses  Heart sounds: Normal heart sounds  No friction rub  No gallop  Pulmonary:      Effort: Pulmonary effort is normal  No respiratory distress  Breath sounds: No wheezing or rhonchi  Comments: Decreased breath sounds throughout  Chest:      Chest wall: No tenderness     Abdominal:      General: Bowel sounds are normal  There is no " distension  Palpations: Abdomen is soft  Tenderness: There is no abdominal tenderness  There is no right CVA tenderness or left CVA tenderness  Musculoskeletal:         General: No swelling or tenderness  Normal range of motion  Cervical back: Normal range of motion and neck supple  Right lower leg: No edema  Left lower leg: No edema  Skin:     General: Skin is warm and dry  Capillary Refill: Capillary refill takes less than 2 seconds  Neurological:      General: No focal deficit present  Mental Status: She is alert and oriented to person, place, and time  Cranial Nerves: No cranial nerve deficit  Sensory: No sensory deficit  Motor: No weakness  Coordination: Coordination normal       Gait: Gait normal       Deep Tendon Reflexes: Reflexes normal    Psychiatric:         Mood and Affect: Mood normal          Behavior: Behavior normal          Thought Content: Thought content normal           Discussion with Family: Updated  (Cousin) via phone  Discharge instructions/Information to patient and family:   See after visit summary for information provided to patient and family  Provisions for Follow-Up Care:  See after visit summary for information related to follow-up care and any pertinent home health orders  Disposition:   Home with [facility] Services (Reminder: Complete face to face encounter)    Planned Readmission: None    Discharge Medications:  See after visit summary for reconciled discharge medications provided to patient and/or family        **Please Note: This note may have been constructed using a voice recognition system**

## 2023-04-28 NOTE — PROGRESS NOTES
-- Patient:  -- MRN: [id number]  -- Aidin Request ID: [id number]  -- Level of care reserved: [address] [facility]  -- Partner Reserved: [address] , [facility], [address] [facility] [phone]  -- Clinical needs requested:  -- Geography searched: [address] [facility]  -- Start of Service:  -- Request sent: 4:11pm EDT on 4/27/2023 by Luly Cameron  -- Partner reserved: 12:16pm EDT on 4/28/2023 by Luly Cameron  -- Choice list shared: 12:16pm EDT on 4/28/2023 by Luly Cameron

## 2023-04-28 NOTE — PHYSICAL THERAPY NOTE
Physical Therapy Treatment Note    Patient's Name: Kena Correa  : 23 0946   PT Last Visit   PT Visit Date 23   Note Type   Note Type Treatment   Pain Assessment   Pain Assessment Tool 0-10   Pain Score No Pain   Restrictions/Precautions   Weight Bearing Precautions Per Order No   Other Precautions Chair Alarm; Bed Alarm;Cognitive; Fall Risk;Pain   General   Chart Reviewed Yes   Response to Previous Treatment Patient with no complaints from previous session  Cognition   Overall Cognitive Status Impaired   Orientation Level Oriented X4   Following Commands Follows one step commands without difficulty   Subjective   Subjective pt up in recliner, agreeable to PT intervention   Bed Mobility   Supine to Sit Unable to assess  (pt up in chair at chair at start of treatment)   Sit to Supine 6  Modified independent   Additional items HOB elevated   Transfers   Sit to Stand 5  Supervision   Additional items Increased time required;Armrests   Stand to Sit 5  Supervision   Additional items Increased time required;Armrests   Ambulation/Elevation   Gait pattern Decreased foot clearance; Short stride; Excessively slow   Gait Assistance 5  Supervision   Additional items Assist x 1;Verbal cues   Assistive Device Rolling walker   Distance 65' x 2   Stair Management Assistance 4  Minimal assist   Additional items Assist x 1   Stair Management Technique One rail L  (HHA R, pt does not have rails at home, BL HHA for simulated performance of stairs to enter home)   Number of Stairs 10   Balance   Static Sitting Good   Dynamic Sitting Fair +   Static Standing Fair   Dynamic Standing Fair   Ambulatory Fair -  (RW)   Activity Tolerance   Activity Tolerance Patient limited by fatigue; Other (Comment)  (wanted to take a nap)   Medical Staff Made Aware ERIC arellano   Nurse Made Aware RN pre/post   Assessment   Prognosis Fair   Problem List Decreased strength;Decreased endurance; Impaired balance;Decreased mobility; Decreased cognition; Impaired judgement;Decreased safety awareness;Decreased skin integrity   Assessment pt overall mobility has improved since I E  She demonstrates improved overall ambulatory distances and ability to perform stairs to simulat entrance into home  patient performs stairs with a step to pattern and use of rail and HHA  Pt is fatigued with ambulation and requested return to bed  Pt transfers and bed mobility are at modified independent level at this time  Would change reccomendation to home with HHPT services  Goals   Patient Goals to go to sleep   STG Expiration Date 05/06/23   Short Term Goal #1 In 10 days pt will be able to: 1  Demonstrate ability to perform all aspects of bed mobility independently to improve functional safety  2  Perform functional transfers independently to facilitate safe return to previous living environment  3   Ambulate 150 ft with LRAD and supervision with stable vitals to improve safety with household distances and reduce fall risk  4  Improve LE strength grades by 1 to increase ease of functional mobility with transfers and gait  5  Pt will demonstrate improved balance by one grade in order to decrease risk of falls  6  Climb 10 steps with 1 HR with LRAD and supervision to simulate entrance to home  PT Treatment Day 2   Plan   Treatment/Interventions Functional transfer training;LE strengthening/ROM; Cognitive reorientation;Patient/family training;Equipment eval/education; Bed mobility;Gait training;Spoke to nursing;Spoke to case management   Progress Progressing toward goals   PT Frequency 3-5x/wk   Recommendation   PT Discharge Recommendation Home with home health rehabilitation   Equipment Recommended [place] walker   AM-PAC Basic Mobility Inpatient   Turning in Flat Bed Without Bedrails 4   Lying on Back to Sitting on Edge of Flat Bed Without Bedrails 4   Moving Bed to Chair 4   Standing Up From Chair Using Arms 4   Walk in Room 4   Climb 3-5 Stairs With Railing 3   Basic Mobility Inpatient Raw Score 23   Basic Mobility Standardized Score 50 88   Highest Level Of Mobility   -HLM Goal 7: Walk 25 feet or more   JH-HLM Achieved 7: Walk 25 feet or more   Education   Education Provided Mobility training;Assistive device   Patient Reinforcement needed   End of Consult   Patient Position at End of Consult Supine;Bed/Chair alarm activated; All needs within reach   The patient's AM-PAC Basic Mobility Inpatient Short Form Raw Score is 18  A Raw score of greater than 16 suggests the patient may benefit from discharge to home  Please also refer to the recommendation of the Physical Therapist for safe discharge planning            Eric Bright, PT

## 2023-04-28 NOTE — PLAN OF CARE
Problem: Nutrition/Hydration-ADULT  Goal: Nutrient/Hydration intake appropriate for improving, restoring or maintaining nutritional needs  Description: Monitor and assess patient's nutrition/hydration status for malnutrition  Collaborate with interdisciplinary team and initiate plan and interventions as ordered  Monitor patient's weight and dietary intake as ordered or per policy  Utilize nutrition screening tool and intervene as necessary  Determine patient's food preferences and provide high-protein, high-caloric foods as appropriate       INTERVENTIONS:  - Monitor oral intake, urinary output, labs, and treatment plans  - Assess nutrition and hydration status and recommend course of action  - Evaluate amount of meals eaten  - Assist patient with eating if necessary   - Allow adequate time for meals  - Recommend/ encourage appropriate diets, oral nutritional supplements, and vitamin/mineral supplements  - Order, calculate, and assess calorie counts as needed  - Recommend, monitor, and adjust tube feedings and TPN/PPN based on assessed needs  - Assess need for intravenous fluids  - Provide specific nutrition/hydration education as appropriate  - Include patient/family/caregiver in decisions related to nutrition  Outcome: Progressing     Problem: MOBILITY - ADULT  Goal: Maintain or return to baseline ADL function  Description: INTERVENTIONS:  -  Assess patient's ability to carry out ADLs; assess patient's baseline for ADL function and identify physical deficits which impact ability to perform ADLs (bathing, care of mouth/teeth, toileting, grooming, dressing, etc )  - Assess/evaluate cause of self-care deficits   - Assess range of motion  - Assess patient's mobility; develop plan if impaired  - Assess patient's need for assistive devices and provide as appropriate  - Encourage maximum independence but intervene and supervise when necessary  - Involve family in performance of ADLs  - Assess for home care needs following discharge   - Consider OT consult to assist with ADL evaluation and planning for discharge  - Provide patient education as appropriate  Outcome: Progressing  Goal: Maintains/Returns to pre admission functional level  Description: INTERVENTIONS:  - Perform BMAT or MOVE assessment daily    - Set and communicate daily mobility goal to care team and patient/family/caregiver     - Collaborate with rehabilitation services on mobility goals if consulted    - Out of bed for toileting  - Record patient progress and toleration of activity level   Outcome: Progressing     Problem: Prexisting or High Potential for Compromised Skin Integrity  Goal: Skin integrity is maintained or improved  Description: INTERVENTIONS:  - Identify patients at risk for skin breakdown  - Assess and monitor skin integrity  - Assess and monitor nutrition and hydration status  - Monitor labs   - Assess for incontinence   - Turn and reposition patient  - Assist with mobility/ambulation  - Relieve pressure over bony prominences  - Avoid friction and shearing  - Provide appropriate hygiene as needed including keeping skin clean and dry  - Evaluate need for skin moisturizer/barrier cream  - Collaborate with interdisciplinary team   - Patient/family teaching  - Consider wound care consult   Outcome: Progressing

## 2023-04-28 NOTE — ASSESSMENT & PLAN NOTE
Lab Results   Component Value Date    CREATININE 1 19 04/28/2023     Does not appear to be in acute kidney injury   Very mild increase in creatinine 1 19    Plan-   Monitor BMP   Monitor I/Os  Avoid hypotension   Avoid nephrotoxins

## 2023-04-28 NOTE — ASSESSMENT & PLAN NOTE
· Patient reports not using CPAP at home for many years  · Unclear reason why patient stopped  · Continue cpap h s    Will order Sleep Study as outpatient

## 2023-04-28 NOTE — PLAN OF CARE
Problem: PHYSICAL THERAPY ADULT  Goal: Performs mobility at highest level of function for planned discharge setting  See evaluation for individualized goals  Description: Treatment/Interventions: LE strengthening/ROM, Functional transfer training, Therapeutic exercise, Cognitive reorientation, Patient/family training, Equipment eval/education, Gait training, Bed mobility, Spoke to nursing          See flowsheet documentation for full assessment, interventions and recommendations  Outcome: Progressing  Note: Prognosis: Fair  Problem List: Decreased strength, Decreased endurance, Impaired balance, Decreased mobility, Decreased cognition, Impaired judgement, Decreased safety awareness, Decreased skin integrity  Assessment: pt overall mobility has improved since I E  She demonstrates improved overall ambulatory distances and ability to perform stairs to simulat entrance into home  patient performs stairs with a step to pattern and use of rail and HHA  Pt is fatigued with ambulation and requested return to bed  Pt transfers and bed mobility are at modified independent level at this time  Would change reccomendation to home with HHPT services  Barriers to Discharge: Inaccessible home environment (significant CEE)     PT Discharge Recommendation: Home with home health rehabilitation    See flowsheet documentation for full assessment

## 2023-04-28 NOTE — PROGRESS NOTES
Heart Failure/ Pulmonary Hypertension Progress Note - [facility] 69 y o  female MRN: [id number]    Unit/Bed#: S -01 Encounter: [id number]      Assessment:    Principal Problem:    Acute combined systolic and diastolic congestive heart failure (HCC)  Active Problems:    Hyperlipidemia LDL goal <100    Type 2 diabetes mellitus with stage 3a chronic kidney disease, with long-term current use of insulin (HCC)    Obstructive sleep apnea syndrome    CKD stage 3 due to type 2 diabetes mellitus ([facility] [address] )    Coronary artery disease involving native coronary artery of native heart without angina pectoris    Primary hypertension    Tobacco use    PAF (paroxysmal atrial fibrillation) (HCC)    Pleural effusion, bilateral    Acute respiratory failure (HCC)    # Acute on chronic heart failure with reduced ejection fraction  Etiology: unclear etiology, nonischemic  Possible due to hypertensive heart disease  Has afib with no documented RVR  Cause of decompensation likely due to nonadherence to diuretic  Reports taking other cardiac medications     Weight: 133 lbs 4/26/23 131 3/1/23  BNP:  > 4700 4/26/23      Studies- personally reviewed by me     Echo 4/27/23:  LVEF 35%  RV normal size, mildly reduced systolic function  Estimated RVSP 59mmHg, estimated RAP 15mmHg  Mild MR  Mild AI  Trivial pericardial effusion    Echocardiogram 1/21/22  LVEF:  25-30%  LVIDd: 4 1 cm, normal wall thickness  RV: normal size and systolic function  MR: trace  PASP: 44mmHg, moderate TR, estimated RAP 8  RVOT: no notching  Other: mild AI  Abnormal diastology     [facility] 11/16/20:   Mid LAD: There was a 50 % stenosis, iFR 0 97 not hemodynamically significant  Mid circumflex: There was a 50 % stenosis  iFR 0 96 not hemodynamically significant    1st obtuse marginal: There was a 60 % stenosis, iFR 0 98 not hemodynamically significant; LM and RCA minimal luminal irregularities        Echo 11/15/20: LVEF lower limits of normal  Moderate hypokinesis of basal to mid inferior and inferolateral walls  Grade 1 diastology  Normal RV size and systolic function  Trace MR/AI     Neurohormonal Blockade:  --Beta-Blocker: metoprolol succinate 75mg BID  --ACEi, ARB or ARNi: entresto 49-51mg BID - increased to 97-103mg BID 4/27  --Aldosterone Receptor Blocker:  none  --SGLT2 Inhibitor: Jardiance 10mg daily  --Diuretic: furosemide 40mg on MWF  --Inpatient diuretic: lasix 60mg IV BID     Sudden Cardiac Death Risk Reduction:  --ICD:  S/p ICD implant 3/9/22  Interrogation 4/14/23: AP 1 3%  <0 1%  OptiVol crossed since 12/8 and ongoing     Electrical Resynchronization:  --Candidacy for BiV device: narrow QRS     Advanced Therapies (if appropriate): --Inotrope:  --LVAD/Transplant Candidacy:     # Paroxysmal atrial fibrillation  Rate: metoprolol as above  Rhythm: none  AC: apixaban  # Hypertension, uncontrolled  # Nonobstructive CAD  Rx: aspirin, statin  # Hyperlipidemia  1/18/23: TG 72 HDL 73 LDL 76  # CKD 3a, creatinine 1 1 today  # DM type 2    Plan:  Continue diuresis with lasix 60mg IV BID  Inaccurate I/Os, bedscale weight, no urine output documented overnight  Possible transition to oral diuretic in the next 24-48 hours  Start spironolactone 25mg daily  Continue entresto, metoprolol and jardiance  Strict I/O and daily weights  Keep K>4 and Mg>2      Subjective:   Patient seen and examined  No significant events overnight  Review of Systems   Constitutional: Negative for chills and fever  Respiratory: Positive for shortness of breath  Negative for chest tightness  Cardiovascular: Positive for leg swelling  Negative for chest pain and palpitations  Gastrointestinal: Negative for abdominal distention, nausea and vomiting  Neurological: Negative for dizziness and light-headedness  Objective:    Intake/ Output: 240/2100, -1860, no urine output documented overnight  Weight: 132 lbs bed scale  Tele: off      Vitals: Blood pressure 144/91, pulse 81, temperature 97 7 "°F (36 5 °C), resp  rate 17, height 5' 2 5\" (1 588 m), weight 60 2 kg (132 lb 11 5 oz), SpO2 95 %, not currently breastfeeding , Body mass index is 23 89 kg/m² , I/O last 3 completed shifts: In: 240 [P O :240]  Out: 2100 [Urine:2100]  No intake/output data recorded  Wt Readings from Last 3 Encounters:   04/28/23 60 2 kg (132 lb 11 5 oz)   03/01/23 59 7 kg (131 lb 9 6 oz)   01/10/23 58 8 kg (129 lb 9 6 oz)       Intake/Output Summary (Last 24 hours) at 4/28/2023 0817  Last data filed at 4/27/2023 1513  Gross per 24 hour   Intake 240 ml   Output 2100 ml   Net -1860 ml     I/O last 3 completed shifts:   In: 240 [P O :240]  Out: 2100 [Urine:2100]      Physical Exam:  Vitals:    04/27/23 1349 04/27/23 2216 04/28/23 0553 04/28/23 0733   BP: 140/86 156/93  144/91   Pulse: 72 70  81   Resp: 16   17   Temp: 98 °F (36 7 °C) 97 7 °F (36 5 °C)  97 7 °F (36 5 °C)   TempSrc:       SpO2: 90% 92%  95%   Weight:   60 2 kg (132 lb 11 5 oz)    Height:           GEN: Mina Reynolds appears well, alert and oriented x 3, pleasant and cooperative   HEENT: NC/AT, moist mucosa, anicteric sclerae; extraocular muscles intact  NECK: supple, no carotid bruits   HEART: regular rhythm, normal S1 and S2, no murmurs, clicks, gallops or rubs, JVP is difficult to assess, distended EJ  LUNGS: clear to auscultation bilaterally; no wheezes, rales, or rhonchi   ABDOMEN: normal bowel sounds, soft, no tenderness, no distention  EXTREMITIES: peripheral pulses normal; no clubbing, cyanosis,  1+ pitting edema bilaterally  NEURO: no focal findings   SKIN: normal without suspicious lesions on exposed skin      Current Facility-Administered Medications:   •  acetaminophen (TYLENOL) tablet 650 mg, 650 mg, Oral, Q6H PRN, Lula Rogel MD, 650 mg at 04/28/23 0800  •  albuterol (PROVENTIL HFA,VENTOLIN HFA) inhaler 2 puff, 2 puff, Inhalation, Q6H PRN, Cyndee Lutz PA-C, 2 puff at 04/27/23 0814  •  aluminum-magnesium hydroxide-simethicone (MYLANTA) oral " suspension 30 mL, 30 mL, Oral, Q6H PRN, Ravi Stewart PA-C  •  apixaban [facility]) tablet 5 mg, 5 mg, Oral, BID, Ravi Stewart PA-C, 5 mg at 04/28/23 0800  •  aspirin (ECOTRIN LOW STRENGTH) EC tablet 81 mg, 81 mg, Oral, Daily, Ravi Stewart PA-C, 81 mg at 04/28/23 0800  •  atorvastatin (LIPITOR) tablet 40 mg, 40 mg, Oral, Daily, Ravi Stewart PA-C, 40 mg at 04/28/23 0800  •  benzonatate (TESSALON PERLES) capsule 100 mg, 100 mg, Oral, TID PRN, Ravi Stewart PA-C, 100 mg at 04/27/23 2735  •  docusate sodium (COLACE) capsule 100 mg, 100 mg, Oral, BID, Ravi Stewart PA-C, 100 mg at 04/28/23 0800  •  Empagliflozin (JARDIANCE) tablet 10 mg, 10 mg, Oral, QAGricelda MD, 10 mg at 04/28/23 0803  •  fluticasone (FLOVENT HFA) 110 MCG/ACT inhaler 1 puff, 1 puff, Inhalation, Q12H [address], Ravi Stewart PA-C, 1 puff at 04/28/23 0802  •  furosemide (LASIX) injection 60 mg, 60 mg, Intravenous, BID (diuretic), Ravi Stewart PA-C, 60 mg at 04/28/23 3642  •  hydrALAZINE (APRESOLINE) injection 5 mg, 5 mg, Intravenous, Q6H PRN, Ravi Stewart PA-C  •  insulin glargine (LANTUS) subcutaneous injection 10 Units 0 1 mL, 10 Units, Subcutaneous, HS, May Thu Stuart Melton MD, 10 Units at 04/27/23 2100  •  insulin lispro (HumaLOG) 100 units/mL subcutaneous injection 1-5 Units, 1-5 Units, Subcutaneous, TID AC, 1 Units at 04/28/23 0803 **AND** Fingerstick Glucose (POCT), , , TID AC, Ravi Stewart PA-C  •  magnesium sulfate 2 g/50 mL IVPB (premix) 2 g, 2 g, Intravenous, Once, Gricelda Cedeño MD, Last Rate: 25 mL/hr at 04/28/23 0714, 2 g at 04/28/23 5041  •  metoprolol succinate (TOPROL-XL) 24 hr tablet 50 mg, 50 mg, Oral, BID, Ravi Stewart PA-C, 50 mg at 04/28/23 0800  •  nicotine (NICODERM CQ) 14 mg/24hr TD 24 hr patch 1 patch, 1 patch, Transdermal, Daily, Ravi Stewart PA-C, 1 patch at 04/28/23 0803  •  potassium chloride (K-DUR,KLOR-CON) CR tablet 40 mEq, 40 mEq, Oral, BID, Trang Schreiber MD, 40 mEq at 04/28/23 0800  •  sacubitril-valsartan (ENTRESTO)  MG per tablet 1 tablet, 1 tablet, Oral, BID, Marina Doran MD, 1 tablet at 04/28/23 0802      Labs & Results:        Results from last 7 days   Lab Units 04/26/23  0600 04/26/23  0135   WBC Thousand/uL 5 81 5 73   HEMOGLOBIN g/dL 16 0* 16 2*   HEMATOCRIT % 48 3* 49 3*   PLATELETS Thousands/uL 197 223         Results from last 7 days   Lab Units 04/28/23  0505 04/27/23  0506 04/26/23  0052   POTASSIUM mmol/L 4 5 3 6 3 4*   CHLORIDE mmol/L 98 98 102   CO2 mmol/L 36* 33* 27   BUN mg/dL 17 19 14   CREATININE mg/dL 1 19 1 10 0 98   CALCIUM mg/dL 8 4 8 8 8 5   ALK PHOS U/L  --   --  210*   ALT U/L  --   --  11   AST U/L  --   --  Suzi Reynolds MD  Advanced Heart Failure and Mechanical [place]

## 2023-04-29 LAB
ANION GAP SERPL CALCULATED.3IONS-SCNC: 7 MMOL/L (ref 4–13)
BUN SERPL-MCNC: 20 MG/DL (ref 5–25)
CALCIUM SERPL-MCNC: 8 MG/DL (ref 8.4–10.2)
CHLORIDE SERPL-SCNC: 97 MMOL/L (ref 96–108)
CO2 SERPL-SCNC: 34 MMOL/L (ref 21–32)
CREAT SERPL-MCNC: 1.18 MG/DL (ref 0.6–1.3)
GFR SERPL CREATININE-BSD FRML MDRD: 47 ML/MIN/1.73SQ M
GLUCOSE SERPL-MCNC: 176 MG/DL (ref 65–140)
GLUCOSE SERPL-MCNC: 190 MG/DL (ref 65–140)
GLUCOSE SERPL-MCNC: 269 MG/DL (ref 65–140)
GLUCOSE SERPL-MCNC: 79 MG/DL (ref 65–140)
GLUCOSE SERPL-MCNC: 82 MG/DL (ref 65–140)
MAGNESIUM SERPL-MCNC: 1.9 MG/DL (ref 1.9–2.7)
POTASSIUM SERPL-SCNC: 3.9 MMOL/L (ref 3.5–5.3)
SODIUM SERPL-SCNC: 138 MMOL/L (ref 135–147)

## 2023-04-29 RX ORDER — SENNOSIDES 8.6 MG
1 TABLET ORAL
Status: DISCONTINUED | OUTPATIENT
Start: 2023-04-29 | End: 2023-04-30 | Stop reason: HOSPADM

## 2023-04-29 RX ORDER — FUROSEMIDE 40 MG/1
40 TABLET ORAL
Status: DISCONTINUED | OUTPATIENT
Start: 2023-04-29 | End: 2023-04-30

## 2023-04-29 RX ORDER — POLYETHYLENE GLYCOL 3350 17 G/17G
17 POWDER, FOR SOLUTION ORAL DAILY
Status: DISCONTINUED | OUTPATIENT
Start: 2023-04-29 | End: 2023-04-30 | Stop reason: HOSPADM

## 2023-04-29 RX ADMIN — EMPAGLIFLOZIN 10 MG: 10 TABLET, FILM COATED ORAL at 09:54

## 2023-04-29 RX ADMIN — FLUTICASONE PROPIONATE 1 PUFF: 110 AEROSOL, METERED RESPIRATORY (INHALATION) at 21:24

## 2023-04-29 RX ADMIN — SENNOSIDES 8.6 MG: 8.6 TABLET, FILM COATED ORAL at 09:54

## 2023-04-29 RX ADMIN — APIXABAN 5 MG: 5 TABLET, FILM COATED ORAL at 17:43

## 2023-04-29 RX ADMIN — METOPROLOL SUCCINATE 50 MG: 50 TABLET, EXTENDED RELEASE ORAL at 21:28

## 2023-04-29 RX ADMIN — APIXABAN 5 MG: 5 TABLET, FILM COATED ORAL at 09:53

## 2023-04-29 RX ADMIN — SPIRONOLACTONE 25 MG: 25 TABLET ORAL at 09:54

## 2023-04-29 RX ADMIN — INSULIN LISPRO 1 UNITS: 100 INJECTION, SOLUTION INTRAVENOUS; SUBCUTANEOUS at 13:10

## 2023-04-29 RX ADMIN — POTASSIUM CHLORIDE 40 MEQ: 1500 TABLET, EXTENDED RELEASE ORAL at 09:53

## 2023-04-29 RX ADMIN — SACUBITRIL AND VALSARTAN 1 TABLET: 97; 103 TABLET, FILM COATED ORAL at 17:43

## 2023-04-29 RX ADMIN — ASPIRIN 81 MG: 81 TABLET, COATED ORAL at 09:53

## 2023-04-29 RX ADMIN — ATORVASTATIN CALCIUM 40 MG: 40 TABLET, FILM COATED ORAL at 09:53

## 2023-04-29 RX ADMIN — INSULIN GLARGINE 10 UNITS: 100 INJECTION, SOLUTION SUBCUTANEOUS at 21:25

## 2023-04-29 RX ADMIN — SENNOSIDES 8.6 MG: 8.6 TABLET, FILM COATED ORAL at 21:24

## 2023-04-29 RX ADMIN — NICOTINE 1 PATCH: 14 PATCH, EXTENDED RELEASE TRANSDERMAL at 09:54

## 2023-04-29 RX ADMIN — DOCUSATE SODIUM 100 MG: 100 CAPSULE, LIQUID FILLED ORAL at 17:43

## 2023-04-29 RX ADMIN — POLYETHYLENE GLYCOL 3350 17 G: 17 POWDER, FOR SOLUTION ORAL at 13:10

## 2023-04-29 RX ADMIN — DOCUSATE SODIUM 100 MG: 100 CAPSULE, LIQUID FILLED ORAL at 09:53

## 2023-04-29 RX ADMIN — FUROSEMIDE 40 MG: 40 TABLET ORAL at 17:43

## 2023-04-29 RX ADMIN — SACUBITRIL AND VALSARTAN 1 TABLET: 97; 103 TABLET, FILM COATED ORAL at 09:54

## 2023-04-29 RX ADMIN — INSULIN LISPRO 2 UNITS: 100 INJECTION, SOLUTION INTRAVENOUS; SUBCUTANEOUS at 17:48

## 2023-04-29 RX ADMIN — BENZONATATE 100 MG: 100 CAPSULE ORAL at 17:43

## 2023-04-29 RX ADMIN — METOPROLOL SUCCINATE 50 MG: 50 TABLET, EXTENDED RELEASE ORAL at 09:53

## 2023-04-29 RX ADMIN — FLUTICASONE PROPIONATE 1 PUFF: 110 AEROSOL, METERED RESPIRATORY (INHALATION) at 10:03

## 2023-04-29 NOTE — RESPIRATORY THERAPY NOTE
04/28/23 2300   Respiratory Protocol   Protocol Initiated? Yes   Protocol Selection Respiratory   Language Barrier? No   Medical & Social History Reviewed? Yes   Diagnostic Studies Reviewed? Yes   Physical Assessment Performed?  Yes   Home Devices/Therapy BiPAP/CPAP   Respiratory Plan Discontinue Protocol

## 2023-04-29 NOTE — PROGRESS NOTES
"Cardiology Progress Note - Kena Correa 69 y o  female MRN: [id number]    Unit/Bed#: S -01 Encounter: [id number]    Assessment and plan  #1 heart failure with reduced ejection fraction  #2 cardiomyopathy EF 35%/mild RV dysfunction  #3 pulmonary hypertension  #4 coronary artery disease moderate nonobstructive  #5 paroxysmal atrial fibrillation  #6 hypertension  #7 hyperlipidemia  #8 CKD stage III  #9 type 2 diabetes mellitus    Recommendations: Cardiomyopathy has been nonischemic with moderate nonobstructive disease seen on cath in 2020  Continue Toprol-XL at 50 mg twice daily, Entresto, spironolactone for goal-directed medical therapy  In regards to fluid status I think she will be good to convert to oral diuretics today  We will do Lasix 40 mg p o  twice daily  Need to follow in house for 24 hours to see response to oral diuretic  Plan discharge tomorrow  Continue aspirin and statin  Continue full anticoagulation with Eliquis  Subjective:    No significant events overnight  Resting flat in bed quite comfortable  Denies any chest pain or dyspnea  Says she is feeling better  ROS    Objective:   Vitals: Blood pressure 131/75, pulse 72, temperature (!) 97 4 °F (36 3 °C), resp   rate 18, height 5' 2 5\" (1 588 m), weight 45 9 kg (101 lb 4 8 oz), SpO2 97 %, not currently breastfeeding , Body mass index is 18 23 kg/m² ,   Orthostatic Blood Pressures    Flowsheet Row Most Recent Value   Blood Pressure 131/75 filed at 04/29/2023 0711   Patient Position - Orthostatic VS Lying filed at 04/26/2023 4273         Systolic (39AJR), GLK:617 , Min:113 , WOU:263     Diastolic (92KQP), INS:98, Min:64, Max:91      Intake/Output Summary (Last 24 hours) at 4/29/2023 0803  Last data filed at 4/28/2023 1621  Gross per 24 hour   Intake 320 ml   Output 1850 ml   Net -1530 ml     Weight (last 2 days)     Date/Time Weight    04/28/23 0900 45 9 (101 3)    04/28/23 0553 60 2 (132 72)    04/27/23 0558 60 2 (132 72)    " Telemetry Review: No significant arrhythmias seen on telemetry review  EKG personally reviewed by Vero Morales DO  Physical Exam  Vitals and nursing note reviewed  Constitutional:       General: She is not in acute distress  Appearance: She is well-developed  HENT:      Head: Normocephalic and atraumatic  Eyes:      Conjunctiva/sclera: Conjunctivae normal       Pupils: Pupils are equal, round, and reactive to light  Cardiovascular:      Rate and Rhythm: Normal rate and regular rhythm  Heart sounds: Normal heart sounds  No murmur heard  No friction rub  Pulmonary:      Effort: Pulmonary effort is normal  No respiratory distress  Breath sounds: Normal breath sounds  No wheezing or rales  Abdominal:      General: Bowel sounds are normal  There is no distension  Palpations: Abdomen is soft  Tenderness: There is no abdominal tenderness  There is no rebound  Musculoskeletal:         General: No tenderness or deformity  Normal range of motion  Cervical back: Neck supple  Skin:     General: Skin is warm and dry  Findings: No erythema  Neurological:      Mental Status: She is alert and oriented to person, place, and time  Cranial Nerves: No cranial nerve deficit             Laboratory Results:        CBC with diff:   Results from last 7 days   Lab Units 04/26/23  0600 04/26/23  0135   WBC Thousand/uL 5 81 5 73   HEMOGLOBIN g/dL 16 0* 16 2*   HEMATOCRIT % 48 3* 49 3*   MCV fL 90 90   PLATELETS Thousands/uL 197 223   MCH pg 29 8 29 4   MCHC g/dL 33 1 32 9   RDW % 14 0 14 1   MPV fL 12 5 12 0   NRBC AUTO /100 WBCs 0 0         CMP:  Results from last 7 days   Lab Units 04/29/23  0433 04/28/23  0505 04/27/23  0506 04/26/23  0052   POTASSIUM mmol/L 3 9 4 5 3 6 3 4*   CHLORIDE mmol/L 97 98 98 102   CO2 mmol/L 34* 36* 33* 27   BUN mg/dL 20 17 19 14   CREATININE mg/dL 1 18 1 19 1 10 0 98   CALCIUM mg/dL 8 0* 8 4 8 8 8 5   AST U/L  --   --   --  15   ALT U/L  --   --   --  11   ALK PHOS U/L  --   --   --  210*   EGFR ml/min/1 73sq m 47 46 51 59         BMP:  Results from last 7 days   Lab Units 23  0433 23  0505 23  0506 23  0052   POTASSIUM mmol/L 3 9 4 5 3 6 3 4*   CHLORIDE mmol/L 97 98 98 102   CO2 mmol/L 34* 36* 33* 27   BUN mg/dL 20 17 19 14   CREATININE mg/dL 1 18 1 19 1 10 0 98   CALCIUM mg/dL 8 0* 8 4 8 8 8 5       BNP:   Recent Labs     23  0957   BNP 3,988*       Magnesium:   Results from last 7 days   Lab Units 23  0433 23  0505 23  0506 23  0052   MAGNESIUM mg/dL 1 9 1 9 2 1 1 4*       Coags:       TSH:        Hemoglobin A1C   Results from last 7 days   Lab Units 23  0600   HEMOGLOBIN A1C % 12 2*       Lipid Profile:       Cardiac testing:   Results for orders placed during the hospital encounter of 11/15/20    Echo complete with contrast if indicated    Narrative  [address]  [phone]    Transthoracic Echocardiogram  2D, M-mode, Doppler, and Color Doppler    Study date:  15-Nov-2020    Patient: Obed Mclean  MR number: [id number]  Account number: [de-identified]  : 1955  Age: 77 years  Gender: Female  Status: Inpatient  Location: Bedside  Height: 63 in  Weight: 134 6 lb  BP: 161/ 82 mmHg    Indications: Cardiomyopathy, acute heart failure  Diagnoses: I42 9 - Cardiomyopathy, unspecified    Sonographer:  CARMELO Connell  Primary Physician:  Margarito Rosario DO  Referring Physician:  Jesi Henderson MD  Group:  [facility]  Interpreting Physician:  Lilliana Rhoades MD    SUMMARY    LEFT VENTRICLE:  Size was normal   Systolic function was at the lower limits of normal   There was moderate hypokinesis of the basal to mid inferior and inferolateral walls  Wall thickness was normal   Doppler parameters were consistent with abnormal left ventricular relaxation (grade 1 diastolic dysfunction)      RIGHT VENTRICLE:  The size was normal   Systolic function was normal     MITRAL VALVE:  There was trace regurgitation  AORTIC VALVE:  There was trace regurgitation  HISTORY: PRIOR HISTORY: Cardiomyopathy, acute heart failure  Breast cancer, chemotherapy, L breast reconstruction with implant, HTN, CKD1, uncontrolled DM2, current smoker  PROCEDURE: The procedure was performed at the bedside  This was a routine study  The transthoracic approach was used  The study included complete 2D imaging, M-mode, complete spectral Doppler, and color Doppler  The heart rate was 108 bpm,  at the start of the study  Images were obtained from the parasternal, apical, subcostal, and suprasternal notch acoustic windows  Echocardiographic views were limited due to poor acoustic window availability  Image quality was good  LEFT VENTRICLE: Size was normal  Systolic function was at the lower limits of normal  There was moderate hypokinesis of the basal to mid inferior and inferolateral walls  Wall thickness was normal  DOPPLER: Doppler parameters were  consistent with abnormal left ventricular relaxation (grade 1 diastolic dysfunction)  RIGHT VENTRICLE: The size was normal  Systolic function was normal  Wall thickness was normal     LEFT ATRIUM: Size was normal     RIGHT ATRIUM: Size was normal     MITRAL VALVE: Valve structure was normal  There was normal leaflet separation  DOPPLER: The transmitral velocity was within the normal range  There was no evidence for stenosis  There was trace regurgitation  AORTIC VALVE: The valve was trileaflet  Leaflets exhibited normal thickness and normal cuspal separation  DOPPLER: Transaortic velocity was within the normal range  There was no evidence for stenosis  There was trace regurgitation  TRICUSPID VALVE: The valve structure was normal  There was normal leaflet separation  DOPPLER: The transtricuspid velocity was within the normal range  There was no evidence for stenosis   There was no regurgitation  PULMONIC VALVE: Leaflets exhibited normal thickness, no calcification, and normal cuspal separation  DOPPLER: The transpulmonic velocity was within the normal range  There was no regurgitation  PERICARDIUM: There was no pericardial effusion  The pericardium was normal in appearance  AORTA: The root exhibited normal size  SYSTEMIC VEINS: IVC: The inferior vena cava was normal in size and course  Respirophasic changes were normal     SYSTEM MEASUREMENT TABLES    2D  %FS: 27 06 %  Ao Diam: 3 24 cm  EDV(Teich): 117 95 ml  EF(Teich): 52 52 %  ESV(Teich): 56 ml  IVSd: 0 82 cm  LA Diam: 3 91 cm  LVEDV MOD A4C: 90 2 ml  LVEF MOD A4C: 40 17 %  LVESV MOD A4C: 53 96 ml  LVIDd: 4 99 cm  LVIDs: 3 64 cm  LVLd A4C: 7 83 cm  LVLs A4C: 6 73 cm  LVPWd: 0 83 cm  SV MOD A4C: 36 24 ml  SV(Teich): 61 95 ml    CW  AV Env  Ti: 209 32 ms  AV MaxP 51 mmHg  AV VTI: 21 7 cm  AV Vmax: 1 37 m/s  AV Vmean: 1 04 m/s  AV meanP 51 mmHg  TR MaxP 42 mmHg  TR Vmax: 2 26 m/s    PW  E' Sept: 0 06 m/s  E/E' Sept: 16 75  LVOT Env  Ti: 239 77 ms  LVOT VTI: 12 62 cm  LVOT Vmax: 0 8 m/s  LVOT Vmean: 0 53 m/s  LVOT maxP 57 mmHg  LVOT meanP 29 mmHg  MV A Conrad: 0 9 m/s  MV Dec [place]: 5 25 m/s2  MV DecT: 183 ms  MV E Conrad: 0 96 m/s  MV E/A Ratio: 1 07  MV PHT: 53 07 ms  MVA By PHT: 4 15 cm2    [facility] Accredited Echocardiography Laboratory    Prepared and electronically signed by    Yola Rodriguez MD  Signed [id number] 15:35:29    No results found for this or any previous visit  No results found for this or any previous visit  No results found for this or any previous visit        Meds/Allergies   all current active meds have been reviewed  Medications Prior to Admission   Medication   • albuterol (Ventolin HFA) 90 mcg/act inhaler   • apixaban (Eliquis) 5 mg   • Aspirin Low Dose 81 MG EC tablet   • atorvastatin (LIPITOR) 40 mg tablet   • Blood Glucose Monitoring Suppl (OneTouch Verio Reflect) w/Device KIT   • cholecalciferol (VITAMIN D3) 1,000 units tablet   • Empagliflozin (Jardiance) 10 MG TABS   • Flovent  MCG/ACT inhaler   • furosemide (LASIX) 40 mg tablet   • glucose blood (OneTouch Verio) test strip   • insulin glargine (Lantus) 100 units/mL subcutaneous injection   • Insulin Pen Needle (BD Pen Needle Kateryna U/F) 32G X 4 MM MISC   • metFORMIN (GLUCOPHAGE) 1000 MG tablet   • metoprolol succinate (TOPROL-XL) 50 mg 24 hr tablet   • nicotine (NICODERM CQ) 14 mg/24hr TD 24 hr patch   • nicotine (NICODERM CQ) 14 mg/24hr TD 24 hr patch   • nicotine (NICODERM CQ) 7 mg/24hr TD 24 hr patch   • nystatin-triamcinolone (MYCOLOG-II) ointment   • OneTouch Delica Lancets 71R MISC   • sacubitril-valsartan (Entresto) 49-51 MG TABS          Assessment:  Principal Problem:    Acute combined systolic and diastolic congestive heart failure (HCC)  Active Problems:    Hyperlipidemia LDL goal <100    Type 2 diabetes mellitus with stage 3a chronic kidney disease, with long-term current use of insulin (HCC)    Obstructive sleep apnea syndrome    CKD stage 3 due to type 2 diabetes mellitus ([facility] [address] )    Coronary artery disease involving native coronary artery of native heart without angina pectoris    Primary hypertension    Tobacco use    PAF (paroxysmal atrial fibrillation) (HCC)    Pleural effusion, bilateral    Acute respiratory failure (HCC)            Counseling / Coordination of Care  Total floor / unit time spent today 25 minutes  Greater than 50% of total time was spent with the patient and / or family counseling and / or coordination of care  A description of the counseling / coordination of care: Prabha Lane

## 2023-04-29 NOTE — PROGRESS NOTES
[facility]  Progress Note  Name: Martha Elias  MRN: [id number]  Unit/Bed#: S -01 I Date of Admission: 4/26/2023   Date of Service: 4/29/2023 I Hospital Day: 3    Assessment/Plan   * Acute combined systolic and diastolic congestive heart failure [facility])  Assessment & Plan  Wt Readings from Last 3 Encounters:   04/28/23 45 9 kg (101 lb 4 8 oz)   03/01/23 59 7 kg (131 lb 9 6 oz)   01/10/23 58 8 kg (129 lb 9 6 oz)     Last ECHO 1/2022 demonstrated EF 25-30% with severely decreased systolic dysfunction and there is global hypokinesis with regional variation  Diastolic function is abnormal    Echo- EF 35%, mild global hypokinesis, diastolic dysfunction, AR, TR, right ventricular pressure elevated, trivial pericardial effusion, left pleural effusion    Plan-  Daily weights  Monitor I/Os  Cardiac Diet with fluid restriction and Na 2gm diet  Continue Potassium supplementation 40 BID   Appreciate Cardiology recommendations  Switch to oral Lasix 40 BID on 04/29  Continue metoprolol/Jardiance,   Continue Entresto to  BID   Continue Toprol-XL at 50 mg twice daily, spironolactone for goal-directed medical therapy  Plan discharge tomorrow  Continue aspirin and statin  Continue full anticoagulation with Eliquis      Acute respiratory failure (HCC)  Assessment & Plan  Acute respiratory failure, POA, since resolved, due to acute CHF as evidenced by SOB, increased WOB, RR 28-40 and HTN urgency, treated with 1hr UDN, Lasix IV and telemetry  Initially saturating well on room air  History of COPD saturation goal >88%  Saturating 93% on RA     Plan-  Continue to monitor O2 status    Pleural effusion, bilateral  Assessment & Plan  Chest x-ray on admission- Moderate pulmonary venous congestion with small left effusion and mild left base atelectasis     Plan-  See acute respiratory failure, CHF or plan      PAF (paroxysmal atrial fibrillation) ([facility])  Assessment & Plan  · Anticoagulated on Eliquis  · Continue metoprolol    Tobacco use  Assessment & Plan  · Patient currently reports smoking 0 5 packs a day  · Continue to encourage cessation  · Nicotine patch supplementation    Primary hypertension  Assessment & Plan  BP on admission elevated to 210/135  Current /93    Plan-  See CHF plan for new therapy which would aid in hypertensive management    Coronary artery disease involving native coronary artery of native heart without angina pectoris  Assessment & Plan  · Nonobstructive as per cath 11/2020  · Continue aspirin, statin, BB    CKD stage 3 due to type 2 diabetes mellitus [facility])  Assessment & Plan  Lab Results   Component Value Date    CREATININE 1 19 04/28/2023     Does not appear to be in acute kidney injury   Very mild increase in creatinine 1 19    Plan-   Monitor BMP   Monitor I/Os  Avoid hypotension   Avoid nephrotoxins    Obstructive sleep apnea syndrome  Assessment & Plan  · Patient reports not using CPAP at home for many years  · Unclear reason why patient stopped  · Continue cpap h s   · Sleep Study as outpatient     Type 2 diabetes mellitus with stage 3a chronic kidney disease, with long-term current use of insulin [facility])  Assessment & Plan  Lab Results   Component Value Date    HGBA1C 12 2 (H) 04/26/2023       Recent Labs     04/28/23  2120 04/29/23  0710 04/29/23  1147 04/29/23  1618   POCGLU 126 82 176* 269*       Blood Sugar Average: Last 72 hrs:  (P) 871 [id number]   Home medications include Lantus 10HS    Plan-  Continue Lantus 10 HS   Continue Jardiance for CHF  ISS  Hypoglycemia protocol     Hyperlipidemia LDL goal <100  Assessment & Plan  · Continue statin             VTE Pharmacologic Prophylaxis: VTE Score: 3 Moderate Risk (Score 3-4) - Pharmacological DVT Prophylaxis Ordered: apixaban (Eliquis)  Patient Centered Rounds: I performed bedside rounds with nursing staff today    Discussions with Specialists or Other Care Team Provider: Cardiology    Education and Discussions with Family / Patient: Updated  (relative) via phone  Current Length of Stay: 3 day(s)  Current Patient Status: Inpatient   Discharge Plan: Anticipate discharge tomorrow to home with home services  Code Status: Level 1 - Full Code    Subjective:   Patient was seen and examined at bedside  Patient was lying in bed and in no acute distress  Patient reports she feels much better from when she did on admission  Patient denies chest pain, shortness of breath, palpitations, abdominal bloating, N/V/D   No significant events overnight  Objective:     Vitals:   Temp (24hrs), Av 1 °F (36 7 °C), Min:97 4 °F (36 3 °C), Max:98 8 °F (37 1 °C)    Temp:  [97 4 °F (36 3 °C)-98 8 °F (37 1 °C)] 97 4 °F (36 3 °C)  HR:  [70-85] 77  BP: ()/(65-80) 132/80  SpO2:  [90 %-97 %] 95 %  Body mass index is 18 23 kg/m²  Input and Output Summary (last 24 hours): Intake/Output Summary (Last 24 hours) at 2023  Last data filed at 2023 1300  Gross per 24 hour   Intake 600 ml   Output 2400 ml   Net -1800 ml       Physical Exam:   Physical Exam  Vitals and nursing note reviewed  Constitutional:       General: She is not in acute distress  Appearance: She is well-developed  Comments: Thin  BMI 18   HENT:      Head: Normocephalic and atraumatic  Mouth/Throat:      Pharynx: Oropharynx is clear  Eyes:      Conjunctiva/sclera: Conjunctivae normal       Pupils: Pupils are equal, round, and reactive to light  Cardiovascular:      Rate and Rhythm: Normal rate and regular rhythm  Heart sounds: Normal heart sounds  No murmur heard  Pulmonary:      Effort: Pulmonary effort is normal  No respiratory distress  Breath sounds: Normal breath sounds  No wheezing or rales  Abdominal:      General: Bowel sounds are normal       Palpations: Abdomen is soft  Tenderness: There is no abdominal tenderness  Musculoskeletal:         General: No swelling        Cervical back: Neck supple  Right lower leg: No edema  Left lower leg: No edema  Skin:     General: Skin is warm and dry  Capillary Refill: Capillary refill takes less than 2 seconds  Neurological:      Mental Status: She is alert and oriented to person, place, and time  Psychiatric:         Mood and Affect: Mood normal           Additional Data:     Labs:  Results from last 7 days   Lab Units 04/26/23  0600   WBC Thousand/uL 5 81   HEMOGLOBIN g/dL 16 0*   HEMATOCRIT % 48 3*   PLATELETS Thousands/uL 197   NEUTROS PCT % 90*   LYMPHS PCT % 8*   MONOS PCT % 2*   EOS PCT % 0     Results from last 7 days   Lab Units 04/29/23  0433 04/27/23  0506 04/26/23  0052   SODIUM mmol/L 138   < > 137   POTASSIUM mmol/L 3 9   < > 3 4*   CHLORIDE mmol/L 97   < > 102   CO2 mmol/L 34*   < > 27   BUN mg/dL 20   < > 14   CREATININE mg/dL 1 18   < > 0 98   ANION GAP mmol/L 7   < > 8   CALCIUM mg/dL 8 0*   < > 8 5   ALBUMIN g/dL  --   --  2 6*   TOTAL BILIRUBIN mg/dL  --   --  0 91   ALK PHOS U/L  --   --  210*   ALT U/L  --   --  11   AST U/L  --   --  15   GLUCOSE RANDOM mg/dL 79   < > 284*    < > = values in this interval not displayed  Results from last 7 days   Lab Units 04/29/23  1618 04/29/23  1147 04/29/23  0710 04/28/23  2120 04/28/23  1554 04/28/23  1125 04/28/23  0731 04/28/23  0453 04/27/23  2047 04/27/23  1723 04/27/23  1617 04/27/23  1144   POC GLUCOSE mg/dl 269* 176* 82 126 271* 197* 151* 93 161* 94 56* 152*     Results from last 7 days   Lab Units 04/26/23  0600   HEMOGLOBIN A1C % 12 2*           Lines/Drains:  Invasive Devices     Peripheral Intravenous Line  Duration           Peripheral IV 04/26/23 Left Antecubital 3 days                      Imaging: Reviewed radiology reports from this admission including: chest xray  XR chest 1 view portable   Final Result by Zoë Odom MD (04/26 1550)      Moderate pulmonary venous congestion with small left effusion and mild left base atelectasis  Workstation performed: [id number]           Recent Cultures (last 7 days):         Last 24 Hours Medication List:   Current Facility-Administered Medications   Medication Dose Route Frequency Provider Last Rate   • acetaminophen  650 mg Oral Q6H PRN Sarita Patrick MD     • albuterol  2 puff Inhalation Q6H PRN Leandra Kay PA-C     • aluminum-magnesium hydroxide-simethicone  30 mL Oral Q6H PRN Leandra Kay PA-C     • apixaban  5 mg Oral BID Leandra Kay PA-C     • aspirin  81 mg Oral Daily Leandra Kay PA-C     • atorvastatin  40 mg Oral Daily Leandra Kay PA-C     • benzonatate  100 mg Oral TID PRN Leandra Kay PA-C     • docusate sodium  100 mg Oral BID Leandra Kay PA-C     • Empagliflozin  10 mg Oral QAM Sarita Patrick MD     • fluticasone  1 puff Inhalation Q12H [facility] Leandra Kay PA-C     • furosemide  40 mg Oral BID (diuretic) Taylor Estevez DO     • hydrALAZINE  5 mg Intravenous Q6H PRN Leandra Kay PA-C     • insulin glargine  10 Units Subcutaneous HS Kendra Lepe MD     • insulin lispro  1-5 Units Subcutaneous TID [facility] Leandra Kay PA-C     • metoprolol succinate  50 mg Oral BID Leandra Kay PA-C     • nicotine  1 patch Transdermal Daily Leandra Kay PA-C     • polyethylene glycol  17 g Oral Daily Trace Barnes MD     • potassium chloride  40 mEq Oral Daily Gamaliel Reyes MD     • sacubitril-valsartan  1 tablet Oral BID Gamaliel Reyes MD     • senna  1 tablet Oral HS Kendra Lepe MD     • spironolactone  25 mg Oral Daily Gamaliel Reyes MD          Today, Patient Was Seen By: Kendra Lepe MD    **Please Note: This note may have been constructed using a voice recognition system  **

## 2023-04-30 ENCOUNTER — TELEPHONE (OUTPATIENT)
Dept: FAMILY MEDICINE CLINIC | Facility: CLINIC | Age: 68
End: 2023-04-30

## 2023-04-30 VITALS
HEART RATE: 73 BPM | TEMPERATURE: 97.8 F | HEIGHT: 63 IN | BODY MASS INDEX: 17.95 KG/M2 | RESPIRATION RATE: 18 BRPM | SYSTOLIC BLOOD PRESSURE: 120 MMHG | WEIGHT: 101.3 LBS | OXYGEN SATURATION: 98 % | DIASTOLIC BLOOD PRESSURE: 68 MMHG

## 2023-04-30 DIAGNOSIS — N18.2 TYPE 2 DIABETES MELLITUS WITH STAGE 2 CHRONIC KIDNEY DISEASE, WITHOUT LONG-TERM CURRENT USE OF INSULIN (HCC): ICD-10-CM

## 2023-04-30 DIAGNOSIS — E11.22 TYPE 2 DIABETES MELLITUS WITH STAGE 2 CHRONIC KIDNEY DISEASE, WITHOUT LONG-TERM CURRENT USE OF INSULIN (HCC): ICD-10-CM

## 2023-04-30 PROBLEM — J96.00 ACUTE RESPIRATORY FAILURE (HCC): Status: RESOLVED | Noted: 2023-04-27 | Resolved: 2023-04-30

## 2023-04-30 LAB
ANION GAP SERPL CALCULATED.3IONS-SCNC: 5 MMOL/L (ref 4–13)
BUN SERPL-MCNC: 23 MG/DL (ref 5–25)
CALCIUM SERPL-MCNC: 8.3 MG/DL (ref 8.4–10.2)
CHLORIDE SERPL-SCNC: 98 MMOL/L (ref 96–108)
CO2 SERPL-SCNC: 36 MMOL/L (ref 21–32)
CREAT SERPL-MCNC: 1.41 MG/DL (ref 0.6–1.3)
GFR SERPL CREATININE-BSD FRML MDRD: 38 ML/MIN/1.73SQ M
GLUCOSE SERPL-MCNC: 82 MG/DL (ref 65–140)
GLUCOSE SERPL-MCNC: 83 MG/DL (ref 65–140)
MAGNESIUM SERPL-MCNC: 2 MG/DL (ref 1.9–2.7)
POTASSIUM SERPL-SCNC: 4.2 MMOL/L (ref 3.5–5.3)
SODIUM SERPL-SCNC: 139 MMOL/L (ref 135–147)

## 2023-04-30 RX ORDER — SPIRONOLACTONE 25 MG/1
25 TABLET ORAL DAILY
Qty: 30 TABLET | Refills: 0 | Status: SHIPPED | OUTPATIENT
Start: 2023-05-01 | End: 2023-05-17 | Stop reason: SINTOL

## 2023-04-30 RX ORDER — METOPROLOL SUCCINATE 50 MG/1
50 TABLET, EXTENDED RELEASE ORAL 2 TIMES DAILY
Qty: 60 TABLET | Refills: 0 | Status: SHIPPED | OUTPATIENT
Start: 2023-04-30 | End: 2023-05-30

## 2023-04-30 RX ORDER — FUROSEMIDE 40 MG/1
40 TABLET ORAL DAILY
Qty: 30 TABLET | Refills: 0 | Status: SHIPPED | OUTPATIENT
Start: 2023-05-01 | End: 2023-05-17 | Stop reason: SINTOL

## 2023-04-30 RX ORDER — POTASSIUM CHLORIDE 20 MEQ/1
40 TABLET, EXTENDED RELEASE ORAL DAILY
Qty: 60 TABLET | Refills: 0 | Status: SHIPPED | OUTPATIENT
Start: 2023-05-01 | End: 2023-05-31

## 2023-04-30 RX ORDER — FUROSEMIDE 40 MG/1
40 TABLET ORAL DAILY
Status: DISCONTINUED | OUTPATIENT
Start: 2023-05-01 | End: 2023-04-30 | Stop reason: HOSPADM

## 2023-04-30 RX ORDER — BENZONATATE 100 MG/1
100 CAPSULE ORAL 3 TIMES DAILY PRN
Qty: 20 CAPSULE | Refills: 0 | Status: SHIPPED | OUTPATIENT
Start: 2023-04-30 | End: 2023-05-17 | Stop reason: ALTCHOICE

## 2023-04-30 RX ADMIN — DOCUSATE SODIUM 100 MG: 100 CAPSULE, LIQUID FILLED ORAL at 08:27

## 2023-04-30 RX ADMIN — SPIRONOLACTONE 25 MG: 25 TABLET ORAL at 08:27

## 2023-04-30 RX ADMIN — ATORVASTATIN CALCIUM 40 MG: 40 TABLET, FILM COATED ORAL at 08:27

## 2023-04-30 RX ADMIN — FLUTICASONE PROPIONATE 1 PUFF: 110 AEROSOL, METERED RESPIRATORY (INHALATION) at 08:35

## 2023-04-30 RX ADMIN — FUROSEMIDE 40 MG: 40 TABLET ORAL at 08:27

## 2023-04-30 RX ADMIN — NICOTINE 1 PATCH: 14 PATCH, EXTENDED RELEASE TRANSDERMAL at 08:28

## 2023-04-30 RX ADMIN — ASPIRIN 81 MG: 81 TABLET, COATED ORAL at 08:27

## 2023-04-30 RX ADMIN — POTASSIUM CHLORIDE 40 MEQ: 1500 TABLET, EXTENDED RELEASE ORAL at 08:27

## 2023-04-30 RX ADMIN — SACUBITRIL AND VALSARTAN 1 TABLET: 97; 103 TABLET, FILM COATED ORAL at 08:33

## 2023-04-30 RX ADMIN — INSULIN LISPRO 1 UNITS: 100 INJECTION, SOLUTION INTRAVENOUS; SUBCUTANEOUS at 08:21

## 2023-04-30 RX ADMIN — METOPROLOL SUCCINATE 50 MG: 50 TABLET, EXTENDED RELEASE ORAL at 08:27

## 2023-04-30 RX ADMIN — APIXABAN 5 MG: 5 TABLET, FILM COATED ORAL at 08:27

## 2023-04-30 RX ADMIN — EMPAGLIFLOZIN 10 MG: 10 TABLET, FILM COATED ORAL at 08:34

## 2023-04-30 NOTE — ASSESSMENT & PLAN NOTE
Lab Results   Component Value Date    HGBA1C 12 2 (H) 04/26/2023       Recent Labs     04/29/23  1147 04/29/23  1618 04/29/23  2125 04/30/23  0738   POCGLU 176* 269* 190* 83       Blood Sugar Average: Last 72 hrs:  (P) 521 [id number]   Home medications include Lantus 10HS and Metformin     Plan-  Continue Lantus 10 HS   Continue Jardiance for CHF  Ambulatory referral to Endocrinology for DM management

## 2023-04-30 NOTE — DISCHARGE INSTR - AVS FIRST PAGE
Dear Mamie Gallagher,     It was our pleasure to care for you here at [facility]  It is our hope that we were always able to exceed the expected standards for your care during your stay  You were hospitalized due to Congestive Heart Failure Exacerbation  You were cared for on the 2nd floor by Wiliam Leon MD under the service of Kevin Urena MD with the [facility]ist [facility] who covers for your primary care physician (PCP), Kendell Roman DO, while you were hospitalized  If you have any questions or concerns related to this hospitalization, you may contact us at [phone]  For follow up as well as any medication refills, we recommend that you follow up with your primary care physician  A registered nurse will reach out to you by phone within a few days after your discharge to answer any additional questions that you may have after going home    However, at this time we provide for you here, the most important instructions / recommendations at discharge:     Notable Medication Adjustments -   Please start taking Entresto  times twice a day  Please start taking Aldactone 25mg daily  Please take potassium pills twice a day   Please continue to take Toprol XL 50mg twice a day  Please take Lasix 40mg daily  Please continue to take your Lantus 10 units at night and monitor your blood sugars at least three times a day   Testing Required after Discharge -   Blood work by your PCP to monitor your kidney function   Please contact your PCP to request testing orders for any of the testing recommended here- BMP  Important follow up information -   Please follow-up with your PCP  Please follow-up with your cardiologist  Please follow-up with endocrinology  Please follow up with Sleep Medicine for a sleep study   Other Instructions -   If you are experiencing chest pain, shortness of breath, palpitations, weight gain, urinary symptoms, abdominal swelling, leg swelling, ICD firing please return to ED  Please review this entire after visit summary as additional general instructions including medication list, appointments, activity, diet, any pertinent wound care, and other additional recommendations from your care team that may be provided for you        Sincerely,     Shelbi Starks MD

## 2023-04-30 NOTE — TELEPHONE ENCOUNTER
----- Message from Bennie Rees MD sent at 4/30/2023 12:59 PM EDT -----  Thank you for allowing us to participate in the care of your patient, Edin Iqbal, who was hospitalized from 4/26/2023 through 4/30/2023 with the admitting diagnosis of acute CHF exacerbation  During hospitalization we had continued diuresis as well as consulting cardiology for further recommendations  Ultimately she was sent home on Lasix 40mg QD, Jardiance, K 40mg BID, Entresto  BID, and spironolactone for GDMT  We also continued her Lantus 10U HS, as she stated she was not taking at home  We sent an ambulatory referral to endocrinology for better management of her diabetes  We are recommending a BMP in 1 week to monitor her kidney function while diuresing  We also recommended her to follow-up with sleep medicine for an outpatient sleep study  If you have any additional questions or would like to discuss further, please feel free to contact me      Bennie Rees MD  [place] [address] Internal Medicine, Hospitalist  [phone]

## 2023-04-30 NOTE — ASSESSMENT & PLAN NOTE
Lab Results   Component Value Date    CREATININE 1 41 (H) 04/30/2023     Does not appear to be in acute kidney injury   Very mild increase in creatinine 1 41    Plan-   Follow up with PCP for BMP in 1 week

## 2023-04-30 NOTE — PLAN OF CARE
Problem: Nutrition/Hydration-ADULT  Goal: Nutrient/Hydration intake appropriate for improving, restoring or maintaining nutritional needs  Description: Monitor and assess patient's nutrition/hydration status for malnutrition  Collaborate with interdisciplinary team and initiate plan and interventions as ordered  Monitor patient's weight and dietary intake as ordered or per policy  Utilize nutrition screening tool and intervene as necessary  Determine patient's food preferences and provide high-protein, high-caloric foods as appropriate       INTERVENTIONS:  - Monitor oral intake, urinary output, labs, and treatment plans  - Assess nutrition and hydration status and recommend course of action  - Evaluate amount of meals eaten  - Assist patient with eating if necessary   - Allow adequate time for meals  - Recommend/ encourage appropriate diets, oral nutritional supplements, and vitamin/mineral supplements  - Order, calculate, and assess calorie counts as needed  - Recommend, monitor, and adjust tube feedings and TPN/PPN based on assessed needs  - Assess need for intravenous fluids  - Provide specific nutrition/hydration education as appropriate  - Include patient/family/caregiver in decisions related to nutrition  Outcome: Progressing     Problem: MOBILITY - ADULT  Goal: Maintain or return to baseline ADL function  Description: INTERVENTIONS:  -  Assess patient's ability to carry out ADLs; assess patient's baseline for ADL function and identify physical deficits which impact ability to perform ADLs (bathing, care of mouth/teeth, toileting, grooming, dressing, etc )  - Assess/evaluate cause of self-care deficits   - Assess range of motion  - Assess patient's mobility; develop plan if impaired  - Assess patient's need for assistive devices and provide as appropriate  - Encourage maximum independence but intervene and supervise when necessary  - Involve family in performance of ADLs  - Assess for home care needs following discharge   - Consider OT consult to assist with ADL evaluation and planning for discharge  - Provide patient education as appropriate  Outcome: Progressing  Goal: Maintains/Returns to pre admission functional level  Description: INTERVENTIONS:  - Perform BMAT or MOVE assessment daily    - Set and communicate daily mobility goal to care team and patient/family/caregiver     - Collaborate with rehabilitation services on mobility goals if consulted  - Record patient progress and toleration of activity level   Outcome: Progressing

## 2023-04-30 NOTE — ASSESSMENT & PLAN NOTE
BP on admission elevated to 210/135  Current /68    Plan-  See CHF plan for new therapy which would aid in hypertensive management

## 2023-04-30 NOTE — CASE MANAGEMENT
Case Management Progress Note    Patient name Rosa Yoder  Location S /S -01 MRN [id number]  : 1955 Date 2023       LOS (days): 4  Geometric Mean LOS (GMLOS) (days): 3 90  Days to GMLOS:-0 4        OBJECTIVE:        Current admission status: Inpatient  Preferred Pharmacy:   [id number] [place], [address]  Phone: [phone] Fax: [address]  [address] [place] [address]  Phone: [phone] Fax: [phone]    Primary Care Provider: Valerie Rodríguez DO    Primary Insurance: [facility] [employer]  Secondary Insurance:     PROGRESS NOTE:    Patient discharging today - same relayed to [facility] via [address] and AVS/home care orders forwarded to them for their records

## 2023-04-30 NOTE — ASSESSMENT & PLAN NOTE
Acute respiratory failure, POA, since resolved, due to acute CHF as evidenced by SOB, increased WOB, RR 28-40 and HTN urgency, treated with 1hr UDN, Lasix IV and telemetry  Initially saturating well on room air  History of COPD saturation goal >88%  Saturating 98% on RA

## 2023-04-30 NOTE — PROGRESS NOTES
"  Cardiology Progress Note - Linda Gomez 69 y o  female MRN: [id number]    Unit/Bed#: S -01 Encounter: [id number]    Assessment and plan  #1 heart failure with reduced ejection fraction  #2 cardiomyopathy EF 35%/mild RV dysfunction  #3 pulmonary hypertension  #4 coronary artery disease moderate nonobstructive  #5 paroxysmal atrial fibrillation  #6 hypertension  #7 hyperlipidemia  #8 CKD stage III  #9 type 2 diabetes mellitus    Recommendations: Cardiomyopathy has been nonischemic with moderate nonobstructive disease seen on cath in 2020  Continue Toprol-XL at 50 mg twice daily, Entresto, spironolactone for goal-directed medical therapy  Patient reports a good diuresis last evening creatinine went up to 1 4 this morning  I think she had some decreased oral intake yesterday as well  Weights are not checked  Decrease Lasix to 40 mg daily  She tells me she was not taking her diuretics at home prior to admission  Continue aspirin and statin  Continue full anticoagulation with Eliquis  Follow-up with heart failure as an outpatient  Subjective:    No significant events overnight  Denies any chest pain, shortness of breath, palpitations, lightheadedness, dizziness  ROS    Objective:   Vitals: Blood pressure 120/68, pulse 73, temperature 97 8 °F (36 6 °C), resp   rate 18, height 5' 2 5\" (1 588 m), weight 45 9 kg (101 lb 4 8 oz), SpO2 98 %, not currently breastfeeding , Body mass index is 18 23 kg/m² ,   Orthostatic Blood Pressures    Flowsheet Row Most Recent Value   Blood Pressure 120/68 filed at 04/30/2023 0740   Patient Position - Orthostatic VS Lying filed at 04/26/2023 9243         Systolic (12ASW), CWI:505 , Min:98 , RUDY:529     Diastolic (82HAY), VKY:50, Min:65, Max:80      Intake/Output Summary (Last 24 hours) at 4/30/2023 0933  Last data filed at 4/29/2023 1300  Gross per 24 hour   Intake 360 ml   Output 2400 ml   Net -2040 ml     Weight (last 2 days)     Date/Time Weight    04/28/23 0900 45 9 " (101 3)    04/28/23 0553 60 2 (132 72)            Telemetry Review: No significant arrhythmias seen on telemetry review  EKG personally reviewed by Owen Bowser DO  Physical Exam:  Vital signs reviewed  General:  Alert and cooperative, appears stated age, no acute distress  HEENT:  PERRLA, EOMI, no scleral icterus, no conjunctival pallor  Neck:  No lymphadenopathy, no thyromegaly, no carotid bruits, no elevated JVP  Heart:  Regular rate and rhythm, normal S1/S2, no S3/S4, no murmur, rubs or gallops  PMI nondisplaced  Lungs:  Clear to auscultation bilaterally, no wheezes rales or rhonchi  Abdomen:  Soft, non-tender, positive bowel sounds, no rebound or guarding,   no organomegaly   Extremities:  Normal range of motion    No clubbing, cyanosis or edema   Vascular:  2+ pedal pulses  Skin:  No rashes or lesions on exposed skin  Neurologic:  Cranial nerves II-XII grossly intact without focal deficits  Psych:  Normal mood and affect      Laboratory Results:        CBC with diff:   Results from last 7 days   Lab Units 04/26/23  0600 04/26/23  0135   WBC Thousand/uL 5 81 5 73   HEMOGLOBIN g/dL 16 0* 16 2*   HEMATOCRIT % 48 3* 49 3*   MCV fL 90 90   PLATELETS Thousands/uL 197 223   MCH pg 29 8 29 4   MCHC g/dL 33 1 32 9   RDW % 14 0 14 1   MPV fL 12 5 12 0   NRBC AUTO /100 WBCs 0 0         CMP:  Results from last 7 days   Lab Units 04/30/23  0557 04/29/23  0433 04/28/23  0505 04/27/23  0506 04/26/23  0052   POTASSIUM mmol/L 4 2 3 9 4 5 3 6 3 4*   CHLORIDE mmol/L 98 97 98 98 102   CO2 mmol/L 36* 34* 36* 33* 27   BUN mg/dL 23 20 17 19 14   CREATININE mg/dL 1 41* 1 18 1 19 1 10 0 98   CALCIUM mg/dL 8 3* 8 0* 8 4 8 8 8 5   AST U/L  --   --   --   --  15   ALT U/L  --   --   --   --  11   ALK PHOS U/L  --   --   --   --  210*   EGFR ml/min/1 73sq m 38 47 46 51 59         BMP:  Results from last 7 days   Lab Units 04/30/23  0557 04/29/23  0433 04/28/23  0505 04/27/23  0506 04/26/23  0052   POTASSIUM mmol/L 4 2 3 9 4 5 3 6 3 4*   CHLORIDE mmol/L 98 97 98 98 102   CO2 mmol/L 36* 34* 36* 33* 27   BUN mg/dL 23 20 17 19 14   CREATININE mg/dL 1 41* 1 18 1 19 1 10 0 98   CALCIUM mg/dL 8 3* 8 0* 8 4 8 8 8 5       BNP:   Recent Labs     23  0957   BNP 3,988*       Magnesium:   Results from last 7 days   Lab Units 23  0557 23  0433 23  0505 23  0506 23  0052   MAGNESIUM mg/dL 2 0 1 9 1 9 2 1 1 4*       Coags:       TSH:        Hemoglobin A1C   Results from last 7 days   Lab Units 23  0600   HEMOGLOBIN A1C % 12 2*       Lipid Profile:       Cardiac testing:   Results for orders placed during the hospital encounter of 11/15/20    Echo complete with contrast if indicated    Narrative  [facility] [address] [facility]  [phone]    Transthoracic Echocardiogram  2D, M-mode, Doppler, and Color Doppler    Study date:  15-Nov-2020    Patient: Tabatha Ovalles  MR number: [id number]  Account number: [de-identified]  : 1955  Age: 77 years  Gender: Female  Status: Inpatient  Location: Bedside  Height: 63 in  Weight: 134 6 lb  BP: 161/ 82 mmHg    Indications: Cardiomyopathy, acute heart failure  Diagnoses: I42 9 - Cardiomyopathy, unspecified    Sonographer:  CARMELO Davila  Primary Physician:  Sydney Rubin DO  Referring Physician:  Julienne Liu MD  Group:  [facility]  Interpreting Physician:  Bakari Walter MD    SUMMARY    LEFT VENTRICLE:  Size was normal   Systolic function was at the lower limits of normal   There was moderate hypokinesis of the basal to mid inferior and inferolateral walls  Wall thickness was normal   Doppler parameters were consistent with abnormal left ventricular relaxation (grade 1 diastolic dysfunction)  RIGHT VENTRICLE:  The size was normal   Systolic function was normal     MITRAL VALVE:  There was trace regurgitation  AORTIC VALVE:  There was trace regurgitation      HISTORY: PRIOR HISTORY: Cardiomyopathy, acute heart failure  Breast cancer, chemotherapy, L breast reconstruction with implant, HTN, CKD1, uncontrolled DM2, current smoker  PROCEDURE: The procedure was performed at the bedside  This was a routine study  The transthoracic approach was used  The study included complete 2D imaging, M-mode, complete spectral Doppler, and color Doppler  The heart rate was 108 bpm,  at the start of the study  Images were obtained from the parasternal, apical, subcostal, and suprasternal notch acoustic windows  Echocardiographic views were limited due to poor acoustic window availability  Image quality was good  LEFT VENTRICLE: Size was normal  Systolic function was at the lower limits of normal  There was moderate hypokinesis of the basal to mid inferior and inferolateral walls  Wall thickness was normal  DOPPLER: Doppler parameters were  consistent with abnormal left ventricular relaxation (grade 1 diastolic dysfunction)  RIGHT VENTRICLE: The size was normal  Systolic function was normal  Wall thickness was normal     LEFT ATRIUM: Size was normal     RIGHT ATRIUM: Size was normal     MITRAL VALVE: Valve structure was normal  There was normal leaflet separation  DOPPLER: The transmitral velocity was within the normal range  There was no evidence for stenosis  There was trace regurgitation  AORTIC VALVE: The valve was trileaflet  Leaflets exhibited normal thickness and normal cuspal separation  DOPPLER: Transaortic velocity was within the normal range  There was no evidence for stenosis  There was trace regurgitation  TRICUSPID VALVE: The valve structure was normal  There was normal leaflet separation  DOPPLER: The transtricuspid velocity was within the normal range  There was no evidence for stenosis  There was no regurgitation  PULMONIC VALVE: Leaflets exhibited normal thickness, no calcification, and normal cuspal separation  DOPPLER: The transpulmonic velocity was within the normal range  There was no regurgitation  PERICARDIUM: There was no pericardial effusion  The pericardium was normal in appearance  AORTA: The root exhibited normal size  SYSTEMIC VEINS: IVC: The inferior vena cava was normal in size and course  Respirophasic changes were normal     SYSTEM MEASUREMENT TABLES    2D  %FS: 27 06 %  Ao Diam: 3 24 cm  EDV(Teich): 117 95 ml  EF(Teich): 52 52 %  ESV(Teich): 56 ml  IVSd: 0 82 cm  LA Diam: 3 91 cm  LVEDV MOD A4C: 90 2 ml  LVEF MOD A4C: 40 17 %  LVESV MOD A4C: 53 96 ml  LVIDd: 4 99 cm  LVIDs: 3 64 cm  LVLd A4C: 7 83 cm  LVLs A4C: 6 73 cm  LVPWd: 0 83 cm  SV MOD A4C: 36 24 ml  SV(Teich): 61 95 ml    CW  AV Env  Ti: 209 32 ms  AV MaxP 51 mmHg  AV VTI: 21 7 cm  AV Vmax: 1 37 m/s  AV Vmean: 1 04 m/s  AV meanP 51 mmHg  TR MaxP 42 mmHg  TR Vmax: 2 26 m/s    PW  E' Sept: 0 06 m/s  E/E' Sept: 16 75  LVOT Env  Ti: 239 77 ms  LVOT VTI: 12 62 cm  LVOT Vmax: 0 8 m/s  LVOT Vmean: 0 53 m/s  LVOT maxP 57 mmHg  LVOT meanP 29 mmHg  MV A Conrad: 0 9 m/s  MV Dec [place]: 5 25 m/s2  MV DecT: 183 ms  MV E Conrad: 0 96 m/s  MV E/A Ratio: 1 07  MV PHT: 53 07 ms  MVA By PHT: 4 15 cm2    [facility] [place] [facility] Accredited Echocardiography Laboratory    Prepared and electronically signed by    Laz Rivas MD  Signed [id number] 15:35:29    No results found for this or any previous visit  No results found for this or any previous visit  No results found for this or any previous visit        Meds/Allergies   all current active meds have been reviewed  Medications Prior to Admission   Medication   • albuterol (Ventolin HFA) 90 mcg/act inhaler   • apixaban (Eliquis) 5 mg   • Aspirin Low Dose 81 MG EC tablet   • atorvastatin (LIPITOR) 40 mg tablet   • Blood Glucose Monitoring Suppl (OneTouch Verio Reflect) w/Device KIT   • cholecalciferol (VITAMIN D3) 1,000 units tablet   • Empagliflozin (Jardiance) 10 MG TABS   • Flovent  MCG/ACT inhaler   • furosemide (LASIX) 40 mg tablet   • glucose blood (OneTouch Verio) test strip   • insulin glargine (Lantus) 100 units/mL subcutaneous injection   • Insulin Pen Needle (BD Pen Needle Kateryna U/F) 32G X 4 MM MISC   • metFORMIN (GLUCOPHAGE) 1000 MG tablet   • metoprolol succinate (TOPROL-XL) 50 mg 24 hr tablet   • nicotine (NICODERM CQ) 14 mg/24hr TD 24 hr patch   • nicotine (NICODERM CQ) 14 mg/24hr TD 24 hr patch   • nicotine (NICODERM CQ) 7 mg/24hr TD 24 hr patch   • nystatin-triamcinolone (MYCOLOG-II) ointment   • OneTouch Delica Lancets 29T MISC   • sacubitril-valsartan (Entresto) 49-51 MG TABS          Assessment:  Principal Problem:    Acute combined systolic and diastolic congestive heart failure (HCC)  Active Problems:    Hyperlipidemia LDL goal <100    Type 2 diabetes mellitus with stage 3a chronic kidney disease, with long-term current use of insulin (HCC)    Obstructive sleep apnea syndrome    CKD stage 3 due to type 2 diabetes mellitus ([facility] [address] )    Coronary artery disease involving native coronary artery of native heart without angina pectoris    Primary hypertension    Tobacco use    PAF (paroxysmal atrial fibrillation) (HCC)    Pleural effusion, bilateral    Acute respiratory failure (HCC)            Counseling / Coordination of Care  Total floor / unit time spent today 25 minutes  Greater than 50% of total time was spent with the patient and / or family counseling and / or coordination of care  A description of the counseling / coordination of care: Cassi Villarreal

## 2023-04-30 NOTE — ASSESSMENT & PLAN NOTE
· Patient reports not using CPAP at home for many years  · Unclear reason why patient stopped  · Ambulatory referral to Sleep Medicine for Sleep Study as outpatient

## 2023-04-30 NOTE — ASSESSMENT & PLAN NOTE
Wt Readings from Last 3 Encounters:   04/28/23 45 9 kg (101 lb 4 8 oz)   03/01/23 59 7 kg (131 lb 9 6 oz)   01/10/23 58 8 kg (129 lb 9 6 oz)     Last ECHO 1/2022 demonstrated EF 25-30% with severely decreased systolic dysfunction and there is global hypokinesis with regional variation  Diastolic function is abnormal    Echo- EF 35%, mild global hypokinesis, diastolic dysfunction, AR, TR, right ventricular pressure elevated, trivial pericardial effusion, left pleural effusion    Plan-  Daily weights  Cardiac Diet with fluid restriction and Na 2gm diet  Continue Potassium supplementation 40 BID   Appreciate Cardiology recommendations- Lasix 40mg QD, Continue metoprolol/Jardiance, Continue Entresto to  BID, spironolactone for goal-directed medical therapy  Continue aspirin and statin      Continue full anticoagulation with Eliquis  Follow up with Cardiology/Heart Failure as outpatient

## 2023-05-01 ENCOUNTER — PATIENT OUTREACH (OUTPATIENT)
Dept: FAMILY MEDICINE CLINIC | Facility: CLINIC | Age: 68
End: 2023-05-01

## 2023-05-01 ENCOUNTER — TRANSITIONAL CARE MANAGEMENT (OUTPATIENT)
Dept: FAMILY MEDICINE CLINIC | Facility: CLINIC | Age: 68
End: 2023-05-01

## 2023-05-01 ENCOUNTER — TELEPHONE (OUTPATIENT)
Dept: CARDIOLOGY CLINIC | Facility: CLINIC | Age: 68
End: 2023-05-01

## 2023-05-01 DIAGNOSIS — Z71.89 COMPLEX CARE COORDINATION: Primary | ICD-10-CM

## 2023-05-01 LAB
DME PARACHUTE DELIVERY DATE ACTUAL: NORMAL
DME PARACHUTE DELIVERY DATE REQUESTED: NORMAL
DME PARACHUTE ITEM DESCRIPTION: NORMAL
DME PARACHUTE ORDER STATUS: NORMAL
DME PARACHUTE SUPPLIER NAME: NORMAL
DME PARACHUTE SUPPLIER PHONE: NORMAL

## 2023-05-01 RX ORDER — INSULIN GLARGINE 100 [IU]/ML
INJECTION, SOLUTION SUBCUTANEOUS
Qty: 3 ML | Refills: 0 | Status: SHIPPED | OUTPATIENT
Start: 2023-05-01 | End: 2023-05-03

## 2023-05-01 NOTE — PROGRESS NOTES
HRR referral received and chart review completed  Pt with hospitalization for CHF exacerbation 4/26-4/30 and discharged home with home health care  Pt called for interest in care management  No answer, and VM box full  Unable to leave message  Lluvia Freedman RN CM added to care team for follow up

## 2023-05-02 DIAGNOSIS — N18.2 TYPE 2 DIABETES MELLITUS WITH STAGE 2 CHRONIC KIDNEY DISEASE, WITHOUT LONG-TERM CURRENT USE OF INSULIN (HCC): ICD-10-CM

## 2023-05-02 DIAGNOSIS — E11.22 TYPE 2 DIABETES MELLITUS WITH STAGE 2 CHRONIC KIDNEY DISEASE, WITHOUT LONG-TERM CURRENT USE OF INSULIN (HCC): ICD-10-CM

## 2023-05-02 NOTE — ED ATTENDING ATTESTATION
4/26/2023  Zeina Jones DO, saw and evaluated the patient  I have discussed the patient with the resident/non-physician practitioner and agree with the resident's/non-physician practitioner's findings, Plan of Care, and MDM as documented in the resident's/non-physician practitioner's note, except where noted  All available labs and Radiology studies were reviewed  I was present for key portions of any procedure(s) performed by the resident/non-physician practitioner and I was immediately available to provide assistance  At this point I agree with the current assessment done in the Emergency Department    I have conducted an independent evaluation of this patient a history and physical is as follows:    ED Course         Critical Care Time  Procedures

## 2023-05-03 ENCOUNTER — DOCUMENTATION (OUTPATIENT)
Dept: OTHER | Facility: HOSPITAL | Age: 68
End: 2023-05-03

## 2023-05-03 ENCOUNTER — PATIENT OUTREACH (OUTPATIENT)
Dept: FAMILY MEDICINE CLINIC | Facility: CLINIC | Age: 68
End: 2023-05-03

## 2023-05-03 DIAGNOSIS — N18.2 TYPE 2 DIABETES MELLITUS WITH STAGE 2 CHRONIC KIDNEY DISEASE, WITHOUT LONG-TERM CURRENT USE OF INSULIN (HCC): ICD-10-CM

## 2023-05-03 DIAGNOSIS — E11.22 TYPE 2 DIABETES MELLITUS WITH STAGE 2 CHRONIC KIDNEY DISEASE, WITHOUT LONG-TERM CURRENT USE OF INSULIN (HCC): Primary | ICD-10-CM

## 2023-05-03 DIAGNOSIS — E11.22 TYPE 2 DIABETES MELLITUS WITH STAGE 2 CHRONIC KIDNEY DISEASE, WITHOUT LONG-TERM CURRENT USE OF INSULIN (HCC): ICD-10-CM

## 2023-05-03 DIAGNOSIS — N18.2 TYPE 2 DIABETES MELLITUS WITH STAGE 2 CHRONIC KIDNEY DISEASE, WITHOUT LONG-TERM CURRENT USE OF INSULIN (HCC): Primary | ICD-10-CM

## 2023-05-03 RX ORDER — INSULIN GLARGINE 100 [IU]/ML
10 INJECTION, SOLUTION SUBCUTANEOUS
Qty: 3 ML | Refills: 0 | Status: SHIPPED | OUTPATIENT
Start: 2023-05-03

## 2023-05-03 RX ORDER — BLOOD SUGAR DIAGNOSTIC
STRIP MISCELLANEOUS
Qty: 300 EACH | Refills: 0 | Status: SHIPPED | OUTPATIENT
Start: 2023-05-03 | End: 2023-05-05 | Stop reason: SDUPTHER

## 2023-05-03 RX ORDER — LANCETS 33 GAUGE
EACH MISCELLANEOUS
Qty: 300 EACH | Refills: 0 | Status: SHIPPED | OUTPATIENT
Start: 2023-05-03 | End: 2023-05-05 | Stop reason: SDUPTHER

## 2023-05-03 RX ORDER — BLOOD-GLUCOSE METER
KIT MISCELLANEOUS
Qty: 1 KIT | Refills: 0
Start: 2023-05-03 | End: 2023-05-05 | Stop reason: SDUPTHER

## 2023-05-03 RX ORDER — INSULIN GLARGINE 100 [IU]/ML
10 INJECTION, SOLUTION SUBCUTANEOUS
Qty: 10 ML | Refills: 0 | Status: SHIPPED | OUTPATIENT
Start: 2023-05-03 | End: 2023-05-03

## 2023-05-03 RX ORDER — PEN NEEDLE, DIABETIC 32GX 5/32"
NEEDLE, DISPOSABLE MISCELLANEOUS
Qty: 100 EACH | Refills: 0 | Status: SHIPPED | OUTPATIENT
Start: 2023-05-03

## 2023-05-03 NOTE — LETTER
Date: 05/03/23    Dear Kimmie Cabral,   My name is Taran Garcia; I am a registered nurse care manager working with [facility]  I have not been able to reach you and would like to set a time that I can talk with you over the phone  My work is to outreach patients that have complex medical conditions and help manage their health and get the care they need  This includes patients who may have been in the hospital or emergency room  My contact information is below  Please call me with any questions you may have  I look forward to speaking with you    Sincerely,  Taran Garcia  [phone]  Outpatient Care Manager

## 2023-05-03 NOTE — PROGRESS NOTES
2nd attempt to call and reach pt for care management  Voicemail box continues to read full, and unable to leave message  Will send letter with contact information in attempt for call back   Casa Riley RN CM on care team

## 2023-05-04 PROBLEM — IMO0001 SMOKING: Status: RESOLVED | Noted: 2021-04-20 | Resolved: 2023-05-04

## 2023-05-04 PROBLEM — M54.9 BACK PAIN: Status: RESOLVED | Noted: 2021-04-21 | Resolved: 2023-05-04

## 2023-05-04 PROBLEM — N18.32 STAGE 3B CHRONIC KIDNEY DISEASE (CKD) (HCC): Status: ACTIVE | Noted: 2023-05-04

## 2023-05-04 PROBLEM — F17.200 SMOKING: Status: RESOLVED | Noted: 2021-04-20 | Resolved: 2023-05-04

## 2023-05-05 ENCOUNTER — OFFICE VISIT (OUTPATIENT)
Dept: FAMILY MEDICINE CLINIC | Facility: CLINIC | Age: 68
End: 2023-05-05

## 2023-05-05 VITALS
SYSTOLIC BLOOD PRESSURE: 110 MMHG | RESPIRATION RATE: 18 BRPM | WEIGHT: 93 LBS | HEART RATE: 84 BPM | BODY MASS INDEX: 16.74 KG/M2 | DIASTOLIC BLOOD PRESSURE: 70 MMHG | TEMPERATURE: 96.4 F

## 2023-05-05 DIAGNOSIS — E11.22 TYPE 2 DIABETES MELLITUS WITH STAGE 2 CHRONIC KIDNEY DISEASE, WITHOUT LONG-TERM CURRENT USE OF INSULIN (HCC): ICD-10-CM

## 2023-05-05 DIAGNOSIS — Z12.31 BREAST CANCER SCREENING BY MAMMOGRAM: ICD-10-CM

## 2023-05-05 DIAGNOSIS — I50.1 HEART FAILURE, LEFT, WITH LVEF 31-40% (HCC): ICD-10-CM

## 2023-05-05 DIAGNOSIS — I50.42 CHRONIC COMBINED SYSTOLIC AND DIASTOLIC CONGESTIVE HEART FAILURE (HCC): ICD-10-CM

## 2023-05-05 DIAGNOSIS — N18.30 CKD STAGE 3 DUE TO TYPE 2 DIABETES MELLITUS (HCC): ICD-10-CM

## 2023-05-05 DIAGNOSIS — E11.22 TYPE 2 DIABETES MELLITUS WITH STAGE 3B CHRONIC KIDNEY DISEASE, WITH LONG-TERM CURRENT USE OF INSULIN (HCC): Primary | ICD-10-CM

## 2023-05-05 DIAGNOSIS — N18.32 TYPE 2 DIABETES MELLITUS WITH STAGE 3B CHRONIC KIDNEY DISEASE, WITH LONG-TERM CURRENT USE OF INSULIN (HCC): Primary | ICD-10-CM

## 2023-05-05 DIAGNOSIS — E21.3 HYPERPARATHYROIDISM (HCC): ICD-10-CM

## 2023-05-05 DIAGNOSIS — I10 PRIMARY HYPERTENSION: ICD-10-CM

## 2023-05-05 DIAGNOSIS — E11.22 CKD STAGE 3 DUE TO TYPE 2 DIABETES MELLITUS (HCC): ICD-10-CM

## 2023-05-05 DIAGNOSIS — C50.919 MALIGNANT NEOPLASM OF FEMALE BREAST, UNSPECIFIED ESTROGEN RECEPTOR STATUS, UNSPECIFIED LATERALITY, UNSPECIFIED SITE OF BREAST (HCC): ICD-10-CM

## 2023-05-05 DIAGNOSIS — N18.2 TYPE 2 DIABETES MELLITUS WITH STAGE 2 CHRONIC KIDNEY DISEASE, WITHOUT LONG-TERM CURRENT USE OF INSULIN (HCC): ICD-10-CM

## 2023-05-05 DIAGNOSIS — E44.0 MODERATE PROTEIN-CALORIE MALNUTRITION (HCC): ICD-10-CM

## 2023-05-05 DIAGNOSIS — I50.41 ACUTE COMBINED SYSTOLIC AND DIASTOLIC CONGESTIVE HEART FAILURE (HCC): ICD-10-CM

## 2023-05-05 DIAGNOSIS — Z79.4 TYPE 2 DIABETES MELLITUS WITH STAGE 3B CHRONIC KIDNEY DISEASE, WITH LONG-TERM CURRENT USE OF INSULIN (HCC): Primary | ICD-10-CM

## 2023-05-05 DIAGNOSIS — N18.32 STAGE 3B CHRONIC KIDNEY DISEASE (CKD) (HCC): ICD-10-CM

## 2023-05-05 RX ORDER — BLOOD SUGAR DIAGNOSTIC
STRIP MISCELLANEOUS
Qty: 300 EACH | Refills: 1 | Status: SHIPPED | OUTPATIENT
Start: 2023-05-05

## 2023-05-05 RX ORDER — LANCETS 33 GAUGE
EACH MISCELLANEOUS
Qty: 300 EACH | Refills: 1 | Status: SHIPPED | OUTPATIENT
Start: 2023-05-05

## 2023-05-05 RX ORDER — BLOOD-GLUCOSE METER
KIT MISCELLANEOUS
Qty: 1 KIT | Refills: 0 | Status: SHIPPED | OUTPATIENT
Start: 2023-05-05

## 2023-05-05 NOTE — PATIENT INSTRUCTIONS
Please contact your endocrinologist ASAP to be seen as soon as possible or if they have any earlier cancellations  Your diabetes is poorly controlled and can lead to significant complications as we discussed in the office today  Please weigh yourself daily and watch for any sudden changes, such as gaining 3 in a day or 5 pounds in a week, as this may be an early indicator of fluid retention and congestive heart failure      You should record your sugar readings for review by your endocrinologist

## 2023-05-05 NOTE — PROGRESS NOTES
Assessment/Plan:    No problem-specific Assessment & Plan notes found for this encounter  CHF  Continue meds  Watch diet  Advised daily wts and report significant changes to cardiologist in case readmission can be prevented    tob avoidance advsied    ckd 3b with gfr 45 from47  Advised of change  Likely diuretic related  She must keep cardiology appt, continue diuretics for now, may need permissive CKD to maintain fluid balance for CHF    Wt loss, malnutrition  Advised it is multifactorial but likely related to her very poorly controlled DM2 with A1c 12  Has been to diabetic education and had diet education sheets from the hospitalization  CLEARLY REITERATED TO PT Jame 124, VERBALLY AND ON AVS, THAT SHE MUST [facility] ENDOCRINOLOGIST TO AVOID COMPLICATIONS OF DM, NOT LIMITED TO CVA/MI/AMPUTATION/ESRD/INFECTION/SEPSIS/DEATH  She acknowledges this  Aware a short term rx of lantus was sent to pharmacy and she should at least check fbs to adjust lantus appropriately to control FBS  mammo f/u advised due to hx of breast CA     Diagnoses and all orders for this visit:    Type 2 diabetes mellitus with stage 3b chronic kidney disease, with long-term current use of insulin ([facility] )    CKD stage 3 due to type 2 diabetes mellitus (HCC)    Stage 3b chronic kidney disease (CKD) ([facility] )    Type 2 diabetes mellitus with stage 2 chronic kidney disease, without long-term current use of insulin (HCC)  -     glucose blood (OneTouch Verio) test strip; Check blood sugars three times daily  Please substitute with appropriate alternative as covered by patient's insurance  Dx: E11 65  -     OneTouch Delica Lancets 15C MISC; Check blood sugars three times daily  Please substitute with appropriate alternative as covered by patient's insurance  Dx: E11 65  -     Blood Glucose Monitoring Suppl (OneTouch Verio Reflect) w/Device KIT; Check blood sugars three times daily   Please substitute with appropriate alternative as covered by patient's insurance  Dx: E11 65    Moderate protein-calorie malnutrition ([facility] )    Hyperparathyroidism ([facility] )    Malignant neoplasm of female breast, unspecified estrogen receptor status, unspecified laterality, unspecified site of breast ([facility] )    Acute combined systolic and diastolic congestive heart failure (HCC)    Chronic combined systolic and diastolic congestive heart failure (HCC)    Heart failure, left, with LVEF 31-40% ([facility] )    Primary hypertension    Breast cancer screening by mammogram        Return in about 6 months (around 11/5/2023) for Recheck  Subjective:      Patient ID: Dyana Mortimer is a 69 y o  female  Chief Complaint   Patient presents with   [address]               sas/cma       HPI  Has not been on insulin per pt for 6-7m, she never made/kept appt as previously advised  Has not been to see endocrinologist as repeatedly advised  Less salt/sugar since dc  Not watching carbs  Breathing ok  Creat 1 41 noted which is slightly higher than past readings    TCM Call     Date and time call was made  5/1/2023  1:54 PM    Hospital care reviewed  Records reviewed    Patient was hospitialized at  [address] [place] [address]    Date of Admission  04/26/23    Date of discharge  04/30/23    Diagnosis  Acute combined systolic and diastolic congestive heart failure    Disposition  Home; Home health services    Were the patients medications reviewed and updated  Yes    Current Symptoms  None      TCM Call     Post hospital issues  None    Should patient be enrolled in anticoag monitoring? No    Scheduled for follow up?   Yes  She came in for an appt on 5/5/23 but she never returned my calls    Patients specialists  Cardiologist    Cardiologist name  Ronal Joshi (Cardiology) on 12/2/2020    Did you obtain your prescribed medications  Yes    Do you need help managing your prescriptions or medications  No    Is transportation to your appointment needed  No    I have advised the patient to call PCP with any new or worsening symptoms  Devan Cortez    The type of support provided  Physical; Emotional    Do you have social support  Yes, as much as I need    Are you recieving any outpatient services  No    Are you recieving home care services  No    Have you fallen in the last 12 months  No    Interperter language line needed  No    Counseling  Patient    Counseling topics  Activities of daily living; Importance of RX compliance; patient and family education; instructions for management; Risk factor reduction    Comments  I spoke with Magda Cisneros who states that she is feeling well  No c/o at this time  She will call with any questions regarding her medications and injection of her insulin  She is new to Insulin  She will keep a blood sugar diary and bring to her appt next week  She knows to go to the ER if any chest pain, dyspnea, weakness, etc Katya Soliz LPN            The following portions of the patient's history were reviewed and updated as appropriate: allergies, current medications, past family history, past medical history, past social history, past surgical history and problem list     Review of Systems   Constitutional: Negative for fever  Respiratory: Negative for shortness of breath            Current Outpatient Medications   Medication Sig Dispense Refill    albuterol (Ventolin HFA) 90 mcg/act inhaler Inhale 2 puffs every 6 (six) hours as needed for wheezing (rinse mouth after use) 18 g 1    apixaban (Eliquis) 5 mg Take 1 tablet (5 mg total) by mouth 2 (two) times a day 60 tablet 3    Aspirin Low Dose 81 MG EC tablet TAKE 1 TABLET BY MOUTH EVERY DAY 90 tablet 3    atorvastatin (LIPITOR) 40 mg tablet Take 1 tablet (40 mg total) by mouth daily 90 tablet 1    benzonatate (TESSALON PERLES) 100 mg capsule Take 1 capsule (100 mg total) by mouth 3 (three) times a day as needed for cough 20 capsule 0    Blood Glucose Monitoring Suppl (OneTouch Verio Reflect) w/Device KIT Check blood sugars three times daily  Please substitute with appropriate alternative as covered by patient's insurance  Dx: E11 65 1 kit 0    cholecalciferol (VITAMIN D3) 1,000 units tablet Take 2 tablets (2,000 Units total) by mouth daily 10 tablet 0    Empagliflozin (Jardiance) 10 MG TABS Take 1 tablet (10 mg total) by mouth every morning 90 tablet 1    Flovent  MCG/ACT inhaler       furosemide (LASIX) 40 mg tablet Take 1 tablet (40 mg total) by mouth daily Do not start before May 1, 2023  30 tablet 0    glucose blood (OneTouch Verio) test strip Check blood sugars three times daily  Please substitute with appropriate alternative as covered by patient's insurance  Dx: E11 65 300 each 1    Insulin Pen Needle (BD Pen Needle Kateryna U/F) 32G X 4 MM MISC Use daily as directed with insulin pen 100 each 0    metFORMIN (GLUCOPHAGE) 1000 MG tablet Take 1 tablet (1,000 mg total) by mouth 2 (two) times a day with meals 180 tablet 1    metoprolol succinate (TOPROL-XL) 50 mg 24 hr tablet Take 1 tablet (50 mg total) by mouth 2 (two) times a day 60 tablet 0    nystatin-triamcinolone (MYCOLOG-II) ointment Apply topically 2 (two) times a day To vaginal area 60 g 1    OneTouch Delica Lancets 15T MISC Check blood sugars three times daily  Please substitute with appropriate alternative as covered by patient's insurance   Dx: E11 65 300 each 1    potassium chloride (K-DUR,KLOR-CON) 20 mEq tablet Take 2 tablets (40 mEq total) by mouth daily Do not start before May 1, 2023  60 tablet 0    sacubitril-valsartan (ENTRESTO)  MG TABS Take 1 tablet by mouth 2 (two) times a day 60 tablet 0    spironolactone (ALDACTONE) 25 mg tablet Take 1 tablet (25 mg total) by mouth daily Do not start before May 1, 2023  30 tablet 0    Insulin Glargine Solostar (Lantus SoloStar) 100 UNIT/ML SOPN Inject 0 1 mL (10 Units total) under the skin daily at bedtime (Patient not taking: Reported on 5/5/2023) 3 mL 0    nicotine (NICODERM CQ) 14 mg/24hr TD 24 hr patch Place 1 patch on the skin every 24 hours (Patient not taking: Reported on 5/5/2023) 28 patch 3     No current facility-administered medications for this visit  Objective:    /70   Pulse 84   Temp (!) 96 4 °F (35 8 °C)   Resp 18   Wt 42 2 kg (93 lb)   LMP  (LMP Unknown)   BMI 16 74 kg/m²        Physical Exam  Vitals and nursing note reviewed  Constitutional:       Appearance: She is well-developed  She is not ill-appearing  HENT:      Head: Normocephalic  Right Ear: Tympanic membrane normal       Left Ear: Tympanic membrane normal    Eyes:      General: No scleral icterus  Conjunctiva/sclera: Conjunctivae normal    Cardiovascular:      Rate and Rhythm: Normal rate and regular rhythm  Pulmonary:      Effort: Pulmonary effort is normal  No respiratory distress  Breath sounds: No rales  Abdominal:      Palpations: Abdomen is soft  Musculoskeletal:         General: No deformity  Cervical back: Neck supple  Right lower leg: No edema  Left lower leg: No edema  Skin:     General: Skin is warm and dry  Coloration: Skin is not pale  Neurological:      Mental Status: She is alert  Psychiatric:         Mood and Affect: Mood normal          Behavior: Behavior normal          Thought Content:  Thought content normal                 Trinity Lanza DO

## 2023-05-08 ENCOUNTER — TELEPHONE (OUTPATIENT)
Dept: FAMILY MEDICINE CLINIC | Facility: CLINIC | Age: 68
End: 2023-05-08

## 2023-05-08 NOTE — TELEPHONE ENCOUNTER
I called and spoke to the patient as well as her daughter Kandis  Patient says that when she was discharged from her recent hospital stay, she was offered in patient rehab but had declined services at that time  Carol Ann Castro says she feels like she may benefit from these services and wants to know how to proceed  Please advise and assist if you can  Best contact number is her daughter Kandis at [phone]  Thank you       Niyah Kim MA

## 2023-05-08 NOTE — TELEPHONE ENCOUNTER
Kandis would like Dr Rosie Lovett call her about pt  , she was recently discharged and is going to need nursing care and was told Dr Rosie Lovett could take care of that, please call her back to discuss

## 2023-05-09 ENCOUNTER — IN-CLINIC DEVICE VISIT (OUTPATIENT)
Dept: CARDIOLOGY CLINIC | Facility: CLINIC | Age: 68
End: 2023-05-09

## 2023-05-09 ENCOUNTER — TELEPHONE (OUTPATIENT)
Dept: FAMILY MEDICINE CLINIC | Facility: CLINIC | Age: 68
End: 2023-05-09

## 2023-05-09 DIAGNOSIS — Z95.810 PRESENCE OF AUTOMATIC CARDIOVERTER/DEFIBRILLATOR (AICD): Primary | ICD-10-CM

## 2023-05-09 NOTE — TELEPHONE ENCOUNTER
DR Shivam Alcantar    Patient called her [facility]    And told her she is not doing well since being home from the hospital   She said her sugars are running in the 290s  Please call patient back

## 2023-05-09 NOTE — PROGRESS NOTES
Results for orders placed or performed in visit on 05/09/23   Cardiac EP device report    Narrative    MDT DUAL ICD/ ACTIVE SYSTEM IS MRI CONDITIONAL  DEVICE INTERROGATED IN THE [place] OFFICE: BATTERY VOLTAGE ADEQUATE (10 2 YRS)  AP 3 6%  <0 1% (AAI-DDD 60PPM); ALL LEAD PARAMETERS WITHIN NORMAL LIMITS/STABLE; INTERMITTENT T-WAVE OVERSENING NOTE ON RV CHANNEL TELEMETRY & WITH TESTING; R WAVES MEASURED 19 1MV (BIPOLAR); 13 5MV (TIP-COIL); NO SIGNIFICANT HIGH RATE EPISODES  INCREASE TO PVC COUNTER 194 4 SINGLE/HR; 2 9 RUNS (2-4 BEATS)/HR; EF 35% (4/26/23 ECHO); PATIENT TAKES ELIQUIS, METOPROLOL SUCC; WAVELET TEMPLATE UPDATED; RV SENSING CHANGED TO TIP-COIL CONFIGURATION, 0 45MV WITH RESOLUTION OF T-WAVE OVERSENSING; OPTI-VOL WITHIN NORMAL LIMITS  APPROPRIATELY FUNCTIONING ICD    ES

## 2023-05-10 ENCOUNTER — HOME HEALTH ADMISSION (OUTPATIENT)
Dept: HOME HEALTH SERVICES | Facility: HOME HEALTHCARE | Age: 68
End: 2023-05-10
Payer: COMMERCIAL

## 2023-05-10 DIAGNOSIS — I48.0 PAF (PAROXYSMAL ATRIAL FIBRILLATION) (HCC): ICD-10-CM

## 2023-05-10 DIAGNOSIS — Z79.4 TYPE 2 DIABETES MELLITUS WITH STAGE 3B CHRONIC KIDNEY DISEASE, WITH LONG-TERM CURRENT USE OF INSULIN (HCC): ICD-10-CM

## 2023-05-10 DIAGNOSIS — I50.32 CHRONIC DIASTOLIC CONGESTIVE HEART FAILURE (HCC): Primary | ICD-10-CM

## 2023-05-10 DIAGNOSIS — Z72.0 TOBACCO USE: ICD-10-CM

## 2023-05-10 DIAGNOSIS — I25.10 CORONARY ARTERY DISEASE INVOLVING NATIVE CORONARY ARTERY OF NATIVE HEART WITHOUT ANGINA PECTORIS: ICD-10-CM

## 2023-05-10 DIAGNOSIS — J90 PLEURAL EFFUSION, BILATERAL: ICD-10-CM

## 2023-05-10 DIAGNOSIS — N18.32 TYPE 2 DIABETES MELLITUS WITH STAGE 3B CHRONIC KIDNEY DISEASE, WITH LONG-TERM CURRENT USE OF INSULIN (HCC): ICD-10-CM

## 2023-05-10 DIAGNOSIS — E11.22 TYPE 2 DIABETES MELLITUS WITH STAGE 3B CHRONIC KIDNEY DISEASE, WITH LONG-TERM CURRENT USE OF INSULIN (HCC): ICD-10-CM

## 2023-05-10 DIAGNOSIS — I50.1 HEART FAILURE, LEFT, WITH LVEF 31-40% (HCC): ICD-10-CM

## 2023-05-10 DIAGNOSIS — I50.41 ACUTE COMBINED SYSTOLIC AND DIASTOLIC CONGESTIVE HEART FAILURE (HCC): ICD-10-CM

## 2023-05-10 NOTE — TELEPHONE ENCOUNTER
Has she contacted her endocrinologist??? She was told this multiple times, with family in room and in person, documented in chart and in after visit summary

## 2023-05-10 NOTE — TELEPHONE ENCOUNTER
Informed patient she should be contacting her Endocrinologist regarding this issue  She said she will call them    Brinda Hamilton CMA

## 2023-05-11 ENCOUNTER — PATIENT OUTREACH (OUTPATIENT)
Dept: FAMILY MEDICINE CLINIC | Facility: CLINIC | Age: 68
End: 2023-05-11

## 2023-05-11 ENCOUNTER — HOME CARE VISIT (OUTPATIENT)
Dept: HOME HEALTH SERVICES | Facility: HOME HEALTHCARE | Age: 68
End: 2023-05-11

## 2023-05-11 VITALS
DIASTOLIC BLOOD PRESSURE: 70 MMHG | HEIGHT: 63 IN | TEMPERATURE: 97.4 F | RESPIRATION RATE: 18 BRPM | BODY MASS INDEX: 17.36 KG/M2 | HEART RATE: 89 BPM | WEIGHT: 98 LBS | SYSTOLIC BLOOD PRESSURE: 112 MMHG | OXYGEN SATURATION: 98 %

## 2023-05-11 DIAGNOSIS — E11.29 TYPE 2 DIABETES MELLITUS WITH OTHER DIABETIC KIDNEY COMPLICATION, WITHOUT LONG-TERM CURRENT USE OF INSULIN (HCC): ICD-10-CM

## 2023-05-11 DIAGNOSIS — I48.91 ATRIAL FIBRILLATION, UNSPECIFIED TYPE (HCC): ICD-10-CM

## 2023-05-12 ENCOUNTER — TELEPHONE (OUTPATIENT)
Dept: FAMILY MEDICINE CLINIC | Facility: CLINIC | Age: 68
End: 2023-05-12

## 2023-05-12 DIAGNOSIS — I50.41 ACUTE COMBINED SYSTOLIC AND DIASTOLIC CONGESTIVE HEART FAILURE (HCC): Primary | ICD-10-CM

## 2023-05-12 NOTE — CASE COMMUNICATION
[facility] has admitted your patient to [facility] service with the following disciplines:      SN and PT  This report is informational only, no responses is needed  Primary focus of home health care- Cardiac   Patient stated goals of care- Understand how to manage CHF symptoms and DM management  Anticipated visit pattern and next visit date- Next visit 5/15  1w1 2w1 1w4  See medication list - meds in home differ from AVS  Signi ficant clinical findings- Medication discrepancies- Pt currently staying with brother in [facility]  Pt only brought some medications and newly organized pill planner  Spoke with Sandee Coates at Cardiologist office about pt missing Eliquis from medication planner  Sandee Coates states she will be sending over refill until follow with Cardiologist office new Wednesday  Spoke to Rahul at [facility] about pt missing Jardiance  Roberta cheng was able to have MD send over 90 day prescription until following up with Endocrinologist next month  Potential barriers to goal achievement- fatigues easily      Thank you for allowing us to participate in the care of your patient        [facility] Colon RN

## 2023-05-12 NOTE — CASE COMMUNICATION
[facility] and pt is able to managing medication indepedently  New address [address]

## 2023-05-12 NOTE — TELEPHONE ENCOUNTER
Postassium bloodwork per Effie Waggoner [phone] Ex [id number] just wants to know if Dr Jared Renteria can order potassium bw for patient

## 2023-05-12 NOTE — TELEPHONE ENCOUNTER
Please let pt know I ordered potassium levels but I don't know why the  is asking me to do it when it is likely for the diuretics/heart failure medication she is on that she gets FROM HER CARDIOLOGIST WHO SHE SHOULD FOLLOW UP WITH

## 2023-05-15 ENCOUNTER — TELEPHONE (OUTPATIENT)
Dept: FAMILY MEDICINE CLINIC | Facility: CLINIC | Age: 68
End: 2023-05-15

## 2023-05-15 ENCOUNTER — HOME CARE VISIT (OUTPATIENT)
Dept: HOME HEALTH SERVICES | Facility: HOME HEALTHCARE | Age: 68
End: 2023-05-15

## 2023-05-15 DIAGNOSIS — Z79.4 TYPE 2 DIABETES MELLITUS WITH STAGE 3B CHRONIC KIDNEY DISEASE, WITH LONG-TERM CURRENT USE OF INSULIN (HCC): Primary | ICD-10-CM

## 2023-05-15 DIAGNOSIS — E11.22 TYPE 2 DIABETES MELLITUS WITH STAGE 3B CHRONIC KIDNEY DISEASE, WITH LONG-TERM CURRENT USE OF INSULIN (HCC): Primary | ICD-10-CM

## 2023-05-15 DIAGNOSIS — N18.32 TYPE 2 DIABETES MELLITUS WITH STAGE 3B CHRONIC KIDNEY DISEASE, WITH LONG-TERM CURRENT USE OF INSULIN (HCC): Primary | ICD-10-CM

## 2023-05-15 NOTE — TELEPHONE ENCOUNTER
Called again but no answer and mailbox is full  [place] 5mg/d sent to her pharmacy to replace the jardiance 10mg but attached comment to rx that she should contact her endocrinologist for any additional options if needed or future refills of her DM meds

## 2023-05-15 NOTE — TELEPHONE ENCOUNTER
Patient aware and will follow up with cardiology  Jeferson Nash says the jardiance that was prescribed is too expensive  She is wondering if you can prescribe something in the same category and affordable       Karen Or, MA

## 2023-05-16 ENCOUNTER — HOME CARE VISIT (OUTPATIENT)
Dept: HOME HEALTH SERVICES | Facility: HOME HEALTHCARE | Age: 68
End: 2023-05-16

## 2023-05-16 VITALS
HEART RATE: 92 BPM | SYSTOLIC BLOOD PRESSURE: 86 MMHG | RESPIRATION RATE: 24 BRPM | OXYGEN SATURATION: 95 % | DIASTOLIC BLOOD PRESSURE: 70 MMHG | TEMPERATURE: 95.8 F

## 2023-05-16 NOTE — TELEPHONE ENCOUNTER
I sent Jacque Garcia as an alternative to her pharmacy and was unable to leave a message on her voicemail

## 2023-05-16 NOTE — PROGRESS NOTES
"Cardiology  Heart Failure   Acute  Office Visit Note     Wyatt Woodall   69 y o    female   MRN: [id number]  [address]  [address]  [address] [place] [address]  [phone]    PCP: Ruy Rosado DO  Cardiologist : Dr Gregorio Santillan of recommendations  BMP, mag today  STOP LASIX  Shes currently not taking potassium nor taking spironolaconte  \"cant find them\" STOP spironolactone  Smoking Cessation inmperative  counseling provided, x5 minutes  \" I quit since being in the hospital\"  CT lung Ca screen- wt loss, hx breast CA, hx tobacco abuse, decreased BS right base  F/U vasc surgery  She has no showed several times in the past   I reviewed her lower extremity ROSA which was abnormal  F/U endo 6/19  F/U with me 1-2 weeks  Follow up with Dr Linnette Asif 6/7/2023         Impression/plan  Wt loss  Malnourished  BMI 16  Cachexic  History breast CA  Hx smoking  C/O nausea, inability to eat  BMP/ mag today  CT lung CA screen  I also suspect hypokalemia  Chronic HFrEF, EF 35%, recent adm for decompensation 4/26 to 4/30/2023  · Etiology: unclear etiology, nonischemic  Possible due to hypertensive heart disease  Has afib with no documented RVR  · Cause of decompensation: likely due to nonadherence to diuretic  Reports taking other cardiac medications, but not antidiabetic meds  Wt Readings from Last 3 Encounters:   05/17/23 40 8 kg (90 lb)   05/11/23 44 5 kg (98 lb)   05/05/23 42 2 kg (93 lb)   · diuretic:  Lasix 40 mg daily  Prescribed supplemental K 40 meq/d- not taking it \"Cant find it\"  · Beta-Blocker:  Toprol 50 mg twice daily  In the past was on 75 BID  • ACEi, ARB or ARNi:   Entresto 97/103 BID- increased in the hospital  • Aldosterone Receptor Blocker: spironolactone 25 mg/d-not taking it \"cannot find it\"  • [id number] Jardiance not taken due to cost  • ICD:S/p DC ICD implant 3/9/22    Narrow QRS  • Educated regarding adherence to a 1 5g -2 gram " Na diet/ 2L fluid restriction, daily weights and to call us if she gains  3 lbs/ 1 day or 5 lbs/ 1 week  NICM, Ef 35%  S/PMDT DC ICD 3/9/22 ( right sided) MRI conditional  · Interrogation 5/9/23  AP 3 6%  <0 1%  No significant high rates  Optivol within normal limits  PAF rate controlled with metoprolol, anticoagulation Eliquis 5 mg twice daily  Moderate CAD, nonobstructive disease by [address] [place] [address] Anita Sosa  · On aspirin 81, statin, beta-blocker  Claudication-- referred to vascular surgery  She has canceled several times  She is symptomatic  · Lower ROSA 1/2022:   · RIGHT LOWER LIMB:Evidence of >75% stenosis noted in the mid superficial femoral artery withdiffuse disease noted throughout the remaining femoral-popliteal andtibioperoneal arteries without significant focal stenosis  Ankle/Brachial index: 0 86 which is in the mild disease category  · LEFT LOWER LIMB:Evidence of >75% stenosis noted in the proximal popliteal arteryThere is 50-75% stenosis in the distal superficial femoral artery followed by a>75% stenosis in the most distal portion  Evidence of tibioperoneal disease with high grade stenosis vs occlusion of thedistal posterior tibial artery  Ankle/Brachial index: 0 69 which is in the moderate disease category  Hypertension, essential  BP  108/70 On  Entresto, Toprol, diuretics  Hyperlipidemia  on atorvastatin 40 mg daily  1823 calculated LDL 76   Latest Reference Range & Units 10/07/16 08:41 10/25/18 14:06 11/16/20 05:17 03/09/21 09:54 01/18/23 11:20   Cholesterol See Comment mg/dL 190 151 168 144 163   Triglycerides See Comment mg/dL 100 76 82 76 72   HDL >=50 mg/dL 62 63 (H) 47 64 73   Non-HDL Cholesterol mg/dl   121 80    LDL Calculated 0 - 100 mg/dL 108 (H) 73 105 (H) 65 76     Diabetes mellitus type 2, uncontrolled: 4/26/23 HgA1C 12 2, prior 14 9, on metformin follow-up with endocrine  Prescribed Jardiance not taking due to cost  CKD2  Baseline Cr around 1  Obstructive sleep apnea   Had some type of surgery ( UPP? ) in the 80s  Not restested in the last 10 yrs  Tobacco abuse, ongoing  Cessation imperative  Nicotine replacement has been ordered  Breast cancer status post mastectomy and treatment with chemotherapy> 15 years ago  No radiation  · S/P  left sided mastectomy and lymphnode dissection  Cardiac testing  • TTE 11/15/2020  Ef 50%  here was moderate hypokinesis of the basal to mid inferior and inferolateral walls  Wall thickness was normal  Grade 1 DD  RV nl size and function  Trace MR  Trace AI  • cardiac catheterization 11/16/2020  --  Left main: MLI  --  Mid LAD: There was a 50 % stenosis, iFR 0 97 not hemodynamically significant  --  Mid circumflex: There was a 50 % stenosis  iFR 0 96 not hemodynamically significant  --  1st obtuse marginal: There was a 60 % stenosis, iFR 0 98 not hemodynamically significant  --  RCA: MLI  IMPRESSIONS  No signficant obstructive atherosclerotic CAD requiring revasculariztion  Moderate CAD as described  · TTE 1/122022: EF 25-30%  LVIDd: 4 1 cm, normal wall thickness; RV: normal size and systolic function;MR: trace;PASP: 44mmHg, moderate TR;RVOT: no notching; mild AI  Abnormal diastology  · TTE  4/26/23 EF 35%, mild global hypokinesis, diastolic dysfunction, AI, TR, mild MR  PASP 59 mmHg  Trivial pericardial fusion   + Lft pleural effusion                   HPI:   Wynema Nissen a 77y o  year old female  with hypertension, diabetes, hyperlipidemia and tobacco abuse  She has history of breast cancer status post mastectomy and treatment with chemotherapy      Home medication regimen includes amlodipine 10 mg daily, atorvastatin 10 mg daily, metformin 1000 mg BID      Interval history  Adm 11/15-11/17/2020 with orthopnea and PND/ exertional dyspnea  On arrival her blood pressure was markedly elevated 181/110  Her EKG showed possible ectopic atrial tachycardia  Chest x-ray:  Mild interstitial edema  CTA:  No PE    Scattered ground-glass opacities, changes representing pulmonary edema with small bilateral pleural effusion  NT proBNP greater than 3200  An echocardiogram demonstrated normal LV Size /function was at the lower limits of normal  There was moderate hypokinesis of the basal to mid inferior and inferolateral walls   Cardiac catheterization was pursued  This demonstrated nonobstructive CAD  Medical management was recommended  She was placed on beta-blocker and ACE-inhibitor  Previously on amlodipine which was discontinued  Her EDP was 12 at time of catheterization  She did diurese with IV Lasix  She did have a rising creatinine and subsequently was recommended to be discharged without diuretic at this point  She was also found to have hypercalcemia with corrected calcium 11 4, an elevated alkaline phosphatase  PTH was elevated at 359-outpatient Endocrinology follow-up recommended  In addition, her diabetes is poorly controlled with an A1c of 10      12/2/2020  Hospital followup  Intermittent cough  SOB/ wakes her from sleep  No CP  Currently not watching her dietary sodium  No daily weights at home  No scale   One will be provided  Blood pressure 138/80  Few bibasilar crackles, no lower extremity edema  Heart tones distant, regular Her lowest weight hospital was 135,prior to discharge  She is up about 2 lb  Will start Lasix 20 mg daily x3 then 20 mg Monday Wednesday Friday  Her creatinine is 1 4 above her baseline of 1  Based on her current symptoms, I suspect she may have some abigail-vascular congestion  I suspect significant dietary sodium indiscretion  We talked about the importance of adhering to a low-sodium diet  Will provided written education material   Will referred to dietitian which she is amenable to  We discussed her coronary anatomy  Recommend she start a baby aspirin a day  Discussed importance of aggressive risk factor management    She is going to be seeing Endocrinology in the near future regarding her diabetes  Interval history  12/2021; 1/2022; 3/2022, 10/2022 OV Dr Phi Avila  3/1/23 OV Dr Phi Avila  increased SOB with exertion with some weight gain  Maintenance Lasix 40 mg Monday Wednesday Friday  Lasix increased to 40 mg daily  Toprol increased to 75 mg twice daily to help with SVT/A-fib episodes seen on device interrogation      Adm 4/26-4/30/23  Cc:worsening shortness of breath and fatigue x 2 weeks  Also orthopnea, leg edema, dry cough  Admitted non compliant with Lasix- given frequent urination and Lantus  Denied increase in dietary sodium or fld  BP in the ED: 210/135,    Hemoglobin A1c 12  BNP > 4700, chest x-ray: Pleural effusions, vascular congestion  Treated for CHF exacerbation  Followed by Cardiology, Advanced HF  Echo: Ef 35%  Diuresed with IV furosemide 60 bid  Entresto added  DC wt 101 lb  DC Cr 1 41  DC diuretic: lasix 40/d, spironoalcotne 25 mg/d  DC cardiac medications: Toprol 50 BID, Entresto 97/103 BID, Lasix 40 mg daily, spironolactone 25 mg daily, potassium 40 meq daily, Jardiance  Advised follow-up with Cardiology, Endocrinology, sleep medicine      5/17/23  (PMH: chronic HFrEF secondary to NICM, non obstructive CAD,S/P ICD, PAF, CKD3, T2DM,  HTN, HL, NASREEN)  Acute OV  Was not seen in the Cardiology office following admission for decompensated heart failure back in April  Today, she is accompanied by her brother with whom she is residing  He adds to the history  CC: H/A  Dizziness  Nauseated  Cant eat  Dizzy  Short of breath  Claudication  Sleeping on a couch  PUENTE  No PND  No orthopnea  weak  Quit smoking after discharge from the hospital   /70  Wt 90 lb  3/1/2023 was 131 pound  1/10/2023 was 129 pounds  o2 sat 98%  She looks chronically ill, no acute distress  Diminished breath sounds right base  I recommend urgent labs today  She is not taking potassium  She is not taking her lactulose    She is not taking Jardiance due to cost   Given her weight loss, I asked her to stop Lasix  We will also get a CT lung cancer screening  REC; F/u vasc surgery, as recommended in the past       minutes with Patient and family today in which greater than 50% of this time was spent in counseling/coordination of care regarding Diagnostic results, Instructions for management, Patient and family education, Importance of tx compliance, Risk factor reductions, Documenting in the medical record, Reviewing / ordering tests, medicine, procedures   and Obtaining or reviewing history    Assessment  Diagnoses and all orders for this visit:    Chronic HFrEF (heart failure with reduced ejection fraction) ([facility])    NICM (nonischemic cardiomyopathy) ([place] [address] )    Coronary artery disease involving native coronary artery of native heart without angina pectoris    Peripheral arterial disease ([facility])    PAF (paroxysmal atrial fibrillation) ([place] [address] )  -     POCT ECG    Primary hypertension    Hyperlipidemia LDL goal <100    Tobacco use    Obstructive sleep apnea syndrome        Past Medical History:   Diagnosis Date   • Breast cancer ([place] [address] )     left - 1994  • Diabetes mellitus ([place] [address] )    • Diabetes mellitus, type 2 ([place] [address] ) 03/15/2006    last assessed 7/10/13   • GERD (gastroesophageal reflux disease)    • History of chemotherapy    • Hypertension        Review of Systems   Constitutional: Positive for malaise/fatigue and weight loss  Negative for chills  Cardiovascular: Positive for dyspnea on exertion  Negative for chest pain, claudication, cyanosis, irregular heartbeat, leg swelling, near-syncope, orthopnea, palpitations, paroxysmal nocturnal dyspnea and syncope  Claudication   Respiratory: Negative for cough and shortness of breath  Gastrointestinal: Positive for diarrhea and nausea  Negative for heartburn  Neurological: Positive for dizziness and headaches  Negative for focal weakness, light-headedness and weakness  All other systems reviewed and are negative  No Known Allergies        Current Outpatient Medications:   •  apixaban (Eliquis) 5 mg, Take 1 tablet (5 mg total) by mouth 2 (two) times a day, Disp: 60 tablet, Rfl: 3  •  atorvastatin (LIPITOR) 40 mg tablet, Take 1 tablet (40 mg total) by mouth daily, Disp: 90 tablet, Rfl: 1  •  Blood Glucose Monitoring Suppl (OneTouch Verio Reflect) w/Device KIT, Check blood sugars three times daily  Please substitute with appropriate alternative as covered by patient's insurance  Dx: E11 65, Disp: 1 kit, Rfl: 0  •  dapagliflozin 5 MG TABS, Take 1 tablet (5 mg total) by mouth daily, Disp: 90 tablet, Rfl: 0  •  Flovent  MCG/ACT inhaler, Inhale 1 puff 2 (two) times a day PT NOT TAKING , Disp: , Rfl:   •  furosemide (LASIX) 40 mg tablet, Take 1 tablet (40 mg total) by mouth daily Do not start before May 1, 2023 , Disp: 30 tablet, Rfl: 0  •  glucose blood (OneTouch Verio) test strip, Check blood sugars three times daily  Please substitute with appropriate alternative as covered by patient's insurance  Dx: E11 65, Disp: 300 each, Rfl: 1  •  Insulin Glargine Solostar (Lantus SoloStar) 100 UNIT/ML SOPN, Inject 0 1 mL (10 Units total) under the skin daily at bedtime, Disp: 3 mL, Rfl: 0  •  Insulin Pen Needle (BD Pen Needle Kateryna U/F) 32G X 4 MM MISC, Use daily as directed with insulin pen, Disp: 100 each, Rfl: 0  •  metFORMIN (GLUCOPHAGE) 1000 MG tablet, Take 1 tablet (1,000 mg total) by mouth 2 (two) times a day with meals, Disp: 180 tablet, Rfl: 1  •  metoprolol succinate (TOPROL-XL) 50 mg 24 hr tablet, Take 1 tablet (50 mg total) by mouth 2 (two) times a day, Disp: 60 tablet, Rfl: 0  •  nystatin-triamcinolone (MYCOLOG-II) ointment, Apply topically 2 (two) times a day To vaginal area, Disp: 60 g, Rfl: 1  •  OneTouch Delica Lancets 77X MISC, Check blood sugars three times daily  Please substitute with appropriate alternative as covered by patient's insurance   Dx: E11 65, Disp: 300 each, Rfl: 1  •  potassium chloride (K-DUR,KLOR-CON) 20 mEq tablet, Take 2 tablets (40 mEq total) by mouth daily Do not start before May 1, 2023 , Disp: 60 tablet, Rfl: 0  •  sacubitril-valsartan (ENTRESTO)  MG TABS, Take 1 tablet by mouth 2 (two) times a day, Disp: 60 tablet, Rfl: 0  •  spironolactone (ALDACTONE) 25 mg tablet, Take 1 tablet (25 mg total) by mouth daily Do not start before May 1, 2023 , Disp: 30 tablet, Rfl: 0  •  albuterol (Ventolin HFA) 90 mcg/act inhaler, Inhale 2 puffs every 6 (six) hours as needed for wheezing (rinse mouth after use) (Patient not taking: Reported on 2023), Disp: 18 g, Rfl: 1  •  Aspirin Low Dose 81 MG EC tablet, TAKE 1 TABLET BY MOUTH EVERY DAY (Patient not taking: Reported on 2023), Disp: 90 tablet, Rfl: 3  •  benzonatate (TESSALON PERLES) 100 mg capsule, Take 1 capsule (100 mg total) by mouth 3 (three) times a day as needed for cough (Patient not taking: Reported on 2023), Disp: 20 capsule, Rfl: 0  •  cholecalciferol (VITAMIN D3) 1,000 units tablet, Take 2 tablets (2,000 Units total) by mouth daily (Patient not taking: Reported on 2023), Disp: 10 tablet, Rfl: 0  •  nicotine (NICODERM CQ) 14 mg/24hr TD 24 hr patch, Place 1 patch on the skin every 24 hours (Patient not taking: Reported on 2023), Disp: 28 patch, Rfl: 3    Social History     Socioeconomic History   • Marital status: Single     Spouse name: Not on file   • Number of children: Not on file   • Years of education: Not on file   • Highest education level: Not on file   Occupational History   • Not on file   Tobacco Use   • Smoking status: Former     Packs/day: 0 50     Years: 50 00     Pack years: 25 00     Types: Cigarettes     Start date: 6/15/1973     Quit date: 2023     Years since quittin 0   • Smokeless tobacco: Never   • Tobacco comments:     3 cigarettes daily    Vaping Use   • Vaping Use: Never used   Substance and Sexual Activity   • Alcohol use: Not Currently     Comment: Social   • Drug use: No   • Sexual activity: Yes     Partners: Male   Other Topics Concern   • Not on file   Social History Narrative   • Not on file     Social Determinants of Health     Financial Resource Strain: Low Risk    • Difficulty of Paying Living Expenses: Not very hard   Food Insecurity: No Food Insecurity   • Worried About Running Out of Food in the Last Year: Never true   • Ran Out of Food in the Last Year: Never true   Transportation Needs: No Transportation Needs   • Lack of Transportation (Medical): No   • Lack of Transportation (Non-Medical): No   Physical Activity: Not on file   Stress: Not on file   Social Connections: Not on file   Intimate Partner Violence: Not on file   Housing Stability: Low Risk    • Unable to Pay for Housing in the Last Year: No   • Number of Places Lived in the Last Year: 1   • Unstable Housing in the Last Year: No       Family History   Problem Relation Age of Onset   • Hypothyroidism Mother    • Leukemia Mother    • Diabetes Father    • Diabetes type II Father    • Stroke Sister    • Diabetes Paternal Grandmother    • Cancer Sister    • Diabetes Brother        Physical Exam  Vitals and nursing note reviewed  Constitutional:       General: She is not in acute distress  Appearance: She is ill-appearing  She is not diaphoretic  HENT:      Head: Normocephalic and atraumatic  Eyes:      Conjunctiva/sclera: Conjunctivae normal    Cardiovascular:      Rate and Rhythm: Normal rate and regular rhythm  Pulses: Intact distal pulses  Heart sounds: Normal heart sounds  Pulmonary:      Effort: Pulmonary effort is normal       Breath sounds: Examination of the right-middle field reveals decreased breath sounds  Examination of the right-lower field reveals decreased breath sounds  Decreased breath sounds present  Abdominal:      General: Bowel sounds are normal       Palpations: Abdomen is soft  Musculoskeletal:         General: Normal range of motion  Cervical back: Normal range of motion and neck supple     Skin:     General: Skin is "warm and dry  Neurological:      Mental Status: She is alert and oriented to person, place, and time  Vitals: Blood pressure 108/70, pulse 94, height 5' 2 5\" (1 588 m), weight 40 8 kg (90 lb), SpO2 98 %, not currently breastfeeding  Wt Readings from Last 3 Encounters:   23 40 8 kg (90 lb)   23 44 5 kg (98 lb)   23 42 2 kg (93 lb)         Labs & Results:  Lab Results   Component Value Date    WBC 5 81 2023    HGB 16 0 (H) 2023    HCT 48 3 (H) 2023    MCV 90 2023     2023     BNP   Date Value Ref Range Status   2023 3,988 (H) 0 - 100 pg/mL Final   2023 >4,700 (H) 0 - 100 pg/mL Final     No components found for: CHEM    Results for orders placed during the hospital encounter of 11/15/20    Echo complete with contrast if indicated    Narrative  [place] [address] [address] [place] [address]  [phone]    Transthoracic Echocardiogram  2D, M-mode, Doppler, and Color Doppler    Study date:  15-Nov-2020    Patient: Mona Harmon  MR number: [id number]  Account number: [de-identified]  : 1955  Age: 77 years  Gender: Female  Status: Inpatient  Location: Bedside  Height: 63 in  Weight: 134 6 lb  BP: 161/ 82 mmHg    Indications: Cardiomyopathy, acute heart failure  Diagnoses: I42 9 - Cardiomyopathy, unspecified    Sonographer:  CARMELO Shepard  Primary Physician:  Meenakshi Rowan DO  Referring Physician:  Brent Goetz MD  Group:  [facility]  Interpreting Physician:  Farida Benjamin MD    SUMMARY    LEFT VENTRICLE:  Size was normal   Systolic function was at the lower limits of normal   There was moderate hypokinesis of the basal to mid inferior and inferolateral walls  Wall thickness was normal   Doppler parameters were consistent with abnormal left ventricular relaxation (grade 1 diastolic dysfunction)      RIGHT VENTRICLE:  The size was normal   Systolic function was " normal     MITRAL VALVE:  There was trace regurgitation  AORTIC VALVE:  There was trace regurgitation  HISTORY: PRIOR HISTORY: Cardiomyopathy, acute heart failure  Breast cancer, chemotherapy, L breast reconstruction with implant, HTN, CKD1, uncontrolled DM2, current smoker  PROCEDURE: The procedure was performed at the bedside  This was a routine study  The transthoracic approach was used  The study included complete 2D imaging, M-mode, complete spectral Doppler, and color Doppler  The heart rate was 108 bpm,  at the start of the study  Images were obtained from the parasternal, apical, subcostal, and suprasternal notch acoustic windows  Echocardiographic views were limited due to poor acoustic window availability  Image quality was good  LEFT VENTRICLE: Size was normal  Systolic function was at the lower limits of normal  There was moderate hypokinesis of the basal to mid inferior and inferolateral walls  Wall thickness was normal  DOPPLER: Doppler parameters were  consistent with abnormal left ventricular relaxation (grade 1 diastolic dysfunction)  RIGHT VENTRICLE: The size was normal  Systolic function was normal  Wall thickness was normal     LEFT ATRIUM: Size was normal     RIGHT ATRIUM: Size was normal     MITRAL VALVE: Valve structure was normal  There was normal leaflet separation  DOPPLER: The transmitral velocity was within the normal range  There was no evidence for stenosis  There was trace regurgitation  AORTIC VALVE: The valve was trileaflet  Leaflets exhibited normal thickness and normal cuspal separation  DOPPLER: Transaortic velocity was within the normal range  There was no evidence for stenosis  There was trace regurgitation  TRICUSPID VALVE: The valve structure was normal  There was normal leaflet separation  DOPPLER: The transtricuspid velocity was within the normal range  There was no evidence for stenosis  There was no regurgitation      PULMONIC VALVE: Leaflets exhibited normal thickness, no calcification, and normal cuspal separation  DOPPLER: The transpulmonic velocity was within the normal range  There was no regurgitation  PERICARDIUM: There was no pericardial effusion  The pericardium was normal in appearance  AORTA: The root exhibited normal size  SYSTEMIC VEINS: IVC: The inferior vena cava was normal in size and course  Respirophasic changes were normal     SYSTEM MEASUREMENT TABLES    2D  %FS: 27 06 %  Ao Diam: 3 24 cm  EDV(Teich): 117 95 ml  EF(Teich): 52 52 %  ESV(Teich): 56 ml  IVSd: 0 82 cm  LA Diam: 3 91 cm  LVEDV MOD A4C: 90 2 ml  LVEF MOD A4C: 40 17 %  LVESV MOD A4C: 53 96 ml  LVIDd: 4 99 cm  LVIDs: 3 64 cm  LVLd A4C: 7 83 cm  LVLs A4C: 6 73 cm  LVPWd: 0 83 cm  SV MOD A4C: 36 24 ml  SV(Teich): 61 95 ml    CW  AV Env  Ti: 209 32 ms  AV MaxP 51 mmHg  AV VTI: 21 7 cm  AV Vmax: 1 37 m/s  AV Vmean: 1 04 m/s  AV meanP 51 mmHg  TR MaxP 42 mmHg  TR Vmax: 2 26 m/s    PW  E' Sept: 0 06 m/s  E/E' Sept: 16 75  LVOT Env  Ti: 239 77 ms  LVOT VTI: 12 62 cm  LVOT Vmax: 0 8 m/s  LVOT Vmean: 0 53 m/s  LVOT maxP 57 mmHg  LVOT meanP 29 mmHg  MV A [place]: 0 9 m/s  MV Dec [place]: 5 25 m/s2  MV DecT: 183 ms  MV E Conrad: 0 96 m/s  MV E/A Ratio: 1 07  MV PHT: 53 07 ms  MVA By PHT: 4 15 cm2    [facility] Accredited Echocardiography [facility]    Prepared and electronically signed by    Bakari Walter MD  Signed [id number] 15:35:29    No results found for this or any previous visit  This note was completed in part utilizing [employer] direct voice recognition software  Grammatical errors, random word insertion, spelling mistakes, and incomplete sentences may be an occasional consequence of the system secondary to software limitations, ambient noise and hardware issues  At the time of dictation, efforts were made to edit, clarify and /or correct errors    Please read the chart carefully and recognize, using context, where substitutions have occurred    If you have any questions or concerns about the context, text or information contained within the body of this dictation, please contact myself, the provider, for further clarification

## 2023-05-17 ENCOUNTER — HOME CARE VISIT (OUTPATIENT)
Dept: HOME HEALTH SERVICES | Facility: HOME HEALTHCARE | Age: 68
End: 2023-05-17

## 2023-05-17 ENCOUNTER — APPOINTMENT (OUTPATIENT)
Dept: LAB | Facility: CLINIC | Age: 68
End: 2023-05-17

## 2023-05-17 ENCOUNTER — OFFICE VISIT (OUTPATIENT)
Dept: CARDIOLOGY CLINIC | Facility: CLINIC | Age: 68
End: 2023-05-17

## 2023-05-17 VITALS — OXYGEN SATURATION: 97 % | HEART RATE: 88 BPM

## 2023-05-17 VITALS
DIASTOLIC BLOOD PRESSURE: 70 MMHG | HEART RATE: 94 BPM | WEIGHT: 90 LBS | HEIGHT: 63 IN | SYSTOLIC BLOOD PRESSURE: 108 MMHG | BODY MASS INDEX: 15.95 KG/M2 | OXYGEN SATURATION: 98 %

## 2023-05-17 DIAGNOSIS — I73.9 PERIPHERAL ARTERIAL DISEASE (HCC): ICD-10-CM

## 2023-05-17 DIAGNOSIS — I25.10 CORONARY ARTERY DISEASE INVOLVING NATIVE CORONARY ARTERY OF NATIVE HEART WITHOUT ANGINA PECTORIS: ICD-10-CM

## 2023-05-17 DIAGNOSIS — I10 PRIMARY HYPERTENSION: ICD-10-CM

## 2023-05-17 DIAGNOSIS — I50.41 ACUTE COMBINED SYSTOLIC AND DIASTOLIC CONGESTIVE HEART FAILURE (HCC): ICD-10-CM

## 2023-05-17 DIAGNOSIS — I50.22 CHRONIC HFREF (HEART FAILURE WITH REDUCED EJECTION FRACTION) (HCC): ICD-10-CM

## 2023-05-17 DIAGNOSIS — R63.4 WEIGHT LOSS: ICD-10-CM

## 2023-05-17 DIAGNOSIS — I48.0 PAF (PAROXYSMAL ATRIAL FIBRILLATION) (HCC): ICD-10-CM

## 2023-05-17 DIAGNOSIS — Z72.0 TOBACCO USE: ICD-10-CM

## 2023-05-17 DIAGNOSIS — E78.5 HYPERLIPIDEMIA LDL GOAL <100: ICD-10-CM

## 2023-05-17 DIAGNOSIS — G47.33 OBSTRUCTIVE SLEEP APNEA SYNDROME: ICD-10-CM

## 2023-05-17 DIAGNOSIS — I50.22 CHRONIC HFREF (HEART FAILURE WITH REDUCED EJECTION FRACTION) (HCC): Primary | ICD-10-CM

## 2023-05-17 DIAGNOSIS — I42.8 NICM (NONISCHEMIC CARDIOMYOPATHY) (HCC): ICD-10-CM

## 2023-05-17 PROBLEM — I50.32 CHRONIC DIASTOLIC CONGESTIVE HEART FAILURE (HCC): Status: RESOLVED | Noted: 2021-07-01 | Resolved: 2023-05-17

## 2023-05-17 PROBLEM — I50.1 HEART FAILURE, LEFT, WITH LVEF 31-40% (HCC): Status: RESOLVED | Noted: 2022-08-10 | Resolved: 2023-05-17

## 2023-05-17 PROBLEM — I50.42 CHRONIC COMBINED SYSTOLIC AND DIASTOLIC CONGESTIVE HEART FAILURE (HCC): Status: RESOLVED | Noted: 2020-11-24 | Resolved: 2023-05-17

## 2023-05-17 LAB
ANION GAP SERPL CALCULATED.3IONS-SCNC: 7 MMOL/L (ref 4–13)
BUN SERPL-MCNC: 67 MG/DL (ref 5–25)
CALCIUM SERPL-MCNC: 9.4 MG/DL (ref 8.4–10.2)
CHLORIDE SERPL-SCNC: 95 MMOL/L (ref 96–108)
CO2 SERPL-SCNC: 28 MMOL/L (ref 21–32)
CREAT SERPL-MCNC: 1.91 MG/DL (ref 0.6–1.3)
GFR SERPL CREATININE-BSD FRML MDRD: 26 ML/MIN/1.73SQ M
GLUCOSE SERPL-MCNC: 240 MG/DL (ref 65–140)
MAGNESIUM SERPL-MCNC: 1.7 MG/DL (ref 1.9–2.7)
POTASSIUM SERPL-SCNC: 5.7 MMOL/L (ref 3.5–5.3)
SODIUM SERPL-SCNC: 130 MMOL/L (ref 135–147)

## 2023-05-17 NOTE — LETTER
"May 17, 2023     Barbi Luna DO  [address] [place] [id number]    Patient: Bakari Jordan   YOB: 1955   Date of Visit: 5/17/2023       Dear Dr Estela Veras: Thank you for referring Bakari Jordan to me for evaluation  Below are my notes for this consultation  If you have questions, please do not hesitate to call me  I look forward to following your patient along with you  Sincerely,        MAGGIE Ash        CC: MD Junito Staley CRNP Bob Self, CRNP  5/17/2023 11:44 AM  Sign when Signing Visit  Cardiology  Heart Failure   Acute  Office Visit Note     Bakari Jordan   69 y o    female   MRN: [id number]  [address]  [address] [facility] [address] [facility] [place] [address]  [phone]  [phone]    PCP: Barbi Luna DO  Cardiologist : Dr Linda Licea of recommendations  BMP, mag today  STOP LASIX  Shes currently not taking potassium nor taking spironolaconte  \"cant find them\" STOP spironolactone  Smoking Cessation inmperative  counseling provided, x5 minutes  \" I quit since being in the hospital\"  CT lung Ca screen- wt loss, hx breast CA, hx tobacco abuse, decreased BS right base  F/U vasc surgery  She has no showed several times in the past   I reviewed her lower extremity ROSA which was abnormal  F/U endo 6/19  F/U with me 1-2 weeks  Follow up with Dr Nina Barrientos 6/7/2023         Impression/plan  Wt loss  Malnourished  BMI 16  Cachexic  History breast CA  Hx smoking  C/O nausea, inability to eat  BMP/ mag today  CT lung CA screen  I also suspect hypokalemia  Chronic HFrEF, EF 35%, recent adm for decompensation 4/26 to 4/30/2023  · Etiology: unclear etiology, nonischemic  Possible due to hypertensive heart disease  Has afib with no documented RVR  · Cause of decompensation: likely due to nonadherence to diuretic   Reports taking other cardiac medications, but not antidiabetic meds  Wt Readings " "from Last 3 Encounters:   05/17/23 40 8 kg (90 lb)   05/11/23 44 5 kg (98 lb)   05/05/23 42 2 kg (93 lb)   · diuretic:  Lasix 40 mg daily  Prescribed supplemental K 40 meq/d- not taking it \"Cant find it\"  · Beta-Blocker:  Toprol 50 mg twice daily  In the past was on 75 BID  • ACEi, ARB or ARNi:   Entresto 97/103 BID- increased in the hospital  • Aldosterone Receptor Blocker: spironolactone 25 mg/d-not taking it \"cannot find it\"  • [id number] Jardiance not taken due to cost  • ICD:S/p DC ICD implant 3/9/22  Narrow QRS  • Educated regarding adherence to a 1 5g -2 gram Na diet/ 2L fluid restriction, daily weights and to call us if she gains  3 lbs/ 1 day or 5 lbs/ 1 week  NICM, Ef 35%  S/PMDT DC ICD 3/9/22 ( right sided) MRI conditional  · Interrogation 5/9/23  AP 3 6%  <0 1%  No significant high rates  Optivol within normal limits  PAF rate controlled with metoprolol, anticoagulation Eliquis 5 mg twice daily  Moderate CAD, nonobstructive disease by [address] [place] [address] Anita Sosa  · On aspirin 81, statin, beta-blocker  Claudication-- referred to vascular surgery  She has canceled several times  She is symptomatic  · Lower ROSA 1/2022:   · RIGHT LOWER LIMB:Evidence of >75% stenosis noted in the mid superficial femoral artery withdiffuse disease noted throughout the remaining femoral-popliteal andtibioperoneal arteries without significant focal stenosis  Ankle/Brachial index: 0 86 which is in the mild disease category  · LEFT LOWER LIMB:Evidence of >75% stenosis noted in the proximal popliteal arteryThere is 50-75% stenosis in the distal superficial femoral artery followed by a>75% stenosis in the most distal portion  Evidence of tibioperoneal disease with high grade stenosis vs occlusion of thedistal posterior tibial artery  Ankle/Brachial index: 0 69 which is in the moderate disease category  Hypertension, essential  BP  108/70 On  Entresto, Toprol, diuretics  Hyperlipidemia  on atorvastatin 40 mg daily    1823 calculated LDL " 76   Latest Reference Range & Units 10/07/16 08:41 10/25/18 14:06 11/16/20 05:17 03/09/21 09:54 01/18/23 11:20   Cholesterol See Comment mg/dL 190 151 168 144 163   Triglycerides See Comment mg/dL 100 76 82 76 72   HDL >=50 mg/dL 62 63 (H) 47 64 73   Non-HDL Cholesterol mg/dl   121 80    LDL Calculated 0 - 100 mg/dL 108 (H) 73 105 (H) 65 76     Diabetes mellitus type 2, uncontrolled: 4/26/23 HgA1C 12 2, prior 14 9, on metformin follow-up with endocrine  Prescribed Jardiance not taking due to cost  CKD2  Baseline Cr around 1  Obstructive sleep apnea  Had some type of surgery ( UPP? ) in the 80s  Not restested in the last 10 yrs  Tobacco abuse, ongoing  Cessation imperative  Nicotine replacement has been ordered  Breast cancer status post mastectomy and treatment with chemotherapy> 15 years ago  No radiation  · S/P  left sided mastectomy and lymphnode dissection  Cardiac testing  • TTE 11/15/2020  Ef 50%  here was moderate hypokinesis of the basal to mid inferior and inferolateral walls  Wall thickness was normal  Grade 1 DD  RV nl size and function  Trace MR  Trace AI  • cardiac catheterization 11/16/2020  --  Left main: MLI  --  Mid LAD: There was a 50 % stenosis, iFR 0 97 not hemodynamically significant  --  Mid circumflex: There was a 50 % stenosis  iFR 0 96 not hemodynamically significant  --  1st obtuse marginal: There was a 60 % stenosis, iFR 0 98 not hemodynamically significant  --  RCA: MLI  IMPRESSIONS  No signficant obstructive atherosclerotic CAD requiring revasculariztion  Moderate CAD as described  · TTE 1/122022: EF 25-30%  LVIDd: 4 1 cm, normal wall thickness; RV: normal size and systolic function;MR: trace;PASP: 44mmHg, moderate TR;RVOT: no notching; mild AI  Abnormal diastology  · TTE  4/26/23 EF 35%, mild global hypokinesis, diastolic dysfunction, AI, TR, mild MR  PASP 59 mmHg    Trivial pericardial fusion   + Lft pleural effusion                   HPI:   Romulo Alvarez a 77 y o  year old female  with hypertension, diabetes, hyperlipidemia and tobacco abuse  She has history of breast cancer status post mastectomy and treatment with chemotherapy      Home medication regimen includes amlodipine 10 mg daily, atorvastatin 10 mg daily, metformin 1000 mg BID      Interval history  Adm 11/15-11/17/2020 with orthopnea and PND/ exertional dyspnea  On arrival her blood pressure was markedly elevated 181/110  Her EKG showed possible ectopic atrial tachycardia  Chest x-ray:  Mild interstitial edema  CTA:  No PE  Scattered ground-glass opacities, changes representing pulmonary edema with small bilateral pleural effusion  NT proBNP greater than 3200  An echocardiogram demonstrated normal LV Size /function was at the lower limits of normal  There was moderate hypokinesis of the basal to mid inferior and inferolateral walls   Cardiac catheterization was pursued  This demonstrated nonobstructive CAD  Medical management was recommended  She was placed on beta-blocker and ACE-inhibitor  Previously on amlodipine which was discontinued  Her EDP was 12 at time of catheterization  She did diurese with IV Lasix  She did have a rising creatinine and subsequently was recommended to be discharged without diuretic at this point  She was also found to have hypercalcemia with corrected calcium 11 4, an elevated alkaline phosphatase  PTH was elevated at 359-outpatient Endocrinology follow-up recommended  In addition, her diabetes is poorly controlled with an A1c of 10      12/2/2020  Hospital followup  Intermittent cough  SOB/ wakes her from sleep  No CP  Currently not watching her dietary sodium  No daily weights at home  No scale   One will be provided  Blood pressure 138/80  Few bibasilar crackles, no lower extremity edema  Heart tones distant, regular Her lowest weight hospital was 135,prior to discharge  She is up about 2 lb  Will start Lasix 20 mg daily x3 then 20 mg Monday Wednesday Friday  Her creatinine is 1 4 above her baseline of 1  Based on her current symptoms, I suspect she may have some abigail-vascular congestion  I suspect significant dietary sodium indiscretion  We talked about the importance of adhering to a low-sodium diet  Will provided written education material   Will referred to dietitian which she is amenable to  We discussed her coronary anatomy  Recommend she start a baby aspirin a day  Discussed importance of aggressive risk factor management  She is going to be seeing Endocrinology in the near future regarding her diabetes  Interval history  12/2021; 1/2022; 3/2022, 10/2022 OV Dr Sudarshan Conway  3/1/23 OV Dr Sudarshan Conway  increased SOB with exertion with some weight gain  Maintenance Lasix 40 mg Monday Wednesday Friday  Lasix increased to 40 mg daily  Toprol increased to 75 mg twice daily to help with SVT/A-fib episodes seen on device interrogation      Adm 4/26-4/30/23  Cc:worsening shortness of breath and fatigue x 2 weeks  Also orthopnea, leg edema, dry cough  Admitted non compliant with Lasix- given frequent urination and Lantus  Denied increase in dietary sodium or fld  BP in the ED: 210/135,    Hemoglobin A1c 12  BNP > 4700, chest x-ray: Pleural effusions, vascular congestion  Treated for CHF exacerbation  Followed by Cardiology, Advanced HF  Echo: Ef 35%  Diuresed with IV furosemide 60 bid  Entresto added  DC wt 101 lb  DC Cr 1 41  DC diuretic: lasix 40/d, spironoalcotne 25 mg/d  DC cardiac medications: Toprol 50 BID, Entresto 97/103 BID, Lasix 40 mg daily, spironolactone 25 mg daily, potassium 40 meq daily, Jardiance  Advised follow-up with Cardiology, Endocrinology, sleep medicine      5/17/23  (PMH: chronic HFrEF secondary to NICM, non obstructive CAD,S/P ICD, PAF, CKD3, T2DM,  HTN, HL, NASREEN)  Acute OV  Was not seen in the Cardiology office following admission for decompensated heart failure back in April    Today, she is accompanied by her brother with whom she is residing  He adds to the history  CC: H/A  Dizziness  Nauseated  Cant eat  Dizzy  Short of breath  Claudication  Sleeping on a couch  PUENTE  No PND  No orthopnea  weak  Quit smoking after discharge from the hospital   /70  Wt 90 lb  3/1/2023 was 131 pound  1/10/2023 was 129 pounds  o2 sat 98%  She looks chronically ill, no acute distress  Diminished breath sounds right base  I recommend urgent labs today  She is not taking potassium  She is not taking her lactulose  She is not taking Jardiance due to cost   Given her weight loss, I asked her to stop Lasix  We will also get a CT lung cancer screening  REC; F/u vasc surgery, as recommended in the past       minutes with Patient and family today in which greater than 50% of this time was spent in counseling/coordination of care regarding Diagnostic results, Instructions for management, Patient and family education, Importance of tx compliance, Risk factor reductions, Documenting in the medical record, Reviewing / ordering tests, medicine, procedures   and Obtaining or reviewing history    Assessment  Diagnoses and all orders for this visit:    Chronic HFrEF (heart failure with reduced ejection fraction) ([facility])    NICM (nonischemic cardiomyopathy) ([place] [address] )    Coronary artery disease involving native coronary artery of native heart without angina pectoris    Peripheral arterial disease ([facility])    PAF (paroxysmal atrial fibrillation) ([place] [address] )  -     POCT ECG    Primary hypertension    Hyperlipidemia LDL goal <100    Tobacco use    Obstructive sleep apnea syndrome        Past Medical History:   Diagnosis Date   • Breast cancer ([place] [address] )     left - 1994  • Diabetes mellitus ([place] [address] )    • Diabetes mellitus, type 2 ([place] [address] ) 03/15/2006    last assessed 7/10/13   • GERD (gastroesophageal reflux disease)    • History of chemotherapy    • Hypertension        Review of Systems   Constitutional: Positive for malaise/fatigue and weight loss  Negative for chills     Cardiovascular: Positive for dyspnea on exertion  Negative for chest pain, claudication, cyanosis, irregular heartbeat, leg swelling, near-syncope, orthopnea, palpitations, paroxysmal nocturnal dyspnea and syncope  Claudication   Respiratory: Negative for cough and shortness of breath  Gastrointestinal: Positive for diarrhea and nausea  Negative for heartburn  Neurological: Positive for dizziness and headaches  Negative for focal weakness, light-headedness and weakness  All other systems reviewed and are negative  No Known Allergies    Current Outpatient Medications:   •  apixaban (Eliquis) 5 mg, Take 1 tablet (5 mg total) by mouth 2 (two) times a day, Disp: 60 tablet, Rfl: 3  •  atorvastatin (LIPITOR) 40 mg tablet, Take 1 tablet (40 mg total) by mouth daily, Disp: 90 tablet, Rfl: 1  •  Blood Glucose Monitoring Suppl (OneTouch Verio Reflect) w/Device KIT, Check blood sugars three times daily  Please substitute with appropriate alternative as covered by patient's insurance  Dx: E11 65, Disp: 1 kit, Rfl: 0  •  dapagliflozin 5 MG TABS, Take 1 tablet (5 mg total) by mouth daily, Disp: 90 tablet, Rfl: 0  •  Flovent  MCG/ACT inhaler, Inhale 1 puff 2 (two) times a day PT NOT TAKING , Disp: , Rfl:   •  furosemide (LASIX) 40 mg tablet, Take 1 tablet (40 mg total) by mouth daily Do not start before May 1, 2023 , Disp: 30 tablet, Rfl: 0  •  glucose blood (OneTouch Verio) test strip, Check blood sugars three times daily  Please substitute with appropriate alternative as covered by patient's insurance   Dx: E11 65, Disp: 300 each, Rfl: 1  •  Insulin Glargine Solostar (Lantus SoloStar) 100 UNIT/ML SOPN, Inject 0 1 mL (10 Units total) under the skin daily at bedtime, Disp: 3 mL, Rfl: 0  •  Insulin Pen Needle (BD Pen Needle Kateryna U/F) 32G X 4 MM MISC, Use daily as directed with insulin pen, Disp: 100 each, Rfl: 0  •  metFORMIN (GLUCOPHAGE) 1000 MG tablet, Take 1 tablet (1,000 mg total) by mouth 2 (two) times a day with meals, Disp: 180 tablet, Rfl: 1  •  metoprolol succinate (TOPROL-XL) 50 mg 24 hr tablet, Take 1 tablet (50 mg total) by mouth 2 (two) times a day, Disp: 60 tablet, Rfl: 0  •  nystatin-triamcinolone (MYCOLOG-II) ointment, Apply topically 2 (two) times a day To vaginal area, Disp: 60 g, Rfl: 1  •  OneTouch Delica Lancets 41D MISC, Check blood sugars three times daily  Please substitute with appropriate alternative as covered by patient's insurance   Dx: E11 65, Disp: 300 each, Rfl: 1  •  potassium chloride (K-DUR,KLOR-CON) 20 mEq tablet, Take 2 tablets (40 mEq total) by mouth daily Do not start before May 1, 2023 , Disp: 60 tablet, Rfl: 0  •  sacubitril-valsartan (ENTRESTO)  MG TABS, Take 1 tablet by mouth 2 (two) times a day, Disp: 60 tablet, Rfl: 0  •  spironolactone (ALDACTONE) 25 mg tablet, Take 1 tablet (25 mg total) by mouth daily Do not start before May 1, 2023 , Disp: 30 tablet, Rfl: 0  •  albuterol (Ventolin HFA) 90 mcg/act inhaler, Inhale 2 puffs every 6 (six) hours as needed for wheezing (rinse mouth after use) (Patient not taking: Reported on 5/17/2023), Disp: 18 g, Rfl: 1  •  Aspirin Low Dose 81 MG EC tablet, TAKE 1 TABLET BY MOUTH EVERY DAY (Patient not taking: Reported on 5/17/2023), Disp: 90 tablet, Rfl: 3  •  benzonatate (TESSALON PERLES) 100 mg capsule, Take 1 capsule (100 mg total) by mouth 3 (three) times a day as needed for cough (Patient not taking: Reported on 5/17/2023), Disp: 20 capsule, Rfl: 0  •  cholecalciferol (VITAMIN D3) 1,000 units tablet, Take 2 tablets (2,000 Units total) by mouth daily (Patient not taking: Reported on 5/17/2023), Disp: 10 tablet, Rfl: 0  •  nicotine (NICODERM CQ) 14 mg/24hr TD 24 hr patch, Place 1 patch on the skin every 24 hours (Patient not taking: Reported on 5/5/2023), Disp: 28 patch, Rfl: 3    Social History     Socioeconomic History   • Marital status: Single     Spouse name: Not on file   • Number of children: Not on file   • Years of education: Not on file   • Highest education level: Not on file   Occupational History   • Not on file   Tobacco Use   • Smoking status: Former     Packs/day: 0 50     Years: 50 00     Pack years: 25 00     Types: Cigarettes     Start date: 6/15/1973     Quit date: 2023     Years since quittin 0   • Smokeless tobacco: Never   • Tobacco comments:     3 cigarettes daily    Vaping Use   • Vaping Use: Never used   Substance and Sexual Activity   • Alcohol use: Not Currently     Comment: Social   • Drug use: No   • Sexual activity: Yes     Partners: Male   Other Topics Concern   • Not on file   Social History Narrative   • Not on file     Social Determinants of Health     Financial Resource Strain: Low Risk    • Difficulty of Paying Living Expenses: Not very hard   Food Insecurity: No Food Insecurity   • Worried About Running Out of Food in the Last Year: Never true   • Ran Out of Food in the Last Year: Never true   Transportation Needs: No Transportation Needs   • Lack of Transportation (Medical): No   • Lack of Transportation (Non-Medical): No   Physical Activity: Not on file   Stress: Not on file   Social Connections: Not on file   Intimate Partner Violence: Not on file   Housing Stability: Low Risk    • Unable to Pay for Housing in the Last Year: No   • Number of Places Lived in the Last Year: 1   • Unstable Housing in the Last Year: No       Family History   Problem Relation Age of Onset   • Hypothyroidism Mother    • Leukemia Mother    • Diabetes Father    • Diabetes type II Father    • Stroke Sister    • Diabetes Paternal Grandmother    • Cancer Sister    • Diabetes Brother        Physical Exam  Vitals and nursing note reviewed  Constitutional:       General: She is not in acute distress  Appearance: She is ill-appearing  She is not diaphoretic  HENT:      Head: Normocephalic and atraumatic  Eyes:      Conjunctiva/sclera: Conjunctivae normal    Cardiovascular:      Rate and Rhythm: Normal rate and regular rhythm  "     Pulses: Intact distal pulses  Heart sounds: Normal heart sounds  Pulmonary:      Effort: Pulmonary effort is normal       Breath sounds: Examination of the right-middle field reveals decreased breath sounds  Examination of the right-lower field reveals decreased breath sounds  Decreased breath sounds present  Abdominal:      General: Bowel sounds are normal       Palpations: Abdomen is soft  Musculoskeletal:         General: Normal range of motion  Cervical back: Normal range of motion and neck supple  Skin:     General: Skin is warm and dry  Neurological:      Mental Status: She is alert and oriented to person, place, and time  Vitals: Blood pressure 108/70, pulse 94, height 5' 2 5\" (1 588 m), weight 40 8 kg (90 lb), SpO2 98 %, not currently breastfeeding  Wt Readings from Last 3 Encounters:   23 40 8 kg (90 lb)   23 44 5 kg (98 lb)   23 42 2 kg (93 lb)         Labs & Results:  Lab Results   Component Value Date    WBC 5 81 2023    HGB 16 0 (H) 2023    HCT 48 3 (H) 2023    MCV 90 2023     2023     BNP   Date Value Ref Range Status   2023 3,988 (H) 0 - 100 pg/mL Final   2023 >4,700 (H) 0 - 100 pg/mL Final     No components found for: CHEM    Results for orders placed during the hospital encounter of 11/15/20    Echo complete with contrast if indicated    Narrative  [place] [address], [address] [place] [address]  [phone]    Transthoracic Echocardiogram  2D, M-mode, Doppler, and Color Doppler    Study date:  15-Nov-2020    Patient: Dana Soria  MR number: [id number]  Account number: [de-identified]  : 1955  Age: 77 years  Gender: Female  Status: Inpatient  Location: Bedside  Height: 63 in  Weight: 134 6 lb  BP: 161/ 82 mmHg    Indications: Cardiomyopathy, acute heart failure      Diagnoses: I42 9 - Cardiomyopathy, unspecified    Sonographer:  CARMELO Ward  Primary Physician:  " Melissa Morning, DO  Referring Physician:  Ki Negro MD  Group:  [facility]  Interpreting Physician:  Desi Riley MD    SUMMARY    LEFT VENTRICLE:  Size was normal   Systolic function was at the lower limits of normal   There was moderate hypokinesis of the basal to mid inferior and inferolateral walls  Wall thickness was normal   Doppler parameters were consistent with abnormal left ventricular relaxation (grade 1 diastolic dysfunction)  RIGHT VENTRICLE:  The size was normal   Systolic function was normal     MITRAL VALVE:  There was trace regurgitation  AORTIC VALVE:  There was trace regurgitation  HISTORY: PRIOR HISTORY: Cardiomyopathy, acute heart failure  Breast cancer, chemotherapy, L breast reconstruction with implant, HTN, CKD1, uncontrolled DM2, current smoker  PROCEDURE: The procedure was performed at the bedside  This was a routine study  The transthoracic approach was used  The study included complete 2D imaging, M-mode, complete spectral Doppler, and color Doppler  The heart rate was 108 bpm,  at the start of the study  Images were obtained from the parasternal, apical, subcostal, and suprasternal notch acoustic windows  Echocardiographic views were limited due to poor acoustic window availability  Image quality was good  LEFT VENTRICLE: Size was normal  Systolic function was at the lower limits of normal  There was moderate hypokinesis of the basal to mid inferior and inferolateral walls  Wall thickness was normal  DOPPLER: Doppler parameters were  consistent with abnormal left ventricular relaxation (grade 1 diastolic dysfunction)  RIGHT VENTRICLE: The size was normal  Systolic function was normal  Wall thickness was normal     LEFT ATRIUM: Size was normal     RIGHT ATRIUM: Size was normal     MITRAL VALVE: Valve structure was normal  There was normal leaflet separation  DOPPLER: The transmitral velocity was within the normal range   There was no evidence for stenosis  There was trace regurgitation  AORTIC VALVE: The valve was trileaflet  Leaflets exhibited normal thickness and normal cuspal separation  DOPPLER: Transaortic velocity was within the normal range  There was no evidence for stenosis  There was trace regurgitation  TRICUSPID VALVE: The valve structure was normal  There was normal leaflet separation  DOPPLER: The transtricuspid velocity was within the normal range  There was no evidence for stenosis  There was no regurgitation  PULMONIC VALVE: Leaflets exhibited normal thickness, no calcification, and normal cuspal separation  DOPPLER: The transpulmonic velocity was within the normal range  There was no regurgitation  PERICARDIUM: There was no pericardial effusion  The pericardium was normal in appearance  AORTA: The root exhibited normal size  SYSTEMIC VEINS: IVC: The inferior vena cava was normal in size and course  Respirophasic changes were normal     SYSTEM MEASUREMENT TABLES    2D  %FS: 27 06 %  Ao Diam: 3 24 cm  EDV(Teich): 117 95 ml  EF(Teich): 52 52 %  ESV(Teich): 56 ml  IVSd: 0 82 cm  LA Diam: 3 91 cm  LVEDV MOD A4C: 90 2 ml  LVEF MOD A4C: 40 17 %  LVESV MOD A4C: 53 96 ml  LVIDd: 4 99 cm  LVIDs: 3 64 cm  LVLd A4C: 7 83 cm  LVLs A4C: 6 73 cm  LVPWd: 0 83 cm  SV MOD A4C: 36 24 ml  SV(Teich): 61 95 ml    CW  AV Env  Ti: 209 32 ms  AV MaxP 51 mmHg  AV VTI: 21 7 cm  AV Vmax: 1 37 m/s  AV Vmean: 1 04 m/s  AV meanP 51 mmHg  TR MaxP 42 mmHg  TR Vmax: 2 26 m/s    PW  E' Sept: 0 06 m/s  E/E' Sept: 16 75  LVOT Env  Ti: 239 77 ms  LVOT VTI: 12 62 cm  LVOT Vmax: 0 8 m/s  LVOT Vmean: 0 53 m/s  LVOT maxP 57 mmHg  LVOT meanP 29 mmHg  MV A Conrad: 0 9 m/s  MV Dec Shasta: 5 25 m/s2  MV DecT: 183 ms  MV E Conrad: 0 96 m/s  MV E/A Ratio: 1 07  MV PHT: 53 07 ms  MVA By PHT: 4 15 cm2    [facility] Accredited [facility]    Prepared and electronically signed by    Osiris Gaming MD  Signed 15-Nov-2020 15:35:29    No results found for this or any previous visit  This note was completed in part utilizing [employer] fluency direct voice recognition software  Grammatical errors, random word insertion, spelling mistakes, and incomplete sentences may be an occasional consequence of the system secondary to software limitations, ambient noise and hardware issues  At the time of dictation, efforts were made to edit, clarify and /or correct errors  Please read the chart carefully and recognize, using context, where substitutions have occurred    If you have any questions or concerns about the context, text or information contained within the body of this dictation, please contact myself, the provider, for further clarification

## 2023-05-17 NOTE — PATIENT INSTRUCTIONS

## 2023-05-17 NOTE — CASE COMMUNICATION
Patient  evaluated  and  to  continue  with  home  P T   2  X  per  week  for  4  weeks  with  treatment  of  transfer  training  G T   on  indoor  and  outdoor  surfaces  and  stairs    Also  to  continue  wit  strengthening  ex  to  BLEs  as  well  as  instruction  in  HEP  and  home  safety  and  energy  conservation  techniques

## 2023-05-18 ENCOUNTER — TELEPHONE (OUTPATIENT)
Dept: FAMILY MEDICINE CLINIC | Facility: CLINIC | Age: 68
End: 2023-05-18

## 2023-05-18 ENCOUNTER — HOME CARE VISIT (OUTPATIENT)
Dept: HOME HEALTH SERVICES | Facility: HOME HEALTHCARE | Age: 68
End: 2023-05-18

## 2023-05-18 NOTE — TELEPHONE ENCOUNTER
----- Message from Swathi Eagle DO sent at 5/17/2023 11:27 PM EDT -----  Please have let pt know her potassium level is high and kidney blood test may indicate some dehydration and she should contact her cardiologist who manages her diuretics for her congestive heart failure  Her glucose is also high so she was told in the past to contact her endocrinologist about her insulin

## 2023-05-19 ENCOUNTER — HOME CARE VISIT (OUTPATIENT)
Dept: HOME HEALTH SERVICES | Facility: HOME HEALTHCARE | Age: 68
End: 2023-05-19

## 2023-05-19 ENCOUNTER — TELEPHONE (OUTPATIENT)
Dept: CARDIOLOGY CLINIC | Facility: CLINIC | Age: 68
End: 2023-05-19

## 2023-05-19 VITALS — OXYGEN SATURATION: 98 % | HEART RATE: 78 BPM

## 2023-05-19 NOTE — TELEPHONE ENCOUNTER
Houston Hurst,   I received a call from A nurse Anneliese Reece who saw patient today  She said patient WAS still taking the potassium although it was not on the med list   The potassium and diuretic now removed from patient' pill boxes  VNA nurse said she will see the patient again on Monday 5/22  If Edin Rowe would like a repeat BMP drawn before the next ov to please let her know      Anneliese Reece: [phone]    Thank you, [place]

## 2023-05-19 NOTE — TELEPHONE ENCOUNTER
Patient's brother verbalized understanding of instructions   He will call the office if any concerns before her f/u with Losbeverly Kaplan on 5/31

## 2023-05-19 NOTE — TELEPHONE ENCOUNTER
Patient saw Niecy Menendez 2 days ago and labs were ordered  The patient did not hear back on the results yet  She had a weight loss and diuretics were discontinued  Per office note she is not currently taking potassium  Her potassium came back at 5 7 BUN 67 Creatinine 1 91  she has a follow up scheduled with Niecy Menendez in 10 days  Please advise Niecy Menendez is off today

## 2023-05-19 NOTE — CASE COMMUNICATION
I have some concerns with patient's medications and what she was supposed to discontinue and possibly that she did not stop them as instructed after her appointment this week  I plan to see her early this afternoon and I would like to discuss this with you prior to my visit  Also I saw the results of her bw done on 5/17/23 and her potassium was high and I am not sure if she stopped her potassium because she did have some tablets in home  and they were placed in her med box  Are any other labs needed? Can you please call me to discuss her meds so I can make sure she is taking the correct meds when I make my visit today  My contact number is [phone]  Thank you

## 2023-05-20 ENCOUNTER — TELEPHONE (OUTPATIENT)
Dept: FAMILY MEDICINE CLINIC | Facility: CLINIC | Age: 68
End: 2023-05-20

## 2023-05-20 NOTE — TELEPHONE ENCOUNTER
Called pt about her abnormal labs (elevated creatinine and potassium) but unable to reach her to advise of results and to contact her cardiologist about her diuretics etc     I did leave her a message  Chart notes reviewed  Seems as if there is some confusion if pt is taking diuretics/potassium or not, for her CHF  There have been visits with cardiology and VNA  I spoke with her alternate contact Kandis about all of the above  She will at least try to contact patient to make she she stops or stays off potassium and to be certain she contacts her cardiologist Monday

## 2023-05-21 VITALS
SYSTOLIC BLOOD PRESSURE: 76 MMHG | RESPIRATION RATE: 18 BRPM | DIASTOLIC BLOOD PRESSURE: 54 MMHG | OXYGEN SATURATION: 100 % | TEMPERATURE: 96.7 F | HEART RATE: 84 BPM

## 2023-05-22 ENCOUNTER — HOME CARE VISIT (OUTPATIENT)
Dept: HOME HEALTH SERVICES | Facility: HOME HEALTHCARE | Age: 68
End: 2023-05-22

## 2023-05-22 ENCOUNTER — TELEPHONE (OUTPATIENT)
Dept: FAMILY MEDICINE CLINIC | Facility: CLINIC | Age: 68
End: 2023-05-22

## 2023-05-22 DIAGNOSIS — E87.5 HYPERKALEMIA: Primary | ICD-10-CM

## 2023-05-22 NOTE — TELEPHONE ENCOUNTER
Visiting nurse ([phone]) calling to get a new order for nursing for 2x weekly for 4 weeks    Also, an order for   Please put in chart    Any questions, call visiting nurse

## 2023-05-23 ENCOUNTER — HOME CARE VISIT (OUTPATIENT)
Dept: HOME HEALTH SERVICES | Facility: HOME HEALTHCARE | Age: 68
End: 2023-05-23

## 2023-05-23 ENCOUNTER — NURSE TRIAGE (OUTPATIENT)
Dept: OTHER | Facility: OTHER | Age: 68
End: 2023-05-23

## 2023-05-23 ENCOUNTER — OFFICE VISIT (OUTPATIENT)
Dept: FAMILY MEDICINE CLINIC | Facility: CLINIC | Age: 68
End: 2023-05-23

## 2023-05-23 VITALS
OXYGEN SATURATION: 93 % | DIASTOLIC BLOOD PRESSURE: 60 MMHG | RESPIRATION RATE: 16 BRPM | HEIGHT: 63 IN | TEMPERATURE: 97.3 F | WEIGHT: 93 LBS | HEART RATE: 92 BPM | BODY MASS INDEX: 16.48 KG/M2 | SYSTOLIC BLOOD PRESSURE: 82 MMHG

## 2023-05-23 VITALS — OXYGEN SATURATION: 99 % | HEART RATE: 78 BPM

## 2023-05-23 DIAGNOSIS — I48.0 PAF (PAROXYSMAL ATRIAL FIBRILLATION) (HCC): ICD-10-CM

## 2023-05-23 DIAGNOSIS — I50.22 CHRONIC HFREF (HEART FAILURE WITH REDUCED EJECTION FRACTION) (HCC): Primary | ICD-10-CM

## 2023-05-23 DIAGNOSIS — F33.9 DEPRESSION, RECURRENT (HCC): ICD-10-CM

## 2023-05-23 DIAGNOSIS — I25.10 CORONARY ARTERY DISEASE INVOLVING NATIVE CORONARY ARTERY OF NATIVE HEART WITHOUT ANGINA PECTORIS: ICD-10-CM

## 2023-05-23 DIAGNOSIS — E11.22 TYPE 2 DIABETES MELLITUS WITH STAGE 3B CHRONIC KIDNEY DISEASE, WITH LONG-TERM CURRENT USE OF INSULIN (HCC): ICD-10-CM

## 2023-05-23 DIAGNOSIS — N18.32 TYPE 2 DIABETES MELLITUS WITH STAGE 3B CHRONIC KIDNEY DISEASE, WITH LONG-TERM CURRENT USE OF INSULIN (HCC): ICD-10-CM

## 2023-05-23 DIAGNOSIS — I10 PRIMARY HYPERTENSION: ICD-10-CM

## 2023-05-23 DIAGNOSIS — N89.8 VAGINAL DISCHARGE: Primary | ICD-10-CM

## 2023-05-23 DIAGNOSIS — Z72.0 TOBACCO USE: ICD-10-CM

## 2023-05-23 DIAGNOSIS — Z79.4 TYPE 2 DIABETES MELLITUS WITH STAGE 3B CHRONIC KIDNEY DISEASE, WITH LONG-TERM CURRENT USE OF INSULIN (HCC): ICD-10-CM

## 2023-05-23 DIAGNOSIS — R19.7 DIARRHEA, UNSPECIFIED TYPE: ICD-10-CM

## 2023-05-23 DIAGNOSIS — R19.5 HEME POSITIVE STOOL: ICD-10-CM

## 2023-05-23 DIAGNOSIS — Z12.4 PAPANICOLAOU SMEAR FOR CERVICAL CANCER SCREENING: ICD-10-CM

## 2023-05-23 PROBLEM — N18.4 CHRONIC RENAL DISEASE, STAGE IV (HCC): Status: ACTIVE | Noted: 2023-05-23

## 2023-05-23 LAB — SL AMB POCT FECES OCC BLD: POSITIVE

## 2023-05-23 RX ORDER — FLUCONAZOLE 150 MG/1
150 TABLET ORAL ONCE
Qty: 1 TABLET | Refills: 0 | Status: SHIPPED | OUTPATIENT
Start: 2023-05-23 | End: 2023-05-23

## 2023-05-23 NOTE — ASSESSMENT & PLAN NOTE
BP low and advised to reduce metoprolol to once a day and will follow back in 2 days and will go to ER for any worsening of symptoms

## 2023-05-23 NOTE — TELEPHONE ENCOUNTER
"Patient reports moderate inner thigh pain, diarrhea, and foul smelling vaginal discharge that started a couple days ago  She would like to be seen today in the office  Appointment made for 1000  Reason for Disposition  • MODERATE pain (e g , interferes with normal activities, limping) and present > 3 days    Answer Assessment - Initial Assessment Questions  1  ONSET: \"When did the pain start? \"       A couple days   2  LOCATION: \"Where is the pain located? \"       Inside thighs   3  PAIN: \"How bad is the pain? \"    (Scale 1-10; or mild, moderate, severe)    -  MILD (1-3): doesn't interfere with normal activities     -  MODERATE (4-7): interferes with normal activities (e g , work or school) or awakens from sleep, limping     -  SEVERE (8-10): excruciating pain, unable to do any normal activities, unable to walk      Moderate   4  WORK OR EXERCISE: \"Has there been any recent work or exercise that involved this part of the body? \"       Denies   5  CAUSE: \"What do you think is causing the leg pain? \"      Unaware   6  OTHER SYMPTOMS: \"Do you have any other symptoms? \" (e g , chest pain, back pain, breathing difficulty, swelling, rash, fever, numbness, weakness)      Diarrhea, foul smelling vaginal discharge   7  PREGNANCY: \"Is there any chance you are pregnant? \" \"When was your last menstrual period? \"      N/A    Protocols used: LEG PAIN-ADULT-[place]    "

## 2023-05-23 NOTE — PATIENT INSTRUCTIONS
Decrease metoprolol to one tablet a day  Supportive care discussed and advised  Advised to RTO for any worsening and no improvement  Follow up for no improvement and worsening of conditions  Patient advised and educated when to see immediate medical care

## 2023-05-23 NOTE — TELEPHONE ENCOUNTER
"Regarding: Thigh pain, diarrhea, foul-smelling discharge  ----- Message from Lea Dowell RN sent at 5/23/2023  7:09 AM EDT -----  \"I would like to know if I can get in to see Dr Wilman Giordano today  \"    "

## 2023-05-23 NOTE — PROGRESS NOTES
Assessment/Plan:    1  Vaginal discharge  -     VAGINOSIS DNA PROBE (AFFIRM)  -     Genital Comprehensive Culture  -     Chlamydia/GC amplified DNA by PCR    2  Heme positive stool  -     Ambulatory Referral to Gastroenterology; Future    3  Diarrhea, unspecified type  -     Ambulatory Referral to Gastroenterology; Future  -     POCT hemoccult screening    4  Depression, recurrent ([address] )  Comments:  will be following with her PCP in 2 days and will discuss then    5  Papanicolaou smear for cervical cancer screening  -     Liquid-based pap, screening    6  Primary hypertension  Assessment & Plan:  BP low and advised to reduce metoprolol to once a day and will follow back in 2 days and will go to ER for any worsening of symptoms            BMI Counseling: Body mass index is 16 74 kg/m²  Discussed the patient's BMI with her  The BMI is below normal  Patient was advised to gain weight  Patient Instructions:  Decrease metoprolol to one tablet a day  Supportive care discussed and advised  Advised to RTO for any worsening and no improvement  Follow up for no improvement and worsening of conditions  Patient advised and educated when to see immediate medical care  Return if symptoms worsen or fail to improve        Future Appointments   Date Time Provider Henna Reid   5/24/2023 10:30 AM SH CT NOR 1 SH NOR CT [facility]   5/25/2023 To Be Determined Juana Day RN [facility]   5/25/2023 To Be Determined Jodi Fontana PT [facility]   5/26/2023 12:15 PM DO Eligio Man   5/30/2023 To Be Determined Jodi Fontana PT [facility]   5/30/2023 To Be Determined Juana Day RN [facility]   5/31/2023 11:40 AM MAGGIE Comer [facility]   6/1/2023 To Be Determined Jodi Fontana PT [facility]   6/2/2023 To Be Determined Juana Day RN [facility]   6/6/2023 To Be Determined Dante Muñoz "Ladon Gosselin, PT [facility]   6/6/2023 To Be Determined Torres Cha, RN [facility]   6/7/2023 10:40 AM Dian Raines MD [facility]   6/8/2023 To Be Determined Rujerrica Seat, PT [facility]   6/9/2023 To Be Determined Torres Cha, RN [facility]   6/13/2023 To Be Determined Torres Cha RN [facility]   6/16/2023 To Be Determined Torres Cha RN [facility]   6/19/2023 12:00 PM Nadia Urena MD [facility]   6/20/2023 To Be Determined Torres Cha RN [facility]   6/23/2023 To Be Determined Torres Cha RN [facility]   6/28/2023 To Be Determined Torres Cha RN [facility]   7/5/2023 To Be Determined Torres Cha RN [facility]   8/11/2023 10:45 AM DEVICE REMOTE [place] [facility]   11/6/2023 10:15 AM DO MATTI Holden [facility]   11/10/2023 10:45 AM DEVICE REM[facility] [place] [facility]   2/9/2024 10:45 AM DEVICE REMOTE [place] [facility]           Subjective:      Patient ID: Wilfredo Ballard is a 69 y o  female  Chief Complaint   Patient presents with   • Leg Pain     Burning sensation in thighs for the past couple weeks  • Diarrhea     Onset: 1 week   • vaginal odor     Patient c/o foul/sour smell coming from her vaginal area  Onset: 1 week  Amaury Jang CMA    • Vomiting         Vitals:  BP (!) 82/60   Pulse 92   Temp (!) 97 3 °F (36 3 °C)   Resp 16   Ht 5' 2 5\" (1 588 m)   Wt 42 2 kg (93 lb)   LMP  (LMP Unknown)   SpO2 93%   BMI 16 74 kg/m²     HPI  Patient stated that having vaginal discharge with foul odor from last week and stated that last time she was sexually active was about 4 months ago  Stated that not sure when she had last PAP done  Stated that also having diarrhea from a week and having BM about 2 times a day and denies any abdominal pain, fever and chills    Also having some " cramps in bilateral thigh areas  Stated that has home health nurses coming to her  Complaint with medications for chronic illnesses and tolerating it well          PHQ-2/9 Depression Screening    Little interest or pleasure in doing things: 3 - nearly every day  Feeling down, depressed, or hopeless: 0 - not at all  Trouble falling or staying asleep, or sleeping too much: 2 - more than half the days  Feeling tired or having little energy: 3 - nearly every day  Poor appetite or overeating: 3 - nearly every day  Feeling bad about yourself - or that you are a failure or have let yourself or your family down: 3 - nearly every day  Trouble concentrating on things, such as reading the newspaper or watching television: 1 - several days  Moving or speaking so slowly that other people could have noticed  Or the opposite - being so fidgety or restless that you have been moving around a lot more than usual: 0 - not at all  Thoughts that you would be better off dead, or of hurting yourself in some way: 0 - not at all  PHQ-2 Score: 3  PHQ-2 Interpretation: POSITIVE depression screen  PHQ-9 Score: 15   PHQ-9 Interpretation: Moderately severe depression         Depression Screening Follow-up Plan: Patient's depression screening was positive with a PHQ-2 score of 3  Their PHQ-9 score was 15  Patient advised to follow-up with PCP for further management  The following portions of the patient's history were reviewed and updated as appropriate: allergies, current medications, past family history, past medical history, past social history, past surgical history and problem list       Review of Systems   Constitutional: Negative  Negative for appetite change, chills, fever and unexpected weight change  HENT: Negative  Respiratory: Negative  Cardiovascular: Negative  Gastrointestinal: Positive for diarrhea  Negative for abdominal pain, anal bleeding, blood in stool, constipation, nausea, rectal pain and vomiting  Genitourinary: Positive for vaginal discharge  Negative for difficulty urinating, dysuria, flank pain, frequency, genital sores, hematuria and urgency  As noted in HPI     Musculoskeletal: Positive for myalgias  Skin: Negative  Neurological: Negative  Objective:    Social History     Tobacco Use   Smoking Status Former   • Packs/day: 0 50   • Years: 50 00   • Pack years: 25 00   • Types: Cigarettes   • Start date: 6/15/1973   • Quit date: 2023   • Years since quittin 0   Smokeless Tobacco Never   Tobacco Comments    3 cigarettes daily        Allergies: No Known Allergies      Current Outpatient Medications   Medication Sig Dispense Refill   • albuterol (Ventolin HFA) 90 mcg/act inhaler Inhale 2 puffs every 6 (six) hours as needed for wheezing (rinse mouth after use) 18 g 1   • apixaban (Eliquis) 5 mg Take 1 tablet (5 mg total) by mouth 2 (two) times a day 60 tablet 3   • Aspirin Low Dose 81 MG EC tablet TAKE 1 TABLET BY MOUTH EVERY DAY 90 tablet 3   • atorvastatin (LIPITOR) 40 mg tablet Take 1 tablet (40 mg total) by mouth daily 90 tablet 1   • Blood Glucose Monitoring Suppl (OneTouch Verio Reflect) w/Device KIT Check blood sugars three times daily  Please substitute with appropriate alternative as covered by patient's insurance  Dx: E11 65 1 kit 0   • cholecalciferol (VITAMIN D3) 1,000 units tablet Take 2 tablets (2,000 Units total) by mouth daily 10 tablet 0   • dapagliflozin 5 MG TABS Take 1 tablet (5 mg total) by mouth daily 90 tablet 0   • Flovent  MCG/ACT inhaler Inhale 1 puff 2 (two) times a day PT NOT TAKING      • glucose blood (OneTouch Verio) test strip Check blood sugars three times daily  Please substitute with appropriate alternative as covered by patient's insurance   Dx: E11 65 300 each 1   • Insulin Glargine Solostar (Lantus SoloStar) 100 UNIT/ML SOPN Inject 0 1 mL (10 Units total) under the skin daily at bedtime 3 mL 0   • Insulin Pen Needle (BD Pen Needle Kateryna U/F) 32G X 4 MM MISC Use daily as directed with insulin pen 100 each 0   • metFORMIN (GLUCOPHAGE) 1000 MG tablet Take 1 tablet (1,000 mg total) by mouth 2 (two) times a day with meals 180 tablet 1   • metoprolol succinate (TOPROL-XL) 50 mg 24 hr tablet Take 1 tablet (50 mg total) by mouth 2 (two) times a day 60 tablet 0   • nystatin-triamcinolone (MYCOLOG-II) ointment Apply topically 2 (two) times a day To vaginal area 60 g 1   • OneTouch Delica Lancets 04S MISC Check blood sugars three times daily  Please substitute with appropriate alternative as covered by patient's insurance  Dx: E11 65 300 each 1   • sacubitril-valsartan (ENTRESTO)  MG TABS Take 1 tablet by mouth 2 (two) times a day 60 tablet 0     No current facility-administered medications for this visit  Physical Exam  Vitals reviewed  Exam conducted with a chaperone present Marisol Chen)  Constitutional:       Appearance: She is underweight  Cardiovascular:      Rate and Rhythm: Normal rate and regular rhythm  Heart sounds: Normal heart sounds  Pulmonary:      Effort: Pulmonary effort is normal       Breath sounds: Normal breath sounds  Abdominal:      General: Bowel sounds are normal       Palpations: Abdomen is soft  Tenderness: There is no abdominal tenderness  Genitourinary:     Labia:         Right: No rash or tenderness  Left: No rash or tenderness  Vagina: No signs of injury  Vaginal discharge (light yellow ) present  No tenderness  Cervix: No discharge, lesion, erythema or cervical bleeding  Uterus: Not tender  Adnexa:         Right: No tenderness  Left: No tenderness  Rectum: Guaiac result positive  No tenderness  Skin:     General: Skin is warm and dry  Neurological:      Mental Status: She is alert and oriented to person, place, and time  Psychiatric:         Behavior: Behavior normal          Thought Content:  Thought content normal          Judgment: Judgment normal                      MAGGIE Baker

## 2023-05-24 ENCOUNTER — HOSPITAL ENCOUNTER (OUTPATIENT)
Dept: RADIOLOGY | Facility: IMAGING CENTER | Age: 68
Discharge: HOME | End: 2023-05-24

## 2023-05-24 ENCOUNTER — LAB (OUTPATIENT)
Dept: LAB | Facility: IMAGING CENTER | Age: 68
End: 2023-05-24
Payer: COMMERCIAL

## 2023-05-24 DIAGNOSIS — Z72.0 TOBACCO USE: ICD-10-CM

## 2023-05-24 DIAGNOSIS — R63.4 WEIGHT LOSS: ICD-10-CM

## 2023-05-24 DIAGNOSIS — I50.9 CHF EXACERBATION (HCC): ICD-10-CM

## 2023-05-24 DIAGNOSIS — E87.5 HYPERKALEMIA: ICD-10-CM

## 2023-05-24 DIAGNOSIS — Z12.2 SCREENING FOR MALIGNANT NEOPLASM OF RESPIRATORY ORGAN: ICD-10-CM

## 2023-05-24 LAB
ANION GAP SERPL CALCULATED.3IONS-SCNC: 1 MMOL/L (ref 4–13)
BUN SERPL-MCNC: 43 MG/DL (ref 5–25)
CALCIUM SERPL-MCNC: 9.7 MG/DL (ref 8.3–10.1)
CANDIDA RRNA VAG QL PROBE: NEGATIVE
CHLORIDE SERPL-SCNC: 105 MMOL/L (ref 96–108)
CO2 SERPL-SCNC: 26 MMOL/L (ref 21–32)
CREAT SERPL-MCNC: 1.65 MG/DL (ref 0.6–1.3)
G VAGINALIS RRNA GENITAL QL PROBE: NEGATIVE
GFR SERPL CREATININE-BSD FRML MDRD: 31 ML/MIN/1.73SQ M
GLUCOSE P FAST SERPL-MCNC: 118 MG/DL (ref 65–99)
POTASSIUM SERPL-SCNC: 5.3 MMOL/L (ref 3.5–5.3)
SODIUM SERPL-SCNC: 132 MMOL/L (ref 135–147)
T VAGINALIS RRNA GENITAL QL PROBE: NEGATIVE

## 2023-05-24 PROCEDURE — 80048 BASIC METABOLIC PNL TOTAL CA: CPT

## 2023-05-24 PROCEDURE — 36415 COLL VENOUS BLD VENIPUNCTURE: CPT

## 2023-05-24 RX ORDER — METOPROLOL SUCCINATE 50 MG/1
50 TABLET, EXTENDED RELEASE ORAL DAILY
Qty: 30 TABLET | Refills: 0 | Status: SHIPPED | OUTPATIENT
Start: 2023-05-24 | End: 2023-05-31 | Stop reason: SDUPTHER

## 2023-05-25 ENCOUNTER — HOME CARE VISIT (OUTPATIENT)
Dept: HOME HEALTH SERVICES | Facility: HOME HEALTHCARE | Age: 68
End: 2023-05-25

## 2023-05-25 VITALS
DIASTOLIC BLOOD PRESSURE: 62 MMHG | TEMPERATURE: 97.4 F | BODY MASS INDEX: 16.34 KG/M2 | HEART RATE: 106 BPM | WEIGHT: 90.8 LBS | SYSTOLIC BLOOD PRESSURE: 82 MMHG | RESPIRATION RATE: 20 BRPM | OXYGEN SATURATION: 100 %

## 2023-05-25 VITALS — OXYGEN SATURATION: 97 % | HEART RATE: 84 BPM

## 2023-05-25 LAB
C TRACH DNA SPEC QL NAA+PROBE: NEGATIVE
HPV HR 12 DNA CVX QL NAA+PROBE: POSITIVE
HPV16 DNA CVX QL NAA+PROBE: NEGATIVE
HPV18 DNA CVX QL NAA+PROBE: POSITIVE
N GONORRHOEA DNA SPEC QL NAA+PROBE: NEGATIVE

## 2023-05-25 NOTE — CASE COMMUNICATION
Update on patient  I saw patient today and BP was 82/62  HR was 109 per min  Patient asymptomatic  I did remove her PM Metoprolol from her med box and also removed the Entresto  Patient did NOT take the pm dose of Metoprolol since Tuesday pm as instructed  Patient had taken her Sharilyn Mocha this am before she received the message to discontinue the med  Lungs clear and no edema noted and patient denies sob

## 2023-05-26 ENCOUNTER — OFFICE VISIT (OUTPATIENT)
Dept: FAMILY MEDICINE CLINIC | Facility: CLINIC | Age: 68
End: 2023-05-26

## 2023-05-26 ENCOUNTER — TELEPHONE (OUTPATIENT)
Dept: FAMILY MEDICINE CLINIC | Facility: CLINIC | Age: 68
End: 2023-05-26

## 2023-05-26 VITALS
SYSTOLIC BLOOD PRESSURE: 102 MMHG | RESPIRATION RATE: 17 BRPM | BODY MASS INDEX: 16.66 KG/M2 | HEIGHT: 63 IN | TEMPERATURE: 97.2 F | WEIGHT: 94 LBS | HEART RATE: 80 BPM | DIASTOLIC BLOOD PRESSURE: 66 MMHG

## 2023-05-26 DIAGNOSIS — Z12.31 BREAST CANCER SCREENING BY MAMMOGRAM: ICD-10-CM

## 2023-05-26 DIAGNOSIS — E11.22 CKD STAGE 3 DUE TO TYPE 2 DIABETES MELLITUS (HCC): ICD-10-CM

## 2023-05-26 DIAGNOSIS — Z79.4 TYPE 2 DIABETES MELLITUS WITH STAGE 3B CHRONIC KIDNEY DISEASE, WITH LONG-TERM CURRENT USE OF INSULIN (HCC): ICD-10-CM

## 2023-05-26 DIAGNOSIS — N18.32 TYPE 2 DIABETES MELLITUS WITH STAGE 3B CHRONIC KIDNEY DISEASE, WITH LONG-TERM CURRENT USE OF INSULIN (HCC): ICD-10-CM

## 2023-05-26 DIAGNOSIS — E78.5 HYPERLIPIDEMIA LDL GOAL <100: ICD-10-CM

## 2023-05-26 DIAGNOSIS — N89.8 VAGINAL ODOR: Primary | ICD-10-CM

## 2023-05-26 DIAGNOSIS — I10 PRIMARY HYPERTENSION: ICD-10-CM

## 2023-05-26 DIAGNOSIS — N18.30 CKD STAGE 3 DUE TO TYPE 2 DIABETES MELLITUS (HCC): ICD-10-CM

## 2023-05-26 DIAGNOSIS — B97.7 HPV IN FEMALE: ICD-10-CM

## 2023-05-26 DIAGNOSIS — E11.22 TYPE 2 DIABETES MELLITUS WITH STAGE 3B CHRONIC KIDNEY DISEASE, WITH LONG-TERM CURRENT USE OF INSULIN (HCC): ICD-10-CM

## 2023-05-26 DIAGNOSIS — E11.29 TYPE 2 DIABETES MELLITUS WITH OTHER DIABETIC KIDNEY COMPLICATION, WITHOUT LONG-TERM CURRENT USE OF INSULIN (HCC): ICD-10-CM

## 2023-05-26 LAB — BACTERIA GENITAL AEROBE CULT: NORMAL

## 2023-05-26 RX ORDER — SACCHAROMYCES BOULARDII 250 MG
250 CAPSULE ORAL 2 TIMES DAILY
Qty: 60 CAPSULE | Refills: 0 | Status: SHIPPED | OUTPATIENT
Start: 2023-05-26

## 2023-05-26 NOTE — PROGRESS NOTES
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  Trial probiotics  Vaginal hygiene products otc  Gyn referral given    CKD3 currently, last GFR 31  Advised to reduce metformin dose for safety from 2000mg/d to 1000mg/d as has been having some better smbg lately and also fbs 65  CKD risks aware with metformin  Continue to watch smbg and keep endo f/u, may be able to reduce lantus further    CHF  Discussed balance of diuretics, fluid overload, potassium balance, CKD, dehydration  meds reviewed  Aware she needs close cardiology f/u, possibly nephrology consultation also in future  Risks of hyperkalemia and JACK on CKD aware    htn stable    tob use risks aware    For your kidney safety, we should reduce the dose of metformin from 2000mg per day to 1000mg per day  Please take half of the 1000mg metformin twice a day  Please continue monitoring your sugar readings to see if you may need adjustment of the once a day insulin (Lantus) and keep your appointment  with the Endocrinologist     Marcelinagemma Gary will need to see a Gynecologist to further investigate the HPV result (human papilloma virus) to be sure there no sign of cervical cancer problems  Diagnoses and all orders for this visit:    Vaginal odor  -     Ambulatory Referral to Obstetrics / Gynecology; Future  -     saccharomyces boulardii (FLORASTOR) 250 mg capsule; Take 1 capsule (250 mg total) by mouth 2 (two) times a day    Breast cancer screening by mammogram  -     Mammo screening bilateral w 3d & cad; Future    Hyperlipidemia LDL goal <100    Primary hypertension    Type 2 diabetes mellitus with stage 3b chronic kidney disease, with long-term current use of insulin (HCC)  -     metFORMIN (GLUCOPHAGE) 500 mg tablet;  Take 1 tablet (500 mg total) by mouth 2 (two) times a day with meals    HPV in female  -     Ambulatory Referral to Obstetrics / Gynecology; Future    Type 2 diabetes mellitus with other diabetic kidney complication, without long-term current use of insulin ([facility] [address] )    CKD stage 3 due to type 2 diabetes mellitus ([facility] [address] )        Return in about 6 months (around 11/26/2023)  Subjective:      Patient ID: Lee Gage is a 69 y o  female  Chief Complaint   Patient presents with   • Follow-up     Blood work f/u nm lpn       HPI  Not sexual active for 5m  acquantance per pt  He was possibly sexual promiscous  Still has the odor  No dc noted  No vaginal bleeding  No fever    Cousin Kandis, aware I s/w her    Not currently on any diuretics or potassium  Weighs self every other day  Did gain 1 pound yesterday    Does smbg  fbs qd, occasionally more  This am 65  yesterday 122 fasting  After meal 170 few days ago  Currently 10 u at hs lantus    The following portions of the patient's history were reviewed and updated as appropriate: allergies, current medications, past family history, past medical history, past social history, past surgical history and problem list     Review of Systems   Constitutional: Negative for fever  Respiratory: Negative for shortness of breath  Current Outpatient Medications   Medication Sig Dispense Refill   • albuterol (Ventolin HFA) 90 mcg/act inhaler Inhale 2 puffs every 6 (six) hours as needed for wheezing (rinse mouth after use) 18 g 1   • apixaban (Eliquis) 5 mg Take 1 tablet (5 mg total) by mouth 2 (two) times a day 60 tablet 3   • Aspirin Low Dose 81 MG EC tablet TAKE 1 TABLET BY MOUTH EVERY DAY 90 tablet 3   • atorvastatin (LIPITOR) 40 mg tablet Take 1 tablet (40 mg total) by mouth daily 90 tablet 1   • Blood Glucose Monitoring Suppl (OneTouch Verio Reflect) w/Device KIT Check blood sugars three times daily  Please substitute with appropriate alternative as covered by patient's insurance   Dx: E11 65 1 kit 0   • cholecalciferol (VITAMIN D3) 1,000 units tablet Take 2 tablets (2,000 Units total) by mouth daily 10 "tablet 0   • dapagliflozin 5 MG TABS Take 1 tablet (5 mg total) by mouth daily 90 tablet 0   • glucose blood (OneTouch Verio) test strip Check blood sugars three times daily  Please substitute with appropriate alternative as covered by patient's insurance  Dx: E11 65 300 each 1   • Insulin Glargine Solostar (Lantus SoloStar) 100 UNIT/ML SOPN Inject 0 1 mL (10 Units total) under the skin daily at bedtime 3 mL 0   • Insulin Pen Needle (BD Pen Needle Kateryna U/F) 32G X 4 MM MISC Use daily as directed with insulin pen 100 each 0   • metFORMIN (GLUCOPHAGE) 500 mg tablet Take 1 tablet (500 mg total) by mouth 2 (two) times a day with meals 60 tablet 2   • metoprolol succinate (TOPROL-XL) 50 mg 24 hr tablet Take 1 tablet (50 mg total) by mouth daily 30 tablet 0   • nystatin-triamcinolone (MYCOLOG-II) ointment Apply topically 2 (two) times a day To vaginal area 60 g 1   • OneTouch Delica Lancets 94W MISC Check blood sugars three times daily  Please substitute with appropriate alternative as covered by patient's insurance  Dx: E11 65 300 each 1   • saccharomyces boulardii (FLORASTOR) 250 mg capsule Take 1 capsule (250 mg total) by mouth 2 (two) times a day 60 capsule 0   • Flovent  MCG/ACT inhaler Inhale 1 puff 2 (two) times a day PT NOT TAKING  (Patient not taking: Reported on 5/26/2023)     • zinc oxide (BALMEX) 11 3 % cream Apply 1 application  topically 2 (two) times a day  Indications: skin care (Patient not taking: Reported on 5/26/2023)       No current facility-administered medications for this visit  Objective:    /66   Pulse 80   Temp (!) 97 2 °F (36 2 °C)   Resp 17   Ht 5' 2 5\" (1 588 m)   Wt 42 6 kg (94 lb)   LMP  (LMP Unknown)   BMI 16 92 kg/m²        Physical Exam  Vitals and nursing note reviewed  Constitutional:       General: She is not in acute distress  Appearance: She is well-developed  She is not ill-appearing  HENT:      Head: Normocephalic     Eyes:      General: No scleral " icterus  Conjunctiva/sclera: Conjunctivae normal    Cardiovascular:      Rate and Rhythm: Normal rate and regular rhythm  Pulses: no weak pulses          Dorsalis pedis pulses are 2+ on the right side and 2+ on the left side  Pulmonary:      Effort: Pulmonary effort is normal  No respiratory distress  Breath sounds: No wheezing or rales  Abdominal:      Palpations: Abdomen is soft  Tenderness: There is no abdominal tenderness  There is no guarding or rebound  Musculoskeletal:         General: No deformity  Cervical back: Neck supple  Right lower leg: No edema  Left lower leg: No edema  Skin:     General: Skin is warm and dry  Coloration: Skin is not pale  Neurological:      Mental Status: She is alert  Motor: No weakness  Gait: Gait normal    Psychiatric:         Mood and Affect: Mood normal          Behavior: Behavior normal          Thought Content: Thought content normal        Diabetic Foot Exam    Patient's shoes and socks removed  Right Foot/Ankle   Right Foot Inspection  Skin Exam: skin intact  No abnormal color  Sensory   Monofilament testing: intact    Vascular  The right DP pulse is 2+  Left Foot/Ankle  Left Foot Inspection  Skin Exam: skin intact  Normal color  Sensory   Monofilament testing: intact    Vascular  The left DP pulse is 2+  Assign Risk Category  No deformity present  No loss of protective sensation  No weak pulses  Risk: 0    41 minutes spent with patient and with chart review and documentation completion           Melanie Travis DO

## 2023-05-26 NOTE — TELEPHONE ENCOUNTER
The Visiting nurse Miladis Falk left a message this am, requesting a call back about some concerns she has about oSfiya  She has a small pressure sore, right buttock which she would like an order for   She would like an order for Zinc Oxide [id number]

## 2023-05-26 NOTE — PATIENT INSTRUCTIONS
For your kidney safety, we should reduce the dose of metformin from 2000mg per day to 1000mg per day  Please take half of the 1000mg metformin twice a day  Please continue monitoring your sugar readings to see if you may need adjustment of the once a day insulin (Lantus) and keep your appointment  with the Endocrinologist     Ellene Primrose will need to see a Gynecologist to further investigate the HPV result (human papilloma virus) to be sure there no sign of cervical cancer problems

## 2023-05-29 ENCOUNTER — TELEPHONE (OUTPATIENT)
Dept: OTHER | Facility: OTHER | Age: 68
End: 2023-05-29

## 2023-05-30 ENCOUNTER — HOME CARE VISIT (OUTPATIENT)
Dept: HOME HEALTH SERVICES | Facility: HOME HEALTHCARE | Age: 68
End: 2023-05-30

## 2023-05-30 ENCOUNTER — HOME CARE VISIT (OUTPATIENT)
Dept: HOME HEALTH SERVICES | Facility: HOME HEALTHCARE | Age: 68
End: 2023-05-30
Payer: COMMERCIAL

## 2023-05-30 VITALS — HEART RATE: 84 BPM | OXYGEN SATURATION: 96 %

## 2023-05-30 VITALS
OXYGEN SATURATION: 99 % | HEART RATE: 84 BPM | BODY MASS INDEX: 16.2 KG/M2 | SYSTOLIC BLOOD PRESSURE: 94 MMHG | DIASTOLIC BLOOD PRESSURE: 62 MMHG | RESPIRATION RATE: 16 BRPM | WEIGHT: 90 LBS | TEMPERATURE: 97 F

## 2023-05-30 PROCEDURE — G0299 HHS/HOSPICE OF RN EA 15 MIN: HCPCS

## 2023-05-31 ENCOUNTER — TELEPHONE (OUTPATIENT)
Dept: CARDIOLOGY CLINIC | Facility: CLINIC | Age: 68
End: 2023-05-31

## 2023-05-31 ENCOUNTER — APPOINTMENT (OUTPATIENT)
Dept: LAB | Facility: CLINIC | Age: 68
End: 2023-05-31
Payer: COMMERCIAL

## 2023-05-31 ENCOUNTER — OFFICE VISIT (OUTPATIENT)
Dept: CARDIOLOGY CLINIC | Facility: CLINIC | Age: 68
End: 2023-05-31

## 2023-05-31 VITALS
SYSTOLIC BLOOD PRESSURE: 115 MMHG | DIASTOLIC BLOOD PRESSURE: 68 MMHG | BODY MASS INDEX: 17.31 KG/M2 | HEART RATE: 91 BPM | WEIGHT: 96.2 LBS

## 2023-05-31 VITALS
RESPIRATION RATE: 16 BRPM | OXYGEN SATURATION: 99 % | SYSTOLIC BLOOD PRESSURE: 118 MMHG | TEMPERATURE: 97.4 F | WEIGHT: 94.6 LBS | BODY MASS INDEX: 17.03 KG/M2 | HEART RATE: 94 BPM | DIASTOLIC BLOOD PRESSURE: 70 MMHG

## 2023-05-31 DIAGNOSIS — N18.30 CKD STAGE 3 DUE TO TYPE 2 DIABETES MELLITUS (HCC): ICD-10-CM

## 2023-05-31 DIAGNOSIS — I50.9 CHF EXACERBATION (HCC): ICD-10-CM

## 2023-05-31 DIAGNOSIS — I25.10 CORONARY ARTERY DISEASE INVOLVING NATIVE CORONARY ARTERY OF NATIVE HEART WITHOUT ANGINA PECTORIS: ICD-10-CM

## 2023-05-31 DIAGNOSIS — E11.22 CKD STAGE 3 DUE TO TYPE 2 DIABETES MELLITUS (HCC): ICD-10-CM

## 2023-05-31 DIAGNOSIS — I50.22 CHRONIC HFREF (HEART FAILURE WITH REDUCED EJECTION FRACTION) (HCC): Primary | ICD-10-CM

## 2023-05-31 DIAGNOSIS — I50.22 CHRONIC HFREF (HEART FAILURE WITH REDUCED EJECTION FRACTION) (HCC): ICD-10-CM

## 2023-05-31 DIAGNOSIS — E11.22 TYPE 2 DIABETES MELLITUS WITH STAGE 3B CHRONIC KIDNEY DISEASE, WITH LONG-TERM CURRENT USE OF INSULIN (HCC): ICD-10-CM

## 2023-05-31 DIAGNOSIS — Z79.4 TYPE 2 DIABETES MELLITUS WITH STAGE 3B CHRONIC KIDNEY DISEASE, WITH LONG-TERM CURRENT USE OF INSULIN (HCC): ICD-10-CM

## 2023-05-31 DIAGNOSIS — I48.0 PAF (PAROXYSMAL ATRIAL FIBRILLATION) (HCC): ICD-10-CM

## 2023-05-31 DIAGNOSIS — I42.8 NICM (NONISCHEMIC CARDIOMYOPATHY) (HCC): ICD-10-CM

## 2023-05-31 DIAGNOSIS — N18.32 TYPE 2 DIABETES MELLITUS WITH STAGE 3B CHRONIC KIDNEY DISEASE, WITH LONG-TERM CURRENT USE OF INSULIN (HCC): ICD-10-CM

## 2023-05-31 DIAGNOSIS — Z72.0 TOBACCO USE: ICD-10-CM

## 2023-05-31 DIAGNOSIS — I10 PRIMARY HYPERTENSION: ICD-10-CM

## 2023-05-31 LAB
ANION GAP SERPL CALCULATED.3IONS-SCNC: 7 MMOL/L (ref 4–13)
BNP SERPL-MCNC: 691 PG/ML (ref 0–100)
BUN SERPL-MCNC: 35 MG/DL (ref 5–25)
CALCIUM SERPL-MCNC: 9.6 MG/DL (ref 8.4–10.2)
CHLORIDE SERPL-SCNC: 105 MMOL/L (ref 96–108)
CO2 SERPL-SCNC: 27 MMOL/L (ref 21–32)
CREAT SERPL-MCNC: 1.15 MG/DL (ref 0.6–1.3)
GFR SERPL CREATININE-BSD FRML MDRD: 48 ML/MIN/1.73SQ M
GLUCOSE SERPL-MCNC: 184 MG/DL (ref 65–140)
POTASSIUM SERPL-SCNC: 4.5 MMOL/L (ref 3.5–5.3)
SODIUM SERPL-SCNC: 139 MMOL/L (ref 135–147)

## 2023-05-31 PROCEDURE — 80048 BASIC METABOLIC PNL TOTAL CA: CPT

## 2023-05-31 PROCEDURE — 36415 COLL VENOUS BLD VENIPUNCTURE: CPT

## 2023-05-31 PROCEDURE — 83880 ASSAY OF NATRIURETIC PEPTIDE: CPT

## 2023-05-31 RX ORDER — FUROSEMIDE 20 MG/1
20 TABLET ORAL DAILY PRN
Qty: 30 TABLET | Refills: 3 | Status: SHIPPED | OUTPATIENT
Start: 2023-05-31

## 2023-05-31 RX ORDER — METOPROLOL SUCCINATE 50 MG/1
50 TABLET, EXTENDED RELEASE ORAL DAILY
Qty: 30 TABLET | Refills: 3 | Status: SHIPPED | OUTPATIENT
Start: 2023-05-31 | End: 2023-09-28

## 2023-05-31 NOTE — LETTER
May 31, 2023     Rosalba Bach DO  [facility]  [address] [facility] [address]    Patient: Rickey Stokes   YOB: 1955   Date of Visit: 5/31/2023       Dear Dr Leonrado Noel: Thank you for referring Rickey Stokes to me for evaluation  Below are my notes for this consultation  If you have questions, please do not hesitate to call me  I look forward to following your patient along with you  Sincerely,        MAGGIE Olvera        CC: No Recipients    MAGGIE Olvera  5/31/2023 12:28 PM  Sign when Signing Visit  Cardiology  Heart Failure   Acute  Office Visit Note     Rickey Stokes   69 y o    female   MRN: [id number]  [address]  [address] [place] [address]  [phone]    PCP: Rosalba Bach DO  Cardiologist : Dr Jennett Phoenix            Summary of recommendations  -Continue  A salt restricted diet  Daily weights; heart failure education as previous; this was reinforced today  -Nonfasting BMP today  -Resume Lasix, at 20 mg daily PRN for 3 pound weight gain in 24 hours, above dry weight  She will then take the Lasix 3 days in row and then stop  -F/U vasc surgery  She has no showed several times in the past   I reviewed her lower extremity ROSA which was abnormal  -F/U endo 6/19  Follow up with Dr Jennett Phoenix 6/7/2023         Impression/plan  Chronic HFrEF, EF 35%, recent adm for decompensation 4/26 to 4/30/2023  Etiology: unclear etiology, nonischemic  Possible due to hypertensive heart disease  Has afib with no documented RVR  Cause of decompensation: likely due to nonadherence to diuretic  Dry wt: 93 lbs  Wt Readings from Last 3 Encounters:   05/31/23 43 6 kg (96 lb 3 2 oz)   05/30/23 42 9 kg (94 lb 9 6 oz)   05/26/23 42 6 kg (94 lb)   Diuretic: Given Lasix 20 mg PRN to take daily for 3 days in a row for 3 pound weight gain in 24 hours  Beta-Blocker:  Toprol 50 mg daily  In the past it was a higher dose    Her blood pressure was too low, and subsequently this was reduced  ACEi, ARB or ARNi:   Currently off, given she had very low blood pressure  Aldosterone Receptor Blocker: She was on spironolactone in the past taken off given very low BPs   [id number] Jardiance-- not taken due to cost  ICD:S/p DC ICD implant 3/9/22  Narrow QRS  Educated regarding adherence to a 1 5g -2 gram Na diet/ 2L fluid restriction, daily weights and to call us if she gains  3 lbs/ 1 day or 5 lbs/ 1 week  NICM, EF 35%, by echo 4/26/2023  Her ejection fraction has been suppressed since 2022  S/PMDT DC ICD 3/9/22 ( right sided) MRI conditional  Interrogation 5/9/23  AP 3 6%  <0 1%  No significant high rates  Optivol within normal limits  PAF rate controlled with metoprolol, anticoagulation Eliquis 5 mg twice daily  Moderate CAD, nonobstructive disease by [address] [place] [address] Anita Sosa  On aspirin 81, statin, beta-blocker  Claudication-- referred to vascular surgery  She has canceled several times  She is symptomatic  Lower ROSA 1/2022:   RIGHT LOWER LIMB:Evidence of >75% stenosis noted in the mid superficial femoral artery withdiffuse disease noted throughout the remaining femoral-popliteal andtibioperoneal arteries without significant focal stenosis  Ankle/Brachial index: 0 86 which is in the mild disease category  LEFT LOWER LIMB:Evidence of >75% stenosis noted in the proximal popliteal arteryThere is 50-75% stenosis in the distal superficial femoral artery followed by a>75% stenosis in the most distal portion  Evidence of tibioperoneal disease with high grade stenosis vs occlusion of thedistal posterior tibial artery  Ankle/Brachial index: 0 69 which is in the moderate disease category  Hypertension, essential  BP  115/68  In the past she had symptomatic hypotension and she was on several medications that were subsequently discontinued  These included high-dose Entresto, spironolactone, higher dose Toprol, higher dose Lasix  Hyperlipidemia    on atorvastatin 40 mg daily  1/18/23 calculated LDL 76   Latest Reference Range & Units 10/07/16 08:41 10/25/18 14:06 11/16/20 05:17 03/09/21 09:54 01/18/23 11:20   Cholesterol See Comment mg/dL 190 151 168 144 163   Triglycerides See Comment mg/dL 100 76 82 76 72   HDL >=50 mg/dL 62 63 (H) 47 64 73   Non-HDL Cholesterol mg/dl   121 80    LDL Calculated 0 - 100 mg/dL 108 (H) 73 105 (H) 65 76     Diabetes mellitus type 2, uncontrolled: 4/26/23 HgA1C 12 2, prior 14 9, on metformin follow-up with endocrine  CKD2  Baseline Cr around 1  Obstructive sleep apnea  Had some type of surgery ( UPP? ) in the 80s  Not restested in the last 10 yrs  Tobacco abuse, ongoing  Cessation imperative  Nicotine replacement has been ordered  Breast cancer status post mastectomy and treatment with chemotherapy> 15 years ago  No radiation  S/P  left sided mastectomy and lymphnode dissection  Her recent CT scan 5/24 showed no evidence of malignancy  Left lower lobe lung nodule  Annual surveillance CT chest  Cardiac testing  TTE 11/15/2020  Ef 50%  here was moderate hypokinesis of the basal to mid inferior and inferolateral walls  Wall thickness was normal  Grade 1 DD  RV nl size and function  Trace MR  Trace AI   cardiac catheterization 11/16/2020   --  Left main: MLI  --  Mid LAD: There was a 50 % stenosis, iFR 0 97 not hemodynamically significant  --  Mid circumflex: There was a 50 % stenosis  iFR 0 96 not hemodynamically significant  --  1st obtuse marginal: There was a 60 % stenosis, iFR 0 98 not hemodynamically significant  --  RCA: MLI  IMPRESSIONS  No signficant obstructive atherosclerotic CAD requiring revasculariztion  Moderate CAD as described  TTE 1/122022: EF 25-30%  LVIDd: 4 1 cm, normal wall thickness; RV: normal size and systolic function;MR: trace;PASP: 44mmHg, moderate TR;RVOT: no notching; mild AI  Abnormal diastology  TTE  4/26/23 EF 35%, mild global hypokinesis, diastolic dysfunction, AI, TR, mild MR    PASP 59 mmHg   Trivial pericardial fusion   + Lft pleural effusion                   HPI:   Magdi Bunch is a 77y o  year old female  with hypertension, diabetes, hyperlipidemia and tobacco abuse  She has history of breast cancer status post mastectomy and treatment with chemotherapy  Home medication regimen includes amlodipine 10 mg daily, atorvastatin 10 mg daily, metformin 1000 mg BID  Interval history  Adm 11/15-11/17/2020 with orthopnea and PND/ exertional dyspnea  On arrival her blood pressure was markedly elevated 181/110  Her EKG showed possible ectopic atrial tachycardia  Chest x-ray:  Mild interstitial edema  CTA:  No PE  Scattered ground-glass opacities, changes representing pulmonary edema with small bilateral pleural effusion  NT proBNP greater than 3200  An echocardiogram demonstrated normal LV Size /function was at the lower limits of normal  There was moderate hypokinesis of the basal to mid inferior and inferolateral walls   Cardiac catheterization was pursued  This demonstrated nonobstructive CAD  Medical management was recommended  She was placed on beta-blocker and ACE-inhibitor  Previously on amlodipine which was discontinued  Her EDP was 12 at time of catheterization  She did diurese with IV Lasix  She did have a rising creatinine and subsequently was recommended to be discharged without diuretic at this point  She was also found to have hypercalcemia with corrected calcium 11 4, an elevated alkaline phosphatase  PTH was elevated at 359-outpatient Endocrinology follow-up recommended  In addition, her diabetes is poorly controlled with an A1c of 10      12/2/2020  Hospital followup  Intermittent cough  SOB/ wakes her from sleep  No CP  Currently not watching her dietary sodium  No daily weights at home  No scale   One will be provided  Blood pressure 138/80  Few bibasilar crackles, no lower extremity edema    Heart tones distant, regular Her lowest weight hospital was 135,prior to discharge  She is up about 2 lb  Will start Lasix 20 mg daily x3 then 20 mg Monday Wednesday Friday  Her creatinine is 1 4 above her baseline of 1  Based on her current symptoms, I suspect she may have some abigail-vascular congestion  I suspect significant dietary sodium indiscretion  We talked about the importance of adhering to a low-sodium diet  Will provided written education material   Will referred to dietitian which she is amenable to  We discussed her coronary anatomy  Recommend she start a baby aspirin a day  Discussed importance of aggressive risk factor management  She is going to be seeing Endocrinology in the near future regarding her diabetes  Interval history  12/2021; 1/2022; 3/2022, 10/2022 OV Dr Wai Lopez  3/1/23 OV Dr Wai Lopez  increased SOB with exertion with some weight gain  Maintenance Lasix 40 mg Monday Wednesday Friday  Lasix increased to 40 mg daily  Toprol increased to 75 mg twice daily to help with SVT/A-fib episodes seen on device interrogation      Adm 4/26-4/30/23  Cc:worsening shortness of breath and fatigue x 2 weeks  Also orthopnea, leg edema, dry cough  Admitted non compliant with Lasix- given frequent urination and Lantus  Denied increase in dietary sodium or fld  BP in the ED: 210/135,    Hemoglobin A1c 12  BNP > 4700, chest x-ray: Pleural effusions, vascular congestion  Treated for CHF exacerbation  Followed by Cardiology, Advanced HF  Echo: Ef 35%  Diuresed with IV furosemide 60 bid  Entresto added  DC wt 101 lb  DC Cr 1 41  DC diuretic: lasix 40/d, spironoalcotne 25 mg/d  DC cardiac medications: Toprol 50 BID, Entresto 97/103 BID, Lasix 40 mg daily, spironolactone 25 mg daily, potassium 40 meq daily, Jardiance  Advised follow-up with Cardiology, Endocrinology, sleep medicine      5/17/23  (PMH: chronic HFrEF secondary to NICM, non obstructive CAD,S/P ICD, PAF, CKD3, T2DM,  HTN, HL, NASREEN)  Acute OV    Was not seen in the Cardiology office following admission for decompensated heart failure back in April  Today, she is accompanied by her brother with whom she is residing  He adds to the history  CC: H/A  Dizziness  Nauseated  Cant eat  Dizzy  Short of breath  Claudication  Sleeping on a couch  PUENTE  No PND  No orthopnea  weak  Quit smoking after discharge from the hospital   /70  Wt 90 lb  3/1/2023 was 131 pound  1/10/2023 was 129 pounds  o2 sat 98%  She looks chronically ill, no acute distress  Diminished breath sounds right base  I recommend urgent labs today  She is not taking potassium  She is not taking her lactulose  She is not taking Jardiance due to cost   Given her weight loss, I asked her to stop Lasix  We will also get a CT lung cancer screening  REC; F/u vasc surgery, as recommended in the past    5/31/23 Close follow-up, heart failure  Today she is accompanied by her brother  Today, Leah Floyd is much more energetic  Her blood pressure is higher  She feels better  She denies chest pain or shortness of breath  She does have a scale, she is performing daily weights  This morning at home she reports it was 94, here a little higher  She is trying to avoid dietary sodium  Fortunately the CT scan of her lungs showed no malignancy  Her EKG shows sinus rhythm at 91 bpm  Her blood pressure is 115/68  Today, I added back Lasix 20 mg daily as needed 3 days in a row for weight gain or symptoms suggestive of heart failure  For now, she is on an aspirin and statin, Eliquis 5 mg twice daily and Toprol 50 mg once daily  In the past she was on many more medications and her blood pressure did not support it  She felt unwell  She will go for a nonfasting BMP today to reassess her  Renal fxn    I reviewed the importance of a salt restricted diet  She will follow with Dr Matilde Bush June 7       minutes with Patient and family today in which greater than 50% of this time was spent in counseling/coordination of care regarding Diagnostic results, Instructions for management, Patient and family education, Importance of tx compliance, Risk factor reductions, Documenting in the medical record, Reviewing / ordering tests, medicine, procedures   and Obtaining or reviewing history    Assessment  Diagnoses and all orders for this visit:    Chronic HFrEF (heart failure with reduced ejection fraction) ([place] [address] )  -     Basic metabolic panel; Future    CHF exacerbation (HCC)  -     metoprolol succinate (TOPROL-XL) 50 mg 24 hr tablet; Take 1 tablet (50 mg total) by mouth daily  -     furosemide (LASIX) 20 mg tablet; Take 1 tablet (20 mg total) by mouth daily as needed (wt gain 3 lb/ 24 hours  /  take for 3 days in a row)  -     B-Type Natriuretic Peptide(BNP); Future    NICM (nonischemic cardiomyopathy) ([facility])    PAF (paroxysmal atrial fibrillation) ([facility])    Coronary artery disease involving native coronary artery of native heart without angina pectoris    Type 2 diabetes mellitus with stage 3b chronic kidney disease, with long-term current use of insulin ([place] [address] )    CKD stage 3 due to type 2 diabetes mellitus ([place] [address] )    Tobacco use    Primary hypertension        Past Medical History:   Diagnosis Date   • Breast cancer ([place] [address] )     left - 1994  • Diabetes mellitus ([place] [address] )    • Diabetes mellitus, type 2 ([place] [address] ) 03/15/2006    last assessed 7/10/13   • GERD (gastroesophageal reflux disease)    • History of chemotherapy    • Hypertension        Review of Systems   Constitutional: Positive for malaise/fatigue and weight loss  Negative for chills  Cardiovascular: Positive for dyspnea on exertion  Negative for chest pain, claudication, cyanosis, irregular heartbeat, leg swelling, near-syncope, orthopnea, palpitations, paroxysmal nocturnal dyspnea and syncope  Claudication   Respiratory: Negative for cough and shortness of breath  Gastrointestinal: Positive for diarrhea and nausea  Negative for heartburn  Neurological: Positive for dizziness and headaches   Negative for focal weakness, light-headedness and weakness  All other systems reviewed and are negative  No Known Allergies    Current Outpatient Medications:   •  albuterol (Ventolin HFA) 90 mcg/act inhaler, Inhale 2 puffs every 6 (six) hours as needed for wheezing (rinse mouth after use), Disp: 18 g, Rfl: 1  •  apixaban (Eliquis) 5 mg, Take 1 tablet (5 mg total) by mouth 2 (two) times a day, Disp: 60 tablet, Rfl: 3  •  Aspirin Low Dose 81 MG EC tablet, TAKE 1 TABLET BY MOUTH EVERY DAY, Disp: 90 tablet, Rfl: 3  •  atorvastatin (LIPITOR) 40 mg tablet, Take 1 tablet (40 mg total) by mouth daily, Disp: 90 tablet, Rfl: 1  •  Blood Glucose Monitoring Suppl (OneTouch Verio Reflect) w/Device KIT, Check blood sugars three times daily  Please substitute with appropriate alternative as covered by patient's insurance  Dx: E11 65, Disp: 1 kit, Rfl: 0  •  dapagliflozin 5 MG TABS, Take 1 tablet (5 mg total) by mouth daily, Disp: 90 tablet, Rfl: 0  •  furosemide (LASIX) 20 mg tablet, Take 1 tablet (20 mg total) by mouth daily as needed (wt gain 3 lb/ 24 hours  /  take for 3 days in a row), Disp: 30 tablet, Rfl: 3  •  glucose blood (OneTouch Verio) test strip, Check blood sugars three times daily  Please substitute with appropriate alternative as covered by patient's insurance   Dx: E11 65, Disp: 300 each, Rfl: 1  •  Insulin Glargine Solostar (Lantus SoloStar) 100 UNIT/ML SOPN, Inject 0 1 mL (10 Units total) under the skin daily at bedtime, Disp: 3 mL, Rfl: 0  •  Insulin Pen Needle (BD Pen Needle Kateryna U/F) 32G X 4 MM MISC, Use daily as directed with insulin pen, Disp: 100 each, Rfl: 0  •  metFORMIN (GLUCOPHAGE) 500 mg tablet, Take 1 tablet (500 mg total) by mouth 2 (two) times a day with meals, Disp: 60 tablet, Rfl: 2  •  metoprolol succinate (TOPROL-XL) 50 mg 24 hr tablet, Take 1 tablet (50 mg total) by mouth daily, Disp: 30 tablet, Rfl: 3  •  nystatin-triamcinolone (MYCOLOG-II) ointment, Apply topically 2 (two) times a day To vaginal area, Disp: 60 g, Rfl: 1  •  OneTouch Delica Lancets 77S MISC, Check blood sugars three times daily  Please substitute with appropriate alternative as covered by patient's insurance  Dx: E11 65, Disp: 300 each, Rfl: 1  •  cholecalciferol (VITAMIN D3) 1,000 units tablet, Take 2 tablets (2,000 Units total) by mouth daily (Patient not taking: Reported on 2023), Disp: 10 tablet, Rfl: 0  •  Flovent  MCG/ACT inhaler, Inhale 1 puff 2 (two) times a day PT NOT TAKING  (Patient not taking: Reported on 2023), Disp: , Rfl:   •  saccharomyces boulardii (FLORASTOR) 250 mg capsule, Take 1 capsule (250 mg total) by mouth 2 (two) times a day (Patient not taking: Reported on 2023), Disp: 60 capsule, Rfl: 0  •  zinc oxide (BALMEX) 11 3 % cream, Apply 1 application  topically 2 (two) times a day   Indications: skin care (Patient not taking: Reported on 2023), Disp: , Rfl:     Social History     Socioeconomic History   • Marital status: Single     Spouse name: Not on file   • Number of children: Not on file   • Years of education: Not on file   • Highest education level: Not on file   Occupational History   • Not on file   Tobacco Use   • Smoking status: Former     Packs/day: 0 50     Years: 50 00     Total pack years: 25 00     Types: Cigarettes     Start date: 6/15/1973     Quit date: 2023     Years since quittin 0   • Smokeless tobacco: Never   • Tobacco comments:     3 cigarettes daily    Vaping Use   • Vaping Use: Never used   Substance and Sexual Activity   • Alcohol use: Not Currently     Comment: Social   • Drug use: No   • Sexual activity: Yes     Partners: Male   Other Topics Concern   • Not on file   Social History Narrative   • Not on file     Social Determinants of Health     Financial Resource Strain: Low Risk  (8/10/2022)    Overall Financial Resource Strain (CARDIA)    • Difficulty of Paying Living Expenses: Not very hard   Food Insecurity: No Food Insecurity (2023)    Hunger Vital Sign    • Worried About Running Out of Food in the Last Year: Never true    • Ran Out of Food in the Last Year: Never true   Transportation Needs: No Transportation Needs (4/27/2023)    PRAPARE - Transportation    • Lack of Transportation (Medical): No    • Lack of Transportation (Non-Medical): No   Physical Activity: Not on file   Stress: Not on file   Social Connections: Not on file   Intimate Partner Violence: Not on file   Housing Stability: Low Risk  (4/27/2023)    Housing Stability Vital Sign    • Unable to Pay for Housing in the Last Year: No    • Number of Places Lived in the Last Year: 1    • Unstable Housing in the Last Year: No       Family History   Problem Relation Age of Onset   • Hypothyroidism Mother    • Leukemia Mother    • Diabetes Father    • Diabetes type II Father    • Stroke Sister    • Diabetes Paternal Grandmother    • Cancer Sister    • Diabetes Brother        Physical Exam  Vitals and nursing note reviewed  Constitutional:       General: She is not in acute distress  Appearance: She is not ill-appearing or diaphoretic  HENT:      Head: Normocephalic and atraumatic  Eyes:      Conjunctiva/sclera: Conjunctivae normal    Cardiovascular:      Rate and Rhythm: Normal rate and regular rhythm  Pulses: Intact distal pulses  Heart sounds: Normal heart sounds  Pulmonary:      Effort: Pulmonary effort is normal       Breath sounds: Examination of the right-middle field reveals decreased breath sounds  Examination of the right-lower field reveals decreased breath sounds  Decreased breath sounds present  Abdominal:      General: Bowel sounds are normal       Palpations: Abdomen is soft  Musculoskeletal:         General: Normal range of motion  Cervical back: Normal range of motion and neck supple  Comments: Very mild bilateral lower extremity edema   Skin:     General: Skin is warm and dry     Neurological:      Mental Status: She is alert and oriented to person, "place, and time  Vitals: Blood pressure 115/68, pulse 91, weight 43 6 kg (96 lb 3 2 oz), not currently breastfeeding  Wt Readings from Last 3 Encounters:   23 43 6 kg (96 lb 3 2 oz)   23 42 9 kg (94 lb 9 6 oz)   23 42 6 kg (94 lb)         Labs & Results:  Lab Results   Component Value Date    HCT 48 3 (H) 2023    HGB 16 0 (H) 2023    MCV 90 2023     2023    WBC 5 81 2023     BNP   Date Value Ref Range Status   2023 3,988 (H) 0 - 100 pg/mL Final   2023 >4,700 (H) 0 - 100 pg/mL Final     No components found for: \"CHEM\"    Results for orders placed during the hospital encounter of 11/15/20    Echo complete with contrast if indicated    Narrative  [address]  [phone]    Transthoracic Echocardiogram  2D, M-mode, Doppler, and Color Doppler    Study date:  15-Nov-2020    Patient: Mike Corbin  MR number: [id number]  Account number: [de-identified]  : 1955  Age: 77 years  Gender: Female  Status: Inpatient  Location: Bedside  Height: 63 in  Weight: 134 6 lb  BP: 161/ 82 mmHg    Indications: Cardiomyopathy, acute heart failure  Diagnoses: I42 9 - Cardiomyopathy, unspecified    Sonographer:  CARMELO Jara  Primary Physician:  Renee Canseco DO  Referring Physician:  Hilda Whatley MD  Group:  [facility]  Interpreting Physician:  Coco Alford MD    SUMMARY    LEFT VENTRICLE:  Size was normal   Systolic function was at the lower limits of normal   There was moderate hypokinesis of the basal to mid inferior and inferolateral walls  Wall thickness was normal   Doppler parameters were consistent with abnormal left ventricular relaxation (grade 1 diastolic dysfunction)  RIGHT VENTRICLE:  The size was normal   Systolic function was normal     MITRAL VALVE:  There was trace regurgitation      AORTIC VALVE:  There was trace " regurgitation  HISTORY: PRIOR HISTORY: Cardiomyopathy, acute heart failure  Breast cancer, chemotherapy, L breast reconstruction with implant, HTN, CKD1, uncontrolled DM2, current smoker  PROCEDURE: The procedure was performed at the bedside  This was a routine study  The transthoracic approach was used  The study included complete 2D imaging, M-mode, complete spectral Doppler, and color Doppler  The heart rate was 108 bpm,  at the start of the study  Images were obtained from the parasternal, apical, subcostal, and suprasternal notch acoustic windows  Echocardiographic views were limited due to poor acoustic window availability  Image quality was good  LEFT VENTRICLE: Size was normal  Systolic function was at the lower limits of normal  There was moderate hypokinesis of the basal to mid inferior and inferolateral walls  Wall thickness was normal  DOPPLER: Doppler parameters were  consistent with abnormal left ventricular relaxation (grade 1 diastolic dysfunction)  RIGHT VENTRICLE: The size was normal  Systolic function was normal  Wall thickness was normal     LEFT ATRIUM: Size was normal     RIGHT ATRIUM: Size was normal     MITRAL VALVE: Valve structure was normal  There was normal leaflet separation  DOPPLER: The transmitral velocity was within the normal range  There was no evidence for stenosis  There was trace regurgitation  AORTIC VALVE: The valve was trileaflet  Leaflets exhibited normal thickness and normal cuspal separation  DOPPLER: Transaortic velocity was within the normal range  There was no evidence for stenosis  There was trace regurgitation  TRICUSPID VALVE: The valve structure was normal  There was normal leaflet separation  DOPPLER: The transtricuspid velocity was within the normal range  There was no evidence for stenosis  There was no regurgitation  PULMONIC VALVE: Leaflets exhibited normal thickness, no calcification, and normal cuspal separation   DOPPLER: The transpulmonic velocity was within the normal range  There was no regurgitation  PERICARDIUM: There was no pericardial effusion  The pericardium was normal in appearance  AORTA: The root exhibited normal size  SYSTEMIC VEINS: IVC: The inferior vena cava was normal in size and course  Respirophasic changes were normal     SYSTEM MEASUREMENT TABLES    2D  %FS: 27 06 %  Ao Diam: 3 24 cm  EDV(Teich): 117 95 ml  EF(Teich): 52 52 %  ESV(Teich): 56 ml  IVSd: 0 82 cm  LA Diam: 3 91 cm  LVEDV MOD A4C: 90 2 ml  LVEF MOD A4C: 40 17 %  LVESV MOD A4C: 53 96 ml  LVIDd: 4 99 cm  LVIDs: 3 64 cm  LVLd A4C: 7 83 cm  LVLs A4C: 6 73 cm  LVPWd: 0 83 cm  SV MOD A4C: 36 24 ml  SV(Teich): 61 95 ml    CW  AV Env  Ti: 209 32 ms  AV MaxP 51 mmHg  AV VTI: 21 7 cm  AV Vmax: 1 37 m/s  AV Vmean: 1 04 m/s  AV meanP 51 mmHg  TR MaxP 42 mmHg  TR Vmax: 2 26 m/s    PW  E' Sept: 0 06 m/s  E/E' Sept: 16 75  LVOT Env  Ti: 239 77 ms  LVOT VTI: 12 62 cm  LVOT Vmax: 0 8 m/s  LVOT Vmean: 0 53 m/s  LVOT maxP 57 mmHg  LVOT meanP 29 mmHg  MV A Conrad: 0 9 m/s  MV Dec Osborne: 5 25 m/s2  MV DecT: 183 ms  MV E Conrad: 0 96 m/s  MV E/A Ratio: 1 07  MV PHT: 53 07 ms  MVA By PHT: 4 15 cm2    [employer]    Prepared and electronically signed by    Marcel Arcos MD  Signed [id number] 15:35:29    No results found for this or any previous visit  This note was completed in part utilizing [employer] fluency direct voice recognition software  Grammatical errors, random word insertion, spelling mistakes, and incomplete sentences may be an occasional consequence of the system secondary to software limitations, ambient noise and hardware issues  At the time of dictation, efforts were made to edit, clarify and /or correct errors  Please read the chart carefully and recognize, using context, where substitutions have occurred    If you have any questions or concerns about the context, text or information contained within the body of this dictation, please contact myself, the provider, for further clarification

## 2023-05-31 NOTE — PROGRESS NOTES
Cardiology  Heart Failure   Acute  Office Visit Note     Vickie Meehan   69 y o    female   MRN: [id number]  [address]  [address]  [address] [facility] Katie Batista [address]  [phone]    PCP: Dimas Serrato DO  Cardiologist : Dr Melony Esqueda            Summary of recommendations  -Continue  A salt restricted diet  Daily weights; heart failure education as previous; this was reinforced today  -Nonfasting BMP today  -Resume Lasix, at 20 mg daily PRN for 3 pound weight gain in 24 hours, above dry weight  She will then take the Lasix 3 days in row and then stop  -F/U vasc surgery  She has no showed several times in the past   I reviewed her lower extremity ROSA which was abnormal  -F/U endo 6/19  Follow up with Dr Melony Esqueda 6/7/2023         Impression/plan  Chronic HFrEF, EF 35%, recent adm for decompensation 4/26 to 4/30/2023  · Etiology: unclear etiology, nonischemic  Possible due to hypertensive heart disease  Has afib with no documented RVR  · Cause of decompensation: likely due to nonadherence to diuretic  · Dry wt: 93 lbs  Wt Readings from Last 3 Encounters:   05/31/23 43 6 kg (96 lb 3 2 oz)   05/30/23 42 9 kg (94 lb 9 6 oz)   05/26/23 42 6 kg (94 lb)   Diuretic: Given Lasix 20 mg PRN to take daily for 3 days in a row for 3 pound weight gain in 24 hours  · Beta-Blocker:  Toprol 50 mg daily  In the past it was a higher dose  Her blood pressure was too low, and subsequently this was reduced  • ACEi, ARB or ARNi:   Currently off, given she had very low blood pressure  • Aldosterone Receptor Blocker: She was on spironolactone in the past taken off given very low BPs   • [id number] Jardiance-- not taken due to cost  • ICD:S/p DC ICD implant 3/9/22  Narrow QRS  • Educated regarding adherence to a 1 5g -2 gram Na diet/ 2L fluid restriction, daily weights and to call us if she gains  3 lbs/ 1 day or 5 lbs/ 1 week  NICM, EF 35%, by echo 4/26/2023    Her ejection fraction has been suppressed since 2022  S/PMDT DC ICD 3/9/22 ( right sided) MRI conditional  · Interrogation 5/9/23  AP 3 6%  <0 1%  No significant high rates  Optivol within normal limits  PAF rate controlled with metoprolol, anticoagulation Eliquis 5 mg twice daily  Moderate CAD, nonobstructive disease by [address]  · On aspirin 81, statin, beta-blocker  Claudication-- referred to vascular surgery  She has canceled several times  She is symptomatic  · Lower ROSA 1/2022:   · RIGHT LOWER LIMB:Evidence of >75% stenosis noted in the mid superficial femoral artery withdiffuse disease noted throughout the remaining femoral-popliteal andtibioperoneal arteries without significant focal stenosis  Ankle/Brachial index: 0 86 which is in the mild disease category  · LEFT LOWER LIMB:Evidence of >75% stenosis noted in the proximal popliteal arteryThere is 50-75% stenosis in the distal superficial femoral artery followed by a>75% stenosis in the most distal portion  Evidence of tibioperoneal disease with high grade stenosis vs occlusion of thedistal posterior tibial artery  Ankle/Brachial index: 0 69 which is in the moderate disease category  Hypertension, essential  BP  115/68  In the past she had symptomatic hypotension and she was on several medications that were subsequently discontinued  These included high-dose Entresto, spironolactone, higher dose Toprol, higher dose Lasix  Hyperlipidemia  on atorvastatin 40 mg daily  1/18/23 calculated LDL 76   Latest Reference Range & Units 10/07/16 08:41 10/25/18 14:06 11/16/20 05:17 03/09/21 09:54 01/18/23 11:20   Cholesterol See Comment mg/dL 190 151 168 144 163   Triglycerides See Comment mg/dL 100 76 82 76 72   HDL >=50 mg/dL 62 63 (H) 47 64 73   Non-HDL Cholesterol mg/dl   121 80    LDL Calculated 0 - 100 mg/dL 108 (H) 73 105 (H) 65 76     Diabetes mellitus type 2, uncontrolled: 4/26/23 HgA1C 12 2, prior 14 9, on metformin follow-up with endocrine  CKD2   Baseline Cr around 1   Obstructive sleep apnea  Had some type of surgery ( UPP? ) in the 80s  Not restested in the last 10 yrs  Tobacco abuse, ongoing  Cessation imperative  Nicotine replacement has been ordered  Breast cancer status post mastectomy and treatment with chemotherapy> 15 years ago  No radiation  · S/P  left sided mastectomy and lymphnode dissection  · Her recent CT scan 5/24 showed no evidence of malignancy  Left lower lobe lung nodule  Annual surveillance CT chest  Cardiac testing  • TTE 11/15/2020  Ef 50%  here was moderate hypokinesis of the basal to mid inferior and inferolateral walls  Wall thickness was normal  Grade 1 DD  RV nl size and function  Trace MR  Trace AI  • cardiac catheterization 11/16/2020  --  Left main: MLI  --  Mid LAD: There was a 50 % stenosis, iFR 0 97 not hemodynamically significant  --  Mid circumflex: There was a 50 % stenosis  iFR 0 96 not hemodynamically significant  --  1st obtuse marginal: There was a 60 % stenosis, iFR 0 98 not hemodynamically significant  --  RCA: MLI  IMPRESSIONS  No signficant obstructive atherosclerotic CAD requiring revasculariztion  Moderate CAD as described  · TTE 1/122022: EF 25-30%  LVIDd: 4 1 cm, normal wall thickness; RV: normal size and systolic function;MR: trace;PASP: 44mmHg, moderate TR;RVOT: no notching; mild AI  Abnormal diastology  · TTE  4/26/23 EF 35%, mild global hypokinesis, diastolic dysfunction, AI, TR, mild MR  PASP 59 mmHg  Trivial pericardial fusion   + Lft pleural effusion                   HPI:   Grover Richardson a 77y o  year old female  with hypertension, diabetes, hyperlipidemia and tobacco abuse  She has history of breast cancer status post mastectomy and treatment with chemotherapy      Home medication regimen includes amlodipine 10 mg daily, atorvastatin 10 mg daily, metformin 1000 mg BID      Interval history  Adm 11/15-11/17/2020 with orthopnea and PND/ exertional dyspnea    On arrival her blood pressure was markedly elevated 181/110  Her EKG showed possible ectopic atrial tachycardia  Chest x-ray:  Mild interstitial edema  CTA:  No PE  Scattered ground-glass opacities, changes representing pulmonary edema with small bilateral pleural effusion  NT proBNP greater than 3200  An echocardiogram demonstrated normal LV Size /function was at the lower limits of normal  There was moderate hypokinesis of the basal to mid inferior and inferolateral walls   Cardiac catheterization was pursued  This demonstrated nonobstructive CAD  Medical management was recommended  She was placed on beta-blocker and ACE-inhibitor  Previously on amlodipine which was discontinued  Her EDP was 12 at time of catheterization  She did diurese with IV Lasix  She did have a rising creatinine and subsequently was recommended to be discharged without diuretic at this point  She was also found to have hypercalcemia with corrected calcium 11 4, an elevated alkaline phosphatase  PTH was elevated at 359-outpatient Endocrinology follow-up recommended  In addition, her diabetes is poorly controlled with an A1c of 10      12/2/2020  Hospital followup  Intermittent cough  SOB/ wakes her from sleep  No CP  Currently not watching her dietary sodium  No daily weights at home  No scale   One will be provided  Blood pressure 138/80  Few bibasilar crackles, no lower extremity edema  Heart tones distant, regular Her lowest weight hospital was 135,prior to discharge  She is up about 2 lb  Will start Lasix 20 mg daily x3 then 20 mg Monday Wednesday Friday  Her creatinine is 1 4 above her baseline of 1  Based on her current symptoms, I suspect she may have some abigail-vascular congestion  I suspect significant dietary sodium indiscretion  We talked about the importance of adhering to a low-sodium diet  Will provided written education material   Will referred to dietitian which she is amenable to  We discussed her coronary anatomy    Recommend she start a baby aspirin a day  Discussed importance of aggressive risk factor management  She is going to be seeing Endocrinology in the near future regarding her diabetes  Interval history  12/2021; 1/2022; 3/2022, 10/2022 OV Dr Alton Reese  3/1/23 OV Dr Alton Reese  increased SOB with exertion with some weight gain  Maintenance Lasix 40 mg Monday Wednesday Friday  Lasix increased to 40 mg daily  Toprol increased to 75 mg twice daily to help with SVT/A-fib episodes seen on device interrogation      Adm 4/26-4/30/23  Cc:worsening shortness of breath and fatigue x 2 weeks  Also orthopnea, leg edema, dry cough  Admitted non compliant with Lasix- given frequent urination and Lantus  Denied increase in dietary sodium or fld  BP in the ED: 210/135,    Hemoglobin A1c 12  BNP > 4700, chest x-ray: Pleural effusions, vascular congestion  Treated for CHF exacerbation  Followed by Cardiology, Advanced HF  Echo: Ef 35%  Diuresed with IV furosemide 60 bid  Entresto added  DC wt 101 lb  DC Cr 1 41  DC diuretic: lasix 40/d, spironoalcotne 25 mg/d  DC cardiac medications: Toprol 50 BID, Entresto 97/103 BID, Lasix 40 mg daily, spironolactone 25 mg daily, potassium 40 meq daily, Jardiance  Advised follow-up with Cardiology, Endocrinology, sleep medicine      5/17/23  (PMH: chronic HFrEF secondary to NICM, non obstructive CAD,S/P ICD, PAF, CKD3, T2DM,  HTN, HL, NASREEN)  Acute OV  Was not seen in the Cardiology office following admission for decompensated heart failure back in April  Today, she is accompanied by her brother with whom she is residing  He adds to the history  CC: H/A  Dizziness  Nauseated  Cant eat  Dizzy  Short of breath  Claudication  Sleeping on a couch  PUENTE  No PND  No orthopnea  weak  Quit smoking after discharge from the hospital   /70  Wt 90 lb  3/1/2023 was 131 pound  1/10/2023 was 129 pounds   o2 sat 98%  She looks chronically ill, no acute distress  Diminished breath sounds right base  I recommend urgent labs today  She is not taking potassium  She is not taking her lactulose  She is not taking Jardiance due to cost   Given her weight loss, I asked her to stop Lasix  We will also get a CT lung cancer screening  REC; F/u vasc surgery, as recommended in the past    5/31/23 Close follow-up, heart failure  Today she is accompanied by her brother  Today, Reji Brine is much more energetic  Her blood pressure is higher  She feels better  She denies chest pain or shortness of breath  She does have a scale, she is performing daily weights  This morning at home she reports it was 94, here a little higher  She is trying to avoid dietary sodium  Fortunately the CT scan of her lungs showed no malignancy  Her EKG shows sinus rhythm at 91 bpm  Her blood pressure is 115/68  Today, I added back Lasix 20 mg daily as needed 3 days in a row for weight gain or symptoms suggestive of heart failure  For now, she is on an aspirin and statin, Eliquis 5 mg twice daily and Toprol 50 mg once daily  In the past she was on many more medications and her blood pressure did not support it  She felt unwell  She will go for a nonfasting BMP today to reassess her  Renal fxn  I reviewed the importance of a salt restricted diet  She will follow with Dr Jon Casiano June 7       minutes with Patient and family today in which greater than 50% of this time was spent in counseling/coordination of care regarding Diagnostic results, Instructions for management, Patient and family education, Importance of tx compliance, Risk factor reductions, Documenting in the medical record, Reviewing / ordering tests, medicine, procedures   and Obtaining or reviewing history    Assessment  Diagnoses and all orders for this visit:    [facility] with [facility]  -     Basic metabolic panel; Future    CHF exacerbation (HCC)  -     metoprolol succinate (TOPROL-XL) 50 mg 24 hr tablet;  Take 1 tablet (50 mg total) by mouth daily  -     furosemide (LASIX) 20 mg tablet; Take 1 tablet (20 mg total) by mouth daily as needed (wt gain 3 lb/ 24 hours  /  take for 3 days in a row)  -     B-Type Natriuretic Peptide(BNP); Future    NICM (nonischemic cardiomyopathy) (HCC)    PAF (paroxysmal atrial fibrillation) (HCC)    Coronary artery disease involving native coronary artery of native heart without angina pectoris    Type 2 diabetes mellitus with stage 3b chronic kidney disease, with long-term current use of insulin ([place] [address] )    CKD stage 3 due to type 2 diabetes mellitus ([place] [address] )    Tobacco use    Primary hypertension        Past Medical History:   Diagnosis Date   • Breast cancer ([place] [address] )     left - 1994  • Diabetes mellitus ([place] [address] )    • Diabetes mellitus, type 2 ([place] [address] ) 03/15/2006    last assessed 7/10/13   • GERD (gastroesophageal reflux disease)    • History of chemotherapy    • Hypertension        Review of Systems   Constitutional: Positive for malaise/fatigue and weight loss  Negative for chills  Cardiovascular: Positive for dyspnea on exertion  Negative for chest pain, claudication, cyanosis, irregular heartbeat, leg swelling, near-syncope, orthopnea, palpitations, paroxysmal nocturnal dyspnea and syncope  Claudication   Respiratory: Negative for cough and shortness of breath  Gastrointestinal: Positive for diarrhea and nausea  Negative for heartburn  Neurological: Positive for dizziness and headaches  Negative for focal weakness, light-headedness and weakness  All other systems reviewed and are negative  No Known Allergies        Current Outpatient Medications:   •  albuterol (Ventolin HFA) 90 mcg/act inhaler, Inhale 2 puffs every 6 (six) hours as needed for wheezing (rinse mouth after use), Disp: 18 g, Rfl: 1  •  apixaban (Eliquis) 5 mg, Take 1 tablet (5 mg total) by mouth 2 (two) times a day, Disp: 60 tablet, Rfl: 3  •  Aspirin Low Dose 81 MG EC tablet, TAKE 1 TABLET BY MOUTH EVERY DAY, Disp: 90 tablet, Rfl: 3  • atorvastatin (LIPITOR) 40 mg tablet, Take 1 tablet (40 mg total) by mouth daily, Disp: 90 tablet, Rfl: 1  •  Blood Glucose Monitoring Suppl (OneTouch Verio Reflect) w/Device KIT, Check blood sugars three times daily  Please substitute with appropriate alternative as covered by patient's insurance  Dx: E11 65, Disp: 1 kit, Rfl: 0  •  dapagliflozin 5 MG TABS, Take 1 tablet (5 mg total) by mouth daily, Disp: 90 tablet, Rfl: 0  •  furosemide (LASIX) 20 mg tablet, Take 1 tablet (20 mg total) by mouth daily as needed (wt gain 3 lb/ 24 hours  /  take for 3 days in a row), Disp: 30 tablet, Rfl: 3  •  glucose blood (OneTouch Verio) test strip, Check blood sugars three times daily  Please substitute with appropriate alternative as covered by patient's insurance  Dx: E11 65, Disp: 300 each, Rfl: 1  •  Insulin Glargine Solostar (Lantus SoloStar) 100 UNIT/ML SOPN, Inject 0 1 mL (10 Units total) under the skin daily at bedtime, Disp: 3 mL, Rfl: 0  •  Insulin Pen Needle (BD Pen Needle Kateryna U/F) 32G X 4 MM MISC, Use daily as directed with insulin pen, Disp: 100 each, Rfl: 0  •  metFORMIN (GLUCOPHAGE) 500 mg tablet, Take 1 tablet (500 mg total) by mouth 2 (two) times a day with meals, Disp: 60 tablet, Rfl: 2  •  metoprolol succinate (TOPROL-XL) 50 mg 24 hr tablet, Take 1 tablet (50 mg total) by mouth daily, Disp: 30 tablet, Rfl: 3  •  nystatin-triamcinolone (MYCOLOG-II) ointment, Apply topically 2 (two) times a day To vaginal area, Disp: 60 g, Rfl: 1  •  OneTouch Delica Lancets 49W MISC, Check blood sugars three times daily  Please substitute with appropriate alternative as covered by patient's insurance   Dx: E11 65, Disp: 300 each, Rfl: 1  •  cholecalciferol (VITAMIN D3) 1,000 units tablet, Take 2 tablets (2,000 Units total) by mouth daily (Patient not taking: Reported on 5/31/2023), Disp: 10 tablet, Rfl: 0  •  Flovent  MCG/ACT inhaler, Inhale 1 puff 2 (two) times a day PT NOT TAKING  (Patient not taking: Reported on 2023), Disp: , Rfl:   •  saccharomyces boulardii (FLORASTOR) 250 mg capsule, Take 1 capsule (250 mg total) by mouth 2 (two) times a day (Patient not taking: Reported on 2023), Disp: 60 capsule, Rfl: 0  •  zinc oxide (BALMEX) 11 3 % cream, Apply 1 application  topically 2 (two) times a day  Indications: skin care (Patient not taking: Reported on 2023), Disp: , Rfl:     Social History     Socioeconomic History   • Marital status: Single     Spouse name: Not on file   • Number of children: Not on file   • Years of education: Not on file   • Highest education level: Not on file   Occupational History   • Not on file   Tobacco Use   • Smoking status: Former     Packs/day: 0 50     Years: 50 00     Total pack years: 25 00     Types: Cigarettes     Start date: 6/15/1973     Quit date: 2023     Years since quittin 0   • Smokeless tobacco: Never   • Tobacco comments:     3 cigarettes daily    Vaping Use   • Vaping Use: Never used   Substance and Sexual Activity   • Alcohol use: Not Currently     Comment: Social   • Drug use: No   • Sexual activity: Yes     Partners: Male   Other Topics Concern   • Not on file   Social History Narrative   • Not on file     Social Determinants of Health     Financial Resource Strain: Low Risk  (8/10/2022)    Overall Financial Resource Strain (CARDIA)    • Difficulty of Paying Living Expenses: Not very hard   Food Insecurity: No Food Insecurity (2023)    Hunger Vital Sign    • Worried About Running Out of Food in the Last Year: Never true    • Ran Out of Food in the Last Year: Never true   Transportation Needs: No Transportation Needs (2023)    PRAPARE - Transportation    • Lack of Transportation (Medical): No    • Lack of Transportation (Non-Medical):  No   Physical Activity: Not on file   Stress: Not on file   Social Connections: Not on file   Intimate Partner Violence: Not on file   Housing Stability: Low Risk  (2023)    Housing Stability Vital Sign    • Unable to Pay for Housing in the Last Year: No    • Number of Places Lived in the Last Year: 1    • Unstable Housing in the Last Year: No       Family History   Problem Relation Age of Onset   • Hypothyroidism Mother    • Leukemia Mother    • Diabetes Father    • Diabetes type II Father    • Stroke Sister    • Diabetes Paternal Grandmother    • Cancer Sister    • Diabetes Brother        Physical Exam  Vitals and nursing note reviewed  Constitutional:       General: She is not in acute distress  Appearance: She is not ill-appearing or diaphoretic  HENT:      Head: Normocephalic and atraumatic  Eyes:      Conjunctiva/sclera: Conjunctivae normal    Cardiovascular:      Rate and Rhythm: Normal rate and regular rhythm  Pulses: Intact distal pulses  Heart sounds: Normal heart sounds  Pulmonary:      Effort: Pulmonary effort is normal       Breath sounds: Examination of the right-middle field reveals decreased breath sounds  Examination of the right-lower field reveals decreased breath sounds  Decreased breath sounds present  Abdominal:      General: Bowel sounds are normal       Palpations: Abdomen is soft  Musculoskeletal:         General: Normal range of motion  Cervical back: Normal range of motion and neck supple  Comments: Very mild bilateral lower extremity edema   Skin:     General: Skin is warm and dry  Neurological:      Mental Status: She is alert and oriented to person, place, and time  Vitals: Blood pressure 115/68, pulse 91, weight 43 6 kg (96 lb 3 2 oz), not currently breastfeeding     Wt Readings from Last 3 Encounters:   05/31/23 43 6 kg (96 lb 3 2 oz)   05/30/23 42 9 kg (94 lb 9 6 oz)   05/26/23 42 6 kg (94 lb)         Labs & Results:  Lab Results   Component Value Date    HCT 48 3 (H) 04/26/2023    HGB 16 0 (H) 04/26/2023    MCV 90 04/26/2023     04/26/2023    WBC 5 81 04/26/2023     BNP   Date Value Ref Range Status   04/28/2023 3,988 (H) 0 - 100 pg/mL "Final   2023 >4,700 (H) 0 - 100 pg/mL Final     No components found for: \"CHEM\"    Results for orders placed during the hospital encounter of 11/15/20    Echo complete with contrast if indicated    Narrative  [place] , [address] [facility]  [phone]    Transthoracic Echocardiogram  2D, M-mode, Doppler, and Color Doppler    Study date:  15-Nov-2020    Patient: George Colunga  MR number: [id number]  Account number: [de-identified]  : 1955  Age: 77 years  Gender: Female  Status: Inpatient  Location: Bedside  Height: 63 in  Weight: 134 6 lb  BP: 161/ 82 mmHg    Indications: Cardiomyopathy, acute heart failure  Diagnoses: I42 9 - Cardiomyopathy, unspecified    Sonographer:  CARMELO Hunt  Primary Physician:  Rozina Rogel DO  Referring Physician:  Carolina Davies MD  Group:  [facility]  Interpreting Physician:  Kirsten Leiva MD    SUMMARY    LEFT VENTRICLE:  Size was normal   Systolic function was at the lower limits of normal   There was moderate hypokinesis of the basal to mid inferior and inferolateral walls  Wall thickness was normal   Doppler parameters were consistent with abnormal left ventricular relaxation (grade 1 diastolic dysfunction)  RIGHT VENTRICLE:  The size was normal   Systolic function was normal     MITRAL VALVE:  There was trace regurgitation  AORTIC VALVE:  There was trace regurgitation  HISTORY: PRIOR HISTORY: Cardiomyopathy, acute heart failure  Breast cancer, chemotherapy, L breast reconstruction with implant, HTN, CKD1, uncontrolled DM2, current smoker  PROCEDURE: The procedure was performed at the bedside  This was a routine study  The transthoracic approach was used  The study included complete 2D imaging, M-mode, complete spectral Doppler, and color Doppler  The heart rate was 108 bpm,  at the start of the study   Images were obtained from the parasternal, apical, subcostal, and " suprasternal notch acoustic windows  Echocardiographic views were limited due to poor acoustic window availability  Image quality was good  LEFT VENTRICLE: Size was normal  Systolic function was at the lower limits of normal  There was moderate hypokinesis of the basal to mid inferior and inferolateral walls  Wall thickness was normal  DOPPLER: Doppler parameters were  consistent with abnormal left ventricular relaxation (grade 1 diastolic dysfunction)  RIGHT VENTRICLE: The size was normal  Systolic function was normal  Wall thickness was normal     LEFT ATRIUM: Size was normal     RIGHT ATRIUM: Size was normal     MITRAL VALVE: Valve structure was normal  There was normal leaflet separation  DOPPLER: The transmitral velocity was within the normal range  There was no evidence for stenosis  There was trace regurgitation  AORTIC VALVE: The valve was trileaflet  Leaflets exhibited normal thickness and normal cuspal separation  DOPPLER: Transaortic velocity was within the normal range  There was no evidence for stenosis  There was trace regurgitation  TRICUSPID VALVE: The valve structure was normal  There was normal leaflet separation  DOPPLER: The transtricuspid velocity was within the normal range  There was no evidence for stenosis  There was no regurgitation  PULMONIC VALVE: Leaflets exhibited normal thickness, no calcification, and normal cuspal separation  DOPPLER: The transpulmonic velocity was within the normal range  There was no regurgitation  PERICARDIUM: There was no pericardial effusion  The pericardium was normal in appearance  AORTA: The root exhibited normal size  SYSTEMIC VEINS: IVC: The inferior vena cava was normal in size and course   Respirophasic changes were normal     SYSTEM MEASUREMENT TABLES    2D  %FS: 27 06 %  Ao Diam: 3 24 cm  EDV(Teich): 117 95 ml  EF(Teich): 52 52 %  ESV(Teich): 56 ml  IVSd: 0 82 cm  LA Diam: 3 91 cm  LVEDV MOD A4C: 90 2 ml  LVEF MOD A4C: 40 17 %  LVESV MOD A4C: 53 96 ml  LVIDd: 4 99 cm  LVIDs: 3 64 cm  LVLd A4C: 7 83 cm  LVLs A4C: 6 73 cm  LVPWd: 0 83 cm  SV MOD A4C: 36 24 ml  SV(Teich): 61 95 ml    CW  AV Env  Ti: 209 32 ms  AV MaxP 51 mmHg  AV VTI: 21 7 cm  AV Vmax: 1 37 m/s  AV Vmean: 1 04 m/s  AV meanP 51 mmHg  TR MaxP 42 mmHg  TR Vmax: 2 26 m/s    PW  E' Sept: 0 06 m/s  E/E' Sept: 16 75  LVOT Env  Ti: 239 77 ms  LVOT VTI: 12 62 cm  LVOT Vmax: 0 8 m/s  LVOT Vmean: 0 53 m/s  LVOT maxP 57 mmHg  LVOT meanP 29 mmHg  MV A Conrad: 0 9 m/s  MV Dec Fisher: 5 25 m/s2  MV DecT: 183 ms  MV E Conrad: 0 96 m/s  MV E/A Ratio: 1 07  MV PHT: 53 07 ms  MVA By PHT: 4 15 cm2    [facility] Accredited Echocardiography Laboratory    Prepared and electronically signed by    Charis Shah MD  Signed [id number] 15:35:29    No results found for this or any previous visit  This note was completed in part utilizing m-modal fluency direct voice recognition software  Grammatical errors, random word insertion, spelling mistakes, and incomplete sentences may be an occasional consequence of the system secondary to software limitations, ambient noise and hardware issues  At the time of dictation, efforts were made to edit, clarify and /or correct errors  Please read the chart carefully and recognize, using context, where substitutions have occurred    If you have any questions or concerns about the context, text or information contained within the body of this dictation, please contact myself, the provider, for further clarification

## 2023-06-01 ENCOUNTER — HOME CARE VISIT (OUTPATIENT)
Dept: HOME HEALTH SERVICES | Facility: HOME HEALTHCARE | Age: 68
End: 2023-06-01

## 2023-06-02 ENCOUNTER — HOME CARE VISIT (OUTPATIENT)
Dept: HOME HEALTH SERVICES | Facility: HOME HEALTHCARE | Age: 68
End: 2023-06-02
Payer: COMMERCIAL

## 2023-06-02 VITALS — HEART RATE: 82 BPM | OXYGEN SATURATION: 96 %

## 2023-06-02 DIAGNOSIS — N76.0 BACTERIAL VAGINOSIS: Primary | ICD-10-CM

## 2023-06-02 DIAGNOSIS — B96.89 BACTERIAL VAGINOSIS: Primary | ICD-10-CM

## 2023-06-02 LAB
LAB AP GYN PRIMARY INTERPRETATION: ABNORMAL
Lab: ABNORMAL
PATH INTERP SPEC-IMP: ABNORMAL

## 2023-06-02 PROCEDURE — G0299 HHS/HOSPICE OF RN EA 15 MIN: HCPCS

## 2023-06-02 RX ORDER — METRONIDAZOLE 7.5 MG/G
1 GEL VAGINAL DAILY
Qty: 70 G | Refills: 0 | Status: SHIPPED | OUTPATIENT
Start: 2023-06-02 | End: 2023-06-07

## 2023-06-02 NOTE — TELEPHONE ENCOUNTER
Returned [email or web address],  She did accept a verbal for wound care and zinc oxide    No further action required, DAISHA Mcarthur/KALLI

## 2023-06-04 VITALS
TEMPERATURE: 97.5 F | WEIGHT: 95 LBS | HEART RATE: 94 BPM | OXYGEN SATURATION: 100 % | RESPIRATION RATE: 24 BRPM | BODY MASS INDEX: 17.1 KG/M2 | DIASTOLIC BLOOD PRESSURE: 64 MMHG | SYSTOLIC BLOOD PRESSURE: 102 MMHG

## 2023-06-06 ENCOUNTER — TELEPHONE (OUTPATIENT)
Dept: FAMILY MEDICINE CLINIC | Facility: CLINIC | Age: 68
End: 2023-06-06

## 2023-06-06 ENCOUNTER — HOME CARE VISIT (OUTPATIENT)
Dept: HOME HEALTH SERVICES | Facility: HOME HEALTHCARE | Age: 68
End: 2023-06-06
Payer: COMMERCIAL

## 2023-06-06 ENCOUNTER — OFFICE VISIT (OUTPATIENT)
Dept: OBGYN CLINIC | Facility: CLINIC | Age: 68
End: 2023-06-06
Payer: COMMERCIAL

## 2023-06-06 VITALS
WEIGHT: 96.6 LBS | RESPIRATION RATE: 24 BRPM | HEART RATE: 92 BPM | OXYGEN SATURATION: 100 % | TEMPERATURE: 96.5 F | BODY MASS INDEX: 17.39 KG/M2 | SYSTOLIC BLOOD PRESSURE: 110 MMHG | DIASTOLIC BLOOD PRESSURE: 64 MMHG

## 2023-06-06 VITALS
BODY MASS INDEX: 17.72 KG/M2 | WEIGHT: 100 LBS | DIASTOLIC BLOOD PRESSURE: 66 MMHG | HEIGHT: 63 IN | SYSTOLIC BLOOD PRESSURE: 122 MMHG

## 2023-06-06 DIAGNOSIS — R87.610 ASCUS WITH POSITIVE HIGH RISK HPV CERVICAL: Primary | ICD-10-CM

## 2023-06-06 DIAGNOSIS — N89.8 VAGINAL ODOR: ICD-10-CM

## 2023-06-06 DIAGNOSIS — B97.7 HPV IN FEMALE: ICD-10-CM

## 2023-06-06 DIAGNOSIS — N89.8 VAGINAL LESION: ICD-10-CM

## 2023-06-06 DIAGNOSIS — R87.810 ASCUS WITH POSITIVE HIGH RISK HPV CERVICAL: Primary | ICD-10-CM

## 2023-06-06 DIAGNOSIS — N90.89 VULVAL LESION: ICD-10-CM

## 2023-06-06 DIAGNOSIS — Z85.3 HISTORY OF BREAST CANCER: ICD-10-CM

## 2023-06-06 PROCEDURE — 88305 TISSUE EXAM BY PATHOLOGIST: CPT | Performed by: PATHOLOGY

## 2023-06-06 PROCEDURE — 57454 BX/CURETT OF CERVIX W/SCOPE: CPT | Performed by: OBSTETRICS & GYNECOLOGY

## 2023-06-06 PROCEDURE — G0299 HHS/HOSPICE OF RN EA 15 MIN: HCPCS

## 2023-06-06 PROCEDURE — 99213 OFFICE O/P EST LOW 20 MIN: CPT | Performed by: OBSTETRICS & GYNECOLOGY

## 2023-06-06 PROCEDURE — 88305 TISSUE EXAM BY PATHOLOGIST: CPT | Performed by: STUDENT IN AN ORGANIZED HEALTH CARE EDUCATION/TRAINING PROGRAM

## 2023-06-06 PROCEDURE — G0151 HHCP-SERV OF PT,EA 15 MIN: HCPCS

## 2023-06-06 NOTE — TELEPHONE ENCOUNTER
This morning 68 sugar reading  Stating feeling shaking, eye sight a little off       Patient had a visiting nurse there today  See records in chart       Please call to triage patient thank you

## 2023-06-06 NOTE — PROGRESS NOTES
"Assessment/Plan:     Diagnoses and all orders for this visit:    HPV in female  -     Ambulatory Referral to Obstetrics / Gynecology    Vaginal odor  -     Ambulatory Referral to Obstetrics / Gynecology      38-year-old female  History of breast cancer left mastectomy  ASCUS HPV positive  Vaginal lesion at the level of the hymen  Vulvar lesion  Plan  Colposcopy ECC and biopsy at 2/9  Vaginal biopsy  Vulvar biopsy  Referred to genetic oncology screening  We will call patient with result    Subjective:      Patient ID: Barb Cruz is a 69 y o  female  HPI  68 yo female  Here today sec to abn pap HPV 18 and HPV high-risk positive, Pap smear ASCUS  New partner   pmh DM, CHF, breast cancer 21 y ago left mastectomy   PSH surgery for pre cancer cell on her pap ?leep   The following portions of the patient's history were reviewed and updated as appropriate: allergies, current medications, past family history, past medical history, past social history, past surgical history and problem list     Review of Systems      Objective:      /66 (BP Location: Left arm, Patient Position: Sitting, Cuff Size: Adult)   Ht 5' 2 5\" (1 588 m)   Wt 45 4 kg (100 lb)   LMP  (LMP Unknown)   BMI 18 00 kg/m²  c           Physical Exam  Genitourinary:         Comments: Vulvar lesion noted at the marked area 2 cm melanocytic lesion biopsy obtained    Hymen edge is white thick leukoplakia to biopsy obtained and sent to pathology         Colposcopy     Date/Time 6/6/2023 9:00 AM     Universal Protocol   Consent: Verbal consent obtained  Risks and benefits: risks, benefits and alternatives were discussed  Consent given by: patient  Time out: Immediately prior to procedure a \"time out\" was called to verify the correct patient, procedure, equipment, support staff and site/side marked as required    Patient understanding: patient states understanding of the procedure being performed  Patient consent: the patient's understanding of the " procedure matches consent given  Procedure consent: procedure consent matches procedure scheduled  Patient identity confirmed: verbally with patient       Performed by  Kaz Miller MD   Authorized by Kaz Miller MD       Pre-procedure details     Pre-procedure timeout performed: yes      Prepped with: acetic acid       Indication    ASC-US     Procedure Details   Procedure: Colposcopy w/ cervical biopsy and ECC      Under satisfactory analgesia the patient was prepped and draped in the dorsal lithotomy position: yes      Pomfret speculum was placed in the vagina: yes      Under colposcopic examination the transition zone was seen in entirety: yes      Endocervix was curetted using a Kevorkian curette: yes      Cervical biopsy performed with a cervical biopsy punch: yes      Monsel's solution was applied: yes      Biopsy(s): yes      Location:  2/9    Specimen to pathology: yes       Post-procedure      Findings: White epithelium      Patient tolerance of procedure:   Tolerated well, no immediate complications

## 2023-06-06 NOTE — TELEPHONE ENCOUNTER
Patient stated that she was seen by nurse she is a little concerned because her blood suger has been low at night  Patient was offered appt on Friday for a diabetes f/u and told to monitor her blood sugar  She stated that last night her number was 68 before that 76 and the other night was 94  Should she be seen sooner or is Friday ok?   Leanne Angulo 22

## 2023-06-07 ENCOUNTER — VBI (OUTPATIENT)
Dept: ADMINISTRATIVE | Facility: OTHER | Age: 68
End: 2023-06-07

## 2023-06-07 ENCOUNTER — OFFICE VISIT (OUTPATIENT)
Dept: CARDIOLOGY CLINIC | Facility: CLINIC | Age: 68
End: 2023-06-07
Payer: COMMERCIAL

## 2023-06-07 ENCOUNTER — TELEPHONE (OUTPATIENT)
Dept: HEMATOLOGY ONCOLOGY | Facility: CLINIC | Age: 68
End: 2023-06-07

## 2023-06-07 ENCOUNTER — HOME CARE VISIT (OUTPATIENT)
Dept: HOME HEALTH SERVICES | Facility: HOME HEALTHCARE | Age: 68
End: 2023-06-07
Payer: COMMERCIAL

## 2023-06-07 ENCOUNTER — RA CDI HCC (OUTPATIENT)
Dept: OTHER | Facility: HOSPITAL | Age: 68
End: 2023-06-07

## 2023-06-07 VITALS
OXYGEN SATURATION: 95 % | SYSTOLIC BLOOD PRESSURE: 116 MMHG | HEART RATE: 89 BPM | DIASTOLIC BLOOD PRESSURE: 62 MMHG | BODY MASS INDEX: 18.43 KG/M2 | HEIGHT: 63 IN | WEIGHT: 104 LBS

## 2023-06-07 VITALS — HEART RATE: 74 BPM | OXYGEN SATURATION: 97 %

## 2023-06-07 DIAGNOSIS — I73.9 PERIPHERAL ARTERIAL DISEASE (HCC): ICD-10-CM

## 2023-06-07 DIAGNOSIS — I10 PRIMARY HYPERTENSION: ICD-10-CM

## 2023-06-07 DIAGNOSIS — N18.30 HYPERTENSIVE HEART AND KIDNEY DISEASE WITH HEART FAILURE AND WITH CHRONIC KIDNEY DISEASE STAGE III (HCC): ICD-10-CM

## 2023-06-07 DIAGNOSIS — I42.8 NICM (NONISCHEMIC CARDIOMYOPATHY) (HCC): ICD-10-CM

## 2023-06-07 DIAGNOSIS — E78.5 HYPERLIPIDEMIA LDL GOAL <100: ICD-10-CM

## 2023-06-07 DIAGNOSIS — I50.22 CHRONIC HFREF (HEART FAILURE WITH REDUCED EJECTION FRACTION) (HCC): ICD-10-CM

## 2023-06-07 DIAGNOSIS — I47.1 SVT (SUPRAVENTRICULAR TACHYCARDIA) (HCC): Primary | ICD-10-CM

## 2023-06-07 DIAGNOSIS — I13.0 HYPERTENSIVE HEART AND KIDNEY DISEASE WITH HEART FAILURE AND WITH CHRONIC KIDNEY DISEASE STAGE III (HCC): ICD-10-CM

## 2023-06-07 DIAGNOSIS — I48.0 PAF (PAROXYSMAL ATRIAL FIBRILLATION) (HCC): ICD-10-CM

## 2023-06-07 DIAGNOSIS — I25.10 CORONARY ARTERY DISEASE INVOLVING NATIVE CORONARY ARTERY OF NATIVE HEART WITHOUT ANGINA PECTORIS: ICD-10-CM

## 2023-06-07 PROCEDURE — 99214 OFFICE O/P EST MOD 30 MIN: CPT | Performed by: INTERNAL MEDICINE

## 2023-06-07 NOTE — TELEPHONE ENCOUNTER
Her diabetes is managed by her endocrinologist   This has been advised to her multiple times  Please inform pt to contact and f/u with her endocrinologist and document her understanding of this

## 2023-06-07 NOTE — PROGRESS NOTES
[address]  coding opportunities          Chart Reviewed number of suggestions sent to Provider: 3  E11 [id number]  E11 [id number]  I13 [phone]    Patients Insurance     Medicare Insurance: [facility] Advantage

## 2023-06-07 NOTE — TELEPHONE ENCOUNTER
I spoke with Rebeka Arteaga and gave her the message from Dr Pal Avila  She understands and she has an appt with endocrinology on 6/19/23 but this will be her first time seeing this doctor  She will be a new patient there  She agreed to call that office now and discuss her concerns, and ask if she can be seen sooner  I am keeping her appt already scheduled with Dr Pal Avila for now, and she will call back to let us know the status of her call to endocrinology   We can cancel the appt with Dr Pal Avila if they are able to see her sooner, etc [facility]

## 2023-06-07 NOTE — TELEPHONE ENCOUNTER
I called Sofiya to schedule a new patient appointment with the [facility]       Outcome:   I left a voice message encouraging the patient to call the [facility] [place] at [phone] to schedule this appointment  A mychart message was also send with our contact information  Follow-up:   At this time the referral will be closed and we will wait to hear back from the patient regarding scheduling this appointment

## 2023-06-07 NOTE — LETTER
Dear Baron Felix,      Your health care provider referred you to the [address] [place], [place] [address]e and Genetics Program   We were unable to reach you to schedule an appointment    If you are interested in scheduling an appointment with one of our genetic counselors please reach out to our office at [phone]    Sincerely,    [facility] [address]

## 2023-06-07 NOTE — PROGRESS NOTES
Cardiology Follow Up    Olegario Santiago  1955  [id number]  [facility]  [address]    [address] [id number] [phone]    1  SVT (supraventricular tachycardia) ([facility] )        2  Primary hypertension        3  PAF (paroxysmal atrial fibrillation) ([facility] )        4  NICM (nonischemic cardiomyopathy) ([facility] )        5  Hypertensive heart and kidney disease with heart failure and with chronic kidney disease stage III ([facility] )        6  Coronary artery disease involving native coronary artery of native heart without angina pectoris        7  Chronic HFrEF (heart failure with reduced ejection fraction) ([facility])        8  Hyperlipidemia LDL goal <100        9  Peripheral arterial disease [facility])          Chief Complaint   Patient presents with   • Follow-up     Month f/u    • Dizziness     Pt states it might be bc of low BP        Interval History: Patient feels well, without complaints  No reported chest pain, shortness of breath, palpitations, lightheadedness, syncope, LE edema, orthopnea, PND, or significant weight changes  Patient remains active without any increased fatigue out of the ordinary        Patient Active Problem List   Diagnosis   • History of left breast cancer   • Hypertensive heart and kidney disease with heart failure and with chronic kidney disease stage III ([facility] )   • Colon, diverticulosis   • Hyperlipidemia LDL goal <100   • Type 2 diabetes mellitus with stage 3b chronic kidney disease, with long-term current use of insulin ([facility] )   • Screening for cardiovascular, respiratory, and genitourinary diseases   • Immunization due   • Colon cancer screening   • Menopause   • Gastroesophageal reflux disease   • Microalbuminuria   • Medicare annual wellness visit, subsequent   • Obstructive sleep apnea syndrome   • Hypercalcemia   • CKD stage 3 due to type 2 diabetes mellitus ([facility] )   • Hyperparathyroidism ([facility] )   • Coronary artery disease involving native coronary artery of native heart without angina pectoris   • Chronic HFrEF (heart failure with reduced ejection fraction) ([facility])   • Vitamin D deficiency   • S/P left mastectomy   • Breast cancer ([place] [address] ) - left    • Primary hypertension   • Bilateral leg edema   • Breast cancer screening by mammogram   • NICM (nonischemic cardiomyopathy) ([place] [address] )   • Peripheral arterial disease ([place] [address] )   • Trigger finger of right thumb   • Venous insufficiency of both lower extremities   • SVT (supraventricular tachycardia) ([facility])   • Tobacco use   • Vulvar itching   • PAF (paroxysmal atrial fibrillation) ([facility])   • Embolism and thrombosis of arteries of the lower extremities ([facility])   • Pleural effusion, bilateral   • Stage 3b chronic kidney disease (CKD) ([facility])   • Moderate protein-calorie malnutrition ([facility])   • Depression, recurrent ([facility])   • Chronic renal disease, stage IV ([place] [address] )   • HPV in female   • Vaginal odor     Past Medical History:   Diagnosis Date   • Breast cancer ([place] [address] )     left -      • Diabetes mellitus ([place] [address] )    • Diabetes mellitus, type 2 ([place] [address] ) 03/15/2006    last assessed 7/10/13   • GERD (gastroesophageal reflux disease)    • History of chemotherapy    • Hypertension      Social History     Socioeconomic History   • Marital status: Single     Spouse name: Not on file   • Number of children: Not on file   • Years of education: Not on file   • Highest education level: Not on file   Occupational History   • Not on file   Tobacco Use   • Smoking status: Former     Packs/day: 0 50     Years: 50 00     Total pack years: 25 00     Types: Cigarettes     Start date: 6/15/1973     Quit date: 2023     Years since quittin 1   • Smokeless tobacco: Never   • Tobacco comments:     3 cigarettes daily    Vaping Use   • Vaping Use: Never used   Substance and Sexual Activity   • Alcohol use: Not Currently     Comment: Social   • Drug use: No   • Sexual activity: Yes     Partners: Male   Other Topics Concern   • Not on file   Social History Narrative   • Not on file     Social Determinants of Health     Financial Resource Strain: Low Risk  (8/10/2022)    Overall Financial Resource Strain (CARDIA)    • Difficulty of Paying Living Expenses: Not very hard   Food Insecurity: No Food Insecurity (4/27/2023)    Hunger Vital Sign    • Worried About Running Out of Food in the Last Year: Never true    • Ran Out of Food in the Last Year: Never true   Transportation Needs: No Transportation Needs (4/27/2023)    PRAPARE - Transportation    • Lack of Transportation (Medical): No    • Lack of Transportation (Non-Medical): No   Physical Activity: Not on file   Stress: Not on file   Social Connections: Not on file   Intimate Partner Violence: Not on file   Housing Stability: Low Risk  (4/27/2023)    Housing Stability Vital Sign    • Unable to Pay for Housing in the Last Year: No    • Number of Places Lived in the Last Year: 1    • Unstable Housing in the Last Year: No      Family History   Problem Relation Age of Onset   • Hypothyroidism Mother    • Leukemia Mother    • Diabetes Father    • Diabetes type II Father    • Stroke Sister    • Diabetes Paternal Grandmother    • Cancer Sister    • Diabetes Brother      Past Surgical History:   Procedure Laterality Date   • CARDIAC ELECTROPHYSIOLOGY PROCEDURE N/A 3/9/2022    Procedure: Cardiac icd implant/ DUAL CHAMBER ICD; Surgeon: Annie Byers MD;  [facility];   Service: Cardiology   • MASTECTOMY Left     last assessed 12/16/14   • MASTECTOMY, RADICAL Left     1994   • [place] PARATHYROIDECTOMY/EXPLORATION PARATHYROIDS N/A 4/21/2021    Procedure: PARATHYROIDECTOMY POSSIBLE 4 GLAND EXPLORATION;  Surgeon: Tete Brito MD;  Location: [facility];  Service: ENT   • REDUCTION MAMMAPLASTY Right    • UVULECTOMY         Current Outpatient Medications:   •  albuterol (Ventolin HFA) 90 mcg/act inhaler, Inhale 2 puffs every 6 (six) hours as needed for wheezing (rinse mouth after use), Disp: 18 g, Rfl: 1  •  apixaban (Eliquis) 5 mg, Take 1 tablet (5 mg total) by mouth 2 (two) times a day, Disp: 60 tablet, Rfl: 3  •  Aspirin Low Dose 81 MG EC tablet, TAKE 1 TABLET BY MOUTH EVERY DAY, Disp: 90 tablet, Rfl: 3  •  atorvastatin (LIPITOR) 40 mg tablet, Take 1 tablet (40 mg total) by mouth daily, Disp: 90 tablet, Rfl: 1  •  Blood Glucose Monitoring Suppl (OneTouch Verio Reflect) w/Device KIT, Check blood sugars three times daily  Please substitute with appropriate alternative as covered by patient's insurance  Dx: E11 65, Disp: 1 kit, Rfl: 0  •  cholecalciferol (VITAMIN D3) 1,000 units tablet, Take 2 tablets (2,000 Units total) by mouth daily, Disp: 10 tablet, Rfl: 0  •  dapagliflozin 5 MG TABS, Take 1 tablet (5 mg total) by mouth daily, Disp: 90 tablet, Rfl: 0  •  Flovent  MCG/ACT inhaler, Inhale 1 puff 2 (two) times a day PT NOT TAKING, Disp: , Rfl:   •  furosemide (LASIX) 20 mg tablet, Take 1 tablet (20 mg total) by mouth daily as needed (wt gain 3 lb/ 24 hours  /  take for 3 days in a row), Disp: 30 tablet, Rfl: 3  •  glucose blood (OneTouch Verio) test strip, Check blood sugars three times daily  Please substitute with appropriate alternative as covered by patient's insurance  Dx: E11 65, Disp: 300 each, Rfl: 1  •  Insulin Glargine Solostar (Lantus SoloStar) 100 UNIT/ML SOPN, Inject 0 1 mL (10 Units total) under the skin daily at bedtime, Disp: 3 mL, Rfl: 0  •  Insulin Pen Needle (BD Pen Needle Kateryna U/F) 32G X 4 MM MISC, Use daily as directed with insulin pen, Disp: 100 each, Rfl: 0  •  metFORMIN (GLUCOPHAGE) 500 mg tablet, Take 1 tablet (500 mg total) by mouth 2 (two) times a day with meals, Disp: 60 tablet, Rfl: 2  •  metoprolol succinate (TOPROL-XL) 50 mg 24 hr tablet, Take 1 tablet (50 mg total) by mouth daily, Disp: 30 tablet, Rfl: 3  •  metroNIDAZOLE (METROGEL) 0 75 % vaginal gel, Insert 1 application   into the vagina daily for 5 days, Disp: 70 g, Rfl: 0  •  nystatin-triamcinolone (MYCOLOG-II) ointment, Apply topically 2 (two) times a day To vaginal area, Disp: 60 g, Rfl: 1  •  OneTouch Delica Lancets 19O MISC, Check blood sugars three times daily  Please substitute with appropriate alternative as covered by patient's insurance  Dx: E11 65, Disp: 300 each, Rfl: 1  •  saccharomyces boulardii (FLORASTOR) 250 mg capsule, Take 1 capsule (250 mg total) by mouth 2 (two) times a day, Disp: 60 capsule, Rfl: 0  •  zinc oxide (BALMEX) 11 3 % cream, Apply 1 application   topically 2 (two) times a day, Disp: , Rfl:   No Known Allergies    Labs:  Appointment on 05/31/2023   Component Date Value   • Sodium 05/31/2023 139    • Potassium 05/31/2023 4 5    • Chloride 05/31/2023 105    • CO2 05/31/2023 27    • ANION GAP 05/31/2023 7    • BUN 05/31/2023 35 (H)    • Creatinine 05/31/2023 1 15    • Glucose 05/31/2023 184 (H)    • Calcium 05/31/2023 9 6    • eGFR 05/31/2023 48    • BNP 05/31/2023 691 (H)    Lab on 05/24/2023   Component Date Value   • Sodium 05/24/2023 132 (L)    • Potassium 05/24/2023 5 3    • Chloride 05/24/2023 105    • CO2 05/24/2023 26    • ANION GAP 05/24/2023 1 (L)    • BUN 05/24/2023 43 (H)    • Creatinine 05/24/2023 1 65 (H)    • Glucose, Fasting 05/24/2023 118 (H)    • Calcium 05/24/2023 9 7    • eGFR 05/24/2023 31    Office Visit on 05/23/2023   Component Date Value   • Candida Species 05/23/2023 Negative    • Gardnerella vaginalis 05/23/2023 Negative    • Trichomonas vaginalis 05/23/2023 Negative    • Genital Culture 05/23/2023 Mixed Vaginal jeimy-no growth of Neisseria gonorrhoeae, Group B Streptococcus or clinically significant numbers of yeast, gram negative rods or Gardnerella    • FECES OCC BLD 05/23/2023 positive    • Case Report 05/23/2023                      Value:Gynecologic Cytology Report                       Case: [id number]                                  Authorizing Provider:  MAGGIE Farah         Collected:           05/23/2023 1046              Ordering Location:     [facility]     Received:            05/23/2023 1055              First Screen:          Evan Campbell                                                                Pathologist:           Lucero Richardson MD                                                                    Specimen:    LIQUID-BASED PAP, SCREENING, Cervix                                                       • Primary Interpretation 05/23/2023 Epithelial cell abnormality    • Interpretation 05/23/2023 Atypical squamous cells of undetermined significance  Shift in jeimy suggestive of bacterial vaginosis    • Specimen Adequacy 05/23/2023 Satisfactory for evaluation  Absence of endocervical/transformation zone component  • Additional Information 05/23/2023                      Value: This result contains rich text formatting which cannot be displayed here     • HPV Other HR 05/24/2023 Positive (A)    • HPV16 05/24/2023 Negative    • HPV18 05/24/2023 Positive (A)    • N gonorrhoeae, DNA Probe 05/23/2023 Negative    • Chlamydia trachomatis, D* 05/23/2023 Negative    Appointment on 05/17/2023   Component Date Value   • Sodium 05/17/2023 130 (L)    • Potassium 05/17/2023 5 7 (H)    • Chloride 05/17/2023 95 (L)    • CO2 05/17/2023 28    • ANION GAP 05/17/2023 7    • BUN 05/17/2023 67 (H)    • Creatinine 05/17/2023 1 91 (H)    • Glucose 05/17/2023 240 (H)    • Calcium 05/17/2023 9 4    • eGFR 05/17/2023 26    • Magnesium 05/17/2023 1 7 (L)    Admission on 04/26/2023, Discharged on 04/30/2023   Component Date Value   • WBC 04/26/2023 5 73    • RBC 04/26/2023 5 51 (H)    • Hemoglobin 04/26/2023 16 2 (H)    • Hematocrit 04/26/2023 49 3 (H)    • MCV 04/26/2023 90    • MCH 04/26/2023 29 4    • MCHC 04/26/2023 32 9    • RDW 04/26/2023 14 1    • MPV 04/26/2023 12 0    • Platelets 95/89/4862 223    • nRBC 04/26/2023 0    • Neutrophils Relative 04/26/2023 56    • Immat GRANS % 04/26/2023 0    • Lymphocytes Relative 04/26/2023 35    • Monocytes Relative 04/26/2023 8    • Eosinophils Relative 04/26/2023 1    • Basophils Relative 04/26/2023 0    • Neutrophils Absolute 04/26/2023 3 22    • Immature Grans Absolute 04/26/2023 0 02    • Lymphocytes Absolute 04/26/2023 1 99    • Monocytes Absolute 04/26/2023 0 46    • Eosinophils Absolute 04/26/2023 0 03    • Basophils Absolute 04/26/2023 0 01    • Sodium 04/26/2023 137    • Potassium 04/26/2023 3 4 (L)    • Chloride 04/26/2023 102    • CO2 04/26/2023 27    • ANION GAP 04/26/2023 8    • BUN 04/26/2023 14    • Creatinine 04/26/2023 0 98    • Glucose 04/26/2023 284 (H)    • Calcium 04/26/2023 8 5    • Corrected Calcium 04/26/2023 9 6    • AST 04/26/2023 15    • ALT 04/26/2023 11    • Alkaline Phosphatase 04/26/2023 210 (H)    • Total Protein 04/26/2023 6 6    • Albumin 04/26/2023 2 6 (L)    • Total Bilirubin 04/26/2023 0 91    • eGFR 04/26/2023 59    • hs TnI 0hr 04/26/2023 31    • BNP 04/26/2023 >4,700 (H)    • POC Glucose 04/26/2023 280 (H)    • hs TnI 2hr 04/26/2023 24    • Delta 2hr hsTnI 04/26/2023 -7    • hs TnI 4hr 04/26/2023 35    • Delta 4hr hsTnI 04/26/2023 4    • WBC 04/26/2023 5 81    • RBC 04/26/2023 5 37 (H)    • Hemoglobin 04/26/2023 16 0 (H)    • Hematocrit 04/26/2023 48 3 (H)    • MCV 04/26/2023 90    • MCH 04/26/2023 29 8    • MCHC 04/26/2023 33 1    • RDW 04/26/2023 14 0    • MPV 04/26/2023 12 5    • Platelets 14/93/1797 197    • nRBC 04/26/2023 0    • Neutrophils Relative 04/26/2023 90 (H)    • Immat GRANS % 04/26/2023 0    • Lymphocytes Relative 04/26/2023 8 (L)    • Monocytes Relative 04/26/2023 2 (L)    • Eosinophils Relative 04/26/2023 0    • Basophils Relative 04/26/2023 0    • Neutrophils Absolute 04/26/2023 5 22    • Immature Grans Absolute 04/26/2023 0 02    • Lymphocytes Absolute 04/26/2023 0 46 (L)    • Monocytes Absolute 04/26/2023 0 10 (L)    • Eosinophils Absolute 04/26/2023 0 00    • Basophils Absolute 04/26/2023 0 01    • AV peak gradient 04/26/2023 46    • LA size 04/26/2023 3 6 • Triscuspid Valve Regurgi* 04/26/2023 44 0    • Tricuspid valve peak reg* 04/26/2023 3 30    • LVPWd 04/26/2023 0 90    • Left Atrium Area-systoli* 04/26/2023 15 2    • Left Atrium Area-systoli* 04/26/2023 15 7    • Tricuspid annular plane * 04/26/2023 1 70    • TR Peak Conrad 04/26/2023 3 3    • IVSd 04/26/2023 9 48    • LV DIASTOLIC VOLUME (MOD* 76/77/7001 86    • LEFT VENTRICLE SYSTOLIC * 09/95/0142 61    • Left ventricular stroke * 04/26/2023 25 00    • AV Deceleration Time 04/26/2023 1,275    • EF 04/26/2023 38    • A4C EF 04/26/2023 43    • LA length (A2C) 04/26/2023 4 90    • LVIDd 04/26/2023 4 40    • IVS 04/26/2023 0 9    • LVIDS 04/26/2023 3 80    • FS 04/26/2023 14    • Asc Ao 04/26/2023 2 8    • Ao root 04/26/2023 3 00    • RVID d 04/26/2023 3 2    • AV regurgitation pressur* 04/26/2023 029    • LV Systolic Volume (BP) 78/24/4846 71    • LV Diastolic Volume (BP) 17/58/4476 114    • RAA A4C 04/26/2023 11 4    • LVSV, 2D 04/26/2023 25    • LV EF 04/26/2023 35    • Est  RA pres 04/26/2023 15 0    • Right Ventricular Peak S* 04/26/2023 59 00    • POC Glucose 04/26/2023 330 (H)    • Magnesium 04/26/2023 1 4 (L)    • POC Glucose 04/26/2023 410 (H)    • POC Glucose 04/26/2023 349 (H)    • POC Glucose 04/26/2023 223 (H)    • Sodium 04/27/2023 139    • Potassium 04/27/2023 3 6    • Chloride 04/27/2023 98    • CO2 04/27/2023 33 (H)    • ANION GAP 04/27/2023 8    • BUN 04/27/2023 19    • Creatinine 04/27/2023 1 10    • Glucose 04/27/2023 64 (L)    • Calcium 04/27/2023 8 8    • eGFR 04/27/2023 51    • Magnesium 04/27/2023 2 1    • POC Glucose 04/27/2023 84    • Ventricular Rate 04/26/2023 81    • Atrial Rate 04/26/2023 81    • CT Interval 04/26/2023 178    • QRSD Interval 04/26/2023 112    • QT Interval 04/26/2023 410    • QTC Interval 04/26/2023 476    • P Axis 04/26/2023 69    • QRS Axis 04/26/2023 -51    • T Wave [place] 04/26/2023 228    • POC Glucose 04/27/2023 152 (H)    • Hemoglobin A1C 04/26/2023 12 2 (H)    • EAG 04/26/2023 303    • POC Glucose 04/27/2023 56 (L)    • POC Glucose 04/27/2023 94    • POC Glucose 04/27/2023 161 (H)    • Sodium 04/28/2023 138    • Potassium 04/28/2023 4 5    • Chloride 04/28/2023 98    • CO2 04/28/2023 36 (H)    • ANION GAP 04/28/2023 4    • BUN 04/28/2023 17    • Creatinine 04/28/2023 1 19    • Glucose 04/28/2023 90    • Calcium 04/28/2023 8 4    • eGFR 04/28/2023 46    • Magnesium 04/28/2023 1 9    • POC Glucose 04/28/2023 93    • POC Glucose 04/28/2023 151 (H)    • BNP 04/28/2023 3,988 (H)    • POC Glucose 04/28/2023 197 (H)    • Supplier Name 04/28/2023 [employer]    • Supplier Phone Number 04/28/2023 [phone]    • Order Status 04/28/2023 Delivery Successful    • Delivery Request Date 04/28/2023 04/28/2023    • Date Delivered  04/28/2023 04/28/2023    • Item Description 04/28/2023 [employer], Adult    • POC Glucose 04/28/2023 271 (H)    • POC Glucose 04/28/2023 126    • Sodium 04/29/2023 138    • Potassium 04/29/2023 3 9    • Chloride 04/29/2023 97    • CO2 04/29/2023 34 (H)    • ANION GAP 04/29/2023 7    • BUN 04/29/2023 20    • Creatinine 04/29/2023 1 18    • Glucose 04/29/2023 79    • Calcium 04/29/2023 8 0 (L)    • eGFR 04/29/2023 47    • Magnesium 04/29/2023 1 9    • POC Glucose 04/29/2023 82    • POC Glucose 04/29/2023 176 (H)    • POC Glucose 04/29/2023 269 (H)    • POC Glucose 04/29/2023 190 (H)    • Sodium 04/30/2023 139    • Potassium 04/30/2023 4 2    • Chloride 04/30/2023 98    • CO2 04/30/2023 36 (H)    • ANION GAP 04/30/2023 5    • BUN 04/30/2023 23    • Creatinine 04/30/2023 1 41 (H)    • Glucose 04/30/2023 82    • Calcium 04/30/2023 8 3 (L)    • eGFR 04/30/2023 38    • Magnesium 04/30/2023 2 0    • POC Glucose 04/30/2023 83    Orders Only on 02/13/2023   Component Date Value   • Sodium 03/01/2023 138    • Potassium 03/01/2023 3 3 (L)    • Chloride 03/01/2023 100    • CO2 03/01/2023 32    • ANION GAP 03/01/2023 6    • BUN 03/01/2023 13    • Creatinine 03/01/2023 1 01    • Glucose 03/01/2023 303 (H)    • Calcium 03/01/2023 8 7    • eGFR 03/01/2023 56    Appointment on 01/18/2023   Component Date Value   • Sodium 01/18/2023 137    • Potassium 01/18/2023 3 5    • Chloride 01/18/2023 100    • CO2 01/18/2023 28    • ANION GAP 01/18/2023 9    • BUN 01/18/2023 13    • Creatinine 01/18/2023 0 98    • Glucose, Fasting 01/18/2023 319 (H)    • Calcium 01/18/2023 9 0    • Corrected Calcium 01/18/2023 9 9    • AST 01/18/2023 29    • ALT 01/18/2023 40    • Alkaline Phosphatase 01/18/2023 351 (H)    • Total Protein 01/18/2023 6 9    • Albumin 01/18/2023 2 9 (L)    • Total Bilirubin 01/18/2023 1 02 (H)    • eGFR 01/18/2023 59    • Cholesterol 01/18/2023 163    • Triglycerides 01/18/2023 72    • HDL, Direct 01/18/2023 73    • LDL Calculated 01/18/2023 76      Lab Results   Component Value Date    CHOL 190 10/07/2016    HDL 73 01/18/2023    HDL 62 10/07/2016    TRIG 72 01/18/2023    TRIG 100 10/07/2016     Imaging: Mammo screening right w 3d & cad    Result Date: 11/16/2021  Narrative: DIAGNOSIS: Breast cancer screening by mammogram TECHNIQUE: Digital screening mammography was performed  Computer Aided Detection (CAD) analyzed all applicable images  COMPARISONS: Prior breast imaging dated: 05/18/2020, 11/06/2018, and 02/23/2016 RELEVANT HISTORY: Family Breast Cancer History: No known family history of breast cancer  Family Medical History: No known relevant family medical history  Personal History: No known relevant hormone history  Surgical history includes breast reduction and mastectomy  Medical history includes breast cancer and history of chemotherapy  The patient is scheduled in a reminder system for screening mammography  8-10% of cancers will be missed on mammography  Management of a palpable abnormality must be based on clinical grounds  Patients will be notified of their results via letter from our facility   Accredited by [employer] of [employer] and FDA  RISK ASSESSMENT: Janene risk assessment reporting was suppressed due to the patient's history and/or demographic factors  TISSUE DENSITY: There are scattered areas of fibroglandular density  INDICATION: Jose Bolaños is a 74 y o  female presenting for screening mammography  FINDINGS: There are no suspicious masses, grouped microcalcifications or areas of architectural distortion  The skin and nipple areolar complex are unremarkable  Impression: No mammographic evidence of malignancy  ASSESSMENT/BI-RADS CATEGORY: Right: 1 - Negative Overall: 1 - Negative RECOMMENDATION:      - Routine screening mammogram in 1 year for the right breast  Workstation ID: [id number]       Review of Systems:  Review of Systems   Constitutional: Negative for activity change, appetite change, chills, diaphoresis, fatigue and unexpected weight change  HENT: Negative for hearing loss, nosebleeds and sore throat  Eyes: Negative for photophobia and visual disturbance  Respiratory: Negative for cough, chest tightness, shortness of breath and wheezing  Cardiovascular: Negative for chest pain, palpitations and leg swelling  Gastrointestinal: Negative for abdominal pain, diarrhea, nausea and vomiting  Endocrine: Negative for polyuria  Genitourinary: Negative for dysuria, frequency and hematuria  Musculoskeletal: Negative for arthralgias, back pain, gait problem and neck pain  Skin: Negative for pallor and rash  Neurological: Negative for dizziness, syncope and headaches  Hematological: Does not bruise/bleed easily  Psychiatric/Behavioral: Negative for behavioral problems and confusion  Physical Exam:  Physical Exam  Vitals reviewed  Constitutional:       Appearance: She is well-developed  She is not diaphoretic  HENT:      Head: Normocephalic and atraumatic  Nose: Nose normal    Eyes:      General: No scleral icterus  Pupils: Pupils are equal, round, and reactive to light     Neck: "  Vascular: No JVD  Cardiovascular:      Rate and Rhythm: Normal rate and regular rhythm  Heart sounds: Normal heart sounds  No murmur heard  No friction rub  No gallop  Pulmonary:      Effort: Pulmonary effort is normal  No respiratory distress  Breath sounds: Normal breath sounds  No wheezing or rales  Abdominal:      General: Bowel sounds are normal  There is no distension  Palpations: Abdomen is soft  Tenderness: There is no abdominal tenderness  Musculoskeletal:         General: No deformity  Normal range of motion  Cervical back: Normal range of motion and neck supple  Skin:     General: Skin is warm and dry  Findings: No rash  Neurological:      Mental Status: She is alert and oriented to person, place, and time  Cranial Nerves: No cranial nerve deficit  Psychiatric:         Behavior: Behavior normal        Blood pressure 116/62, pulse 89, height 5' 2 5\" (1 588 m), weight 47 2 kg (104 lb), SpO2 95 %, not currently breastfeeding  EKG:  Normal sinus rhythm  Left axis deviation  Incomplete right bundle branch block  Nonspecific ST and T wave abnormality  Abnormal ECG    Discussion/Summary:  NICM: mild reduction/low normal EF when hospitalized for CHF  Repeat echo revealed significantly reduced EF of 25-30%  Continued on BB only for GDMT as blood pressure limits use of ACE inhibitor or ARB  s/p ICD placement and doing well  Most recent device interrogation revealed stable OptiVol fluid threshold  Euvolemic on exam today  Using Lasix on an as needed basis  Most recent  on May 31, 2023 compared to 3988 in the end of April 2023  Renal function improved significantly with a creatinine of 1 15 and the end of May 2023  CAD: nonobstructive as per cath in Nov 2020  Continued on ASA, statin, and B-blocker  Eventual repeat ischemic eval, no current symptoms to suggest active disease  Continue on aspirin, statin, beta-blocker    Currently " asymptomatic from this standpoint and will hold off on further evaluation  HTN:  Was improving at prior visit, changed lisinopril to Entresto at prior visit and was tolerating well  Tends to have elevated blood pressure with increased volume  Currently with improved blood pressure control, volume status euvolemic  Unable to tolerate ACE inhibitor or ARB due to low blood pressure  HLD: continued on statin  LDL 65 in March 2021  Repeat levels well controlled with LDL of 76 in Jan 2023  Claudication: fatigue in legs with exertion  We checked arterial duplex revealing significant LE arterial disease - referred to vascular surgery for further management  They recommended continued monitoring and ambulation with continued aspirin and statin  Afib: paroxysmal and found on device interrogation  Continued on Toprol for improved heart rate control at 50 mg BID and continued on Eliquis 5mg BID for AC

## 2023-06-08 PROBLEM — N18.31 STAGE 3A CHRONIC KIDNEY DISEASE (HCC): Status: ACTIVE | Noted: 2023-05-04

## 2023-06-09 ENCOUNTER — OFFICE VISIT (OUTPATIENT)
Dept: FAMILY MEDICINE CLINIC | Facility: CLINIC | Age: 68
End: 2023-06-09
Payer: COMMERCIAL

## 2023-06-09 ENCOUNTER — HOME CARE VISIT (OUTPATIENT)
Dept: HOME HEALTH SERVICES | Facility: HOME HEALTHCARE | Age: 68
End: 2023-06-09
Payer: COMMERCIAL

## 2023-06-09 VITALS
SYSTOLIC BLOOD PRESSURE: 108 MMHG | RESPIRATION RATE: 16 BRPM | HEART RATE: 88 BPM | WEIGHT: 102.25 LBS | TEMPERATURE: 97.8 F | BODY MASS INDEX: 18.12 KG/M2 | DIASTOLIC BLOOD PRESSURE: 58 MMHG | HEIGHT: 63 IN

## 2023-06-09 DIAGNOSIS — N18.30 CKD STAGE 3 DUE TO TYPE 2 DIABETES MELLITUS (HCC): ICD-10-CM

## 2023-06-09 DIAGNOSIS — E11.22 CKD STAGE 3 DUE TO TYPE 2 DIABETES MELLITUS (HCC): ICD-10-CM

## 2023-06-09 DIAGNOSIS — Z79.4 TYPE 2 DIABETES MELLITUS WITH STAGE 3A CHRONIC KIDNEY DISEASE, WITH LONG-TERM CURRENT USE OF INSULIN (HCC): Primary | ICD-10-CM

## 2023-06-09 DIAGNOSIS — E11.22 TYPE 2 DIABETES MELLITUS WITH STAGE 3A CHRONIC KIDNEY DISEASE, WITH LONG-TERM CURRENT USE OF INSULIN (HCC): Primary | ICD-10-CM

## 2023-06-09 DIAGNOSIS — N18.31 TYPE 2 DIABETES MELLITUS WITH STAGE 3A CHRONIC KIDNEY DISEASE, WITH LONG-TERM CURRENT USE OF INSULIN (HCC): Primary | ICD-10-CM

## 2023-06-09 DIAGNOSIS — E78.5 HYPERLIPIDEMIA LDL GOAL <100: ICD-10-CM

## 2023-06-09 PROCEDURE — G0299 HHS/HOSPICE OF RN EA 15 MIN: HCPCS

## 2023-06-09 PROCEDURE — 99214 OFFICE O/P EST MOD 30 MIN: CPT | Performed by: FAMILY MEDICINE

## 2023-06-09 NOTE — PATIENT INSTRUCTIONS
Please try to follow a diabetic diet to control fluctuations in your sugar readings  You could schedule an appointment with the registered dietician for diabetic meal planning at [facility] as a free service  We are shooting for a goal fasting sugar reading around 100-110  If you are consistently lower than 100, then you should reduce the dose of Lantus  Since it has been low, please reduce the lantus from 10 units at night to 8 units at night  In the future, you may need to take insulin before meals

## 2023-06-09 NOTE — PROGRESS NOTES
Assessment/Plan:    No problem-specific Assessment & Plan notes found for this encounter  DM2 with improving smbg, some lower readings  Advised to reduce lantus if fbs low and continue to monitor  Keep f/u with endocrinologist soon and in future  Aware a1c has been very high, 12 2 but 14 9 before that and never less than 7  Diet advised, may need mealtime insulin in future    ckd 3a  Creatinine has improved with diuretic adjustment  Potassium now normal, was high in past  F/u cardiology    HLD stable  On statin  Continue lipitor 40mg/d    Please try to follow a diabetic diet to control fluctuations in your sugar readings  You could schedule an appointment with the registered dietician for diabetic meal planning at [facility] as a free service  We are shooting for a goal fasting sugar reading around 100-110  If you are consistently lower than 100, then you should reduce the dose of Lantus  Since it has been low, please reduce the lantus from 10 units at night to 8 units at night  In the future, you may need to take insulin before meals  Diagnoses and all orders for this visit:    Type 2 diabetes mellitus with stage 3a chronic kidney disease, with long-term current use of insulin ([address] )  -     Ambulatory referral to Diabetic Education; Future    CKD stage 3 due to type 2 diabetes mellitus ([address] )    Hyperlipidemia LDL goal <100      Return in about 6 months (around 12/9/2023), or if symptoms worsen or fail to improve  Subjective:      Patient ID: Edgar Stewart is a 69 y o  female      Chief Complaint   Patient presents with   • Diabetes     Follow up lw cma       HPI  fbs 68 yest, 91 today  2d ago was 76  Has not been over 100  Endo appt on 6/19/23    smbg sometimes after lunch, 226  After dinner 177    Watches sugar  Limits      Breathing ok  No syncope or lightheadedness  sx of hypoglycemia aware, did happen recently    The following portions of the patient's history were reviewed and updated as appropriate: allergies, current medications, past family history, past medical history, past social history, past surgical history and problem list     Review of Systems   Constitutional: Negative for chills and fever  Current Outpatient Medications   Medication Sig Dispense Refill   • albuterol (Ventolin HFA) 90 mcg/act inhaler Inhale 2 puffs every 6 (six) hours as needed for wheezing (rinse mouth after use) 18 g 1   • apixaban (Eliquis) 5 mg Take 1 tablet (5 mg total) by mouth 2 (two) times a day 60 tablet 3   • Aspirin Low Dose 81 MG EC tablet TAKE 1 TABLET BY MOUTH EVERY DAY 90 tablet 3   • atorvastatin (LIPITOR) 40 mg tablet Take 1 tablet (40 mg total) by mouth daily 90 tablet 1   • Blood Glucose Monitoring Suppl (OneTouch Verio Reflect) w/Device KIT Check blood sugars three times daily  Please substitute with appropriate alternative as covered by patient's insurance  Dx: E11 65 1 kit 0   • cholecalciferol (VITAMIN D3) 1,000 units tablet Take 2 tablets (2,000 Units total) by mouth daily 10 tablet 0   • Flovent  MCG/ACT inhaler Inhale 1 puff 2 (two) times a day PT NOT TAKING     • furosemide (LASIX) 20 mg tablet Take 1 tablet (20 mg total) by mouth daily as needed (wt gain 3 lb/ 24 hours  /  take for 3 days in a row) 30 tablet 3   • glucose blood (OneTouch Verio) test strip Check blood sugars three times daily  Please substitute with appropriate alternative as covered by patient's insurance   Dx: E11 65 300 each 1   • Insulin Glargine Solostar (Lantus SoloStar) 100 UNIT/ML SOPN Inject 0 1 mL (10 Units total) under the skin daily at bedtime 3 mL 0   • Insulin Pen Needle (BD Pen Needle Kateryna U/F) 32G X 4 MM MISC Use daily as directed with insulin pen 100 each 0   • metFORMIN (GLUCOPHAGE) 500 mg tablet Take 1 tablet (500 mg total) by mouth 2 (two) times a day with meals 60 tablet 2   • metoprolol succinate (TOPROL-XL) 50 mg 24 hr tablet Take 1 tablet (50 mg total) by mouth daily 30 tablet 3   • "nystatin-triamcinolone (MYCOLOG-II) ointment Apply topically 2 (two) times a day To vaginal area 60 g 1   • OneTouch Delica Lancets 09A MISC Check blood sugars three times daily  Please substitute with appropriate alternative as covered by patient's insurance  Dx: E11 65 300 each 1   • saccharomyces boulardii (FLORASTOR) 250 mg capsule Take 1 capsule (250 mg total) by mouth 2 (two) times a day 60 capsule 0   • zinc oxide (BALMEX) 11 3 % cream Apply 1 application  topically 2 (two) times a day     • dapagliflozin 5 MG TABS Take 1 tablet (5 mg total) by mouth daily (Patient not taking: Reported on 6/9/2023) 90 tablet 0     No current facility-administered medications for this visit  Objective:    /58   Pulse 88   Temp 97 8 °F (36 6 °C) (Tympanic)   Resp 16   Ht 5' 2 5\" (1 588 m)   Wt 46 4 kg (102 lb 4 oz)   LMP  (LMP Unknown)   BMI 18 40 kg/m²        Physical Exam  Vitals and nursing note reviewed  Constitutional:       Appearance: She is well-developed  She is not ill-appearing  HENT:      Head: Normocephalic  Right Ear: Tympanic membrane normal       Left Ear: Tympanic membrane normal    Eyes:      General: No scleral icterus  Conjunctiva/sclera: Conjunctivae normal    Cardiovascular:      Rate and Rhythm: Normal rate and regular rhythm  Heart sounds: No murmur heard  Pulmonary:      Effort: Pulmonary effort is normal  No respiratory distress  Breath sounds: No rales  Abdominal:      Palpations: Abdomen is soft  Musculoskeletal:         General: No deformity  Cervical back: Neck supple  Right lower leg: No edema  Left lower leg: No edema  Skin:     General: Skin is warm and dry  Coloration: Skin is not pale  Neurological:      Mental Status: She is alert  Motor: No weakness  Gait: Gait normal    Psychiatric:         Mood and Affect: Mood normal          Behavior: Behavior normal          Thought Content:  Thought content normal        " Dinorah Trammell, DO

## 2023-06-11 VITALS
SYSTOLIC BLOOD PRESSURE: 106 MMHG | TEMPERATURE: 96.1 F | WEIGHT: 99.4 LBS | DIASTOLIC BLOOD PRESSURE: 50 MMHG | OXYGEN SATURATION: 98 % | HEART RATE: 92 BPM | BODY MASS INDEX: 17.89 KG/M2 | RESPIRATION RATE: 26 BRPM

## 2023-06-12 ENCOUNTER — TELEPHONE (OUTPATIENT)
Dept: GENETICS | Facility: CLINIC | Age: 68
End: 2023-06-12

## 2023-06-12 PROCEDURE — 88305 TISSUE EXAM BY PATHOLOGIST: CPT | Performed by: STUDENT IN AN ORGANIZED HEALTH CARE EDUCATION/TRAINING PROGRAM

## 2023-06-12 PROCEDURE — 88305 TISSUE EXAM BY PATHOLOGIST: CPT | Performed by: PATHOLOGY

## 2023-06-12 NOTE — TELEPHONE ENCOUNTER
Baron Felix called to schedule a new patient appointment with the Cancer Risk and Genetics Program       Outcome:  Genetics appointment scheduled for 11/24 at 11    Personal/Family History Related to Appointment:  Hx of breast ca in 89  fhx of blood cancer (mom), sister with cancer type unkn  Non-[facility]  History of Genetic Testing:  Patient reports no personal or family history of genetic testing    Genetics Family History Questionnaire: The patient declined providing and e-mail to send an invite link for our genetics family history intake  The patient understands that a detailed family history will be collected during the visit

## 2023-06-13 ENCOUNTER — HOME CARE VISIT (OUTPATIENT)
Dept: HOME HEALTH SERVICES | Facility: HOME HEALTHCARE | Age: 68
End: 2023-06-13
Payer: COMMERCIAL

## 2023-06-13 PROCEDURE — G0299 HHS/HOSPICE OF RN EA 15 MIN: HCPCS

## 2023-06-14 VITALS
BODY MASS INDEX: 17.85 KG/M2 | HEART RATE: 82 BPM | RESPIRATION RATE: 24 BRPM | OXYGEN SATURATION: 100 % | WEIGHT: 99.2 LBS | TEMPERATURE: 98.4 F | SYSTOLIC BLOOD PRESSURE: 110 MMHG | DIASTOLIC BLOOD PRESSURE: 60 MMHG

## 2023-06-16 ENCOUNTER — HOME CARE VISIT (OUTPATIENT)
Dept: HOME HEALTH SERVICES | Facility: HOME HEALTHCARE | Age: 68
End: 2023-06-16
Payer: COMMERCIAL

## 2023-06-16 VITALS
HEART RATE: 84 BPM | OXYGEN SATURATION: 99 % | SYSTOLIC BLOOD PRESSURE: 116 MMHG | DIASTOLIC BLOOD PRESSURE: 60 MMHG | RESPIRATION RATE: 24 BRPM | WEIGHT: 99 LBS | BODY MASS INDEX: 17.82 KG/M2 | TEMPERATURE: 98.9 F

## 2023-06-16 PROCEDURE — G0299 HHS/HOSPICE OF RN EA 15 MIN: HCPCS

## 2023-06-19 ENCOUNTER — TELEPHONE (OUTPATIENT)
Dept: FAMILY MEDICINE CLINIC | Facility: CLINIC | Age: 68
End: 2023-06-19

## 2023-06-19 ENCOUNTER — OFFICE VISIT (OUTPATIENT)
Dept: ENDOCRINOLOGY | Facility: CLINIC | Age: 68
End: 2023-06-19
Payer: COMMERCIAL

## 2023-06-19 VITALS
DIASTOLIC BLOOD PRESSURE: 78 MMHG | OXYGEN SATURATION: 99 % | TEMPERATURE: 96.6 F | BODY MASS INDEX: 18.07 KG/M2 | SYSTOLIC BLOOD PRESSURE: 112 MMHG | HEART RATE: 56 BPM | HEIGHT: 63 IN | WEIGHT: 102 LBS

## 2023-06-19 DIAGNOSIS — G47.33 OBSTRUCTIVE SLEEP APNEA SYNDROME: ICD-10-CM

## 2023-06-19 DIAGNOSIS — E78.5 HYPERLIPIDEMIA LDL GOAL <100: ICD-10-CM

## 2023-06-19 DIAGNOSIS — E55.9 VITAMIN D DEFICIENCY: ICD-10-CM

## 2023-06-19 DIAGNOSIS — Z79.4 TYPE 2 DIABETES MELLITUS WITH STAGE 3A CHRONIC KIDNEY DISEASE, WITH LONG-TERM CURRENT USE OF INSULIN (HCC): Primary | ICD-10-CM

## 2023-06-19 DIAGNOSIS — N18.31 STAGE 3A CHRONIC KIDNEY DISEASE (HCC): ICD-10-CM

## 2023-06-19 DIAGNOSIS — N18.31 TYPE 2 DIABETES MELLITUS WITH STAGE 3A CHRONIC KIDNEY DISEASE, WITH LONG-TERM CURRENT USE OF INSULIN (HCC): Primary | ICD-10-CM

## 2023-06-19 DIAGNOSIS — E11.22 TYPE 2 DIABETES MELLITUS WITH STAGE 3A CHRONIC KIDNEY DISEASE, WITH LONG-TERM CURRENT USE OF INSULIN (HCC): Primary | ICD-10-CM

## 2023-06-19 DIAGNOSIS — E04.1 THYROID NODULE: ICD-10-CM

## 2023-06-19 DIAGNOSIS — E21.3 HYPERPARATHYROIDISM (HCC): ICD-10-CM

## 2023-06-19 PROCEDURE — 99215 OFFICE O/P EST HI 40 MIN: CPT | Performed by: INTERNAL MEDICINE

## 2023-06-19 PROCEDURE — 95250 CONT GLUC MNTR PHYS/QHP EQP: CPT | Performed by: INTERNAL MEDICINE

## 2023-06-19 NOTE — TELEPHONE ENCOUNTER
Patient called stating that there was paper sent to us last week by the [facility] that needs to be filled out  She stated that she needs it filled out as soon as possible  I do not see anything scanned into her media      Mukul Latham LPN

## 2023-06-19 NOTE — PROGRESS NOTES
Continous glucose monitoring frank placement     Date/Time 6/19/2023 12:00 PM     Performed by  Sunday Mcclendon by Casper Pérez MD     Universal Protocol   Consent: Verbal consent obtained  Written consent obtained  Risks and benefits: risks, benefits and alternatives were discussed  Consent given by: patient  Patient understanding: patient states understanding of the procedure being performed  Patient consent: the patient's understanding of the procedure matches consent given  Relevant documents: relevant documents present and verified  Patient identity confirmed: verbally with patient        Local anesthesia used: no     Anesthesia   Local anesthesia used: no     Sedation   Patient sedated: no       Procedure Details   Procedure Notes: Frank Pro placed on patient per request of provider  Placed on Left arm for 14 days nurse schedule made for 2 weeks for removal of device  # [id number]  Exp: 10/31/2023      Patient tolerated procedure well     Patient tolerance: patient tolerated the procedure well with no immediate complications

## 2023-06-19 NOTE — PROGRESS NOTES
Follow-up Patient Progress Note      CC: Type 2 diabetes    History of Present Illness:   58-year-old female with type 2 diabetes since 2008, HTN, HLD, NASREEN, hyperparathyroidism, hypercalcemia, osteoporosis, vitamin D deficiency, CKD 3 EGFR 48, CAD, HFrEF, PAF, tobacco use, Hx breast cancer, 0 6 cm left mid gland TR 4 thyroid nodule 3/17/2021  She was recently discharged from hospital 4/23/23 on lantus 10u qhs  Home blood glucose monitoring:       Current meds:  Lantus 8 units nightly  Metformin 500mg po bid    Opthamology: Yes  Last 2 years   Podiatry: No   Vaccination: yes   Dental: No  Pancreatitis: No    Ace/ARB: none  Statin: lipitor    Thyroid issues:  3/17/2021 US thyroid: 0 6 cm left mid gland TR 4 thyroid nodule  1 6 cm nodule posterior inferior to the lower pole of the right thyroid lobe suspicious for a parathyroid nodule  4/5/2021 sestamibi: Focal radiotracer uptake in the right lower pole region corresponding to 1 6 cm hypoechoic nodule seen on the thyroid ultrasound        Patient Active Problem List   Diagnosis   • History of left breast cancer   • Hypertensive heart and kidney disease with heart failure and with chronic kidney disease stage III ([address] )   • Colon, diverticulosis   • Hyperlipidemia LDL goal <100   • Type 2 diabetes mellitus with stage 3a chronic kidney disease, with long-term current use of insulin ([address] )   • Screening for cardiovascular, respiratory, and genitourinary diseases   • Immunization due   • Colon cancer screening   • Menopause   • Gastroesophageal reflux disease   • Microalbuminuria   • Medicare annual wellness visit, subsequent   • Obstructive sleep apnea syndrome   • Hypercalcemia   • CKD stage 3 due to type 2 diabetes mellitus ([address] )   • Hyperparathyroidism ([address] )   • Coronary artery disease involving native coronary artery of native heart without angina pectoris   • Chronic HFrEF (heart failure with reduced ejection fraction) ([facility])   • Vitamin D deficiency   • S/P left mastectomy   • Breast cancer ([place] [address] ) - left 1994   • Primary hypertension   • Bilateral leg edema   • Breast cancer screening by mammogram   • NICM (nonischemic cardiomyopathy) ([place] [address] )   • Peripheral arterial disease (HCC)   • Trigger finger of right thumb   • Venous insufficiency of both lower extremities   • SVT (supraventricular tachycardia) ([facility])   • Tobacco use   • Vulvar itching   • PAF (paroxysmal atrial fibrillation) ([facility])   • Embolism and thrombosis of arteries of the lower extremities ([facility])   • Pleural effusion, bilateral   • Stage 3a chronic kidney disease (HCC)   • Moderate protein-calorie malnutrition (HCC)   • Depression, recurrent (HCC)   • Chronic renal disease, stage IV ([place] [address] )   • HPV in female   • Vaginal odor     Past Medical History:   Diagnosis Date   • Breast cancer ([place] [address] )     left - 1994  • Diabetes mellitus ([place] [address] )    • Diabetes mellitus, type 2 ([place] [address] ) 03/15/2006    last assessed 7/10/13   • GERD (gastroesophageal reflux disease)    • History of chemotherapy    • Hypertension       Past Surgical History:   Procedure Laterality Date   • CARDIAC ELECTROPHYSIOLOGY PROCEDURE N/A 3/9/2022    Procedure: Cardiac icd implant/ DUAL CHAMBER ICD; Surgeon: Jessica Lira MD;  [facility];   Service: Cardiology   • MASTECTOMY Left     last assessed 12/16/14   • MASTECTOMY, RADICAL Left     1994   • MS PARATHYROIDECTOMY/EXPLORATION PARATHYROIDS N/A 4/21/2021    Procedure: PARATHYROIDECTOMY POSSIBLE 4 GLAND EXPLORATION;  Surgeon: Stan Storm MD;  Location[facility];  Service: ENT   • REDUCTION MAMMAPLASTY Right    • UVULECTOMY        Family History   Problem Relation Age of Onset   • Hypothyroidism Mother    • Leukemia Mother    • Diabetes Father    • Diabetes type II Father    • Stroke Sister    • Diabetes Paternal Grandmother    • Cancer Sister    • Diabetes Brother      Social History     Tobacco Use   • Smoking status: Former     Packs/day: 0 50     Years: 50 00     Total pack years: 25 00     Types: Cigarettes     Start date: 6/15/1973     Quit date: 2023     Years since quittin 1   • Smokeless tobacco: Never   • Tobacco comments:     3 cigarettes daily    Substance Use Topics   • Alcohol use: Not Currently     Comment: Social     No Known Allergies      Current Outpatient Medications:   •  albuterol (Ventolin HFA) 90 mcg/act inhaler, Inhale 2 puffs every 6 (six) hours as needed for wheezing (rinse mouth after use), Disp: 18 g, Rfl: 1  •  apixaban (Eliquis) 5 mg, Take 1 tablet (5 mg total) by mouth 2 (two) times a day, Disp: 60 tablet, Rfl: 3  •  Aspirin Low Dose 81 MG EC tablet, TAKE 1 TABLET BY MOUTH EVERY DAY, Disp: 90 tablet, Rfl: 3  •  atorvastatin (LIPITOR) 40 mg tablet, Take 1 tablet (40 mg total) by mouth daily, Disp: 90 tablet, Rfl: 1  •  Blood Glucose Monitoring Suppl (OneTouch Verio Reflect) w/Device KIT, Check blood sugars three times daily  Please substitute with appropriate alternative as covered by patient's insurance  Dx: E11 65, Disp: 1 kit, Rfl: 0  •  cholecalciferol (VITAMIN D3) 1,000 units tablet, Take 2 tablets (2,000 Units total) by mouth daily, Disp: 10 tablet, Rfl: 0  •  furosemide (LASIX) 20 mg tablet, Take 1 tablet (20 mg total) by mouth daily as needed (wt gain 3 lb/ 24 hours  /  take for 3 days in a row), Disp: 30 tablet, Rfl: 3  •  glucose blood (OneTouch Verio) test strip, Check blood sugars three times daily  Please substitute with appropriate alternative as covered by patient's insurance   Dx: E11 65, Disp: 300 each, Rfl: 1  •  Insulin Glargine Solostar (Lantus SoloStar) 100 UNIT/ML SOPN, Inject 0 1 mL (10 Units total) under the skin daily at bedtime (Patient taking differently: Inject 10 Units under the skin daily at bedtime Per patient md decreased Lantus insulin to 8 units on 23), Disp: 3 mL, Rfl: 0  •  Insulin Pen Needle (BD Pen Needle Kateryna U/F) 32G X 4 MM MISC, Use daily as directed with insulin pen, Disp: 100 each, Rfl: 0  •  metFORMIN "(GLUCOPHAGE) 500 mg tablet, Take 1 tablet (500 mg total) by mouth 2 (two) times a day with meals, Disp: 60 tablet, Rfl: 2  •  metoprolol succinate (TOPROL-XL) 50 mg 24 hr tablet, Take 1 tablet (50 mg total) by mouth daily, Disp: 30 tablet, Rfl: 3  •  nystatin-triamcinolone (MYCOLOG-II) ointment, Apply topically 2 (two) times a day To vaginal area, Disp: 60 g, Rfl: 1  •  OneTouch Delica Lancets 57B MISC, Check blood sugars three times daily  Please substitute with appropriate alternative as covered by patient's insurance  Dx: E11 65, Disp: 300 each, Rfl: 1  •  saccharomyces boulardii (FLORASTOR) 250 mg capsule, Take 1 capsule (250 mg total) by mouth 2 (two) times a day, Disp: 60 capsule, Rfl: 0  •  dapagliflozin 5 MG TABS, Take 1 tablet (5 mg total) by mouth daily (Patient not taking: Reported on 6/9/2023), Disp: 90 tablet, Rfl: 0  •  Flovent  MCG/ACT inhaler, Inhale 1 puff 2 (two) times a day PT NOT TAKING (Patient not taking: Reported on 6/19/2023), Disp: , Rfl:   •  zinc oxide (BALMEX) 11 3 % cream, Apply 1 application  topically 2 (two) times a day (Patient not taking: Reported on 6/19/2023), Disp: , Rfl:     Review of Systems   HENT: Negative  Eyes: Negative  Respiratory: Negative  Cardiovascular: Negative  Gastrointestinal: Negative  Endocrine: Negative  Musculoskeletal: Negative  Skin: Negative  Allergic/Immunologic: Negative  Neurological: Negative  Hematological: Negative  Psychiatric/Behavioral: Negative  Physical Exam:  Body mass index is 18 36 kg/m²  /78 (BP Location: Left arm, Patient Position: Sitting, Cuff Size: Standard)   Pulse 56   Temp (!) 96 6 °F (35 9 °C) (Tympanic)   Ht 5' 2 5\" (1 588 m)   Wt 46 3 kg (102 lb)   LMP  (LMP Unknown)   SpO2 99%   BMI 18 36 kg/m²    Vitals:    06/19/23 1208   Weight: 46 3 kg (102 lb)        Physical Exam  Constitutional:       General: She is not in acute distress  Appearance: She is well-developed   She " is not ill-appearing  HENT:      Head: Normocephalic and atraumatic  Nose: Nose normal       Mouth/Throat:      Pharynx: Oropharynx is clear  Eyes:      Extraocular Movements: Extraocular movements intact  Conjunctiva/sclera: Conjunctivae normal    Neck:      Thyroid: No thyromegaly  Cardiovascular:      Rate and Rhythm: Normal rate  Pulmonary:      Effort: Pulmonary effort is normal    Musculoskeletal:         General: No deformity  Cervical back: Normal range of motion  Skin:     Capillary Refill: Capillary refill takes less than 2 seconds  Coloration: Skin is not pale  Findings: No rash  Neurological:      Mental Status: She is alert and oriented to person, place, and time  Psychiatric:         Behavior: Behavior normal          Labs:   Lab Results   Component Value Date    HGBA1C 12 2 (H) 04/26/2023       Lab Results   Component Value Date    APZ8CPMQDITC 1 332 12/09/2020       Lab Results   Component Value Date    CREATININE 1 15 05/31/2023    CREATININE 1 65 (H) 05/24/2023    CREATININE 1 91 (H) 05/17/2023    BUN 35 (H) 05/31/2023     10/07/2016    K 4 5 05/31/2023     05/31/2023    CO2 27 05/31/2023     eGFR   Date Value Ref Range Status   05/31/2023 48 ml/min/1 73sq m Final       Lab Results   Component Value Date    ALT 11 04/26/2023    AST 15 04/26/2023    ALKPHOS 210 (H) 04/26/2023    BILITOT 0 3 10/07/2016       Lab Results   Component Value Date    CHOLESTEROL 163 01/18/2023    CHOLESTEROL 144 03/09/2021    CHOLESTEROL 168 11/16/2020     Lab Results   Component Value Date    HDL 73 01/18/2023    HDL 64 03/09/2021    HDL 47 11/16/2020     Lab Results   Component Value Date    TRIG 72 01/18/2023    TRIG 76 03/09/2021    TRIG 82 11/16/2020     Lab Results   Component Value Date    [place] 80 03/09/2021    [place] 121 11/16/2020         Impression:  1   Type 2 diabetes mellitus with stage 3a chronic kidney disease, with long-term current use of insulin ([facility] ) 2  Hyperparathyroidism ([place] [address] )    3  Obstructive sleep apnea syndrome    4  Vitamin D deficiency    5  Hyperlipidemia LDL goal <100    6  Stage 3a chronic kidney disease ([place] [address] )    7  Thyroid nodule         Plan:    Diagnoses and all orders for this visit:    Type 2 diabetes mellitus with stage 3a chronic kidney disease, with long-term current use of insulin ([place] [address] )  She is uncontrolled with A1c 12%  Goal is 7%  Today we discussed all aspects of diabetes including pathophysiology, risk factors, complications, SAGM, CGM, diet, lifestyle modifications, medical fitness training, diabetes education, goals of therapy, follow up needs and medications including insulin, metformin, Jardiance and GLP1 agonists  Advised to maintain goal blood sugars 70-180mg/dL  Advised to continue basal insulin and metformin  Will get accurate data with a professional cgm and make further dose changes  Follow up in 4-6 weeks  -     Continous glucose monitoring frank placement; Future  -     Continous glucose monitoring frank intrepretation; Future  -     Continuous Blood Gluc Sensor (FreeStyle Frank 2 Sensor) MISC; Use 1 Units every 14 (fourteen) days  -     Continuous Blood Gluc  (FreeStyle [place] 2 [place]) KEELY; Use 1 Units continuous  -     Ambulatory referral to Diabetic Education; Future  -     Hemoglobin A1C; Future  -     Continous glucose monitoring frank placement    Hyperparathyroidism ([place] [address] )  She had a previous sestamibi that was suspicious for a rt parathyroid adenoma  Repeat testing as listed and review next visit  -     DXA bone density spine hip and pelvis; Future  -     Phosphorus; Future  -     PTH, intact; Future  -     Vitamin D 25 hydroxy; Future  -     Comprehensive metabolic panel; Future    Obstructive sleep apnea syndrome    Vitamin D deficiency  Take vit D3 2000IU daily  Hyperlipidemia LDL goal <100    Stage 3a chronic kidney disease (HCC)    Thyroid nodule  Will repeat labs and then follow up  Discussed improtance of monitoring thyroid nodules and associated risk of thyroid cancer     -     T4, free; Future  -     TSH, 3rd generation; Future  -     US thyroid; Future        I have spent 48 minutes with patient today in which greater than 50% of this time was spent in counseling/coordination of care  Discussed with the patient and all questioned fully answered  She will call me if any problems arise  Educated/ Counseled patient on diagnostic test results, prognosis, risk vs benefit of treatment options, importance of treatment compliance, healthy life and lifestyle choices        [address]

## 2023-06-20 ENCOUNTER — HOME CARE VISIT (OUTPATIENT)
Dept: HOME HEALTH SERVICES | Facility: HOME HEALTHCARE | Age: 68
End: 2023-06-20
Payer: COMMERCIAL

## 2023-06-20 VITALS
SYSTOLIC BLOOD PRESSURE: 116 MMHG | TEMPERATURE: 97.6 F | RESPIRATION RATE: 20 BRPM | WEIGHT: 99.6 LBS | DIASTOLIC BLOOD PRESSURE: 56 MMHG | BODY MASS INDEX: 17.93 KG/M2 | HEART RATE: 84 BPM | OXYGEN SATURATION: 99 %

## 2023-06-20 DIAGNOSIS — E78.5 HYPERLIPIDEMIA LDL GOAL <100: ICD-10-CM

## 2023-06-20 PROCEDURE — G0299 HHS/HOSPICE OF RN EA 15 MIN: HCPCS

## 2023-06-20 RX ORDER — ATORVASTATIN CALCIUM 40 MG/1
40 TABLET, FILM COATED ORAL DAILY
Qty: 90 TABLET | Refills: 1 | Status: SHIPPED | OUTPATIENT
Start: 2023-06-20 | End: 2023-07-20

## 2023-06-21 ENCOUNTER — APPOINTMENT (OUTPATIENT)
Dept: LAB | Facility: IMAGING CENTER | Age: 68
End: 2023-06-21
Payer: COMMERCIAL

## 2023-06-21 ENCOUNTER — CONSULT (OUTPATIENT)
Dept: OBGYN CLINIC | Facility: CLINIC | Age: 68
End: 2023-06-21
Payer: COMMERCIAL

## 2023-06-21 ENCOUNTER — TELEPHONE (OUTPATIENT)
Dept: FAMILY MEDICINE CLINIC | Facility: CLINIC | Age: 68
End: 2023-06-21

## 2023-06-21 VITALS
SYSTOLIC BLOOD PRESSURE: 126 MMHG | DIASTOLIC BLOOD PRESSURE: 72 MMHG | BODY MASS INDEX: 18.25 KG/M2 | WEIGHT: 103 LBS | HEIGHT: 63 IN

## 2023-06-21 DIAGNOSIS — D07.1 VIN III (VULVAR INTRAEPITHELIAL NEOPLASIA III): ICD-10-CM

## 2023-06-21 DIAGNOSIS — E04.1 THYROID NODULE: ICD-10-CM

## 2023-06-21 DIAGNOSIS — N90.1 VIN II (VULVAR INTRAEPITHELIAL NEOPLASIA II): Primary | ICD-10-CM

## 2023-06-21 DIAGNOSIS — Z79.4 TYPE 2 DIABETES MELLITUS WITH STAGE 3A CHRONIC KIDNEY DISEASE, WITH LONG-TERM CURRENT USE OF INSULIN (HCC): ICD-10-CM

## 2023-06-21 DIAGNOSIS — E11.22 TYPE 2 DIABETES MELLITUS WITH STAGE 3A CHRONIC KIDNEY DISEASE, WITH LONG-TERM CURRENT USE OF INSULIN (HCC): ICD-10-CM

## 2023-06-21 DIAGNOSIS — N18.31 TYPE 2 DIABETES MELLITUS WITH STAGE 3A CHRONIC KIDNEY DISEASE, WITH LONG-TERM CURRENT USE OF INSULIN (HCC): ICD-10-CM

## 2023-06-21 DIAGNOSIS — E21.3 HYPERPARATHYROIDISM (HCC): ICD-10-CM

## 2023-06-21 LAB
25(OH)D3 SERPL-MCNC: 17.3 NG/ML (ref 30–100)
ALBUMIN SERPL BCP-MCNC: 3 G/DL (ref 3.5–5)
ALP SERPL-CCNC: 117 U/L (ref 46–116)
ALT SERPL W P-5'-P-CCNC: 27 U/L (ref 12–78)
ANION GAP SERPL CALCULATED.3IONS-SCNC: 4 MMOL/L
AST SERPL W P-5'-P-CCNC: 19 U/L (ref 5–45)
BILIRUB SERPL-MCNC: 0.31 MG/DL (ref 0.2–1)
BUN SERPL-MCNC: 20 MG/DL (ref 5–25)
CALCIUM ALBUM COR SERPL-MCNC: 10.1 MG/DL (ref 8.3–10.1)
CALCIUM SERPL-MCNC: 9.3 MG/DL (ref 8.3–10.1)
CHLORIDE SERPL-SCNC: 112 MMOL/L (ref 96–108)
CO2 SERPL-SCNC: 24 MMOL/L (ref 21–32)
CREAT SERPL-MCNC: 1.09 MG/DL (ref 0.6–1.3)
EST. AVERAGE GLUCOSE BLD GHB EST-MCNC: 194 MG/DL
GFR SERPL CREATININE-BSD FRML MDRD: 51 ML/MIN/1.73SQ M
GLUCOSE P FAST SERPL-MCNC: 84 MG/DL (ref 65–99)
HBA1C MFR BLD: 8.4 %
PHOSPHATE SERPL-MCNC: 3.9 MG/DL (ref 2.3–4.1)
POTASSIUM SERPL-SCNC: 4.3 MMOL/L (ref 3.5–5.3)
PROT SERPL-MCNC: 7.1 G/DL (ref 6.4–8.4)
PTH-INTACT SERPL-MCNC: 131.4 PG/ML (ref 12–88)
SODIUM SERPL-SCNC: 140 MMOL/L (ref 135–147)
T4 FREE SERPL-MCNC: 0.89 NG/DL (ref 0.61–1.12)
TSH SERPL DL<=0.05 MIU/L-ACNC: 2.87 UIU/ML (ref 0.45–4.5)

## 2023-06-21 PROCEDURE — 82306 VITAMIN D 25 HYDROXY: CPT

## 2023-06-21 PROCEDURE — 84100 ASSAY OF PHOSPHORUS: CPT

## 2023-06-21 PROCEDURE — 99213 OFFICE O/P EST LOW 20 MIN: CPT | Performed by: OBSTETRICS & GYNECOLOGY

## 2023-06-21 PROCEDURE — 80053 COMPREHEN METABOLIC PANEL: CPT

## 2023-06-21 PROCEDURE — 84443 ASSAY THYROID STIM HORMONE: CPT

## 2023-06-21 PROCEDURE — 83036 HEMOGLOBIN GLYCOSYLATED A1C: CPT

## 2023-06-21 PROCEDURE — 36415 COLL VENOUS BLD VENIPUNCTURE: CPT

## 2023-06-21 PROCEDURE — 84439 ASSAY OF FREE THYROXINE: CPT

## 2023-06-21 PROCEDURE — 83970 ASSAY OF PARATHORMONE: CPT

## 2023-06-21 NOTE — TELEPHONE ENCOUNTER
Claudio Duffy, from the [employer] called and left a message  regarding  a physician form to be filled out  She is  checking to see if that form was received by our office  Their phone number is [phone] and they are open Monday through Friday from 8:00 AM until 6:00 PM Joaquim

## 2023-06-21 NOTE — TELEPHONE ENCOUNTER
Called and spoke with representative  We have not received this fax  However, she directed me to the website- they have the form on there   I printed it and am placing it in Dr Scout Castillo form to be filled out  Stefan Canada, [address] [place] [address] Roxy

## 2023-06-21 NOTE — TELEPHONE ENCOUNTER
Calling about a physician's certification form that was faxed over on 6/15/23 and again 6/20/23, has it been received?

## 2023-06-22 ENCOUNTER — TELEPHONE (OUTPATIENT)
Dept: HEMATOLOGY ONCOLOGY | Facility: CLINIC | Age: 68
End: 2023-06-22

## 2023-06-22 NOTE — PROGRESS NOTES
Assessment/Plan:     Diagnoses and all orders for this visit:    JAYLAN II (vulvar intraepithelial neoplasia II)  -     Ambulatory Referral to Gynecologic Oncology; Future    JAYLAN III (vulvar intraepithelial neoplasia III)  -     Ambulatory Referral to Gynecologic Oncology; Future        56-year-old female  History of breast cancer left mastectomy  ASCUS HPV positive/colpo MARCEILNO-1  Vaginal lesion at the level of the hymen  Vulvar lesion JAYLAN 2/3  Plan  Referral to GYN oncology for JAYLAN 2/3  Return to office in 1 year for Pap and HPV and annual exam    Subjective:      Patient ID: Felix Ewing is a 69 y o  female  HPI  Patient seen evaluated presents to the office today to discuss biopsy results was done secondary to abnormal Pap smear and lesion noted at the time of the colposcopy    A  Vaginal, vaginal lesion, biopsy:  - Low-grade squamous intraepithelial lesion (condyloma acuminatum)  - Negative for high-grade squamous intraepithelial lesion and malignancy         B  Vulva, vulva lesion, biopsy:  - High-grade squamous intraepithelial lesion/vulvar intraepithelial neoplasia grade 2-3     of usual type (HSIL/uVIN 2-3)  - No invasive carcinoma identified  A  Endocervical, ECC:  - Strips of superficial squamous epithelium only  - No endocervical tissue present       B  Cervix, 2:00, Biopsy:  - Small fragment of ectocervical mucosa within normal limits   - No transformation zone present   - Negative for squamous intraepithelial lesion (TRAV) and malignancy        C  Cervix, 9:00, Biopsy:  - Focal changes consistent with low-grade squamous intraepithelial lesion (LSIL, MARCELINO I)        All results reviewed and discussed with patient and cousin was present at the time of the consult based on the colposcopy results recommend repeat Pap and HPV in 1 year  Based on the vulvar/vaginal biopsy referral to GYN oncologist given secondary to the finding of JAYLAN 2/3 on the lesion noted at the vaginal opening at the time of the "colposcopy  All patient question answered in details and patient was satisfied        The following portions of the patient's history were reviewed and updated as appropriate: allergies, current medications, past family history, past medical history, past social history, past surgical history and problem list     Review of Systems      Objective:      /72 (BP Location: Left arm, Patient Position: Sitting, Cuff Size: Adult)   Ht 5' 2 5\" (1 588 m)   Wt 46 7 kg (103 lb)   LMP  (LMP Unknown)   BMI 18 54 kg/m²          Physical Exam  Constitutional:       Appearance: Normal appearance  Neurological:      General: No focal deficit present  Mental Status: She is alert and oriented to person, place, and time     Psychiatric:         Mood and Affect: Mood normal          Behavior: Behavior normal          "

## 2023-06-22 NOTE — TELEPHONE ENCOUNTER
I called Idalia Diallo in response to a referral that was received for patient to establish care with Gynecologic Oncology  Outreach was made to complete patient's intake questionnaire   Patient's intake questionnaire was reviewed and complete  Patient's intake has been sent to the team for clinical review

## 2023-06-23 ENCOUNTER — HOME CARE VISIT (OUTPATIENT)
Dept: HOME HEALTH SERVICES | Facility: HOME HEALTHCARE | Age: 68
End: 2023-06-23
Payer: COMMERCIAL

## 2023-06-23 ENCOUNTER — TELEPHONE (OUTPATIENT)
Dept: ENDOCRINOLOGY | Facility: CLINIC | Age: 68
End: 2023-06-23

## 2023-06-23 ENCOUNTER — TELEPHONE (OUTPATIENT)
Dept: CARDIOLOGY CLINIC | Facility: CLINIC | Age: 68
End: 2023-06-23

## 2023-06-23 ENCOUNTER — TELEPHONE (OUTPATIENT)
Dept: HEMATOLOGY ONCOLOGY | Facility: CLINIC | Age: 68
End: 2023-06-23

## 2023-06-23 PROCEDURE — G0299 HHS/HOSPICE OF RN EA 15 MIN: HCPCS

## 2023-06-23 NOTE — TELEPHONE ENCOUNTER
I called Ester Ortez in response to a referral that was received for patient to establish care with Gynecologic Oncology  Outreach was made to schedule a consultation       I left a voicemail explaining the reason for my call and advised patient to call [facility] at [phone]  Another attempt will be made to contact patient  Schedulin/23/23 with EVA in [address] [facility]  (okay to schedule same day if available if patient calls in, per [facility])    Otherwise,  with IZ in [facility]

## 2023-06-23 NOTE — TELEPHONE ENCOUNTER
Carina From [facility] called wanting a call back to discuss the pts medications  Please advise   [phone]

## 2023-06-24 VITALS
BODY MASS INDEX: 18.07 KG/M2 | HEART RATE: 84 BPM | OXYGEN SATURATION: 99 % | WEIGHT: 100.4 LBS | DIASTOLIC BLOOD PRESSURE: 56 MMHG | TEMPERATURE: 98.6 F | SYSTOLIC BLOOD PRESSURE: 116 MMHG | RESPIRATION RATE: 20 BRPM

## 2023-06-27 NOTE — TELEPHONE ENCOUNTER
Elan Allred left another v/m requesting a call back  She is seeing pt on Wed 6/28 and she would like a call back to discuss medications

## 2023-06-28 ENCOUNTER — HOME CARE VISIT (OUTPATIENT)
Dept: HOME HEALTH SERVICES | Facility: HOME HEALTHCARE | Age: 68
End: 2023-06-28
Payer: COMMERCIAL

## 2023-06-28 ENCOUNTER — TELEPHONE (OUTPATIENT)
Dept: CARDIOLOGY CLINIC | Facility: CLINIC | Age: 68
End: 2023-06-28

## 2023-06-28 ENCOUNTER — HOSPITAL ENCOUNTER (OUTPATIENT)
Dept: RADIOLOGY | Facility: IMAGING CENTER | Age: 68
Discharge: HOME | End: 2023-06-28
Payer: COMMERCIAL

## 2023-06-28 ENCOUNTER — TELEPHONE (OUTPATIENT)
Dept: HEMATOLOGY ONCOLOGY | Facility: CLINIC | Age: 68
End: 2023-06-28

## 2023-06-28 VITALS
RESPIRATION RATE: 20 BRPM | SYSTOLIC BLOOD PRESSURE: 120 MMHG | DIASTOLIC BLOOD PRESSURE: 58 MMHG | HEART RATE: 78 BPM | OXYGEN SATURATION: 96 % | TEMPERATURE: 96.8 F

## 2023-06-28 DIAGNOSIS — I50.9 CHF EXACERBATION (HCC): ICD-10-CM

## 2023-06-28 DIAGNOSIS — E04.1 THYROID NODULE: ICD-10-CM

## 2023-06-28 DIAGNOSIS — I48.91 ATRIAL FIBRILLATION, UNSPECIFIED TYPE (HCC): ICD-10-CM

## 2023-06-28 PROCEDURE — 76536 US EXAM OF HEAD AND NECK: CPT

## 2023-06-28 PROCEDURE — G0299 HHS/HOSPICE OF RN EA 15 MIN: HCPCS

## 2023-06-28 RX ORDER — FUROSEMIDE 20 MG/1
TABLET ORAL
Qty: 90 TABLET | Refills: 2 | Status: SHIPPED | OUTPATIENT
Start: 2023-06-28

## 2023-06-28 NOTE — TELEPHONE ENCOUNTER
Appointment Change  Cancel, Reschedule, Change to Virtual      Who are you speaking with? Patient   If it is not the patient, are they listed on an active communication consent form? N/A   Which provider is the appointment scheduled with? Dr Eugenia Ortiz   When is the appointment scheduled? Please list date and time  07/18/2023 @9:15AM    At which location is the appointment scheduled to take place? [place]   Was the appointment rescheduled or changed from an in person visit to a virtual visit? If so, please list the details of the change  Yes, 07/18/2023 @10:45AM with Dr Luís Hartman in [facility]   (new patient appointment)   What is the reason for the appointment change? Patient is moving, would like a closer location  Was [facility] scheduled for this visit? No   Does [facility] need to be scheduled for the new visit (if applicable) No   Does the patient need an infusion appointment rescheduled? No   Does the patient have an infusion appointment scheduled? If so, when? No   Is the patient undergoing chemotherapy? No   Was the no-show policy reviewed for appointments being changed with less then 24 hours of notice?  No

## 2023-07-01 NOTE — CASE COMMUNICATION
Patient discharged [facility]  Patient was to be dischargerd next week but patient requested discharge today since she was going to be relocating back to [place] this week  Patient has been adherent in taking meds as ordered and has made appointments with mds as requested and has been adherent in following up with all md appointments  Question as to complete adherence with following 2 gram na diet and diabetic diet   Has been adher ent in checking daily weights

## 2023-07-03 ENCOUNTER — CLINICAL SUPPORT (OUTPATIENT)
Dept: ENDOCRINOLOGY | Facility: CLINIC | Age: 68
End: 2023-07-03
Payer: COMMERCIAL

## 2023-07-03 DIAGNOSIS — N18.31 TYPE 2 DIABETES MELLITUS WITH STAGE 3A CHRONIC KIDNEY DISEASE, WITH LONG-TERM CURRENT USE OF INSULIN (HCC): ICD-10-CM

## 2023-07-03 DIAGNOSIS — E11.22 TYPE 2 DIABETES MELLITUS WITH STAGE 3A CHRONIC KIDNEY DISEASE, WITH LONG-TERM CURRENT USE OF INSULIN (HCC): ICD-10-CM

## 2023-07-03 DIAGNOSIS — Z79.4 TYPE 2 DIABETES MELLITUS WITH STAGE 3A CHRONIC KIDNEY DISEASE, WITH LONG-TERM CURRENT USE OF INSULIN (HCC): ICD-10-CM

## 2023-07-03 PROCEDURE — 95251 CONT GLUC MNTR ANALYSIS I&R: CPT | Performed by: INTERNAL MEDICINE

## 2023-07-03 NOTE — PROGRESS NOTES
Continous glucose monitoring xochilt intrepretation     Date/Time 7/3/2023 11:00 AM     Performed by  Tono Calderon   Authorized by Rosio Goetz MD     Universal Protocol   Consent: Verbal consent obtained. Written consent not obtained. Consent given by: patient  Timeout called at: 7/3/2023 10:58 AM.  Patient understanding: patient states understanding of the procedure being performed  Patient consent: the patient's understanding of the procedure matches consent given  Procedure consent: procedure consent matches procedure scheduled  Relevant documents: relevant documents present and verified  Test results: test results available and properly labeled  Site marked: the operative site was not marked  Radiology Images displayed and confirmed.  If images not available, report reviewed: imaging studies not available  Patient identity confirmed: verbally with patient        Local anesthesia used: no     Anesthesia   Local anesthesia used: no     Sedation   Patient sedated: no        Specimen: no    Culture: no   Procedure Details   Procedure Notes: 14 Days Data  Patient tolerance: patient tolerated the procedure well with no immediate complications

## 2023-07-07 ENCOUNTER — TREATMENT (OUTPATIENT)
Dept: OTHER | Facility: HOSPITAL | Age: 68
End: 2023-07-07
Payer: COMMERCIAL

## 2023-07-07 DIAGNOSIS — N18.31 TYPE 2 DIABETES MELLITUS WITH STAGE 3A CHRONIC KIDNEY DISEASE, WITH LONG-TERM CURRENT USE OF INSULIN (HCC): Primary | ICD-10-CM

## 2023-07-07 DIAGNOSIS — E11.9 DIABETES (HCC): ICD-10-CM

## 2023-07-07 DIAGNOSIS — E11.22 TYPE 2 DIABETES MELLITUS WITH STAGE 3A CHRONIC KIDNEY DISEASE, WITH LONG-TERM CURRENT USE OF INSULIN (HCC): Primary | ICD-10-CM

## 2023-07-07 DIAGNOSIS — Z79.4 TYPE 2 DIABETES MELLITUS WITH STAGE 3A CHRONIC KIDNEY DISEASE, WITH LONG-TERM CURRENT USE OF INSULIN (HCC): Primary | ICD-10-CM

## 2023-07-07 PROCEDURE — 95251 CONT GLUC MNTR ANALYSIS I&R: CPT | Performed by: INTERNAL MEDICINE

## 2023-07-07 NOTE — PROGRESS NOTES
CGM data review[de-identified]  Device: Renato Anna Dates: 6/19/23 - 7/3/23 Usage: 100 %  Av glu: 141 mg/dL  SD:  mg/dL CV: 37  % GMI: 6.7 %  TIR: 73 % TAR: 20+4 %  TBR: 3  %    Glycemic patters:  Mild fasting hypoglycemia and significant postprandial hyperglycemia    Recommendation:  Much better glucose control. Continue current regimen. review upcoming visit.

## 2023-07-10 PROBLEM — K21.9 GASTROESOPHAGEAL REFLUX DISEASE: Status: RESOLVED | Noted: 2018-11-14 | Resolved: 2023-07-10

## 2023-07-11 ENCOUNTER — TELEPHONE (OUTPATIENT)
Dept: GASTROENTEROLOGY | Facility: CLINIC | Age: 68
End: 2023-07-11

## 2023-07-11 ENCOUNTER — OFFICE VISIT (OUTPATIENT)
Dept: GASTROENTEROLOGY | Facility: CLINIC | Age: 68
End: 2023-07-11
Payer: COMMERCIAL

## 2023-07-11 VITALS
HEART RATE: 76 BPM | DIASTOLIC BLOOD PRESSURE: 82 MMHG | SYSTOLIC BLOOD PRESSURE: 154 MMHG | HEIGHT: 63 IN | WEIGHT: 107.3 LBS | BODY MASS INDEX: 19.01 KG/M2

## 2023-07-11 DIAGNOSIS — R19.5 HEME POSITIVE STOOL: ICD-10-CM

## 2023-07-11 DIAGNOSIS — R19.7 DIARRHEA, UNSPECIFIED TYPE: ICD-10-CM

## 2023-07-11 DIAGNOSIS — K92.1 BLACK STOOL: Primary | ICD-10-CM

## 2023-07-11 PROCEDURE — 99204 OFFICE O/P NEW MOD 45 MIN: CPT | Performed by: INTERNAL MEDICINE

## 2023-07-11 RX ORDER — PANTOPRAZOLE SODIUM 40 MG/1
40 TABLET, DELAYED RELEASE ORAL DAILY
Qty: 90 TABLET | Refills: 0 | Status: ON HOLD | OUTPATIENT
Start: 2023-07-11

## 2023-07-11 NOTE — TELEPHONE ENCOUNTER
Faxed medication hold clearance for eliquis for patient  That is scheduled for EGD/Colonoscopy on 8/29/23 to Dr. Darling Saleh at [phone]

## 2023-07-11 NOTE — PROGRESS NOTES
Consultation - [facility]) [facility]  Chidi Castillo 77 y.o. female MRN: [id number]  Unit/Bed#:  Encounter: [id number]        Consults    ASSESSMENT/PLAN:     1. Heme positive stool with reports of 3 weeks of black foul-smelling stools-in setting of patient's recent use of NSAIDs, dual antiplatelet therapy, possibility of peptic ulcer disease resulting in change in bowel habits and with heme positive stool. The other possibility includes undiagnosed right-sided colonic lesion that may have bled. Other possibilities include AVMs or Dula Tawana lesions.  -Would recommend checking CBC at this time.  -We will empirically start patient on pantoprazole 40 mg to be taken 20 minutes before breakfast.  -Stop the use of NSAIDs, use acetaminophen when possible.  -We will plan for EGD along with colonoscopy to assess for etiology of heme positive stools.  -We will need to have Eliquis held 2 days prior to the procedure, will reach out to cardiology in regards to this.  -Follow nonulcerogenic diet.      ______________________________________________________________________    Reason for Consult / Principal Problem: [unfilled]    HPI: Chidi Castillo is a 77y.o. year old female with history of diabetes, GERD, breast cancer status post radical mastectomy/chemotherapy, hypertension, osteoporosis, CKD stage III, tobacco use, paroxysmal A-fib, chronic heart failure with reduced EF, status post ICD placement, on Xarelto, with most recent device interrogation revealing fluid overload, currently euvolemic, presents for evaluation after a heme positive stool. Patient reports that 1 month ago she started experiencing change in bowel habits with black stools which were foul-smelling. She denies any abdominal pain. She does report occasional use of Aleve. She denies hematemesis, coffee-ground emesis. She reports that the black stools lasted almost 3 weeks, reports that she is now back to having brown bowel movements. Denies taking Pepto-Bismol or iron supplements. No unintentional weight loss. Her last colonoscopy was in  and was recommended 10-year follow-up. This was notable for diverticulosis only. No other family history of GI malignancy. Patient denies any symptoms of acid reflux, dysphagia otherwise. Comprehensive metabolic panel was notable for mild elevation of BUN last month however this is normalized. Liver enzymes are notable only for minimal elevation of alkaline phosphatase at 117. TSH is normal.  No recent abdominal imaging. Review of Systems: The remainder of the review of systems was negative except for the pertinent positives noted in HPI. Historical Information   Past Medical History:   Diagnosis Date   • Breast cancer ([address])     left - . • Diabetes mellitus ([address] [facility])    • Diabetes mellitus, type 2 ([address] [facility]) 03/15/2006    last assessed 7/10/13   • GERD (gastroesophageal reflux disease)    • History of chemotherapy    • Hypertension      Past Surgical History:   Procedure Laterality Date   • CARDIAC ELECTROPHYSIOLOGY PROCEDURE N/A 3/9/2022    Procedure: Cardiac icd implant/ DUAL CHAMBER ICD; Surgeon: Samy Cueva MD;  Location: [facility];   Service: Cardiology   • MASTECTOMY Left     last assessed 14   • MASTECTOMY, RADICAL Left        • ID PARATHYROIDECTOMY/EXPLORATION PARATHYROIDS N/A 2021    Procedure: PARATHYROIDECTOMY POSSIBLE 4 GLAND EXPLORATION;  Surgeon: Arlene Foley MD;  Location: [facility];  Service: ENT   • REDUCTION MAMMAPLASTY Right    • UVULECTOMY       Social History   Social History     Substance and Sexual Activity   Alcohol Use Not Currently    Comment: Social     Social History     Substance and Sexual Activity   Drug Use No     Social History     Tobacco Use   Smoking Status Former   • Packs/day: 0.50   • Years: 50.00   • Total pack years: 25.00   • Types: Cigarettes   • Start date: 6/15/1973   • Quit date: 2023   • Years since quittin.1 Smokeless Tobacco Never   Tobacco Comments    3 cigarettes daily      Family History   Problem Relation Age of Onset   • Hypothyroidism Mother    • Leukemia Mother    • Diabetes Father    • Diabetes type II Father    • Stroke Sister    • Diabetes Paternal Grandmother    • Cancer Sister    • Diabetes Brother        Meds/Allergies     (Not in a hospital admission)    No current facility-administered medications for this visit. No Known Allergies    Objective     Blood pressure 154/82, pulse 76, height 5' 2.5" (1.588 m), weight 48.7 kg (107 lb 4.8 oz), not currently breastfeeding. [unfilled]    PHYSICAL EXAM     GEN: well nourished, well developed, no acute distress  HEENT: anicteric, MMM, no cervical or supraclavicular lymphadenopathy  CV: RRR, no m/r/g  CHEST: CTA b/l, no WRR  ABD: +BS, soft, NT/ND, no hepatosplenomegaly  EXT: no c/c/e  SKIN: no rashes,  NEURO: aaox3    Lab Results:   No visits with results within 1 Day(s) from this visit.    Latest known visit with results is:   Appointment on 06/21/2023   Component Date Value   • Free T4 06/21/2023 0.89    • TSH 3RD GENERATON 06/21/2023 2.869    • Phosphorus 06/21/2023 3.9    • PTH 06/21/2023 131.4 (H)    • Vit D, 25-Hydroxy 06/21/2023 17.3 (L)    • Sodium 06/21/2023 140    • Potassium 06/21/2023 4.3    • Chloride 06/21/2023 112 (H)    • CO2 06/21/2023 24    • ANION GAP 06/21/2023 4    • BUN 06/21/2023 20    • Creatinine 06/21/2023 1.09    • Glucose, Fasting 06/21/2023 84    • Calcium 06/21/2023 9.3    • Corrected Calcium 06/21/2023 10.1    • AST 06/21/2023 19    • ALT 06/21/2023 27    • Alkaline Phosphatase 06/21/2023 117 (H)    • Total Protein 06/21/2023 7.1    • Albumin 06/21/2023 3.0 (L)    • Total Bilirubin 06/21/2023 0.31    • eGFR 06/21/2023 51    • Hemoglobin A1C 06/21/2023 8.4 (H)    • EAG 06/21/2023 194      Imaging Studies: I have personally reviewed pertinent films in PACS

## 2023-07-11 NOTE — PATIENT INSTRUCTIONS
Scheduled date of EGD/colonoscopy (as of today):08/29/23  Physician performing EGD/colonoscopy:Yannick  Location of EGD/colonoscopy:[facility]  Desired bowel prep reviewed with patient:Miralax/dolculax  Instructions reviewed with patient by:Jenna ASHTON  Clearances:   Godfrey alvarado

## 2023-07-13 NOTE — TELEPHONE ENCOUNTER
Pt called regarding blood work order. Pt states she went to get BW done and there are no active orders.  Pt requesting a call back  [phone]  Thank you

## 2023-07-14 DIAGNOSIS — D07.1 VIN III (VULVAR INTRAEPITHELIAL NEOPLASIA III): Primary | ICD-10-CM

## 2023-07-17 ENCOUNTER — TELEPHONE (OUTPATIENT)
Dept: CARDIOLOGY CLINIC | Facility: CLINIC | Age: 68
End: 2023-07-17

## 2023-07-17 NOTE — PROGRESS NOTES
Assessment/Plan:    Problem List Items Addressed This Visit        Endocrine    Type 2 diabetes mellitus with stage 3a chronic kidney disease, with long-term current use of insulin ([address])     Not well controlled. D/w pt increased risk of wound break down and infection. Lab Results   Component Value Date    HGBA1C 8.4 (H) 06/21/2023            CKD stage 3 due to type 2 diabetes mellitus (HCC)       Cardiovascular and Mediastinum    Hypertensive heart and kidney disease with heart failure and with chronic kidney disease stage III (HCC)    Coronary artery disease involving native coronary artery of native heart without angina pectoris    Chronic HFrEF (heart failure with reduced ejection fraction) (HCC)       Musculoskeletal and Integument    JAYLAN II (vulvar intraepithelial neoplasia II) - Primary     69yo with T2DM, NASREEN, hyperparathyroid, CKD, HTN, CHF, CAD, afib, tobacco abuse, h/o breast cancer and new diagnosis of VIN2 presents for consultation. D/w pt her abnormal biopsy results showing high grade lesion. D/w pt HPV related dysplasia and its progressive course in becoming cancer if not cleared. D/w pt that in order to 1. Determine there is not an invasive component and 2. Remove the lesion to prevent progression, a surgical excision is necessary. D/w pt that pending the pathology results, subsequent treatment would be further discussed but potentially simple vulvectomy could be sufficient. Pt understands she is at an increased risk of progression given history of smoking and older age. Surgical risks were reviewed with the patient including infection, blood loss, transfusion, damage to other organs and possible need for additional procedures. Postoperative recovery was reviewed. The patient was given time to ask pertinent questions and would like to proceed with the procedure.      Consents signed  PATs  Cards/pcp clearance           Relevant Orders    Case request operating room: VULVECTOMY SIMPLE Type and screen    CBC and Platelet    Basic metabolic panel       Genitourinary    MARCELINO I (cervical intraepithelial neoplasia I)     Mild dysplasia on colposcopy. D/w pt given her increased risk of persistence excision vs burning for treatment at the time of surgical intervention. Pt agrees to proceed          Relevant Orders    Case request operating room: VULVECTOMY SIMPLE       Other    Tobacco use   Other Visit Diagnoses     JAYLAN III (vulvar intraepithelial neoplasia III)                  CHIEF COMPLAINT: JAYLAN    Oncology Problem:   Cancer Staging   No matching staging information was found for the patient. Previous therapy:  Oncology History    No history exists. Most recent imaging:  US thyroid  Narrative: THYROID ULTRASOUND    INDICATION:    E04.1: Nontoxic single thyroid nodule. COMPARISON: Thyroid ultrasound 3/17/2022    TECHNIQUE:   Ultrasound of the thyroid was performed with a high frequency linear transducer in transverse and sagittal planes including volumetric imaging sweeps as well as traditional still imaging technique. FINDINGS:  Thyroid parenchyma is diffusely heterogeneous in echotexture. Right lobe: 4.0 x 1.4 x 1.6 cm. Volume 4.3 mL  Left lobe:  4.1 x 1.4 x 1.3 cm. Volume 3.6 mL  Isthmus: 0.4  cm. Nodule #1. Image 67. LEFT midgland nodule measuring 0.6 x 0.3 x 0.4 cm. Unchanged from prior. COMPOSITION:  2 points, solid or almost completely solid . ECHOGENICITY:  2 points, hypoechoic. SHAPE:  0 points, wider-than-tall. MARGIN: 0 points, smooth. ECHOGENIC FOCI:  0 points, none or large comet-tail artifacts. TI-RADS Classification: TR 4 (4-6 points),  FNA if > 1.5 cm. Follow if > 1cm. There are additional nodules of lesser size and/or TI-RADS score. These do not necessitate additional evaluation based on ACR criteria. Stable 4 mm macrocalcification in the left lower pole. Interval resection of right parathyroid adenoma.   Impression: No nodule meets current ACR criteria for requiring biopsy or followup ultrasounds. Reference: ACR Thyroid Imaging, Reporting and Data System (TI-RADS): White Paper of the [employer]. J AM Funmi Radiol 9533;30:107-755. (additional recommendations based on American Thyroid Association 2015 guidelines.)    Workstation performed: [id number]        Patient ID: Paulina Howe is a 77 y.o. female  67yo with T2DM, NASREEN, hyperparathyroid, CKD, HTN, CHF, CAD, afib, tobacco abuse, h/o breast cancer and new diagnosis of VIN2 presents for consultation. Pt presented to gyn for annual and had ASCUS/+HPV pap for which colpo was done showing CIN1. She was also noted to have a lesion at the introitus that was biospied and resulted as VINII. Pt recently c/o black stool that was heme+. Seen by GI and has colonoscopy/EGD pending. Review of Systems   Constitutional: Negative. HENT: Negative. Eyes: Negative. Respiratory: Negative. Cardiovascular: Negative. Gastrointestinal: Positive for blood in stool (dark stool). Endocrine: Negative. Genitourinary: Negative. Musculoskeletal: Negative. Neurological: Negative. Psychiatric/Behavioral: Negative. Current Outpatient Medications   Medication Sig Dispense Refill   • albuterol (Ventolin HFA) 90 mcg/act inhaler Inhale 2 puffs every 6 (six) hours as needed for wheezing (rinse mouth after use) 18 g 1   • apixaban (Eliquis) 5 mg Take 1 tablet (5 mg total) by mouth 2 (two) times a day 180 tablet 3   • Aspirin Low Dose 81 MG EC tablet TAKE 1 TABLET BY MOUTH EVERY DAY 90 tablet 3   • atorvastatin (LIPITOR) 40 mg tablet Take 1 tablet (40 mg total) by mouth daily 90 tablet 1   • cholecalciferol (VITAMIN D3) 1,000 units tablet Take 2 tablets (2,000 Units total) by mouth daily 10 tablet 0   • dapagliflozin (Farxiga) 5 MG TABS Take 5 mg by mouth in the morning.  Indications: Cardiac Failure     • Flovent  MCG/ACT inhaler Inhale 1 puff 2 (two) times a day PT NOT TAKING     • metFORMIN (GLUCOPHAGE) 500 mg tablet TAKE 1 TABLET BY MOUTH TWICE A DAY WITH MEALS 180 tablet 1   • metoprolol succinate (TOPROL-XL) 50 mg 24 hr tablet Take 1 tablet (50 mg total) by mouth daily 30 tablet 3   • pantoprazole (PROTONIX) 40 mg tablet Take 1 tablet (40 mg total) by mouth daily 90 tablet 0   • Blood Glucose Monitoring Suppl (OneTouch Verio Reflect) w/Device KIT Check blood sugars three times daily. Please substitute with appropriate alternative as covered by patient's insurance. Dx: E11.65 (Patient not taking: Reported on 7/11/2023) 1 kit 0   • Continuous Blood Gluc  (FreeStyle [place] 2 [place]) KEELY Use 1 Units continuous (Patient not taking: Reported on 7/11/2023) 1 each 0   • Continuous Blood Gluc Sensor (FreeStyle Frank 2 Sensor) MISC Use 1 Units every 14 (fourteen) days (Patient not taking: Reported on 7/11/2023) 2 each 5   • furosemide (LASIX) 20 mg tablet TAKE 1 TABLET BY MOUTH DAILY AS NEEDED (WT GAIN 3 LB/ 24 HOURS / TAKE FOR 3 DAYS IN A ROW) (Patient not taking: Reported on 7/18/2023) 90 tablet 2   • glucose blood (OneTouch Verio) test strip Check blood sugars three times daily. Please substitute with appropriate alternative as covered by patient's insurance. Dx: E11.65 (Patient not taking: Reported on 7/11/2023) 300 each 1   • Insulin Pen Needle (BD Pen Needle Kateryna U/F) 32G X 4 MM MISC Use daily as directed with insulin pen (Patient not taking: Reported on 7/11/2023) 100 each 0   • nystatin-triamcinolone (MYCOLOG-II) ointment Apply topically 2 (two) times a day To vaginal area (Patient not taking: Reported on 7/18/2023) 60 g 1   • OneTouch Delica Lancets 78A MISC Check blood sugars three times daily. Please substitute with appropriate alternative as covered by patient's insurance.  Dx: E11.65 (Patient not taking: Reported on 7/11/2023) 300 each 1   • saccharomyces boulardii (FLORASTOR) 250 mg capsule Take 1 capsule (250 mg total) by mouth 2 (two) times a day (Patient not taking: Reported on 2023) 60 capsule 0   • zinc oxide (BALMEX) 11.3 % cream Apply 1 application. topically 2 (two) times a day (Patient not taking: Reported on 2023)       No current facility-administered medications for this visit. No Known Allergies    Past Medical History:   Diagnosis Date   • Breast cancer ([address])     left - . • Diabetes mellitus ([address])    • Diabetes mellitus, type 2 ([address]) 03/15/2006    last assessed 7/10/13   • GERD (gastroesophageal reflux disease)    • History of chemotherapy    • Hypertension        Past Surgical History:   Procedure Laterality Date   • CARDIAC ELECTROPHYSIOLOGY PROCEDURE N/A 3/9/2022    Procedure: Cardiac icd implant/ DUAL CHAMBER ICD; Surgeon: Nithya Stoner MD;  Location: [facility];   Service: Cardiology   • MASTECTOMY Left     last assessed 14   • MASTECTOMY, RADICAL Left        • WA PARATHYROIDECTOMY/EXPLORATION PARATHYROIDS N/A 2021    Procedure: PARATHYROIDECTOMY POSSIBLE 4 GLAND EXPLORATION;  Surgeon: Arpita Owen MD;  Location: [facility];  Service: ENT   • REDUCTION MAMMAPLASTY Right    • UVULECTOMY         OB History        0    Para   0    Term   0       0    AB   0    Living   0       SAB   0    IAB   0    Ectopic   0    Multiple   0    Live Births   0                 Family History   Problem Relation Age of Onset   • Hypothyroidism Mother    • Leukemia Mother    • Diabetes Father    • Diabetes type II Father    • Stroke Sister    • Diabetes Paternal Grandmother    • Cancer Sister    • Diabetes Brother        The following portions of the patient's history were reviewed and updated as appropriate: allergies, current medications, past family history, past medical history, past social history, past surgical history and problem list.      Objective:    Blood pressure 148/66, pulse 73, temperature (!) 96.3 °F (35.7 °C), temperature source Tympanic, height 5' 2.5" (1.588 m), weight 50.3 kg (111 lb), SpO2 96 %, not currently breastfeeding. Body mass index is 19.98 kg/m². Physical Exam  HENT:      Head: Normocephalic and atraumatic. Cardiovascular:      Rate and Rhythm: Normal rate and regular rhythm. Pulmonary:      Effort: Pulmonary effort is normal.   Abdominal:      General: There is no distension. Palpations: Abdomen is soft. There is no mass. Genitourinary:         Comments: The external female genitalia is normal. There is a posterior hymenal ring papillary lesion extending from 3 oclock to 9 oclock position. A second smaller lesion noted on the labia minora. Sebaceous appearing cysts throughout. +melanotic lesion on the right labia majora. The bartholin's, uretheral and skenes glands are normal. The urethral meatus is normal (midline with no lesions). Anus without fissure or lesion. Speculum exam reveals vagina without lesion or discharge. Cervix is normal appearing without visible lesions. No bleeding. No significant cystocele or rectocele noted. Bimanual exam notes a uterus with normal contour, mobility. No tenderness. Adnexa without masses or tenderness. Bladder is without fullness, mass or tenderness. Musculoskeletal:         General: Normal range of motion. Cervical back: Normal range of motion. Skin:     General: Skin is warm and dry. Neurological:      Mental Status: She is alert and oriented to person, place, and time.            No results found for: ""  Lab Results   Component Value Date    WBC 5.81 04/26/2023    HGB 16.0 (H) 04/26/2023    HCT 48.3 (H) 04/26/2023    MCV 90 04/26/2023     04/26/2023     Lab Results   Component Value Date     10/07/2016    K 4.3 06/21/2023     (H) 06/21/2023    CO2 24 06/21/2023    BUN 20 06/21/2023    CREATININE 1.09 06/21/2023    GLUCOSE 105 (H) 10/07/2016    GLUF 84 06/21/2023    CALCIUM 9.3 06/21/2023    CORRECTEDCA 10.1 06/21/2023    AST 19 06/21/2023    ALT 27 06/21/2023    ALKPHOS 117 (H) 06/21/2023    PROT 6.9 10/07/2016    BILITOT 0.3 10/07/2016    EGFR 51 06/21/2023        Trend:  No results found for: ""

## 2023-07-17 NOTE — TELEPHONE ENCOUNTER
Hi, my name is Rianna Puentes and I'm calling in regards to a letter, a certificate that was sent from the [facility] for myself. My YOB: 1955. That needs to be filled out and sent back in as soon as possible. If somebody can, please give me a call at [phone]. Thank you.  Rodrigo Wallace.

## 2023-07-18 ENCOUNTER — LAB REQUISITION (OUTPATIENT)
Dept: LAB | Facility: HOSPITAL | Age: 68
End: 2023-07-18
Payer: COMMERCIAL

## 2023-07-18 ENCOUNTER — APPOINTMENT (OUTPATIENT)
Dept: LAB | Facility: CLINIC | Age: 68
End: 2023-07-18
Payer: COMMERCIAL

## 2023-07-18 ENCOUNTER — OFFICE VISIT (OUTPATIENT)
Dept: GYNECOLOGIC ONCOLOGY | Facility: CLINIC | Age: 68
End: 2023-07-18
Payer: COMMERCIAL

## 2023-07-18 ENCOUNTER — LAB (OUTPATIENT)
Dept: LAB | Facility: CLINIC | Age: 68
End: 2023-07-18
Payer: COMMERCIAL

## 2023-07-18 ENCOUNTER — TELEPHONE (OUTPATIENT)
Dept: OTHER | Facility: OTHER | Age: 68
End: 2023-07-18

## 2023-07-18 VITALS
TEMPERATURE: 96.3 F | WEIGHT: 111 LBS | DIASTOLIC BLOOD PRESSURE: 66 MMHG | HEART RATE: 73 BPM | BODY MASS INDEX: 19.67 KG/M2 | SYSTOLIC BLOOD PRESSURE: 148 MMHG | HEIGHT: 63 IN | OXYGEN SATURATION: 96 %

## 2023-07-18 DIAGNOSIS — N18.30 CKD STAGE 3 DUE TO TYPE 2 DIABETES MELLITUS (HCC): ICD-10-CM

## 2023-07-18 DIAGNOSIS — N90.1 VIN II (VULVAR INTRAEPITHELIAL NEOPLASIA II): ICD-10-CM

## 2023-07-18 DIAGNOSIS — I13.0 HYPERTENSIVE HEART AND KIDNEY DISEASE WITH HEART FAILURE AND WITH CHRONIC KIDNEY DISEASE STAGE III (HCC): ICD-10-CM

## 2023-07-18 DIAGNOSIS — N90.1 VIN II (VULVAR INTRAEPITHELIAL NEOPLASIA II): Primary | ICD-10-CM

## 2023-07-18 DIAGNOSIS — D07.1 VIN III (VULVAR INTRAEPITHELIAL NEOPLASIA III): ICD-10-CM

## 2023-07-18 DIAGNOSIS — Z72.0 TOBACCO USE: ICD-10-CM

## 2023-07-18 DIAGNOSIS — E11.22 CKD STAGE 3 DUE TO TYPE 2 DIABETES MELLITUS (HCC): ICD-10-CM

## 2023-07-18 DIAGNOSIS — N18.30 HYPERTENSIVE HEART AND KIDNEY DISEASE WITH HEART FAILURE AND WITH CHRONIC KIDNEY DISEASE STAGE III (HCC): ICD-10-CM

## 2023-07-18 DIAGNOSIS — N90.1 MODERATE VULVAR DYSPLASIA: ICD-10-CM

## 2023-07-18 DIAGNOSIS — N87.0 CIN I (CERVICAL INTRAEPITHELIAL NEOPLASIA I): ICD-10-CM

## 2023-07-18 DIAGNOSIS — Z79.4 TYPE 2 DIABETES MELLITUS WITH STAGE 3A CHRONIC KIDNEY DISEASE, WITH LONG-TERM CURRENT USE OF INSULIN (HCC): ICD-10-CM

## 2023-07-18 DIAGNOSIS — I25.10 CORONARY ARTERY DISEASE INVOLVING NATIVE CORONARY ARTERY OF NATIVE HEART WITHOUT ANGINA PECTORIS: ICD-10-CM

## 2023-07-18 DIAGNOSIS — E11.22 TYPE 2 DIABETES MELLITUS WITH STAGE 3A CHRONIC KIDNEY DISEASE, WITH LONG-TERM CURRENT USE OF INSULIN (HCC): ICD-10-CM

## 2023-07-18 DIAGNOSIS — I50.22 CHRONIC HFREF (HEART FAILURE WITH REDUCED EJECTION FRACTION) (HCC): ICD-10-CM

## 2023-07-18 DIAGNOSIS — N18.31 TYPE 2 DIABETES MELLITUS WITH STAGE 3A CHRONIC KIDNEY DISEASE, WITH LONG-TERM CURRENT USE OF INSULIN (HCC): ICD-10-CM

## 2023-07-18 LAB
ABO GROUP BLD: NORMAL
ANION GAP SERPL CALCULATED.3IONS-SCNC: 4 MMOL/L
BLD GP AB SCN SERPL QL: NEGATIVE
BUN SERPL-MCNC: 22 MG/DL (ref 5–25)
CALCIUM SERPL-MCNC: 9.5 MG/DL (ref 8.3–10.1)
CHLORIDE SERPL-SCNC: 111 MMOL/L (ref 96–108)
CO2 SERPL-SCNC: 23 MMOL/L (ref 21–32)
CREAT SERPL-MCNC: 1.16 MG/DL (ref 0.6–1.3)
ERYTHROCYTE [DISTWIDTH] IN BLOOD BY AUTOMATED COUNT: 20 % (ref 11.6–15.1)
GFR SERPL CREATININE-BSD FRML MDRD: 47 ML/MIN/1.73SQ M
GLUCOSE SERPL-MCNC: 125 MG/DL (ref 65–140)
HCT VFR BLD AUTO: 24.5 % (ref 34.8–46.1)
HGB BLD-MCNC: 6.9 G/DL (ref 11.5–15.4)
MCH RBC QN AUTO: 23.1 PG (ref 26.8–34.3)
MCHC RBC AUTO-ENTMCNC: 28.2 G/DL (ref 31.4–37.4)
MCV RBC AUTO: 82 FL (ref 82–98)
PLATELET # BLD AUTO: 388 THOUSANDS/UL (ref 149–390)
PMV BLD AUTO: 11.1 FL (ref 8.9–12.7)
POTASSIUM SERPL-SCNC: 4.4 MMOL/L (ref 3.5–5.3)
RBC # BLD AUTO: 2.99 MILLION/UL (ref 3.81–5.12)
RH BLD: POSITIVE
SODIUM SERPL-SCNC: 138 MMOL/L (ref 135–147)
SPECIMEN EXPIRATION DATE: NORMAL
WBC # BLD AUTO: 10.07 THOUSAND/UL (ref 4.31–10.16)

## 2023-07-18 PROCEDURE — 86901 BLOOD TYPING SEROLOGIC RH(D): CPT | Performed by: OBSTETRICS & GYNECOLOGY

## 2023-07-18 PROCEDURE — 86850 RBC ANTIBODY SCREEN: CPT | Performed by: OBSTETRICS & GYNECOLOGY

## 2023-07-18 PROCEDURE — 36415 COLL VENOUS BLD VENIPUNCTURE: CPT

## 2023-07-18 PROCEDURE — 86900 BLOOD TYPING SEROLOGIC ABO: CPT | Performed by: OBSTETRICS & GYNECOLOGY

## 2023-07-18 PROCEDURE — 80048 BASIC METABOLIC PNL TOTAL CA: CPT

## 2023-07-18 PROCEDURE — 85027 COMPLETE CBC AUTOMATED: CPT

## 2023-07-18 PROCEDURE — 99205 OFFICE O/P NEW HI 60 MIN: CPT | Performed by: OBSTETRICS & GYNECOLOGY

## 2023-07-18 NOTE — TELEPHONE ENCOUNTER
Lab Result: Hemoglobin 6.9   Date/Time Drawn: 07/18/2023  1300   Ordering Provider: Dr Aylin Ugarte, [address] [place] [address] Name: [facility]       The following critical/stat result was read back to the lab as stated above and [employer] to the on-call provider. The provider confirmed receipt of the message.

## 2023-07-18 NOTE — ASSESSMENT & PLAN NOTE
67yo with T2DM, NASREEN, hyperparathyroid, CKD, HTN, CHF, CAD, afib, tobacco abuse, h/o breast cancer and new diagnosis of VIN2 presents for consultation. D/w pt her abnormal biopsy results showing high grade lesion. D/w pt HPV related dysplasia and its progressive course in becoming cancer if not cleared. D/w pt that in order to 1. Determine there is not an invasive component and 2. Remove the lesion to prevent progression, a surgical excision is necessary. D/w pt that pending the pathology results, subsequent treatment would be further discussed but potentially simple vulvectomy could be sufficient. Pt understands she is at an increased risk of progression given history of smoking and older age. Surgical risks were reviewed with the patient including infection, blood loss, transfusion, damage to other organs and possible need for additional procedures. Postoperative recovery was reviewed. The patient was given time to ask pertinent questions and would like to proceed with the procedure.      Consents signed  PATs  Cards/pcp clearance

## 2023-07-18 NOTE — ASSESSMENT & PLAN NOTE
Not well controlled. D/w pt increased risk of wound break down and infection.     Lab Results   Component Value Date    HGBA1C 8.4 (H) 06/21/2023

## 2023-07-18 NOTE — ASSESSMENT & PLAN NOTE
Mild dysplasia on colposcopy. D/w pt given her increased risk of persistence excision vs burning for treatment at the time of surgical intervention.     Pt agrees to proceed

## 2023-07-20 ENCOUNTER — TELEPHONE (OUTPATIENT)
Dept: OTHER | Facility: HOSPITAL | Age: 68
End: 2023-07-20

## 2023-07-20 ENCOUNTER — TELEPHONE (OUTPATIENT)
Dept: GASTROENTEROLOGY | Facility: CLINIC | Age: 68
End: 2023-07-20

## 2023-07-20 ENCOUNTER — HOSPITAL ENCOUNTER (INPATIENT)
Facility: HOSPITAL | Age: 68
LOS: 5 days | Discharge: HOME HEALTH | DRG: 378 | End: 2023-07-25
Attending: EMERGENCY MEDICINE | Admitting: INTERNAL MEDICINE
Payer: COMMERCIAL

## 2023-07-20 ENCOUNTER — TELEPHONE (OUTPATIENT)
Age: 68
End: 2023-07-20

## 2023-07-20 DIAGNOSIS — R19.5 HEME POSITIVE STOOL: ICD-10-CM

## 2023-07-20 DIAGNOSIS — L08.9 SOFT TISSUE INFECTION: ICD-10-CM

## 2023-07-20 DIAGNOSIS — D64.9 SYMPTOMATIC ANEMIA: Primary | ICD-10-CM

## 2023-07-20 DIAGNOSIS — D64.9 ACUTE ANEMIA: Primary | ICD-10-CM

## 2023-07-20 DIAGNOSIS — K92.1 BLACK STOOL: ICD-10-CM

## 2023-07-20 PROBLEM — G47.33 OBSTRUCTIVE SLEEP APNEA SYNDROME: Status: RESOLVED | Noted: 2020-05-26 | Resolved: 2023-07-20

## 2023-07-20 LAB
ABO GROUP BLD: NORMAL
ALBUMIN SERPL BCP-MCNC: 3.4 G/DL (ref 3.5–5)
ALP SERPL-CCNC: 126 U/L (ref 34–104)
ALT SERPL W P-5'-P-CCNC: 25 U/L (ref 7–52)
ANION GAP SERPL CALCULATED.3IONS-SCNC: 5 MMOL/L
APTT PPP: 30 SECONDS (ref 23–37)
AST SERPL W P-5'-P-CCNC: 22 U/L (ref 13–39)
BASOPHILS # BLD AUTO: 0.01 THOUSANDS/ÂΜL (ref 0–0.1)
BASOPHILS NFR BLD AUTO: 0 % (ref 0–1)
BILIRUB SERPL-MCNC: 0.43 MG/DL (ref 0.2–1)
BLD GP AB SCN SERPL QL: NEGATIVE
BUN SERPL-MCNC: 21 MG/DL (ref 5–25)
CALCIUM ALBUM COR SERPL-MCNC: 9.7 MG/DL (ref 8.3–10.1)
CALCIUM SERPL-MCNC: 9.2 MG/DL (ref 8.4–10.2)
CHLORIDE SERPL-SCNC: 106 MMOL/L (ref 96–108)
CO2 SERPL-SCNC: 25 MMOL/L (ref 21–32)
CREAT SERPL-MCNC: 1.08 MG/DL (ref 0.6–1.3)
EOSINOPHIL # BLD AUTO: 0.08 THOUSAND/ÂΜL (ref 0–0.61)
EOSINOPHIL NFR BLD AUTO: 1 % (ref 0–6)
ERYTHROCYTE [DISTWIDTH] IN BLOOD BY AUTOMATED COUNT: 20.5 % (ref 11.6–15.1)
GFR SERPL CREATININE-BSD FRML MDRD: 52 ML/MIN/1.73SQ M
GLUCOSE SERPL-MCNC: 155 MG/DL (ref 65–140)
GLUCOSE SERPL-MCNC: 179 MG/DL (ref 65–140)
GLUCOSE SERPL-MCNC: 192 MG/DL (ref 65–140)
HCT VFR BLD AUTO: 22.6 % (ref 34.8–46.1)
HGB BLD-MCNC: 6.4 G/DL (ref 11.5–15.4)
HGB BLD-MCNC: 9.3 G/DL (ref 11.5–15.4)
IMM GRANULOCYTES # BLD AUTO: 0.03 THOUSAND/UL (ref 0–0.2)
IMM GRANULOCYTES NFR BLD AUTO: 0 % (ref 0–2)
INR PPP: 1.37 (ref 0.84–1.19)
LYMPHOCYTES # BLD AUTO: 1.6 THOUSANDS/ÂΜL (ref 0.6–4.47)
LYMPHOCYTES NFR BLD AUTO: 18 % (ref 14–44)
MCH RBC QN AUTO: 22.9 PG (ref 26.8–34.3)
MCHC RBC AUTO-ENTMCNC: 28.3 G/DL (ref 31.4–37.4)
MCV RBC AUTO: 81 FL (ref 82–98)
MONOCYTES # BLD AUTO: 0.87 THOUSAND/ÂΜL (ref 0.17–1.22)
MONOCYTES NFR BLD AUTO: 10 % (ref 4–12)
NEUTROPHILS # BLD AUTO: 6.37 THOUSANDS/ÂΜL (ref 1.85–7.62)
NEUTS SEG NFR BLD AUTO: 71 % (ref 43–75)
NRBC BLD AUTO-RTO: 0 /100 WBCS
PLATELET # BLD AUTO: 307 THOUSANDS/UL (ref 149–390)
PMV BLD AUTO: 10.5 FL (ref 8.9–12.7)
POTASSIUM SERPL-SCNC: 4.5 MMOL/L (ref 3.5–5.3)
PROT SERPL-MCNC: 6.7 G/DL (ref 6.4–8.4)
PROTHROMBIN TIME: 17.1 SECONDS (ref 11.6–14.5)
RBC # BLD AUTO: 2.79 MILLION/UL (ref 3.81–5.12)
RH BLD: POSITIVE
SODIUM SERPL-SCNC: 136 MMOL/L (ref 135–147)
SPECIMEN EXPIRATION DATE: NORMAL
WBC # BLD AUTO: 8.96 THOUSAND/UL (ref 4.31–10.16)

## 2023-07-20 PROCEDURE — 85025 COMPLETE CBC W/AUTO DIFF WBC: CPT | Performed by: EMERGENCY MEDICINE

## 2023-07-20 PROCEDURE — 82948 REAGENT STRIP/BLOOD GLUCOSE: CPT

## 2023-07-20 PROCEDURE — 86920 COMPATIBILITY TEST SPIN: CPT

## 2023-07-20 PROCEDURE — 85018 HEMOGLOBIN: CPT | Performed by: INTERNAL MEDICINE

## 2023-07-20 PROCEDURE — 85610 PROTHROMBIN TIME: CPT | Performed by: EMERGENCY MEDICINE

## 2023-07-20 PROCEDURE — 80053 COMPREHEN METABOLIC PANEL: CPT | Performed by: EMERGENCY MEDICINE

## 2023-07-20 PROCEDURE — 85730 THROMBOPLASTIN TIME PARTIAL: CPT | Performed by: EMERGENCY MEDICINE

## 2023-07-20 PROCEDURE — 36430 TRANSFUSION BLD/BLD COMPNT: CPT

## 2023-07-20 PROCEDURE — 83550 IRON BINDING TEST: CPT | Performed by: INTERNAL MEDICINE

## 2023-07-20 PROCEDURE — 86901 BLOOD TYPING SEROLOGIC RH(D): CPT | Performed by: EMERGENCY MEDICINE

## 2023-07-20 PROCEDURE — 82728 ASSAY OF FERRITIN: CPT | Performed by: INTERNAL MEDICINE

## 2023-07-20 PROCEDURE — 86850 RBC ANTIBODY SCREEN: CPT | Performed by: EMERGENCY MEDICINE

## 2023-07-20 PROCEDURE — 36415 COLL VENOUS BLD VENIPUNCTURE: CPT | Performed by: EMERGENCY MEDICINE

## 2023-07-20 PROCEDURE — 83540 ASSAY OF IRON: CPT | Performed by: INTERNAL MEDICINE

## 2023-07-20 PROCEDURE — C9113 INJ PANTOPRAZOLE SODIUM, VIA: HCPCS | Performed by: INTERNAL MEDICINE

## 2023-07-20 PROCEDURE — 86900 BLOOD TYPING SEROLOGIC ABO: CPT | Performed by: EMERGENCY MEDICINE

## 2023-07-20 PROCEDURE — 99223 1ST HOSP IP/OBS HIGH 75: CPT | Performed by: INTERNAL MEDICINE

## 2023-07-20 PROCEDURE — P9040 RBC LEUKOREDUCED IRRADIATED: HCPCS

## 2023-07-20 PROCEDURE — 99291 CRITICAL CARE FIRST HOUR: CPT | Performed by: EMERGENCY MEDICINE

## 2023-07-20 PROCEDURE — 30233N1 TRANSFUSION OF NONAUTOLOGOUS RED BLOOD CELLS INTO PERIPHERAL VEIN, PERCUTANEOUS APPROACH: ICD-10-PCS | Performed by: INTERNAL MEDICINE

## 2023-07-20 RX ORDER — PANTOPRAZOLE SODIUM 40 MG/10ML
40 INJECTION, POWDER, LYOPHILIZED, FOR SOLUTION INTRAVENOUS EVERY 12 HOURS SCHEDULED
Status: DISCONTINUED | OUTPATIENT
Start: 2023-07-20 | End: 2023-07-22

## 2023-07-20 RX ORDER — MELATONIN
2000 DAILY
Status: DISCONTINUED | OUTPATIENT
Start: 2023-07-21 | End: 2023-07-25 | Stop reason: HOSPADM

## 2023-07-20 RX ORDER — ALBUTEROL SULFATE 90 UG/1
2 AEROSOL, METERED RESPIRATORY (INHALATION) EVERY 6 HOURS PRN
Status: DISCONTINUED | OUTPATIENT
Start: 2023-07-20 | End: 2023-07-25 | Stop reason: HOSPADM

## 2023-07-20 RX ORDER — ATORVASTATIN CALCIUM 40 MG/1
40 TABLET, FILM COATED ORAL
Status: DISCONTINUED | OUTPATIENT
Start: 2023-07-20 | End: 2023-07-25 | Stop reason: HOSPADM

## 2023-07-20 RX ORDER — INSULIN LISPRO 100 [IU]/ML
1-5 INJECTION, SOLUTION INTRAVENOUS; SUBCUTANEOUS
Status: DISCONTINUED | OUTPATIENT
Start: 2023-07-20 | End: 2023-07-22

## 2023-07-20 RX ORDER — METOPROLOL SUCCINATE 50 MG/1
50 TABLET, EXTENDED RELEASE ORAL DAILY
Status: DISCONTINUED | OUTPATIENT
Start: 2023-07-21 | End: 2023-07-25 | Stop reason: HOSPADM

## 2023-07-20 RX ADMIN — INSULIN LISPRO 1 UNITS: 100 INJECTION, SOLUTION INTRAVENOUS; SUBCUTANEOUS at 17:58

## 2023-07-20 RX ADMIN — PANTOPRAZOLE SODIUM 40 MG: 40 INJECTION, POWDER, FOR SOLUTION INTRAVENOUS at 21:28

## 2023-07-20 RX ADMIN — ATORVASTATIN CALCIUM 40 MG: 40 TABLET, FILM COATED ORAL at 17:20

## 2023-07-20 NOTE — TELEPHONE ENCOUNTER
Please see Dr. Simone Horvath note from yesterday 7/19/2023. I attempted calling patient again this morning to recommend she go to the emergency room. I called her mobile number which went straight to voicemail. I also called her phone number and was unable to leave a voicemail. I then called the patient's brother. He reports that he spoke with the patient yesterday and we will try to call her right now. I will call back shortly.

## 2023-07-20 NOTE — TELEPHONE ENCOUNTER
----- Message from Morelia Bonilla MD sent at 7/19/2023  4:29 PM EDT -----  I called the patient this evening after I saw the blood work. She did not answer the phone. I left a detailed message instructing her to go to the emergency room. The second number was nonfunctioning. She has significant drop in hemoglobin, I would recommend that the patient go to the emergency room given that she is on Eliquis with concern for GI bleed. She was scheduled for an EGD and colonoscopy in the outpatient setting however given the degree of anemia, I would be recommend evaluation as soon as possible. Nursing-please call the patient tomorrow morning instructing her to go to the ER. I have also informed the patient that she should be taking pantoprazole 40 mg but increase the dose to twice daily.

## 2023-07-20 NOTE — TELEPHONE ENCOUNTER
I was able to get in touch with patient. She is doing well at this time. I recommended she go to the ER due to Hgb of 6.9. She is agreeable and will go to [place]. She reports she took her eliquis this morning and had cereal. I recommended she hold off on any further food until evaluation.

## 2023-07-20 NOTE — TELEPHONE ENCOUNTER
Spoke with patient and she states that someone had already spoken with her and she is going to be going to the ED very shortly.      ----- Message from Manny Carcamo MD sent at 7/19/2023  4:29 PM EDT -----  I called the patient this evening after I saw the blood work. She did not answer the phone. I left a detailed message instructing her to go to the emergency room. The second number was nonfunctioning. She has significant drop in hemoglobin, I would recommend that the patient go to the emergency room given that she is on Eliquis with concern for GI bleed. She was scheduled for an EGD and colonoscopy in the outpatient setting however given the degree of anemia, I would be recommend evaluation as soon as possible. Nursing-please call the patient tomorrow morning instructing her to go to the ER. I have also informed the patient that she should be taking pantoprazole 40 mg but increase the dose to twice daily.

## 2023-07-20 NOTE — ED PROVIDER NOTES
History  Chief Complaint   Patient presents with   • Abnormal Lab     Patients hemoglobin is 6.9 sent in by pcp for infusion . recent bloody stools.  -dizzy-sob +fatigue +eliquis (did take today)     59-year-old female coming into the ED for evaluation of low hemoglobin, sent by outpatient physician for a hemoglobin of 6.9 2 days ago. On Eliquis for PAF. Started on PPI 1 week ago, with improvement in dark stools she was having for 1 month. Feels fatigued, but denies any other symptoms. History provided by:  Patient   used: No        Prior to Admission Medications   Prescriptions Last Dose Informant Patient Reported? Taking? Aspirin Low Dose 81 MG EC tablet  Self No No   Sig: TAKE 1 TABLET BY MOUTH EVERY DAY   Blood Glucose Monitoring Suppl (OneTouch Verio Reflect) w/Device KIT  Self No No   Sig: Check blood sugars three times daily. Please substitute with appropriate alternative as covered by patient's insurance. Dx: E11.65   Patient not taking: Reported on 7/11/2023   Continuous Blood Gluc  (FreeStyle [place] 2 [place]) KEELY  Self No No   Sig: Use 1 Units continuous   Patient not taking: Reported on 7/11/2023   Continuous Blood Gluc Sensor (FreeStyle Frank 2 Sensor) MISC  Self No No   Sig: Use 1 Units every 14 (fourteen) days   Patient not taking: Reported on 7/11/2023   Flovent  MCG/ACT inhaler  Self Yes No   Sig: Inhale 1 puff 2 (two) times a day PT NOT TAKING   Insulin Pen Needle (BD Pen Needle Kateryna U/F) 32G X 4 MM MISC  Self No No   Sig: Use daily as directed with insulin pen   Patient not taking: Reported on 5/22/6980   OneTouch Delica Lancets 81S MISC  Self No No   Sig: Check blood sugars three times daily. Please substitute with appropriate alternative as covered by patient's insurance.  Dx: E11.65   Patient not taking: Reported on 7/11/2023   albuterol (Ventolin HFA) 90 mcg/act inhaler  Self No No   Sig: Inhale 2 puffs every 6 (six) hours as needed for wheezing (rinse mouth after use)   apixaban (Eliquis) 5 mg  Self No No   Sig: Take 1 tablet (5 mg total) by mouth 2 (two) times a day   atorvastatin (LIPITOR) 40 mg tablet  Self No No   Sig: Take 1 tablet (40 mg total) by mouth daily   cholecalciferol (VITAMIN D3) 1,000 units tablet  Self No No   Sig: Take 2 tablets (2,000 Units total) by mouth daily   dapagliflozin (Farxiga) 5 MG TABS  Self Yes No   Sig: Take 5 mg by mouth in the morning. Indications: Cardiac Failure   furosemide (LASIX) 20 mg tablet  Self No No   Sig: TAKE 1 TABLET BY MOUTH DAILY AS NEEDED (WT GAIN 3 LB/ 24 HOURS / TAKE FOR 3 DAYS IN A ROW)   Patient not taking: Reported on 7/18/2023   glucose blood (OneTouch Verio) test strip  Self No No   Sig: Check blood sugars three times daily. Please substitute with appropriate alternative as covered by patient's insurance. Dx: E11.65   Patient not taking: Reported on 7/11/2023   metFORMIN (GLUCOPHAGE) 500 mg tablet  Self No No   Sig: TAKE 1 TABLET BY MOUTH TWICE A DAY WITH MEALS   metoprolol succinate (TOPROL-XL) 50 mg 24 hr tablet  Self No No   Sig: Take 1 tablet (50 mg total) by mouth daily   nystatin-triamcinolone (MYCOLOG-II) ointment  Self No No   Sig: Apply topically 2 (two) times a day To vaginal area   Patient not taking: Reported on 7/18/2023   pantoprazole (PROTONIX) 40 mg tablet  Self No No   Sig: Take 1 tablet (40 mg total) by mouth daily   saccharomyces boulardii (FLORASTOR) 250 mg capsule  Self No No   Sig: Take 1 capsule (250 mg total) by mouth 2 (two) times a day   Patient not taking: Reported on 7/18/2023   zinc oxide (BALMEX) 11.3 % cream  Self Yes No   Sig: Apply 1 application. topically 2 (two) times a day   Patient not taking: Reported on 7/18/2023      Facility-Administered Medications: None       Past Medical History:   Diagnosis Date   • Breast cancer ([facility])     left - 1994.    • Diabetes mellitus ([facility])    • Diabetes mellitus, type 2 ([facility]) 03/15/2006    last assessed 7/10/13   • GERD (gastroesophageal reflux disease)    • History of chemotherapy    • Hypertension        Past Surgical History:   Procedure Laterality Date   • CARDIAC ELECTROPHYSIOLOGY PROCEDURE N/A 3/9/2022    Procedure: Cardiac icd implant/ DUAL CHAMBER ICD; Surgeon: Arlene Kenyon MD;  Location: [facility]; Service: Cardiology   • MASTECTOMY Left     last assessed 14   • MASTECTOMY, RADICAL Left        • ND PARATHYROIDECTOMY/EXPLORATION PARATHYROIDS N/A 2021    Procedure: PARATHYROIDECTOMY POSSIBLE 4 GLAND EXPLORATION;  Surgeon: Jas Acosta MD;  Location: [facility];  Service: ENT   • REDUCTION MAMMAPLASTY Right    • UVULECTOMY         Family History   Problem Relation Age of Onset   • Hypothyroidism Mother    • Leukemia Mother    • Diabetes Father    • Diabetes type II Father    • Stroke Sister    • Diabetes Paternal Grandmother    • Cancer Sister    • Diabetes Brother      I have reviewed and agree with the history as documented. E-Cigarette/Vaping   • E-Cigarette Use Never User      E-Cigarette/Vaping Substances   • Nicotine No    • THC No    • CBD No    • Flavoring No    • Other No    • Unknown No      Social History     Tobacco Use   • Smoking status: Some Days     Packs/day: 0.25     Years: 50.00     Total pack years: 12.50     Types: Cigarettes     Start date: 6/15/1973     Last attempt to quit: 2023     Years since quittin.2   • Smokeless tobacco: Never   • Tobacco comments:     2 cigarettes daily    Vaping Use   • Vaping Use: Never used   Substance Use Topics   • Alcohol use: Never   • Drug use: No       Review of Systems   Constitutional: Positive for fatigue. Gastrointestinal: Positive for blood in stool. All other systems reviewed and are negative. Physical Exam  Physical Exam  Vitals and nursing note reviewed. Constitutional:       General: She is not in acute distress. Appearance: Normal appearance. She is well-developed and normal weight.  She is not ill-appearing, toxic-appearing or diaphoretic. Comments: pale conjunctivae. HENT:      Head: Normocephalic and atraumatic. Right Ear: External ear normal.      Left Ear: External ear normal.      Nose: Nose normal.      Mouth/Throat:      Mouth: Mucous membranes are moist.      Pharynx: Oropharynx is clear. Eyes:      Conjunctiva/sclera: Conjunctivae normal.   Cardiovascular:      Rate and Rhythm: Normal rate and regular rhythm. Pulses: Normal pulses. Heart sounds: Normal heart sounds. Pulmonary:      Effort: Pulmonary effort is normal.      Breath sounds: Normal breath sounds. Abdominal:      General: Abdomen is flat. Bowel sounds are normal. There is no distension or abdominal bruit. There are no signs of injury. Palpations: Abdomen is soft. There is no shifting dullness or mass. Tenderness: There is no abdominal tenderness. Hernia: No hernia is present. Genitourinary:     Adnexa: Right adnexa normal and left adnexa normal.      Rectum: No mass, tenderness, external hemorrhoid or internal hemorrhoid. Comments: No melena or blood in stool. Trace + guaiac. Musculoskeletal:         General: Normal range of motion. Cervical back: Normal range of motion and neck supple. Skin:     General: Skin is warm and dry. Capillary Refill: Capillary refill takes less than 2 seconds. Neurological:      General: No focal deficit present. Mental Status: She is alert and oriented to person, place, and time. Mental status is at baseline.    Psychiatric:         Mood and Affect: Mood normal.         Behavior: Behavior normal.         Vital Signs  ED Triage Vitals   Temperature Pulse Respirations Blood Pressure SpO2   07/20/23 1120 07/20/23 1120 07/20/23 1120 07/20/23 1120 07/20/23 1120   98.4 °F (36.9 °C) 89 19 165/74 100 %      Temp Source Heart Rate Source Patient Position - Orthostatic VS BP Location FiO2 (%)   07/20/23 1120 07/20/23 1120 07/20/23 1120 07/20/23 1120 --   Oral Monitor Sitting Right arm       Pain Score       07/20/23 1415       No Pain           Vitals:    07/20/23 1702 07/20/23 1745 07/20/23 1751 07/20/23 1810   BP: 133/67 128/70 128/70 144/80   Pulse: 77 67 67 68   Patient Position - Orthostatic VS:             Visual Acuity      ED Medications  Medications   pantoprazole (PROTONIX) injection 40 mg (has no administration in time range)   atorvastatin (LIPITOR) tablet 40 mg (40 mg Oral Given 7/20/23 1720)   cholecalciferol (VITAMIN D3) tablet 2,000 Units (has no administration in time range)   metoprolol succinate (TOPROL-XL) 24 hr tablet 50 mg (has no administration in time range)   albuterol (PROVENTIL HFA,VENTOLIN HFA) inhaler 2 puff (has no administration in time range)   insulin lispro (HumaLOG) 100 units/mL subcutaneous injection 1-5 Units (1 Units Subcutaneous Given 7/20/23 1758)       Diagnostic Studies  Results Reviewed     Procedure Component Value Units Date/Time    Iron Saturation % [[id number]] Collected: 07/20/23 1220    Lab Status: In process Specimen: Blood from Arm, Right Updated: 07/20/23 1700    Ferritin [[id number]] Collected: 07/20/23 1220    Lab Status:  In process Specimen: Blood from Arm, Right Updated: 07/20/23 1700    CBC and differential [[id number]]  (Abnormal) Collected: 07/20/23 1220    Lab Status: Final result Specimen: Blood from Arm, Right Updated: 07/20/23 1252     WBC 8.96 Thousand/uL      RBC 2.79 Million/uL      Hemoglobin 6.4 g/dL      Hematocrit 22.6 %      MCV 81 fL      MCH 22.9 pg      MCHC 28.3 g/dL      RDW 20.5 %      MPV 10.5 fL      Platelets 770 Thousands/uL      nRBC 0 /100 WBCs      Neutrophils Relative 71 %      Immat GRANS % 0 %      Lymphocytes Relative 18 %      Monocytes Relative 10 %      Eosinophils Relative 1 %      Basophils Relative 0 %      Neutrophils Absolute 6.37 Thousands/µL      Immature Grans Absolute 0.03 Thousand/uL      Lymphocytes Absolute 1.60 Thousands/µL      Monocytes Absolute 0.87 Thousand/µL      Eosinophils Absolute 0.08 Thousand/µL      Basophils Absolute 0.01 Thousands/µL     Narrative: This is an appended report. These results have been appended to a previously verified report.     Protime-INR [[id number]]  (Abnormal) Collected: 07/20/23 1220    Lab Status: Final result Specimen: Blood from Arm, Right Updated: 07/20/23 1250     Protime 17.1 seconds      INR 1.37    APTT [[id number]]  (Normal) Collected: 07/20/23 1220    Lab Status: Final result Specimen: Blood from Arm, Right Updated: 07/20/23 1250     PTT 30 seconds     Comprehensive metabolic panel [[id number]]  (Abnormal) Collected: 07/20/23 1220    Lab Status: Final result Specimen: Blood from Arm, Right Updated: 07/20/23 1247     Sodium 136 mmol/L      Potassium 4.5 mmol/L      Chloride 106 mmol/L      CO2 25 mmol/L      ANION GAP 5 mmol/L      BUN 21 mg/dL      Creatinine 1.08 mg/dL      Glucose 155 mg/dL      Calcium 9.2 mg/dL      Corrected Calcium 9.7 mg/dL      AST 22 U/L      ALT 25 U/L      Alkaline Phosphatase 126 U/L      Total Protein 6.7 g/dL      Albumin 3.4 g/dL      Total Bilirubin 0.43 mg/dL      eGFR 52 ml/min/1.73sq m     Narrative:      [place] guidelines for Chronic Kidney Disease (CKD):   •  Stage 1 with normal or high GFR (GFR > 90 mL/min/1.73 square meters)  •  Stage 2 Mild CKD (GFR = 60-89 mL/min/1.73 square meters)  •  Stage 3A Moderate CKD (GFR = 45-59 mL/min/1.73 square meters)  •  Stage 3B Moderate CKD (GFR = 30-44 mL/min/1.73 square meters)  •  Stage 4 Severe CKD (GFR = 15-29 mL/min/1.73 square meters)  •  Stage 5 End Stage CKD (GFR <15 mL/min/1.73 square meters)  Note: GFR calculation is accurate only with a steady state creatinine                 No orders to display              Procedures  CriticalCare Time    Date/Time: 7/20/2023 6:41 PM    Performed by: Kinsey Bennett DO  Authorized by: Kinsey Bennett DO    Critical care provider statement:     Critical care time (minutes):  40    Critical care time was exclusive of:  Separately billable procedures and treating other patients and teaching time    Critical care was necessary to treat or prevent imminent or life-threatening deterioration of the following conditions: GI bleed, symptomatic anemia requiring 2 units PRBCs. Critical care was time spent personally by me on the following activities:  Obtaining history from patient or surrogate, development of treatment plan with patient or surrogate, discussions with primary provider, evaluation of patient's response to treatment, examination of patient, review of old charts, re-evaluation of patient's condition, ordering and review of radiographic studies and ordering and review of laboratory studies    I assumed direction of critical care for this patient from another provider in my specialty: no               ED Course  ED Course as of 07/20/23 1842   Thu Jul 20, 2023   1235 Pt consented for 2 units PRBCs transfusion. 6.9 2 days ago. Hemoccult here, brown stool, trace positive guaiac. 5 D/w Dr Mariam Ferguson accepts to service. SBIRT 22yo+    Flowsheet Row Most Recent Value   Initial Alcohol Screen: US AUDIT-C     1. How often do you have a drink containing alcohol? 0 Filed at: 07/20/2023 1122   2. How many drinks containing alcohol do you have on a typical day you are drinking? 0 Filed at: 07/20/2023 1122   3a. Male UNDER 65: How often do you have five or more drinks on one occasion? 0 Filed at: 07/20/2023 1122   3b. FEMALE Any Age, or MALE 65+: How often do you have 4 or more drinks on one occassion? 0 Filed at: 07/20/2023 1122   Audit-C Score 0 Filed at: 07/20/2023 1122   ALEX: How many times in the past year have you. .. Used an illegal drug or used a prescription medication for non-medical reasons? Never Filed at: 07/20/2023 1122                    Medical Decision Making  70-year-old female with symptomatic anemia, hemoglobin 6.9, two days ago, 6.4 today.   Source likely upper GI bleed that has been improving. Not currently bleeding. Rectal exam, trace positive Hemoccult. Admitting for transfusion of 2 units of PRBCs, GI consult, will likely require EGD for evaluation. Symptomatic anemia: acute illness or injury  Amount and/or Complexity of Data Reviewed  External Data Reviewed: labs and notes. Labs: ordered. Decision-making details documented in ED Course. Risk  Decision regarding hospitalization. Disposition  Final diagnoses:   Symptomatic anemia     Time reflects when diagnosis was documented in both MDM as applicable and the Disposition within this note     Time User Action Codes Description Comment    7/20/2023  1:20 PM [place] Severs [D64.9] Symptomatic anemia     7/20/2023  4:39 PM Cardio, Thom[place] Likes Add [R19.5] Heme positive stool       ED Disposition     ED Disposition   Admit    Condition   Stable    Date/Time   Thu Jul 20, 2023  1:22 PM    Comment   Case was discussed with Dr Keyur Bach and the patient's admission status was agreed to be Admission Status: inpatient status to the service of Dr. Keyur Bach .            Follow-up Information    None         Current Discharge Medication List      CONTINUE these medications which have NOT CHANGED    Details   albuterol (Ventolin HFA) 90 mcg/act inhaler Inhale 2 puffs every 6 (six) hours as needed for wheezing (rinse mouth after use)  Qty: 18 g, Refills: 1    Comments: Dispense generic albuterol  Associated Diagnoses: Cough; COVID-19      apixaban (Eliquis) 5 mg Take 1 tablet (5 mg total) by mouth 2 (two) times a day  Qty: 180 tablet, Refills: 3    Associated Diagnoses: Atrial fibrillation, unspecified type (HCC)      Aspirin Low Dose 81 MG EC tablet TAKE 1 TABLET BY MOUTH EVERY DAY  Qty: 90 tablet, Refills: 3    Associated Diagnoses: Coronary artery disease involving native coronary artery of native heart without angina pectoris      atorvastatin (LIPITOR) 40 mg tablet Take 1 tablet (40 mg total) by mouth daily  Qty: 90 tablet, Refills: 1    Associated Diagnoses: Hyperlipidemia LDL goal <100      Blood Glucose Monitoring Suppl (OneTouch Verio Reflect) w/Device KIT Check blood sugars three times daily. Please substitute with appropriate alternative as covered by patient's insurance. Dx: E11.65  Qty: 1 kit, Refills: 0    Associated Diagnoses: Type 2 diabetes mellitus with stage 2 chronic kidney disease, without long-term current use of insulin ([facility])      cholecalciferol (VITAMIN D3) 1,000 units tablet Take 2 tablets (2,000 Units total) by mouth daily  Qty: 10 tablet, Refills: 0    Associated Diagnoses: COVID-19      Continuous Blood Gluc  (FreeStyle Frank 2 [place]) KEELY Use 1 Units continuous  Qty: 1 each, Refills: 0    Associated Diagnoses: Type 2 diabetes mellitus with stage 3a chronic kidney disease, with long-term current use of insulin ([facility])      Continuous Blood Gluc Sensor (FreeStyle Frank 2 Sensor) MISC Use 1 Units every 14 (fourteen) days  Qty: 2 each, Refills: 5    Associated Diagnoses: Type 2 diabetes mellitus with stage 3a chronic kidney disease, with long-term current use of insulin ([facility])      dapagliflozin (Farxiga) 5 MG TABS Take 5 mg by mouth in the morning. Indications: Cardiac Failure      Flovent  MCG/ACT inhaler Inhale 1 puff 2 (two) times a day PT NOT TAKING      furosemide (LASIX) 20 mg tablet TAKE 1 TABLET BY MOUTH DAILY AS NEEDED (WT GAIN 3 LB/ 24 HOURS / TAKE FOR 3 DAYS IN A ROW)  Qty: 90 tablet, Refills: 2    Associated Diagnoses: CHF exacerbation ([facility])      glucose blood (OneTouch Verio) test strip Check blood sugars three times daily. Please substitute with appropriate alternative as covered by patient's insurance.  Dx: E11.65  Qty: 300 each, Refills: 1    Associated Diagnoses: Type 2 diabetes mellitus with stage 2 chronic kidney disease, without long-term current use of insulin ([facility])      Insulin Pen Needle (BD Pen Needle Kateryna U/F) 32G X 4 MM MISC Use daily as directed with insulin pen  Qty: 100 each, Refills: 0    Associated Diagnoses: Type 2 diabetes mellitus with stage 2 chronic kidney disease, without long-term current use of insulin ([facility])      metFORMIN (GLUCOPHAGE) 500 mg tablet TAKE 1 TABLET BY MOUTH TWICE A DAY WITH MEALS  Qty: 180 tablet, Refills: 1    Comments: For Type 2 diabetes with hyperglycemia  Associated Diagnoses: Type 2 diabetes mellitus with stage 3b chronic kidney disease, with long-term current use of insulin ([facility])      metoprolol succinate (TOPROL-XL) 50 mg 24 hr tablet Take 1 tablet (50 mg total) by mouth daily  Qty: 30 tablet, Refills: 3    Associated Diagnoses: CHF exacerbation ([facility])      nystatin-triamcinolone (MYCOLOG-II) ointment Apply topically 2 (two) times a day To vaginal area  Qty: 60 g, Refills: 1    Associated Diagnoses: Vulvar itching      OneTouch Delica Lancets 27D MISC Check blood sugars three times daily. Please substitute with appropriate alternative as covered by patient's insurance. Dx: E11.65  Qty: 300 each, Refills: 1    Associated Diagnoses: Type 2 diabetes mellitus with stage 2 chronic kidney disease, without long-term current use of insulin ([facility])      pantoprazole (PROTONIX) 40 mg tablet Take 1 tablet (40 mg total) by mouth daily  Qty: 90 tablet, Refills: 0    Associated Diagnoses: Black stool      saccharomyces boulardii (FLORASTOR) 250 mg capsule Take 1 capsule (250 mg total) by mouth 2 (two) times a day  Qty: 60 capsule, Refills: 0    Associated Diagnoses: Vaginal odor      zinc oxide (BALMEX) 11.3 % cream Apply 1 application. topically 2 (two) times a day             No discharge procedures on file.     PDMP Review       Value Time User    PDMP Reviewed  Yes 4/21/2021  3:21 PM Corina Bradshaw MD          ED Provider  Electronically Signed by           Mireyasummer Clement,   07/20/23 5984

## 2023-07-20 NOTE — PLAN OF CARE
Problem: PAIN - ADULT  Goal: Verbalizes/displays adequate comfort level or baseline comfort level  Description: Interventions:  - Encourage patient to monitor pain and request assistance  - Assess pain using appropriate pain scale  - Administer analgesics based on type and severity of pain and evaluate response  - Implement non-pharmacological measures as appropriate and evaluate response  - Consider cultural and social influences on pain and pain management  - Notify physician/advanced practitioner if interventions unsuccessful or patient reports new pain  Outcome: Progressing     Problem: DISCHARGE PLANNING  Goal: Discharge to home or other facility with appropriate resources  Description: INTERVENTIONS:  - Identify barriers to discharge w/patient and caregiver  - Arrange for needed discharge resources and transportation as appropriate  - Identify discharge learning needs (meds, wound care, etc.)  - Arrange for interpretive services to assist at discharge as needed  - Refer to Case Management Department for coordinating discharge planning if the patient needs post-hospital services based on physician/advanced practitioner order or complex needs related to functional status, cognitive ability, or social support system  Outcome: Progressing     Problem: Knowledge Deficit  Goal: Patient/family/caregiver demonstrates understanding of disease process, treatment plan, medications, and discharge instructions  Description: Complete learning assessment and assess knowledge base.   Interventions:  - Provide teaching at level of understanding  - Provide teaching via preferred learning methods  Outcome: Progressing     Problem: HEMATOLOGIC - ADULT  Goal: Maintains hematologic stability  Description: INTERVENTIONS  - Assess for signs and symptoms of bleeding or hemorrhage  - Monitor labs  - Administer supportive blood products/factors as ordered and appropriate  Outcome: Progressing

## 2023-07-21 ENCOUNTER — ANESTHESIA (INPATIENT)
Dept: GASTROENTEROLOGY | Facility: HOSPITAL | Age: 68
DRG: 378 | End: 2023-07-21
Payer: COMMERCIAL

## 2023-07-21 ENCOUNTER — APPOINTMENT (INPATIENT)
Dept: GASTROENTEROLOGY | Facility: HOSPITAL | Age: 68
DRG: 378 | End: 2023-07-21
Payer: COMMERCIAL

## 2023-07-21 ENCOUNTER — ANESTHESIA EVENT (INPATIENT)
Dept: GASTROENTEROLOGY | Facility: HOSPITAL | Age: 68
DRG: 378 | End: 2023-07-21
Payer: COMMERCIAL

## 2023-07-21 LAB
ABO GROUP BLD BPU: NORMAL
ABO GROUP BLD BPU: NORMAL
ANION GAP SERPL CALCULATED.3IONS-SCNC: 5 MMOL/L
BPU ID: NORMAL
BPU ID: NORMAL
BUN SERPL-MCNC: 21 MG/DL (ref 5–25)
CALCIUM SERPL-MCNC: 8.9 MG/DL (ref 8.4–10.2)
CHLORIDE SERPL-SCNC: 107 MMOL/L (ref 96–108)
CO2 SERPL-SCNC: 24 MMOL/L (ref 21–32)
CREAT SERPL-MCNC: 1.02 MG/DL (ref 0.6–1.3)
CROSSMATCH: NORMAL
CROSSMATCH: NORMAL
FERRITIN SERPL-MCNC: 5 NG/ML (ref 11–307)
GFR SERPL CREATININE-BSD FRML MDRD: 55 ML/MIN/1.73SQ M
GLUCOSE SERPL-MCNC: 102 MG/DL (ref 65–140)
GLUCOSE SERPL-MCNC: 102 MG/DL (ref 65–140)
GLUCOSE SERPL-MCNC: 174 MG/DL (ref 65–140)
GLUCOSE SERPL-MCNC: 348 MG/DL (ref 65–140)
GLUCOSE SERPL-MCNC: 78 MG/DL (ref 65–140)
HGB BLD-MCNC: 10.4 G/DL (ref 11.5–15.4)
IRON SATN MFR SERPL: 5 % (ref 15–50)
IRON SERPL-MCNC: 22 UG/DL (ref 50–170)
POTASSIUM SERPL-SCNC: 4.5 MMOL/L (ref 3.5–5.3)
SODIUM SERPL-SCNC: 136 MMOL/L (ref 135–147)
TIBC SERPL-MCNC: 419 UG/DL (ref 250–450)
UNIT DISPENSE STATUS: NORMAL
UNIT DISPENSE STATUS: NORMAL
UNIT PRODUCT CODE: NORMAL
UNIT PRODUCT CODE: NORMAL
UNIT PRODUCT VOLUME: 350 ML
UNIT PRODUCT VOLUME: 350 ML
UNIT RH: NORMAL
UNIT RH: NORMAL

## 2023-07-21 PROCEDURE — 80048 BASIC METABOLIC PNL TOTAL CA: CPT | Performed by: INTERNAL MEDICINE

## 2023-07-21 PROCEDURE — 85018 HEMOGLOBIN: CPT | Performed by: INTERNAL MEDICINE

## 2023-07-21 PROCEDURE — 99232 SBSQ HOSP IP/OBS MODERATE 35: CPT | Performed by: PHYSICIAN ASSISTANT

## 2023-07-21 PROCEDURE — 99222 1ST HOSP IP/OBS MODERATE 55: CPT | Performed by: PHYSICIAN ASSISTANT

## 2023-07-21 PROCEDURE — C9113 INJ PANTOPRAZOLE SODIUM, VIA: HCPCS | Performed by: INTERNAL MEDICINE

## 2023-07-21 PROCEDURE — 0DB68ZX EXCISION OF STOMACH, VIA NATURAL OR ARTIFICIAL OPENING ENDOSCOPIC, DIAGNOSTIC: ICD-10-PCS | Performed by: INTERNAL MEDICINE

## 2023-07-21 PROCEDURE — 88305 TISSUE EXAM BY PATHOLOGIST: CPT | Performed by: SPECIALIST

## 2023-07-21 PROCEDURE — 0DB98ZX EXCISION OF DUODENUM, VIA NATURAL OR ARTIFICIAL OPENING ENDOSCOPIC, DIAGNOSTIC: ICD-10-PCS | Performed by: INTERNAL MEDICINE

## 2023-07-21 PROCEDURE — 43239 EGD BIOPSY SINGLE/MULTIPLE: CPT | Performed by: INTERNAL MEDICINE

## 2023-07-21 PROCEDURE — 82948 REAGENT STRIP/BLOOD GLUCOSE: CPT

## 2023-07-21 RX ORDER — SODIUM CHLORIDE, SODIUM LACTATE, POTASSIUM CHLORIDE, CALCIUM CHLORIDE 600; 310; 30; 20 MG/100ML; MG/100ML; MG/100ML; MG/100ML
INJECTION, SOLUTION INTRAVENOUS CONTINUOUS PRN
Status: DISCONTINUED | OUTPATIENT
Start: 2023-07-21 | End: 2023-07-21

## 2023-07-21 RX ORDER — NICOTINE 21 MG/24HR
14 PATCH, TRANSDERMAL 24 HOURS TRANSDERMAL DAILY
Status: DISCONTINUED | OUTPATIENT
Start: 2023-07-21 | End: 2023-07-25 | Stop reason: HOSPADM

## 2023-07-21 RX ORDER — LIDOCAINE HYDROCHLORIDE 10 MG/ML
INJECTION, SOLUTION EPIDURAL; INFILTRATION; INTRACAUDAL; PERINEURAL AS NEEDED
Status: DISCONTINUED | OUTPATIENT
Start: 2023-07-21 | End: 2023-07-21

## 2023-07-21 RX ORDER — PROPOFOL 10 MG/ML
INJECTION, EMULSION INTRAVENOUS AS NEEDED
Status: DISCONTINUED | OUTPATIENT
Start: 2023-07-21 | End: 2023-07-21

## 2023-07-21 RX ADMIN — INSULIN LISPRO 1 UNITS: 100 INJECTION, SOLUTION INTRAVENOUS; SUBCUTANEOUS at 17:06

## 2023-07-21 RX ADMIN — SODIUM CHLORIDE, SODIUM LACTATE, POTASSIUM CHLORIDE, AND CALCIUM CHLORIDE: .6; .31; .03; .02 INJECTION, SOLUTION INTRAVENOUS at 13:35

## 2023-07-21 RX ADMIN — PROPOFOL 100 MG: 10 INJECTION, EMULSION INTRAVENOUS at 13:41

## 2023-07-21 RX ADMIN — ATORVASTATIN CALCIUM 40 MG: 40 TABLET, FILM COATED ORAL at 16:20

## 2023-07-21 RX ADMIN — PANTOPRAZOLE SODIUM 40 MG: 40 INJECTION, POWDER, FOR SOLUTION INTRAVENOUS at 08:24

## 2023-07-21 RX ADMIN — PANTOPRAZOLE SODIUM 40 MG: 40 INJECTION, POWDER, FOR SOLUTION INTRAVENOUS at 21:38

## 2023-07-21 RX ADMIN — NICOTINE 14 MG: 14 PATCH, EXTENDED RELEASE TRANSDERMAL at 09:40

## 2023-07-21 RX ADMIN — METOPROLOL SUCCINATE 50 MG: 50 TABLET, EXTENDED RELEASE ORAL at 08:24

## 2023-07-21 RX ADMIN — LIDOCAINE HYDROCHLORIDE 50 MG: 10 INJECTION, SOLUTION EPIDURAL; INFILTRATION; INTRACAUDAL; PERINEURAL at 13:41

## 2023-07-21 RX ADMIN — Medication 2000 UNITS: at 08:24

## 2023-07-21 NOTE — ASSESSMENT & PLAN NOTE
Lab Results   Component Value Date    HGBA1C 8.4 (H) 06/21/2023     Recent Labs     07/20/23  1729 07/20/23 2021 07/21/23  0727   POCGLU 192* 179* 102       Blood Sugar Average: Last 72 hrs:  (P) [id number] A1c not at goal  Hold home oral meds  Diabetic clears  ISS TID with meals   Adjust as needed for BG goal 140-180

## 2023-07-21 NOTE — H&P
[address] [facility]  H&P  Name: Kelby Berg 77 y.o. female I MRN: [id number]  Unit/Bed#: S -01 I Date of Admission: 7/20/2023   Date of Service: 7/20/2023 I Hospital Day: 0      Assessment/Plan   * Symptomatic anemia  Assessment & Plan  · Symptomatic anemia with some dizziness, weakness, fatigue. Outpatient lab work with GI informed patient of Hgb 6.4 (Hgb 16 in April), recommended hospitalization for acute on chronic JANKI as she has been seeing them for episodes of melena. She reports 1 month of intermittent black foul-smelling stools. Risk factors include use + NSAIDs, dual antiplatelet therapy, DDX  PUD vs right-sided colonic lesion that may have bled vsAVMs or Dula Tawana lesions. She was recently advised to stop PPI once daily which she was put on 1 month ago, unclear reasons why she was told to stop PPI. Currently stools are brown, no further melena though   · On AC with Eliquis and APT with ASA. · Hold Eliquis and ASA, (last dose of both were in AM of 7/20 PTA) in anticipation for endoscopy tomorrow, patient may require bx. Discussed with patient about limited role for continuing ASA at this point given her bleeding risk. · IV PPI BID  · Clear liquids  · NPO after MN  · Patient to receive 2u pRBCs, follow post-transfusion H&H.  H&H q12, transfuse for Hgb <8 or symptomatic anemia with underlying CAD  · GI consult appreciated for endoscopic evaluation      Heme positive stool  Assessment & Plan  · See above plan    PAF (paroxysmal atrial fibrillation) ([facility])  Assessment & Plan  · Hold Eliquis    Chronic HFrEF (heart failure with reduced ejection fraction) ([facility])  Assessment & Plan  Wt Readings from Last 3 Encounters:   07/20/23 50.3 kg (111 lb)   07/18/23 50.3 kg (111 lb)   07/11/23 48.7 kg (107 lb 4.8 oz)         Euvolemic. Continue Toprol XL. Non-ischemic cardiomyopathy. Not on other GDMT.   Per outpatient cardiology notes "Continued on BB only for GDMT as blood pressure limits use of ACE inhibitor or ARB. ... Using Lasix on an as needed basis. "    Coronary artery disease involving native coronary artery of native heart without angina pectoris  Assessment & Plan  · Stable. Target Hgb 8-9 to not precipitate demand ischemia. No CP    CKD stage 3 due to type 2 diabetes mellitus [facility])  Assessment & Plan  Lab Results   Component Value Date    HGBA1C 8.4 (H) 06/21/2023       Recent Labs     07/20/23  1729 07/20/23 2021   POCGLU 192* 179*       Blood Sugar Average: Last 72 hrs:  (P) 185. 5     At baseline. Continue to monitor. Type 2 diabetes mellitus with stage 3a chronic kidney disease, with long-term current use of insulin [facility])  Assessment & Plan  Lab Results   Component Value Date    HGBA1C 8.4 (H) 06/21/2023       Recent Labs     07/20/23  1729 07/20/23 2021   POCGLU 192* 179*       Blood Sugar Average: Last 72 hrs:  (P) 185.5   Hold home oral meds  Diabetic clears  ISS TID with meals   Adjust as needed for BG goal 140-180    VTE Pharmacologic Prophylaxis: VTE Score: 3 Moderate Risk (Score 3-4) - Pharmacological DVT Prophylaxis Contraindicated. Sequential Compression Devices Ordered. Code Status: Level 1 - Full Code  Discussion with family: Patient declined call to . Anticipated Length of Stay: Patient will be admitted on an inpatient basis with an anticipated length of stay of greater than 2 midnights secondary to symptomatic anemia, endoscopy tomorrow, GI consult, blood transfusion, will need to resume [facility] tomorrow and monitor additionally for Hgb stability.     Total Time Spent on Date of Encounter in care of patient: 65 minutes This time was spent on one or more of the following: performing physical exam; counseling and coordination of care; obtaining or reviewing history; documenting in the medical record; reviewing/ordering tests, medications or procedures; communicating with other healthcare professionals and discussing with patient's family/caregivers. Chief Complaint: abnormal blood work    History of Present Illness:  Kayla Luna is a 77 y.o. female with a PMH of CAD, non-ischemic cardiomyopathy, PAF on AC, nicotine dependence,  DM2, GERD, HTN, hx of left-sided breast cancer MARCELINO and JAYLAN II and I, respectively who presents after having outpatient blood work as part of GI work-up for melena and Hgb was noted to be 6.4. She was instructed to go to ED. She saw GI July 11 in the office for 1 month of change in bowel habits with foul smelling black stools. Reported occasional aleve use at the time. No abdominal pain. She was put on PPI once daily and was taking that up until recently when she was advised to stop it. She is planned for EGD and colonoscopy next month. She reports some symptomatic anemia with fatigue, weakness and postural dizziness at times. No BRBPR. No hematemesis. c-scope in 2015 only notable for diverticulosis. Review of Systems:  Review of Systems   Constitutional: Positive for activity change and fatigue. Respiratory: Negative for shortness of breath. Cardiovascular: Negative for chest pain. Gastrointestinal: Negative for abdominal pain, blood in stool, diarrhea, nausea and vomiting. Dark stools   Musculoskeletal: Positive for arthralgias. Neurological: Positive for dizziness and light-headedness. Negative for syncope. Past Medical and Surgical History:   Past Medical History:   Diagnosis Date   • Breast cancer ([facility])     left - 1994. • Diabetes mellitus ([facility])    • Diabetes mellitus, type 2 ([facility]) 03/15/2006    last assessed 7/10/13   • GERD (gastroesophageal reflux disease)    • History of chemotherapy    • Hypertension        Past Surgical History:   Procedure Laterality Date   • CARDIAC ELECTROPHYSIOLOGY PROCEDURE N/A 3/9/2022    Procedure: Cardiac icd implant/ DUAL CHAMBER ICD; Surgeon: Noam Hernández MD;  Location: [facility];   Service: Cardiology   • MASTECTOMY Left last assessed 12/16/14   • MASTECTOMY, RADICAL Left     1994   • AK PARATHYROIDECTOMY/EXPLORATION PARATHYROIDS N/A 4/21/2021    Procedure: PARATHYROIDECTOMY POSSIBLE 4 GLAND EXPLORATION;  Surgeon: Jas Acosta MD;  Location: [facility];  Service: ENT   • REDUCTION MAMMAPLASTY Right    • UVULECTOMY         Meds/Allergies:  Prior to Admission medications    Medication Sig Start Date End Date Taking? Authorizing Provider   albuterol (Ventolin HFA) 90 mcg/act inhaler Inhale 2 puffs every 6 (six) hours as needed for wheezing (rinse mouth after use) 8/24/22   Josefina Koenig,    apixaban (Eliquis) 5 mg Take 1 tablet (5 mg total) by mouth 2 (two) times a day 6/28/23   Sherryle Reading, MD   Aspirin Low Dose 81 MG EC tablet TAKE 1 TABLET BY MOUTH EVERY DAY 3/13/23   Sherryle Reading, MD   atorvastatin (LIPITOR) 40 mg tablet Take 1 tablet (40 mg total) by mouth daily 6/20/23 7/20/23  Josefina Koenig, DO   Blood Glucose Monitoring Suppl (OneTouch Verio Reflect) w/Device KIT Check blood sugars three times daily. Please substitute with appropriate alternative as covered by patient's insurance. Dx: E11.65  Patient not taking: Reported on 7/11/2023 5/5/23   Josefina Koenig DO   cholecalciferol (VITAMIN D3) 1,000 units tablet Take 2 tablets (2,000 Units total) by mouth daily 8/23/22   Paramjit Yeboah, DO   Continuous Blood Gluc  (FreeStyle Datapipeon 2 Ravenel) KEELY Use 1 Units continuous  Patient not taking: Reported on 7/11/2023 6/19/23   Gabriela Farooq MD   Continuous Blood Gluc Sensor (FreeStyle Frank 2 Sensor) MISC Use 1 Units every 14 (fourteen) days  Patient not taking: Reported on 7/11/2023 6/19/23   Gabriela Farooq MD   dapagliflozin (Farxiga) 5 MG TABS Take 5 mg by mouth in the morning.  Indications: Cardiac Failure    Historical Provider, MD   Flovent  MCG/ACT inhaler Inhale 1 puff 2 (two) times a day PT NOT TAKING 8/24/22   Historical Provider, MD   furosemide (LASIX) 20 mg tablet TAKE 1 TABLET BY MOUTH DAILY AS NEEDED (WT GAIN 3 LB/ 24 HOURS / TAKE FOR 3 DAYS IN A ROW)  Patient not taking: Reported on 7/18/2023 6/28/23   Mare Casas MD   glucose blood (OneTouch Verio) test strip Check blood sugars three times daily. Please substitute with appropriate alternative as covered by patient's insurance. Dx: E11.65  Patient not taking: Reported on 7/11/2023 5/5/23   Hany Malhotra, DO   Insulin Pen Needle (BD Pen Needle Kateryna U/F) 32G X 4 MM MISC Use daily as directed with insulin pen  Patient not taking: Reported on 7/11/2023 5/3/23   Mckay Collins MD   metFORMIN (GLUCOPHAGE) 500 mg tablet TAKE 1 TABLET BY MOUTH TWICE A DAY WITH MEALS 6/20/23   Noé Lazar MD   metoprolol succinate (TOPROL-XL) 50 mg 24 hr tablet Take 1 tablet (50 mg total) by mouth daily 5/31/23 9/28/23  MAGGIE Fernandes   nystatin-triamcinolone (MYCOLOG-II) ointment Apply topically 2 (two) times a day To vaginal area  Patient not taking: Reported on 7/18/2023 1/10/23   Hany Malhotra, DO   OneTouch Delica Lancets 09E MISC Check blood sugars three times daily. Please substitute with appropriate alternative as covered by patient's insurance. Dx: E11.65  Patient not taking: Reported on 7/11/2023 5/5/23   Hany Malhotra DO   pantoprazole (PROTONIX) 40 mg tablet Take 1 tablet (40 mg total) by mouth daily 7/11/23   Saman Jason MD   saccharomyces boulardii (FLORASTOR) 250 mg capsule Take 1 capsule (250 mg total) by mouth 2 (two) times a day  Patient not taking: Reported on 7/18/2023 5/26/23   Hany Malhotra DO   zinc oxide (BALMEX) 11.3 % cream Apply 1 application. topically 2 (two) times a day  Patient not taking: Reported on 7/18/2023    Historical Provider, MD     I have reviewed home medications with patient personally.     Allergies: No Known Allergies    Social History:  Marital Status: Single     Substance Use History:   Social History     Substance and Sexual Activity   Alcohol Use Never     Social History     Tobacco Use Smoking Status Some Days   • Packs/day: 0.25   • Years: 50.00   • Total pack years: 12.50   • Types: Cigarettes   • Start date: 6/15/1973   • Last attempt to quit: 2023   • Years since quittin.2   Smokeless Tobacco Never   Tobacco Comments    2 cigarettes daily      Social History     Substance and Sexual Activity   Drug Use No       Family History:  Family History   Problem Relation Age of Onset   • Hypothyroidism Mother    • Leukemia Mother    • Diabetes Father    • Diabetes type II Father    • Stroke Sister    • Diabetes Paternal Grandmother    • Cancer Sister    • Diabetes Brother        Physical Exam:     Vitals:   Blood Pressure: 134/65 (23)  Pulse: 79 (23)  Temperature: 99 °F (37.2 °C) (23)  Temp Source: Oral (23)  Respirations: 18 (23)  Height: 5' 3" (160 cm) (23 1451)  Weight - Scale: 50.3 kg (111 lb) (23 1120)  SpO2: 99 % (23)    Physical Exam  Vitals reviewed. Constitutional:       Appearance: She is not ill-appearing or toxic-appearing. Cardiovascular:      Rate and Rhythm: Normal rate. Rhythm irregular. Pulmonary:      Effort: No respiratory distress. Breath sounds: No wheezing, rhonchi or rales. Abdominal:      General: There is no distension. Palpations: There is no mass. Tenderness: There is no abdominal tenderness. There is no guarding. Musculoskeletal:         General: No swelling. Skin:     General: Skin is warm and dry. Neurological:      Mental Status: Mental status is at baseline.           Additional Data:     Lab Results:  Results from last 7 days   Lab Units 23  1220   WBC Thousand/uL 8.96   HEMOGLOBIN g/dL 6.4*   HEMATOCRIT % 22.6*   PLATELETS Thousands/uL 307   NEUTROS PCT % 71   LYMPHS PCT % 18   MONOS PCT % 10   EOS PCT % 1     Results from last 7 days   Lab Units 23  1220   SODIUM mmol/L 136   POTASSIUM mmol/L 4.5   CHLORIDE mmol/L 106   CO2 mmol/L 25   BUN mg/dL 21   CREATININE mg/dL 1.08   ANION GAP mmol/L 5   CALCIUM mg/dL 9.2   ALBUMIN g/dL 3.4*   TOTAL BILIRUBIN mg/dL 0.43   ALK PHOS U/L 126*   ALT U/L 25   AST U/L 22   GLUCOSE RANDOM mg/dL 155*     Results from last 7 days   Lab Units 07/20/23  1220   INR  1.37*     Results from last 7 days   Lab Units 07/20/23 2021 07/20/23  1729   POC GLUCOSE mg/dl 179* 192*               Lines/Drains:  Invasive Devices     Peripheral Intravenous Line  Duration           Peripheral IV 07/20/23 Distal;Left Wrist <1 day                    Imaging: No pertinent imaging reviewed. No orders to display       ·     ** Please Note: This note has been constructed using a voice recognition system.  **

## 2023-07-21 NOTE — PROGRESS NOTES
[facility]  Progress Note  Name: Rajinder Feliz  MRN: [id number]  Unit/Bed#: S -01 I Date of Admission: 7/20/2023   Date of Service: 7/21/2023 I Hospital Day: 1    Assessment/Plan   * Symptomatic anemia  Assessment & Plan  · Symptomatic anemia with some dizziness, weakness, fatigue. Outpatient lab work with GI was done and they informed patient of Hgb 6.4 (Hgb 16 in April), recommended hospitalization for acute on chronic JANKI as she has been seeing them for episodes 1 month of intermittent black foul-smelling stools. · Risk factors include use + NSAIDs, combined use of ASA and eliquis, and ongoing tobacco abuse     · IV PPI BID  · Patient received 2u pRBCs-hemoglobin currently stable at 10.4, transfuse for Hgb <8 or symptomatic anemia with underlying CAD. Monitor q12hr  · GI consult appreciated for endoscopic evaluation to be done today      DM2 (diabetes mellitus, type 2) [facility])  Assessment & Plan  Lab Results   Component Value Date    HGBA1C 8.4 (H) 06/21/2023     Recent Labs     07/20/23  1729 07/20/23 2021 07/21/23  0727   POCGLU 192* 179* 102       Blood Sugar Average: Last 72 hrs:  (P) 114.1120597901382092 A1c not at goal  Hold home oral meds  Diabetic clears  ISS TID with meals   Adjust as needed for BG goal 140-180    PAF (paroxysmal atrial fibrillation) ([facility])  Assessment & Plan  · Hold Eliquis in the setting of bleeding  · Continue beta-blocker    CKD (chronic kidney disease) stage 3, GFR 30-59 ml/min ([facility])  Assessment & Plan  · Creatinine at baseline. Continue to monitor. Chronic HFrEF (heart failure with reduced ejection fraction) ([facility])  Assessment & Plan  Wt Readings from Last 3 Encounters:   07/20/23 50.3 kg (111 lb)   07/18/23 50.3 kg (111 lb)   07/11/23 48.7 kg (107 lb 4.8 oz)     · Euvolemic. Non-ischemic cardiomyopathy. Not on other GDMT.     · Per outpatient cardiology notes "Continued on BB only for GDMT as blood pressure limits use of ACE inhibitor or ARB. ... Using Lasix on an as needed basis. "    Tobacco use  Assessment & Plan  · Currently smoking about 6 cigarettes a day but is requesting nicotine patch of 14    Coronary artery disease involving native coronary artery of native heart without angina pectoris  Assessment & Plan  · Stable. No CP         VTE Pharmacologic Prophylaxis: VTE Score: 3 off d/t GIB    Patient Centered Rounds: Spoke with RN  Discussions with Specialists or Other Care Team Provider: GI    Education and Discussions with Family / Patient: Attempted to update  (brother) via phone. Left voicemail. Total Time Spent on Date of Encounter in care of patient: 30 min This time was spent on one or more of the following: performing physical exam; counseling and coordination of care; obtaining or reviewing history; documenting in the medical record; reviewing/ordering tests, medications or procedures; communicating with other healthcare professionals and discussing with patient's family/caregivers. Current Length of Stay: 1 day(s)  Current Patient Status: Inpatient   Certification Statement: The patient will continue to require additional inpatient hospital stay due to EGD  Discharge Plan: Anticipate discharge in 24-48 hrs to home. Code Status: Level 1 - Full Code    Subjective:   Per RN patient has been asking repeatedly for nicotine patch this morning due to cravings. Upon my evaluation of the patient she is not reporting any active bleeding or abdominal pain    Objective:     Vitals:   Temp (24hrs), Av.1 °F (36.7 °C), Min:97.4 °F (36.3 °C), Max:99 °F (37.2 °C)    Temp:  [97.4 °F (36.3 °C)-99 °F (37.2 °C)] 98.1 °F (36.7 °C)  HR:  [67-89] 67  Resp:  [16-20] 16  BP: (128-165)/(65-80) 143/72  SpO2:  [98 %-100 %] 99 %  Body mass index is 19.66 kg/m². Input and Output Summary (last 24 hours):      Intake/Output Summary (Last 24 hours) at 2023 1020  Last data filed at 2023  Gross per 24 hour   Intake 1301.25 ml   Output 200 ml   Net 1101.25 ml       Physical Exam:   Physical Exam  Vitals reviewed. Constitutional:       General: She is not in acute distress. Appearance: Normal appearance. She is not ill-appearing, toxic-appearing or diaphoretic. Eyes:      General: No scleral icterus. Right eye: No discharge. Left eye: No discharge. Conjunctiva/sclera: Conjunctivae normal.   Cardiovascular:      Rate and Rhythm: Normal rate and regular rhythm. Heart sounds: No murmur heard. Pulmonary:      Effort: No respiratory distress. Breath sounds: No stridor. No wheezing, rhonchi or rales. Abdominal:      General: There is no distension. Palpations: Abdomen is soft. Tenderness: There is no abdominal tenderness. There is no guarding. Musculoskeletal:      Right lower leg: No edema. Left lower leg: No edema. Skin:     General: Skin is warm and dry. Coloration: Skin is not jaundiced or pale. Findings: No bruising, erythema or lesion. Neurological:      General: No focal deficit present. Mental Status: She is alert. Mental status is at baseline. Psychiatric:         Mood and Affect: Mood normal.         Thought Content: Thought content normal.          Additional Data:     Labs:  Results from last 7 days   Lab Units 07/21/23  0843 07/20/23  2200 07/20/23  1220   WBC Thousand/uL  --   --  8.96   HEMOGLOBIN g/dL 10.4*   < > 6.4*   HEMATOCRIT %  --   --  22.6*   PLATELETS Thousands/uL  --   --  307   NEUTROS PCT %  --   --  71   LYMPHS PCT %  --   --  18   MONOS PCT %  --   --  10   EOS PCT %  --   --  1    < > = values in this interval not displayed.      Results from last 7 days   Lab Units 07/21/23  0604 07/20/23  1220   SODIUM mmol/L 136 136   POTASSIUM mmol/L 4.5 4.5   CHLORIDE mmol/L 107 106   CO2 mmol/L 24 25   BUN mg/dL 21 21   CREATININE mg/dL 1.02 1.08   ANION GAP mmol/L 5 5   CALCIUM mg/dL 8.9 9.2   ALBUMIN g/dL  --  3.4*   TOTAL BILIRUBIN mg/dL  --  0.43   ALK PHOS U/L  --  126*   ALT U/L  --  25   AST U/L  --  22   GLUCOSE RANDOM mg/dL 102 155*     Results from last 7 days   Lab Units 07/20/23  1220   INR  1.37*     Results from last 7 days   Lab Units 07/21/23  0727 07/20/23 2021 07/20/23  1729   POC GLUCOSE mg/dl 102 179* 192*               Lines/Drains:  Invasive Devices     Peripheral Intravenous Line  Duration           Peripheral IV 07/20/23 Distal;Left Wrist <1 day              Imaging:     Recent Cultures (last 7 days):         Last 24 Hours Medication List:   Current Facility-Administered Medications   Medication Dose Route Frequency Provider Last Rate   • albuterol  2 puff Inhalation Q6H PRN Leticia Lala MD     • atorvastatin  40 mg Oral Daily With Renato Dinh MD     • cholecalciferol  2,000 Units Oral Daily Leticia Lala MD     • insulin lispro  1-5 Units Subcutaneous TID [facility] Leticia Lala MD     • metoprolol succinate  50 mg Oral Daily Leticia Lala MD     • nicotine  14 mg Transdermal Daily Radha Miller PA-C     • pantoprazole  40 mg Intravenous Q12H Amelia Lopez MD          Today, Patient Was Seen By: Isabel Jimenez PA-C    **Please Note: This note may have been constructed using a voice recognition system. **

## 2023-07-21 NOTE — ASSESSMENT & PLAN NOTE
Lab Results   Component Value Date    HGBA1C 8.4 (H) 06/21/2023       Recent Labs     07/20/23  1729 07/20/23 2021   POCGLU 192* 179*       Blood Sugar Average: Last 72 hrs:  (P) 185.5   Hold home oral meds  Diabetic clears  ISS TID with meals   Adjust as needed for BG goal 140-180

## 2023-07-21 NOTE — ASSESSMENT & PLAN NOTE
· Symptomatic anemia with some dizziness, weakness, fatigue. Outpatient lab work with GI informed patient of Hgb 6.4 (Hgb 16 in April), recommended hospitalization for acute on chronic JANKI as she has been seeing them for episodes of melena. She reports 1 month of intermittent black foul-smelling stools. Risk factors include use + NSAIDs, dual antiplatelet therapy, DDX  PUD vs right-sided colonic lesion that may have bled vsAVMs or Dula Tawana lesions. She was recently advised to stop PPI once daily which she was put on 1 month ago, unclear reasons why she was told to stop PPI. Currently stools are brown, no further melena though   · On AC with Eliquis and APT with ASA. · Hold Eliquis and ASA, (last dose of both were in AM of 7/20 PTA) in anticipation for endoscopy tomorrow, patient may require bx. Discussed with patient about limited role for continuing ASA at this point given her bleeding risk.     · IV PPI BID  · Clear liquids  · NPO after MN  · Patient to receive 2u pRBCs, follow post-transfusion H&H.  H&H q12, transfuse for Hgb <8 or symptomatic anemia with underlying CAD  · GI consult appreciated for endoscopic evaluation

## 2023-07-21 NOTE — ANESTHESIA POSTPROCEDURE EVALUATION
Post-Op Assessment Note    CV Status:  Stable    Pain management: adequate     Mental Status:  Sleepy   Hydration Status:  Euvolemic   PONV Controlled:  Controlled   Airway Patency:  Patent      Post Op Vitals Reviewed: Yes      Staff: CRNA         No notable events documented.     BP   135/65   Temp     Pulse  66   Resp      SpO2   100

## 2023-07-21 NOTE — UTILIZATION REVIEW
Initial Clinical Review    Admission: Date/Time/Statement:   Admission Orders (From admission, onward)     Ordered        07/20/23 1322  INPATIENT ADMISSION  Once                      Orders Placed This Encounter   Procedures   • INPATIENT ADMISSION     Standing Status:   Standing     Number of Occurrences:   1     Order Specific Question:   Level of Care     Answer:   Med Surg [16]     Order Specific Question:   Estimated length of stay     Answer:   More than 2 Midnights     Order Specific Question:   Certification     Answer:   I certify that inpatient services are medically necessary for this patient for a duration of greater than two midnights. See H&P and MD Progress Notes for additional information about the patient's course of treatment. ED Arrival Information     Expected   7/20/2023 10:05    Arrival   7/20/2023 11:16    Acuity   Urgent            Means of arrival   Walk-In    Escorted by   Spouse    Service   Hospitalist    Admission type   Emergency            Arrival complaint   Anemia           Chief Complaint   Patient presents with   • Abnormal Lab     Patients hemoglobin is 6.9 sent in by pcp for infusion . recent bloody stools.  -dizzy-sob +fatigue +eliquis (did take today)       Initial Presentation: 77 y.o. female presented to ED from home as inpatient admission for symptomatic amenia. Patient c/o  dizziness, weakness, fatigue. PMH of CAD, non-ischemic cardiomyopathy, PAF on AC,(ASA & Eliquis) nicotine dependence,  DM2, GERD, HTN, hx of left-sided breast cancer MARCELINO and JAYLAN II and I. She reports 1 month of intermittent black foul-smelling stools. On exam Rhythm irregular. Plan transfusion @ URBC's keep  HGB>8 monitor HGB q 12 hrs  holding Eliquis and ASA    GI consult   1. Acute anemia  2. Dark stools  Patient reports a few weeks ago having dark stools for around 3 weeks which have since subsided. She currently is doing well. Denies any abdominal pain.   Hemoglobin 6.4 on arrival.  She received 2 units of blood now currently 9.3. BUN normal.  Vital signs stable. No prior EGD. Possibly the setting of AVM versus peptic ulcer disease, rule out lesion.  -We will plan for EGD today. - Keep NPO. -Last dose of Eliquis was yesterday morning, 7/20.  -Further recommendations based on EGD results. If EGD is unremarkable, could consider staying for colonoscopy on Monday. ISS TID with meals continue to hold ASA & Eliquuis  -Continue PPI twice daily. Date: 07-21-23  Patient denies active bleeding NPO EGD today    EGD   FINDINGS:  • Regular Z-line 40 cm from the incisors  • Mild, localized erythematous mucosa in the prepyloric region; performed cold forceps biopsy to rule out H. pylori. Retroflexed view was unremarkable, pylorus was patent. • The duodenal bulb, 1st part of the duodenum and 2nd part of the duodenum appeared normal. Performed random biopsy using biopsy forceps to rule out celiac disease.     COLONOSCOPY       ED Triage Vitals   Temperature Pulse Respirations Blood Pressure SpO2   07/20/23 1120 07/20/23 1120 07/20/23 1120 07/20/23 1120 07/20/23 1120   98.4 °F (36.9 °C) 89 19 165/74 100 %      Temp Source Heart Rate Source Patient Position - Orthostatic VS BP Location FiO2 (%)   07/20/23 1120 07/20/23 1120 07/20/23 1120 07/20/23 1120 --   Oral Monitor Sitting Right arm       Pain Score       07/20/23 1415       No Pain          Wt Readings from Last 1 Encounters:   07/20/23 50.3 kg (111 lb)     Additional Vital Signs:   Date/Time Temp Pulse Resp BP MAP (mmHg) SpO2 O2 Device Patient Position - Orthostatic VS   07/21/23 0700 98.1 °F (36.7 °C) 67 16 143/72 96 99 % None (Room air) Lying   07/20/23 2000 99 °F (37.2 °C) 79 18 134/65 93 99 % None (Room air) Lying   07/20/23 1810 97.5 °F (36.4 °C) 68 20 144/80 -- 98 % None (Room air) --   07/20/23 1751 97.4 °F (36.3 °C) Abnormal  67 20 128/70 -- -- -- --   07/20/23 1745 97.4 °F (36.3 °C) Abnormal  67 20 128/70 -- 100 % None (Room air) --   07/20/23 1702 98.9 °F (37.2 °C) 77 20 133/67 -- 98 % None (Room air) --   07/20/23 1451 98.2 °F (36.8 °C) 73 16 164/76 -- 98 % None (Room air) Sitting   07/20/23 1415 98.4 °F (36.9 °C) 71 18 -- -- 100 % None (Room air) Lying   07/20/23 1353 97.9 °F (36.6 °C) 75 18 152/72 -- 100 % None (Room air) --   07/20/23 1300 -- 85 18 146/71 99 99 % None (Room air) --     Pertinent Labs/Diagnostic Test Results:   No orders to display         Results from last 7 days   Lab Units 07/21/23  0843 07/20/23  2200 07/20/23  1220 07/18/23  1300   WBC Thousand/uL  --   --  8.96 10.07   HEMOGLOBIN g/dL 10.4* 9.3* 6.4* 6.9*   HEMATOCRIT %  --   --  22.6* 24.5*   PLATELETS Thousands/uL  --   --  307 388   NEUTROS ABS Thousands/µL  --   --  6.37  --          Results from last 7 days   Lab Units 07/21/23  0604 07/20/23  1220 07/18/23  1300   SODIUM mmol/L 136 136 138   POTASSIUM mmol/L 4.5 4.5 4.4   CHLORIDE mmol/L 107 106 111*   CO2 mmol/L 24 25 23   ANION GAP mmol/L 5 5 4   BUN mg/dL 21 21 22   CREATININE mg/dL 1.02 1.08 1.16   EGFR ml/min/1.73sq m 55 52 47   CALCIUM mg/dL 8.9 9.2 9.5     Results from last 7 days   Lab Units 07/20/23  1220   AST U/L 22   ALT U/L 25   ALK PHOS U/L 126*   TOTAL PROTEIN g/dL 6.7   ALBUMIN g/dL 3.4*   TOTAL BILIRUBIN mg/dL 0.43     Results from last 7 days   Lab Units 07/21/23  1140 07/21/23  0727 07/20/23 2021 07/20/23  1729   POC GLUCOSE mg/dl 78 102 179* 192*     Results from last 7 days   Lab Units 07/21/23  0604 07/20/23  1220 07/18/23  1300   GLUCOSE RANDOM mg/dL 102 155* 125             BETA-HYDROXYBUTYRATE   Date Value Ref Range Status   12/07/2022 0.4 <0.6 mmol/L Final        Results from last 7 days   Lab Units 07/20/23  1220   PROTIME seconds 17.1*   INR  1.37*   PTT seconds 30       Results from last 7 days   Lab Units 07/20/23  1220   FERRITIN ng/mL 5*   IRON SATURATION % 5*   IRON ug/dL 22*   TIBC ug/dL 419         Results from last 7 days   Lab Units 07/21/23  0547   UNIT PRODUCT CODE  [id number]  [id number]   UNIT NUMBER  [id number]  [id number]   Nestora Hodgkins  POS  POS   CROSSMATCH  Compatible  Compatible   UNIT DISPENSE STATUS  Presumed Trans  Presumed Trans   UNIT PRODUCT VOL mL 350  350     ED Treatment:   Medication Administration from 07/20/2023 1005 to 07/20/2023 1441     None        Past Medical History:   Diagnosis Date   • Breast cancer ([address] [facility])     left - 1994. • Diabetes mellitus ([facility])    • Diabetes mellitus, type 2 ([facility]) 03/15/2006    last assessed 7/10/13   • GERD (gastroesophageal reflux disease)    • History of chemotherapy    • Hypertension      Present on Admission:  • CKD (chronic kidney disease) stage 3, GFR 30-59 ml/min ([facility])  • Chronic HFrEF (heart failure with reduced ejection fraction) ([facility])  • (Resolved) Obstructive sleep apnea syndrome  • Coronary artery disease involving native coronary artery of native heart without angina pectoris  • PAF (paroxysmal atrial fibrillation) ([facility])  • Tobacco use      Admitting Diagnosis: Abnormal laboratory test [R89.9]  Symptomatic anemia [D64.9]  Age/Sex: 77 y.o. female     Admission Orders:  SCD  Blood sugars ac  Hemoglobin q 12 hrs  EGD  NPO        Scheduled Medications:  atorvastatin, 40 mg, Oral, Daily With Dinner  cholecalciferol, 2,000 Units, Oral, Daily  insulin lispro, 1-5 Units, Subcutaneous, TID AC  metoprolol succinate, 50 mg, Oral, Daily  nicotine, 14 mg, Transdermal, Daily  pantoprazole, 40 mg, Intravenous, Q12H [facility]      Continuous IV Infusions:     PRN Meds:  albuterol, 2 puff, Inhalation, Q6H PRN        IP CONSULT TO GASTROENTEROLOGY    Network Utilization Review Department  ATTENTION: Please call with any questions or concerns to [phone] and carefully listen to the prompts so that you are directed to the right person.  All voicemails are confidential.  Nhi Bolaños all requests for admission clinical reviews, approved or denied determinations and any other requests to dedicated fax number below belonging to the campus where the patient is receiving treatment.  List of dedicated fax numbers for the Facilities:  [place] DENIALS (Administrative/Medical Necessity) [phone] [address] (Maternity/NICU/Pediatrics) [phone]   [address] [place] [address] Drive [phone]   [facility] [address] [facility] [phone] [address] [facility] [address] [facility] [address] [id number] [facility] [phone]   [address] [phone]

## 2023-07-21 NOTE — ANESTHESIA PREPROCEDURE EVALUATION
Procedure:  EGD    Relevant Problems   CARDIO   (+) Coronary artery disease involving native coronary artery of native heart without angina pectoris   (+) Hyperlipidemia LDL goal <100   (+) Hypertensive heart and kidney disease with heart failure and with chronic kidney disease stage III (HCC)   (+) PAF (paroxysmal atrial fibrillation) (HCC)   (+) Primary hypertension   (+) SVT (supraventricular tachycardia) (HCC)      ENDO   (+) DM2 (diabetes mellitus, type 2) (HCC)   (+) Hyperparathyroidism (HCC)      /RENAL   (+) CKD (chronic kidney disease) stage 3, GFR 30-59 ml/min (HCC)   (+) Chronic renal disease, stage IV (HCC)   (+) Hypertensive heart and kidney disease with heart failure and with chronic kidney disease stage III (HCC)   (+) Stage 3a chronic kidney disease (HCC)      GYN   (+) Breast cancer (HCC) - left 1994      HEMATOLOGY   (+) Symptomatic anemia      NEURO/PSYCH   (+) Depression, recurrent (HCC)      PULMONARY   (+) Pleural effusion, bilateral      Cardiovascular and Mediastinum   (+) Chronic HFrEF (heart failure with reduced ejection fraction) ([facility])      Other   (+) Tobacco use        Physical Exam    Airway    Mallampati score: I  TM Distance: >3 FB  Neck ROM: full     Dental       Cardiovascular  Cardiovascular exam normal    Pulmonary  Pulmonary exam normal     Other Findings        Anesthesia Plan  ASA Score- 3     Anesthesia Type- IV sedation with anesthesia with ASA Monitors. Additional Monitors:   Airway Plan:           Plan Factors-Exercise tolerance (METS): >4 METS. Chart reviewed. EKG reviewed. Imaging results reviewed. Existing labs reviewed. Patient summary reviewed. Induction- intravenous. Postoperative Plan- Plan for postoperative opioid use. Planned trial extubation    Informed Consent- Anesthetic plan and risks discussed with patient. I personally reviewed this patient with the CRNA. Discussed and agreed on the Anesthesia Plan with the CRNA. Francoise Barrera

## 2023-07-21 NOTE — ASSESSMENT & PLAN NOTE
· Symptomatic anemia with some dizziness, weakness, fatigue. Outpatient lab work with GI was done and they informed patient of Hgb 6.4 (Hgb 16 in April), recommended hospitalization for acute on chronic JANKI as she has been seeing them for episodes 1 month of intermittent black foul-smelling stools. · Risk factors include use + NSAIDs, combined use of ASA and eliquis, and ongoing tobacco abuse     · IV PPI BID  · Patient received 2u pRBCs-hemoglobin currently stable at 10.4, transfuse for Hgb <8 or symptomatic anemia with underlying CAD.  Monitor q12hr  · GI consult appreciated for endoscopic evaluation to be done today

## 2023-07-21 NOTE — PROGRESS NOTES
Progress Note - Bea Mcardle 77 y.o. female MRN: [id number]    Unit/Bed#: S -01 Encounter: [id number]    Assessment and Plan:   Principal Problem:    Symptomatic anemia  Active Problems:    DM2 (diabetes mellitus, type 2) ([facility])    CKD (chronic kidney disease) stage 3, GFR 30-59 ml/min ([facility])    Coronary artery disease involving native coronary artery of native heart without angina pectoris    Chronic HFrEF (heart failure with reduced ejection fraction) ([facility])    Tobacco use    PAF (paroxysmal atrial fibrillation) ([address])    #1. Acute anemia with dark stools: s/p EGD on Friday which was normal without source of bleeding. Patient is on Eliquis normally for a fib, last dose morning of 7/20. hgb is stable.   -will plan for colonoscopy Monday due to need for anticoagulation and significant drop in hgb outpatient prior to admission  -clear liquid diet Sunday, NPO after midnight Monday  -dulc/taisha bowel prep Sunday evening  -monitor hgb  -continue to hold Eliquis     ----------------------------------------------------------------------------------------------------------------    Subjective:     Reports a cyst and some vaginal discomfort which she discussed with laila. Otherwise denies any bleeding    Objective:     Vitals: Blood pressure 143/72, pulse 67, temperature 98.1 °F (36.7 °C), temperature source Oral, resp. rate 16, height 5' 3" (1.6 m), weight 50.3 kg (111 lb), SpO2 99 %, not currently breastfeeding. ,Body mass index is 19.66 kg/m².       Intake/Output Summary (Last 24 hours) at 7/21/2023 1126  Last data filed at 7/20/2023 2000  Gross per 24 hour   Intake 1301.25 ml   Output 200 ml   Net 1101.25 ml       Physical Exam:     General Appearance: Alert, appears stated age and cooperative  Lungs: Clear to auscultation bilaterally, no rales or rhonchi, no labored breathing/accessory muscle use  Heart: Regular rate and rhythm, S1, S2 normal, no murmur, click, rub or gallop  Abdomen: Soft, non-tender, non-distended; bowel sounds normal; no masses or no organomegaly  Extremities: No cyanosis, clubbing, or edema    Invasive Devices     Peripheral Intravenous Line  Duration           Peripheral IV 07/20/23 Distal;Left Wrist <1 day                Lab Results:  Results from last 7 days   Lab Units 07/21/23  0843 07/20/23  2200 07/20/23  1220   WBC Thousand/uL  --   --  8.96   HEMOGLOBIN g/dL 10.4*   < > 6.4*   HEMATOCRIT %  --   --  22.6*   PLATELETS Thousands/uL  --   --  307   NEUTROS PCT %  --   --  71   LYMPHS PCT %  --   --  18   MONOS PCT %  --   --  10   EOS PCT %  --   --  1    < > = values in this interval not displayed. Results from last 7 days   Lab Units 07/21/23  0604 07/20/23  1220   POTASSIUM mmol/L 4.5 4.5   CHLORIDE mmol/L 107 106   CO2 mmol/L 24 25   BUN mg/dL 21 21   CREATININE mg/dL 1.02 1.08   CALCIUM mg/dL 8.9 9.2   ALK PHOS U/L  --  126*   ALT U/L  --  25   AST U/L  --  22     Invalid input(s): "BILI"  Results from last 7 days   Lab Units 07/20/23  1220   INR  1.37*           Imaging Studies: I have personally reviewed pertinent imaging studies. No results found.

## 2023-07-21 NOTE — ASSESSMENT & PLAN NOTE
Wt Readings from Last 3 Encounters:   07/20/23 50.3 kg (111 lb)   07/18/23 50.3 kg (111 lb)   07/11/23 48.7 kg (107 lb 4.8 oz)         Euvolemic. Continue Toprol XL. Non-ischemic cardiomyopathy. Not on other GDMT. Per outpatient cardiology notes "Continued on BB only for GDMT as blood pressure limits use of ACE inhibitor or ARB. ...   Using Lasix on an as needed basis. " 21

## 2023-07-21 NOTE — ASSESSMENT & PLAN NOTE
Lab Results   Component Value Date    HGBA1C 8.4 (H) 06/21/2023       Recent Labs     07/20/23  1729 07/20/23 2021   POCGLU 192* 179*       Blood Sugar Average: Last 72 hrs:  (P) 185. 5     At baseline. Continue to monitor.

## 2023-07-21 NOTE — CONSULTS
Consultation - [facility]  Jane Fernandez 77 y.o. female MRN: [id number]  Unit/Bed#: S -01 Encounter: [id number]        Inpatient consult to gastroenterology  Consult performed by: Shereen Deutsch PA-C  Consult ordered by: Logan Azevedo MD          Reason for Consult / Principal Problem: Acute anemia, dark stools    ASSESSMENT and PLAN:      70-year-old female with history of A-fib on Eliquis, type 2 diabetes, CKD, CHF who presented to the emergency room due to outpatient labs with hemoglobin of 6.8, previously 16 3 months ago. 1. Acute anemia  2. Dark stools  Patient reports a few weeks ago having dark stools for around 3 weeks which have since subsided. She currently is doing well. Denies any abdominal pain. Hemoglobin 6.4 on arrival.  She received 2 units of blood now currently 9.3. BUN normal.  Vital signs stable. No prior EGD. Possibly the setting of AVM versus peptic ulcer disease, rule out lesion.    -We will plan for EGD today. - Keep NPO. -Last dose of Eliquis was yesterday morning, 7/20.  -Further recommendations based on EGD results. If EGD is unremarkable, could consider staying for colonoscopy on Monday.  -Continue PPI twice daily.  - Patient requesting nicotine patch. Informed primary team.  -Monitor hemoglobin and transfuse as needed per primary team.  - Maintain good IV access. -Informed primary team of above plan.    -------------------------------------------------------------------------------------------------------------------    HPI:     Jane Fernandez is a 70-year-old female with history of A-fib on Eliquis, type 2 diabetes, CKD, CAD, CHF, recent diagnosis of vulvar intraepithelial neoplasia who presented to the emergency room due to outpatient labs with hemoglobin of 6.9, previously 16 3 months ago. Hemoglobin on arrival was found to be 6.4. She was transfused 2 units of blood with repeat hemoglobin now 9.3.   BUN normal.  Vital signs stable. Patient reports she is feeling well. She reports several weeks ago she did have dark black stools for around 3 weeks however this has since resolved. She otherwise reports she has been doing well. She denies any abdominal pain, nausea or vomiting. She reports her appetite is normal.  She has been taking PPI once daily however reports stopping this after OB/GYN visit 3 days ago. She reports possibly feeling dizzy a few times recently however attributed this to low sugar levels. Otherwise feels well. Reports last bowel movement was brown. She reports occasional NSAID use with Aleve. She is in the process of quitting smoking. No alcohol use. No prior EGD. Reports having a colonoscopy several years ago, appears to be in 2015 with repeat recommended in 10 years. REVIEW OF SYSTEMS:    CONSTITUTIONAL: Denies any fever, chills, or rigors. Good appetite, and no recent weight loss. HEENT: No earache or tinnitus. Denies hearing loss or visual disturbances. CARDIOVASCULAR: No chest pain or palpitations. RESPIRATORY: Denies any cough, hemoptysis, shortness of breath or dyspnea on exertion. GASTROINTESTINAL: As noted in the History of Present Illness. GENITOURINARY: No problems with urination. Denies any hematuria or dysuria. NEUROLOGIC: No dizziness or vertigo, denies headaches. MUSCULOSKELETAL: Denies any muscle or joint pain. SKIN: Denies skin rashes or itching. ENDOCRINE: Denies excessive thirst. Denies intolerance to heat or cold. PSYCHOSOCIAL: Denies depression or anxiety. Denies any recent memory loss. Historical Information   Past Medical History:   Diagnosis Date   • Breast cancer ([facility])     left - 1994.    • Diabetes mellitus ([address] [place] [address])    • Diabetes mellitus, type 2 ([address] [place] [address]) 03/15/2006    last assessed 7/10/13   • GERD (gastroesophageal reflux disease)    • History of chemotherapy    • Hypertension      Past Surgical History:   Procedure Laterality Date   • CARDIAC ELECTROPHYSIOLOGY PROCEDURE N/A 3/9/2022    Procedure: Cardiac icd implant/ DUAL CHAMBER ICD; Surgeon: Hai Yoder MD;  Location: [facility];   Service: Cardiology   • MASTECTOMY Left     last assessed 14   • MASTECTOMY, RADICAL Left        • UT PARATHYROIDECTOMY/EXPLORATION PARATHYROIDS N/A 2021    Procedure: PARATHYROIDECTOMY POSSIBLE 4 GLAND EXPLORATION;  Surgeon: Sharmila Everett MD;  Location: [facility];  Service: ENT   • REDUCTION MAMMAPLASTY Right    • UVULECTOMY       Social History   Social History     Substance and Sexual Activity   Alcohol Use Never     Social History     Substance and Sexual Activity   Drug Use No     Social History     Tobacco Use   Smoking Status Some Days   • Packs/day: 0.25   • Years: 50.00   • Total pack years: 12.50   • Types: Cigarettes   • Start date: 6/15/1973   • Last attempt to quit: 2023   • Years since quittin.2   Smokeless Tobacco Never   Tobacco Comments    2 cigarettes daily      Family History   Problem Relation Age of Onset   • Hypothyroidism Mother    • Leukemia Mother    • Diabetes Father    • Diabetes type II Father    • Stroke Sister    • Diabetes Paternal Grandmother    • Cancer Sister    • Diabetes Brother        Meds/Allergies     Medications Prior to Admission   Medication   • albuterol (Ventolin HFA) 90 mcg/act inhaler   • apixaban (Eliquis) 5 mg   • Aspirin Low Dose 81 MG EC tablet   • atorvastatin (LIPITOR) 40 mg tablet   • Blood Glucose Monitoring Suppl (OneTouch Verio Reflect) w/Device KIT   • cholecalciferol (VITAMIN D3) 1,000 units tablet   • Continuous Blood Gluc  (FreeStyle Frank 2 Coram) KEELY   • Continuous Blood Gluc Sensor (FreeStyle Frank 2 Sensor) MISC   • dapagliflozin (Farxiga) 5 MG TABS   • Flovent  MCG/ACT inhaler   • furosemide (LASIX) 20 mg tablet   • glucose blood (OneTouch Verio) test strip   • Insulin Pen Needle (BD Pen Needle Kateryna U/F) 32G X 4 MM MISC   • metFORMIN (GLUCOPHAGE) 500 mg tablet • metoprolol succinate (TOPROL-XL) 50 mg 24 hr tablet   • nystatin-triamcinolone (MYCOLOG-II) ointment   • OneTouch Delica Lancets 40W MISC   • pantoprazole (PROTONIX) 40 mg tablet   • saccharomyces boulardii (FLORASTOR) 250 mg capsule   • zinc oxide (BALMEX) 11.3 % cream     Current Facility-Administered Medications   Medication Dose Route Frequency   • albuterol (PROVENTIL HFA,VENTOLIN HFA) inhaler 2 puff  2 puff Inhalation Q6H PRN   • atorvastatin (LIPITOR) tablet 40 mg  40 mg Oral Daily With Dinner   • cholecalciferol (VITAMIN D3) tablet 2,000 Units  2,000 Units Oral Daily   • insulin lispro (HumaLOG) 100 units/mL subcutaneous injection 1-5 Units  1-5 Units Subcutaneous TID AC   • metoprolol succinate (TOPROL-XL) 24 hr tablet 50 mg  50 mg Oral Daily   • pantoprazole (PROTONIX) injection 40 mg  40 mg Intravenous Q12H EMILIA       No Known Allergies        Objective     Blood pressure 143/72, pulse 67, temperature 98.1 °F (36.7 °C), temperature source Oral, resp. rate 16, height 5' 3" (1.6 m), weight 50.3 kg (111 lb), SpO2 99 %, not currently breastfeeding. Intake/Output Summary (Last 24 hours) at 7/21/2023 0830  Last data filed at 7/20/2023 2000  Gross per 24 hour   Intake 1301.25 ml   Output 200 ml   Net 1101.25 ml         PHYSICAL EXAM:      General Appearance:   Alert, cooperative, no distress, appears stated age. Pleasant    HEENT:   Normocephalic, atraumatic, anicteric. Neck:  Supple, symmetrical, trachea midline, no adenopathy. Lungs:   Clear to auscultation bilaterally; no rales, rhonchi or wheezing; respirations unlabored    Heart[de-identified]   S1 and S2 normal; regular rate and rhythm; no murmur, rub, or gallop. Abdomen:   Soft, non-tender, non-distended; normal bowel sounds; no masses, no organomegaly.  Benign abdomen    Genitalia:   Deferred    Rectal:   Deferred    Extremities:  No cyanosis, clubbing or edema    Pulses:  2+ and symmetric all extremities    Skin:  Skin color, texture, turgor normal, no rashes or lesions    Lymph nodes:  No palpable cervical, axillary or inguinal lymphadenopathy        Lab Results:   Results from last 7 days   Lab Units 07/20/23  2200 07/20/23  1220   WBC Thousand/uL  --  8.96   HEMOGLOBIN g/dL 9.3* 6.4*   HEMATOCRIT %  --  22.6*   PLATELETS Thousands/uL  --  307   NEUTROS PCT %  --  71   LYMPHS PCT %  --  18   MONOS PCT %  --  10   EOS PCT %  --  1     Results from last 7 days   Lab Units 07/21/23  0604 07/20/23  1220   POTASSIUM mmol/L 4.5 4.5   CHLORIDE mmol/L 107 106   CO2 mmol/L 24 25   BUN mg/dL 21 21   CREATININE mg/dL 1.02 1.08   CALCIUM mg/dL 8.9 9.2   ALK PHOS U/L  --  126*   ALT U/L  --  25   AST U/L  --  22     Results from last 7 days   Lab Units 07/20/23  1220   INR  1.37*           Imaging Studies: I have personally reviewed pertinent imaging studies. No results found. Patient was seen and examined by Dr. Melissa Taylor. All [facility] medical decisions were made by Dr. Melissa Taylor. Thank you for allowing us to participate in the care of this present patient. We will follow-up with you closely.

## 2023-07-21 NOTE — ASSESSMENT & PLAN NOTE
Wt Readings from Last 3 Encounters:   07/20/23 50.3 kg (111 lb)   07/18/23 50.3 kg (111 lb)   07/11/23 48.7 kg (107 lb 4.8 oz)     · Euvolemic. Non-ischemic cardiomyopathy. Not on other GDMT. · Per outpatient cardiology notes "Continued on BB only for GDMT as blood pressure limits use of ACE inhibitor or ARB. ...   Using Lasix on an as needed basis. "

## 2023-07-22 ENCOUNTER — APPOINTMENT (INPATIENT)
Dept: ULTRASOUND IMAGING | Facility: HOSPITAL | Age: 68
DRG: 378 | End: 2023-07-22
Payer: COMMERCIAL

## 2023-07-22 PROBLEM — L08.9 SOFT TISSUE INFECTION: Status: ACTIVE | Noted: 2023-07-22

## 2023-07-22 LAB
ANION GAP SERPL CALCULATED.3IONS-SCNC: 5 MMOL/L
BASOPHILS # BLD AUTO: 0.02 THOUSANDS/ÂΜL (ref 0–0.1)
BASOPHILS NFR BLD AUTO: 0 % (ref 0–1)
BUN SERPL-MCNC: 22 MG/DL (ref 5–25)
CALCIUM SERPL-MCNC: 8.7 MG/DL (ref 8.4–10.2)
CHLORIDE SERPL-SCNC: 106 MMOL/L (ref 96–108)
CO2 SERPL-SCNC: 23 MMOL/L (ref 21–32)
CREAT SERPL-MCNC: 1.02 MG/DL (ref 0.6–1.3)
EOSINOPHIL # BLD AUTO: 0.08 THOUSAND/ÂΜL (ref 0–0.61)
EOSINOPHIL NFR BLD AUTO: 1 % (ref 0–6)
ERYTHROCYTE [DISTWIDTH] IN BLOOD BY AUTOMATED COUNT: 19.2 % (ref 11.6–15.1)
GFR SERPL CREATININE-BSD FRML MDRD: 55 ML/MIN/1.73SQ M
GLUCOSE SERPL-MCNC: 199 MG/DL (ref 65–140)
GLUCOSE SERPL-MCNC: 271 MG/DL (ref 65–140)
GLUCOSE SERPL-MCNC: 293 MG/DL (ref 65–140)
GLUCOSE SERPL-MCNC: 326 MG/DL (ref 65–140)
GLUCOSE SERPL-MCNC: 394 MG/DL (ref 65–140)
HCT VFR BLD AUTO: 33.7 % (ref 34.8–46.1)
HGB BLD-MCNC: 10.3 G/DL (ref 11.5–15.4)
IMM GRANULOCYTES # BLD AUTO: 0.04 THOUSAND/UL (ref 0–0.2)
IMM GRANULOCYTES NFR BLD AUTO: 0 % (ref 0–2)
LYMPHOCYTES # BLD AUTO: 1.17 THOUSANDS/ÂΜL (ref 0.6–4.47)
LYMPHOCYTES NFR BLD AUTO: 11 % (ref 14–44)
MCH RBC QN AUTO: 25.5 PG (ref 26.8–34.3)
MCHC RBC AUTO-ENTMCNC: 30.6 G/DL (ref 31.4–37.4)
MCV RBC AUTO: 83 FL (ref 82–98)
MONOCYTES # BLD AUTO: 0.86 THOUSAND/ÂΜL (ref 0.17–1.22)
MONOCYTES NFR BLD AUTO: 8 % (ref 4–12)
NEUTROPHILS # BLD AUTO: 8.43 THOUSANDS/ÂΜL (ref 1.85–7.62)
NEUTS SEG NFR BLD AUTO: 80 % (ref 43–75)
NRBC BLD AUTO-RTO: 0 /100 WBCS
PLATELET # BLD AUTO: 264 THOUSANDS/UL (ref 149–390)
PMV BLD AUTO: 10.9 FL (ref 8.9–12.7)
POTASSIUM SERPL-SCNC: 4.1 MMOL/L (ref 3.5–5.3)
RBC # BLD AUTO: 4.04 MILLION/UL (ref 3.81–5.12)
SODIUM SERPL-SCNC: 134 MMOL/L (ref 135–147)
WBC # BLD AUTO: 10.6 THOUSAND/UL (ref 4.31–10.16)

## 2023-07-22 PROCEDURE — C9113 INJ PANTOPRAZOLE SODIUM, VIA: HCPCS | Performed by: PHYSICIAN ASSISTANT

## 2023-07-22 PROCEDURE — 80048 BASIC METABOLIC PNL TOTAL CA: CPT | Performed by: PHYSICIAN ASSISTANT

## 2023-07-22 PROCEDURE — 99231 SBSQ HOSP IP/OBS SF/LOW 25: CPT | Performed by: PHYSICIAN ASSISTANT

## 2023-07-22 PROCEDURE — 82948 REAGENT STRIP/BLOOD GLUCOSE: CPT

## 2023-07-22 PROCEDURE — 85025 COMPLETE CBC W/AUTO DIFF WBC: CPT | Performed by: PHYSICIAN ASSISTANT

## 2023-07-22 PROCEDURE — 99233 SBSQ HOSP IP/OBS HIGH 50: CPT | Performed by: PHYSICIAN ASSISTANT

## 2023-07-22 PROCEDURE — 76705 ECHO EXAM OF ABDOMEN: CPT

## 2023-07-22 RX ORDER — PANTOPRAZOLE SODIUM 40 MG/10ML
40 INJECTION, POWDER, LYOPHILIZED, FOR SOLUTION INTRAVENOUS
Status: DISCONTINUED | OUTPATIENT
Start: 2023-07-22 | End: 2023-07-25

## 2023-07-22 RX ORDER — DOXYCYCLINE HYCLATE 100 MG/1
100 CAPSULE ORAL EVERY 12 HOURS SCHEDULED
Status: DISCONTINUED | OUTPATIENT
Start: 2023-07-22 | End: 2023-07-25 | Stop reason: HOSPADM

## 2023-07-22 RX ORDER — INSULIN LISPRO 100 [IU]/ML
1-5 INJECTION, SOLUTION INTRAVENOUS; SUBCUTANEOUS
Status: DISCONTINUED | OUTPATIENT
Start: 2023-07-22 | End: 2023-07-25 | Stop reason: HOSPADM

## 2023-07-22 RX ADMIN — METOPROLOL SUCCINATE 50 MG: 50 TABLET, EXTENDED RELEASE ORAL at 08:40

## 2023-07-22 RX ADMIN — INSULIN LISPRO 1 UNITS: 100 INJECTION, SOLUTION INTRAVENOUS; SUBCUTANEOUS at 08:41

## 2023-07-22 RX ADMIN — Medication 2000 UNITS: at 08:40

## 2023-07-22 RX ADMIN — PANTOPRAZOLE SODIUM 40 MG: 40 INJECTION, POWDER, FOR SOLUTION INTRAVENOUS at 08:42

## 2023-07-22 RX ADMIN — DOXYCYCLINE 100 MG: 100 CAPSULE ORAL at 21:40

## 2023-07-22 RX ADMIN — INSULIN LISPRO 3 UNITS: 100 INJECTION, SOLUTION INTRAVENOUS; SUBCUTANEOUS at 12:24

## 2023-07-22 RX ADMIN — INSULIN LISPRO 3 UNITS: 100 INJECTION, SOLUTION INTRAVENOUS; SUBCUTANEOUS at 17:19

## 2023-07-22 RX ADMIN — NICOTINE 14 MG: 14 PATCH, EXTENDED RELEASE TRANSDERMAL at 08:43

## 2023-07-22 RX ADMIN — DOXYCYCLINE 100 MG: 100 CAPSULE ORAL at 08:40

## 2023-07-22 RX ADMIN — INSULIN LISPRO 5 UNITS: 100 INJECTION, SOLUTION INTRAVENOUS; SUBCUTANEOUS at 21:40

## 2023-07-22 RX ADMIN — ATORVASTATIN CALCIUM 40 MG: 40 TABLET, FILM COATED ORAL at 17:18

## 2023-07-22 NOTE — PROGRESS NOTES
[address] [facility]  Progress Note  Name: Katelyn Soto  MRN: [id number]  Unit/Bed#: S -01 I Date of Admission: 7/20/2023   Date of Service: 7/22/2023 I Hospital Day: 2    Assessment/Plan   Soft tissue infection  Assessment & Plan  · Pt reported pressure and tenderness in groin today, exam reveals area of induration with small overlying erythematous lesion, no purulence/fluctuance  · Start Doxy BID   · Obtain US to ensure no drainable collection    PAF (paroxysmal atrial fibrillation) ([facility])  Assessment & Plan  · Hold Eliquis in the setting of bleeding  · Continue beta-blocker    Tobacco use  Assessment & Plan  · Currently smoking about 6 cigarettes a day but is requesting nicotine patch of 14    Chronic HFrEF (heart failure with reduced ejection fraction) ([facility])  Assessment & Plan  Wt Readings from Last 3 Encounters:   07/20/23 50.3 kg (111 lb)   07/18/23 50.3 kg (111 lb)   07/11/23 48.7 kg (107 lb 4.8 oz)     · Euvolemic. Non-ischemic cardiomyopathy. Not on other GDMT. · Per outpatient cardiology notes "Continued on BB only for GDMT as blood pressure limits use of ACE inhibitor or ARB. ... Using Lasix on an as needed basis. "    Coronary artery disease involving native coronary artery of native heart without angina pectoris  Assessment & Plan  · Stable. No CP    CKD (chronic kidney disease) stage 3, GFR 30-59 ml/min ([facility])  Assessment & Plan  · Creatinine at baseline. Continue to monitor. DM2 (diabetes mellitus, type 2) [facility])  Assessment & Plan  Lab Results   Component Value Date    HGBA1C 8.4 (H) 06/21/2023     Recent Labs     07/21/23  1140 07/21/23  1642 07/21/23  2024 07/22/23  0753   POCGLU 78 174* 348* 199*       Blood Sugar Average: Last 72 hrs:  (P) 181.2738508231855935 A1c not at goal  Hold home oral meds  ISS TID with meals   Adjust as needed for BG goal 140-180    * Symptomatic anemia  Assessment & Plan  · Symptomatic anemia with some dizziness, weakness, fatigue. Outpatient lab work with GI was done and they informed patient of Hgb 6.4 (Hgb 16 in April), recommended hospitalization for acute on chronic JANKI as she has been seeing them for episodes 1 month of intermittent black foul-smelling stools. · Risk factors include use + NSAIDs, combined use of ASA and eliquis, and ongoing tobacco abuse     · IV PPI BID  · Patient received 2u pRBCs-hemoglobin currently stable at 10.4, transfuse for Hgb <8 or symptomatic anemia with underlying CAD. Monitor CBC  · GI consult appreciated, plan for colonoscopy on Monday 7/24  · CLD tomorrow and bowel prep tomorrow        VTE Pharmacologic Prophylaxis: VTE Score: 3 Moderate Risk (Score 3-4) - Pharmacological DVT Prophylaxis Contraindicated. Sequential Compression Devices Ordered. Patient Centered Rounds: I performed bedside rounds with nursing staff today. Discussions with Specialists or Other Care Team Provider: nursing    Education and Discussions with Family / Patient: Patient declined call to . Total Time Spent on Date of Encounter in care of patient: 45 minutes This time was spent on one or more of the following: performing physical exam; counseling and coordination of care; obtaining or reviewing history; documenting in the medical record; reviewing/ordering tests, medications or procedures; communicating with other healthcare professionals and discussing with patient's family/caregivers. Current Length of Stay: 2 day(s)  Current Patient Status: Inpatient   Certification Statement: The patient will continue to require additional inpatient hospital stay due to pending colonoscopy  Discharge Plan: Anticipate discharge in 48-72 hrs to home. Code Status: Level 1 - Full Code    Subjective:   Denies further bleeding, she reports a good appetite today and good p.o. intake. Denies nausea, vomiting or diarrhea. She denies fever or chills.   She does report some discomfort and pressure in her groin on the left side.  She says it is tender and she can feel pressure in that area upon ambulating. Does not note any drainage and describes it as feeling like a cyst.  Denies any shaving or trauma in that area. Objective:     Vitals:   Temp (24hrs), Av.2 °F (36.8 °C), Min:97.7 °F (36.5 °C), Max:98.5 °F (36.9 °C)    Temp:  [97.7 °F (36.5 °C)-98.5 °F (36.9 °C)] 97.7 °F (36.5 °C)  HR:  [63-74] 72  Resp:  [16] 16  BP: (132-170)/(65-96) 170/96  SpO2:  [96 %-99 %] 96 %  Body mass index is 19.66 kg/m². Input and Output Summary (last 24 hours): Intake/Output Summary (Last 24 hours) at 2023 1324  Last data filed at 2023 0900  Gross per 24 hour   Intake 220 ml   Output --   Net 220 ml       Physical Exam:   Physical Exam  Vitals and nursing note reviewed. Constitutional:       General: She is not in acute distress. Appearance: Normal appearance. She is normal weight. She is not ill-appearing or toxic-appearing. HENT:      Head: Normocephalic and atraumatic. Right Ear: External ear normal.      Left Ear: External ear normal.      Nose: Nose normal.      Mouth/Throat:      Mouth: Mucous membranes are moist.      Pharynx: Oropharynx is clear. Eyes:      Conjunctiva/sclera: Conjunctivae normal.   Cardiovascular:      Rate and Rhythm: Normal rate and regular rhythm. Pulses: Normal pulses. Heart sounds: Normal heart sounds. No murmur heard. Pulmonary:      Effort: Pulmonary effort is normal. No respiratory distress. Breath sounds: Normal breath sounds. No stridor. No wheezing, rhonchi or rales. Abdominal:      General: Abdomen is flat. Bowel sounds are normal. There is no distension. Palpations: Abdomen is soft. There is no mass. Tenderness: There is no abdominal tenderness. There is no guarding. Hernia: No hernia is present. Musculoskeletal:      Cervical back: Normal range of motion.    Skin:     Comments: Area of induration on left labia near inguinal fold, small discrete area erythema, no fluctuance, no purulent drainage. Unable to express any drainage   Neurological:      Mental Status: She is alert. Mental status is at baseline. Psychiatric:         Mood and Affect: Mood normal.         Thought Content: Thought content normal.          Additional Data:     Labs:  Results from last 7 days   Lab Units 07/22/23  0900   WBC Thousand/uL 10.60*   HEMOGLOBIN g/dL 10.3*   HEMATOCRIT % 33.7*   PLATELETS Thousands/uL 264   NEUTROS PCT % 80*   LYMPHS PCT % 11*   MONOS PCT % 8   EOS PCT % 1     Results from last 7 days   Lab Units 07/22/23  0900 07/21/23  0604 07/20/23  1220   SODIUM mmol/L 134*   < > 136   POTASSIUM mmol/L 4.1   < > 4.5   CHLORIDE mmol/L 106   < > 106   CO2 mmol/L 23   < > 25   BUN mg/dL 22   < > 21   CREATININE mg/dL 1.02   < > 1.08   ANION GAP mmol/L 5   < > 5   CALCIUM mg/dL 8.7   < > 9.2   ALBUMIN g/dL  --   --  3.4*   TOTAL BILIRUBIN mg/dL  --   --  0.43   ALK PHOS U/L  --   --  126*   ALT U/L  --   --  25   AST U/L  --   --  22   GLUCOSE RANDOM mg/dL 271*   < > 155*    < > = values in this interval not displayed. Results from last 7 days   Lab Units 07/20/23  1220   INR  1.37*     Results from last 7 days   Lab Units 07/22/23  1054 07/22/23  0753 07/21/23  2024 07/21/23  1642 07/21/23  1140 07/21/23  0727 07/20/23  2021 07/20/23  1729   POC GLUCOSE mg/dl 326* 199* 348* 174* 78 102 179* 192*               Lines/Drains:  Invasive Devices     Peripheral Intravenous Line  Duration           Peripheral IV 07/20/23 Distal;Left Wrist 1 day                      Imaging: No pertinent imaging reviewed.     Recent Cultures (last 7 days):         Last 24 Hours Medication List:   Current Facility-Administered Medications   Medication Dose Route Frequency Provider Last Rate   • albuterol  2 puff Inhalation Q6H PRN Dimas Vaca MD     • atorvastatin  40 mg Oral Daily With Kristy Santacruz MD     • cholecalciferol  2,000 Units Oral Daily Dimas Vaca MD     • doxycycline hyclate  100 mg Oral Q12H [address] Lovely Valencia PA-C     • insulin lispro  1-5 Units Subcutaneous TID AC Lovely Valencia PA-C     • insulin lispro  1-5 Units Subcutaneous HS Aneta Walker PA-C     • metoprolol succinate  50 mg Oral Daily Marcio Jorgensen MD     • nicotine  14 mg Transdermal Daily Radah Miller PA-C     • pantoprazole  40 mg Intravenous Q24H [address] Aneta Walker PA-C          Today, Patient Was Seen By: Aneta Walker PA-C    **Please Note: This note may have been constructed using a voice recognition system. **

## 2023-07-22 NOTE — ASSESSMENT & PLAN NOTE
· Symptomatic anemia with some dizziness, weakness, fatigue. Outpatient lab work with GI was done and they informed patient of Hgb 6.4 (Hgb 16 in April), recommended hospitalization for acute on chronic JANKI as she has been seeing them for episodes 1 month of intermittent black foul-smelling stools. · Risk factors include use + NSAIDs, combined use of ASA and eliquis, and ongoing tobacco abuse     · IV PPI BID  · Patient received 2u pRBCs-hemoglobin currently stable at 10.4, transfuse for Hgb <8 or symptomatic anemia with underlying CAD.  Monitor CBC  · GI consult appreciated, plan for colonoscopy on Monday 7/24  · CLD tomorrow and bowel prep tomorrow

## 2023-07-22 NOTE — UTILIZATION REVIEW
Date: 7/22/2023    Day 3: Has surpassed a 2nd midnight with active treatments and services, which include following labs incl  H&H, holding Eliquis & [place] PRN; Reg diet today & clear liq diet tomorrow w bowel prep tonight, colonoscopy MON. EGD unremarkable;  See initial review completed by Zaira Draper RN on 7/21/2023.

## 2023-07-22 NOTE — ASSESSMENT & PLAN NOTE
· Pt reported pressure and tenderness in groin today, exam reveals area of induration with small overlying erythematous lesion, no purulence/fluctuance  · Start Doxy BID   · Obtain US to ensure no drainable collection

## 2023-07-22 NOTE — ASSESSMENT & PLAN NOTE
Lab Results   Component Value Date    HGBA1C 8.4 (H) 06/21/2023     Recent Labs     07/21/23  1140 07/21/23  1642 07/21/23 2024 07/22/23  0753   POCGLU 78 174* 348* 199*       Blood Sugar Average: Last 72 hrs:  (P) [id number] A1c not at goal  Hold home oral meds  ISS TID with meals   Adjust as needed for BG goal 140-180

## 2023-07-23 LAB
ALBUMIN SERPL BCP-MCNC: 3.1 G/DL (ref 3.5–5)
ALP SERPL-CCNC: 152 U/L (ref 34–104)
ALT SERPL W P-5'-P-CCNC: 50 U/L (ref 7–52)
ANION GAP SERPL CALCULATED.3IONS-SCNC: 4 MMOL/L
AST SERPL W P-5'-P-CCNC: 63 U/L (ref 13–39)
BASOPHILS # BLD AUTO: 0.02 THOUSANDS/ÂΜL (ref 0–0.1)
BASOPHILS NFR BLD AUTO: 0 % (ref 0–1)
BILIRUB SERPL-MCNC: 0.75 MG/DL (ref 0.2–1)
BUN SERPL-MCNC: 18 MG/DL (ref 5–25)
CALCIUM ALBUM COR SERPL-MCNC: 9.3 MG/DL (ref 8.3–10.1)
CALCIUM SERPL-MCNC: 8.6 MG/DL (ref 8.4–10.2)
CHLORIDE SERPL-SCNC: 108 MMOL/L (ref 96–108)
CO2 SERPL-SCNC: 24 MMOL/L (ref 21–32)
CREAT SERPL-MCNC: 0.97 MG/DL (ref 0.6–1.3)
EOSINOPHIL # BLD AUTO: 0.1 THOUSAND/ÂΜL (ref 0–0.61)
EOSINOPHIL NFR BLD AUTO: 1 % (ref 0–6)
ERYTHROCYTE [DISTWIDTH] IN BLOOD BY AUTOMATED COUNT: 19.4 % (ref 11.6–15.1)
GFR SERPL CREATININE-BSD FRML MDRD: 59 ML/MIN/1.73SQ M
GLUCOSE SERPL-MCNC: 161 MG/DL (ref 65–140)
GLUCOSE SERPL-MCNC: 167 MG/DL (ref 65–140)
GLUCOSE SERPL-MCNC: 246 MG/DL (ref 65–140)
GLUCOSE SERPL-MCNC: 93 MG/DL (ref 65–140)
GLUCOSE SERPL-MCNC: 96 MG/DL (ref 65–140)
HCT VFR BLD AUTO: 30.9 % (ref 34.8–46.1)
HGB BLD-MCNC: 9.3 G/DL (ref 11.5–15.4)
IMM GRANULOCYTES # BLD AUTO: 0.05 THOUSAND/UL (ref 0–0.2)
IMM GRANULOCYTES NFR BLD AUTO: 0 % (ref 0–2)
LYMPHOCYTES # BLD AUTO: 1.4 THOUSANDS/ÂΜL (ref 0.6–4.47)
LYMPHOCYTES NFR BLD AUTO: 11 % (ref 14–44)
MCH RBC QN AUTO: 25.1 PG (ref 26.8–34.3)
MCHC RBC AUTO-ENTMCNC: 30.1 G/DL (ref 31.4–37.4)
MCV RBC AUTO: 84 FL (ref 82–98)
MONOCYTES # BLD AUTO: 1.43 THOUSAND/ÂΜL (ref 0.17–1.22)
MONOCYTES NFR BLD AUTO: 12 % (ref 4–12)
NEUTROPHILS # BLD AUTO: 9.3 THOUSANDS/ÂΜL (ref 1.85–7.62)
NEUTS SEG NFR BLD AUTO: 76 % (ref 43–75)
NRBC BLD AUTO-RTO: 0 /100 WBCS
PLATELET # BLD AUTO: 251 THOUSANDS/UL (ref 149–390)
PMV BLD AUTO: 11.1 FL (ref 8.9–12.7)
POTASSIUM SERPL-SCNC: 4.1 MMOL/L (ref 3.5–5.3)
PROT SERPL-MCNC: 6.3 G/DL (ref 6.4–8.4)
RBC # BLD AUTO: 3.7 MILLION/UL (ref 3.81–5.12)
SODIUM SERPL-SCNC: 136 MMOL/L (ref 135–147)
WBC # BLD AUTO: 12.3 THOUSAND/UL (ref 4.31–10.16)

## 2023-07-23 PROCEDURE — 80053 COMPREHEN METABOLIC PANEL: CPT | Performed by: PHYSICIAN ASSISTANT

## 2023-07-23 PROCEDURE — 82948 REAGENT STRIP/BLOOD GLUCOSE: CPT

## 2023-07-23 PROCEDURE — C9113 INJ PANTOPRAZOLE SODIUM, VIA: HCPCS | Performed by: PHYSICIAN ASSISTANT

## 2023-07-23 PROCEDURE — 85025 COMPLETE CBC W/AUTO DIFF WBC: CPT | Performed by: PHYSICIAN ASSISTANT

## 2023-07-23 PROCEDURE — 99232 SBSQ HOSP IP/OBS MODERATE 35: CPT | Performed by: INTERNAL MEDICINE

## 2023-07-23 RX ORDER — BISACODYL 5 MG/1
10 TABLET, DELAYED RELEASE ORAL ONCE
Status: COMPLETED | OUTPATIENT
Start: 2023-07-23 | End: 2023-07-23

## 2023-07-23 RX ORDER — ACETAMINOPHEN 325 MG/1
975 TABLET ORAL EVERY 6 HOURS PRN
Status: DISCONTINUED | OUTPATIENT
Start: 2023-07-23 | End: 2023-07-25 | Stop reason: HOSPADM

## 2023-07-23 RX ADMIN — INSULIN LISPRO 2 UNITS: 100 INJECTION, SOLUTION INTRAVENOUS; SUBCUTANEOUS at 12:36

## 2023-07-23 RX ADMIN — BISACODYL 10 MG: 5 TABLET, COATED ORAL at 15:11

## 2023-07-23 RX ADMIN — ACETAMINOPHEN 975 MG: 325 TABLET, FILM COATED ORAL at 09:21

## 2023-07-23 RX ADMIN — NICOTINE 14 MG: 14 PATCH, EXTENDED RELEASE TRANSDERMAL at 08:49

## 2023-07-23 RX ADMIN — PANTOPRAZOLE SODIUM 40 MG: 40 INJECTION, POWDER, FOR SOLUTION INTRAVENOUS at 08:49

## 2023-07-23 RX ADMIN — POLYETHYLENE GLYCOL 3350, SODIUM SULFATE ANHYDROUS, SODIUM BICARBONATE, SODIUM CHLORIDE, POTASSIUM CHLORIDE 4000 ML: 236; 22.74; 6.74; 5.86; 2.97 POWDER, FOR SOLUTION ORAL at 15:11

## 2023-07-23 RX ADMIN — METOPROLOL SUCCINATE 50 MG: 50 TABLET, EXTENDED RELEASE ORAL at 08:42

## 2023-07-23 RX ADMIN — DOXYCYCLINE 100 MG: 100 CAPSULE ORAL at 21:12

## 2023-07-23 RX ADMIN — DOXYCYCLINE 100 MG: 100 CAPSULE ORAL at 08:42

## 2023-07-23 RX ADMIN — ATORVASTATIN CALCIUM 40 MG: 40 TABLET, FILM COATED ORAL at 17:03

## 2023-07-23 RX ADMIN — ACETAMINOPHEN 975 MG: 325 TABLET, FILM COATED ORAL at 17:03

## 2023-07-23 RX ADMIN — INSULIN LISPRO 1 UNITS: 100 INJECTION, SOLUTION INTRAVENOUS; SUBCUTANEOUS at 08:44

## 2023-07-23 RX ADMIN — Medication 2000 UNITS: at 08:42

## 2023-07-23 NOTE — PROGRESS NOTES
[address] [facility]  Progress Note  Name: Saida Payne  MRN: [id number]  Unit/Bed#: S -01 I Date of Admission: 7/20/2023   Date of Service: 7/23/2023 I Hospital Day: 3    Assessment/Plan   * Symptomatic anemia  Assessment & Plan  · Symptomatic anemia with dizziness weakness and fatigue found to have a hemoglobin of 6.4 which is down from 16 in April. She had 1 episode of intermittent black following smelling stools. Risk factors include Advil use, aspirin and Eliquis. · Eliquis on hold   · Continue IV PPI twice daily   · Received 2 units transfusion and hemoglobin now stable above 8. · Trend daily CBC   Appreciate GI support, plan for colonoscopy tomorrow. Prep today. Soft tissue infection  Assessment & Plan  · Patient reported pressure and tenderness in the groin area on July 22. Started on Doxy twice daily  · Ultrasound showed no drainable collection or abscess      PAF (paroxysmal atrial fibrillation) ([facility])  Assessment & Plan  · Hold Eliquis in the setting of bleeding   · Continue Toprol for rate control    Chronic HFrEF (heart failure with reduced ejection fraction) ([facility])  Assessment & Plan  Wt Readings from Last 3 Encounters:   07/20/23 50.3 kg (111 lb)   07/18/23 50.3 kg (111 lb)   07/11/23 48.7 kg (107 lb 4.8 oz)     · Currently euvolemic. Per outpatient notes, blood pressure limits use of an ACE inhibitor, continue beta-blocker     Coronary artery disease involving native coronary artery of native heart without angina pectoris  Assessment & Plan  · Currently stable. Hold aspirin due to GI bleed.   Continue Toprol    CKD (chronic kidney disease) stage 3, GFR 30-59 ml/min ([facility])  Assessment & Plan  · Creatinine at baseline, continue to monitor    DM2 (diabetes mellitus, type 2) [facility])  Assessment & Plan  Lab Results   Component Value Date    HGBA1C 8.4 (H) 06/21/2023     Recent Labs     07/22/23  1605 07/22/23  2058 07/23/23  0734 07/23/23  1043   POCGLU 293* 394* 167* 246*       Blood Sugar Average: Last 72 hrs:  (P) [id number] A1c not at goal  Hold oral meds  Continue insulin sliding scale  Carb controlled diet             VTE Pharmacologic Prophylaxis: VTE Score: 3 Moderate Risk (Score 3-4) - Pharmacological DVT Prophylaxis Contraindicated. Sequential Compression Devices Ordered. Patient Centered Rounds: I performed bedside rounds with nursing staff today. Discussions with Specialists or Other Care Team Provider: GI    Education and Discussions with Family / Patient: Patient declined call to . Total Time Spent on Date of Encounter in care of patient: 35 minutes This time was spent on one or more of the following: performing physical exam; counseling and coordination of care; obtaining or reviewing history; documenting in the medical record; reviewing/ordering tests, medications or procedures; communicating with other healthcare professionals and discussing with patient's family/caregivers. Current Length of Stay: 3 day(s)  Current Patient Status: Inpatient   Certification Statement: The patient will continue to require additional inpatient hospital stay due to Acute blood loss anemia  Discharge Plan: Anticipate discharge in 24-48 hrs to home. Code Status: Level 1 - Full Code    Subjective:   Patient reports no episodes of melena or bright red blood per rectum. She notes she plans to the prep today with a colonoscopy tomorrow. Had some bilateral lower extremity pain which improved with Tylenol. She denies fevers or chills, abdominal pain, chest pain or shortness of breath    Objective:     Vitals:   Temp (24hrs), Av.7 °F (37.1 °C), Min:98.7 °F (37.1 °C), Max:98.7 °F (37.1 °C)    Temp:  [98.7 °F (37.1 °C)] 98.7 °F (37.1 °C)  HR:  [69-77] 69  Resp:  [16-17] 16  BP: (139-159)/(67-77) 159/77  SpO2:  [96 %-99 %] 96 %  Body mass index is 19.66 kg/m². Input and Output Summary (last 24 hours):      Intake/Output Summary (Last 24 hours) at 7/23/2023 1524  Last data filed at 7/23/2023 1513  Gross per 24 hour   Intake 240 ml   Output 400 ml   Net -160 ml       Physical Exam:   Physical Exam  Vitals and nursing note reviewed. Constitutional:       Appearance: Normal appearance. She is not ill-appearing or diaphoretic. HENT:      Head: Normocephalic. Nose: Nose normal. No congestion. Mouth/Throat:      Mouth: Mucous membranes are moist.      Pharynx: No oropharyngeal exudate. Eyes:      General: No scleral icterus. Conjunctiva/sclera: Conjunctivae normal.   Pulmonary:      Effort: Pulmonary effort is normal. No respiratory distress. Abdominal:      General: Bowel sounds are normal. There is no distension. Palpations: Abdomen is soft. Musculoskeletal:      Cervical back: Normal range of motion and neck supple. No rigidity. Skin:     General: Skin is warm. Coloration: Skin is not jaundiced. Neurological:      Mental Status: She is alert and oriented to person, place, and time.    Psychiatric:         Mood and Affect: Mood normal.         Behavior: Behavior normal.          Additional Data:     Labs:  Results from last 7 days   Lab Units 07/23/23  0633   WBC Thousand/uL 12.30*   HEMOGLOBIN g/dL 9.3*   HEMATOCRIT % 30.9*   PLATELETS Thousands/uL 251   NEUTROS PCT % 76*   LYMPHS PCT % 11*   MONOS PCT % 12   EOS PCT % 1     Results from last 7 days   Lab Units 07/23/23  0633   SODIUM mmol/L 136   POTASSIUM mmol/L 4.1   CHLORIDE mmol/L 108   CO2 mmol/L 24   BUN mg/dL 18   CREATININE mg/dL 0.97   ANION GAP mmol/L 4   CALCIUM mg/dL 8.6   ALBUMIN g/dL 3.1*   TOTAL BILIRUBIN mg/dL 0.75   ALK PHOS U/L 152*   ALT U/L 50   AST U/L 63*   GLUCOSE RANDOM mg/dL 161*     Results from last 7 days   Lab Units 07/20/23  1220   INR  1.37*     Results from last 7 days   Lab Units 07/23/23  1043 07/23/23  0734 07/22/23  2058 07/22/23  1605 07/22/23  1054 07/22/23  0753 07/21/23  2024 07/21/23  1642 07/21/23  1140 07/21/23  0727 07/20/23 2021 07/20/23  1729   POC GLUCOSE mg/dl 246* 167* 394* 293* 326* 199* 348* 174* 78 102 179* 192*               Lines/Drains:  Invasive Devices     Peripheral Intravenous Line  Duration           Peripheral IV 07/20/23 Distal;Left Wrist 3 days                      Imaging: No pertinent imaging reviewed. Recent Cultures (last 7 days):         Last 24 Hours Medication List:   Current Facility-Administered Medications   Medication Dose Route Frequency Provider Last Rate   • acetaminophen  975 mg Oral Q6H PRN Nikki Damon MD     • albuterol  2 puff Inhalation Q6H PRN Delmy Pacheco MD     • atorvastatin  40 mg Oral Daily With Mera Ireland MD     • cholecalciferol  2,000 Units Oral Daily Delmy Pacheco MD     • doxycycline hyclate  100 mg Oral Q12H [address] [place] Greg Roy PA-C     • insulin lispro  1-5 Units Subcutaneous TID [facility] Greg Roy PA-C     • insulin lispro  1-5 Units Subcutaneous HS Belén Jose PA-C     • metoprolol succinate  50 mg Oral Daily Delmy Pacheco MD     • nicotine  14 mg Transdermal Daily Radha Miller PA-C     • pantoprazole  40 mg Intravenous Q24H 3 [facility] Katrina Acharya PA-C          Today, Patient Was Seen By: Nikki Damon MD    **Please Note: This note may have been constructed using a voice recognition system. **

## 2023-07-23 NOTE — ASSESSMENT & PLAN NOTE
· Patient reported pressure and tenderness in the groin area on July 22.   Started on Doxy twice daily  · Ultrasound showed no drainable collection or abscess

## 2023-07-23 NOTE — ASSESSMENT & PLAN NOTE
· Symptomatic anemia with dizziness weakness and fatigue found to have a hemoglobin of 6.4 which is down from 16 in April. She had 1 episode of intermittent black following smelling stools. Risk factors include Advil use, aspirin and Eliquis. · Eliquis on hold   · Continue IV PPI twice daily   · Received 2 units transfusion and hemoglobin now stable above 8. · Trend daily CBC   Appreciate GI support, plan for colonoscopy tomorrow. Prep today.

## 2023-07-23 NOTE — UTILIZATION REVIEW
Initial Clinical Review    Admission: Date/Time/Statement:   Admission Orders (From admission, onward)     Ordered        07/20/23 1322  INPATIENT ADMISSION  Once                      Orders Placed This Encounter   Procedures   • INPATIENT ADMISSION     Standing Status:   Standing     Number of Occurrences:   1     Order Specific Question:   Level of Care     Answer:   Med Surg [16]     Order Specific Question:   Estimated length of stay     Answer:   More than 2 Midnights     Order Specific Question:   Certification     Answer:   I certify that inpatient services are medically necessary for this patient for a duration of greater than two midnights. See H&P and MD Progress Notes for additional information about the patient's course of treatment. ED Arrival Information     Expected   7/20/2023 10:05    Arrival   7/20/2023 11:16    Acuity   Urgent            Means of arrival   Walk-In    Escorted by   Spouse    Service   Hospitalist    Admission type   Emergency            Arrival complaint   Anemia           Chief Complaint   Patient presents with   • Abnormal Lab     Patients hemoglobin is 6.9 sent in by pcp for infusion . recent bloody stools.  -dizzy-sob +fatigue +eliquis (did take today)       Initial Presentation: 77 y.o. female presented to ED from home as inpatient admission for symptomatic amenia. Patient c/o  dizziness, weakness, fatigue. PMH of CAD, non-ischemic cardiomyopathy, PAF on AC,(ASA & Eliquis) nicotine dependence,  DM2, GERD, HTN, hx of left-sided breast cancer MARCELINO and JAYLAN II and I. She reports 1 month of intermittent black foul-smelling stools. On exam Rhythm irregular. Plan transfusion @ URBC's keep  HGB>8 monitor HGB q 12 hrs  holding Eliquis and ASA    GI consult   1. Acute anemia  2. Dark stools  Patient reports a few weeks ago having dark stools for around 3 weeks which have since subsided. She currently is doing well. Denies any abdominal pain.   Hemoglobin 6.4 on arrival.  She received 2 units of blood now currently 9.3. BUN normal.  Vital signs stable. No prior EGD. Possibly the setting of AVM versus peptic ulcer disease, rule out lesion.  -We will plan for EGD today. - Keep NPO. -Last dose of Eliquis was yesterday morning, 7/20.  -Further recommendations based on EGD results. If EGD is unremarkable, could consider staying for colonoscopy on Monday. ISS TID with meals continue to hold ASA & Eliquuis  -Continue PPI twice daily. Date: 07-21-23  Patient denies active bleeding NPO EGD today    EGD   FINDINGS:  • Regular Z-line 40 cm from the incisors  • Mild, localized erythematous mucosa in the prepyloric region; performed cold forceps biopsy to rule out H. pylori. Retroflexed view was unremarkable, pylorus was patent. • The duodenal bulb, 1st part of the duodenum and 2nd part of the duodenum appeared normal. Performed random biopsy using biopsy forceps to rule out celiac disease.     COLONOSCOPY       ED Triage Vitals   Temperature Pulse Respirations Blood Pressure SpO2   07/20/23 1120 07/20/23 1120 07/20/23 1120 07/20/23 1120 07/20/23 1120   98.4 °F (36.9 °C) 89 19 165/74 100 %      Temp Source Heart Rate Source Patient Position - Orthostatic VS BP Location FiO2 (%)   07/20/23 1120 07/20/23 1120 07/20/23 1120 07/20/23 1120 --   Oral Monitor Sitting Right arm       Pain Score       07/20/23 1415       No Pain          Wt Readings from Last 1 Encounters:   07/20/23 50.3 kg (111 lb)     Additional Vital Signs:   Date/Time Temp Pulse Resp BP MAP (mmHg) SpO2 O2 Device Patient Position - Orthostatic VS   07/21/23 0700 98.1 °F (36.7 °C) 67 16 143/72 96 99 % None (Room air) Lying   07/20/23 2000 99 °F (37.2 °C) 79 18 134/65 93 99 % None (Room air) Lying   07/20/23 1810 97.5 °F (36.4 °C) 68 20 144/80 -- 98 % None (Room air) --   07/20/23 1751 97.4 °F (36.3 °C) Abnormal  67 20 128/70 -- -- -- --   07/20/23 1745 97.4 °F (36.3 °C) Abnormal  67 20 128/70 -- 100 % None (Room air) --   07/20/23 1702 98.9 °F (37.2 °C) 77 20 133/67 -- 98 % None (Room air) --   07/20/23 1451 98.2 °F (36.8 °C) 73 16 164/76 -- 98 % None (Room air) Sitting   07/20/23 1415 98.4 °F (36.9 °C) 71 18 -- -- 100 % None (Room air) Lying   07/20/23 1353 97.9 °F (36.6 °C) 75 18 152/72 -- 100 % None (Room air) --   07/20/23 1300 -- 85 18 146/71 99 99 % None (Room air) --     Pertinent Labs/Diagnostic Test Results:   US superficial lump (non extremity)   Final Result by Lisette Coburn MD (07/23 1009)      Left labial subcutaneous edema. No subcutaneous abscess identified.          Workstation performed: [id number]               Results from last 7 days   Lab Units 07/23/23  0633 07/22/23  0900 07/21/23  0843 07/20/23  2200 07/20/23  1220 07/18/23  1300   WBC Thousand/uL 12.30* 10.60*  --   --  8.96 10.07   HEMOGLOBIN g/dL 9.3* 10.3* 10.4* 9.3* 6.4* 6.9*   HEMATOCRIT % 30.9* 33.7*  --   --  22.6* 24.5*   PLATELETS Thousands/uL 251 264  --   --  307 388   NEUTROS ABS Thousands/µL 9.30* 8.43*  --   --  6.37  --          Results from last 7 days   Lab Units 07/23/23  0633 07/22/23  0900 07/21/23  0604 07/20/23  1220 07/18/23  1300   SODIUM mmol/L 136 134* 136 136 138   POTASSIUM mmol/L 4.1 4.1 4.5 4.5 4.4   CHLORIDE mmol/L 108 106 107 106 111*   CO2 mmol/L 24 23 24 25 23   ANION GAP mmol/L 4 5 5 5 4   BUN mg/dL 18 22 21 21 22   CREATININE mg/dL 0.97 1.02 1.02 1.08 1.16   EGFR ml/min/1.73sq m 59 55 55 52 47   CALCIUM mg/dL 8.6 8.7 8.9 9.2 9.5     Results from last 7 days   Lab Units 07/23/23  0633 07/20/23  1220   AST U/L 63* 22   ALT U/L 50 25   ALK PHOS U/L 152* 126*   TOTAL PROTEIN g/dL 6.3* 6.7   ALBUMIN g/dL 3.1* 3.4*   TOTAL BILIRUBIN mg/dL 0.75 0.43     Results from last 7 days   Lab Units 07/23/23  1043 07/23/23  0734 07/22/23  2058 07/22/23  1605 07/22/23  1054 07/22/23  0753 07/21/23  2024 07/21/23  1642 07/21/23  1140 07/21/23  0727 07/20/23  2021 07/20/23  1729   POC GLUCOSE mg/dl 246* 167* 394* 293* 326* 199* 348* 174* 78 102 179* 192*     Results from last 7 days   Lab Units 07/23/23  0633 07/22/23  0900 07/21/23  0604 07/20/23  1220 07/18/23  1300   GLUCOSE RANDOM mg/dL 161* 271* 102 155* 125             BETA-HYDROXYBUTYRATE   Date Value Ref Range Status   12/07/2022 0.4 <0.6 mmol/L Final        Results from last 7 days   Lab Units 07/20/23  1220   PROTIME seconds 17.1*   INR  1.37*   PTT seconds 30       Results from last 7 days   Lab Units 07/20/23  1220   FERRITIN ng/mL 5*   IRON SATURATION % 5*   IRON ug/dL 22*   TIBC ug/dL 419         Results from last 7 days   Lab Units 07/21/23  0547   UNIT PRODUCT CODE  [id number]  [id number]   UNIT NUMBER  [id number]  [id number]   Buster Stabs   UNITRH  POS  POS   CROSSMATCH  Compatible  Compatible   UNIT DISPENSE STATUS  Presumed Trans  Presumed Trans   UNIT PRODUCT VOL mL 350  350     ED Treatment:   Medication Administration from 07/20/2023 1005 to 07/20/2023 1441     None        Past Medical History:   Diagnosis Date   • Breast cancer ([address] [facility])     left - 1994.    • Diabetes mellitus ([address] [facility])    • Diabetes mellitus, type 2 ([address] [facility]) 03/15/2006    last assessed 7/10/13   • GERD (gastroesophageal reflux disease)    • History of chemotherapy    • Hypertension      Present on Admission:  • CKD (chronic kidney disease) stage 3, GFR 30-59 ml/min ([facility])  • Chronic HFrEF (heart failure with reduced ejection fraction) ([facility])  • (Resolved) Obstructive sleep apnea syndrome  • Coronary artery disease involving native coronary artery of native heart without angina pectoris  • PAF (paroxysmal atrial fibrillation) ([facility])  • Tobacco use      Admitting Diagnosis: Abnormal laboratory test [R89.9]  Symptomatic anemia [D64.9]  Age/Sex: 77 y.o. female     Admission Orders:  SCD  Blood sugars ac  Hemoglobin q 12 hrs  EGD  NPO        Scheduled Medications:  atorvastatin, 40 mg, Oral, Daily With Dinner  cholecalciferol, 2,000 Units, Oral, Daily  doxycycline hyclate, 100 mg, Oral, Q12H [address]  insulin lispro, 1-5 Units, Subcutaneous, TID AC  insulin lispro, 1-5 Units, Subcutaneous, HS  metoprolol succinate, 50 mg, Oral, Daily  nicotine, 14 mg, Transdermal, Daily  pantoprazole, 40 mg, Intravenous, Q24H EMILIA      Continuous IV Infusions:     PRN Meds:  acetaminophen, 975 mg, Oral, Q6H PRN  albuterol, 2 puff, Inhalation, Q6H PRN        IP CONSULT TO GASTROENTEROLOGY    Network Utilization Review Department  ATTENTION: Please call with any questions or concerns to [phone] and carefully listen to the prompts so that you are directed to the right person. All voicemails are confidential.  [place] all requests for admission clinical reviews, approved or denied determinations and any other requests to dedicated fax number below belonging to the campus where the patient is receiving treatment.  List of dedicated fax numbers for the Facilities:  [place] DENIALS (Administrative/Medical Necessity) [phone]   [address] [facility] (Maternity/NICU/Pediatrics) [phone]   [address] [place] [address] Drive [phone]   [facility] [address] [facility] [phone]   [address] [place] [address] [id number] [place] [address] [phone]   [address] [facility] [address] [phone]

## 2023-07-23 NOTE — ASSESSMENT & PLAN NOTE
Wt Readings from Last 3 Encounters:   07/20/23 50.3 kg (111 lb)   07/18/23 50.3 kg (111 lb)   07/11/23 48.7 kg (107 lb 4.8 oz)     · Currently euvolemic.   Per outpatient notes, blood pressure limits use of an ACE inhibitor, continue beta-blocker

## 2023-07-23 NOTE — ASSESSMENT & PLAN NOTE
Lab Results   Component Value Date    HGBA1C 8.4 (H) 06/21/2023     Recent Labs     07/22/23  1605 07/22/23  2058 07/23/23  0734 07/23/23  1043   POCGLU 293* 394* 167* 246*       Blood Sugar Average: Last 72 hrs:  (P) [id number] A1c not at goal  Hold oral meds  Continue insulin sliding scale  Carb controlled diet

## 2023-07-24 ENCOUNTER — APPOINTMENT (INPATIENT)
Dept: GASTROENTEROLOGY | Facility: HOSPITAL | Age: 68
DRG: 378 | End: 2023-07-24
Payer: COMMERCIAL

## 2023-07-24 ENCOUNTER — ANESTHESIA (INPATIENT)
Dept: GASTROENTEROLOGY | Facility: HOSPITAL | Age: 68
DRG: 378 | End: 2023-07-24
Payer: COMMERCIAL

## 2023-07-24 ENCOUNTER — ANESTHESIA (OUTPATIENT)
Dept: ANESTHESIOLOGY | Facility: HOSPITAL | Age: 68
End: 2023-07-24

## 2023-07-24 ENCOUNTER — ANESTHESIA EVENT (INPATIENT)
Dept: GASTROENTEROLOGY | Facility: HOSPITAL | Age: 68
DRG: 378 | End: 2023-07-24
Payer: COMMERCIAL

## 2023-07-24 ENCOUNTER — ANESTHESIA EVENT (OUTPATIENT)
Dept: ANESTHESIOLOGY | Facility: HOSPITAL | Age: 68
End: 2023-07-24

## 2023-07-24 LAB
ALBUMIN SERPL BCP-MCNC: 3 G/DL (ref 3.5–5)
ALP SERPL-CCNC: 163 U/L (ref 34–104)
ALT SERPL W P-5'-P-CCNC: 68 U/L (ref 7–52)
ANION GAP SERPL CALCULATED.3IONS-SCNC: 7 MMOL/L
AST SERPL W P-5'-P-CCNC: 59 U/L (ref 13–39)
BASOPHILS # BLD AUTO: 0.02 THOUSANDS/ÂΜL (ref 0–0.1)
BASOPHILS NFR BLD AUTO: 0 % (ref 0–1)
BILIRUB SERPL-MCNC: 0.98 MG/DL (ref 0.2–1)
BUN SERPL-MCNC: 12 MG/DL (ref 5–25)
CALCIUM ALBUM COR SERPL-MCNC: 9.5 MG/DL (ref 8.3–10.1)
CALCIUM SERPL-MCNC: 8.7 MG/DL (ref 8.4–10.2)
CHLORIDE SERPL-SCNC: 106 MMOL/L (ref 96–108)
CO2 SERPL-SCNC: 25 MMOL/L (ref 21–32)
CREAT SERPL-MCNC: 0.85 MG/DL (ref 0.6–1.3)
EOSINOPHIL # BLD AUTO: 0.05 THOUSAND/ÂΜL (ref 0–0.61)
EOSINOPHIL NFR BLD AUTO: 1 % (ref 0–6)
ERYTHROCYTE [DISTWIDTH] IN BLOOD BY AUTOMATED COUNT: 19.8 % (ref 11.6–15.1)
GFR SERPL CREATININE-BSD FRML MDRD: 69 ML/MIN/1.73SQ M
GLUCOSE SERPL-MCNC: 117 MG/DL (ref 65–140)
GLUCOSE SERPL-MCNC: 139 MG/DL (ref 65–140)
GLUCOSE SERPL-MCNC: 245 MG/DL (ref 65–140)
GLUCOSE SERPL-MCNC: 67 MG/DL (ref 65–140)
GLUCOSE SERPL-MCNC: 74 MG/DL (ref 65–140)
GLUCOSE SERPL-MCNC: 90 MG/DL (ref 65–140)
HCT VFR BLD AUTO: 32.7 % (ref 34.8–46.1)
HGB BLD-MCNC: 9.9 G/DL (ref 11.5–15.4)
IMM GRANULOCYTES # BLD AUTO: 0.03 THOUSAND/UL (ref 0–0.2)
IMM GRANULOCYTES NFR BLD AUTO: 0 % (ref 0–2)
LYMPHOCYTES # BLD AUTO: 1.36 THOUSANDS/ÂΜL (ref 0.6–4.47)
LYMPHOCYTES NFR BLD AUTO: 13 % (ref 14–44)
MCH RBC QN AUTO: 25 PG (ref 26.8–34.3)
MCHC RBC AUTO-ENTMCNC: 30.3 G/DL (ref 31.4–37.4)
MCV RBC AUTO: 83 FL (ref 82–98)
MONOCYTES # BLD AUTO: 0.93 THOUSAND/ÂΜL (ref 0.17–1.22)
MONOCYTES NFR BLD AUTO: 9 % (ref 4–12)
NEUTROPHILS # BLD AUTO: 8.08 THOUSANDS/ÂΜL (ref 1.85–7.62)
NEUTS SEG NFR BLD AUTO: 77 % (ref 43–75)
NRBC BLD AUTO-RTO: 0 /100 WBCS
PLATELET # BLD AUTO: 231 THOUSANDS/UL (ref 149–390)
PMV BLD AUTO: 10.3 FL (ref 8.9–12.7)
POTASSIUM SERPL-SCNC: 3.8 MMOL/L (ref 3.5–5.3)
PROT SERPL-MCNC: 6.1 G/DL (ref 6.4–8.4)
RBC # BLD AUTO: 3.96 MILLION/UL (ref 3.81–5.12)
SODIUM SERPL-SCNC: 138 MMOL/L (ref 135–147)
WBC # BLD AUTO: 10.47 THOUSAND/UL (ref 4.31–10.16)

## 2023-07-24 PROCEDURE — C9113 INJ PANTOPRAZOLE SODIUM, VIA: HCPCS | Performed by: PHYSICIAN ASSISTANT

## 2023-07-24 PROCEDURE — 0DJD8ZZ INSPECTION OF LOWER INTESTINAL TRACT, VIA NATURAL OR ARTIFICIAL OPENING ENDOSCOPIC: ICD-10-PCS | Performed by: INTERNAL MEDICINE

## 2023-07-24 PROCEDURE — 82948 REAGENT STRIP/BLOOD GLUCOSE: CPT

## 2023-07-24 PROCEDURE — 80053 COMPREHEN METABOLIC PANEL: CPT | Performed by: PHYSICIAN ASSISTANT

## 2023-07-24 PROCEDURE — 45378 DIAGNOSTIC COLONOSCOPY: CPT | Performed by: INTERNAL MEDICINE

## 2023-07-24 PROCEDURE — 99232 SBSQ HOSP IP/OBS MODERATE 35: CPT | Performed by: INTERNAL MEDICINE

## 2023-07-24 PROCEDURE — 85025 COMPLETE CBC W/AUTO DIFF WBC: CPT | Performed by: PHYSICIAN ASSISTANT

## 2023-07-24 RX ORDER — DEXTROSE AND SODIUM CHLORIDE 5; .9 G/100ML; G/100ML
75 INJECTION, SOLUTION INTRAVENOUS CONTINUOUS
Status: DISCONTINUED | OUTPATIENT
Start: 2023-07-24 | End: 2023-07-25

## 2023-07-24 RX ORDER — SODIUM CHLORIDE, SODIUM LACTATE, POTASSIUM CHLORIDE, CALCIUM CHLORIDE 600; 310; 30; 20 MG/100ML; MG/100ML; MG/100ML; MG/100ML
INJECTION, SOLUTION INTRAVENOUS CONTINUOUS PRN
Status: DISCONTINUED | OUTPATIENT
Start: 2023-07-24 | End: 2023-07-24

## 2023-07-24 RX ORDER — DEXTROSE MONOHYDRATE 25 G/50ML
INJECTION, SOLUTION INTRAVENOUS
Status: COMPLETED
Start: 2023-07-24 | End: 2023-07-24

## 2023-07-24 RX ORDER — DEXTROSE 25 % IN WATER 25 %
25 SYRINGE (ML) INTRAVENOUS ONCE
Status: DISCONTINUED | OUTPATIENT
Start: 2023-07-24 | End: 2023-07-24 | Stop reason: SDUPTHER

## 2023-07-24 RX ORDER — LIDOCAINE HYDROCHLORIDE 10 MG/ML
INJECTION, SOLUTION EPIDURAL; INFILTRATION; INTRACAUDAL; PERINEURAL AS NEEDED
Status: DISCONTINUED | OUTPATIENT
Start: 2023-07-24 | End: 2023-07-24

## 2023-07-24 RX ORDER — DEXTROSE MONOHYDRATE 25 G/50ML
12.5 INJECTION, SOLUTION INTRAVENOUS ONCE
Status: COMPLETED | OUTPATIENT
Start: 2023-07-24 | End: 2023-07-24

## 2023-07-24 RX ORDER — PROPOFOL 10 MG/ML
INJECTION, EMULSION INTRAVENOUS AS NEEDED
Status: DISCONTINUED | OUTPATIENT
Start: 2023-07-24 | End: 2023-07-24

## 2023-07-24 RX ADMIN — DOXYCYCLINE 100 MG: 100 CAPSULE ORAL at 08:14

## 2023-07-24 RX ADMIN — PROPOFOL 10 MG: 10 INJECTION, EMULSION INTRAVENOUS at 15:57

## 2023-07-24 RX ADMIN — PROPOFOL 10 MG: 10 INJECTION, EMULSION INTRAVENOUS at 15:53

## 2023-07-24 RX ADMIN — Medication 2000 UNITS: at 08:14

## 2023-07-24 RX ADMIN — LIDOCAINE HYDROCHLORIDE 50 MG: 10 INJECTION, SOLUTION EPIDURAL; INFILTRATION; INTRACAUDAL; PERINEURAL at 15:45

## 2023-07-24 RX ADMIN — PROPOFOL 10 MG: 10 INJECTION, EMULSION INTRAVENOUS at 15:50

## 2023-07-24 RX ADMIN — METOPROLOL SUCCINATE 50 MG: 50 TABLET, EXTENDED RELEASE ORAL at 08:14

## 2023-07-24 RX ADMIN — DEXTROSE MONOHYDRATE 50 ML: 25 INJECTION, SOLUTION INTRAVENOUS at 12:04

## 2023-07-24 RX ADMIN — DOXYCYCLINE 100 MG: 100 CAPSULE ORAL at 21:18

## 2023-07-24 RX ADMIN — ATORVASTATIN CALCIUM 40 MG: 40 TABLET, FILM COATED ORAL at 17:06

## 2023-07-24 RX ADMIN — PROPOFOL 10 MG: 10 INJECTION, EMULSION INTRAVENOUS at 15:51

## 2023-07-24 RX ADMIN — ACETAMINOPHEN 975 MG: 325 TABLET, FILM COATED ORAL at 21:20

## 2023-07-24 RX ADMIN — PROPOFOL 70 MG: 10 INJECTION, EMULSION INTRAVENOUS at 15:46

## 2023-07-24 RX ADMIN — SODIUM CHLORIDE, SODIUM LACTATE, POTASSIUM CHLORIDE, AND CALCIUM CHLORIDE: .6; .31; .03; .02 INJECTION, SOLUTION INTRAVENOUS at 15:30

## 2023-07-24 RX ADMIN — PROPOFOL 10 MG: 10 INJECTION, EMULSION INTRAVENOUS at 15:48

## 2023-07-24 RX ADMIN — PROPOFOL 10 MG: 10 INJECTION, EMULSION INTRAVENOUS at 15:54

## 2023-07-24 RX ADMIN — PROPOFOL 10 MG: 10 INJECTION, EMULSION INTRAVENOUS at 15:56

## 2023-07-24 RX ADMIN — PANTOPRAZOLE SODIUM 40 MG: 40 INJECTION, POWDER, FOR SOLUTION INTRAVENOUS at 08:14

## 2023-07-24 RX ADMIN — NICOTINE 14 MG: 14 PATCH, EXTENDED RELEASE TRANSDERMAL at 08:15

## 2023-07-24 RX ADMIN — PROPOFOL 20 MG: 10 INJECTION, EMULSION INTRAVENOUS at 15:47

## 2023-07-24 RX ADMIN — INSULIN LISPRO 2 UNITS: 100 INJECTION, SOLUTION INTRAVENOUS; SUBCUTANEOUS at 21:20

## 2023-07-24 RX ADMIN — Medication 40 MG: at 15:55

## 2023-07-24 RX ADMIN — ACETAMINOPHEN 975 MG: 325 TABLET, FILM COATED ORAL at 08:13
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
## 2023-07-24 NOTE — ASSESSMENT & PLAN NOTE
· Patient reported pressure and tenderness in the groin area on July 22.   Started on Doxy twice daily d3/7  · Ultrasound showed no drainable collection or abscess

## 2023-07-24 NOTE — ANESTHESIA PREPROCEDURE EVALUATION
Procedure:  COLONOSCOPY    Relevant Problems   CARDIO   (+) Coronary artery disease involving native coronary artery of native heart without angina pectoris   (+) Hyperlipidemia LDL goal <100   (+) Hypertensive heart and kidney disease with heart failure and with chronic kidney disease stage III (HCC)   (+) PAF (paroxysmal atrial fibrillation) (HCC)   (+) Primary hypertension   (+) SVT (supraventricular tachycardia) (HCC)      ENDO   (+) DM2 (diabetes mellitus, type 2) (HCC)   (+) Hyperparathyroidism (HCC)      /RENAL   (+) CKD (chronic kidney disease) stage 3, GFR 30-59 ml/min (HCC)   (+) Chronic renal disease, stage IV (HCC)   (+) Hypertensive heart and kidney disease with heart failure and with chronic kidney disease stage III (HCC)   (+) Stage 3a chronic kidney disease (HCC)      GYN   (+) Breast cancer (HCC) - left 1994      HEMATOLOGY   (+) Symptomatic anemia      NEURO/PSYCH   (+) Depression, recurrent (HCC)      PULMONARY   (+) Pleural effusion, bilateral      Cardiovascular and Mediastinum   (+) Chronic HFrEF (heart failure with reduced ejection fraction) (HCC)      Digestive   (+) Colon, diverticulosis      Other   (+) Bilateral leg edema   (+) Black stool   (+) Diarrhea   (+) Heme positive stool   (+) History of left breast cancer   (+) Hypercalcemia   (+) Menopause   (+) Microalbuminuria   (+) Moderate protein-calorie malnutrition (HCC)   (+) Soft tissue infection   (+) Tobacco use      Lab Results   Component Value Date     10/07/2016    SODIUM 138 07/24/2023    K 3.8 07/24/2023     07/24/2023    CO2 25 07/24/2023    AGAP 7 07/24/2023    BUN 12 07/24/2023    CREATININE 0.85 07/24/2023    GLUC 74 07/24/2023    GLUF 84 06/21/2023    CALCIUM 8.7 07/24/2023    AST 59 (H) 07/24/2023    ALT 68 (H) 07/24/2023    ALKPHOS 163 (H) 07/24/2023    PROT 6.9 10/07/2016    TP 6.1 (L) 07/24/2023    BILITOT 0.3 10/07/2016    TBILI 0.98 07/24/2023    EGFR 69 07/24/2023     Lab Results   Component Value Date WBC 10.47 (H) 07/24/2023    HGB 9.9 (L) 07/24/2023    HCT 32.7 (L) 07/24/2023    MCV 83 07/24/2023     07/24/2023         Physical Exam    Airway    Mallampati score: II  TM Distance: >3 FB  Neck ROM: full     Dental       Cardiovascular      Pulmonary      Other Findings        Anesthesia Plan  ASA Score- 3     Anesthesia Type- IV sedation with anesthesia with ASA Monitors. Additional Monitors:   Airway Plan:           Plan Factors-Exercise tolerance (METS): <4 METS. Chart reviewed. EKG reviewed. Imaging results reviewed. Existing labs reviewed. Patient summary reviewed. Induction- intravenous. Postoperative Plan-     Informed Consent- Anesthetic plan and risks discussed with patient. I personally reviewed this patient with the CRNA. Discussed and agreed on the Anesthesia Plan with the CRNA. Theodor Points

## 2023-07-24 NOTE — ASSESSMENT & PLAN NOTE
· Symptomatic anemia with dizziness weakness and fatigue found to have a hemoglobin of 6.4 which is down from 16 in April. She had 1 episode of intermittent black following smelling stools. Risk factors include Advil use, aspirin and Eliquis. · Continue to hold Eliquis  · Continue PPI twice daily  · Received 2 units transfusion and hemoglobin remained stable above 8  · Trend daily CBC   Appreciate GI support, plan for colonoscopy this afternoon, the patient tolerated the prep well and feels she is having clear bowel movements.   She denied any melena overnight

## 2023-07-24 NOTE — ASSESSMENT & PLAN NOTE
Lab Results   Component Value Date    HGBA1C 8.4 (H) 06/21/2023     Recent Labs     07/23/23  1043 07/23/23  1617 07/23/23  2128 07/24/23  0710   POCGLU 246* 96 93 90       Blood Sugar Average: Last 72 hrs:  (P) [id number] A1c not at goal  Well-controlled blood glucose in the hospital  Hold oral meds  Continue insulin sliding scale  Carb controlled diet

## 2023-07-24 NOTE — ASSESSMENT & PLAN NOTE
Wt Readings from Last 3 Encounters:   07/20/23 50.3 kg (111 lb)   07/18/23 50.3 kg (111 lb)   07/11/23 48.7 kg (107 lb 4.8 oz)     · Remains euvolemic.   Per outpatient notes, blood pressure limits use of an ACE inhibitor, continue beta-blocker

## 2023-07-24 NOTE — ANESTHESIA PREPROCEDURE EVALUATION
Procedure:  PRE-OP ONLY    Relevant Problems   CARDIO  ICD placement 2022   (+) Coronary artery disease involving native coronary artery of native heart without angina pectoris   (+) Hyperlipidemia LDL goal <100   (+) Hypertensive heart and kidney disease with heart failure and with chronic kidney disease stage III (HCC)   (+) PAF (paroxysmal atrial fibrillation) (HCC)   (+) Peripheral arterial disease (HCC)   (+) Primary hypertension   (+) SVT (supraventricular tachycardia) (HCC)      ENDO   (+) DM2 (diabetes mellitus, type 2) (HCC)   (+) Hyperparathyroidism (HCC)      /RENAL   (+) CKD (chronic kidney disease) stage 3, GFR 30-59 ml/min (HCC)   (+) Chronic renal disease, stage IV (HCC)   (+) Hypertensive heart and kidney disease with heart failure and with chronic kidney disease stage III (HCC)   (+) Stage 3a chronic kidney disease (HCC)      GYN   (+) Breast cancer (HCC) - left 1994      HEMATOLOGY   (+) Symptomatic anemia      NEURO/PSYCH   (+) Depression, recurrent (HCC)      PULMONARY   (+) Pleural effusion, bilateral      Cardiovascular and Mediastinum   (+) Chronic HFrEF (heart failure with reduced ejection fraction) ([facility])   (+) NICM (nonischemic cardiomyopathy) ([address])      Other   (+) History of left breast cancer   (+) S/P left mastectomy   (+) Tobacco use             Anesthesia Plan  ASA Score- 3     Anesthesia Type- IV sedation with anesthesia with ASA Monitors. Additional Monitors:   Airway Plan:           Plan Factors-    Chart reviewed. EKG reviewed. Existing labs reviewed. Patient summary reviewed. Patient is a current smoker. Patient instructed to abstain from smoking on day of procedure. Induction-     Postoperative Plan-     Informed Consent- Anesthetic plan and risks discussed with patient. I personally reviewed this patient with the CRNA. Discussed and agreed on the Anesthesia Plan with the CRNA. .            Lab Results   Component Value Date    HGBA1C 8.4 (H) 06/21/2023 Lab Results   Component Value Date     10/07/2016    K 4.1 07/23/2023     07/23/2023    CO2 24 07/23/2023    BUN 18 07/23/2023    CREATININE 0.97 07/23/2023    GLUCOSE 105 (H) 10/07/2016    GLUF 84 06/21/2023    CALCIUM 8.6 07/23/2023    CORRECTEDCA 9.3 07/23/2023    AST 63 (H) 07/23/2023    ALT 50 07/23/2023    ALKPHOS 152 (H) 07/23/2023    PROT 6.9 10/07/2016    BILITOT 0.3 10/07/2016    EGFR 59 07/23/2023       Lab Results   Component Value Date    WBC 10.47 (H) 07/24/2023    HGB 9.9 (L) 07/24/2023    HCT 32.7 (L) 07/24/2023    MCV 83 07/24/2023     07/24/2023   echo April 2023    Left Ventricle: Left ventricular cavity size is normal. Wall thickness is normal. The left ventricular ejection fraction is 35%. Systolic function is severely reduced. There is mild global hypokinesis. Diastolic function is abnormal.  •  Right Ventricle: Right ventricular cavity size is normal. Systolic function is mildly reduced. •  Aortic Valve: There is mild regurgitation. •  Mitral Valve: There is mild regurgitation. •  Tricuspid Valve: The right ventricular systolic pressure is moderately elevated. The estimated right ventricular systolic pressure is 83.42 mmHg. •  Pericardium: There is a trivial pericardial effusion. There is a left pleural effusion.     Normal sinus rhythm 94 bpm.  Left axis deviation, right bundle branch block.  Nonspecific T T wave changes most pronounced inferiorly.  QTc 477 ms      Specimen Collected: 05/17/23 11:00

## 2023-07-24 NOTE — ANESTHESIA POSTPROCEDURE EVALUATION
Post-Op Assessment Note    CV Status:  Stable    Pain management: adequate     Mental Status:  Sleepy   Hydration Status:  Euvolemic   PONV Controlled:  Controlled   Airway Patency:  Patent      Post Op Vitals Reviewed: Yes      Staff: CRNA         No notable events documented.     BP  135/62   Temp      Pulse  60   Resp   17   SpO2   99

## 2023-07-25 ENCOUNTER — PREP FOR PROCEDURE (OUTPATIENT)
Dept: GASTROENTEROLOGY | Facility: CLINIC | Age: 68
End: 2023-07-25

## 2023-07-25 ENCOUNTER — TELEPHONE (OUTPATIENT)
Dept: FAMILY MEDICINE CLINIC | Facility: CLINIC | Age: 68
End: 2023-07-25

## 2023-07-25 ENCOUNTER — TRANSITIONAL CARE MANAGEMENT (OUTPATIENT)
Dept: FAMILY MEDICINE CLINIC | Facility: CLINIC | Age: 68
End: 2023-07-25

## 2023-07-25 VITALS
TEMPERATURE: 98.3 F | RESPIRATION RATE: 16 BRPM | HEIGHT: 63 IN | DIASTOLIC BLOOD PRESSURE: 76 MMHG | SYSTOLIC BLOOD PRESSURE: 157 MMHG | OXYGEN SATURATION: 98 % | BODY MASS INDEX: 19.67 KG/M2 | WEIGHT: 111 LBS | HEART RATE: 66 BPM

## 2023-07-25 DIAGNOSIS — D50.9 IRON DEFICIENCY ANEMIA, UNSPECIFIED IRON DEFICIENCY ANEMIA TYPE: ICD-10-CM

## 2023-07-25 DIAGNOSIS — K92.1 BLACK STOOL: Primary | ICD-10-CM

## 2023-07-25 DIAGNOSIS — I50.9 CHF EXACERBATION (HCC): ICD-10-CM

## 2023-07-25 LAB
BASOPHILS # BLD AUTO: 0.03 THOUSANDS/ÂΜL (ref 0–0.1)
BASOPHILS NFR BLD AUTO: 0 % (ref 0–1)
EOSINOPHIL # BLD AUTO: 0.08 THOUSAND/ÂΜL (ref 0–0.61)
EOSINOPHIL NFR BLD AUTO: 1 % (ref 0–6)
ERYTHROCYTE [DISTWIDTH] IN BLOOD BY AUTOMATED COUNT: 20 % (ref 11.6–15.1)
GLUCOSE SERPL-MCNC: 191 MG/DL (ref 65–140)
GLUCOSE SERPL-MCNC: 390 MG/DL (ref 65–140)
HCT VFR BLD AUTO: 31.5 % (ref 34.8–46.1)
HGB BLD-MCNC: 9.5 G/DL (ref 11.5–15.4)
IMM GRANULOCYTES # BLD AUTO: 0.04 THOUSAND/UL (ref 0–0.2)
IMM GRANULOCYTES NFR BLD AUTO: 1 % (ref 0–2)
LYMPHOCYTES # BLD AUTO: 1.38 THOUSANDS/ÂΜL (ref 0.6–4.47)
LYMPHOCYTES NFR BLD AUTO: 16 % (ref 14–44)
MCH RBC QN AUTO: 25.1 PG (ref 26.8–34.3)
MCHC RBC AUTO-ENTMCNC: 30.2 G/DL (ref 31.4–37.4)
MCV RBC AUTO: 83 FL (ref 82–98)
MONOCYTES # BLD AUTO: 0.95 THOUSAND/ÂΜL (ref 0.17–1.22)
MONOCYTES NFR BLD AUTO: 11 % (ref 4–12)
NEUTROPHILS # BLD AUTO: 6.09 THOUSANDS/ÂΜL (ref 1.85–7.62)
NEUTS SEG NFR BLD AUTO: 71 % (ref 43–75)
NRBC BLD AUTO-RTO: 0 /100 WBCS
PLATELET # BLD AUTO: 228 THOUSANDS/UL (ref 149–390)
PMV BLD AUTO: 10.8 FL (ref 8.9–12.7)
RBC # BLD AUTO: 3.79 MILLION/UL (ref 3.81–5.12)
WBC # BLD AUTO: 8.57 THOUSAND/UL (ref 4.31–10.16)

## 2023-07-25 PROCEDURE — 82948 REAGENT STRIP/BLOOD GLUCOSE: CPT

## 2023-07-25 PROCEDURE — 99232 SBSQ HOSP IP/OBS MODERATE 35: CPT | Performed by: PHYSICIAN ASSISTANT

## 2023-07-25 PROCEDURE — 99239 HOSP IP/OBS DSCHRG MGMT >30: CPT | Performed by: INTERNAL MEDICINE

## 2023-07-25 PROCEDURE — 85025 COMPLETE CBC W/AUTO DIFF WBC: CPT | Performed by: INTERNAL MEDICINE

## 2023-07-25 PROCEDURE — C9113 INJ PANTOPRAZOLE SODIUM, VIA: HCPCS | Performed by: PHYSICIAN ASSISTANT

## 2023-07-25 RX ORDER — DOXYCYCLINE HYCLATE 100 MG/1
100 CAPSULE ORAL EVERY 12 HOURS SCHEDULED
Qty: 7 CAPSULE | Refills: 0 | Status: SHIPPED | OUTPATIENT
Start: 2023-07-25 | End: 2023-07-29

## 2023-07-25 RX ORDER — PANTOPRAZOLE SODIUM 40 MG/1
40 TABLET, DELAYED RELEASE ORAL
Status: DISCONTINUED | OUTPATIENT
Start: 2023-07-26 | End: 2023-07-25 | Stop reason: HOSPADM

## 2023-07-25 RX ORDER — PANTOPRAZOLE SODIUM 40 MG/1
40 TABLET, DELAYED RELEASE ORAL
Status: DISCONTINUED | OUTPATIENT
Start: 2023-07-25 | End: 2023-07-25

## 2023-07-25 RX ORDER — METOPROLOL SUCCINATE 50 MG/1
50 TABLET, EXTENDED RELEASE ORAL DAILY
Qty: 30 TABLET | Refills: 3 | Status: SHIPPED | OUTPATIENT
Start: 2023-07-25 | End: 2023-11-22

## 2023-07-25 RX ADMIN — METOPROLOL SUCCINATE 50 MG: 50 TABLET, EXTENDED RELEASE ORAL at 08:33

## 2023-07-25 RX ADMIN — INSULIN LISPRO 5 UNITS: 100 INJECTION, SOLUTION INTRAVENOUS; SUBCUTANEOUS at 12:11

## 2023-07-25 RX ADMIN — INSULIN LISPRO 1 UNITS: 100 INJECTION, SOLUTION INTRAVENOUS; SUBCUTANEOUS at 08:36

## 2023-07-25 RX ADMIN — APIXABAN 5 MG: 5 TABLET, FILM COATED ORAL at 10:38

## 2023-07-25 RX ADMIN — NICOTINE 14 MG: 14 PATCH, EXTENDED RELEASE TRANSDERMAL at 08:34

## 2023-07-25 RX ADMIN — DOXYCYCLINE 100 MG: 100 CAPSULE ORAL at 08:33

## 2023-07-25 RX ADMIN — Medication 2000 UNITS: at 08:33

## 2023-07-25 RX ADMIN — PANTOPRAZOLE SODIUM 40 MG: 40 INJECTION, POWDER, FOR SOLUTION INTRAVENOUS at 08:33

## 2023-07-25 NOTE — PROGRESS NOTES
Progress Note - Sho Lundberg 77 y.o. female MRN: [id number]    Unit/Bed#: S -01 Encounter: [id number]    Assessment and Plan:     64-year-old female with history of A-fib on Eliquis, type 2 diabetes, CKD, CHF who presented to the emergency room due to outpatient labs with hemoglobin of 6.8, previously 16 3 months ago.     1. Acute anemia  2. Dark stools  EGD on arrival 7/21 with mild gastritis however otherwise unremarkable. Colonoscopy performed yesterday was normal with no burst of anemia identified. Her hemoglobin is stable at 9.5 today. She was doing well from a GI standpoint.    -In the setting of significant anemia with reports of dark stools for several weeks a month ago, would recommend capsule endoscopy in the outpatient setting for further evaluation due to EGD and colonoscopy being unrevealing. I discussed this with the patient. Order placed. - Otherwise, due to stable hemoglobin and no current dark stools, patient can be restarted on anticoagulation.  -Continue Protonix daily. - Continue to follow hemoglobin occasionally per PCP.  - Discussed with primary team.  Will be discharged today. -Advised patient was reach out to the office if she develops any recurrence of dark stools, weakness, dizziness, etc.    ----------------------------------------------------------------------------------------------------------------    Subjective:     Patient reports doing well this morning. She has no GI complaints. Tolerating diet. Hopeful for discharge. Objective:     Vitals: Blood pressure 157/76, pulse 66, temperature 98.3 °F (36.8 °C), temperature source Oral, resp. rate 16, height 5' 3" (1.6 m), weight 50.3 kg (111 lb), SpO2 98 %, not currently breastfeeding. ,Body mass index is 19.66 kg/m².       Intake/Output Summary (Last 24 hours) at 7/25/2023 1318  Last data filed at 7/25/2023 1001  Gross per 24 hour   Intake 600 ml   Output --   Net 600 ml       Physical Exam:     General Appearance: Lying comfortably in bed. No distress. Lungs: Clear to auscultation bilaterally, no rales or rhonchi, no labored breathing/accessory muscle use  Heart: Regular rate and rhythm, S1, S2 normal, no murmur, click, rub or gallop  Abdomen: Soft, non-tender, non-distended; bowel sounds normal; no masses or no organomegaly  Extremities: No cyanosis, clubbing, or edema    Invasive Devices     None                 Lab Results:  Results from last 7 days   Lab Units 07/25/23  0447   WBC Thousand/uL 8.57   HEMOGLOBIN g/dL 9.5*   HEMATOCRIT % 31.5*   PLATELETS Thousands/uL 228   NEUTROS PCT % 71   LYMPHS PCT % 16   MONOS PCT % 11   EOS PCT % 1     Results from last 7 days   Lab Units 07/24/23  0949   POTASSIUM mmol/L 3.8   CHLORIDE mmol/L 106   CO2 mmol/L 25   BUN mg/dL 12   CREATININE mg/dL 0.85   CALCIUM mg/dL 8.7   ALK PHOS U/L 163*   ALT U/L 68*   AST U/L 59*     Invalid input(s): "BILI"  Results from last 7 days   Lab Units 07/20/23  1220   INR  1.37*           Imaging Studies: I have personally reviewed pertinent imaging studies. Colonoscopy    Result Date: 7/24/2023  Impression: The terminal ileum and entire colon appeared normal. Scattered diverticulosis of mild severity in the sigmoid colon RECOMMENDATION:  No further screening colonoscopies necessary  Age greater than 72   Return to floor Monitor for overt GI bleeding, transfuse as needed Resume regular diet  Amira Maher MD      superficial lump (non extremity)    Result Date: 7/23/2023  Impression: Left labial subcutaneous edema. No subcutaneous abscess identified. Workstation performed: [id number]     EGD    Result Date: 7/21/2023  Impression: Mild erythematous mucosa in the prepyloric region; performed cold forceps biopsy The duodenal bulb, 1st part of the duodenum and 2nd part of the duodenum appeared normal. Performed random biopsy to rule out celiac disease. RECOMMENDATION:  There is no recommended follow-up for this procedure.  Follow biopsy results in 2 to 3 weeks. Recommend colonoscopy on Monday. Recommend clear liquid diet on Sunday with bowel prep starting Sunday evening. Monitor H&H and transfuse as needed. Recommend pantoprazole 40 mg once daily only.    Madison Johnson MD

## 2023-07-25 NOTE — ASSESSMENT & PLAN NOTE
Lab Results   Component Value Date    HGBA1C 8.4 (H) 06/21/2023     Recent Labs     07/24/23  1304 07/24/23  1510 07/24/23 2032 07/25/23  0736   POCGLU 139 117 245* 191*       Blood Sugar Average: Last 72 hrs:  (P) 190.2636680303964386 A1c not at goal  Hold oral meds while in hospital>> restart on discharge  Continue insulin sliding scale  Carb controlled diet

## 2023-07-25 NOTE — DISCHARGE SUMMARY
[facility] [facility]  Discharge- Maximiliano Simple 1955, 77 y.o. female MRN: [id number]  Unit/Bed#: S -01 Encounter: [id number]  Primary Care Provider: Judy Gloria DO   Date and time admitted to hospital: 7/20/2023 11:23 AM    * Symptomatic anemia  Assessment & Plan  · Symptomatic anemia with dizziness weakness and fatigue found to have a hemoglobin of 6.4 which is down from 16 in April. She had 1 episode of intermittent black following smelling stools. Risk factors include Advil use, aspirin and Eliquis. · Received 2 units transfusion and hemoglobin remained stable above 8  · hgb now stable between 9-10  · S/p EGD and colonoscopy  • EGD: Mild erythematous mucosa in the prepyloric region; performed cold forceps biopsy. The duodenal bulb, 1st part of the duodenum and 2nd part of the duodenum appeared normal.  • Colonoscopy: The terminal ileum and entire colon appeared normal. Few scattered diverticula of mild severity in the sigmoid colon  · D/w Gi, okay to restart anticoagulation. continue daily PPI. Follow biopsy results. · Trend daily CBC     Soft tissue infection  Assessment & Plan  · Patient reported pressure and tenderness in the groin area on July 22. Started on Doxy twice daily d4/7>> improving per patient,. · Ultrasound showed no drainable collection or abscess      PAF (paroxysmal atrial fibrillation) ([facility])  Assessment & Plan  · eliquis restarted on 7/25  · Continue Toprol for rate control    Tobacco use  Assessment & Plan  · Currently smoking about 6 cigarettes a day but is requesting nicotine patch of 14  · Nicotine cessation counselling    Chronic HFrEF (heart failure with reduced ejection fraction) ([facility])  Assessment & Plan  Wt Readings from Last 3 Encounters:   07/20/23 50.3 kg (111 lb)   07/18/23 50.3 kg (111 lb)   07/11/23 48.7 kg (107 lb 4.8 oz)     · Remains euvolemic.   Per outpatient notes, blood pressure limits use of an ACE inhibitor, continue beta-blocker Coronary artery disease involving native coronary artery of native heart without angina pectoris  Assessment & Plan  · Currently stable. · restart home dose aspirin  · Continue Toprol    CKD (chronic kidney disease) stage 3, GFR 30-59 ml/min ([facility])  Assessment & Plan  · Creatinine at baseline, continue to monitor    DM2 (diabetes mellitus, type 2) [facility])  Assessment & Plan  Lab Results   Component Value Date    HGBA1C 8.4 (H) 06/21/2023     Recent Labs     07/24/23  1304 07/24/23  1510 07/24/23  2032 07/25/23  0736   POCGLU 139 117 245* 191*       Blood Sugar Average: Last 72 hrs:  (P) 190.6775102226313774 A1c not at goal  Hold oral meds while in hospital>> restart on discharge  Continue insulin sliding scale  Carb controlled diet        Discharge Summary - [facility]    Patient Information: Elizabeth Showers 77 y.o. female MRN: [id number]  Unit/Bed#: S -01 Encounter: [id number]    Discharging Physician / Practitioner: Aury Adame MD  PCP: Carlos Fu DO  Admission Date: 7/20/2023  Discharge Date: 07/25/23    Reason for Admission: symptomatic anemia    Discharge Diagnoses:     Principal Problem:    Symptomatic anemia  Active Problems:    DM2 (diabetes mellitus, type 2) ([facility])    CKD (chronic kidney disease) stage 3, GFR 30-59 ml/min ([facility])    Coronary artery disease involving native coronary artery of native heart without angina pectoris    Chronic HFrEF (heart failure with reduced ejection fraction) ([facility])    Tobacco use    PAF (paroxysmal atrial fibrillation) ([address])    Soft tissue infection  Resolved Problems:    Obstructive sleep apnea syndrome      Consultations During Hospital Stay:  · GI    Procedures Performed:     · EGD  · colonoscopy    Significant Findings / Test Results:     US superficial lump (non extremity)   Final Result by Osman Pérez MD (07/23 1009)      Left labial subcutaneous edema. No subcutaneous abscess identified.          Workstation performed: [facility] Course:     Allen Gosselin is a 77 y.o. female patient who originally presented to the hospital on 7/20/2023 due to symptomatic anemia. Received transfusion. Underwent EGD and colon as above. hgb stable. eliquis resumed. Biopsy results to be followed. Discharged home. Please refer to detailed assessment and plan above for further details. Condition at Discharge: stable     Discharge Day Visit / Exam:     Subjective:  No further blood in BM. ;ast BM yesterday and non bloody. Labial pain is improving. Wants to go home today. Vitals: Blood Pressure: 157/76 (07/25/23 0735)  Pulse: 66 (07/25/23 0735)  Temperature: 98.3 °F (36.8 °C) (07/25/23 0735)  Temp Source: Oral (07/25/23 0735)  Respirations: 16 (07/25/23 0735)  Height: 5' 3" (160 cm) (07/20/23 1451)  Weight - Scale: 50.3 kg (111 lb) (07/20/23 1120)  SpO2: 98 % (07/25/23 0735)  Exam:   Physical Exam  Constitutional:       General: She is not in acute distress. Cardiovascular:      Rate and Rhythm: Normal rate and regular rhythm. Heart sounds: Normal heart sounds. No murmur heard. Pulmonary:      Effort: No respiratory distress. Breath sounds: Normal breath sounds. No wheezing or rales. Abdominal:      General: Bowel sounds are normal. There is no distension. Palpations: Abdomen is soft. Tenderness: There is no abdominal tenderness. Skin:     General: Skin is warm. Neurological:      Mental Status: She is alert. Comments: Awake alert and communicative  Planter flexes feet b.l           Discussion with Family: patient declined to call . Discharge instructions/Information to patient and family:   See after visit summary for information provided to patient and family. Provisions for Follow-Up Care:  See after visit summary for information related to follow-up care and any pertinent home health orders.       Disposition:     Home    Discharge Statement:  I spent 45 minutes discharging the patient. This time was spent on the day of discharge. I had direct contact with the patient on the day of discharge. Greater than 50% of the total time was spent examining patient, answering all patient questions, arranging and discussing plan of care with patient as well as directly providing post-discharge instructions. Additional time then spent on discharge activities. Discharge Medications:  See after visit summary for reconciled discharge medications provided to patient and family.       ** Please Note: This note has been constructed using a voice recognition system **

## 2023-07-25 NOTE — PLAN OF CARE
Problem: PAIN - ADULT  Goal: Verbalizes/displays adequate comfort level or baseline comfort level  Description: Interventions:  - Encourage patient to monitor pain and request assistance  - Assess pain using appropriate pain scale  - Administer analgesics based on type and severity of pain and evaluate response  - Implement non-pharmacological measures as appropriate and evaluate response  - Consider cultural and social influences on pain and pain management  - Notify physician/advanced practitioner if interventions unsuccessful or patient reports new pain  7/25/2023 1040 by Augustus Villa RN  Outcome: Adequate for Discharge  7/25/2023 0925 by Augustus Villa RN  Outcome: Progressing     Problem: DISCHARGE PLANNING  Goal: Discharge to home or other facility with appropriate resources  Description: INTERVENTIONS:  - Identify barriers to discharge w/patient and caregiver  - Arrange for needed discharge resources and transportation as appropriate  - Identify discharge learning needs (meds, wound care, etc.)  - Arrange for interpretive services to assist at discharge as needed  - Refer to Case Management Department for coordinating discharge planning if the patient needs post-hospital services based on physician/advanced practitioner order or complex needs related to functional status, cognitive ability, or social support system  7/25/2023 1040 by Augustus Villa RN  Outcome: Adequate for Discharge  7/25/2023 0925 by Augustus Villa RN  Outcome: Progressing     Problem: Knowledge Deficit  Goal: Patient/family/caregiver demonstrates understanding of disease process, treatment plan, medications, and discharge instructions  Description: Complete learning assessment and assess knowledge base.   Interventions:  - Provide teaching at level of understanding  - Provide teaching via preferred learning methods  7/25/2023 1040 by Augustus Villa RN  Outcome: Adequate for Discharge  7/25/2023 0925 by Tracey Ramirez KIZZY Andrews  Outcome: Progressing     Problem: HEMATOLOGIC - ADULT  Goal: Maintains hematologic stability  Description: INTERVENTIONS  - Assess for signs and symptoms of bleeding or hemorrhage  - Monitor labs  - Administer supportive blood products/factors as ordered and appropriate  7/25/2023 1040 by Miller Salcedo RN  Outcome: Adequate for Discharge  7/25/2023 0925 by Miller Salcedo RN  Outcome: Progressing

## 2023-07-25 NOTE — ASSESSMENT & PLAN NOTE
· Patient reported pressure and tenderness in the groin area on July 22. Started on Doxy twice daily d4/7>> improving per patient,.   · Ultrasound showed no drainable collection or abscess

## 2023-07-25 NOTE — ASSESSMENT & PLAN NOTE
· Currently smoking about 6 cigarettes a day but is requesting nicotine patch of 14  · Nicotine cessation counselling

## 2023-07-25 NOTE — CASE MANAGEMENT
Case Management Discharge Planning Note    Patient name Kelby Berg  Location S /S -01 MRN [id number]  : 1955 Date 2023       Current Admission Date: 2023  Current Admission Diagnosis:Symptomatic anemia   Patient Active Problem List    Diagnosis Date Noted   • Soft tissue infection 2023   • Symptomatic anemia 2023   • JAYLAN II (vulvar intraepithelial neoplasia II) 2023   • MARCELINO I (cervical intraepithelial neoplasia I) 2023   • Black stool 2023   • Diarrhea 2023   • Heme positive stool 2023   • HPV in female 2023   • Vaginal odor 2023   • Depression, recurrent ([address]) 2023   • Chronic renal disease, stage IV ([address]) 2023   • Moderate protein-calorie malnutrition ([address]) 2023   • Stage 3a chronic kidney disease ([address]) 2023   • Pleural effusion, bilateral 2023   • PAF (paroxysmal atrial fibrillation) ([address]) 2023   • Embolism and thrombosis of arteries of the lower extremities ([address]) 2023   • Tobacco use 01/10/2023   • Vulvar itching 01/10/2023   • SVT (supraventricular tachycardia) ([address]) 2022   • Venous insufficiency of both lower extremities 2022   • Trigger finger of right thumb 2021   • NICM (nonischemic cardiomyopathy) ([address]) 2021   • Peripheral arterial disease ([address]) 2021   • Breast cancer screening by mammogram 10/14/2021   • Bilateral leg edema 2021   • Breast cancer ([address]) - left 1994 2021   • Primary hypertension 2021   • S/P left mastectomy 2021   • Vitamin D deficiency 2021   • Hyperparathyroidism ([address]) 2020   • Coronary artery disease involving native coronary artery of native heart without angina pectoris 2020   • Chronic HFrEF (heart failure with reduced ejection fraction) ([address]) 2020   • CKD (chronic kidney disease) stage 3, GFR 30-59 ml/min ([address]) 2020   • Hypercalcemia 11/15/2020   • Microalbuminuria 04/24/2020   • Medicare annual wellness visit, subsequent 04/24/2020   • Screening for cardiovascular, respiratory, and genitourinary diseases 10/24/2018   • Immunization due 10/24/2018   • Colon cancer screening 10/24/2018   • Menopause 10/24/2018   • Hyperlipidemia LDL goal <100 12/07/2015   • DM2 (diabetes mellitus, type 2) ([facility]) 01/28/2015   • History of left breast cancer 09/04/2012   • Hypertensive heart and kidney disease with heart failure and with chronic kidney disease stage III ([facility]) 09/04/2012   • Colon, diverticulosis 09/04/2012      LOS (days): 5  Geometric Mean LOS (GMLOS) (days): 3.00  Days to GMLOS:-1.8     OBJECTIVE:  Risk of Unplanned Readmission Score: 18.29         Current admission status: Inpatient   Preferred Pharmacy:   [id number]- [place] - [address]  Phone: [phone] Fax: [phone]    Primary Care Provider: Juan Cooley DO    Primary Insurance: [facility] [employer]  Secondary Insurance:     DISCHARGE DETAILS:                                                                                      IMM reviewed with patient, patient agrees with discharge determination.   IMM Given (Date):: 07/25/23  IMM Given to[de-identified] Patient

## 2023-07-25 NOTE — CASE MANAGEMENT
Case Management Assessment    Patient name Jailyn Ruffin  [facility] S /S -01 MRN [id number]  : 1955 Date 2023       Current Admission Date: 2023  Current Admission Diagnosis:Symptomatic anemia   Patient Active Problem List    Diagnosis Date Noted   • Soft tissue infection 2023   • Symptomatic anemia 2023   • JAYLAN II (vulvar intraepithelial neoplasia II) 2023   • MARCELINO I (cervical intraepithelial neoplasia I) 2023   • Black stool 2023   • Diarrhea 2023   • Heme positive stool 2023   • HPV in female 2023   • Vaginal odor 2023   • Depression, recurrent ([address]) 2023   • Chronic renal disease, stage IV ([address]) 2023   • Moderate protein-calorie malnutrition ([address]) 2023   • Stage 3a chronic kidney disease ([address]) 2023   • Pleural effusion, bilateral 2023   • PAF (paroxysmal atrial fibrillation) ([address]) 2023   • Embolism and thrombosis of arteries of the lower extremities ([address]) 2023   • Tobacco use 01/10/2023   • Vulvar itching 01/10/2023   • SVT (supraventricular tachycardia) ([address]) 2022   • Venous insufficiency of both lower extremities 2022   • Trigger finger of right thumb 2021   • NICM (nonischemic cardiomyopathy) ([address]) 2021   • Peripheral arterial disease ([address]) 2021   • Breast cancer screening by mammogram 10/14/2021   • Bilateral leg edema 2021   • Breast cancer ([address]) - left 1994 2021   • Primary hypertension 2021   • S/P left mastectomy 2021   • Vitamin D deficiency 2021   • Hyperparathyroidism ([address]) 2020   • Coronary artery disease involving native coronary artery of native heart without angina pectoris 2020   • Chronic HFrEF (heart failure with reduced ejection fraction) ([address]) 2020   • CKD (chronic kidney disease) stage 3, GFR 30-59 ml/min ([address]) 2020   • Hypercalcemia 11/15/2020   • Microalbuminuria 2020   • Medicare annual wellness visit, subsequent 04/24/2020   • Screening for cardiovascular, respiratory, and genitourinary diseases 10/24/2018   • Immunization due 10/24/2018   • Colon cancer screening 10/24/2018   • Menopause 10/24/2018   • Hyperlipidemia LDL goal <100 12/07/2015   • DM2 (diabetes mellitus, type 2) ([facility]) 01/28/2015   • History of left breast cancer 09/04/2012   • Hypertensive heart and kidney disease with heart failure and with chronic kidney disease stage III ([facility]) 09/04/2012   • Colon, diverticulosis 09/04/2012      LOS (days): 5  Geometric Mean LOS (GMLOS) (days): 3.00  Days to GMLOS:-1.8     OBJECTIVE:    Risk of Unplanned Readmission Score: 18.29         Current admission status: Inpatient       Preferred Pharmacy:   [id number]- [place] - [address]  Phone: [phone] Fax: [phone]    Primary Care Provider: Yenny Kenyon DO    Primary Insurance: [facility] [employer]  Secondary Insurance:     ASSESSMENT:  [address], [address] [facility]   Primary Phone: [phone] (Mobile)                              Patient Information  Admitted from[de-identified] Home  Mental Status: Alert  During Assessment patient was accompanied by: Not accompanied during assessment  Assessment information provided by[de-identified] Patient  Primary Caregiver: Self  Support Systems: Family members  [place] of Residence: [address] do you live in?: [facility] entry access options.  Select all that apply.: Stairs  Number of steps to enter home.: 5  Type of Current Residence: Bi-level  Upon entering residence, is there a bedroom on the main floor (no further steps)?: No  A bedroom is located on the following floor levels of residence (select all that apply):: 2nd Floor  Upon entering residence, is there a bathroom on the main floor (no further steps)?: No  Indicate which floors of current residence have a bathroom (select all the apply):: 2nd Floor  Number of steps to 2nd floor from main floor: 5  In the last 12 months, was there a time when you were not able to pay the mortgage or rent on time?: No  In the last 12 months, was there a time when you did not have a steady place to sleep or slept in a shelter (including now)?: No  Living Arrangements: Lives w/ Extended Family    Activities of Daily Living Prior to Admission  Functional Status: Independent  Completes ADLs independently?: Yes  Ambulates independently?: Yes  Does patient use assisted devices?: Yes  Assisted Devices (DME) used: Elsa Burows  Does patient currently own DME?: Yes  What DME does the patient currently own?: Elsa Burows  Does patient have a history of Outpatient Therapy (PT/OT)?: No  Does the patient have a history of Short-Term Rehab?: No  Does patient have a history of HHC?: Yes ([facility])  Does patient currently have [address]?: No         Patient Information Continued  Within the past 12 months, you worried that your food would run out before you got the money to buy more.: Never true  Within the past 12 months, the food you bought just didn't last and you didn't have money to get more.: Never true  Food insecurity resource given?: N/A         Means of Transportation  Means of Transport to Appts[de-identified] Family transport  In the past 12 months, has lack of transportation kept you from medical appointments or from getting medications?: No  In the past 12 months, has lack of transportation kept you from meetings, work, or from getting things needed for daily living?: No  Was application for public transport provided?: N/A    CM met with patient re: assessment. Pt lives with niece and two nephews in bi-level home, 5 CEE then another 5 steps up to main floor bed/bath. Patient generally independent with ADLS but has help from her family to get into and out of the shower and w/ bathing if needed.  Pt primarily utilizes her RW to ambulate but also has a cane her sister just bought for her recently. Patient reports hx of HHC with [facility] - preferred choice if HHC rcmd. Confirmed PCP ([address]) and preferred pharmacy ([facility]). Family provides pt transport to appointments. No CM dc needs currently identified, CM remains available.

## 2023-07-25 NOTE — ASSESSMENT & PLAN NOTE
· Symptomatic anemia with dizziness weakness and fatigue found to have a hemoglobin of 6.4 which is down from 16 in April. She had 1 episode of intermittent black following smelling stools. Risk factors include Advil use, aspirin and Eliquis. · Received 2 units transfusion and hemoglobin remained stable above 8  · hgb now stable between 9-10  · S/p EGD and colonoscopy  • EGD: Mild erythematous mucosa in the prepyloric region; performed cold forceps biopsy. The duodenal bulb, 1st part of the duodenum and 2nd part of the duodenum appeared normal.  • Colonoscopy: The terminal ileum and entire colon appeared normal. Few scattered diverticula of mild severity in the sigmoid colon  · D/w Gi, okay to restart anticoagulation. continue daily PPI. Follow biopsy results.   · Trend daily CBC

## 2023-07-26 ENCOUNTER — TELEPHONE (OUTPATIENT)
Dept: CARDIOLOGY CLINIC | Facility: CLINIC | Age: 68
End: 2023-07-26

## 2023-07-26 PROCEDURE — 88305 TISSUE EXAM BY PATHOLOGIST: CPT | Performed by: SPECIALIST

## 2023-07-26 NOTE — TELEPHONE ENCOUNTER
Patient called asking if office received medical assistance paper work that was Eric Riley' d from [facility] to office FAX# [phone]. Patient states form was Eric Barker d to this # twice. Patient stated that [facility] is waiting for completed forms by Dr García Espinoza, so patient can receive medical assistance. Requested that patient notify [facility] to re FAX form attention Dr García Espinoza. Patient stated she will have them do this.

## 2023-07-26 NOTE — UTILIZATION REVIEW
NOTIFICATION OF ADMISSION DISCHARGE   This is a Notification of Discharge from [address] [place] [address]. Please be advised that this patient has been discharge from our facility. Below you will find the admission and discharge date and time including the patient’s disposition. UTILIZATION REVIEW CONTACT:  Gretchen Chandler MA  Utilization   Network Utilization Review Department  Phone: [phone] x carefully listen to the prompts. All voicemails are confidential.  Email: [email or web address]     ADMISSION INFORMATION  PRESENTATION DATE: 7/20/2023 11:23 AM  OBERVATION ADMISSION DATE:   INPATIENT ADMISSION DATE: 7/20/23  1:22 PM   DISCHARGE DATE: 7/25/2023 12:32 PM   DISPOSITION:Home/Self Care    IMPORTANT INFORMATION:  Send all requests for admission clinical reviews, approved or denied determinations and any other requests to dedicated fax number below belonging to the campus where the patient is receiving treatment.  List of dedicated fax numbers:  [place] DENIALS (Administrative/Medical Necessity) [phone] [address] [facility] (Maternity/NICU/Pediatrics) [phone]   [facility] [phone]   [facility] [phone] [address] [phone] [address] [facility] [phone]   [facility] [phone]   [address] [facility] 608 [facility] [phone]   [address] [facility] [phone] 3[address] [facility] [phone] 27[address] [facility] [address] [facility] [phone]

## 2023-07-26 NOTE — TELEPHONE ENCOUNTER
----- Message from Kathleen Lancaster MD sent at 7/25/2023 10:21 AM EDT -----  Regarding: Discharged from hospital on 7/25  Thank you for allowing us to participate in the care of your patient, Qasim Perkins, who was hospitalized from 7/20/2023 through 7/25/2023 with the admitting diagnosis of anemia. Requires transfusion. Underwent EGD and colonoscopy given melena and anemia. Both no conclusive, hgb stabilized. Eliquis resumed. OP follow up with GI for biopsy results. If you have any additional questions or would like to discuss further, please feel free to contact me.     Kathleen Lancaster MD  [facility] Internal Medicine, Hospitalist  [phone]

## 2023-07-28 ENCOUNTER — TELEPHONE (OUTPATIENT)
Dept: FAMILY MEDICINE CLINIC | Facility: CLINIC | Age: 68
End: 2023-07-28

## 2023-07-28 NOTE — TELEPHONE ENCOUNTER
The patient is coming in for a pre op on 8/3/23. I spoke to her and went through the TCM questions. I attempted to schedule her for a separate day for the TCM, but she stated it is very hard for her to get a ride. She wanted me to ask if it is okay to do both visits together?     Perfecto Cunha LPN

## 2023-07-31 ENCOUNTER — NURSE TRIAGE (OUTPATIENT)
Age: 68
End: 2023-07-31

## 2023-07-31 ENCOUNTER — HOSPITAL ENCOUNTER (OUTPATIENT)
Facility: HOSPITAL | Age: 68
Setting detail: OBSERVATION
Discharge: HOME | End: 2023-08-01
Attending: EMERGENCY MEDICINE | Admitting: GENERAL PRACTICE
Payer: COMMERCIAL

## 2023-07-31 DIAGNOSIS — D64.9 SYMPTOMATIC ANEMIA: ICD-10-CM

## 2023-07-31 DIAGNOSIS — K92.1 MELENA: Primary | ICD-10-CM

## 2023-07-31 DIAGNOSIS — D64.9 ANEMIA: ICD-10-CM

## 2023-07-31 DIAGNOSIS — K92.1 BLACK STOOL: ICD-10-CM

## 2023-07-31 LAB
ALBUMIN SERPL BCP-MCNC: 3.2 G/DL (ref 3.5–5)
ALP SERPL-CCNC: 155 U/L (ref 34–104)
ALT SERPL W P-5'-P-CCNC: 23 U/L (ref 7–52)
ANION GAP SERPL CALCULATED.3IONS-SCNC: 5 MMOL/L
AST SERPL W P-5'-P-CCNC: 20 U/L (ref 13–39)
BASOPHILS # BLD AUTO: 0.03 THOUSANDS/ÂΜL (ref 0–0.1)
BASOPHILS NFR BLD AUTO: 0 % (ref 0–1)
BILIRUB SERPL-MCNC: 0.45 MG/DL (ref 0.2–1)
BUN SERPL-MCNC: 23 MG/DL (ref 5–25)
CALCIUM ALBUM COR SERPL-MCNC: 9.7 MG/DL (ref 8.3–10.1)
CALCIUM SERPL-MCNC: 9.1 MG/DL (ref 8.4–10.2)
CHLORIDE SERPL-SCNC: 106 MMOL/L (ref 96–108)
CO2 SERPL-SCNC: 27 MMOL/L (ref 21–32)
CREAT SERPL-MCNC: 1.01 MG/DL (ref 0.6–1.3)
EOSINOPHIL # BLD AUTO: 0.1 THOUSAND/ÂΜL (ref 0–0.61)
EOSINOPHIL NFR BLD AUTO: 1 % (ref 0–6)
ERYTHROCYTE [DISTWIDTH] IN BLOOD BY AUTOMATED COUNT: 20.6 % (ref 11.6–15.1)
GFR SERPL CREATININE-BSD FRML MDRD: 56 ML/MIN/1.73SQ M
GLUCOSE SERPL-MCNC: 116 MG/DL (ref 65–140)
GLUCOSE SERPL-MCNC: 241 MG/DL (ref 65–140)
GLUCOSE SERPL-MCNC: 86 MG/DL (ref 65–140)
HCT VFR BLD AUTO: 32.2 % (ref 34.8–46.1)
HGB BLD-MCNC: 9.7 G/DL (ref 11.5–15.4)
IMM GRANULOCYTES # BLD AUTO: 0.02 THOUSAND/UL (ref 0–0.2)
IMM GRANULOCYTES NFR BLD AUTO: 0 % (ref 0–2)
LIPASE SERPL-CCNC: 53 U/L (ref 11–82)
LYMPHOCYTES # BLD AUTO: 1.41 THOUSANDS/ÂΜL (ref 0.6–4.47)
LYMPHOCYTES NFR BLD AUTO: 19 % (ref 14–44)
MCH RBC QN AUTO: 24.9 PG (ref 26.8–34.3)
MCHC RBC AUTO-ENTMCNC: 30.1 G/DL (ref 31.4–37.4)
MCV RBC AUTO: 83 FL (ref 82–98)
MONOCYTES # BLD AUTO: 0.5 THOUSAND/ÂΜL (ref 0.17–1.22)
MONOCYTES NFR BLD AUTO: 7 % (ref 4–12)
NEUTROPHILS # BLD AUTO: 5.28 THOUSANDS/ÂΜL (ref 1.85–7.62)
NEUTS SEG NFR BLD AUTO: 73 % (ref 43–75)
NRBC BLD AUTO-RTO: 0 /100 WBCS
PLATELET # BLD AUTO: 316 THOUSANDS/UL (ref 149–390)
PMV BLD AUTO: 10.4 FL (ref 8.9–12.7)
POTASSIUM SERPL-SCNC: 3.9 MMOL/L (ref 3.5–5.3)
PROT SERPL-MCNC: 6.6 G/DL (ref 6.4–8.4)
RBC # BLD AUTO: 3.9 MILLION/UL (ref 3.81–5.12)
SODIUM SERPL-SCNC: 138 MMOL/L (ref 135–147)
WBC # BLD AUTO: 7.34 THOUSAND/UL (ref 4.31–10.16)

## 2023-07-31 PROCEDURE — 85025 COMPLETE CBC W/AUTO DIFF WBC: CPT | Performed by: EMERGENCY MEDICINE

## 2023-07-31 PROCEDURE — 99284 EMERGENCY DEPT VISIT MOD MDM: CPT

## 2023-07-31 PROCEDURE — 82272 OCCULT BLD FECES 1-3 TESTS: CPT

## 2023-07-31 PROCEDURE — 80053 COMPREHEN METABOLIC PANEL: CPT | Performed by: EMERGENCY MEDICINE

## 2023-07-31 PROCEDURE — 83690 ASSAY OF LIPASE: CPT | Performed by: EMERGENCY MEDICINE

## 2023-07-31 PROCEDURE — 82948 REAGENT STRIP/BLOOD GLUCOSE: CPT

## 2023-07-31 PROCEDURE — 99223 1ST HOSP IP/OBS HIGH 75: CPT | Performed by: GENERAL PRACTICE

## 2023-07-31 PROCEDURE — 99285 EMERGENCY DEPT VISIT HI MDM: CPT | Performed by: EMERGENCY MEDICINE

## 2023-07-31 PROCEDURE — C9113 INJ PANTOPRAZOLE SODIUM, VIA: HCPCS

## 2023-07-31 PROCEDURE — 36415 COLL VENOUS BLD VENIPUNCTURE: CPT

## 2023-07-31 PROCEDURE — 96374 THER/PROPH/DIAG INJ IV PUSH: CPT

## 2023-07-31 RX ORDER — METOPROLOL SUCCINATE 50 MG/1
50 TABLET, EXTENDED RELEASE ORAL DAILY
Status: DISCONTINUED | OUTPATIENT
Start: 2023-08-01 | End: 2023-08-01 | Stop reason: HOSPADM

## 2023-07-31 RX ORDER — NICOTINE 21 MG/24HR
1 PATCH, TRANSDERMAL 24 HOURS TRANSDERMAL DAILY
Status: DISCONTINUED | OUTPATIENT
Start: 2023-08-01 | End: 2023-08-01 | Stop reason: HOSPADM

## 2023-07-31 RX ORDER — ATORVASTATIN CALCIUM 40 MG/1
40 TABLET, FILM COATED ORAL DAILY
Status: DISCONTINUED | OUTPATIENT
Start: 2023-08-01 | End: 2023-08-01 | Stop reason: HOSPADM

## 2023-07-31 RX ORDER — PANTOPRAZOLE SODIUM 40 MG/1
40 TABLET, DELAYED RELEASE ORAL DAILY
Status: DISCONTINUED | OUTPATIENT
Start: 2023-08-01 | End: 2023-07-31

## 2023-07-31 RX ORDER — INSULIN LISPRO 100 [IU]/ML
1-5 INJECTION, SOLUTION INTRAVENOUS; SUBCUTANEOUS
Status: DISCONTINUED | OUTPATIENT
Start: 2023-07-31 | End: 2023-07-31

## 2023-07-31 RX ORDER — INSULIN LISPRO 100 [IU]/ML
1-5 INJECTION, SOLUTION INTRAVENOUS; SUBCUTANEOUS
Status: DISPENSED | OUTPATIENT
Start: 2023-07-31 | End: 2023-08-01

## 2023-07-31 RX ORDER — INSULIN LISPRO 100 [IU]/ML
1-5 INJECTION, SOLUTION INTRAVENOUS; SUBCUTANEOUS EVERY 6 HOURS SCHEDULED
Status: DISCONTINUED | OUTPATIENT
Start: 2023-08-01 | End: 2023-08-01

## 2023-07-31 RX ORDER — PANTOPRAZOLE SODIUM 40 MG/10ML
40 INJECTION, POWDER, LYOPHILIZED, FOR SOLUTION INTRAVENOUS EVERY 12 HOURS SCHEDULED
Status: DISCONTINUED | OUTPATIENT
Start: 2023-07-31 | End: 2023-08-01 | Stop reason: HOSPADM

## 2023-07-31 RX ORDER — ENOXAPARIN SODIUM 100 MG/ML
50 INJECTION SUBCUTANEOUS EVERY 12 HOURS SCHEDULED
Status: DISCONTINUED | OUTPATIENT
Start: 2023-07-31 | End: 2023-07-31

## 2023-07-31 RX ADMIN — PANTOPRAZOLE SODIUM 40 MG: 40 INJECTION, POWDER, FOR SOLUTION INTRAVENOUS at 15:15

## 2023-07-31 RX ADMIN — IRON SUCROSE 300 MG: 20 INJECTION, SOLUTION INTRAVENOUS at 19:32

## 2023-07-31 NOTE — TELEPHONE ENCOUNTER
Patient calling in, was in the ED on 7/20/23 for low hgb and black stools. She has been home for 5 days and noticed yesterday she is having black stools again x2. No lightheadedness/ dizziness. Per protocol patient is to go back to the ED. She understood and will be going to [place]. I did give her CS number as well to schedule Capsule endoscopy as ordered.        Reason for Disposition  • Bloody, black, or tarry bowel movements (Exception: chronic-unchanged black-grey bowel movements and is taking iron pills or Pepto-Bismol)    Protocols used: RECTAL BLEEDING-ADULT-[place]

## 2023-07-31 NOTE — PLAN OF CARE
Problem: PAIN - ADULT  Goal: Verbalizes/displays adequate comfort level or baseline comfort level  Description: Interventions:  - Encourage patient to monitor pain and request assistance  - Assess pain using appropriate pain scale  - Administer analgesics based on type and severity of pain and evaluate response  - Implement non-pharmacological measures as appropriate and evaluate response  - Consider cultural and social influences on pain and pain management  - Notify physician/advanced practitioner if interventions unsuccessful or patient reports new pain  Outcome: Progressing     Problem: INFECTION - ADULT  Goal: Absence or prevention of progression during hospitalization  Description: INTERVENTIONS:  - Assess and monitor for signs and symptoms of infection  - Monitor lab/diagnostic results  - Monitor all insertion sites, i.e. indwelling lines, tubes, and drains  - Monitor endotracheal if appropriate and nasal secretions for changes in amount and color  - [place] appropriate cooling/warming therapies per order  - Administer medications as ordered  - Instruct and encourage patient and family to use good hand hygiene technique  - Identify and instruct in appropriate isolation precautions for identified infection/condition  Outcome: Progressing     Problem: DISCHARGE PLANNING  Goal: Discharge to home or other facility with appropriate resources  Description: INTERVENTIONS:  - Identify barriers to discharge w/patient and caregiver  - Arrange for needed discharge resources and transportation as appropriate  - Identify discharge learning needs (meds, wound care, etc.)  - Arrange for interpretive services to assist at discharge as needed  - Refer to Case Management Department for coordinating discharge planning if the patient needs post-hospital services based on physician/advanced practitioner order or complex needs related to functional status, cognitive ability, or social support system  Outcome: Progressing Problem: Knowledge Deficit  Goal: Patient/family/caregiver demonstrates understanding of disease process, treatment plan, medications, and discharge instructions  Description: Complete learning assessment and assess knowledge base.   Interventions:  - Provide teaching at level of understanding  - Provide teaching via preferred learning methods  Outcome: Progressing

## 2023-07-31 NOTE — ASSESSMENT & PLAN NOTE
Lab Results   Component Value Date    EGFR 56 07/31/2023    EGFR 69 07/24/2023    EGFR 59 07/23/2023    CREATININE 1.01 07/31/2023    CREATININE 0.85 07/24/2023    CREATININE 0.97 07/23/2023     Assessment:  · Creatinine has been stable at 1.01  · eGFR at 56    Plan:  · Continue to monitor renal function  · Will avoid nephrotoxic agents

## 2023-07-31 NOTE — ED ATTENDING ATTESTATION
7/31/2023  IMary Kate MD, saw and evaluated the patient. I have discussed the patient with the resident/non-physician practitioner and agree with the resident's/non-physician practitioner's findings, Plan of Care, and MDM as documented in the resident's/non-physician practitioner's note, except where noted. All available labs and Radiology studies were reviewed. I was present for key portions of any procedure(s) performed by the resident/non-physician practitioner and I was immediately available to provide assistance. At this point I agree with the current assessment done in the Emergency Department. I have conducted an independent evaluation of this patient a history and physical is as follows:    38-year-old female with history of A-fib on Eliquis, recently admitted to the hospital for melanotic stool with discharge 6 days ago. During that admission she had an EGD and colonoscopy. EGD showed mild erythematous mucosa in the prepyloric region without active bleeding, colonoscopy was normal.  She received 2 units of packed red blood cells while admitted. She initially did well after discharge, but then yesterday had 2 black stools. Denies any abdominal pain. When she called her GI office this morning, they recommended that she come to the emergency department for reevaluation. Denies fatigue, lightheadedness, or dizziness. Physical Exam  Constitutional:       General: She is not in acute distress. Appearance: She is well-developed. She is not diaphoretic. HENT:      Head: Normocephalic and atraumatic. Right Ear: External ear normal.      Left Ear: External ear normal.      Nose: Nose normal.   Eyes:      Conjunctiva/sclera: Conjunctivae normal.   Cardiovascular:      Rate and Rhythm: Normal rate and regular rhythm. Heart sounds: Normal heart sounds. No murmur heard. No friction rub. No gallop. Pulmonary:      Effort: Pulmonary effort is normal. No respiratory distress. Breath sounds: Normal breath sounds. No wheezing or rales. Abdominal:      General: Bowel sounds are normal. There is no distension. Palpations: Abdomen is soft. Tenderness: There is no abdominal tenderness. There is no guarding. Genitourinary:     Comments: Rectal exam performed by resident physician. Showed dark red stool that was Hemoccult positive. Musculoskeletal:         General: No deformity. Normal range of motion. Cervical back: Normal range of motion and neck supple. Skin:     General: Skin is warm and dry. Neurological:      Mental Status: She is alert and oriented to person, place, and time. Motor: No abnormal muscle tone. Psychiatric:         Mood and Affect: Mood normal.           ED Course  ED Course as of 07/31/23 1734   Mon Jul 31, 2023   1517 Dark red Hemoccult positive stool present in rectal vault. Hemoglobin is 9.7, stable from the time of discharge. Abdomen is completely benign to deep palpation, patient denies any abdominal pain, doubt intra-abdominal surgical process or mesenteric ischemia. Will start Protonix and admit for GI bleed of unknown source in this patient who is anticoagulated. GI was notified via [facility] regarding the patient's admission. She is currently hemodynamically stable. 36 Per [facility] discussion with Dr. Lizzy Vera from GI, patient was given dose of IV Protonix but will hold off on Protonix infusion at this time.          Critical Care Time  Procedures

## 2023-07-31 NOTE — ASSESSMENT & PLAN NOTE
Assessment:   · Has been having dark stools for past month and a half  · Has been having mild fatigue, weakness and increased pallor  · Recent EGD on 7/21 found mild erythematous mucosa in prepyloric region and a biopsy was performed to rule out H. Pylori, a biopsy of duodenum was done to rule out celiac disease  · Colonoscopy 7/24 showed few scattered diverticula of mild severity in sigmoid colon  · ED doctor performed digital rectal exam and found melanotic stool  · Iron studies done 7/20 showed iron saturation of 5%, iron 22, ferritin 5, TIBC of 419  · CBC showed a MCV of 83  · If hemoglobin stable will likely discharge tomorrow  · If Hgb not stable will likely try to have GI scope her sooner  · Consider stopping aspirin indefinitely as no strong indication for aspirin at this time as she has nonocclusive CAD      Plan:  · Will hold aspirin 81 mg and apixiban 5 mg BID doses for now  · Will order venofer 300 mg once  · Continue to monitor Hgb with daily CBC  · Consult GI to see if they can move capsule endoscopy up

## 2023-07-31 NOTE — ASSESSMENT & PLAN NOTE
Assessment:  · Has been having dark stools for past month and a half  · Has been having mild fatigue, weakness and increased pallor  · Recent EGD on 7/21 found mild erythematous mucosa in prepyloric region and a biopsy was performed to rule out H. Pylori, a biopsy of duodenum was done to rule out celiac disease  · Colonoscopy 7/24 showed few scattered diverticula of mild severity in sigmoid colon  · ED doctor performed digital rectal exam and found melanotic stool      Plan:  · See plan for symptomatic anemia

## 2023-07-31 NOTE — H&P
[facility]  H&P  Name: Kayla Luna 77 y.o. female I MRN: [id number]  Unit/Bed#: STEFANY I Date of Admission: 7/31/2023   Date of Service: 7/31/2023 I Hospital Day: 0      Assessment/Plan   * Symptomatic anemia  Assessment & Plan  Assessment:   · Has been having dark stools for past month and a half  · Has been having mild fatigue, weakness and increased pallor  · Recent EGD on 7/21 found mild erythematous mucosa in prepyloric region and a biopsy was performed to rule out H. Pylori, a biopsy of duodenum was done to rule out celiac disease  · Colonoscopy 7/24 showed few scattered diverticula of mild severity in sigmoid colon  · ED doctor performed digital rectal exam and found melanotic stool  · Iron studies done 7/20 showed iron saturation of 5%, iron 22, ferritin 5, TIBC of 419  · CBC showed a MCV of 83  · If hemoglobin stable will likely discharge tomorrow  · If Hgb not stable will likely try to have GI scope her sooner  · Consider stopping aspirin indefinitely as no strong indication for aspirin at this time as she has nonocclusive CAD      Plan:  · Will hold aspirin 81 mg and apixiban 5 mg BID doses for now  · Will order venofer 300 mg once  · Continue to monitor Hgb with daily CBC  · Consult GI to see if they can move capsule endoscopy up      Black stool  Assessment & Plan  Assessment:  · Has been having dark stools for past month and a half  · Has been having mild fatigue, weakness and increased pallor  · Recent EGD on 7/21 found mild erythematous mucosa in prepyloric region and a biopsy was performed to rule out H. Pylori, a biopsy of duodenum was done to rule out celiac disease  · Colonoscopy 7/24 showed few scattered diverticula of mild severity in sigmoid colon  · ED doctor performed digital rectal exam and found melanotic stool      Plan:  · See plan for symptomatic anemia    Chronic HFrEF (heart failure with reduced ejection fraction) ([facility])  Assessment & Plan  Wt Readings from Last 3 Encounters:   07/20/23 50.3 kg (111 lb)   07/18/23 50.3 kg (111 lb)   07/11/23 48.7 kg (107 lb 4.8 oz)     Assessment:  · Patient not appearing fluid overloaded  · No JVD or bilateral leg edema appreciated  · No crackles heard on auscultation of lungs  · Reports only taking lasix when she has leg swelling and will take it for 3 days then stop  · Previous ECHO in April 2023 showed EF of 35 percent    Plan:  · Will hold her lasix 20 mg        Tobacco use  Assessment & Plan  · Continue 14 mg nicotine patch    CKD (chronic kidney disease) stage 3, GFR 30-59 ml/min [facility])  Assessment & Plan  Lab Results   Component Value Date    EGFR 56 07/31/2023    EGFR 69 07/24/2023    EGFR 59 07/23/2023    CREATININE 1.01 07/31/2023    CREATININE 0.85 07/24/2023    CREATININE 0.97 07/23/2023     Assessment:  · Creatinine has been stable at 1.01  · eGFR at 56    Plan:  · Continue to monitor renal function  · Will avoid nephrotoxic agents    Hypertensive heart and kidney disease with heart failure and with chronic kidney disease stage III (HCC)  Assessment & Plan  Wt Readings from Last 3 Encounters:   07/20/23 50.3 kg (111 lb)   07/18/23 50.3 kg (111 lb)   07/11/23 48.7 kg (107 lb 4.8 oz)     Assessment:  · BP was high at 180/89 today  · Other reads were in acceptable range    Plan:  · Continue to monitor BPs  · Will continue metoprolol 50 mg daily             VTE Pharmacologic Prophylaxis: VTE Score: 3 Moderate Risk (Score 3-4) - Pharmacological DVT Prophylaxis Ordered: enoxaparin (Lovenox). Code Status: Level 1 - Full Code   Discussion with family: Patient declined call to . Anticipated Length of Stay: Patient will be admitted on an observation basis with an anticipated length of stay of less than 2 midnights secondary to observation for Hgb level.     Chief Complaint: Bloody stool    History of Present Illness:  Qasim Perkins is a 77 y.o. female with a PMH of breast cancer s/p lumpectomy 20 years ago, type 2 diabetes with 8.4, GERD, HTN, CKD 3A, systolic heart failure with reduced EF of 35% with pacemaker who presents with dark stools for the past month and half. She says that there were times when she would go 7 days without having a BM. She reports that during her last hospital stay on July 20th for symptomatic anemia as she was having fatigue. Hgb during that stay dropped to as low as 6.0 and she received two units of blood at that time. She was taken off eliquis due to her being scheduled for colonoscopy and EGD. After EGD and Colonoscopy resulted largely unremarkable she was discharged and reported no issues with dark stools. On the day she left, she immediately resumed her eliquis and a few days later she started noticing having melanotic stools yet again. Now she has returned because she was nervous she was losing blood again although she felt asymptomatic. In the ED, the doctor did a digital rectal exam and found dark stool. She was admitted to observe her hemoglobin levels tomorrow morning. Review of Systems:  Review of Systems   Constitutional: Positive for fatigue. Negative for activity change, appetite change, chills, diaphoresis, fever and unexpected weight change. HENT: Negative. Eyes: Negative. Respiratory: Negative. Cardiovascular: Negative. Gastrointestinal: Positive for blood in stool. Negative for abdominal distention, abdominal pain, anal bleeding, constipation, diarrhea, nausea, rectal pain and vomiting. Endocrine: Positive for cold intolerance. Negative for polydipsia, polyphagia and polyuria. Genitourinary: Negative. Musculoskeletal: Negative. Skin: Positive for pallor. Allergic/Immunologic: Negative. Neurological: Positive for weakness. Hematological: Negative. Psychiatric/Behavioral: Negative. Past Medical and Surgical History:   Past Medical History:   Diagnosis Date   • Breast cancer ([address] [facility])     left - 1994.    • Diabetes mellitus ([address] [facility])    • Diabetes mellitus, type 2 ([address] [facility]) 03/15/2006    last assessed 7/10/13   • GERD (gastroesophageal reflux disease)    • History of chemotherapy    • Hypertension        Past Surgical History:   Procedure Laterality Date   • CARDIAC ELECTROPHYSIOLOGY PROCEDURE N/A 3/9/2022    Procedure: Cardiac icd implant/ DUAL CHAMBER ICD; Surgeon: Noam Hernándze MD;  Location: [facility]; Service: Cardiology   • MASTECTOMY Left     last assessed 12/16/14   • MASTECTOMY, RADICAL Left     1994   • MT PARATHYROIDECTOMY/EXPLORATION PARATHYROIDS N/A 4/21/2021    Procedure: PARATHYROIDECTOMY POSSIBLE 4 GLAND EXPLORATION;  Surgeon: Forest Stevenson MD;  Location: [facility];  Service: ENT   • REDUCTION MAMMAPLASTY Right    • UVULECTOMY         Meds/Allergies:  Prior to Admission medications    Medication Sig Start Date End Date Taking? Authorizing Provider   albuterol (Ventolin HFA) 90 mcg/act inhaler Inhale 2 puffs every 6 (six) hours as needed for wheezing (rinse mouth after use) 8/24/22   Uche Kemp DO   apixaban (Eliquis) 5 mg Take 1 tablet (5 mg total) by mouth 2 (two) times a day 6/28/23   Lala Regalado MD   Aspirin Low Dose 81 MG EC tablet TAKE 1 TABLET BY MOUTH EVERY DAY 3/13/23   Lala Regalado MD   atorvastatin (LIPITOR) 40 mg tablet Take 1 tablet (40 mg total) by mouth daily 6/20/23 7/28/23  Uche Kemp DO   Blood Glucose Monitoring Suppl (OneTouch Verio Reflect) w/Device KIT Check blood sugars three times daily. Please substitute with appropriate alternative as covered by patient's insurance.  Dx: E11.65 5/5/23   Uche Kemp DO   cholecalciferol (VITAMIN D3) 1,000 units tablet Take 2 tablets (2,000 Units total) by mouth daily  Patient not taking: Reported on 7/28/2023 8/23/22   Sulema Valentin DO   Continuous Blood Gluc  (FreeStyle [place] 2 [place]) KEELY Use 1 Units continuous 6/19/23   Harshal Roberts MD   Continuous Blood Gluc Sensor (FreeStyle Frank 2 Sensor) MISC Use 1 Units every 14 (fourteen) days 6/19/23   Bipin Alvarez MD   dapagliflozin (Farxiga) 5 MG TABS Take 5 mg by mouth in the morning. Indications: Cardiac Failure    Historical Provider, MD   Flovent  MCG/ACT inhaler Inhale 1 puff 2 (two) times a day PT NOT TAKING  Patient not taking: Reported on 7/28/2023 8/24/22   Historical Provider, MD   furosemide (LASIX) 20 mg tablet TAKE 1 TABLET BY MOUTH DAILY AS NEEDED (WT GAIN 3 LB/ 24 HOURS / TAKE FOR 3 DAYS IN A ROW) 6/28/23   Zhen Oglesby MD   glucose blood (OneTouch Verio) test strip Check blood sugars three times daily. Please substitute with appropriate alternative as covered by patient's insurance. Dx: E11.65 5/5/23   Rebecca Martino DO   Insulin Pen Needle (BD Pen Needle Kateryna U/F) 32G X 4 MM MISC Use daily as directed with insulin pen 5/3/23   Susy Barnes MD   metFORMIN (GLUCOPHAGE) 500 mg tablet TAKE 1 TABLET BY MOUTH TWICE A DAY WITH MEALS 6/20/23   Bipin Alvarez MD   metoprolol succinate (TOPROL-XL) 50 mg 24 hr tablet Take 1 tablet (50 mg total) by mouth daily 7/25/23 11/22/23  Zhen Oglesby MD   nystatin-triamcinolone (MYCOLOG-II) ointment Apply topically 2 (two) times a day To vaginal area  Patient taking differently: Apply topically if needed To vaginal area 1/10/23   Rebecca Martino DO   OneTouch Delica Lancets 74S MISC Check blood sugars three times daily. Please substitute with appropriate alternative as covered by patient's insurance. Dx: E11.65 5/5/23   Rebecca Martino DO   pantoprazole (PROTONIX) 40 mg tablet Take 1 tablet (40 mg total) by mouth daily 7/11/23   Vic Cho MD   saccharomyces boulardii (FLORASTOR) 250 mg capsule Take 1 capsule (250 mg total) by mouth 2 (two) times a day 5/26/23   Rebecca Martino DO     I have reviewed home medications with patient personally.     Allergies: No Known Allergies    Social History:  Marital Status: Single   Occupation: Retired   Patient Pre-hospital Living Situation: Home  Patient Pre-hospital Level of Mobility: walks with cane  Patient Pre-hospital Diet Restrictions: None  Substance Use History:   Social History     Substance and Sexual Activity   Alcohol Use Never     Social History     Tobacco Use   Smoking Status Some Days   • Packs/day: 0.25   • Years: 50.00   • Total pack years: 12.50   • Types: Cigarettes   • Start date: 6/15/1973   • Last attempt to quit: 2023   • Years since quittin.2   Smokeless Tobacco Never   Tobacco Comments    2 cigarettes daily      Social History     Substance and Sexual Activity   Drug Use No       Family History:  Family History   Problem Relation Age of Onset   • Hypothyroidism Mother    • Leukemia Mother    • Diabetes Father    • Diabetes type II Father    • Stroke Sister    • Diabetes Paternal Grandmother    • Cancer Sister    • Diabetes Brother        Physical Exam:     Vitals:   Blood Pressure: (!) 180/89 (23 151)  Pulse: 71 (23 151)  Temperature: 98.2 °F (36.8 °C) (23 1128)  Temp Source: Oral (23 1128)  Respirations: 18 (23 151)  SpO2: 99 % (23 151)    Physical Exam  Vitals and nursing note reviewed. Constitutional:       General: She is not in acute distress. Appearance: She is not ill-appearing or toxic-appearing. HENT:      Head: Normocephalic. Nose: Nose normal. No congestion or rhinorrhea. Mouth/Throat:      Mouth: Mucous membranes are moist.      Pharynx: Oropharynx is clear. Eyes:      Extraocular Movements: Extraocular movements intact. Conjunctiva/sclera: Conjunctivae normal.   Cardiovascular:      Rate and Rhythm: Normal rate and regular rhythm. Pulses: Normal pulses. Heart sounds: Normal heart sounds. No murmur heard. No friction rub. No gallop. Pulmonary:      Effort: Pulmonary effort is normal.      Breath sounds: Normal breath sounds. No wheezing, rhonchi or rales. Abdominal:      General: Bowel sounds are normal. There is no distension.       Palpations: Abdomen is soft.      Tenderness: There is no abdominal tenderness. There is no guarding or rebound. Genitourinary:     Rectum: Normal.   Musculoskeletal:         General: No swelling. Normal range of motion. Cervical back: Normal range of motion and neck supple. Right lower leg: No edema. Left lower leg: No edema. Skin:     General: Skin is warm. Capillary Refill: Capillary refill takes less than 2 seconds. Neurological:      Mental Status: She is alert and oriented to person, place, and time. Psychiatric:         Mood and Affect: Mood normal.         Behavior: Behavior normal.         Additional Data:     Lab Results:  Results from last 7 days   Lab Units 07/31/23  1225   WBC Thousand/uL 7.34   HEMOGLOBIN g/dL 9.7*   HEMATOCRIT % 32.2*   PLATELETS Thousands/uL 316   NEUTROS PCT % 73   LYMPHS PCT % 19   MONOS PCT % 7   EOS PCT % 1     Results from last 7 days   Lab Units 07/31/23  1225   SODIUM mmol/L 138   POTASSIUM mmol/L 3.9   CHLORIDE mmol/L 106   CO2 mmol/L 27   BUN mg/dL 23   CREATININE mg/dL 1.01   ANION GAP mmol/L 5   CALCIUM mg/dL 9.1   ALBUMIN g/dL 3.2*   TOTAL BILIRUBIN mg/dL 0.45   ALK PHOS U/L 155*   ALT U/L 23   AST U/L 20   GLUCOSE RANDOM mg/dL 116         Results from last 7 days   Lab Units 07/31/23  1730 07/25/23  1151 07/25/23  0736 07/24/23  2032   POC GLUCOSE mg/dl 86 390* 191* 245*               Lines/Drains:  Invasive Devices     Peripheral Intravenous Line  Duration           Peripheral IV 07/31/23 Right Antecubital <1 day                    Imaging: No pertinent imaging reviewed. No orders to display       EKG and Other Studies Reviewed on Admission:   · EKG: No EKG obtained. ** Please Note: This note has been constructed using a voice recognition system.  **

## 2023-07-31 NOTE — ED PROVIDER NOTES
History  Chief Complaint   Patient presents with   • Black or Bloody Stool     Pt had recent hospitalization looking for source of blood loss. They could not find source after egd and colonoscopy. Yesterday pt noticed dark stools. 78-year-old female with past medical history of A-fib on Eliquis, type 2 diabetes, GERD, hypertension presents for evaluation of x2 "black stools" throughout yesterday. Patient reports she was recently admitted for evaluation of similar black stools and was recently discharged after undergoing an EGD and colonoscopy that were both unremarkable. She states that she has an upcoming appointment with gastroenterology for "video capsule" for further investigation. She denies associated symptoms of abdominal pain, lightheadedness, shortness of breath, chest pain, dysuria, N/V/D, fever/chills or any other symptoms. Patient states since discharge from the hospital, she has been taking Eliquis daily. No other concerns. Prior to Admission Medications   Prescriptions Last Dose Informant Patient Reported? Taking? Aspirin Low Dose 81 MG EC tablet  Self No No   Sig: TAKE 1 TABLET BY MOUTH EVERY DAY   Blood Glucose Monitoring Suppl (OneTouch Verio Reflect) w/Device KIT  Self No No   Sig: Check blood sugars three times daily. Please substitute with appropriate alternative as covered by patient's insurance.  Dx: E11.65   Continuous Blood Gluc  (FreeStyle [place] 2 [place]) KEELY  Self No No   Sig: Use 1 Units continuous   Continuous Blood Gluc Sensor (FreeStyle Frank 2 Sensor) MISC  Self No No   Sig: Use 1 Units every 14 (fourteen) days   Flovent  MCG/ACT inhaler  Self Yes No   Sig: Inhale 1 puff 2 (two) times a day PT NOT TAKING   Patient not taking: Reported on 7/28/2023   Insulin Pen Needle (BD Pen Needle Kateryna U/F) 32G X 4 MM MISC  Self No No   Sig: Use daily as directed with insulin pen   OneTouch Delica Lancets 55X MISC  Self No No   Sig: Check blood sugars three times daily. Please substitute with appropriate alternative as covered by patient's insurance. Dx: E11.65   albuterol (Ventolin HFA) 90 mcg/act inhaler  Self No No   Sig: Inhale 2 puffs every 6 (six) hours as needed for wheezing (rinse mouth after use)   apixaban (Eliquis) 5 mg  Self No No   Sig: Take 1 tablet (5 mg total) by mouth 2 (two) times a day   atorvastatin (LIPITOR) 40 mg tablet  Self No No   Sig: Take 1 tablet (40 mg total) by mouth daily   cholecalciferol (VITAMIN D3) 1,000 units tablet  Self No No   Sig: Take 2 tablets (2,000 Units total) by mouth daily   Patient not taking: Reported on 7/28/2023   dapagliflozin (Farxiga) 5 MG TABS  Self Yes No   Sig: Take 5 mg by mouth in the morning. Indications: Cardiac Failure   furosemide (LASIX) 20 mg tablet  Self No No   Sig: TAKE 1 TABLET BY MOUTH DAILY AS NEEDED (WT GAIN 3 LB/ 24 HOURS / TAKE FOR 3 DAYS IN A ROW)   glucose blood (OneTouch Verio) test strip  Self No No   Sig: Check blood sugars three times daily. Please substitute with appropriate alternative as covered by patient's insurance. Dx: E11.65   metFORMIN (GLUCOPHAGE) 500 mg tablet  Self No No   Sig: TAKE 1 TABLET BY MOUTH TWICE A DAY WITH MEALS   metoprolol succinate (TOPROL-XL) 50 mg 24 hr tablet   No No   Sig: Take 1 tablet (50 mg total) by mouth daily   nystatin-triamcinolone (MYCOLOG-II) ointment  Self No No   Sig: Apply topically 2 (two) times a day To vaginal area   Patient taking differently: Apply topically if needed To vaginal area   pantoprazole (PROTONIX) 40 mg tablet  Self No No   Sig: Take 1 tablet (40 mg total) by mouth daily   saccharomyces boulardii (FLORASTOR) 250 mg capsule  Self No No   Sig: Take 1 capsule (250 mg total) by mouth 2 (two) times a day      Facility-Administered Medications: None       Past Medical History:   Diagnosis Date   • Breast cancer ([facility])     left - 1994.    • Diabetes mellitus ([facility])    • Diabetes mellitus, type 2 ([facility]) 03/15/2006    last assessed 7/10/13   • GERD (gastroesophageal reflux disease)    • History of chemotherapy    • Hypertension        Past Surgical History:   Procedure Laterality Date   • CARDIAC ELECTROPHYSIOLOGY PROCEDURE N/A 3/9/2022    Procedure: Cardiac icd implant/ DUAL CHAMBER ICD; Surgeon: Nithya Stoner MD;  Location: [facility]; Service: Cardiology   • MASTECTOMY Left     last assessed 14   • MASTECTOMY, RADICAL Left        • UT PARATHYROIDECTOMY/EXPLORATION PARATHYROIDS N/A 2021    Procedure: PARATHYROIDECTOMY POSSIBLE 4 GLAND EXPLORATION;  Surgeon: Arpita Owen MD;  Location: [facility];  Service: ENT   • REDUCTION MAMMAPLASTY Right    • UVULECTOMY         Family History   Problem Relation Age of Onset   • Hypothyroidism Mother    • Leukemia Mother    • Diabetes Father    • Diabetes type II Father    • Stroke Sister    • Diabetes Paternal Grandmother    • Cancer Sister    • Diabetes Brother      I have reviewed and agree with the history as documented. E-Cigarette/Vaping   • E-Cigarette Use Never User      E-Cigarette/Vaping Substances   • Nicotine No    • THC No    • CBD No    • Flavoring No    • Other No    • Unknown No      Social History     Tobacco Use   • Smoking status: Some Days     Packs/day: 0.25     Years: 50.00     Total pack years: 12.50     Types: Cigarettes     Start date: 6/15/1973     Last attempt to quit: 2023     Years since quittin.2   • Smokeless tobacco: Never   • Tobacco comments:     2 cigarettes daily    Vaping Use   • Vaping Use: Never used   Substance Use Topics   • Alcohol use: Never   • Drug use: No        Review of Systems   Constitutional: Negative for chills and fever. HENT: Negative for ear pain and sore throat. Eyes: Negative for pain and visual disturbance. Respiratory: Negative for cough and shortness of breath. Cardiovascular: Negative for chest pain and palpitations. Gastrointestinal: Positive for blood in stool.  Negative for abdominal pain, diarrhea, nausea and vomiting. Genitourinary: Negative for dysuria and hematuria. Musculoskeletal: Negative for arthralgias and back pain. Skin: Negative for color change and rash. Neurological: Negative for seizures and syncope. All other systems reviewed and are negative. Physical Exam  ED Triage Vitals   Temperature Pulse Respirations Blood Pressure SpO2   07/31/23 1128 07/31/23 1128 07/31/23 1128 07/31/23 1128 07/31/23 1128   98.2 °F (36.8 °C) 75 18 163/83 99 %      Temp Source Heart Rate Source Patient Position - Orthostatic VS BP Location FiO2 (%)   07/31/23 1128 07/31/23 1128 07/31/23 1511 07/31/23 1128 --   Oral Monitor Lying Right arm       Pain Score       07/31/23 1128       No Pain             Orthostatic Vital Signs  Vitals:    07/31/23 1128 07/31/23 1300 07/31/23 1511   BP: 163/83 152/80 (!) 180/89   Pulse: 75 70 71   Patient Position - Orthostatic VS:   Lying       Physical Exam  Vitals and nursing note reviewed. Constitutional:       General: She is not in acute distress. Appearance: Normal appearance. She is well-developed. She is not ill-appearing, toxic-appearing or diaphoretic. HENT:      Head: Normocephalic and atraumatic. Right Ear: External ear normal.      Left Ear: External ear normal.      Nose: Nose normal.      Mouth/Throat:      Mouth: Mucous membranes are moist.      Comments: Pallorous oral mucous membranes  Eyes:      Extraocular Movements: Extraocular movements intact. Pupils: Pupils are equal, round, and reactive to light. Comments: Pallorous conjunctiva bilaterally   Cardiovascular:      Rate and Rhythm: Normal rate and regular rhythm. Pulses: Normal pulses. Heart sounds: Normal heart sounds. No murmur heard. Pulmonary:      Effort: Pulmonary effort is normal. No respiratory distress. Breath sounds: Normal breath sounds. Abdominal:      General: Abdomen is flat. Bowel sounds are normal.      Palpations: Abdomen is soft. Tenderness:  There is no abdominal tenderness. There is no guarding or rebound. Genitourinary:     Rectum: Guaiac result positive. Comments: Jamal Clonts dark red soft stool in the rectal vault. No evidence of thrombosed external hemorrhoids. Rectal exam otherwise unremarkable. Musculoskeletal:         General: No swelling. Cervical back: Normal range of motion and neck supple. Skin:     General: Skin is warm and dry. Capillary Refill: Capillary refill takes less than 2 seconds. Neurological:      Mental Status: She is alert and oriented to person, place, and time. Mental status is at baseline.    Psychiatric:         Mood and Affect: Mood normal.         Behavior: Behavior normal.         ED Medications  Medications   pantoprazole (PROTONIX) injection 40 mg (40 mg Intravenous Given 7/31/23 1515)   atorvastatin (LIPITOR) tablet 40 mg (has no administration in time range)   metoprolol succinate (TOPROL-XL) 24 hr tablet 50 mg (has no administration in time range)   nicotine (NICODERM CQ) 14 mg/24hr TD 24 hr patch 1 patch (has no administration in time range)   insulin lispro (HumaLOG) 100 units/mL subcutaneous injection 1-5 Units (has no administration in time range)   iron sucrose (VENOFER) 300 mg in sodium chloride 0.9 % 250 mL IVPB (has no administration in time range)       Diagnostic Studies  Results Reviewed     Procedure Component Value Units Date/Time    Lipase [[id number]]  (Normal) Collected: 07/31/23 1225    Lab Status: Final result Specimen: Blood from Arm, Right Updated: 07/31/23 1257     Lipase 53 u/L     Comprehensive metabolic panel [[id number]]  (Abnormal) Collected: 07/31/23 1225    Lab Status: Final result Specimen: Blood from Arm, Right Updated: 07/31/23 1257     Sodium 138 mmol/L      Potassium 3.9 mmol/L      Chloride 106 mmol/L      CO2 27 mmol/L      ANION GAP 5 mmol/L      BUN 23 mg/dL      Creatinine 1.01 mg/dL      Glucose 116 mg/dL      Calcium 9.1 mg/dL      Corrected Calcium 9.7 mg/dL      AST 20 U/L      ALT 23 U/L      Alkaline Phosphatase 155 U/L      Total Protein 6.6 g/dL      Albumin 3.2 g/dL      Total Bilirubin 0.45 mg/dL      eGFR 56 ml/min/1.73sq m     Narrative:      [place] guidelines for Chronic Kidney Disease (CKD):   •  Stage 1 with normal or high GFR (GFR > 90 mL/min/1.73 square meters)  •  Stage 2 Mild CKD (GFR = 60-89 mL/min/1.73 square meters)  •  Stage 3A Moderate CKD (GFR = 45-59 mL/min/1.73 square meters)  •  Stage 3B Moderate CKD (GFR = 30-44 mL/min/1.73 square meters)  •  Stage 4 Severe CKD (GFR = 15-29 mL/min/1.73 square meters)  •  Stage 5 End Stage CKD (GFR <15 mL/min/1.73 square meters)  Note: GFR calculation is accurate only with a steady state creatinine    CBC and differential [[id number]]  (Abnormal) Collected: 07/31/23 1225    Lab Status: Final result Specimen: Blood from Arm, Right Updated: 07/31/23 1233     WBC 7.34 Thousand/uL      RBC 3.90 Million/uL      Hemoglobin 9.7 g/dL      Hematocrit 32.2 %      MCV 83 fL      MCH 24.9 pg      MCHC 30.1 g/dL      RDW 20.6 %      MPV 10.4 fL      Platelets 808 Thousands/uL      nRBC 0 /100 WBCs      Neutrophils Relative 73 %      Immat GRANS % 0 %      Lymphocytes Relative 19 %      Monocytes Relative 7 %      Eosinophils Relative 1 %      Basophils Relative 0 %      Neutrophils Absolute 5.28 Thousands/µL      Immature Grans Absolute 0.02 Thousand/uL      Lymphocytes Absolute 1.41 Thousands/µL      Monocytes Absolute 0.50 Thousand/µL      Eosinophils Absolute 0.10 Thousand/µL      Basophils Absolute 0.03 Thousands/µL                  No orders to display         Procedures  Procedures      ED Course                                       Medical Decision Making  Patient remained stable throughout ED course.   Given recent admission for acute blood loss anemia and concern for possible acute GI bleed which required transfusion of 2 units PRBC in the context of chronic Eliquis use, there was high suspicion today for worsening upper versus lower GI bleed. However, on examination, patient did not appear to have abdominal tenderness or acute findings consistent with active intra-abdominal hemorrhage. Exam was also grossly benign aside from grossly melanous stool as well as mild pallor of bilateral conjunctival and oral mucous membranes. Hemoglobin, hematocrit today stable and improved from recent blood work. GI was consulted who recommended twice daily dosing of Protonix and further observation after which if H&H continue to remain stable, will undergo a more expedited outpatient investigation of possible GI bleed via capsule endoscopy. Patient was admitted to [facility][1] for further observation. Pt understands and agreed with plan. All questions answered. No other concerns. Amount and/or Complexity of Data Reviewed  Labs: ordered. Risk  Decision regarding hospitalization. Disposition  Final diagnoses:   Melena   Anemia     Time reflects when diagnosis was documented in both MDM as applicable and the Disposition within this note     Time User Action Codes Description Comment    7/31/2023  3:48 PM Liz Kirkpatrick Add [K92.1] Melena     7/31/2023  3:48 PM Liz Kirkpatrick Add [D64.9] Anemia       ED Disposition     ED Disposition   Admit    Condition   Stable    Date/Time   Mon Jul 31, 2023  3:48 PM    Comment   Case was discussed with Dr. Salas Root and the patient's admission status was agreed to be Admission Status: observation status to the service of Dr. Salas Root . Follow-up Information    None         Patient's Medications   Discharge Prescriptions    No medications on file     No discharge procedures on file. PDMP Review       Value Time User    PDMP Reviewed  Yes 4/21/2021  4:83 PM Soniya Birmingham MD           ED Provider  Attending physically available and evaluated [place]. I managed the patient along with the ED Attending.     Electronically Signed by         Liz Kirkpatrick MD  07/31/23 1700 [address]

## 2023-07-31 NOTE — ASSESSMENT & PLAN NOTE
Wt Readings from Last 3 Encounters:   07/20/23 50.3 kg (111 lb)   07/18/23 50.3 kg (111 lb)   07/11/23 48.7 kg (107 lb 4.8 oz)     Assessment:  · Patient not appearing fluid overloaded  · No JVD or bilateral leg edema appreciated  · No crackles heard on auscultation of lungs  · Reports only taking lasix when she has leg swelling and will take it for 3 days then stop  · Previous ECHO in April 2023 showed EF of 35 percent    Plan:  · Will hold her lasix 20 mg

## 2023-07-31 NOTE — ASSESSMENT & PLAN NOTE
Wt Readings from Last 3 Encounters:   07/20/23 50.3 kg (111 lb)   07/18/23 50.3 kg (111 lb)   07/11/23 48.7 kg (107 lb 4.8 oz)     Assessment:  · BP was high at 180/89 today  · Other reads were in acceptable range    Plan:  · Continue to monitor BPs  · Will continue metoprolol 50 mg daily

## 2023-08-01 ENCOUNTER — TELEPHONE (OUTPATIENT)
Dept: HEMATOLOGY ONCOLOGY | Facility: CLINIC | Age: 68
End: 2023-08-01

## 2023-08-01 VITALS
BODY MASS INDEX: 21.05 KG/M2 | DIASTOLIC BLOOD PRESSURE: 85 MMHG | HEIGHT: 63 IN | TEMPERATURE: 96.9 F | OXYGEN SATURATION: 96 % | RESPIRATION RATE: 16 BRPM | WEIGHT: 118.8 LBS | SYSTOLIC BLOOD PRESSURE: 147 MMHG | HEART RATE: 73 BPM

## 2023-08-01 LAB
ALBUMIN SERPL BCP-MCNC: 3 G/DL (ref 3.5–5)
ALP SERPL-CCNC: 134 U/L (ref 34–104)
ALT SERPL W P-5'-P-CCNC: 20 U/L (ref 7–52)
ANION GAP SERPL CALCULATED.3IONS-SCNC: 4 MMOL/L
AST SERPL W P-5'-P-CCNC: 17 U/L (ref 13–39)
BASOPHILS # BLD AUTO: 0.01 THOUSANDS/ÂΜL (ref 0–0.1)
BASOPHILS NFR BLD AUTO: 0 % (ref 0–1)
BILIRUB SERPL-MCNC: 0.43 MG/DL (ref 0.2–1)
BUN SERPL-MCNC: 20 MG/DL (ref 5–25)
CALCIUM ALBUM COR SERPL-MCNC: 9.5 MG/DL (ref 8.3–10.1)
CALCIUM SERPL-MCNC: 8.7 MG/DL (ref 8.4–10.2)
CHLORIDE SERPL-SCNC: 107 MMOL/L (ref 96–108)
CO2 SERPL-SCNC: 26 MMOL/L (ref 21–32)
CREAT SERPL-MCNC: 1.03 MG/DL (ref 0.6–1.3)
EOSINOPHIL # BLD AUTO: 0.18 THOUSAND/ÂΜL (ref 0–0.61)
EOSINOPHIL NFR BLD AUTO: 2 % (ref 0–6)
ERYTHROCYTE [DISTWIDTH] IN BLOOD BY AUTOMATED COUNT: 20.8 % (ref 11.6–15.1)
GFR SERPL CREATININE-BSD FRML MDRD: 55 ML/MIN/1.73SQ M
GLUCOSE P FAST SERPL-MCNC: 136 MG/DL (ref 65–99)
GLUCOSE SERPL-MCNC: 133 MG/DL (ref 65–140)
GLUCOSE SERPL-MCNC: 136 MG/DL (ref 65–140)
GLUCOSE SERPL-MCNC: 228 MG/DL (ref 65–140)
GLUCOSE SERPL-MCNC: 242 MG/DL (ref 65–140)
HCT VFR BLD AUTO: 30.4 % (ref 34.8–46.1)
HGB BLD-MCNC: 9.1 G/DL (ref 11.5–15.4)
IMM GRANULOCYTES # BLD AUTO: 0.03 THOUSAND/UL (ref 0–0.2)
IMM GRANULOCYTES NFR BLD AUTO: 0 % (ref 0–2)
LYMPHOCYTES # BLD AUTO: 1.81 THOUSANDS/ÂΜL (ref 0.6–4.47)
LYMPHOCYTES NFR BLD AUTO: 24 % (ref 14–44)
MCH RBC QN AUTO: 24.7 PG (ref 26.8–34.3)
MCHC RBC AUTO-ENTMCNC: 29.9 G/DL (ref 31.4–37.4)
MCV RBC AUTO: 83 FL (ref 82–98)
MONOCYTES # BLD AUTO: 0.78 THOUSAND/ÂΜL (ref 0.17–1.22)
MONOCYTES NFR BLD AUTO: 10 % (ref 4–12)
NEUTROPHILS # BLD AUTO: 4.7 THOUSANDS/ÂΜL (ref 1.85–7.62)
NEUTS SEG NFR BLD AUTO: 64 % (ref 43–75)
NRBC BLD AUTO-RTO: 0 /100 WBCS
PLATELET # BLD AUTO: 327 THOUSANDS/UL (ref 149–390)
PMV BLD AUTO: 10.5 FL (ref 8.9–12.7)
POTASSIUM SERPL-SCNC: 3.9 MMOL/L (ref 3.5–5.3)
PROT SERPL-MCNC: 6.2 G/DL (ref 6.4–8.4)
RBC # BLD AUTO: 3.68 MILLION/UL (ref 3.81–5.12)
SODIUM SERPL-SCNC: 137 MMOL/L (ref 135–147)
WBC # BLD AUTO: 7.51 THOUSAND/UL (ref 4.31–10.16)

## 2023-08-01 PROCEDURE — 80053 COMPREHEN METABOLIC PANEL: CPT

## 2023-08-01 PROCEDURE — C9113 INJ PANTOPRAZOLE SODIUM, VIA: HCPCS

## 2023-08-01 PROCEDURE — 82948 REAGENT STRIP/BLOOD GLUCOSE: CPT

## 2023-08-01 PROCEDURE — 99214 OFFICE O/P EST MOD 30 MIN: CPT | Performed by: INTERNAL MEDICINE

## 2023-08-01 PROCEDURE — 85025 COMPLETE CBC W/AUTO DIFF WBC: CPT

## 2023-08-01 PROCEDURE — 99239 HOSP IP/OBS DSCHRG MGMT >30: CPT | Performed by: INTERNAL MEDICINE

## 2023-08-01 RX ORDER — INSULIN LISPRO 100 [IU]/ML
1-5 INJECTION, SOLUTION INTRAVENOUS; SUBCUTANEOUS
Status: DISCONTINUED | OUTPATIENT
Start: 2023-08-01 | End: 2023-08-01 | Stop reason: HOSPADM

## 2023-08-01 RX ORDER — DICYCLOMINE HCL 20 MG
20 TABLET ORAL ONCE
Status: COMPLETED | OUTPATIENT
Start: 2023-08-01 | End: 2023-08-01

## 2023-08-01 RX ADMIN — ATORVASTATIN CALCIUM 40 MG: 40 TABLET, FILM COATED ORAL at 09:31

## 2023-08-01 RX ADMIN — DICYCLOMINE HYDROCHLORIDE 20 MG: 20 TABLET ORAL at 06:03

## 2023-08-01 RX ADMIN — NICOTINE 1 PATCH: 14 PATCH, EXTENDED RELEASE TRANSDERMAL at 09:31

## 2023-08-01 RX ADMIN — PANTOPRAZOLE SODIUM 40 MG: 40 INJECTION, POWDER, FOR SOLUTION INTRAVENOUS at 09:31

## 2023-08-01 RX ADMIN — INSULIN LISPRO 2 UNITS: 100 INJECTION, SOLUTION INTRAVENOUS; SUBCUTANEOUS at 00:55

## 2023-08-01 RX ADMIN — INSULIN LISPRO 2 UNITS: 100 INJECTION, SOLUTION INTRAVENOUS; SUBCUTANEOUS at 11:33

## 2023-08-01 RX ADMIN — METOPROLOL SUCCINATE 50 MG: 50 TABLET, EXTENDED RELEASE ORAL at 09:31

## 2023-08-01 NOTE — ASSESSMENT & PLAN NOTE
Wt Readings from Last 3 Encounters:   07/31/23 53.9 kg (118 lb 12.8 oz)   07/20/23 50.3 kg (111 lb)   07/18/23 50.3 kg (111 lb)     Assessment:  · Patient not appearing fluid overloaded  · No JVD or bilateral leg edema appreciated  · No crackles heard on auscultation of lungs  · Reports only taking lasix when she has leg swelling and will take it for 3 days then stop  · Previous ECHO in April 2023 showed EF of 35 percent    Plan:  · Continue her home meds upon discharge

## 2023-08-01 NOTE — QUICK NOTE
Sanya device malfunction. Multiple attempts to make the system work, including changing the sensor and the battery. None were successful. Upon evaluation, patient was found to not be of concern for cardiac or oxygenation issues. Patient is completed alert and oriented and mobilizes by herself. RN placed a discontinuation order for sanya. Provider aware.

## 2023-08-01 NOTE — DISCHARGE INSTR - AVS FIRST PAGE
Dear Arely Richard,     It was our pleasure to care for you here at [address] [place] [address]. It is our hope that we were always able to exceed the expected standards for your care during your stay. You were hospitalized due to Dark stools. You were cared for on the 4th floor by Chantell Montgomery MD under the service of Babak James MD with the [facility] who covers for your primary care physician (PCP), Josefina Koenig DO, while you were hospitalized. If you have any questions or concerns related to this hospitalization, you may contact us at [phone]. For follow up as well as any medication refills, we recommend that you follow up with your primary care physician. A registered nurse will reach out to you by phone within a few days after your discharge to answer any additional questions that you may have after going home. However, at this time we provide for you here, the most important instructions / recommendations at discharge:     Notable Medication Adjustments -   Please stop taking Aspirin 81 mg until after capsule endoscopy. Testing Required after Discharge - Capsule endoscopy  Important follow up information -   Please follow up with your primary care and Gastroenterology. Other Instructions -     Please review this entire after visit summary as additional general instructions including medication list, appointments, activity, diet, any pertinent wound care, and other additional recommendations from your care team that may be provided for you.       Sincerely,     Chantell Montgomery MD

## 2023-08-01 NOTE — ASSESSMENT & PLAN NOTE
Assessment:   · Has been having dark stools for past month and a half  · Has been having mild fatigue, weakness and increased pallor  · Recent EGD on 7/21 found mild erythematous mucosa in prepyloric region and a biopsy was performed to rule out H. Pylori, a biopsy of duodenum was done to rule out celiac disease  · Colonoscopy 7/24 showed few scattered diverticula of mild severity in sigmoid colon  · ED doctor performed digital rectal exam and found melanotic stool  · Iron studies done 7/20 showed iron saturation of 5%, iron 22, ferritin 5, TIBC of 419  · CBC showed a MCV of 83  · If hemoglobin stable will likely discharge tomorrow  · If Hgb not stable will likely try to have GI scope her sooner  · Consider stopping aspirin indefinitely as no strong indication for aspirin at this time as she has nonocclusive CAD      Plan:  · Resume apixaban 5 mg twice daily but hold off on aspirin 81 mg  · Will order venofer 300 mg once  · Continue to monitor Hgb with daily CBC  · GI said they would follow-up on capsule endoscopy outpatient

## 2023-08-01 NOTE — ASSESSMENT & PLAN NOTE
Assessment:  · Has been having dark stools for past month and a half  · Has been having mild fatigue, weakness and increased pallor  · Recent EGD on 7/21 found mild erythematous mucosa in prepyloric region and a biopsy was performed to rule out H. Pylori, a biopsy of duodenum was done to rule out celiac disease  · Colonoscopy 7/24 showed few scattered diverticula of mild severity in sigmoid colon  · ED doctor performed digital rectal exam and found melanotic stool      Plan:  · Continue to monitor patient's stool. · 8/1 she had a normal bowel movement with brown stool.

## 2023-08-01 NOTE — PLAN OF CARE
Problem: PAIN - ADULT  Goal: Verbalizes/displays adequate comfort level or baseline comfort level  Description: Interventions:  - Encourage patient to monitor pain and request assistance  - Assess pain using appropriate pain scale  - Administer analgesics based on type and severity of pain and evaluate response  - Implement non-pharmacological measures as appropriate and evaluate response  - Consider cultural and social influences on pain and pain management  - Notify physician/advanced practitioner if interventions unsuccessful or patient reports new pain  8/1/2023 1311 by Rupesh Ramos  Outcome: Adequate for Discharge  8/1/2023 0800 by Rupesh Ramos  Outcome: Progressing     Problem: INFECTION - ADULT  Goal: Absence or prevention of progression during hospitalization  Description: INTERVENTIONS:  - Assess and monitor for signs and symptoms of infection  - Monitor lab/diagnostic results  - Monitor all insertion sites, i.e. indwelling lines, tubes, and drains  - Monitor endotracheal if appropriate and nasal secretions for changes in amount and color  - [place] appropriate cooling/warming therapies per order  - Administer medications as ordered  - Instruct and encourage patient and family to use good hand hygiene technique  - Identify and instruct in appropriate isolation precautions for identified infection/condition  8/1/2023 1311 by Rupesh Ramos  Outcome: Adequate for Discharge  8/1/2023 0800 by Rupesh Ramos  Outcome: Progressing     Problem: DISCHARGE PLANNING  Goal: Discharge to home or other facility with appropriate resources  Description: INTERVENTIONS:  - Identify barriers to discharge w/patient and caregiver  - Arrange for needed discharge resources and transportation as appropriate  - Identify discharge learning needs (meds, wound care, etc.)  - Arrange for interpretive services to assist at discharge as needed  - Refer to Case Management Department for coordinating discharge planning if the patient needs post-hospital services based on physician/advanced practitioner order or complex needs related to functional status, cognitive ability, or social support system  8/1/2023 1311 by Edvin Rascon  Outcome: Adequate for Discharge  8/1/2023 0800 by Edvin Rascon  Outcome: Progressing     Problem: Knowledge Deficit  Goal: Patient/family/caregiver demonstrates understanding of disease process, treatment plan, medications, and discharge instructions  Description: Complete learning assessment and assess knowledge base.   Interventions:  - Provide teaching at level of understanding  - Provide teaching via preferred learning methods  8/1/2023 1311 by Edvin Rascon  Outcome: Adequate for Discharge  8/1/2023 0800 by Edvin Rascon  Outcome: Progressing

## 2023-08-01 NOTE — ASSESSMENT & PLAN NOTE
Lab Results   Component Value Date    EGFR 55 08/01/2023    EGFR 56 07/31/2023    EGFR 69 07/24/2023    CREATININE 1.03 08/01/2023    CREATININE 1.01 07/31/2023    CREATININE 0.85 07/24/2023     Assessment:  · Creatinine has been stable at 1.01  · eGFR at 56    Plan:  · Continue to monitor renal function  · Will avoid nephrotoxic agents

## 2023-08-01 NOTE — ASSESSMENT & PLAN NOTE
Wt Readings from Last 3 Encounters:   07/31/23 53.9 kg (118 lb 12.8 oz)   07/20/23 50.3 kg (111 lb)   07/18/23 50.3 kg (111 lb)     Assessment:  · BP was high at 180/89 today  · Other reads were in acceptable range    Plan:  · Continue to monitor BPs  · Will continue metoprolol 50 mg daily

## 2023-08-01 NOTE — TELEPHONE ENCOUNTER
Patient Call    Who are you speaking with? Patient    If it is not the patient, are they listed on an active communication consent form? N/A   What is the reason for this call? Patient has questions about her surgery. Does this require a call back? Yes   If a call back is required, please list best call back number [phone]   If a call back is required, advise that a message will be forwarded to their care team and someone will return their call as soon as possible. Did you relay this information to the patient?  Yes

## 2023-08-01 NOTE — PLAN OF CARE
Problem: PAIN - ADULT  Goal: Verbalizes/displays adequate comfort level or baseline comfort level  Description: Interventions:  - Encourage patient to monitor pain and request assistance  - Assess pain using appropriate pain scale  - Administer analgesics based on type and severity of pain and evaluate response  - Implement non-pharmacological measures as appropriate and evaluate response  - Consider cultural and social influences on pain and pain management  - Notify physician/advanced practitioner if interventions unsuccessful or patient reports new pain  8/1/2023 0800 by Laney Donald  Outcome: Progressing  7/31/2023 1815 by Laney Donald  Outcome: Progressing     Problem: INFECTION - ADULT  Goal: Absence or prevention of progression during hospitalization  Description: INTERVENTIONS:  - Assess and monitor for signs and symptoms of infection  - Monitor lab/diagnostic results  - Monitor all insertion sites, i.e. indwelling lines, tubes, and drains  - Monitor endotracheal if appropriate and nasal secretions for changes in amount and color  - Tower appropriate cooling/warming therapies per order  - Administer medications as ordered  - Instruct and encourage patient and family to use good hand hygiene technique  - Identify and instruct in appropriate isolation precautions for identified infection/condition  8/1/2023 0800 by Laney Donald  Outcome: Progressing  7/31/2023 1815 by Laney Shabana  Outcome: Progressing     Problem: DISCHARGE PLANNING  Goal: Discharge to home or other facility with appropriate resources  Description: INTERVENTIONS:  - Identify barriers to discharge w/patient and caregiver  - Arrange for needed discharge resources and transportation as appropriate  - Identify discharge learning needs (meds, wound care, etc.)  - Arrange for interpretive services to assist at discharge as needed  - Refer to Case Management Department for coordinating discharge planning if the patient needs post-hospital services based on physician/advanced practitioner order or complex needs related to functional status, cognitive ability, or social support system  8/1/2023 0800 by Crystal Luna  Outcome: Progressing  7/31/2023 1815 by Crystal Luna  Outcome: Progressing     Problem: Knowledge Deficit  Goal: Patient/family/caregiver demonstrates understanding of disease process, treatment plan, medications, and discharge instructions  Description: Complete learning assessment and assess knowledge base.   Interventions:  - Provide teaching at level of understanding  - Provide teaching via preferred learning methods  8/1/2023 0800 by Crystal Luna  Outcome: Progressing  7/31/2023 1815 by Crystal Luna  Outcome: Progressing

## 2023-08-01 NOTE — DISCHARGE SUMMARY
[address] [facility]  Discharge- Rodri Montgomery 1955, 77 y.o. female MRN: [id number]  Unit/Bed#: W -01 Encounter: [id number]  Primary Care Provider: Yenny Kenyon,    Date and time admitted to hospital: 7/31/2023 11:30 AM    Black stool  Assessment & Plan  Assessment:  · Has been having dark stools for past month and a half  · Has been having mild fatigue, weakness and increased pallor  · Recent EGD on 7/21 found mild erythematous mucosa in prepyloric region and a biopsy was performed to rule out H. Pylori, a biopsy of duodenum was done to rule out celiac disease  · Colonoscopy 7/24 showed few scattered diverticula of mild severity in sigmoid colon  · ED doctor performed digital rectal exam and found melanotic stool      Plan:  · Continue to monitor patient's stool. · 8/1 she had a normal bowel movement with brown stool.      Tobacco use  Assessment & Plan  · Continue 14 mg nicotine patch    Chronic HFrEF (heart failure with reduced ejection fraction) ([facility])  Assessment & Plan  Wt Readings from Last 3 Encounters:   07/31/23 53.9 kg (118 lb 12.8 oz)   07/20/23 50.3 kg (111 lb)   07/18/23 50.3 kg (111 lb)     Assessment:  · Patient not appearing fluid overloaded  · No JVD or bilateral leg edema appreciated  · No crackles heard on auscultation of lungs  · Reports only taking lasix when she has leg swelling and will take it for 3 days then stop  · Previous ECHO in April 2023 showed EF of 35 percent    Plan:  · Continue her home meds upon discharge        CKD (chronic kidney disease) stage 3, GFR 30-59 ml/min ([facility])  Assessment & Plan  Lab Results   Component Value Date    EGFR 55 08/01/2023    EGFR 56 07/31/2023    EGFR 69 07/24/2023    CREATININE 1.03 08/01/2023    CREATININE 1.01 07/31/2023    CREATININE 0.85 07/24/2023     Assessment:  · Creatinine has been stable at 1.01  · eGFR at 56    Plan:  · Continue to monitor renal function  · Will avoid nephrotoxic agents    Hypertensive heart and kidney disease with heart failure and with chronic kidney disease stage III ([address])  Assessment & Plan  Wt Readings from Last 3 Encounters:   07/31/23 53.9 kg (118 lb 12.8 oz)   07/20/23 50.3 kg (111 lb)   07/18/23 50.3 kg (111 lb)     Assessment:  · BP was high at 180/89 today  · Other reads were in acceptable range    Plan:  · Continue to monitor BPs  · Will continue metoprolol 50 mg daily          * Symptomatic anemia  Assessment & Plan  Assessment:   · Has been having dark stools for past month and a half  · Has been having mild fatigue, weakness and increased pallor  · Recent EGD on 7/21 found mild erythematous mucosa in prepyloric region and a biopsy was performed to rule out H. Pylori, a biopsy of duodenum was done to rule out celiac disease  · Colonoscopy 7/24 showed few scattered diverticula of mild severity in sigmoid colon  · ED doctor performed digital rectal exam and found melanotic stool  · Iron studies done 7/20 showed iron saturation of 5%, iron 22, ferritin 5, TIBC of 419  · CBC showed a MCV of 83  · If hemoglobin stable will likely discharge tomorrow  · If Hgb not stable will likely try to have GI scope her sooner  · Consider stopping aspirin indefinitely as no strong indication for aspirin at this time as she has nonocclusive CAD      Plan:  · Resume apixaban 5 mg twice daily but hold off on aspirin 81 mg  · Will order venofer 300 mg once  · Continue to monitor Hgb with daily CBC  · GI said they would follow-up on capsule endoscopy outpatient          Medical Problems     Resolved Problems  Date Reviewed: 7/22/2023   None       Discharging Resident: Sarah Felix MD  Discharging Attending: Mark Kenyon MD  PCP: Chan Bullock DO  Admission Date:   Admission Orders (From admission, onward)     Ordered        07/31/23 1549  Place in Observation  Once                      Discharge Date: 08/01/23    Consultations During Hospital Stay:  · Gastroenterology    Procedures Performed: ·     Significant Findings / Test Results:   ·     Incidental Findings:   ·    ·     Test Results Pending at Discharge (will require follow up):  ·      Outpatient Tests Requested:  ·     Complications:      Reason for Admission: Dark stool    Hospital Course:   Sho Lundberg is a 77 y.o. female patient who originally presented to the hospital on 7/31/2023 due to dark stool. Has a past medical history of diabetes, atrial fibrillation anticoagulated with Eliquis who presented to the emergency room yesterday with complaint of noticing black-colored stool twice the previous day. She had notably been admitted recently for similar complaints in the setting of acute anemia (hemoglobin was 6.9 on presentation at that time, she required transfusions) and had undergone EGD (7/21) and colonoscopy (7/24) during admission which were both unremarkable for any sources of GI bleeding or apparent old blood. In the ED, did a digital rectal exam and found dark stool. She was admitted to observe her hemoglobin levels tomorrow morning. GI was consulted who recommended twice daily dosing of Protonix and further observation after which if H&H continue to remain stable, will undergo a more expedited outpatient investigation of possible GI bleed via capsule endoscopy. On 8/1 patient told me she had a brown-colored bowel this morning, and denied having any abdominal pain, diarrhea, constipation, shortness of breath, chest pain, dizziness, or any presyncopal episodes. She has been tolerating diet well. She is currently stable and ready to be discharged to follow-up with her gastroenterology for capsule endoscopy which is scheduled for 8/7. Please see above list of diagnoses and related plan for additional information.      Condition at Discharge: stable    Discharge Day Visit / Exam:   Subjective:    Vitals: Blood Pressure: 147/85 (08/01/23 0729)  Pulse: 73 (08/01/23 0729)  Temperature: (!) 96.9 °F (36.1 °C) (08/01/23 7596)  Temp Source: Oral (08/01/23 0729)  Respirations: 16 (08/01/23 0729)  Height: 5' 3" (160 cm) (07/31/23 1822)  Weight - Scale: 53.9 kg (118 lb 12.8 oz) (07/31/23 1830)  SpO2: 96 % (08/01/23 0729)  Exam:   Physical Exam  Vitals and nursing note reviewed. Constitutional:       General: She is not in acute distress. Appearance: She is well-developed. HENT:      Head: Normocephalic and atraumatic. Eyes:      Conjunctiva/sclera: Conjunctivae normal.   Cardiovascular:      Rate and Rhythm: Normal rate and regular rhythm. Heart sounds: No murmur heard. Pulmonary:      Effort: Pulmonary effort is normal. No respiratory distress. Breath sounds: Normal breath sounds. Abdominal:      Palpations: Abdomen is soft. Tenderness: There is no abdominal tenderness. Musculoskeletal:         General: No swelling. Cervical back: Neck supple. Skin:     General: Skin is warm and dry. Capillary Refill: Capillary refill takes less than 2 seconds. Neurological:      General: No focal deficit present. Mental Status: She is alert and oriented to person, place, and time. Psychiatric:         Mood and Affect: Mood normal.         Behavior: Behavior normal.          Discussion with Family: Patient declined call to . Discharge instructions/Information to patient and family:   See after visit summary for information provided to patient and family. Provisions for Follow-Up Care:  See after visit summary for information related to follow-up care and any pertinent home health orders. Disposition:   Home    Planned Readmission: no    Discharge Medications:  See after visit summary for reconciled discharge medications provided to patient and/or family.       **Please Note: This note may have been constructed using a voice recognition system**

## 2023-08-01 NOTE — UTILIZATION REVIEW
NOTIFICATION OF OBSERVATION ADMISSION   AUTHORIZATION REQUEST   SERVICING FACILITY:   [address] [place] [address]. [facility] [address] [place] [address]  Tax ID: [id number]  NPI: [id number]   ATTENDING PROVIDER:  Attending Name and NPI#: Mynor Murphy Md [[id number]]  Address: [address] [place] [address]. [facility], [address] [place] [address]  Phone: [phone]   ADMISSION INFORMATION:  Place of Service: [facility] Code: 22 CPT Code:   Admitting Diagnosis Code/Description:  Blood in stool [K92.1]  Melena [K92.1]  Anemia [D64.9]  Symptomatic anemia [D64.9]  Observation Admission Date/Time:   Discharge Date/Time: No discharge date for patient encounter. UTILIZATION REVIEW CONTACT:  Nohemi Crooks Utilization   Network Utilization Review Department  Phone: [phone]  Fax: [phone]  Email: [email or web address]  Contact for approvals/pending authorizations, clinical reviews, and discharge. PHYSICIAN ADVISORY SERVICES:  Medical Necessity Denial & [employer] Review  Phone: [phone]  Fax: [phone]  Email: [email or web address]

## 2023-08-01 NOTE — CONSULTS
Consultation - [address] [place] [address] [facility]  Junie Gill 77 y.o. female MRN: [id number]  Unit/Bed#: W -01 Encounter: [id number]        Inpatient consult to gastroenterology  Consult performed by: Cheryl Mcallister PA-C  Consult ordered by: Candelario Barrientos MD          Reason for Consult / Principal Problem: Symptomatic anemia    HPI: Junie Gill is a 77y.o. year old female with history of diabetes, atrial fibrillation anticoagulated with Eliquis who presented to the emergency room yesterday with complaint of noticing black-colored stool twice the previous day. She had notably been admitted recently for similar complaints in the setting of acute anemia (hemoglobin was 6.9 on presentation at that time, she required transfusions) and had undergone EGD (7/21) and colonoscopy (7/24) during admission which were both unremarkable for any sources of GI bleeding or apparent old blood. She was planned for an outpatient capsule study for which she is currently scheduled for 8/7. Upon this presentation her hemoglobin appears to be stable; this morning it is 9.1, it appeared to be 9.5 at the time of hospital discharge one week ago. BUN appears normal at 20. Patient tells me she had a brown-colored bowel movement this morning, she denies any shortness of breath or dizziness, any chest pain, dyspnea on exertion, presyncopal episodes. Denies any abdominal pain, nausea or vomiting. Reports to be tolerating diet and reports she did well with breakfast this morning. REVIEW OF SYSTEMS:    CONSTITUTIONAL: Denies any fever, chills, or rigors. Good appetite, and no recent weight loss. HEENT: No earache or tinnitus. Denies hearing loss or visual disturbances. CARDIOVASCULAR: No chest pain or palpitations. RESPIRATORY: Denies any cough, hemoptysis, shortness of breath or dyspnea on exertion. GASTROINTESTINAL: As noted in the History of Present Illness. GENITOURINARY: No problems with urination.  Denies any hematuria or dysuria. NEUROLOGIC: No dizziness or vertigo, denies headaches. MUSCULOSKELETAL: Denies any muscle or joint pain. SKIN: Denies skin rashes or itching. ENDOCRINE: Denies excessive thirst. Denies intolerance to heat or cold. PSYCHOSOCIAL: Denies depression or anxiety. Denies any recent memory loss. Historical Information   Past Medical History:   Diagnosis Date   • Breast cancer ([address] [facility])     left - . • Diabetes mellitus ([address] [facility])    • Diabetes mellitus, type 2 ([address] [facility]) 03/15/2006    last assessed 7/10/13   • GERD (gastroesophageal reflux disease)    • History of chemotherapy    • Hypertension      Past Surgical History:   Procedure Laterality Date   • CARDIAC ELECTROPHYSIOLOGY PROCEDURE N/A 3/9/2022    Procedure: Cardiac icd implant/ DUAL CHAMBER ICD; Surgeon: Alise Lomas MD;  Location: [facility];   Service: Cardiology   • MASTECTOMY Left     last assessed 14   • MASTECTOMY, RADICAL Left        • LA PARATHYROIDECTOMY/EXPLORATION PARATHYROIDS N/A 2021    Procedure: PARATHYROIDECTOMY POSSIBLE 4 GLAND EXPLORATION;  Surgeon: Ling Cedillo MD;  Location: [facility];  Service: ENT   • REDUCTION MAMMAPLASTY Right    • UVULECTOMY       Social History   Social History     Substance and Sexual Activity   Alcohol Use Never     Social History     Substance and Sexual Activity   Drug Use No     Social History     Tobacco Use   Smoking Status Some Days   • Packs/day: 0.25   • Years: 50.00   • Total pack years: 12.50   • Types: Cigarettes   • Start date: 6/15/1973   • Last attempt to quit: 2023   • Years since quittin.2   Smokeless Tobacco Never   Tobacco Comments    2 cigarettes daily      Family History   Problem Relation Age of Onset   • Hypothyroidism Mother    • Leukemia Mother    • Diabetes Father    • Diabetes type II Father    • Stroke Sister    • Diabetes Paternal Grandmother    • Cancer Sister    • Diabetes Brother        Meds/Allergies     Medications Prior to Admission   Medication   • apixaban (Eliquis) 5 mg   • Aspirin Low Dose 81 MG EC tablet   • furosemide (LASIX) 20 mg tablet   • metFORMIN (GLUCOPHAGE) 500 mg tablet   • metoprolol succinate (TOPROL-XL) 50 mg 24 hr tablet   • pantoprazole (PROTONIX) 40 mg tablet   • albuterol (Ventolin HFA) 90 mcg/act inhaler   • atorvastatin (LIPITOR) 40 mg tablet   • Blood Glucose Monitoring Suppl (OneTouch Verio Reflect) w/Device KIT   • cholecalciferol (VITAMIN D3) 1,000 units tablet   • Continuous Blood Gluc  (FreeStyle Frank 2 [place]) KEELY   • Continuous Blood Gluc Sensor (FreeStyle Frank 2 Sensor) MISC   • dapagliflozin (Farxiga) 5 MG TABS   • Flovent  MCG/ACT inhaler   • glucose blood (OneTouch Verio) test strip   • Insulin Pen Needle (BD Pen Needle Kateryna U/F) 32G X 4 MM MISC   • nystatin-triamcinolone (MYCOLOG-II) ointment   • OneTouch Delica Lancets 86S MISC   • saccharomyces boulardii (FLORASTOR) 250 mg capsule     Current Facility-Administered Medications   Medication Dose Route Frequency   • atorvastatin (LIPITOR) tablet 40 mg  40 mg Oral Daily   • insulin lispro (HumaLOG) 100 units/mL subcutaneous injection 1-5 Units  1-5 Units Subcutaneous Q6H [facility]   • metoprolol succinate (TOPROL-XL) 24 hr tablet 50 mg  50 mg Oral Daily   • nicotine (NICODERM CQ) 14 mg/24hr TD 24 hr patch 1 patch  1 patch Transdermal Daily   • pantoprazole (PROTONIX) injection 40 mg  40 mg Intravenous Q12H EMILIA       No Known Allergies        Objective     Blood pressure 147/85, pulse 73, temperature (!) 96.9 °F (36.1 °C), temperature source Oral, resp. rate 16, height 5' 3" (1.6 m), weight 53.9 kg (118 lb 12.8 oz), SpO2 96 %, not currently breastfeeding.       Intake/Output Summary (Last 24 hours) at 8/1/2023 0902  Last data filed at 8/1/2023 0744  Gross per 24 hour   Intake 0 ml   Output --   Net 0 ml         PHYSICAL EXAM     General Appearance:   Alert, cooperative, no distress, appears stated age    HEENT:   Normocephalic, atraumatic, anicteric.     Neck:  Supple, symmetrical, trachea midline, no adenopathy;    thyroid: no enlargement/tenderness/nodules; no carotid  bruit or JVD    Lungs:   Clear to auscultation bilaterally; no rales, rhonchi or wheezing; respirations unlabored    Heart[de-identified]   S1 and S2 normal; regular rate and rhythm; no murmur, rub, or gallop.    Abdomen:   Soft, non-tender, non-distended; normal bowel sounds; no masses, no organomegaly    Genitalia:   Deferred    Rectal:   Deferred    Extremities:  No cyanosis, clubbing or edema    Pulses:  2+ and symmetric all extremities    Skin:  Skin color, texture, turgor normal, no rashes or lesions    Lymph nodes:  No palpable cervical, axillary or inguinal lymphadenopathy        Lab Results:   Admission on 07/31/2023   Component Date Value   • WBC 07/31/2023 7.34    • RBC 07/31/2023 3.90    • Hemoglobin 07/31/2023 9.7 (L)    • Hematocrit 07/31/2023 32.2 (L)    • MCV 07/31/2023 83    • MCH 07/31/2023 24.9 (L)    • MCHC 07/31/2023 30.1 (L)    • RDW 07/31/2023 20.6 (H)    • MPV 07/31/2023 10.4    • Platelets 60/06/8527 316    • nRBC 07/31/2023 0    • Neutrophils Relative 07/31/2023 73    • Immat GRANS % 07/31/2023 0    • Lymphocytes Relative 07/31/2023 19    • Monocytes Relative 07/31/2023 7    • Eosinophils Relative 07/31/2023 1    • Basophils Relative 07/31/2023 0    • Neutrophils Absolute 07/31/2023 5.28    • Immature Grans Absolute 07/31/2023 0.02    • Lymphocytes Absolute 07/31/2023 1.41    • Monocytes Absolute 07/31/2023 0.50    • Eosinophils Absolute 07/31/2023 0.10    • Basophils Absolute 07/31/2023 0.03    • Sodium 07/31/2023 138    • Potassium 07/31/2023 3.9    • Chloride 07/31/2023 106    • CO2 07/31/2023 27    • ANION GAP 07/31/2023 5    • BUN 07/31/2023 23    • Creatinine 07/31/2023 1.01    • Glucose 07/31/2023 116    • Calcium 07/31/2023 9.1    • Corrected Calcium 07/31/2023 9.7    • AST 07/31/2023 20    • ALT 07/31/2023 23    • Alkaline Phosphatase 07/31/2023 155 (H)    • Total Protein 07/31/2023 6.6    • Albumin 07/31/2023 3.2 (L)    • Total Bilirubin 07/31/2023 0.45    • eGFR 07/31/2023 56    • Lipase 07/31/2023 53    • POC Glucose 07/31/2023 86    • POC Glucose 07/31/2023 241 (H)    • POC Glucose 08/01/2023 242 (H)    • Sodium 08/01/2023 137    • Potassium 08/01/2023 3.9    • Chloride 08/01/2023 107    • CO2 08/01/2023 26    • ANION GAP 08/01/2023 4    • BUN 08/01/2023 20    • Creatinine 08/01/2023 1.03    • Glucose 08/01/2023 136    • Glucose, Fasting 08/01/2023 136 (H)    • Calcium 08/01/2023 8.7    • Corrected Calcium 08/01/2023 9.5    • AST 08/01/2023 17    • ALT 08/01/2023 20    • Alkaline Phosphatase 08/01/2023 134 (H)    • Total Protein 08/01/2023 6.2 (L)    • Albumin 08/01/2023 3.0 (L)    • Total Bilirubin 08/01/2023 0.43    • eGFR 08/01/2023 55    • WBC 08/01/2023 7.51    • RBC 08/01/2023 3.68 (L)    • Hemoglobin 08/01/2023 9.1 (L)    • Hematocrit 08/01/2023 30.4 (L)    • MCV 08/01/2023 83    • MCH 08/01/2023 24.7 (L)    • MCHC 08/01/2023 29.9 (L)    • RDW 08/01/2023 20.8 (H)    • MPV 08/01/2023 10.5    • Platelets 03/34/7564 327    • nRBC 08/01/2023 0    • Neutrophils Relative 08/01/2023 64    • Immat GRANS % 08/01/2023 0    • Lymphocytes Relative 08/01/2023 24    • Monocytes Relative 08/01/2023 10    • Eosinophils Relative 08/01/2023 2    • Basophils Relative 08/01/2023 0    • Neutrophils Absolute 08/01/2023 4.70    • Immature Grans Absolute 08/01/2023 0.03    • Lymphocytes Absolute 08/01/2023 1.81    • Monocytes Absolute 08/01/2023 0.78    • Eosinophils Absolute 08/01/2023 0.18    • Basophils Absolute 08/01/2023 0.01    • POC Glucose 08/01/2023 133       Latest Reference Range & Units 08/01/23 05:21 08/01/23 05:55   Sodium 135 - 147 mmol/L 137    Potassium 3.5 - 5.3 mmol/L 3.9    Chloride 96 - 108 mmol/L 107    CO2 21 - 32 mmol/L 26    Anion Gap mmol/L 4    BUN 5 - 25 mg/dL 20    Creatinine 0.60 - 1.30 mg/dL 1.03    Glucose, Random 65 - 140 mg/dL 136    GLUCOSE FASTING 65 - 99 mg/dL 136 (H)    Calcium 8.4 - 10.2 mg/dL 8.7    CORRECTED CALCIUM 8.3 - 10.1 mg/dL 9.5    AST 13 - 39 U/L 17    ALT 7 - 52 U/L 20    Alkaline Phosphatase 34 - 104 U/L 134 (H)    Total Protein 6.4 - 8.4 g/dL 6.2 (L)    Albumin 3.5 - 5.0 g/dL 3.0 (L)    TOTAL BILIRUBIN 0.20 - 1.00 mg/dL 0.43    eGFR ml/min/1.73sq m 55    WBC 4.31 - 10.16 Thousand/uL 7.51    Red Blood Cell Count 3.81 - 5.12 Million/uL 3.68 (L)    Hemoglobin 11.5 - 15.4 g/dL 9.1 (L)    HCT 34.8 - 46.1 % 30.4 (L)    MCV 82 - 98 fL 83    MCH 26.8 - 34.3 pg 24.7 (L)    MCHC 31.4 - 37.4 g/dL 29.9 (L)    RDW 11.6 - 15.1 % 20.8 (H)    Platelet Count 897 - 390 Thousands/uL 327    MPV 8.9 - 12.7 fL 10.5    nRBC /100 WBCs 0    Neutrophils % 43 - 75 % 64    Immat GRANS % 0 - 2 % 0    Lymphocytes Relative 14 - 44 % 24    Monocytes Relative 4 - 12 % 10    Eosinophils 0 - 6 % 2    Basophils Relative 0 - 1 % 0    Immature Grans Absolute 0.00 - 0.20 Thousand/uL 0.03    Absolute Neutrophils 1.85 - 7.62 Thousands/µL 4.70    Lymphocytes Absolute 0.60 - 4.47 Thousands/µL 1.81    Absolute Monocytes 0.17 - 1.22 Thousand/µL 0.78    Absolute Eosinophils 0.00 - 0.61 Thousand/µL 0.18    Basophils Absolute 0.00 - 0.10 Thousands/µL 0.01    POC Glucose 65 - 140 mg/dl  133   (H): Data is abnormally high  (L): Data is abnormally low    Imaging Studies: I have personally reviewed pertinent reports. EGD and colonoscopy from 7/21/23 and 7/24/23 respectively  EGD IMPRESSION:  • Mild erythematous mucosa in the prepyloric region; performed cold forceps biopsy  • The duodenal bulb, 1st part of the duodenum and 2nd part of the duodenum appeared normal. Performed random biopsy to rule out celiac disease. RECOMMENDATION:    There is no recommended follow-up for this procedure. Follow biopsy results in 2 to 3 weeks. Recommend colonoscopy on Monday. Recommend clear liquid diet on Sunday with bowel prep starting Sunday evening. Monitor H&H and transfuse as needed.   Recommend pantoprazole 40 mg once daily only. COLONOSCOPY IMPRESSION:  • The terminal ileum and entire colon appeared normal.  • Scattered diverticulosis of mild severity in the sigmoid colon  RECOMMENDATION:    No further screening colonoscopies necessary     • Age greater than 65           ASSESSMENT/PLAN:     #1.  Recurrent dark-colored stools; patient was recently hospitalized with complaint of approximately 1 month of dark-colored stools and severe symptomatic anemia with EGD and colonoscopy showing no obvious sources of GI bleeding; she is anticoagulated with Eliquis and question remains for source of bleeding within the small bowel. She does not appear to be actively bleeding at this time    -Discussed with internal medicine service, patient has upcoming appointment for capsule endoscopy, would recommend she keep this    -Advised patient to come back to the hospital if she does develop large amount of dark-colored stool recurrence, and particularly if it is associated with symptoms of shortness of breath/dyspnea on exertion, presyncope/syncope, generalized weakness or other symptoms of worsening anemia    -Continue PPI    -May continue with diet as tolerated    The patient was seen and examined by Dr. Phoenix Perkins, all [facility]ill[facility] medical decisions were made with Dr. Phoenix Perkins. Thank you for allowing us to participate in the care of this pleasant patient. We will follow up with you closely.

## 2023-08-01 NOTE — UTILIZATION REVIEW
Initial Clinical Review    Admission: Date/Time/Statement:   Admission Orders (From admission, onward)     Ordered        07/31/23 1549  Place in Observation  Once                      Orders Placed This Encounter   Procedures   • Place in Observation     Standing Status:   Standing     Number of Occurrences:   1     Order Specific Question:   Level of Care     Answer:   Med Surg [16]     ED Arrival Information     Expected   -    Arrival   7/31/2023 11:21    Acuity   Urgent            Means of arrival   Walk-In    Escorted by   Self    Service   Hospitalist    Admission type   Emergency            Arrival complaint   Black or Bloody Stool           Chief Complaint   Patient presents with   • Black or Bloody Stool     Pt had recent hospitalization looking for source of blood loss. They could not find source after egd and colonoscopy. Yesterday pt noticed dark stools. Initial Presentation: 77 y.o. female PMH of breast cancer s/p lumpectomy 20 years ago, type 2 diabetes with 8.4, GERD, HTN, CKD 3A, systolic heart failure with reduced EF of 35% with pacemaker , recent admission 7/20 /23 with symptomatic anemia , given 2 U PRBC for hgb 6.0 at that time with EGD and c scope at that time unrevealing Pt presents with dark stools that a few days  after resuming Eliquis after Endoscopic procedures , dark stool approx 1.5 months . Reports mild fatigue, weakness and increased pallor . On exam, Dark stool on  digital rectal exam, guiac +. Pale conjunctiva, oral mucous membranes . Labs Hgb 9.7. Pt given PPI in ED. Pt admitted as OBS with symptomatic anemia, black stool. Plan-Monitor Hgb-daily CBC, hold aspirin 81 mg and apixiban 5 mg BID doses for now. Venofer IV X1 . GI consult      Date: 8/1   Hgb 9.1 today.     ED Triage Vitals   Temperature Pulse Respirations Blood Pressure SpO2   07/31/23 1128 07/31/23 1128 07/31/23 1128 07/31/23 1128 07/31/23 1128   98.2 °F (36.8 °C) 75 18 163/83 99 %      Temp Source Heart Rate Source Patient Position - Orthostatic VS BP Location FiO2 (%)   07/31/23 1128 07/31/23 1128 07/31/23 1511 07/31/23 1128 --   Oral Monitor Lying Right arm       Pain Score       07/31/23 1128       No Pain          Wt Readings from Last 1 Encounters:   07/31/23 53.9 kg (118 lb 12.8 oz)     Additional Vital Signs:   Date/Time Temp Pulse Resp BP MAP (mmHg) SpO2   08/01/23 07:29:39 96.9 °F (36.1 °C) Abnormal  73 16 147/85 106 96 %   07/31/23 20:51:14 98.3 °F (36.8 °C) 76 18 153/75 101 100 %   07/31/23 18:22:45 98.3 °F (36.8 °C) 78 18 158/80 106 99 %   07/31/23 1511 -- 71 18 180/89 Abnormal  128 99 %   07/31/23 1300 -- 70 16 152/80 112 98 %     Pertinent Labs/Diagnostic Test Results:   No orders to display         Results from last 7 days   Lab Units 08/01/23  0521 07/31/23  1225   WBC Thousand/uL 7.51 7.34   HEMOGLOBIN g/dL 9.1* 9.7*   HEMATOCRIT % 30.4* 32.2*   PLATELETS Thousands/uL 327 316   NEUTROS ABS Thousands/µL 4.70 5.28         Results from last 7 days   Lab Units 08/01/23  0521 07/31/23  1225   SODIUM mmol/L 137 138   POTASSIUM mmol/L 3.9 3.9   CHLORIDE mmol/L 107 106   CO2 mmol/L 26 27   ANION GAP mmol/L 4 5   BUN mg/dL 20 23   CREATININE mg/dL 1.03 1.01   EGFR ml/min/1.73sq m 55 56   CALCIUM mg/dL 8.7 9.1     Results from last 7 days   Lab Units 08/01/23  0521 07/31/23  1225   AST U/L 17 20   ALT U/L 20 23   ALK PHOS U/L 134* 155*   TOTAL PROTEIN g/dL 6.2* 6.6   ALBUMIN g/dL 3.0* 3.2*   TOTAL BILIRUBIN mg/dL 0.43 0.45     Results from last 7 days   Lab Units 08/01/23  0555 08/01/23  0030 07/31/23 2049 07/31/23  1730 07/25/23  1151   POC GLUCOSE mg/dl 133 242* 241* 86 390*     Results from last 7 days   Lab Units 08/01/23  0521 07/31/23  1225   GLUCOSE RANDOM mg/dL 136 116             BETA-HYDROXYBUTYRATE   Date Value Ref Range Status   12/07/2022 0.4 <0.6 mmol/L Final                                                                          Results from last 7 days   Lab Units 07/31/23  1225   LIPASE u/L 53 ED Treatment:   Medication Administration from 07/31/2023 1030 to 07/31/2023 1749       Date/Time Order Dose Route Action     07/31/2023 1515 EDT pantoprazole (PROTONIX) injection 40 mg 40 mg Intravenous Given        Past Medical History:   Diagnosis Date   • Breast cancer ([address] [facility])     left - 1994. • Diabetes mellitus ([address] [facility])    • Diabetes mellitus, type 2 ([facility]) 03/15/2006    last assessed 7/10/13   • GERD (gastroesophageal reflux disease)    • History of chemotherapy    • Hypertension      Present on Admission:  • Tobacco use  • Symptomatic anemia  • Chronic HFrEF (heart failure with reduced ejection fraction) ([facility])  • CKD (chronic kidney disease) stage 3, GFR 30-59 ml/min ([facility])  • Hypertensive heart and kidney disease with heart failure and with chronic kidney disease stage III ([facility])  • Black stool      Admitting Diagnosis: Blood in stool [K92.1]  Melena [K92.1]  Anemia [D64.9]  Symptomatic anemia [D64.9]  Age/Sex: 77 y.o. female  Admission Orders:  Scheduled Medications:  atorvastatin, 40 mg, Oral, Daily  insulin lispro, 1-5 Units, Subcutaneous, Q6H [address]  metoprolol succinate, 50 mg, Oral, Daily  nicotine, 1 patch, Transdermal, Daily  pantoprazole, 40 mg, Intravenous, Q12H EMILIA    iron sucrose (VENOFER) 300 mg in sodium chloride 0.9 % 250 mL IVPB  Dose: 300 mg  Freq: Once Route: IV  Start: 07/31/23 1900 End: 07/31/23 2102  dicyclomine (BENTYL) tablet 20 mg  Dose: 20 mg  Freq: Once Route: PO  Start: 08/01/23 0600 End: 08/01/23 0603      Continuous IV Infusions:     PRN Meds:       NPO 8/1 @0001   POCT glucose   OOb as bailey      IP CONSULT TO GASTROENTEROLOGY    Network Utilization Review Department  ATTENTION: Please call with any questions or concerns to [phone] and carefully listen to the prompts so that you are directed to the right person.  All voicemails are confidential.  Theta Dimple all requests for admission clinical reviews, approved or denied determinations and any other requests to dedicated fax number below belonging to the campus where the patient is receiving treatment.  List of dedicated fax numbers for the Facilities:  [place] DENIALS (Administrative/Medical Necessity) [phone] [address] [facility] (Maternity/NICU/Pediatrics) [phone]   [address] [place] [address] [phone]   [facility] [address] [facility] [phone]   [address] [place] [address] [address] [phone]   [address] [facility] [address] [facility] [address] [facility] [phone]

## 2023-08-02 ENCOUNTER — TELEPHONE (OUTPATIENT)
Dept: CARDIOLOGY CLINIC | Facility: CLINIC | Age: 68
End: 2023-08-02

## 2023-08-02 NOTE — TELEPHONE ENCOUNTER
Patient called requesting  [facility] complete forms for medical assistance for the state of [place]. She stated that the [place] office has the forms. Previously patient stated that the forms were Ron damon to [place] office, Ron Vicente # [phone]    [facility] office. They checked for patients forms & do not have them. Left a message for patient stating that cardiology does not have medical assistant forms. Stated she can have [place] refax forms to office. Also stated to call office with any further questions or concerns.

## 2023-08-03 ENCOUNTER — OFFICE VISIT (OUTPATIENT)
Dept: FAMILY MEDICINE CLINIC | Facility: CLINIC | Age: 68
End: 2023-08-03
Payer: COMMERCIAL

## 2023-08-03 VITALS
HEART RATE: 71 BPM | BODY MASS INDEX: 22.32 KG/M2 | OXYGEN SATURATION: 100 % | TEMPERATURE: 97.5 F | DIASTOLIC BLOOD PRESSURE: 70 MMHG | WEIGHT: 126 LBS | SYSTOLIC BLOOD PRESSURE: 122 MMHG | RESPIRATION RATE: 16 BRPM | HEIGHT: 63 IN

## 2023-08-03 DIAGNOSIS — E11.22 TYPE 2 DIABETES MELLITUS WITH STAGE 3A CHRONIC KIDNEY DISEASE, WITHOUT LONG-TERM CURRENT USE OF INSULIN (HCC): ICD-10-CM

## 2023-08-03 DIAGNOSIS — N90.1 VIN II (VULVAR INTRAEPITHELIAL NEOPLASIA II): ICD-10-CM

## 2023-08-03 DIAGNOSIS — I50.22 CHRONIC HFREF (HEART FAILURE WITH REDUCED EJECTION FRACTION) (HCC): ICD-10-CM

## 2023-08-03 DIAGNOSIS — D64.9 SYMPTOMATIC ANEMIA: Primary | ICD-10-CM

## 2023-08-03 DIAGNOSIS — N18.31 TYPE 2 DIABETES MELLITUS WITH STAGE 3A CHRONIC KIDNEY DISEASE, WITHOUT LONG-TERM CURRENT USE OF INSULIN (HCC): ICD-10-CM

## 2023-08-03 DIAGNOSIS — N18.31 STAGE 3A CHRONIC KIDNEY DISEASE (HCC): ICD-10-CM

## 2023-08-03 DIAGNOSIS — I50.1 HEART FAILURE, LEFT, WITH LVEF 31-40% (HCC): ICD-10-CM

## 2023-08-03 PROBLEM — N89.8 VAGINAL ODOR: Status: RESOLVED | Noted: 2023-05-26 | Resolved: 2023-08-03

## 2023-08-03 PROBLEM — E83.52 HYPERCALCEMIA: Status: RESOLVED | Noted: 2020-11-15 | Resolved: 2023-08-03

## 2023-08-03 PROBLEM — N18.30 CKD (CHRONIC KIDNEY DISEASE) STAGE 3, GFR 30-59 ML/MIN (HCC): Status: RESOLVED | Noted: 2020-11-16 | Resolved: 2023-08-03

## 2023-08-03 PROBLEM — N18.4 CHRONIC RENAL DISEASE, STAGE IV (HCC): Status: RESOLVED | Noted: 2023-05-23 | Resolved: 2023-08-03

## 2023-08-03 PROCEDURE — 99214 OFFICE O/P EST MOD 30 MIN: CPT | Performed by: FAMILY MEDICINE

## 2023-08-03 PROCEDURE — 99495 TRANSJ CARE MGMT MOD F2F 14D: CPT | Performed by: FAMILY MEDICINE

## 2023-08-03 PROCEDURE — 93000 ELECTROCARDIOGRAM COMPLETE: CPT | Performed by: FAMILY MEDICINE

## 2023-08-03 NOTE — PROGRESS NOTES
Assessment/Plan:    No problem-specific Assessment & Plan notes found for this encounter. preop ok if surgeon finds recent hemoglobin acceptable     Diagnoses and all orders for this visit:    Chronic HFrEF (heart failure with reduced ejection fraction) ([address])    Stage 3a chronic kidney disease ([address])    Type 2 diabetes mellitus with stage 3a chronic kidney disease, without long-term current use of insulin (HCC)    JAYLAN II (vulvar intraepithelial neoplasia II)    Heart failure, left, with LVEF 31-40% (HCC)          Stable anemia, see trend, proceed if hemoglobin 9.1 is ok with surgeon    She will ask her cardiologist about holding eliquis preop    No follow-ups on file. Subjective:      Patient ID: Rush Roy is a 77 y.o. female.     Chief Complaint   Patient presents with   • Pre-op Exam     wmcma       HPI    Planned procedure:  Vulvectomy  Surgeon:  Dr Kendell Sarabia  Date:  8/10/23  Anesthesia type:  usnstated    Prior major surgeries:  Right mastectomy, left rotator cuff  Problems with anesthesia in past: no  Recent hospitalization for GI bleed with stable anemia and outpt GI w/u       Can walk 4 blocks or 2 flights of stairs:  yes but legs hurtt  History of excessive bleeding:  no but on eliquis and off aspirin recently after undiagnosed GI bleed with melena but neg egd/colon  History of excessive clotting:  no  Personal history of DVT or Pe:  no    Taking NSAIDS:  n  Takings vitamins D or E or A or fish oil supplements:  D    Usual am medications:  Metformin, eliquis, metoprolol, pantoprazole, farxiga    Can take usual am meds but needs to have her cardiologist tell her when she can stop the eliquis preoperatively    Ok to skip metformin the am of surgery  Keep f/u with her endocrinologist  She is currently off insulin  Advised to stay hydrated on farxiga    The following portions of the patient's history were reviewed and updated as appropriate: allergies, current medications, past family history, past medical history, past social history, past surgical history and problem list.    Review of Systems   Constitutional: Negative for chills and fever. Current Outpatient Medications   Medication Sig Dispense Refill   • apixaban (Eliquis) 5 mg Take 1 tablet (5 mg total) by mouth 2 (two) times a day 180 tablet 3   • atorvastatin (LIPITOR) 40 mg tablet Take 1 tablet (40 mg total) by mouth daily 90 tablet 1   • Blood Glucose Monitoring Suppl (OneTouch Verio Reflect) w/Device KIT Check blood sugars three times daily. Please substitute with appropriate alternative as covered by patient's insurance. Dx: E11.65 1 kit 0   • Continuous Blood Gluc  (FreeStyle [place] 2 [place]) KEELY Use 1 Units continuous 1 each 0   • Continuous Blood Gluc Sensor (FreeStyle Frank 2 Sensor) MISC Use 1 Units every 14 (fourteen) days 2 each 5   • dapagliflozin (Farxiga) 5 MG TABS Take 5 mg by mouth in the morning. Indications: Cardiac Failure     • furosemide (LASIX) 20 mg tablet TAKE 1 TABLET BY MOUTH DAILY AS NEEDED (WT GAIN 3 LB/ 24 HOURS / TAKE FOR 3 DAYS IN A ROW) 90 tablet 2   • glucose blood (OneTouch Verio) test strip Check blood sugars three times daily. Please substitute with appropriate alternative as covered by patient's insurance. Dx: E11.65 300 each 1   • Insulin Pen Needle (BD Pen Needle Kateryna U/F) 32G X 4 MM MISC Use daily as directed with insulin pen 100 each 0   • metFORMIN (GLUCOPHAGE) 500 mg tablet TAKE 1 TABLET BY MOUTH TWICE A DAY WITH MEALS 180 tablet 1   • metoprolol succinate (TOPROL-XL) 50 mg 24 hr tablet Take 1 tablet (50 mg total) by mouth daily 30 tablet 3   • OneTouch Delica Lancets 79M MISC Check blood sugars three times daily. Please substitute with appropriate alternative as covered by patient's insurance. Dx: E11.65 300 each 1   • pantoprazole (PROTONIX) 40 mg tablet Take 1 tablet (40 mg total) by mouth daily 90 tablet 0     No current facility-administered medications for this visit.        Objective:    BP 122/70   Pulse 71   Temp 97.5 °F (36.4 °C) (Temporal)   Resp 16   Ht 5' 3" (1.6 m)   Wt 57.2 kg (126 lb)   LMP  (LMP Unknown)   SpO2 100%   BMI 22.32 kg/m²        Physical Exam  Vitals and nursing note reviewed. Constitutional:       General: She is not in acute distress. Appearance: She is well-developed. She is not ill-appearing. HENT:      Head: Normocephalic. Eyes:      General: No scleral icterus. Conjunctiva/sclera: Conjunctivae normal.   Cardiovascular:      Rate and Rhythm: Normal rate and regular rhythm. Heart sounds: No murmur heard. Pulmonary:      Effort: Pulmonary effort is normal. No respiratory distress. Breath sounds: No wheezing. Abdominal:      General: There is no distension. Palpations: Abdomen is soft. Tenderness: There is no abdominal tenderness. Musculoskeletal:         General: No deformity. Cervical back: Neck supple. Right lower leg: Edema present. Left lower leg: Edema present. Comments: Mild b/l LE edema for which her cardiologist has her take short courses of lasix prn   Skin:     General: Skin is warm and dry. Coloration: Skin is not pale. Neurological:      Mental Status: She is alert. Motor: No weakness. Gait: Gait abnormal.   Psychiatric:         Mood and Affect: Mood normal.         Behavior: Behavior normal.         Thought Content:  Thought content normal.         ekg done in office, stable LAD and RBBB     Josefina Koenig DO

## 2023-08-04 NOTE — PROGRESS NOTES
Assessment/Plan:    No problem-specific Assessment & Plan notes found for this encounter. Off asa, still on NOAC  Watch for further bleeding  F/u gi    Dm2, f/u endo  Off insulin     Diagnoses and all orders for this visit:    Chronic HFrEF (heart failure with reduced ejection fraction) ([facility])    Stage 3a chronic kidney disease ([address] [facility])    Type 2 diabetes mellitus with stage 3a chronic kidney disease, without long-term current use of insulin ([facility])    JAYLAN II (vulvar intraepithelial neoplasia II)    Heart failure, left, with LVEF 31-40% ([facility])  -     POCT ECG    Symptomatic anemia              No follow-ups on file. Subjective:      Patient ID: Allen Gosselin is a 77 y.o. female. Chief Complaint   Patient presents with   • Pre-op Exam     wmcma       HPI     CC is recent hospitalization for gi bleed and melanotic stool with symptomatic anemia    TCM Call     Date and time call was made  7/25/2023  1:16 PM    Hospital care reviewed  Records reviewed    Patient was hospitialized at  [address] [place] [address]    Date of Admission  07/20/23    Date of discharge  07/25/23    Diagnosis  Symptomatic anemia    Disposition  Home    Were the patients medications reviewed and updated  Yes    Current Symptoms  None      TCM Call     Post hospital issues  None    Should patient be enrolled in anticoag monitoring? No    Scheduled for follow up?   Yes    Patients specialists  Cardiologist    Cardiologist name  MAGGIE Marshall (Cardiology) on 12/2/2020    Did you obtain your prescribed medications  Yes    Do you need help managing your prescriptions or medications  No    Is transportation to your appointment needed  Yes    Specify why  She does not drive, her brother drives her    I have advised the patient to call PCP with any new or worsening symptoms  Maryam Kulkarni LPN    Living Arrangements  Family members    Support System  Family; Friends    The type of support provided  Emotional; Physical    Do you have social support Yes, as much as I need    Are you recieving any outpatient services  No    Are you recieving home care services  No    Are you using any community resources  No    Current waiver services  No    Have you fallen in the last 12 months  Yes    How many times  Michelle Cushing about 2 days ago when she got home from the hospital and she hurt her left wrist. She stated the wrist is sore now. Interperter language line needed  No    Counseling  Patient    Counseling topics  Activities of daily living; Importance of RX compliance    Comments  Spoke to the patient and went over the questions with her. She is aware that if her symptoms return or worsen to go back to the ER. Jordyn Ramona Faye LPN        No further dark stool noted  Planning outpt capsule endoscopy  Taking all her meds  On protonix 40mg/d    The following portions of the patient's history were reviewed and updated as appropriate: allergies, current medications, past family history, past medical history, past social history, past surgical history and problem list.    Review of Systems   Constitutional: Positive for fatigue. Negative for fever. Current Outpatient Medications   Medication Sig Dispense Refill   • apixaban (Eliquis) 5 mg Take 1 tablet (5 mg total) by mouth 2 (two) times a day 180 tablet 3   • atorvastatin (LIPITOR) 40 mg tablet Take 1 tablet (40 mg total) by mouth daily 90 tablet 1   • Blood Glucose Monitoring Suppl (OneTouch Verio Reflect) w/Device KIT Check blood sugars three times daily. Please substitute with appropriate alternative as covered by patient's insurance. Dx: E11.65 1 kit 0   • Continuous Blood Gluc  (FreeStyle [place] 2 [place]) KEELY Use 1 Units continuous 1 each 0   • Continuous Blood Gluc Sensor (FreeStyle Frank 2 Sensor) MISC Use 1 Units every 14 (fourteen) days 2 each 5   • dapagliflozin (Farxiga) 5 MG TABS Take 5 mg by mouth in the morning.  Indications: Cardiac Failure     • furosemide (LASIX) 20 mg tablet TAKE 1 TABLET BY MOUTH DAILY AS NEEDED (WT GAIN 3 LB/ 24 HOURS / TAKE FOR 3 DAYS IN A ROW) 90 tablet 2   • glucose blood (OneTouch Verio) test strip Check blood sugars three times daily. Please substitute with appropriate alternative as covered by patient's insurance. Dx: E11.65 300 each 1   • Insulin Pen Needle (BD Pen Needle Kateryna U/F) 32G X 4 MM MISC Use daily as directed with insulin pen 100 each 0   • metFORMIN (GLUCOPHAGE) 500 mg tablet TAKE 1 TABLET BY MOUTH TWICE A DAY WITH MEALS 180 tablet 1   • metoprolol succinate (TOPROL-XL) 50 mg 24 hr tablet Take 1 tablet (50 mg total) by mouth daily 30 tablet 3   • OneTouch Delica Lancets 74Q MISC Check blood sugars three times daily. Please substitute with appropriate alternative as covered by patient's insurance. Dx: E11.65 300 each 1   • pantoprazole (PROTONIX) 40 mg tablet Take 1 tablet (40 mg total) by mouth daily 90 tablet 0     No current facility-administered medications for this visit. Objective:    /70   Pulse 71   Temp 97.5 °F (36.4 °C) (Temporal)   Resp 16   Ht 5' 3" (1.6 m)   Wt 57.2 kg (126 lb)   LMP  (LMP Unknown)   SpO2 100%   BMI 22.32 kg/m²        Physical Exam  Vitals and nursing note reviewed. Constitutional:       General: She is not in acute distress. Appearance: She is well-developed. She is not ill-appearing. HENT:      Head: Normocephalic. Right Ear: External ear normal.      Left Ear: External ear normal.      Mouth/Throat:      Pharynx: Oropharynx is clear. Eyes:      General: No scleral icterus. Conjunctiva/sclera: Conjunctivae normal.   Cardiovascular:      Rate and Rhythm: Normal rate and regular rhythm. Pulmonary:      Effort: Pulmonary effort is normal. No respiratory distress. Breath sounds: No rales. Abdominal:      General: There is no distension. Palpations: Abdomen is soft. Tenderness: There is no abdominal tenderness. Musculoskeletal:         General: No deformity.       Cervical back: Neck supple. Right lower leg: Edema present. Left lower leg: Edema present. Skin:     General: Skin is warm and dry. Coloration: Skin is not pale. Neurological:      Mental Status: She is alert. Gait: Gait abnormal.   Psychiatric:         Mood and Affect: Mood normal.         Behavior: Behavior normal.         Thought Content:  Thought content normal.         Trended hgb noted and reviewed with pt        Josefina Koenig DO

## 2023-08-07 ENCOUNTER — HOSPITAL ENCOUNTER (OUTPATIENT)
Dept: GASTROENTEROLOGY | Facility: HOSPITAL | Age: 68
Discharge: HOME | End: 2023-08-07
Payer: COMMERCIAL

## 2023-08-07 DIAGNOSIS — K92.1 BLACK STOOL: ICD-10-CM

## 2023-08-07 DIAGNOSIS — D50.9 IRON DEFICIENCY ANEMIA, UNSPECIFIED IRON DEFICIENCY ANEMIA TYPE: ICD-10-CM

## 2023-08-07 PROCEDURE — 91110 GI TRC IMG INTRAL ESOPH-ILE: CPT

## 2023-08-07 NOTE — PERIOPERATIVE NURSING NOTE
Patient did eat some food yesterday, but wanted to proceed with procedure. Verbalized understanding that test may not be able to visualize everything due to this. Patient swallowed pill without difficulty. Capsule visualized in stomach. Patient aware to return to GI lab at 1500 with equipment.

## 2023-08-08 DIAGNOSIS — I50.9 CHF EXACERBATION (HCC): ICD-10-CM

## 2023-08-08 RX ORDER — METOPROLOL SUCCINATE 50 MG/1
50 TABLET, EXTENDED RELEASE ORAL DAILY
Qty: 90 TABLET | Refills: 2 | Status: SHIPPED | OUTPATIENT
Start: 2023-08-08

## 2023-08-09 ENCOUNTER — TELEPHONE (OUTPATIENT)
Dept: OTHER | Facility: OTHER | Age: 68
End: 2023-08-09

## 2023-08-09 ENCOUNTER — HOSPITAL ENCOUNTER (EMERGENCY)
Facility: HOSPITAL | Age: 68
Discharge: HOME | End: 2023-08-09
Attending: EMERGENCY MEDICINE
Payer: COMMERCIAL

## 2023-08-09 ENCOUNTER — APPOINTMENT (EMERGENCY)
Dept: RADIOLOGY | Facility: HOSPITAL | Age: 68
End: 2023-08-09
Payer: COMMERCIAL

## 2023-08-09 ENCOUNTER — TELEPHONE (OUTPATIENT)
Dept: HEMATOLOGY ONCOLOGY | Facility: CLINIC | Age: 68
End: 2023-08-09

## 2023-08-09 VITALS
OXYGEN SATURATION: 99 % | HEART RATE: 91 BPM | DIASTOLIC BLOOD PRESSURE: 90 MMHG | SYSTOLIC BLOOD PRESSURE: 175 MMHG | TEMPERATURE: 98.7 F | RESPIRATION RATE: 18 BRPM
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
## 2023-08-09 DIAGNOSIS — T14.8XXA CONTUSION: ICD-10-CM

## 2023-08-09 LAB
ABO GROUP BLD: NORMAL
BLD GP AB SCN SERPL QL: NEGATIVE
RH BLD: POSITIVE
SPECIMEN EXPIRATION DATE: NORMAL

## 2023-08-09 PROCEDURE — 73564 X-RAY EXAM KNEE 4 OR MORE: CPT

## 2023-08-09 PROCEDURE — 73590 X-RAY EXAM OF LOWER LEG: CPT

## 2023-08-09 PROCEDURE — 99284 EMERGENCY DEPT VISIT MOD MDM: CPT | Performed by: EMERGENCY MEDICINE

## 2023-08-09 PROCEDURE — 73502 X-RAY EXAM HIP UNI 2-3 VIEWS: CPT

## 2023-08-09 PROCEDURE — 99285 EMERGENCY DEPT VISIT HI MDM: CPT

## 2023-08-09 RX ORDER — OXYCODONE HYDROCHLORIDE AND ACETAMINOPHEN 5; 325 MG/1; MG/1
1 TABLET ORAL ONCE
Status: COMPLETED | OUTPATIENT
Start: 2023-08-09 | End: 2023-08-09

## 2023-08-09 RX ORDER — OXYCODONE HYDROCHLORIDE AND ACETAMINOPHEN 5; 325 MG/1; MG/1
1 TABLET ORAL EVERY 6 HOURS PRN
Qty: 8 TABLET | Refills: 0 | Status: SHIPPED | OUTPATIENT
Start: 2023-08-09 | End: 2023-08-19

## 2023-08-09 RX ADMIN — OXYCODONE HYDROCHLORIDE AND ACETAMINOPHEN 1 TABLET: 5; 325 TABLET ORAL at 14:21

## 2023-08-09 NOTE — TELEPHONE ENCOUNTER
Patient Call    Who are you speaking with? Patient    If it is not the patient, are they listed on an active communication consent form? N/A   What is the reason for this call? Pt fell on her porch on Sunday and she hurt her left leg and it is painful to lift and walk. She doesn't know if she should have her procedure tomorrow   Does this require a call back? Yes   If a call back is required, please list best call back number [phone]   If a call back is required, advise that a message will be forwarded to their care team and someone will return their call as soon as possible. Did you relay this information to the patient?  Yes

## 2023-08-09 NOTE — MEDICAL STUDENT
History     Chief Complaint   Patient presents with   • Fall     Pt to ED with c/o fall on Sunday. Denies head strike. Complains of left knee pain     Jane Fernandez is a 77 y.o female with a history of A-fib anticoagulated with eloquis, CHF, PAD, PAD, DVT, who presents to the clinic due to leg pain. Patient had a witnessed, forward fall on concrete while going up the stairs with a cane. Patient denies LOC, head strike, and was able to walk with pain with assistance from her nephew. Patient fell 3 days ago but reported to the ER due to worsening pain and because she is concerned about a vulvectomy procedure scheduled for tomorrow. Pain          Past Medical History:   Diagnosis Date   • Breast cancer ([address])     left - 1994. • Diabetes mellitus ([address])    • Diabetes mellitus, type 2 ([address]) 03/15/2006    last assessed 7/10/13   • GERD (gastroesophageal reflux disease)    • History of chemotherapy    • Hypertension        Past Surgical History:   Procedure Laterality Date   • CARDIAC ELECTROPHYSIOLOGY PROCEDURE N/A 03/09/2022    Procedure: Cardiac icd implant/ DUAL CHAMBER ICD; Surgeon: Chet Stewart MD;  Location: [facility];   Service: Cardiology   • MASTECTOMY Left     last assessed 12/16/14   • MASTECTOMY, RADICAL Left     1994   • PA PARATHYROIDECTOMY/EXPLORATION PARATHYROIDS N/A 04/21/2021    Procedure: PARATHYROIDECTOMY POSSIBLE 4 GLAND EXPLORATION;  Surgeon: Edvin Santamaria MD;  Location: [facility];  Service: ENT   • REDUCTION MAMMAPLASTY Right    • UVULECTOMY         Family History   Problem Relation Age of Onset   • Hypothyroidism Mother    • Leukemia Mother    • Diabetes Father    • Diabetes type II Father    • Stroke Sister    • Diabetes Paternal Grandmother    • Cancer Sister    • Diabetes Brother        Social History     Tobacco Use   • Smoking status: Some Days     Packs/day: 0.25     Years: 50.00     Total pack years: 12.50     Types: Cigarettes     Start date: 6/15/1973     Passive exposure: Current   • Smokeless tobacco: Never   • Tobacco comments:     2 cigarettes daily    Vaping Use   • Vaping Use: Never used   Substance Use Topics   • Alcohol use: Never   • Drug use: Never       Review of Systems    Physical Exam     ED Triage Vitals [08/09/23 1224]   Temperature Pulse Respirations Blood Pressure SpO2   98.7 °F (37.1 °C) 90 18 (!) 208/93 100 %      Temp Source Heart Rate Source Patient Position - Orthostatic VS BP Location FiO2 (%)   Oral Monitor Sitting Right arm --      Pain Score       --           Physical Exam    ED Course

## 2023-08-09 NOTE — TELEPHONE ENCOUNTER
Pt called needing to cancel her appointment for tomorrow. She fell and was put on Percocet for pain. Please call patient to reschedule.

## 2023-08-09 NOTE — ED PROVIDER NOTES
History  Chief Complaint   Patient presents with   • Fall     Pt to ED with c/o fall on Sunday. Denies head strike. Complains of left knee pain     76 yo female with h/o IDDM, Afib not on Eliquis stopped one week prior 2/2 surgery scheduled for tomorrow, CHF, HTN, PAD, DVT, recent admission for GI bleed and symptomatic anemia required transfusion presents with left knee pain on Sunday. Trip and mechanical fall, witnessed by family, no head strike, LOC, no seizure, no prodromal symptoms preciptating fall. Was able to get up and ambulate at baseline. Report left buttock pain and left knee pain. Knee pain shoots down leg from knee, stabbing quality. Worse with movement/ambulating. Not taking meds a home for pain. Concerned pain/knee issue will interfere with surgery tomorrow. Pt will need to be in lithotomy position for surgery. History provided by:  Medical records and patient   used: No        Prior to Admission Medications   Prescriptions Last Dose Informant Patient Reported? Taking? Blood Glucose Monitoring Suppl (OneTouch Verio Reflect) w/Device KIT  Self No No   Sig: Check blood sugars three times daily. Please substitute with appropriate alternative as covered by patient's insurance. Dx: E11.65   Continuous Blood Gluc  (FreeStyle Barton 2 Culver) KEELY  Self No No   Sig: Use 1 Units continuous   Continuous Blood Gluc Sensor (FreeStyle Frank 2 Sensor) MISC  Self No No   Sig: Use 1 Units every 14 (fourteen) days   Insulin Pen Needle (BD Pen Needle Kateryna U/F) 32G X 4 MM MISC  Self No No   Sig: Use daily as directed with insulin pen   OneTouch Delica Lancets 44D MISC  Self No No   Sig: Check blood sugars three times daily. Please substitute with appropriate alternative as covered by patient's insurance.  Dx: E11.65   apixaban (Eliquis) 5 mg  Self No No   Sig: Take 1 tablet (5 mg total) by mouth 2 (two) times a day   ascorbic acid (VITAMIN C) 250 mg tablet   Yes No   Sig: Take 250 mg by mouth daily   atorvastatin (LIPITOR) 40 mg tablet  Self No No   Sig: Take 1 tablet (40 mg total) by mouth daily   cholecalciferol (VITAMIN D3) 1,000 units tablet   Yes No   Sig: Take 1,000 Units by mouth daily   dapagliflozin (Farxiga) 5 MG TABS  Self Yes No   Sig: Take 5 mg by mouth every evening   docusate sodium (COLACE) 100 mg capsule   Yes No   Sig: Take 100 mg by mouth 2 (two) times a day   furosemide (LASIX) 20 mg tablet  Self No No   Sig: TAKE 1 TABLET BY MOUTH DAILY AS NEEDED (WT GAIN 3 LB/ 24 HOURS / TAKE FOR 3 DAYS IN A ROW)   glucose blood (OneTouch Verio) test strip  Self No No   Sig: Check blood sugars three times daily. Please substitute with appropriate alternative as covered by patient's insurance. Dx: E11.65   metFORMIN (GLUCOPHAGE) 500 mg tablet  Self No No   Sig: TAKE 1 TABLET BY MOUTH TWICE A DAY WITH MEALS   metoprolol succinate (TOPROL-XL) 50 mg 24 hr tablet   No No   Sig: TAKE 1 TABLET BY MOUTH EVERY DAY   pantoprazole (PROTONIX) 40 mg tablet  Self No No   Sig: Take 1 tablet (40 mg total) by mouth daily      Facility-Administered Medications: None       Past Medical History:   Diagnosis Date   • Breast cancer ([address] [facility])     left - 1994. • Diabetes mellitus ([address] [facility])    • Diabetes mellitus, type 2 ([address] [facility]) 03/15/2006    last assessed 7/10/13   • GERD (gastroesophageal reflux disease)    • History of chemotherapy    • Hypertension        Past Surgical History:   Procedure Laterality Date   • CARDIAC ELECTROPHYSIOLOGY PROCEDURE N/A 03/09/2022    Procedure: Cardiac icd implant/ DUAL CHAMBER ICD; Surgeon: Chet Stewart MD;  Location: [facility];   Service: Cardiology   • MASTECTOMY Left     last assessed 12/16/14   • MASTECTOMY, RADICAL Left     1994   • NE PARATHYROIDECTOMY/EXPLORATION PARATHYROIDS N/A 04/21/2021    Procedure: PARATHYROIDECTOMY POSSIBLE 4 GLAND EXPLORATION;  Surgeon: Edvin Santamaria MD;  Location: [facility];  Service: ENT   • REDUCTION MAMMAPLASTY Right    • UVULECTOMY Family History   Problem Relation Age of Onset   • Hypothyroidism Mother    • Leukemia Mother    • Diabetes Father    • Diabetes type II Father    • Stroke Sister    • Diabetes Paternal Grandmother    • Cancer Sister    • Diabetes Brother      I have reviewed and agree with the history as documented. E-Cigarette/Vaping   • E-Cigarette Use Never User      E-Cigarette/Vaping Substances   • Nicotine No    • THC No    • CBD No    • Flavoring No    • Other No    • Unknown No      Social History     Tobacco Use   • Smoking status: Some Days     Packs/day: 0.25     Years: 50.00     Total pack years: 12.50     Types: Cigarettes     Start date: 6/15/1973     Passive exposure: Current   • Smokeless tobacco: Never   • Tobacco comments:     2 cigarettes daily    Vaping Use   • Vaping Use: Never used   Substance Use Topics   • Alcohol use: Never   • Drug use: Never       Review of Systems   Constitutional: Negative for chills and fever. HENT: Negative for congestion, rhinorrhea and sore throat. Respiratory: Negative for shortness of breath. Cardiovascular: Negative for chest pain. Gastrointestinal: Negative for abdominal pain, diarrhea, nausea and vomiting. Genitourinary: Negative for frequency and menstrual problem. Musculoskeletal: Positive for arthralgias. Negative for back pain, neck pain and neck stiffness. Skin: Negative for rash. Neurological: Negative for dizziness, facial asymmetry, speech difficulty, weakness and numbness. Hematological: Does not bruise/bleed easily. Psychiatric/Behavioral: Negative for confusion. All other systems reviewed and are negative. Physical Exam  Physical Exam  Vitals and nursing note reviewed. Constitutional:       General: She is not in acute distress. Appearance: She is well-developed. She is not toxic-appearing or diaphoretic. HENT:      Head: Normocephalic and atraumatic.       Nose: Nose normal.      Mouth/Throat:      Mouth: Mucous membranes are moist.   Eyes:      General: No scleral icterus. Conjunctiva/sclera: Conjunctivae normal.   Cardiovascular:      Rate and Rhythm: Normal rate and regular rhythm. Heart sounds: Normal heart sounds. No murmur heard. Pulmonary:      Effort: Pulmonary effort is normal. No respiratory distress. Breath sounds: Normal breath sounds. Abdominal:      Palpations: Abdomen is soft. Tenderness: There is no abdominal tenderness. There is no right CVA tenderness, left CVA tenderness or guarding. Hernia: No hernia is present. Musculoskeletal:         General: No deformity. Normal range of motion. Cervical back: Normal range of motion. Left hip: Tenderness and bony tenderness present. No deformity or lacerations. Normal range of motion. Normal strength. Left upper leg: Normal.      Left knee: No swelling, deformity, effusion, ecchymosis or crepitus. Normal range of motion. Tenderness present over the medial joint line and lateral joint line. No LCL laxity, MCL laxity, ACL laxity or PCL laxity. Normal alignment and normal patellar mobility. Normal pulse. Left lower leg: Tenderness present. No swelling or deformity. No edema. Skin:     General: Skin is warm and dry. Capillary Refill: Capillary refill takes less than 2 seconds. Neurological:      General: No focal deficit present. Mental Status: She is alert and oriented to person, place, and time. Cranial Nerves: No cranial nerve deficit. Sensory: No sensory deficit. Motor: No weakness. Psychiatric:         Behavior: Behavior normal.         Thought Content:  Thought content normal.         Judgment: Judgment normal.         Vital Signs  ED Triage Vitals [08/09/23 1224]   Temperature Pulse Respirations Blood Pressure SpO2   98.7 °F (37.1 °C) 90 18 (!) 208/93 100 %      Temp Source Heart Rate Source Patient Position - Orthostatic VS BP Location FiO2 (%)   Oral Monitor Sitting Right arm --      Pain Score --           Vitals:    08/09/23 1224 08/09/23 1330   BP: (!) 208/93 (!) 193/108   Pulse: 90 87   Patient Position - Orthostatic VS: Sitting          Visual Acuity      ED Medications  Medications - No data to display    Diagnostic Studies  Results Reviewed     None                 No orders to display              Procedures  Procedures         ED Course  ED Course as of 08/14/23 1650   Wed Aug 09, 2023   1410 GFR from 8/1/23 reviewed at baseline in the 50's. 1617 Improved symptoms, no acute injury on imaging, will attempt to ambulate with walker which is baseline. IF ok then safe for dispo to home. 1635 Pt ambulated at baseline. Feels better. Feels comfortable with discharge. Will speak with surgeon regarding pushing back surgery. SBIRT 20yo+    Flowsheet Row Most Recent Value   Initial Alcohol Screen: US AUDIT-C     1. How often do you have a drink containing alcohol? 0 Filed at: 08/09/2023 1320   2. How many drinks containing alcohol do you have on a typical day you are drinking? 0 Filed at: 08/09/2023 1320   3a. Male UNDER 65: How often do you have five or more drinks on one occasion? 0 Filed at: 08/09/2023 1320   3b. FEMALE Any Age, or MALE 65+: How often do you have 4 or more drinks on one occassion? 0 Filed at: 08/09/2023 1320   Audit-C Score 0 Filed at: 08/09/2023 1320   ALEX: How many times in the past year have you. .. Used an illegal drug or used a prescription medication for non-medical reasons? Never Filed at: 08/09/2023 1320                    Medical Decision Making  Witnessed fall from standing c/p LLE pain with ambulation. Pt on a/c at baseline but stopped one week prior. No history of head strike/LOC/neck/chest/belly or back pain. On exam pt with TTP left hip/knee. No deformity. Pt NV intact. Full but painful ROM throughout. Imaging without acute pathology. Pt with improved pain with ED treatment. Able to ambulate at baseline with walker. Safe for dispo to home. Advised pt to contact surgeon regarding surgery scheduled tomorrow. RTER precautions discussed and documented on discharge paperwork, pt and family endorsed good understanding of reasons to return. Contusion: acute illness or injury  Fall, initial encounter: acute illness or injury  Amount and/or Complexity of Data Reviewed  External Data Reviewed: labs and notes. Labs: ordered. Radiology: ordered and independent interpretation performed. Decision-making details documented in ED Course. Risk  Prescription drug management. Decision regarding hospitalization. Disposition  Final diagnoses:   None     ED Disposition     None      Follow-up Information    None         Patient's Medications   Discharge Prescriptions    No medications on file       No discharge procedures on file.     PDMP Review       Value Time User    PDMP Reviewed  Yes 4/21/2021  0:29 PM Clare Yeager MD          ED Provider  Electronically Signed by           Alexandre Hoang MD  08/14/23 [phone]

## 2023-08-09 NOTE — TELEPHONE ENCOUNTER
Returned patient call- she is having a hard time walking s/p fall and reports significant pain-instructed to report to the ED for evaluation of her leg and to report back to us related to results of eval.

## 2023-08-10 ENCOUNTER — VBI (OUTPATIENT)
Dept: FAMILY MEDICINE CLINIC | Facility: CLINIC | Age: 68
End: 2023-08-10

## 2023-08-10 ENCOUNTER — TELEPHONE (OUTPATIENT)
Dept: HEMATOLOGY ONCOLOGY | Facility: CLINIC | Age: 68
End: 2023-08-10

## 2023-08-10 NOTE — TELEPHONE ENCOUNTER
Left message for patient that we will cancel her procedure for today. Told her we will call back to reschedule. Then called OR and cancelled patient for today.

## 2023-08-10 NOTE — TELEPHONE ENCOUNTER
08/10/23 10:18 AM    Patient contacted post ED visit, [facility] department spoke with patient/caregiver and outreach was successful. Thank you.   SCOTT DALLAS  [facility]

## 2023-08-10 NOTE — TELEPHONE ENCOUNTER
Patient Call    Who are you speaking with? Patient    If it is not the patient, are they listed on an active communication consent form? N/A   What is the reason for this call? Patient calling to reschedule her surgery with Dr. Bibiana Samuel. Does this require a call back? yes   If a call back is required, please list best call back number [phone]   If a call back is required, advise that a message will be forwarded to their care team and someone will return their call as soon as possible. Did you relay this information to the patient?  yes

## 2023-08-11 ENCOUNTER — TELEPHONE (OUTPATIENT)
Dept: CARDIOLOGY CLINIC | Facility: CLINIC | Age: 68
End: 2023-08-11

## 2023-08-11 ENCOUNTER — REMOTE DEVICE CLINIC VISIT (OUTPATIENT)
Dept: CARDIOLOGY CLINIC | Facility: CLINIC | Age: 68
End: 2023-08-11
Payer: COMMERCIAL

## 2023-08-11 DIAGNOSIS — Z95.810 PRESENCE OF AUTOMATIC CARDIOVERTER/DEFIBRILLATOR (AICD): Primary | ICD-10-CM

## 2023-08-11 PROCEDURE — 93295 DEV INTERROG REMOTE 1/2/MLT: CPT | Performed by: INTERNAL MEDICINE

## 2023-08-11 PROCEDURE — 93296 REM INTERROG EVL PM/IDS: CPT | Performed by: INTERNAL MEDICINE

## 2023-08-11 NOTE — TELEPHONE ENCOUNTER
Please ask her to start taking lasix for the next 3 days and to start weighing herself daily. She should see a reduction in weight over the next few days. If she sees her weight starting to creep back up after the 3 days, she may need to resume taking the lasix afterwards.

## 2023-08-11 NOTE — PROGRESS NOTES
Results for orders placed or performed in visit on 08/11/23   Cardiac EP device report    Narrative    MDT DUAL ICD/ ACTIVE SYSTEM IS MRI CONDITIONAL  CARELINK TRANSMISSION: BATTERY VOLTAGE ADEQUATE (9.9 YRS). AP: 1.1%. : 0.2% (MVP-ON). ALL AVAILABLE LEAD PARAMETERS WITHIN NORMAL LIMITS. NO SIGNIFICANT HIGH RATE EPISODES. OPTI-VOL FLUID THRESHOLD CROSSED SINCE 6/12 AND IS ONGOING. PT TAKES LASIX, METOPROLOL SUCC, ELIQUIS. EF: 35 %(ECHO 4/26/23). TASK TO HF TEAM. NORMAL DEVICE FUNCTION.   [id number] [address]

## 2023-08-11 NOTE — TELEPHONE ENCOUNTER
[facility], she hasn't been taking her Lasix it is as needed for wt gain, but she doesn't currently wt herself at home. Having left lower leg swelling, denies coughing,sob. Is currently taking pain medication for a fall that occurred.      Last ov 6/7/2023 7/20/23 hospital visit anemia    Please advise

## 2023-08-11 NOTE — TELEPHONE ENCOUNTER
----- Message from [facility] sent at 8/11/2023  7:13 AM EDT -----  Regarding: opti-vol crossed  Hi,  Pts opti-vol crossed since 6/12 and is ongoing. Pt takes lasix, metoprolol succ, eliquis. EF: 35% (echo 4/26/23). Thanks,  ~Radha   NON-BILLABLE CARELINK TRANSMISSION: BATTERY VOLTAGE ADEQUATE (9.9 YRS). AP: 1.1%. : 0.2% (MVP-ON). ALL AVAILABLE LEAD PARAMETERS WITHIN NORMAL LIMITS. NO SIGNIFICANT HIGH RATE EPISODES. OPTI-VOL FLUID THRESHOLD CROSSED SINCE 6/12 AND IS ONGOING. PT TAKES LASIX, METOPROLOL SUCC, ELIQUIS. EF: 35 %(ECHO 4/26/23). TASK TO HF TEAM. NORMAL DEVICE FUNCTION.   [id number] [address]

## 2023-08-14 ENCOUNTER — TELEPHONE (OUTPATIENT)
Dept: HEMATOLOGY ONCOLOGY | Facility: CLINIC | Age: 68
End: 2023-08-14

## 2023-08-14 ENCOUNTER — TELEPHONE (OUTPATIENT)
Dept: FAMILY MEDICINE CLINIC | Facility: CLINIC | Age: 68
End: 2023-08-14

## 2023-08-14 NOTE — TELEPHONE ENCOUNTER
Sharif Gutierres left a message on the clinical line asking for a refill of oxycodone medication that she received from the ED. Stated she called the ED for a refill first, they told her to call her primary physician for more pain medication. I called patient to schedule a appointment to be seen per Dr Parul Walker (Dr. Lucy Gamble is on vacation), and she would not schedule a appointment. She said she would rather wait until the 24th to see her pain management doctor.   Cortney/KALLI

## 2023-08-14 NOTE — TELEPHONE ENCOUNTER
Patient Call    Who are you speaking with? Patient    If it is not the patient, are they listed on an active communication consent form? N/A   What is the reason for this call? Patient calling to reschedule her surgery with Dr. Arlene Carter. This is patients 3rd call regarding rescheduling   Does this require a call back? Yes   If a call back is required, please list best call back number [phone]   If a call back is required, advise that a message will be forwarded to their care team and someone will return their call as soon as possible. Did you relay this information to the patient?  yes

## 2023-08-16 DIAGNOSIS — D50.9 IRON DEFICIENCY ANEMIA, UNSPECIFIED IRON DEFICIENCY ANEMIA TYPE: Primary | ICD-10-CM

## 2023-08-16 NOTE — TELEPHONE ENCOUNTER
Patient called today stating no one has called her back and she is getting anxious and worried that her condition will worsen.

## 2023-08-17 ENCOUNTER — APPOINTMENT (OUTPATIENT)
Dept: LAB | Facility: CLINIC | Age: 68
End: 2023-08-17
Payer: COMMERCIAL

## 2023-08-17 DIAGNOSIS — D50.9 IRON DEFICIENCY ANEMIA, UNSPECIFIED IRON DEFICIENCY ANEMIA TYPE: ICD-10-CM

## 2023-08-17 LAB
BASOPHILS # BLD AUTO: 0.01 THOUSANDS/ÂΜL (ref 0–0.1)
BASOPHILS NFR BLD AUTO: 0 % (ref 0–1)
EOSINOPHIL # BLD AUTO: 0.2 THOUSAND/ÂΜL (ref 0–0.61)
EOSINOPHIL NFR BLD AUTO: 2 % (ref 0–6)
ERYTHROCYTE [DISTWIDTH] IN BLOOD BY AUTOMATED COUNT: 22.7 % (ref 11.6–15.1)
HCT VFR BLD AUTO: 36.7 % (ref 34.8–46.1)
HGB BLD-MCNC: 10.8 G/DL (ref 11.5–15.4)
IMM GRANULOCYTES # BLD AUTO: 0.03 THOUSAND/UL (ref 0–0.2)
IMM GRANULOCYTES NFR BLD AUTO: 0 % (ref 0–2)
LYMPHOCYTES # BLD AUTO: 1.79 THOUSANDS/ÂΜL (ref 0.6–4.47)
LYMPHOCYTES NFR BLD AUTO: 20 % (ref 14–44)
MCH RBC QN AUTO: 24.4 PG (ref 26.8–34.3)
MCHC RBC AUTO-ENTMCNC: 29.4 G/DL (ref 31.4–37.4)
MCV RBC AUTO: 83 FL (ref 82–98)
MONOCYTES # BLD AUTO: 0.86 THOUSAND/ÂΜL (ref 0.17–1.22)
MONOCYTES NFR BLD AUTO: 9 % (ref 4–12)
NEUTROPHILS # BLD AUTO: 6.29 THOUSANDS/ÂΜL (ref 1.85–7.62)
NEUTS SEG NFR BLD AUTO: 69 % (ref 43–75)
NRBC BLD AUTO-RTO: 0 /100 WBCS
PLATELET # BLD AUTO: 335 THOUSANDS/UL (ref 149–390)
PMV BLD AUTO: 10.6 FL (ref 8.9–12.7)
RBC # BLD AUTO: 4.42 MILLION/UL (ref 3.81–5.12)
WBC # BLD AUTO: 9.18 THOUSAND/UL (ref 4.31–10.16)

## 2023-08-17 PROCEDURE — 36415 COLL VENOUS BLD VENIPUNCTURE: CPT

## 2023-08-17 PROCEDURE — 85025 COMPLETE CBC W/AUTO DIFF WBC: CPT

## 2023-08-21 ENCOUNTER — HOSPITAL ENCOUNTER (INPATIENT)
Facility: HOSPITAL | Age: 68
LOS: 2 days | Discharge: HOME | End: 2023-08-23
Attending: EMERGENCY MEDICINE | Admitting: INTERNAL MEDICINE
Payer: COMMERCIAL

## 2023-08-21 ENCOUNTER — APPOINTMENT (EMERGENCY)
Dept: CT IMAGING | Facility: HOSPITAL | Age: 68
End: 2023-08-21
Payer: COMMERCIAL

## 2023-08-21 ENCOUNTER — TELEPHONE (OUTPATIENT)
Dept: FAMILY MEDICINE CLINIC | Facility: CLINIC | Age: 68
End: 2023-08-21

## 2023-08-21 DIAGNOSIS — T18.9XXA FOREIGN BODY IN DIGESTIVE TRACT: ICD-10-CM

## 2023-08-21 DIAGNOSIS — I50.9 CHF EXACERBATION (HCC): ICD-10-CM

## 2023-08-21 DIAGNOSIS — I48.91 ATRIAL FIBRILLATION, UNSPECIFIED TYPE (HCC): ICD-10-CM

## 2023-08-21 DIAGNOSIS — E11.22 TYPE 2 DIABETES MELLITUS WITH STAGE 2 CHRONIC KIDNEY DISEASE, WITHOUT LONG-TERM CURRENT USE OF INSULIN (HCC): ICD-10-CM

## 2023-08-21 DIAGNOSIS — K57.30 COLON, DIVERTICULOSIS: ICD-10-CM

## 2023-08-21 DIAGNOSIS — I13.0 HYPERTENSIVE HEART AND KIDNEY DISEASE WITH HEART FAILURE AND WITH CHRONIC KIDNEY DISEASE STAGE III (HCC): ICD-10-CM

## 2023-08-21 DIAGNOSIS — K92.1 BLACK STOOL: ICD-10-CM

## 2023-08-21 DIAGNOSIS — K62.5 BRBPR (BRIGHT RED BLOOD PER RECTUM): Primary | ICD-10-CM

## 2023-08-21 DIAGNOSIS — Q45.3 PANCREATIC ABNORMALITY: ICD-10-CM

## 2023-08-21 DIAGNOSIS — E11.22 TYPE 2 DIABETES MELLITUS WITH STAGE 3B CHRONIC KIDNEY DISEASE, WITH LONG-TERM CURRENT USE OF INSULIN (HCC): ICD-10-CM

## 2023-08-21 DIAGNOSIS — Z79.4 TYPE 2 DIABETES MELLITUS WITH STAGE 3B CHRONIC KIDNEY DISEASE, WITH LONG-TERM CURRENT USE OF INSULIN (HCC): ICD-10-CM

## 2023-08-21 DIAGNOSIS — D50.0 IRON DEFICIENCY ANEMIA DUE TO CHRONIC BLOOD LOSS: ICD-10-CM

## 2023-08-21 DIAGNOSIS — K62.5 RECTAL BLEEDING: ICD-10-CM

## 2023-08-21 DIAGNOSIS — N18.30 HYPERTENSIVE HEART AND KIDNEY DISEASE WITH HEART FAILURE AND WITH CHRONIC KIDNEY DISEASE STAGE III (HCC): ICD-10-CM

## 2023-08-21 DIAGNOSIS — I10 PRIMARY HYPERTENSION: ICD-10-CM

## 2023-08-21 DIAGNOSIS — N18.2 TYPE 2 DIABETES MELLITUS WITH STAGE 2 CHRONIC KIDNEY DISEASE, WITHOUT LONG-TERM CURRENT USE OF INSULIN (HCC): ICD-10-CM

## 2023-08-21 DIAGNOSIS — N18.32 TYPE 2 DIABETES MELLITUS WITH STAGE 3B CHRONIC KIDNEY DISEASE, WITH LONG-TERM CURRENT USE OF INSULIN (HCC): ICD-10-CM

## 2023-08-21 DIAGNOSIS — E78.5 HYPERLIPIDEMIA LDL GOAL <100: ICD-10-CM

## 2023-08-21 DIAGNOSIS — T18.9XXA FOREIGN BODY IN DIGESTIVE TRACT, INITIAL ENCOUNTER: ICD-10-CM

## 2023-08-21 PROBLEM — D50.9 IRON DEFICIENCY ANEMIA: Status: ACTIVE | Noted: 2023-07-20

## 2023-08-21 LAB
2HR DELTA HS TROPONIN: 3 NG/L
4HR DELTA HS TROPONIN: 4 NG/L
ABO GROUP BLD: NORMAL
ALBUMIN SERPL BCP-MCNC: 3.5 G/DL (ref 3.5–5)
ALP SERPL-CCNC: 219 U/L (ref 34–104)
ALT SERPL W P-5'-P-CCNC: 15 U/L (ref 7–52)
ANION GAP SERPL CALCULATED.3IONS-SCNC: 9 MMOL/L
AST SERPL W P-5'-P-CCNC: 16 U/L (ref 13–39)
BASOPHILS # BLD AUTO: 0.01 THOUSANDS/ÂΜL (ref 0–0.1)
BASOPHILS NFR BLD AUTO: 0 % (ref 0–1)
BILIRUB SERPL-MCNC: 0.71 MG/DL (ref 0.2–1)
BLD GP AB SCN SERPL QL: NEGATIVE
BUN SERPL-MCNC: 21 MG/DL (ref 5–25)
CALCIUM SERPL-MCNC: 9.3 MG/DL (ref 8.4–10.2)
CARDIAC TROPONIN I PNL SERPL HS: 22 NG/L
CARDIAC TROPONIN I PNL SERPL HS: 25 NG/L
CARDIAC TROPONIN I PNL SERPL HS: 26 NG/L
CHLORIDE SERPL-SCNC: 98 MMOL/L (ref 96–108)
CO2 SERPL-SCNC: 30 MMOL/L (ref 21–32)
CREAT SERPL-MCNC: 1.02 MG/DL (ref 0.6–1.3)
EOSINOPHIL # BLD AUTO: 0.07 THOUSAND/ÂΜL (ref 0–0.61)
EOSINOPHIL NFR BLD AUTO: 1 % (ref 0–6)
ERYTHROCYTE [DISTWIDTH] IN BLOOD BY AUTOMATED COUNT: 22.6 % (ref 11.6–15.1)
GFR SERPL CREATININE-BSD FRML MDRD: 55 ML/MIN/1.73SQ M
GLUCOSE SERPL-MCNC: 245 MG/DL (ref 65–140)
GLUCOSE SERPL-MCNC: 298 MG/DL (ref 65–140)
GLUCOSE SERPL-MCNC: 376 MG/DL (ref 65–140)
HCT VFR BLD AUTO: 32.4 % (ref 34.8–46.1)
HCT VFR BLD AUTO: 38.1 % (ref 34.8–46.1)
HGB BLD-MCNC: 11.2 G/DL (ref 11.5–15.4)
HGB BLD-MCNC: 9.5 G/DL (ref 11.5–15.4)
HOLD SPECIMEN: NORMAL
IMM GRANULOCYTES # BLD AUTO: 0.03 THOUSAND/UL (ref 0–0.2)
IMM GRANULOCYTES NFR BLD AUTO: 0 % (ref 0–2)
LIPASE SERPL-CCNC: 49 U/L (ref 11–82)
LYMPHOCYTES # BLD AUTO: 1.36 THOUSANDS/ÂΜL (ref 0.6–4.47)
LYMPHOCYTES NFR BLD AUTO: 18 % (ref 14–44)
MCH RBC QN AUTO: 24.4 PG (ref 26.8–34.3)
MCHC RBC AUTO-ENTMCNC: 29.4 G/DL (ref 31.4–37.4)
MCV RBC AUTO: 83 FL (ref 82–98)
MONOCYTES # BLD AUTO: 0.48 THOUSAND/ÂΜL (ref 0.17–1.22)
MONOCYTES NFR BLD AUTO: 6 % (ref 4–12)
NEUTROPHILS # BLD AUTO: 5.6 THOUSANDS/ÂΜL (ref 1.85–7.62)
NEUTS SEG NFR BLD AUTO: 75 % (ref 43–75)
NRBC BLD AUTO-RTO: 0 /100 WBCS
PLATELET # BLD AUTO: 307 THOUSANDS/UL (ref 149–390)
PMV BLD AUTO: 11 FL (ref 8.9–12.7)
POTASSIUM SERPL-SCNC: 3.8 MMOL/L (ref 3.5–5.3)
PROT SERPL-MCNC: 7.6 G/DL (ref 6.4–8.4)
RBC # BLD AUTO: 4.59 MILLION/UL (ref 3.81–5.12)
RH BLD: POSITIVE
SODIUM SERPL-SCNC: 137 MMOL/L (ref 135–147)
SPECIMEN EXPIRATION DATE: NORMAL
WBC # BLD AUTO: 7.55 THOUSAND/UL (ref 4.31–10.16)

## 2023-08-21 PROCEDURE — 86850 RBC ANTIBODY SCREEN: CPT

## 2023-08-21 PROCEDURE — 82948 REAGENT STRIP/BLOOD GLUCOSE: CPT

## 2023-08-21 PROCEDURE — 99285 EMERGENCY DEPT VISIT HI MDM: CPT | Performed by: PHYSICIAN ASSISTANT

## 2023-08-21 PROCEDURE — 85025 COMPLETE CBC W/AUTO DIFF WBC: CPT

## 2023-08-21 PROCEDURE — 36415 COLL VENOUS BLD VENIPUNCTURE: CPT

## 2023-08-21 PROCEDURE — 84484 ASSAY OF TROPONIN QUANT: CPT | Performed by: PHYSICIAN ASSISTANT

## 2023-08-21 PROCEDURE — 85018 HEMOGLOBIN: CPT | Performed by: INTERNAL MEDICINE

## 2023-08-21 PROCEDURE — 85014 HEMATOCRIT: CPT | Performed by: INTERNAL MEDICINE

## 2023-08-21 PROCEDURE — 0DJD7ZZ INSPECTION OF LOWER INTESTINAL TRACT, VIA NATURAL OR ARTIFICIAL OPENING: ICD-10-PCS | Performed by: INTERNAL MEDICINE

## 2023-08-21 PROCEDURE — 83690 ASSAY OF LIPASE: CPT

## 2023-08-21 PROCEDURE — 99223 1ST HOSP IP/OBS HIGH 75: CPT | Performed by: INTERNAL MEDICINE

## 2023-08-21 PROCEDURE — G1004 CDSM NDSC: HCPCS

## 2023-08-21 PROCEDURE — 80053 COMPREHEN METABOLIC PANEL: CPT

## 2023-08-21 PROCEDURE — 86901 BLOOD TYPING SEROLOGIC RH(D): CPT

## 2023-08-21 PROCEDURE — 74177 CT ABD & PELVIS W/CONTRAST: CPT

## 2023-08-21 PROCEDURE — 99285 EMERGENCY DEPT VISIT HI MDM: CPT

## 2023-08-21 PROCEDURE — 93005 ELECTROCARDIOGRAM TRACING: CPT

## 2023-08-21 PROCEDURE — 86900 BLOOD TYPING SEROLOGIC ABO: CPT

## 2023-08-21 RX ORDER — HYDRALAZINE HYDROCHLORIDE 20 MG/ML
5 INJECTION INTRAMUSCULAR; INTRAVENOUS EVERY 6 HOURS PRN
Status: DISCONTINUED | OUTPATIENT
Start: 2023-08-21 | End: 2023-08-23 | Stop reason: HOSPADM

## 2023-08-21 RX ORDER — PANTOPRAZOLE SODIUM 40 MG/1
40 TABLET, DELAYED RELEASE ORAL DAILY
Status: DISCONTINUED | OUTPATIENT
Start: 2023-08-22 | End: 2023-08-23 | Stop reason: HOSPADM

## 2023-08-21 RX ORDER — METOPROLOL SUCCINATE 50 MG/1
50 TABLET, EXTENDED RELEASE ORAL DAILY
Status: DISCONTINUED | OUTPATIENT
Start: 2023-08-22 | End: 2023-08-22

## 2023-08-21 RX ORDER — NICOTINE 21 MG/24HR
1 PATCH, TRANSDERMAL 24 HOURS TRANSDERMAL DAILY
Status: DISCONTINUED | OUTPATIENT
Start: 2023-08-22 | End: 2023-08-23 | Stop reason: HOSPADM

## 2023-08-21 RX ORDER — ACETAMINOPHEN 325 MG/1
650 TABLET ORAL EVERY 6 HOURS PRN
Status: DISCONTINUED | OUTPATIENT
Start: 2023-08-21 | End: 2023-08-23 | Stop reason: HOSPADM

## 2023-08-21 RX ORDER — DOCUSATE SODIUM 100 MG/1
100 CAPSULE, LIQUID FILLED ORAL 2 TIMES DAILY
Status: DISCONTINUED | OUTPATIENT
Start: 2023-08-21 | End: 2023-08-23 | Stop reason: HOSPADM

## 2023-08-21 RX ORDER — ATORVASTATIN CALCIUM 40 MG/1
40 TABLET, FILM COATED ORAL DAILY
Status: DISCONTINUED | OUTPATIENT
Start: 2023-08-22 | End: 2023-08-23 | Stop reason: HOSPADM

## 2023-08-21 RX ORDER — LABETALOL HYDROCHLORIDE 5 MG/ML
10 INJECTION, SOLUTION INTRAVENOUS EVERY 6 HOURS PRN
Status: DISCONTINUED | OUTPATIENT
Start: 2023-08-21 | End: 2023-08-21

## 2023-08-21 RX ORDER — HYDRALAZINE HYDROCHLORIDE 20 MG/ML
5 INJECTION INTRAMUSCULAR; INTRAVENOUS EVERY 6 HOURS PRN
Status: DISCONTINUED | OUTPATIENT
Start: 2023-08-21 | End: 2023-08-21

## 2023-08-21 RX ORDER — INSULIN LISPRO 100 [IU]/ML
1-5 INJECTION, SOLUTION INTRAVENOUS; SUBCUTANEOUS EVERY 6 HOURS
Status: DISCONTINUED | OUTPATIENT
Start: 2023-08-21 | End: 2023-08-22

## 2023-08-21 RX ORDER — FUROSEMIDE 10 MG/ML
20 INJECTION INTRAMUSCULAR; INTRAVENOUS ONCE
Status: COMPLETED | OUTPATIENT
Start: 2023-08-21 | End: 2023-08-21

## 2023-08-21 RX ADMIN — DOCUSATE SODIUM 100 MG: 100 CAPSULE, LIQUID FILLED ORAL at 21:44

## 2023-08-21 RX ADMIN — FUROSEMIDE 20 MG: 10 INJECTION, SOLUTION INTRAMUSCULAR; INTRAVENOUS at 19:52

## 2023-08-21 RX ADMIN — HYDRALAZINE HYDROCHLORIDE 5 MG: 20 INJECTION, SOLUTION INTRAMUSCULAR; INTRAVENOUS at 23:20

## 2023-08-21 RX ADMIN — IOHEXOL 80 ML: 350 INJECTION, SOLUTION INTRAVENOUS at 14:20

## 2023-08-21 RX ADMIN — INSULIN LISPRO 4 UNITS: 100 INJECTION, SOLUTION INTRAVENOUS; SUBCUTANEOUS at 21:45

## 2023-08-21 RX ADMIN — ACETAMINOPHEN 650 MG: 325 TABLET, FILM COATED ORAL at 21:44

## 2023-08-21 NOTE — ED PROVIDER NOTES
History  Chief Complaint   Patient presents with   • Rectal Bleeding     Pt to ED with c/o rectal bleeding, blood when wiping. Denies abdominal pain     75-year-old female presents the emergency department with complaints of rectal bleeding. States that she has had some bright red bleeding per rectum since yesterday. States that it has only been on the toilet tissue when cleaning herself but has not been present in the toilet bowl. No clots. States that she has a history of melanotic stool and recently requiring several blood transfusions and had an EGD and colonoscopy without acute findings. Notes that she also had a small bowel EGD with a camera. Patient states that today she has felt kind of weak and fatigued. Has a decreased appetite. Denies fevers at home. No chest pain or difficulty breathing. States that she called her doctor and was told to come to the emergency department for further evaluation due to rectal bleeding and possible anemia with use of Eliquis. History provided by:  Patient   used: No        Prior to Admission Medications   Prescriptions Last Dose Informant Patient Reported? Taking? Blood Glucose Monitoring Suppl (OneTouch Verio Reflect) w/Device KIT  Self No No   Sig: Check blood sugars three times daily. Please substitute with appropriate alternative as covered by patient's insurance. Dx: E11.65   Continuous Blood Gluc  (FreeStyle [place] 2 [place]) KEELY  Self No No   Sig: Use 1 Units continuous   Continuous Blood Gluc Sensor (FreeStyle Frank 2 Sensor) MISC  Self No No   Sig: Use 1 Units every 14 (fourteen) days   Insulin Pen Needle (BD Pen Needle Kateryna U/F) 32G X 4 MM MISC  Self No No   Sig: Use daily as directed with insulin pen   OneTouch Delica Lancets 15C MISC  Self No No   Sig: Check blood sugars three times daily. Please substitute with appropriate alternative as covered by patient's insurance.  Dx: E11.65   apixaban (Eliquis) 5 mg  Self No No Sig: Take 1 tablet (5 mg total) by mouth 2 (two) times a day   ascorbic acid (VITAMIN C) 250 mg tablet   Yes No   Sig: Take 250 mg by mouth daily   atorvastatin (LIPITOR) 40 mg tablet  Self No No   Sig: Take 1 tablet (40 mg total) by mouth daily   cholecalciferol (VITAMIN D3) 1,000 units tablet   Yes No   Sig: Take 1,000 Units by mouth daily   dapagliflozin (Farxiga) 5 MG TABS  Self Yes No   Sig: Take 5 mg by mouth every evening   docusate sodium (COLACE) 100 mg capsule   Yes No   Sig: Take 100 mg by mouth 2 (two) times a day   furosemide (LASIX) 20 mg tablet  Self No No   Sig: TAKE 1 TABLET BY MOUTH DAILY AS NEEDED (WT GAIN 3 LB/ 24 HOURS / TAKE FOR 3 DAYS IN A ROW)   glucose blood (OneTouch Verio) test strip  Self No No   Sig: Check blood sugars three times daily. Please substitute with appropriate alternative as covered by patient's insurance. Dx: E11.65   metFORMIN (GLUCOPHAGE) 500 mg tablet  Self No No   Sig: TAKE 1 TABLET BY MOUTH TWICE A DAY WITH MEALS   metoprolol succinate (TOPROL-XL) 50 mg 24 hr tablet   No No   Sig: TAKE 1 TABLET BY MOUTH EVERY DAY   pantoprazole (PROTONIX) 40 mg tablet  Self No No   Sig: Take 1 tablet (40 mg total) by mouth daily      Facility-Administered Medications: None       Past Medical History:   Diagnosis Date   • Breast cancer ([address])     left - 1994. • Diabetes mellitus ([address] [facility])    • Diabetes mellitus, type 2 ([address] [facility]) 03/15/2006    last assessed 7/10/13   • GERD (gastroesophageal reflux disease)    • History of chemotherapy    • Hypertension        Past Surgical History:   Procedure Laterality Date   • CARDIAC ELECTROPHYSIOLOGY PROCEDURE N/A 03/09/2022    Procedure: Cardiac icd implant/ DUAL CHAMBER ICD; Surgeon: Lamonte Deng MD;  Location: [facility];   Service: Cardiology   • MASTECTOMY Left     last assessed 12/16/14   • MASTECTOMY, RADICAL Left     1994   • WI PARATHYROIDECTOMY/EXPLORATION PARATHYROIDS N/A 04/21/2021    Procedure: PARATHYROIDECTOMY POSSIBLE 4 GLAND EXPLORATION;  Surgeon: Macy Mosley MD;  Location: [facility];  Service: ENT   • REDUCTION MAMMAPLASTY Right    • UVULECTOMY         Family History   Problem Relation Age of Onset   • Hypothyroidism Mother    • Leukemia Mother    • Diabetes Father    • Diabetes type II Father    • Stroke Sister    • Diabetes Paternal Grandmother    • Cancer Sister    • Diabetes Brother      I have reviewed and agree with the history as documented. E-Cigarette/Vaping   • E-Cigarette Use Never User      E-Cigarette/Vaping Substances   • Nicotine No    • THC No    • CBD No    • Flavoring No    • Other No    • Unknown No      Social History     Tobacco Use   • Smoking status: Some Days     Packs/day: 0.25     Years: 50.00     Total pack years: 12.50     Types: Cigarettes     Start date: 6/15/1973     Passive exposure: Current   • Smokeless tobacco: Never   • Tobacco comments:     2 cigarettes daily    Vaping Use   • Vaping Use: Never used   Substance Use Topics   • Alcohol use: Never   • Drug use: Never       Review of Systems   Constitutional: Positive for fatigue. Negative for activity change, appetite change, chills and fever. HENT: Negative for congestion, dental problem, drooling, ear discharge, ear pain, mouth sores, nosebleeds, rhinorrhea, sore throat and trouble swallowing. Eyes: Negative for pain, discharge and itching. Respiratory: Negative for cough, chest tightness, shortness of breath and wheezing. Cardiovascular: Negative for chest pain and palpitations. Gastrointestinal: Positive for blood in stool. Negative for abdominal pain, constipation, diarrhea, nausea and vomiting. Endocrine: Negative for cold intolerance and heat intolerance. Genitourinary: Negative for difficulty urinating, dysuria, flank pain, frequency and urgency. Skin: Negative for rash and wound. Allergic/Immunologic: Negative for food allergies and immunocompromised state. Neurological: Positive for weakness.  Negative for dizziness, seizures, syncope, numbness and headaches. Psychiatric/Behavioral: Negative for agitation, behavioral problems and confusion. Physical Exam  Physical Exam  Vitals and nursing note reviewed. Constitutional:       General: She is not in acute distress. Appearance: She is not diaphoretic. HENT:      Head: Normocephalic and atraumatic. Right Ear: External ear normal.      Left Ear: External ear normal.      Mouth/Throat:      Mouth: Mucous membranes are dry. Eyes:      Conjunctiva/sclera: Conjunctivae normal.   Neck:      Vascular: No JVD. Trachea: No tracheal deviation. Cardiovascular:      Rate and Rhythm: Normal rate. Rhythm irregular. Heart sounds: Normal heart sounds. No murmur heard. No friction rub. No gallop. Pulmonary:      Effort: Pulmonary effort is normal. No respiratory distress. Breath sounds: Normal breath sounds. No wheezing or rales. Chest:      Chest wall: No tenderness. Abdominal:      General: Bowel sounds are normal. There is no distension. Palpations: Abdomen is soft. Tenderness: There is no abdominal tenderness. There is no guarding. Genitourinary:     Rectum: No anal fissure or external hemorrhoid. Musculoskeletal:         General: No tenderness or deformity. Normal range of motion. Lymphadenopathy:      Cervical: No cervical adenopathy. Skin:     General: Skin is warm and dry. Findings: No erythema or rash. Neurological:      General: No focal deficit present. Mental Status: She is alert and oriented to person, place, and time.    Psychiatric:         Mood and Affect: Mood normal.         Behavior: Behavior normal.         Vital Signs  ED Triage Vitals [08/21/23 1203]   Temperature Pulse Respirations Blood Pressure SpO2   98.5 °F (36.9 °C) 83 20 (!) 182/84 98 %      Temp Source Heart Rate Source Patient Position - Orthostatic VS BP Location FiO2 (%)   Oral Monitor Sitting Right arm --      Pain Score       -- Vitals:    08/21/23 1203   BP: (!) 182/84   Pulse: 83   Patient Position - Orthostatic VS: Sitting         Visual Acuity      ED Medications  Medications   iohexol (OMNIPAQUE) 350 MG/ML injection (MULTI-DOSE) 80 mL (80 mL Intravenous Given 8/21/23 1420)       Diagnostic Studies  Results Reviewed     Procedure Component Value Units Date/Time    HS Troponin I 2hr [[id number]]  (Normal) Collected: 08/21/23 1529    Lab Status: Final result Specimen: Blood from Arm, Right Updated: 08/21/23 1604     hs TnI 2hr 25 ng/L      Delta 2hr hsTnI 3 ng/L     HS Troponin I 4hr [[id number]]     Lab Status: No result Specimen: Blood     [place] draw [[id number]] Collected: 08/21/23 1211    Lab Status: Final result Specimen: Blood from Arm, Right Updated: 08/21/23 1401    Narrative: The following orders were created for panel order [place] draw. Procedure                               Abnormality         Status                     ---------                               -----------         ------                     Catherne Riff Top on ZHWT[[id number]]                           Final result               Gold top on TAXD[[id number]]                                 Final result               Green / Black tube on LNEO[[id number]]                       Final result                 Please view results for these tests on the individual orders.     HS Troponin 0hr (reflex protocol) [[id number]]  (Normal) Collected: 08/21/23 1300    Lab Status: Final result Specimen: Blood from Arm, Right Updated: 08/21/23 1334     hs TnI 0hr 22 ng/L     Comprehensive metabolic panel [[id number]]  (Abnormal) Collected: 08/21/23 1211    Lab Status: Final result Specimen: Blood from Arm, Right Updated: 08/21/23 1244     Sodium 137 mmol/L      Potassium 3.8 mmol/L      Chloride 98 mmol/L      CO2 30 mmol/L      ANION GAP 9 mmol/L      BUN 21 mg/dL      Creatinine 1.02 mg/dL      Glucose 298 mg/dL      Calcium 9.3 mg/dL      AST 16 U/L      ALT 15 U/L Alkaline Phosphatase 219 U/L      Total Protein 7.6 g/dL      Albumin 3.5 g/dL      Total Bilirubin 0.71 mg/dL      eGFR 55 ml/min/1.73sq m     Narrative:      [place] guidelines for Chronic Kidney Disease (CKD):   •  Stage 1 with normal or high GFR (GFR > 90 mL/min/1.73 square meters)  •  Stage 2 Mild CKD (GFR = 60-89 mL/min/1.73 square meters)  •  Stage 3A Moderate CKD (GFR = 45-59 mL/min/1.73 square meters)  •  Stage 3B Moderate CKD (GFR = 30-44 mL/min/1.73 square meters)  •  Stage 4 Severe CKD (GFR = 15-29 mL/min/1.73 square meters)  •  Stage 5 End Stage CKD (GFR <15 mL/min/1.73 square meters)  Note: GFR calculation is accurate only with a steady state creatinine    Lipase [[id number]]  (Normal) Collected: 08/21/23 1211    Lab Status: Final result Specimen: Blood from Arm, Right Updated: 08/21/23 1244     Lipase 49 u/L     CBC and differential [[id number]]  (Abnormal) Collected: 08/21/23 1211    Lab Status: Final result Specimen: Blood from Arm, Right Updated: 08/21/23 1228     WBC 7.55 Thousand/uL      RBC 4.59 Million/uL      Hemoglobin 11.2 g/dL      Hematocrit 38.1 %      MCV 83 fL      MCH 24.4 pg      MCHC 29.4 g/dL      RDW 22.6 %      MPV 11.0 fL      Platelets 435 Thousands/uL      nRBC 0 /100 WBCs      Neutrophils Relative 75 %      Immat GRANS % 0 %      Lymphocytes Relative 18 %      Monocytes Relative 6 %      Eosinophils Relative 1 %      Basophils Relative 0 %      Neutrophils Absolute 5.60 Thousands/µL      Immature Grans Absolute 0.03 Thousand/uL      Lymphocytes Absolute 1.36 Thousands/µL      Monocytes Absolute 0.48 Thousand/µL      Eosinophils Absolute 0.07 Thousand/µL      Basophils Absolute 0.01 Thousands/µL                  CT abdomen pelvis with contrast   Final Result by Juan Cavazos DO (08/21 1612)      Approximate 9 mm area of decreased attenuation at the pancreatic head with downstream pancreatic ductal dilation.  Recommend further evaluation with MRI/MRCP with IV contrast.      Metallic density in the cecum, of uncertain origin, please correlate with history of ingestion. Review of the patient's EMR. Demonstrates capsule endoscopy on August 7, 2023. Heterogeneous enhancement of the liver on initial imaging which becomes more homogeneous on delayed phase of various etiologies including but not limited to, hepatic congestion and hepatitis. New irregularity at the L2 inferior body endplate as described, may represent a Schmorl's node. Mild pulmonary groundglass opacities and interlobular septal thickening likely represent edema. Additional findings as above. I personally discussed this study with TURNER KEITH on 8/21/2023 4:04 PM.            Workstation performed: [id number]                    Procedures  ECG 12 Lead Documentation Only    Date/Time: 8/21/2023 1:11 PM    Performed by: Yaritza Be PA-C  Authorized by: Yaritza Be PA-C    Indications / Diagnosis:  Weakness  ECG reviewed by me, the ED Provider: yes    Patient location:  ED  Previous ECG:     Previous ECG:  Compared to current    Comparison ECG info:  4/26/26    Similarity:  Changes noted  Interpretation:     Interpretation: abnormal    Rate:     ECG rate:  80    ECG rate assessment: normal    Rhythm:     Rhythm: sinus rhythm    QRS:     QRS axis:  Left  Conduction:     Conduction: abnormal (bigeminy)    ST segments:     ST segments:  Normal  T waves:     T waves: inverted      Inverted:  V5, V6 and aVF             ED Course                                             Medical Decision Making  Differential diagnosis includes but not limited to:  GI bleeding, anemia, ACS, pancreatic mass, retained FB    Foreign body in digestive tract, initial encounter: acute illness or injury  Pancreatic abnormality: acute illness or injury  Rectal bleeding: acute illness or injury  Amount and/or Complexity of Data Reviewed  Labs: ordered.  Decision-making details documented in ED Course. Radiology: ordered and independent interpretation performed. Decision-making details documented in ED Course. Risk  Prescription drug management. Decision regarding hospitalization. Disposition  Final diagnoses:   Rectal bleeding   Foreign body in digestive tract, initial encounter   Pancreatic abnormality     Time reflects when diagnosis was documented in both MDM as applicable and the Disposition within this note     Time User Action Codes Description Comment    8/21/2023  4:50 PM Whit Finn Add [K62.5] Rectal bleeding     8/21/2023  4:51 PM Huma, [address] 9XXA] Foreign body in digestive tract, initial encounter     8/21/2023  4:51 PM Whit Finn Add [Q45.3] Pancreatic abnormality       ED Disposition     ED Disposition   Admit    Condition   Stable    Date/Time   Mon Aug 21, 2023  4:50 PM    Comment   Case was discussed with CAITLIN and the patient's admission status was agreed to be Admission Status: inpatient status to the service of Dr. Eric Lebron . Follow-up Information    None         Patient's Medications   Discharge Prescriptions    No medications on file       No discharge procedures on file.     PDMP Review       Value Time User    PDMP Reviewed  Yes 8/9/2023  4:39 PM Gary Mann MD          ED Provider  Electronically Signed by           Epifanio Pacheco PA-C  08/21/23 9311

## 2023-08-21 NOTE — TELEPHONE ENCOUNTER
Spoke to patient and she stated that she is having blood in her stool no other symptoms. Spoke to Dr. Ned Miguel and he stated that patient should return to ER to be evaluated. Patient informed and agreed.   Tonie Palacios

## 2023-08-21 NOTE — TELEPHONE ENCOUNTER
Kb Toddwa is having blood in her stool again. She stated she was in hospital for this before. Patient seems alittle confused. Triage.

## 2023-08-22 LAB
ANION GAP SERPL CALCULATED.3IONS-SCNC: 8 MMOL/L
BUN SERPL-MCNC: 15 MG/DL (ref 5–25)
CALCIUM SERPL-MCNC: 6.2 MG/DL (ref 8.4–10.2)
CHLORIDE SERPL-SCNC: 112 MMOL/L (ref 96–108)
CO2 SERPL-SCNC: 21 MMOL/L (ref 21–32)
CREAT SERPL-MCNC: 0.64 MG/DL (ref 0.6–1.3)
ERYTHROCYTE [DISTWIDTH] IN BLOOD BY AUTOMATED COUNT: 22.5 % (ref 11.6–15.1)
GFR SERPL CREATININE-BSD FRML MDRD: 90 ML/MIN/1.73SQ M
GLUCOSE SERPL-MCNC: 123 MG/DL (ref 65–140)
GLUCOSE SERPL-MCNC: 150 MG/DL (ref 65–140)
GLUCOSE SERPL-MCNC: 157 MG/DL (ref 65–140)
GLUCOSE SERPL-MCNC: 177 MG/DL (ref 65–140)
GLUCOSE SERPL-MCNC: 319 MG/DL (ref 65–140)
GLUCOSE SERPL-MCNC: 349 MG/DL (ref 65–140)
GLUCOSE SERPL-MCNC: 89 MG/DL (ref 65–140)
HCT VFR BLD AUTO: 34.5 % (ref 34.8–46.1)
HGB BLD-MCNC: 10.6 G/DL (ref 11.5–15.4)
MCH RBC QN AUTO: 24.7 PG (ref 26.8–34.3)
MCHC RBC AUTO-ENTMCNC: 30.7 G/DL (ref 31.4–37.4)
MCV RBC AUTO: 80 FL (ref 82–98)
PLATELET # BLD AUTO: 269 THOUSANDS/UL (ref 149–390)
PMV BLD AUTO: 10.9 FL (ref 8.9–12.7)
POTASSIUM SERPL-SCNC: 2.8 MMOL/L (ref 3.5–5.3)
RBC # BLD AUTO: 4.29 MILLION/UL (ref 3.81–5.12)
SODIUM SERPL-SCNC: 141 MMOL/L (ref 135–147)
WBC # BLD AUTO: 7.05 THOUSAND/UL (ref 4.31–10.16)

## 2023-08-22 PROCEDURE — 85027 COMPLETE CBC AUTOMATED: CPT

## 2023-08-22 PROCEDURE — 82948 REAGENT STRIP/BLOOD GLUCOSE: CPT

## 2023-08-22 PROCEDURE — 80048 BASIC METABOLIC PNL TOTAL CA: CPT

## 2023-08-22 PROCEDURE — 36415 COLL VENOUS BLD VENIPUNCTURE: CPT

## 2023-08-22 PROCEDURE — 99233 SBSQ HOSP IP/OBS HIGH 50: CPT | Performed by: GENERAL PRACTICE

## 2023-08-22 PROCEDURE — 99222 1ST HOSP IP/OBS MODERATE 55: CPT | Performed by: INTERNAL MEDICINE

## 2023-08-22 RX ORDER — ECHINACEA PURPUREA EXTRACT 125 MG
1 TABLET ORAL
Status: DISCONTINUED | OUTPATIENT
Start: 2023-08-22 | End: 2023-08-23 | Stop reason: HOSPADM

## 2023-08-22 RX ORDER — INSULIN LISPRO 100 [IU]/ML
1-5 INJECTION, SOLUTION INTRAVENOUS; SUBCUTANEOUS
Status: DISCONTINUED | OUTPATIENT
Start: 2023-08-22 | End: 2023-08-23

## 2023-08-22 RX ORDER — POLYETHYLENE GLYCOL 3350 17 G/17G
17 POWDER, FOR SOLUTION ORAL DAILY
Status: DISCONTINUED | OUTPATIENT
Start: 2023-08-22 | End: 2023-08-23 | Stop reason: HOSPADM

## 2023-08-22 RX ORDER — POTASSIUM CHLORIDE 20 MEQ/1
40 TABLET, EXTENDED RELEASE ORAL DAILY
Status: DISCONTINUED | OUTPATIENT
Start: 2023-08-22 | End: 2023-08-23

## 2023-08-22 RX ORDER — INSULIN LISPRO 100 [IU]/ML
1-5 INJECTION, SOLUTION INTRAVENOUS; SUBCUTANEOUS EVERY 6 HOURS
Status: DISCONTINUED | OUTPATIENT
Start: 2023-08-22 | End: 2023-08-22

## 2023-08-22 RX ORDER — METOPROLOL SUCCINATE 25 MG/1
25 TABLET, EXTENDED RELEASE ORAL ONCE
Status: COMPLETED | OUTPATIENT
Start: 2023-08-22 | End: 2023-08-22

## 2023-08-22 RX ORDER — POTASSIUM CHLORIDE 14.9 MG/ML
20 INJECTION INTRAVENOUS ONCE
Status: COMPLETED | OUTPATIENT
Start: 2023-08-22 | End: 2023-08-22

## 2023-08-22 RX ADMIN — METOPROLOL SUCCINATE 25 MG: 25 TABLET, EXTENDED RELEASE ORAL at 13:41

## 2023-08-22 RX ADMIN — HYDRALAZINE HYDROCHLORIDE 5 MG: 20 INJECTION, SOLUTION INTRAMUSCULAR; INTRAVENOUS at 05:30

## 2023-08-22 RX ADMIN — DOCUSATE SODIUM 100 MG: 100 CAPSULE, LIQUID FILLED ORAL at 17:32

## 2023-08-22 RX ADMIN — NICOTINE 1 PATCH: 14 PATCH, EXTENDED RELEASE TRANSDERMAL at 08:15

## 2023-08-22 RX ADMIN — DOCUSATE SODIUM 100 MG: 100 CAPSULE, LIQUID FILLED ORAL at 08:18

## 2023-08-22 RX ADMIN — ATORVASTATIN CALCIUM 40 MG: 40 TABLET, FILM COATED ORAL at 08:14

## 2023-08-22 RX ADMIN — POLYETHYLENE GLYCOL 3350 17 G: 17 POWDER, FOR SOLUTION ORAL at 16:15

## 2023-08-22 RX ADMIN — INSULIN LISPRO 3 UNITS: 100 INJECTION, SOLUTION INTRAVENOUS; SUBCUTANEOUS at 21:43

## 2023-08-22 RX ADMIN — INSULIN LISPRO 1 UNITS: 100 INJECTION, SOLUTION INTRAVENOUS; SUBCUTANEOUS at 08:23

## 2023-08-22 RX ADMIN — APIXABAN 5 MG: 5 TABLET, FILM COATED ORAL at 13:41

## 2023-08-22 RX ADMIN — PANTOPRAZOLE SODIUM 40 MG: 40 TABLET, DELAYED RELEASE ORAL at 08:14

## 2023-08-22 RX ADMIN — METOPROLOL SUCCINATE 50 MG: 50 TABLET, EXTENDED RELEASE ORAL at 08:15

## 2023-08-22 RX ADMIN — POTASSIUM CHLORIDE 40 MEQ: 1500 TABLET, EXTENDED RELEASE ORAL at 09:31

## 2023-08-22 RX ADMIN — SALINE NASAL SPRAY 1 SPRAY: 1.5 SOLUTION NASAL at 03:15

## 2023-08-22 RX ADMIN — INSULIN LISPRO 4 UNITS: 100 INJECTION, SOLUTION INTRAVENOUS; SUBCUTANEOUS at 17:32

## 2023-08-22 RX ADMIN — INSULIN LISPRO 1 UNITS: 100 INJECTION, SOLUTION INTRAVENOUS; SUBCUTANEOUS at 03:42

## 2023-08-22 RX ADMIN — APIXABAN 5 MG: 5 TABLET, FILM COATED ORAL at 17:32

## 2023-08-22 RX ADMIN — INSULIN LISPRO 1 UNITS: 100 INJECTION, SOLUTION INTRAVENOUS; SUBCUTANEOUS at 14:48

## 2023-08-22 RX ADMIN — POTASSIUM CHLORIDE 20 MEQ: 14.9 INJECTION, SOLUTION INTRAVENOUS at 09:31

## 2023-08-22 NOTE — UTILIZATION REVIEW
Initial Clinical Review    Admission: Date/Time/Statement:   Admission Orders (From admission, onward)     Ordered        08/21/23 1650  INPATIENT ADMISSION  Once                      Orders Placed This Encounter   Procedures   • INPATIENT ADMISSION     Standing Status:   Standing     Number of Occurrences:   1     Order Specific Question:   Level of Care     Answer:   Med Surg [16]     Order Specific Question:   Estimated length of stay     Answer:   More than 2 Midnights     Order Specific Question:   Certification     Answer:   I certify that inpatient services are medically necessary for this patient for a duration of greater than two midnights. See H&P and MD Progress Notes for additional information about the patient's course of treatment. ED Arrival Information     Expected   -    Arrival   8/21/2023 11:55    Acuity   Urgent            Means of arrival   Walk-In    Escorted by   Self    Service   Hospitalist    Admission type   Emergency            Arrival complaint   Bloody stool              Chief Complaint   Patient presents with   • Rectal Bleeding     Pt to ED with c/o rectal bleeding, blood when wiping. Denies abdominal pain       Initial Presentation: 77 y.o. female presented to ED from home as in patient admission for retcal bleeding. States that she has had some bright red bleeding per rectum since yesterday. States that it has only been on the toilet tissue when cleaning herself but has not been present in the toilet bowl. PMH of left sided breast cancer in 1994, diabetes on metformin and SGLT-2 inhibitor (farxiga) with recent economic barriers to obtaining farxiga, paroxysmal Afib on eliquis, HFrEF (EF 30-35% range) with prior hypotensive lability on ACE/ARB. On exam fatigue, week, (+) blood in stool no internal or external hemorrhoids noted.  Plan monitor hgb q 12 hrs GI consult and supportive care          Date: 08-22-23   GI consult:   Patient has been seen in the hospital a few times for the symptoms. She underwent EGD and colonoscopy 7/24/2023 which was unremarkable. 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 Repeat hemoglobin was checked at that time and was improving to 10.8 therefore no advanced procedure such as push enteroscopy was recommended at the time. Blood when wiping  Acute on chronic normocytic/microcytic anemia  Patient reports her last bowel movement was 2 days ago and was brown. She had a small amount of red blood when wiping. She denies any abdominal pain. She reports some worsening fatigue therefore prompting her to come in. No bowel movement since. Suspect small amount of red blood when wiping secondary to hemorrhoids versus fissure.     -Due to stable hemoglobin, no further bowel movement or bleeding the last 2 days would recommend holding off on repeat procedures at this time.  -Okay to continue Eliquis. - Monitor hemoglobin and transfuse as needed per primary team.  - Monitor stool output. - We will continue to monitor hemoglobin in the outpatient setting. If she has any significant decreased or dark black stools would recommend likely single balloon enteroscopy which would need to be done at [facility].     Abnormal CT scan  CT on arrival showing 9 mm area of decreased attenuation of the pancreatic head with downstream pancreatic ductal dilatation. Mild elevation of alkaline phosphatase of 219. She denies any abdominal pain for me. -Recommend MRI/MRCP for further evaluation to rule out underlying concerning lesion. Unfortunately due to suspected capsule in the cecum would not be able to pursue MRI at this time. Will repeat KUB in a week to see if capsule passed as it should spontaneously if in the cecum.     Date: 08-23-23  Day 3: Has surpassed a 2nd midnight with active treatments and services, which include bright red rectal bleeding.  (+) small amount of bleeding this AM.  Approximate 9 mm area of decreased attenuation at the pancreatic head with downstream pancreatic ductal dilation. Recommend further evaluation with MRI/MRCP with IV contrast.        ED Triage Vitals   Temperature Pulse Respirations Blood Pressure SpO2   08/21/23 1203 08/21/23 1203 08/21/23 1203 08/21/23 1203 08/21/23 1203   98.5 °F (36.9 °C) 83 20 (!) 182/84 98 %      Temp Source Heart Rate Source Patient Position - Orthostatic VS BP Location FiO2 (%)   08/21/23 1203 08/21/23 1203 08/21/23 1203 08/21/23 1203 --   Oral Monitor Sitting Right arm       Pain Score       08/22/23 0000       No Pain          Wt Readings from Last 1 Encounters:   08/21/23 57.1 kg (125 lb 14.1 oz)     Additional Vital Signs:   Patient Position - Orthostatic VS           08/22/23 0739 98 °F (36.7 °C) 75 18 169/79 114 98 % None (Room air) Lying   08/22/23 0525 -- -- -- 184/86 Abnormal  -- -- -- --   08/22/23 0330 -- 83 -- 178/84 Abnormal  -- -- -- --   08/22/23 0300 -- 80 -- 182/86 Abnormal  -- -- -- Lying   08/22/23 0243 -- 82 16 196/87 Abnormal  125 99 % None (Room air) Lying   08/22/23 0000 -- 80 18 158/90 116 -- -- --   08/21/23 2309 -- 82 18 164/93 121 -- -- --   08/21/23 2258 -- 81 16 194/105 Abnormal  138 98 % None (Room air) Lying   08/21/23 1950 -- 84 20 134/92 -- 98 % -- --   08/21/23 1706 -- 80 20 203/109 Abnormal  -- 97 % -- Sitting                   Pertinent Labs/Diagnostic Test Results:     EKG 08-21-22     ECG rate:  80     ECG rate assessment: normal     Rhythm:     Rhythm: sinus rhythm     QRS:     QRS axis:  Left   Conduction:     Conduction: abnormal (bigeminy)     ST segments:     ST segments:  Normal   T waves:     T waves: inverted       Inverted:  V5, V6 and aVF             CT abdomen pelvis with contrast    (08/21 1612)      Approximate 9 mm area of decreased attenuation at the pancreatic head with downstream pancreatic ductal dilation.  Recommend further evaluation with MRI/MRCP with IV contrast.      Metallic density in the cecum, of uncertain origin, please correlate with history of ingestion. Review of the patient's EMR. Demonstrates capsule endoscopy on August 7, 2023. Heterogeneous enhancement of the liver on initial imaging which becomes more homogeneous on delayed phase of various etiologies including but not limited to, hepatic congestion and hepatitis. New irregularity at the L2 inferior body endplate as described, may represent a Schmorl's node. Mild pulmonary groundglass opacities and interlobular septal thickening likely represent edema. Additional findings as above.          Results from last 7 days   Lab Units 08/22/23  0421 08/21/23  2341 08/21/23  1211 08/17/23  1542   WBC Thousand/uL 7.05  --  7.55 9.18   HEMOGLOBIN g/dL 10.6* 9.5* 11.2* 10.8*   HEMATOCRIT % 34.5* 32.4* 38.1 36.7   PLATELETS Thousands/uL 269  --  307 335   NEUTROS ABS Thousands/µL  --   --  5.60 6.29         Results from last 7 days   Lab Units 08/22/23  0421 08/21/23  1211   SODIUM mmol/L 141 137   POTASSIUM mmol/L 2.8* 3.8   CHLORIDE mmol/L 112* 98   CO2 mmol/L 21 30   ANION GAP mmol/L 8 9   BUN mg/dL 15 21   CREATININE mg/dL 0.64 1.02   EGFR ml/min/1.73sq m 90 55   CALCIUM mg/dL 6.2* 9.3     Results from last 7 days   Lab Units 08/21/23  1211   AST U/L 16   ALT U/L 15   ALK PHOS U/L 219*   TOTAL PROTEIN g/dL 7.6   ALBUMIN g/dL 3.5   TOTAL BILIRUBIN mg/dL 0.71     Results from last 7 days   Lab Units 08/22/23  0608 08/22/23  0340 08/21/23  2334 08/21/23  2130   POC GLUCOSE mg/dl 157* 177* 245* 376*     Results from last 7 days   Lab Units 08/22/23  0421 08/21/23  1211   GLUCOSE RANDOM mg/dL 123 298*             BETA-HYDROXYBUTYRATE   Date Value Ref Range Status   12/07/2022 0.4 <0.6 mmol/L Final        Results from last 7 days   Lab Units 08/21/23  1706 08/21/23  1529 08/21/23  1300   HS TNI 0HR ng/L  --   --  22   HS TNI 2HR ng/L  --  25  --    HSTNI D2 ng/L  --  3  --    HS TNI 4HR ng/L 26  --   --    HSTNI D4 ng/L 4  --   --        Results from last 7 days   Lab Units 08/21/23  1211   LIPASE u/L 49       ED Treatment:   Medication Administration from 08/21/2023 1155 to 08/22/2023 0733       Date/Time Order Dose Route Action     08/21/2023 1420 EDT iohexol (OMNIPAQUE) 350 MG/ML injection (MULTI-DOSE) 80 mL 80 mL Intravenous Given     08/21/2023 1952 EDT furosemide (LASIX) injection 20 mg 20 mg Intravenous Given     08/22/2023 0530 EDT hydrALAZINE (APRESOLINE) injection 5 mg 5 mg Intravenous Given     08/21/2023 2320 EDT hydrALAZINE (APRESOLINE) injection 5 mg 5 mg Intravenous Given     08/21/2023 2144 EDT docusate sodium (COLACE) capsule 100 mg 100 mg Oral Given     08/22/2023 0342 EDT insulin lispro (HumaLOG) 100 units/mL subcutaneous injection 1-5 Units 1 Units Subcutaneous Given     08/21/2023 2145 EDT insulin lispro (HumaLOG) 100 units/mL subcutaneous injection 1-5 Units 4 Units Subcutaneous Given     08/21/2023 2144 EDT acetaminophen (TYLENOL) tablet 650 mg 650 mg Oral Given     08/22/2023 0315 EDT sodium chloride (OCEAN) 0.65 % nasal spray 1 spray 1 spray Each Nare Given        Past Medical History:   Diagnosis Date   • Breast cancer ([address] [place] [address])     left - 1994. • Diabetes mellitus ([facility])    • Diabetes mellitus, type 2 ([facility]) 03/15/2006    last assessed 7/10/13   • GERD (gastroesophageal reflux disease)    • History of chemotherapy    • Hypertension      Present on Admission:  • BRBPR (bright red blood per rectum)  • Foreign body alimentary tract  • Chronic HFrEF (heart failure with reduced ejection fraction) ([facility])  • Iron deficiency anemia      Admitting Diagnosis: Rectal bleeding [K62.5]  BRBPR (bright red blood per rectum) [K62.5]  Foreign body in digestive tract [T18. 9XXA]  Foreign body in digestive tract, initial encounter [T18. 9XXA]  Pancreatic abnormality [Q45.3]  Age/Sex: 77 y.o. female     Admission Orders:    NPO blood sugars q 6 hrs  SCD  Stool record          Scheduled Medications:  atorvastatin, 40 mg, Oral, Daily  docusate sodium, 100 mg, Oral, BID  insulin lispro, 1-5 Units, Subcutaneous, Q6H  metoprolol succinate, 50 mg, Oral, Daily  nicotine, 1 patch, Transdermal, Daily  pantoprazole, 40 mg, Oral, Daily  potassium chloride, 40 mEq, Oral, Daily  potassium chloride, 20 mEq, Intravenous, Once      Continuous IV Infusions:     PRN Meds:  acetaminophen, 650 mg, Oral, Q6H PRN  hydrALAZINE, 5 mg, Intravenous, Q6H PRN  sodium chloride, 1 spray, Each Nare, Q1H PRN        IP CONSULT TO GASTROENTEROLOGY    Network Utilization Review Department  ATTENTION: Please call with any questions or concerns to [phone] and carefully listen to the prompts so that you are directed to the right person. All voicemails are confidential.  Tommie King all requests for admission clinical reviews, approved or denied determinations and any other requests to dedicated fax number below belonging to the campus where the patient is receiving treatment.  List of dedicated fax numbers for the Facilities:  [place] DENIALS (Administrative/Medical Necessity) [phone] [facility] [facility] [phone]   [address] [place] [address] Drive [phone]   [facility] [address] [facility] [phone] 1505 [facility] [id number] [place] [address] [phone]   [address] [facility] [address] [facility]  [address] [phone]

## 2023-08-22 NOTE — PLAN OF CARE
Problem: PAIN - ADULT  Goal: Verbalizes/displays adequate comfort level or baseline comfort level  Description: Interventions:  - Encourage patient to monitor pain and request assistance  - Assess pain using appropriate pain scale  - Administer analgesics based on type and severity of pain and evaluate response  - Implement non-pharmacological measures as appropriate and evaluate response  - Consider cultural and social influences on pain and pain management  - Notify physician/advanced practitioner if interventions unsuccessful or patient reports new pain  Outcome: Progressing     Problem: INFECTION - ADULT  Goal: Absence or prevention of progression during hospitalization  Description: INTERVENTIONS:  - Assess and monitor for signs and symptoms of infection  - Monitor lab/diagnostic results  - Monitor all insertion sites, i.e. indwelling lines, tubes, and drains  - Monitor endotracheal if appropriate and nasal secretions for changes in amount and color  - [place] appropriate cooling/warming therapies per order  - Administer medications as ordered  - Instruct and encourage patient and family to use good hand hygiene technique  - Identify and instruct in appropriate isolation precautions for identified infection/condition  Outcome: Progressing  Goal: Absence of fever/infection during neutropenic period  Description: INTERVENTIONS:  - Monitor WBC    Outcome: Progressing     Problem: SAFETY ADULT  Goal: Patient will remain free of falls  Description: INTERVENTIONS:  - Educate patient/family on patient safety including physical limitations  - Instruct patient to call for assistance with activity   - Consult OT/PT to assist with strengthening/mobility   - Keep Call bell within reach  - Keep bed low and locked with side rails adjusted as appropriate  - Keep care items and personal belongings within reach  - Initiate and maintain comfort rounds  - Make Fall Risk Sign visible to staff  - Offer Toileting every  Hours, in advance of need  - Initiate/Maintain alarm  - Obtain necessary fall risk management equipment:   - Apply yellow socks and bracelet for high fall risk patients  - Consider moving patient to room near nurses station  Outcome: Progressing  Goal: Maintain or return to baseline ADL function  Description: INTERVENTIONS:  -  Assess patient's ability to carry out ADLs; assess patient's baseline for ADL function and identify physical deficits which impact ability to perform ADLs (bathing, care of mouth/teeth, toileting, grooming, dressing, etc.)  - Assess/evaluate cause of self-care deficits   - Assess range of motion  - Assess patient's mobility; develop plan if impaired  - Assess patient's need for assistive devices and provide as appropriate  - Encourage maximum independence but intervene and supervise when necessary  - Involve family in performance of ADLs  - Assess for home care needs following discharge   - Consider OT consult to assist with ADL evaluation and planning for discharge  - Provide patient education as appropriate  Outcome: Progressing  Goal: Maintains/Returns to pre admission functional level  Description: INTERVENTIONS:  - Perform BMAT or MOVE assessment daily.   - Set and communicate daily mobility goal to care team and patient/family/caregiver. - Collaborate with rehabilitation services on mobility goals if consulted  - Perform Range of Motion  times a day. - Reposition patient every  hours.   - Dangle patient  times a day  - Stand patient  times a day  - Ambulate patient  times a day  - Out of bed to chair  times a day   - Out of bed for meals times a day  - Out of bed for toileting  - Record patient progress and toleration of activity level   Outcome: Progressing     Problem: DISCHARGE PLANNING  Goal: Discharge to home or other facility with appropriate resources  Description: INTERVENTIONS:  - Identify barriers to discharge w/patient and caregiver  - Arrange for needed discharge resources and transportation as appropriate  - Identify discharge learning needs (meds, wound care, etc.)  - Arrange for interpretive services to assist at discharge as needed  - Refer to Case Management Department for coordinating discharge planning if the patient needs post-hospital services based on physician/advanced practitioner order or complex needs related to functional status, cognitive ability, or social support system  Outcome: Progressing     Problem: Knowledge Deficit  Goal: Patient/family/caregiver demonstrates understanding of disease process, treatment plan, medications, and discharge instructions  Description: Complete learning assessment and assess knowledge base.   Interventions:  - Provide teaching at level of understanding  - Provide teaching via preferred learning methods  Outcome: Progressing

## 2023-08-22 NOTE — H&P
[address] [facility]  H&P- Milvia Nix 1955, 77 y.o. female MRN: [id number]  Unit/Bed#: ED-36 Encounter: [id number]  Primary Care Provider: Neal Mccoy DO   Date and time admitted to hospital: 8/21/2023 12:11 PM      Assessment/Plan:  * BRBPR (bright red blood per rectum)  Assessment & Plan  Patient has episodes of occasional BRBPR noticed when wiping in the past 2 days. She reports that not every BM produces blood. Rectal examination did not elicit any hemorrhoids by feel, nor did it elicit any bleeding. 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 Hemoglobin levels are still low at 11.2 Hgb, but improved from prior admissions in the past month where Hgb was in the 9-10 range. 1. GI consult, possible discussion of single push enteroscopy to visualize AVM and evaluate it as a potential source of bleeding   2. Continuous evaluation q12 hr of hemoglobin - transfuse if hgb < 7, consent obtained and signed by patient  3. Medical team will continue to evaluate for rectal bleeding by history/exam  4. F/u GI recommendations     Pancreatic abnormality  Assessment & Plan  CT scan shows 9 mm decreased attenuation at the level of the pancreatic head/duodenal junction with downstream pancreatic ductal dilation measuring 6 mm. 1. Will proceed with MRCP given these findings after metallic foreign body is removed. 2. GI consulted     Foreign body alimentary tract  Assessment & Plan  Capsule endoscopy was done for prior melena admissions on 8/7. Capsule did not pass. CT scan shows metallic object in the cecum. Same CT scan shows some decreased attenuation at the level of the pancreatic head. 1. Retrieval of capsule endoscopy via colonoscopy prior to MRCP   2. MRCP for further evaluation of the pancreas   3. Hold ELIQUIS (APIXIBAN) due to bleeding concern  4. GI consulted - f/u recommendations  5.  Will keep NPO starting midnight in case procedure/scope, pending GI evaluation     Iron deficiency anemia  Assessment & Plan  Patient reports one episode of weakness. Had prior hospitalizations for melena that led to a GI workup including upper endoscopy/lower endoscopy without significant findings. Capsule endoscopy was performed and found AVM that may be contributing to current primary problem. Lab Results   Component Value Date    IRON 22 (L) 07/20/2023    FERRITIN 5 (L) 07/20/2023    TIBC 419 07/20/2023    CONCFE 5 (L) 07/20/2023     1. Q12 hr Hemoglobin   2. Consider transfusions if hemoglobin drops below 7 or 7.5 is symptomatic   3. consider starting oral iron supplementation    Chronic HFrEF (heart failure with reduced ejection fraction) ([facility])  Assessment & Plan  Wt Readings from Last 3 Encounters:   08/03/23 57.2 kg (126 lb)   07/31/23 53.9 kg (118 lb 12.8 oz)   07/20/23 50.3 kg (111 lb)     No intake or output data in the 24 hours ending 08/21/23 2242    Patient clinically appears euvolemic, however has some lung base ground glass opacities consistent with mild pulmonary fluid overload. Maintained on Lasix 20 mg PRN as outpatient. Also has associated hypertension initially at 182/84, up to 200/109, then down to 134/92 within 50 minutes without any medication. Is not GDMT for BP management/HFrEF due to lack of tolerance of ACE/ARB in the past and labile hypotensive episodes. - 4/2023 Echo showed LVEF of 35% with mild hypokinesis and moderate pulmonary hypertension s/p ICD placement with recent normal device interrogation. 1. Monitor strict I/O and daily wt with standing scale  2. Give IV lasix 20 mg once to diurese - will monitor volume status and continue home diuretic regimen PRN   3. Hydralazine 5 mg IV Q 6 hrs for SBP > 180  4. Will consider adding po agent if persistent elevated BP given concern for labile BP       VTE Prophylaxis: Low Risk (Score 0-2) - Encourage Ambulation.    Code Status: Level 1 - Full Code   Discussion with Patient/Family: patient seen alone in ED, understanding of plan and current state    Anticipated Length of Stay: Inpatient. Patient will be admitted on an inpatient basis with an anticipated length of stay of greater than 2 midnights secondary to  BRBPR (bright red blood per rectum). Alongside a metallic foreign body with need for retrieval and MRCP evaluation of pancreas given CT decreased attenuation findings. Chief Complaint: " I noticed blood when I wipe sometimes after going to the bathroom."    History of Present Illness:  Nikos Chavez is a 77 y.o. female with a PMH of left sided breast cancer in 1994, diabetes on metformin and SGLT-2 inhibitor (farxiga) with recent economic barriers to obtaining farxiga, paroxysmal Afib on eliquis, HFrEF (EF 30-35% range) with prior hypotensive lability on ACE/ARB, and recent hospitalization for melena who presents with two days of bright red blood per rectum after some bowel movements with occasional nausea. She has no other symptomatic associations (pain, vomiting, stool changes) she does not observe any changes to her stool in terms of color/texture/consistency. She does not notice any bleeding in her stool itself, just on the toilet paper after she wipes. She has multiple prior hospitalizations for melena and anemia in the past month. This melena resulted in unremarkable findings on upper/lower endoscopy and a capsule endoscopy on 8/7. The capsule endoscopy showed a non-bleeding AVM in the deep jejunum. The capsule itself was however lost and on current imaging is present in the cecum. She was transfused for her anemia and also given iron sucrose (VENOFER) 300 mg solution. Anemia in late-July to mid-August was seen with hemoglobin in the 8-9 range. Her current hemoglobin of 11.2 is an improvement since then. Most recent ED visit prior was on 8/09 due to left knee pain 2/2 to mechanical fall witnessed by family with no LOC, no seizure, no head strike.      CT imaging done in the ED on this admission showed multiple findings including: a metallic object in her cecum, and decreased attenuation of the pancreatic head. There is no associated back pain, abdominal pain, flank pain. Some mild abdominal tenderness on exam.     She endorses some extra-intestinal history including muscle twitching/fasciculation to the left upper thigh with associated incontinence that occurred in June of 2023 with 2-3 episodes of incontinence. However, no other episodes since. Family history includes unspecified cancer and T2DM on her paternal side and hypothyroidism and leukemia on her maternal side. She denies any recent substance abuse, denies EtOH use, has an average of 0.25-0.50 pack a day history of smoking since she was 14years old. She lives with her nephew/niece. Does not endorse any recent stressors beyond current medical episodes. She is attempting to have home nursing care provided to her to ease these medical stresses. Source/Reliability: the patient who is a reliable historian. Past Medical and Surgical History: PMHx:   Past Medical History:   Diagnosis Date   • Breast cancer ([address] [facility])     left - 1994. • Diabetes mellitus ([address] [facility])    • Diabetes mellitus, type 2 ([address]) 03/15/2006    last assessed 7/10/13   • GERD (gastroesophageal reflux disease)    • History of chemotherapy    • Hypertension      PSHx:   Past Surgical History:   Procedure Laterality Date   • CARDIAC ELECTROPHYSIOLOGY PROCEDURE N/A 03/09/2022    Procedure: Cardiac icd implant/ DUAL CHAMBER ICD; Surgeon: Dari Pretty MD;  Location: [facility]; Service: Cardiology   • MASTECTOMY Left     last assessed 12/16/14   • MASTECTOMY, RADICAL Left     1994   • AR PARATHYROIDECTOMY/EXPLORATION PARATHYROIDS N/A 04/21/2021    Procedure: PARATHYROIDECTOMY POSSIBLE 4 GLAND EXPLORATION;  Surgeon: Essence Menendez MD;  Location: [facility];  Service: ENT   • REDUCTION MAMMAPLASTY Right    • UVULECTOMY       PTA Meds/Allergies:  I have personally reviewed all medications and allergies. Prior to Admission medications    Medication Sig Start Date End Date Taking? Authorizing Provider   apixaban (Eliquis) 5 mg Take 1 tablet (5 mg total) by mouth 2 (two) times a day 6/28/23  Yes Cady Stacy MD   ascorbic acid (VITAMIN C) 250 mg tablet Take 250 mg by mouth daily   Yes Historical Provider, MD   atorvastatin (LIPITOR) 40 mg tablet Take 1 tablet (40 mg total) by mouth daily 6/20/23 8/21/23 Yes Юлия Julio DO   dapagliflozin (Farxiga) 5 MG TABS Take 5 mg by mouth every evening   Yes Historical Provider, MD   furosemide (LASIX) 20 mg tablet TAKE 1 TABLET BY MOUTH DAILY AS NEEDED (WT GAIN 3 LB/ 24 HOURS / TAKE FOR 3 DAYS IN A ROW) 6/28/23  Yes Cady Stacy MD   metFORMIN (GLUCOPHAGE) 500 mg tablet TAKE 1 TABLET BY MOUTH TWICE A DAY WITH MEALS 6/20/23  Yes Julian Mcdonnell MD   metoprolol succinate (TOPROL-XL) 50 mg 24 hr tablet TAKE 1 TABLET BY MOUTH EVERY DAY 8/8/23  Yes Cady Stacy MD   pantoprazole (PROTONIX) 40 mg tablet Take 1 tablet (40 mg total) by mouth daily 7/11/23  Yes Jossue Castaneda MD   Blood Glucose Monitoring Suppl (OneTouch Verio Reflect) w/Device KIT Check blood sugars three times daily. Please substitute with appropriate alternative as covered by patient's insurance. Dx: E11.65 5/5/23   Юлия Julio DO   cholecalciferol (VITAMIN D3) 1,000 units tablet Take 1,000 Units by mouth daily  Patient not taking: Reported on 8/21/2023    Historical Provider, MD   Continuous Blood Gluc  (FreeStyle [place] 2 [place]) KEELY Use 1 Units continuous 6/19/23   Julian Mcdonnell MD   Continuous Blood Gluc Sensor (FreeStyle Frank 2 Sensor) MISC Use 1 Units every 14 (fourteen) days 6/19/23   uJlian Mcdonnell MD   docusate sodium (COLACE) 100 mg capsule Take 100 mg by mouth 2 (two) times a day    Historical Provider, MD   glucose blood (OneTouch Verio) test strip Check blood sugars three times daily.  Please substitute with appropriate alternative as covered by patient's insurance. Dx: E11.65 5/5/23   Katarina Baum, DO   Insulin Pen Needle (BD Pen Needle Kateryna U/F) 32G X 4 MM MISC Use daily as directed with insulin pen 5/3/23   Jared Rodríguez MD   OneTouch Delica Lancets 16Q MISC Check blood sugars three times daily. Please substitute with appropriate alternative as covered by patient's insurance. Dx: E11.65 5/5/23   Katarina Baum, DO      No Known Allergies    Family History:   Family History   Problem Relation Age of Onset   • Hypothyroidism Mother    • Leukemia Mother    • Diabetes Father    • Diabetes type II Father    • Stroke Sister    • Diabetes Paternal Grandmother    • Cancer Sister    • Diabetes Brother         Personal and Social History:  Social History     Tobacco Use   • Smoking status: Some Days     Packs/day: 0.25     Years: 50.00     Total pack years: 12.50     Types: Cigarettes     Start date: 6/15/1973     Passive exposure: Current   • Smokeless tobacco: Never   • Tobacco comments:     2 cigarettes daily    Vaping Use   • Vaping Use: Never used   Substance Use Topics   • Alcohol use: Never   • Drug use: Never       Review of Systems:   Review of Systems   Constitutional: Positive for fatigue. Negative for diaphoresis and fever. HENT: Negative for nosebleeds and sinus pain. Respiratory: Negative for cough, chest tightness, shortness of breath and stridor. Cardiovascular: Negative for chest pain, palpitations and leg swelling. Gastrointestinal: Positive for anal bleeding, constipation and nausea. Negative for abdominal distention, abdominal pain, diarrhea and vomiting. Genitourinary: Positive for decreased urine volume. Negative for dysuria, vaginal bleeding, vaginal discharge and vaginal pain. Musculoskeletal: Negative for arthralgias. Neurological: Negative for dizziness, syncope, facial asymmetry, speech difficulty and headaches.    Psychiatric/Behavioral: Negative for agitation and behavioral problems. Objective:   Vitals: Blood Pressure: 134/92 (08/21/23 1950)  Pulse: 84 (08/21/23 1950)  Temperature: 98.5 °F (36.9 °C) (08/21/23 1203)  Temp Source: Oral (08/21/23 1203)  Respirations: 20 (08/21/23 1950)  SpO2: 98 % (08/21/23 1950)    Physical Exam  Constitutional:       General: She is not in acute distress. Appearance: Normal appearance. She is normal weight. Comments: Appears to prefer sitting on the right side, some positional discomfort noted on exam due to prior falls. HENT:      Head: Normocephalic and atraumatic. Nose: Nose normal. No congestion or rhinorrhea. Mouth/Throat:      Mouth: Mucous membranes are moist.   Eyes:      Extraocular Movements: Extraocular movements intact. Conjunctiva/sclera: Conjunctivae normal.   Neck:      Vascular: No carotid bruit. Cardiovascular:      Rate and Rhythm: Normal rate and regular rhythm. Pulses: Normal pulses. Heart sounds: Normal heart sounds. Pulmonary:      Effort: Pulmonary effort is normal.      Breath sounds: Normal breath sounds. Abdominal:      General: Bowel sounds are normal.      Palpations: Abdomen is soft. Comments: Mild tenderness     Genitourinary:     Vagina: No vaginal discharge. Rectum: Normal.   Musculoskeletal:      Cervical back: Neck supple. No tenderness. Skin:     General: Skin is warm and dry. Capillary Refill: Capillary refill takes less than 2 seconds. Neurological:      General: No focal deficit present. Mental Status: She is alert and oriented to person, place, and time. Psychiatric:         Mood and Affect: Mood normal.         Behavior: Behavior normal.         Thought Content: Thought content normal.          Lab Results: I have reviewed all pertinent results listed below.     Results from last 7 days   Lab Units 08/21/23  1211   WBC Thousand/uL 7.55   HEMOGLOBIN g/dL 11.2*   HEMATOCRIT % 38.1   PLATELETS Thousands/uL 307   NEUTROS PCT % 75 LYMPHS PCT % 18   MONOS PCT % 6   EOS PCT % 1     Results from last 7 days   Lab Units 08/21/23  1211   POTASSIUM mmol/L 3.8   CHLORIDE mmol/L 98   CO2 mmol/L 30   BUN mg/dL 21   CREATININE mg/dL 1.02   CALCIUM mg/dL 9.3   ALK PHOS U/L 219*   ALT U/L 15   AST U/L 16           Micro:         ECG, Imaging and Other Studies Reviewed on Admission:   · ECG: sinus rhythm with supraventricular complexes in pattern of bigeminy . · Imaging: Reviewed radiology reports from this admission including: abdominal/pelvic CT     ** Please Note: This note has been constructed using a voice recognition system. **      Patient was seen and note written by Terell Rosa MS-[facility] under the supervision of [facility] resident, Dexter Choudhury MD.

## 2023-08-22 NOTE — PLAN OF CARE
Problem: PAIN - ADULT  Goal: Verbalizes/displays adequate comfort level or baseline comfort level  Description: Interventions:  - Encourage patient to monitor pain and request assistance  - Assess pain using appropriate pain scale  - Administer analgesics based on type and severity of pain and evaluate response  - Implement non-pharmacological measures as appropriate and evaluate response  - Consider cultural and social influences on pain and pain management  - Notify physician/advanced practitioner if interventions unsuccessful or patient reports new pain  Outcome: Progressing     Problem: INFECTION - ADULT  Goal: Absence or prevention of progression during hospitalization  Description: INTERVENTIONS:  - Assess and monitor for signs and symptoms of infection  - Monitor lab/diagnostic results  - Monitor all insertion sites, i.e. indwelling lines, tubes, and drains  - Monitor endotracheal if appropriate and nasal secretions for changes in amount and color  - Griffin appropriate cooling/warming therapies per order  - Administer medications as ordered  - Instruct and encourage patient and family to use good hand hygiene technique  - Identify and instruct in appropriate isolation precautions for identified infection/condition  Outcome: Progressing  Goal: Absence of fever/infection during neutropenic period  Description: INTERVENTIONS:  - Monitor WBC    Outcome: Progressing     Problem: SAFETY ADULT  Goal: Patient will remain free of falls  Description: INTERVENTIONS:  - Educate patient/family on patient safety including physical limitations  - Instruct patient to call for assistance with activity   - Consult OT/PT to assist with strengthening/mobility   - Keep Call bell within reach  - Keep bed low and locked with side rails adjusted as appropriate  - Keep care items and personal belongings within reach  - Initiate and maintain comfort rounds  - Make Fall Risk Sign visible to staff  - Offer Toileting every  Hours, in advance of need  - Initiate/Maintain alarm  - Obtain necessary fall risk management equipment:   - Apply yellow socks and bracelet for high fall risk patients  - Consider moving patient to room near nurses station  Outcome: Progressing  Goal: Maintain or return to baseline ADL function  Description: INTERVENTIONS:  -  Assess patient's ability to carry out ADLs; assess patient's baseline for ADL function and identify physical deficits which impact ability to perform ADLs (bathing, care of mouth/teeth, toileting, grooming, dressing, etc.)  - Assess/evaluate cause of self-care deficits   - Assess range of motion  - Assess patient's mobility; develop plan if impaired  - Assess patient's need for assistive devices and provide as appropriate  - Encourage maximum independence but intervene and supervise when necessary  - Involve family in performance of ADLs  - Assess for home care needs following discharge   - Consider OT consult to assist with ADL evaluation and planning for discharge  - Provide patient education as appropriate  Outcome: Progressing  Goal: Maintains/Returns to pre admission functional level  Description: INTERVENTIONS:  - Perform BMAT or MOVE assessment daily.   - Set and communicate daily mobility goal to care team and patient/family/caregiver. - Collaborate with rehabilitation services on mobility goals if consulted  - Perform Range of Motion  times a day. - Reposition patient every  hours.   - Dangle patient  times a day  - Stand patient  times a day  - Ambulate patient  times a day  - Out of bed to chair  times a day   - Out of bed for meals times a day  - Out of bed for toileting  - Record patient progress and toleration of activity level   Outcome: Progressing     Problem: DISCHARGE PLANNING  Goal: Discharge to home or other facility with appropriate resources  Description: INTERVENTIONS:  - Identify barriers to discharge w/patient and caregiver  - Arrange for needed discharge resources and transportation as appropriate  - Identify discharge learning needs (meds, wound care, etc.)  - Arrange for interpretive services to assist at discharge as needed  - Refer to Case Management Department for coordinating discharge planning if the patient needs post-hospital services based on physician/advanced practitioner order or complex needs related to functional status, cognitive ability, or social support system  Outcome: Progressing     Problem: Knowledge Deficit  Goal: Patient/family/caregiver demonstrates understanding of disease process, treatment plan, medications, and discharge instructions  Description: Complete learning assessment and assess knowledge base.   Interventions:  - Provide teaching at level of understanding  - Provide teaching via preferred learning methods  Outcome: Progressing

## 2023-08-22 NOTE — CONSULTS
Consultation - [address] [place] [address] [facility]  Isabel Caro 77 y.o. female MRN: [id number]  Unit/Bed#: -01 Encounter: [id number]        Inpatient consult to gastroenterology  Consult performed by: Bibi Phillip PA-C  Consult ordered by: Mackenzie Andino MD          Reason for Consult / Principal Problem: Acute on chronic anemia, rectal bleeding, abnormal CT    ASSESSMENT and PLAN:      68-year-old female with history of A-fib on Eliquis, diabetes who presented due to small amount of blood when wiping 2 days ago with hemoglobin found to be stable. 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 1. Blood when wiping  2. Acute on chronic normocytic/microcytic anemia  Patient reports her last bowel movement was 2 days ago and was brown. She had a small amount of red blood when wiping. She denies any abdominal pain. She reports some worsening fatigue therefore prompting her to come in. No bowel movement since. Suspect small amount of red blood when wiping secondary to hemorrhoids versus fissure.    -Due to stable hemoglobin, no further bowel movement or bleeding the last 2 days would recommend holding off on repeat procedures at this time.  -Okay to continue Eliquis. - Monitor hemoglobin and transfuse as needed per primary team.  - Monitor stool output. - We will continue to monitor hemoglobin in the outpatient setting. If she has any significant decreased or dark black stools would recommend likely single balloon enteroscopy which would need to be done at [facility]. I discussed this with the patient.  -Recommend outpatient follow-up with hematology to consider IV iron.   Due to ferritin low at 5, recent MCV decreasing to 80.    -Of note, CT scan showing metallic foreign body to be at the cecum and should be able to pass spontaneously, though appears to have been there for two weeks nows.  We will plan for KUB in one week. 3.  Abnormal CT scan  CT on arrival showing 9 mm area of decreased attenuation of the pancreatic head with downstream pancreatic ductal dilatation. Mild elevation of alkaline phosphatase of 219. She denies any abdominal pain for me. -Recommend MRI/MRCP for further evaluation to rule out underlying concerning lesion. Unfortunately due to suspected capsule in the cecum would not be able to pursue MRI at this time. Will repeat KUB in a week to see if capsule passed as it should spontaneously if in the cecum. Informed primary team of recommendations above.  -------------------------------------------------------------------------------------------------------------------    HPI:     Kath Allen is a 57-year-old female with history of A-fib on Eliquis, diabetes, small bowel AVM who presented to the emergency room yesterday for bright red blood on wiping. Vital signs stable. Hemoglobin stable at 11.2 with normal MCV. CMP with elevated glucose and alkaline phosphatase mildly elevated at 219. CT scan scan was performed showing 9 mm area of decreased attenuation of the pancreatic head with downstream pancreatic ductal dilatation, metallic density in the cecum likely recent capsule endoscopy, possible hepatic congestion. Patient reports 2 days ago, on Sunday she had 2 bowel movements. She reports noticing a small amount of red blood when wiping. Denies any blood in the toilet water. She reports the blood did not soak the toilet paper and appeared to be only a smear. She called her PCP and due to some worsening fatigue she was recommended to go to the hospital.  She reports her bowel movements with this were brown. She does not take an iron supplement. She reports some fatigue. She reports her sleep habits have been poor. She reports eating fine. She denies any abdominal pain for me. Patient has been seen in the hospital a few times for the symptoms. She underwent EGD and colonoscopy 7/24/2023 which was unremarkable. 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 Repeat hemoglobin was checked at that time and was improving to 10.8 therefore no advanced procedure such as push enteroscopy was recommended at the time. REVIEW OF SYSTEMS:    CONSTITUTIONAL: Denies any fever, chills, or rigors. Good appetite, and no recent weight loss. +fatigue  HEENT: No earache or tinnitus. Denies hearing loss or visual disturbances. CARDIOVASCULAR: No chest pain or palpitations. RESPIRATORY: Denies any cough, hemoptysis, shortness of breath or dyspnea on exertion. GASTROINTESTINAL: As noted in the History of Present Illness. GENITOURINARY: No problems with urination. Denies any hematuria or dysuria. NEUROLOGIC: No dizziness or vertigo, denies headaches. MUSCULOSKELETAL: Denies any muscle or joint pain. SKIN: Denies skin rashes or itching. ENDOCRINE: Denies excessive thirst. Denies intolerance to heat or cold. PSYCHOSOCIAL: Denies depression or anxiety. Denies any recent memory loss. Historical Information   Past Medical History:   Diagnosis Date   • Breast cancer ([address])     left - 1994. • Diabetes mellitus ([address] [facility])    • Diabetes mellitus, type 2 ([address] [facility]) 03/15/2006    last assessed 7/10/13   • GERD (gastroesophageal reflux disease)    • History of chemotherapy    • Hypertension      Past Surgical History:   Procedure Laterality Date   • CARDIAC ELECTROPHYSIOLOGY PROCEDURE N/A 03/09/2022    Procedure: Cardiac icd implant/ DUAL CHAMBER ICD; Surgeon: Aleta Mayers MD;  Location: [facility];   Service: Cardiology   • MASTECTOMY Left     last assessed 12/16/14   • MASTECTOMY, RADICAL Left     1994   • TX PARATHYROIDECTOMY/EXPLORATION PARATHYROIDS N/A 04/21/2021    Procedure: PARATHYROIDECTOMY POSSIBLE 4 GLAND EXPLORATION;  Surgeon: Cristina Ordoñez MD;  Location: [facility];  Service: ENT   • REDUCTION MAMMAPLASTY Right    • UVULECTOMY       Social History   Social History     Substance and Sexual Activity   Alcohol Use Never     Social History     Substance and Sexual Activity   Drug Use Never     Social History     Tobacco Use   Smoking Status Some Days   • Packs/day: 0.25   • Years: 50.00   • Total pack years: 12.50   • Types: Cigarettes   • Start date: 6/15/1973   • Passive exposure: Current   Smokeless Tobacco Never   Tobacco Comments    2 cigarettes daily      Family History   Problem Relation Age of Onset   • Hypothyroidism Mother    • Leukemia Mother    • Diabetes Father    • Diabetes type II Father    • Stroke Sister    • Diabetes Paternal Grandmother    • Cancer Sister    • Diabetes Brother        Meds/Allergies     Medications Prior to Admission   Medication   • apixaban (Eliquis) 5 mg   • ascorbic acid (VITAMIN C) 250 mg tablet   • atorvastatin (LIPITOR) 40 mg tablet   • dapagliflozin (Farxiga) 5 MG TABS   • furosemide (LASIX) 20 mg tablet   • metFORMIN (GLUCOPHAGE) 500 mg tablet   • metoprolol succinate (TOPROL-XL) 50 mg 24 hr tablet   • pantoprazole (PROTONIX) 40 mg tablet   • Blood Glucose Monitoring Suppl (OneTouch Verio Reflect) w/Device KIT   • Continuous Blood Gluc  (FreeStyle Frank 2 [place]) KEELY   • Continuous Blood Gluc Sensor (FreeStyle Frank 2 Sensor) MISC   • docusate sodium (COLACE) 100 mg capsule   • glucose blood (OneTouch Verio) test strip   • Insulin Pen Needle (BD Pen Needle Kateryna U/F) 32G X 4 MM MISC   • OneTouch Delica Lancets 72X MISC     Current Facility-Administered Medications   Medication Dose Route Frequency   • acetaminophen (TYLENOL) tablet 650 mg  650 mg Oral Q6H PRN   • atorvastatin (LIPITOR) tablet 40 mg  40 mg Oral Daily   • docusate sodium (COLACE) capsule 100 mg  100 mg Oral BID   • hydrALAZINE (APRESOLINE) injection 5 mg  5 mg Intravenous Q6H PRN   • insulin lispro (HumaLOG) 100 units/mL subcutaneous injection 1-5 Units  1-5 Units Subcutaneous Q6H   • metoprolol succinate (TOPROL-XL) 24 hr tablet 50 mg  50 mg Oral Daily   • nicotine (NICODERM CQ) 14 mg/24hr TD 24 hr patch 1 patch  1 patch Transdermal Daily   • pantoprazole (PROTONIX) EC tablet 40 mg  40 mg Oral Daily   • potassium chloride (K-DUR,KLOR-CON) CR tablet 40 mEq  40 mEq Oral Daily   • potassium chloride 20 mEq IVPB (premix)  20 mEq Intravenous Once   • sodium chloride (OCEAN) 0.65 % nasal spray 1 spray  1 spray Each Nare Q1H PRN       No Known Allergies        Objective     Blood pressure 169/79, pulse 75, temperature 98 °F (36.7 °C), temperature source Oral, resp. rate 18, weight 57.1 kg (125 lb 14.1 oz), SpO2 98 %, not currently breastfeeding. No intake or output data in the 24 hours ending 08/22/23 1010      PHYSICAL EXAM:      General Appearance:   Alert, cooperative. Some distress due to IV potassium. HEENT:   Normocephalic, atraumatic, anicteric. Neck:  Supple, symmetrical, trachea midline, no adenopathy. Lungs:   Clear to auscultation bilaterally; no rales, rhonchi or wheezing; respirations unlabored    Heart[de-identified]   S1 and S2 normal; regular rate and rhythm; no murmur, rub, or gallop. Abdomen:   Soft, non-tender, non-distended; normal bowel sounds; no masses, no organomegaly. Benign    Genitalia:   Deferred    Rectal:   Deferred    Extremities:  No cyanosis, clubbing or edema    Pulses:  2+ and symmetric all extremities    Skin:  Skin color, texture, turgor normal, no rashes or lesions    Lymph nodes:  No palpable cervical, axillary or inguinal lymphadenopathy        Lab Results:   Results from last 7 days   Lab Units 08/22/23  0421 08/21/23  2341 08/21/23  1211   WBC Thousand/uL 7.05  --  7.55   HEMOGLOBIN g/dL 10.6*   < > 11.2*   HEMATOCRIT % 34.5*   < > 38.1   PLATELETS Thousands/uL 269  --  307   NEUTROS PCT %  --   --  75   LYMPHS PCT %  --   --  18   MONOS PCT %  --   --  6   EOS PCT %  --   --  1    < > = values in this interval not displayed.      Results from last 7 days   Lab Units 08/22/23  0421 08/21/23  1211   POTASSIUM mmol/L 2.8* 3.8   CHLORIDE mmol/L 112* 98   CO2 mmol/L 21 30   BUN mg/dL 15 21   CREATININE mg/dL 0.64 1.02   CALCIUM mg/dL 6.2* 9.3   ALK PHOS U/L  --  219*   ALT U/L  --  15   AST U/L  --  16         Results from last 7 days   Lab Units 08/21/23  1211   LIPASE u/L 49       Imaging Studies: I have personally reviewed pertinent imaging studies. CT abdomen pelvis with contrast    Result Date: 8/21/2023  Impression: Approximate 9 mm area of decreased attenuation at the pancreatic head with downstream pancreatic ductal dilation. Recommend further evaluation with MRI/MRCP with IV contrast. Metallic density in the cecum, of uncertain origin, please correlate with history of ingestion. Review of the patient's EMR. Demonstrates capsule endoscopy on August 7, 2023. Heterogeneous enhancement of the liver on initial imaging which becomes more homogeneous on delayed phase of various etiologies including but not limited to, hepatic congestion and hepatitis. New irregularity at the L2 inferior body endplate as described, may represent a Schmorl's node. Mild pulmonary groundglass opacities and interlobular septal thickening likely represent edema. Additional findings as above. I personally discussed this study with Funmilayo Croft on 8/21/2023 4:04 PM. Workstation performed: [id number]           Patient was seen and examined by Dr. Huber Yuan. All key medical decisions were made by Dr. Huber Yuan. Thank you for allowing us to participate in the care of this present patient. We will follow-up with you closely.

## 2023-08-22 NOTE — PROGRESS NOTES
[facility]  Progress Note  Name: Kane Cruz  MRN: [id number]  Unit/Bed#: -01 I Date of Admission: 8/21/2023   Date of Service: 8/22/2023 I Hospital Day: 1    Assessment/Plan   Pancreatic abnormality  Assessment & Plan  CT scan shows 9 mm decreased attenuation at the level of the pancreatic head/duodenal junction with downstream pancreatic ductal dilation measuring 6 mm. Plan   - miralax to encourage BM formation  - repeat KUB outpatient to ensure metallic object passes   - MRCP afterwards to assess pancreatic abnormality   - GI agreeable to plan     Foreign body alimentary tract  Assessment & Plan  Capsule endoscopy was done for prior melena admissions on 8/7. Capsule did not pass. CT scan shows metallic object in the cecum alongside decreased attenuation of the pancreatic head. GI consulted. Gi informed our team that object will likely pass without intervention. They will repeat KUB prior to MRCP for assessment of CT pancreas finding. Plan   - For CT scan showing metallic foreign body to be at the cecum and should be able to pass spontaneously, though appears to have been there for two weeks now. Plan for KUB in one week. Iron deficiency anemia  Assessment & Plan  Patient reports one episode of weakness. Had prior hospitalizations for melena that led to a GI workup including upper endoscopy/lower endoscopy without significant findings. Capsule endoscopy was performed and found AVM that may be contributing to current primary problem. However, AVM was non-bleeding at the time. Hemoglobin went down from admission (11.2) to 9.5 now to 10.6. Appears to be borderline microcytic with MCV of 80.      Plan   - Iron supplementation on discharge   - Consider transfusions if hemoglobin drops below 7   - Follow up CBC         Lab Results   Component Value Date    IRON 22 (L) 07/20/2023    FERRITIN 5 (L) 07/20/2023    TIBC 419 07/20/2023    CONCFE 5 (L) 07/20/2023 Hypertension  Assessment & Plan  Patient is in hypertensive state with blood pressure on admission being 182/84. Highest and lowest blood pressures ranged from 203/109 to 134/92 in the span of 2-3 hours without medical intervention. Blood pressure today 168/88 at 07:39 and 169/79 at 13:38, both taken prior to metoprolol. Patient's hypertension is currently managed by metoprolol. ACE/ARNI in the past have been attempted by her cardiologist Dr. Bryon Barreto but not given at this time due to labile blood pressure and hypotension after doses given. From chart review, lisinopril 10 mg was given 11/2020 and discontinued 1/27/2022 due to htn elevation and switched to entresto which was discontinued in May of 2023 due to frequent hypotensive episodes. Given current high bp on metoprolol plan is as follows   - increase metoprolol 75 mg/24 hour period   - Hydralazine as per need ordered   - Monitor BP      Chronic HFrEF (heart failure with reduced ejection fraction) ([facility])  Assessment & Plan  Wt Readings from Last 3 Encounters:   08/03/23 57.2 kg (126 lb)   07/31/23 53.9 kg (118 lb 12.8 oz)   07/20/23 50.3 kg (111 lb)     No intake or output data in the 24 hours ending 08/21/23 2242    Patient clinically appears euvolemic, however has some lung base ground glass opacities consistent with mild pulmonary fluid overload. Maintained on Lasix 20 mg PRN as outpatient. Also has associated hypertension initially at 182/84, up to 200/109, then down to 134/92 within 50 minutes without any medication. Is not GDMT for BP management/HFrEF due to lack of tolerance of ACE/ARB in the past and labile hypotensive episodes. - 4/2023 Echo showed LVEF of 35% with mild hypokinesis and moderate pulmonary hypertension s/p ICD placement with recent normal device interrogation. Plan:  1. Monitor strict I/O and daily wt with standing scale  2. Will monitor volume status and continue home diuretic regimen PRN   3.  Hydralazine 5 mg IV Q 6 hrs for SBP > 180  4. Started metoprolol 75 mg daily for high BP       * BRBPR (bright red blood per rectum)  Assessment & Plan  Patient has episodes of occasional BRBPR noticed when wiping in the past 2 days. She reports that not every BM produces blood. Rectal examination did not elicit any hemorrhoids by feel, nor did it elicit any bleeding. Repeat rectal examination done in 8/22 AM also did not elicit any bleeding, this was done due to a hemoglobin drop from 11.2 to 9.5, closer to her baseline from prior admission for iron deficiency anemia. Recheck hemoglobin was stable around 10.6. Gi consulted, team agrees patient does not appear to be actively bleeding. Plan   - Occult blood testing for microscopic bleeding assessment pending   - Per GI continue to monitor hemoglobin in the outpatient setting. If she has any significant decreased or dark black stools would recommend likely single balloon enteroscopy which would need to be done at [facility]. Recommend outpatient follow-up with hematology to consider IV iron. Due to ferritin low at 5, recent MCV decreasing to 80. VTE Pharmacologic Prophylaxis:   High Risk (Score >/= 5) - Pharmacological DVT Prophylaxis Ordered: apixaban (Eliquis). Sequential Compression Devices Ordered. Patient Centered Rounds: I performed bedside rounds with nursing staff today. Discussions with Specialists or Other Care Team Provider: Attending, Senior resident    Education and Discussions with Family / Patient: Updated  (brother) via phone.     Total Time Spent on Date of Encounter in care of patient: 45 minutes This time was spent on one or more of the following: performing physical exam; counseling and coordination of care; obtaining or reviewing history; documenting in the medical record; reviewing/ordering tests, medications or procedures; communicating with other healthcare professionals and discussing with patient's family/caregivers. Current Length of Stay: 1 day(s)  Current Patient Status: Inpatient   Certification Statement: The patient will continue to require additional inpatient hospital stay due to GI bleeding  Discharge Plan: Anticipate discharge tomorrow to home. Code Status: Level 1 - Full Code    Subjective: At night her BP was as high as 194/105 and upon recheck it was 164/93. One dose hydralazine was given to reduce BP. Patient was seen and examined at bedside this morning. She was complaining of weakness and desire to defecate but could not have bowel movement yet. Denied GI bleed after admission, nausea, vomiting, fever, chills, SOB. Her hemoglobin was near baseline. No visible bleeding present. Objective:     Vitals:   Temp (24hrs), Av °F (36.7 °C), Min:98 °F (36.7 °C), Max:98 °F (36.7 °C)    Temp:  [98 °F (36.7 °C)] 98 °F (36.7 °C)  HR:  [75-86] 86  Resp:  [16-20] 18  BP: (134-196)/() 142/66  SpO2:  [98 %-100 %] 100 %  Body mass index is 22.3 kg/m². Input and Output Summary (last 24 hours): Intake/Output Summary (Last 24 hours) at 2023 1715  Last data filed at 2023 1338  Gross per 24 hour   Intake 320 ml   Output --   Net 320 ml       Physical Exam:   Physical Exam  Constitutional:       General: She is not in acute distress. Appearance: Normal appearance. She is normal weight. Comments: Appears to prefer sitting on the right side, some positional discomfort noted on exam due to prior falls. HENT:      Head: Normocephalic and atraumatic. Nose: Nose normal. No congestion or rhinorrhea. Mouth/Throat:      Mouth: Mucous membranes are moist.   Eyes:      Extraocular Movements: Extraocular movements intact. Conjunctiva/sclera: Conjunctivae normal.   Neck:      Vascular: No carotid bruit. Cardiovascular:      Rate and Rhythm: Normal rate and regular rhythm. Pulses: Normal pulses. Heart sounds: Normal heart sounds.    Pulmonary: Effort: Pulmonary effort is normal.      Breath sounds: Normal breath sounds. Abdominal:      General: Bowel sounds are normal. There is no distension. Palpations: Abdomen is soft. There is no mass. Tenderness: There is abdominal tenderness. There is no guarding. Comments: Mild tenderness and having pad of fat in the upper abdomen     Genitourinary:     Vagina: No vaginal discharge. Rectum: Normal.   Musculoskeletal:         General: Tenderness present. Normal range of motion. Cervical back: Neck supple. No tenderness. Right lower leg: No edema. Left lower leg: No edema. Comments: Mild pain in the lower extremities from fall   Skin:     General: Skin is warm and dry. Capillary Refill: Capillary refill takes less than 2 seconds. Neurological:      General: No focal deficit present. Mental Status: She is alert and oriented to person, place, and time. Sensory: No sensory deficit. Motor: No weakness. Psychiatric:         Mood and Affect: Mood normal.         Behavior: Behavior normal.         Thought Content: Thought content normal.           Additional Data:     Labs:  Results from last 7 days   Lab Units 08/22/23  0421 08/21/23  2341 08/21/23  1211   WBC Thousand/uL 7.05  --  7.55   HEMOGLOBIN g/dL 10.6*   < > 11.2*   HEMATOCRIT % 34.5*   < > 38.1   PLATELETS Thousands/uL 269  --  307   NEUTROS PCT %  --   --  75   LYMPHS PCT %  --   --  18   MONOS PCT %  --   --  6   EOS PCT %  --   --  1    < > = values in this interval not displayed.      Results from last 7 days   Lab Units 08/22/23  0421 08/21/23  1211   SODIUM mmol/L 141 137   POTASSIUM mmol/L 2.8* 3.8   CHLORIDE mmol/L 112* 98   CO2 mmol/L 21 30   BUN mg/dL 15 21   CREATININE mg/dL 0.64 1.02   ANION GAP mmol/L 8 9   CALCIUM mg/dL 6.2* 9.3   ALBUMIN g/dL  --  3.5   TOTAL BILIRUBIN mg/dL  --  0.71   ALK PHOS U/L  --  219*   ALT U/L  --  15   AST U/L  --  16   GLUCOSE RANDOM mg/dL 123 298* Results from last 7 days   Lab Units 08/22/23  1647 08/22/23  1439 08/22/23  1144 08/22/23  0608 08/22/23  0340 08/21/23  2334 08/21/23  2130   POC GLUCOSE mg/dl 349* 150* 89 157* 177* 245* 376*               Lines/Drains:  Invasive Devices     Peripheral Intravenous Line  Duration           Peripheral IV 08/21/23 Proximal;Right;Ventral (anterior) Forearm 1 day                      Imaging: Reviewed radiology reports from this admission including: abdominal/pelvic CT    Recent Cultures (last 7 days):         Last 24 Hours Medication List:   Current Facility-Administered Medications   Medication Dose Route Frequency Provider Last Rate   • acetaminophen  650 mg Oral Q6H PRN Yonatan Johnson MD     • apixaban  5 mg Oral BID Vickie Salgado DO     • atorvastatin  40 mg Oral Daily Yonatan Johnson MD     • docusate sodium  100 mg Oral BID Yonatan Johnson MD     • hydrALAZINE  5 mg Intravenous Q6H PRN Yonatan Johnson MD     • insulin lispro  1-5 Units Subcutaneous Q6H Juju Clayton MD     • [START ON 8/23/2023] metoprolol succinate  75 mg Oral Daily Vickie Salgado DO     • nicotine  1 patch Transdermal Daily Yonatan Johnson MD     • pantoprazole  40 mg Oral Daily Yonatan Johnson MD     • polyethylene glycol  17 g Oral Daily Yonatan Johnson MD     • potassium chloride  40 mEq Oral Daily Yan Severino MD     • sodium chloride  1 spray Each Nare Q1H PRN Sofía Ferrell DO          Today, Patient Was Seen By: Yan Severino MD    **Please Note: This note may have been constructed using a voice recognition system. **

## 2023-08-22 NOTE — PROGRESS NOTES
[address] [facility]   PROGRESS NOTE- Kath Allen, 1955, 77 y.o. female MRN: [id number]   Unit/Bed#: /-01 Encounter: [id number]   Primary Care Physician: Zachariah Henning DO   Date and Time Admitted to Hospital: 8/21/2023 12:11 PM     Assessment/Plan:   * BRBPR (bright red blood per rectum)  Assessment & Plan  Patient has episodes of occasional BRBPR noticed when wiping in the past 2 days. She reports that not every BM produces blood. Rectal examination did not elicit any hemorrhoids by feel, nor did it elicit any bleeding. Repeat rectal examination done in 8/22 AM also did not elicit any bleeding, this was done due to a hemoglobin drop from 11.2 to 9.5, closer to her baseline from prior admission for iron deficiency anemia. Recheck hemoglobin was stable around 10.6. Gi consulted, team agrees patient does not appear to be actively bleeding. Plan   - Occult blood testing for microscopic bleeding assessment        Foreign body alimentary tract  Assessment & Plan  Capsule endoscopy was done for prior melena admissions on 8/7. Capsule did not pass. CT scan shows metallic object in the cecum alongside decreased attenuation of the pancreatic head. GI consulted. Gi informed our team that object will likely pass without intervention. They will repeat KUB prior to MRCP for assessment of CT pancreas finding. Plan   - Follow-up KUB outpatient     Hypertension  Assessment & Plan  Patient is in hypertensive state with blood pressure on admission being 182/84. Highest and lowest blood pressures ranged from 203/109 to 134/92 in the span of 2-3 hours without medical intervention. Blood pressure today 168/88 at 07:39 and 169/79 at 13:38, both taken prior to metoprolol. Patient's hypertension is currently managed by metoprolol.  ACE/ARNI in the past have been attempted by her cardiologist Dr. Sebas Yoder but not given at this time due to labile blood pressure and hypotension after doses given. From chart review, lisinopril 10 mg was given 11/2020 and discontinued 1/27/2022 due to htn elevation and switched to entresto which was discontinued in May of 2023 due to frequent hypotensive episodes. Given current high bp on metoprolol plan is as follows   - increase metoprolol 75 mg/24 hour period   - consider ace/arb efficacy while under inpatient monitoring       Pancreatic abnormality  Assessment & Plan  CT scan shows 9 mm decreased attenuation at the level of the pancreatic head/duodenal junction with downstream pancreatic ductal dilation measuring 6 mm. Plan   - miralax to encourage BM formation  - repeat KUB outpatient to ensure metallic object passes   - MRCP afterwards to assess pancreatic abnormality   - GI agreeable to plan     Iron deficiency anemia  Assessment & Plan  Patient reports one episode of weakness. Had prior hospitalizations for melena that led to a GI workup including upper endoscopy/lower endoscopy without significant findings. Capsule endoscopy was performed and found AVM that may be contributing to current primary problem. However, AVM was non-bleeding at the time. Hemoglobin went down from admission (11.2) to 9.5 now to 10.6. Appears to be borderline microcytic with MCV of 80. Plan   - Iron supplementation on discharge   - Consider transfusions if hemoglobin drops below 7 or 7.5 is symptomatic          Lab Results   Component Value Date    IRON 22 (L) 07/20/2023    FERRITIN 5 (L) 07/20/2023    TIBC 419 07/20/2023    CONCFE 5 (L) 07/20/2023         Chronic HFrEF (heart failure with reduced ejection fraction) ([facility])  Assessment & Plan  Wt Readings from Last 3 Encounters:   08/03/23 57.2 kg (126 lb)   07/31/23 53.9 kg (118 lb 12.8 oz)   07/20/23 50.3 kg (111 lb)     No intake or output data in the 24 hours ending 08/21/23 1753    Patient clinically appears euvolemic, however has some lung base ground glass opacities consistent with mild pulmonary fluid overload. Maintained on Lasix 20 mg PRN as outpatient. Also has associated hypertension initially at 182/84, up to 200/109, then down to 134/92 within 50 minutes without any medication. Is not GDMT for BP management/HFrEF due to lack of tolerance of ACE/ARB in the past and labile hypotensive episodes. - 4/2023 Echo showed LVEF of 35% with mild hypokinesis and moderate pulmonary hypertension s/p ICD placement with recent normal device interrogation. 1. Monitor strict I/O and daily wt with standing scale  2. Give IV lasix 20 mg once to diurese - will monitor volume status and continue home diuretic regimen PRN   3. Hydralazine 5 mg IV Q 6 hrs for SBP > 180  4. Will consider adding po agent if persistent elevated BP given concern for labile BP        VTE Pharmacologic Prophylaxis:   Low Risk (Score 0-2) - Encourage Ambulation. Patient Centered Rounds:  Performed bedside rounds with nursing staff. Discussions with Specialists or Other Care Team Provider: Dr. Haile Henson, Dr. Panfilo Maxwell and Discussions with Family / Patient: {Family Communication:[id number]}    Current Length of Stay: 1 day(s)  Current Patient Status: Inpatient   Discharge Plan: Anticipate discharge tomorrow to home. Code Status: Level 1 - Full Code    Subjective:   Virgil Mayen is a 76 y/o female with PMH of of left sided breast cancer in 1994, diabetes on metformin and SGLT-2 inhibitor (farxiga) with recent economic barriers to obtaining farxiga, hypertension, paroxysmal Afib on eliquis, HFrEF (EF 30-35% range) with prior hypotensive lability on ACE/ARB, and recent hospitalization for melena who presents with two days of bright red blood per rectum after some bowel movements with occasional nausea. No other symptoms have been elicited. No bleeding in stool noticed, just when she wipes.  CT imaging done by ED shows a metallic object in the cecum alongside other findings including a decreased attenuation of the pancreatic head and downstream pancreatic ductal dilation measuring 6 mm. Prior GI work-up for melena episode has been negative on upper/lower endoscopy. Capsule endoscopy was performed showing a non-bleeding AVM in the distal-mid jejunum. The capsule was lost and appears to be in the cecum on current CT  imaging. Gi has been consulted for this and has determined that she will be able to eventually pass this capsule. A KUB will be scheduled prior to MRI/MRCP work-up for pancreatic abnormalities noticed on ED CT. In ED, patient has been hypertensive with initial bp measuring 182/84 and varying from 203/109 to 134/92. She is currently on metoprolol for her BP management and this has been increased today from 50 mg to 75 mg. Has had prior ARNI/ACE but ARNI (entresto) was discontinued in  by outpatient cardiologist due to hypotensive lability and ACE (lisinopril) was discontinued in 2022 due to continued hypertension. Patient has also been having high blood glucose readings. She is on metformin for T2DM. Three months ago she was prescribed faxiga but for the past month she has not been able to afford it. Patient's hemoglobin has been at a baseline of 9-10 since prior melena admission. MCV is 80, suggestive of possible microcytic anemia. She was given an iron-sucrose (VENOFER) 300 mg solution on  but is not on current continuous iron supplementation. She has difficulty ambulating due to prior admission. She has been attempting to obtain a home-aid for help, but this may not be possible due to insurance status/insurance refusal to do this. Today she is Ax3, NAD. Agreeable to examination/plan. Concerned of ongoing health issues with her blood sugar, hypertension and anemia state. She reports no pain but a hard time sleeping. She feels like she can go to the bathroom but as of this AM has not had the opportunity to do so. Will continue to assess.        Objective:     Vitals:   Temp (24hrs), Av °F (36.7 °C), Min:98 °F (36.7 °C), Max:98 °F (36.7 °C)    Temp:  [98 °F (36.7 °C)] 98 °F (36.7 °C)  HR:  [75-86] 86  Resp:  [16-20] 18  BP: (134-203)/() 142/66  SpO2:  [97 %-100 %] 100 %  Body mass index is 22.3 kg/m². Input and Output Summary (last 24 hours): Intake/Output Summary (Last 24 hours) at 8/22/2023 1543  Last data filed at 8/22/2023 1338  Gross per 24 hour   Intake 320 ml   Output --   Net 320 ml       Physical Exam  Constitutional:       Appearance: Normal appearance. HENT:      Head: Normocephalic and atraumatic. Nose: Nose normal.      Mouth/Throat:      Mouth: Mucous membranes are moist.      Pharynx: Oropharynx is clear. Eyes:      Extraocular Movements: Extraocular movements intact. Conjunctiva/sclera: Conjunctivae normal.   Cardiovascular:      Rate and Rhythm: Normal rate and regular rhythm. Pulses: Normal pulses. Heart sounds: Normal heart sounds. No murmur heard. Pulmonary:      Effort: Pulmonary effort is normal. No respiratory distress. Breath sounds: Normal breath sounds. Abdominal:      General: Abdomen is flat. Palpations: Abdomen is soft. Comments: Hypoactive bowel sounds      Genitourinary:     Rectum: Normal.      Comments: No bleeding noticed on CHICHI     Musculoskeletal:         General: No swelling or tenderness. Cervical back: Normal range of motion and neck supple. Skin:     General: Skin is warm and dry. Capillary Refill: Capillary refill takes less than 2 seconds. Neurological:      General: No focal deficit present. Mental Status: She is alert and oriented to person, place, and time.    Psychiatric:         Mood and Affect: Mood normal.         Behavior: Behavior normal.          Additional Data:     Labs:  Results from last 7 days   Lab Units 08/22/23  0421 08/21/23  2341 08/21/23  1211   WBC Thousand/uL 7.05  --  7.55   HEMOGLOBIN g/dL 10.6*   < > 11.2*   HEMATOCRIT % 34.5*   < > 38.1   PLATELETS Thousands/uL 269  -- 307   NEUTROS PCT %  --   --  75   LYMPHS PCT %  --   --  18   MONOS PCT %  --   --  6   EOS PCT %  --   --  1    < > = values in this interval not displayed. Results from last 7 days   Lab Units 08/22/23  0421 08/21/23  1211   SODIUM mmol/L 141 137   POTASSIUM mmol/L 2.8* 3.8   CHLORIDE mmol/L 112* 98   CO2 mmol/L 21 30   BUN mg/dL 15 21   CREATININE mg/dL 0.64 1.02   ANION GAP mmol/L 8 9   CALCIUM mg/dL 6.2* 9.3   ALBUMIN g/dL  --  3.5   TOTAL BILIRUBIN mg/dL  --  0.71   ALK PHOS U/L  --  219*   ALT U/L  --  15   AST U/L  --  16   GLUCOSE RANDOM mg/dL 123 298*         Results from last 7 days   Lab Units 08/22/23  1439 08/22/23  1144 08/22/23  0608 08/22/23  0340 08/21/23  2334 08/21/23  2130   POC GLUCOSE mg/dl 150* 89 157* 177* 245* 376*               Lines/Drains:  Invasive Devices     Peripheral Intravenous Line  Duration           Peripheral IV 08/21/23 Proximal;Right;Ventral (anterior) Forearm 1 day                    Imaging: Reviewed pertinent radiology reports from this admission.    Recent Cultures (last 7 days):         Last 24 Hours Medication List:   Current Facility-Administered Medications   Medication Dose Route Frequency Provider Last Rate   • acetaminophen  650 mg Oral Q6H PRN Edwar Denson MD     • apixaban  5 mg Oral BID Telma Enrique DO     • atorvastatin  40 mg Oral Daily Edwar Denson MD     • docusate sodium  100 mg Oral BID Edwar Denson MD     • hydrALAZINE  5 mg Intravenous Q6H PRN Edwar Denson MD     • insulin lispro  1-5 Units Subcutaneous Q6H Edwar Denson MD     • [START ON 8/23/2023] metoprolol succinate  75 mg Oral Daily Telma Enrique DO     • nicotine  1 patch Transdermal Daily Edwar Denson MD     • pantoprazole  40 mg Oral Daily Edwar Denson MD     • potassium chloride  40 mEq Oral Daily Estela Izaguirre MD     • sodium chloride  1 spray Each Nare Q1H PRN Dayna Melvin DO          Today, Patient Was Seen By: Yariel Botello    **Please Note: This note may have been constructed using a voice recognition system. **

## 2023-08-23 ENCOUNTER — TRANSITIONAL CARE MANAGEMENT (OUTPATIENT)
Dept: FAMILY MEDICINE CLINIC | Facility: CLINIC | Age: 68
End: 2023-08-23

## 2023-08-23 ENCOUNTER — TELEPHONE (OUTPATIENT)
Dept: FAMILY MEDICINE CLINIC | Facility: CLINIC | Age: 68
End: 2023-08-23

## 2023-08-23 ENCOUNTER — TELEPHONE (OUTPATIENT)
Dept: CARDIOLOGY CLINIC | Facility: CLINIC | Age: 68
End: 2023-08-23

## 2023-08-23 VITALS
HEART RATE: 83 BPM | RESPIRATION RATE: 16 BRPM | WEIGHT: 113.1 LBS | OXYGEN SATURATION: 97 % | TEMPERATURE: 97.8 F | DIASTOLIC BLOOD PRESSURE: 91 MMHG | BODY MASS INDEX: 20.03 KG/M2 | SYSTOLIC BLOOD PRESSURE: 174 MMHG

## 2023-08-23 LAB
ANION GAP SERPL CALCULATED.3IONS-SCNC: 7 MMOL/L
BUN SERPL-MCNC: 22 MG/DL (ref 5–25)
CALCIUM SERPL-MCNC: 9.1 MG/DL (ref 8.4–10.2)
CHLORIDE SERPL-SCNC: 104 MMOL/L (ref 96–108)
CO2 SERPL-SCNC: 25 MMOL/L (ref 21–32)
CREAT SERPL-MCNC: 0.98 MG/DL (ref 0.6–1.3)
ERYTHROCYTE [DISTWIDTH] IN BLOOD BY AUTOMATED COUNT: 22.5 % (ref 11.6–15.1)
GFR SERPL CREATININE-BSD FRML MDRD: 58 ML/MIN/1.73SQ M
GLUCOSE SERPL-MCNC: 213 MG/DL (ref 65–140)
GLUCOSE SERPL-MCNC: 215 MG/DL (ref 65–140)
GLUCOSE SERPL-MCNC: 276 MG/DL (ref 65–140)
HCT VFR BLD AUTO: 36 % (ref 34.8–46.1)
HGB BLD-MCNC: 10.8 G/DL (ref 11.5–15.4)
MAGNESIUM SERPL-MCNC: 1.7 MG/DL (ref 1.9–2.7)
MCH RBC QN AUTO: 24.5 PG (ref 26.8–34.3)
MCHC RBC AUTO-ENTMCNC: 30 G/DL (ref 31.4–37.4)
MCV RBC AUTO: 82 FL (ref 82–98)
PLATELET # BLD AUTO: 270 THOUSANDS/UL (ref 149–390)
PMV BLD AUTO: 11.1 FL (ref 8.9–12.7)
POTASSIUM SERPL-SCNC: 4.5 MMOL/L (ref 3.5–5.3)
RBC # BLD AUTO: 4.41 MILLION/UL (ref 3.81–5.12)
SODIUM SERPL-SCNC: 136 MMOL/L (ref 135–147)
WBC # BLD AUTO: 8.33 THOUSAND/UL (ref 4.31–10.16)

## 2023-08-23 PROCEDURE — 83735 ASSAY OF MAGNESIUM: CPT | Performed by: GENERAL PRACTICE

## 2023-08-23 PROCEDURE — 99232 SBSQ HOSP IP/OBS MODERATE 35: CPT | Performed by: INTERNAL MEDICINE

## 2023-08-23 PROCEDURE — 80048 BASIC METABOLIC PNL TOTAL CA: CPT | Performed by: GENERAL PRACTICE

## 2023-08-23 PROCEDURE — 99239 HOSP IP/OBS DSCHRG MGMT >30: CPT | Performed by: GENERAL PRACTICE

## 2023-08-23 PROCEDURE — 97163 PT EVAL HIGH COMPLEX 45 MIN: CPT

## 2023-08-23 PROCEDURE — 85027 COMPLETE CBC AUTOMATED: CPT | Performed by: GENERAL PRACTICE

## 2023-08-23 PROCEDURE — 82948 REAGENT STRIP/BLOOD GLUCOSE: CPT

## 2023-08-23 PROCEDURE — 97166 OT EVAL MOD COMPLEX 45 MIN: CPT

## 2023-08-23 RX ORDER — BLOOD SUGAR DIAGNOSTIC
STRIP MISCELLANEOUS
Qty: 300 EACH | Refills: 0 | Status: SHIPPED | OUTPATIENT
Start: 2023-08-23

## 2023-08-23 RX ORDER — MAGNESIUM SULFATE HEPTAHYDRATE 40 MG/ML
2 INJECTION, SOLUTION INTRAVENOUS ONCE
Status: COMPLETED | OUTPATIENT
Start: 2023-08-23 | End: 2023-08-23

## 2023-08-23 RX ORDER — ATORVASTATIN CALCIUM 40 MG/1
40 TABLET, FILM COATED ORAL DAILY
Qty: 90 TABLET | Refills: 0 | Status: SHIPPED | OUTPATIENT
Start: 2023-08-23 | End: 2023-09-21 | Stop reason: SDUPTHER

## 2023-08-23 RX ORDER — PANTOPRAZOLE SODIUM 40 MG/1
40 TABLET, DELAYED RELEASE ORAL DAILY
Qty: 90 TABLET | Refills: 0 | Status: SHIPPED | OUTPATIENT
Start: 2023-08-23 | End: 2023-09-21 | Stop reason: SDUPTHER

## 2023-08-23 RX ORDER — DOCUSATE SODIUM 100 MG/1
100 CAPSULE, LIQUID FILLED ORAL 2 TIMES DAILY
Qty: 60 CAPSULE | Refills: 0 | Status: SHIPPED | OUTPATIENT
Start: 2023-08-23 | End: 2023-08-26

## 2023-08-23 RX ORDER — FERROUS SULFATE TAB EC 324 MG (65 MG FE EQUIVALENT) 324 (65 FE) MG
324 TABLET DELAYED RESPONSE ORAL
Qty: 30 TABLET | Refills: 0 | Status: SHIPPED | OUTPATIENT
Start: 2023-08-23 | End: 2023-08-29

## 2023-08-23 RX ORDER — BISACODYL 10 MG
10 SUPPOSITORY, RECTAL RECTAL ONCE
Status: DISCONTINUED | OUTPATIENT
Start: 2023-08-23 | End: 2023-08-23 | Stop reason: HOSPADM

## 2023-08-23 RX ORDER — AMOXICILLIN 250 MG
1 CAPSULE ORAL 2 TIMES DAILY
Status: DISCONTINUED | OUTPATIENT
Start: 2023-08-23 | End: 2023-08-23 | Stop reason: HOSPADM

## 2023-08-23 RX ORDER — METOPROLOL SUCCINATE 25 MG/1
75 TABLET, EXTENDED RELEASE ORAL DAILY
Qty: 90 TABLET | Refills: 0 | Status: SHIPPED | OUTPATIENT
Start: 2023-08-24 | End: 2023-08-23 | Stop reason: SDUPTHER

## 2023-08-23 RX ORDER — FUROSEMIDE 20 MG/1
20 TABLET ORAL DAILY
Qty: 90 TABLET | Refills: 0 | Status: SHIPPED | OUTPATIENT
Start: 2023-08-23 | End: 2023-09-21 | Stop reason: SDUPTHER

## 2023-08-23 RX ORDER — INSULIN LISPRO 100 [IU]/ML
1-6 INJECTION, SOLUTION INTRAVENOUS; SUBCUTANEOUS
Status: DISCONTINUED | OUTPATIENT
Start: 2023-08-23 | End: 2023-08-23 | Stop reason: HOSPADM

## 2023-08-23 RX ORDER — METOPROLOL SUCCINATE 25 MG/1
75 TABLET, EXTENDED RELEASE ORAL DAILY
Qty: 90 TABLET | Refills: 0 | Status: SHIPPED | OUTPATIENT
Start: 2023-08-24 | End: 2023-09-21 | Stop reason: SDUPTHER

## 2023-08-23 RX ORDER — MULTIVIT WITH MINERALS/LUTEIN
250 TABLET ORAL DAILY
Qty: 30 TABLET | Refills: 0 | Status: SHIPPED | OUTPATIENT
Start: 2023-08-23 | End: 2023-09-22

## 2023-08-23 RX ADMIN — SENNOSIDES AND DOCUSATE SODIUM 1 TABLET: 50; 8.6 TABLET ORAL at 08:31

## 2023-08-23 RX ADMIN — POLYETHYLENE GLYCOL 3350 17 G: 17 POWDER, FOR SOLUTION ORAL at 08:33

## 2023-08-23 RX ADMIN — DOCUSATE SODIUM 100 MG: 100 CAPSULE, LIQUID FILLED ORAL at 08:31

## 2023-08-23 RX ADMIN — APIXABAN 5 MG: 5 TABLET, FILM COATED ORAL at 08:31

## 2023-08-23 RX ADMIN — MAGNESIUM SULFATE HEPTAHYDRATE 2 G: 40 INJECTION, SOLUTION INTRAVENOUS at 09:35

## 2023-08-23 RX ADMIN — POTASSIUM CHLORIDE 40 MEQ: 1500 TABLET, EXTENDED RELEASE ORAL at 08:38

## 2023-08-23 RX ADMIN — NICOTINE 1 PATCH: 14 PATCH, EXTENDED RELEASE TRANSDERMAL at 08:32

## 2023-08-23 RX ADMIN — ATORVASTATIN CALCIUM 40 MG: 40 TABLET, FILM COATED ORAL at 08:31

## 2023-08-23 RX ADMIN — PANTOPRAZOLE SODIUM 40 MG: 40 TABLET, DELAYED RELEASE ORAL at 08:31

## 2023-08-23 RX ADMIN — ACETAMINOPHEN 650 MG: 325 TABLET, FILM COATED ORAL at 08:40

## 2023-08-23 RX ADMIN — METOPROLOL SUCCINATE 75 MG: 50 TABLET, EXTENDED RELEASE ORAL at 08:31

## 2023-08-23 RX ADMIN — INSULIN LISPRO 2 UNITS: 100 INJECTION, SOLUTION INTRAVENOUS; SUBCUTANEOUS at 08:32

## 2023-08-23 RX ADMIN — INSULIN LISPRO 3 UNITS: 100 INJECTION, SOLUTION INTRAVENOUS; SUBCUTANEOUS at 12:42

## 2023-08-23 NOTE — PLAN OF CARE
Problem: PAIN - ADULT  Goal: Verbalizes/displays adequate comfort level or baseline comfort level  Description: Interventions:  - Encourage patient to monitor pain and request assistance  - Assess pain using appropriate pain scale  - Administer analgesics based on type and severity of pain and evaluate response  - Implement non-pharmacological measures as appropriate and evaluate response  - Consider cultural and social influences on pain and pain management  - Notify physician/advanced practitioner if interventions unsuccessful or patient reports new pain  Outcome: Progressing     Problem: INFECTION - ADULT  Goal: Absence or prevention of progression during hospitalization  Description: INTERVENTIONS:  - Assess and monitor for signs and symptoms of infection  - Monitor lab/diagnostic results  - Monitor all insertion sites, i.e. indwelling lines, tubes, and drains  - Monitor endotracheal if appropriate and nasal secretions for changes in amount and color  - [place] appropriate cooling/warming therapies per order  - Administer medications as ordered  - Instruct and encourage patient and family to use good hand hygiene technique  - Identify and instruct in appropriate isolation precautions for identified infection/condition  Outcome: Progressing  Goal: Absence of fever/infection during neutropenic period  Description: INTERVENTIONS:  - Monitor WBC    Outcome: Progressing     Problem: SAFETY ADULT  Goal: Patient will remain free of falls  Description: INTERVENTIONS:  - Educate patient/family on patient safety including physical limitations  - Instruct patient to call for assistance with activity   - Consult OT/PT to assist with strengthening/mobility   - Keep Call bell within reach  - Keep bed low and locked with side rails adjusted as appropriate  - Keep care items and personal belongings within reach  - Initiate and maintain comfort rounds  - Make Fall Risk Sign visible to staff  - Offer Toileting every  Hours, in advance of need  - Initiate/Maintain alarm  - Obtain necessary fall risk management equipment:   - Apply yellow socks and bracelet for high fall risk patients  - Consider moving patient to room near nurses station  Outcome: Progressing  Goal: Maintain or return to baseline ADL function  Description: INTERVENTIONS:  -  Assess patient's ability to carry out ADLs; assess patient's baseline for ADL function and identify physical deficits which impact ability to perform ADLs (bathing, care of mouth/teeth, toileting, grooming, dressing, etc.)  - Assess/evaluate cause of self-care deficits   - Assess range of motion  - Assess patient's mobility; develop plan if impaired  - Assess patient's need for assistive devices and provide as appropriate  - Encourage maximum independence but intervene and supervise when necessary  - Involve family in performance of ADLs  - Assess for home care needs following discharge   - Consider OT consult to assist with ADL evaluation and planning for discharge  - Provide patient education as appropriate  Outcome: Progressing  Goal: Maintains/Returns to pre admission functional level  Description: INTERVENTIONS:  - Perform BMAT or MOVE assessment daily.   - Set and communicate daily mobility goal to care team and patient/family/caregiver. - Collaborate with rehabilitation services on mobility goals if consulted  - Perform Range of Motion  times a day. - Reposition patient every  hours.   - Dangle patient  times a day  - Stand patient  times a day  - Ambulate patient  times a day  - Out of bed to chair  times a day   - Out of bed for meal times a day  - Out of bed for toileting  - Record patient progress and toleration of activity level   Outcome: Progressing     Problem: DISCHARGE PLANNING  Goal: Discharge to home or other facility with appropriate resources  Description: INTERVENTIONS:  - Identify barriers to discharge w/patient and caregiver  - Arrange for needed discharge resources and transportation as appropriate  - Identify discharge learning needs (meds, wound care, etc.)  - Arrange for interpretive services to assist at discharge as needed  - Refer to Case Management Department for coordinating discharge planning if the patient needs post-hospital services based on physician/advanced practitioner order or complex needs related to functional status, cognitive ability, or social support system  Outcome: Progressing     Problem: Knowledge Deficit  Goal: Patient/family/caregiver demonstrates understanding of disease process, treatment plan, medications, and discharge instructions  Description: Complete learning assessment and assess knowledge base.   Interventions:  - Provide teaching at level of understanding  - Provide teaching via preferred learning methods  Outcome: Progressing

## 2023-08-23 NOTE — PLAN OF CARE
Problem: PAIN - ADULT  Goal: Verbalizes/displays adequate comfort level or baseline comfort level  Description: Interventions:  - Encourage patient to monitor pain and request assistance  - Assess pain using appropriate pain scale  - Administer analgesics based on type and severity of pain and evaluate response  - Implement non-pharmacological measures as appropriate and evaluate response  - Consider cultural and social influences on pain and pain management  - Notify physician/advanced practitioner if interventions unsuccessful or patient reports new pain  Outcome: Progressing     Problem: INFECTION - ADULT  Goal: Absence or prevention of progression during hospitalization  Description: INTERVENTIONS:  - Assess and monitor for signs and symptoms of infection  - Monitor lab/diagnostic results  - Monitor all insertion sites, i.e. indwelling lines, tubes, and drains  - Monitor endotracheal if appropriate and nasal secretions for changes in amount and color  - [place] appropriate cooling/warming therapies per order  - Administer medications as ordered  - Instruct and encourage patient and family to use good hand hygiene technique  - Identify and instruct in appropriate isolation precautions for identified infection/condition  Outcome: Progressing  Goal: Absence of fever/infection during neutropenic period  Description: INTERVENTIONS:  - Monitor WBC    Outcome: Progressing     Problem: SAFETY ADULT  Goal: Patient will remain free of falls  Description: INTERVENTIONS:  - Educate patient/family on patient safety including physical limitations  - Instruct patient to call for assistance with activity   - Consult OT/PT to assist with strengthening/mobility   - Keep Call bell within reach  - Keep bed low and locked with side rails adjusted as appropriate  - Keep care items and personal belongings within reach  - Initiate and maintain comfort rounds  - Make Fall Risk Sign visible to staff  - Offer Toileting every  Hours, in advance of need  - Initiate/Maintain alarm  - Obtain necessary fall risk management equipment:   - Apply yellow socks and bracelet for high fall risk patients  - Consider moving patient to room near nurses station  Outcome: Progressing  Goal: Maintain or return to baseline ADL function  Description: INTERVENTIONS:  -  Assess patient's ability to carry out ADLs; assess patient's baseline for ADL function and identify physical deficits which impact ability to perform ADLs (bathing, care of mouth/teeth, toileting, grooming, dressing, etc.)  - Assess/evaluate cause of self-care deficits   - Assess range of motion  - Assess patient's mobility; develop plan if impaired  - Assess patient's need for assistive devices and provide as appropriate  - Encourage maximum independence but intervene and supervise when necessary  - Involve family in performance of ADLs  - Assess for home care needs following discharge   - Consider OT consult to assist with ADL evaluation and planning for discharge  - Provide patient education as appropriate  Outcome: Progressing  Goal: Maintains/Returns to pre admission functional level  Description: INTERVENTIONS:  - Perform BMAT or MOVE assessment daily.   - Set and communicate daily mobility goal to care team and patient/family/caregiver. - Collaborate with rehabilitation services on mobility goals if consulted  - Perform Range of Motion  times a day. - Reposition patient every  hours.   - Dangle patient  times a day  - Stand patient  times a day  - Ambulate patient  times a day  - Out of bed to chair  times a day   - Out of bed for meals times a day  - Out of bed for toileting  - Record patient progress and toleration of activity level   Outcome: Progressing     Problem: DISCHARGE PLANNING  Goal: Discharge to home or other facility with appropriate resources  Description: INTERVENTIONS:  - Identify barriers to discharge w/patient and caregiver  - Arrange for needed discharge resources and transportation as appropriate  - Identify discharge learning needs (meds, wound care, etc.)  - Arrange for interpretive services to assist at discharge as needed  - Refer to Case Management Department for coordinating discharge planning if the patient needs post-hospital services based on physician/advanced practitioner order or complex needs related to functional status, cognitive ability, or social support system  Outcome: Progressing     Problem: Knowledge Deficit  Goal: Patient/family/caregiver demonstrates understanding of disease process, treatment plan, medications, and discharge instructions  Description: Complete learning assessment and assess knowledge base.   Interventions:  - Provide teaching at level of understanding  - Provide teaching via preferred learning methods  Outcome: Progressing
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
## 2023-08-23 NOTE — INCIDENTAL FINDINGS
The following findings require follow up:  Radiographic finding   Finding: CT abdomen pelvis with contrast: Approximate 9 mm area of decreased attenuation at the pancreatic head with downstream pancreatic ductal dilation. Recommend further evaluation with MRI/MRCP with IV contrast., Metallic density in the cecum, of uncertain origin, please correlate with history of ingestion. Review of the patient's EMR. Demonstrates capsule endoscopy on August 7, 2023., Heterogeneous enhancement of the liver on initial imaging which becomes more homogeneous on delayed phase of various etiologies including but not limited to, hepatic congestion and hepatitis. , New irregularity at the L2 inferior body endplate as described, may represent a Schmorl's node., Mild pulmonary groundglass opacities and interlobular septal thickening likely represent edema.,      Follow up required: with outpatient Gastroenterology

## 2023-08-23 NOTE — TELEPHONE ENCOUNTER
Patient called and left a message that she was scheduled for an appointment for today with Dr. Leeann Sanchez but she needed to cancel it because she just was being discharged from the hospital.  She would like to reschedule, please call her at [phone]

## 2023-08-23 NOTE — DISCHARGE INSTR - AVS FIRST PAGE
Dear Milvia Nix,     It was our pleasure to care for you here at [facility], [facility]. It is our hope that we were always able to exceed the expected standards for your care during your stay. You were hospitalized due to Bright red GI bleeding. You were cared for on the second and then third floor by Candace Martin MD under the service of Anabell Rowell DO with the [facility] who covers for your primary care physician (PCP), Neal Mccoy DO, while you were hospitalized. If you have any questions or concerns related to this hospitalization, you may contact us at [phone]. For follow up as well as any medication refills, we recommend that you follow up with your primary care physician. A registered nurse will reach out to you by phone within a few days after your discharge to answer any additional questions that you may have after going home. However, at this time we provide for you here, the most important instructions / recommendations at discharge:     Notable Medication Adjustments -   Continue taking Metoprolol 75 mg daily  Continue taking eliquis 5 mg daily and hold it off if you notice any GI bleeding  Start taking Ferrous Sulfate 324 mg daily   Testing Required after Discharge -   None  ** Please contact your PCP to request testing orders for any of the testing recommended here **  Important follow up information -   Follow up with PCP after 1 week of discharge  Follow up with your GI doctor after 2 weeks to have KUB and MRI with further evaluation and management  Follow up with your Cardiologist  Other Instructions -   Please be compliant with all medications  Hold off taking Eliquis if you notice any blood per rectum  Keep hydrate  Check your blood glucose at home  Maintain good hygiene  Please schedule MRI after you have been informed by GI office that capsule has passed.   Please review this entire after visit summary as additional general instructions including medication list, appointments, activity, diet, any pertinent wound care, and other additional recommendations from your care team that may be provided for you.       Sincerely,     Pamela Denson MD

## 2023-08-23 NOTE — PLAN OF CARE
Problem: PHYSICAL THERAPY ADULT  Goal: Performs mobility at highest level of function for planned discharge setting. See evaluation for individualized goals. Description: Treatment/Interventions: LE strengthening/ROM, Elevations, Therapeutic exercise, Endurance training, Patient/family training, Gait training, Equipment eval/education          See flowsheet documentation for full assessment, interventions and recommendations. Note:    Problem List: Decreased strength, Decreased endurance, Impaired balance, Decreased mobility, Decreased safety awareness, Pain  Assessment: Pt presents with two days of bright red blood per rectum after some bowel movements with occasional nausea. Dx: BRBPR, foreign body in the cecum, HTN, iron deficiency anemia, a-fib, and chronic heart failure. order placed for PT eval and tx, w/ activity order of activity as tolerated. pt presents w/ comorbidities of breast cancer, DM, HTN, and a-fib and personal factors of living in 2 story house, mobilizing w/ assistive device, stair(s) to enter home, positive fall history and tobacco use. pt presents w/ pain, weakness, decreased endurance, impaired balance, gait deviations, decreased safety awareness and fall risk. these impairments are evident in findings from physical examination (weakness), mobility assessment (need for standby assist w/ all phases of mobility when usually mobilizing independently, tolerance to only 50 feet of ambulation and need for cueing for mobility technique), and Barthel Index: 70/100. pt needed input for  mobility technique. pt is at risk for falls due to physical and safety awareness deficits.  pt's clinical presentation is unstable/unpredictable (evident in poor blood sugar control, poor blood pressure control, need for assist w/ all phases of mobility when usually mobilizing independently, tolerance to only 50 feet of ambulation, pain impacting overall mobility status and need for input for mobility technique/safety). pt needs inpatient PT tx to improve mobility deficits and progress mobility training as appropriate. discharge recommendation is for home w/ family support and home PT to reduce fall risk and maximize level of functional independence. PT Discharge Recommendation: Home with home health rehabilitation    See flowsheet documentation for full assessment.

## 2023-08-23 NOTE — ASSESSMENT & PLAN NOTE
CT scan shows 9 mm decreased attenuation at the level of the pancreatic head/duodenal junction with downstream pancreatic ductal dilation measuring 6 mm.      Plan   - miralax to encourage BM formation  - repeat KUB outpatient to ensure metallic object passes   - MRCP afterwards (after confirmation that capsule has passed) to assess pancreatic abnormality   - GI agreeable to plan
CT scan shows 9 mm decreased attenuation at the level of the pancreatic head/duodenal junction with downstream pancreatic ductal dilation measuring 6 mm.      Plan   - miralax to encourage BM formation  - repeat KUB outpatient to ensure metallic object passes   - MRCP afterwards to assess pancreatic abnormality   - GI agreeable to plan
CT scan shows decreased attenuation 9 mm at pancreatic head. 1. Plan to evaluate using MRCP after metallic foreign body is removed.
Capsule endoscopy was done for prior melena admissions on 8/7. Capsule did not pass. CT scan shows metallic object in the cecum alongside decreased attenuation of the pancreatic head. GI consulted. Gi informed our team that object will likely pass without intervention. They will repeat KUB prior to MRCP for assessment of CT pancreas finding. Plan   - For CT scan showing metallic foreign body to be at the cecum and should be able to pass spontaneously, though appears to have been there for two weeks now.  Plan for KUB in one week outpatient with GI   - Follow-up GI
Capsule endoscopy was done for prior melena admissions on 8/7. Capsule did not pass. CT scan shows metallic object in the cecum alongside decreased attenuation of the pancreatic head. GI consulted. Gi informed our team that object will likely pass without intervention. They will repeat KUB prior to MRCP for assessment of CT pancreas finding. Plan   - For CT scan showing metallic foreign body to be at the cecum and should be able to pass spontaneously, though appears to have been there for two weeks now. Plan for KUB in one week.
No afib episode noticed in hospital.     Plan   Follow up with cardiology, continue eliquis (Alexis Ferrera) continue home medications
Patient has episodes of occasional BRBPR noticed when wiping in the past 2 days. She reports that not every BM produces blood. Rectal examination did not elicit any hemorrhoids by feel, nor did it elicit any bleeding. Repeat rectal examination done in 8/22 AM also did not elicit any bleeding, this was done due to a hemoglobin drop from 11.2 to 9.5, closer to her baseline from prior admission for iron deficiency anemia. Recheck hemoglobin was stable around 10.6. Gi consulted, team agrees patient does not appear to be actively bleeding. No further bleeding. Hemoglobin stable.      Plan   - Follow up with GI (prior investigations negative one month ago)   - Hold off eliquis (Deepa Earl) if you see any visible blood
Patient has episodes of occasional BRBPR noticed when wiping in the past 2 days. She reports that not every BM produces blood. Rectal examination did not elicit any hemorrhoids by feel, nor did it elicit any bleeding. Repeat rectal examination done in 8/22 AM also did not elicit any bleeding, this was done due to a hemoglobin drop from 11.2 to 9.5, closer to her baseline from prior admission for iron deficiency anemia. Recheck hemoglobin was stable around 10.6. Gi consulted, team agrees patient does not appear to be actively bleeding. Plan   - Occult blood testing for microscopic bleeding assessment pending   - Per GI continue to monitor hemoglobin in the outpatient setting. If she has any significant decreased or dark black stools would recommend likely single balloon enteroscopy which would need to be done at [facility]. Recommend outpatient follow-up with hematology to consider IV iron. Due to ferritin low at 5, recent MCV decreasing to 80.
Patient is in hypertensive state with blood pressure on admission being 182/84. Highest and lowest blood pressures ranged from 203/109 to 134/92 in the span of 2-3 hours without medical intervention. Blood pressure today 168/88 at 07:39 and 169/79 at 13:38, both taken prior to metoprolol. 174/91 BP at 0700 on 8/23      Patient's hypertension is currently managed by metoprolol. ACE/ARNI in the past have been attempted by her cardiologist Dr. Joann Choe but not given at this time due to labile blood pressure and hypotension after doses given. From chart review, lisinopril 10 mg was given 11/2020 and discontinued 1/27/2022 due to htn elevation and switched to entresto which was discontinued in May of 2023 due to frequent hypotensive episodes.      Given current high bp on metoprolol plan is as follows   - Continue metoprolol 75 mg per day for hypertension management   - Follow-up with cardiology for continued management
Patient is in hypertensive state with blood pressure on admission being 182/84. Highest and lowest blood pressures ranged from 203/109 to 134/92 in the span of 2-3 hours without medical intervention. Blood pressure today 168/88 at 07:39 and 169/79 at 13:38, both taken prior to metoprolol. Patient's hypertension is currently managed by metoprolol. ACE/ARNI in the past have been attempted by her cardiologist Dr. Trevor Khoury but not given at this time due to labile blood pressure and hypotension after doses given. From chart review, lisinopril 10 mg was given 11/2020 and discontinued 1/27/2022 due to htn elevation and switched to entresto which was discontinued in May of 2023 due to frequent hypotensive episodes.      Given current high bp on metoprolol plan is as follows   - increase metoprolol 75 mg/24 hour period   - Hydralazine as per need ordered   - Monitor BP
Patient reports one episode of weakness. Had prior hospitalizations for melena that led to a GI workup including upper endoscopy/lower endoscopy without significant findings. Capsule endoscopy was performed and found AVM that may be contributing to current primary problem. However, AVM was non-bleeding at the time. Hemoglobin went down from admission (11.2) to 9.5 now to 10.6. Appears to be borderline microcytic with MCV of 80.      Plan   - Iron supplementation on discharge   - Consider transfusions if hemoglobin drops below 7   - Follow up CBC         Lab Results   Component Value Date    IRON 22 (L) 07/20/2023    FERRITIN 5 (L) 07/20/2023    TIBC 419 07/20/2023    CONCFE 5 (L) 07/20/2023
Patient reports one episode of weakness. Had prior hospitalizations for melena that led to a GI workup including upper endoscopy/lower endoscopy without significant findings. Capsule endoscopy was performed and found AVM that may be contributing to current primary problem. However, AVM was non-bleeding at the time. Hemoglobin went down from admission (11.2) to 9.5 now to 10.6. Appears to be borderline microcytic with MCV of 80.   8/23 hemoglobin remains stable.     Plan   - Iron supplementation on discharge   - Consider transfusions if hemoglobin drops below 7   - Follow up with PCP          Lab Results   Component Value Date    IRON 22 (L) 07/20/2023    FERRITIN 5 (L) 07/20/2023    TIBC 419 07/20/2023    CONCFE 5 (L) 07/20/2023
Wt Readings from Last 3 Encounters:   08/03/23 57.2 kg (126 lb)   07/31/23 53.9 kg (118 lb 12.8 oz)   07/20/23 50.3 kg (111 lb)
Wt Readings from Last 3 Encounters:   08/03/23 57.2 kg (126 lb)   07/31/23 53.9 kg (118 lb 12.8 oz)   07/20/23 50.3 kg (111 lb)     No intake or output data in the 24 hours ending 08/21/23 2242    Patient clinically appears euvolemic, however has some lung base ground glass opacities consistent with mild pulmonary fluid overload. Maintained on Lasix 20 mg PRN as outpatient. Also has associated hypertension initially at 182/84, up to 200/109, then down to 134/92 within 50 minutes without any medication. Is not GDMT for BP management/HFrEF due to lack of tolerance of ACE/ARB in the past and labile hypotensive episodes. - 4/2023 Echo showed LVEF of 35% with mild hypokinesis and moderate pulmonary hypertension s/p ICD placement with recent normal device interrogation. Plan:  1. Monitor strict I/O and daily wt with standing scale  2. Will monitor volume status and continue home diuretic regimen PRN   3. Hydralazine 5 mg IV Q 6 hrs for SBP > 180  4.  Started metoprolol 75 mg daily for high BP
Wt Readings from Last 3 Encounters:   08/23/23 51.3 kg (113 lb 1.5 oz)   08/03/23 57.2 kg (126 lb)   07/31/23 53.9 kg (118 lb 12.8 oz)       Intake/Output Summary (Last 24 hours) at 8/23/2023 1057  Last data filed at 8/22/2023 1900  Gross per 24 hour   Intake 590 ml   Output --   Net 590 ml       Patient clinically appears euvolemic, however has some lung base ground glass opacities consistent with mild pulmonary fluid overload. Maintained on Lasix 20 mg PRN as outpatient. Also has associated hypertension initially at 182/84, up to 200/109, then down to 134/92 within 50 minutes without any medication. Is not GDMT for BP management/HFrEF due to lack of tolerance of ACE/ARB in the past and labile hypotensive episodes. - 4/2023 Echo showed LVEF of 35% with mild hypokinesis and moderate pulmonary hypertension s/p ICD placement with recent normal device interrogation.     Plan:  Continue taking metoprolol 75 mg daily for high BP  Follow up with cardiology
Statement Selected

## 2023-08-23 NOTE — TELEPHONE ENCOUNTER
Hill Fuentes said she has been hospitalized and in ER a few times for GI bleeding / rectal bleeding. She has had GI testing. She was just discharged from the hospital today. She called to ask if her Eliquis dose can be changed as she thinks it is causing the bleeding. Hospital discharge states to continue Eliquis and to hold if bleeding resumes. I advised patient to let us know if rectal / GI bleeding occurs again , as we can obtain permission from you to hold the Eliquis. Please advise if other recommendations.

## 2023-08-23 NOTE — DISCHARGE SUMMARY
[address] [facility]  Discharge- Isma Dennis 1955, 77 y.o. female MRN: [id number]  Unit/Bed#: -Kathryn Encounter: [id number]  Primary Care Provider: Cleveland Greer DO   Date and time admitted to hospital: 8/21/2023 12:11 PM    * BRBPR (bright red blood per rectum)  Assessment & Plan  Patient has episodes of occasional BRBPR noticed when wiping in the past 2 days. She reports that not every BM produces blood. Rectal examination did not elicit any hemorrhoids by feel, nor did it elicit any bleeding. Repeat rectal examination done in 8/22 AM also did not elicit any bleeding, this was done due to a hemoglobin drop from 11.2 to 9.5, closer to her baseline from prior admission for iron deficiency anemia. Recheck hemoglobin was stable around 10.6. Gi consulted, team agrees patient does not appear to be actively bleeding. No further bleeding. Hemoglobin stable. Plan   - Follow up with GI (prior investigations negative one month ago)   - Hold off eliquis (Dorlene Sunita) if you see any visible blood        Foreign body alimentary tract  Assessment & Plan  Capsule endoscopy was done for prior melena admissions on 8/7. Capsule did not pass. CT scan shows metallic object in the cecum alongside decreased attenuation of the pancreatic head. GI consulted. Gi informed our team that object will likely pass without intervention. They will repeat KUB prior to MRCP for assessment of CT pancreas finding. Plan   - For CT scan showing metallic foreign body to be at the cecum and should be able to pass spontaneously, though appears to have been there for two weeks now. Plan for KUB in one week outpatient with GI   - Follow-up GI     Hypertension  Assessment & Plan  Patient is in hypertensive state with blood pressure on admission being 182/84. Highest and lowest blood pressures ranged from 203/109 to 134/92 in the span of 2-3 hours without medical intervention.  Blood pressure today 168/88 at 07:39 and 169/79 at 13:38, both taken prior to metoprolol. 174/91 BP at 0700 on 8/23      Patient's hypertension is currently managed by metoprolol. ACE/ARNI in the past have been attempted by her cardiologist Dr. Nhi Briceno but not given at this time due to labile blood pressure and hypotension after doses given. From chart review, lisinopril 10 mg was given 11/2020 and discontinued 1/27/2022 due to htn elevation and switched to entresto which was discontinued in May of 2023 due to frequent hypotensive episodes. Given current high bp on metoprolol plan is as follows   - Continue metoprolol 75 mg per day for hypertension management   - Follow-up with cardiology for continued management       Pancreatic abnormality  Assessment & Plan  CT scan shows 9 mm decreased attenuation at the level of the pancreatic head/duodenal junction with downstream pancreatic ductal dilation measuring 6 mm. Plan   - miralax to encourage BM formation  - repeat KUB outpatient to ensure metallic object passes   - MRCP afterwards (after confirmation that capsule has passed) to assess pancreatic abnormality   - GI agreeable to plan     Iron deficiency anemia  Assessment & Plan  Patient reports one episode of weakness. Had prior hospitalizations for melena that led to a GI workup including upper endoscopy/lower endoscopy without significant findings. Capsule endoscopy was performed and found AVM that may be contributing to current primary problem. However, AVM was non-bleeding at the time. Hemoglobin went down from admission (11.2) to 9.5 now to 10.6. Appears to be borderline microcytic with MCV of 80.   8/23 hemoglobin remains stable.     Plan   - Iron supplementation on discharge   - Consider transfusions if hemoglobin drops below 7   - Follow up with PCP          Lab Results   Component Value Date    IRON 22 (L) 07/20/2023    FERRITIN 5 (L) 07/20/2023    TIBC 419 07/20/2023    CONCFE 5 (L) 07/20/2023         PAF (paroxysmal atrial fibrillation) [facility])  Assessment & Plan  No afib episode noticed in hospital.     Plan   Follow up with cardiology, continue aletha (Frances Estrada) continue home medications       Chronic HFrEF (heart failure with reduced ejection fraction) ([address])  Assessment & Plan  Wt Readings from Last 3 Encounters:   08/23/23 51.3 kg (113 lb 1.5 oz)   08/03/23 57.2 kg (126 lb)   07/31/23 53.9 kg (118 lb 12.8 oz)       Intake/Output Summary (Last 24 hours) at 8/23/2023 1057  Last data filed at 8/22/2023 1900  Gross per 24 hour   Intake 590 ml   Output --   Net 590 ml       Patient clinically appears euvolemic, however has some lung base ground glass opacities consistent with mild pulmonary fluid overload. Maintained on Lasix 20 mg PRN as outpatient. Also has associated hypertension initially at 182/84, up to 200/109, then down to 134/92 within 50 minutes without any medication. Is not GDMT for BP management/HFrEF due to lack of tolerance of ACE/ARB in the past and labile hypotensive episodes. - 4/2023 Echo showed LVEF of 35% with mild hypokinesis and moderate pulmonary hypertension s/p ICD placement with recent normal device interrogation. Plan:  Continue taking metoprolol 75 mg daily for high BP  Follow up with cardiology        Medical Problems     Resolved Problems  Date Reviewed: 8/23/2023   None       Discharging Physician / Practitioner: April Borrego  PCP: Tali Rodas DO  Admission Date:   Admission Orders (From admission, onward)     Ordered        08/21/23 1650  INPATIENT ADMISSION  Once                      Discharge Date: 08/23/23    Consultations During Hospital Stay:  · Gastroenterology consulted due to CT incidental findings including foreign body (metalic object) believed to be the capsule endoscopy camera. Additional finding of pancreatic decreased attenuation of the pancreatic head alongside a downstream pancreatic ductal dilation measuring 6 mm. GI states that metallic object in cecum and will likely pass on it's own. In a week, KUB will be done outpatient followed by MRCP once we are ensured that the metallic object has passed. Procedures Performed:   · None    Significant Findings / Test Results:   · Decreased hemoglobin, near baseline from prior admissions. Appears stable around 10.6. GI consulted and agrees patient not actively bleeding. · No blood found on 2 rectal examinations. · Hypertensive BP readings noticed in ED, continued hypertension issues. Metoprolol dose increased, cardiology outpatient management recommended. · Abnormal magnesium reading - magnesium supplemented given. · Multiple POCT glucose readings elevated, BMP also confirms increased glucose. Follow up with endocrinology provided given that patient can no longer afford previously prescribed SGLT-2 inhibitor. Incidental Findings:   · CT imaging showing metallic object in cecum   · I reviewed the above mentioned incidental findings with the patient and/or family and they expressed understanding. Test Results Pending at Discharge (will require follow up):   · N/a     Outpatient Tests Requested:  · KUB to assess passage of metallic object   · MRCP ordered once object passage is confirmed by KUB    Complications:  N/A    Reason for Admission: BRBPR (bright red blood per rectum)     Hospital Course:   Steffanie Fitzgerald is a 77 y.o. female patient who originally presented to the hospital on 8/21/2023 due to BRBPR x2 days on multiple BM movements (but not all). She describes this as blood when wiping. It is associated with mild nausea, but not associated with any other pain. Patient has been symptomatically stable. She was previously hospitalized with episodes of melena with GI work-up showing a non-bleeding AVM in the distal-mid jejunum (otherwise negative upper/lower endoscopy). On that hospitalization she was diagnosed with iron deficiency anemia.      On this admission, ED team performed a CT scan that found incidental findings of a metallic object believed to be the camera capsule from the endoscopy in the cecum. Additional CT findings included 9 mm decreased attenuation at the level of the pancreatic head/duodenal junction with downstream pancreatic ductal dilation measuring 6 mm. GI has been consulted. GI states that capsule will likely pass on own, this will be confirmed by KUB outpatient. MRCP will be scheduled for better evaluation of additional pancreatic CT findings after KUB confirm object passage. Patient had stable hemoglobin levels at around 10.6 with a borderline microcytic/normocytic MCV at 80 and has been placed on iron supplementation by our team. We recommend PCP follow-up for this. Patient has had hypertensive episodes on multiple vital checks. She has previously been on lisinopril and entresto to manage her hypertension but this was discontinued by cardiology. She is on metoprolol for blood pressure control. The metoprolol dose has been increased from 50 mg to 75 mg by our team. We recommend cardiology follow-up for further bp management. Patient has T2DM diagnosis on metformin/farxiga. She has been unable to afford [email or web address] in the past month. POCT glucose and BMP glucose show glucose elevation. We recommend endocrinology outpatient follow-up. Patient has prior diagnosis of Afib, has been in NSR with bigeminy during this hospitalization. She takes apixiban for anti-coagulation. She is having some pink urine. We recommend that when patient notices pink urine she does not take her apixiban dose. We recommend cardiology follow-up for this in addition to hypertension management. Please see above list of diagnoses and related plan for additional information. Condition at Discharge: stable    Discharge Day Visit / Exam:   Subjective:  Patient complains of nausea, some diffuse abdominal pain, and a generalized weak state. She reports that she did not sleep well in the past night.  She was seen while eating breakfast and states that her appetite has not been diminished. She has passed a BM today . She has noticed pink appearing fluid after urination. Otherwise no additional complaints at this time. She is agreeable to our plan regarding KUB/MRCP evaluation by GI on an outpatient basis. Vitals: Blood Pressure: (!) 174/91 (08/23/23 0700)  Pulse: 83 (08/23/23 0700)  Temperature: 97.8 °F (36.6 °C) (08/23/23 0700)  Temp Source: Oral (08/22/23 2319)  Respirations: 16 (08/23/23 0700)  Weight - Scale: 51.3 kg (113 lb 1.5 oz) (08/23/23 0534)  SpO2: 97 % (08/23/23 0700)  Exam:   Physical Exam  Constitutional:       Appearance: Normal appearance. Comments: Continues to have weakness beyond baseline, believes this to be due to her lack of sleep while admitted. HENT:      Head: Normocephalic and atraumatic. Nose: Nose normal. No congestion or rhinorrhea. Mouth/Throat:      Mouth: Mucous membranes are moist.      Pharynx: Oropharynx is clear. Eyes:      General: No scleral icterus. Extraocular Movements: Extraocular movements intact. Conjunctiva/sclera: Conjunctivae normal.   Cardiovascular:      Rate and Rhythm: Normal rate and regular rhythm. Pulses: Normal pulses. Heart sounds: Normal heart sounds. No murmur heard. Pulmonary:      Effort: Pulmonary effort is normal. No respiratory distress. Breath sounds: Normal breath sounds. No wheezing. Abdominal:      General: Abdomen is flat. Palpations: Abdomen is soft. Comments: Hypoactive bowel sounds in all 4 quadrants   Pain elicited on palpation bilaterally below the umbilicus      Musculoskeletal:      Cervical back: Normal range of motion and neck supple. Skin:     General: Skin is warm and dry. Capillary Refill: Capillary refill takes less than 2 seconds. Coloration: Skin is not jaundiced. Findings: No erythema. Neurological:      General: No focal deficit present.       Mental Status: She is alert and oriented to person, place, and time. Psychiatric:         Mood and Affect: Mood normal.         Behavior: Behavior normal.          Discussion with Family: Patient declined call to . Discharge instructions/Information to patient and family:   See after visit summary for information provided to patient and family. Provisions for Follow-Up Care:  See after visit summary for information related to follow-up care and any pertinent home health orders. Disposition:   Home    Planned Readmission: n/a     Discharge Statement:  I spent 45 minutes discharging the patient. This time was spent on the day of discharge. I had direct contact with the patient on the day of discharge. Greater than 50% of the total time was spent examining patient, answering all patient questions, arranging and discussing plan of care with patient as well as directly providing post-discharge instructions. Additional time then spent on discharge activities. Discharge Medications:  See after visit summary for reconciled discharge medications provided to patient and/or family.       **Please Note: This note may have been constructed using a voice recognition system**

## 2023-08-23 NOTE — TELEPHONE ENCOUNTER
Pt is scheduled for 8/29/23. Would like a call from  to let her know what Dougiemichael Nga is for and if she should be taking it.

## 2023-08-23 NOTE — TELEPHONE ENCOUNTER
I spoke to patient, she is also concerned about her Potassium. She stated in the hospital she had to get an IV infusion and 2 pills of Potassium. She is wondering why she was not on Potassium prior, and if she needs to be on it now?     Heather Mendez LPN

## 2023-08-23 NOTE — PHYSICAL THERAPY NOTE
PHYSICAL THERAPY EVALUATION NOTE    Patient Name: Regino Bernardo  WFWLO'H Date: 8/23/2023  AGE:   77 y.o. Mrn:   [id number]  ADMIT DX:  Rectal bleeding [K62.5]  BRBPR (bright red blood per rectum) [K62.5]  Foreign body in digestive tract [T18. 9XXA]  Foreign body in digestive tract, initial encounter [T18. 9XXA]  Pancreatic abnormality [Q45.3]    Past Medical History:   Diagnosis Date    Breast cancer ([facility])     left - 1994. Diabetes mellitus ([facility])     Diabetes mellitus, type 2 ([facility]) 03/15/2006    last assessed 7/10/13    GERD (gastroesophageal reflux disease)     History of chemotherapy     Hypertension      Length Of Stay: 2  PHYSICAL THERAPY EVALUATION :    08/23/23 1140   PT Last Visit   PT Visit Date 08/23/23   Pain Assessment   Pain Assessment Tool 0-10   Pain Score No Pain  (at rest, 5/10 w/ activity)   Pain Location/Orientation Orientation: Left; Location: Knee   Hospital Pain Intervention(s) Repositioned; Ambulation/increased activity   Restrictions/Precautions   Other Precautions Fall Risk;Pain   Home Living   Type of [address] [place] [address] Two level; Other (Comment)  (4+6 CEE. 6 steps to bedroom and bathroom.)   Additional Comments lives w/ family. ambulates w/ cane. owns walker. independent w/ ADLs. family assists w/ IADLs. 3 falls in last 6 months. General   Additional Pertinent History 8/23/23 at 11:05 glucose was 276 and at 7:00 blood pressure was 174/91   Family/Caregiver Present No   Cognition   Arousal/Participation Alert   Attention Attends with cues to redirect   Orientation Level Oriented to person; Other (Comment)  (pt was identified w/ full name, birth date)   Following Commands Follows multistep commands without difficulty   Subjective   Subjective pt seen sitting on edge of bed. agreed to PT eval. denied pain at rest. has left knee pain w/ ambulation.    RUE Assessment   RUE Assessment WFL   LUE Assessment LUE Assessment WFL   RLE Assessment   RLE Assessment WFL  (4- to 4/5)   LLE Assessment   LLE Assessment WFL  (hip 3+/5, knee 3/5, ankle plantarflexion 3+/5 dorsiflexion 3/5)   Light Touch   RLE Light Touch Grossly intact   LLE Light Touch Grossly intact   Transfers   Sit to Stand 7  Independent   Additional items Increased time required   Stand to Sit 7  Independent   Additional items Increased time required   Ambulation/Elevation   Gait pattern Foward flexed; Excessively slow;Decreased L stance   Gait Assistance 5  Supervision   Additional items Verbal cues  (for cane positioning)   Assistive Device Other (Comment)  (hurrycane)   Distance 50 feet x2 w/ seated rest break  (additional not possible due to fatigue and pain)   Stair Management Assistance 5  Supervision   Additional items Verbal cues; Increased time required  (for sequencing, cane placement)   Stair Management Technique No rails; With cane   Number of Stairs 3   Balance   Static Sitting Good   Dynamic Sitting Good   Static Standing Fair  (w/ cane)   Ambulatory Fair -  (w/ cane)   Activity Tolerance   Activity Tolerance Patient limited by fatigue;Patient limited by pain   Nurse Made Aware spoke to [address] [place] [address]  Pema BENÍTEZ    Assessment   Problem List Decreased strength;Decreased endurance; Impaired balance;Decreased mobility; Decreased safety awareness;Pain   Assessment Pt presents with two days of bright red blood per rectum after some bowel movements with occasional nausea. Dx: BRBPR, foreign body in the cecum, HTN, iron deficiency anemia, a-fib, and chronic heart failure. order placed for PT eval and tx, w/ activity order of activity as tolerated. pt presents w/ comorbidities of breast cancer, DM, HTN, and a-fib and personal factors of living in 2 story house, mobilizing w/ assistive device, stair(s) to enter home, positive fall history and tobacco use.  pt presents w/ pain, weakness, decreased endurance, impaired balance, gait deviations, decreased safety awareness and fall risk. these impairments are evident in findings from physical examination (weakness), mobility assessment (need for standby assist w/ all phases of mobility when usually mobilizing independently, tolerance to only 50 feet of ambulation and need for cueing for mobility technique), and Barthel Index: 70/100. pt needed input for  mobility technique. pt is at risk for falls due to physical and safety awareness deficits. pt's clinical presentation is unstable/unpredictable (evident in poor blood sugar control, poor blood pressure control, need for assist w/ all phases of mobility when usually mobilizing independently, tolerance to only 50 feet of ambulation, pain impacting overall mobility status and need for input for mobility technique/safety). pt needs inpatient PT tx to improve mobility deficits and progress mobility training as appropriate. discharge recommendation is for home w/ family support and home PT to reduce fall risk and maximize level of functional independence. Goals   Patient Goals go home   STG Expiration Date 09/02/23   Short Term Goal #1 pt will: Increase bilateral LE strength 1/2 grade to facilitate independent mobility, Ambulate 200 ft. with single point cane independently w/o LOB to improve functional independence, Navigate 6 stair(s) independently with cane to facilitate return to previous living environment, Increase ambulatory balance 1 grade to decrease risk for falls, Complete exercise program independently to increase strength and endurance, Tolerate 3 hr OOB to faciliate upright tolerance, Improve Barthel Index score to 90 or greater to facilitate independence and Complete Timed Up and Go or Comfortable Gait Speed to further assess mobility and monitor progress   Plan   Treatment/Interventions LE strengthening/ROM; Elevations; Therapeutic exercise; Endurance training;Patient/family training;Gait training;Equipment eval/education   PT Frequency 3-5x/wk Recommendation   PT Discharge Recommendation Home with home health rehabilitation   AM-PAC Basic Mobility Inpatient   Turning in Flat Bed Without Bedrails 4   Lying on Back to Sitting on Edge of Flat Bed Without Bedrails 4   Moving Bed to Chair 3   Standing Up From Chair Using Arms 4   Walk in Room 3   Climb 3-5 Stairs With Railing 3   Basic Mobility Inpatient Raw Score 21   Basic Mobility Standardized Score 45.55   Highest Level Of Mobility   JH-HLM Goal 6: Walk 10 steps or more   JH-HLM Achieved 7: Walk 25 feet or more   Barthel Index   Feeding 10   Bathing 0   Grooming Score 5   Dressing Score 10   Bladder Score 10   Bowels Score 10   Toilet Use Score 10   Transfers (Bed/Chair) Score 10   Mobility (Level Surface) Score 0   Stairs Score 5   Barthel Index Score 70   End of Consult   Patient Position at End of Consult Seated edge of bed; All needs within reach     The patient's AM-PAC Basic Mobility Inpatient Short Form Raw Score is 21. A Raw score of greater than 16 suggests the patient may benefit from discharge to home. Please also refer to the recommendation of the Physical Therapist for safe discharge planning. Skilled PT recommended while in hospital and upon DC to progress pt toward treatment goals.      Micheal Muir, PT

## 2023-08-23 NOTE — OCCUPATIONAL THERAPY NOTE
Occupational Therapy Evaluation     Patient Name: Nikos Chavez  [id number] Date: 8/23/2023  Problem List  Principal Problem:    BRBPR (bright red blood per rectum)  Active Problems:    Chronic HFrEF (heart failure with reduced ejection fraction) ([facility])    Hypertension    PAF (paroxysmal atrial fibrillation) ([address])    Iron deficiency anemia    Foreign body alimentary tract    Pancreatic abnormality    Past Medical History  Past Medical History:   Diagnosis Date    Breast cancer ([address])     left - 1994. Diabetes mellitus ([address])     Diabetes mellitus, type 2 ([address]) 03/15/2006    last assessed 7/10/13    GERD (gastroesophageal reflux disease)     History of chemotherapy     Hypertension      Past Surgical History  Past Surgical History:   Procedure Laterality Date    CARDIAC ELECTROPHYSIOLOGY PROCEDURE N/A 03/09/2022    Procedure: Cardiac icd implant/ DUAL CHAMBER ICD; Surgeon: Ronak Toure MD;  [facility]; Service: Cardiology    MASTECTOMY Left     last assessed 12/16/14    MASTECTOMY, RADICAL Left     1994    DE PARATHYROIDECTOMY/EXPLORATION PARATHYROIDS N/A 04/21/2021    Procedure: PARATHYROIDECTOMY POSSIBLE 4 GLAND EXPLORATION;  Surgeon: Jose Hyatt MD;  Location: [facility];  Service: ENT    REDUCTION MAMMAPLASTY Right     UVULECTOMY           08/23/23 1123   OT Last Visit   OT Visit Date 08/23/23   Note Type   Note type Evaluation   Pain Assessment   Pain Assessment Tool 0-10   Pain Score No Pain   Restrictions/Precautions   Weight Bearing Precautions Per Order No   Home Living   Type of Home House  (bilevel)   Home Layout Two level;Stairs to enter without rails;Bed/bath upstairs  (4+6 CEE to enter, 6 steps to bedroom/bathroom.  No HR on stairs)   Bathroom Shower/Tub Tub/shower unit   Bathroom Toilet Standard   Bathroom Equipment   (no DME, interested in shower chair)   Bathroom Accessibility Accessible  (via FOS)   Home Equipment Walker;Cane  (hurrycane used at baseline)   Prior Function Level of [place] Independent with ADLs; Independent with functional mobility  (stacy has been helping with)   Lives With Family  (2 nephews and niece)   Receives Help From Family   IADLs Independent with meal prep; Independent with medication management; Family/Friend/Other provides transportation  (family provides A for transportation)   Falls in the last 6 months 1 to 4  (3 falls per pt, all mechanical)   Vocational Retired   Comments reports has been trying to get HHAs at home to assist with showering   Lifestyle   Autonomy At baseline, pt is (I) with ADLs, except A for showering. mod (I) c hurrycane vs RW. Lives c nephews in [address] [facility]. (-) driving. 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 complicated by : iron deficiency anemia, PAF, HTN, HFrEF, PAF. Family/Caregiver Present No   Subjective   Subjective Pt eager to walk and work with therapy in order to return home.  "I think a little therapy would be good for me"   ADL   Where Assessed Standing at sink  (vs seated at EOB)   Eating Assistance 7  Independent   Grooming Assistance 6  Modified Independent   Grooming Deficit Setup  (standing at sink for LB spongebathing , washing face and hands)   UB Bathing Assistance 6  Modified Independent   LB Bathing Assistance 6  Modified Independent   LB Bathing Deficit Setup  (completed in stance at sink)   [address] 6  Modified independent   [address] 6  Modified independent   LB Dressing Deficit Setup  (thread BLE through pants, pulled over hips without LOB, steadying.)   Toileting Assistance  6  Modified independent   Toileting Deficit   ((I) pericare and clothing management)   Functional Assistance 6  Modified independent   Bed Mobility   Additional Comments pt seated at EOB upon arrival/end of session   Transfers   Sit to Stand 7  Independent   Stand to Sit 7  Independent   Toilet transfer 6  Modified independent   Additional items Standard toilet; Increased time required  (R GB)   Additional Comments transfers completed both wiith and without AD   Functional Mobility   Functional Mobility 6  Modified independent   Additional Comments functional mobility household distance within hallway, no LOB or s/s of exertion. Additional items SPC   Balance   Static Sitting Good   Dynamic Sitting Good   Static Standing Good   Dynamic Standing Fair +   Activity Tolerance   Activity Tolerance Patient tolerated treatment well   Medical Staff Made Aware PT [facility]   Nurse Made Aware RN pre/post session   RUE Assessment   RUE Assessment WFL  (Full AROM, MMT 4+/5 based on functional assessment)   LUE Assessment   LUE Assessment WFL  (Full AROM, MMT 4+/5 based on functional assessment)   Hand Function   Gross Motor Coordination Functional   Fine Motor Coordination Functional   Sensation   Light Touch No apparent deficits   Cognition   Overall Cognitive Status WFL   Arousal/Participation Alert; Cooperative   Attention Attends with cues to redirect   Orientation Level Oriented X4   Memory Within functional limits   Following Commands Follows multistep commands without difficulty   Comments Pt agreeable to OT session, pleasant and cooperative. Assessment   Limitation Decreased endurance;Decreased high-level ADLs  (dynamic balance)   Prognosis Good   Assessment Patient is a 77 y.o. female seen for OT evaluation at [address] [place] [address] following admission on 8/21/2023  s/p BRBPR (bright red blood per rectum). Please see above for comprehensive list of comorbidities and significant PMHx impacting functional performance. At baseline, pt is (I) with ADLs, but A for showering. A with IADLs, mod (I) c SPC vs RW. Upon initial evaluation, pt appears to be performing at / near functional status.  Pt presents with the following deficits: endurance  and decreased dynamic balance impacting functional reach. However, no significant impact in occupational performance. Personal/Environmental factors impacting D/C include: (+) Hx of falls . Supporting factors include: support system available and attitude towards recovery . No OT services warranted at this time. Recommending D/C to return to previous environment with social support once medically cleared. Will sign off, D/C OT. Please reconsult if further immediate skilled OT needs warranted. Goals   Patient Goals to return home soon, to get HHAs for A with showering   Plan   OT Frequency Eval only  (D/C IP OT)   Recommendation   OT Discharge Recommendation No rehabilitation needs   Additional Comments  The patient's raw score on the AM-PAC Daily Activity Inpatient Short Form is 24. A raw score of greater than or equal to 19 suggests the patient may benefit from discharge to home. Please refer to the recommendation of the Occupational Therapist for safe discharge planning. Additional Comments 2 (S)  Pt education for encouragement of regular OOB mobility throughout remainder of hospitalization to maintain functional status and endurance, verbalized understanding   AM-PAC Daily Activity Inpatient   Lower Body Dressing 4   Bathing 4   Toileting 4   Upper Body Dressing 4   Grooming 4   Eating 4   Daily Activity Raw Score 24   Daily Activity Standardized Score (Calc for Raw Score >=11) 57.54   AM-PAC Applied Cognition Inpatient   Following a Speech/Presentation 4   Understanding Ordinary Conversation 4   Taking Medications 4   Remembering Where Things Are Placed or Put Away 4   Remembering List of 4-5 Errands 4   Taking Care of Complicated Tasks 4   Applied Cognition Raw Score 24   Applied Cognition Standardized Score 62.21   End of Consult   Education Provided Yes  (OOB mobility)   Patient Position at End of Consult Seated edge of bed; All needs within reach  (PT RJ present)   Nurse Communication Nurse aware of consult   Ludivina Yeager, OT

## 2023-08-23 NOTE — UTILIZATION REVIEW
NOTIFICATION OF INPATIENT ADMISSION   AUTHORIZATION REQUEST   SERVICING FACILITY:   [address] [place] [address]  Tax ID: [id number]  NPI: [id number]   ATTENDING PROVIDER:  Attending Name and NPI#: Qing RíosKristina [[id number]]  Address: [address] [place] [address], [address] [place] [address]  Phone: [phone]     ADMISSION INFORMATION:  Place of Service: Inpatient [address] [place] [address] Code: 21  Inpatient Admission Date/Time: 8/21/23  4:50 PM  Discharge Date/Time: 8/23/2023  1:07 PM  Admitting Diagnosis Code/Description:  Rectal bleeding [K62.5]  BRBPR (bright red blood per rectum) [K62.5]  Foreign body in digestive tract [T18. 9XXA]  Foreign body in digestive tract, initial encounter [T18. 9XXA]  Pancreatic abnormality [Q45.3]     UTILIZATION REVIEW CONTACT:  Jhony Carbone Utilization   Network Utilization Review Department  Phone: [phone]  Fax: [phone]  Email: [email or web address]  Contact for approvals/pending authorizations, clinical reviews, and discharge. PHYSICIAN ADVISORY SERVICES:  Medical Necessity Denial & [employer] Review  Phone: [phone]  Fax: [phone]  Email: [email or web address]

## 2023-08-23 NOTE — PROGRESS NOTES
Progress Note - [facility] [place] [facility]  Christiano Labrador 77 y.o. female MRN: [id number]    Unit/Bed#: -01 Encounter: [id number]      Assessment/Plan:  78-year-old female with history of A-fib on Eliquis, diabetes who presented due to small amount of blood when wiping 2 days ago with hemoglobin found to be stable. 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     1. Blood when wiping  2. Acute on chronic normocytic/microcytic anemia  Patient reports her last bowel movement was 2 days ago and was brown. She had a small amount of red blood when wiping. She denies any abdominal pain. She reports some worsening fatigue therefore prompting her to come in. Patient reported scant amount of blood on toilet paper after bowel movement today. Hemoglobin 10.8 and increasing. Suspect small amount of red blood when wiping secondary to hemorrhoids versus fissure.     -Due to stable hemoglobin, no further bowel movement and only scan amount of blood on tissue with wiping would recommend holding off on repeat procedures at this time.  -Okay to continue Eliquis. - Monitor hemoglobin and transfuse as needed per primary team.  - Monitor stool output. - Will continue to monitor hemoglobin in the outpatient setting. If patient develops black stools or continued drop in hemoglobin, can consider single balloon enteroscopy for small bowel AVMs noted on capsule endoscopy. -Recommend outpatient follow-up with hematology to consider IV iron. Due to ferritin low at 5, recent MCV decreasing to 80.      3. Abnormal CT scan  CT on arrival showing 9 mm area of decreased attenuation of the pancreatic head with downstream pancreatic ductal dilatation. Mild elevation of alkaline phosphatase of 219. Denies nausea, vomiting, or abdominal pain.     -Recommend MRI/MRCP for further evaluation to rule out underlying concerning lesion.  Unfortunately due to suspected capsule in the cecum would not be able to pursue MRI at this time. Will repeat KUB in a week to see if capsule passed as it should spontaneously if in the cecum.  -Order placed for MRI/MRCP as outpatient and patient instructed to not schedule test until she hears from GI that capsule has passed.   -Of note, CT scan showing metallic foreign body to be at the cecum and should be able to pass spontaneously, though appears to have been there for two weeks nows. GI will plan for KUB in one week- order placed. - Continue current bowel regiment. Subjective:   Lying in bed in no acute distress. Patient reports a scant amount of blood this a.m. when she wipes after bowel movement. Tolerating diet. Denies nausea, vomiting, or abdominal pain. Objective:     Vitals: Blood pressure (!) 174/91, pulse 83, temperature 97.8 °F (36.6 °C), resp. rate 16, weight 51.3 kg (113 lb 1.5 oz), SpO2 97 %, not currently breastfeeding. ,Body mass index is 20.03 kg/m². Intake/Output Summary (Last 24 hours) at 8/23/2023 1018  Last data filed at 8/22/2023 1900  Gross per 24 hour   Intake 590 ml   Output --   Net 590 ml       Physical Exam: Physical Exam  Vitals and nursing note reviewed. Constitutional:       General: She is not in acute distress. Cardiovascular:      Rate and Rhythm: Normal rate and regular rhythm. Pulses: Normal pulses. Heart sounds: Normal heart sounds. Pulmonary:      Effort: Pulmonary effort is normal. No respiratory distress. Breath sounds: Normal breath sounds. No stridor. No wheezing, rhonchi or rales. Abdominal:      General: Bowel sounds are normal. There is no distension. Palpations: Abdomen is soft. There is no mass. Tenderness: There is no abdominal tenderness. There is no guarding or rebound. Hernia: No hernia is present. Musculoskeletal:      Right lower leg: No edema. Left lower leg: No edema. Skin:     General: Skin is warm and dry. Capillary Refill: Capillary refill takes less than 2 seconds. Coloration: Skin is not jaundiced or pale. Neurological:      Mental Status: She is alert and oriented to person, place, and time.    Psychiatric:         Mood and Affect: Mood normal.         Invasive Devices     Peripheral Intravenous Line  Duration           Peripheral IV 08/21/23 Proximal;Right;Ventral (anterior) Forearm 1 day                Current Facility-Administered Medications:   •  acetaminophen (TYLENOL) tablet 650 mg, 650 mg, Oral, Q6H PRN, Rebeka Swift MD, 650 mg at 08/23/23 0840  •  apixaban (ELIQUIS) tablet 5 mg, 5 mg, Oral, BID, Haile Henson DO, 5 mg at 08/23/23 9978  •  atorvastatin (LIPITOR) tablet 40 mg, 40 mg, Oral, Daily, Rebeka Swift MD, 40 mg at 08/23/23 0831  •  bisacodyl (DULCOLAX) rectal suppository 10 mg, 10 mg, Rectal, Once, Rebeka Swift MD  •  docusate sodium (COLACE) capsule 100 mg, 100 mg, Oral, BID, Rebeka Swift MD, 100 mg at 08/23/23 1417  •  hydrALAZINE (APRESOLINE) injection 5 mg, 5 mg, Intravenous, Q6H PRN, Rebeka Swift MD, 5 mg at 08/22/23 0530  •  insulin lispro (HumaLOG) 100 units/mL subcutaneous injection 1-6 Units, 1-6 Units, Subcutaneous, 4x Daily (AC & HS) **AND** Fingerstick Glucose (POCT), , , 4x Daily AC and at bedtime, Rebeka Swift MD  •  magnesium sulfate 2 g/50 mL IVPB (premix) 2 g, 2 g, Intravenous, Once, Rebeka Swift MD, Last Rate: 25 mL/hr at 08/23/23 0935, 2 g at 08/23/23 0935  •  metoprolol succinate (TOPROL-XL) 24 hr tablet 75 mg, 75 mg, Oral, Daily, Haile Henson DO, 75 mg at 08/23/23 0831  •  nicotine (NICODERM CQ) 14 mg/24hr TD 24 hr patch 1 patch, 1 patch, Transdermal, Daily, Rebeka Switf MD, 1 patch at 08/23/23 2730  •  pantoprazole (PROTONIX) EC tablet 40 mg, 40 mg, Oral, Daily, Rebeka Swift MD, 40 mg at 08/23/23 0831  •  polyethylene glycol (MIRALAX) packet 17 g, 17 g, Oral, Daily, Rebeka Swift MD, 17 g at 08/23/23 9107  •  senna-docusate sodium (SENOKOT S) 8.6-50 mg per tablet 1 tablet, 1 tablet, Oral, BID, Cathy Encinas MD, 1 tablet at 08/23/23 0831  •  sodium chloride (OCEAN) 0.65 % nasal spray 1 spray, 1 spray, Each Nare, Q1H PRN, Wes Cooper DO, 1 spray at 08/22/23 0315    Lab Results:   Recent Results (from the past 24 hour(s))   Fingerstick Glucose (POCT)    Collection Time: 08/22/23 11:44 AM   Result Value Ref Range    POC Glucose 89 65 - 140 mg/dl   Fingerstick Glucose (POCT)    Collection Time: 08/22/23  2:39 PM   Result Value Ref Range    POC Glucose 150 (H) 65 - 140 mg/dl   Fingerstick Glucose (POCT)    Collection Time: 08/22/23  4:47 PM   Result Value Ref Range    POC Glucose 349 (H) 65 - 140 mg/dl   Fingerstick Glucose (POCT)    Collection Time: 08/22/23  8:56 PM   Result Value Ref Range    POC Glucose 319 (H) 65 - 140 mg/dl   CBC    Collection Time: 08/23/23  4:33 AM   Result Value Ref Range    WBC 8.33 4.31 - 10.16 Thousand/uL    RBC 4.41 3.81 - 5.12 Million/uL    Hemoglobin 10.8 (L) 11.5 - 15.4 g/dL    Hematocrit 36.0 34.8 - 46.1 %    MCV 82 82 - 98 fL    MCH 24.5 (L) 26.8 - 34.3 pg    MCHC 30.0 (L) 31.4 - 37.4 g/dL    RDW 22.5 (H) 11.6 - 15.1 %    Platelets 642 582 - 707 Thousands/uL    MPV 11.1 8.9 - 12.7 fL   Basic metabolic panel    Collection Time: 08/23/23  4:33 AM   Result Value Ref Range    Sodium 136 135 - 147 mmol/L    Potassium 4.5 3.5 - 5.3 mmol/L    Chloride 104 96 - 108 mmol/L    CO2 25 21 - 32 mmol/L    ANION GAP 7 mmol/L    BUN 22 5 - 25 mg/dL    Creatinine 0.98 0.60 - 1.30 mg/dL    Glucose 213 (H) 65 - 140 mg/dL    Calcium 9.1 8.4 - 10.2 mg/dL    eGFR 58 ml/min/1.73sq m   Magnesium    Collection Time: 08/23/23  4:33 AM   Result Value Ref Range    Magnesium 1.7 (L) 1.9 - 2.7 mg/dL   Fingerstick Glucose (POCT)    Collection Time: 08/23/23  7:10 AM   Result Value Ref Range    POC Glucose 215 (H) 65 - 140 mg/dl             Imaging Studies: CT abdomen pelvis with contrast    Result Date: 8/21/2023  Narrative: CT ABDOMEN AND PELVIS WITH IV CONTRAST INDICATION:   Abdominal pain, acute, nonlocalized pain. History of mastectomy. Chemotherapy left breast cancer. Right mammoplasty. Parathyroidectomy. Rectal bleeding, blood when wiping. COMPARISON: CT chest abdomen pelvis November 15, 2020 TECHNIQUE:  CT examination of the abdomen and pelvis was performed. Multiplanar 2D reformatted images were created from the source data. This examination, like all CT scans performed in the [facility], was performed utilizing techniques to minimize radiation dose exposure, including the use of iterative reconstruction and automated exposure control. Radiation dose length product (DLP) for this visit:  414 mGy-cm IV Contrast:  80 mL of iohexol (OMNIPAQUE) Enteric Contrast:  Enteric contrast was not administered. FINDINGS: ABDOMEN LOWER CHEST: Mild diffuse groundglass opacification and mild interlobular septal thickening in the visualized lung bases. Scattered areas of subsegmental atelectasis. Partially visualized cardiac leads. Small hiatal hernia. Left breast prosthesis. Heart is enlarged. LIVER/BILIARY TREE: Heterogeneous enhancement of the liver on initial scan which appears more homogeneous on 4-minute delay. GALLBLADDER:  There are gallstone(s) within the gallbladder, without pericholecystic inflammatory changes. SPLEEN:  Unremarkable. PANCREAS: Approximate 9 mm area of decreased attenuation at the level of the pancreatic head/duodenal junction 301/52, 304/52. Downstream pancreatic ductal dilation measuring 6 mm 301/50. No significant pancreatic parenchymal atrophy. ADRENAL GLANDS:  Unremarkable. KIDNEYS/URETERS:  No hydronephrosis or urinary tract calculus. One or more sharply circumscribed subcentimeter renal hypodensities are present, too small to accurately characterize, and statistically most likely benign findings. According to recent literature (Radiology 2019) no further workup of these findings is recommended.  STOMACH AND BOWEL: Metallic density at the level of the cecum causing artifact, uncertain origin. APPENDIX:  No findings to suggest appendicitis. ABDOMINOPELVIC CAVITY: Small amount of free fluid in the pelvis. Iline Luh No pneumoperitoneum. No lymphadenopathy. VESSELS: Atherosclerotic changes are present. No evidence of aneurysm. PELVIS: Images are obscured by metallic density artifact in the right lower quadrant REPRODUCTIVE ORGANS: Similar appearance of the uterus with likely calcified fibroids. Iline Luh URINARY BLADDER:  Unremarkable. ABDOMINAL WALL/INGUINAL REGIONS: Mild diffuse body wall edema. Epigastric fat and fluid and likely omental containing hernia, similar in size, presence of fluid is new from prior. Similar-appearing small fat-containing umbilical hernia. OSSEOUS STRUCTURES:  No acute fracture or destructive osseous lesion. Degenerative changes. Irregularity at the L2 inferior vertebral body endplate with extension to the inferior vertebral body rounded in appearance with some internal matrix suggestive of Schmorl's node, this is new from prior. Transitional lumbosacral segment. Stable sclerotic focus in the left femoral head. Impression: Approximate 9 mm area of decreased attenuation at the pancreatic head with downstream pancreatic ductal dilation. Recommend further evaluation with MRI/MRCP with IV contrast. Metallic density in the cecum, of uncertain origin, please correlate with history of ingestion. Review of the patient's EMR. Demonstrates capsule endoscopy on August 7, 2023. Heterogeneous enhancement of the liver on initial imaging which becomes more homogeneous on delayed phase of various etiologies including but not limited to, hepatic congestion and hepatitis. New irregularity at the L2 inferior body endplate as described, may represent a Schmorl's node. Mild pulmonary groundglass opacities and interlobular septal thickening likely represent edema. Additional findings as above.  I personally discussed this study with [employer] on 8/21/2023 4:04 PM. Workstation performed: [id number]     Cardiac EP device report    Result Date: 8/11/2023  Narrative: MDT DUAL ICD/ ACTIVE SYSTEM IS MRI CONDITIONAL NON-BILLABLE CARELINK TRANSMISSION: BATTERY VOLTAGE ADEQUATE (9.9 YRS). AP: 1.1%. : 0.2% (MVP-ON). ALL AVAILABLE LEAD PARAMETERS WITHIN NORMAL LIMITS. NO SIGNIFICANT HIGH RATE EPISODES. OPTI-VOL FLUID THRESHOLD CROSSED SINCE 6/12 AND IS ONGOING. PT TAKES LASIX, METOPROLOL SUCC, ELIQUIS. EF: 35 %(ECHO 4/26/23). TASK TO HF TEAM. NORMAL DEVICE FUNCTION. [id number] [place] [address]     Cardiac EP device report    Result Date: 8/11/2023  Narrative: MDT DUAL ICD/ ACTIVE SYSTEM IS MRI CONDITIONAL CARELINK TRANSMISSION: BATTERY VOLTAGE ADEQUATE (9.9 YRS). AP: 1.1%. : 0.2% (MVP-ON). ALL AVAILABLE LEAD PARAMETERS WITHIN NORMAL LIMITS. NO SIGNIFICANT HIGH RATE EPISODES. OPTI-VOL FLUID THRESHOLD CROSSED SINCE 6/12 AND IS ONGOING. PT TAKES LASIX, METOPROLOL SUCC, ELIQUIS. EF: 35 %(ECHO 4/26/23). TASK TO HF TEAM. NORMAL DEVICE FUNCTION.      XR hip/pelv 2-3 vws left    Result Date: 8/10/2023  Narrative: LEFT HIP INDICATION:   trauma. COMPARISON:  None VIEWS:  XR HIP/PELV 2-3 VWS LEFT  W PELVIS IF PERFORMED Images: 3 FINDINGS: There is no acute fracture or dislocation. No significant hip degenerative changes. No lytic or blastic osseous lesion. Round radiopaque density overlying the medial aspect of the right acetabulum of uncertain significance. The visualized lumbar spine is unremarkable. Impression: No acute osseous abnormality. Workstation performed: [id number]     XR tibia fibula 2 views LEFT    Result Date: 8/9/2023  Narrative: LEFT TIBIA/FIBULA INDICATION: trauma. Patient status post fall several days ago. COMPARISON: No similar prior studies available for comparison. VIEWS: XR TIBIA FIBULA 2 VW LEFT Images - 3. FINDINGS: No fractures. No evidence of knee or ankle joint dislocation. No lytic or sclerotic osseous lesions. Vascular calcifications are noted. .     Impression: No acute osseous abnormality. Findings are concordant with preliminary interpretation provided in the Emergency Department. Workstation performed: [id number]     XR knee 4+ views left injury    Result Date: 8/9/2023  Narrative: LEFT KNEE INDICATION: trauma. Patient reports left knee pain after fall several days ago. COMPARISON: No similar prior studies available for comparison. VIEWS: XR KNEE 4+ VW LEFT INJURY Images - 4. FINDINGS: No fractures or dislocations. Moderate suprapatellar joint effusion. Suprapatellar joint effusion. The joint spaces are preserved with smooth articular margins. Mildly increased density seen in the region of medial and lateral menisci, suggestive of chondrocalcinosis. No lytic or sclerotic osseous lesions. Impression: Moderate suprapatellar joint effusion. No acute osseous abnormality. Mildly increased density in the region of the medial and lateral menisci, suggestive of chondrocalcinosis. Findings are concordant with preliminary interpretation provided in the Emergency Department. Workstation performed: [id number]     Colonoscopy    Result Date: 7/24/2023  Narrative: Table formatting from the original result was not included. [address] [place] [address] [phone] DATE OF SERVICE: 7/24/23 PHYSICIAN(S): Attending: Lazaro Ventura MD Fellow: No Staff Documented INDICATION: Heme positive stool, Black stool, Symptomatic anemia POST-OP DIAGNOSIS: See the impression below. HISTORY: Prior colonoscopy: 8 years ago. BOWEL PREPARATION: Golytely/Colyte/Trilyte PREPROCEDURE: Informed consent was obtained for the procedure, including sedation. Risks including but not limited to bleeding, infection, perforation, adverse drug reaction and aspiration were explained in detail. Also explained about less than 100% sensitivity with the exam and other alternatives. The patient was placed in the left lateral decubitus position.  Procedure: Colonoscopy DETAILS OF PROCEDURE: Patient was taken to the procedure room where a time out was performed to confirm correct patient and correct procedure. The patient underwent monitored anesthesia care, which was administered by an anesthesia professional. The patient's blood pressure, heart rate, level of consciousness, oxygen, respirations and ECG were monitored throughout the procedure. A digital rectal exam was performed. The scope was introduced through the anus and advanced to the terminal ileum. Retroflexion was performed in the rectum. The quality of bowel preparation was evaluated using the [facility] Bowel Preparation Scale with scores of: right colon = 2, transverse colon = 2, left colon = 2. The total BBPS score was 6. Bowel prep was adequate. The patient experienced no blood loss. The procedure was not difficult. The patient tolerated the procedure well. There were no apparent adverse events.  ANESTHESIA INFORMATION: ASA: III Anesthesia Type: IV Sedation with Anesthesia MEDICATIONS: insulin lispro (HumaLOG) 100 units/mL subcutaneous injection 1-5 Units Cannot be calculated*  *Administration dose not documented simethicone (MYLICON) 40 mg in sterile water 60 mL 40 mg (Totals for administrations occurring from 1543 to 1605 on 07/24/23) FINDINGS: The terminal ileum and entire colon appeared normal. Few scattered diverticula of mild severity in the sigmoid colon EVENTS: Procedure Events Event Event Time ENDO CECUM REACHED 7/24/2023  3:52 PM ENDO SCOPE OUT TIME 7/24/2023  4:05 PM SPECIMENS: * No specimens in log * EQUIPMENT: Colonoscope -[id number]     Impression: The terminal ileum and entire colon appeared normal. Scattered diverticulosis of mild severity in the sigmoid colon RECOMMENDATION:  No further screening colonoscopies necessary  Age greater than 65   Return to floor Monitor for overt GI bleeding, transfuse as needed Resume regular diet  Sonja Bruns MD           [address], CRNP      Please Note: "This note has been constructed using a voice recognition system. Therefore there may be syntax, spelling, and/or grammatical errors.  Please call if you have any questions. "**

## 2023-08-23 NOTE — TELEPHONE ENCOUNTER
Spoke to the patient, went through the TCM questions and she has an appointment 8/29/23. She needs us to set up [address] for her. I attempted to call but they close at 4pm. Can we please call tomorrow to set this up for her appointment. ([phone]).     Heather Mendez LPN

## 2023-08-24 ENCOUNTER — OFFICE VISIT (OUTPATIENT)
Dept: OBGYN CLINIC | Facility: CLINIC | Age: 68
End: 2023-08-24
Payer: COMMERCIAL

## 2023-08-24 ENCOUNTER — PATIENT OUTREACH (OUTPATIENT)
Dept: FAMILY MEDICINE CLINIC | Facility: CLINIC | Age: 68
End: 2023-08-24

## 2023-08-24 ENCOUNTER — APPOINTMENT (OUTPATIENT)
Dept: RADIOLOGY | Facility: AMBULARY SURGERY CENTER | Age: 68
End: 2023-08-24
Attending: ORTHOPAEDIC SURGERY
Payer: COMMERCIAL

## 2023-08-24 VITALS
BODY MASS INDEX: 20.02 KG/M2 | WEIGHT: 113 LBS | HEART RATE: 77 BPM | DIASTOLIC BLOOD PRESSURE: 106 MMHG | HEIGHT: 63 IN | SYSTOLIC BLOOD PRESSURE: 175 MMHG

## 2023-08-24 DIAGNOSIS — M51.36 DDD (DEGENERATIVE DISC DISEASE), LUMBAR: ICD-10-CM

## 2023-08-24 DIAGNOSIS — Z71.89 COMPLEX CARE COORDINATION: Primary | ICD-10-CM
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
## 2023-08-24 DIAGNOSIS — M54.50 LOW BACK PAIN, UNSPECIFIED BACK PAIN LATERALITY, UNSPECIFIED CHRONICITY, UNSPECIFIED WHETHER SCIATICA PRESENT: ICD-10-CM

## 2023-08-24 DIAGNOSIS — N18.2 TYPE 2 DIABETES MELLITUS WITH STAGE 2 CHRONIC KIDNEY DISEASE, WITHOUT LONG-TERM CURRENT USE OF INSULIN: ICD-10-CM

## 2023-08-24 DIAGNOSIS — M43.16 SPONDYLOLISTHESIS AT L4-L5 LEVEL: ICD-10-CM

## 2023-08-24 DIAGNOSIS — E11.22 TYPE 2 DIABETES MELLITUS WITH STAGE 2 CHRONIC KIDNEY DISEASE, WITHOUT LONG-TERM CURRENT USE OF INSULIN: ICD-10-CM

## 2023-08-24 DIAGNOSIS — T14.8XXA CONTUSION: ICD-10-CM

## 2023-08-24 DIAGNOSIS — M54.16 RADICULOPATHY, LUMBAR REGION: Primary | ICD-10-CM

## 2023-08-24 LAB
ATRIAL RATE: 81 BPM
P AXIS: 32 DEGREES
PR INTERVAL: 162 MS
QRS AXIS: -53 DEGREES
QRSD INTERVAL: 124 MS
QT INTERVAL: 388 MS
QTC INTERVAL: 450 MS
T WAVE AXIS: 225 DEGREES
VENTRICULAR RATE: 81 BPM

## 2023-08-24 PROCEDURE — 99214 OFFICE O/P EST MOD 30 MIN: CPT | Performed by: ORTHOPAEDIC SURGERY

## 2023-08-24 PROCEDURE — 93010 ELECTROCARDIOGRAM REPORT: CPT | Performed by: INTERNAL MEDICINE

## 2023-08-24 PROCEDURE — 72110 X-RAY EXAM L-2 SPINE 4/>VWS: CPT

## 2023-08-24 RX ORDER — BLOOD SUGAR DIAGNOSTIC
STRIP MISCELLANEOUS
Qty: 1 STRIP | Refills: 0 | OUTPATIENT
Start: 2023-08-24

## 2023-08-24 NOTE — PROGRESS NOTES
Assessment:  1. Radiculopathy, lumbar region  Ambulatory referral to Spine & Pain Management      2. Fall, initial encounter  Ambulatory Referral to Orthopedic Surgery      3. Contusion  Ambulatory Referral to Orthopedic Surgery      4. Low back pain, unspecified back pain laterality, unspecified chronicity, unspecified whether sciatica present  XR spine lumbar minimum 4 views non injury      5. DDD (degenerative disc disease), lumbar  Ambulatory referral to Spine & Pain Management      6.  Spondylolisthesis at L4-L5 level  Ambulatory referral to Spine & Pain Management        Patient Active Problem List   Diagnosis   • History of left breast cancer   • Hypertensive heart and kidney disease with heart failure and with chronic kidney disease stage III ([facility])   • Colon, diverticulosis   • Hyperlipidemia LDL goal <100   • Screening for cardiovascular, respiratory, and genitourinary diseases   • Immunization due   • Colon cancer screening   • Menopause   • Microalbuminuria   • Medicare annual wellness visit, subsequent   • Hyperparathyroidism ([address])   • Coronary artery disease involving native coronary artery of native heart without angina pectoris   • Chronic HFrEF (heart failure with reduced ejection fraction) ([facility])   • Vitamin D deficiency   • S/P left mastectomy   • Breast cancer ([address]) - left 1994   • Hypertension   • Bilateral leg edema   • Breast cancer screening by mammogram   • NICM (nonischemic cardiomyopathy) ([address])   • Peripheral arterial disease ([facility])   • Trigger finger of right thumb   • Venous insufficiency of both lower extremities   • SVT (supraventricular tachycardia) ([facility])   • Tobacco use   • Vulvar itching   • PAF (paroxysmal atrial fibrillation) ([facility])   • Embolism and thrombosis of arteries of the lower extremities ([facility])   • Pleural effusion, bilateral   • Stage 3a chronic kidney disease ([facility])   • Moderate protein-calorie malnutrition ([facility])   • Depression, recurrent ([facility])   • HPV in female   • Black stool   • Diarrhea   • Heme positive stool   • JAYLAN II (vulvar intraepithelial neoplasia II)   • MARCELINO I (cervical intraepithelial neoplasia I)   • Iron deficiency anemia   • Soft tissue infection   • BRBPR (bright red blood per rectum)   • Foreign body alimentary tract   • Pancreatic abnormality       Plan:    77 y.o. female with left knee contusion sp fall on 8/9 improving without evidence of internal derangement or ligamentous injury; findings of weakness L knee extension and hip flexion on exam, concern for possible foot drop as well all from her likely lumbar spondylosis, DDD and radiculopathy    • Referral to spine and pain  • Recommend OTC dorsiflexion aid attachment for left shoe to help with foot drop  • No further general ortho follow up for her lumbar spine      The patient was seen and examined by Dr. Juan Anrold and myself. The assessment and plan were formulated by Dr. Juan Arnold and I assisted in carrying it out. Subjective:   Patient ID: Edie Heck is a 77 y.o. female . HPI    Patient comes in today with regards to left knee pain, weakness. Patient is referred to us by Phil Gutierrez MD for further evaluation. The patient reports that the pain been going on for 2 weeks. Injury or trauma prior to onset of pain: fell on left knee while walking with knee in valgus. Pain is located in the medial knee but gets cramping and tiredness in quads. It is worsened with walking, and is made better with rest.  Treatments tried: non . The pain does radiate down the leg. Old injuries or prior surgeries: none. Numbness or tingling: denies .       The following portions of the patient's history were reviewed and updated as appropriate: allergies, current medications, past family history, past social history, past surgical history and problem list.    Social History     Socioeconomic History   • Marital status: Single     Spouse name: Not on file   • Number of children: Not on file   • Years of education: Not on file • Highest education level: Not on file   Occupational History   • Not on file   Tobacco Use   • Smoking status: Some Days     Packs/day: 0.25     Years: 50.00     Total pack years: 12.50     Types: Cigarettes     Start date: 6/15/1973     Passive exposure: Current   • Smokeless tobacco: Never   • Tobacco comments:     2 cigarettes daily    Vaping Use   • Vaping Use: Never used   Substance and Sexual Activity   • Alcohol use: Never   • Drug use: Never   • Sexual activity: Yes     Partners: Male   Other Topics Concern   • Not on file   Social History Narrative   • Not on file     Social Determinants of Health     Financial Resource Strain: Low Risk  (8/10/2022)    Overall Financial Resource Strain (CARDIA)    • Difficulty of Paying Living Expenses: Not very hard   Food Insecurity: No Food Insecurity (7/25/2023)    Hunger Vital Sign    • Worried About Running Out of Food in the Last Year: Never true    • Ran Out of Food in the Last Year: Never true   Transportation Needs: No Transportation Needs (7/25/2023)    PRAPARE - Transportation    • Lack of Transportation (Medical): No    • Lack of Transportation (Non-Medical): No   Physical Activity: Not on file   Stress: Not on file   Social Connections: Not on file   Intimate Partner Violence: Not on file   Housing Stability: Low Risk  (7/25/2023)    Housing Stability Vital Sign    • Unable to Pay for Housing in the Last Year: No    • Number of Places Lived in the Last Year: 1    • Unstable Housing in the Last Year: No     Past Medical History:   Diagnosis Date   • Breast cancer ([address] [facility])     left - 1994. • Diabetes mellitus ([address] [facility])    • Diabetes mellitus, type 2 ([facility]) 03/15/2006    last assessed 7/10/13   • GERD (gastroesophageal reflux disease)    • History of chemotherapy    • Hypertension      Past Surgical History:   Procedure Laterality Date   • CARDIAC ELECTROPHYSIOLOGY PROCEDURE N/A 03/09/2022    Procedure: Cardiac icd implant/ DUAL CHAMBER ICD;   Surgeon: Lilian Gore Laurann Goodell, MD;  [facility]: [facility]; Service: Cardiology   • MASTECTOMY Left     last assessed 12/16/14   • MASTECTOMY, RADICAL Left     1994   • MT PARATHYROIDECTOMY/EXPLORATION PARATHYROIDS N/A 04/21/2021    Procedure: PARATHYROIDECTOMY POSSIBLE 4 GLAND EXPLORATION;  Surgeon: Justin Basurto MD;  Location: [facility];  Service: ENT   • REDUCTION MAMMAPLASTY Right    • UVULECTOMY       No Known Allergies  Current Outpatient Medications on File Prior to Visit   Medication Sig Dispense Refill   • apixaban (Eliquis) 5 mg Take 1 tablet (5 mg total) by mouth 2 (two) times a day 180 tablet 0   • ascorbic acid (VITAMIN C) 250 mg tablet Take 1 tablet (250 mg total) by mouth daily 30 tablet 0   • atorvastatin (LIPITOR) 40 mg tablet Take 1 tablet (40 mg total) by mouth daily 90 tablet 0   • Blood Glucose Monitoring Suppl (OneTouch Verio Reflect) w/Device KIT Check blood sugars three times daily. Please substitute with appropriate alternative as covered by patient's insurance.  Dx: E11.65 1 kit 0   • Continuous Blood Gluc  (FreeStyle [place] 2 [place]) KEELY Use 1 Units continuous 1 each 0   • Continuous Blood Gluc Sensor (FreeStyle Frank 2 Sensor) MISC Use 1 Units every 14 (fourteen) days 2 each 5   • dapagliflozin (Farxiga) 5 MG TABS Take 1 tablet (5 mg total) by mouth every evening (Patient not taking: Reported on 8/23/2023) 30 tablet 0   • docusate sodium (COLACE) 100 mg capsule Take 1 capsule (100 mg total) by mouth 2 (two) times a day (Patient taking differently: Take 100 mg by mouth if needed) 60 capsule 0   • ferrous sulfate 324 (65 Fe) mg Take 1 tablet (324 mg total) by mouth daily before breakfast 30 tablet 0   • ferrous sulfate 324 (65 Fe) mg Take 1 tablet (324 mg total) by mouth daily before breakfast (Patient not taking: Reported on 8/23/2023) 30 tablet 0   • furosemide (LASIX) 20 mg tablet Take 1 tablet (20 mg total) by mouth daily (Patient taking differently: Take 20 mg by mouth if needed) 90 tablet 0 • glucose blood (OneTouch Verio) test strip Check blood sugars three times daily. Please substitute with appropriate alternative as covered by patient's insurance. Dx: E11.65 300 each 0   • Insulin Pen Needle (BD Pen Needle Kateryna U/F) 32G X 4 MM MISC Use daily as directed with insulin pen 100 each 0   • metFORMIN (GLUCOPHAGE) 500 mg tablet Take 1 tablet (500 mg total) by mouth 2 (two) times a day with meals 180 tablet 0   • metoprolol succinate (TOPROL-XL) 25 mg 24 hr tablet Take 3 tablets (75 mg total) by mouth daily Do not start before August 24, 2023. 90 tablet 0   • OneTouch Delica Lancets 98O MISC Check blood sugars three times daily. Please substitute with appropriate alternative as covered by patient's insurance.  Dx: E11.65 300 each 1   • pantoprazole (PROTONIX) 40 mg tablet Take 1 tablet (40 mg total) by mouth daily 90 tablet 0     Current Facility-Administered Medications on File Prior to Visit   Medication Dose Route Frequency Provider Last Rate Last Admin   • [DISCONTINUED] acetaminophen (TYLENOL) tablet 650 mg  650 mg Oral Q6H PRN Augustine Marion MD   650 mg at 08/23/23 0840   • [DISCONTINUED] apixaban (ELIQUIS) tablet 5 mg  5 mg Oral BID Reginaldo Cobia, DO   5 mg at 08/23/23 0831   • [DISCONTINUED] atorvastatin (LIPITOR) tablet 40 mg  40 mg Oral Daily Augustine Marion MD   40 mg at 08/23/23 0831   • [DISCONTINUED] bisacodyl (DULCOLAX) rectal suppository 10 mg  10 mg Rectal Once Augustine Marion MD       • [DISCONTINUED] docusate sodium (COLACE) capsule 100 mg  100 mg Oral BID Augustine Marion MD   100 mg at 08/23/23 0831   • [DISCONTINUED] hydrALAZINE (APRESOLINE) injection 5 mg  5 mg Intravenous Q6H PRN Augustine Marion MD   5 mg at 08/22/23 0530   • [DISCONTINUED] insulin lispro (HumaLOG) 100 units/mL subcutaneous injection 1-6 Units  1-6 Units Subcutaneous 4x Daily (AC & HS) Augustine Marion MD   3 Units at 08/23/23 1242   • [DISCONTINUED] metoprolol succinate (TOPROL-XL) 24 hr tablet 75 mg  75 mg Oral Daily Reginaldo Blackburn DO 75 mg at 08/23/23 0831   • [DISCONTINUED] nicotine (NICODERM CQ) 14 mg/24hr TD 24 hr patch 1 patch  1 patch Transdermal Daily Sabrina Bryant MD   1 patch at 08/23/23 3862   • [DISCONTINUED] pantoprazole (PROTONIX) EC tablet 40 mg  40 mg Oral Daily Sabrina Bryant MD   40 mg at 08/23/23 0831   • [DISCONTINUED] polyethylene glycol (MIRALAX) packet 17 g  17 g Oral Daily Sabrina Bryant MD   17 g at 08/23/23 3707   • [DISCONTINUED] senna-docusate sodium (SENOKOT S) 8.6-50 mg per tablet 1 tablet  1 tablet Oral BID Susan Haddad MD   1 tablet at 08/23/23 0831   • [DISCONTINUED] sodium chloride (OCEAN) 0.65 % nasal spray 1 spray  1 spray Each Nare Q1H PRN Sherlyn Senior DO   1 spray at 08/22/23 0315       Review of Systems      Objective:    Vitals:    08/24/23 1304   BP: (!) 175/106   Pulse: 77       Physical Exam  Musculoskeletal:      Left knee: No effusion. Instability Tests: Medial Diana test negative and lateral Diana test negative. Left Knee Exam     Tenderness   The patient is experiencing tenderness in the MCL (minimal TTP). Range of Motion   The patient has normal left knee ROM. Tests   Diana:  Medial - negative Lateral - negative  Varus: negative Valgus: negative    Other   Erythema: absent  Scars: absent  Sensation: normal  Pulse: present  Swelling: none  Effusion: no effusion present            I have personally reviewed pertinent films in PACS.  XR lumbar spine was performed today this demonstrates grade 1 anterolisthesis of L4 on L5 with flexion-extension views there is partial lumbosacral position of L5 degenerative disc disease throughout the spine as well as noted at the upper lumbar spine    Procedures  No Procedures performed today      Scribe Attestation    I,:  Pascale Hector PA-C am acting as a scribe while in the presence of the attending physician.:       I,:  Neris Richardson DO personally performed the services described in this documentation    as scribed in my presence.: Portions of the record may have been created with voice recognition software. Occasional wrong word or "sound a like" substitutions may have occurred due to the inherent limitations of voice recognition software. Read the chart carefully and recognize, using context, where substitutions have occurred.

## 2023-08-24 NOTE — PROGRESS NOTES
HRR referral received via IB    Chart reviewed. Patient was recently admitted to [address] [place] [address] 8/21/23-8/23/23 with BRBPR. Patient has had recent admissions due to same on 7/31/23-8/1/23 and 7/20/23-7/25/23. This RNCM attempted to call patient. There was no answer and a detailed message was left with a request for a call back.

## 2023-08-24 NOTE — UTILIZATION REVIEW
NOTIFICATION OF ADMISSION DISCHARGE   This is a Notification of Discharge from [facility]. Please be advised that this patient has been discharge from our facility. Below you will find the admission and discharge date and time including the patient’s disposition. UTILIZATION REVIEW CONTACT:  Dai Hoffmann  Utilization   Network Utilization Review Department  Phone: [phone] x carefully listen to the prompts. All voicemails are confidential.  Email: [email or web address]     ADMISSION INFORMATION  PRESENTATION DATE: 8/21/2023 12:11 PM  OBERVATION ADMISSION DATE:   INPATIENT ADMISSION DATE: 8/21/23  4:50 PM   DISCHARGE DATE: 8/23/2023  1:07 PM   DISPOSITION:Home/Self Care    IMPORTANT INFORMATION:  Send all requests for admission clinical reviews, approved or denied determinations and any other requests to dedicated fax number below belonging to the campus where the patient is receiving treatment.  List of dedicated fax numbers:  [place] DENIALS (Administrative/Medical Necessity) [phone] 230[address] [facility] (Maternity/NICU/Pediatrics) [phone]   [facility] [phone]   [facility] [phone] 16[address] [facility] [phone]   [address] [facility] [phone]   [facility] [phone]   [address] [facility] [address] [facility] [phone]   [address] [facility] [phone]   [address] [facility] [phone]   [address] [facility] [address] [facility] [phone]

## 2023-08-24 NOTE — TELEPHONE ENCOUNTER
Labwork yesterday showed normal potassium so she is good at present but her cardiologist should keep an eye on it since she is on a diuretic (furosemide)

## 2023-08-24 NOTE — TELEPHONE ENCOUNTER
I called [facility] [employer] and set up the pickup for her appointment. They will pick her up at 9:30am curb side on 8/29/23. She has to be outside waiting for them, they only will wait 5 minutes. They will call her cell phone before arriving to let her know they are on the way. Left message on machine for patient to call office back. We need to get ahold of the patient to inform her of all of this.     John Hicks LPN

## 2023-08-25 ENCOUNTER — TELEPHONE (OUTPATIENT)
Dept: FAMILY MEDICINE CLINIC | Facility: CLINIC | Age: 68
End: 2023-08-25

## 2023-08-25 NOTE — TELEPHONE ENCOUNTER
Abimbola Bush is calling to recommend PT at home for patient. Charity Foreman has an upcoming apt on Tuesday and wanted to recommend this to doctor.

## 2023-08-25 NOTE — TELEPHONE ENCOUNTER
Patient called and stated that she would like to cancel [facility]  for her upcoming appt .  She stated that she has someone bringing her that day  Adriana Jose

## 2023-08-29 ENCOUNTER — APPOINTMENT (OUTPATIENT)
Dept: LAB | Facility: CLINIC | Age: 68
End: 2023-08-29
Payer: COMMERCIAL

## 2023-08-29 ENCOUNTER — APPOINTMENT (OUTPATIENT)
Dept: RADIOLOGY | Facility: CLINIC | Age: 68
End: 2023-08-29
Payer: COMMERCIAL

## 2023-08-29 ENCOUNTER — OFFICE VISIT (OUTPATIENT)
Dept: FAMILY MEDICINE CLINIC | Facility: CLINIC | Age: 68
End: 2023-08-29
Payer: COMMERCIAL

## 2023-08-29 VITALS
WEIGHT: 114 LBS | TEMPERATURE: 96.2 F | HEART RATE: 102 BPM | RESPIRATION RATE: 18 BRPM | BODY MASS INDEX: 20.19 KG/M2 | SYSTOLIC BLOOD PRESSURE: 158 MMHG | DIASTOLIC BLOOD PRESSURE: 94 MMHG

## 2023-08-29 DIAGNOSIS — D50.0 IRON DEFICIENCY ANEMIA DUE TO CHRONIC BLOOD LOSS: ICD-10-CM

## 2023-08-29 DIAGNOSIS — I48.0 PAF (PAROXYSMAL ATRIAL FIBRILLATION) (HCC): ICD-10-CM

## 2023-08-29 DIAGNOSIS — T18.9XXD FB GI (FOREIGN BODY IN GASTROINTESTINAL TRACT), SUBSEQUENT ENCOUNTER: ICD-10-CM

## 2023-08-29 DIAGNOSIS — D50.0 IRON DEFICIENCY ANEMIA DUE TO CHRONIC BLOOD LOSS: Primary | ICD-10-CM

## 2023-08-29 DIAGNOSIS — N18.30 CKD STAGE 3 DUE TO TYPE 2 DIABETES MELLITUS (HCC): ICD-10-CM

## 2023-08-29 DIAGNOSIS — E11.22 CKD STAGE 3 DUE TO TYPE 2 DIABETES MELLITUS (HCC): ICD-10-CM

## 2023-08-29 LAB
BASOPHILS # BLD AUTO: 0.02 THOUSANDS/ÂΜL (ref 0–0.1)
BASOPHILS NFR BLD AUTO: 0 % (ref 0–1)
DME PARACHUTE DELIVERY DATE REQUESTED: NORMAL
DME PARACHUTE ITEM DESCRIPTION: NORMAL
DME PARACHUTE ITEM DESCRIPTION: NORMAL
DME PARACHUTE ORDER STATUS: NORMAL
DME PARACHUTE SUPPLIER NAME: NORMAL
DME PARACHUTE SUPPLIER PHONE: NORMAL
EOSINOPHIL # BLD AUTO: 0.07 THOUSAND/ÂΜL (ref 0–0.61)
EOSINOPHIL NFR BLD AUTO: 1 % (ref 0–6)
ERYTHROCYTE [DISTWIDTH] IN BLOOD BY AUTOMATED COUNT: 21.5 % (ref 11.6–15.1)
HCT VFR BLD AUTO: 40.8 % (ref 34.8–46.1)
HGB BLD-MCNC: 12 G/DL (ref 11.5–15.4)
IMM GRANULOCYTES # BLD AUTO: 0.02 THOUSAND/UL (ref 0–0.2)
IMM GRANULOCYTES NFR BLD AUTO: 0 % (ref 0–2)
IRON SATN MFR SERPL: 6 % (ref 15–50)
IRON SERPL-MCNC: 23 UG/DL (ref 50–212)
LYMPHOCYTES # BLD AUTO: 1.36 THOUSANDS/ÂΜL (ref 0.6–4.47)
LYMPHOCYTES NFR BLD AUTO: 18 % (ref 14–44)
MCH RBC QN AUTO: 24 PG (ref 26.8–34.3)
MCHC RBC AUTO-ENTMCNC: 29.4 G/DL (ref 31.4–37.4)
MCV RBC AUTO: 82 FL (ref 82–98)
MONOCYTES # BLD AUTO: 0.53 THOUSAND/ÂΜL (ref 0.17–1.22)
MONOCYTES NFR BLD AUTO: 7 % (ref 4–12)
NEUTROPHILS # BLD AUTO: 5.46 THOUSANDS/ÂΜL (ref 1.85–7.62)
NEUTS SEG NFR BLD AUTO: 74 % (ref 43–75)
NRBC BLD AUTO-RTO: 0 /100 WBCS
PLATELET # BLD AUTO: 369 THOUSANDS/UL (ref 149–390)
PMV BLD AUTO: 11.6 FL (ref 8.9–12.7)
RBC # BLD AUTO: 5 MILLION/UL (ref 3.81–5.12)
TIBC SERPL-MCNC: 371 UG/DL (ref 250–450)
UIBC SERPL-MCNC: 348 UG/DL (ref 155–355)
WBC # BLD AUTO: 7.46 THOUSAND/UL (ref 4.31–10.16)

## 2023-08-29 PROCEDURE — 99215 OFFICE O/P EST HI 40 MIN: CPT | Performed by: FAMILY MEDICINE

## 2023-08-29 PROCEDURE — 36415 COLL VENOUS BLD VENIPUNCTURE: CPT

## 2023-08-29 PROCEDURE — 83540 ASSAY OF IRON: CPT

## 2023-08-29 PROCEDURE — 85025 COMPLETE CBC W/AUTO DIFF WBC: CPT

## 2023-08-29 PROCEDURE — 83550 IRON BINDING TEST: CPT

## 2023-08-29 PROCEDURE — 74018 RADEX ABDOMEN 1 VIEW: CPT

## 2023-08-29 RX ORDER — FERROUS SULFATE TAB EC 324 MG (65 MG FE EQUIVALENT) 324 (65 FE) MG
324 TABLET DELAYED RESPONSE ORAL
Qty: 90 TABLET | Refills: 5 | Status: SHIPPED | OUTPATIENT
Start: 2023-08-29

## 2023-08-29 NOTE — PROGRESS NOTES
Assessment/Plan:    No problem-specific Assessment & Plan notes found for this encounter. JANKI, start po iron  Recheck labs 1m    FB in Gi tract, kub to localize capsule    PAF on NOAC  F/u cardiology  Can hold prn bleeding signs as instructed at hospital    DM2 uncontrolled  F/u endo advised     Diagnoses and all orders for this visit:    FB GI (foreign body in gastrointestinal tract), subsequent encounter  -     XR abdomen 1 view kub; Future    Iron deficiency anemia due to chronic blood loss  -     ferrous sulfate 324 (65 Fe) mg; Take 1 tablet (324 mg total) by mouth 3 (three) times a day before meals  -     CBC and differential; Future  -     TIBC Panel (incl. Iron, TIBC, % Iron Saturation); Future    PAF (paroxysmal atrial fibrillation) ([address])    CKD stage 3 due to type 2 diabetes mellitus ([address])    Other orders  -     Rollator  -     Cancel: Shower Transfer Bench [$]        Return in about 3 months (around 11/29/2023) for Recheck. Subjective:      Patient ID: Edie Heck is a 77 y.o. female. Chief Complaint   Patient presents with   • Transition of Care Management     Sas/cma       HPI    TCM Call     Date and time call was made  8/23/2023  4:03 PM    Hospital care reviewed  Records reviewed    Patient was hospitialized at  [address] [place] [address] Rd    Date of Admission  08/21/23    Date of discharge  08/23/23    Diagnosis  BRBPR (bright red blood per rectum)    Disposition  Home    Were the patients medications reviewed and updated  Yes    Current Symptoms  None      TCM Call     Post hospital issues  None    Should patient be enrolled in anticoag monitoring? No    Scheduled for follow up?   Yes    Patients specialists  Endocrinologist; Cardiologist; Other (comment)    Cardiologist name  Jessi Abrams (Cardiology) on 12/2/2020    Other specialists names  Gastroenterology    Did you obtain your prescribed medications  No    Why were you unable to obtain your medications  She will go pick the prescriptions up tomorrow    Do you need help managing your prescriptions or medications  No    Is transportation to your appointment needed  Yes    Specify why  Her family memberes drive her    I have advised the patient to call PCP with any new or worsening symptoms  Meryle Fear, LPN    Living Arrangements  Family members    Support System  Friends; Family    The type of support provided  Emotional; Physical    Do you have social support  Yes, as much as I need    Are you recieving any outpatient services  No    Are you recieving home care services  No    Are you using any community resources  No    Current waiver services  No    Have you fallen in the last 12 months  Yes    How many times  Has had at least 3 falls in the past 12 months    Interperter language line needed  No    Counseling  Patient    Counseling topics  Importance of RX compliance; Activities of daily living    Comments  Spoke to the patient and went through all of the questions with her. She is aware to go back to the ER if her symptoms return or worsen. Meryle Fear, LPN        On eliquis for AF  Holds for rectal bleeding per pt as instructed  Not sure if passed capsule    Not on po iron currently    Wants rollater  Through "[employer]  Shower bench also    The following portions of the patient's history were reviewed and updated as appropriate: allergies, current medications, past family history, past medical history, past social history, past surgical history and problem list.    Review of Systems   Genitourinary: Negative for difficulty urinating. Musculoskeletal: Positive for gait problem.          Current Outpatient Medications   Medication Sig Dispense Refill   • apixaban (Eliquis) 5 mg Take 1 tablet (5 mg total) by mouth 2 (two) times a day 180 tablet 0   • ascorbic acid (VITAMIN C) 250 mg tablet Take 1 tablet (250 mg total) by mouth daily 30 tablet 0   • atorvastatin (LIPITOR) 40 mg tablet Take 1 tablet (40 mg total) by mouth daily 90 tablet 0   • Blood Glucose Monitoring Suppl (OneTouch Verio Reflect) w/Device KIT Check blood sugars three times daily. Please substitute with appropriate alternative as covered by patient's insurance. Dx: E11.65 1 kit 0   • Continuous Blood Gluc  (FreeStyle [place] 2 [place]) KEELY Use 1 Units continuous 1 each 0   • Continuous Blood Gluc Sensor (FreeStyle Frank 2 Sensor) MISC Use 1 Units every 14 (fourteen) days 2 each 5   • ferrous sulfate 324 (65 Fe) mg Take 1 tablet (324 mg total) by mouth 3 (three) times a day before meals 90 tablet 5   • furosemide (LASIX) 20 mg tablet Take 1 tablet (20 mg total) by mouth daily (Patient taking differently: Take 20 mg by mouth if needed) 90 tablet 0   • glucose blood (OneTouch Verio) test strip Check blood sugars three times daily. Please substitute with appropriate alternative as covered by patient's insurance. Dx: E11.65 300 each 0   • metFORMIN (GLUCOPHAGE) 500 mg tablet Take 1 tablet (500 mg total) by mouth 2 (two) times a day with meals 180 tablet 0   • metoprolol succinate (TOPROL-XL) 25 mg 24 hr tablet Take 3 tablets (75 mg total) by mouth daily Do not start before August 24, 2023. 90 tablet 0   • OneTouch Delica Lancets 10H MISC Check blood sugars three times daily. Please substitute with appropriate alternative as covered by patient's insurance. Dx: E11.65 300 each 1   • pantoprazole (PROTONIX) 40 mg tablet Take 1 tablet (40 mg total) by mouth daily 90 tablet 0   • Insulin Pen Needle (BD Pen Needle Kateryna U/F) 32G X 4 MM MISC Use daily as directed with insulin pen (Patient not taking: Reported on 8/29/2023) 100 each 0     No current facility-administered medications for this visit. Objective:    /94   Pulse 102   Temp (!) 96.2 °F (35.7 °C)   Resp 18   Wt 51.7 kg (114 lb)   LMP  (LMP Unknown)   BMI 20.19 kg/m²        Physical Exam  Vitals and nursing note reviewed. Constitutional:       Appearance: She is well-developed.  She is not ill-appearing. HENT:      Head: Normocephalic. Right Ear: Tympanic membrane normal.      Left Ear: Tympanic membrane normal.   Eyes:      General: No scleral icterus. Conjunctiva/sclera: Conjunctivae normal.   Cardiovascular:      Rate and Rhythm: Normal rate and regular rhythm. Heart sounds: No murmur heard. Pulmonary:      Effort: Pulmonary effort is normal. No respiratory distress. Breath sounds: No wheezing. Abdominal:      General: There is no distension. Palpations: Abdomen is soft. Tenderness: There is no abdominal tenderness. Musculoskeletal:         General: No deformity. Cervical back: Neck supple. Skin:     General: Skin is warm and dry. Coloration: Skin is not pale. Neurological:      Mental Status: She is alert. Motor: No weakness. Gait: Gait abnormal.   Psychiatric:         Mood and Affect: Mood normal.         Behavior: Behavior normal.         Thought Content:  Thought content normal.                Macy Azar DO

## 2023-08-29 NOTE — PATIENT INSTRUCTIONS
If you need to take iron supplements due to anemia, you would take a pill called ferrous sulfate 324mg each (which contain 65mg of elemental iron per pill), and take up to 1 pill three times a day along with a dose of vitamin C 250mg that helps you absorb the iron. You may need a stool softener (such as colace or docusate 100-300mg) every day to prevent constipation from iron.

## 2023-08-30 ENCOUNTER — PATIENT OUTREACH (OUTPATIENT)
Dept: FAMILY MEDICINE CLINIC | Facility: CLINIC | Age: 68
End: 2023-08-30

## 2023-08-30 NOTE — LETTER
[address] [address]. Date: 08/30/23  Dear Edie Heck,   My name is Percy Bauman; I am a registered nurse care manager working with Dr Whitfield Persons office. I have not been able to reach you and would like to set a time that I can talk with you over the phone. My work is to help patients that have complex medical conditions get the care they need. This includes patients who may have been in the hospital or emergency room. Please call me with any questions you may have. I look forward to speaking with you.   Sincerely,  Percy Bauman [facility]  [phone]  Outpatient Care Manager

## 2023-08-30 NOTE — PROGRESS NOTES
IB message received via IB for follow up from last outreach. Chart reviewed and I attempted to call patient. There was no answer and a detailed message was left with a request for a call back. Outreach #2 and an UTR letter was sent via e-mail.     This RNCM removed self from care team.

## 2023-08-31 DIAGNOSIS — T18.9XXA FOREIGN BODY IN DIGESTIVE TRACT, INITIAL ENCOUNTER: Primary | ICD-10-CM

## 2023-09-05 ENCOUNTER — TELEPHONE (OUTPATIENT)
Dept: HEMATOLOGY ONCOLOGY | Facility: CLINIC | Age: 68
End: 2023-09-05

## 2023-09-05 NOTE — TELEPHONE ENCOUNTER
Patient Call    Who are you speaking with? Patient    If it is not the patient, are they listed on an active communication consent form? N/A   What is the reason for this call? Pt would like to reschedule her procedure from 8/10   Does this require a call back? Yes   If a call back is required, please list best call back number [phone]   If a call back is required, advise that a message will be forwarded to their care team and someone will return their call as soon as possible. Did you relay this information to the patient?  Yes

## 2023-09-07 ENCOUNTER — TELEPHONE (OUTPATIENT)
Dept: HEMATOLOGY ONCOLOGY | Facility: CLINIC | Age: 68
End: 2023-09-07

## 2023-09-07 ENCOUNTER — HOSPITAL ENCOUNTER (OUTPATIENT)
Dept: RADIOLOGY | Facility: HOSPITAL | Age: 68
Discharge: HOME | End: 2023-09-07
Payer: COMMERCIAL

## 2023-09-07 ENCOUNTER — APPOINTMENT (OUTPATIENT)
Dept: RADIOLOGY | Facility: HOSPITAL | Age: 68
End: 2023-09-07
Payer: COMMERCIAL

## 2023-09-07 DIAGNOSIS — T18.9XXD FOREIGN BODY IN DIGESTIVE TRACT, SUBSEQUENT ENCOUNTER: Primary | ICD-10-CM

## 2023-09-07 DIAGNOSIS — T18.9XXD FOREIGN BODY IN DIGESTIVE TRACT, SUBSEQUENT ENCOUNTER: ICD-10-CM

## 2023-09-07 PROCEDURE — 74018 RADEX ABDOMEN 1 VIEW: CPT

## 2023-09-07 NOTE — TELEPHONE ENCOUNTER
Patient  she has honestly called 7 times trying to get her surgery rescheduled when i spoke with her on 8/14 Osmany Yost sent a message to Анна Navarro and Natalia Fischer and no one has called her back. she should be scheduled with Dr. Tracy Puga. she is very upset.

## 2023-09-07 NOTE — TELEPHONE ENCOUNTER
Patient Call    Who are you speaking with? Patient    If it is not the patient, are they listed on an active communication consent form? Yes   What is the reason for this call? Patient is upset that she has been trying to reschedule her surgery from August andcalled about 7 times and no one has called her back. Does this require a call back? Yes   If a call back is required, please list best call back number [phone]   If a call back is required, advise that a message will be forwarded to their care team and someone will return their call as soon as possible. Did you relay this information to the patient?  Yes

## 2023-09-08 ENCOUNTER — TELEPHONE (OUTPATIENT)
Dept: GYNECOLOGIC ONCOLOGY | Facility: CLINIC | Age: 68
End: 2023-09-08

## 2023-09-12 ENCOUNTER — ANESTHESIA EVENT (OUTPATIENT)
Dept: PERIOP | Facility: HOSPITAL | Age: 68
End: 2023-09-12
Payer: COMMERCIAL

## 2023-09-12 NOTE — PRE-PROCEDURE INSTRUCTIONS
Pre-Surgery Instructions:   Medication Instructions   • apixaban (Eliquis) 5 mg Awaiting instructions from surgeon   • ascorbic acid (VITAMIN C) 250 mg tablet Stop taking 7 days prior to surgery. • atorvastatin (LIPITOR) 40 mg tablet Take day of surgery. • ferrous sulfate 324 (65 Fe) mg Stop taking 7 days prior to surgery. • furosemide (LASIX) 20 mg tablet Hold day of surgery. • metFORMIN (GLUCOPHAGE) 500 mg tablet Hold day of surgery. • metoprolol succinate (TOPROL-XL) 25 mg 24 hr tablet Take day of surgery. • pantoprazole (PROTONIX) 40 mg tablet Take as directed      Medication instructions for day surgery reviewed. Please use only a sip of water to take your instructed medications. Avoid all over the counter vitamins, supplements and NSAIDS for one week prior to surgery per anesthesia guidelines. Tylenol is ok to take as needed. You will receive a call one business day prior to surgery with an arrival time and hospital directions. If your surgery is scheduled on a Monday, the hospital will be calling you on the Friday prior to your surgery. If you have not heard from anyone by 8pm, please call the hospital supervisor through the hospital  at [phone]. Lavonne Dias [phone]). Do not eat or drink anything after midnight the night before your surgery, including candy, mints, lifesavers, or chewing gum. Do not drink alcohol 24hrs before your surgery. Try not to smoke at least 24hrs before your surgery. Follow the pre surgery showering instructions as listed in the [facility] Surgical Experience Booklet” or otherwise provided by your surgeon's office. Do not shave the surgical area 24 hours before surgery. Do not apply any lotions, creams, including makeup, cologne, deodorant, or perfumes after showering on the day of your surgery. No contact lenses, eye make-up, or artificial eyelashes. Remove nail polish, including gel polish, and any artificial, gel, or acrylic nails if possible. Remove all jewelry including rings and body piercing jewelry. Wear causal clothing that is easy to take on and off. Consider your type of surgery. Keep any valuables, jewelry, piercings at home. Please bring any specially ordered equipment (sling, braces) if indicated. Arrange for a responsible person to drive you to and from the hospital on the day of your surgery. Visitor Guidelines discussed. Call the surgeon's office with any new illnesses, exposures, or additional questions prior to surgery. Please reference your [facility] Surgical Experience Booklet” for additional information to prepare for your upcoming surgery.

## 2023-09-13 ENCOUNTER — REMOTE DEVICE CLINIC VISIT (OUTPATIENT)
Dept: CARDIOLOGY CLINIC | Facility: CLINIC | Age: 68
End: 2023-09-13
Payer: COMMERCIAL

## 2023-09-13 DIAGNOSIS — Z95.810 PRESENCE OF AUTOMATIC CARDIOVERTER/DEFIBRILLATOR (AICD): Primary | ICD-10-CM

## 2023-09-13 PROCEDURE — G2066 INTER DEVC REMOTE 30D: HCPCS | Performed by: INTERNAL MEDICINE

## 2023-09-13 PROCEDURE — 93297 REM INTERROG DEV EVAL ICPMS: CPT | Performed by: INTERNAL MEDICINE

## 2023-09-13 NOTE — PROGRESS NOTES
Results for orders placed or performed in visit on 09/13/23   Cardiac EP device report    Narrative    MDT DUAL ICD/ [address] [address] [address]: OPTI-VOL WITHIN NORMAL LIMITS SINCE 8/16. NO SIGNIFICANT HIGH RATE EPISODES. AP AND  <1%. ALL AVAILABLE LEAD PARAMETERS WITHIN NORMAL LIMITS. NORMAL DEVICE FUNCTION. ---GONZALEZ

## 2023-09-15 ENCOUNTER — PREP FOR PROCEDURE (OUTPATIENT)
Dept: GASTROENTEROLOGY | Facility: AMBULARY SURGERY CENTER | Age: 68
End: 2023-09-15

## 2023-09-15 ENCOUNTER — HOSPITAL ENCOUNTER (OUTPATIENT)
Dept: RADIOLOGY | Facility: HOSPITAL | Age: 68
Discharge: HOME | End: 2023-09-15
Payer: COMMERCIAL

## 2023-09-15 DIAGNOSIS — T18.4XXD FOREIGN BODY IN COLON, SUBSEQUENT ENCOUNTER: Primary | ICD-10-CM

## 2023-09-15 DIAGNOSIS — T18.9XXD FOREIGN BODY IN DIGESTIVE TRACT, SUBSEQUENT ENCOUNTER: ICD-10-CM

## 2023-09-15 DIAGNOSIS — T18.9XXD FOREIGN BODY IN DIGESTIVE TRACT, SUBSEQUENT ENCOUNTER: Primary | ICD-10-CM

## 2023-09-15 PROCEDURE — 74018 RADEX ABDOMEN 1 VIEW: CPT

## 2023-09-18 ENCOUNTER — TELEPHONE (OUTPATIENT)
Age: 68
End: 2023-09-18

## 2023-09-18 ENCOUNTER — TELEPHONE (OUTPATIENT)
Dept: GYNECOLOGIC ONCOLOGY | Facility: CLINIC | Age: 68
End: 2023-09-18

## 2023-09-18 NOTE — TELEPHONE ENCOUNTER
Patients GI provider:  SCHUYLER Fernandes    Number to return call: [phone]    Reason for call: Pt calling to get result on the Xray that was done on 9.15.23. Pt stated that she has an up coming procedure and she would like to get the result before the procedure. Pt would like to be contact as soon as the result is available.     Scheduled procedure/appointment date if applicable: 1.60.97

## 2023-09-20 DIAGNOSIS — N18.32 TYPE 2 DIABETES MELLITUS WITH STAGE 3B CHRONIC KIDNEY DISEASE, WITH LONG-TERM CURRENT USE OF INSULIN (HCC): ICD-10-CM

## 2023-09-20 DIAGNOSIS — E11.22 TYPE 2 DIABETES MELLITUS WITH STAGE 3B CHRONIC KIDNEY DISEASE, WITH LONG-TERM CURRENT USE OF INSULIN (HCC): ICD-10-CM

## 2023-09-20 DIAGNOSIS — Z79.4 TYPE 2 DIABETES MELLITUS WITH STAGE 3B CHRONIC KIDNEY DISEASE, WITH LONG-TERM CURRENT USE OF INSULIN (HCC): ICD-10-CM

## 2023-09-21 ENCOUNTER — TELEPHONE (OUTPATIENT)
Age: 68
End: 2023-09-21

## 2023-09-21 ENCOUNTER — TELEPHONE (OUTPATIENT)
Dept: GASTROENTEROLOGY | Facility: CLINIC | Age: 68
End: 2023-09-21

## 2023-09-21 DIAGNOSIS — I50.9 CHF EXACERBATION (HCC): ICD-10-CM

## 2023-09-21 DIAGNOSIS — I10 PRIMARY HYPERTENSION: ICD-10-CM

## 2023-09-21 DIAGNOSIS — E11.22 TYPE 2 DIABETES MELLITUS WITH STAGE 3B CHRONIC KIDNEY DISEASE, WITH LONG-TERM CURRENT USE OF INSULIN (HCC): ICD-10-CM

## 2023-09-21 DIAGNOSIS — T18.9XXD FOREIGN BODY IN DIGESTIVE TRACT, SUBSEQUENT ENCOUNTER: Primary | ICD-10-CM

## 2023-09-21 DIAGNOSIS — K92.1 BLACK STOOL: ICD-10-CM

## 2023-09-21 DIAGNOSIS — N18.32 TYPE 2 DIABETES MELLITUS WITH STAGE 3B CHRONIC KIDNEY DISEASE, WITH LONG-TERM CURRENT USE OF INSULIN (HCC): ICD-10-CM

## 2023-09-21 DIAGNOSIS — Z79.4 TYPE 2 DIABETES MELLITUS WITH STAGE 3B CHRONIC KIDNEY DISEASE, WITH LONG-TERM CURRENT USE OF INSULIN (HCC): ICD-10-CM

## 2023-09-21 DIAGNOSIS — I48.91 ATRIAL FIBRILLATION, UNSPECIFIED TYPE (HCC): ICD-10-CM

## 2023-09-21 DIAGNOSIS — E78.5 HYPERLIPIDEMIA LDL GOAL <100: ICD-10-CM

## 2023-09-21 PROCEDURE — NC001 PR NO CHARGE: Performed by: OBSTETRICS & GYNECOLOGY

## 2023-09-21 RX ORDER — PANTOPRAZOLE SODIUM 40 MG/1
40 TABLET, DELAYED RELEASE ORAL DAILY
Qty: 90 TABLET | Refills: 1 | Status: SHIPPED | OUTPATIENT
Start: 2023-09-21

## 2023-09-21 RX ORDER — METOPROLOL SUCCINATE 25 MG/1
75 TABLET, EXTENDED RELEASE ORAL DAILY
Qty: 180 TABLET | Refills: 2 | Status: SHIPPED | OUTPATIENT
Start: 2023-09-21 | End: 2024-01-19

## 2023-09-21 RX ORDER — METOPROLOL SUCCINATE 25 MG/1
75 TABLET, EXTENDED RELEASE ORAL DAILY
Qty: 180 TABLET | Refills: 2 | Status: SHIPPED | OUTPATIENT
Start: 2023-09-21 | End: 2023-09-21 | Stop reason: SDUPTHER

## 2023-09-21 RX ORDER — FUROSEMIDE 20 MG/1
20 TABLET ORAL DAILY
Qty: 90 TABLET | Refills: 0 | Status: SHIPPED | OUTPATIENT
Start: 2023-09-21 | End: 2023-12-20

## 2023-09-21 RX ORDER — ATORVASTATIN CALCIUM 40 MG/1
40 TABLET, FILM COATED ORAL DAILY
Qty: 90 TABLET | Refills: 1 | Status: SHIPPED | OUTPATIENT
Start: 2023-09-21 | End: 2024-03-19

## 2023-09-21 RX ORDER — PANTOPRAZOLE SODIUM 40 MG/1
40 TABLET, DELAYED RELEASE ORAL DAILY
Qty: 90 TABLET | Refills: 1 | Status: SHIPPED | OUTPATIENT
Start: 2023-09-21 | End: 2023-09-21 | Stop reason: SDUPTHER

## 2023-09-21 NOTE — TELEPHONE ENCOUNTER
Order is in system. I lmom for pt to please call back to schedule a colonoscopy with Dr. Jamie Aleman or Dr. Brennen Khan. Will call pt again in a few days if do not hear back from her.

## 2023-09-21 NOTE — TELEPHONE ENCOUNTER
[facility] called and wanted all patient's prescriptions [facility] go to [facility] as [facility] are packaging all patient's prescriptions.   If you need to fax a list, fax #[phone]

## 2023-09-21 NOTE — TELEPHONE ENCOUNTER
ELOISA Seals  Cc: [facility]  Patient still has retained capsule endoscopy on todays (9/15/23) imaging   Needs to be scheduled for colonoscopy next week at [address] [place] [address] to be in the hospital due to patient having defibrillator   She also needs Eliquis (which she takes for Afib) stopped 2 days prior to procedure     I attempted to call her to let her know today and got answering machine     Thanks   Lizy Cartagena

## 2023-09-21 NOTE — ASSESSMENT & PLAN NOTE
Wt Readings from Last 3 Encounters:   08/29/23 51.7 kg (114 lb)   08/24/23 51.3 kg (113 lb)   08/23/23 51.3 kg (113 lb 1.5 oz)         Following with family medicine and cardiology  Metoprolol 75 mg daily

## 2023-09-21 NOTE — H&P
For questions/concerns on this patient, please reach out to the following:  [facility]- GynOn Resident       H&P Exam - Gynecology Oncology  Donte Mandujano 77 y.o. female MRN: [id number]  Unit/Bed#:  Encounter: [id number]        Assessment/Plan   MARCELINO I (cervical intraepithelial neoplasia I)  Assessment & Plan  Receiving cervical biopsy today    PAF (paroxysmal atrial fibrillation) (HCC)  Assessment & Plan  Eliquis 5mg daily    Hypertensive heart and kidney disease with heart failure and with chronic kidney disease stage III ([address])  Assessment & Plan  Wt Readings from Last 3 Encounters:   08/29/23 51.7 kg (114 lb)   08/24/23 51.3 kg (113 lb)   08/23/23 51.3 kg (113 lb 1.5 oz)         Following with family medicine and cardiology  Metoprolol 75 mg daily    * JAYALN II (vulvar intraepithelial neoplasia II)  Assessment & Plan  69yo with T2DM, NASREEN, hyperparathyroid, CKD, HTN, CHF, CAD, afib, tobacco abuse, h/o breast cancer and new diagnosis of VIN2, presenting for simple vulvectomy and cervical biopsy         History of Present Illness     HPI:  Donte Mandujano is a 77 y.o. female presenting for simple vulvectomy and cervical biopsy for JAYLAN II and MARCELINO I. Patient has had changes in her health, most recently with admission to the hospital for bright red rectal bleeding and currently following up with her GI. Patient feels well today and denies chest pain, shortness of breath, and abdominal pain       Oncology History    No history exists. Review of Systems: As above    Historical Information   Past Medical History:   Diagnosis Date   • Breast cancer ([address])     left - 1994.    • Diabetes mellitus ([address] [facility])    • Diabetes mellitus, type 2 ([address]) 03/15/2006    last assessed 7/10/13   • GERD (gastroesophageal reflux disease)    • History of chemotherapy    • Hyperlipidemia    • Hypertension      Past Surgical History:   Procedure Laterality Date   • CARDIAC ELECTROPHYSIOLOGY PROCEDURE N/A 03/09/2022    Procedure: Cardiac icd implant/ DUAL CHAMBER ICD; Surgeon: Livingston Frankel, MD;  Location: [facility];   Service: Cardiology   • Ulanda Mitchell / Nellene Ronak / REMOVE PACEMAKER     • MASTECTOMY Left     last assessed 14   • MASTECTOMY, RADICAL Left        • RI PARATHYROIDECTOMY/EXPLORATION PARATHYROIDS N/A 2021    Procedure: PARATHYROIDECTOMY POSSIBLE 4 GLAND EXPLORATION;  Surgeon: Grabiel Amezcua MD;  Location: [facility];  Service: ENT   • REDUCTION MAMMAPLASTY Right    • UVULECTOMY       OB History    Para Term  AB Living   0 0 0 0 0 0   SAB IAB Ectopic Multiple Live Births   0 0 0 0 0     Family History   Problem Relation Age of Onset   • Hypothyroidism Mother    • Leukemia Mother    • Diabetes Father    • Diabetes type II Father    • Stroke Sister    • Diabetes Paternal Grandmother    • Cancer Sister    • Diabetes Brother      Social History   Social History     Substance and Sexual Activity   Alcohol Use Never     Social History     Substance and Sexual Activity   Drug Use Never     Social History     Tobacco Use   Smoking Status Every Day   • Packs/day: 0.25   • Years: 50.00   • Total pack years: 12.50   • Types: Cigarettes   • Start date: 6/15/1973   • Passive exposure: Current   Smokeless Tobacco Never   Tobacco Comments    2 cigarettes daily        Meds/Allergies   Medications Prior to Admission   Medication   • ascorbic acid (VITAMIN C) 250 mg tablet   • ferrous sulfate 324 (65 Fe) mg   • apixaban (Eliquis) 5 mg   • atorvastatin (LIPITOR) 40 mg tablet   • Blood Glucose Monitoring Suppl (OneTouch Verio Reflect) w/Device KIT   • Continuous Blood Gluc  (FreeStyle Frank 2 [place]) KEELY   • Continuous Blood Gluc Sensor (FreeStyle Frank 2 Sensor) MISC   • furosemide (LASIX) 20 mg tablet   • glucose blood (OneTouch Verio) test strip   • Insulin Pen Needle (BD Pen Needle Kateryna U/F) 32G X 4 MM MISC   • metFORMIN (GLUCOPHAGE) 500 mg tablet   • metoprolol succinate (TOPROL-XL) 25 mg 24 hr tablet   • OneTouch Delica Lancets 33G MISC   • pantoprazole (PROTONIX) 40 mg tablet   • polyethylene glycol (GOLYTELY) 4000 mL solution     No Known Allergies    Objective     LMP  (LMP Unknown)     No intake/output data recorded. No intake/output data recorded. Lab Results   Component Value Date    WBC 7.46 08/29/2023    HGB 12.0 08/29/2023    HCT 40.8 08/29/2023    MCV 82 08/29/2023     08/29/2023       Lab Results   Component Value Date    GLUCOSE 105 (H) 10/07/2016    CALCIUM 9.1 08/23/2023     10/07/2016    K 4.5 08/23/2023    CO2 25 08/23/2023     08/23/2023    BUN 22 08/23/2023    CREATININE 0.98 08/23/2023       Physical Exam   GEN: The patient was alert and oriented x3, pleasant well-appearing female in no acute distress.    CV: Regular rate  PULM: nonlabored respirations  ABD: nontender, nondistended  MSK: Normal gait  Skin: warm, dry  Neuro: no focal deficits  Psych: normal affect and judgement, cooperative    Code Status: Prior    Dallas Villagran MD  9/22/2023  8:51 AM

## 2023-09-21 NOTE — TELEPHONE ENCOUNTER
Patients GI provider:  Dr. Harriett Richardson    Number to return call: [phone]    Reason for call: [facility] requesting pantoprazole be sent to their pharmacy.  A fax has also been sent with this request.    Scheduled procedure/appointment date if applicable:

## 2023-09-21 NOTE — DISCHARGE INSTRUCTIONS
Post-Gynecologic Surgery Discharge Instructions:  1. No heavy lifting more than one full gallon of milk (about 8 lbs) for 1 week. 2. Nothing in the vagina for 6 weeks, or until cleared at your post-operative visit  3. You may take stairs one at a time, touching each step with both feet for the first few days, then as tolerated. 4. Call the office for fever greater than 100. 4'F, heavy vaginal bleeding, or increasing pain. 5. Activity as tolerated. 6. Avoid driving if taking narcotic pain medications (Roxicodone, Percocet, Vicodin). Post Operative Pain Management:  For pain, take 650 mg of tylenol every 6 hours. For cramping, take 600 mg Ibuprofen every 6 hours to relieve. You may alternate these and take them "around the clock" to stay ahead of pain. For example, take 650mg of tylenol at 9 AM, then 600mg of ibuprofen at 12 PM, then 650 of tylenol at 3 PM, and so forth. Post Operative Bowel Management:  Please take over the counter stool softener or laxative to ensure you do not strain, as the bowels are often the last thing to wake up from anesthesia. You can take whatever is preferred (colace, senna, or miralax)    If you have any questions regarding your prescriptions please call your doctor.

## 2023-09-21 NOTE — TELEPHONE ENCOUNTER
Hailee with [facility] is calling on behalf of the patient to notify that she will be transferring to their pharmacy service.   They are requesting that all active prescriptions be faxed to them at     [address] [place] [address] [address] [address]

## 2023-09-21 NOTE — ASSESSMENT & PLAN NOTE
65yo with T2DM, NASREEN, hyperparathyroid, CKD, HTN, CHF, CAD, afib, tobacco abuse, h/o breast cancer and new diagnosis of VIN2, presenting for simple vulvectomy and cervical biopsy

## 2023-09-22 ENCOUNTER — HOSPITAL ENCOUNTER (OUTPATIENT)
Facility: HOSPITAL | Age: 68
Setting detail: OUTPATIENT SURGERY
Discharge: HOME | End: 2023-09-22
Attending: OBSTETRICS & GYNECOLOGY | Admitting: OBSTETRICS & GYNECOLOGY
Payer: COMMERCIAL

## 2023-09-22 ENCOUNTER — ANESTHESIA (OUTPATIENT)
Dept: PERIOP | Facility: HOSPITAL | Age: 68
End: 2023-09-22
Payer: COMMERCIAL

## 2023-09-22 VITALS
TEMPERATURE: 97.5 F | SYSTOLIC BLOOD PRESSURE: 168 MMHG | HEIGHT: 63 IN | OXYGEN SATURATION: 94 % | RESPIRATION RATE: 17 BRPM | DIASTOLIC BLOOD PRESSURE: 76 MMHG | WEIGHT: 113.98 LBS | HEART RATE: 90 BPM | BODY MASS INDEX: 20.2 KG/M2

## 2023-09-22 DIAGNOSIS — N90.1 VIN II (VULVAR INTRAEPITHELIAL NEOPLASIA II): ICD-10-CM

## 2023-09-22 DIAGNOSIS — N87.0 CIN I (CERVICAL INTRAEPITHELIAL NEOPLASIA I): ICD-10-CM

## 2023-09-22 DIAGNOSIS — D50.0 IRON DEFICIENCY ANEMIA DUE TO CHRONIC BLOOD LOSS: ICD-10-CM

## 2023-09-22 DIAGNOSIS — G89.18 POST-OP PAIN: Primary | ICD-10-CM

## 2023-09-22 LAB
GLUCOSE SERPL-MCNC: 191 MG/DL (ref 65–140)
GLUCOSE SERPL-MCNC: 199 MG/DL (ref 65–140)

## 2023-09-22 PROCEDURE — 56620 VULVECTOMY SIMPLE PARTIAL: CPT | Performed by: OBSTETRICS & GYNECOLOGY

## 2023-09-22 PROCEDURE — 88307 TISSUE EXAM BY PATHOLOGIST: CPT | Performed by: PATHOLOGY

## 2023-09-22 PROCEDURE — 82948 REAGENT STRIP/BLOOD GLUCOSE: CPT

## 2023-09-22 PROCEDURE — 57500 BIOPSY OF CERVIX: CPT | Performed by: OBSTETRICS & GYNECOLOGY

## 2023-09-22 PROCEDURE — 88344 IMHCHEM/IMCYTCHM EA MLT ANTB: CPT | Performed by: PATHOLOGY

## 2023-09-22 PROCEDURE — 88305 TISSUE EXAM BY PATHOLOGIST: CPT | Performed by: PATHOLOGY

## 2023-09-22 RX ORDER — LIDOCAINE HYDROCHLORIDE 20 MG/ML
INJECTION, SOLUTION EPIDURAL; INFILTRATION; INTRACAUDAL; PERINEURAL AS NEEDED
Status: DISCONTINUED | OUTPATIENT
Start: 2023-09-22 | End: 2023-09-22

## 2023-09-22 RX ORDER — SODIUM CHLORIDE, SODIUM LACTATE, POTASSIUM CHLORIDE, CALCIUM CHLORIDE 600; 310; 30; 20 MG/100ML; MG/100ML; MG/100ML; MG/100ML
125 INJECTION, SOLUTION INTRAVENOUS CONTINUOUS
Status: DISCONTINUED | OUTPATIENT
Start: 2023-09-22 | End: 2023-09-22 | Stop reason: HOSPADM

## 2023-09-22 RX ORDER — ONDANSETRON 2 MG/ML
4 INJECTION INTRAMUSCULAR; INTRAVENOUS ONCE AS NEEDED
Status: DISCONTINUED | OUTPATIENT
Start: 2023-09-22 | End: 2023-09-22 | Stop reason: HOSPADM

## 2023-09-22 RX ORDER — HYDROMORPHONE HCL IN WATER/PF 6 MG/30 ML
0.2 PATIENT CONTROLLED ANALGESIA SYRINGE INTRAVENOUS
Status: DISCONTINUED | OUTPATIENT
Start: 2023-09-22 | End: 2023-09-22 | Stop reason: HOSPADM

## 2023-09-22 RX ORDER — FENTANYL CITRATE 50 UG/ML
INJECTION, SOLUTION INTRAMUSCULAR; INTRAVENOUS AS NEEDED
Status: DISCONTINUED | OUTPATIENT
Start: 2023-09-22 | End: 2023-09-22

## 2023-09-22 RX ORDER — ACETIC ACID 5 %
LIQUID (ML) MISCELLANEOUS AS NEEDED
Status: DISCONTINUED | OUTPATIENT
Start: 2023-09-22 | End: 2023-09-22 | Stop reason: HOSPADM

## 2023-09-22 RX ORDER — LIDOCAINE HYDROCHLORIDE 10 MG/ML
INJECTION, SOLUTION EPIDURAL; INFILTRATION; INTRACAUDAL; PERINEURAL
Status: DISCONTINUED
Start: 2023-09-22 | End: 2023-09-22 | Stop reason: HOSPADM

## 2023-09-22 RX ORDER — SODIUM CHLORIDE, SODIUM LACTATE, POTASSIUM CHLORIDE, CALCIUM CHLORIDE 600; 310; 30; 20 MG/100ML; MG/100ML; MG/100ML; MG/100ML
INJECTION, SOLUTION INTRAVENOUS CONTINUOUS PRN
Status: DISCONTINUED | OUTPATIENT
Start: 2023-09-22 | End: 2023-09-22

## 2023-09-22 RX ORDER — LIDOCAINE HYDROCHLORIDE AND EPINEPHRINE 10; 10 MG/ML; UG/ML
INJECTION, SOLUTION INFILTRATION; PERINEURAL AS NEEDED
Status: DISCONTINUED | OUTPATIENT
Start: 2023-09-22 | End: 2023-09-22 | Stop reason: HOSPADM

## 2023-09-22 RX ORDER — PROPOFOL 10 MG/ML
INJECTION, EMULSION INTRAVENOUS AS NEEDED
Status: DISCONTINUED | OUTPATIENT
Start: 2023-09-22 | End: 2023-09-22

## 2023-09-22 RX ORDER — DEXAMETHASONE SODIUM PHOSPHATE 10 MG/ML
INJECTION, SOLUTION INTRAMUSCULAR; INTRAVENOUS AS NEEDED
Status: DISCONTINUED | OUTPATIENT
Start: 2023-09-22 | End: 2023-09-22

## 2023-09-22 RX ORDER — OXYCODONE HYDROCHLORIDE 5 MG/1
5 TABLET ORAL EVERY 4 HOURS PRN
Qty: 10 TABLET | Refills: 0 | Status: SHIPPED | OUTPATIENT
Start: 2023-09-22 | End: 2023-10-02

## 2023-09-22 RX ORDER — FERROUS SULFATE TAB EC 324 MG (65 MG FE EQUIVALENT) 324 (65 FE) MG
324 TABLET DELAYED RESPONSE ORAL
Qty: 90 TABLET | Refills: 5 | Status: SHIPPED | OUTPATIENT
Start: 2023-09-22

## 2023-09-22 RX ORDER — IBUPROFEN 600 MG/1
600 TABLET ORAL EVERY 6 HOURS PRN
Status: DISCONTINUED | OUTPATIENT
Start: 2023-09-22 | End: 2023-09-22 | Stop reason: HOSPADM

## 2023-09-22 RX ORDER — LIDOCAINE HYDROCHLORIDE 10 MG/ML
0.5 INJECTION, SOLUTION EPIDURAL; INFILTRATION; INTRACAUDAL; PERINEURAL ONCE AS NEEDED
Status: DISCONTINUED | OUTPATIENT
Start: 2023-09-22 | End: 2023-09-22 | Stop reason: HOSPADM

## 2023-09-22 RX ORDER — ONDANSETRON 2 MG/ML
4 INJECTION INTRAMUSCULAR; INTRAVENOUS EVERY 6 HOURS PRN
Status: DISCONTINUED | OUTPATIENT
Start: 2023-09-22 | End: 2023-09-22 | Stop reason: HOSPADM

## 2023-09-22 RX ORDER — ACETAMINOPHEN 325 MG/1
975 TABLET ORAL EVERY 6 HOURS PRN
Status: DISCONTINUED | OUTPATIENT
Start: 2023-09-22 | End: 2023-09-22 | Stop reason: HOSPADM

## 2023-09-22 RX ORDER — FENTANYL CITRATE/PF 50 MCG/ML
50 SYRINGE (ML) INJECTION
Status: DISCONTINUED | OUTPATIENT
Start: 2023-09-22 | End: 2023-09-22 | Stop reason: HOSPADM

## 2023-09-22 RX ORDER — ONDANSETRON 2 MG/ML
INJECTION INTRAMUSCULAR; INTRAVENOUS AS NEEDED
Status: DISCONTINUED | OUTPATIENT
Start: 2023-09-22 | End: 2023-09-22

## 2023-09-22 RX ADMIN — SODIUM CHLORIDE, SODIUM LACTATE, POTASSIUM CHLORIDE, AND CALCIUM CHLORIDE: .6; .31; .03; .02 INJECTION, SOLUTION INTRAVENOUS at 09:20

## 2023-09-22 RX ADMIN — PROPOFOL 100 MG: 10 INJECTION, EMULSION INTRAVENOUS at 09:30

## 2023-09-22 RX ADMIN — ACETAMINOPHEN 975 MG: 325 TABLET ORAL at 14:02

## 2023-09-22 RX ADMIN — DEXAMETHASONE SODIUM PHOSPHATE 4 MG: 10 INJECTION, SOLUTION INTRAMUSCULAR; INTRAVENOUS at 09:30

## 2023-09-22 RX ADMIN — LIDOCAINE HYDROCHLORIDE 60 MG: 20 INJECTION, SOLUTION EPIDURAL; INFILTRATION; INTRACAUDAL; PERINEURAL at 09:30

## 2023-09-22 RX ADMIN — FENTANYL CITRATE 50 MCG: 50 INJECTION, SOLUTION INTRAMUSCULAR; INTRAVENOUS at 09:30

## 2023-09-22 RX ADMIN — ONDANSETRON 4 MG: 2 INJECTION INTRAMUSCULAR; INTRAVENOUS at 09:30

## 2023-09-22 RX ADMIN — FENTANYL CITRATE 50 MCG: 50 INJECTION, SOLUTION INTRAMUSCULAR; INTRAVENOUS at 09:45

## 2023-09-22 NOTE — ANESTHESIA PREPROCEDURE EVALUATION
Procedure:  VULVECTOMY SIMPLE (Vulva)  CERVICAL BIOPSY (Cervix)    Relevant Problems   CARDIO   (+) Coronary artery disease involving native coronary artery of native heart without angina pectoris   (+) Hyperlipidemia LDL goal <100   (+) Hypertension   (+) Hypertensive heart and kidney disease with heart failure and with chronic kidney disease stage III (HCC)   (+) PAF (paroxysmal atrial fibrillation) (HCC)   (+) SVT (supraventricular tachycardia)      ENDO   (+) Hyperparathyroidism (HCC)      GI/HEPATIC   (+) BRBPR (bright red blood per rectum)   (+) Pancreatic abnormality      /RENAL   (+) CKD stage 3 due to type 2 diabetes mellitus (HCC)   (+) Hypertensive heart and kidney disease with heart failure and with chronic kidney disease stage III (HCC)   (+) Stage 3a chronic kidney disease (HCC)      GYN   (+) Breast cancer (HCC) - left 1994      HEMATOLOGY   (+) Iron deficiency anemia      NEURO/PSYCH   (+) Depression, recurrent (HCC)      PULMONARY   (+) Pleural effusion, bilateral          Physical Exam    Airway    Mallampati score: II  TM Distance: >3 FB  Neck ROM: full     Dental       Cardiovascular      Pulmonary      Other Findings        Anesthesia Plan  ASA Score- 3     Anesthesia Type- general with ASA Monitors. Additional Monitors:   Airway Plan: LMA. Plan Factors-Exercise tolerance (METS): <4 METS. Chart reviewed. EKG reviewed. Imaging results reviewed. Existing labs reviewed. Patient summary reviewed. Induction- intravenous. Postoperative Plan-     Informed Consent- Anesthetic plan and risks discussed with patient. I personally reviewed this patient with the CRNA. Discussed and agreed on the Anesthesia Plan with the CRNA. Ayad Curry

## 2023-09-22 NOTE — TELEPHONE ENCOUNTER
Went to call patient to see what medications she needed but realized she is in the hospital   [address] [facility]

## 2023-09-22 NOTE — OP NOTE
OPERATIVE REPORT  PATIENT NAME: Candi Aguilera    :  1955  MRN: [id number]  Pt Location: [facility] OR ROOM 06    SURGERY DATE: 2023    Surgeon(s) and Role:     * Reggie Ferrer MD - Primary     * Hayde Ruiz MD - Assisting    Preop Diagnosis:  JAYLAN II (vulvar intraepithelial neoplasia II) [N90.1]  MARCELINO I (cervical intraepithelial neoplasia I) [N87.0]    Post-Op Diagnosis Codes:     * JAYLAN II (vulvar intraepithelial neoplasia II) [N90.1]     * MARCELINO I (cervical intraepithelial neoplasia I) [N87.0]    Procedure(s):  VULVECTOMY SIMPLE  CERVICAL BIOPSY    Specimen(s):  ID Type Source Tests Collected by Time Destination   1 : Right lateral vagina Tissue Vaginal TISSUE EXAM Reggie Ferrer MD 2023  9:43 AM    2 : cervical biopsy Tissue Cervix TISSUE EXAM Reggie Ferrer MD 2023  9:44 AM    3 : vulvar lesion Tissue Vulva TISSUE EXAM Reggie Ferrer MD 2023  9:46 AM    4 : Vaginal 2 O'clock introitus Tissue Soft Tissue, Other TISSUE EXAM Reggie Ferrer MD 2023  9:48 AM    5 : Vaginal 10 O'clock introitus Tissue Soft Tissue, Other TISSUE EXAM Reggie Ferrer MD 2023  9:50 AM    6 : Posterior introitus stitch at 12 O'clock Tissue Vaginal TISSUE EXAM Reggie Ferrer MD 2023  9:56 AM        Estimated Blood Loss:   Minimal    Drains:  * No LDAs found *    Anesthesia Type:   General/LMA    Operative Indications:  JAYLAN II (vulvar intraepithelial neoplasia II) [N90.1]  MARCELINO I (cervical intraepithelial neoplasia I) [N87.0]  69yo with hx of abnormal pap with colposcopy showing persistent CIN1 as well as new vulvar lesion biopsy showing VIN2. Awa Baig She has a good understanding and has agreed to proceed with definitive surgical management of simple vulvectomy and cervical excision. Operative Findings:  ACW changes over introitus extending from 2oclock to 10 oclock excised completely. Smaller lesions throughout vulvar c/w condyloma. No AWE noted on cervix or vagina.  Small polyp at right lateral vaginal apex noted without AWE and excised. Complications:   None    Procedure and Technique:  The patient was taken to the operating room where she was given general anesthesia with LMA. She was prepped and draped in the usual sterile fashion in the dorso lithotomy position. Examination under anesthesia revealed findings as noted above. Cervix was grasped with tenaculum and acetic acid applied without any abnormality. Central cervix was biopsied and surfaced cauterized. Small polyp removed with cautery. The vulva was then infiltrated with 10 cc of 1% lidocaine with 1;100,000 epinephrine. Small vulvar lesions excised using cautery and reapproximated using interrupted 3-0 vicryl. Introitus lesion  was completely excised in elliptical fashion in 3 parts using a scalpel and Bovie. Bleeding areas were cauterized with Bovie electrocautery. Deep layers were reapproximated using 2-0 vicryl. Skin incision was re-approximated using 3-0 Vicryl in interrupted fashion . Silvadene was applied. The patient tolerated the procedure well and was taken to the recovery room in stable condition. All sponge, needle and instrument counts were correct x2. Anesthesia was reversed and the patient was taken to the PACU in a stable condition. I was present for the entire procedure.     Patient Disposition:  PACU     SIGNATURE: Stephane Neumann MD  DATE: September 22, 2023  TIME: 10:21 AM

## 2023-09-22 NOTE — ANESTHESIA POSTPROCEDURE EVALUATION
Post-Op Assessment Note    CV Status:  Stable    Pain management: adequate     Mental Status:  Sleepy   Hydration Status:  Euvolemic   PONV Controlled:  Controlled   Airway Patency:  Patent      Post Op Vitals Reviewed: Yes      Staff: CRNA, Anesthesiologist         No notable events documented.     BP   116/69   Temp      Pulse  87   Resp   12   SpO2   100

## 2023-09-22 NOTE — PERIOPERATIVE NURSING NOTE
Patient /100 and POCT . Amanda Chino MD notified and assess patient at bedside. No new orders by MD at this time. Patient to continue at home medications when discharged today.

## 2023-09-23 ENCOUNTER — NURSE TRIAGE (OUTPATIENT)
Dept: OTHER | Facility: OTHER | Age: 68
End: 2023-09-23

## 2023-09-23 NOTE — TELEPHONE ENCOUNTER
Returned pt call. Advised patient she may shower. Encouraged use of dash-bottle for cleansing. All questions answered.

## 2023-09-23 NOTE — TELEPHONE ENCOUNTER
Sent a [employer] message to on call provider for Oncology- Gyn-Onc Dr. Mariah Elaine to return patient's phone call.

## 2023-09-23 NOTE — TELEPHONE ENCOUNTER
Regarding: post op question  ----- Message from Alek Bunch RN sent at 9/23/2023 10:45 AM EDT -----  " I had surgery yesterday, I want to know if I can shower today "

## 2023-09-25 NOTE — TELEPHONE ENCOUNTER
Noted.  I lmom informing pt that order was created for her to have Xray repeated. I told her to go to any [facility]'s location to have done. I asked her to please call back if has any questions.

## 2023-09-25 NOTE — TELEPHONE ENCOUNTER
Pt calling to find out if she should still come in for her appt on 09.27.23 if she has not had the xray done yet? Or should she reschedule this.

## 2023-09-25 NOTE — TELEPHONE ENCOUNTER
I called and spoke to pt whom informed that she just had a gynecological surgery done on Friday and still has stitches. She also informed that she had a lot of bowel movements since the xray was done so wants to have repeated to see if still in there. Please advise. Thank you!

## 2023-09-26 DIAGNOSIS — Z18.9 RETAINED FOREIGN BODY: Primary | ICD-10-CM

## 2023-09-26 NOTE — TELEPHONE ENCOUNTER
There isn't much to discuss at her appt unless she has xray done so it can be cancelled. I will reorder the xray stat so we get the results quickly, if the capsule is retained, she needs a colonoscopy which can likely be scheduled over the phone rather than needing an office visit.

## 2023-09-27 PROCEDURE — 88307 TISSUE EXAM BY PATHOLOGIST: CPT | Performed by: PATHOLOGY

## 2023-09-27 PROCEDURE — 88344 IMHCHEM/IMCYTCHM EA MLT ANTB: CPT | Performed by: PATHOLOGY

## 2023-09-27 PROCEDURE — 88305 TISSUE EXAM BY PATHOLOGIST: CPT | Performed by: PATHOLOGY

## 2023-09-29 NOTE — TELEPHONE ENCOUNTER
I lmom reminding pt to please have xray done asap to see if capsule is still in colon or if was expressed and no longer in the body. I asked pt to please call me back to let me know when she is having it done. Will call again in a few days if do not hear back from her.

## 2023-10-03 ENCOUNTER — HOSPITAL ENCOUNTER (OUTPATIENT)
Dept: RADIOLOGY | Facility: HOSPITAL | Age: 68
Discharge: HOME | End: 2023-10-03
Payer: COMMERCIAL

## 2023-10-03 DIAGNOSIS — Z18.9 RETAINED FOREIGN BODY: ICD-10-CM

## 2023-10-03 PROCEDURE — 74018 RADEX ABDOMEN 1 VIEW: CPT

## 2023-10-05 NOTE — PROGRESS NOTES
Assessment/Plan:    Problem List Items Addressed This Visit        Musculoskeletal and Integument    JAYLAN II (vulvar intraepithelial neoplasia II) - Primary     65yo with T2DM, NASREEN, hyperparathyroid, CKD, HTN, CHF, CAD, afib, tobacco abuse, h/o breast cancer and JAYLAN s/p resection presents for post op. Multifocal lesions present on EUA. Resection revealed VIN1-3 throughout. A few with focal positive margins     Healing well post op. Continue restrictions    Should continue with routine exams to ensure no recurrence. Genitourinary    MARCELINO I (cervical intraepithelial neoplasia I)     No pathology on cervical resection. Repeat pap as scheduled with gyn               CHIEF COMPLAINT: post op        Problem:   Cancer Staging   No matching staging information was found for the patient. Previous therapy:  Oncology History    No history exists. Patient ID: Candi Aguilera is a 77 y.o. female  65yo with T2DM, NASREEN, hyperparathyroid, CKD, HTN, CHF, CAD, afib, tobacco abuse, h/o breast cancer and JAYLAN s/p resection presents for post op. Pt denies abdominal bloating/pain/cramping. No vaginal bleeding but with persistent discharge. No changes in BM/urination. No early satiety/Appetite adequate. Denies significant weight loss/weight gain. The following portions of the patient's history were reviewed and updated as appropriate: allergies, current medications, past family history, past medical history, past social history, past surgical history and problem list.    Review of Systems   Constitutional: Negative. HENT: Negative. Eyes: Negative. Respiratory: Negative. Cardiovascular: Negative. Gastrointestinal: Negative. Endocrine: Negative. Genitourinary: Negative. Musculoskeletal: Negative. Neurological: Negative. Psychiatric/Behavioral: Negative.           Current Outpatient Medications:   •  apixaban (Eliquis) 5 mg, Take 1 tablet (5 mg total) by mouth 2 (two) times a day, Disp: 180 tablet, Rfl: 1  •  atorvastatin (LIPITOR) 40 mg tablet, Take 1 tablet (40 mg total) by mouth daily, Disp: 90 tablet, Rfl: 1  •  Blood Glucose Monitoring Suppl (OneTouch Verio Reflect) w/Device KIT, Check blood sugars three times daily. Please substitute with appropriate alternative as covered by patient's insurance. Dx: E11.65, Disp: 1 kit, Rfl: 0  •  Continuous Blood Gluc  (FreeStyle Frank 2 [place]) KEELY, Use 1 Units continuous, Disp: 1 each, Rfl: 0  •  Continuous Blood Gluc Sensor (FreeStyle Frank 2 Sensor) MISC, Use 1 Units every 14 (fourteen) days, Disp: 2 each, Rfl: 5  •  ferrous sulfate 324 (65 Fe) mg, Take 1 tablet (324 mg total) by mouth 3 (three) times a day before meals, Disp: 90 tablet, Rfl: 5  •  furosemide (LASIX) 20 mg tablet, Take 1 tablet (20 mg total) by mouth daily, Disp: 90 tablet, Rfl: 0  •  glucose blood (OneTouch Verio) test strip, Check blood sugars three times daily. Please substitute with appropriate alternative as covered by patient's insurance. Dx: E11.65, Disp: 300 each, Rfl: 0  •  metFORMIN (GLUCOPHAGE) 500 mg tablet, Take 1 tablet (500 mg total) by mouth 2 (two) times a day with meals, Disp: 180 tablet, Rfl: 1  •  metoprolol succinate (TOPROL-XL) 25 mg 24 hr tablet, Take 3 tablets (75 mg total) by mouth daily, Disp: 180 tablet, Rfl: 2  •  OneTouch Delica Lancets 30D MISC, Check blood sugars three times daily. Please substitute with appropriate alternative as covered by patient's insurance.  Dx: E11.65, Disp: 300 each, Rfl: 1  •  pantoprazole (PROTONIX) 40 mg tablet, Take 1 tablet (40 mg total) by mouth daily, Disp: 90 tablet, Rfl: 1  •  ascorbic acid (VITAMIN C) 250 mg tablet, Take 1 tablet (250 mg total) by mouth daily, Disp: 30 tablet, Rfl: 0  •  Insulin Pen Needle (BD Pen Needle Kateryna U/F) 32G X 4 MM MISC, Use daily as directed with insulin pen (Patient not taking: Reported on 8/29/2023), Disp: 100 each, Rfl: 0  •  polyethylene glycol (GOLYTELY) 4000 mL solution, Take 4,000 mL by mouth once for 1 dose, Disp: 4000 mL, Rfl: 0    Objective:    Blood pressure 150/70, pulse 102, temperature 98 °F (36.7 °C), height 5' 3" (1.6 m), weight 51.7 kg (114 lb), SpO2 97 %, not currently breastfeeding. Body mass index is 20.19 kg/m². Body surface area is 1.52 meters squared. Physical Exam  HENT:      Head: Normocephalic and atraumatic. Cardiovascular:      Rate and Rhythm: Normal rate and regular rhythm. Pulmonary:      Effort: Pulmonary effort is normal.   Abdominal:      General: There is no distension. Palpations: Abdomen is soft. There is no mass. Genitourinary:     Comments: The external female genitalia is normal and well approximated. The bartholin's, uretheral and skenes glands are normal. The urethral meatus is normal (midline with no lesions). Anus without fissure or lesion. Musculoskeletal:         General: Normal range of motion. Cervical back: Normal range of motion. Skin:     General: Skin is warm and dry. Neurological:      Mental Status: She is alert and oriented to person, place, and time.

## 2023-10-05 NOTE — ASSESSMENT & PLAN NOTE
65yo with T2DM, NASREEN, hyperparathyroid, CKD, HTN, CHF, CAD, afib, tobacco abuse, h/o breast cancer and JAYLAN s/p resection presents for post op. Multifocal lesions present on EUA. Resection revealed VIN1-3 throughout. A few with focal positive margins     Healing well post op. Continue restrictions    Should continue with routine exams to ensure no recurrence.

## 2023-10-06 ENCOUNTER — OFFICE VISIT (OUTPATIENT)
Dept: GYNECOLOGIC ONCOLOGY | Facility: CLINIC | Age: 68
End: 2023-10-06

## 2023-10-06 ENCOUNTER — TELEPHONE (OUTPATIENT)
Dept: ENDOCRINOLOGY | Facility: CLINIC | Age: 68
End: 2023-10-06

## 2023-10-06 ENCOUNTER — TELEPHONE (OUTPATIENT)
Age: 68
End: 2023-10-06

## 2023-10-06 ENCOUNTER — OFFICE VISIT (OUTPATIENT)
Dept: FAMILY MEDICINE CLINIC | Facility: CLINIC | Age: 68
End: 2023-10-06
Payer: COMMERCIAL

## 2023-10-06 VITALS
HEIGHT: 63 IN | OXYGEN SATURATION: 97 % | SYSTOLIC BLOOD PRESSURE: 150 MMHG | BODY MASS INDEX: 20.2 KG/M2 | TEMPERATURE: 98 F | WEIGHT: 114 LBS | DIASTOLIC BLOOD PRESSURE: 70 MMHG | HEART RATE: 102 BPM

## 2023-10-06 VITALS
SYSTOLIC BLOOD PRESSURE: 138 MMHG | RESPIRATION RATE: 16 BRPM | TEMPERATURE: 98 F | DIASTOLIC BLOOD PRESSURE: 90 MMHG | HEIGHT: 64 IN | HEART RATE: 72 BPM | BODY MASS INDEX: 19.12 KG/M2 | WEIGHT: 112 LBS | OXYGEN SATURATION: 99 %

## 2023-10-06 DIAGNOSIS — I42.8 NICM (NONISCHEMIC CARDIOMYOPATHY) (HCC): ICD-10-CM

## 2023-10-06 DIAGNOSIS — Z00.00 MEDICARE ANNUAL WELLNESS VISIT, SUBSEQUENT: ICD-10-CM

## 2023-10-06 DIAGNOSIS — M81.0 OSTEOPOROSIS, UNSPECIFIED OSTEOPOROSIS TYPE, UNSPECIFIED PATHOLOGICAL FRACTURE PRESENCE: ICD-10-CM

## 2023-10-06 DIAGNOSIS — C50.911 MALIGNANT NEOPLASM OF RIGHT FEMALE BREAST, UNSPECIFIED ESTROGEN RECEPTOR STATUS, UNSPECIFIED SITE OF BREAST (HCC): ICD-10-CM

## 2023-10-06 DIAGNOSIS — N87.0 CIN I (CERVICAL INTRAEPITHELIAL NEOPLASIA I): ICD-10-CM

## 2023-10-06 DIAGNOSIS — N18.31 STAGE 3A CHRONIC KIDNEY DISEASE (HCC): ICD-10-CM

## 2023-10-06 DIAGNOSIS — N18.30 CKD STAGE 3 DUE TO TYPE 2 DIABETES MELLITUS (HCC): ICD-10-CM

## 2023-10-06 DIAGNOSIS — Z23 NEED FOR VACCINATION: ICD-10-CM

## 2023-10-06 DIAGNOSIS — I73.9 PERIPHERAL ARTERIAL DISEASE (HCC): ICD-10-CM

## 2023-10-06 DIAGNOSIS — N90.1 VIN II (VULVAR INTRAEPITHELIAL NEOPLASIA II): Primary | ICD-10-CM

## 2023-10-06 DIAGNOSIS — E21.3 HYPERPARATHYROIDISM (HCC): ICD-10-CM

## 2023-10-06 DIAGNOSIS — Z12.2 SCREENING FOR LUNG CANCER: ICD-10-CM

## 2023-10-06 DIAGNOSIS — I10 PRIMARY HYPERTENSION: ICD-10-CM

## 2023-10-06 DIAGNOSIS — N18.31 TYPE 2 DIABETES MELLITUS WITH STAGE 3A CHRONIC KIDNEY DISEASE, WITHOUT LONG-TERM CURRENT USE OF INSULIN (HCC): ICD-10-CM

## 2023-10-06 DIAGNOSIS — E11.9 TYPE 2 DIABETES MELLITUS WITHOUT COMPLICATION, WITHOUT LONG-TERM CURRENT USE OF INSULIN (HCC): ICD-10-CM

## 2023-10-06 DIAGNOSIS — Z11.59 NEED FOR HEPATITIS C SCREENING TEST: ICD-10-CM

## 2023-10-06 DIAGNOSIS — D64.9 ANEMIA, UNSPECIFIED TYPE: ICD-10-CM

## 2023-10-06 DIAGNOSIS — I48.0 PAF (PAROXYSMAL ATRIAL FIBRILLATION) (HCC): ICD-10-CM

## 2023-10-06 DIAGNOSIS — I50.22 CHRONIC HFREF (HEART FAILURE WITH REDUCED EJECTION FRACTION) (HCC): ICD-10-CM

## 2023-10-06 DIAGNOSIS — Z79.4 TYPE 2 DIABETES MELLITUS WITH STAGE 3B CHRONIC KIDNEY DISEASE, WITH LONG-TERM CURRENT USE OF INSULIN (HCC): ICD-10-CM

## 2023-10-06 DIAGNOSIS — N90.1 VIN II (VULVAR INTRAEPITHELIAL NEOPLASIA II): ICD-10-CM

## 2023-10-06 DIAGNOSIS — Z90.12 S/P LEFT MASTECTOMY: ICD-10-CM

## 2023-10-06 DIAGNOSIS — H93.92 LESION OF LEFT EAR: ICD-10-CM

## 2023-10-06 DIAGNOSIS — Z12.31 ENCOUNTER FOR SCREENING MAMMOGRAM FOR MALIGNANT NEOPLASM OF BREAST: ICD-10-CM

## 2023-10-06 DIAGNOSIS — T18.9XXA FOREIGN BODY IN DIGESTIVE TRACT, INITIAL ENCOUNTER: ICD-10-CM

## 2023-10-06 DIAGNOSIS — Z71.89 ACP (ADVANCE CARE PLANNING): ICD-10-CM

## 2023-10-06 DIAGNOSIS — E11.22 TYPE 2 DIABETES MELLITUS WITH STAGE 3A CHRONIC KIDNEY DISEASE, WITHOUT LONG-TERM CURRENT USE OF INSULIN (HCC): ICD-10-CM

## 2023-10-06 DIAGNOSIS — N18.32 TYPE 2 DIABETES MELLITUS WITH STAGE 3B CHRONIC KIDNEY DISEASE, WITH LONG-TERM CURRENT USE OF INSULIN (HCC): ICD-10-CM

## 2023-10-06 DIAGNOSIS — Z12.11 COLON CANCER SCREENING: ICD-10-CM

## 2023-10-06 DIAGNOSIS — E11.22 CKD STAGE 3 DUE TO TYPE 2 DIABETES MELLITUS (HCC): ICD-10-CM

## 2023-10-06 DIAGNOSIS — K62.5 BRBPR (BRIGHT RED BLOOD PER RECTUM): Primary | ICD-10-CM

## 2023-10-06 DIAGNOSIS — E11.22 TYPE 2 DIABETES MELLITUS WITH STAGE 3B CHRONIC KIDNEY DISEASE, WITH LONG-TERM CURRENT USE OF INSULIN (HCC): ICD-10-CM

## 2023-10-06 PROCEDURE — 99024 POSTOP FOLLOW-UP VISIT: CPT | Performed by: OBSTETRICS & GYNECOLOGY

## 2023-10-06 PROCEDURE — G0008 ADMIN INFLUENZA VIRUS VAC: HCPCS | Performed by: INTERNAL MEDICINE

## 2023-10-06 PROCEDURE — G0439 PPPS, SUBSEQ VISIT: HCPCS | Performed by: INTERNAL MEDICINE

## 2023-10-06 PROCEDURE — 99497 ADVNCD CARE PLAN 30 MIN: CPT | Performed by: INTERNAL MEDICINE

## 2023-10-06 PROCEDURE — 90686 IIV4 VACC NO PRSV 0.5 ML IM: CPT | Performed by: INTERNAL MEDICINE

## 2023-10-06 PROCEDURE — 99214 OFFICE O/P EST MOD 30 MIN: CPT | Performed by: INTERNAL MEDICINE

## 2023-10-06 NOTE — PROGRESS NOTES
Assessment and Plan:     Problem List Items Addressed This Visit        Digestive    BRBPR (bright red blood per rectum) - Primary    Foreign body alimentary tract       Endocrine    CKD stage 3 due to type 2 diabetes mellitus ([address])    Hyperparathyroidism ([address])    RESOLVED: Type 2 diabetes mellitus, without long-term current use of insulin (HCC)    Relevant Orders    CBC and differential    Comprehensive metabolic panel    Lipid panel    TSH, 3rd generation    Albumin / creatinine urine ratio       Cardiovascular and Mediastinum    Chronic HFrEF (heart failure with reduced ejection fraction) (HCC)    Hypertension    NICM (nonischemic cardiomyopathy) ([address])    Peripheral arterial disease (HCC)    PAF (paroxysmal atrial fibrillation) (HCC)       Musculoskeletal and Integument    JAYLAN II (vulvar intraepithelial neoplasia II)       Genitourinary    Stage 3a chronic kidney disease (HCC)    MARCEILNO I (cervical intraepithelial neoplasia I)       Other    Colon cancer screening    Medicare annual wellness visit, subsequent    S/P left mastectomy    Breast cancer ([address]) - left 1994   Other Visit Diagnoses     Osteoporosis, unspecified osteoporosis type, unspecified pathological fracture presence        Relevant Orders    DXA bone density spine hip and pelvis    Type 2 diabetes mellitus without complication, without long-term current use of insulin (HCC)        Relevant Orders    CBC and differential    Comprehensive metabolic panel    Lipid panel    TSH, 3rd generation    Albumin / creatinine urine ratio    Need for vaccination        Relevant Orders    influenza vaccine, quadrivalent, 0.5 mL, preservative-free, for adult and pediatric patients 6 mos+ (AFLURIA, FLUARIX, FLULAVAL, FLUZONE)    Anemia, unspecified type        Relevant Orders    CBC and differential    Comprehensive metabolic panel    Ferritin    Need for hepatitis C screening test        Relevant Orders    Hepatitis C antibody    Encounter for screening mammogram for malignant neoplasm of breast        Relevant Orders    Mammo diagnostic right w cad    Lesion of left ear        Relevant Orders    Ambulatory Referral to Dermatology    ACP (advance care planning)        Screening for lung cancer        Had LDCT in May      Sofiya here to get established and for her MAWV, and ACP discussion-she is a very, very complicated patient with a large number of chronic medical issues-she is on metformin and we talked about diabetic diet, also told her to see her eye dr and gave her the diabetic eye form, ordered labs including A1c%; hx of CHF with reduced EF and has a pacemaker and hx of a fib-is on Eliquis, but does NOT take it every day because it causes her to bleed-she's had to have both blood and iron transfusions-she sees Cardiology and they are aware of this; hx of anemia, and had colonoscopy in July which looked normal, but she also had capsule endoscopy and has yet to pass the camera, so is following with GI; hx of vulvar intraepithelial neoplasia (JAYLAN) and cervical cancer and just had multiple lesions removed from her vulva is followed by GYN Dr Janene Tee; she will get a high dose flu shot today-DXA scan ordered, as she has a history of osteoporosis, told her to take calcium vitamin D with meals-has a lesion on her left ear that looks suspicious for melanoma, so will do a Derm referral.  Have to watch blood pressure    Falls Plan of Care: Home safety education provided. Tobacco Cessation Counseling: Tobacco cessation counseling was provided. The patient is sincerely urged to quit consumption of tobacco. She is ready to quit tobacco.       Preventive health issues were discussed with patient, and age appropriate screening tests were ordered as noted in patient's After Visit Summary. Personalized health advice and appropriate referrals for health education or preventive services given if needed, as noted in patient's After Visit Summary.      History of Present Illness: Patient presents for a Medicare Wellness Visit    Johanna Cross here to get established, and for her [address], ACP discussion, and to discuss her hx of DMII (most recent A1c% was 8.4% in June), HTN, CHF with EF of 25-30%, Vulvar Intraepithelial neoplasia, CKD, tobacco use, COPD, atrial fibrillation. She follows with Cardiology Dr Varsha Reyna, and with GYN Dr Latonia Spencer, Nephrology      Patient Care Team:  Mary Abreu MD as PCP - General (Internal Medicine)  Hosseinrebecca Felix MD (Gastroenterology)  Hemal Christie MD (Endocrinology)  Terell Rosa as Medical Student (Internal Medicine)     Review of Systems:     Review of Systems   Constitutional: Positive for fatigue. HENT: Negative. Eyes: Negative. Respiratory: Negative. Cardiovascular: Negative. Musculoskeletal: Positive for back pain. Neurological: Positive for weakness and light-headedness. Hematological: Negative. Psychiatric/Behavioral: Negative.          Problem List:     Patient Active Problem List   Diagnosis   • History of left breast cancer   • Hypertensive heart and kidney disease with heart failure and with chronic kidney disease stage III ([address])   • Colon, diverticulosis   • Hyperlipidemia LDL goal <100   • Screening for cardiovascular, respiratory, and genitourinary diseases   • Immunization due   • Colon cancer screening   • Menopause   • Microalbuminuria   • Medicare annual wellness visit, subsequent   • CKD stage 3 due to type 2 diabetes mellitus ([address])   • Hyperparathyroidism ([address])   • Coronary artery disease involving native coronary artery of native heart without angina pectoris   • Chronic HFrEF (heart failure with reduced ejection fraction) ([address])   • Vitamin D deficiency   • S/P left mastectomy   • Breast cancer ([address]) - left 1994   • Hypertension   • Bilateral leg edema   • Breast cancer screening by mammogram   • NICM (nonischemic cardiomyopathy) ([address])   • Peripheral arterial disease ([address])   • Trigger finger of right thumb   • Venous insufficiency of both lower extremities   • SVT (supraventricular tachycardia)   • Tobacco use   • Vulvar itching   • PAF (paroxysmal atrial fibrillation) (HCC)   • Embolism and thrombosis of arteries of the lower extremities (HCC)   • Pleural effusion, bilateral   • Stage 3a chronic kidney disease (HCC)   • Moderate protein-calorie malnutrition (HCC)   • Depression, recurrent (HCC)   • HPV in female   • Black stool   • Diarrhea   • Heme positive stool   • JAYLAN II (vulvar intraepithelial neoplasia II)   • MARCELINO I (cervical intraepithelial neoplasia I)   • Iron deficiency anemia   • Soft tissue infection   • BRBPR (bright red blood per rectum)   • Foreign body alimentary tract   • Pancreatic abnormality      Past Medical and Surgical History:     Past Medical History:   Diagnosis Date   • Breast cancer ([address])     left - 1994. • Diabetes mellitus ([address])    • Diabetes mellitus, type 2 ([address]) 03/15/2006    last assessed 7/10/13   • GERD (gastroesophageal reflux disease)    • History of chemotherapy    • Hyperlipidemia    • Hypertension      Past Surgical History:   Procedure Laterality Date   • CARDIAC ELECTROPHYSIOLOGY PROCEDURE N/A 03/09/2022    Procedure: Cardiac icd implant/ DUAL CHAMBER ICD; Surgeon: Ava Luis MD;  [facility]: [facility];   Service: Cardiology   • [facility]     • MASTECTOMY Left     last assessed 12/16/14   • MASTECTOMY, RADICAL Left     1994   • NE COLPOSCOPY CERVIX BX CERVIX & ENDOCRV CURRETAGE N/A 9/22/2023    Procedure: CERVICAL BIOPSY;  Surgeon: Angela Mckeon MD;  Location: [facility];  Service: Gynecology Oncology   • NE PARATHYROIDECTOMY/EXPLORATION PARATHYROIDS N/A 04/21/2021    Procedure: PARATHYROIDECTOMY POSSIBLE 4 GLAND EXPLORATION;  Surgeon: Justina Ko MD;  Location: [facility];  Service: ENT   • NE VULVECTOMY SIMPLE PARTIAL N/A 9/22/2023    Procedure: [place] Breeding;  Surgeon: Angela Mckeon MD;  Location: [facility];  Service: Gynecology Oncology   • REDUCTION MAMMAPLASTY Right    • UVULECTOMY        Family History:     Family History   Problem Relation Age of Onset   • Hypothyroidism Mother    • Leukemia Mother    • Diabetes Father    • Diabetes type II Father    • Stroke Sister    • Diabetes Paternal Grandmother    • Cancer Sister    • Diabetes Brother       Social History:     Social History     Socioeconomic History   • Marital status: Single     Spouse name: None   • Number of children: None   • Years of education: None   • Highest education level: None   Occupational History   • None   Tobacco Use   • Smoking status: Every Day     Packs/day: 0.25     Years: 50.00     Total pack years: 12.50     Types: Cigarettes     Start date: 6/15/1973     Passive exposure: Current   • Smokeless tobacco: Never   • Tobacco comments:     2 cigarettes daily    Vaping Use   • Vaping Use: Never used   Substance and Sexual Activity   • Alcohol use: Never   • Drug use: Never   • Sexual activity: Not Currently     Partners: Male   Other Topics Concern   • None   Social History Narrative   • None     Social Determinants of Health     Financial Resource Strain: Low Risk  (10/6/2023)    Overall Financial Resource Strain (CARDIA)    • Difficulty of Paying Living Expenses: Not hard at all   Food Insecurity: No Food Insecurity (7/25/2023)    Hunger Vital Sign    • Worried About Running Out of Food in the Last Year: Never true    • Ran Out of Food in the Last Year: Never true   Transportation Needs: No Transportation Needs (10/6/2023)    PRAPARE - Transportation    • Lack of Transportation (Medical): No    • Lack of Transportation (Non-Medical):  No   Physical Activity: Not on file   Stress: Not on file   Social Connections: Not on file   Intimate Partner Violence: Not on file   Housing Stability: Low Risk  (7/25/2023)    Housing Stability Vital Sign    • Unable to Pay for Housing in the Last Year: No    • Number of Places Lived in the Last Year: 1    • Unstable Housing in the Last Year: No      Medications and Allergies:     Current Outpatient Medications   Medication Sig Dispense Refill   • apixaban (Eliquis) 5 mg Take 1 tablet (5 mg total) by mouth 2 (two) times a day 180 tablet 1   • ascorbic acid (VITAMIN C) 250 mg tablet Take 1 tablet (250 mg total) by mouth daily 30 tablet 0   • atorvastatin (LIPITOR) 40 mg tablet Take 1 tablet (40 mg total) by mouth daily 90 tablet 1   • Blood Glucose Monitoring Suppl (OneTouch Verio Reflect) w/Device KIT Check blood sugars three times daily. Please substitute with appropriate alternative as covered by patient's insurance. Dx: E11.65 1 kit 0   • ferrous sulfate 324 (65 Fe) mg Take 1 tablet (324 mg total) by mouth 3 (three) times a day before meals 90 tablet 5   • furosemide (LASIX) 20 mg tablet Take 1 tablet (20 mg total) by mouth daily 90 tablet 0   • glucose blood (OneTouch Verio) test strip Check blood sugars three times daily. Please substitute with appropriate alternative as covered by patient's insurance. Dx: E11.65 300 each 0   • metFORMIN (GLUCOPHAGE) 500 mg tablet Take 1 tablet (500 mg total) by mouth 2 (two) times a day with meals 180 tablet 1   • metoprolol succinate (TOPROL-XL) 25 mg 24 hr tablet Take 3 tablets (75 mg total) by mouth daily 180 tablet 2   • OneTouch Delica Lancets 90H MISC Check blood sugars three times daily. Please substitute with appropriate alternative as covered by patient's insurance. Dx: E11.65 300 each 1   • pantoprazole (PROTONIX) 40 mg tablet Take 1 tablet (40 mg total) by mouth daily 90 tablet 1   • polyethylene glycol (GOLYTELY) 4000 mL solution Take 4,000 mL by mouth once for 1 dose 4000 mL 0     No current facility-administered medications for this visit.      No Known Allergies   Immunizations:     Immunization History   Administered Date(s) Administered   • COVID-19 MODERNA VACC 0.25 ML IM BOOSTER 11/09/2021   • COVID-19 MODERNA VACC 0.5 ML IM 01/11/2021, 02/08/2021, 11/09/2021   • DTaP / HiB 03/15/2006   • H1N1, All Formulations 11/05/2009   • Influenza Quadrivalent Preservative Free 3 years and older IM 09/29/2014   • Influenza Quadrivalent, 6-35 Months IM 09/10/2015, 10/04/2016   • Influenza, high dose seasonal 0.7 mL 10/24/2018, 10/14/2021   • Influenza, injectable, quadrivalent, preservative free 0.5 mL 09/18/2020   • Influenza, seasonal, injectable 10/25/2006, 10/17/2007, 01/06/2012, 09/28/2012, 10/22/2013   • Pneumococcal Conjugate 13-Valent 10/24/2018   • Pneumococcal Polysaccharide PPV23 09/18/2020   • Tuberculin Skin Test-PPD Intradermal 06/23/2020, 07/07/2020, 11/01/2021      Health Maintenance:         Topic Date Due   • Hepatitis C Screening  Never done   • Breast Cancer Screening: Mammogram  11/12/2022   • Lung Cancer Screening  Discontinued   • Colorectal Cancer Screening  Discontinued         Topic Date Due   • COVID-19 Vaccine (5 - Moderna series) 01/04/2022   • Influenza Vaccine (1) 09/01/2023      Medicare Screening Tests and Risk Assessments:     William Rodríguez is here for her Subsequent Wellness visit. Health Risk Assessment:   Patient rates overall health as poor. Patient feels that their physical health rating is much worse. Patient is very dissatisfied with their life. Eyesight was rated as same. Hearing was rated as same. Patient feels that their emotional and mental health rating is slightly worse. Patients states they are never, rarely angry. Patient states they are often unusually tired/fatigued. Pain experienced in the last 7 days has been some. Patient's pain rating has been 5/10. Patient states that she has experienced weight loss or gain in last 6 months. Depression Screening:   PHQ-9 Score: 0      Fall Risk Screening:    In the past year, patient has experienced: history of falling in past year    Number of falls: 2 or more  Injured during fall?: Yes    Feels unsteady when standing or walking?: No    Worried about falling?: No      Urinary Incontinence Screening: Patient has leaked urine accidently in the last six months. Home Safety:  Patient has trouble with stairs inside or outside of their home. Patient has working smoke alarms and has no working carbon monoxide detector. Home safety hazards include: none. Nutrition:   Current diet is Regular. Medications:   Patient is currently taking over-the-counter supplements. OTC medications include: see medication list. Patient is able to manage medications. Activities of Daily Living (ADLs)/Instrumental Activities of Daily Living (IADLs):   Walk and transfer into and out of bed and chair?: Yes  Dress and groom yourself?: Yes    Bathe or shower yourself?: Yes    Feed yourself?  Yes  Do your laundry/housekeeping?: No  Manage your money, pay your bills and track your expenses?: Yes  Make your own meals?: Yes    Do your own shopping?: Yes    Previous Hospitalizations:   Any hospitalizations or ED visits within the last 12 months?: Yes    How many hospitalizations have you had in the last year?: 3-4    Advance Care Planning:   Living will: No    Durable POA for healthcare: No    Advanced directive: No    Advanced directive counseling given: Yes    Five wishes given: Yes    End of Life Decisions reviewed with patient: Yes      Cognitive Screening:   Provider or family/friend/caregiver concerned regarding cognition?: No    PREVENTIVE SCREENINGS      Cardiovascular Screening:    General: Screening Not Indicated and History Lipid Disorder      Diabetes Screening:     General: Screening Not Indicated and History Diabetes      Colorectal Cancer Screening:     General: Screening Current      Breast Cancer Screening:     General: History Breast Cancer and Risks and Benefits Discussed    Due for: Mammogram        Cervical Cancer Screening:    General: History Cervical Cancer      Osteoporosis Screening:    General: Risks and Benefits Discussed and History Osteoporosis    Due for: DXA Axial      Abdominal Aortic Aneurysm (AAA) Screening:        General: Screening Not Indicated      Lung Cancer Screening:     General: Screening Not Indicated    Screening, Brief Intervention, and Referral to Treatment (SBIRT)    Screening  Typical number of drinks in a day: 0  Typical number of drinks in a week: 0  Interpretation: Low risk drinking behavior. Single Item Drug Screening:  How often have you used an illegal drug (including marijuana) or a prescription medication for non-medical reasons in the past year? never    Single Item Drug Screen Score: 0  Interpretation: Negative screen for possible drug use disorder    No results found. Physical Exam:     /90   Pulse 72   Temp 98 °F (36.7 °C) (Tympanic)   Resp 16   Ht 5' 4" (1.626 m)   Wt 50.8 kg (112 lb)   LMP  (LMP Unknown)   SpO2 99%   BMI 19.22 kg/m²     Physical Exam  Constitutional:       Comments: Frail   HENT:      Head: Normocephalic and atraumatic. Right Ear: External ear normal.      Ears:      Comments: Dark colored lesion upper left ear     Nose: Nose normal.      Mouth/Throat:      Mouth: Mucous membranes are moist.   Eyes:      Extraocular Movements: Extraocular movements intact. Cardiovascular:      Rate and Rhythm: Normal rate. Pulmonary:      Effort: Pulmonary effort is normal.      Comments: Breath sounds decreased  Musculoskeletal:      Cervical back: Normal range of motion and neck supple. Comments: atrophy   Skin:     Findings: Lesion present. Comments: Tiny blackheaded cyst on back recommend warm compresses   Neurological:      General: No focal deficit present. Mental Status: She is alert and oriented to person, place, and time. Mental status is at baseline.    Psychiatric:         Mood and Affect: Mood normal.         Behavior: Behavior normal.          Tasha Gonzalez MD

## 2023-10-06 NOTE — TELEPHONE ENCOUNTER
[facility] called to request medications for pt be transferred to them. Pt is switching to [facility] and they need her Metformin prescription in order to supply pt with it.

## 2023-10-06 NOTE — PATIENT INSTRUCTIONS
Medicare Preventive Visit Patient Instructions  Thank you for completing your Welcome to Medicare Visit or Medicare Annual Wellness Visit today. Your next wellness visit will be due in one year (10/6/2024). The screening/preventive services that you may require over the next 5-10 years are detailed below. Some tests may not apply to you based off risk factors and/or age. Screening tests ordered at today's visit but not completed yet may show as past due. Also, please note that scanned in results may not display below. Preventive Screenings:  Service Recommendations Previous Testing/Comments   Colorectal Cancer Screening  * Colonoscopy    * Fecal Occult Blood Test (FOBT)/Fecal Immunochemical Test (FIT)  * Fecal DNA/Cologuard Test  * Flexible Sigmoidoscopy Age: 43-73 years old   Colonoscopy: every 10 years (may be performed more frequently if at higher risk)  OR  FOBT/FIT: every 1 year  OR  Cologuard: every 3 years  OR  Sigmoidoscopy: every 5 years  Screening may be recommended earlier than age 39 if at higher risk for colorectal cancer. Also, an individualized decision between you and your healthcare provider will decide whether screening between the ages of 77-80 would be appropriate. Colonoscopy: 07/24/2023  FOBT/FIT: Not on file  Cologuard: Not on file  Sigmoidoscopy: Not on file    Screening Current     Breast Cancer Screening Age: 36 years old  Frequency: every 1-2 years  Not required if history of left and right mastectomy Mammogram: 11/12/2021    History Breast Cancer   Cervical Cancer Screening Between the ages of 21-29, pap smear recommended once every 3 years. Between the ages of 32-69, can perform pap smear with HPV co-testing every 5 years.    Recommendations may differ for women with a history of total hysterectomy, cervical cancer, or abnormal pap smears in past. Pap Smear: 05/23/2023    History Cervical Cancer   Hepatitis C Screening Once for adults born between 1945 and 1965  More frequently in patients at high risk for Hepatitis C Hep C Antibody: Not on file        Diabetes Screening 1-2 times per year if you're at risk for diabetes or have pre-diabetes Fasting glucose: 136 mg/dL (8/1/2023)  A1C: 8.4 % (6/21/2023)  Screening Not Indicated  History Diabetes   Cholesterol Screening Once every 5 years if you don't have a lipid disorder. May order more often based on risk factors. Lipid panel: 01/18/2023    Screening Not Indicated  History Lipid Disorder     Other Preventive Screenings Covered by Medicare:  1. Abdominal Aortic Aneurysm (AAA) Screening: covered once if your at risk. You're considered to be at risk if you have a family history of AAA. 2. Lung Cancer Screening: covers low dose CT scan once per year if you meet all of the following conditions: (1) Age 48-67; (2) No signs or symptoms of lung cancer; (3) Current smoker or have quit smoking within the last 15 years; (4) You have a tobacco smoking history of at least 20 pack years (packs per day multiplied by number of years you smoked); (5) You get a written order from a healthcare provider. 3. Glaucoma Screening: covered annually if you're considered high risk: (1) You have diabetes OR (2) Family history of glaucoma OR (3)  aged 48 and older OR (3)  American aged 72 and older  3. Osteoporosis Screening: covered every 2 years if you meet one of the following conditions: (1) You're estrogen deficient and at risk for osteoporosis based off medical history and other findings; (2) Have a vertebral abnormality; (3) On glucocorticoid therapy for more than 3 months; (4) Have primary hyperparathyroidism; (5) On osteoporosis medications and need to assess response to drug therapy. · Last bone density test (DXA Scan): 03/17/2021.  5. HIV Screening: covered annually if you're between the age of 15-65. Also covered annually if you are younger than 13 and older than 72 with risk factors for HIV infection.  For pregnant patients, it is covered up to 3 times per pregnancy. Immunizations:  Immunization Recommendations   Influenza Vaccine Annual influenza vaccination during flu season is recommended for all persons aged >= 6 months who do not have contraindications   Pneumococcal Vaccine   * Pneumococcal conjugate vaccine = PCV13 (Prevnar 13), PCV15 (Vaxneuvance), PCV20 (Prevnar 20)  * Pneumococcal polysaccharide vaccine = PPSV23 (Pneumovax) Adults 20-63 years old: 1-3 doses may be recommended based on certain risk factors  Adults 72 years old: 1-2 doses may be recommended based off what pneumonia vaccine you previously received   Hepatitis B Vaccine 3 dose series if at intermediate or high risk (ex: diabetes, end stage renal disease, liver disease)   Tetanus (Td) Vaccine - COST NOT COVERED BY MEDICARE PART B Following completion of primary series, a booster dose should be given every 10 years to maintain immunity against tetanus. Td may also be given as tetanus wound prophylaxis. Tdap Vaccine - COST NOT COVERED BY MEDICARE PART B Recommended at least once for all adults. For pregnant patients, recommended with each pregnancy. Shingles Vaccine (Shingrix) - COST NOT COVERED BY MEDICARE PART B  2 shot series recommended in those aged 48 and above     Health Maintenance Due:      Topic Date Due   • Hepatitis C Screening  Never done   • Breast Cancer Screening: Mammogram  11/12/2022   • Lung Cancer Screening  Discontinued   • Colorectal Cancer Screening  Discontinued     Immunizations Due:      Topic Date Due   • COVID-19 Vaccine (5 - Moderna series) 01/04/2022   • Influenza Vaccine (1) 09/01/2023     Advance Directives   What are advance directives? Advance directives are legal documents that state your wishes and plans for medical care. These plans are made ahead of time in case you lose your ability to make decisions for yourself.  Advance directives can apply to any medical decision, such as the treatments you want, and if you want to donate organs. What are the types of advance directives? There are many types of advance directives, and each state has rules about how to use them. You may choose a combination of any of the following:  · Living will: This is a written record of the treatment you want. You can also choose which treatments you do not want, which to limit, and which to stop at a certain time. This includes surgery, medicine, IV fluid, and tube feedings. · Durable power of  for healthcare [facility]): This is a written record that states who you want to make healthcare choices for you when you are unable to make them for yourself. This person, called a proxy, is usually a family member or a friend. You may choose more than 1 proxy. · Do not resuscitate (DNR) order:  A DNR order is used in case your heart stops beating or you stop breathing. It is a request not to have certain forms of treatment, such as CPR. A DNR order may be included in other types of advance directives. · Medical directive: This covers the care that you want if you are in a coma, near death, or unable to make decisions for yourself. You can list the treatments you want for each condition. Treatment may include pain medicine, surgery, blood transfusions, dialysis, IV or tube feedings, and a ventilator (breathing machine). · Values history: This document has questions about your views, beliefs, and how you feel and think about life. This information can help others choose the care that you would choose. Why are advance directives important? An advance directive helps you control your care. Although spoken wishes may be used, it is better to have your wishes written down. Spoken wishes can be misunderstood, or not followed. Treatments may be given even if you do not want them. An advance directive may make it easier for your family to make difficult choices about your care.    Fall Prevention    Fall prevention  includes ways to make your home and other areas safer. It also includes ways you can move more carefully to prevent a fall. Health conditions that cause changes in your blood pressure, vision, or muscle strength and coordination may increase your risk for falls. Medicines may also increase your risk for falls if they make you dizzy, weak, or sleepy. Fall prevention tips:   · Stand or sit up slowly. · Use assistive devices as directed. · Wear shoes that fit well and have soles that . · Wear a personal alarm. · Stay active. · Manage your medical conditions. Home Safety Tips:  · Add items to prevent falls in the bathroom. · Keep paths clear. · Install bright lights in your home. · Keep items you use often on shelves within reach. · Paint or place reflective tape on the edges of your stairs. Urinary Incontinence   Urinary incontinence (UI)  is when you lose control of your bladder. UI develops because your bladder cannot store or empty urine properly. The 3 most common types of UI are stress incontinence, urge incontinence, or both. Medicines:   · May be given to help strengthen your bladder control. Report any side effects of medication to your healthcare provider. Do pelvic muscle exercises often:  Your pelvic muscles help you stop urinating. Squeeze these muscles tight for 5 seconds, then relax for 5 seconds. Gradually work up to squeezing for 10 seconds. Do 3 sets of 15 repetitions a day, or as directed. This will help strengthen your pelvic muscles and improve bladder control. Train your bladder:  Go to the bathroom at set times, such as every 2 hours, even if you do not feel the urge to go. You can also try to hold your urine when you feel the urge to go. For example, hold your urine for 5 minutes when you feel the urge to go. As that becomes easier, hold your urine for 10 minutes. Self-care:   · Keep a UI record. Write down how often you leak urine and how much you leak.  Make a note of what you were doing when you leaked urine. · Drink liquids as directed. You may need to limit the amount of liquid you drink to help control your urine leakage. Do not drink any liquid right before you go to bed. Limit or do not have drinks that contain caffeine or alcohol. · Prevent constipation. Eat a variety of high-fiber foods. Good examples are high-fiber cereals, beans, vegetables, and whole-grain breads. Walking is the best way to trigger your intestines to have a bowel movement. · Exercise regularly and maintain a healthy weight. Weight loss and exercise will decrease pressure on your bladder and help you control your leakage. · Use a catheter as directed  to help empty your bladder. A catheter is a tiny, plastic tube that is put into your bladder to drain your urine. · Go to behavior therapy as directed. Behavior therapy may be used to help you learn to control your urge to urinate. Cigarette Smoking and Your Health   Risks to your health if you smoke:  Nicotine and other chemicals found in tobacco damage every cell in your body. Even if you are a light smoker, you have an increased risk for cancer, heart disease, and lung disease. If you are pregnant or have diabetes, smoking increases your risk for complications. Benefits to your health if you stop smoking:   · You decrease respiratory symptoms such as coughing, wheezing, and shortness of breath. · You reduce your risk for cancers of the lung, mouth, throat, kidney, bladder, pancreas, stomach, and cervix. If you already have cancer, you increase the benefits of chemotherapy. You also reduce your risk for cancer returning or a second cancer from developing. · You reduce your risk for heart disease, blood clots, heart attack, and stroke. · You reduce your risk for lung infections, and diseases such as pneumonia, asthma, chronic bronchitis, and emphysema. · Your circulation improves. More oxygen can be delivered to your body.  If you have diabetes, you lower your risk for complications, such as kidney, artery, and eye diseases. You also lower your risk for nerve damage. Nerve damage can lead to amputations, poor vision, and blindness. · You improve your body's ability to heal and to fight infections. For more information and support to stop smoking:   · Smoke[employer]. gov  Phone: 9- 017 - 212-9077  Web Address: www.[employer]. [facility] [address] [place] [address] Information is for End User's use only and may not be sold, redistributed or otherwise used for commercial purposes.  All illustrations and images included in CareNotes® are the copyrighted property of [employer] Inc[employer] or  [employer]

## 2023-10-06 NOTE — TELEPHONE ENCOUNTER
Call from [facility] with [facility] requesting medications be transferred to them. After speaking with clinical staff we determined that all meds have already been sent to them. I verified medications and patient as they have medications we do not have on her medication list or have dispensed to patient. She will make notes of it on her chart. No further follow up needed at this time.

## 2023-10-09 DIAGNOSIS — E11.22 TYPE 2 DIABETES MELLITUS WITH STAGE 2 CHRONIC KIDNEY DISEASE, WITHOUT LONG-TERM CURRENT USE OF INSULIN: ICD-10-CM

## 2023-10-09 DIAGNOSIS — N18.2 TYPE 2 DIABETES MELLITUS WITH STAGE 2 CHRONIC KIDNEY DISEASE, WITHOUT LONG-TERM CURRENT USE OF INSULIN: ICD-10-CM

## 2023-10-09 RX ORDER — LANCETS
EACH MISCELLANEOUS
Qty: 300 EACH | Refills: 1 | Status: SHIPPED | OUTPATIENT
Start: 2023-10-09

## 2023-10-16 ENCOUNTER — NURSE TRIAGE (OUTPATIENT)
Age: 68
End: 2023-10-16

## 2023-11-02 ENCOUNTER — TRANSCRIBE ORDERS (OUTPATIENT)
Dept: PAIN MEDICINE | Facility: CLINIC | Age: 68
End: 2023-11-02

## 2023-11-02 ENCOUNTER — CONSULT (OUTPATIENT)
Dept: PAIN MEDICINE | Facility: CLINIC | Age: 68
End: 2023-11-02
Payer: COMMERCIAL

## 2023-11-02 VITALS
DIASTOLIC BLOOD PRESSURE: 90 MMHG | WEIGHT: 112 LBS | HEART RATE: 76 BPM | SYSTOLIC BLOOD PRESSURE: 155 MMHG | BODY MASS INDEX: 19.22 KG/M2

## 2023-11-02 DIAGNOSIS — M54.16 RADICULOPATHY, LUMBAR REGION: Primary | ICD-10-CM

## 2023-11-02 DIAGNOSIS — M51.36 DDD (DEGENERATIVE DISC DISEASE), LUMBAR: ICD-10-CM

## 2023-11-02 DIAGNOSIS — M43.16 SPONDYLOLISTHESIS AT L4-L5 LEVEL: ICD-10-CM

## 2023-11-02 PROCEDURE — 99204 OFFICE O/P NEW MOD 45 MIN: CPT | Performed by: PHYSICAL MEDICINE & REHABILITATION

## 2023-11-02 NOTE — PROGRESS NOTES
Assessment  1. Radiculopathy, lumbar region    2. DDD (degenerative disc disease), lumbar    3. Spondylolisthesis at L4-L5 level        Plan  Ms. Deana Sosa is a pleasant 68-year-old female who presents to [facility]. [facility] for initial evaluation regarding worsening low back pain with radiating symptoms into the bilateral lower extremities. During today's evaluation she is demonstrating clinical evidence of lumbar radiculopathy with a patient reported traumatic fall that is made her symptoms progressively worse. At this time we only have x-ray of the lumbar spine demonstrating multilevel degenerative changes and anterolisthesis notable at L4-L5. Given the traumatic fall, worsening pain we will request an MRI lumbar spine to better identify disc and spine pathology contributing to symptoms. We will also place the patient in formal physical therapy program x6 weeks or longer if needed. All questions answered, patient is agreeable with plan. If diagnostic imaging matches clinical presentation would consider epidural steroid injection but for now we will wait MRI. My impressions and treatment recommendations were discussed in detail with the patient who verbalized understanding and had no further questions. Discharge instructions were provided. I personally saw and examined the patient and I agree with the above discussed plan of care. Orders Placed This Encounter   Procedures    MRI lumbar spine without contrast     Standing Status:   Future     Standing Expiration Date:   11/2/2027     Scheduling Instructions: There is no preparation for this test. Please leave your jewelry and valuables at home, wedding rings are the exception. All patients will be required to change into a hospital gown and pants. Street clothes are not permitted in the MRI.   Magnetic nail polish must be removed prior to arrival for your test. Please bring your insurance cards, a form of photo ID and a list of your medications with you. Arrive 15 minutes prior to your appointment time in order to register. Please bring any prior CT or MRI studies of this area that were not performed at a [facility] facility. To schedule this appointment, please contact Central Scheduling at [phone]. Prior to your appointment, please make sure you complete the MRI Screening Form when you e-Check in for your appointment. This will be available starting 7 days before your appointment in [address] [place] [address]. You may receive an e-mail with an activation code if you do not have a [employer] account. If you do not have access to a device, we will complete your screening at your appointment. Order Specific Question:   What is the patient's sedation requirement? If Medication for Claustrophobia is selected, order medication at this point. Answer:   No Sedation     Order Specific Question:   Does this procedure require the 3T MRI at [facility] or [place]?     Answer:   No     Order Specific Question:   Release to patient through [employer]     Answer:   Immediate     Order Specific Question:   Is order priority selected as STAT? Answer:   No     Order Specific Question:   Reason for Exam (FREE TEXT)     Answer:   lumbar radiculopathy    Ambulatory referral to Physical Therapy     Standing Status:   Future     Standing Expiration Date:   11/2/2024     Referral Priority:   Routine     Referral Type:   Physical Therapy     Referral Reason:   Specialty Services Required     Requested Specialty:   Physical Therapy     Number of Visits Requested:   1     Expiration Date:   11/2/2024     No orders of the defined types were placed in this encounter. History of Present Illness    Paris Licea is a 77 y.o. female presents to [facility] for initial evaluation regarding isolated low back pain from a nonwork related injury June 2023. Today reports moderate to severe pain rated 7 out of 10 and interfere with activities. Pain is nearly constant 60 to 95% of the time that is worse in the morning in the evening. Describes symptoms of numbness, throbbing, dull aching pain. Also reports lower extremity weakness but denies falls. Requires a cane and walker for ambulation. Symptoms are worse with exercise. Reports moderate relief with heat. Denies marijuana use or alcohol use. Currently using Tylenol and aspirin with no relief. Presents today for initial evaluation. I have personally reviewed and/or updated the patient's past medical history, past surgical history, family history, social history, current medications, allergies, and vital signs today. Review of Systems   Constitutional:  Negative for fever and unexpected weight change. HENT:  Negative for trouble swallowing. Eyes:  Negative for visual disturbance. Respiratory:  Negative for shortness of breath and wheezing. Cardiovascular:  Negative for chest pain and palpitations. Gastrointestinal:  Negative for constipation, diarrhea, nausea and vomiting. Endocrine: Negative for cold intolerance, heat intolerance and polydipsia. Genitourinary:  Negative for difficulty urinating and frequency. Musculoskeletal:  Positive for back pain, gait problem and joint swelling. Negative for arthralgias and myalgias. Skin:  Positive for rash. Neurological:  Negative for dizziness, seizures, syncope, weakness and headaches. Hematological:  Does not bruise/bleed easily. Psychiatric/Behavioral:  Negative for dysphoric mood. All other systems reviewed and are negative.       Patient Active Problem List   Diagnosis    History of left breast cancer    Hypertensive heart and kidney disease with heart failure and with chronic kidney disease stage III (HCC)    Colon, diverticulosis    Hyperlipidemia LDL goal <100    Screening for cardiovascular, respiratory, and genitourinary diseases    Immunization due    Colon cancer screening    Menopause    Microalbuminuria Medicare annual wellness visit, subsequent    CKD stage 3 due to type 2 diabetes mellitus ([address])    Hyperparathyroidism ([address])    Coronary artery disease involving native coronary artery of native heart without angina pectoris    Chronic HFrEF (heart failure with reduced ejection fraction) (HCC)    Vitamin D deficiency    S/P left mastectomy    Breast cancer ([address]) - left 1994    Hypertension    Bilateral leg edema    Breast cancer screening by mammogram    NICM (nonischemic cardiomyopathy) ([address])    Peripheral arterial disease (HCC)    Trigger finger of right thumb    Venous insufficiency of both lower extremities    SVT (supraventricular tachycardia)    Tobacco use    Vulvar itching    PAF (paroxysmal atrial fibrillation) (HCC)    Embolism and thrombosis of arteries of the lower extremities (HCC)    Pleural effusion, bilateral    Stage 3a chronic kidney disease (HCC)    Moderate protein-calorie malnutrition (HCC)    Depression, recurrent (HCC)    HPV in female    Black stool    Diarrhea    Heme positive stool    JAYLAN II (vulvar intraepithelial neoplasia II)    MARCELINO I (cervical intraepithelial neoplasia I)    Iron deficiency anemia    Soft tissue infection    BRBPR (bright red blood per rectum)    Foreign body alimentary tract    Pancreatic abnormality       Past Medical History:   Diagnosis Date    Breast cancer ([address])     left - 1994. Diabetes mellitus ([address])     Diabetes mellitus, type 2 ([address]) 03/15/2006    last assessed 7/10/13    GERD (gastroesophageal reflux disease)     History of chemotherapy     Hyperlipidemia     Hypertension        Past Surgical History:   Procedure Laterality Date    CARDIAC ELECTROPHYSIOLOGY PROCEDURE N/A 03/09/2022    Procedure: Cardiac icd implant/ DUAL CHAMBER ICD; Surgeon: Medina Denson MD;  Location: [facility];   Service: Cardiology    INSERT / Too Severino / REMOVE PACEMAKER      MASTECTOMY Left     last assessed 12/16/14    MASTECTOMY, RADICAL Left     1994    KS COLPOSCOPY CERVIX BX CERVIX & ENDOCRV CURRETAGE N/A 9/22/2023    Procedure: CERVICAL BIOPSY;  Surgeon: Jose Giles MD;  Location: [facility];  Service: Gynecology Oncology    MO PARATHYROIDECTOMY/EXPLORATION PARATHYROIDS N/A 04/21/2021    Procedure: PARATHYROIDECTOMY POSSIBLE 4 GLAND EXPLORATION;  Surgeon: Willie Bowen MD;  Location[facility];  Service: ENT    MO VULVECTOMY SIMPLE PARTIAL N/A 9/22/2023    Procedure: Vinny Donist;  Surgeon: Jose Giles MD;  Location: [facility];  Service: Gynecology Oncology    REDUCTION MAMMAPLASTY Right     UVULECTOMY         Family History   Problem Relation Age of Onset    Hypothyroidism Mother     Leukemia Mother     Diabetes Father     Diabetes type II Father     Stroke Sister     Diabetes Paternal Grandmother     Cancer Sister     Diabetes Brother        Social History     Occupational History    Not on file   Tobacco Use    Smoking status: Every Day     Packs/day: 0.25     Years: 50.00     Total pack years: 12.50     Types: Cigarettes     Start date: 6/15/1973     Passive exposure: Current    Smokeless tobacco: Never    Tobacco comments:     2 cigarettes daily    Vaping Use    Vaping Use: Never used   Substance and Sexual Activity    Alcohol use: Never    Drug use: Never    Sexual activity: Not Currently     Partners: Male       Current Outpatient Medications on File Prior to Visit   Medication Sig    Accu-Chek Softclix Lancets lancets CHECK BLOOD SUGARS THREE TIMES DAILY    apixaban (Eliquis) 5 mg Take 1 tablet (5 mg total) by mouth 2 (two) times a day    ascorbic acid (VITAMIN C) 250 mg tablet Take 1 tablet (250 mg total) by mouth daily    atorvastatin (LIPITOR) 40 mg tablet Take 1 tablet (40 mg total) by mouth daily    Blood Glucose Monitoring Suppl (OneTouch Verio Reflect) w/Device KIT Check blood sugars three times daily. Please substitute with appropriate alternative as covered by patient's insurance.  Dx: E11.65    ferrous sulfate 324 (65 Fe) mg Take 1 tablet (324 mg total) by mouth 3 (three) times a day before meals    furosemide (LASIX) 20 mg tablet Take 1 tablet (20 mg total) by mouth daily    glucose blood (OneTouch Verio) test strip Check blood sugars three times daily. Please substitute with appropriate alternative as covered by patient's insurance. Dx: E11.65    metFORMIN (GLUCOPHAGE) 500 mg tablet Take 1 tablet (500 mg total) by mouth 2 (two) times a day with meals    metoprolol succinate (TOPROL-XL) 25 mg 24 hr tablet Take 3 tablets (75 mg total) by mouth daily    pantoprazole (PROTONIX) 40 mg tablet Take 1 tablet (40 mg total) by mouth daily    polyethylene glycol (GOLYTELY) 4000 mL solution Take 4,000 mL by mouth once for 1 dose     No current facility-administered medications on file prior to visit. No Known Allergies    Physical Exam    /90   Pulse 76   Wt 50.8 kg (112 lb)   LMP  (LMP Unknown)   BMI 19.22 kg/m²     Constitutional: normal, well developed, well nourished, alert, in no distress and non-toxic and no overt pain behavior. Eyes: anicteric  HEENT: grossly intact  Neck: supple, symmetric, trachea midline and no masses   Pulmonary:even and unlabored  Cardiovascular:No edema or pitting edema present  Skin:Normal without rashes or lesions and well hydrated  Psychiatric:Mood and affect appropriate  Neurologic:Cranial Nerves II-XII grossly intact  Musculoskeletal:antalgic and ambulates with cane, tenderness to palpation bilateral lumbar paraspinals, decreased active and passive range of motion with lumbar flexion and extension limited by pain, MMT 5 out of 5 bilateral lower extremities, sensation decreased to light touch in patchy distribution bilateral extremities, positive straight leg raise in the seated position radicular pain into the left leg    Imaging    LUMBAR SPINE     INDICATION:   M54.50: Low back pain, unspecified.      COMPARISON:  None     VIEWS:  XR SPINE LUMBAR MINIMUM 4 VIEWS NON INJURY        FINDINGS:     Transitional lumbosacral vertebra is present. There are 4 non rib bearing lumbar vertebrae with sacralization of the L5 vertebra bilaterally. There is no evidence of acute fracture or destructive osseous lesion. Alignment is unremarkable. Slightly reduced excursion on flexion and extension without evidence for instability. Age-appropriate lumbar degenerative changes are seen. The pedicles appear intact. Diffuse vascular calcification is present. 8 mm irregular metallic foreign body projected over the right pelvis internal obturator region in uncertain location possibly within bowel loop. Other metallic objects on the images related to the patient   sweatpants. IMPRESSION:     No acute osseous abnormality. Degenerative changes as described.

## 2023-11-06 ENCOUNTER — EVALUATION (OUTPATIENT)
Dept: PHYSICAL THERAPY | Facility: CLINIC | Age: 68
End: 2023-11-06
Payer: COMMERCIAL

## 2023-11-06 DIAGNOSIS — M54.16 RADICULOPATHY, LUMBAR REGION: ICD-10-CM

## 2023-11-06 DIAGNOSIS — R26.9 GAIT ABNORMALITY: ICD-10-CM

## 2023-11-06 DIAGNOSIS — M51.36 DDD (DEGENERATIVE DISC DISEASE), LUMBAR: Primary | ICD-10-CM

## 2023-11-06 DIAGNOSIS — M43.16 SPONDYLOLISTHESIS AT L4-L5 LEVEL: ICD-10-CM

## 2023-11-06 PROCEDURE — 97112 NEUROMUSCULAR REEDUCATION: CPT | Performed by: PHYSICAL THERAPIST

## 2023-11-06 PROCEDURE — 97110 THERAPEUTIC EXERCISES: CPT | Performed by: PHYSICAL THERAPIST

## 2023-11-06 PROCEDURE — 97161 PT EVAL LOW COMPLEX 20 MIN: CPT | Performed by: PHYSICAL THERAPIST

## 2023-11-06 NOTE — PROGRESS NOTES
PT Evaluation     Today's date: 2023  Patient name: Virgil Mayen  : 1955  MRN: [id number]  Referring provider: Sharif Artis DO  Dx:   Encounter Diagnosis     ICD-10-CM    1. DDD (degenerative disc disease), lumbar  M51.36 Ambulatory referral to Physical Therapy      2. Radiculopathy, lumbar region  M54.16 Ambulatory referral to Physical Therapy      3. Spondylolisthesis at L4-L5 level  M43.16 Ambulatory referral to Physical Therapy      4. Gait abnormality  R26.9                      Assessment  Assessment details: Pt presents with signs and symptoms synonymous of admitting diagnosis as well as mechanical diagnosis of cervical derangement, possible TS involvement too with DP of CS Retraction. Pt presents with pain, decreased strength, decreased range, flexibility, as well as tolerance to activity and postural awareness. She is a fall risk per objective and subjective involvement and would benefit involved intervention for this as well. Pt would benefit skilled PT intervention in order to address these impairments in order to be able to perform all desired activities with minimal to nil symptom exacerbation. Thank you very much for this referral.     Impairments: abnormal or restricted ROM, activity intolerance, impaired balance, impaired physical strength, lacks appropriate home exercise program, pain with function, poor posture  and poor body mechanics  Understanding of Dx/Px/POC: good   Prognosis: good    Goals  STG 4 Weeks:  Decrease pain at worst to 5  Improve range to improve CS/TS range to Mod  Improve strength to improve UE/LE strength to 4-  Independent with HEP  LTG 8 Weeks:  Decrease pain at worst to 2  Improve range to CS/TS min  Improve strength to 4/5 or greater.    Able to perform all desired activities with minimal to nil symptom exacerbation      Plan  Patient would benefit from: skilled physical therapy  Planned modality interventions: cryotherapy and thermotherapy: hydrocollator packs  Planned therapy interventions: abdominal trunk stabilization, activity modification, joint mobilization, kinesiology taping, manual therapy, neuromuscular re-education, patient education, postural training, strengthening, stretching, therapeutic activities, therapeutic exercise, therapeutic training, transfer training, IADL retraining, home exercise program, graded exercise, graded motor, graded activity, functional ROM exercises, flexibility, behavior modification and body mechanics training  Frequency: 2x week  Duration in weeks: 10  Treatment plan discussed with: patient        Subjective Evaluation    History of Present Illness  Date of onset: 11/6/2023  Mechanism of injury: Pt is a 77 yofemale who presents today stating that she has had a challenging year with multiple falls, and a few hospital admissions due to multiple comorbid factors. Pt currently that she states that she was having imagery performed of that demonstrated no fractures. Pt reports that she also had slow onset of L LE weakness, cramping, and this has buckled a few times over the last 9 months sometimes resulting in the falls. She believes some of her back pain is associated with at least 1 of the 4 falls she encountered this year. Pt reports that she was quite mobile and healthy 9-12 months ago, no using SPC, has lost 15-30 pounds since this all began. Pt reports that she has now been stable within in her home since July. Pt reports today from Dr. Maria Guadalupe Montano of pain management secondary to low back pain that pt reports began approximately 3 weeks ago. Pt reports that there are periods of time without pain, but at worst it can 8/10, this can occur with turning in bed, and sometimes lifting or being mobile. Pt reports that these symptoms are burning, sensitive, itchy, painful with aches sometimes sharp. She reports it starts below shoulder blades and stayes above glutes, but can wrap around into stomach and ribs.   Pt reports that there is no leg pain, and is noticing her L leg strength is coming along now that she is on vitamins and medication and eating more. Pt reports that there is no change in bowel or bladder. Pt reports that there occasional neck shoulder pain, no head aches or arm pain, no numbness or tingling in the arms or legs. Pt reports there is a topical cream that she takes and this helps temporarily. Pt reports that sleeping is currently well, if she wakes up its to readjust or use restroom. Pt reports that morning could be the most problematic presentation. Pt reports goals are to reduce pain, improve mobility, activity tolerance, strength, and get back to how she was prior to this mid back pain presentation.     Quality of life: good    Patient Goals  Patient goals for therapy: increased strength, return to sport/leisure activities, increased motion, improved balance and decreased pain    Pain  Current pain ratin  At best pain ratin  At worst pain ratin  Quality: dull ache, needle-like, sharp and tight  Relieving factors: medications and change in position  Aggravating factors: standing, walking and lifting  Progression: improved      Diagnostic Tests  X-ray: normal        Objective     Active Range of Motion   Cervical/Thoracic Spine       Cervical  Subcranial protraction:   Restriction level: minimal  Subcranial retraction:   Restriction level: moderate  Flexion:  WFL  Extension:  Restriction level: moderate  Left lateral flexion:  with pain Restriction level: minimal  Right lateral flexion:  Restriction level minimal  Left rotation:  with pain Restriction level: minimal  Right rotation:  Restriction level: minimal    Thoracic    Flexion:  WFL  Extension:  Restriction level: maximal  Left lateral flexion:  Restriction level: maximal  Right lateral flexion:  Restriction level: moderate  Left rotation:  Restriction level: maximal  Right rotation:  Restriction level: moderate    Additional Active Range of Motion Details  Forward head, rounded shoulders  B/L Sensation intact to light touch C3,4,5,6,7,8,T1,T2  DTR Bi Tri Brac  1+ b/l Cisse (-) B/L  Postural correction L shoulder to low back > R. Better. LS Screen:  Flex nil Ext max SB R min SB L min  LE Screen Hip Flex 4+ R, 3+ L, all else 5/5. Knee strength b/l 5/5. Ankle/Gtoe b/l 5/5. UE Screen, WFL ROM, 3+/5 B/L Flex, Abd, ER. All else 5/5. Repeated motion   Flex  Retraction L > R shoulder to low back. D B. Improved UE Strength and CS/TS range. (Review of TS repeated movements)  Ext  SB R  SB L  Joint Mobility  Palpation  STs    TUG: with SPC 17.42"    Short steps, decreased trunk sway and clearance. Precautions: Falls, HTN, Hx of DVT, Hx of R BC in 1990's with mastectomy, Hyperparathyrdoism, DM2, Pacer. Manuals 11/6                                                                Neuro Re-Ed             Postural Ed and Progression 10 min            Bank Account analogy performed            Eventual Hip Abd + Ext             Eventual sit<>stand             Side Steps             NBOS EC              NBOS EO             Ther Ex             CS Retraction D B 3 x 5            Progression? TS Ext D B 2x5            Progression? Ther Activity             UE Strength (Flex/Abd/ER) B            CS/TS range B            Other concerns? Monitor L LE hip flexion*             Modalities             CP/HP Prn. MDT Key:  ANR: Adherent Nerve root  P: Produce  B: Better  NB: No Better  W: Worse  NW: No worse  NE: No Effect.   D: Decrease  I: Increase  A: Abolish  R/Rep: Repeated  EIS: Ext in Standing  EIL: Ext in Lying  FIS: Flex in standing  FISit: Flex in sitting  SGIS: Side Glide in Standing  SGIP: Side Glide in Prone   Pro: Protrusion  Ret: Retraction  SB: Side Bend  Rot: Rotation  Ext: Extension PE'd: peripheralized  Pe'g: Peripheralizing  CE'd: centralized  Ce'g: Centralizing

## 2023-11-08 ENCOUNTER — OFFICE VISIT (OUTPATIENT)
Dept: PHYSICAL THERAPY | Facility: CLINIC | Age: 68
End: 2023-11-08
Payer: COMMERCIAL

## 2023-11-08 DIAGNOSIS — M51.36 DDD (DEGENERATIVE DISC DISEASE), LUMBAR: Primary | ICD-10-CM

## 2023-11-08 DIAGNOSIS — R26.9 GAIT ABNORMALITY: ICD-10-CM

## 2023-11-08 DIAGNOSIS — M43.16 SPONDYLOLISTHESIS AT L4-L5 LEVEL: ICD-10-CM

## 2023-11-08 DIAGNOSIS — M54.16 RADICULOPATHY, LUMBAR REGION: ICD-10-CM

## 2023-11-08 PROCEDURE — 97112 NEUROMUSCULAR REEDUCATION: CPT | Performed by: PHYSICAL THERAPIST

## 2023-11-08 PROCEDURE — 97110 THERAPEUTIC EXERCISES: CPT | Performed by: PHYSICAL THERAPIST

## 2023-11-08 NOTE — PROGRESS NOTES
Daily Note     Today's date: 2023  Patient name: Paris Licea  : 1955  MRN: [id number]  Referring provider: Re Mckay DO  Dx:   Encounter Diagnosis     ICD-10-CM    1. DDD (degenerative disc disease), lumbar  M51.36       2. Radiculopathy, lumbar region  M54.16       3. Spondylolisthesis at L4-L5 level  M43.16       4. Gait abnormality  R26.9                      Subjective: Pt presents today stating she has been performing HEP 2x's a day at minimum, she is feeling better though. Objective: See treatment diary below      Assessment:  Proceeded with outlined activities, mild exacerbation of symptoms with side steps but reduced with TS Ext based intervention. Encouraged the more she does HEP the more reduction in her symptoms. Pt in accord. Mild fatigue with other balance based intervention. Continue to progress as able. Precautions: Falls, HTN, Hx of DVT, Hx of R BC in  with mastectomy, Hyperparathyrdoism, DM2, Pacer. Manuals                                                                Neuro Re-Ed             Postural Ed and Progression 10 min 10 min           AP PA Walking performed 4 laps           Eventual Hip Abd + Ext  2 x 10            Eventual sit<>stand  Nv? Side Steps  4 laps           NBOS EC   1 min           NBOS EO  1 min           Ther Ex             CS Retraction D B 3 x 5 3 x 5           Progression? TS Ext D B 2x5 D B 2 x 5           Progression? HR/TR  2 x 10           Bike endurance conclusion? Nv? Ther Activity             UE Strength (Flex/Abd/ER) B B           CS/TS range B B           Other concerns? Monitor L LE hip flexion*  4/5 R, 4-/5 L. Modalities             CP/HP Prn.

## 2023-11-10 ENCOUNTER — REMOTE DEVICE CLINIC VISIT (OUTPATIENT)
Dept: CARDIOLOGY CLINIC | Facility: CLINIC | Age: 68
End: 2023-11-10
Payer: COMMERCIAL

## 2023-11-10 DIAGNOSIS — Z95.810 PRESENCE OF AUTOMATIC CARDIOVERTER/DEFIBRILLATOR (AICD): Primary | ICD-10-CM

## 2023-11-10 PROCEDURE — 93296 REM INTERROG EVL PM/IDS: CPT | Performed by: INTERNAL MEDICINE

## 2023-11-10 PROCEDURE — 93295 DEV INTERROG REMOTE 1/2/MLT: CPT | Performed by: INTERNAL MEDICINE

## 2023-11-10 NOTE — PROGRESS NOTES
Results for orders placed or performed in visit on 11/10/23   Cardiac EP device report    Narrative    MDT DUAL ICD/ ACTIVE SYSTEM IS MRI CONDITIONAL  CARELINK TRANSMISSION: BATTERY VOLTAGE ADEQUATE. (9.7 YRS) AP <1%  <1%. ALL AVAILABLE LEAD PARAMETERS WITHIN NORMAL LIMITS. NO SIGNIFICANT HIGH RATE EPISODES. OPTI-VOL WITHIN NORMAL LIMITS. NORMAL DEVICE FUNCTION. ---GONZALEZ

## 2023-11-16 ENCOUNTER — DOCUMENTATION (OUTPATIENT)
Dept: GENETICS | Facility: CLINIC | Age: 68
End: 2023-11-16

## 2023-11-20 ENCOUNTER — TELEPHONE (OUTPATIENT)
Dept: GENETICS | Facility: CLINIC | Age: 68
End: 2023-11-20

## 2023-11-20 ENCOUNTER — TELEPHONE (OUTPATIENT)
Age: 68
End: 2023-11-20

## 2023-11-20 NOTE — TELEPHONE ENCOUNTER
Dr Kenneth Phelps, I don't see any forms from [place] with "[facility]"? ?  Is there a form that hasn't been scanned into patients chart?   (See previous message)

## 2023-11-20 NOTE — TELEPHONE ENCOUNTER
I called and left a message for Sofiya to confirm her upcoming appointment she was encouraged to call our office.

## 2023-11-20 NOTE — TELEPHONE ENCOUNTER
Beverley Jones from [address] [place] [address] was calling with the pt on the other line trying to get some forms filled out and faxed back over for the pt. Stated that they fax over those form 5 different time within the last four months and haven't received or heard back from anyone. Beverley Jones stated that she is going to fax over that form again and would like it fax back over to [phone] and said that is also a good call back number. Please advise thank you.

## 2023-11-21 ENCOUNTER — APPOINTMENT (OUTPATIENT)
Dept: PHYSICAL THERAPY | Facility: CLINIC | Age: 68
End: 2023-11-21
Payer: COMMERCIAL

## 2023-11-24 ENCOUNTER — CLINICAL SUPPORT (OUTPATIENT)
Dept: GENETICS | Facility: CLINIC | Age: 68
End: 2023-11-24

## 2023-11-24 DIAGNOSIS — Z85.3 HISTORY OF LEFT BREAST CANCER: Primary | ICD-10-CM

## 2023-11-24 NOTE — LETTER
2023     Sandy Flaherty, [address] [facility]  [place] [address] [address],[address]    Patient: Micheline Burdick  YOB: 1955  Date of Visit: 2023      Dear Dr. Falguni Bernal:    Thank you for referring Micheline Burdick to me for evaluation. Below are my notes for this consultation. If you have questions, please do not hesitate to call me. I look forward to following your patient along with you. Sincerely,        Padmaja Harris        CC: No Recipients        Pre-Test Genetic Counseling Consult Note    Patient Name: Micheline Burdick   /Age: 1955/68 y.o. Referring Provider: Sandy Flaherty MD    Date of Service: 2023  Genetic Counselor: Padmaja Harris MS, [facility]  Interpretation Services: None  Location: Telephone consult   Length of Visit: 27 Minutes    Manuel Hagen was referred to the [address] [place] [address],[address] and Genetic Assessment Program due to her personal history of breast cancer . she presents today to discuss the possibility of a hereditary cancer syndrome, options for genetic testing, and implications for her and her family. Cancer History and Treatment:   Personal History:   Left breast cancer diagnosed at 44, s/p unilateral mastectomy     Screening Hx:   Breast:  Breast Imaging:   Mammogram (): Impression: No mammographic evidence of malignancy. Manuel Hagen is aware that her mammogram is overdue. Breast Density: Scattered fibroglandular density     Colon:  Colonoscopy (): 0 polyps reported  No further screening colonoscopies necessary     Gynecologic:  Ovaries and Uterus intact     Skin:  No current screening    Other screening:   Thyroid US (): Impression: No nodule meets current ACR criteria for requiring biopsy or followup ultrasounds. CT Abdomen/Pelvis w/ Contrast (): Impression:  Approximate 9 mm area of decreased attenuation at the pancreatic head with downstream pancreatic ductal dilation.    Recommend further evaluation with MRI/MRCP with IV Ebenezer Sevilla is aware that additional imaging was recommended. Reproductive History  Age at menarche: 11  Age at first live birth: Nulliparous  Menopause: Post Menopausal (late 30s/early 45s)  Hormone replacement: none     Medical and Surgical History  Pertinent surgical history:   Past Surgical History:   Procedure Laterality Date   • CARDIAC ELECTROPHYSIOLOGY PROCEDURE N/A 03/09/2022    Procedure: Cardiac icd implant/ DUAL CHAMBER ICD; Surgeon: Marley Joyner MD;  [facility]; Service: Cardiology   • Elinor Ramp / Jenni Michaeljerrica / Joseph Car     • MASTECTOMY Left     last assessed 12/16/14   • MASTECTOMY, RADICAL Left     1994   • WI COLPOSCOPY CERVIX BX CERVIX & ENDOCRV CURRETAGE N/A 9/22/2023    Procedure: CERVICAL BIOPSY;  Surgeon: Dana Brewer MD;  [facility];  Service: Gynecology Oncology   • WI PARATHYROIDECTOMY/EXPLORATION PARATHYROIDS N/A 04/21/2021    Procedure: PARATHYROIDECTOMY POSSIBLE 4 GLAND EXPLORATION;  Surgeon: Zechariah Clark MD;  [facility];  Service: ENT   • WI VULVECTOMY SIMPLE PARTIAL N/A 9/22/2023    Procedure: Tay Mathews;  Surgeon: Dana Brewer MD;  [facility];  Service: Gynecology Oncology   • REDUCTION MAMMAPLASTY Right    • UVULECTOMY        Pertinent medical history:  Past Medical History:   Diagnosis Date   • Breast cancer ([address])     left - 1994. • Diabetes mellitus ([address])    • Diabetes mellitus, type 2 ([address]) 03/15/2006    last assessed 7/10/13   • GERD (gastroesophageal reflux disease)    • History of chemotherapy    • Hyperlipidemia    • Hypertension          Other History:  Height:   Ht Readings from Last 1 Encounters:   10/06/23 5' 4" (1.626 m)     Weight:   Wt Readings from Last 1 Encounters:   11/02/23 50.8 kg (112 lb)       Relevant Family History   Patient reports non-Ashkenazi Orthodoxy ancestry. Maternal Family History:   Mother: unknown cancer diagnosed at 68 (d.75)   Uncle: unknown cancer, no info ()  Uncle: unknown cancer, no info ()  Aunt: unknown cancer, no info ()   Limited information regarding family history     Paternal Family History:  Father: unknown cancer diagnosed at 43, no info (d. 61)   Limited information regarding family history     Please refer to the scanned pedigree in the Media Tab for a complete family history     *All history is reported as provided by the patient; records are not available for review, except where indicated. Assessment:  We discussed sporadic, familial and hereditary cancer. We also discussed the many factors that influence our risk for cancer such as age, environmental exposures, lifestyle choices and family history. We reviewed the indications suggestive of a hereditary predisposition to cancer. [place]anna [place] understands that our assessment is limited given the limited information regarding her paternal family history of cancer. We discussed the possibility of a pathogenic variant in the BRCA1 or BRCA2 gene given her history of breast cancer. We reviewed the cancer risks and NCCN screening recommendations for individuals with pathogenic variants in the BRCA1 and BRCA2 genes. We reviewed that if a pathogenic variant is identified in Johanna Cross, we may recommend more frequent right breast imaging and prophylactic surgery. We reviewed that if a pathogenic variant were identified in Johanna Cross, there is a 50% chance that her brother possesses the pathogenic variant as well. We reviewed that genes such as BRCA1 and BRCA2 increase the risk for male breast cancer and prostate cancer. Thus, it is worthwhile for men to consider genetic testing. Genetic testing is indicated for Sofiya based on the following criteria: Meets NCCN V2.2024 Testing Criteria for High-Penetrance Breast Cancer Susceptibility Genes: personal history of breast cancer diagnosed <59 y/o; limited information regarding family history and structure.      Plan: Patient decided not to proceed with testing at this time. We encouraged Sofiya to contact the genetics department if there are any changes in her personal or family history or if she decides to proceed with genetic testing in the future.

## 2023-11-24 NOTE — PROGRESS NOTES
Pre-Test Genetic Counseling Consult Note    Patient Name: Nakia Cummings DOB/Age: 1955/68 y.o. Referring Provider: Jacob Steven MD    Date of Service: 2023  Genetic Counselor: Suzanne Pittman MS, [facility]  Interpretation Services: None  Location: Telephone consult   Length of Visit: 27 Minutes    Fernando Miller was referred to the [address] [place], [place] [address] and Genetic Assessment Program due to her personal history of breast cancer . she presents today to discuss the possibility of a hereditary cancer syndrome, options for genetic testing, and implications for her and her family. Cancer History and Treatment:   Personal History:   Left breast cancer diagnosed at 44, s/p unilateral mastectomy     Screening Hx:   Breast:  Breast Imaging:   Mammogram (): Impression: No mammographic evidence of malignancy. Fernando Miller is aware that her mammogram is overdue. Breast Density: Scattered fibroglandular density     Colon:  Colonoscopy (): 0 polyps reported  No further screening colonoscopies necessary     Gynecologic:  Ovaries and Uterus intact     Skin:  No current screening    Other screening:   Thyroid US (): Impression: No nodule meets current ACR criteria for requiring biopsy or followup ultrasounds. CT Abdomen/Pelvis w/ Contrast (): Impression:  Approximate 9 mm area of decreased attenuation at the pancreatic head with downstream pancreatic ductal dilation. Recommend further evaluation with MRI/MRCP with Lazaro Porter is aware that additional imaging was recommended. Reproductive History  Age at menarche: 11  Age at first live birth: Nulliparous  Menopause: Post Menopausal (late 30s/early 45s)  Hormone replacement: none     Medical and Surgical History  Pertinent surgical history:   Past Surgical History:   Procedure Laterality Date    CARDIAC ELECTROPHYSIOLOGY PROCEDURE N/A 2022    Procedure: Cardiac icd implant/ DUAL CHAMBER ICD;   Surgeon: Ava Luis MD;  [facility]; Service: Cardiology    INSERT / Kenneth Wu / REMOVE PACEMAKER      MASTECTOMY Left     last assessed 14    MASTECTOMY, RADICAL Left         TN COLPOSCOPY CERVIX BX CERVIX & ENDOCRV CURRETAGE N/A 2023    Procedure: CERVICAL BIOPSY;  Surgeon: Taj Doty MD;  [facility];  Service: Gynecology Oncology    TN PARATHYROIDECTOMY/EXPLORATION PARATHYROIDS N/A 2021    Procedure: PARATHYROIDECTOMY POSSIBLE 4 GLAND EXPLORATION;  Surgeon: Cecilia Desouza MD;  [facility];  Service: ENT    TN VULVECTOMY SIMPLE PARTIAL N/A 2023    Procedure: Karis Roll;  Surgeon: Taj Doty MD;  [facility];  Service: Gynecology Oncology    REDUCTION MAMMAPLASTY Right     UVULECTOMY        Pertinent medical history:  Past Medical History:   Diagnosis Date    Breast cancer ([address])     left - . Diabetes mellitus ([address])     Diabetes mellitus, type 2 ([address]) 03/15/2006    last assessed 7/10/13    GERD (gastroesophageal reflux disease)     History of chemotherapy     Hyperlipidemia     Hypertension          Other History:  Height:   Ht Readings from Last 1 Encounters:   10/06/23 5' 4" (1.626 m)     Weight:   Wt Readings from Last 1 Encounters:   23 50.8 kg (112 lb)       Relevant Family History   Patient reports non-Ashkenazi Taoism ancestry. Maternal Family History: Mother: unknown cancer diagnosed at 68 (d.75)   Uncle: unknown cancer, no info ()  Uncle: unknown cancer, no info ()  Aunt: unknown cancer, no info ()   Limited information regarding family history     Paternal Family History:  Father: unknown cancer diagnosed at 43, no info (d. 61)   Limited information regarding family history     Please refer to the scanned pedigree in the Media Tab for a complete family history     *All history is reported as provided by the patient; records are not available for review, except where indicated.      Assessment:  We discussed sporadic, familial and hereditary cancer. We also discussed the many factors that influence our risk for cancer such as age, environmental exposures, lifestyle choices and family history. We reviewed the indications suggestive of a hereditary predisposition to cancer. [place] understands that our assessment is limited given the limited information regarding her paternal family history of cancer. We discussed the possibility of a pathogenic variant in the BRCA1 or BRCA2 gene given her history of breast cancer. We reviewed the cancer risks and NCCN screening recommendations for individuals with pathogenic variants in the BRCA1 and BRCA2 genes. We reviewed that if a pathogenic variant is identified in [place], we may recommend more frequent right breast imaging and prophylactic surgery. We reviewed that if a pathogenic variant were identified in [place], there is a 50% chance that her brother possesses the pathogenic variant as well. We reviewed that genes such as BRCA1 and BRCA2 increase the risk for male breast cancer and prostate cancer. Thus, it is worthwhile for men to consider genetic testing. Genetic testing is indicated for Sofiya based on the following criteria: Meets NCCN V2.2024 Testing Criteria for High-Penetrance Breast Cancer Susceptibility Genes: personal history of breast cancer diagnosed <59 y/o; limited information regarding family history and structure. Plan: Patient decided not to proceed with testing at this time. We encouraged Sofiya to contact the genetics department if there are any changes in her personal or family history or if she decides to proceed with genetic testing in the future.

## 2023-11-26 PROBLEM — K62.5 BRBPR (BRIGHT RED BLOOD PER RECTUM): Status: RESOLVED | Noted: 2023-08-21 | Resolved: 2023-11-26

## 2023-11-26 PROBLEM — R19.5 HEME POSITIVE STOOL: Status: RESOLVED | Noted: 2023-07-11 | Resolved: 2023-11-26

## 2023-11-26 PROBLEM — C50.919 BREAST CANCER (HCC): Status: RESOLVED | Noted: 2021-04-20 | Resolved: 2023-11-26

## 2023-11-26 PROBLEM — L29.2 VULVAR ITCHING: Status: RESOLVED | Noted: 2023-01-10 | Resolved: 2023-11-26

## 2023-11-26 PROBLEM — K92.1 BLACK STOOL: Status: RESOLVED | Noted: 2023-07-11 | Resolved: 2023-11-26

## 2023-11-26 PROBLEM — L08.9 SOFT TISSUE INFECTION: Status: RESOLVED | Noted: 2023-07-22 | Resolved: 2023-11-26

## 2023-11-27 ENCOUNTER — APPOINTMENT (OUTPATIENT)
Dept: PHYSICAL THERAPY | Facility: CLINIC | Age: 68
End: 2023-11-27
Payer: COMMERCIAL

## 2023-11-28 ENCOUNTER — TELEPHONE (OUTPATIENT)
Age: 68
End: 2023-11-28

## 2023-11-28 NOTE — TELEPHONE ENCOUNTER
Eulalio Iniguez spoke to you earlier in ref to this patient and getitng continuation of care but we have never receeived the form. She said you gave her your personal 484 faxnumber but everytime she tries to send it it say fax failed. She called trying to get the correct fax number. Can you please call her or her phone is textable and text her th correct fax number so we can proceed with this pt care.  The phone number is [phone]

## 2023-11-28 NOTE — TELEPHONE ENCOUNTER
Vania Coffey called from [facility] for human services for long term services stating she has faxed over a form to be filled out of Dr. Nina Muir for patient to have help with ADLs. Phone number is [phone]  Fax number is [phone]  Website is [employer] - if accessing the website for the form you need to scroll all the way to the bottom where it says physician certification form that's the electronic version of the form if you are to print it out that way to have filled out.

## 2023-11-28 NOTE — TELEPHONE ENCOUNTER
Rut Hansen called looking for a form that has been faxed over by miranda several times since July. Rut Hansen was warm transferred to the office for further assistance.

## 2023-11-29 NOTE — TELEPHONE ENCOUNTER
She wrote  incorrect number down. I gave her the correct one and I received the fax. In doctors mailbox to complete.

## 2023-11-30 NOTE — TELEPHONE ENCOUNTER
Brielle Lovett from [facility] was calling again to speak with anthony in regards to PCF forms for the pt. Brielle Lovett was transferred over to the office.

## 2023-12-04 DIAGNOSIS — I50.9 CHF EXACERBATION (HCC): ICD-10-CM

## 2023-12-05 RX ORDER — FUROSEMIDE 20 MG/1
TABLET ORAL
Qty: 90 TABLET | Refills: 1 | Status: SHIPPED | OUTPATIENT
Start: 2023-12-05

## 2023-12-13 ENCOUNTER — OFFICE VISIT (OUTPATIENT)
Dept: CARDIOLOGY CLINIC | Facility: CLINIC | Age: 68
End: 2023-12-13
Payer: COMMERCIAL

## 2023-12-13 VITALS
HEIGHT: 64 IN | WEIGHT: 109.4 LBS | OXYGEN SATURATION: 98 % | SYSTOLIC BLOOD PRESSURE: 140 MMHG | HEART RATE: 80 BPM | DIASTOLIC BLOOD PRESSURE: 88 MMHG | BODY MASS INDEX: 18.68 KG/M2

## 2023-12-13 DIAGNOSIS — I47.10 SVT (SUPRAVENTRICULAR TACHYCARDIA): ICD-10-CM

## 2023-12-13 DIAGNOSIS — I48.0 PAF (PAROXYSMAL ATRIAL FIBRILLATION) (HCC): ICD-10-CM

## 2023-12-13 DIAGNOSIS — I25.10 CORONARY ARTERY DISEASE INVOLVING NATIVE CORONARY ARTERY OF NATIVE HEART WITHOUT ANGINA PECTORIS: ICD-10-CM

## 2023-12-13 DIAGNOSIS — I50.22 CHRONIC HFREF (HEART FAILURE WITH REDUCED EJECTION FRACTION) (HCC): Primary | ICD-10-CM

## 2023-12-13 DIAGNOSIS — I10 PRIMARY HYPERTENSION: ICD-10-CM

## 2023-12-13 DIAGNOSIS — E78.5 HYPERLIPIDEMIA LDL GOAL <100: ICD-10-CM

## 2023-12-13 DIAGNOSIS — I42.8 NICM (NONISCHEMIC CARDIOMYOPATHY) (HCC): ICD-10-CM

## 2023-12-13 DIAGNOSIS — I13.0 HYPERTENSIVE HEART AND KIDNEY DISEASE WITH HEART FAILURE AND WITH CHRONIC KIDNEY DISEASE STAGE III (HCC): ICD-10-CM

## 2023-12-13 DIAGNOSIS — N18.30 HYPERTENSIVE HEART AND KIDNEY DISEASE WITH HEART FAILURE AND WITH CHRONIC KIDNEY DISEASE STAGE III (HCC): ICD-10-CM

## 2023-12-13 PROCEDURE — 99214 OFFICE O/P EST MOD 30 MIN: CPT | Performed by: INTERNAL MEDICINE

## 2023-12-13 NOTE — PROGRESS NOTES
Cardiology Follow Up    Chato Coon  1955  [id number]  [facility] [place]  [address] [place] [address] [address]VD    [address]  [id number]  [phone]    1. Chronic HFrEF (heart failure with reduced ejection fraction) ([address])        2. Primary hypertension  Ambulatory referral to Cardiology      3. Hypertensive heart and kidney disease with heart failure and with chronic kidney disease stage III [facility])  Ambulatory referral to Cardiology      4. Coronary artery disease involving native coronary artery of native heart without angina pectoris        5. PAF (paroxysmal atrial fibrillation) ([address])        6. NICM (nonischemic cardiomyopathy) ([address])        7. SVT (supraventricular tachycardia)        8. Hyperlipidemia LDL goal <100          Chief Complaint   Patient presents with    Follow-up     6 month follow-up; patient has been taking Eliquis as needed since she had a bleed requiring transfusion    Shortness of Breath     On exertion and at night - patient interested in inhaler therapy    Fatigue     Patient reports sometimes getting tired       Interval History: Patient feels well, without complaints. She was in the hospital for a GI bleed and has not been taking her Eliquis as consistently now. No reported chest pain, shortness of breath, palpitations, lightheadedness, syncope, LE edema, orthopnea, PND, or significant weight changes. Patient remains active without any increased fatigue out of the ordinary.       Patient Active Problem List   Diagnosis    History of left breast cancer    Hypertensive heart and kidney disease with heart failure and with chronic kidney disease stage III (HCC)    Colon, diverticulosis    Hyperlipidemia LDL goal <100    Screening for cardiovascular, respiratory, and genitourinary diseases    Immunization due    Colon cancer screening    Menopause    Microalbuminuria    Medicare annual wellness visit, subsequent    CKD stage 3 due to type 2 diabetes mellitus ([address])    Hyperparathyroidism ([address])    Coronary artery disease involving native coronary artery of native heart without angina pectoris    Chronic HFrEF (heart failure with reduced ejection fraction) (HCC)    Vitamin D deficiency    S/P left mastectomy    Primary hypertension    Bilateral leg edema    Breast cancer screening by mammogram    NICM (nonischemic cardiomyopathy) ([address])    Peripheral arterial disease (HCC)    Trigger finger of right thumb    Venous insufficiency of both lower extremities    SVT (supraventricular tachycardia)    Tobacco use    PAF (paroxysmal atrial fibrillation) (HCC)    Embolism and 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     Diabetes mellitus ([address])     Diabetes mellitus, type 2 ([address]) 03/15/2006    last assessed 7/10/13    GERD (gastroesophageal reflux disease)     History of chemotherapy     Hyperlipidemia     Hypertension      Social History     Socioeconomic History    Marital status: Single     Spouse name: Not on file    Number of children: Not on file    Years of education: Not on file    Highest education level: Not on file   Occupational History    Not on file   Tobacco Use    Smoking status: Every Day     Current packs/day: 0.25     Average packs/day: 0.3 packs/day for 50.5 years (12.6 ttl pk-yrs)     Types: Cigarettes     Start date: 6/15/1973     Passive exposure: Current    Smokeless tobacco: Never    Tobacco comments:     2 cigarettes daily    Vaping Use    Vaping status: Never Used Substance and Sexual Activity    Alcohol use: Never    Drug use: Never    Sexual activity: Not Currently     Partners: Male   Other Topics Concern    Not on file   Social History Narrative    Not on file     Social Determinants of Health     Financial Resource Strain: Low Risk  (10/6/2023)    Overall Financial Resource Strain (CARDIA)     Difficulty of Paying Living Expenses: Not hard at all   Food Insecurity: No Food Insecurity (7/25/2023)    Hunger Vital Sign     Worried About Running Out of Food in the Last Year: Never true     Ran Out of Food in the Last Year: Never true   Transportation Needs: No Transportation Needs (10/6/2023)    PRAPARE - Transportation     Lack of Transportation (Medical): No     Lack of Transportation (Non-Medical): No   Physical Activity: Not on file   Stress: Not on file   Social Connections: Not on file   Intimate Partner Violence: Not on file   Housing Stability: Low Risk  (7/25/2023)    Housing Stability Vital Sign     Unable to Pay for Housing in the Last Year: No     Number of Places Lived in the Last Year: 1     Unstable Housing in the Last Year: No      Family History   Problem Relation Age of Onset    Hypothyroidism Mother     Leukemia Mother     Diabetes Father     Diabetes type II Father     Stroke Sister     Diabetes Paternal Grandmother     Cancer Sister     Diabetes Brother      Past Surgical History:   Procedure Laterality Date    CARDIAC ELECTROPHYSIOLOGY PROCEDURE N/A 03/09/2022    Procedure: Cardiac icd implant/ DUAL CHAMBER ICD; Surgeon: Abiel Suero MD;  Location: [facility];   Service: Cardiology    INSERT / Marino Reyes / Stew Herrera      MASTECTOMY Left     last assessed 12/16/14    MASTECTOMY, RADICAL Left     1994    DC COLPOSCOPY CERVIX BX CERVIX & ENDOCRV CURRETAGE N/A 9/22/2023    Procedure: CERVICAL BIOPSY;  Surgeon: Lamberto Thompson MD;  Location: [facility];  Service: Gynecology Oncology    DC PARATHYROIDECTOMY/EXPLORATION PARATHYROIDS N/A 04/21/2021    Procedure: PARATHYROIDECTOMY POSSIBLE 4 GLAND EXPLORATION;  Surgeon: Gordon Ricketts MD;  Location: [facility];  Service: ENT    NM VULVECTOMY SIMPLE PARTIAL N/A 9/22/2023    Procedure: Yesenia Edward;  Surgeon: Yamileth Harrison MD;  Location: [facility];  Service: Gynecology Oncology    REDUCTION MAMMAPLASTY Right     UVULECTOMY         Current Outpatient Medications:     Accu-Chek Softclix Lancets lancets, CHECK BLOOD SUGARS THREE TIMES DAILY, Disp: 300 each, Rfl: 1    apixaban (Eliquis) 5 mg, Take 1 tablet (5 mg total) by mouth 2 (two) times a day (Patient taking differently: Take 5 mg by mouth if needed), Disp: 180 tablet, Rfl: 1    ascorbic acid (VITAMIN C) 250 mg tablet, Take 1 tablet (250 mg total) by mouth daily, Disp: 30 tablet, Rfl: 0    atorvastatin (LIPITOR) 40 mg tablet, Take 1 tablet (40 mg total) by mouth daily, Disp: 90 tablet, Rfl: 1    Blood Glucose Monitoring Suppl (OneTouch Verio Reflect) w/Device KIT, Check blood sugars three times daily. Please substitute with appropriate alternative as covered by patient's insurance. Dx: E11.65, Disp: 1 kit, Rfl: 0    ferrous sulfate 324 (65 Fe) mg, Take 1 tablet (324 mg total) by mouth 3 (three) times a day before meals, Disp: 90 tablet, Rfl: 5    furosemide (LASIX) 20 mg tablet, TAKE ONE TABLET BY MOUTH DAILY AT 9AM, Disp: 90 tablet, Rfl: 1    glucose blood (OneTouch Verio) test strip, Check blood sugars three times daily. Please substitute with appropriate alternative as covered by patient's insurance.  Dx: E11.65, Disp: 300 each, Rfl: 0    metFORMIN (GLUCOPHAGE) 500 mg tablet, Take 1 tablet (500 mg total) by mouth 2 (two) times a day with meals, Disp: 180 tablet, Rfl: 1    metoprolol succinate (TOPROL-XL) 25 mg 24 hr tablet, Take 3 tablets (75 mg total) by mouth daily, Disp: 180 tablet, Rfl: 2    pantoprazole (PROTONIX) 40 mg tablet, Take 1 tablet (40 mg total) by mouth daily, Disp: 90 tablet, Rfl: 1    polyethylene glycol (GOLYTELY) 4000 mL solution, Take 4,000 mL by mouth once for 1 dose (Patient not taking: Reported on 12/13/2023), Disp: 4000 mL, Rfl: 0  No Known Allergies    Labs:  Admission on 09/22/2023, Discharged on 09/22/2023   Component Date Value    POC Glucose 09/22/2023 199 (H)     Case Report 09/22/2023                      Value:Surgical Pathology Report                         Case: [id number]                                   Authorizing Provider:  Kenn Fajardo MD           Collected:           09/22/2023 0943              Ordering Location:     [facility]      Received:            09/22/2023 [address] [place] [address]                                                      Pathologist:           Lili Valenzuela MD                                                    Specimens:   A) - Vaginal, Right lateral vagina                                                                  B) - Cervix, cervical biopsy                                                                        C) - Vulva, vulvar lesion                                                                           D) - Vaginal, Vaginal 2 O'clock introitus                                                           E) - Vaginal, Vaginal 10 O'clock introitus                                                                                    F) - Vaginal, Posterior introitus stitch at 12 O'clock                                     Final Diagnosis 09/22/2023                      Value: This result contains rich text formatting which cannot be displayed here. Additional Information 09/22/2023                      Value: This result contains rich text formatting which cannot be displayed here. Gross Description 09/22/2023                      Value: This result contains rich text formatting which cannot be displayed here.     POC Glucose 09/22/2023 191 (H)    Appointment on 08/29/2023   Component Date Value    WBC 08/29/2023 7.46 RBC 08/29/2023 5.00     Hemoglobin 08/29/2023 12.0     Hematocrit 08/29/2023 40.8     MCV 08/29/2023 82     MCH 08/29/2023 24.0 (L)     MCHC 08/29/2023 29.4 (L)     RDW 08/29/2023 21.5 (H)     MPV 08/29/2023 11.6     Platelets 30/22/5891 369     nRBC 08/29/2023 0     Neutrophils Relative 08/29/2023 74     Immat GRANS % 08/29/2023 0     Lymphocytes Relative 08/29/2023 18     Monocytes Relative 08/29/2023 7     Eosinophils Relative 08/29/2023 1     Basophils Relative 08/29/2023 0     Neutrophils Absolute 08/29/2023 5.46     Immature Grans Absolute 08/29/2023 0.02     Lymphocytes Absolute 08/29/2023 1.36     Monocytes Absolute 08/29/2023 0.53     Eosinophils Absolute 08/29/2023 0.07     Basophils Absolute 08/29/2023 0.02     Iron Saturation 08/29/2023 6 (L)     TIBC 08/29/2023 371     Iron 08/29/2023 23 (L)     UIBC 08/29/2023 348    Admission on 08/21/2023, Discharged on 08/23/2023   Component Date Value    WBC 08/21/2023 7.55     RBC 08/21/2023 4.59     Hemoglobin 08/21/2023 11.2 (L)     Hematocrit 08/21/2023 38.1     MCV 08/21/2023 83     MCH 08/21/2023 24.4 (L)     MCHC 08/21/2023 29.4 (L)     RDW 08/21/2023 22.6 (H)     MPV 08/21/2023 11.0     Platelets 68/88/1504 307     nRBC 08/21/2023 0     Neutrophils Relative 08/21/2023 75     Immat GRANS % 08/21/2023 0     Lymphocytes Relative 08/21/2023 18     Monocytes Relative 08/21/2023 6     Eosinophils Relative 08/21/2023 1     Basophils Relative 08/21/2023 0     Neutrophils Absolute 08/21/2023 5.60     Immature Grans Absolute 08/21/2023 0.03     Lymphocytes Absolute 08/21/2023 1.36     Monocytes Absolute 08/21/2023 0.48     Eosinophils Absolute 08/21/2023 0.07     Basophils Absolute 08/21/2023 0.01     Sodium 08/21/2023 137     Potassium 08/21/2023 3.8     Chloride 08/21/2023 98     CO2 08/21/2023 30     ANION GAP 08/21/2023 9     BUN 08/21/2023 21     Creatinine 08/21/2023 1.02     Glucose 08/21/2023 298 (H)     Calcium 08/21/2023 9.3     AST 08/21/2023 16     ALT 08/21/2023 15     Alkaline Phosphatase 08/21/2023 219 (H)     Total Protein 08/21/2023 7.6     Albumin 08/21/2023 3.5     Total Bilirubin 08/21/2023 0.71     eGFR 08/21/2023 55     Lipase 08/21/2023 49     Extra Tube 08/21/2023 Hold for add-ons.      hs TnI 0hr 08/21/2023 22     hs TnI 2hr 08/21/2023 25     Delta 2hr hsTnI 08/21/2023 3     hs TnI 4hr 08/21/2023 26     Delta 4hr hsTnI 08/21/2023 4     ABO Grouping 08/21/2023 B     Rh Factor 08/21/2023 Positive     Antibody Screen 08/21/2023 Negative     Specimen Expiration Date 08/21/2023 [id number]     POC Glucose 08/21/2023 376 (H)     POC Glucose 08/21/2023 245 (H)     Hemoglobin 08/21/2023 9.5 (L)     Hematocrit 08/21/2023 32.4 (L)     POC Glucose 08/22/2023 177 (H)     Sodium 08/22/2023 141     Potassium 08/22/2023 2.8 (L)     Chloride 08/22/2023 112 (H)     CO2 08/22/2023 21     ANION GAP 08/22/2023 8     BUN 08/22/2023 15     Creatinine 08/22/2023 0.64     Glucose 08/22/2023 123     Calcium 08/22/2023 6.2 (L)     eGFR 08/22/2023 90     WBC 08/22/2023 7.05     RBC 08/22/2023 4.29     Hemoglobin 08/22/2023 10.6 (L)     Hematocrit 08/22/2023 34.5 (L)     MCV 08/22/2023 80 (L)     MCH 08/22/2023 24.7 (L)     MCHC 08/22/2023 30.7 (L)     RDW 08/22/2023 22.5 (H)     Platelets 18/42/2590 269     MPV 08/22/2023 10.9     POC Glucose 08/22/2023 157 (H)     POC Glucose 08/22/2023 89     POC Glucose 08/22/2023 150 (H)     POC Glucose 08/22/2023 349 (H)     POC Glucose 08/22/2023 319 (H)     WBC 08/23/2023 8.33     RBC 08/23/2023 4.41     Hemoglobin 08/23/2023 10.8 (L)     Hematocrit 08/23/2023 36.0     MCV 08/23/2023 82     MCH 08/23/2023 24.5 (L)     MCHC 08/23/2023 30.0 (L)     RDW 08/23/2023 22.5 (H)     Platelets 62/85/2209 270     MPV 08/23/2023 11.1     Sodium 08/23/2023 136     Potassium 08/23/2023 4.5     Chloride 08/23/2023 104     CO2 08/23/2023 25     ANION GAP 08/23/2023 7     BUN 08/23/2023 22     Creatinine 08/23/2023 0.98     Glucose 08/23/2023 213 (H) Calcium 08/23/2023 9.1     eGFR 08/23/2023 58     Magnesium 08/23/2023 1.7 (L)     POC Glucose 08/23/2023 215 (H)     POC Glucose 08/23/2023 276 (H)     Ventricular Rate 08/21/2023 81     Atrial Rate 08/21/2023 81     MN Interval 08/21/2023 162     QRSD Interval 08/21/2023 124     QT Interval 08/21/2023 388     QTC Interval 08/21/2023 450     P Axis 08/21/2023 32     QRS [place] 08/21/2023 -48     T Wave [place] 08/21/2023 225    Appointment on 08/17/2023   Component Date Value    WBC 08/17/2023 9.18     RBC 08/17/2023 4.42     Hemoglobin 08/17/2023 10.8 (L)     Hematocrit 08/17/2023 36.7     MCV 08/17/2023 83     MCH 08/17/2023 24.4 (L)     MCHC 08/17/2023 29.4 (L)     RDW 08/17/2023 22.7 (H)     MPV 08/17/2023 10.6     Platelets 46/67/1887 335     nRBC 08/17/2023 0     Neutrophils Relative 08/17/2023 69     Immat GRANS % 08/17/2023 0     Lymphocytes Relative 08/17/2023 20     Monocytes Relative 08/17/2023 9     Eosinophils Relative 08/17/2023 2     Basophils Relative 08/17/2023 0     Neutrophils Absolute 08/17/2023 6.29     Immature Grans Absolute 08/17/2023 0.03     Lymphocytes Absolute 08/17/2023 1.79     Monocytes Absolute 08/17/2023 0.86     Eosinophils Absolute 08/17/2023 0.20     Basophils Absolute 08/17/2023 0.01    Admission on 07/31/2023, Discharged on 08/01/2023   Component Date Value    WBC 07/31/2023 7.34     RBC 07/31/2023 3.90     Hemoglobin 07/31/2023 9.7 (L)     Hematocrit 07/31/2023 32.2 (L)     MCV 07/31/2023 83     MCH 07/31/2023 24.9 (L)     MCHC 07/31/2023 30.1 (L)     RDW 07/31/2023 20.6 (H)     MPV 07/31/2023 10.4     Platelets 76/17/8997 316     nRBC 07/31/2023 0     Neutrophils Relative 07/31/2023 73     Immat GRANS % 07/31/2023 0     Lymphocytes Relative 07/31/2023 19     Monocytes Relative 07/31/2023 7     Eosinophils Relative 07/31/2023 1     Basophils Relative 07/31/2023 0     Neutrophils Absolute 07/31/2023 5.28     Immature Grans Absolute 07/31/2023 0.02     Lymphocytes Absolute 07/31/2023 1.41     Monocytes Absolute 07/31/2023 0.50     Eosinophils Absolute 07/31/2023 0.10     Basophils Absolute 07/31/2023 0.03     Sodium 07/31/2023 138     Potassium 07/31/2023 3.9     Chloride 07/31/2023 106     CO2 07/31/2023 27     ANION GAP 07/31/2023 5     BUN 07/31/2023 23     Creatinine 07/31/2023 1.01     Glucose 07/31/2023 116     Calcium 07/31/2023 9.1     Corrected Calcium 07/31/2023 9.7     AST 07/31/2023 20     ALT 07/31/2023 23     Alkaline Phosphatase 07/31/2023 155 (H)     Total Protein 07/31/2023 6.6     Albumin 07/31/2023 3.2 (L)     Total Bilirubin 07/31/2023 0.45     eGFR 07/31/2023 56     Lipase 07/31/2023 53     POC Glucose 07/31/2023 86     POC Glucose 07/31/2023 241 (H)     POC Glucose 08/01/2023 242 (H)     Sodium 08/01/2023 137     Potassium 08/01/2023 3.9     Chloride 08/01/2023 107     CO2 08/01/2023 26     ANION GAP 08/01/2023 4     BUN 08/01/2023 20     Creatinine 08/01/2023 1.03     Glucose 08/01/2023 136     Glucose, Fasting 08/01/2023 136 (H)     Calcium 08/01/2023 8.7     Corrected Calcium 08/01/2023 9.5     AST 08/01/2023 17     ALT 08/01/2023 20     Alkaline Phosphatase 08/01/2023 134 (H)     Total Protein 08/01/2023 6.2 (L)     Albumin 08/01/2023 3.0 (L)     Total Bilirubin 08/01/2023 0.43     eGFR 08/01/2023 55     WBC 08/01/2023 7.51     RBC 08/01/2023 3.68 (L)     Hemoglobin 08/01/2023 9.1 (L)     Hematocrit 08/01/2023 30.4 (L)     MCV 08/01/2023 83     MCH 08/01/2023 24.7 (L)     MCHC 08/01/2023 29.9 (L)     RDW 08/01/2023 20.8 (H)     MPV 08/01/2023 10.5     Platelets 95/91/2527 327     nRBC 08/01/2023 0     Neutrophils Relative 08/01/2023 64     Immat GRANS % 08/01/2023 0     Lymphocytes Relative 08/01/2023 24     Monocytes Relative 08/01/2023 10     Eosinophils Relative 08/01/2023 2     Basophils Relative 08/01/2023 0     Neutrophils Absolute 08/01/2023 4.70     Immature Grans Absolute 08/01/2023 0.03     Lymphocytes Absolute 08/01/2023 1.81     Monocytes Absolute 08/01/2023 0.78     Eosinophils Absolute 08/01/2023 0.18     Basophils Absolute 08/01/2023 0.01     POC Glucose 08/01/2023 133     POC Glucose 08/01/2023 228 (H)    No results displayed because visit has over 200 results.       Lab Requisition on 07/18/2023   Component Date Value    ABO Grouping 07/18/2023 B     Rh Factor 07/18/2023 Positive     Antibody Screen 07/18/2023 Negative     Specimen Expiration Date 07/18/2023 [id number]    Lab on 07/18/2023   Component Date Value    WBC 07/18/2023 10.07     RBC 07/18/2023 2.99 (L)     Hemoglobin 07/18/2023 6.9 (LL)     Hematocrit 07/18/2023 24.5 (L)     MCV 07/18/2023 82     MCH 07/18/2023 23.1 (L)     MCHC 07/18/2023 28.2 (L)     RDW 07/18/2023 20.0 (H)     Platelets 82/67/0168 388     MPV 07/18/2023 11.1     Sodium 07/18/2023 138     Potassium 07/18/2023 4.4     Chloride 07/18/2023 111 (H)     CO2 07/18/2023 23     ANION GAP 07/18/2023 4     BUN 07/18/2023 22     Creatinine 07/18/2023 1.16     Glucose 07/18/2023 125     Calcium 07/18/2023 9.5     eGFR 07/18/2023 47    Appointment on 06/21/2023   Component Date Value    Free T4 06/21/2023 0.89     TSH 3RD GENERATON 06/21/2023 2.869     Phosphorus 06/21/2023 3.9     PTH 06/21/2023 131.4 (H)     Vit D, 25-Hydroxy 06/21/2023 17.3 (L)     Sodium 06/21/2023 140     Potassium 06/21/2023 4.3     Chloride 06/21/2023 112 (H)     CO2 06/21/2023 24     ANION GAP 06/21/2023 4     BUN 06/21/2023 20     Creatinine 06/21/2023 1.09     Glucose, Fasting 06/21/2023 84     Calcium 06/21/2023 9.3     Corrected Calcium 06/21/2023 10.1     AST 06/21/2023 19     ALT 06/21/2023 27     Alkaline Phosphatase 06/21/2023 117 (H)     Total Protein 06/21/2023 7.1     Albumin 06/21/2023 3.0 (L)     Total Bilirubin 06/21/2023 0.31     eGFR 06/21/2023 51     Hemoglobin A1C 06/21/2023 8.4 (H)     EAG 06/21/2023 194      Lab Results   Component Value Date    CHOL 190 10/07/2016    TRIG 72 01/18/2023    TRIG 100 10/07/2016    HDL 73 01/18/2023    HDL 62 10/07/2016 Imaging: Mammo screening right w 3d & cad    Result Date: 11/16/2021  Narrative: DIAGNOSIS: Breast cancer screening by mammogram TECHNIQUE: Digital screening mammography was performed. Computer Aided Detection (CAD) analyzed all applicable images. COMPARISONS: Prior breast imaging dated: 05/18/2020, 11/06/2018, and 02/23/2016 RELEVANT HISTORY: Family Breast Cancer History: No known family history of breast cancer. Family Medical History: No known relevant family medical history. Personal History: No known relevant hormone history. Surgical history includes breast reduction and mastectomy. Medical history includes breast cancer and history of chemotherapy. The patient is scheduled in a reminder system for screening mammography. 8-10% of cancers will be missed on mammography. Management of a palpable abnormality must be based on clinical grounds. Patients will be notified of their results via letter from our facility. Accredited by [employer] [facility] [employer] and [facility]. RISK ASSESSMENT: [facility] risk assessment reporting was suppressed due to the patient's history and/or demographic factors. TISSUE DENSITY: There are scattered areas of fibroglandular density. INDICATION: Keli Porter is a 74 y.o. female presenting for screening mammography. FINDINGS: There are no suspicious masses, grouped microcalcifications or areas of architectural distortion. The skin and nipple areolar complex are unremarkable. Impression: No mammographic evidence of malignancy. ASSESSMENT/BI-RADS CATEGORY: Right: 1 - Negative Overall: 1 - Negative RECOMMENDATION:      - Routine screening mammogram in 1 year for the right breast. Workstation ID: [id number]       Review of Systems:  Review of Systems   Constitutional:  Negative for activity change, appetite change, chills, diaphoresis, fatigue and unexpected weight change. HENT:  Negative for hearing loss, nosebleeds and sore throat.     Eyes:  Negative for photophobia and visual disturbance. Respiratory:  Negative for cough, chest tightness, shortness of breath and wheezing. Cardiovascular:  Negative for chest pain, palpitations and leg swelling. Gastrointestinal:  Negative for abdominal pain, diarrhea, nausea and vomiting. Endocrine: Negative for polyuria. Genitourinary:  Negative for dysuria, frequency and hematuria. Musculoskeletal:  Negative for arthralgias, back pain, gait problem and neck pain. Skin:  Negative for pallor and rash. Neurological:  Negative for dizziness, syncope and headaches. Hematological:  Does not bruise/bleed easily. Psychiatric/Behavioral:  Negative for behavioral problems and confusion. Physical Exam:  Physical Exam  Vitals reviewed. Constitutional:       Appearance: She is well-developed. She is not diaphoretic. HENT:      Head: Normocephalic and atraumatic. Nose: Nose normal.   Eyes:      General: No scleral icterus. Pupils: Pupils are equal, round, and reactive to light. Neck:      Vascular: No JVD. Cardiovascular:      Rate and Rhythm: Normal rate and regular rhythm. Heart sounds: Normal heart sounds. No murmur heard. No friction rub. No gallop. Pulmonary:      Effort: Pulmonary effort is normal. No respiratory distress. Breath sounds: Normal breath sounds. No wheezing or rales. Abdominal:      General: Bowel sounds are normal. There is no distension. Palpations: Abdomen is soft. Tenderness: There is no abdominal tenderness. Musculoskeletal:         General: No deformity. Normal range of motion. Cervical back: Normal range of motion and neck supple. Skin:     General: Skin is warm and dry. Findings: No rash. Neurological:      Mental Status: She is alert and oriented to person, place, and time. Cranial Nerves: No cranial nerve deficit.    Psychiatric:         Behavior: Behavior normal.       Blood pressure 140/88, pulse 80, height 5' 4" (1.626 m), weight 49.6 kg (109 lb 6.4 oz), SpO2 98%, not currently breastfeeding. EKG:  Normal sinus rhythm  Left axis deviation  Incomplete right bundle branch block  Nonspecific ST and T wave abnormality  Abnormal ECG    Discussion/Summary:  NICM: mild reduction/low normal EF when hospitalized for CHF. Repeat echo revealed significantly reduced EF of 25-30%. Continued on BB only for GDMT as blood pressure limits use of ACE inhibitor or ARB. s/p ICD placement and doing well. Most recent device interrogation revealed stable OptiVol fluid threshold, adequate battery life and no significant high rate episodes. Euvolemic on exam today. Using Lasix on an as needed basis.  on May 31, 2023 compared to 3988 in the end of April 2023. Renal function improved significantly with a creatinine of 1.15 and the end of May 2023 and 0.98 in Aug 2023. CAD: nonobstructive as per cath in Nov 2020. Was continued on ASA, statin, and B-blocker - but with GI bleed, ASA has been discontinued. Eventual repeat ischemic eval, no current symptoms to suggest active disease. Continue on statin & beta-blocker. Currently asymptomatic from this standpoint and will hold off on further evaluation. HTN:  Was improving at prior visit, changed lisinopril to Entresto at prior visit and was tolerating well. Tends to have elevated blood pressure with increased volume. Currently with improved blood pressure control, volume status euvolemic. Unable to tolerate ACE inhibitor or ARB due to low blood pressure. Doing well currently. HLD: continued on statin. LDL 65 in March 2021. Repeat levels well controlled with LDL of 76 in Jan 2023. Repeat levels ordered in her chart. Claudication: fatigue in legs with exertion. We checked arterial duplex revealing significant LE arterial disease - referred to vascular surgery for further management. They recommended continued monitoring and ambulation with continued aspirin and statin.   Continue current management. Afib: paroxysmal and found on device interrogation. Continued on Toprol for improved heart rate control at 50 mg BID and was continued on Eliquis 5mg BID for AC, but with recent GI bleed, she is not taking quite as consistently.

## 2024-01-02 ENCOUNTER — HOSPITAL ENCOUNTER (OUTPATIENT)
Dept: MRI IMAGING | Facility: HOSPITAL | Age: 69
Discharge: HOME | End: 2024-01-02
Payer: COMMERCIAL

## 2024-01-02 DIAGNOSIS — M43.16 SPONDYLOLISTHESIS AT L4-L5 LEVEL: ICD-10-CM

## 2024-01-02 DIAGNOSIS — M51.36 DDD (DEGENERATIVE DISC DISEASE), LUMBAR: ICD-10-CM

## 2024-01-02 DIAGNOSIS — M54.16 RADICULOPATHY, LUMBAR REGION: ICD-10-CM

## 2024-01-02 PROCEDURE — 72148 MRI LUMBAR SPINE W/O DYE: CPT

## 2024-01-02 PROCEDURE — G1004 CDSM NDSC: HCPCS

## 2024-01-02 NOTE — NURSING NOTE
Remote device interrogation for MRI.  Normal device function prior to MRI.  Leads and device meet all requirements per policy for MRI.  Device programmed ODO per Cardiology for MRI.  Patient has no complaints.  Vital signs monitored throughout by KIZZY Mccloud.  Normal device function post MRI.  Device reprogrammed to prior settings per Cardiology.

## 2024-01-02 NOTE — NURSING NOTE
Device programmed remotely for MRI per Cardiology RNTony. Patient continuously monitored by Radiology RN. Patient tolerated well. Device reprogrammed to prior settings per Tony Cardiology RN. Vital signs stable throughout. No complaints per patient.

## 2024-01-09 ENCOUNTER — TELEPHONE (OUTPATIENT)
Dept: PAIN MEDICINE | Facility: CLINIC | Age: 69
End: 2024-01-09

## 2024-01-09 NOTE — TELEPHONE ENCOUNTER
----- Message from John Kaiser DO sent at 1/8/2024  8:28 PM EST -----  Please notify patient MRI lumbar spine within normal limits, no major disc herniations, slippage or narrowing  If patient is stil experiencing significant pain please schedule in office followup and reeval with me or CRNP  Thank you  ----- Message -----  From: Interface, Radiology Results In  Sent: 1/8/2024  12:37 PM EST  To: John Kaiser DO

## 2024-01-09 NOTE — TELEPHONE ENCOUNTER
Caller: Patient    Doctor: Awilda    Reason for call: Pt returning nurses call. RN not available at the time of call.     Call back#: [phone]

## 2024-01-09 NOTE — TELEPHONE ENCOUNTER
S/w pt and advised of same. Per pt she has an itch under her skin of her LB  that feels sore to touch and is constant. Pt denies rash. Pt advised to try  moisturizer or benadryl cream but advised to contact PCP for evaluation. Pt verbalized understanding and appreciation

## 2024-01-17 ENCOUNTER — OFFICE VISIT (OUTPATIENT)
Dept: FAMILY MEDICINE CLINIC | Facility: CLINIC | Age: 69
End: 2024-01-17
Payer: COMMERCIAL

## 2024-01-17 VITALS
HEART RATE: 48 BPM | TEMPERATURE: 95 F | BODY MASS INDEX: 22.26 KG/M2 | HEIGHT: 62 IN | OXYGEN SATURATION: 99 % | DIASTOLIC BLOOD PRESSURE: 74 MMHG | WEIGHT: 121 LBS | SYSTOLIC BLOOD PRESSURE: 120 MMHG | RESPIRATION RATE: 16 BRPM

## 2024-01-17 DIAGNOSIS — I74.3 EMBOLISM AND THROMBOSIS OF ARTERIES OF THE LOWER EXTREMITIES (HCC): ICD-10-CM

## 2024-01-17 DIAGNOSIS — J44.9 CHRONIC OBSTRUCTIVE PULMONARY DISEASE, UNSPECIFIED COPD TYPE (HCC): Primary | ICD-10-CM

## 2024-01-17 DIAGNOSIS — I50.43 ACUTE ON CHRONIC COMBINED SYSTOLIC (CONGESTIVE) AND DIASTOLIC (CONGESTIVE) HEART FAILURE (HCC): ICD-10-CM

## 2024-01-17 DIAGNOSIS — E21.3 HYPERPARATHYROIDISM (HCC): ICD-10-CM

## 2024-01-17 DIAGNOSIS — I10 PRIMARY HYPERTENSION: ICD-10-CM

## 2024-01-17 DIAGNOSIS — Q45.3 PANCREATIC ABNORMALITY: ICD-10-CM

## 2024-01-17 DIAGNOSIS — N87.0 CIN I (CERVICAL INTRAEPITHELIAL NEOPLASIA I): ICD-10-CM

## 2024-01-17 DIAGNOSIS — Z79.4 TYPE 2 DIABETES MELLITUS WITH STAGE 3B CHRONIC KIDNEY DISEASE, WITH LONG-TERM CURRENT USE OF INSULIN (HCC): ICD-10-CM

## 2024-01-17 DIAGNOSIS — I25.10 CORONARY ARTERY DISEASE INVOLVING NATIVE CORONARY ARTERY OF NATIVE HEART WITHOUT ANGINA PECTORIS: ICD-10-CM

## 2024-01-17 DIAGNOSIS — I13.0 HYPERTENSIVE HEART AND KIDNEY DISEASE WITH HEART FAILURE AND WITH CHRONIC KIDNEY DISEASE STAGE III (HCC): ICD-10-CM

## 2024-01-17 DIAGNOSIS — F33.9 DEPRESSION, RECURRENT (HCC): ICD-10-CM

## 2024-01-17 DIAGNOSIS — N18.32 TYPE 2 DIABETES MELLITUS WITH STAGE 3B CHRONIC KIDNEY DISEASE, WITH LONG-TERM CURRENT USE OF INSULIN (HCC): ICD-10-CM

## 2024-01-17 DIAGNOSIS — N90.1 VIN II (VULVAR INTRAEPITHELIAL NEOPLASIA II): ICD-10-CM

## 2024-01-17 DIAGNOSIS — Z72.0 TOBACCO USE: ICD-10-CM

## 2024-01-17 DIAGNOSIS — E44.0 MODERATE PROTEIN-CALORIE MALNUTRITION (HCC): ICD-10-CM

## 2024-01-17 DIAGNOSIS — N18.30 HYPERTENSIVE HEART AND KIDNEY DISEASE WITH HEART FAILURE AND WITH CHRONIC KIDNEY DISEASE STAGE III (HCC): ICD-10-CM

## 2024-01-17 DIAGNOSIS — Z85.3 HISTORY OF LEFT BREAST CANCER: ICD-10-CM

## 2024-01-17 DIAGNOSIS — B97.7 HPV IN FEMALE: ICD-10-CM

## 2024-01-17 DIAGNOSIS — C50.911 MALIGNANT NEOPLASM OF RIGHT FEMALE BREAST, UNSPECIFIED ESTROGEN RECEPTOR STATUS, UNSPECIFIED SITE OF BREAST (HCC): ICD-10-CM

## 2024-01-17 DIAGNOSIS — E11.22 TYPE 2 DIABETES MELLITUS WITH STAGE 3B CHRONIC KIDNEY DISEASE, WITH LONG-TERM CURRENT USE OF INSULIN (HCC): ICD-10-CM

## 2024-01-17 DIAGNOSIS — N18.30 CKD STAGE 3 DUE TO TYPE 2 DIABETES MELLITUS (HCC): ICD-10-CM

## 2024-01-17 DIAGNOSIS — I48.0 PAF (PAROXYSMAL ATRIAL FIBRILLATION) (HCC): ICD-10-CM

## 2024-01-17 DIAGNOSIS — E11.22 CKD STAGE 3 DUE TO TYPE 2 DIABETES MELLITUS (HCC): ICD-10-CM

## 2024-01-17 LAB — SL AMB POCT HEMOGLOBIN AIC: 10.5 (ref ?–6.5)

## 2024-01-17 PROCEDURE — 99214 OFFICE O/P EST MOD 30 MIN: CPT | Performed by: INTERNAL MEDICINE

## 2024-01-17 PROCEDURE — 83036 HEMOGLOBIN GLYCOSYLATED A1C: CPT | Performed by: INTERNAL MEDICINE

## 2024-01-17 RX ORDER — ASPIRIN 325 MG
325 TABLET ORAL DAILY
Qty: 90 TABLET | Refills: 3 | Status: SHIPPED | OUTPATIENT
Start: 2024-01-17

## 2024-01-17 RX ORDER — ALBUTEROL SULFATE 90 UG/1
2 AEROSOL, METERED RESPIRATORY (INHALATION) EVERY 6 HOURS PRN
Qty: 18 G | Refills: 5 | Status: SHIPPED | OUTPATIENT
Start: 2024-01-17

## 2024-01-17 NOTE — PROGRESS NOTES
Assessment/Plan:Sofiya has A LOT of issues, especially chronic-her A1c today is 10% and she openly admits not following a diabetic diet-I added some Jardiance today, just 10 mg to see if that helps with sugars as well as proving beneficial for her cardiomyopathy history-I did go ahead and bump up her metformin too-I was a touch concerned about this with hx of cardiomyopathy but she is clinically compensated and last GFR was 58-I did ask her to get her previously ordered labwork done-follows up in June with GYN re the MARCELINO 1 and JAYLAN 1, and she had stopped Eliquis that she was taking for her hx of atrial fib (she had GI bleeding) and we will put her on 325 ASA daily-encouraged her to stop smoking, and she is current on her LDCT for lung cancer screening.  Doesn't need again until May-mammogram of the right breast was ordered before as was DXA scan-she is interested in getting the Abrysvo and shingles shot but likely needs to get them at pharmacy.           Problem List Items Addressed This Visit       History of left breast cancer    Hypertensive heart and kidney disease with heart failure and with chronic kidney disease stage III (HCC)    Acute on chronic combined systolic (congestive) and diastolic (congestive) heart failure (HCC)    CKD stage 3 due to type 2 diabetes mellitus (HCC)    Relevant Medications    metFORMIN (GLUCOPHAGE) 500 mg tablet    Empagliflozin (JARDIANCE) 10 MG TABS tablet    Empagliflozin (Jardiance) 10 MG TABS tablet    Hyperparathyroidism (HCC)    Coronary artery disease involving native coronary artery of native heart without angina pectoris    RESOLVED: Breast cancer (HCC) - left 1994    Primary hypertension    Tobacco use    PAF (paroxysmal atrial fibrillation) (HCC)    Relevant Medications    aspirin 325 mg tablet    Embolism and thrombosis of arteries of the lower extremities (HCC)    Moderate protein-calorie malnutrition (HCC)    Depression, recurrent (HCC)    HPV in female    JAYLAN II  (vulvar intraepithelial neoplasia II)    MARCELINO I (cervical intraepithelial neoplasia I)    Pancreatic abnormality     Other Visit Diagnoses       Chronic obstructive pulmonary disease, unspecified COPD type (HCC)    -  Primary    Relevant Medications    albuterol (Ventolin HFA) 90 mcg/act inhaler    umeclidinium-vilanterol 62.5-25 mcg/actuation inhaler    Type 2 diabetes mellitus with stage 3b chronic kidney disease, with long-term current use of insulin (HCC)        Relevant Medications    metFORMIN (GLUCOPHAGE) 500 mg tablet    Empagliflozin (JARDIANCE) 10 MG TABS tablet    Empagliflozin (Jardiance) 10 MG TABS tablet    Other Relevant Orders    Ambulatory Referral to Ophthalmology              Subjective:      Patient ID: Sofiya Harding is a 68 y.o. female.    Sofiya here for follow up on her host of chronic medical issues-hx of HTN, DM II (A1c today is 10), hx of vulvar intraepithelial neoplasia and MARCELINO 1 too, hx of left breast cancer, COPD, active smoker-doing ok, she admits that she doesn't follow a diabetic diet-like, at all- she also has a hx of cardiomyopathy, last EF that I saw was 35%, although she is relatively compensated symptomatically-still smokes, didn't get any of her labwork done or her mammogram or DXA scan    The following portions of the patient's history were reviewed and updated as appropriate:   Past Medical History:  She has a past medical history of Breast cancer (HCC), Diabetes mellitus (HCC), Diabetes mellitus, type 2 (HCC) (03/15/2006), GERD (gastroesophageal reflux disease), History of chemotherapy, Hyperlipidemia, and Hypertension.,  _______________________________________________________________________  Medical Problems:  does not have any pertinent problems on file.,  _______________________________________________________________________  Past Surgical History:   has a past surgical history that includes Mastectomy (Left); Mastectomy, radical (Left); Reduction mammaplasty (Right);  Uvulectomy; pr parathyroidectomy/exploration parathyroids (N/A, 04/21/2021); Cardiac electrophysiology procedure (N/A, 03/09/2022); Insert / replace / remove pacemaker; pr vulvectomy simple partial (N/A, 9/22/2023); and pr colposcopy cervix bx cervix & endocrv curretage (N/A, 9/22/2023).,  _______________________________________________________________________  Family History:  family history includes Cancer in her sister; Diabetes in her brother, father, and paternal grandmother; Diabetes type II in her father; Hypothyroidism in her mother; Leukemia in her mother; Stroke in her sister.,  _______________________________________________________________________  Social History:   reports that she has been smoking cigarettes. She started smoking about 50 years ago. She has a 12.6 pack-year smoking history. She has been exposed to tobacco smoke. She has never used smokeless tobacco. She reports that she does not drink alcohol and does not use drugs.,  _______________________________________________________________________  Allergies:  has No Known Allergies..  _______________________________________________________________________  Current Outpatient Medications   Medication Sig Dispense Refill    albuterol (Ventolin HFA) 90 mcg/act inhaler Inhale 2 puffs every 6 (six) hours as needed for wheezing 18 g 5    ascorbic acid (VITAMIN C) 250 mg tablet Take 1 tablet (250 mg total) by mouth daily 30 tablet 0    aspirin 325 mg tablet Take 1 tablet (325 mg total) by mouth daily 90 tablet 3    atorvastatin (LIPITOR) 40 mg tablet Take 1 tablet (40 mg total) by mouth daily 90 tablet 1    Empagliflozin (JARDIANCE) 10 MG TABS tablet Take 1 tablet (10 mg total) by mouth daily 30 tablet 5    Empagliflozin (Jardiance) 10 MG TABS tablet Take 1 tablet (10 mg total) by mouth in the morning 90 tablet 1    ferrous sulfate 324 (65 Fe) mg Take 1 tablet (324 mg total) by mouth 3 (three) times a day before meals 90 tablet 5    metFORMIN  "(GLUCOPHAGE) 500 mg tablet Take 2 tablets (1,000 mg total) by mouth 2 (two) times a day with meals 180 tablet 1    metoprolol succinate (TOPROL-XL) 25 mg 24 hr tablet Take 3 tablets (75 mg total) by mouth daily 180 tablet 2    pantoprazole (PROTONIX) 40 mg tablet Take 1 tablet (40 mg total) by mouth daily 90 tablet 1    umeclidinium-vilanterol 62.5-25 mcg/actuation inhaler Inhale 1 puff daily 60 blister 5     No current facility-administered medications for this visit.     _______________________________________________________________________  Review of Systems   Constitutional:  Negative for fever.   HENT: Negative.     Eyes: Negative.    Respiratory:  Positive for shortness of breath. Negative for wheezing and stridor.    Cardiovascular: Negative.    Neurological: Negative.    Hematological: Negative.    Psychiatric/Behavioral: Negative.         Objective:  Vitals:    01/17/24 1051   BP: 120/74   BP Location: Left arm   Patient Position: Sitting   Cuff Size: Standard   Pulse: (!) 48   Resp: 16   Temp: (!) 95 °F (35 °C)   TempSrc: Tympanic   SpO2: 99%   Weight: 54.9 kg (121 lb)   Height: 5' 2\" (1.575 m)     Body mass index is 22.13 kg/m².     Physical Exam  Constitutional:       Comments: Trim   HENT:      Head: Normocephalic and atraumatic.      Right Ear: External ear normal.      Left Ear: External ear normal.      Nose: Nose normal.      Mouth/Throat:      Mouth: Mucous membranes are moist.   Cardiovascular:      Rate and Rhythm: Normal rate and regular rhythm.      Heart sounds: No murmur heard.  Pulmonary:      Effort: Pulmonary effort is normal.      Comments: Decreased breath sounds  Abdominal:      Palpations: Abdomen is soft.   Musculoskeletal:      Cervical back: Normal range of motion and neck supple.      Right lower leg: No edema.      Left lower leg: No edema.   Lymphadenopathy:      Cervical: No cervical adenopathy.   Neurological:      General: No focal deficit present.      Mental Status: She is " alert and oriented to person, place, and time. Mental status is at baseline.   Psychiatric:         Mood and Affect: Mood normal.

## 2024-02-09 ENCOUNTER — REMOTE DEVICE CLINIC VISIT (OUTPATIENT)
Dept: CARDIOLOGY CLINIC | Facility: CLINIC | Age: 69
End: 2024-02-09
Payer: COMMERCIAL

## 2024-02-09 ENCOUNTER — TELEPHONE (OUTPATIENT)
Dept: CARDIOLOGY CLINIC | Facility: CLINIC | Age: 69
End: 2024-02-09

## 2024-02-09 DIAGNOSIS — Z95.810 PRESENCE OF AUTOMATIC CARDIOVERTER/DEFIBRILLATOR (AICD): Primary | ICD-10-CM

## 2024-02-09 PROCEDURE — 93295 DEV INTERROG REMOTE 1/2/MLT: CPT | Performed by: INTERNAL MEDICINE

## 2024-02-09 PROCEDURE — 93296 REM INTERROG EVL PM/IDS: CPT | Performed by: INTERNAL MEDICINE

## 2024-02-09 NOTE — PROGRESS NOTES
Results for orders placed or performed in visit on 02/09/24   Cardiac EP device report    Narrative    MDT DUAL ICD/ ACTIVE SYSTEM IS MRI CONDITIONAL  CARELINK TRANSMISSION: BATTERY VOLTAGE ADEQUATE (9.5 YRS). AP <0.1%  <0.1% (AAI-DDD 60PPM); ALL AVAILABLE LEAD PARAMETERS WITHIN NORMAL LIMITS. 1 VT-NS EPISODE, 19 @  BPM;  OPTI-VOL FLUID THRESHOLD CROSSED 1/5/24 AND ONGOING; TASK TO CHF RN; EF 35% (4/26/23 ECHO); PATIENT TAKING , METOPROLOL SUCC, NO DIURETIC; HX OF COPD WITH INHALER USE; RE CHECK IN 31 DAYS; NORMAL DEVICE FUNCTION. ES

## 2024-02-09 NOTE — TELEPHONE ENCOUNTER
----- Message from Kelley Dobbins sent at 2/9/2024  1:31 PM EST -----  Regarding: OPTI-VOL CROSS 1/5/24 & ONGOING  Patient with optivol cross since 1/5/2; no diuretics; (+) COPD; recheck in 1 mos.   Thanks     CARELINK TRANSMISSION: BATTERY VOLTAGE ADEQUATE (9.5 YRS). AP <0.1%  <0.1% (AAI-DDD 60PPM); ALL AVAILABLE LEAD PARAMETERS WITHIN NORMAL LIMITS. 1 VT-NS EPISODE, 19 @  BPM;  OPTI-VOL FLUID THRESHOLD CROSSED 1/5/24 AND ONGOING; TASK TO CHF RN; EF 35% (4/26/23 ECHO); PATIENT TAKING , METOPROLOL SUCC, NO DIURETIC; HX OF COPD WITH INHALER USE; RE CHECK IN 31 DAYS; NORMAL DEVICE FUNCTION. ES

## 2024-02-21 PROBLEM — Z12.11 COLON CANCER SCREENING: Status: RESOLVED | Noted: 2018-10-24 | Resolved: 2024-02-21

## 2024-02-21 PROBLEM — Z13.6 SCREENING FOR CARDIOVASCULAR, RESPIRATORY, AND GENITOURINARY DISEASES: Status: RESOLVED | Noted: 2018-10-24 | Resolved: 2024-02-21

## 2024-02-21 PROBLEM — Z13.83 SCREENING FOR CARDIOVASCULAR, RESPIRATORY, AND GENITOURINARY DISEASES: Status: RESOLVED | Noted: 2018-10-24 | Resolved: 2024-02-21

## 2024-02-21 PROBLEM — Z13.89 SCREENING FOR CARDIOVASCULAR, RESPIRATORY, AND GENITOURINARY DISEASES: Status: RESOLVED | Noted: 2018-10-24 | Resolved: 2024-02-21

## 2024-02-21 PROBLEM — Z00.00 MEDICARE ANNUAL WELLNESS VISIT, SUBSEQUENT: Status: RESOLVED | Noted: 2020-04-24 | Resolved: 2024-02-21

## 2024-03-04 DIAGNOSIS — I10 PRIMARY HYPERTENSION: ICD-10-CM

## 2024-03-04 DIAGNOSIS — E11.22 TYPE 2 DIABETES MELLITUS WITH STAGE 3B CHRONIC KIDNEY DISEASE, WITH LONG-TERM CURRENT USE OF INSULIN (HCC): ICD-10-CM

## 2024-03-04 DIAGNOSIS — Z79.4 TYPE 2 DIABETES MELLITUS WITH STAGE 3B CHRONIC KIDNEY DISEASE, WITH LONG-TERM CURRENT USE OF INSULIN (HCC): ICD-10-CM

## 2024-03-04 DIAGNOSIS — E78.5 HYPERLIPIDEMIA LDL GOAL <100: ICD-10-CM

## 2024-03-04 DIAGNOSIS — N18.32 TYPE 2 DIABETES MELLITUS WITH STAGE 3B CHRONIC KIDNEY DISEASE, WITH LONG-TERM CURRENT USE OF INSULIN (HCC): ICD-10-CM

## 2024-03-04 DIAGNOSIS — K92.1 BLACK STOOL: ICD-10-CM

## 2024-03-04 DIAGNOSIS — D50.0 IRON DEFICIENCY ANEMIA DUE TO CHRONIC BLOOD LOSS: ICD-10-CM

## 2024-03-05 RX ORDER — PANTOPRAZOLE SODIUM 40 MG/1
TABLET, DELAYED RELEASE ORAL
Qty: 90 TABLET | Refills: 11 | Status: SHIPPED | OUTPATIENT
Start: 2024-03-05

## 2024-03-05 RX ORDER — FERROUS SULFATE 324(65)MG
TABLET, DELAYED RELEASE (ENTERIC COATED) ORAL
Qty: 90 TABLET | Refills: 11 | Status: SHIPPED | OUTPATIENT
Start: 2024-03-05

## 2024-03-05 RX ORDER — ATORVASTATIN CALCIUM 40 MG/1
40 TABLET, FILM COATED ORAL
Qty: 90 TABLET | Refills: 11 | Status: SHIPPED | OUTPATIENT
Start: 2024-03-05

## 2024-03-05 RX ORDER — METOPROLOL SUCCINATE 25 MG/1
TABLET, EXTENDED RELEASE ORAL
Qty: 180 TABLET | Refills: 11 | Status: SHIPPED | OUTPATIENT
Start: 2024-03-05

## 2024-03-07 NOTE — PROGRESS NOTES
Monitor Summary:     Pt is medical.     12 hour chart check   PRN Norco given for general discomfort, pt states relief. VSS, afebrile, tolerating oral and IV abx. AW to lt leg cdi. Pt ambulatory using walker with SBA. Updated with plan of care, verbalized understanding.

## 2024-03-13 ENCOUNTER — REMOTE DEVICE CLINIC VISIT (OUTPATIENT)
Dept: CARDIOLOGY CLINIC | Facility: CLINIC | Age: 69
End: 2024-03-13
Payer: COMMERCIAL

## 2024-03-13 DIAGNOSIS — Z95.810 PRESENCE OF IMPLANTABLE CARDIOVERTER-DEFIBRILLATOR (ICD): Primary | ICD-10-CM

## 2024-03-13 PROCEDURE — 93297 REM INTERROG DEV EVAL ICPMS: CPT | Performed by: INTERNAL MEDICINE

## 2024-03-13 NOTE — PROGRESS NOTES
MDT DC ICD/ACTIVE SYSTEM IS MRI CONDITIONAL   CARELINK TRANSMISSION - OPTI-VOL ONLY:  OPTI-VOL WITHIN NORMAL LIMITS.  BATTERY VOLTAGE ADEQUATE (9.3 YR.).  AP 0.1%  0.1%.  ALL LEAD PARAMETERS WITHIN NORMAL LIMITS.  3 EPISODES OF NSVT (30 @ 181 BPM, 9 @ 214 BPM, 9 @ 197 BPM).  TASK TO DR DICKINSON FOR HR >200 BPM.  NORMAL DEVICE FUNCTION.  RG

## 2024-03-15 ENCOUNTER — NURSE TRIAGE (OUTPATIENT)
Age: 69
End: 2024-03-15

## 2024-03-15 NOTE — TELEPHONE ENCOUNTER
Regarding: itch  ----- Message from Alek Santos sent at 3/15/2024  9:26 AM EDT -----  Patient who was diagnosed with HPV last year, said that she started with an itch again she was prescribed nystatin last year but said it is no longer working.

## 2024-03-15 NOTE — TELEPHONE ENCOUNTER
"Reports persistant itching in vulvar area worse when urinating x one month. Appointment provided for Monday. Message routed to [employer] clerical  and team message to practice coordinator to provide lyft arrangement.  Reason for Disposition   Vaginal itching and not improved > 3 days following CARE ADVICE    Answer Assessment - Initial Assessment Questions  1. SYMPTOM: \"What's the main symptom you're concerned about?\" (e.g., pain, itching, dryness)      Vaginal itching  2. LOCATION: \"Where is the  itch located?\" (e.g., inside/outside, left/right)      vagina  3. ONSET: \"When did the  itch  start?\"      One month ago  4. PAIN: \"Is there any pain?\" If Yes, ask: \"How bad is it?\" (Scale: 1-10; mild, moderate, severe)      Denies pain  5. ITCHING: \"Is there any itching?\" If Yes, ask: \"How bad is it?\" (Scale: 1-10; mild, moderate, severe)      Yes, worse when urinating  6. CAUSE: \"What do you think is causing the discharge?\" \"Have you had the same problem before? What happened then?\"      Unsure- has not had in past  7. OTHER SYMPTOMS: \"Do you have any other symptoms?\" (e.g., fever, itching, vaginal bleeding, pain with urination, injury to genital area, vaginal foreign body)      Denies odor, denies discharge, denies vaginal bleeding  8. PREGNANCY: \"Is there any chance you are pregnant?\" \"When was your last menstrual period?\"      Post menopausal    Protocols used: Vaginal Symptoms-ADULT-[place]    "

## 2024-03-18 ENCOUNTER — TELEPHONE (OUTPATIENT)
Age: 69
End: 2024-03-18

## 2024-03-18 ENCOUNTER — OFFICE VISIT (OUTPATIENT)
Dept: OBGYN CLINIC | Facility: CLINIC | Age: 69
End: 2024-03-18
Payer: COMMERCIAL

## 2024-03-18 VITALS
WEIGHT: 110.4 LBS | DIASTOLIC BLOOD PRESSURE: 80 MMHG | SYSTOLIC BLOOD PRESSURE: 122 MMHG | HEIGHT: 62 IN | BODY MASS INDEX: 20.32 KG/M2

## 2024-03-18 DIAGNOSIS — N76.2 ACUTE VULVITIS: Primary | ICD-10-CM

## 2024-03-18 PROBLEM — Z12.31 BREAST CANCER SCREENING BY MAMMOGRAM: Status: RESOLVED | Noted: 2021-10-14 | Resolved: 2024-03-18

## 2024-03-18 PROBLEM — Z23 IMMUNIZATION DUE: Status: RESOLVED | Noted: 2018-10-24 | Resolved: 2024-03-18

## 2024-03-18 PROBLEM — Z78.0 MENOPAUSE: Status: RESOLVED | Noted: 2018-10-24 | Resolved: 2024-03-18

## 2024-03-18 PROCEDURE — 99213 OFFICE O/P EST LOW 20 MIN: CPT | Performed by: PHYSICIAN ASSISTANT

## 2024-03-18 PROCEDURE — 87510 GARDNER VAG DNA DIR PROBE: CPT | Performed by: PHYSICIAN ASSISTANT

## 2024-03-18 PROCEDURE — 87480 CANDIDA DNA DIR PROBE: CPT | Performed by: PHYSICIAN ASSISTANT

## 2024-03-18 PROCEDURE — 87660 TRICHOMONAS VAGIN DIR PROBE: CPT | Performed by: PHYSICIAN ASSISTANT

## 2024-03-18 NOTE — PROGRESS NOTES
"Assessment/Plan:    No problem-specific Assessment & Plan notes found for this encounter.       Diagnoses and all orders for this visit:    Acute vulvitis  -     Vaginosis DNA Probe        Vaginal cultures done; we will call with results.  Recommended vulvar biopsy secondary to possible lichen sclerosus.  She will schedule today.  Call if symptoms worsen or change.    Subjective:      Patient ID: Sofiya Harding is a 68 y.o. female.    Patient is here with complaint of vulvar itching for the last month.  States itching is around clitoral boyd.  Has been using a topical Nystatin without much relief. S/p partial vulvectomy in September 2023 secondary to JAYLAN 2/3.  Denies urinary symptoms, vaginal discharge, odor, vaginal bleeding, pelvic pain, abdominal pain, n/v, and fever/chills.  Patient states blood sugars are not under good control.  Hgb A1c in January was 10.5.        The following portions of the patient's history were reviewed and updated as appropriate: allergies, current medications, past family history, past medical history, past social history, past surgical history, and problem list.    Review of Systems   Constitutional:  Negative for chills and fever.   Gastrointestinal:  Negative for abdominal distention, abdominal pain, nausea and vomiting.   Genitourinary:  Negative for difficulty urinating, dysuria, frequency, genital sores, hematuria, menstrual problem, pelvic pain, urgency, vaginal bleeding, vaginal discharge and vaginal pain.        Vulvar itching         Objective:      /80 (BP Location: Left arm, Patient Position: Sitting, Cuff Size: Adult)   Ht 5' 2\" (1.575 m)   Wt 50.1 kg (110 lb 6.4 oz)   LMP  (LMP Unknown)   BMI 20.19 kg/m²          Physical Exam  Vitals reviewed. Exam conducted with a chaperone present.   Constitutional:       Appearance: Normal appearance. She is well-developed and normal weight.   Genitourinary:     Pubic Area: No rash.       Labia:         Right: Lesion present. " No rash, tenderness or injury.         Left: No rash, tenderness, lesion or injury.       Vagina: Vaginal discharge (Mild, white, thick) present. No erythema, tenderness or bleeding.      Cervix: Normal.      Uterus: Normal.       Adnexa: Right adnexa normal and left adnexa normal.        Right: No mass, tenderness or fullness.          Left: No mass, tenderness or fullness.            Comments: White patch of skin  Lymphadenopathy:      Lower Body: No right inguinal adenopathy. No left inguinal adenopathy.   Skin:     General: Skin is warm and dry.   Neurological:      Mental Status: She is alert and oriented to person, place, and time.   Psychiatric:         Mood and Affect: Mood normal.         Behavior: Behavior normal. Behavior is cooperative.         Thought Content: Thought content normal.         Judgment: Judgment normal.

## 2024-03-19 ENCOUNTER — NURSE TRIAGE (OUTPATIENT)
Age: 69
End: 2024-03-19

## 2024-03-19 DIAGNOSIS — J44.9 CHRONIC OBSTRUCTIVE PULMONARY DISEASE, UNSPECIFIED COPD TYPE (HCC): ICD-10-CM

## 2024-03-19 RX ORDER — ALBUTEROL SULFATE 90 UG/1
2 AEROSOL, METERED RESPIRATORY (INHALATION) EVERY 6 HOURS PRN
Qty: 18 G | Refills: 5 | Status: SHIPPED | OUTPATIENT
Start: 2024-03-19

## 2024-03-19 NOTE — TELEPHONE ENCOUNTER
"Pt called for medication refills. She is out of all of her inhalers. Pt is having moderate difficulty breathing but states that it feels like it is at her baseline. Pt states she has a chronic cough but denies chest pain. Will send in med refill RX for her inhalers. Please see if Dr. Vaca would like to see her for an appt. If so she will need a lift ride.     Reason for Disposition   MODERATE longstanding difficulty breathing (e.g., speaks in phrases, SOB even at rest, pulse 100-120) and SAME as normal    Answer Assessment - Initial Assessment Questions  1. RESPIRATORY STATUS: \"Describe your breathing?\" (e.g., wheezing, shortness of breath, unable to speak, severe coughing)       SOB, coughing  2. ONSET: \"When did this breathing problem begin?\"       Everyday  3. PATTERN \"Does the difficult breathing come and go, or has it been constant since it started?\"       Constant  4. SEVERITY: \"How bad is your breathing?\" (e.g., mild, moderate, severe)     - MILD: No SOB at rest, mild SOB with walking, speaks normally in sentences, can lay down, no retractions, pulse < 100.     - MODERATE: SOB at rest, SOB with minimal exertion and prefers to sit, cannot lie down flat, speaks in phrases, mild retractions, audible wheezing, pulse 100-120.     - SEVERE: Very SOB at rest, speaks in single words, struggling to breathe, sitting hunched forward, retractions, pulse > 120       Moderate  5. RECURRENT SYMPTOM: \"Have you had difficulty breathing before?\" If Yes, ask: \"When was the last time?\" and \"What happened that time?\"       Yes, chronic  6. CARDIAC HISTORY: \"Do you have any history of heart disease?\" (e.g., heart attack, angina, bypass surgery, angioplasty)       HTN, CHF  7. LUNG HISTORY: \"Do you have any history of lung disease?\"  (e.g., pulmonary embolus, asthma, emphysema)      COPD  8. CAUSE: \"What do you think is causing the breathing problem?\"       COPD  9. OTHER SYMPTOMS: \"Do you have any other symptoms? (e.g., " "dizziness, runny nose, cough, chest pain, fever)      Chronic cough  10. PREGNANCY: \"Is there any chance you are pregnant?\" \"When was your last menstrual period?\"        NA  11. TRAVEL: \"Have you traveled out of the country in the last month?\" (e.g., travel history, exposures)        NA    Protocols used: Breathing Difficulty-ADULT-[place]    "

## 2024-03-19 NOTE — TELEPHONE ENCOUNTER
LM telling patient that she should go to ER or be seen if she is having significant trouble breathing

## 2024-03-19 NOTE — TELEPHONE ENCOUNTER
Person from [facility] refilled her inhalers but if she's having significant trouble breathing she should either go to the ER or be seen

## 2024-03-19 NOTE — TELEPHONE ENCOUNTER
Regarding: difficulty breathing and coughing  ----- Message from Terra Waldrop sent at 3/19/2024 12:24 PM EDT -----  Pt called for refills of her inhalers, anoro and albuteral.  Then she asked if she can have something stronger, she said she has been coughing a lot and having trouble breathing.  Unable to warm transfer to nurse triage.    ( I did not send refill request because she requested something stronger and then stated she was having trouble breathing).

## 2024-03-19 NOTE — TELEPHONE ENCOUNTER
Medication Refill Request     Name albuterol    Dose/Frequency 2 puffs every 6 hours prn wheezing   Quantity 18 grams   Verified pharmacy   [x]  Verified ordering Provider   [x]  Does patient have enough for the next 3 days? Yes [] No [x]    Medication Refill Request     Name umeclidinium-vilanterol 62.5-25mg   Dose/Frequency 1 puff daily   Quantity 60 blisters   Verified pharmacy   [x]  Verified ordering Provider   [x]  Does patient have enough for the next 3 days? Yes [] No [x]        Pt is completely out of COPD meds.  Please refill asap.

## 2024-03-20 LAB
CANDIDA RRNA VAG QL PROBE: NOT DETECTED
G VAGINALIS RRNA GENITAL QL PROBE: DETECTED
T VAGINALIS RRNA GENITAL QL PROBE: NOT DETECTED

## 2024-03-25 ENCOUNTER — TELEPHONE (OUTPATIENT)
Dept: OBGYN CLINIC | Facility: CLINIC | Age: 69
End: 2024-03-25

## 2024-03-25 DIAGNOSIS — N76.0 BV (BACTERIAL VAGINOSIS): Primary | ICD-10-CM

## 2024-03-25 DIAGNOSIS — B96.89 BV (BACTERIAL VAGINOSIS): Primary | ICD-10-CM

## 2024-03-25 RX ORDER — METRONIDAZOLE 7.5 MG/G
1 GEL VAGINAL DAILY
Qty: 5 G | Refills: 0 | Status: SHIPPED | OUTPATIENT
Start: 2024-03-25 | End: 2024-03-30

## 2024-03-25 NOTE — TELEPHONE ENCOUNTER
Spoke with patient regarding vaginal culture results - positive for BV.  Rx for Metro Gel daily x 5 days sent to pharmacy at patient request.  F/u for vulvar biopsy as planned.  Call with further questions.

## 2024-04-02 ENCOUNTER — PROCEDURE VISIT (OUTPATIENT)
Dept: OBGYN CLINIC | Facility: CLINIC | Age: 69
End: 2024-04-02
Payer: COMMERCIAL

## 2024-04-02 ENCOUNTER — TELEPHONE (OUTPATIENT)
Age: 69
End: 2024-04-02

## 2024-04-02 VITALS
HEIGHT: 62 IN | SYSTOLIC BLOOD PRESSURE: 122 MMHG | DIASTOLIC BLOOD PRESSURE: 80 MMHG | BODY MASS INDEX: 20.17 KG/M2 | WEIGHT: 109.6 LBS

## 2024-04-02 DIAGNOSIS — L29.2 VULVAR ITCHING: Primary | ICD-10-CM

## 2024-04-02 PROCEDURE — 99212 OFFICE O/P EST SF 10 MIN: CPT | Performed by: OBSTETRICS & GYNECOLOGY

## 2024-04-02 NOTE — PROGRESS NOTES
"Assessment/Plan:     There are no diagnoses linked to this encounter.      67-year-old female  History of breast cancer left mastectomy  ASCUS HPV positive/colpo MARCELINO-1  Vaginal lesion at the level of the hymen  Vulvar lesion JAYLAN 2/3 status post simple vulvectomy  Vulvar itching/irritation resolved asymptomatic today  Plan  Return to office for annual exam  Return to office earlier if vulvar itching recur    Subjective:      Patient ID: Sofiya Harding is a 68 y.o. female.    HPI  68-year-old female present to the office today follow-up on vulvar itching  Patient has history of JAYLAN 2/3 treated by GYN oncology  Patient was complaining of vulvar itching around the clitoris area  Patient was given MetroGel secondary to bacterial vaginosis patient said symptoms significantly improved denies any vaginal itching odor or discharge denies any vulvar itching or irritation  As patient is currently asymptomatic we will Hold off on the vulvar biopsy patient has appointment schedule for annual exam repeat Pap smear and genital exam in September if patient has any recurrence of the itching I would recommend to return to office earlier for vulvar biopsy otherwise to keep her appointment as scheduled  Plan explained and discussed with patient all patient questions answered and patient was satisfied        The following portions of the patient's history were reviewed and updated as appropriate: allergies, current medications, past family history, past medical history, past social history, past surgical history and problem list.    Review of Systems      No complaint today denies any urgency frequency or dysuria  Denies any vaginal itching odor or discharge  Denies any pelvic pain    Objective:      /80 (BP Location: Left arm, Patient Position: Sitting, Cuff Size: Adult)   Ht 5' 2\" (1.575 m)   Wt 49.7 kg (109 lb 9.6 oz)   LMP  (LMP Unknown)   BMI 20.05 kg/m²          Physical Exam  Constitutional:       Appearance: She is " well-developed.   Abdominal:      General: There is no distension.      Palpations: Abdomen is soft.      Tenderness: There is no abdominal tenderness.   Genitourinary:     Labia:         Right: No rash, tenderness or lesion.         Left: No rash, tenderness or lesion.       Vagina: No signs of injury. No vaginal discharge, erythema or tenderness.      Adnexa:         Right: No mass, tenderness or fullness.          Left: No mass, tenderness or fullness.        Comments: No abnormality noted on external genitalia at the exam today  Neurological:      Mental Status: She is alert and oriented to person, place, and time.   Psychiatric:         Behavior: Behavior normal.

## 2024-04-02 NOTE — TELEPHONE ENCOUNTER
Patient's LYFY did not show.  Called [facility].  As soon as they have confirmation, they will call patient back

## 2024-04-11 ENCOUNTER — RA CDI HCC (OUTPATIENT)
Dept: OTHER | Facility: HOSPITAL | Age: 69
End: 2024-04-11

## 2024-04-11 NOTE — PROGRESS NOTES
E11.51, E11.65,    HCC coding opportunities          Chart Reviewed number of suggestions sent to Provider: 2     Patients Insurance     Medicare Insurance: [employer]

## 2024-05-13 ENCOUNTER — REMOTE DEVICE CLINIC VISIT (OUTPATIENT)
Dept: CARDIOLOGY CLINIC | Facility: CLINIC | Age: 69
End: 2024-05-13
Payer: COMMERCIAL

## 2024-05-13 DIAGNOSIS — Z95.810 AICD (AUTOMATIC CARDIOVERTER/DEFIBRILLATOR) PRESENT: Primary | ICD-10-CM

## 2024-05-13 PROCEDURE — 93296 REM INTERROG EVL PM/IDS: CPT | Performed by: INTERNAL MEDICINE

## 2024-05-13 PROCEDURE — 93295 DEV INTERROG REMOTE 1/2/MLT: CPT | Performed by: INTERNAL MEDICINE

## 2024-05-13 NOTE — PROGRESS NOTES
Results for orders placed or performed in visit on 05/13/24   Cardiac EP device report    Narrative    MDT DC ICD/ACTIVE SYSTEM IS MRI CONDITIONAL  CARELINK TRANSMISSION: BATTERY VOLTAGE ADEQUATE (9.2 YRS). AP-0.9%, -0.4%. ALL AVAILABLE LEAD PARAMETERS WITHIN NORMAL LIMITS. NO SIGNIFICANT HIGH RATE EPISODES. OPTI-VOL WITHIN NORMAL LIMITS. NORMAL DEVICE FUNCTION. GV

## 2024-05-28 ENCOUNTER — NURSE TRIAGE (OUTPATIENT)
Age: 69
End: 2024-05-28

## 2024-05-28 NOTE — TELEPHONE ENCOUNTER
"Spoke with patient who reports 1.5 weeks of vaginal itching.  She reports a sticky feeling, but denies a discharge/odor.  She was advised to try Monistat 7 and to call back if it doesn't help or if her symptoms become worse.  She understood and had no additional questions or concerns at this time.    Reason for Disposition   Symptoms of a yeast infection (i.e., itchy, white discharge, not bad smelling) and feels like prior vaginal yeast infections    Answer Assessment - Initial Assessment Questions  1. SYMPTOM: \"What's the main symptom you're concerned about?\" (e.g., pain, itching, dryness)     vaginal itching    3. ONSET: \"When did the  itching  start?\"      1.5 weeks ago  4. PAIN: \"Is there any pain?\" If Yes, ask: \"How bad is it?\" (Scale: 1-10; mild, moderate, severe)      no  5. ITCHING: \"Is there any itching?\" If Yes, ask: \"How bad is it?\" (Scale: 1-10; mild, moderate, severe)      9/10  6. CAUSE: \"What do you think is causing the discharge?\" \"Have you had the same problem before? What happened then?\"      unsure  7. OTHER SYMPTOMS: \"Do you have any other symptoms?\" (e.g., fever, itching, vaginal bleeding, pain with urination, injury to genital area, vaginal foreign body)      no    Protocols used: Vaginal Symptoms-ADULT-[place]    "

## 2024-05-31 DIAGNOSIS — I50.9 CHF EXACERBATION (HCC): ICD-10-CM

## 2024-05-31 NOTE — TELEPHONE ENCOUNTER
Refill must be reviewed and completed by the office or provider. The refill is unable to be approved or denied by the medication management team.    The original prescription was discontinued on 1/17/2024 by Bertha Vaca MD

## 2024-06-03 RX ORDER — FUROSEMIDE 20 MG/1
TABLET ORAL
Qty: 90 TABLET | Refills: 11 | Status: SHIPPED | OUTPATIENT
Start: 2024-06-03

## 2024-06-19 ENCOUNTER — TELEPHONE (OUTPATIENT)
Dept: FAMILY MEDICINE CLINIC | Facility: CLINIC | Age: 69
End: 2024-06-19

## 2024-06-26 NOTE — ASSESSMENT & PLAN NOTE
Presents with acute kidney injury, BUN 53/C r 6.7. FENa 4.1% suggestive of intrinsic etiology.  - Nephrology consulted  - Hold ARB.  - treat underlying UTI  - slowly improving  - recheck in AM     · Currently smoking about 6 cigarettes a day but is requesting nicotine patch of 14

## 2024-07-08 ENCOUNTER — TELEPHONE (OUTPATIENT)
Dept: CARDIOLOGY CLINIC | Facility: CLINIC | Age: 69
End: 2024-07-08

## 2024-07-15 ENCOUNTER — TELEPHONE (OUTPATIENT)
Dept: CARDIOLOGY CLINIC | Facility: CLINIC | Age: 69
End: 2024-07-15

## 2024-08-10 DIAGNOSIS — E11.22 TYPE 2 DIABETES MELLITUS WITH STAGE 3B CHRONIC KIDNEY DISEASE, WITH LONG-TERM CURRENT USE OF INSULIN (HCC): ICD-10-CM

## 2024-08-10 DIAGNOSIS — Z79.4 TYPE 2 DIABETES MELLITUS WITH STAGE 3B CHRONIC KIDNEY DISEASE, WITH LONG-TERM CURRENT USE OF INSULIN (HCC): ICD-10-CM

## 2024-08-10 DIAGNOSIS — N18.32 TYPE 2 DIABETES MELLITUS WITH STAGE 3B CHRONIC KIDNEY DISEASE, WITH LONG-TERM CURRENT USE OF INSULIN (HCC): ICD-10-CM

## 2024-08-11 RX ORDER — EMPAGLIFLOZIN 10 MG/1
10 TABLET, FILM COATED ORAL EVERY MORNING
Qty: 90 TABLET | Refills: 1 | Status: SHIPPED | OUTPATIENT
Start: 2024-08-11

## 2024-08-12 ENCOUNTER — REMOTE DEVICE CLINIC VISIT (OUTPATIENT)
Dept: CARDIOLOGY CLINIC | Facility: CLINIC | Age: 69
End: 2024-08-12
Payer: COMMERCIAL

## 2024-08-12 DIAGNOSIS — Z95.810 PRESENCE OF AUTOMATIC CARDIOVERTER/DEFIBRILLATOR (AICD): Primary | ICD-10-CM

## 2024-08-12 PROCEDURE — 93296 REM INTERROG EVL PM/IDS: CPT | Performed by: INTERNAL MEDICINE

## 2024-08-12 PROCEDURE — 93295 DEV INTERROG REMOTE 1/2/MLT: CPT | Performed by: INTERNAL MEDICINE

## 2024-08-12 NOTE — PROGRESS NOTES
Results for orders placed or performed in visit on 08/12/24   Cardiac EP device report    Narrative    MDT DC ICD/ACTIVE SYSTEM IS MRI CONDITIONAL  CARELINK TRANSMISSION: BATTERY VOLTAGE ADEQUATE. (8.8 YRS) AP 2%  76%. ALL AVAILABLE LEAD PARAMETERS WITHIN NORMAL LIMITS. NO SIGNIFICANT HIGH RATE EPISODES. OPTI-VOL WITHIN NORMAL LIMITS. NORMAL DEVICE FUNCTION.---GONZALEZ

## 2024-09-04 ENCOUNTER — TELEPHONE (OUTPATIENT)
Age: 69
End: 2024-09-04

## 2024-09-09 ENCOUNTER — ANNUAL EXAM (OUTPATIENT)
Dept: OBGYN CLINIC | Facility: CLINIC | Age: 69
End: 2024-09-09
Payer: COMMERCIAL

## 2024-09-09 VITALS
DIASTOLIC BLOOD PRESSURE: 78 MMHG | WEIGHT: 100 LBS | SYSTOLIC BLOOD PRESSURE: 128 MMHG | HEIGHT: 62 IN | BODY MASS INDEX: 18.4 KG/M2

## 2024-09-09 DIAGNOSIS — L29.2 VULVAR ITCHING: ICD-10-CM

## 2024-09-09 DIAGNOSIS — Z01.419 ENCOUNTER FOR GYNECOLOGICAL EXAMINATION WITHOUT ABNORMAL FINDING: Primary | ICD-10-CM

## 2024-09-09 DIAGNOSIS — Z12.31 SCREENING MAMMOGRAM, ENCOUNTER FOR: ICD-10-CM

## 2024-09-09 PROCEDURE — G0124 SCREEN C/V THIN LAYER BY MD: HCPCS | Performed by: PATHOLOGY

## 2024-09-09 PROCEDURE — G0145 SCR C/V CYTO,THINLAYER,RESCR: HCPCS | Performed by: PATHOLOGY

## 2024-09-09 PROCEDURE — 87480 CANDIDA DNA DIR PROBE: CPT | Performed by: OBSTETRICS & GYNECOLOGY

## 2024-09-09 PROCEDURE — G0476 HPV COMBO ASSAY CA SCREEN: HCPCS | Performed by: OBSTETRICS & GYNECOLOGY

## 2024-09-09 PROCEDURE — 87510 GARDNER VAG DNA DIR PROBE: CPT | Performed by: OBSTETRICS & GYNECOLOGY

## 2024-09-09 PROCEDURE — G0101 CA SCREEN;PELVIC/BREAST EXAM: HCPCS | Performed by: OBSTETRICS & GYNECOLOGY

## 2024-09-09 PROCEDURE — 87660 TRICHOMONAS VAGIN DIR PROBE: CPT | Performed by: OBSTETRICS & GYNECOLOGY

## 2024-09-09 RX ORDER — NYSTATIN AND TRIAMCINOLONE ACETONIDE 100000; 1 [USP'U]/G; MG/G
OINTMENT TOPICAL 2 TIMES DAILY
Qty: 30 G | Refills: 1 | Status: SHIPPED | OUTPATIENT
Start: 2024-09-09 | End: 2024-09-12

## 2024-09-09 NOTE — PROGRESS NOTES
"Amarjit Harding is a 69 y.o. female who presents for annual well woman exam.       History of abnormal Pap smear: yes -   H/o BREAST CANCER DECLINES GENETIC  Menstrual History:  OB History          0    Para   0    Term   0       0    AB   0    Living   0         SAB   0    IAB   0    Ectopic   0    Multiple   0    Live Births   0               No LMP recorded (lmp unknown). Patient is postmenopausal.           Review of Systems  Review of Systems   Constitutional:  Negative for activity change, appetite change, chills, fatigue and fever.   Respiratory:  Negative for apnea, cough, chest tightness and shortness of breath.    Cardiovascular:  Negative for chest pain, palpitations and leg swelling.   Gastrointestinal:  Negative for abdominal pain, constipation, diarrhea, nausea and vomiting.   Genitourinary:  Negative for difficulty urinating, dysuria, flank pain, frequency, hematuria and urgency.        VULVAR ITCHING STARTED LAST MONTH INTERMITTENT    Neurological:  Negative for dizziness, seizures, syncope, light-headedness, numbness and headaches.   Psychiatric/Behavioral:  Negative for agitation and confusion.           Objective      /78 (BP Location: Left arm, Patient Position: Sitting, Cuff Size: Adult)   Ht 5' 2\" (1.575 m)   Wt 45.4 kg (100 lb)   LMP  (LMP Unknown)   BMI 18.29 kg/m²     Physical Exam  OBGyn Exam     General:   alert and oriented, in no acute distress, alert, appears stated age, and cooperative   Heart: regular rate and rhythm, S1, S2 normal, no murmur, click, rub or gallop   Lungs: clear to auscultation bilaterally   Abdomen: soft, non-tender, without masses or organomegaly   Vulva: normal   Vagina: normal mucosa, normal discharge   Cervix: no cervical motion tenderness and no lesions   Uterus: normal size   Adnexa:  Breast Exam:  normal adnexa  Absent right breast from mastectomy, left breast augmentation scar behind the nipple clip felt on exam no other " abnormality.                Assessment      @well woman@ .   69-year-old female\  Annual exam  DM  CHF  History of breast cancer left mastectomy  ASCUS HPV positive/colpo MARCELINO-1  Vaginal lesion at the level of the hymen  Vulvar lesion JAYLAN 2/3 status post simple vulvectomy  Vulvar itching/irritation    Plan   Pap/HPV  Diet/exercise  Calcium/vitamin D  Female hygiene reviewed and discussed with patient  Mammogram on the left breast  Nystatin/triamcinolone  If symptoms did not improve in 2 to 3 weeks return to office for vulvar biopsy otherwise  return to office in 6 months for follow-up and vulvar exam   All questions answered.     There are no Patient Instructions on file for this visit.

## 2024-09-10 ENCOUNTER — TELEPHONE (OUTPATIENT)
Age: 69
End: 2024-09-10

## 2024-09-10 LAB
CANDIDA RRNA VAG QL PROBE: NOT DETECTED
G VAGINALIS RRNA GENITAL QL PROBE: DETECTED
HPV HR 12 DNA CVX QL NAA+PROBE: NEGATIVE
HPV16 DNA CVX QL NAA+PROBE: NEGATIVE
HPV18 DNA CVX QL NAA+PROBE: NEGATIVE
T VAGINALIS RRNA GENITAL QL PROBE: NOT DETECTED

## 2024-09-10 NOTE — TELEPHONE ENCOUNTER
Reason for call:   [x] Prior Auth  [] Other:     Medication: nystatin-triamcinolone (MYCOLOG-II) ointment     Dose/Frequency:  Apply topically 2 (two) times a day     Quantity: 30g    Ordering Provider:   [] PCP/Provider -   [x] Speciality/Provider - Tamiko Redding MD

## 2024-09-10 NOTE — TELEPHONE ENCOUNTER
PA for nystatin-triamcinolone (MYCOLOG-II) ointment SUBMITTED     via    []CMM-KEY:   [x][employer]-Case ID # [id number]   []Faxed to plan   []Other website   []Phone call Case ID #     Office notes sent, clinical questions answered. Awaiting determination    Turnaround time for your insurance to make a decision on your Prior Authorization can take 7-21 business days.

## 2024-09-11 DIAGNOSIS — N90.89 VULVAR IRRITATION: Primary | ICD-10-CM

## 2024-09-11 RX ORDER — NYSTATIN 100000 U/G
CREAM TOPICAL 2 TIMES DAILY
Qty: 30 G | Refills: 0 | Status: SHIPPED | OUTPATIENT
Start: 2024-09-11 | End: 2024-09-12

## 2024-09-11 NOTE — TELEPHONE ENCOUNTER
Nystatin called to the pharmacy without triamcinolone please make sure patient is using medication twice daily for 2 weeks I would like to see her back in 6 months for follow-up I would like to see her in 2 weeks if symptoms did not improve

## 2024-09-11 NOTE — TELEPHONE ENCOUNTER
PA for nystatin-triamcinolone ointment DENIED    Reason:(Screenshot if applicable)        Message sent to office clinical pool Yes    Denial letter scanned into Media Yes    Appeal started No (Provider will need to decide if appeal is warranted and send clinical documentation to Prior Authorization Team for initiation.)    **Please follow up with your patient regarding denial and next steps**

## 2024-09-12 DIAGNOSIS — N90.89 VULVAR IRRITATION: Primary | ICD-10-CM

## 2024-09-12 DIAGNOSIS — N76.0 BV (BACTERIAL VAGINOSIS): Primary | ICD-10-CM

## 2024-09-12 DIAGNOSIS — B96.89 BV (BACTERIAL VAGINOSIS): Primary | ICD-10-CM

## 2024-09-12 RX ORDER — METRONIDAZOLE 500 MG/1
500 TABLET ORAL EVERY 12 HOURS SCHEDULED
Qty: 14 TABLET | Refills: 0 | Status: SHIPPED | OUTPATIENT
Start: 2024-09-12 | End: 2024-09-19

## 2024-09-12 RX ORDER — CLOTRIMAZOLE AND BETAMETHASONE DIPROPIONATE 10; .64 MG/G; MG/G
CREAM TOPICAL 2 TIMES DAILY
Qty: 15 G | Refills: 0 | Status: SHIPPED | OUTPATIENT
Start: 2024-09-12

## 2024-09-12 NOTE — TELEPHONE ENCOUNTER
Left message , per HIPAA, from Dr. Redding on patient's vm with instructions regarding  nystatin without triamcinolone ,  Patient to call back with any further questions or concerns. Instructions given to schedule follow up visit

## 2024-09-13 LAB
LAB AP GYN PRIMARY INTERPRETATION: ABNORMAL
Lab: ABNORMAL
PATH INTERP SPEC-IMP: ABNORMAL

## 2024-11-15 NOTE — TELEPHONE ENCOUNTER
Fyi: Received a call from [facility] concerned because pt was taking Jardiance for HF, but she could not afford Jardiance and endocrinology changed medication to [place]     Binta# [phone] current antihypertensive regimen: losartan 50mg daily, amlodipine 10mg daily  regimen changes:   intolerance:   future titration/work-up plan:

## 2024-12-17 ENCOUNTER — VBI (OUTPATIENT)
Dept: ADMINISTRATIVE | Facility: OTHER | Age: 69
End: 2024-12-17

## 2024-12-17 NOTE — TELEPHONE ENCOUNTER
12/17/24 2:53 PM     Chart reviewed for Hemoglobin A1c ; nothing is submitted to the patient's insurance at this time.     Bouchra Andujar   [facility]

## 2024-12-20 ENCOUNTER — VBI (OUTPATIENT)
Dept: ADMINISTRATIVE | Facility: OTHER | Age: 69
End: 2024-12-20

## 2024-12-20 NOTE — TELEPHONE ENCOUNTER
12/20/24 11:44 AM     Chart reviewed for Diabetic Eye Exam ; nothing is submitted to the patient's insurance at this time.     Sukumar Stiels MA   [facility]

## 2024-12-26 ENCOUNTER — RESULTS FOLLOW-UP (OUTPATIENT)
Dept: CARDIOLOGY CLINIC | Facility: CLINIC | Age: 69
End: 2024-12-26

## 2024-12-26 ENCOUNTER — REMOTE DEVICE CLINIC VISIT (OUTPATIENT)
Dept: CARDIOLOGY CLINIC | Facility: CLINIC | Age: 69
End: 2024-12-26
Payer: COMMERCIAL

## 2024-12-26 DIAGNOSIS — Z95.810 AICD (AUTOMATIC CARDIOVERTER/DEFIBRILLATOR) PRESENT: Primary | ICD-10-CM

## 2024-12-26 PROCEDURE — 93295 DEV INTERROG REMOTE 1/2/MLT: CPT | Performed by: INTERNAL MEDICINE

## 2024-12-26 PROCEDURE — 93296 REM INTERROG EVL PM/IDS: CPT | Performed by: INTERNAL MEDICINE

## 2024-12-26 NOTE — PROGRESS NOTES
Results for orders placed or performed in visit on 12/26/24   Cardiac EP device report    Narrative    MDT DC ICD/ACTIVE SYSTEM IS MRI CONDITIONAL  CARELINK TRANSMISSION: BATTERY VOLTAGE ADEQUATE (8.1 YRS). AP-7%, -86% (>40% MVP@60PPM). ALL AVAILABLE LEAD PARAMETERS WITHIN NORMAL LIMITS. NO SIGNIFICANT HIGH RATE EPISODES. OPTI-VOL WITHIN NORMAL LIMITS. NORMAL DEVICE FUNCTION. GV

## 2025-01-28 ENCOUNTER — TELEPHONE (OUTPATIENT)
Dept: OBGYN CLINIC | Facility: CLINIC | Age: 70
End: 2025-01-28

## 2025-01-28 DIAGNOSIS — N90.89 VULVAR IRRITATION: ICD-10-CM

## 2025-01-28 NOTE — TELEPHONE ENCOUNTER
Reason for call:   [x] Refill   [] Prior Auth  [] Other:     Office:   [] PCP/Provider -   [x] Specialty/Provider - OBGYN    Medication: clotrimazole-betamethasone (LOTRISONE) 1-0.05 % cream     Dose/Frequency: : Apply topically 2 (two) times a day     Quantity: 15 Gr    Pharmacy: [facility]/pharmacy #[id number] - [place] - [address] [place] [address] RD     Does the patient have enough for 3 days?   [] Yes   [x] No - Send as HP to POD

## 2025-01-28 NOTE — TELEPHONE ENCOUNTER
Patient called the RX Refill Line. Message is being forwarded to the office.     Patient is requesting a refill for metroNIDAZOLE (FLAGYL) 500 mg tablet. The medication is not on patients current medication list. Please review. Patient uses [facility]/pharmacy #[id number] - [place] - [address] [facility] [address]     Please contact patient at [phone]

## 2025-01-30 ENCOUNTER — TELEPHONE (OUTPATIENT)
Dept: OBGYN CLINIC | Facility: CLINIC | Age: 70
End: 2025-01-30

## 2025-01-30 DIAGNOSIS — N76.0 BV (BACTERIAL VAGINOSIS): Primary | ICD-10-CM

## 2025-01-30 DIAGNOSIS — B96.89 BV (BACTERIAL VAGINOSIS): Primary | ICD-10-CM

## 2025-01-30 RX ORDER — METRONIDAZOLE 500 MG/1
500 TABLET ORAL EVERY 12 HOURS SCHEDULED
Qty: 14 TABLET | Refills: 0 | Status: SHIPPED | OUTPATIENT
Start: 2025-01-30 | End: 2025-02-06

## 2025-01-30 RX ORDER — CLOTRIMAZOLE AND BETAMETHASONE DIPROPIONATE 10; .64 MG/G; MG/G
CREAM TOPICAL 2 TIMES DAILY
Qty: 15 G | Refills: 0 | OUTPATIENT
Start: 2025-01-30

## 2025-01-30 NOTE — TELEPHONE ENCOUNTER
.Patient called to request a refill for their Flagyl  advised a refill was requested on 1/28/2025  and is pending approval. Patient verbalized understanding and is in agreement.      Pt complains of vaginal itching

## 2025-01-30 NOTE — TELEPHONE ENCOUNTER
Patient called Rx refill line to check the status pf this - advised it was denied - pt would like a call back to sched an appt      [FreeTextEntry1] : THis is a 21 year old man, autistic spectrum, non verbal at baseline, patient with severe chronic ITP on Tavalise and Doptlet, receiving IVIG therapy today for recently platelet count 8-16 at the group home lately.  Mother was concerned over the possibility of the IVIG containing donor COVID spike proteins. Explained to her for a long time that this is essentially negligible.  She requests delaying IVIG treatment unless the pallets olga significantly  below 10. Informed her that doing this will increase the possibly of fatal hemorrhaging including intracranial hemorrhaging which can be fatal.    Mother expressed her understanding.   Mother did point out that patient's plates were in the single digit range for a fair amount of time before the repeat treatment with Rituxan at the hospital, and patient had not had any significant bleeding then either. \par Can check the platelets at the group home and then trtaet after the plaett count returns < 10 twice consecutively.  \par \par Continue Tavallise and Doptlet as currently prescribed. Asked nikkint's mother to consider the rituxan treatment again, which is a humanized mouse antibody.  Repeat treatment can be made in an attempt to decrease the platelet clearing effect and increase the platelet count to prevent bleeding.  \par \par Spent > 60 minutes in direct patient care and and addressed all questions and concerns.  >50% of this time was in direct face to face contact with patient during exam and counseling.

## 2025-02-05 DIAGNOSIS — N90.89 VULVAR IRRITATION: ICD-10-CM

## 2025-02-05 RX ORDER — CLOTRIMAZOLE AND BETAMETHASONE DIPROPIONATE 10; .64 MG/G; MG/G
CREAM TOPICAL 2 TIMES DAILY
Qty: 15 G | Refills: 0 | Status: SHIPPED | OUTPATIENT
Start: 2025-02-05

## 2025-02-20 NOTE — UTILIZATION REVIEW
"  Nephrology Progress Note     History of Present Illness     81-year-old female with history of ESRD on HD TTS at [facility], hypertension, CAD, hyperlipidemia, nonrheumatic mitral regurgitation, aortic stenosis who presented to the ED on 02/11/2025 for evaluation of chest pain FPC through dialysis. Code STEMI was called on route to the ED but repeat EKG with no ST elevation after arrival. Initial troponin was 0.119. Cardiology was consulted and placed on heparin drip, admitted to [facility]. [facility] with angioplasty to mid lad lesion, unsuccessful with stent x 2.  Complicated with septal perf and need for stent retrieval.     Interval History     Overnight/currently:  Course reviewed.  No acute events overnight.  Her brother reports she is sleeping a lot, still with some weakness.  No shortness of breath or chest pain.  He reports she ate some breakfast this morning without any issues.  She will go for HD today.    Health Status   Allergies:    has no known allergies.    Current medications:   Current Medications[1]     Physical Examination   VS/Measurements   BP (!) 112/56 (BP Location: Right arm, Patient Position: Lying)   Pulse (!) 59   Temp 98 °F (36.7 °C) (Oral)   Resp 16   Ht 5' 2" (1.575 m)   Wt 43.7 kg (96 lb 5.5 oz)   LMP  (LMP Unknown)   SpO2 99%   BMI 17.62 kg/m²      General:  Frail-appearing, No acute distress.     Neck:  Supple, No lymphadenopathy.     Respiratory:  Lungs are clear to auscultation, Respirations are non-labored, Symmetrical chest wall expansion.    Cardiovascular:  Normal rate, Regular rhythm.   Gastrointestinal:  Soft, Non-tender, Normal bowel sounds.   Integumentary:  Warm, Dry.   Psychiatric:  Cooperative.  Vascular:  LUE AVF with positive thrill and bruit.         Review / Management   Laboratory Results   Today's Lab Results :    Recent Results (from the past 24 hours)   Renal Function Panel    Collection Time: 02/20/25  4:48 AM   Result Value Ref Range    " NOTIFICATION OF ADMISSION DISCHARGE   This is a Notification of Discharge from [address]  Please be advised that this patient has been discharge from our facility  Below you will find the admission and discharge date and time including the patient’s disposition  UTILIZATION REVIEW CONTACT:  Sam Yeager MA  Utilization   Network Utilization Review Department  Phone: [phone] x carefully listen to the prompts  All voicemails are confidential   Email: [email or web address]     ADMISSION INFORMATION  PRESENTATION DATE: 4/26/2023 12:29 AM  OBERVATION ADMISSION DATE:   INPATIENT ADMISSION DATE: 4/26/23  3:39 AM   DISCHARGE DATE: 4/30/2023  1:00 PM   DISPOSITION:Home with Home Health Care    IMPORTANT INFORMATION:  Send all requests for admission clinical reviews, approved or denied determinations and any other requests to dedicated fax number below belonging to the campus where the patient is receiving treatment   List of dedicated fax numbers:  [address] DENIALS (Administrative/Medical Necessity) [phone]   [address] (Maternity/NICU/Pediatrics) [phone]   [facility] [phone]   [facility] [phone]   [address] [phone]   [address] [facility] [phone] [address] [facility] [phone]   [address] [place] [address] 119 [phone]   [facility]  [phone]   [address] [facility] [phone]   412 [facility] [address] [facility] [phone] Glucose 102 70 - 110 mg/dL    Sodium 134 (L) 136 - 145 mmol/L    Potassium 3.8 3.5 - 5.1 mmol/L    Chloride 92 (L) 95 - 110 mmol/L    CO2 25 23 - 29 mmol/L    BUN 64 (H) 8 - 23 mg/dL    Calcium 8.6 (L) 8.7 - 10.5 mg/dL    Creatinine 7.2 (H) 0.5 - 1.4 mg/dL    Albumin 2.4 (L) 3.5 - 5.2 g/dL    Phosphorus 5.0 (H) 2.7 - 4.5 mg/dL    eGFR 5 (A) >60 mL/min/1.73 m^2    Anion Gap 17 (H) 8 - 16 mmol/L        Impression and Plan   Diagnosis     ESRD on HD.  TTS at [facility].    --HD today per routine.      Hypertension.  Blood pressure on the lower side but stable.  Continue to monitor.       Anemia.  S/p PRBC transfusion.  Hemoglobin is stable.     SHPT.  Monitor phosphorus, acceptable at this time.  Not currently on binders.  She is not eating much.  Follow labs.    V-tach.  No longer on amiodarone due to prolonged QT. No recurrent NSVT.  Management per Cardiology.    Chest pain/CAD.  S/p LHC. S/p angioplasty to mid LAD lesion, unsuccessful attempts x2 to stent after sliding off balloon.  She had a stent dislodgement with septal perforation, required IR intervention to retrieve.  Cardiology following as above.     NSTEMI.  As above.      Hyperlipidemia.      DNR     Disposition.  Home health versus SNF.  It appears there have been several denials for SNF.    ______________________________________________  Apollo Almeida      This document was created using voice recognition software.  It is possible that there are errors which have persisted after original proofreading.  If there is a question regarding contents of this document please contact me for clarification.         [1]   Current Facility-Administered Medications:     0.9%  NaCl infusion (for blood administration), , Intravenous, Q24H PRN, Dennis Trujillo MD    acetaminophen tablet 650 mg, 650 mg, Oral, Q8H PRN, Melissa Arthur FNP, 650 mg at 02/19/25 0656    aspirin chewable tablet 81 mg, 81 mg, Oral, Daily, Sadaf Rankin MD, 81 mg at 02/20/25  0755    atorvastatin tablet 40 mg, 40 mg, Oral, QHS, Emilie Madrid PA-C, 40 mg at 02/19/25 2055    benzonatate capsule 100 mg, 100 mg, Oral, TID PRN, Kayli Rodas NP, 100 mg at 02/13/25 2236    carvediloL tablet 3.125 mg, 3.125 mg, Oral, BID, Melissa Arthur, LEON, 3.125 mg at 02/20/25 0755    clopidogreL tablet 75 mg, 75 mg, Oral, Daily, Sadaf Rankin MD, 75 mg at 02/20/25 0755    hydrALAZINE injection 10 mg, 10 mg, Intravenous, Q6H PRN, Melissa Arthur FNP, 10 mg at 02/13/25 1331    influenza (adjuvanted) (Fluad) 45 mcg/0.5 mL IM vaccine (> or = 66 yo) 0.5 mL, 0.5 mL, Intramuscular, Prior to discharge, Jackson Hodge MD    melatonin tablet 3 mg, 3 mg, Oral, Nightly PRN, Carolina Isaac NP, 3 mg at 02/19/25 2055    morphine injection 2 mg, 2 mg, Intravenous, Q6H PRN, Melissa Arthur FNP    nitroGLYCERIN SL tablet 0.4 mg, 0.4 mg, Sublingual, Q5 Min PRN, Melissa Arthur, LEON    ondansetron injection 4 mg, 4 mg, Intravenous, Q8H PRN, Melissa Arthur FNP, 4 mg at 02/15/25 0429    pantoprazole EC tablet 40 mg, 40 mg, Oral, Daily, Shanta Vega MD, 40 mg at 02/20/25 0755    pneumoc 20-collins conj-dip cr(PF) (PREVNAR-20 (PF)) injection Syrg 0.5 mL, 0.5 mL, Intramuscular, vaccine x 1 dose, Jackson Hodge MD    sodium chloride 0.9% bolus 250 mL 250 mL, 250 mL, Intravenous, PRN, George Amaro MD    sodium chloride 0.9% flush 10 mL, 10 mL, Intravenous, PRN, Melissa Arthur FNP

## 2025-02-21 DIAGNOSIS — D50.0 IRON DEFICIENCY ANEMIA DUE TO CHRONIC BLOOD LOSS: ICD-10-CM

## 2025-02-21 RX ORDER — FERROUS SULFATE 324(65)MG
TABLET, DELAYED RELEASE (ENTERIC COATED) ORAL
Qty: 90 TABLET | Refills: 11 | Status: SHIPPED | OUTPATIENT
Start: 2025-02-21

## 2025-02-25 ENCOUNTER — TELEPHONE (OUTPATIENT)
Age: 70
End: 2025-02-25

## 2025-02-27 ENCOUNTER — OFFICE VISIT (OUTPATIENT)
Dept: FAMILY MEDICINE CLINIC | Facility: CLINIC | Age: 70
End: 2025-02-27
Payer: COMMERCIAL

## 2025-02-27 ENCOUNTER — TELEPHONE (OUTPATIENT)
Age: 70
End: 2025-02-27

## 2025-02-27 VITALS
HEART RATE: 85 BPM | BODY MASS INDEX: 17.85 KG/M2 | OXYGEN SATURATION: 95 % | HEIGHT: 62 IN | WEIGHT: 97 LBS | SYSTOLIC BLOOD PRESSURE: 124 MMHG | DIASTOLIC BLOOD PRESSURE: 78 MMHG

## 2025-02-27 DIAGNOSIS — Z12.2 SCREENING FOR LUNG CANCER: ICD-10-CM

## 2025-02-27 DIAGNOSIS — I10 PRIMARY HYPERTENSION: ICD-10-CM

## 2025-02-27 DIAGNOSIS — Z79.4 TYPE 2 DIABETES MELLITUS WITH STAGE 3B CHRONIC KIDNEY DISEASE, WITH LONG-TERM CURRENT USE OF INSULIN (HCC): ICD-10-CM

## 2025-02-27 DIAGNOSIS — E21.3 HYPERPARATHYROIDISM (HCC): ICD-10-CM

## 2025-02-27 DIAGNOSIS — Z12.31 ENCOUNTER FOR SCREENING MAMMOGRAM FOR BREAST CANCER: ICD-10-CM

## 2025-02-27 DIAGNOSIS — N87.0 CIN I (CERVICAL INTRAEPITHELIAL NEOPLASIA I): ICD-10-CM

## 2025-02-27 DIAGNOSIS — E11.9 ENCOUNTER FOR DIABETIC FOOT EXAM (HCC): ICD-10-CM

## 2025-02-27 DIAGNOSIS — N18.32 TYPE 2 DIABETES MELLITUS WITH STAGE 3B CHRONIC KIDNEY DISEASE, WITH LONG-TERM CURRENT USE OF INSULIN (HCC): ICD-10-CM

## 2025-02-27 DIAGNOSIS — I48.0 PAF (PAROXYSMAL ATRIAL FIBRILLATION) (HCC): ICD-10-CM

## 2025-02-27 DIAGNOSIS — Z00.00 MEDICARE ANNUAL WELLNESS VISIT, SUBSEQUENT: Primary | ICD-10-CM

## 2025-02-27 DIAGNOSIS — Z72.0 TOBACCO USE: ICD-10-CM

## 2025-02-27 DIAGNOSIS — Z13.820 OSTEOPOROSIS SCREENING: ICD-10-CM

## 2025-02-27 DIAGNOSIS — I50.22 CHRONIC HFREF (HEART FAILURE WITH REDUCED EJECTION FRACTION) (HCC): ICD-10-CM

## 2025-02-27 DIAGNOSIS — I25.10 CORONARY ARTERY DISEASE INVOLVING NATIVE CORONARY ARTERY OF NATIVE HEART WITHOUT ANGINA PECTORIS: ICD-10-CM

## 2025-02-27 DIAGNOSIS — Z85.3 HISTORY OF LEFT BREAST CANCER: ICD-10-CM

## 2025-02-27 DIAGNOSIS — E78.5 HYPERLIPIDEMIA LDL GOAL <100: ICD-10-CM

## 2025-02-27 DIAGNOSIS — Z12.11 COLON CANCER SCREENING: ICD-10-CM

## 2025-02-27 DIAGNOSIS — N90.1 VIN II (VULVAR INTRAEPITHELIAL NEOPLASIA II): ICD-10-CM

## 2025-02-27 DIAGNOSIS — N18.30 CKD STAGE 3 DUE TO TYPE 2 DIABETES MELLITUS (HCC): ICD-10-CM

## 2025-02-27 DIAGNOSIS — D50.0 IRON DEFICIENCY ANEMIA DUE TO CHRONIC BLOOD LOSS: ICD-10-CM

## 2025-02-27 DIAGNOSIS — E11.22 TYPE 2 DIABETES MELLITUS WITH STAGE 3B CHRONIC KIDNEY DISEASE, WITH LONG-TERM CURRENT USE OF INSULIN (HCC): ICD-10-CM

## 2025-02-27 DIAGNOSIS — R29.898 LEFT LEG WEAKNESS: ICD-10-CM

## 2025-02-27 DIAGNOSIS — Z90.12 S/P LEFT MASTECTOMY: ICD-10-CM

## 2025-02-27 DIAGNOSIS — I73.9 PERIPHERAL ARTERIAL DISEASE (HCC): ICD-10-CM

## 2025-02-27 DIAGNOSIS — E11.22 CKD STAGE 3 DUE TO TYPE 2 DIABETES MELLITUS (HCC): ICD-10-CM

## 2025-02-27 LAB
CREAT UR-MCNC: 31.9 MG/DL
LEFT EYE DIABETIC RETINOPATHY: ABNORMAL
LEFT EYE IMAGE QUALITY: ABNORMAL
LEFT EYE MACULAR EDEMA: ABNORMAL
LEFT EYE OTHER RETINOPATHY: ABNORMAL
MICROALBUMIN UR-MCNC: 401.1 MG/L
MICROALBUMIN/CREAT 24H UR: 1257 MG/G CREATININE (ref 0–30)
RIGHT EYE DIABETIC RETINOPATHY: ABNORMAL
RIGHT EYE IMAGE QUALITY: ABNORMAL
RIGHT EYE MACULAR EDEMA: ABNORMAL
RIGHT EYE OTHER RETINOPATHY: ABNORMAL
SEVERITY (EYE EXAM): ABNORMAL
SL AMB POCT HEMOGLOBIN AIC: 15 (ref ?–6.5)

## 2025-02-27 PROCEDURE — 82043 UR ALBUMIN QUANTITATIVE: CPT | Performed by: INTERNAL MEDICINE

## 2025-02-27 PROCEDURE — 83036 HEMOGLOBIN GLYCOSYLATED A1C: CPT | Performed by: INTERNAL MEDICINE

## 2025-02-27 PROCEDURE — G0439 PPPS, SUBSEQ VISIT: HCPCS | Performed by: INTERNAL MEDICINE

## 2025-02-27 PROCEDURE — 99214 OFFICE O/P EST MOD 30 MIN: CPT | Performed by: INTERNAL MEDICINE

## 2025-02-27 PROCEDURE — 82570 ASSAY OF URINE CREATININE: CPT | Performed by: INTERNAL MEDICINE

## 2025-02-27 RX ORDER — BLOOD-GLUCOSE METER
KIT MISCELLANEOUS
Qty: 1 KIT | Refills: 0 | Status: SHIPPED | OUTPATIENT
Start: 2025-02-27

## 2025-02-27 RX ORDER — BLOOD SUGAR DIAGNOSTIC
STRIP MISCELLANEOUS
Qty: 100 EACH | Refills: 3 | Status: SHIPPED | OUTPATIENT
Start: 2025-02-27

## 2025-02-27 RX ORDER — INSULIN GLARGINE 100 [IU]/ML
12 INJECTION, SOLUTION SUBCUTANEOUS DAILY
Qty: 15 ML | Refills: 3 | Status: SHIPPED | OUTPATIENT
Start: 2025-02-27

## 2025-02-27 RX ORDER — LANCETS 33 GAUGE
EACH MISCELLANEOUS
Qty: 100 EACH | Refills: 3 | Status: SHIPPED | OUTPATIENT
Start: 2025-02-27

## 2025-02-27 NOTE — TELEPHONE ENCOUNTER
Caller: Sofiya    Doctor: Dr. Rodriguez    Reason for call: Patient requesting transportation for upcoming appointment. March 18 at 11:40      Call back#: [phone]

## 2025-02-27 NOTE — ASSESSMENT & PLAN NOTE
Wt Readings from Last 3 Encounters:   02/27/25 44 kg (97 lb)   09/09/24 45.4 kg (100 lb)   04/02/24 49.7 kg (109 lb 9.6 oz)   Sofiya has a hx of CHF, Ef of 35%, she's pretty well compensated at the moment-no leg swelling-is on Jardiance 10 mg, and furosemide. I ordered a new echocardiogram today, referred her to Cardiology whom she hasn't seen in awhile, and bumped up her Jardiance to 25 mg, which will hopefully also help her diabetes.Has a pacemaker in place too            Orders:    Echo complete w/ contrast if indicated; Future    Ambulatory Referral to Cardiology; Future

## 2025-02-27 NOTE — PATIENT INSTRUCTIONS
Medicare Preventive Visit Patient Instructions  Thank you for completing your Welcome to Medicare Visit or Medicare Annual Wellness Visit today. Your next wellness visit will be due in one year (2/28/2026).  The screening/preventive services that you may require over the next 5-10 years are detailed below. Some tests may not apply to you based off risk factors and/or age. Screening tests ordered at today's visit but not completed yet may show as past due. Also, please note that scanned in results may not display below.  Preventive Screenings:  Service Recommendations Previous Testing/Comments   Colorectal Cancer Screening  * Colonoscopy    * Fecal Occult Blood Test (FOBT)/Fecal Immunochemical Test (FIT)  * Fecal DNA/Cologuard Test  * Flexible Sigmoidoscopy Age: 45-75 years old   Colonoscopy: every 10 years (may be performed more frequently if at higher risk)  OR  FOBT/FIT: every 1 year  OR  Cologuard: every 3 years  OR  Sigmoidoscopy: every 5 years  Screening may be recommended earlier than age 45 if at higher risk for colorectal cancer. Also, an individualized decision between you and your healthcare provider will decide whether screening between the ages of 76-85 would be appropriate. Colonoscopy: 07/24/2023  FOBT/FIT: Not on file  Cologuard: Not on file  Sigmoidoscopy: Not on file    Screening Current     Breast Cancer Screening Age: 40+ years old  Frequency: every 1-2 years  Not required if history of left and right mastectomy Mammogram: 11/12/2021    History Breast Cancer   Cervical Cancer Screening Between the ages of 21-29, pap smear recommended once every 3 years.   Between the ages of 30-65, can perform pap smear with HPV co-testing every 5 years.   Recommendations may differ for women with a history of total hysterectomy, cervical cancer, or abnormal pap smears in past. Pap Smear: 09/09/2024    History Cervical Cancer   Hepatitis C Screening Once for adults born between 1945 and 1965  More frequently in  patients at high risk for Hepatitis C Hep C Antibody: Not on file        Diabetes Screening 1-2 times per year if you're at risk for diabetes or have pre-diabetes Fasting glucose: 136 mg/dL (8/1/2023)  A1C: 10.5 (1/17/2024)  Screening Not Indicated  History Diabetes   Cholesterol Screening Once every 5 years if you don't have a lipid disorder. May order more often based on risk factors. Lipid panel: 01/18/2023    Screening Not Indicated  History Lipid Disorder     Other Preventive Screenings Covered by Medicare:  Abdominal Aortic Aneurysm (AAA) Screening: covered once if your at risk. You're considered to be at risk if you have a family history of AAA.  Lung Cancer Screening: covers low dose CT scan once per year if you meet all of the following conditions: (1) Age 55-77; (2) No signs or symptoms of lung cancer; (3) Current smoker or have quit smoking within the last 15 years; (4) You have a tobacco smoking history of at least 20 pack years (packs per day multiplied by number of years you smoked); (5) You get a written order from a healthcare provider.  Glaucoma Screening: covered annually if you're considered high risk: (1) You have diabetes OR (2) Family history of glaucoma OR (3)  aged 50 and older OR (4)  American aged 65 and older  Osteoporosis Screening: covered every 2 years if you meet one of the following conditions: (1) You're estrogen deficient and at risk for osteoporosis based off medical history and other findings; (2) Have a vertebral abnormality; (3) On glucocorticoid therapy for more than 3 months; (4) Have primary hyperparathyroidism; (5) On osteoporosis medications and need to assess response to drug therapy.   Last bone density test (DXA Scan): 03/17/2021.  HIV Screening: covered annually if you're between the age of 15-65. Also covered annually if you are younger than 15 and older than 65 with risk factors for HIV infection. For pregnant patients, it is covered up to 3  times per pregnancy.    Immunizations:  Immunization Recommendations   Influenza Vaccine Annual influenza vaccination during flu season is recommended for all persons aged >= 6 months who do not have contraindications   Pneumococcal Vaccine   * Pneumococcal conjugate vaccine = PCV13 (Prevnar 13), PCV15 (Vaxneuvance), PCV20 (Prevnar 20)  * Pneumococcal polysaccharide vaccine = PPSV23 (Pneumovax) Adults 19-63 yo with certain risk factors or if 65+ yo  If never received any pneumonia vaccine: recommend Prevnar 20 (PCV20)  Give PCV20 if previously received 1 dose of PCV13 or PPSV23   Hepatitis B Vaccine 3 dose series if at intermediate or high risk (ex: diabetes, end stage renal disease, liver disease)   Respiratory syncytial virus (RSV) Vaccine - COVERED BY MEDICARE PART D  * RSVPreF3 (Arexvy) CDC recommends that adults 60 years of age and older may receive a single dose of RSV vaccine using shared clinical decision-making (SCDM)   Tetanus (Td) Vaccine - COST NOT COVERED BY MEDICARE PART B Following completion of primary series, a booster dose should be given every 10 years to maintain immunity against tetanus. Td may also be given as tetanus wound prophylaxis.   Tdap Vaccine - COST NOT COVERED BY MEDICARE PART B Recommended at least once for all adults. For pregnant patients, recommended with each pregnancy.   Shingles Vaccine (Shingrix) - COST NOT COVERED BY MEDICARE PART B  2 shot series recommended in those 19 years and older who have or will have weakened immune systems or those 50 years and older     Health Maintenance Due:      Topic Date Due   • Hepatitis C Screening  Never done   • Breast Cancer Screening: Mammogram  12/07/2016   • Lung Cancer Screening  Discontinued   • Colorectal Cancer Screening  Discontinued     Immunizations Due:  There are no preventive care reminders to display for this patient.  Advance Directives   What are advance directives?  Advance directives are legal documents that state your  wishes and plans for medical care. These plans are made ahead of time in case you lose your ability to make decisions for yourself. Advance directives can apply to any medical decision, such as the treatments you want, and if you want to donate organs.   What are the types of advance directives?  There are many types of advance directives, and each state has rules about how to use them. You may choose a combination of any of the following:  Living will:  This is a written record of the treatment you want. You can also choose which treatments you do not want, which to limit, and which to stop at a certain time. This includes surgery, medicine, IV fluid, and tube feedings.   Durable power of  for healthcare (DPAHC):  This is a written record that states who you want to make healthcare choices for you when you are unable to make them for yourself. This person, called a proxy, is usually a family member or a friend. You may choose more than 1 proxy.  Do not resuscitate (DNR) order:  A DNR order is used in case your heart stops beating or you stop breathing. It is a request not to have certain forms of treatment, such as CPR. A DNR order may be included in other types of advance directives.  Medical directive:  This covers the care that you want if you are in a coma, near death, or unable to make decisions for yourself. You can list the treatments you want for each condition. Treatment may include pain medicine, surgery, blood transfusions, dialysis, IV or tube feedings, and a ventilator (breathing machine).  Values history:  This document has questions about your views, beliefs, and how you feel and think about life. This information can help others choose the care that you would choose.  Why are advance directives important?  An advance directive helps you control your care. Although spoken wishes may be used, it is better to have your wishes written down. Spoken wishes can be misunderstood, or not followed.  Treatments may be given even if you do not want them. An advance directive may make it easier for your family to make difficult choices about your care.   Fall Prevention    Fall prevention  includes ways to make your home and other areas safer. It also includes ways you can move more carefully to prevent a fall. Health conditions that cause changes in your blood pressure, vision, or muscle strength and coordination may increase your risk for falls. Medicines may also increase your risk for falls if they make you dizzy, weak, or sleepy.   Fall prevention tips:   Stand or sit up slowly.    Use assistive devices as directed.    Wear shoes that fit well and have soles that .    Wear a personal alarm.    Stay active.    Manage your medical conditions.    Home Safety Tips:  Add items to prevent falls in the bathroom.    Keep paths clear.    Install bright lights in your home.    Keep items you use often on shelves within reach.    Paint or place reflective tape on the edges of your stairs.    Urinary Incontinence   Urinary incontinence (UI)  is when you lose control of your bladder. UI develops because your bladder cannot store or empty urine properly. The 3 most common types of UI are stress incontinence, urge incontinence, or both.  Medicines:   May be given to help strengthen your bladder control. Report any side effects of medication to your healthcare provider.  Do pelvic muscle exercises often:  Your pelvic muscles help you stop urinating. Squeeze these muscles tight for 5 seconds, then relax for 5 seconds. Gradually work up to squeezing for 10 seconds. Do 3 sets of 15 repetitions a day, or as directed. This will help strengthen your pelvic muscles and improve bladder control.  Train your bladder:  Go to the bathroom at set times, such as every 2 hours, even if you do not feel the urge to go. You can also try to hold your urine when you feel the urge to go. For example, hold your urine for 5 minutes when you  feel the urge to go. As that becomes easier, hold your urine for 10 minutes.   Self-care:   Keep a UI record.  Write down how often you leak urine and how much you leak. Make a note of what you were doing when you leaked urine.  Drink liquids as directed. You may need to limit the amount of liquid you drink to help control your urine leakage. Do not drink any liquid right before you go to bed. Limit or do not have drinks that contain caffeine or alcohol.   Prevent constipation.  Eat a variety of high-fiber foods. Good examples are high-fiber cereals, beans, vegetables, and whole-grain breads. Walking is the best way to trigger your intestines to have a bowel movement.  Exercise regularly and maintain a healthy weight.  Weight loss and exercise will decrease pressure on your bladder and help you control your leakage.   Use a catheter as directed  to help empty your bladder. A catheter is a tiny, plastic tube that is put into your bladder to drain your urine.   Go to behavior therapy as directed.  Behavior therapy may be used to help you learn to control your urge to urinate.    Cigarette Smoking and Your Health   Risks to your health if you smoke:  Nicotine and other chemicals found in tobacco damage every cell in your body. Even if you are a light smoker, you have an increased risk for cancer, heart disease, and lung disease. If you are pregnant or have diabetes, smoking increases your risk for complications.   Benefits to your health if you stop smoking:   You decrease respiratory symptoms such as coughing, wheezing, and shortness of breath.   You reduce your risk for cancers of the lung, mouth, throat, kidney, bladder, pancreas, stomach, and cervix. If you already have cancer, you increase the benefits of chemotherapy. You also reduce your risk for cancer returning or a second cancer from developing.   You reduce your risk for heart disease, blood clots, heart attack, and stroke.   You reduce your risk for lung  infections, and diseases such as pneumonia, asthma, chronic bronchitis, and emphysema.  Your circulation improves. More oxygen can be delivered to your body. If you have diabetes, you lower your risk for complications, such as kidney, artery, and eye diseases. You also lower your risk for nerve damage. Nerve damage can lead to amputations, poor vision, and blindness.  You improve your body's ability to heal and to fight infections.  For more information and support to stop smoking:   [employer]  Phone: 5- 987 - 139-9395  Web Address: www.[employer]  Underweight  Underweight is defined as having a body mass index (BMI) of less than 18.5 kg/m2   Anorexia  is a loss of appetite, decreased food intake, or both. Your appetite naturally decreases as you get older. You also get full faster than you used to. This occurs because your body needs less energy. Other body changes can also lead to a decreased appetite. Even though some appetite loss is normal, you still need to get enough calories and nutrients to keep you healthy. You can start to lose too much weight if you do not eat as much food as your body needs. Unwanted weight loss can cause health problems, or worsen health problems you already have. You can also become dehydrated if you do not drink enough liquid.  How to eat healthy and get enough nutrients:   Choose healthy foods.  Eat a variety of fruits, vegetables, whole grains, low-fat dairy foods, lean meats, and other protein foods. Limit foods high in fat, sugar, and salt. Limit or avoid alcohol as directed. Work with a dietitian to help you plan your meals if you need to follow a special diet. A dietitian can also teach you how to modify foods if you have trouble chewing or swallowing.   Snack on healthy foods between meals  if you only eat a small amount during meals. Snacks provide extra healthy nutrients and calories between meals. Examples include fruit, cheese, and whole grain crackers.   Drink  liquids as directed  to avoid dehydration. Drink liquids between meals if they cause you to get full too quickly during meals. Ask how much liquid to drink each day and which liquids are best for you.   Use herbs, spices, and flavor enhancers to add flavor to foods.  Avoid using herbs and spice blends that also contain sodium. Ask your healthcare provider or dietitian about flavor enhancers. Flavor enhancers with ham, natural rockwell, and roast beef flavors can also be sprinkled on food to add flavor.   Share meals with others as often as you can.  Eating with others may help you to eat better during meal time. Ask family members, neighbors, or friends to join you for lunch. There are also senior centers where you can meet people, and share meals with them.   Ask family and friends for help  with shopping or preparing foods. Ask for a ride to the grocery store, if needed.       © Copyright [employer] 2018 Information is for End User's use only and may not be sold, redistributed or otherwise used for commercial purposes. All illustrations and images included in CareNotes® are the copyrighted property of [employer] or [employer]

## 2025-02-27 NOTE — PROGRESS NOTES
Name: Sofiya Harding      : 1955      MRN: [id number]  Encounter Provider: Bertha Vaca MD  Encounter Date: 2025   Encounter department: [facility]    Assessment & Plan  Coronary artery disease involving native coronary artery of native heart without angina pectoris         Chronic HFrEF (heart failure with reduced ejection fraction) ([facility])  Wt Readings from Last 3 Encounters:   25 44 kg (97 lb)   24 45.4 kg (100 lb)   24 49.7 kg (109 lb 9.6 oz)   Sofiya has a hx of CHF, Ef of 35%, she's pretty well compensated at the moment-no leg swelling-is on Jardiance 10 mg, and furosemide. I ordered a new echocardiogram today, referred her to Cardiology whom she hasn't seen in awhile, and bumped up her Jardiance to 25 mg, which will hopefully also help her diabetes.Has a pacemaker in place too            Orders:  •  Echo complete w/ contrast if indicated; Future  •  Ambulatory Referral to Cardiology; Future    Primary hypertension  Well controlled-       Peripheral arterial disease (HCC)  Smokes and is on statin and ASA       PAF (paroxysmal atrial fibrillation) (HCC)  Used to be on Eliquis but now just taking  mg        CKD stage 3 due to type 2 diabetes mellitus (HCC)    Lab Results   Component Value Date    HGBA1C 15.0 (A) 2025            Hyperparathyroidism (HCC)         JAYLAN II (vulvar intraepithelial neoplasia II)         Tobacco use  Needs LDCT       Iron deficiency anemia due to chronic blood loss         History of left breast cancer  Needs right breast mammogram       S/P left mastectomy         Hyperlipidemia LDL goal <100         Medicare annual wellness visit, subsequent         MARCELINO I (cervical intraepithelial neoplasia I)         Encounter for screening mammogram for breast cancer         Encounter for diabetic foot exam (HCC)         Osteoporosis screening  Needs DEXA, almost certainly has osteoporosis       Colon cancer screening          Screening for lung cancer  I discussed with her that she is a candidate for lung cancer CT screening.     The following Shared Decision-Making points were covered:  Benefits of screening were discussed, including the rates of reduction in death from lung cancer and other causes.  Harms of screening were reviewed, including false positive tests, radiation exposure levels, risks of invasive procedures, risks of complications of screening, and risk of overdiagnosis.  I counseled on the importance of adherence to annual lung cancer LDCT screening, impact of co-morbidities, and ability or willingness to undergo diagnosis and treatment.  I counseled on the importance of maintaining abstinence as a former smoker or was counseled on the importance of smoking cessation if a current smoker    Review of Eligibility Criteria: She meets all of the criteria for Lung Cancer Screening.   She is 69 y.o.   She has 20 pack year tobacco history and is a current smoker or has quit within the past 15 years  She presents no signs or symptoms of lung cancer    After discussion, the patient decided to elect lung cancer screening.         Type 2 diabetes mellitus with stage 3b chronic kidney disease, with long-term current use of insulin ([facility])    Lab Results   Component Value Date    HGBA1C 15.0 (A) 02/27/2025   On metformin and jardiance and I bumped up the jardiance to 25 mg daily- her A1c is 15% so will go ahead and put her on insulin -will order diabetic testing supplies too-wrote for 12 units of lantus daily-needs a better diet-tried to do her iris exam today    Orders:  •  Albumin / creatinine urine ratio; Future  •  CBC and differential; Future  •  Comprehensive metabolic panel; Future  •  Lipid panel; Future  •  TSH, 3rd generation; Future  •  POCT hemoglobin A1c  •  IRIS Diabetic eye exam  •  insulin glargine (LANTUS SOLOSTAR) 100 units/mL injection pen; Inject 12 Units under the skin daily  •  Blood Glucose Monitoring Suppl  (OneTouch Verio Reflect) w/Device KIT; Check blood sugars once daily. Please substitute with appropriate alternative as covered by patient's insurance. Dx: E11.65  •  glucose blood (OneTouch Verio) test strip; Check blood sugars once daily. Please substitute with appropriate alternative as covered by patient's insurance. Dx: E11.65  •  OneTouch Delica Lancets 33G MISC; Check blood sugars once daily. Please substitute with appropriate alternative as covered by patient's insurance. Dx: E11.65  •  Empagliflozin 25 MG TABS; Take 1 tablet (25 mg total) by mouth daily  •  Insulin Glargine Solostar (Lantus SoloStar) 100 UNIT/ML SOPN; Inject 0.12 mL (12 Units total) under the skin in the morning    Left leg weakness  Her back mri was actually ok last year-will order Xray of her hips and refer to Orthopedics-seems to have a lot of weakness with hiip flexion on the left  Orders:  •  XR hips bilateral 3-4 vw w pelvis if performed; Future  •  Ambulatory Referral to Orthopedic Surgery; Future       Preventive health issues were discussed with patient, and age appropriate screening tests were ordered as noted in patient's After Visit Summary. Personalized health advice and appropriate referrals for health education or preventive services given if needed, as noted in patient's After Visit Summary.    History of Present Illness     Sofiya here for her MAWV, and to touch base on her hx of DM II very poorly controlled, systolic and diastolic CHF, frailty, hx of left breast cancer, CKD, HTN, HL, tobacco use, COPD, hx of PAF, anemia-I haven't seen Sofiya in a year or so-she's lost a lot of weight but her A1c% today is 15%-it had been 10.8% back in October-she has not seen her Cardiologist in quite some time-also has left leg weakness, seemingly with hip flexion. She had an MRI of her back in 2024 which really didn't look that bad-doesn't follow a diabetic diet, drinks soda all the time and still smokes     Patient Care  Team:  Bertha Vaca MD as PCP - General (Internal Medicine)  Bertha Vaca MD as PCP - PCP-[facility] ([facility])  Leo Lanier MD (Gastroenterology)  Alistair BRANCH MD (Endocrinology)  Mikhail Mock as Medical Student (Internal Medicine)    Review of Systems   Constitutional:  Positive for fatigue and unexpected weight change.   HENT: Negative.     Respiratory: Negative.     Cardiovascular: Negative.    Musculoskeletal:  Positive for gait problem.   Neurological:  Positive for weakness.   Hematological: Negative.    Psychiatric/Behavioral: Negative.       Medical History Reviewed by provider this encounter:       Annual Wellness Visit Questionnaire   Sofiya is here for her Subsequent Wellness visit.     Health Risk Assessment:   Patient rates overall health as good. Patient feels that their physical health rating is same. Patient is satisfied with their life. Eyesight was rated as slightly worse. Hearing was rated as slightly worse. Patient feels that their emotional and mental health rating is same. Patients states they are never, rarely angry. Patient states they are often unusually tired/fatigued. Pain experienced in the last 7 days has been some. Patient's pain rating has been 5/10. Patient states that she has experienced no weight loss or gain in last 6 months.     Depression Screening:   PHQ-9 Score: 0      Fall Risk Screening:   In the past year, patient has experienced: history of falling in past year    Number of falls: 1  Injured during fall?: No    Feels unsteady when standing or walking?: Yes    Worried about falling?: Yes      Urinary Incontinence Screening:   Patient has leaked urine accidently in the last six months.     Home Safety:  Patient has trouble with stairs inside or outside of their home. Patient has working smoke alarms and has working carbon monoxide detector. Home safety hazards include: none.     Nutrition:   Current diet is Regular.     Medications:   Patient is  currently taking over-the-counter supplements. OTC medications include: see medication list. Patient is able to manage medications.     Activities of Daily Living (ADLs)/Instrumental Activities of Daily Living (IADLs):   Walk and transfer into and out of bed and chair?: Yes  Dress and groom yourself?: Yes    Bathe or shower yourself?: No    Feed yourself? Yes  Do your laundry/housekeeping?: No  Manage your money, pay your bills and track your expenses?: Yes  Make your own meals?: No    Do your own shopping?: No    Previous Hospitalizations:   Any hospitalizations or ED visits within the last 12 months?: No      Advance Care Planning:   Living will: No    Advanced directive: No    Advanced directive counseling given: Yes    ACP document given: Yes      PREVENTIVE SCREENINGS      Cardiovascular Screening:    General: Screening Not Indicated and History Lipid Disorder      Diabetes Screening:     General: Screening Not Indicated and History Diabetes      Colorectal Cancer Screening:     General: Screening Current      Breast Cancer Screening:     General: History Breast Cancer    Due for: Mammogram        Cervical Cancer Screening:    General: History Cervical Cancer      Osteoporosis Screening:      Due for: Bone Density Ultrasound      Abdominal Aortic Aneurysm (AAA) Screening:        General: Screening Not Indicated      Lung Cancer Screening:     General: Risks and Benefits Discussed    Due for: Low Dose CT (LDCT)    Screening, Brief Intervention, and Referral to Treatment (SBIRT)     Screening  Typical number of drinks in a day: 0  Typical number of drinks in a week: 0  Interpretation: Low risk drinking behavior.    Single Item Drug Screening:  How often have you used an illegal drug (including marijuana) or a prescription medication for non-medical reasons in the past year? never    Single Item Drug Screen Score: 0  Interpretation: Negative screen for possible drug use disorder    Social Drivers of Health  "    Financial Resource Strain: Low Risk  (10/6/2023)    Overall Financial Resource Strain (CARDIA)    • Difficulty of Paying Living Expenses: Not hard at all   Food Insecurity: No Food Insecurity (2/27/2025)    Hunger Vital Sign    • Worried About Running Out of Food in the Last Year: Never true    • Ran Out of Food in the Last Year: Never true   Transportation Needs: No Transportation Needs (2/27/2025)    PRAPARE - Transportation    • Lack of Transportation (Medical): No    • Lack of Transportation (Non-Medical): No   Housing Stability: Low Risk  (2/27/2025)    Housing Stability Vital Sign    • Unable to Pay for Housing in the Last Year: No    • Number of Times Moved in the Last Year: 0    • Homeless in the Last Year: No   Utilities: Not At Risk (2/27/2025)    [facility] Utilities    • Threatened with loss of utilities: No     No results found.    Objective   /78 (BP Location: Left arm, Patient Position: Sitting, Cuff Size: Standard)   Pulse 85   Ht 5' 2\" (1.575 m)   Wt 44 kg (97 lb)   LMP  (LMP Unknown)   SpO2 95%   BMI 17.74 kg/m²     Physical Exam  Constitutional:       Comments: frail   HENT:      Head: Normocephalic and atraumatic.      Right Ear: External ear normal.      Left Ear: External ear normal.      Nose: Nose normal.      Mouth/Throat:      Mouth: Mucous membranes are moist.   Eyes:      Extraocular Movements: Extraocular movements intact.      Pupils: Pupils are equal, round, and reactive to light.   Cardiovascular:      Rate and Rhythm: Normal rate and regular rhythm.      Pulses: Pulses are weak.           Dorsalis pedis pulses are 1+ on the right side and 1+ on the left side.      Heart sounds: Normal heart sounds.   Pulmonary:      Effort: Pulmonary effort is normal.      Breath sounds: Normal breath sounds.   Musculoskeletal:         General: Normal range of motion.      Cervical back: Normal range of motion and neck supple.   Feet:      Right foot:      Skin integrity: No ulcer, skin " breakdown, erythema, warmth, callus or dry skin.      Left foot:      Skin integrity: No ulcer, skin breakdown, erythema, warmth, callus or dry skin.   Skin:     General: Skin is warm.   Neurological:      General: No focal deficit present.      Mental Status: She is alert and oriented to person, place, and time.      Motor: Weakness present.   Psychiatric:         Mood and Affect: Mood normal.         Thought Content: Thought content normal.         Judgment: Judgment normal.   Patient's shoes and socks removed.    Right Foot/Ankle   Right Foot Inspection  Skin Exam: skin normal and skin intact. No dry skin, no warmth, no callus, no erythema, no maceration, no abnormal color, no pre-ulcer, no ulcer and no callus.     Toe Exam: ROM and strength within normal limits.     Sensory   Proprioception: intact  Monofilament testing: intact    Vascular  The right DP pulse is 1+.     Left Foot/Ankle  Left Foot Inspection  Skin Exam: skin normal and skin intact. No dry skin, no warmth, no erythema, no maceration, normal color, no pre-ulcer, no ulcer and no callus.     Toe Exam: ROM and strength within normal limits.     Sensory   Proprioception: intact  Monofilament testing: intact    Vascular  The left DP pulse is 1+.     Assign Risk Category  No deformity present  No loss of protective sensation  Weak pulses  Risk: 1

## 2025-03-04 ENCOUNTER — TELEPHONE (OUTPATIENT)
Age: 70
End: 2025-03-04

## 2025-03-05 ENCOUNTER — TELEPHONE (OUTPATIENT)
Age: 70
End: 2025-03-05

## 2025-03-05 NOTE — TELEPHONE ENCOUNTER
Patient called to cancel her appointment today and had a lyft scheduled so that needs to be cancelled as well.

## 2025-03-07 DIAGNOSIS — E11.22 TYPE 2 DIABETES MELLITUS WITH STAGE 3B CHRONIC KIDNEY DISEASE, WITH LONG-TERM CURRENT USE OF INSULIN (HCC): ICD-10-CM

## 2025-03-07 DIAGNOSIS — I10 PRIMARY HYPERTENSION: ICD-10-CM

## 2025-03-07 DIAGNOSIS — N18.32 TYPE 2 DIABETES MELLITUS WITH STAGE 3B CHRONIC KIDNEY DISEASE, WITH LONG-TERM CURRENT USE OF INSULIN (HCC): ICD-10-CM

## 2025-03-07 DIAGNOSIS — Z79.4 TYPE 2 DIABETES MELLITUS WITH STAGE 3B CHRONIC KIDNEY DISEASE, WITH LONG-TERM CURRENT USE OF INSULIN (HCC): ICD-10-CM

## 2025-03-07 DIAGNOSIS — K92.1 BLACK STOOL: ICD-10-CM

## 2025-03-07 DIAGNOSIS — E78.5 HYPERLIPIDEMIA LDL GOAL <100: ICD-10-CM

## 2025-03-07 RX ORDER — EMPAGLIFLOZIN 10 MG/1
10 TABLET, FILM COATED ORAL EVERY MORNING
Qty: 90 TABLET | Refills: 1 | OUTPATIENT
Start: 2025-03-07

## 2025-03-10 DIAGNOSIS — N18.32 TYPE 2 DIABETES MELLITUS WITH STAGE 3B CHRONIC KIDNEY DISEASE, WITH LONG-TERM CURRENT USE OF INSULIN (HCC): ICD-10-CM

## 2025-03-10 DIAGNOSIS — E11.22 TYPE 2 DIABETES MELLITUS WITH STAGE 3B CHRONIC KIDNEY DISEASE, WITH LONG-TERM CURRENT USE OF INSULIN (HCC): ICD-10-CM

## 2025-03-10 DIAGNOSIS — Z79.4 TYPE 2 DIABETES MELLITUS WITH STAGE 3B CHRONIC KIDNEY DISEASE, WITH LONG-TERM CURRENT USE OF INSULIN (HCC): ICD-10-CM

## 2025-03-11 ENCOUNTER — TELEPHONE (OUTPATIENT)
Dept: OBGYN CLINIC | Facility: CLINIC | Age: 70
End: 2025-03-11

## 2025-03-11 ENCOUNTER — TELEPHONE (OUTPATIENT)
Facility: MEDICAL CENTER | Age: 70
End: 2025-03-11

## 2025-03-11 ENCOUNTER — HOSPITAL ENCOUNTER (OUTPATIENT)
Dept: RADIOLOGY | Facility: HOSPITAL | Age: 70
Discharge: HOME | End: 2025-03-11
Attending: ORTHOPAEDIC SURGERY
Payer: COMMERCIAL

## 2025-03-11 ENCOUNTER — OFFICE VISIT (OUTPATIENT)
Dept: OBGYN CLINIC | Facility: CLINIC | Age: 70
End: 2025-03-11
Payer: COMMERCIAL

## 2025-03-11 VITALS — HEIGHT: 62 IN | WEIGHT: 98 LBS | BODY MASS INDEX: 18.03 KG/M2

## 2025-03-11 DIAGNOSIS — R29.898 LEFT LEG WEAKNESS: ICD-10-CM

## 2025-03-11 DIAGNOSIS — M54.16 RADICULOPATHY, LUMBAR REGION: Primary | ICD-10-CM

## 2025-03-11 PROCEDURE — 72100 X-RAY EXAM L-S SPINE 2/3 VWS: CPT

## 2025-03-11 PROCEDURE — 99214 OFFICE O/P EST MOD 30 MIN: CPT | Performed by: ORTHOPAEDIC SURGERY

## 2025-03-11 PROCEDURE — 73502 X-RAY EXAM HIP UNI 2-3 VIEWS: CPT

## 2025-03-11 RX ORDER — BLOOD-GLUCOSE METER
KIT MISCELLANEOUS
Qty: 1 KIT | Refills: 3 | Status: SHIPPED | OUTPATIENT
Start: 2025-03-11

## 2025-03-11 NOTE — TELEPHONE ENCOUNTER
Note in chart after leaving a message for someone to schedule there MRI appointment:  I left a message today to call me at [phone] to schedule their MRI appointment

## 2025-03-11 NOTE — TELEPHONE ENCOUNTER
Patient called to see if she had a ride scheduled to come to her appointment today at 3 pm. I told her I was unable to see that as I only handle medication refills. She states the ride can be cancelled if she does have one scheduled because she has someone giving her a ride.     So if there is a ride for the patient today for her appointment at 3 pm it can be cancelled because she got her own ride.

## 2025-03-11 NOTE — PROGRESS NOTES
"Name: Sofiya Harding      : 1955      MRN: [id number]  Encounter Provider: Sharmila Gilbert MD  Encounter Date: 3/11/2025   Encounter department: [facility]  :  Assessment & Plan  Left leg weakness  Weightbearing as tolerated left lower extremity  Orders:    Ambulatory Referral to Orthopedic Surgery    XR hip/pelv 2-3 vws left if performed; Future    XR spine lumbar 2 or 3 views injury; Future    Ambulatory Referral to Orthopedic Surgery; Future    MRI lumbar spine wo contrast; Future    Radiculopathy, lumbar region  Weightbearing as tolerated bilateral lower extremities  Anti-inflammatory medications  Follow-up with spine and pain regarding patient's lumbar radiculopathy symptoms  Follow-up with Dr. Cas Noonan orthopedic spine surgeon  MRI of the lumbar spine ordered  Orders:    Ambulatory Referral to Orthopedic Surgery; Future    MRI lumbar spine wo contrast; Future        History of Present Illness   HPI  Sofiya Harding is a 69 y.o. female who presents to the office today regarding left leg weakness.  Patient states she has had left leg weakness for greater than 1 years time.  She states having weakness in her left proximal thigh region.  She denies any numbness or tingling pain she denies any pain numbness tingling or weakness of her left knee or ankle.  She denies any event causing this issue.  She does ambulate with the assistance of a cane.  Patient was seen in the past for back pain she states over-the-counter medications to relieve this pain greater than 1 year ago.  She denies any changes in bowel or bladder issues.  History obtained from: patient         Objective   Ht 5' 2\" (1.575 m)   Wt 44.5 kg (98 lb)   LMP  (LMP Unknown)   BMI 17.92 kg/m²              Review Of Systems:   Skin: Normal  Neuro: See HPI  Musculoskeletal: See HPI  All other systems reviewed and are negative    Past Medical History:   Past Medical History:   Diagnosis Date    " Breast cancer (HCC)     left - 1994.    Diabetes mellitus (HCC)     Diabetes mellitus, type 2 (HCC) 03/15/2006    last assessed 7/10/13    GERD (gastroesophageal reflux disease)     History of chemotherapy     Hyperlipidemia     Hypertension        Past Surgical History:   Past Surgical History:   Procedure Laterality Date    CARDIAC ELECTROPHYSIOLOGY PROCEDURE N/A 03/09/2022    Procedure: Cardiac icd implant/ DUAL CHAMBER ICD;  Surgeon: Elmer Marina MD;  Location: [facility];  Service: Cardiology    INSERT / REPLACE / REMOVE PACEMAKER      MASTECTOMY Left     last assessed 12/16/14    MASTECTOMY, RADICAL Left     1994    TN COLPOSCOPY CERVIX BX CERVIX & ENDOCRV CURRETAGE N/A 9/22/2023    Procedure: CERVICAL BIOPSY;  Surgeon: Osiris Perry MD;  Location: [facility];  Service: Gynecology Oncology    TN PARATHYROIDECTOMY/EXPLORATION PARATHYROIDS N/A 04/21/2021    Procedure: PARATHYROIDECTOMY POSSIBLE 4 GLAND EXPLORATION;  Surgeon: Ramesh Austin MD;  Location: [facility];  Service: ENT    TN VULVECTOMY SIMPLE PARTIAL N/A 9/22/2023    Procedure: VULVECTOMY SIMPLE;  Surgeon: Osiris Perry MD;  Location: [facility];  Service: Gynecology Oncology    REDUCTION MAMMAPLASTY Right     UVULECTOMY         Family History:  Family history reviewed and non-contributory  Family History   Problem Relation Age of Onset    Hypothyroidism Mother     Leukemia Mother     Diabetes Father     Diabetes type II Father     Stroke Sister     Diabetes Paternal Grandmother     Cancer Sister     Diabetes Brother          Social History:  Social History     Socioeconomic History    Marital status: Single     Spouse name: None    Number of children: None    Years of education: None    Highest education level: None   Occupational History    None   Tobacco Use    Smoking status: Every Day     Current packs/day: 0.25     Average packs/day: 0.3 packs/day for 51.7 years (12.9 ttl pk-yrs)     Types: Cigarettes     Start date: 6/15/1973      Passive exposure: Current    Smokeless tobacco: Never    Tobacco comments:     2 cigarettes daily    Vaping Use    Vaping status: Never Used   Substance and Sexual Activity    Alcohol use: Never    Drug use: Never    Sexual activity: Not Currently     Partners: Male   Other Topics Concern    None   Social History Narrative    None     Social Drivers of Health     Financial Resource Strain: Low Risk  (10/6/2023)    Overall Financial Resource Strain (CARDIA)     Difficulty of Paying Living Expenses: Not hard at all   Food Insecurity: No Food Insecurity (2/27/2025)    Hunger Vital Sign     Worried About Running Out of Food in the Last Year: Never true     Ran Out of Food in the Last Year: Never true   Transportation Needs: No Transportation Needs (2/27/2025)    PRAPARE - Transportation     Lack of Transportation (Medical): No     Lack of Transportation (Non-Medical): No   Physical Activity: Not on file   Stress: Not on file   Social Connections: Not on file   Intimate Partner Violence: Not on file   Housing Stability: Low Risk  (2/27/2025)    Housing Stability Vital Sign     Unable to Pay for Housing in the Last Year: No     Number of Times Moved in the Last Year: 0     Homeless in the Last Year: No       Allergies:   No Known Allergies    Labs:  0   Lab Value Date/Time    HCT 40.8 08/29/2023 1306    HCT 36.0 08/23/2023 0433    HCT 34.5 (L) 08/22/2023 0421    HGB 12.0 08/29/2023 1306    HGB 10.8 (L) 08/23/2023 0433    HGB 10.6 (L) 08/22/2023 0421    INR 1.37 (H) 07/20/2023 1220    WBC 7.46 08/29/2023 1306    WBC 8.33 08/23/2023 0433    WBC 7.05 08/22/2023 0421       Meds:    Current Outpatient Medications:     Jardiance 10 MG TABS tablet, TAKE 1 TABLET (10 MG TOTAL) BY MOUTH IN THE MORNING (Patient taking differently: Take 25 mg by mouth every morning), Disp: 90 tablet, Rfl: 1    albuterol (Ventolin HFA) 90 mcg/act inhaler, Inhale 2 puffs every 6 (six) hours as needed for wheezing, Disp: 18 g, Rfl: 5    ascorbic acid  (VITAMIN C) 250 mg tablet, Take 1 tablet (250 mg total) by mouth daily, Disp: 30 tablet, Rfl: 0    aspirin 325 mg tablet, Take 1 tablet (325 mg total) by mouth daily, Disp: 90 tablet, Rfl: 3    atorvastatin (LIPITOR) 40 mg tablet, TAKE ONE TABLET BY MOUTH DAILY AT 5PM, Disp: 90 tablet, Rfl: 11    Blood Glucose Monitoring Suppl (OneTouch Verio Reflect) w/Device KIT, Check blood sugars once daily. Please substitute with appropriate alternative as covered by patient's insurance. Dx: E11.65, Disp: 1 kit, Rfl: 0    clotrimazole-betamethasone (LOTRISONE) 1-0.05 % cream, Apply topically 2 (two) times a day, Disp: 15 g, Rfl: 0    Empagliflozin 25 MG TABS, Take 1 tablet (25 mg total) by mouth daily, Disp: 30 tablet, Rfl: 5    ferrous sulfate 324 (65 Fe) mg, TAKE ONE TABLET BY MOUTH THREE TIMES DAILY @9AM-1PM-5PM BEFORE MEALS, Disp: 90 tablet, Rfl: 11    furosemide (LASIX) 20 mg tablet, TAKE ONE TABLET BY MOUTH DAILY AT 9AM, Disp: 90 tablet, Rfl: 11    glucose blood (OneTouch Verio) test strip, Check blood sugars once daily. Please substitute with appropriate alternative as covered by patient's insurance. Dx: E11.65, Disp: 100 each, Rfl: 3    insulin glargine (LANTUS SOLOSTAR) 100 units/mL injection pen, Inject 12 Units under the skin daily, Disp: 15 mL, Rfl: 1    Insulin Glargine Solostar (Lantus SoloStar) 100 UNIT/ML SOPN, Inject 0.12 mL (12 Units total) under the skin in the morning, Disp: 15 mL, Rfl: 3    metFORMIN (GLUCOPHAGE) 500 mg tablet, TAKE 2 TABLETS BY MOUTH TWICE A DAY WITH FOOD, Disp: 360 tablet, Rfl: 1    metoprolol succinate (TOPROL-XL) 25 mg 24 hr tablet, TAKE THREE TABLETS BY MOUTH DAILY AT 9AM, Disp: 180 tablet, Rfl: 11    OneTouch Delica Lancets 33G MISC, Check blood sugars once daily. Please substitute with appropriate alternative as covered by patient's insurance. Dx: E11.65, Disp: 100 each, Rfl: 3    pantoprazole (PROTONIX) 40 mg tablet, TAKE ONE TABLET BY MOUTH DAILY AT 9AM, Disp: 90 tablet, Rfl: 11     umeclidinium-vilanterol 62.5-25 mcg/actuation inhaler, Inhale 1 puff daily, Disp: 60 blister, Rfl: 5    Body mass index is 17.92 kg/m².  Wt Readings from Last 3 Encounters:   03/11/25 44.5 kg (98 lb)   02/27/25 44 kg (97 lb)   09/09/24 45.4 kg (100 lb)     Physical Exam:   There were no vitals filed for this visit.    General Appearance:    Alert, cooperative, no distress, appears stated age   Head:    Normocephalic, without obvious abnormality, atraumatic   Eyes:    conjunctiva/corneas clear, both eyes        Nose:   Nares normal, septum midline, no drainage    Throat:   Lips normal; teeth and gums normal   Neck:    symmetrical, trachea midline, ;     thyroid:  no enlargement/   Back:     Symmetric, no curvature, ROM normal   Lungs:   No audible wheezing or labored breathing   Chest Wall:    No tenderness or deformity    Heart:    Regular rate and rhythm               Pulses:   2+ and symmetric all extremities   Skin:   Skin color, texture, turgor normal, no rashes or lesions   Neurologic:   normal strength, sensation and reflexes     throughout       Musculoskeletal: bilateral lower extremity  On examination of patient's lumbar spine no deformity no step-offs no pain to palpation of the midline of the lumbar spine no pain to palpation bilateral SI joints active forward flexion extension without pain examination patient's lower extremities right lower extremity hip flexion adduction abduction strength 5 out of 5 negative modified straight leg raise negative [facility] no pain with range of motion internal/external rotation of the right hip to 30 degrees knee flexion extension strength 5 out of 5 ankle dorsi and plantarflexion strength 5 out of 5 sensation intact distal pulse present right lower extremity  Examination patient's left lower extremity 4+ out of 5 hip flexion strength adduction abduction strength out of 5 negative modified straight leg raise no pain with rotation of the left hip to 3 degrees  internal/external rotation with the hip flexed at 90 degrees knee flexion extension strength 5 out of 5 ankle dorsal plantarflexion strength 5 out of 5 distal pulses present    Radiology:   I personally reviewed the films.  X-rays of the left hip reviewed x-rays reveal no evidence of arthritic change fracture or osseous abnormality  X-rays of the lumbar spine reveal mild degenerative changes noted in the lumbar spine as well as L5-S1 significant degenerative joint disease

## 2025-03-12 RX ORDER — ATORVASTATIN CALCIUM 40 MG/1
40 TABLET, FILM COATED ORAL
Qty: 90 TABLET | Refills: 11 | Status: SHIPPED | OUTPATIENT
Start: 2025-03-12

## 2025-03-12 RX ORDER — PANTOPRAZOLE SODIUM 40 MG/1
TABLET, DELAYED RELEASE ORAL
Qty: 90 TABLET | Refills: 11 | Status: SHIPPED | OUTPATIENT
Start: 2025-03-12

## 2025-03-12 RX ORDER — METOPROLOL SUCCINATE 25 MG/1
TABLET, EXTENDED RELEASE ORAL
Qty: 180 TABLET | Refills: 11 | Status: SHIPPED | OUTPATIENT
Start: 2025-03-12

## 2025-03-13 ENCOUNTER — REMOTE DEVICE CLINIC VISIT (OUTPATIENT)
Dept: CARDIOLOGY CLINIC | Facility: CLINIC | Age: 70
End: 2025-03-13
Payer: COMMERCIAL

## 2025-03-13 ENCOUNTER — TELEPHONE (OUTPATIENT)
Facility: MEDICAL CENTER | Age: 70
End: 2025-03-13

## 2025-03-13 DIAGNOSIS — Z95.810 AICD (AUTOMATIC CARDIOVERTER/DEFIBRILLATOR) PRESENT: Primary | ICD-10-CM

## 2025-03-13 PROCEDURE — 93295 DEV INTERROG REMOTE 1/2/MLT: CPT | Performed by: INTERNAL MEDICINE

## 2025-03-13 PROCEDURE — 93296 REM INTERROG EVL PM/IDS: CPT | Performed by: INTERNAL MEDICINE

## 2025-03-13 NOTE — TELEPHONE ENCOUNTER
Note in chart after leaving a message for someone to schedule there MRI appointment:  I left a message today to call me at [phone] to schedule their MRI appointment.

## 2025-03-14 NOTE — PROGRESS NOTES
"Results for orders placed or performed in visit on 03/13/25   Cardiac EP device report    Narrative    MDT DC ICD/ACTIVE SYSTEM IS MRI CONDITIONAL  CARELINK TRANSMISSION: PRESENTING EGRAM AP @ 60 BPM. BATTERY STATUS \"7 YRS.\" AP 9%  66%. ALL AVAILABLE LEAD PARAMETERS WITHIN NORMAL LIMITS. NO SIGNIFICANT HIGH RATE EPISODES. OPTI-VOL WITHIN NORMAL LIMITS. NORMAL DEVICE FUNCTION. NC         "

## 2025-03-16 ENCOUNTER — RESULTS FOLLOW-UP (OUTPATIENT)
Dept: NON INVASIVE DIAGNOSTICS | Facility: HOSPITAL | Age: 70
End: 2025-03-16

## 2025-03-17 ENCOUNTER — TELEPHONE (OUTPATIENT)
Age: 70
End: 2025-03-17

## 2025-03-17 NOTE — TELEPHONE ENCOUNTER
Reminder added to appt notes. Ride cannot be scheduled this far in advance. Will schedule once we are closer to appt date.

## 2025-03-17 NOTE — TELEPHONE ENCOUNTER
Caller: Sofiya Harding    Doctor: Dr. Rodriguez/Zelda     Reason for call: Patient has an appt scheduled with Zelda on 5/1/2025 @ 3 p.m.  She is requesting Ly[employer] transportation be arranged for that visit.     Call back#: [phone]    Final Anesthesia Post-op Assessment    Patient: Carol Montoya  Procedure(s) Performed: LEFT REVISION, KNEE REPLACEMENT- RESECTION  Anesthesia type: General    Vitals Value Taken Time   Temp 36.1 3/5/2020  6:25 PM   Pulse 91 3/5/2020  6:24 PM   Resp 16 3/5/2020  6:25 PM   SpO2 100 % 3/5/2020  6:24 PM   /84 3/5/2020  6:23 PM   Vitals shown include unvalidated device data.    Last 24 I/O:     Intake/Output Summary (Last 24 hours) at 3/5/2020 1825  Last data filed at 3/5/2020 1823  Gross per 24 hour   Intake 1000 ml   Output --   Net 1000 ml         Patient Location: PACU Phase 1  Post-op Vital Signs:stable  Level of Consciousness: participates in exam, awake, oriented, answers questions appropriately and alert  Respiratory Status: spontaneous ventilation  Cardiovascular blood pressure returned to baseline  Hydration: euvolemic  Pain Management: adequately controlled  Handoff: Handoff to receiving nurse was performed and questions were answered  Nausea: None  Airway Patency:patent  Post-op Assessment: awake, alert, appropriately conversant, or baseline, no complications and patient tolerated procedure well with no complications

## 2025-03-20 ENCOUNTER — TELEPHONE (OUTPATIENT)
Facility: MEDICAL CENTER | Age: 70
End: 2025-03-20

## 2025-03-20 DIAGNOSIS — J44.9 CHRONIC OBSTRUCTIVE PULMONARY DISEASE, UNSPECIFIED COPD TYPE (HCC): ICD-10-CM

## 2025-03-20 NOTE — TELEPHONE ENCOUNTER
4-9-25 AT 1100AM AT    Note in chart after scheduling pacemaker:  Topics covered in our conversation:  MRI scheduled on above date and time.  Reminded to get chest x-ray follow-up prior to MRI appt.

## 2025-03-21 RX ORDER — UMECLIDINIUM BROMIDE AND VILANTEROL TRIFENATATE 62.5; 25 UG/1; UG/1
1 POWDER RESPIRATORY (INHALATION) DAILY
Qty: 60 BLISTER | Refills: 4 | Status: SHIPPED | OUTPATIENT
Start: 2025-03-21

## 2025-03-25 ENCOUNTER — HOSPITAL ENCOUNTER (OUTPATIENT)
Dept: RADIOLOGY | Facility: HOSPITAL | Age: 70
Discharge: HOME | End: 2025-03-25

## 2025-03-25 DIAGNOSIS — M54.16 RADICULOPATHY, LUMBAR REGION: ICD-10-CM

## 2025-03-25 DIAGNOSIS — R29.898 LEFT LEG WEAKNESS: ICD-10-CM

## 2025-04-09 ENCOUNTER — HOSPITAL ENCOUNTER (OUTPATIENT)
Dept: MRI IMAGING | Facility: HOSPITAL | Age: 70
Discharge: HOME | End: 2025-04-09
Payer: COMMERCIAL

## 2025-04-09 VITALS — HEART RATE: 100 BPM | OXYGEN SATURATION: 99 %

## 2025-04-09 DIAGNOSIS — R29.898 LEFT LEG WEAKNESS: ICD-10-CM

## 2025-04-09 DIAGNOSIS — M54.16 RADICULOPATHY, LUMBAR REGION: ICD-10-CM

## 2025-04-09 PROCEDURE — 76018 MR SAFETY IMPLANT ELEC PREPJ: CPT

## 2025-04-09 PROCEDURE — 76014 MR SFTY IMPLT&/FB ASMT STF 1: CPT

## 2025-04-09 PROCEDURE — 72148 MRI LUMBAR SPINE W/O DYE: CPT

## 2025-04-09 NOTE — NURSING NOTE
Outpatient arrived for MRI study of lumbar spine. Cardiology order for pacemaker programming change to MRI sure scan signed by BEE Christie PA-C.                                Baseline MDT  Pacer Mode AAI<=>DDD Low rate 60 Upper track 130. MRI sure scan turned on, settings for scan changed to                        via [employer] Sync I pad application.   Patient continuously monitored during MRI scan by RN. See vitals flowsheet.     Post MRI interrogation completed. Pacer set back to baseline settings of AAI<=>DDD Low rate 60 Upper track 130 using the same application. Device status, scan parameters and order to be scanned into patient chart.

## 2025-04-17 ENCOUNTER — TELEPHONE (OUTPATIENT)
Dept: OBGYN CLINIC | Facility: CLINIC | Age: 70
End: 2025-04-17

## 2025-04-17 DIAGNOSIS — N18.32 TYPE 2 DIABETES MELLITUS WITH STAGE 3B CHRONIC KIDNEY DISEASE, WITH LONG-TERM CURRENT USE OF INSULIN (HCC): ICD-10-CM

## 2025-04-17 DIAGNOSIS — E11.22 TYPE 2 DIABETES MELLITUS WITH STAGE 3B CHRONIC KIDNEY DISEASE, WITH LONG-TERM CURRENT USE OF INSULIN (HCC): ICD-10-CM

## 2025-04-17 DIAGNOSIS — Z79.4 TYPE 2 DIABETES MELLITUS WITH STAGE 3B CHRONIC KIDNEY DISEASE, WITH LONG-TERM CURRENT USE OF INSULIN (HCC): ICD-10-CM

## 2025-04-17 NOTE — TELEPHONE ENCOUNTER
Patient called the RX Refill Line. Message is being forwarded to the office.     Patient is requesting a call back to review MRI results.    Please contact patient at [phone]

## 2025-04-17 NOTE — TELEPHONE ENCOUNTER
Reason for call:   [x] Refill   [] Prior Auth  [x] Other: Not a duplicate. Needs to change pharmacy.    Office:   [x] PCP/Provider -   [] Specialty/Provider -     Medication: Empagliflozin 25 MG TABS     Dose/Frequency: Take 1 tablet (25 mg total) by mouth daily     Quantity: 30    Pharmacy: [facility]   Does the patient have enough for 3 days?   [] Yes   [x] No - Send as HP to POD

## 2025-04-18 NOTE — TELEPHONE ENCOUNTER
PATIENT CALLED. SCHEDULED WITH DR. VALENZUELA, MONDAY MAY 5TH @ 1:45. PATIENT GIVEN LOCATION AND PHONE# IF NEEDED.

## 2025-05-01 ENCOUNTER — OFFICE VISIT (OUTPATIENT)
Dept: CARDIOLOGY CLINIC | Facility: CLINIC | Age: 70
End: 2025-05-01
Payer: COMMERCIAL

## 2025-05-01 VITALS
TEMPERATURE: 97.3 F | SYSTOLIC BLOOD PRESSURE: 116 MMHG | BODY MASS INDEX: 16.56 KG/M2 | WEIGHT: 90 LBS | OXYGEN SATURATION: 98 % | DIASTOLIC BLOOD PRESSURE: 70 MMHG | HEIGHT: 62 IN

## 2025-05-01 DIAGNOSIS — I50.22 CHRONIC HFREF (HEART FAILURE WITH REDUCED EJECTION FRACTION) (HCC): ICD-10-CM

## 2025-05-01 DIAGNOSIS — I42.8 NICM (NONISCHEMIC CARDIOMYOPATHY) (HCC): ICD-10-CM

## 2025-05-01 DIAGNOSIS — I47.10 SVT (SUPRAVENTRICULAR TACHYCARDIA) (HCC): ICD-10-CM

## 2025-05-01 DIAGNOSIS — I10 PRIMARY HYPERTENSION: ICD-10-CM

## 2025-05-01 DIAGNOSIS — E78.5 HYPERLIPIDEMIA LDL GOAL <100: ICD-10-CM

## 2025-05-01 DIAGNOSIS — I48.0 PAF (PAROXYSMAL ATRIAL FIBRILLATION) (HCC): ICD-10-CM

## 2025-05-01 DIAGNOSIS — I25.10 CORONARY ARTERY DISEASE INVOLVING NATIVE CORONARY ARTERY OF NATIVE HEART WITHOUT ANGINA PECTORIS: ICD-10-CM

## 2025-05-01 PROCEDURE — 99214 OFFICE O/P EST MOD 30 MIN: CPT

## 2025-05-01 NOTE — PROGRESS NOTES
Sofiya Oliver  1955  [id number]  [facility]  [address] [address] [phone]    1. Chronic HFrEF (heart failure with reduced ejection fraction) ([facility])  Ambulatory Referral to Cardiology      2. SVT (supraventricular tachycardia) ([facility])        3. Primary hypertension        4. PAF (paroxysmal atrial fibrillation) ([facility])        5. NICM (nonischemic cardiomyopathy) ([facility])        6. Coronary artery disease involving native coronary artery of native heart without angina pectoris        7. Hyperlipidemia LDL goal <100            Summary/Discussion:  Chronic HFrEF  Nonischemic cardiomyopathy s/p MDT DC ICD in 3/2022  - echo (4/2023): LVEF 35%, mild global hypokinesis, abnormal diastolic function, normal RV size with mildly reduced function function, mild AI, mild MR, PASP 59, trivial pericardial effusion  updated echo ordered by PCP  - creatinine (8/23/2023): 0.98  - potassium (8/23/2023): 4.5  updated labs ordered by PCP   - euvolemic on exam. She has had significant weight loss over the last several years- unclear etiology  will continue lasix 20 mg daily at this time  if evidence of JACK on updated labs would recommend decreasing and/or possibility discontinuing her lasix   - continue present GDMT  - heart failure education provided    Coronary artery disease:  - nonobstructive by cardiac cath in 11/2020  - currently without symptoms of angina   - continue aspirin, statin and beta blocker  self discontinued aspirin as noted above    SVT  Paroxysmal atrial fibrillation  - device interrogation (3/13/2025): MDT DC ICD. AP 9%,  66%. Lead parameters WNL. No significant high rate episodes. Opti-Vol within normal limits. Normal device function    - she self discontinued eliquis with prior GI bleed. On high dose aspirin which she reports no longer take  discussion had on importance of anticoagulation for stroke prevention   - continue metoprolol 75 mg daily      Hypertension:  - 116/70  - continue present medication regimen  - lifestyle modification   - close blood pressure monitoring     Dyslipidemia:  - Lipid Profile:    Latest Reference Range & Units 01/18/23 11:20   Cholesterol See Comment mg/dL 163   Triglycerides See Comment mg/dL 72   HDL >=50 mg/dL 73   LDL Calculated 0 - 100 mg/dL 76   - continue atorvastatin 40 mg daily   - due for updated lipids which she has an active order by PCP  - encouraged low cholesterol diet and annual lipid follow up    Unintentional weight loss/malnourished:  - hx of left breast cancer  - reports decreased appetite/poor oral intake  - advised her to follow up with PCP    Summary/Discussion:  Interval History: Sofiya Harding is a 69 y.o. year old female with history mentioned in problem list who presents to the office today for routine follow up.     She has not been seen in the office since 2023. Today, she does not express any significant cardiac complaints. She denies any chest pain/pressure/discomfort or shortness of breath. he denies lower extremity edema, orthopnea, and PND. She denies lightheadedness, dizziness, and syncope. She denies palpitations.  She does report fatigue which is not new for her. Her biggest complaint today is her unintentional weight loss and decreased appetite/poor oral intake.    She will RTO in 6 months with Dr. Rodriguez or sooner if necessary. She will call with any concerns.         Medical Problems       Problem List       History of left breast cancer    Overview Signed 2/21/2018  3:33 PM by Zechariah Hihg DO   Procedure/Onset: 03/15/2006; Transitioned From: Mammogram abnormal         Hypertensive heart and kidney disease with heart failure and with chronic kidney disease stage III (HCC)    Overview Signed 2/21/2018  3:33 PM by Zechariah High DO   Procedure/Onset: 03/15/2006         Wt Readings from Last 3 Encounters:   05/01/25 40.8 kg (90 lb)   03/11/25 44.5 kg (98 lb)   02/27/25 44 kg (97 lb)                  Colon, diverticulosis    Overview Signed 2/21/2018  3:33 PM by Zechariah High DO   Procedure/Onset: 07/27/2007         Hyperlipidemia LDL goal <100    Microalbuminuria    Acute on chronic combined systolic (congestive) and diastolic (congestive) heart failure (HCC)    Wt Readings from Last 3 Encounters:   05/01/25 40.8 kg (90 lb)   03/11/25 44.5 kg (98 lb)   02/27/25 44 kg (97 lb)                 CKD stage 3 due to type 2 diabetes mellitus (HCC)      Lab Results   Component Value Date    HGBA1C 15.0 (A) 02/27/2025         Hyperparathyroidism (HCC)    Coronary artery disease involving native coronary artery of native heart without angina pectoris    Chronic HFrEF (heart failure with reduced ejection fraction) (HCC)    Wt Readings from Last 3 Encounters:   05/01/25 40.8 kg (90 lb)   03/11/25 44.5 kg (98 lb)   02/27/25 44 kg (97 lb)                 Vitamin D deficiency    S/P left mastectomy    Overview Signed 4/14/2021  1:25 PM by Zechariah High DO   1994         Primary hypertension    Bilateral leg edema    NICM (nonischemic cardiomyopathy) (HCC)    Peripheral arterial disease (HCC)    Trigger finger of right thumb    Venous insufficiency of both lower extremities    SVT (supraventricular tachycardia) (HCC)    Tobacco use    PAF (paroxysmal atrial fibrillation) (HCC)    Embolism and 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         Depression, recurrent (HCC)    HPV in female    Diarrhea    JAYLAN II (vulvar intraepithelial neoplasia II)    MARCELINO I (cervical  intraepithelial neoplasia I)    Iron deficiency anemia    Foreign body alimentary tract    Pancreatic abnormality        Past Medical History:   Diagnosis Date    Breast cancer (HCC)     left - 1994.    Diabetes mellitus (HCC)     Diabetes mellitus, type 2 (HCC) 03/15/2006    last assessed 7/10/13    GERD (gastroesophageal reflux disease)     History of chemotherapy     Hyperlipidemia     Hypertension      Social History     Socioeconomic History    Marital status: Single     Spouse name: Not on file    Number of children: Not on file    Years of education: Not on file    Highest education level: Not on file   Occupational History    Not on file   Tobacco Use    Smoking status: Every Day     Current packs/day: 0.25     Average packs/day: 0.3 packs/day for 51.9 years (13.0 ttl pk-yrs)     Types: Cigarettes     Start date: 6/15/1973     Passive exposure: Current    Smokeless tobacco: Never    Tobacco comments:     2 cigarettes daily    Vaping Use    Vaping status: Never Used   Substance and Sexual Activity    Alcohol use: Never    Drug use: Never    Sexual activity: Not Currently     Partners: Male   Other Topics Concern    Not on file   Social History Narrative    Not on file     Social Drivers of Health     Financial Resource Strain: Low Risk  (10/6/2023)    Overall Financial Resource Strain (CARDIA)     Difficulty of Paying Living Expenses: Not hard at all   Food Insecurity: No Food Insecurity (2/27/2025)    Hunger Vital Sign     Worried About Running Out of Food in the Last Year: Never true     Ran Out of Food in the Last Year: Never true   Transportation Needs: No Transportation Needs (2/27/2025)    PRAPARE - Transportation     Lack of Transportation (Medical): No     Lack of Transportation (Non-Medical): No   Physical Activity: Not on file   Stress: Not on file   Social Connections: Not on file   Intimate Partner Violence: Not on file   Housing Stability: Low Risk  (2/27/2025)    Housing Stability Vital Sign      Unable to Pay for Housing in the Last Year: No     Number of Times Moved in the Last Year: 0     Homeless in the Last Year: No      Family History   Problem Relation Age of Onset    Hypothyroidism Mother     Leukemia Mother     Diabetes Father     Diabetes type II Father     Stroke Sister     Diabetes Paternal Grandmother     Cancer Sister     Diabetes Brother      Past Surgical History:   Procedure Laterality Date    CARDIAC ELECTROPHYSIOLOGY PROCEDURE N/A 03/09/2022    Procedure: Cardiac icd implant/ DUAL CHAMBER ICD;  Surgeon: Elmer Marina MD;  Location: [facility];  Service: Cardiology    INSERT / REPLACE / REMOVE PACEMAKER      MASTECTOMY Left     last assessed 12/16/14    MASTECTOMY, RADICAL Left     1994    TN COLPOSCOPY CERVIX BX CERVIX & ENDOCRV CURRETAGE N/A 9/22/2023    Procedure: CERVICAL BIOPSY;  Surgeon: Osiris Perry MD;  Location: [facility];  Service: Gynecology Oncology    TN PARATHYROIDECTOMY/EXPLORATION PARATHYROIDS N/A 04/21/2021    Procedure: PARATHYROIDECTOMY POSSIBLE 4 GLAND EXPLORATION;  Surgeon: Ramesh Austin MD;  Location: [facility];  Service: ENT    TN VULVECTOMY SIMPLE PARTIAL N/A 9/22/2023    Procedure: VULVECTOMY SIMPLE;  Surgeon: Osiris Perry MD;  Location: [facility];  Service: Gynecology Oncology    REDUCTION MAMMAPLASTY Right     UVULECTOMY         Current Outpatient Medications:     albuterol (Ventolin HFA) 90 mcg/act inhaler, Inhale 2 puffs every 6 (six) hours as needed for wheezing, Disp: 18 g, Rfl: 5    Anoro Ellipta 62.5-25 MCG/ACT inhaler, INHALE 1 PUFF BY MOUTH ONCE DAILY, Disp: 60 blister, Rfl: 4    atorvastatin (LIPITOR) 40 mg tablet, TAKE ONE TABLET BY MOUTH DAILY AT 5PM, Disp: 90 tablet, Rfl: 11    Blood Glucose Monitoring Suppl (OneTouch Verio Reflect) w/Device KIT, CHECK BLOOD SUGARS ONCE DAILY. PLEASE SUBSTITUTE WITH APPROPRIATE ALTERNATIVE AS COVERED BY PATIENT'S INSURANCE. DX: E11.65, Disp: 1 kit, Rfl: 3    clotrimazole-betamethasone (LOTRISONE) 1-0.05 %  cream, Apply topically 2 (two) times a day, Disp: 15 g, Rfl: 0    Empagliflozin 25 MG TABS, Take 1 tablet (25 mg total) by mouth daily, Disp: 30 tablet, Rfl: 0    ferrous sulfate 324 (65 Fe) mg, TAKE ONE TABLET BY MOUTH THREE TIMES DAILY @9AM-1PM-5PM BEFORE MEALS, Disp: 90 tablet, Rfl: 11    furosemide (LASIX) 20 mg tablet, TAKE ONE TABLET BY MOUTH DAILY AT 9AM, Disp: 90 tablet, Rfl: 11    glucose blood (OneTouch Verio) test strip, Check blood sugars once daily. Please substitute with appropriate alternative as covered by patient's insurance. Dx: E11.65, Disp: 100 each, Rfl: 3    insulin glargine (LANTUS SOLOSTAR) 100 units/mL injection pen, Inject 12 Units under the skin daily, Disp: 15 mL, Rfl: 1    metFORMIN (GLUCOPHAGE) 500 mg tablet, TAKE 2 TABLETS BY MOUTH TWICE A DAY WITH FOOD, Disp: 360 tablet, Rfl: 1    metoprolol succinate (TOPROL-XL) 25 mg 24 hr tablet, TAKE THREE TABLETS BY MOUTH DAILY AT 9AM, Disp: 180 tablet, Rfl: 11    OneTouch Delica Lancets 33G MISC, Check blood sugars once daily. Please substitute with appropriate alternative as covered by patient's insurance. Dx: E11.65, Disp: 100 each, Rfl: 3    pantoprazole (PROTONIX) 40 mg tablet, TAKE ONE TABLET BY MOUTH DAILY AT 9AM, Disp: 90 tablet, Rfl: 11    ascorbic acid (VITAMIN C) 250 mg tablet, Take 1 tablet (250 mg total) by mouth daily, Disp: 30 tablet, Rfl: 0    aspirin 325 mg tablet, Take 1 tablet (325 mg total) by mouth daily (Patient not taking: Reported on 5/1/2025), Disp: 90 tablet, Rfl: 3    Insulin Glargine Solostar (Lantus SoloStar) 100 UNIT/ML SOPN, Inject 0.12 mL (12 Units total) under the skin in the morning, Disp: 15 mL, Rfl: 3  No Known Allergies    Labs:     Chemistry        Component Value Date/Time     10/07/2016 0841    K 4.5 08/23/2023 0433    K 4.4 10/07/2016 0841     08/23/2023 0433     10/07/2016 0841    CO2 25 08/23/2023 0433    CO2 23 10/07/2016 0841    BUN 22 08/23/2023 0433    BUN 15 10/07/2016 0841     "CREATININE 0.98 08/23/2023 0433    CREATININE 0.82 10/07/2016 0841        Component Value Date/Time    CALCIUM 9.1 08/23/2023 0433    CALCIUM 12.2 (H) 10/07/2016 0841    ALKPHOS 219 (H) 08/21/2023 1211    ALKPHOS 84 10/07/2016 0841    AST 16 08/21/2023 1211    AST 16 10/07/2016 0841    ALT 15 08/21/2023 1211    ALT 16 10/07/2016 0841    BILITOT 0.3 10/07/2016 0841            Lab Results   Component Value Date    CHOL 190 10/07/2016    CHOL 137 12/16/2015    CHOL 168 09/11/2015     Lab Results   Component Value Date    HDL 73 01/18/2023    HDL 64 03/09/2021    HDL 47 11/16/2020     Lab Results   Component Value Date    LDLCALC 76 01/18/2023    LDLCALC 65 03/09/2021    LDLCALC 105 (H) 11/16/2020     Lab Results   Component Value Date    TRIG 72 01/18/2023    TRIG 76 03/09/2021    TRIG 82 11/16/2020     No results found for: \"CHOLHDL\"    Imaging: MRI lumbar spine wo contrast  Result Date: 4/9/2025  Narrative: MRI LUMBAR SPINE WITHOUT CONTRAST INDICATION: R29.898: Other symptoms and signs involving the musculoskeletal system M54.16: Radiculopathy, lumbar region. COMPARISON: 1/2/2024 TECHNIQUE:  Multiplanar, multisequence imaging of the lumbar spine was performed. . IMAGE QUALITY:  Diagnostic FINDINGS: VERTEBRAL BODIES:  There are 5 lumbar type vertebral bodies.  Normal alignment of the lumbar spine.  No spondylolysis or spondylolisthesis. No scoliosis.  No compression fracture.    There is a Schmorl's node within the inferior endplate of the L2 vertebral body. This is unchanged. Mild patchy marrow change with no discrete marrow lesion. SACRUM:  Normal signal within the sacrum. No evidence of insufficiency or stress fracture. DISTAL CORD AND CONUS:  Normal size and signal within the distal cord and conus. PARASPINAL SOFT TISSUES:  Paraspinal soft tissues are unremarkable. LOWER THORACIC DISC SPACES:  Normal disc height and signal.  No disc herniation, canal stenosis or foraminal narrowing. LUMBAR DISC SPACES: L1-L2:  " "Normal. L2-L3: Disc desiccation without loss of disc height. No canal stenosis or foraminal narrowing. L3-L4: Normal disc height and signal with minimal annular bulging. No disc herniation, canal stenosis or foraminal narrowing. L4-L5: Minor annular bulging. No disc herniation. No canal stenosis or foraminal narrowing. L5-S1:  Normal. OTHER FINDINGS:  None.     Impression: Minor noncompressive annular bulging within the lumbar spine with no discrete disc herniation or nerve impingement. Workstation performed: [id number]       ECG:  n/a    Review of Systems   Constitutional: Positive for malaise/fatigue and weight loss.   Neurological:  Positive for weakness.   All other systems reviewed and are negative.      Vitals:    05/01/25 1505   BP: 116/70   Temp: (!) 97.3 °F (36.3 °C)   SpO2: 98%     Vitals:    05/01/25 1505   Weight: 40.8 kg (90 lb)     Height: 5' 2\" (157.5 cm)   Body mass index is 16.46 kg/m².    Physical Exam  Vitals and nursing note reviewed.   Constitutional:       General: She is not in acute distress.     Appearance: She is ill-appearing.   HENT:      Head: Normocephalic.      Nose: Nose normal.      Mouth/Throat:      Mouth: Mucous membranes are moist.      Pharynx: Oropharynx is clear.   Cardiovascular:      Rate and Rhythm: Normal rate and regular rhythm.      Pulses: Normal pulses.      Heart sounds: Normal heart sounds. No murmur heard.  Pulmonary:      Breath sounds: Decreased breath sounds present.   Musculoskeletal:         General: Normal range of motion.      Cervical back: Normal range of motion.      Right lower leg: No edema.   Skin:     General: Skin is warm and dry.   Neurological:      Mental Status: She is alert and oriented to person, place, and time.      Motor: Weakness present.   Psychiatric:         Mood and Affect: Mood normal.         Behavior: Behavior normal.        "

## 2025-05-04 NOTE — ASSESSMENT & PLAN NOTE
Dependent on Suboxone  PRN dilaudid due to acute abdomen, may need fentanyl gtt if intubated postop -- defer to ICU   Ara CAMPOS

## 2025-05-05 ENCOUNTER — OFFICE VISIT (OUTPATIENT)
Dept: OBGYN CLINIC | Facility: HOSPITAL | Age: 70
End: 2025-05-05
Payer: COMMERCIAL

## 2025-05-05 ENCOUNTER — HOSPITAL ENCOUNTER (OUTPATIENT)
Dept: RADIOLOGY | Facility: HOSPITAL | Age: 70
Discharge: HOME | End: 2025-05-05
Attending: ORTHOPAEDIC SURGERY
Payer: COMMERCIAL

## 2025-05-05 VITALS — WEIGHT: 89.95 LBS | HEIGHT: 62 IN | BODY MASS INDEX: 16.55 KG/M2

## 2025-05-05 DIAGNOSIS — E11.42 DIABETIC POLYNEUROPATHY ASSOCIATED WITH TYPE 2 DIABETES MELLITUS (HCC): Primary | ICD-10-CM

## 2025-05-05 DIAGNOSIS — M51.369 DEGENERATION OF INTERVERTEBRAL DISC OF LUMBAR REGION, UNSPECIFIED WHETHER PAIN PRESENT: ICD-10-CM

## 2025-05-05 PROCEDURE — 72100 X-RAY EXAM L-S SPINE 2/3 VWS: CPT

## 2025-05-05 PROCEDURE — 99214 OFFICE O/P EST MOD 30 MIN: CPT | Performed by: ORTHOPAEDIC SURGERY

## 2025-05-05 NOTE — PROGRESS NOTES
Name: Sofiya Harding      : 1955       MRN: [id number]   Encounter Provider: Cas Noonna MD   Encounter Date: 25  Encounter department: [facility] OR[facility] ORTHOPEDIC SPINE SURGERY    Medical Decision Making:     Assessment & Plan  Diabetic polyneuropathy associated with type 2 diabetes mellitus (HCC)    69-year-old female with over 1 year history of left lower extremity weakness with minimal if any low back pain accompanying it.  Exam and imaging findings are benign except for left lower extremity weakness.  MRI showing no significant neural compression. Possible cause is diabetic neuropathy due to poorly controlled diabetes with A1c of 15.  Will plan to get EMG to further evaluate and referral to neurology.    Orders:    XR spine lumbar 2 or 3 views injury; Future    EMG; Future    Ambulatory Referral to Neurology; Future           Diagnostic Tests   IMAGING: I have personally reviewed the images and these are my findings:  Lumbar Spine X-rays from 25: no significant degenerative changes, no apparent spondylolisthesis, no appreciated lytic/blastic lesions, no obvious instability    Lumbar Spine MRI from 25: No significant degenerative changes or findings.  No lumbar stenosis is appreciated, no foraminal stenosis is appreciated.  Disc heights are well-preserved and there is no obvious disc herniation.      Subjective:      Chief Complaint: Back Pain    HPI:  Sofiya Harding is a 69 y.o. female presenting for initial visit with chief complaint of left leg weakness of about 1 year duration.  Patient states she has little if any pain accompanying this weakness and when she does have low back pain it is alleviated by Tylenol.  She states she noted the onset of this left leg weakness primarily when going up the steps to her home as she is unable to flex her left hip enough without assistance.  She feels this weakness has been getting progressively  worse over the past year.  She saw Dr. Gilbert for this complaint who obtained an MRI of the lumbar spine and referred her here for further evaluation.  Of note she does have nicotine dependence, type 2 diabetes mellitus with poor blood glucose control and most recent A1c of 15.  Denies any yan trauma. Denies fever or chills, no night sweats. Denies any bladder or bowel changes.      Conservative therapy includes the following:   Medications: Tylenol  Injections: None     Physical Therapy: None  Chiropractic Medicine: has not attempted  Accupunture/Massage Therapy: has not attempted     Nicotine dependent: Yes  Occupation: Retired  Living situation: Lives with family   ADLs: patient is able to perform     Objective:     Family History   Problem Relation Age of Onset    Hypothyroidism Mother     Leukemia Mother     Diabetes Father     Diabetes type II Father     Stroke Sister     Diabetes Paternal Grandmother     Cancer Sister     Diabetes Brother        Past Medical History:   Diagnosis Date    Breast cancer (HCC)     left - 1994.    Diabetes mellitus (HCC)     Diabetes mellitus, type 2 ([facility]) 03/15/2006    last assessed 7/10/13    GERD (gastroesophageal reflux disease)     History of chemotherapy     Hyperlipidemia     Hypertension        Current Outpatient Medications   Medication Sig Dispense Refill    albuterol (Ventolin HFA) 90 mcg/act inhaler Inhale 2 puffs every 6 (six) hours as needed for wheezing 18 g 5    Anoro Ellipta 62.5-25 MCG/ACT inhaler INHALE 1 PUFF BY MOUTH ONCE DAILY 60 blister 4    atorvastatin (LIPITOR) 40 mg tablet TAKE ONE TABLET BY MOUTH DAILY AT 5PM 90 tablet 11    Blood Glucose Monitoring Suppl (OneTouch Verio Reflect) w/Device KIT CHECK BLOOD SUGARS ONCE DAILY. PLEASE SUBSTITUTE WITH APPROPRIATE ALTERNATIVE AS COVERED BY PATIENT'S INSURANCE. DX: E11.65 1 kit 3    clotrimazole-betamethasone (LOTRISONE) 1-0.05 % cream Apply topically 2 (two) times a day 15 g 0    Empagliflozin 25 MG TABS  Take 1 tablet (25 mg total) by mouth daily 30 tablet 0    ferrous sulfate 324 (65 Fe) mg TAKE ONE TABLET BY MOUTH THREE TIMES DAILY @9AM-1PM-5PM BEFORE MEALS 90 tablet 11    furosemide (LASIX) 20 mg tablet TAKE ONE TABLET BY MOUTH DAILY AT 9AM 90 tablet 11    glucose blood (OneTouch Verio) test strip Check blood sugars once daily. Please substitute with appropriate alternative as covered by patient's insurance. Dx: E11.65 100 each 3    insulin glargine (LANTUS SOLOSTAR) 100 units/mL injection pen Inject 12 Units under the skin daily 15 mL 1    Insulin Glargine Solostar (Lantus SoloStar) 100 UNIT/ML SOPN Inject 0.12 mL (12 Units total) under the skin in the morning 15 mL 3    metFORMIN (GLUCOPHAGE) 500 mg tablet TAKE 2 TABLETS BY MOUTH TWICE A DAY WITH FOOD 360 tablet 1    metoprolol succinate (TOPROL-XL) 25 mg 24 hr tablet TAKE THREE TABLETS BY MOUTH DAILY AT 9AM 180 tablet 11    OneTouch Delica Lancets 33G MISC Check blood sugars once daily. Please substitute with appropriate alternative as covered by patient's insurance. Dx: E11.65 100 each 3    pantoprazole (PROTONIX) 40 mg tablet TAKE ONE TABLET BY MOUTH DAILY AT 9AM 90 tablet 11    ascorbic acid (VITAMIN C) 250 mg tablet Take 1 tablet (250 mg total) by mouth daily 30 tablet 0    aspirin 325 mg tablet Take 1 tablet (325 mg total) by mouth daily (Patient not taking: Reported on 5/1/2025) 90 tablet 3     No current facility-administered medications for this visit.       Past Surgical History:   Procedure Laterality Date    CARDIAC ELECTROPHYSIOLOGY PROCEDURE N/A 03/09/2022    Procedure: Cardiac icd implant/ DUAL CHAMBER ICD;  Surgeon: Elmer Marina MD;  Location: [facility];  Service: Cardiology    INSERT / REPLACE / REMOVE PACEMAKER      MASTECTOMY Left     last assessed 12/16/14    MASTECTOMY, RADICAL Left     1994    [place] COLPOSCOPY CERVIX BX CERVIX & ENDOCRV CURRETAGE N/A 9/22/2023    Procedure: CERVICAL BIOPSY;  Surgeon: Osiris Perry MD;  Location:  [facility] MAIN OR;  Service: Gynecology Oncology    NM PARATHYROIDECTOMY/EXPLORATION PARATHYROIDS N/A 04/21/2021    Procedure: PARATHYROIDECTOMY POSSIBLE 4 GLAND EXPLORATION;  Surgeon: Ramesh Austin MD;  Location: [facility] Main [facility];  Service: ENT    NM VULVECTOMY SIMPLE PARTIAL N/A 9/22/2023    Procedure: VULVECTOMY SIMPLE;  Surgeon: Osiris Perry MD;  Location: [facility];  Service: Gynecology Oncology    REDUCTION MAMMAPLASTY Right     UVULECTOMY         Social History     Socioeconomic History    Marital status: Single     Spouse name: Not on file    Number of children: Not on file    Years of education: Not on file    Highest education level: Not on file   Occupational History    Not on file   Tobacco Use    Smoking status: Every Day     Current packs/day: 0.25     Average packs/day: 0.3 packs/day for 51.9 years (13.0 ttl pk-yrs)     Types: Cigarettes     Start date: 6/15/1973     Passive exposure: Current    Smokeless tobacco: Never    Tobacco comments:     2 cigarettes daily    Vaping Use    Vaping status: Never Used   Substance and Sexual Activity    Alcohol use: Never    Drug use: Never    Sexual activity: Not Currently     Partners: Male   Other Topics Concern    Not on file   Social History Narrative    Not on file     Social Drivers of Health     Financial Resource Strain: Low Risk  (10/6/2023)    Overall Financial Resource Strain (CARDIA)     Difficulty of Paying Living Expenses: Not hard at all   Food Insecurity: No Food Insecurity (2/27/2025)    Hunger Vital Sign     Worried About Running Out of Food in the Last Year: Never true     Ran Out of Food in the Last Year: Never true   Transportation Needs: No Transportation Needs (2/27/2025)    PRAPARE - Transportation     Lack of Transportation (Medical): No     Lack of Transportation (Non-Medical): No   Physical Activity: Not on file   Stress: Not on file   Social Connections: Not on file   Intimate Partner Violence: Not on file   Housing Stability: Low Risk  (2/27/2025)  "   Housing Stability Vital Sign     Unable to Pay for Housing in the Last Year: No     Number of Times Moved in the Last Year: 0     Homeless in the Last Year: No       No Known Allergies    Review of Systems  General- denies fever/chills  HEENT- denies hearing loss or sore throat  Eyes- denies eye pain or visual disturbances, denies red eyes  Respiratory- denies cough or SOB  Cardio- denies chest pain or palpitations  GI- denies abdominal pain  Endocrine- denies urinary frequency  Urinary- denies pain with urination  Musculoskeletal- Negative except noted above  Skin- denies rashes or wounds  Neurological- denies dizziness or headache  Psychiatric- denies anxiety or difficulty concentrating    Physical Exam  Ht 5' 2\" (1.575 m)   Wt 40.8 kg (89 lb 15.2 oz)   LMP  (LMP Unknown)   BMI 16.45 kg/m²     General/Constitutional: No apparent distress: well-nourished and well developed.  Lymphatic: No appreciable lymphadenopathy  Respiratory: Non-labored breathing  Vascular: No edema, swelling or tenderness, except as noted in detailed exam.  Integumentary: No impressive skin lesions present, except as noted in detailed exam.  Psych: Normal mood and affect, oriented to person, place and time.  MSK: normal other than stated in HPI and exam  Gait & balance: no evidence of myelopathic gait, ambulates with a Cane    Lumbar spine range of motion:  -Forward flexion to 90  -Extension to neutral  There is mild tenderness with palpation along lumbar paraspinal musculature, no midline tenderness     Neurologic:    Lower Extremity Motor Function    Right  Left    Iliopsoas  5/5  4+/5    Quadriceps 5/5 4+/5   Tibialis anterior  5/5  3/5    EHL  5/5  3/5    Gastroc. muscle  5/5  3/5      Sensory: light touch is intact to bilateral lower extremities     Reflexes:  Intact    Other tests:  Straight Leg Raise: negative  Miquel SI: negative  LEBRON SI: negative  Greater troch: no tenderness   Internal/external hip ROM: intact, no pain " "  Flexion/extension knee ROM: intact, no pain   Vascular: WWP extremities  Negative femoral stretch test    Electronic Medical Records were reviewed including previous orthopedic office notes, radiology reports    Procedures, if performed today     None performed       Portions of the record may have been created with voice recognition software.  Occasional wrong word or \"sound a like\" substitutions may have occurred due to the inherent limitations of voice recognition software.  Read the chart carefully and recognize, using context, where substitutions have occurred.    "

## 2025-05-14 DIAGNOSIS — E11.22 TYPE 2 DIABETES MELLITUS WITH STAGE 3B CHRONIC KIDNEY DISEASE, WITH LONG-TERM CURRENT USE OF INSULIN (HCC): ICD-10-CM

## 2025-05-14 DIAGNOSIS — N18.32 TYPE 2 DIABETES MELLITUS WITH STAGE 3B CHRONIC KIDNEY DISEASE, WITH LONG-TERM CURRENT USE OF INSULIN (HCC): ICD-10-CM

## 2025-05-14 DIAGNOSIS — Z79.4 TYPE 2 DIABETES MELLITUS WITH STAGE 3B CHRONIC KIDNEY DISEASE, WITH LONG-TERM CURRENT USE OF INSULIN (HCC): ICD-10-CM

## 2025-05-14 RX ORDER — EMPAGLIFLOZIN 25 MG/1
25 TABLET, FILM COATED ORAL DAILY
Qty: 30 TABLET | Refills: 0 | Status: SHIPPED | OUTPATIENT
Start: 2025-05-14

## 2025-05-21 ENCOUNTER — APPOINTMENT (OUTPATIENT)
Dept: LAB | Facility: CLINIC | Age: 70
End: 2025-05-21
Attending: INTERNAL MEDICINE
Payer: COMMERCIAL

## 2025-05-21 ENCOUNTER — RESULTS FOLLOW-UP (OUTPATIENT)
Dept: FAMILY MEDICINE CLINIC | Facility: CLINIC | Age: 70
End: 2025-05-21

## 2025-05-21 DIAGNOSIS — Z79.4 TYPE 2 DIABETES MELLITUS WITH STAGE 3B CHRONIC KIDNEY DISEASE, WITH LONG-TERM CURRENT USE OF INSULIN (HCC): ICD-10-CM

## 2025-05-21 DIAGNOSIS — E11.22 TYPE 2 DIABETES MELLITUS WITH STAGE 3B CHRONIC KIDNEY DISEASE, WITH LONG-TERM CURRENT USE OF INSULIN (HCC): ICD-10-CM

## 2025-05-21 DIAGNOSIS — R74.8 ELEVATED LIVER ENZYMES: ICD-10-CM

## 2025-05-21 DIAGNOSIS — N18.32 TYPE 2 DIABETES MELLITUS WITH STAGE 3B CHRONIC KIDNEY DISEASE, WITH LONG-TERM CURRENT USE OF INSULIN (HCC): ICD-10-CM

## 2025-05-21 DIAGNOSIS — E87.6 HYPOKALEMIA: Primary | ICD-10-CM

## 2025-05-21 LAB
ALBUMIN SERPL BCG-MCNC: 3.6 G/DL (ref 3.5–5)
ALP SERPL-CCNC: 155 U/L (ref 34–104)
ALT SERPL W P-5'-P-CCNC: 61 U/L (ref 7–52)
ANION GAP SERPL CALCULATED.3IONS-SCNC: 8 MMOL/L (ref 4–13)
AST SERPL W P-5'-P-CCNC: 37 U/L (ref 13–39)
BASOPHILS # BLD AUTO: 0.03 THOUSANDS/ÂΜL (ref 0–0.1)
BASOPHILS NFR BLD AUTO: 1 % (ref 0–1)
BILIRUB SERPL-MCNC: 0.69 MG/DL (ref 0.2–1)
BUN SERPL-MCNC: 24 MG/DL (ref 5–25)
CALCIUM SERPL-MCNC: 9.6 MG/DL (ref 8.4–10.2)
CHLORIDE SERPL-SCNC: 100 MMOL/L (ref 96–108)
CHOLEST SERPL-MCNC: 149 MG/DL (ref ?–200)
CO2 SERPL-SCNC: 34 MMOL/L (ref 21–32)
CREAT SERPL-MCNC: 1.05 MG/DL (ref 0.6–1.3)
EOSINOPHIL # BLD AUTO: 0.03 THOUSAND/ÂΜL (ref 0–0.61)
EOSINOPHIL NFR BLD AUTO: 1 % (ref 0–6)
ERYTHROCYTE [DISTWIDTH] IN BLOOD BY AUTOMATED COUNT: 13.9 % (ref 11.6–15.1)
GFR SERPL CREATININE-BSD FRML MDRD: 53 ML/MIN/1.73SQ M
GLUCOSE P FAST SERPL-MCNC: 107 MG/DL (ref 65–99)
HCT VFR BLD AUTO: 50.7 % (ref 34.8–46.1)
HDLC SERPL-MCNC: 46 MG/DL
HGB BLD-MCNC: 16.9 G/DL (ref 11.5–15.4)
IMM GRANULOCYTES # BLD AUTO: 0.02 THOUSAND/UL (ref 0–0.2)
IMM GRANULOCYTES NFR BLD AUTO: 0 % (ref 0–2)
LDLC SERPL CALC-MCNC: 77 MG/DL (ref 0–100)
LYMPHOCYTES # BLD AUTO: 2.6 THOUSANDS/ÂΜL (ref 0.6–4.47)
LYMPHOCYTES NFR BLD AUTO: 40 % (ref 14–44)
MCH RBC QN AUTO: 29.3 PG (ref 26.8–34.3)
MCHC RBC AUTO-ENTMCNC: 33.3 G/DL (ref 31.4–37.4)
MCV RBC AUTO: 88 FL (ref 82–98)
MONOCYTES # BLD AUTO: 0.55 THOUSAND/ÂΜL (ref 0.17–1.22)
MONOCYTES NFR BLD AUTO: 8 % (ref 4–12)
NEUTROPHILS # BLD AUTO: 3.36 THOUSANDS/ÂΜL (ref 1.85–7.62)
NEUTS SEG NFR BLD AUTO: 50 % (ref 43–75)
NONHDLC SERPL-MCNC: 103 MG/DL
NRBC BLD AUTO-RTO: 0 /100 WBCS
PLATELET # BLD AUTO: 250 THOUSANDS/UL (ref 149–390)
PMV BLD AUTO: 12 FL (ref 8.9–12.7)
POTASSIUM SERPL-SCNC: 3.2 MMOL/L (ref 3.5–5.3)
PROT SERPL-MCNC: 7.5 G/DL (ref 6.4–8.4)
RBC # BLD AUTO: 5.77 MILLION/UL (ref 3.81–5.12)
SODIUM SERPL-SCNC: 142 MMOL/L (ref 135–147)
TRIGL SERPL-MCNC: 132 MG/DL (ref ?–150)
TSH SERPL DL<=0.05 MIU/L-ACNC: 3.54 UIU/ML (ref 0.45–4.5)
WBC # BLD AUTO: 6.59 THOUSAND/UL (ref 4.31–10.16)

## 2025-05-21 PROCEDURE — 85025 COMPLETE CBC W/AUTO DIFF WBC: CPT

## 2025-05-21 PROCEDURE — 36415 COLL VENOUS BLD VENIPUNCTURE: CPT

## 2025-05-21 PROCEDURE — 80053 COMPREHEN METABOLIC PANEL: CPT

## 2025-05-21 PROCEDURE — 84443 ASSAY THYROID STIM HORMONE: CPT

## 2025-05-21 PROCEDURE — 80061 LIPID PANEL: CPT

## 2025-05-21 RX ORDER — POTASSIUM CHLORIDE 1500 MG/1
20 TABLET, EXTENDED RELEASE ORAL DAILY
Qty: 30 TABLET | Refills: 5 | Status: SHIPPED | OUTPATIENT
Start: 2025-05-21

## 2025-06-03 ENCOUNTER — DOCUMENTATION (OUTPATIENT)
Dept: ADMINISTRATIVE | Facility: OTHER | Age: 70
End: 2025-06-03

## 2025-06-03 ENCOUNTER — DOCUMENTATION (OUTPATIENT)
Dept: OTHER | Facility: HOSPITAL | Age: 70
End: 2025-06-03

## 2025-06-03 ENCOUNTER — HOSPITAL ENCOUNTER (OUTPATIENT)
Dept: NEUROLOGY | Facility: CLINIC | Age: 70
Discharge: HOME | End: 2025-06-03

## 2025-06-03 DIAGNOSIS — E11.42 DIABETIC POLYNEUROPATHY ASSOCIATED WITH TYPE 2 DIABETES MELLITUS (HCC): ICD-10-CM

## 2025-06-03 NOTE — PROGRESS NOTES
06/03/25 11:37 AM     DM A1c outreach is not required, patient has an upcoming appointment with the PCP office.    Thank you.  Kacie Aguila MA  [facility]

## 2025-06-03 NOTE — PROGRESS NOTES
Neurology/EMG lab       69 year-old patient who describes a several month history of left lower extremity weakness.  She was noted to have weakness of the quadriceps.  iliopsoas and abductors without any sensory symptoms.  She denies any pain.  Today she presented for EMG/ NCV study however she had placed  lotion on her legs.  Despite multiple attempts for cleaning off her leg with alcohol the testing could not be completed.  She has been rescheduled and she was informed that she does need to refrain from utilizing lotion or oils prior to this  of testing

## 2025-06-04 DIAGNOSIS — E87.6 HYPOKALEMIA: ICD-10-CM

## 2025-06-04 RX ORDER — POTASSIUM CHLORIDE 1500 MG/1
20 TABLET, EXTENDED RELEASE ORAL DAILY
Qty: 30 TABLET | Refills: 5 | Status: SHIPPED | OUTPATIENT
Start: 2025-06-04

## 2025-06-04 NOTE — TELEPHONE ENCOUNTER
Reason for call:   [x] Refill   [] Prior Auth  [x] Other: Not a duplicate. Sent to wrong pharmacy.    Office:   [x] PCP/Provider -   [] Specialty/Provider -     Medication: potassium chloride (Klor-Con M20) 20 mEq tablet     Dose/Frequency: Take 1 tablet (20 mEq total) by mouth daily     Quantity: 30    Pharmacy: [facility]   Does the patient have enough for 3 days?   [] Yes   [x] No - Send as HP to POD

## 2025-06-05 DIAGNOSIS — I50.9 CHF EXACERBATION (HCC): ICD-10-CM

## 2025-06-06 RX ORDER — FUROSEMIDE 20 MG/1
TABLET ORAL
Qty: 90 TABLET | Refills: 11 | Status: SHIPPED | OUTPATIENT
Start: 2025-06-06

## 2025-06-12 ENCOUNTER — RESULTS FOLLOW-UP (OUTPATIENT)
Dept: NON INVASIVE DIAGNOSTICS | Facility: HOSPITAL | Age: 70
End: 2025-06-12

## 2025-06-12 ENCOUNTER — REMOTE DEVICE CLINIC VISIT (OUTPATIENT)
Dept: CARDIOLOGY CLINIC | Facility: CLINIC | Age: 70
End: 2025-06-12
Payer: COMMERCIAL

## 2025-06-12 DIAGNOSIS — Z95.810 AICD (AUTOMATIC CARDIOVERTER/DEFIBRILLATOR) PRESENT: Primary | ICD-10-CM

## 2025-06-12 PROCEDURE — 93295 DEV INTERROG REMOTE 1/2/MLT: CPT | Performed by: INTERNAL MEDICINE

## 2025-06-12 PROCEDURE — 93296 REM INTERROG EVL PM/IDS: CPT | Performed by: INTERNAL MEDICINE

## 2025-06-12 NOTE — PROGRESS NOTES
Results for orders placed or performed in visit on 06/12/25   Cardiac EP device report    Narrative    MDT DC ICD/ACTIVE SYSTEM IS MRI CONDITIONAL  CARELINK TRANSMISSION: BATTERY VOLTAGE ADEQUATE (7.4 YRS). AP-9%, -46%. ALL AVAILABLE LEAD PARAMETERS WITHIN NORMAL LIMITS. 1 NSVT EPISODE- 9 BEATS, AVG CL~340MS. PT ON METOPROLOL. PVC SINGLES COUNT SINCE 3/13/25- 17/H. OPTI-VOL WITHIN NORMAL LIMITS. WILL REPROGRAM VF RX PATHWAY @ NEXT INTERRO. NORMAL DEVICE FUNCTION. GV

## 2025-06-13 ENCOUNTER — HOSPITAL ENCOUNTER (OUTPATIENT)
Dept: NEUROLOGY | Facility: CLINIC | Age: 70
End: 2025-06-13
Payer: COMMERCIAL

## 2025-06-13 ENCOUNTER — TELEPHONE (OUTPATIENT)
Age: 70
End: 2025-06-13

## 2025-06-13 DIAGNOSIS — E11.22 TYPE 2 DIABETES MELLITUS WITH STAGE 3B CHRONIC KIDNEY DISEASE, WITH LONG-TERM CURRENT USE OF INSULIN (HCC): ICD-10-CM

## 2025-06-13 DIAGNOSIS — Z79.4 TYPE 2 DIABETES MELLITUS WITH STAGE 3B CHRONIC KIDNEY DISEASE, WITH LONG-TERM CURRENT USE OF INSULIN (HCC): ICD-10-CM

## 2025-06-13 DIAGNOSIS — N18.32 TYPE 2 DIABETES MELLITUS WITH STAGE 3B CHRONIC KIDNEY DISEASE, WITH LONG-TERM CURRENT USE OF INSULIN (HCC): ICD-10-CM

## 2025-06-13 PROBLEM — E11.42 DIABETIC POLYNEUROPATHY ASSOCIATED WITH TYPE 2 DIABETES MELLITUS (HCC): Status: ACTIVE | Noted: 2025-06-13

## 2025-06-13 PROCEDURE — 95886 MUSC TEST DONE W/N TEST COMP: CPT

## 2025-06-13 PROCEDURE — 95909 NRV CNDJ TST 5-6 STUDIES: CPT

## 2025-06-13 RX ORDER — EMPAGLIFLOZIN 25 MG/1
25 TABLET, FILM COATED ORAL DAILY
Qty: 30 TABLET | Refills: 0 | Status: SHIPPED | OUTPATIENT
Start: 2025-06-13

## 2025-06-13 NOTE — TELEPHONE ENCOUNTER
The patient called to report that she is feeling tired and would like to speak with Dr. Vaca or one of the nurse regarding that she probably needs to check her thyroid

## 2025-06-18 ENCOUNTER — OFFICE VISIT (OUTPATIENT)
Dept: FAMILY MEDICINE CLINIC | Facility: CLINIC | Age: 70
End: 2025-06-18
Payer: COMMERCIAL

## 2025-06-18 VITALS
OXYGEN SATURATION: 98 % | RESPIRATION RATE: 16 BRPM | DIASTOLIC BLOOD PRESSURE: 88 MMHG | WEIGHT: 89 LBS | HEIGHT: 62 IN | BODY MASS INDEX: 16.38 KG/M2 | SYSTOLIC BLOOD PRESSURE: 130 MMHG | HEART RATE: 98 BPM

## 2025-06-18 DIAGNOSIS — I73.9 PERIPHERAL ARTERIAL DISEASE (HCC): ICD-10-CM

## 2025-06-18 DIAGNOSIS — J44.9 CHRONIC OBSTRUCTIVE PULMONARY DISEASE, UNSPECIFIED COPD TYPE (HCC): ICD-10-CM

## 2025-06-18 DIAGNOSIS — Z12.31 ENCOUNTER FOR SCREENING MAMMOGRAM FOR BREAST CANCER: ICD-10-CM

## 2025-06-18 DIAGNOSIS — I25.10 CORONARY ARTERY DISEASE INVOLVING NATIVE CORONARY ARTERY OF NATIVE HEART WITHOUT ANGINA PECTORIS: ICD-10-CM

## 2025-06-18 DIAGNOSIS — I74.3 EMBOLISM AND THROMBOSIS OF ARTERIES OF THE LOWER EXTREMITIES (HCC): ICD-10-CM

## 2025-06-18 DIAGNOSIS — C50.611 MALIGNANT NEOPLASM OF AXILLARY TAIL OF RIGHT FEMALE BREAST, UNSPECIFIED ESTROGEN RECEPTOR STATUS (HCC): ICD-10-CM

## 2025-06-18 DIAGNOSIS — I50.43 ACUTE ON CHRONIC COMBINED SYSTOLIC (CONGESTIVE) AND DIASTOLIC (CONGESTIVE) HEART FAILURE (HCC): ICD-10-CM

## 2025-06-18 DIAGNOSIS — I48.0 PAF (PAROXYSMAL ATRIAL FIBRILLATION) (HCC): ICD-10-CM

## 2025-06-18 DIAGNOSIS — I10 PRIMARY HYPERTENSION: ICD-10-CM

## 2025-06-18 DIAGNOSIS — I50.22 CHRONIC HFREF (HEART FAILURE WITH REDUCED EJECTION FRACTION) (HCC): ICD-10-CM

## 2025-06-18 DIAGNOSIS — N18.30 CKD STAGE 3 DUE TO TYPE 2 DIABETES MELLITUS (HCC): ICD-10-CM

## 2025-06-18 DIAGNOSIS — E46 PROTEIN-CALORIE MALNUTRITION, UNSPECIFIED SEVERITY (HCC): ICD-10-CM

## 2025-06-18 DIAGNOSIS — Z72.0 NICOTINE USE: ICD-10-CM

## 2025-06-18 DIAGNOSIS — E78.5 HYPERLIPIDEMIA LDL GOAL <100: ICD-10-CM

## 2025-06-18 DIAGNOSIS — C50.911 MALIGNANT NEOPLASM OF RIGHT FEMALE BREAST, UNSPECIFIED ESTROGEN RECEPTOR STATUS, UNSPECIFIED SITE OF BREAST (HCC): ICD-10-CM

## 2025-06-18 DIAGNOSIS — E11.22 CKD STAGE 3 DUE TO TYPE 2 DIABETES MELLITUS (HCC): ICD-10-CM

## 2025-06-18 DIAGNOSIS — N90.1 VIN II (VULVAR INTRAEPITHELIAL NEOPLASIA II): ICD-10-CM

## 2025-06-18 DIAGNOSIS — Z79.4 TYPE 2 DIABETES MELLITUS WITHOUT COMPLICATION, WITH LONG-TERM CURRENT USE OF INSULIN (HCC): Primary | ICD-10-CM

## 2025-06-18 DIAGNOSIS — Z87.891 PERSONAL HISTORY OF NICOTINE DEPENDENCE: ICD-10-CM

## 2025-06-18 DIAGNOSIS — E11.9 TYPE 2 DIABETES MELLITUS WITHOUT COMPLICATION, WITH LONG-TERM CURRENT USE OF INSULIN (HCC): Primary | ICD-10-CM

## 2025-06-18 DIAGNOSIS — Z72.0 TOBACCO USE: ICD-10-CM

## 2025-06-18 DIAGNOSIS — E44.0 MODERATE PROTEIN-CALORIE MALNUTRITION (HCC): ICD-10-CM

## 2025-06-18 PROBLEM — Z90.12 S/P LEFT MASTECTOMY: Status: RESOLVED | Noted: 2021-04-14 | Resolved: 2025-06-18

## 2025-06-18 LAB — SL AMB POCT HEMOGLOBIN AIC: 8 (ref ?–6.5)

## 2025-06-18 PROCEDURE — 96372 THER/PROPH/DIAG INJ SC/IM: CPT

## 2025-06-18 PROCEDURE — 83036 HEMOGLOBIN GLYCOSYLATED A1C: CPT | Performed by: INTERNAL MEDICINE

## 2025-06-18 PROCEDURE — 99214 OFFICE O/P EST MOD 30 MIN: CPT | Performed by: INTERNAL MEDICINE

## 2025-06-18 RX ORDER — DRONABINOL 10 MG/1
10 CAPSULE ORAL
Qty: 60 CAPSULE | Refills: 3 | Status: SHIPPED | OUTPATIENT
Start: 2025-06-18

## 2025-06-18 RX ORDER — CYANOCOBALAMIN 1000 UG/ML
1000 INJECTION, SOLUTION INTRAMUSCULAR; SUBCUTANEOUS
Status: SHIPPED | OUTPATIENT
Start: 2025-06-18

## 2025-06-18 RX ADMIN — CYANOCOBALAMIN 1000 MCG: 1000 INJECTION, SOLUTION INTRAMUSCULAR; SUBCUTANEOUS at 13:07

## 2025-06-18 NOTE — ASSESSMENT & PLAN NOTE
Wt Readings from Last 3 Encounters:   06/18/25 40.4 kg (89 lb)   05/05/25 40.8 kg (89 lb 15.2 oz)   05/01/25 40.8 kg (90 lb)     Has yet to do echocardiogram -she does follow with Cardiology

## 2025-06-18 NOTE — ASSESSMENT & PLAN NOTE
Check B12 level and gave a B12 shot here today-she has lost weight-says she doesn't have an appetite-we talked about a higher protein low carb diet but not one that restricts calories-cut back on soda -I did prescribe some marinol for her as well-LDCT ordered, which is important to rule things out

## 2025-06-18 NOTE — PROGRESS NOTES
Assessment/Plan:Sofiya also has ordered an abdominal US as on her last labwork her ALT was elevated as was her alk phos-her diabetic control is much better with Hgb A1c going from 15 to 8%-told her to continue the lantus insulin, the jardiance and the metformin and really watch diet-cut out sugars         Problem List Items Addressed This Visit       Hyperlipidemia LDL goal <100    On atorvastatin         Type 2 diabetes mellitus without complication, with long-term current use of insulin ([facility]) - Primary    Relevant Orders    POCT hemoglobin A1c (Completed)    Acute on chronic combined systolic (congestive) and diastolic (congestive) heart failure ([facility])    CKD stage 3 due to type 2 diabetes mellitus ([facility])    Coronary artery disease involving native coronary artery of native heart without angina pectoris    Chronic HFrEF (heart failure with reduced ejection fraction) ([facility])    Wt Readings from Last 3 Encounters:   06/18/25 40.4 kg (89 lb)   05/05/25 40.8 kg (89 lb 15.2 oz)   05/01/25 40.8 kg (90 lb)     Has yet to do echocardiogram -she does follow with Cardiology                Malignant neoplasm of right female breast, unspecified estrogen receptor status, unspecified site of breast ([facility])    Left sided mammogram order placed         Relevant Orders    Mammo diagnostic left w 3d and cad    Primary hypertension    Well controlled          Peripheral arterial disease ([facility])    Tobacco use    PAF (paroxysmal atrial fibrillation) ([facility])    Embolism and thrombosis of arteries of the lower extremities ([facility])    Moderate protein-calorie malnutrition (HCC)    Check B12 level and gave a B12 shot here today-she has lost weight-says she doesn't have an appetite-we talked about a higher protein low carb diet but not one that restricts calories-cut back on soda -I did prescribe some marinol for her as well-LDCT ordered, which is important to rule things out         JAYLAN II (vulvar intraepithelial neoplasia II)    Chronic obstructive  pulmonary disease, unspecified COPD type (HCC)    On Anoro and albuterol-still smoking-LDCT ordered          Other Visit Diagnoses         Encounter for screening mammogram for breast cancer        Relevant Orders    Mammo diagnostic left w 3d and cad      Malignant neoplasm of axillary tail of right female breast, unspecified estrogen receptor status (HCC)        Relevant Orders    Mammo diagnostic left w 3d and cad      Nicotine use        Relevant Orders    CT Lung Screening Program      Personal history of nicotine dependence        Relevant Orders    CT Lung Screening Program      Protein-calorie malnutrition, unspecified severity (HCC)        Relevant Medications    dronabinol (MARINOL) 10 MG capsule    cyanocobalamin injection 1,000 mcg    Other Relevant Orders    Vitamin B12              Subjective:      Patient ID: Sofiya Harding is a 69 y.o. female.    Sofiya here with her brother Jm who I'm meeting for the first time-she has a hx of DM II, CKD, CHF and CAD, breast cancer right breast s/p mastectomy, s/p AICD placement, frailty-she's doing ok, since I last saw her and her A1c was 15%, as of today it has come down to 8%-she is on jardiance, supposed to be on metformin BID (although she's not sure if she's taking it once or twice a day) and she's on Lantus insulin too-12 units daily-her brother said she still drinks a lot of soda and doesn't eat right-she says she has very little appetite-her weight is down to 89 pounds-says she feels tired a lot-smokes 1/2 ppd cigarettes        The following portions of the patient's history were reviewed and updated as appropriate:   Past Medical History:  She has a past medical history of Breast cancer (HCC), Diabetes mellitus (HCC), Diabetes mellitus, type 2 (HCC) (03/15/2006), GERD (gastroesophageal reflux disease), History of chemotherapy, Hyperlipidemia, and Hypertension.,  _______________________________________________________________________  Medical  Problems:  does not have any pertinent problems on file.,  _______________________________________________________________________  Past Surgical History:   has a past surgical history that includes Mastectomy (Left); Mastectomy, radical (Left); Reduction mammaplasty (Right); Uvulectomy; pr parathyroidectomy/exploration parathyroids (N/A, 04/21/2021); Cardiac electrophysiology procedure (N/A, 03/09/2022); Insert / replace / remove pacemaker; pr vulvectomy simple partial (N/A, 9/22/2023); and pr colposcopy cervix bx cervix & endocrv curretage (N/A, 9/22/2023).,  _______________________________________________________________________  Family History:  family history includes Cancer in her sister; Diabetes in her brother, father, and paternal grandmother; Diabetes type II in her father; Hypothyroidism in her mother; Leukemia in her mother; Stroke in her sister.,  _______________________________________________________________________  Social History:   reports that she has been smoking cigarettes. She started smoking about 52 years ago. She has a 13 pack-year smoking history. She has been exposed to tobacco smoke. She has never used smokeless tobacco. She reports that she does not drink alcohol and does not use drugs.,  _______________________________________________________________________  Allergies:  has no known allergies..  _______________________________________________________________________  Current Medications[1]  _______________________________________________________________________  Review of Systems   Constitutional:  Positive for fatigue and unexpected weight change.   HENT: Negative.     Respiratory: Negative.     Cardiovascular: Negative.    Musculoskeletal:  Positive for gait problem.   Neurological:  Positive for numbness.   Psychiatric/Behavioral: Negative.           Objective:  Vitals:    06/18/25 1116   BP: 130/88   BP Location: Left arm   Patient Position: Sitting   Cuff Size: Standard   Pulse:  "98   Resp: 16   SpO2: 98%   Weight: 40.4 kg (89 lb)   Height: 5' 2\" (1.575 m)     Body mass index is 16.28 kg/m².     Physical Exam  Constitutional:       Comments: Frail   HENT:      Head: Normocephalic and atraumatic.      Right Ear: External ear normal.      Left Ear: External ear normal.      Nose: Nose normal.      Mouth/Throat:      Mouth: Mucous membranes are moist.     Cardiovascular:      Rate and Rhythm: Normal rate and regular rhythm.      Heart sounds: Normal heart sounds.   Pulmonary:      Comments: Decreased breath sounds b/l  Abdominal:      Palpations: Abdomen is soft.     Musculoskeletal:         General: Normal range of motion.      Cervical back: Normal range of motion.     Neurological:      General: No focal deficit present.      Mental Status: She is alert and oriented to person, place, and time.     Psychiatric:         Thought Content: Thought content normal.         Judgment: Judgment normal.                [1]   Current Outpatient Medications   Medication Sig Dispense Refill    albuterol (Ventolin HFA) 90 mcg/act inhaler Inhale 2 puffs every 6 (six) hours as needed for wheezing 18 g 5    Anoro Ellipta 62.5-25 MCG/ACT inhaler INHALE 1 PUFF BY MOUTH ONCE DAILY 60 blister 4    atorvastatin (LIPITOR) 40 mg tablet TAKE ONE TABLET BY MOUTH DAILY AT 5PM 90 tablet 11    Blood Glucose Monitoring Suppl (OneTouch Verio Reflect) w/Device KIT CHECK BLOOD SUGARS ONCE DAILY. PLEASE SUBSTITUTE WITH APPROPRIATE ALTERNATIVE AS COVERED BY PATIENT'S INSURANCE. DX: E11.65 1 kit 3    clotrimazole-betamethasone (LOTRISONE) 1-0.05 % cream Apply topically 2 (two) times a day 15 g 0    dronabinol (MARINOL) 10 MG capsule Take 1 capsule (10 mg total) by mouth 2 (two) times a day before meals 60 capsule 3    ferrous sulfate 324 (65 Fe) mg TAKE ONE TABLET BY MOUTH THREE TIMES DAILY @9AM-1PM-5PM BEFORE MEALS 90 tablet 11    furosemide (LASIX) 20 mg tablet TAKE ONE TABLET BY MOUTH DAILY AT 9AM 90 tablet 11    glucose " blood (OneTouch Verio) test strip Check blood sugars once daily. Please substitute with appropriate alternative as covered by patient's insurance. Dx: E11.65 100 each 3    insulin glargine (LANTUS SOLOSTAR) 100 units/mL injection pen Inject 12 Units under the skin daily 15 mL 1    Insulin Glargine Solostar (Lantus SoloStar) 100 UNIT/ML SOPN Inject 0.12 mL (12 Units total) under the skin in the morning 15 mL 3    Jardiance 25 MG TABS TAKE 1 TABLET (25 MG TOTAL) BY MOUTH DAILY. 30 tablet 0    metFORMIN (GLUCOPHAGE) 500 mg tablet TAKE 2 TABLETS BY MOUTH TWICE A DAY WITH FOOD 360 tablet 1    metoprolol succinate (TOPROL-XL) 25 mg 24 hr tablet TAKE THREE TABLETS BY MOUTH DAILY AT 9AM 180 tablet 11    OneTouch Delica Lancets 33G MISC Check blood sugars once daily. Please substitute with appropriate alternative as covered by patient's insurance. Dx: E11.65 100 each 3    pantoprazole (PROTONIX) 40 mg tablet TAKE ONE TABLET BY MOUTH DAILY AT 9AM 90 tablet 11    potassium chloride (Klor-Con M20) 20 mEq tablet Take 1 tablet (20 mEq total) by mouth daily 30 tablet 5    ascorbic acid (VITAMIN C) 250 mg tablet Take 1 tablet (250 mg total) by mouth daily 30 tablet 0    aspirin 325 mg tablet Take 1 tablet (325 mg total) by mouth daily (Patient not taking: Reported on 5/1/2025) 90 tablet 3     Current Facility-Administered Medications   Medication Dose Route Frequency Provider Last Rate Last Admin    cyanocobalamin injection 1,000 mcg  1,000 mcg Intramuscular Q30 Days

## 2025-06-27 ENCOUNTER — HOSPITAL ENCOUNTER (OUTPATIENT)
Dept: CT IMAGING | Facility: HOSPITAL | Age: 70
Discharge: HOME | End: 2025-06-27
Attending: INTERNAL MEDICINE
Payer: COMMERCIAL

## 2025-06-27 DIAGNOSIS — Z87.891 PERSONAL HISTORY OF NICOTINE DEPENDENCE: ICD-10-CM

## 2025-06-27 DIAGNOSIS — Z72.0 NICOTINE USE: ICD-10-CM

## 2025-06-27 PROCEDURE — 71271 CT THORAX LUNG CANCER SCR C-: CPT

## 2025-07-03 DIAGNOSIS — Z79.4 TYPE 2 DIABETES MELLITUS WITH STAGE 3B CHRONIC KIDNEY DISEASE, WITH LONG-TERM CURRENT USE OF INSULIN (HCC): ICD-10-CM

## 2025-07-03 DIAGNOSIS — D50.0 IRON DEFICIENCY ANEMIA DUE TO CHRONIC BLOOD LOSS: ICD-10-CM

## 2025-07-03 DIAGNOSIS — J44.9 CHRONIC OBSTRUCTIVE PULMONARY DISEASE, UNSPECIFIED COPD TYPE (HCC): ICD-10-CM

## 2025-07-03 DIAGNOSIS — E11.22 TYPE 2 DIABETES MELLITUS WITH STAGE 3B CHRONIC KIDNEY DISEASE, WITH LONG-TERM CURRENT USE OF INSULIN (HCC): ICD-10-CM

## 2025-07-03 DIAGNOSIS — N18.32 TYPE 2 DIABETES MELLITUS WITH STAGE 3B CHRONIC KIDNEY DISEASE, WITH LONG-TERM CURRENT USE OF INSULIN (HCC): ICD-10-CM

## 2025-07-03 RX ORDER — FERROUS SULFATE 324(65)MG
TABLET, DELAYED RELEASE (ENTERIC COATED) ORAL
Qty: 90 TABLET | Refills: 11 | OUTPATIENT
Start: 2025-07-03

## 2025-07-03 RX ORDER — UMECLIDINIUM BROMIDE AND VILANTEROL TRIFENATATE 62.5; 25 UG/1; UG/1
1 POWDER RESPIRATORY (INHALATION) DAILY
Qty: 60 BLISTER | Refills: 4 | Status: SHIPPED | OUTPATIENT
Start: 2025-07-03

## 2025-07-03 RX ORDER — INSULIN GLARGINE 100 [IU]/ML
12 INJECTION, SOLUTION SUBCUTANEOUS DAILY
Qty: 15 ML | Refills: 3 | Status: SHIPPED | OUTPATIENT
Start: 2025-07-03 | End: 2025-07-09 | Stop reason: SDUPTHER

## 2025-07-03 RX ORDER — ALBUTEROL SULFATE 90 UG/1
2 INHALANT RESPIRATORY (INHALATION) EVERY 6 HOURS PRN
Qty: 18 G | Refills: 5 | Status: SHIPPED | OUTPATIENT
Start: 2025-07-03

## 2025-07-08 NOTE — TELEPHONE ENCOUNTER
Patient called requesting refill for lantus. Patient made aware medication was refilled on 7/3/25 for 15 mL with 3 refills to [facility] pharmacy. Patient instructed to contact the pharmacy and speak with someone directly to obtain refills of medication. Patient advised to call back for refill if their pharmacy is unable to assist them. Patient verbalized understanding.

## 2025-07-09 RX ORDER — INSULIN GLARGINE 100 [IU]/ML
12 INJECTION, SOLUTION SUBCUTANEOUS DAILY
Qty: 15 ML | Refills: 3 | Status: SHIPPED | OUTPATIENT
Start: 2025-07-09

## 2025-07-09 NOTE — TELEPHONE ENCOUNTER
Patient states the insulin was sent to the wrong pharmacy. Patient is requesting the insulin be sent to [facility], [facility] [address] and [facility]. Advised patient I would resend script.

## 2025-07-10 DIAGNOSIS — E87.6 HYPOKALEMIA: ICD-10-CM

## 2025-07-11 RX ORDER — POTASSIUM CHLORIDE 1500 MG/1
20 TABLET, EXTENDED RELEASE ORAL DAILY
Qty: 90 TABLET | Refills: 1 | Status: SHIPPED | OUTPATIENT
Start: 2025-07-11

## 2025-07-22 DIAGNOSIS — E11.40 TYPE 2 DIABETES MELLITUS WITH DIABETIC NEUROPATHY, WITH LONG-TERM CURRENT USE OF INSULIN (HCC): Primary | ICD-10-CM

## 2025-07-22 DIAGNOSIS — E11.22 TYPE 2 DIABETES MELLITUS WITH STAGE 3B CHRONIC KIDNEY DISEASE, WITH LONG-TERM CURRENT USE OF INSULIN (HCC): ICD-10-CM

## 2025-07-22 DIAGNOSIS — Z79.4 TYPE 2 DIABETES MELLITUS WITH DIABETIC NEUROPATHY, WITH LONG-TERM CURRENT USE OF INSULIN (HCC): Primary | ICD-10-CM

## 2025-07-22 DIAGNOSIS — Z79.4 TYPE 2 DIABETES MELLITUS WITH STAGE 3B CHRONIC KIDNEY DISEASE, WITH LONG-TERM CURRENT USE OF INSULIN (HCC): ICD-10-CM

## 2025-07-22 DIAGNOSIS — N18.32 TYPE 2 DIABETES MELLITUS WITH STAGE 3B CHRONIC KIDNEY DISEASE, WITH LONG-TERM CURRENT USE OF INSULIN (HCC): ICD-10-CM

## 2025-07-22 RX ORDER — GABAPENTIN 100 MG/1
100 CAPSULE ORAL
Qty: 30 CAPSULE | Refills: 5 | Status: SHIPPED | OUTPATIENT
Start: 2025-07-22

## 2025-07-22 NOTE — TELEPHONE ENCOUNTER
Reason for call:   [x] Refill   [] Prior Auth  [] Other:     Office:   [x] PCP/Provider -   [] Specialty/Provider -     Medication: Empagliflozin (Jardiance) 25 MG TABS     Dose/Frequency: Take 1 tablet (25 mg total) by mouth daily     Quantity: 30    Pharmacy: [facility] [place] [place]     Local Pharmacy   Does the patient have enough for 3 days?   [] Yes   [x] No - Send as HP to POD

## 2025-07-30 ENCOUNTER — TELEPHONE (OUTPATIENT)
Age: 70
End: 2025-07-30

## 2025-07-31 NOTE — TELEPHONE ENCOUNTER
Patient called in regarding her Jardiance asking why the pharmacy did not have it for refill.  I spoke with Elisa at [facility] and she stated when the refill was put in, it was too soon, but she ran it today and it went through.  Patient advised. No further action.

## 2025-08-06 DIAGNOSIS — R26.2 AMBULATORY DYSFUNCTION: Primary | ICD-10-CM

## 2025-08-14 ENCOUNTER — TELEPHONE (OUTPATIENT)
Age: 70
End: 2025-08-14

## (undated) DEVICE — VIAL DECANTER

## (undated) DEVICE — CATHETER URETHRAL FOLEY SILICONE OD16 FR 10 ML (10EA/CA)

## (undated) DEVICE — BLANKET WARMING UPPER BODY - (10/CA)

## (undated) DEVICE — PAD LAP STERILE 18 X 18 - (5/PK 40PK/CA)

## (undated) DEVICE — MEDI-VAC YANK SUCT HNDL W/TPRD BULBOUS TIP: Brand: CARDINAL HEALTH

## (undated) DEVICE — INTENDED FOR TISSUE SEPARATION, AND OTHER PROCEDURES THAT REQUIRE A SHARP SURGICAL BLADE TO PUNCTURE OR CUT.: Brand: BARD-PARKER SAFETY BLADES SIZE 15, STERILE

## (undated) DEVICE — CHLORAPREP HI-LITE 26ML ORANGE

## (undated) DEVICE — GLOVE SRG BIOGEL ORTHOPEDIC 7

## (undated) DEVICE — INTRO SHEATH PEEL AWAY 7FR

## (undated) DEVICE — RADIFOCUS GLIDEWIRE: Brand: GLIDEWIRE

## (undated) DEVICE — NEEDLE 25G X 1 1/2

## (undated) DEVICE — Device

## (undated) DEVICE — SURGICEL 2 X 3

## (undated) DEVICE — NEPTUNE 4 PORT MANIFOLD - (20/PK)

## (undated) DEVICE — KIT DRESSING WOUND VAC ABDOMEN W/TRAC PAD ABTHERA (5EA/CA)

## (undated) DEVICE — SUTURE 0 COATED VICRYL 6-18IN - (12PK/BX)

## (undated) DEVICE — TRAY SKIN SCRUB PVP WET (20EA/CA) PART #DYND70356 DISCONTINUED

## (undated) DEVICE — SUTURE 4-0 MONOCRYL PLUS PS-1 - 27 INCH (36/BX)

## (undated) DEVICE — CANISTER INFO VAC 1000ML (5EA/CA)

## (undated) DEVICE — STAPLER 75MM LINEAR OPEN (3EA/BX)

## (undated) DEVICE — BANDAID SHEER STRIP 3/4 IN (100EA/BX 12BX/CA)

## (undated) DEVICE — CANNULA W/SEAL 5X100 Z-THRE - ADED KII (12/BX)

## (undated) DEVICE — GLOVE BIOGEL SZ 8 SURGICAL PF LTX - (50PR/BX 4BX/CA)

## (undated) DEVICE — DRAPE LARGE 3 QUARTER - (20/CA)

## (undated) DEVICE — SET LEADWIRE 5 LEAD BEDSIDE DISPOSABLE ECG (1SET OF 5/EA)

## (undated) DEVICE — HEAD HOLDER JUNIOR/ADULT

## (undated) DEVICE — GLOVE BIOGEL INDICATOR SZ 7SURGICAL PF LTX - (50/BX 4BX/CA)

## (undated) DEVICE — TELFA NON-ADHERENT ABSORBENT DRESSING: Brand: TELFA

## (undated) DEVICE — GLOVE BIOGEL PI INDICATOR SZ 7.5 SURGICAL PF LF -(50/BX 4BX/CA)

## (undated) DEVICE — NEEDLE INSFL 120MM 14GA VRRS - (20/BX)

## (undated) DEVICE — SUTURE 1 SILK BLK BRD TIES 10-30 (12/BX)"

## (undated) DEVICE — SUTURE 3-0 ETHILON FS-1 - (36/BX) 30 INCH

## (undated) DEVICE — LIGACLIP MCA MULTIPLE CLIP APPLIERS, 20 SMALL CLIPS: Brand: LIGACLIP

## (undated) DEVICE — SUT SILK 2-0 SH 30 IN K833H

## (undated) DEVICE — GLOVE BIOGEL PI ORTHO SZ 6 SURGICAL PF LF (40PR/BX)

## (undated) DEVICE — PACK LAP CHOLE OR - (2EA/CA)

## (undated) DEVICE — 3M™ STERI-STRIP™ REINFORCED ADHESIVE SKIN CLOSURES, R1547, 1/2 IN X 4 IN (12 MM X 100 MM), 6 STRIPS/ENVELOPE: Brand: 3M™ STERI-STRIP™

## (undated) DEVICE — INTRO SHEATH PEEL AWAY 9 FR

## (undated) DEVICE — STANDARD SURGICAL GOWN, L: Brand: CONVERTORS

## (undated) DEVICE — INTENDED FOR TISSUE SEPARATION, AND OTHER PROCEDURES THAT REQUIRE A SHARP SURGICAL BLADE TO PUNCTURE OR CUT.: Brand: BARD-PARKER SAFETY BLADES SIZE 11, STERILE

## (undated) DEVICE — BOVIE  BLADE 6 EXTENDED - (50/PK)

## (undated) DEVICE — STAPLE 75MM LINEAR (12EA/BX)

## (undated) DEVICE — SPONGE DRAIN 4 X 4IN 6-PLY - (2/PK25PK/BX12BX/CS)

## (undated) DEVICE — KIT ANESTHESIA W/CIRCUIT & 3/LT BAG W/FILTER (20EA/CA)

## (undated) DEVICE — DRESSING LEUKOMED STERILE 11.75X4IN - (50/CA)

## (undated) DEVICE — TUBING CLEARLINK DUO-VENT - C-FLO (48EA/CA)

## (undated) DEVICE — TOWEL STOP TIMEOUT SAFETY FLAG (40EA/CA)

## (undated) DEVICE — GOWN SURGICAL XX-LARGE - (28EA/CA) SIRUS NON REINFORCED

## (undated) DEVICE — SUTURE 1 VICRYL PLUS CT-1 - 18 INCH (12/BX)

## (undated) DEVICE — ELECTRODE DUAL RETURN W/ CORD - (50/PK)

## (undated) DEVICE — BANDAID X-LARGE 2 X 4 IN LF (50EA/BX)

## (undated) DEVICE — GLOVE BIOGEL INDICATOR SZ 8 SURGICAL PF LTX - (50/BX 4BX/CA)

## (undated) DEVICE — TIBURON SPLIT SHEET: Brand: CONVERTORS

## (undated) DEVICE — 3M™ STERI-STRIP™ REINFORCED ADHESIVE SKIN CLOSURES, R1541, 1/4 IN X 3 IN (6 MM X 75 MM), 3 STRIPS/ENVELOPE: Brand: 3M™ STERI-STRIP™

## (undated) DEVICE — SPONGE GAUZESTER 4 X 4 4PLY - (128PK/CA)

## (undated) DEVICE — TUBING INSUFFLATION PNEUMOCLEAR HIGH-FLOW (10EA/BX)

## (undated) DEVICE — SYRINGE DISP. 12 CC LL - (100/BX)

## (undated) DEVICE — GLOVE BIOGEL PI INDICATOR SZ 8.0 SURGICAL PF LF -(50/BX 4BX/CA)

## (undated) DEVICE — PACK MINOR BASIN - (2EA/CA)

## (undated) DEVICE — SENSOR SPO2 NEO LNCS ADHESIVE (20/BX) SEE USER NOTES

## (undated) DEVICE — TOWELS CLOTH SURGICAL - (4/PK 20PK/CA)

## (undated) DEVICE — BAG DRAINAGE URINARY CLOSED 2000ML (20EA/CA)

## (undated) DEVICE — SODIUM CHL IRRIGATION 0.9% 1000ML (12EA/CA)

## (undated) DEVICE — SUT MONOCRYL 4-0 PS-2 18 IN Y496G

## (undated) DEVICE — STERILE 8 INCH PROCTO SWAB: Brand: CARDINAL HEALTH

## (undated) DEVICE — ELECTRODE BLADE MOD E-Z CLEAN  2.75IN 7CM -0012AM

## (undated) DEVICE — LIGASURE TISSUE FUSION  - SINGLE USE (6/CA)

## (undated) DEVICE — GLOVE BIOGEL PI INDICATOR SZ 6.5 SURGICAL PF LF - (50/BX 4BX/CA)

## (undated) DEVICE — SUTURE 3-0 SILK SH C/R 18 IN - (12/BX)

## (undated) DEVICE — SUCTION INSTRUMENT YANKAUER BULBOUS TIP W/O VENT (50EA/CA)

## (undated) DEVICE — PACK SINGLE BASIN - (6/CA)

## (undated) DEVICE — ELECTRODE 850 FOAM ADHESIVE - HYDROGEL RADIOTRNSPRNT (50/PK)

## (undated) DEVICE — PACK PBDS MAJOR GYN VAGINAL SLB

## (undated) DEVICE — SYRINGE 50ML CATHETER TIP (40EA/BX 4BX/CA)

## (undated) DEVICE — TUBE CONNECT SUCTION CLEAR 120 X 1/4" (50EA/CA)"

## (undated) DEVICE — LACTATED RINGERS INJ 1000 ML - (14EA/CA 60CA/PF)

## (undated) DEVICE — GLOVE SZ 7.5 BIOGEL PI MICRO - PF LF (50PR/BX)

## (undated) DEVICE — PACK MAJOR BASIN - (2EA/CA)

## (undated) DEVICE — SYRINGE 10ML LL

## (undated) DEVICE — PVC URETHRAL CATHETER: Brand: DOVER

## (undated) DEVICE — GLOVE BIOGEL INDICATOR SZ 7.5 SURGICAL PF LTX - (50PR/BX 4BX/CA)

## (undated) DEVICE — SUTURE 3-0 VICRYL PLUS SH - 27 INCH (36/BX)

## (undated) DEVICE — SUT SILK 3-0 18 IN A184H

## (undated) DEVICE — PACK UNIVERSAL NECK

## (undated) DEVICE — SYRINGE BULB 2 OZ

## (undated) DEVICE — GLOVE PI ULTRA TOUCH SZ.6.5

## (undated) DEVICE — SUCTION INSTRUMENT YANKAUER OPEN TIP W/O VENT (50EA/CA)

## (undated) DEVICE — GLOVE BIOGEL SZ 7 SURGICAL PF LTX - (50PR/BX 4BX/CA)

## (undated) DEVICE — SUTURE 1 PDS TP-1 54 (12EA/BX)"

## (undated) DEVICE — INTENDED FOR TISSUE SEPARATION, AND OTHER PROCEDURES THAT REQUIRE A SHARP SURGICAL BLADE TO PUNCTURE OR CUT.: Brand: BARD-PARKER ® CARBON RIB-BACK BLADES

## (undated) DEVICE — SET EXTENSION WITH 2 PORTS (48EA/CA) ***PART #2C8610 IS A SUBSTITUTE*****

## (undated) DEVICE — TROCAR 5X100 NON BLADED Z-TH - READ KII (6/BX)

## (undated) DEVICE — GLOVE BIOGEL SZ 7.5 SURGICAL PF LTX - (50PR/BX 4BX/CA)

## (undated) DEVICE — PROTECTOR ULNA NERVE - (36PR/CA)

## (undated) DEVICE — GLOVE INDICATOR PI UNDERGLOVE SZ 6.5 BLUE

## (undated) DEVICE — SUT VICRYL 3-0 SH 27 IN J416H

## (undated) DEVICE — CAUTERY TIP POLISHER: Brand: DEVON

## (undated) DEVICE — BIPOLAR CORD DISP

## (undated) DEVICE — DRAPE LAPAROTOMY T SHEET - (12EA/CA)

## (undated) DEVICE — STAPLER 60MM BLUE 3.5MM WITH - STAPLE (3EA/BX)

## (undated) DEVICE — PEN SKIN MARKER W/RULER - (50EA/BX)

## (undated) DEVICE — STAPLER SKIN DISP - (6/BX 10BX/CA) VISISTAT

## (undated) DEVICE — STAPLER 60MM ENDO SHORT ARTICULATING (3EA/BX)

## (undated) DEVICE — SUTURE 3-0 SILK SH 30 (36PK/BX)

## (undated) DEVICE — GOWN WARMING STANDARD FLEX - (30/CA)

## (undated) DEVICE — HEMOSTAT ARISTA PWD 3 GRAM - (5/CA)

## (undated) DEVICE — CANISTER SUCTION 3000ML MECHANICAL FILTER AUTO SHUTOFF MEDI-VAC NONSTERILE LF DISP  (40EA/CA)

## (undated) DEVICE — SUTURE GENERAL

## (undated) DEVICE — GLOVE, BIOGEL ECLIPSE, SZ 7.0, PF LTX (50/BX)

## (undated) DEVICE — BLADE SURGICAL #11 - (50/BX)

## (undated) DEVICE — STAPLE 60MM BLUE 3.5MM - ECHELON (12/BX)

## (undated) DEVICE — STAPLE 60MM WHTE 2.6MM - (12/BX) WAS PART #ECR60W

## (undated) DEVICE — SUTURE 3-0 VICRYL PLUS SH - 8X 18 INCH (12/BX)

## (undated) DEVICE — MASK ANESTHESIA ADULT  - (100/CA)

## (undated) DEVICE — NEEDLE 22 G X 1 1/2 SAFETY

## (undated) DEVICE — SUT SILK 3-0 SH 30 IN K832H

## (undated) DEVICE — GLOVE SRG LF STRL BGL SKNSNS 6.5 PF

## (undated) DEVICE — RELOAD WITH GRIPPING SURFACE TECHNOLOGY BLUE 60MM (12EA/BX)

## (undated) DEVICE — TROCAR 5X100 BLADED Z-THREAD - KII (6/BX)

## (undated) DEVICE — LIGHT HANDLE COVER SLEEVE DISP BLUE STELLAR

## (undated) DEVICE — SPONGE CHERRY 1/2IN

## (undated) DEVICE — GAUZE SPONGES,16 PLY: Brand: CURITY

## (undated) DEVICE — PENCIL ELECTROSURG E-Z CLEAN -0035H